# Patient Record
Sex: FEMALE | Race: WHITE | Employment: OTHER | URBAN - METROPOLITAN AREA
[De-identification: names, ages, dates, MRNs, and addresses within clinical notes are randomized per-mention and may not be internally consistent; named-entity substitution may affect disease eponyms.]

---

## 2017-01-03 ENCOUNTER — TELEPHONE (OUTPATIENT)
Dept: PULMONOLOGY | Age: 62
End: 2017-01-03

## 2017-03-01 ENCOUNTER — OFFICE VISIT (OUTPATIENT)
Dept: PULMONOLOGY | Age: 62
End: 2017-03-01

## 2017-03-01 VITALS
BODY MASS INDEX: 27.07 KG/M2 | DIASTOLIC BLOOD PRESSURE: 82 MMHG | HEIGHT: 63 IN | SYSTOLIC BLOOD PRESSURE: 104 MMHG | RESPIRATION RATE: 20 BRPM | HEART RATE: 90 BPM | TEMPERATURE: 98.5 F | OXYGEN SATURATION: 94 % | WEIGHT: 152.8 LBS

## 2017-03-01 DIAGNOSIS — J44.9 CHRONIC OBSTRUCTIVE PULMONARY DISEASE, UNSPECIFIED COPD TYPE (HCC): Primary | ICD-10-CM

## 2017-03-01 PROCEDURE — 99213 OFFICE O/P EST LOW 20 MIN: CPT | Performed by: INTERNAL MEDICINE

## 2017-03-01 RX ORDER — NICOTINE 21 MG/24HR
1 PATCH, TRANSDERMAL 24 HOURS TRANSDERMAL EVERY 24 HOURS
Qty: 30 PATCH | Refills: 3 | Status: ON HOLD | OUTPATIENT
Start: 2017-03-01 | End: 2018-05-22 | Stop reason: HOSPADM

## 2017-09-15 ENCOUNTER — HOSPITAL ENCOUNTER (OUTPATIENT)
Dept: OTHER | Age: 62
Discharge: HOME | End: 2017-09-15
Attending: PSYCHIATRY & NEUROLOGY | Admitting: PSYCHIATRY & NEUROLOGY

## 2017-09-15 LAB
A/G RATIO: 1.4 (ref 1.1–2.2)
ALBUMIN SERPL-MCNC: 4.1 G/DL (ref 3.4–5)
ALP BLD-CCNC: 97 U/L (ref 40–129)
ALT SERPL-CCNC: 19 U/L (ref 10–40)
ANION GAP SERPL CALCULATED.3IONS-SCNC: 13 MMOL/L (ref 3–16)
AST SERPL-CCNC: 27 U/L (ref 15–37)
BASOPHILS ABSOLUTE: 0.1 K/UL (ref 0–0.2)
BASOPHILS RELATIVE PERCENT: 0.9 %
BILIRUB SERPL-MCNC: 0.5 MG/DL (ref 0–1)
BUN BLDV-MCNC: 9 MG/DL (ref 7–20)
CALCIUM SERPL-MCNC: 9.2 MG/DL (ref 8.3–10.6)
CHLORIDE BLD-SCNC: 96 MMOL/L (ref 99–110)
CHOLESTEROL, TOTAL: 154 MG/DL (ref 0–199)
CO2: 23 MMOL/L (ref 21–32)
CREAT SERPL-MCNC: <0.5 MG/DL (ref 0.6–1.1)
EOSINOPHILS ABSOLUTE: 0.2 K/UL (ref 0–0.6)
EOSINOPHILS RELATIVE PERCENT: 1.8 %
GFR AFRICAN AMERICAN: >60
GFR NON-AFRICAN AMERICAN: >60
GLOBULIN: 3 G/DL
GLUCOSE BLD-MCNC: 118 MG/DL (ref 70–99)
HCT VFR BLD CALC: 44.1 % (ref 36–48)
HDLC SERPL-MCNC: 81 MG/DL (ref 40–60)
HEMOGLOBIN: 15.3 G/DL (ref 12–16)
LDL CHOLESTEROL CALCULATED: 64 MG/DL
LYMPHOCYTES ABSOLUTE: 1.7 K/UL (ref 1–5.1)
LYMPHOCYTES RELATIVE PERCENT: 18 %
MCH RBC QN AUTO: 32.2 PG (ref 26–34)
MCHC RBC AUTO-ENTMCNC: 34.7 G/DL (ref 31–36)
MCV RBC AUTO: 92.6 FL (ref 80–100)
MONOCYTES ABSOLUTE: 0.8 K/UL (ref 0–1.3)
MONOCYTES RELATIVE PERCENT: 8.6 %
NEUTROPHILS ABSOLUTE: 6.7 K/UL (ref 1.7–7.7)
NEUTROPHILS RELATIVE PERCENT: 70.7 %
PDW BLD-RTO: 12.9 % (ref 12.4–15.4)
PLATELET # BLD: 178 K/UL (ref 135–450)
PMV BLD AUTO: 7.4 FL (ref 5–10.5)
POTASSIUM SERPL-SCNC: 4.4 MMOL/L (ref 3.5–5.1)
RBC # BLD: 4.76 M/UL (ref 4–5.2)
SODIUM BLD-SCNC: 132 MMOL/L (ref 136–145)
TOTAL PROTEIN: 7.1 G/DL (ref 6.4–8.2)
TRIGL SERPL-MCNC: 44 MG/DL (ref 0–150)
TSH SERPL DL<=0.05 MIU/L-ACNC: 1.04 UIU/ML (ref 0.27–4.2)
VLDLC SERPL CALC-MCNC: 9 MG/DL
WBC # BLD: 9.5 K/UL (ref 4–11)

## 2017-09-16 LAB
ESTIMATED AVERAGE GLUCOSE: 108.3 MG/DL
HBA1C MFR BLD: 5.4 %

## 2018-02-27 ENCOUNTER — TELEPHONE (OUTPATIENT)
Dept: PULMONOLOGY | Age: 63
End: 2018-02-27

## 2018-04-03 ENCOUNTER — TELEPHONE (OUTPATIENT)
Dept: PULMONOLOGY | Age: 63
End: 2018-04-03

## 2018-05-20 PROBLEM — J44.1 COPD EXACERBATION (HCC): Status: ACTIVE | Noted: 2018-05-20

## 2018-05-21 PROBLEM — J96.01 ACUTE RESPIRATORY FAILURE WITH HYPOXIA (HCC): Status: ACTIVE | Noted: 2018-05-21

## 2018-06-01 ENCOUNTER — OFFICE VISIT (OUTPATIENT)
Dept: PULMONOLOGY | Age: 63
End: 2018-06-01

## 2018-06-01 VITALS
WEIGHT: 139 LBS | HEIGHT: 64 IN | BODY MASS INDEX: 23.73 KG/M2 | SYSTOLIC BLOOD PRESSURE: 114 MMHG | DIASTOLIC BLOOD PRESSURE: 72 MMHG | RESPIRATION RATE: 18 BRPM | TEMPERATURE: 97.6 F | OXYGEN SATURATION: 96 % | HEART RATE: 53 BPM

## 2018-06-01 DIAGNOSIS — J96.01 ACUTE RESPIRATORY FAILURE WITH HYPOXIA (HCC): ICD-10-CM

## 2018-06-01 DIAGNOSIS — J44.9 CHRONIC OBSTRUCTIVE PULMONARY DISEASE, UNSPECIFIED COPD TYPE (HCC): Primary | ICD-10-CM

## 2018-06-01 DIAGNOSIS — F17.210 NICOTINE DEPENDENCE, CIGARETTES, UNCOMPLICATED: ICD-10-CM

## 2018-06-01 PROCEDURE — 1111F DSCHRG MED/CURRENT MED MERGE: CPT | Performed by: INTERNAL MEDICINE

## 2018-06-01 PROCEDURE — 3017F COLORECTAL CA SCREEN DOC REV: CPT | Performed by: INTERNAL MEDICINE

## 2018-06-01 PROCEDURE — G8420 CALC BMI NORM PARAMETERS: HCPCS | Performed by: INTERNAL MEDICINE

## 2018-06-01 PROCEDURE — 99214 OFFICE O/P EST MOD 30 MIN: CPT | Performed by: INTERNAL MEDICINE

## 2018-06-01 PROCEDURE — 4004F PT TOBACCO SCREEN RCVD TLK: CPT | Performed by: INTERNAL MEDICINE

## 2018-06-01 PROCEDURE — G8926 SPIRO NO PERF OR DOC: HCPCS | Performed by: INTERNAL MEDICINE

## 2018-06-01 PROCEDURE — G0296 VISIT TO DETERM LDCT ELIG: HCPCS | Performed by: INTERNAL MEDICINE

## 2018-06-01 PROCEDURE — 3023F SPIROM DOC REV: CPT | Performed by: INTERNAL MEDICINE

## 2018-06-01 PROCEDURE — G8427 DOCREV CUR MEDS BY ELIG CLIN: HCPCS | Performed by: INTERNAL MEDICINE

## 2018-06-01 RX ORDER — ALBUTEROL SULFATE 2.5 MG/3ML
2.5 SOLUTION RESPIRATORY (INHALATION) EVERY 6 HOURS PRN
Qty: 120 EACH | Refills: 5 | Status: SHIPPED | OUTPATIENT
Start: 2018-06-01 | End: 2018-11-27 | Stop reason: SDUPTHER

## 2018-06-01 RX ORDER — NICOTINE 21 MG/24HR
1 PATCH, TRANSDERMAL 24 HOURS TRANSDERMAL EVERY 24 HOURS
Qty: 30 PATCH | Refills: 3 | Status: SHIPPED | OUTPATIENT
Start: 2018-06-01 | End: 2018-07-17 | Stop reason: SDUPTHER

## 2018-06-12 ENCOUNTER — TELEPHONE (OUTPATIENT)
Dept: PULMONOLOGY | Age: 63
End: 2018-06-12

## 2018-07-13 ENCOUNTER — HOSPITAL ENCOUNTER (OUTPATIENT)
Dept: PULMONOLOGY | Age: 63
Discharge: HOME | End: 2018-07-14
Attending: INTERNAL MEDICINE | Admitting: INTERNAL MEDICINE

## 2018-07-13 DIAGNOSIS — F17.200 NICOTINE DEPENDENCE, UNCOMPLICATED: ICD-10-CM

## 2018-07-13 DIAGNOSIS — F17.210 NICOTINE DEPENDENCE, CIGARETTES, UNCOMPLICATED: ICD-10-CM

## 2018-07-13 RX ORDER — ALBUTEROL SULFATE 2.5 MG/3ML
2.5 SOLUTION RESPIRATORY (INHALATION) ONCE
Status: COMPLETED | OUTPATIENT
Start: 2018-07-13 | End: 2018-07-13

## 2018-07-13 RX ADMIN — ALBUTEROL SULFATE 2.5 MG: 2.5 SOLUTION RESPIRATORY (INHALATION) at 11:55

## 2018-07-16 ENCOUNTER — TELEPHONE (OUTPATIENT)
Dept: CASE MANAGEMENT | Age: 63
End: 2018-07-16

## 2018-07-16 NOTE — TELEPHONE ENCOUNTER
Baseline lung screen on 7-13-18. LRAD4A Recommended LDCT in 3 months (10-13-18). Ordering office contacts pt with results. Per physician order, current smoker, 35 pack yr hx. Per OV note dated 6-1-18: Smoked 405years at 1ppd/Smoking < 1/2 PPD. Smoking cessation material mailed. 7-17-18 OV/review for next imaging.

## 2018-07-17 ENCOUNTER — OFFICE VISIT (OUTPATIENT)
Dept: PULMONOLOGY | Age: 63
End: 2018-07-17

## 2018-07-17 VITALS
BODY MASS INDEX: 24.13 KG/M2 | HEART RATE: 57 BPM | HEIGHT: 63 IN | WEIGHT: 136.2 LBS | DIASTOLIC BLOOD PRESSURE: 58 MMHG | RESPIRATION RATE: 20 BRPM | SYSTOLIC BLOOD PRESSURE: 112 MMHG | OXYGEN SATURATION: 93 %

## 2018-07-17 DIAGNOSIS — R91.1 PULMONARY NODULE: Primary | ICD-10-CM

## 2018-07-17 DIAGNOSIS — F17.200 TOBACCO USE DISORDER: ICD-10-CM

## 2018-07-17 DIAGNOSIS — J44.9 MODERATE COPD (CHRONIC OBSTRUCTIVE PULMONARY DISEASE) (HCC): ICD-10-CM

## 2018-07-17 PROCEDURE — 4004F PT TOBACCO SCREEN RCVD TLK: CPT | Performed by: INTERNAL MEDICINE

## 2018-07-17 PROCEDURE — G8420 CALC BMI NORM PARAMETERS: HCPCS | Performed by: INTERNAL MEDICINE

## 2018-07-17 PROCEDURE — 3023F SPIROM DOC REV: CPT | Performed by: INTERNAL MEDICINE

## 2018-07-17 PROCEDURE — 3017F COLORECTAL CA SCREEN DOC REV: CPT | Performed by: INTERNAL MEDICINE

## 2018-07-17 PROCEDURE — 99214 OFFICE O/P EST MOD 30 MIN: CPT | Performed by: INTERNAL MEDICINE

## 2018-07-17 PROCEDURE — G8427 DOCREV CUR MEDS BY ELIG CLIN: HCPCS | Performed by: INTERNAL MEDICINE

## 2018-07-17 PROCEDURE — G8926 SPIRO NO PERF OR DOC: HCPCS | Performed by: INTERNAL MEDICINE

## 2018-07-17 NOTE — PROGRESS NOTES
findings. Soft Tissues/Bones: No acute findings. No definitive osteolytic or   osteoblastic bone lesions.         7/13/18 new 7mm LLL nodule    ASSESSMENT:   New LLL 7mm lung nodule  Moderate COPD  Severe decrease in DLCO with advanced emphysema on Chest CT  Tobacco abuse    PLAN:   Repeat Chest CT in 3 months  Start Spiriva  She is using NRT patches intermittently  prn albuterol or albuterol nebs  F/u in 3 months  Repeat 6 MWT in 6 months for borderline hypoxia

## 2018-07-27 ENCOUNTER — TELEPHONE (OUTPATIENT)
Dept: CASE MANAGEMENT | Age: 63
End: 2018-07-27

## 2018-07-27 NOTE — TELEPHONE ENCOUNTER
7-13-18 baseline lung screen, LRAD4A    7-17-18 Pulm OV:Repeat Chest CT in 3 months    10-15-18 CT chest scheduled

## 2018-09-17 ENCOUNTER — TELEPHONE (OUTPATIENT)
Dept: PULMONOLOGY | Age: 63
End: 2018-09-17

## 2018-09-17 RX ORDER — PREDNISONE 10 MG/1
TABLET ORAL
Qty: 30 TABLET | Refills: 0 | Status: SHIPPED | OUTPATIENT
Start: 2018-09-17 | End: 2019-10-21

## 2018-09-17 RX ORDER — AZITHROMYCIN 250 MG/1
TABLET, FILM COATED ORAL
Qty: 1 PACKET | Refills: 0 | Status: SHIPPED | OUTPATIENT
Start: 2018-09-17 | End: 2018-09-21

## 2018-09-17 NOTE — TELEPHONE ENCOUNTER
Do you have the following symptoms? Shortness of Breath  Yes  Wheezing  No  Cough  Yes                  Cough Characteristics:                           Productive    Yes                           Sputum Color    Clear                           Hemoptysis   No                           Consistency of sputum   Thick     Fever:    Yes  Temp:not sure did not take but states she knows she had one  Chills/Sweats: Both  What other symptoms are you having?:  Fatigue, achy     How long have you had these symptoms? 4 days     Pharmacy: [facility]           Review medications and allergies: Allergies? Allergies   Allergen Reactions    Cephalosporins Shortness Of Breath    Pcn [Penicillins] Anaphylaxis and Hives                        Currently on Antibiotics? (Drug/Dose/Frequency and how long on?) No                   Systemic Steroids? (Drug/Dose/Frequency and how long on?) No      Last sick call taken on 6/12/18.   Meds prescribed were none    LOV: 7/17/18      ASSESSMENT:   · New LLL 7mm lung nodule  · Moderate COPD  · Severe decrease in DLCO with advanced emphysema on Chest CT  · Tobacco abuse     PLAN:   · Repeat Chest CT in 3 months  · Start Spiriva  · She is using NRT patches intermittently  · prn albuterol or albuterol nebs  · F/u in 3 months  · Repeat 6 MWT in 6 months for borderline hypoxia

## 2018-10-16 ENCOUNTER — TELEPHONE (OUTPATIENT)
Dept: PULMONOLOGY | Age: 63
End: 2018-10-16

## 2018-10-16 NOTE — TELEPHONE ENCOUNTER
Patient did not show for 3 month CT fua  with  on 10/16/18    Patient was also no show on: 4/3/18    LOV   ASSESSMENT: 7/17/18  · New LLL 7mm lung nodule  · Moderate COPD  · Severe decrease in DLCO with advanced emphysema on Chest CT  · Tobacco abuse     PLAN:   · Repeat Chest CT in 3 months  · Start Spiriva  · She is using NRT patches intermittently  · prn albuterol or albuterol nebs  · F/u in 3 months  · Repeat 6 MWT in 6 months for borderline hypoxia

## 2018-11-13 ENCOUNTER — TELEPHONE (OUTPATIENT)
Dept: PULMONOLOGY | Age: 63
End: 2018-11-13

## 2018-11-26 ENCOUNTER — TELEPHONE (OUTPATIENT)
Dept: CASE MANAGEMENT | Age: 63
End: 2018-11-26

## 2018-11-27 DIAGNOSIS — J44.9 CHRONIC OBSTRUCTIVE PULMONARY DISEASE, UNSPECIFIED COPD TYPE (HCC): ICD-10-CM

## 2018-11-27 RX ORDER — ALBUTEROL SULFATE 2.5 MG/3ML
SOLUTION RESPIRATORY (INHALATION)
Qty: 300 ML | Refills: 0 | Status: SHIPPED | OUTPATIENT
Start: 2018-11-27 | End: 2019-02-14 | Stop reason: SDUPTHER

## 2019-01-09 ENCOUNTER — TELEPHONE (OUTPATIENT)
Dept: PULMONOLOGY | Age: 64
End: 2019-01-09

## 2019-01-17 RX ORDER — TIOTROPIUM BROMIDE INHALATION SPRAY 3.12 UG/1
2 SPRAY, METERED RESPIRATORY (INHALATION) DAILY
Qty: 1 INHALER | Refills: 5 | Status: SHIPPED | OUTPATIENT
Start: 2019-01-17 | End: 2019-04-02

## 2019-02-14 DIAGNOSIS — J44.9 CHRONIC OBSTRUCTIVE PULMONARY DISEASE, UNSPECIFIED COPD TYPE (HCC): ICD-10-CM

## 2019-02-14 RX ORDER — ALBUTEROL SULFATE 2.5 MG/3ML
SOLUTION RESPIRATORY (INHALATION)
Qty: 360 VIAL | Refills: 5 | Status: SHIPPED | OUTPATIENT
Start: 2019-02-14 | End: 2019-04-02

## 2019-02-27 ENCOUNTER — TELEPHONE (OUTPATIENT)
Dept: PULMONOLOGY | Age: 64
End: 2019-02-27

## 2019-04-02 ENCOUNTER — TELEPHONE (OUTPATIENT)
Dept: PULMONOLOGY | Age: 64
End: 2019-04-02

## 2019-04-02 NOTE — TELEPHONE ENCOUNTER
Pt called to inform Dr. Sylvester Sensing that she is no longer using Albuterol neb treatments or the Spiriva Respimat inhaler. She said the Spiriva Respimat caused stomach upset and she just has not needed this inhaler or albuterol neb treatments for over 3 months. She is asking if we can remove these from her medication list.    OK to remove?     Last OV 7/17/18  ASSESSMENT:   · New LLL 7mm lung nodule  · Moderate COPD  · Severe decrease in DLCO with advanced emphysema on Chest CT  · Tobacco abuse     PLAN:   · Repeat Chest CT in 3 months  · Start Spiriva  · She is using NRT patches intermittently  · prn albuterol or albuterol nebs  · F/u in 3 months  · Repeat 6 MWT in 6 months for borderline hypoxia

## 2019-04-04 RX ORDER — ALBUTEROL SULFATE 90 UG/1
AEROSOL, METERED RESPIRATORY (INHALATION)
Qty: 3 INHALER | Refills: 1 | Status: ON HOLD | OUTPATIENT
Start: 2019-04-04 | End: 2020-02-07 | Stop reason: SDUPTHER

## 2019-08-28 ENCOUNTER — HOSPITAL ENCOUNTER (OUTPATIENT)
Age: 64
Discharge: HOME | End: 2019-08-28
Payer: MEDICARE

## 2019-08-28 ENCOUNTER — HOSPITAL ENCOUNTER (OUTPATIENT)
Dept: GENERAL RADIOLOGY | Age: 64
Discharge: HOME | End: 2019-08-28
Payer: MEDICARE

## 2019-08-28 DIAGNOSIS — M25.561 RIGHT KNEE PAIN, UNSPECIFIED CHRONICITY: ICD-10-CM

## 2019-08-28 DIAGNOSIS — M25.562 LEFT KNEE PAIN, UNSPECIFIED CHRONICITY: ICD-10-CM

## 2019-08-28 DIAGNOSIS — M47.816 LUMBAR SPONDYLOSIS: ICD-10-CM

## 2019-08-28 PROCEDURE — 73560 X-RAY EXAM OF KNEE 1 OR 2: CPT

## 2019-08-28 PROCEDURE — 72100 X-RAY EXAM L-S SPINE 2/3 VWS: CPT

## 2019-10-21 ENCOUNTER — HOSPITAL ENCOUNTER (EMERGENCY)
Age: 64
Discharge: HOME | End: 2019-10-21
Payer: MEDICARE

## 2019-10-21 ENCOUNTER — APPOINTMENT (OUTPATIENT)
Dept: GENERAL RADIOLOGY | Age: 64
End: 2019-10-21
Payer: MEDICARE

## 2019-10-21 VITALS
RESPIRATION RATE: 16 BRPM | SYSTOLIC BLOOD PRESSURE: 140 MMHG | OXYGEN SATURATION: 95 % | HEART RATE: 58 BPM | TEMPERATURE: 98.3 F | DIASTOLIC BLOOD PRESSURE: 76 MMHG | BODY MASS INDEX: 22.02 KG/M2 | WEIGHT: 129 LBS | HEIGHT: 64 IN

## 2019-10-21 DIAGNOSIS — M79.604 RIGHT LEG PAIN: Primary | ICD-10-CM

## 2019-10-21 PROCEDURE — 73590 X-RAY EXAM OF LOWER LEG: CPT

## 2019-10-21 PROCEDURE — 96372 THER/PROPH/DIAG INJ SC/IM: CPT

## 2019-10-21 PROCEDURE — 99283 EMERGENCY DEPT VISIT LOW MDM: CPT

## 2019-10-21 PROCEDURE — 6360000002 HC RX W HCPCS: Performed by: PHYSICIAN ASSISTANT

## 2019-10-21 RX ORDER — OXYCODONE HYDROCHLORIDE 10 MG/1
TABLET ORAL
COMMUNITY
Start: 2019-10-03 | End: 2019-10-21

## 2019-10-21 RX ORDER — HYDROXYZINE HYDROCHLORIDE 25 MG/1
TABLET, FILM COATED ORAL
Status: ON HOLD | COMMUNITY
Start: 2019-10-07 | End: 2020-01-31

## 2019-10-21 RX ADMIN — ENOXAPARIN SODIUM 60 MG: 60 INJECTION SUBCUTANEOUS at 15:51

## 2019-10-21 ASSESSMENT — ENCOUNTER SYMPTOMS
SHORTNESS OF BREATH: 0
COLOR CHANGE: 0

## 2019-10-21 ASSESSMENT — PAIN SCALES - GENERAL: PAINLEVEL_OUTOF10: 8

## 2019-10-21 ASSESSMENT — PAIN DESCRIPTION - PAIN TYPE: TYPE: ACUTE PAIN

## 2019-10-21 ASSESSMENT — PAIN DESCRIPTION - LOCATION: LOCATION: KNEE

## 2019-10-21 ASSESSMENT — PAIN DESCRIPTION - ORIENTATION: ORIENTATION: RIGHT

## 2019-10-29 ENCOUNTER — HOSPITAL ENCOUNTER (OUTPATIENT)
Dept: VASCULAR LAB | Age: 64
Discharge: HOME | End: 2019-10-29
Payer: MEDICARE

## 2019-10-29 DIAGNOSIS — M79.604 RIGHT LEG PAIN: ICD-10-CM

## 2019-10-29 PROCEDURE — 93971 EXTREMITY STUDY: CPT

## 2020-01-30 ENCOUNTER — HOSPITAL ENCOUNTER (INPATIENT)
Age: 65
LOS: 2 days | Discharge: HOME | DRG: 641 | End: 2020-02-01
Attending: EMERGENCY MEDICINE | Admitting: INTERNAL MEDICINE
Payer: MEDICARE

## 2020-01-30 ENCOUNTER — APPOINTMENT (OUTPATIENT)
Dept: GENERAL RADIOLOGY | Age: 65
DRG: 641 | End: 2020-01-30
Payer: MEDICARE

## 2020-01-30 PROBLEM — E87.1 HYPONATREMIA: Status: ACTIVE | Noted: 2020-01-30

## 2020-01-30 LAB
A/G RATIO: 1.5 (ref 1.1–2.2)
ALBUMIN SERPL-MCNC: 4 G/DL (ref 3.4–5)
ALBUMIN SERPL-MCNC: 4.1 G/DL (ref 3.4–5)
ALP BLD-CCNC: 89 U/L (ref 40–129)
ALT SERPL-CCNC: 22 U/L (ref 10–40)
ANION GAP SERPL CALCULATED.3IONS-SCNC: 13 MMOL/L (ref 3–16)
ANION GAP SERPL CALCULATED.3IONS-SCNC: 15 MMOL/L (ref 3–16)
AST SERPL-CCNC: 32 U/L (ref 15–37)
BACTERIA: ABNORMAL /HPF
BASOPHILS ABSOLUTE: 0 K/UL (ref 0–0.2)
BASOPHILS RELATIVE PERCENT: 0.6 %
BILIRUB SERPL-MCNC: 1 MG/DL (ref 0–1)
BILIRUBIN URINE: NEGATIVE
BLOOD, URINE: ABNORMAL
BUN BLDV-MCNC: 3 MG/DL (ref 7–20)
BUN BLDV-MCNC: <2 MG/DL (ref 7–20)
CALCIUM SERPL-MCNC: 9 MG/DL (ref 8.3–10.6)
CALCIUM SERPL-MCNC: 9.1 MG/DL (ref 8.3–10.6)
CHLORIDE BLD-SCNC: 83 MMOL/L (ref 99–110)
CHLORIDE BLD-SCNC: 89 MMOL/L (ref 99–110)
CHLORIDE URINE RANDOM: <20 MMOL/L
CLARITY: CLEAR
CO2: 21 MMOL/L (ref 21–32)
CO2: 23 MMOL/L (ref 21–32)
COLOR: ABNORMAL
CREAT SERPL-MCNC: <0.5 MG/DL (ref 0.6–1.2)
CREAT SERPL-MCNC: <0.5 MG/DL (ref 0.6–1.2)
EKG ATRIAL RATE: 58 BPM
EKG DIAGNOSIS: NORMAL
EKG P AXIS: 42 DEGREES
EKG P-R INTERVAL: 134 MS
EKG Q-T INTERVAL: 454 MS
EKG QRS DURATION: 76 MS
EKG QTC CALCULATION (BAZETT): 445 MS
EKG R AXIS: -65 DEGREES
EKG T AXIS: 67 DEGREES
EKG VENTRICULAR RATE: 58 BPM
EOSINOPHILS ABSOLUTE: 0 K/UL (ref 0–0.6)
EOSINOPHILS RELATIVE PERCENT: 0.2 %
EPITHELIAL CELLS, UA: ABNORMAL /HPF
GFR AFRICAN AMERICAN: >60
GFR AFRICAN AMERICAN: >60
GFR NON-AFRICAN AMERICAN: >60
GFR NON-AFRICAN AMERICAN: >60
GLOBULIN: 2.7 G/DL
GLUCOSE BLD-MCNC: 100 MG/DL (ref 70–99)
GLUCOSE BLD-MCNC: 150 MG/DL (ref 70–99)
GLUCOSE URINE: NEGATIVE MG/DL
HCT VFR BLD CALC: 41.1 % (ref 36–48)
HEMOGLOBIN: 14.3 G/DL (ref 12–16)
KETONES, URINE: NEGATIVE MG/DL
LEUKOCYTE ESTERASE, URINE: NEGATIVE
LYMPHOCYTES ABSOLUTE: 1.4 K/UL (ref 1–5.1)
LYMPHOCYTES RELATIVE PERCENT: 18.9 %
MAGNESIUM: 1.5 MG/DL (ref 1.8–2.4)
MCH RBC QN AUTO: 31.2 PG (ref 26–34)
MCHC RBC AUTO-ENTMCNC: 34.8 G/DL (ref 31–36)
MCV RBC AUTO: 89.8 FL (ref 80–100)
MICROSCOPIC EXAMINATION: YES
MONOCYTES ABSOLUTE: 0.5 K/UL (ref 0–1.3)
MONOCYTES RELATIVE PERCENT: 6.6 %
NEUTROPHILS ABSOLUTE: 5.5 K/UL (ref 1.7–7.7)
NEUTROPHILS RELATIVE PERCENT: 73.7 %
NITRITE, URINE: NEGATIVE
OSMOLALITY URINE: 111 MOSM/KG (ref 390–1070)
OSMOLALITY: 250 MOSM/KG (ref 280–301)
PDW BLD-RTO: 12.6 % (ref 12.4–15.4)
PH UA: 6 (ref 5–8)
PHOSPHORUS: 2.5 MG/DL (ref 2.5–4.9)
PLATELET # BLD: 230 K/UL (ref 135–450)
PMV BLD AUTO: 7.2 FL (ref 5–10.5)
POTASSIUM SERPL-SCNC: 3.2 MMOL/L (ref 3.5–5.1)
POTASSIUM SERPL-SCNC: 3.7 MMOL/L (ref 3.5–5.1)
POTASSIUM, UR: 12.6 MMOL/L
PROTEIN UA: NEGATIVE MG/DL
RBC # BLD: 4.57 M/UL (ref 4–5.2)
RBC UA: ABNORMAL /HPF (ref 0–2)
SODIUM BLD-SCNC: 121 MMOL/L (ref 136–145)
SODIUM BLD-SCNC: 123 MMOL/L (ref 136–145)
SODIUM URINE: <20 MMOL/L
SPECIFIC GRAVITY UA: <=1.005 (ref 1–1.03)
TOTAL PROTEIN: 6.8 G/DL (ref 6.4–8.2)
URINE REFLEX TO CULTURE: ABNORMAL
URINE TYPE: ABNORMAL
UROBILINOGEN, URINE: 0.2 E.U./DL
WBC # BLD: 7.5 K/UL (ref 4–11)
WBC UA: ABNORMAL /HPF (ref 0–5)

## 2020-01-30 PROCEDURE — 84300 ASSAY OF URINE SODIUM: CPT

## 2020-01-30 PROCEDURE — 83735 ASSAY OF MAGNESIUM: CPT

## 2020-01-30 PROCEDURE — 36415 COLL VENOUS BLD VENIPUNCTURE: CPT

## 2020-01-30 PROCEDURE — 6370000000 HC RX 637 (ALT 250 FOR IP): Performed by: NURSE PRACTITIONER

## 2020-01-30 PROCEDURE — 2060000000 HC ICU INTERMEDIATE R&B

## 2020-01-30 PROCEDURE — 71046 X-RAY EXAM CHEST 2 VIEWS: CPT

## 2020-01-30 PROCEDURE — 2580000003 HC RX 258: Performed by: NURSE PRACTITIONER

## 2020-01-30 PROCEDURE — 6360000002 HC RX W HCPCS: Performed by: NURSE PRACTITIONER

## 2020-01-30 PROCEDURE — 85025 COMPLETE CBC W/AUTO DIFF WBC: CPT

## 2020-01-30 PROCEDURE — 93010 ELECTROCARDIOGRAM REPORT: CPT | Performed by: INTERNAL MEDICINE

## 2020-01-30 PROCEDURE — 99285 EMERGENCY DEPT VISIT HI MDM: CPT

## 2020-01-30 PROCEDURE — 93005 ELECTROCARDIOGRAM TRACING: CPT | Performed by: NURSE PRACTITIONER

## 2020-01-30 PROCEDURE — 81001 URINALYSIS AUTO W/SCOPE: CPT

## 2020-01-30 PROCEDURE — 84133 ASSAY OF URINE POTASSIUM: CPT

## 2020-01-30 PROCEDURE — 82436 ASSAY OF URINE CHLORIDE: CPT

## 2020-01-30 PROCEDURE — 83935 ASSAY OF URINE OSMOLALITY: CPT

## 2020-01-30 PROCEDURE — 80053 COMPREHEN METABOLIC PANEL: CPT

## 2020-01-30 PROCEDURE — 83930 ASSAY OF BLOOD OSMOLALITY: CPT

## 2020-01-30 PROCEDURE — 96365 THER/PROPH/DIAG IV INF INIT: CPT

## 2020-01-30 RX ORDER — SODIUM CHLORIDE 0.9 % (FLUSH) 0.9 %
10 SYRINGE (ML) INJECTION PRN
Status: DISCONTINUED | OUTPATIENT
Start: 2020-01-30 | End: 2020-02-01 | Stop reason: HOSPADM

## 2020-01-30 RX ORDER — ALBUTEROL SULFATE 90 UG/1
2 AEROSOL, METERED RESPIRATORY (INHALATION) EVERY 6 HOURS PRN
Status: DISCONTINUED | OUTPATIENT
Start: 2020-01-30 | End: 2020-02-01 | Stop reason: HOSPADM

## 2020-01-30 RX ORDER — MAGNESIUM SULFATE 1 G/100ML
1 INJECTION INTRAVENOUS PRN
Status: DISCONTINUED | OUTPATIENT
Start: 2020-01-30 | End: 2020-02-01 | Stop reason: HOSPADM

## 2020-01-30 RX ORDER — MAGNESIUM SULFATE 1 G/100ML
1 INJECTION INTRAVENOUS ONCE
Status: COMPLETED | OUTPATIENT
Start: 2020-01-30 | End: 2020-01-30

## 2020-01-30 RX ORDER — SODIUM CHLORIDE 0.9 % (FLUSH) 0.9 %
10 SYRINGE (ML) INJECTION EVERY 12 HOURS SCHEDULED
Status: DISCONTINUED | OUTPATIENT
Start: 2020-01-30 | End: 2020-02-01 | Stop reason: HOSPADM

## 2020-01-30 RX ORDER — POTASSIUM CHLORIDE 7.45 MG/ML
10 INJECTION INTRAVENOUS PRN
Status: DISCONTINUED | OUTPATIENT
Start: 2020-01-30 | End: 2020-02-01 | Stop reason: HOSPADM

## 2020-01-30 RX ORDER — M-VIT,TX,IRON,MINS/CALC/FOLIC 27MG-0.4MG
1 TABLET ORAL DAILY
Status: DISCONTINUED | OUTPATIENT
Start: 2020-01-30 | End: 2020-02-01 | Stop reason: HOSPADM

## 2020-01-30 RX ORDER — NICOTINE 21 MG/24HR
1 PATCH, TRANSDERMAL 24 HOURS TRANSDERMAL DAILY
Status: DISCONTINUED | OUTPATIENT
Start: 2020-01-30 | End: 2020-02-01 | Stop reason: HOSPADM

## 2020-01-30 RX ORDER — POLYETHYLENE GLYCOL 3350 17 G/17G
17 POWDER, FOR SOLUTION ORAL DAILY PRN
Status: DISCONTINUED | OUTPATIENT
Start: 2020-01-30 | End: 2020-02-01 | Stop reason: HOSPADM

## 2020-01-30 RX ORDER — OXYCODONE HYDROCHLORIDE 5 MG/1
10 TABLET ORAL EVERY 6 HOURS PRN
Status: DISCONTINUED | OUTPATIENT
Start: 2020-01-30 | End: 2020-02-01 | Stop reason: HOSPADM

## 2020-01-30 RX ORDER — POTASSIUM CHLORIDE 20 MEQ/1
40 TABLET, EXTENDED RELEASE ORAL PRN
Status: DISCONTINUED | OUTPATIENT
Start: 2020-01-30 | End: 2020-02-01 | Stop reason: HOSPADM

## 2020-01-30 RX ORDER — ONDANSETRON 2 MG/ML
4 INJECTION INTRAMUSCULAR; INTRAVENOUS EVERY 6 HOURS PRN
Status: DISCONTINUED | OUTPATIENT
Start: 2020-01-30 | End: 2020-02-01 | Stop reason: HOSPADM

## 2020-01-30 RX ORDER — SODIUM CHLORIDE 9 MG/ML
INJECTION, SOLUTION INTRAVENOUS CONTINUOUS
Status: DISCONTINUED | OUTPATIENT
Start: 2020-01-30 | End: 2020-01-31

## 2020-01-30 RX ORDER — 0.9 % SODIUM CHLORIDE 0.9 %
500 INTRAVENOUS SOLUTION INTRAVENOUS ONCE
Status: COMPLETED | OUTPATIENT
Start: 2020-01-30 | End: 2020-01-30

## 2020-01-30 RX ORDER — POTASSIUM CHLORIDE 20 MEQ/1
40 TABLET, EXTENDED RELEASE ORAL ONCE
Status: COMPLETED | OUTPATIENT
Start: 2020-01-30 | End: 2020-01-30

## 2020-01-30 RX ADMIN — MAGNESIUM SULFATE HEPTAHYDRATE 1 G: 1 INJECTION, SOLUTION INTRAVENOUS at 13:57

## 2020-01-30 RX ADMIN — SODIUM CHLORIDE: 9 INJECTION, SOLUTION INTRAVENOUS at 20:49

## 2020-01-30 RX ADMIN — POTASSIUM CHLORIDE 40 MEQ: 1500 TABLET, EXTENDED RELEASE ORAL at 13:57

## 2020-01-30 RX ADMIN — Medication 10 ML: at 20:49

## 2020-01-30 RX ADMIN — SODIUM CHLORIDE 500 ML: 9 INJECTION, SOLUTION INTRAVENOUS at 13:57

## 2020-01-30 ASSESSMENT — ENCOUNTER SYMPTOMS
RHINORRHEA: 0
SHORTNESS OF BREATH: 0
ABDOMINAL PAIN: 0
COLOR CHANGE: 0
SORE THROAT: 0

## 2020-01-30 NOTE — ED PROVIDER NOTES
[facility]  [facility]  eMERGENCY dEPARTMENT eNCOUnter        Pt Name: Felipa Breen  MRN: [id number]  [place] 1955  Date of evaluation: 1/30/2020  Provider: JUAN Oneal - CNP  PCP: Alma Snow MD  ED Attending: No att. providers found    [facility]       Chief Complaint   Patient presents with    Fatigue     pt has been weak and dizzy. not feeling well.  states this has been going on for a week. states she feels \"high\". hx of copd       HISTORY OF PRESENT ILLNESS   (Location/Symptom, Timing/Onset, Context/Setting, Quality, Duration, Modifying Factors, Severity)  Note limiting factors. Felipa Breen is a 60 y.o. female for fatigue. Onset was for 1 week. \. Duration has been since the onset. Context includes daughter reports pt has had increased fatigue for while and states she feels like she is drugged. She has cut back on her pain meds thinking this was the issue but she is still fatigued. Alleviating factors include nothing. Aggravating factors include nothing. Pain is 0/10. nothing has been used for pain today. Nursing Notes were all reviewed and agreed with or any disagreements were addressed  in the HPI. REVIEW OF SYSTEMS  (2-9 systems for level 4, 10 or more for level 5)     Review of Systems   Constitutional: Positive for fatigue. Negative for fever. HENT: Negative for congestion, rhinorrhea and sore throat. Respiratory: Negative for shortness of breath. Cardiovascular: Negative for chest pain. Gastrointestinal: Negative for abdominal pain. Genitourinary: Negative for decreased urine volume and difficulty urinating. Musculoskeletal: Negative for arthralgias and myalgias. Skin: Negative for color change and rash. Neurological: Positive for dizziness. Negative for light-headedness. Psychiatric/Behavioral: Negative for agitation. All other systems reviewed and are negative. Positivesand Pertinent negatives as per HPI.   Except as noted Socioeconomic History    Marital status:      Spouse name: None    Number of children: 10    Years of education: None    Highest education level: None   Occupational History    None   Social Needs    Financial resource strain: None    Food insecurity:     Worry: None     Inability: None    Transportation needs:     Medical: None     Non-medical: None   Tobacco Use    Smoking status: Current Every Day Smoker     Packs/day: 0.50     Years: 35.00     Pack years: 17.50     Types: Cigarettes    Smokeless tobacco: Never Used    Tobacco comment: 7/17/18 currently 1/2 PPD   Substance and Sexual Activity    Alcohol use: No     Alcohol/week: 12.0 standard drinks     Types: 12 Cans of beer per week    Drug use: No    Sexual activity: Never   Lifestyle    Physical activity:     Days per week: None     Minutes per session: None    Stress: None   Relationships    Social connections:     Talks on phone: None     Gets together: None     Attends Hinduism service: None     Active member of club or organization: None     Attends meetings of clubs or organizations: None     Relationship status: None    Intimate partner violence:     Fear of current or ex partner: None     Emotionally abused: None     Physically abused: None     Forced sexual activity: None   Other Topics Concern    None   Social History Narrative    None       SCREENINGS    [place] Coma Scale  Eye Opening: Spontaneous  Best Verbal Response: Oriented  Best Motor Response: Obeys commands  [place]ster Coma Scale Score: 15        PHYSICAL EXAM    (up to 7 for level 4, 8 ormore for level 5)     ED Triage Vitals [01/30/20 1148]   BP Temp Temp Source Pulse Resp SpO2 Height Weight   (!) 150/78 99.3 °F (37.4 °C) Oral 66 20 94 % -- 130 lb (59 kg)       Physical Exam  Constitutional:       Appearance: She is well-developed. HENT:      Head: Normocephalic and atraumatic. Eyes:      Extraocular Movements: Extraocular movements intact.       Pupils: Pupils are equal, round, and reactive to light. Neck:      Musculoskeletal: Normal range of motion. Cardiovascular:      Rate and Rhythm: Normal rate. Pulmonary:      Effort: Pulmonary effort is normal. No respiratory distress. Abdominal:      General: There is no distension. Palpations: Abdomen is soft. Tenderness: There is no abdominal tenderness. Musculoskeletal: Normal range of motion. Skin:     General: Skin is warm and dry. Neurological:      General: No focal deficit present. Mental Status: She is alert and oriented to person, place, and time.          DIAGNOSTIC RESULTS   LABS:    Labs Reviewed   COMPREHENSIVE METABOLIC PANEL - Abnormal; Notable for the following components:       Result Value    Sodium 121 (*)     Potassium 3.2 (*)     Chloride 83 (*)     Glucose 100 (*)     BUN 3 (*)     CREATININE <0.5 (*)     All other components within normal limits    Narrative:     Performed at:  [place] [address],  [facility]   Phone [phone]   URINE RT REFLEX TO CULTURE - Abnormal; Notable for the following components:    Blood, Urine TRACE-INTACT (*)     All other components within normal limits    Narrative:     Performed at:  [place] [address],  [facility]   Phone [phone]   MICROSCOPIC URINALYSIS - Abnormal; Notable for the following components:    RBC, UA 3-5 (*)     Bacteria, UA 2+ (*)     All other components within normal limits    Narrative:     Performed at:  [facility]) [facility],  [employer]   Phone [phone]   MAGNESIUM - Abnormal; Notable for the following components:    Magnesium 1.50 (*)     All other components within normal limits    Narrative:     Performed at:  [place] [address],  [employer]   Phone [phone]   OSMOLALITY - Abnormal; Notable for the following components:    Osmolality 250 (*)     All other components within normal limits    Narrative:     Performed at:  [facility]) [place] [address],  [employer]   Phone [phone]   OSMOLALITY, URINE - Abnormal; Notable for the following components:    Osmolality, Ur 111 (*)     All other components within normal limits    Narrative:     Performed at:  [place] [address],  [employer] [facility]   Phone [phone]   BASIC METABOLIC PANEL W/ REFLEX TO MG FOR LOW K - Abnormal; Notable for the following components:    Potassium reflex Magnesium 3.4 (*)     Glucose 131 (*)     BUN 3 (*)     CREATININE <0.5 (*)     All other components within normal limits    Narrative:     Performed at:  [place] [address],  [employer] [facility]   Phone [phone]   RENAL FUNCTION PANEL - Abnormal; Notable for the following components:    Sodium 123 (*)     Chloride 89 (*)     Glucose 150 (*)     BUN <2 (*)     CREATININE <0.5 (*)     All other components within normal limits    Narrative:     Performed at:  [place] [address],  [employer] [employer]   Phone [phone]   RENAL FUNCTION PANEL - Abnormal; Notable for the following components:    Sodium 131 (*)     Chloride 98 (*)     CO2 20 (*)     Glucose 113 (*)     BUN 3 (*)     CREATININE <0.5 (*)     Phosphorus 2.2 (*)     All other components within normal limits    Narrative:     Performed at:  [place] [address],  [employer] [facility]   Phone [phone]   RENAL FUNCTION PANEL - Abnormal; Notable for the following components:    Sodium 129 (*)     Chloride 93 (*)     Glucose 108 (*)     BUN 4 (*)     CREATININE <0.5 (*)     All other components within normal limits    Narrative:     Performed at:  [facility]  [facility],  [place] [facility]   Phone [phone]   RENAL FUNCTION PANEL - Abnormal; Notable for the following components:    Sodium 131 (*)     Chloride 93 (*)     Glucose 116 (*)     BUN 6 (*)     Phosphorus 2.3 (*)     All other components within normal limits    Narrative:     Performed at:  [facility],  [employer] [facility]   Phone [phone]   RENAL FUNCTION PANEL - Abnormal; Notable for the following components:    Sodium 129 (*)     Chloride 95 (*)     Glucose 107 (*)     BUN 6 (*)     CREATININE <0.5 (*)     Phosphorus 2.4 (*)     All other components within normal limits    Narrative:     Performed at:  [facility],  [employer] [facility]   Phone [phone]   CBC WITH AUTO DIFFERENTIAL - Abnormal; Notable for the following components:    Lymphocytes Absolute 0.5 (*)     All other components within normal limits    Narrative:     Performed at:  [facility],  [employer] [facility]   Phone [phone]   RENAL FUNCTION PANEL - Abnormal; Notable for the following components:    Sodium 131 (*)     Chloride 94 (*)     Glucose 136 (*)     BUN 4 (*)     Phosphorus 2.2 (*)     All other components within normal limits    Narrative:     Performed at:  [facility],  [facility] [facility]   Phone [phone]   RENAL FUNCTION PANEL - Abnormal; Notable for the following components:    Sodium 133 (*)     Potassium 3.3 (*)     Chloride 95 (*)     BUN 4 (*)     All other components within normal limits    Narrative:     Performed at:  [facility],  [employer] [facility]   Phone [phone]   CBC WITH AUTO DIFFERENTIAL    Narrative:     Performed at:  [facility]) - [facility],  [facility]

## 2020-01-30 NOTE — ED NOTES
Report given PCU RN. Pt left ED in stable condition per w/c.       Sarah Lawson, LENNY  01/30/20 8947

## 2020-01-30 NOTE — ED PROVIDER NOTES
I independently examined and evaluated Isaias Pearson. In brief, 60 y.o. female presented with lightheadedness x many days. Denies vertigo. Not related to changes in posture. Denies chest pain, SOB. Focused exam is notable for 60 y.o. female, nontoxic, NAD. Mucus membranes are dry. - Alert and oriented to person, place, time, situation. - CN II-XII intact. - Full and symmetric strength bilateral upper and lower extremities. - Sensation intact to light touch in all extremities. - Normal rapidly alternating movements  - Normal finger to nose. Normal heel to shin. EKG interpreted by myself. Rate: 58  Rhythm: sinus neptali  Axis: -65  Intervals: within normal limits  ST Segments: non specific ST/T abnormality  Comparison: no prior  Impression: Sinus bradycardia with left axis deviation, cannot rule out anterior infarct, no prior for comparison         Labs notable for hyponatremia. Will characterize with serum osms, urine lytes. Suspect secondary to hypovolemia. Will obtain CXR. Anticipate admit. All diagnostic, treatment, and disposition decisions were made by myself in conjunction with the advanced practice provider. For all further details of the patient's emergency department visit, please see the advanced practice provider's documentation. Nuzhat Muñoz MD     This report has been produced using speech recognition software and may contain errors related to that system including errors in grammar, punctuation, and spelling, as well as words and phrases that may be inappropriate. If there are any questions or concerns please feel free to contact the dictating provider for clarification.       Nuzhat Muñoz MD  01/30/20 7922       Nuzhat Muñoz MD  01/30/20 4285

## 2020-01-31 ENCOUNTER — APPOINTMENT (OUTPATIENT)
Dept: GENERAL RADIOLOGY | Age: 65
DRG: 641 | End: 2020-01-31
Payer: MEDICARE

## 2020-01-31 ENCOUNTER — APPOINTMENT (OUTPATIENT)
Dept: CT IMAGING | Age: 65
DRG: 641 | End: 2020-01-31
Payer: MEDICARE

## 2020-01-31 LAB
ALBUMIN SERPL-MCNC: 3.5 G/DL (ref 3.4–5)
ALBUMIN SERPL-MCNC: 3.6 G/DL (ref 3.4–5)
ALBUMIN SERPL-MCNC: 3.7 G/DL (ref 3.4–5)
ANION GAP SERPL CALCULATED.3IONS-SCNC: 12 MMOL/L (ref 3–16)
ANION GAP SERPL CALCULATED.3IONS-SCNC: 13 MMOL/L (ref 3–16)
ANION GAP SERPL CALCULATED.3IONS-SCNC: 13 MMOL/L (ref 3–16)
ANION GAP SERPL CALCULATED.3IONS-SCNC: 14 MMOL/L (ref 3–16)
BUN BLDV-MCNC: 3 MG/DL (ref 7–20)
BUN BLDV-MCNC: 3 MG/DL (ref 7–20)
BUN BLDV-MCNC: 4 MG/DL (ref 7–20)
BUN BLDV-MCNC: 6 MG/DL (ref 7–20)
CALCIUM SERPL-MCNC: 8.4 MG/DL (ref 8.3–10.6)
CALCIUM SERPL-MCNC: 8.7 MG/DL (ref 8.3–10.6)
CALCIUM SERPL-MCNC: 8.8 MG/DL (ref 8.3–10.6)
CALCIUM SERPL-MCNC: 8.8 MG/DL (ref 8.3–10.6)
CHLORIDE BLD-SCNC: 101 MMOL/L (ref 99–110)
CHLORIDE BLD-SCNC: 93 MMOL/L (ref 99–110)
CHLORIDE BLD-SCNC: 93 MMOL/L (ref 99–110)
CHLORIDE BLD-SCNC: 98 MMOL/L (ref 99–110)
CO2: 20 MMOL/L (ref 21–32)
CO2: 23 MMOL/L (ref 21–32)
CO2: 23 MMOL/L (ref 21–32)
CO2: 24 MMOL/L (ref 21–32)
CREAT SERPL-MCNC: 0.6 MG/DL (ref 0.6–1.2)
CREAT SERPL-MCNC: <0.5 MG/DL (ref 0.6–1.2)
GFR AFRICAN AMERICAN: >60
GFR NON-AFRICAN AMERICAN: >60
GLUCOSE BLD-MCNC: 108 MG/DL (ref 70–99)
GLUCOSE BLD-MCNC: 113 MG/DL (ref 70–99)
GLUCOSE BLD-MCNC: 116 MG/DL (ref 70–99)
GLUCOSE BLD-MCNC: 131 MG/DL (ref 70–99)
HCT VFR BLD CALC: 41.3 % (ref 36–48)
HEMOGLOBIN: 14.5 G/DL (ref 12–16)
MAGNESIUM: 2 MG/DL (ref 1.8–2.4)
MCH RBC QN AUTO: 31.6 PG (ref 26–34)
MCHC RBC AUTO-ENTMCNC: 35.1 G/DL (ref 31–36)
MCV RBC AUTO: 90 FL (ref 80–100)
PDW BLD-RTO: 12.6 % (ref 12.4–15.4)
PHOSPHORUS: 2.2 MG/DL (ref 2.5–4.9)
PHOSPHORUS: 2.3 MG/DL (ref 2.5–4.9)
PHOSPHORUS: 2.5 MG/DL (ref 2.5–4.9)
PLATELET # BLD: 232 K/UL (ref 135–450)
PMV BLD AUTO: 7.1 FL (ref 5–10.5)
POTASSIUM REFLEX MAGNESIUM: 3.4 MMOL/L (ref 3.5–5.1)
POTASSIUM SERPL-SCNC: 3.6 MMOL/L (ref 3.5–5.1)
POTASSIUM SERPL-SCNC: 3.7 MMOL/L (ref 3.5–5.1)
POTASSIUM SERPL-SCNC: 3.9 MMOL/L (ref 3.5–5.1)
RBC # BLD: 4.58 M/UL (ref 4–5.2)
SODIUM BLD-SCNC: 129 MMOL/L (ref 136–145)
SODIUM BLD-SCNC: 131 MMOL/L (ref 136–145)
SODIUM BLD-SCNC: 131 MMOL/L (ref 136–145)
SODIUM BLD-SCNC: 136 MMOL/L (ref 136–145)
TSH REFLEX: 0.65 UIU/ML (ref 0.27–4.2)
WBC # BLD: 8.2 K/UL (ref 4–11)

## 2020-01-31 PROCEDURE — 71250 CT THORAX DX C-: CPT

## 2020-01-31 PROCEDURE — 90686 IIV4 VACC NO PRSV 0.5 ML IM: CPT | Performed by: INTERNAL MEDICINE

## 2020-01-31 PROCEDURE — 99232 SBSQ HOSP IP/OBS MODERATE 35: CPT | Performed by: INTERNAL MEDICINE

## 2020-01-31 PROCEDURE — 6370000000 HC RX 637 (ALT 250 FOR IP): Performed by: NURSE PRACTITIONER

## 2020-01-31 PROCEDURE — 6360000002 HC RX W HCPCS: Performed by: INTERNAL MEDICINE

## 2020-01-31 PROCEDURE — 2580000003 HC RX 258: Performed by: INTERNAL MEDICINE

## 2020-01-31 PROCEDURE — 6360000002 HC RX W HCPCS: Performed by: NURSE PRACTITIONER

## 2020-01-31 PROCEDURE — 84443 ASSAY THYROID STIM HORMONE: CPT

## 2020-01-31 PROCEDURE — 83735 ASSAY OF MAGNESIUM: CPT

## 2020-01-31 PROCEDURE — 2060000000 HC ICU INTERMEDIATE R&B

## 2020-01-31 PROCEDURE — 6370000000 HC RX 637 (ALT 250 FOR IP): Performed by: PHYSICIAN ASSISTANT

## 2020-01-31 PROCEDURE — G0008 ADMIN INFLUENZA VIRUS VAC: HCPCS | Performed by: INTERNAL MEDICINE

## 2020-01-31 PROCEDURE — 80069 RENAL FUNCTION PANEL: CPT

## 2020-01-31 PROCEDURE — 2580000003 HC RX 258: Performed by: NURSE PRACTITIONER

## 2020-01-31 PROCEDURE — 36415 COLL VENOUS BLD VENIPUNCTURE: CPT

## 2020-01-31 PROCEDURE — 85027 COMPLETE CBC AUTOMATED: CPT

## 2020-01-31 RX ORDER — RISPERIDONE 2 MG/1
4 TABLET, FILM COATED ORAL NIGHTLY
Status: DISCONTINUED | OUTPATIENT
Start: 2020-01-31 | End: 2020-02-01 | Stop reason: HOSPADM

## 2020-01-31 RX ORDER — DEXTROSE MONOHYDRATE 50 MG/ML
INJECTION, SOLUTION INTRAVENOUS ONCE
Status: COMPLETED | OUTPATIENT
Start: 2020-01-31 | End: 2020-01-31

## 2020-01-31 RX ORDER — DEXTROSE MONOHYDRATE 50 MG/ML
INJECTION, SOLUTION INTRAVENOUS CONTINUOUS
Status: ACTIVE | OUTPATIENT
Start: 2020-01-31 | End: 2020-01-31

## 2020-01-31 RX ORDER — HYDROXYZINE 50 MG/1
50 TABLET, FILM COATED ORAL EVERY 6 HOURS PRN
COMMUNITY
End: 2020-06-09

## 2020-01-31 RX ORDER — HYDROXYZINE PAMOATE 50 MG/1
50 CAPSULE ORAL EVERY 6 HOURS PRN
Status: DISCONTINUED | OUTPATIENT
Start: 2020-01-31 | End: 2020-02-01 | Stop reason: HOSPADM

## 2020-01-31 RX ORDER — PSEUDOEPHEDRINE HYDROCHLORIDE 30 MG/1
30 TABLET ORAL EVERY 4 HOURS PRN
Status: DISCONTINUED | OUTPATIENT
Start: 2020-01-31 | End: 2020-02-01 | Stop reason: HOSPADM

## 2020-01-31 RX ORDER — ESCITALOPRAM OXALATE 20 MG/1
20 TABLET ORAL NIGHTLY
COMMUNITY
End: 2022-04-06 | Stop reason: DRUGHIGH

## 2020-01-31 RX ORDER — ESCITALOPRAM OXALATE 10 MG/1
20 TABLET ORAL NIGHTLY
Status: DISCONTINUED | OUTPATIENT
Start: 2020-01-31 | End: 2020-02-01 | Stop reason: HOSPADM

## 2020-01-31 RX ORDER — RISPERIDONE 4 MG/1
4 TABLET, FILM COATED ORAL NIGHTLY
COMMUNITY
End: 2021-12-10

## 2020-01-31 RX ORDER — BACLOFEN 10 MG/1
10 TABLET ORAL 3 TIMES DAILY PRN
Status: DISCONTINUED | OUTPATIENT
Start: 2020-01-31 | End: 2020-02-01 | Stop reason: HOSPADM

## 2020-01-31 RX ORDER — DEXTROSE MONOHYDRATE 50 MG/ML
INJECTION, SOLUTION INTRAVENOUS CONTINUOUS
Status: DISCONTINUED | OUTPATIENT
Start: 2020-01-31 | End: 2020-01-31

## 2020-01-31 RX ORDER — KETOTIFEN FUMARATE 0.35 MG/ML
1 SOLUTION/ DROPS OPHTHALMIC 2 TIMES DAILY
COMMUNITY
End: 2020-06-09

## 2020-01-31 RX ORDER — NEOMYCIN SULFATE, POLYMYXIN B SULFATE AND HYDROCORTISONE 10; 3.5; 1 MG/ML; MG/ML; [USP'U]/ML
2 SUSPENSION/ DROPS AURICULAR (OTIC) 3 TIMES DAILY
COMMUNITY
End: 2020-06-09

## 2020-01-31 RX ORDER — BACLOFEN 10 MG/1
10 TABLET ORAL 3 TIMES DAILY PRN
Status: ON HOLD | COMMUNITY
End: 2020-02-07 | Stop reason: HOSPADM

## 2020-01-31 RX ADMIN — PSEUDOEPHEDRINE HCL 30 MG: 30 TABLET, FILM COATED ORAL at 20:40

## 2020-01-31 RX ADMIN — INFLUENZA A VIRUS A/BRISBANE/02/2018 IVR-190 (H1N1) ANTIGEN (PROPIOLACTONE INACTIVATED), INFLUENZA A VIRUS A/KANSAS/14/2017 X-327 (H3N2) ANTIGEN (PROPIOLACTONE INACTIVATED), INFLUENZA B VIRUS B/MARYLAND/15/2016 ANTIGEN (PROPIOLACTONE INACTIVATED), INFLUENZA B VIRUS B/PHUKET/3073/2013 BVR-1B ANTIGEN (PROPIOLACTONE INACTIVATED) 0.5 ML: 15; 15; 15; 15 INJECTION, SUSPENSION INTRAMUSCULAR at 10:14

## 2020-01-31 RX ADMIN — Medication 10 ML: at 20:41

## 2020-01-31 RX ADMIN — PSEUDOEPHEDRINE HCL 30 MG: 30 TABLET, FILM COATED ORAL at 15:56

## 2020-01-31 RX ADMIN — MULTIPLE VITAMINS W/ MINERALS TAB 1 TABLET: TAB at 10:14

## 2020-01-31 RX ADMIN — RISPERIDONE 4 MG: 2 TABLET ORAL at 20:40

## 2020-01-31 RX ADMIN — ESCITALOPRAM OXALATE 20 MG: 10 TABLET ORAL at 20:40

## 2020-01-31 RX ADMIN — DEXTROSE MONOHYDRATE: 50 INJECTION, SOLUTION INTRAVENOUS at 07:27

## 2020-01-31 RX ADMIN — DEXTROSE MONOHYDRATE: 50 INJECTION, SOLUTION INTRAVENOUS at 06:37

## 2020-01-31 RX ADMIN — OXYCODONE HYDROCHLORIDE 10 MG: 5 TABLET ORAL at 15:56

## 2020-01-31 RX ADMIN — ENOXAPARIN SODIUM 40 MG: 40 INJECTION SUBCUTANEOUS at 10:13

## 2020-01-31 ASSESSMENT — PAIN SCALES - GENERAL: PAINLEVEL_OUTOF10: 8

## 2020-01-31 NOTE — PROGRESS NOTES
Shift assessment complete as charted. VSS. NSR/SB on tele monitor. Lung sounds clear to auscultation. Denies pain at this time. Meds given as per MAR. Safety measures in place and will continue to monitor.

## 2020-01-31 NOTE — PROGRESS NOTES
Repeat sodium results of 123, within nephrology's parameters. 0.9 NS Infusing to Rt arm PIV. Will continue to monitor.

## 2020-01-31 NOTE — PROGRESS NOTES
Spoke with Dr. Tin Cary at this time, new orders received for one time bolus of D5W and then to start D5W gtt at 150ml/hr. See MAR for administrations.

## 2020-01-31 NOTE — H&P
Hospital Medicine History & Physical      PCP: Jack Gonsales MD    Date of Admission: 1/30/2020    Date of Service: Pt seen/examined on 1/30/2020    Chief Complaint: Dizziness      History Of Present Illness:    60 y.o. female who presented to the hospital with a chief complaint of dizziness and lethargy that is been going on for 4 weeks. The patient also reports increased fatigue and just feeling lifeless over the past couple of weeks. She endorses depression and states that she has not been eating very much. She has persistently felt dizzy and weak. Today her daughter convinced her to come to the emergency room to get evaluated. She denies any fevers or chills, denies any recent illness. She continues to smoke about half a pack of cigarettes daily. In the emergency department today she was found to have low sodium levels. She will be admitted for further medical therapy. Past Medical History:          Diagnosis Date    Angina pectoris (HCC)     Chronic pain     knees and lower back     COPD exacerbation ([facility] [address] 5/20/2018    Depression     Panic disorder     Pneumonia        Past Surgical History:        Procedure Laterality Date    CHOLECYSTECTOMY         Medications Prior to Admission:   Prior to Admission medications    Medication Sig Start Date End Date Taking? Authorizing Provider   hydrOXYzine (ATARAX) 25 MG tablet  10/7/19   Historical Provider, MD   albuterol sulfate HFA (VENTOLIN HFA) 108 (90 Base) MCG/ACT inhaler INHALE 2 PUFFS EVERY 6 HOURS AS NEEDED FOR WHEEZING OR SHORTNESS OF BREATH 4/4/19   Lida Zavala MD   oxyCODONE (ROXICODONE) 5 MG immediate release tablet Take 10 mg by mouth every 6 hours as needed for Pain. Anastasiia Scott     Historical Provider, MD   Multiple Vitamins-Minerals (CENTRUM SILVER 50+WOMEN PO) Take 1 tablet by mouth daily    Historical Provider, MD   risperiDONE (RISPERDAL) 2 MG tablet Take 2 mg by mouth daily    Historical Provider, MD   oxybutynin (DITROPAN) 5 MG tablet Take 5 mg by mouth 4 times daily    Historical Provider, MD   escitalopram (LEXAPRO) 20 MG tablet Take 20 mg by mouth daily     Historical Provider, MD       Allergies:  Cephalosporins and Pcn [penicillins]    Social History:    TOBACCO:   reports that she has been smoking cigarettes. She has a 17.50 pack-year smoking history. She has never used smokeless tobacco.  ETOH:   reports no history of alcohol use. Family History:        Problem Relation Age of Onset    COPD Mother     Hypertension Mother     Asthma Neg Hx     Cancer Neg Hx     Diabetes Neg Hx     Emphysema Neg Hx     Heart Failure Neg Hx        REVIEW OF SYSTEMS:   Constitutional:  Negative for fever,chills or night sweats  ENT:  Negative for rhinorrhea, epistaxis, hoarseness, sore throat. Respiratory: Negative for SOB or wheezing   Cardiovascular:   Negative for  chest pain, palpitations   Gastrointestinal:  Negative for nausea, vomiting, diarrhea  Genitourinary:  Negative for polyuria, dysuria   Hematologic/Lymphatic:  Negative for  bleeding tendency, easy bruising  Musculoskeletal:  Negative for myalgias and arthralgias  Neurologic:  Negative for  confusion,dysarthria. Skin:  Negative for itching,rash  Psychiatric:  Negative for depression,anxiety, agitation. Endocrine:  Negative for polydipsia,polyuria,heat /cold intolerance. PHYSICAL EXAM:  BP (!) 155/78   Pulse 59   Temp 98.9 °F (37.2 °C) (Oral)   Resp 18   Ht 5' 2\" (1.575 m)   Wt 126 lb (57.2 kg)   SpO2 96%   BMI 23.05 kg/m²   General appearance:  No apparent distress, appears stated age and cooperative. HEENT:  Normal cephalic, atraumatic without obvious deformity. Pupils equal, round, and reactive to light. Extra ocular muscles intact. Conjunctivae/corneas clear. Neck: Supple, with full range of motion. No jugular venous distention. Trachea midline.   Respiratory: Diminished breath sounds bilaterally, poor air movement  Cardiovascular:  Regular rate and rhythm with

## 2020-01-31 NOTE — PROGRESS NOTES
Bedside report and transfer of care given to [facility]. Pt awake in bed and denies needs.

## 2020-01-31 NOTE — PROGRESS NOTES
Morning lab work reviewed, Sodium level 136. Previous check @ ~20:00 was 123. Nephrology paged at this time to update them regarding change in level.

## 2020-01-31 NOTE — PROGRESS NOTES
RESPIRATORY THERAPY ASSESSMENT    Name:  Lili Field Record Number:  [id number]  Age: 60 y.o. Gender: female  : 1955  Today's Date:  2020  Room:  /0322-02    Assessment     Is the patient being admitted for a COPD or Asthma exacerbation? No   (If yes the patient will be seen every 4 hours for the first 24 hours and then reassessed)    Patient Admission Diagnosis      Allergies  Allergies   Allergen Reactions    Cephalosporins Shortness Of Breath    Pcn [Penicillins] Anaphylaxis and Hives       Minimum Predicted Vital Capacity:     748          Actual Vital Capacity:      n/a              Pulmonary History:COPD  Home Oxygen Therapy:  room air  Home Respiratory Therapy:Albuterol/Ipratropium Bromide MDI   Current Respiratory Therapy:  Albuterol MDI Q6 PRN           Respiratory Severity Index(RSI)   Patients with orders for inhalation medications, oxygen, or any therapeutic treatment modality will be placed on Respiratory Protocol. They will be assessed with the first treatment and at least every 72 hours thereafter. The following severity scale will be used to determine frequency of treatment intervention.     Smoking History: Pulmonary Disease or Smoking History, Greater than 15 pack year = 2    Social History  Social History     Tobacco Use    Smoking status: Current Every Day Smoker     Packs/day: 0.50     Years: 35.00     Pack years: 17.50     Types: Cigarettes    Smokeless tobacco: Never Used    Tobacco comment: 18 currently 1/2 PPD   Substance Use Topics    Alcohol use: No     Alcohol/week: 12.0 standard drinks     Types: 12 Cans of beer per week    Drug use: No       Recent Surgical History: None = 0  Past Surgical History  Past Surgical History:   Procedure Laterality Date    CHOLECYSTECTOMY         Level of Consciousness: Alert, Oriented, and Cooperative = 0    Level of Activity: Walking unassisted = 0    Respiratory Pattern: Regular Pattern; RR 8-20 = 0    Breath Sounds: Diminshed bilaterally and/or crackles = 2    Sputum   ,  ,    Cough: Strong, spontaneous, non-productive = 0    Vital Signs   BP (!) 145/70   Pulse 61   Temp 98.7 °F (37.1 °C) (Oral)   Resp 20   Ht 5' 2\" (1.575 m)   Wt 121 lb 11.2 oz (55.2 kg)   SpO2 97%   BMI 22.26 kg/m²   SPO2 (COPD values may differ): Greater than or equal to 92% on room air = 0    [place] Flow (asthma only): not applicable = 0    RSI: 0-4 = See once and convert to home regimen or discontinue        Plan       Goals: medication delivery    Patient/caregiver was educated on the proper method of use for Respiratory Care Devices:  Yes      Level of patient/caregiver understanding able to:   ? Verbalize understanding   ? Demonstrate understanding       ? Teach back        ? Needs reinforcement       ? No available caregiver               ? Other:     Response to education:  Very Good     Is patient being placed on Home Treatment Regimen? Yes     Does the patient have everything they need prior to discharge? NA     Comments: pt assessed and plan of care determined    Plan of Care: Albuterol MDI Q6 PRN     Electronically signed by Carlos Davis RCP on 1/31/2020 at 1:07 AM    Respiratory Protocol Guidelines     1. Assessment and treatment by Respiratory Therapy will be initiated for medication and therapeutic interventions upon initiation of aerosolized medication. 2. Physician will be contacted for respiratory rate (RR) greater than 35 breaths per minute. Therapy will be held for heart rate (HR) greater than 140 beats per minute, pending direction from physician. 3. Bronchodilators will be administered via Metered Dose Inhaler (MDI) with spacer when the following criteria are met:  a. Alert and cooperative     b. HR < 140 bpm  c. RR < 30 bpm                d. Can demonstrate a 2-3 second inspiratory hold  4.  Bronchodilators will be administered via Hand Held Nebulizer [facility]) to patients when ANY of the following criteria are met  a. Incognizant or uncooperative          b. Patients treated with HHN at Home        c. Unable to demonstrate proper use of MDI with spacer     d. RR > 30 bpm   5. Bronchodilators will be delivered via Metered Dose Inhaler (MDI), HHN, Aerogen to intubated patients on mechanical ventilation. 6. Inhalation medication orders will be delivered and/or substituted as outlined below. Aerosolized Medications Ordering and Administration Guidelines:    1. All Medications will be ordered by a physician, and their frequency and/or modality will be adjusted as defined by the patients Respiratory Severity Index (RSI) score. 2. If the patient does not have documented COPD, consider discontinuing anticholinergics when RSI is less than 9.  3. If the bronchospasm worsens (increased RSI), then the bronchodilator frequency can be increased to a maximum of every 4 hours. If greater than every 4 hours is required, the physician will be contacted. 4. If the bronchospasm improves, the frequency of the bronchodilator can be decreased, based on the patient's RSI, but not less than home treatment regimen frequency. 5. Bronchodilator(s) will be discontinued if patient has a RSI less than 9 and has received no scheduled or as needed treatment for 72  Hrs. Patients Ordered on a Mucolytic Agent:    1. Must always be administered with a bronchodilator. 2. Discontinue if patient experiences worsened bronchospasm, or secretions have lessened to the point that the patient is able to clear them with a cough. Anti-inflammatory and Combination Medications:    1. If the patient lacks prior history of lung disease, is not using inhaled anti-inflammatory medication at home, and lacks wheezing by examination or by history for at least 24 hours, contact physician for possible discontinuation.

## 2020-01-31 NOTE — CONSULTS
Pain..      Multiple Vitamins-Minerals (CENTRUM SILVER 50+WOMEN PO) Take 1 tablet by mouth daily      oxybutynin (DITROPAN) 5 MG tablet Take 5 mg by mouth 4 times daily         Allergies:  Cephalosporins and Pcn [penicillins]    Social History:    Social History     Socioeconomic History    Marital status:       Spouse name: Not on file    Number of children: 10    Years of education: Not on file    Highest education level: Not on file   Occupational History    Not on file   Social Needs    Financial resource strain: Not on file    Food insecurity:     Worry: Not on file     Inability: Not on file    Transportation needs:     Medical: Not on file     Non-medical: Not on file   Tobacco Use    Smoking status: Current Every Day Smoker     Packs/day: 0.50     Years: 35.00     Pack years: 17.50     Types: Cigarettes    Smokeless tobacco: Never Used    Tobacco comment: 7/17/18 currently 1/2 PPD   Substance and Sexual Activity    Alcohol use: No     Alcohol/week: 12.0 standard drinks     Types: 12 Cans of beer per week    Drug use: No    Sexual activity: Never   Lifestyle    Physical activity:     Days per week: Not on file     Minutes per session: Not on file    Stress: Not on file   Relationships    Social connections:     Talks on phone: Not on file     Gets together: Not on file     Attends Tenriism service: Not on file     Active member of club or organization: Not on file     Attends meetings of clubs or organizations: Not on file     Relationship status: Not on file    Intimate partner violence:     Fear of current or ex partner: Not on file     Emotionally abused: Not on file     Physically abused: Not on file     Forced sexual activity: Not on file   Other Topics Concern    Not on file   Social History Narrative    Not on file       Family History:   Family History   Problem Relation Age of Onset    COPD Mother     Hypertension Mother     Asthma Neg Hx     Cancer Neg Hx     follow-up    Plan  -Goal sodium of 128 to 129 by  1 p.m. on 1/31/2020 and further correction  -Given her history of smoking and pulmonary nodule, she needs to have further evaluation of her pulmonary lung nodule with CT scan of the chest  -Serial BMPs  -Fluid restriction 1500 mL/day eventually    Thank you for the consultation. Please do not hesitate to call with questions.     Gin Sifuentes  [facility]  Office: [phone]  Fax:    [phone]

## 2020-01-31 NOTE — PROGRESS NOTES
Progress Note    Admit Date:  1/30/2020    64F with depression, COPD, chronic pain presents with lightheadedness/dizziness and fatigue x 4 weeks. Very poor oral intake (potato chips and 6 cans of diet coke per day for the past 1 month). Reports no interest in eating- vague with any further details. Na 121 in the ER, admitted for further care    Subjective:  Ms. Kaia Torres she feels much better this morning. Ate breakfast.  Denies feeling dizzy    Objective:      /71   Pulse 61   Temp 98.1 °F (36.7 °C) (Oral)   Resp 18   Ht 5' 2\" (1.575 m)   Wt 121 lb 11.2 oz (55.2 kg)   SpO2 97%   BMI 22.26 kg/m²          Intake/Output Summary (Last 24 hours) at 1/31/2020 0943  Last data filed at 1/31/2020 0830  Gross per 24 hour   Intake 2429 ml   Output 2050 ml   Net 379 ml       Physical Exam:    Gen: Appears older than her stated age. No distress. Alert. Eyes: PERRL. No sclera icterus. No conjunctival injection. ENT: No discharge. Pharynx clear. Neck: No JVD. Trachea midline. Resp: No accessory muscle use. No crackles. No wheezes. No rhonchi. CV: Regular rate. Regular rhythm. No murmur. No rub. No edema. GI: Non-tender. Non-distended. Normal bowel sounds. Skin: Warm and dry. No nodule on exposed extremities. No rash on exposed extremities. M/S: No cyanosis. No joint deformity. No clubbing. Neuro: Awake. Grossly nonfocal    Psych: Oriented x 3. No anxiety or agitation. I Rebekah Ferraro have reviewed the chart on [facility] and personally interviewed and examined patient, reviewed the data (labs and imaging) and after discussion with my PA formulated the plan. Agree with note with the following edits. HPI:     I reviewed the patient's Past Medical History, Past Surgical History, Medications, and Allergies. No acute issues      General:  Elderly female, Awake, alert and oriented.  Appears to be not in any distress  Mucous Membranes:  Pink , anicteric  Neck: No JVD, no carotid bruit, no thyromegaly  Chest:  Clear to auscultation bilaterally, occasional basilar crackles  Cardiovascular:  RRR S1S2 heard, no murmurs or gallops  Abdomen:  Soft, undistended, non tender, no organomegaly, BS present  Extremities: No edema or cyanosis. Distal pulses well felt  Neurological : grossly normal                  Scheduled Meds:   therapeutic multivitamin-minerals  1 tablet Oral Daily    sodium chloride flush  10 mL Intravenous 2 times per day    enoxaparin  40 mg Subcutaneous Daily    nicotine  1 patch Transdermal Daily    influenza virus vaccine  0.5 mL Intramuscular Once       Continuous Infusions:   dextrose 150 mL/hr at 01/31/20 0727       PRN Meds:  albuterol sulfate HFA, oxyCODONE, sodium chloride flush, ondansetron, polyethylene glycol, magnesium sulfate, potassium chloride **OR** potassium alternative oral replacement **OR** potassium chloride      Data:  CBC:   Recent Labs     01/30/20  1256 01/31/20  0501   WBC 7.5 8.2   HGB 14.3 14.5   HCT 41.1 41.3   MCV 89.8 90.0    232     BMP:   Recent Labs     01/30/20 2015 01/31/20  0501 01/31/20  0814   * 136 131*   K 3.7 3.4* 3.6   CL 89* 101 98*   CO2 21 23 20*   PHOS 2.5  --  2.2*   BUN <2* 3* 3*   CREATININE <0.5* <0.5* <0.5*     LIVER PROFILE:   Recent Labs     01/30/20  1256   AST 32   ALT 22   BILITOT 1.0   ALKPHOS 89      RADIOLOGY  XR CHEST STANDARD (2 VW)   Final Result   1. Pulmonary emphysema with no acute infiltrate   2. Calcific coronary atherosclerosis         XR CHEST PORTABLE    (Results Pending)         Assessment/Plan:-    #Hyponatremia  - she reports very poor oral intake x 4 weeks (reports no interest in eating, denies n/v/d or pain). Her diet has consisted of potato chips and at least 72 oz of diet coke per day. - patient presented with Na of 121.   Placed on NS at 100 cc/hr and Na trended upward quickly to 136, fluids changed to D5 fluids now  - nephro c/s  - urine studies with urine sodium <20 and

## 2020-02-01 VITALS
WEIGHT: 126.19 LBS | RESPIRATION RATE: 18 BRPM | TEMPERATURE: 99.7 F | OXYGEN SATURATION: 92 % | SYSTOLIC BLOOD PRESSURE: 136 MMHG | DIASTOLIC BLOOD PRESSURE: 77 MMHG | HEART RATE: 95 BPM | BODY MASS INDEX: 23.22 KG/M2 | HEIGHT: 62 IN

## 2020-02-01 LAB
ALBUMIN SERPL-MCNC: 3.5 G/DL (ref 3.4–5)
ALBUMIN SERPL-MCNC: 3.7 G/DL (ref 3.4–5)
ALBUMIN SERPL-MCNC: 3.7 G/DL (ref 3.4–5)
ANION GAP SERPL CALCULATED.3IONS-SCNC: 13 MMOL/L (ref 3–16)
ANION GAP SERPL CALCULATED.3IONS-SCNC: 15 MMOL/L (ref 3–16)
ANION GAP SERPL CALCULATED.3IONS-SCNC: 9 MMOL/L (ref 3–16)
BASOPHILS ABSOLUTE: 0 K/UL (ref 0–0.2)
BASOPHILS RELATIVE PERCENT: 0.6 %
BUN BLDV-MCNC: 4 MG/DL (ref 7–20)
BUN BLDV-MCNC: 4 MG/DL (ref 7–20)
BUN BLDV-MCNC: 6 MG/DL (ref 7–20)
CALCIUM SERPL-MCNC: 8.7 MG/DL (ref 8.3–10.6)
CALCIUM SERPL-MCNC: 8.9 MG/DL (ref 8.3–10.6)
CALCIUM SERPL-MCNC: 8.9 MG/DL (ref 8.3–10.6)
CHLORIDE BLD-SCNC: 94 MMOL/L (ref 99–110)
CHLORIDE BLD-SCNC: 95 MMOL/L (ref 99–110)
CHLORIDE BLD-SCNC: 95 MMOL/L (ref 99–110)
CO2: 22 MMOL/L (ref 21–32)
CO2: 25 MMOL/L (ref 21–32)
CO2: 25 MMOL/L (ref 21–32)
CREAT SERPL-MCNC: 0.6 MG/DL (ref 0.6–1.2)
CREAT SERPL-MCNC: 0.6 MG/DL (ref 0.6–1.2)
CREAT SERPL-MCNC: <0.5 MG/DL (ref 0.6–1.2)
EOSINOPHILS ABSOLUTE: 0 K/UL (ref 0–0.6)
EOSINOPHILS RELATIVE PERCENT: 0.2 %
GFR AFRICAN AMERICAN: >60
GFR NON-AFRICAN AMERICAN: >60
GLUCOSE BLD-MCNC: 107 MG/DL (ref 70–99)
GLUCOSE BLD-MCNC: 136 MG/DL (ref 70–99)
GLUCOSE BLD-MCNC: 84 MG/DL (ref 70–99)
HCT VFR BLD CALC: 41.7 % (ref 36–48)
HEMOGLOBIN: 14.4 G/DL (ref 12–16)
LYMPHOCYTES ABSOLUTE: 0.5 K/UL (ref 1–5.1)
LYMPHOCYTES RELATIVE PERCENT: 8.1 %
MCH RBC QN AUTO: 31.4 PG (ref 26–34)
MCHC RBC AUTO-ENTMCNC: 34.4 G/DL (ref 31–36)
MCV RBC AUTO: 91.2 FL (ref 80–100)
MONOCYTES ABSOLUTE: 0.7 K/UL (ref 0–1.3)
MONOCYTES RELATIVE PERCENT: 12.2 %
NEUTROPHILS ABSOLUTE: 4.8 K/UL (ref 1.7–7.7)
NEUTROPHILS RELATIVE PERCENT: 78.9 %
PDW BLD-RTO: 12.8 % (ref 12.4–15.4)
PHOSPHORUS: 2.2 MG/DL (ref 2.5–4.9)
PHOSPHORUS: 2.4 MG/DL (ref 2.5–4.9)
PHOSPHORUS: 3.5 MG/DL (ref 2.5–4.9)
PLATELET # BLD: 178 K/UL (ref 135–450)
PMV BLD AUTO: 7.3 FL (ref 5–10.5)
POTASSIUM SERPL-SCNC: 3.3 MMOL/L (ref 3.5–5.1)
POTASSIUM SERPL-SCNC: 3.6 MMOL/L (ref 3.5–5.1)
POTASSIUM SERPL-SCNC: 4 MMOL/L (ref 3.5–5.1)
RBC # BLD: 4.58 M/UL (ref 4–5.2)
SODIUM BLD-SCNC: 129 MMOL/L (ref 136–145)
SODIUM BLD-SCNC: 131 MMOL/L (ref 136–145)
SODIUM BLD-SCNC: 133 MMOL/L (ref 136–145)
WBC # BLD: 6 K/UL (ref 4–11)

## 2020-02-01 PROCEDURE — 6370000000 HC RX 637 (ALT 250 FOR IP): Performed by: INTERNAL MEDICINE

## 2020-02-01 PROCEDURE — 80069 RENAL FUNCTION PANEL: CPT

## 2020-02-01 PROCEDURE — 85025 COMPLETE CBC W/AUTO DIFF WBC: CPT

## 2020-02-01 PROCEDURE — 36415 COLL VENOUS BLD VENIPUNCTURE: CPT

## 2020-02-01 PROCEDURE — 99238 HOSP IP/OBS DSCHRG MGMT 30/<: CPT | Performed by: INTERNAL MEDICINE

## 2020-02-01 PROCEDURE — 6360000002 HC RX W HCPCS: Performed by: NURSE PRACTITIONER

## 2020-02-01 PROCEDURE — 2580000003 HC RX 258: Performed by: NURSE PRACTITIONER

## 2020-02-01 PROCEDURE — 6370000000 HC RX 637 (ALT 250 FOR IP): Performed by: NURSE PRACTITIONER

## 2020-02-01 RX ORDER — AZITHROMYCIN 250 MG/1
500 TABLET, FILM COATED ORAL DAILY
Status: COMPLETED | OUTPATIENT
Start: 2020-02-01 | End: 2020-02-01

## 2020-02-01 RX ORDER — ACETAMINOPHEN 325 MG/1
650 TABLET ORAL EVERY 4 HOURS PRN
Status: DISCONTINUED | OUTPATIENT
Start: 2020-02-01 | End: 2020-02-01 | Stop reason: HOSPADM

## 2020-02-01 RX ORDER — AZITHROMYCIN 250 MG/1
250 TABLET, FILM COATED ORAL DAILY
Qty: 4 TABLET | Refills: 0 | Status: ON HOLD | OUTPATIENT
Start: 2020-02-01 | End: 2020-02-07 | Stop reason: HOSPADM

## 2020-02-01 RX ORDER — AZITHROMYCIN 250 MG/1
250 TABLET, FILM COATED ORAL DAILY
Status: DISCONTINUED | OUTPATIENT
Start: 2020-02-02 | End: 2020-02-01 | Stop reason: HOSPADM

## 2020-02-01 RX ADMIN — POTASSIUM CHLORIDE 40 MEQ: 1500 TABLET, EXTENDED RELEASE ORAL at 13:27

## 2020-02-01 RX ADMIN — ACETAMINOPHEN 650 MG: 325 TABLET, FILM COATED ORAL at 08:47

## 2020-02-01 RX ADMIN — OXYCODONE HYDROCHLORIDE 10 MG: 5 TABLET ORAL at 00:05

## 2020-02-01 RX ADMIN — MULTIPLE VITAMINS W/ MINERALS TAB 1 TABLET: TAB at 08:47

## 2020-02-01 RX ADMIN — Medication 10 ML: at 08:48

## 2020-02-01 RX ADMIN — AZITHROMYCIN MONOHYDRATE 500 MG: 250 TABLET ORAL at 08:47

## 2020-02-01 RX ADMIN — ENOXAPARIN SODIUM 40 MG: 40 INJECTION SUBCUTANEOUS at 08:47

## 2020-02-01 RX ADMIN — OXYCODONE HYDROCHLORIDE 10 MG: 5 TABLET ORAL at 08:47

## 2020-02-01 ASSESSMENT — PAIN SCALES - GENERAL
PAINLEVEL_OUTOF10: 6
PAINLEVEL_OUTOF10: 8
PAINLEVEL_OUTOF10: 7
PAINLEVEL_OUTOF10: 5

## 2020-02-01 ASSESSMENT — PAIN DESCRIPTION - ORIENTATION: ORIENTATION: MID

## 2020-02-01 ASSESSMENT — PAIN DESCRIPTION - LOCATION: LOCATION: HEAD;BACK

## 2020-02-01 ASSESSMENT — PAIN DESCRIPTION - DESCRIPTORS: DESCRIPTORS: ACHING

## 2020-02-01 ASSESSMENT — PAIN DESCRIPTION - PAIN TYPE: TYPE: ACUTE PAIN;CHRONIC PAIN

## 2020-02-01 NOTE — PROGRESS NOTES
Assessment complete. Vital signs stable on room air. Call light within reach. Allergy bands, ID band, and tele intact. Medications given per MAR. Denies any needs at this time. Will continue to monitor.

## 2020-02-01 NOTE — PROGRESS NOTES
Bedside report and transfer of care given to [facility], [address] [facility]. Pt awake in bed and denies needs.

## 2020-02-01 NOTE — CARE COORDINATION
DISCHARGE ORDER  Date/Time 2020 12:16 PM  Completed by: Margaret Mcmillan, Case Management    Patient Name: Nathaly Coe    : 1955  Admitting Diagnosis: Hyponatremia [E87.1]      Noted discharge order. Confirmed discharge plan with patient / family (pt): Yes    If pt confirmed DC plan does family need to be contacted by CM No if yes who______  Discharge Plan: reviewed chart. Met with pt. Plan home alone today. Pt active with [employer] for home O2, pt uses prn only. Pt declines any d/c needs at this time and family will provide transportation home per pt. Reviewed chart. Role of discharge planner explained and patient verbalized understanding. Discharge order is noted. Has Home O2 in place on admit:  Yes,uses prn only  Informed of need to bring portable home O2 tank on day of discharge for nursing to connect prior to leaving:   Not Indicated  Verbalized agreement/Understanding:   Not Indicated  Pt is being d/c'd to home today. Pt's O2 sats are 92% on RA. Discharge timeout done with [employer]. All discharge needs and concerns addressed.
[facility], uses prn only. Will need testing day of d/c. Follow. Explained Case Management role/services.

## 2020-02-01 NOTE — PROGRESS NOTES
Pt is lying in bed with their eyes closed. Respirations are easy and even. Call light within reach bed in lowest position with the wheels locked. Will continue to monitor.  Best Ar[facility]

## 2020-02-01 NOTE — PROGRESS NOTES
PRN oxycodone 10 mg given for knee pain. Pt rates pain 7/10. Pt assisted to bathroom and back. No other needs at this time. Call light within reach.

## 2020-02-01 NOTE — PROGRESS NOTES
Discharge paperwork and instructions reviewed with patient. Verbalized understanding. New medications reviewed. PIV and tele monitor removed. Pt belongings sent with pt. Stable condition at d/c.

## 2020-02-01 NOTE — DISCHARGE SUMMARY
Name:  Elijah Champagne  Room:  /3343-32  MRN:    [id number]    Discharge Summary      This discharge summary is in conjunction with a complete physical exam done on the day of discharge. Discharging Physician: Dr. Lesli Almendarez: 1/30/2020  Discharge:      HPI taken from admission H&P:      60 y.o. female who presented to the hospital with a chief complaint of dizziness and lethargy that is been going on for 4 weeks. The patient also reports increased fatigue and just feeling lifeless over the past couple of weeks. She endorses depression and states that she has not been eating very much. She has persistently felt dizzy and weak. Today her daughter convinced her to come to the emergency room to get evaluated. She denies any fevers or chills, denies any recent illness. She continues to smoke about half a pack of cigarettes daily. In the emergency department today she was found to have low sodium levels. She will be admitted for further medical therapy. Diagnoses this Admission and Hospital Course        #Hyponatremia  - she reported very poor oral intake x 4 weeks (reports no interest in eating, denies n/v/d or pain). Her diet has consisted of potato chips and at least 72 oz of diet coke per day. - patient presented with Na of 121. Placed on NS at 100 cc/hr and Na trended upward quickly to 136, fluids changed to D5 fluids now  - nephro c/s  - urine studies with urine sodium <20 and urine osmo low 111. Serum osmolality 250. Suspected related to polydipsia. Also on SSRI. CXR without abnormality noted   - TSH 0.65  - continued close monitoring of BMP   - Na trended 131, 1500cc fluid restriction continued at D/c    Acute Tracheobronchitis  - sent with Azithromycin     #Hypokalemia - Resolved  #Hypomagnesemia - Resp;elvin  - replaced     #COPD without exacerbation  - cont inhalers      # PULMONARY NODULE - 7 MM . FOUND LAST YEAR.  NEVER FOLLOWED UP  - OBTAIN CT CHEST FOR NEW HYPONATREMIA WORKUP     #Tobacco dependence  - 1/2 ppd, recommended cessation      #Depression  - lexapro and risperdal continued  - she f/w psychiatry  - ? Playing a role in her lack of oral intake     #Chronic pain   - f/w pain management    Procedures (Please Review Full Report for Details)  N/A    Consults    Nephrology    Physical Exam at Discharge:    /77   Pulse 95   Temp 99.7 °F (37.6 °C) (Oral)   Resp 18   Ht 5' 2\" (1.575 m)   Wt 126 lb 3 oz (57.2 kg)   SpO2 92%   BMI 23.08 kg/m²     General:  Elderly female, Awake, alert and oriented. Appears to be not in any distress  Mucous Membranes:  Pink , anicteric  Neck: No JVD, no carotid bruit, no thyromegaly  Chest:  Clear to auscultation bilaterally, occasional basilar crackles  Cardiovascular:  RRR S1S2 heard, no murmurs or gallops  Abdomen:  Soft, undistended, non tender, no organomegaly, BS present  Extremities: No edema or cyanosis. Distal pulses well felt  Neurological : grossly normal    CBC:   Recent Labs     01/30/20  1256 01/31/20  0501 02/01/20  0555   WBC 7.5 8.2 6.0   HGB 14.3 14.5 14.4   HCT 41.1 41.3 41.7   MCV 89.8 90.0 91.2    232 178     BMP:   Recent Labs     01/31/20  1819 01/31/20  2351 02/01/20  0555   * 129* 131*   K 3.9 4.0 3.6   CL 93* 95* 94*   CO2 24 25 22   PHOS 2.3* 2.4* 2.2*   BUN 6* 6* 4*   CREATININE 0.6 <0.5* 0.6     LIVER PROFILE:   Recent Labs     01/30/20  1256   AST 32   ALT 22   BILITOT 1.0   ALKPHOS 89     UA:  Recent Labs     01/30/20  1244   COLORU Straw   PHUR 6.0   WBCUA 3-5   RBCUA 3-5*   BACTERIA 2+*   CLARITYU Clear   SPECGRAV <=1.005   LEUKOCYTESUR Negative   UROBILINOGEN 0.2   BILIRUBINUR Negative   BLOODU TRACE-INTACT*   GLUCOSEU Negative     CULTURES  N/A    RADIOLOGY    CT CHEST WO CONTRAST 1/31/2020   Final Result   1. Left lower lobe pulmonary nodule is now confirmed to represent a partially   calcified granuloma. No additional follow-up of this nodule is necessary.    Resumption of screening may be considered. 2. Cluster of tree in bud nodularity in the superior segment of the right   lower lobe that is new. Recommend correlation with any acute pulmonary   symptoms. Pulmonary follow-up and consideration for repeat imaging in 3   months advised. 3. Moderate centrilobular emphysema. 4. Stable dilation of the ascending thoracic aorta. XR CHEST STANDARD (2 VW) 1/31/2020   Final Result   1. Pulmonary emphysema with no acute infiltrate   2. Calcific coronary atherosclerosis           Discharge Medications     Medication List      START taking these medications    azithromycin 250 MG tablet  Commonly known as:  Zithromax  Take 1 tablet by mouth daily        CONTINUE taking these medications    albuterol sulfate  (90 Base) MCG/ACT inhaler  Commonly known as:  Ventolin HFA  INHALE 2 PUFFS EVERY 6 HOURS AS NEEDED FOR WHEEZING OR SHORTNESS OF BREATH     baclofen 10 MG tablet  Commonly known as:  LIORESAL     CENTRUM SILVER 50+WOMEN PO     hydrOXYzine 50 MG tablet  Commonly known as:  ATARAX     ketotifen 0.025 % ophthalmic solution  Commonly known as:  ZADITOR     Lexapro 20 MG tablet  Generic drug:  escitalopram     neomycin-polymyxin-hydrocortisone 3.5-68479-2 otic suspension  Commonly known as:  CORTISPORIN     oxybutynin 5 MG tablet  Commonly known as:  DITROPAN     oxyCODONE 5 MG immediate release tablet  Commonly known as:  ROXICODONE     risperiDONE 4 MG tablet  Commonly known as:  RISPERDAL           Where to Get Your Medications      These medications were sent to [address] [place] [address] [phone] - F [phone]  [address] [place] [address], [address] [place] [address]    Phone:  [phone]   · azithromycin 250 MG tablet           Discharged in stable condition to home. Follow Up: Follow up with PCP in 1 week.          Nomi Rueda MD 2/10/2020 8:13 PM

## 2020-02-04 ENCOUNTER — APPOINTMENT (OUTPATIENT)
Dept: CT IMAGING | Age: 65
DRG: 193 | End: 2020-02-04
Payer: MEDICARE

## 2020-02-04 ENCOUNTER — APPOINTMENT (OUTPATIENT)
Dept: GENERAL RADIOLOGY | Age: 65
DRG: 193 | End: 2020-02-04
Payer: MEDICARE

## 2020-02-04 ENCOUNTER — HOSPITAL ENCOUNTER (INPATIENT)
Age: 65
LOS: 3 days | Discharge: HOME | DRG: 193 | End: 2020-02-07
Attending: EMERGENCY MEDICINE | Admitting: INTERNAL MEDICINE
Payer: MEDICARE

## 2020-02-04 PROBLEM — J96.22 ACUTE ON CHRONIC RESPIRATORY FAILURE WITH HYPOXIA AND HYPERCAPNIA (HCC): Status: ACTIVE | Noted: 2020-02-04

## 2020-02-04 PROBLEM — J96.21 ACUTE ON CHRONIC RESPIRATORY FAILURE WITH HYPOXIA AND HYPERCAPNIA (HCC): Status: ACTIVE | Noted: 2020-02-04

## 2020-02-04 LAB
A/G RATIO: 1.4 (ref 1.1–2.2)
ALBUMIN SERPL-MCNC: 3.8 G/DL (ref 3.4–5)
ALP BLD-CCNC: 84 U/L (ref 40–129)
ALT SERPL-CCNC: 23 U/L (ref 10–40)
ANION GAP SERPL CALCULATED.3IONS-SCNC: 13 MMOL/L (ref 3–16)
APTT: 30.3 SEC (ref 24.2–36.2)
AST SERPL-CCNC: 39 U/L (ref 15–37)
BACTERIA: ABNORMAL /HPF
BASE EXCESS VENOUS: 2.6 MMOL/L (ref -3–3)
BASOPHILS ABSOLUTE: 0 K/UL (ref 0–0.2)
BASOPHILS RELATIVE PERCENT: 0.5 %
BILIRUB SERPL-MCNC: 0.7 MG/DL (ref 0–1)
BILIRUBIN URINE: NEGATIVE
BLOOD, URINE: ABNORMAL
BUN BLDV-MCNC: 6 MG/DL (ref 7–20)
CALCIUM SERPL-MCNC: 8.9 MG/DL (ref 8.3–10.6)
CARBOXYHEMOGLOBIN: 2.8 % (ref 0–1.5)
CHLORIDE BLD-SCNC: 87 MMOL/L (ref 99–110)
CLARITY: CLEAR
CO2: 25 MMOL/L (ref 21–32)
COLOR: YELLOW
CREAT SERPL-MCNC: <0.5 MG/DL (ref 0.6–1.2)
EKG ATRIAL RATE: 70 BPM
EKG DIAGNOSIS: NORMAL
EKG P AXIS: 56 DEGREES
EKG P-R INTERVAL: 130 MS
EKG Q-T INTERVAL: 366 MS
EKG QRS DURATION: 68 MS
EKG QTC CALCULATION (BAZETT): 395 MS
EKG R AXIS: -37 DEGREES
EKG T AXIS: 43 DEGREES
EKG VENTRICULAR RATE: 70 BPM
EOSINOPHILS ABSOLUTE: 0 K/UL (ref 0–0.6)
EOSINOPHILS RELATIVE PERCENT: 0 %
EPITHELIAL CELLS, UA: ABNORMAL /HPF
GFR AFRICAN AMERICAN: >60
GFR NON-AFRICAN AMERICAN: >60
GLOBULIN: 2.8 G/DL
GLUCOSE BLD-MCNC: 103 MG/DL (ref 70–99)
GLUCOSE URINE: NEGATIVE MG/DL
HCO3 VENOUS: 26.7 MMOL/L (ref 23–29)
HCT VFR BLD CALC: 39.3 % (ref 36–48)
HEMOGLOBIN: 13.5 G/DL (ref 12–16)
INR BLD: 1.11 (ref 0.86–1.14)
KETONES, URINE: 15 MG/DL
LACTIC ACID, SEPSIS: 0.9 MMOL/L (ref 0.4–1.9)
LEUKOCYTE ESTERASE, URINE: ABNORMAL
LYMPHOCYTES ABSOLUTE: 0.7 K/UL (ref 1–5.1)
LYMPHOCYTES RELATIVE PERCENT: 7.5 %
MAGNESIUM: 1.5 MG/DL (ref 1.8–2.4)
MCH RBC QN AUTO: 30.9 PG (ref 26–34)
MCHC RBC AUTO-ENTMCNC: 34.5 G/DL (ref 31–36)
MCV RBC AUTO: 89.6 FL (ref 80–100)
METHEMOGLOBIN VENOUS: 0 %
MICROSCOPIC EXAMINATION: YES
MONOCYTES ABSOLUTE: 1 K/UL (ref 0–1.3)
MONOCYTES RELATIVE PERCENT: 10.7 %
NEUTROPHILS ABSOLUTE: 7.3 K/UL (ref 1.7–7.7)
NEUTROPHILS RELATIVE PERCENT: 81.3 %
NITRITE, URINE: NEGATIVE
O2 CONTENT, VEN: 15 VOL %
O2 SAT, VEN: 76 %
O2 THERAPY: ABNORMAL
PCO2, VEN: 39.7 MMHG (ref 40–50)
PDW BLD-RTO: 12.6 % (ref 12.4–15.4)
PH UA: 7 (ref 5–8)
PH VENOUS: 7.45 (ref 7.35–7.45)
PLATELET # BLD: 172 K/UL (ref 135–450)
PMV BLD AUTO: 7.3 FL (ref 5–10.5)
PO2, VEN: 39.2 MMHG (ref 25–40)
POTASSIUM REFLEX MAGNESIUM: 3.3 MMOL/L (ref 3.5–5.1)
PRO-BNP: 233 PG/ML (ref 0–124)
PROTEIN UA: ABNORMAL MG/DL
PROTHROMBIN TIME: 12.8 SEC (ref 10–13.2)
RAPID INFLUENZA  B AGN: NEGATIVE
RAPID INFLUENZA A AGN: NEGATIVE
RBC # BLD: 4.39 M/UL (ref 4–5.2)
RBC UA: ABNORMAL /HPF (ref 0–2)
SODIUM BLD-SCNC: 125 MMOL/L (ref 136–145)
SPECIFIC GRAVITY UA: 1.01 (ref 1–1.03)
TCO2 CALC VENOUS: 28 MMOL/L
TOTAL PROTEIN: 6.6 G/DL (ref 6.4–8.2)
TROPONIN: <0.01 NG/ML
URINE REFLEX TO CULTURE: YES
URINE TYPE: ABNORMAL
UROBILINOGEN, URINE: 0.2 E.U./DL
WBC # BLD: 9 K/UL (ref 4–11)
WBC UA: ABNORMAL /HPF (ref 0–5)

## 2020-02-04 PROCEDURE — 2580000003 HC RX 258: Performed by: EMERGENCY MEDICINE

## 2020-02-04 PROCEDURE — 6360000002 HC RX W HCPCS: Performed by: EMERGENCY MEDICINE

## 2020-02-04 PROCEDURE — 85730 THROMBOPLASTIN TIME PARTIAL: CPT

## 2020-02-04 PROCEDURE — 82803 BLOOD GASES ANY COMBINATION: CPT

## 2020-02-04 PROCEDURE — 36415 COLL VENOUS BLD VENIPUNCTURE: CPT

## 2020-02-04 PROCEDURE — 87086 URINE CULTURE/COLONY COUNT: CPT

## 2020-02-04 PROCEDURE — 2060000000 HC ICU INTERMEDIATE R&B

## 2020-02-04 PROCEDURE — 80053 COMPREHEN METABOLIC PANEL: CPT

## 2020-02-04 PROCEDURE — 81001 URINALYSIS AUTO W/SCOPE: CPT

## 2020-02-04 PROCEDURE — 83880 ASSAY OF NATRIURETIC PEPTIDE: CPT

## 2020-02-04 PROCEDURE — 83735 ASSAY OF MAGNESIUM: CPT

## 2020-02-04 PROCEDURE — 93010 ELECTROCARDIOGRAM REPORT: CPT | Performed by: INTERNAL MEDICINE

## 2020-02-04 PROCEDURE — 2500000003 HC RX 250 WO HCPCS: Performed by: EMERGENCY MEDICINE

## 2020-02-04 PROCEDURE — 84484 ASSAY OF TROPONIN QUANT: CPT

## 2020-02-04 PROCEDURE — 83605 ASSAY OF LACTIC ACID: CPT

## 2020-02-04 PROCEDURE — 96365 THER/PROPH/DIAG IV INF INIT: CPT

## 2020-02-04 PROCEDURE — 85025 COMPLETE CBC W/AUTO DIFF WBC: CPT

## 2020-02-04 PROCEDURE — 6370000000 HC RX 637 (ALT 250 FOR IP): Performed by: EMERGENCY MEDICINE

## 2020-02-04 PROCEDURE — 84145 PROCALCITONIN (PCT): CPT

## 2020-02-04 PROCEDURE — 87040 BLOOD CULTURE FOR BACTERIA: CPT

## 2020-02-04 PROCEDURE — 87804 INFLUENZA ASSAY W/OPTIC: CPT

## 2020-02-04 PROCEDURE — 70450 CT HEAD/BRAIN W/O DYE: CPT

## 2020-02-04 PROCEDURE — 85610 PROTHROMBIN TIME: CPT

## 2020-02-04 PROCEDURE — 93005 ELECTROCARDIOGRAM TRACING: CPT | Performed by: EMERGENCY MEDICINE

## 2020-02-04 PROCEDURE — 99285 EMERGENCY DEPT VISIT HI MDM: CPT

## 2020-02-04 PROCEDURE — 71046 X-RAY EXAM CHEST 2 VIEWS: CPT

## 2020-02-04 RX ORDER — ACETAMINOPHEN 325 MG/1
650 TABLET ORAL ONCE
Status: COMPLETED | OUTPATIENT
Start: 2020-02-04 | End: 2020-02-04

## 2020-02-04 RX ORDER — POTASSIUM CHLORIDE 7.45 MG/ML
10 INJECTION INTRAVENOUS
Status: COMPLETED | OUTPATIENT
Start: 2020-02-04 | End: 2020-02-05

## 2020-02-04 RX ORDER — 0.9 % SODIUM CHLORIDE 0.9 %
1000 INTRAVENOUS SOLUTION INTRAVENOUS ONCE
Status: COMPLETED | OUTPATIENT
Start: 2020-02-04 | End: 2020-02-04

## 2020-02-04 RX ADMIN — SODIUM CHLORIDE 1000 ML: 9 INJECTION, SOLUTION INTRAVENOUS at 22:34

## 2020-02-04 RX ADMIN — ACETAMINOPHEN 650 MG: 325 TABLET ORAL at 22:34

## 2020-02-04 RX ADMIN — AZTREONAM 1 G: 1 INJECTION, POWDER, LYOPHILIZED, FOR SOLUTION INTRAMUSCULAR; INTRAVENOUS at 22:35

## 2020-02-04 RX ADMIN — VANCOMYCIN HYDROCHLORIDE 1000 MG: 1 INJECTION, POWDER, LYOPHILIZED, FOR SOLUTION INTRAVENOUS at 21:45

## 2020-02-04 ASSESSMENT — PAIN SCALES - GENERAL: PAINLEVEL_OUTOF10: 0

## 2020-02-05 LAB
A/G RATIO: 1.1 (ref 1.1–2.2)
ALBUMIN SERPL-MCNC: 3.4 G/DL (ref 3.4–5)
ALP BLD-CCNC: 84 U/L (ref 40–129)
ALT SERPL-CCNC: 23 U/L (ref 10–40)
ANION GAP SERPL CALCULATED.3IONS-SCNC: 12 MMOL/L (ref 3–16)
ANION GAP SERPL CALCULATED.3IONS-SCNC: 13 MMOL/L (ref 3–16)
AST SERPL-CCNC: 38 U/L (ref 15–37)
BILIRUB SERPL-MCNC: 0.7 MG/DL (ref 0–1)
BUN BLDV-MCNC: 4 MG/DL (ref 7–20)
BUN BLDV-MCNC: 8 MG/DL (ref 7–20)
CALCIUM SERPL-MCNC: 8.7 MG/DL (ref 8.3–10.6)
CALCIUM SERPL-MCNC: 8.8 MG/DL (ref 8.3–10.6)
CHLORIDE BLD-SCNC: 92 MMOL/L (ref 99–110)
CHLORIDE BLD-SCNC: 94 MMOL/L (ref 99–110)
CO2: 24 MMOL/L (ref 21–32)
CO2: 24 MMOL/L (ref 21–32)
CREAT SERPL-MCNC: 0.6 MG/DL (ref 0.6–1.2)
CREAT SERPL-MCNC: <0.5 MG/DL (ref 0.6–1.2)
GFR AFRICAN AMERICAN: >60
GFR AFRICAN AMERICAN: >60
GFR NON-AFRICAN AMERICAN: >60
GFR NON-AFRICAN AMERICAN: >60
GLOBULIN: 3 G/DL
GLUCOSE BLD-MCNC: 287 MG/DL (ref 70–99)
GLUCOSE BLD-MCNC: 97 MG/DL (ref 70–99)
MAGNESIUM: 2 MG/DL (ref 1.8–2.4)
POTASSIUM REFLEX MAGNESIUM: 3.4 MMOL/L (ref 3.5–5.1)
POTASSIUM REFLEX MAGNESIUM: 3.8 MMOL/L (ref 3.5–5.1)
PROCALCITONIN: 0.09 NG/ML (ref 0–0.15)
SODIUM BLD-SCNC: 128 MMOL/L (ref 136–145)
SODIUM BLD-SCNC: 131 MMOL/L (ref 136–145)
TOTAL PROTEIN: 6.4 G/DL (ref 6.4–8.2)

## 2020-02-05 PROCEDURE — 2500000003 HC RX 250 WO HCPCS: Performed by: INTERNAL MEDICINE

## 2020-02-05 PROCEDURE — 83735 ASSAY OF MAGNESIUM: CPT

## 2020-02-05 PROCEDURE — 80053 COMPREHEN METABOLIC PANEL: CPT

## 2020-02-05 PROCEDURE — 6360000002 HC RX W HCPCS: Performed by: INTERNAL MEDICINE

## 2020-02-05 PROCEDURE — 92526 ORAL FUNCTION THERAPY: CPT

## 2020-02-05 PROCEDURE — 94761 N-INVAS EAR/PLS OXIMETRY MLT: CPT

## 2020-02-05 PROCEDURE — 2700000000 HC OXYGEN THERAPY PER DAY

## 2020-02-05 PROCEDURE — 6370000000 HC RX 637 (ALT 250 FOR IP): Performed by: INTERNAL MEDICINE

## 2020-02-05 PROCEDURE — 87449 NOS EACH ORGANISM AG IA: CPT

## 2020-02-05 PROCEDURE — 87641 MR-STAPH DNA AMP PROBE: CPT

## 2020-02-05 PROCEDURE — 94640 AIRWAY INHALATION TREATMENT: CPT

## 2020-02-05 PROCEDURE — 36415 COLL VENOUS BLD VENIPUNCTURE: CPT

## 2020-02-05 PROCEDURE — 2580000003 HC RX 258: Performed by: INTERNAL MEDICINE

## 2020-02-05 PROCEDURE — 2060000000 HC ICU INTERMEDIATE R&B

## 2020-02-05 PROCEDURE — 92610 EVALUATE SWALLOWING FUNCTION: CPT

## 2020-02-05 PROCEDURE — 99223 1ST HOSP IP/OBS HIGH 75: CPT | Performed by: INTERNAL MEDICINE

## 2020-02-05 RX ORDER — ACETAMINOPHEN 325 MG/1
650 TABLET ORAL EVERY 4 HOURS PRN
Status: DISCONTINUED | OUTPATIENT
Start: 2020-02-05 | End: 2020-02-07 | Stop reason: HOSPADM

## 2020-02-05 RX ORDER — SODIUM CHLORIDE 9 MG/ML
INJECTION, SOLUTION INTRAVENOUS
Status: DISPENSED
Start: 2020-02-05 | End: 2020-02-05

## 2020-02-05 RX ORDER — SODIUM CHLORIDE 9 MG/ML
INJECTION, SOLUTION INTRAVENOUS CONTINUOUS
Status: DISCONTINUED | OUTPATIENT
Start: 2020-02-05 | End: 2020-02-05

## 2020-02-05 RX ORDER — KETOTIFEN FUMARATE 0.35 MG/ML
1 SOLUTION/ DROPS OPHTHALMIC 2 TIMES DAILY
Status: DISCONTINUED | OUTPATIENT
Start: 2020-02-05 | End: 2020-02-07 | Stop reason: HOSPADM

## 2020-02-05 RX ORDER — ONDANSETRON 2 MG/ML
4 INJECTION INTRAMUSCULAR; INTRAVENOUS EVERY 6 HOURS PRN
Status: DISCONTINUED | OUTPATIENT
Start: 2020-02-05 | End: 2020-02-07 | Stop reason: HOSPADM

## 2020-02-05 RX ORDER — LEVOFLOXACIN 5 MG/ML
750 INJECTION, SOLUTION INTRAVENOUS EVERY 24 HOURS
Status: DISCONTINUED | OUTPATIENT
Start: 2020-02-05 | End: 2020-02-07 | Stop reason: HOSPADM

## 2020-02-05 RX ORDER — OXYBUTYNIN CHLORIDE 5 MG/1
5 TABLET ORAL 4 TIMES DAILY
Status: DISCONTINUED | OUTPATIENT
Start: 2020-02-05 | End: 2020-02-07 | Stop reason: HOSPADM

## 2020-02-05 RX ORDER — PREDNISONE 20 MG/1
40 TABLET ORAL DAILY
Status: DISCONTINUED | OUTPATIENT
Start: 2020-02-07 | End: 2020-02-06

## 2020-02-05 RX ORDER — ALBUTEROL SULFATE 2.5 MG/3ML
2.5 SOLUTION RESPIRATORY (INHALATION)
Status: DISCONTINUED | OUTPATIENT
Start: 2020-02-05 | End: 2020-02-07 | Stop reason: HOSPADM

## 2020-02-05 RX ORDER — NICOTINE 21 MG/24HR
1 PATCH, TRANSDERMAL 24 HOURS TRANSDERMAL DAILY
Status: DISCONTINUED | OUTPATIENT
Start: 2020-02-05 | End: 2020-02-07 | Stop reason: HOSPADM

## 2020-02-05 RX ORDER — METHYLPREDNISOLONE SODIUM SUCCINATE 40 MG/ML
40 INJECTION, POWDER, LYOPHILIZED, FOR SOLUTION INTRAMUSCULAR; INTRAVENOUS EVERY 12 HOURS
Status: DISCONTINUED | OUTPATIENT
Start: 2020-02-05 | End: 2020-02-06

## 2020-02-05 RX ORDER — KETOTIFEN FUMARATE 0.35 MG/ML
1 SOLUTION/ DROPS OPHTHALMIC 2 TIMES DAILY
Status: DISCONTINUED | OUTPATIENT
Start: 2020-02-05 | End: 2020-02-05 | Stop reason: SDUPTHER

## 2020-02-05 RX ORDER — IPRATROPIUM BROMIDE AND ALBUTEROL SULFATE 2.5; .5 MG/3ML; MG/3ML
1 SOLUTION RESPIRATORY (INHALATION)
Status: DISCONTINUED | OUTPATIENT
Start: 2020-02-05 | End: 2020-02-07 | Stop reason: HOSPADM

## 2020-02-05 RX ORDER — IPRATROPIUM BROMIDE AND ALBUTEROL SULFATE 2.5; .5 MG/3ML; MG/3ML
1 SOLUTION RESPIRATORY (INHALATION)
Status: DISCONTINUED | OUTPATIENT
Start: 2020-02-05 | End: 2020-02-05

## 2020-02-05 RX ORDER — POTASSIUM CHLORIDE 20 MEQ/1
40 TABLET, EXTENDED RELEASE ORAL 2 TIMES DAILY WITH MEALS
Status: COMPLETED | OUTPATIENT
Start: 2020-02-05 | End: 2020-02-06

## 2020-02-05 RX ORDER — HYDROXYZINE HYDROCHLORIDE 10 MG/1
25 TABLET, FILM COATED ORAL EVERY 6 HOURS PRN
Status: DISCONTINUED | OUTPATIENT
Start: 2020-02-05 | End: 2020-02-07 | Stop reason: HOSPADM

## 2020-02-05 RX ORDER — OXYCODONE HYDROCHLORIDE 5 MG/1
5 TABLET ORAL ONCE
Status: COMPLETED | OUTPATIENT
Start: 2020-02-05 | End: 2020-02-05

## 2020-02-05 RX ADMIN — IPRATROPIUM BROMIDE AND ALBUTEROL SULFATE 1 AMPULE: .5; 3 SOLUTION RESPIRATORY (INHALATION) at 14:44

## 2020-02-05 RX ADMIN — IPRATROPIUM BROMIDE AND ALBUTEROL SULFATE 1 AMPULE: .5; 3 SOLUTION RESPIRATORY (INHALATION) at 19:02

## 2020-02-05 RX ADMIN — METHYLPREDNISOLONE SODIUM SUCCINATE 40 MG: 40 INJECTION, POWDER, FOR SOLUTION INTRAMUSCULAR; INTRAVENOUS at 02:50

## 2020-02-05 RX ADMIN — POTASSIUM CHLORIDE 10 MEQ: 7.46 INJECTION, SOLUTION INTRAVENOUS at 02:53

## 2020-02-05 RX ADMIN — IPRATROPIUM BROMIDE AND ALBUTEROL SULFATE 1 AMPULE: .5; 3 SOLUTION RESPIRATORY (INHALATION) at 23:00

## 2020-02-05 RX ADMIN — SODIUM CHLORIDE: 9 INJECTION, SOLUTION INTRAVENOUS at 02:01

## 2020-02-05 RX ADMIN — POTASSIUM CHLORIDE 10 MEQ: 7.46 INJECTION, SOLUTION INTRAVENOUS at 04:03

## 2020-02-05 RX ADMIN — POTASSIUM CHLORIDE 10 MEQ: 7.46 INJECTION, SOLUTION INTRAVENOUS at 06:08

## 2020-02-05 RX ADMIN — IPRATROPIUM BROMIDE AND ALBUTEROL SULFATE 1 AMPULE: .5; 3 SOLUTION RESPIRATORY (INHALATION) at 10:37

## 2020-02-05 RX ADMIN — IPRATROPIUM BROMIDE AND ALBUTEROL SULFATE 1 AMPULE: .5; 3 SOLUTION RESPIRATORY (INHALATION) at 06:29

## 2020-02-05 RX ADMIN — POTASSIUM CHLORIDE 40 MEQ: 1500 TABLET, EXTENDED RELEASE ORAL at 17:15

## 2020-02-05 RX ADMIN — OXYBUTYNIN CHLORIDE 5 MG: 5 TABLET ORAL at 08:03

## 2020-02-05 RX ADMIN — AZTREONAM 1 G: 1 INJECTION, POWDER, LYOPHILIZED, FOR SOLUTION INTRAMUSCULAR; INTRAVENOUS at 08:03

## 2020-02-05 RX ADMIN — MAGNESIUM SULFATE HEPTAHYDRATE 2 G: 500 INJECTION, SOLUTION INTRAMUSCULAR; INTRAVENOUS at 00:20

## 2020-02-05 RX ADMIN — POTASSIUM CHLORIDE 10 MEQ: 7.46 INJECTION, SOLUTION INTRAVENOUS at 05:05

## 2020-02-05 RX ADMIN — ENOXAPARIN SODIUM 40 MG: 40 INJECTION SUBCUTANEOUS at 08:03

## 2020-02-05 RX ADMIN — OXYBUTYNIN CHLORIDE 5 MG: 5 TABLET ORAL at 17:15

## 2020-02-05 RX ADMIN — LEVOFLOXACIN 750 MG: 5 INJECTION, SOLUTION INTRAVENOUS at 13:29

## 2020-02-05 RX ADMIN — VANCOMYCIN HYDROCHLORIDE 1000 MG: 1 INJECTION, POWDER, LYOPHILIZED, FOR SOLUTION INTRAVENOUS at 22:04

## 2020-02-05 RX ADMIN — OXYCODONE HYDROCHLORIDE 5 MG: 5 TABLET ORAL at 22:04

## 2020-02-05 RX ADMIN — METHYLPREDNISOLONE SODIUM SUCCINATE 40 MG: 40 INJECTION, POWDER, FOR SOLUTION INTRAMUSCULAR; INTRAVENOUS at 13:29

## 2020-02-05 RX ADMIN — ACETAMINOPHEN 650 MG: 325 TABLET ORAL at 13:29

## 2020-02-05 RX ADMIN — OXYBUTYNIN CHLORIDE 5 MG: 5 TABLET ORAL at 22:04

## 2020-02-05 RX ADMIN — VANCOMYCIN HYDROCHLORIDE 1000 MG: 1 INJECTION, POWDER, LYOPHILIZED, FOR SOLUTION INTRAVENOUS at 10:26

## 2020-02-05 RX ADMIN — OXYBUTYNIN CHLORIDE 5 MG: 5 TABLET ORAL at 13:29

## 2020-02-05 ASSESSMENT — PAIN SCALES - GENERAL
PAINLEVEL_OUTOF10: 0
PAINLEVEL_OUTOF10: 6
PAINLEVEL_OUTOF10: 7
PAINLEVEL_OUTOF10: 7
PAINLEVEL_OUTOF10: 0

## 2020-02-05 ASSESSMENT — PAIN DESCRIPTION - LOCATION: LOCATION: KNEE

## 2020-02-05 NOTE — PROGRESS NOTES
Nutrition Assessment (Low Risk)    Type and Reason for Visit: Initial, Consult, Positive Nutrition Screen(poor appetite)    Nutrition Recommendations:   1. Continue Regular diet  2. Encouraged smoking cessation  3. Encouraged cessation of drink Pepsi     Nutrition Assessment:  Patient assessed for nutritional risk. Deemed to be at low risk at this time. Will continue to monitor for changes in status.       Malnutrition Assessment:  · Malnutrition Status: No malnutrition    Nutrition Risk Level   Risk Level: Low    Nutrition Diagnosis:   · Problem: No nutrition diagnosis at this time  · Etiology:      Signs and symptoms:      Nutrition Intervention:  Food and/or Delivery: Continue current diet  Nutrition Education/Counseling/Coordination of Care:  Continued Inpatient Monitoring, Coordination of Care, Coordination of Community Care      Electronically signed by Jovanni Bailon RD, LD on 2/5/20 at [address] [place] [address]    Contact Number: [phone]

## 2020-02-05 NOTE — CONSULTS
mL/hr at 02/05/20 0201    sodium chloride       PRN Meds:  hydrOXYzine, magnesium hydroxide, ondansetron, albuterol, acetaminophen    ALLERGIES:  Patient is allergic to cephalosporins and pcn [penicillins]. REVIEW OF SYSTEMS:  Constitutional: + for fever  HENT: Negative for sore throat  Eyes: Negative for redness   Respiratory: + for dyspnea, cough  Cardiovascular: Negative for chest pain  Gastrointestinal: Negative for vomiting, diarrhea   Genitourinary: Negative for hematuria   Musculoskeletal: Negative for arthralgias   Skin: Negative for rash  Neurological: Negative for syncope  Hematological: Negative for adenopathy  Psychiatric/Behavorial: Negative for anxiety    PHYSICAL EXAM:  Blood pressure 127/67, pulse 60, temperature 98.5 °F (36.9 °C), temperature source Oral, resp. rate 18, height 5' 3\" (1.6 m), weight 126 lb 11.2 oz (57.5 kg), SpO2 97 %, not currently breastfeeding.' on 2 L  Gen: No distress. Eyes: PERRL. No sclera icterus. No conjunctival injection. ENT: No discharge. Pharynx clear. Neck: Trachea midline. No obvious mass. Resp: No accessory muscle use. + crackles. + wheezes. No rhonchi. No dullness on percussion. CV: Regular rate. Regular rhythm. No murmur or rub. No edema. Peripheral pulses are 2+. Capillary refill is less than 3 seconds. GI: Non-tender. Non-distended. No hernia. Skin: Warm and dry. No nodule on exposed extremities. Lymph: No cervical LAD. No supraclavicular LAD. M/S: No cyanosis. No joint deformity. No clubbing. Neuro: Awake. Alert. Moves all four extremities. Psych: Oriented x 3. No anxiety.      LABS:  CBC:   Recent Labs     02/04/20 2033   WBC 9.0   HGB 13.5   HCT 39.3   MCV 89.6        BMP:   Recent Labs     02/04/20 2033 02/05/20  0514   * 131*   K 3.3* 3.8   CL 87* 94*   CO2 25 24   BUN 6* 4*   CREATININE <0.5* <0.5*     LIVER PROFILE:   Recent Labs     02/04/20 2033 02/05/20  0514   AST 39* 38*   ALT 23 23   BILITOT 0.7 0.7   ALKPHOS 84 84     PT/INR:   Recent Labs     02/04/20 2033   PROTIME 12.8   INR 1.11     APTT:   Recent Labs     02/04/20 2033   APTT 30.3     UA:  Recent Labs     02/04/20  2200   COLORU Yellow   PHUR 7.0   WBCUA 20-50*   RBCUA 10-20*   BACTERIA 3+*   CLARITYU Clear   SPECGRAV 1.015   LEUKOCYTESUR SMALL*   UROBILINOGEN 0.2   BILIRUBINUR Negative   BLOODU TRACE-INTACT*   GLUCOSEU Negative     No results for input(s): PHART, JAU2QDC, PO2ART in the last 72 hours. Chest imaging was reviewed by me and showed  CT chest 1/31/2020  Severe upper lobe predominant centrilobular pulmonary emphysema  6 mm calcified pulmonary nodule left lower lobe stable V 2018  Small focus of nodular airspace disease in the right lower lobe    CXR 2/4/2020 with new right lower lobe airspace disease    Head CT 2/5/2020 no acute abnormality    ASSESSMENT:  · Acute on chronic hypoxemic respiratory failure  · Community acquired pneumonia with risk for resistant organisms given underlying structural lung disease, of note, there was a small focus of lung disease present on the CT scan 1/31/2020 that probably represented an early pneumonia that is now become clinically evident  · Very severe upper lobe predominant centrilobular pulmonary emphysema on CT imaging -followed by Dr. Mandie Foss, unfortunately, she has a history of multiple no-shows  · Hyponatremia  · Acute metabolic encephalopathy   · UTI    PLAN:  Supplemental oxygen to maintain SaO2 >92%; wean as tolerated    IV solumedrol with plan to convert to oral prednisone with improvement  Inhaled bronchodilators   Azactam and vancomycin D#2, will plan to change to Levaquin/vanc for improved activity against suspected pathogens. D/C vanc if nasal probe for MRSA is negative  F/U nasal probe and urine antigens  Monitoring of vancomycin levels to prevent toxicity.    Tobacco cessation  ·  Repeat CT imaging of chest in approximately 3 months to document resolution of the nodular airspace disease in the right lower lobe. Patient is aware of this recommendation.

## 2020-02-05 NOTE — PROGRESS NOTES
RESPIRATORY THERAPY ASSESSMENT    Name:  Lili Field Record Number:  [id number]  Age: 60 y.o. Gender: female  : 1955  Today's Date:  2020  Room:  /0301-01    Assessment     Is the patient being admitted for a COPD or Asthma exacerbation? No   (If yes the patient will be seen every 4 hours for the first 24 hours and then reassessed)    Patient Admission Diagnosis      Allergies  Allergies   Allergen Reactions    Cephalosporins Shortness Of Breath    Pcn [Penicillins] Anaphylaxis and Hives       Minimum Predicted Vital Capacity:     782          Actual Vital Capacity: Too sleepy to preform               Pulmonary History:COPD, Smoker   Home Oxygen Therapy:  2 liters/min via NC but does not wear   Home Respiratory Therapy:None   Current Respiratory Therapy: Albuterol Q2 PRN and Duoneb Q4 WA             Respiratory Severity Index(RSI)   Patients with orders for inhalation medications, oxygen, or any therapeutic treatment modality will be placed on Respiratory Protocol. They will be assessed with the first treatment and at least every 72 hours thereafter. The following severity scale will be used to determine frequency of treatment intervention.     Smoking History: Pulmonary Disease or Smoking History, Greater than 15 pack year = 2    Social History  Social History     Tobacco Use    Smoking status: Current Every Day Smoker     Packs/day: 0.50     Years: 35.00     Pack years: 17.50     Types: Cigarettes    Smokeless tobacco: Never Used    Tobacco comment: 18 currently 1/2 PPD   Substance Use Topics    Alcohol use: No     Alcohol/week: 12.0 standard drinks     Types: 12 Cans of beer per week    Drug use: No       Recent Surgical History: None = 0  Past Surgical History  Past Surgical History:   Procedure Laterality Date    CHOLECYSTECTOMY         Level of Consciousness: Alert, Oriented, and Cooperative = 0    Level of Activity: Walking unassisted = 0    Respiratory Pattern: Regular Pattern; RR 8-20 = 0    Breath Sounds: Diminshed bilaterally and/or crackles = 2    Sputum   ,  ,    Cough: Strong, productive = 1    Vital Signs   /67   Pulse 60   Temp 98.5 °F (36.9 °C) (Oral)   Resp 22   Ht 5' 3\" (1.6 m)   Wt 126 lb 11.2 oz (57.5 kg)   SpO2 92%   Breastfeeding No   BMI 22.44 kg/m²   SPO2 (COPD values may differ): 88-89% on room air or greater than 92% on FiO2 28- 35% = 2    Peak Flow (asthma only): not applicable = 0    RSI: 5-6 = Q4hr PRN (every four hours as needed) for dyspnea        Plan       Goals: medication delivery and improve oxygenation    Patient/caregiver was educated on the proper method of use for Respiratory Care Devices:  Yes      Level of patient/caregiver understanding able to:   ? Verbalize understanding   ? Demonstrate understanding       ? Teach back        ? Needs reinforcement       ? No available caregiver               ? Other:     Response to education:  Very Good     Is patient being placed on Home Treatment Regimen? No     Does the patient have everything they need prior to discharge? NA     Comments: pt assessed and plan of care determined    Plan of Care: Albuterol Q2 PRN and Duoneb Q4 WA     Electronically signed by Gabrielle Bajwa RCP on 2/5/2020 at 4:01 AM    Respiratory Protocol Guidelines     1. Assessment and treatment by Respiratory Therapy will be initiated for medication and therapeutic interventions upon initiation of aerosolized medication. 2. Physician will be contacted for respiratory rate (RR) greater than 35 breaths per minute. Therapy will be held for heart rate (HR) greater than 140 beats per minute, pending direction from physician. 3. Bronchodilators will be administered via Metered Dose Inhaler (MDI) with spacer when the following criteria are met:  a. Alert and cooperative     b. HR < 140 bpm  c. RR < 30 bpm                d. Can demonstrate a 2-3 second inspiratory hold  4.  Bronchodilators will be

## 2020-02-05 NOTE — ED NOTES
[facility] center called with bed assignment. Patient will be going to #320 Bed #1.      [employer]  02/04/20 9634

## 2020-02-05 NOTE — CONSULTS
Pharmacy Note  Vancomycin Consult    Sidra Shetty is a 60 y.o. female started on Vancomycin for PNA; consult received from Dr. Tonie Beckford to manage therapy. Also receiving the following antibiotics: azactam.    Patient Active Problem List   Diagnosis    Chest pain    SOB (shortness of breath)    Tobacco use    Moderate COPD (chronic obstructive pulmonary disease) (HCC)    COPD exacerbation (HCC)    Acute respiratory failure with hypoxia (HCC)    Hyponatremia    Pulmonary nodule    Pulmonary emphysema (HCC)    Acute on chronic respiratory failure with hypoxia and hypercapnia (HCC)       Allergies:  Cephalosporins and Pcn [penicillins]     Temp max:     Recent Labs     02/04/20 2033   BUN 6*       Recent Labs     02/04/20 2033   CREATININE <0.5*       Recent Labs     02/04/20 2033   WBC 9.0         Intake/Output Summary (Last 24 hours) at 2/5/2020 0211  Last data filed at 2/5/2020 0130  Gross per 24 hour   Intake 350 ml   Output 600 ml   Net -250 ml       Culture Date      Source                       Results      Ht Readings from Last 1 Encounters:   02/05/20 5' 3\" (1.6 m)        Wt Readings from Last 1 Encounters:   02/05/20 126 lb 11.2 oz (57.5 kg)         Body mass index is 22.44 kg/m². CrCl cannot be calculated (This lab value cannot be used to calculate CrCl because it is not a number: <0.5). Goal Trough Level: 15-20 mcg/mL    Assessment/Plan:  Patient received vancomycin 1 gram ivpb x1 dose in ER at 2145 on 2-4-20. Please start vancomycin 750 mg ivpb q12h at 1000 on 2-5-20. Please check vancomycin trough 2-6-20 at 0930. Thank you for the consult. Will continue to follow. [address] Ph.  2/5/2020 2:12 AM

## 2020-02-05 NOTE — PLAN OF CARE
Problem: Falls - Risk of:  Goal: Will remain free from falls  Description  Will remain free from falls  Outcome: Ongoing  Goal: Absence of physical injury  Description  Absence of physical injury  Outcome: Ongoing     Problem: Breathing Pattern - Ineffective:  Goal: Ability to achieve and maintain a regular respiratory rate will improve  Description  Ability to achieve and maintain a regular respiratory rate will improve  Outcome: Ongoing     Problem: Pain:  Goal: Pain level will decrease  Description  Pain level will decrease  Outcome: Ongoing  Goal: Control of acute pain  Description  Control of acute pain  Outcome: Ongoing  Goal: Control of chronic pain  Description  Control of chronic pain  Outcome: Ongoing     Problem: Discharge Planning:  Goal: Discharged to appropriate level of care  Description  Discharged to appropriate level of care  Outcome: Ongoing     Problem: OXYGENATION/RESPIRATORY FUNCTION  Goal: Patient will maintain patent airway  Outcome: Ongoing  Goal: Patient will achieve/maintain normal respiratory rate/effort  Description  Respiratory rate and effort will be within normal limits for the patient  Outcome: Ongoing     Problem: SKIN INTEGRITY  Goal: Skin integrity is maintained or improved  Outcome: Ongoing     Problem: NUTRITION  Goal: Nutritional status is improving  Outcome: Ongoing

## 2020-02-05 NOTE — ED NOTES
Pt assisted to [facility] with stand-by assist     Fito Graff [facility]  02/04/20 2648

## 2020-02-05 NOTE — PROGRESS NOTES
Vancomycin Day: 2    Patient's labs, cultures, vitals, and vancomycin regimen reviewed. No changes today.   Trough due on 6th at [address] D 0/8/522455:82 AM  .

## 2020-02-05 NOTE — CARE COORDINATION
Case Management Assessment  Initial Evaluation  COPD    Date/Time of Evaluation: 2/5/2020 11:51 AM  Assessment Completed by: Jocy Tc    Patient Name: Irineo Desai  YOB: 1955  Diagnosis: Acute on chronic respiratory failure with hypoxia and hypercapnia ([facility] [address] [J96.21, J96.22]  Date / Time: 2/4/2020  7:55 PM  Admission status/Date: Inpatient 02/04/2020 4485  Chart Reviewed: Yes      Patient Interviewed: Yes   Family Interviewed:  No      Hospitalization in the last 30 days:  No    Contacts  :     Relationship to Patient:   Phone Number:    Alternate Contact:     Relationship to Patient:     Phone Number:      Current PCP: Eleni Tierney  Pulmonologist/Name:    Luis Mabry Medicare    ADLS  Support Systems:    Transportation: family    Meal Preparation: self  Smoke: +     Housing  Home Environment: lives alone  Steps: ramp  Plans to Return to Present Housing: Yes  Other Identified Issues: NA    Home Care Information  Currently active with [address] : No           Passport/Waiver : No  Passport/Waiver Services: NA    Durable Medical Equipment   DME Provider: Charlee Britt  Equipment: +home O2 (only uses PRN), Jeannette Braswell    Has Home O2 in place on admit:  Yes  Informed of need to bring portable home O2 tank on day of discharge for nursing to connect prior to leaving:   Not Indicated  Verbalized agreement/Understanding:   Not Indicated    Community Service Affiliation  Dialysis:  No  Outpatient PT/OT: No    Cancer Center: No     CHF Clinic: No     Pulmonary Rehab: No    Pain Clinic: No  Community Mental Health: No    Wound Clinic: No     Other: NA    DISCHARGE PLAN: Chart reviewed. Met with pt at bedside and explained the role of the CM. Lives alone. Depends on friends/family for transportation. She is otherwise independent. Plans to return home. Denies any new HC or DME needs. CM not following. Please consult CM PRN.

## 2020-02-05 NOTE — PROGRESS NOTES
home but over the last 2 days has become more weak. Intermittent episodes of confusion. Her family notes she has had a worsening cough and they have noted that she seemed more short of breath than usual.  They checked her oxygen saturation at home and she was in the low 70s on room air. While she does have home oxygen they note that she uses it very rarely and does not usually need it.     They are unsure if she has been having any fevers but has felt warm. She denies lower extremity swelling. She is had no vomiting or diarrhea. \"     Fiorella/RN okays entry of SLP for BSE. Pt awakens to name and light. Pt orients to person, place, year, not say of week. No c/o pain. Pt reports no h/o dysphagia. Pt eats regular solids and drinks thin liquids PTA. 2L O2 per NC. Oral motor exam is unremarkable. Volitional cough appears strong. Spontaneous cough noted prior to po trials. Oral care completed. Pt assessed with ice chips, thin liquids by tsp, cup, and straw, puree, and solids. Oral swallow appears WNL. No overt clinical signs of aspiration are observed. Pt completes 3 oz water challenge without stopping and without coughing during or one minute after completion indicating that likelihood of silent aspiration is extremely low. Assessment: Oral swallow appears WNL. No clinical signs of aspiration observed. Recommend initiate regular solids/thin liquids, meds whole with thin or puree as needed. Education completed  re: results and recommendations, role of SLP in dysphagia, signs of aspiration, safe swallow strategies, and benefits of oral care. No further ST recommended at this time. Dysphagia Diagnosis: Swallow function appears grossly intact  Dysphagia Impression : Oral swallow appears WNL. No clinical signs of aspiration observed.   Dysphagia Outcome Severity Scale: Level 7: Normal in all situations     Treatment Plan  Requires SLP Intervention: No  Duration/Frequency of Treatment: eval only  D/C

## 2020-02-05 NOTE — ED PROVIDER NOTES
[facility]      Pt Name: Chacho Worrell  MRN: [id number]  [place] 1955  Date of evaluation: 2/4/2020  Provider: Holli Gibbons MD    [address] [place] [address]       Chief Complaint   Patient presents with    Shortness of Breath     C/O shortness of breath starting today with a fever. Pt has home O2, \"but seldomnly wears it\" 88% on RA         HISTORY OF PRESENT ILLNESS   (Location/Symptom, Timing/Onset, Context/Setting, Quality, Duration, Modifying Factors, Severity)  Note limiting factors. Chacho Worrell is a 60 y.o. female with past medical history of COPD hyponatremia here today generalized weakness shortness of breath. Patient was just recently discharged from the hospital days ago for hyponatremia and COPD exacerbation. She initially was doing fine at home but over the last 2 days has become more weak. Intermittent episodes of confusion. Her family notes she has had a worsening cough and they have noted that she seemed more short of breath than usual.  They checked her oxygen saturation at home and she was in the low 70s on room air. While she does have home oxygen they note that she uses it very rarely and does not usually need it. They are unsure if she has been having any fevers but has felt warm. She denies lower extremity swelling. She is had no vomiting or diarrhea. \[facility] [place] [facility]\""    Nursing Notes were reviewed. REVIEW OF SYSTEMS    (2-9 systems for level 4, 10 or more for level 5)     Review of Systems    Please see HPI for pertinent positive and negative review of system findings. A full 10 system ROS was performed and otherwise negative.         PAST MEDICAL HISTORY     Past Medical History:   Diagnosis Date    Angina pectoris (HCC)     Chronic pain     knees and lower back     COPD exacerbation ([facility] [address] 5/20/2018    Depression     Panic disorder     Pneumonia          SURGICAL HISTORY       Past Surgical History:   Procedure Laterality tobacco: Never Used    Tobacco comment: 7/17/18 currently 1/2 PPD   Substance and Sexual Activity    Alcohol use: No     Alcohol/week: 12.0 standard drinks     Types: 12 Cans of beer per week    Drug use: No    Sexual activity: Never   Lifestyle    Physical activity:     Days per week: None     Minutes per session: None    Stress: None   Relationships    Social connections:     Talks on phone: None     Gets together: None     Attends Oriental orthodox service: None     Active member of club or organization: None     Attends meetings of clubs or organizations: None     Relationship status: None    Intimate partner violence:     Fear of current or ex partner: None     Emotionally abused: None     Physically abused: None     Forced sexual activity: None   Other Topics Concern    None   Social History Narrative    None       SCREENINGS               PHYSICAL EXAM    (up to 7 for level 4, 8 or more for level 5)     ED Triage Vitals   BP Temp Temp src Pulse Resp SpO2 Height Weight   -- -- -- -- -- -- -- --       Physical Exam    General appearance:  Cooperative. No acute distress. Skin:  Warm. Dry. Eye:  Extraocular movements intact. Ears, nose, mouth and throat:  Oral mucosa dry. Neck:  Trachea midline. Heart:  Regular rate and rhythm  Perfusion:  intact  Respiratory: Patient does have increased respiratory rate with somewhat distant lung sounds but no significant wheezing noted. Frequent wet sounding cough     Abdominal:   Non distended. Nontender  Neurological:  Alert and oriented x 3. Moves all extremities spontaneously. Patient has generalized weakness throughout with no focality.   While she is oriented to person, place and time she does somewhat trail off with her answers and at times appears somewhat confused with further questioning  Musculoskeletal:   Normal ROM, no deformities          Psychiatric:  Normal mood      DIAGNOSTIC RESULTS       Labs Reviewed   CBC WITH AUTO DIFFERENTIAL - Abnormal; Notable for the following components:       Result Value    Lymphocytes Absolute 0.7 (*)     All other components within normal limits    Narrative:     Performed at:  [facility]. [facility]  [address] [place] [address]. [place] [address] [place] [address]   Phone [phone]   COMPREHENSIVE METABOLIC PANEL W/ REFLEX TO MG FOR LOW K - Abnormal; Notable for the following components:    Sodium 125 (*)     Potassium reflex Magnesium 3.3 (*)     Chloride 87 (*)     Glucose 103 (*)     BUN 6 (*)     CREATININE <0.5 (*)     AST 39 (*)     All other components within normal limits    Narrative:     Performed at:  [facility]. [facility]  [address] [place] [address]. [place] [address] [address]    Phone [phone] - Abnormal; Notable for the following components:    Pro- (*)     All other components within normal limits    Narrative:     Performed at:  [facility]. [facility]  [address] [place] [address]. [address]   Phone [phone]   URINE RT REFLEX TO CULTURE - Abnormal; Notable for the following components:    Ketones, Urine 15 (*)     Blood, Urine TRACE-INTACT (*)     Protein, UA TRACE (*)     Leukocyte Esterase, Urine SMALL (*)     All other components within normal limits    Narrative:     Performed at:  [facility]. [facility]  [address] [place] [address]. [place] [facility] [address] [employer]   Phone [phone]   BLOOD GAS, VENOUS - Abnormal; Notable for the following components:    pCO2, Kevin 39.7 (*)     Carboxyhemoglobin 2.8 (*)     All other components within normal limits    Narrative:     Performed at:  [facility]. [facility]  [address] [place] [address].  [place] [facility] [employer]   Phone [phone]   MAGNESIUM - Abnormal; Notable for the following components:    Magnesium 1.50 (*)     All other components within normal limits    Narrative:     Performed at:  [facility]. [facility]  [address] [place] [address]. [facility], [address]   Phone [phone]   MICROSCOPIC URINALYSIS - Abnormal; Notable for the following components:    WBC, UA 20-50 (*)     RBC, UA 10-20 (*)     Bacteria, UA 3+ (*)     All other components within normal limits    Narrative:     Performed at:  [facility]. [facility]  [address] [place] [address]. [facility], [facility]   Phone [phone]   RAPID INFLUENZA A/B ANTIGENS    Narrative:     Performed at:  [facility]. [facility]  [address] [place] [address]. [facility], [facility] [address]   Phone [phone]   CULTURE BLOOD #1   CULTURE BLOOD #2   URINE CULTURE   TROPONIN    Narrative:     Performed at:  [facility]. [facility]  [address] [place] [address]. [facility], [facility] [address]   Phone [phone]    Narrative:     Performed at:  [facility]. [facility]  [address] [place] [address]. [facility], [facility] [address]   Phone [phone]   APTT    Narrative:     Performed at:  [facility]. [facility]  [address] [place] [address]. [facility], [facility] [address]   Phone [phone]   LACTATE, SEPSIS    Narrative:     Performed at:  [facility]. [facility]  [address] [place] [address]. [facility], [facility] [address]   Phone [phone]   LACTATE, SEPSIS   PROCALCITONIN       Interpretation per the Radiologist below, if obtained/available at the time of this note:    CT HEAD WO CONTRAST   Final Result   No acute intracranial abnormality. XR CHEST STANDARD (2 VW)   Final Result   New right lower lobe atelectasis versus pneumonia             All other labs/imaging were within normal range or not returned as of this dictation.     EMERGENCY DEPARTMENT COURSE and DIFFERENTIAL DIAGNOSIS/MDM:   Vitals:    Vitals:    02/04/20 2045 02/04/20 2239   BP: (!) 149/75 (!) 156/78   Pulse: 71 78   Resp: note that portions of this note were completed with a voice recognition program.  Efforts were made to edit the dictations but occasionally words are mis-transcribed.)    Gustavo Brown MD (electronically signed)  Attending Emergency Physician            Xi Olson MD  02/05/20 7079

## 2020-02-05 NOTE — CONSULTS
mouth 3 times daily as needed      ketotifen (ZADITOR) 0.025 % ophthalmic solution Place 1 drop into both eyes 2 times daily      neomycin-polymyxin-hydrocortisone (CORTISPORIN) 3.5-48543-0 otic suspension 2 drops 3 times daily      albuterol sulfate HFA (VENTOLIN HFA) 108 (90 Base) MCG/ACT inhaler INHALE 2 PUFFS EVERY 6 HOURS AS NEEDED FOR WHEEZING OR SHORTNESS OF BREATH 3 Inhaler 1    oxyCODONE (ROXICODONE) 5 MG immediate release tablet Take 10 mg by mouth every 6 hours as needed for Pain. Diania Medico Multiple Vitamins-Minerals (CENTRUM SILVER 50+WOMEN PO) Take 1 tablet by mouth daily      oxybutynin (DITROPAN) 5 MG tablet Take 5 mg by mouth 4 times daily         Allergies:  Cephalosporins and Pcn [penicillins]    Social History:    Social History     Socioeconomic History    Marital status:       Spouse name: Not on file    Number of children: 10    Years of education: Not on file    Highest education level: Not on file   Occupational History    Not on file   Social Needs    Financial resource strain: Not on file    Food insecurity:     Worry: Not on file     Inability: Not on file    Transportation needs:     Medical: Not on file     Non-medical: Not on file   Tobacco Use    Smoking status: Current Every Day Smoker     Packs/day: 0.50     Years: 35.00     Pack years: 17.50     Types: Cigarettes    Smokeless tobacco: Never Used    Tobacco comment: 7/17/18 currently 1/2 PPD   Substance and Sexual Activity    Alcohol use: No     Alcohol/week: 12.0 standard drinks     Types: 12 Cans of beer per week    Drug use: No    Sexual activity: Never   Lifestyle    Physical activity:     Days per week: Not on file     Minutes per session: Not on file    Stress: Not on file   Relationships    Social connections:     Talks on phone: Not on file     Gets together: Not on file     Attends Mandaen service: Not on file     Active member of club or organization: Not on file     Attends meetings of clubs or  Recommend correlation with any acute pulmonary   symptoms.  Pulmonary follow-up and consideration for repeat imaging in 3   months advised. 3. Moderate centrilobular emphysema. 4. Stable dilation of the ascending thoracic aorta. influenza screen has been negative  UA shows WBCs and RBCs with small le    Assessment  -Acute on chronic hyponatremia, in the setting of decreased solute intake and fever, responding with IV fluid   Now back almost to baseline    -Fever, confusion   Question source, possible UTI and pneumonia  -Ongoing tobacco abuse  -Pulmonary nodule  -Acute on chronic hypoxic respiratory failure      Plan  -Stop IV fluid  -Fluid restriction 1500 mL/day  -Antibiotics as ordered  -Serial BMPs  -Encouraged p.o. intake  -Follow blood cultures, urine culture    Thank you for the consultation. Please do not hesitate to call with questions.     Tristen Carrera  [facility]  Office: [phone]  Fax:    [phone]

## 2020-02-05 NOTE — ED NOTES
Pt report provided to PCU. Pt report provided to [employer], KULDIP. Pt and daughter verbalizes an understanding of admission and consent obtained.      [facility], [address] [facility]  02/05/20 1408

## 2020-02-06 LAB
ALBUMIN SERPL-MCNC: 3.5 G/DL (ref 3.4–5)
ANION GAP SERPL CALCULATED.3IONS-SCNC: 11 MMOL/L (ref 3–16)
BASOPHILS ABSOLUTE: 0 K/UL (ref 0–0.2)
BASOPHILS RELATIVE PERCENT: 0 %
BUN BLDV-MCNC: 10 MG/DL (ref 7–20)
CALCIUM SERPL-MCNC: 9.2 MG/DL (ref 8.3–10.6)
CHLORIDE BLD-SCNC: 92 MMOL/L (ref 99–110)
CO2: 24 MMOL/L (ref 21–32)
CREAT SERPL-MCNC: <0.5 MG/DL (ref 0.6–1.2)
EOSINOPHILS ABSOLUTE: 0 K/UL (ref 0–0.6)
EOSINOPHILS RELATIVE PERCENT: 0 %
GFR AFRICAN AMERICAN: >60
GFR NON-AFRICAN AMERICAN: >60
GLUCOSE BLD-MCNC: 154 MG/DL (ref 70–99)
HCT VFR BLD CALC: 38.8 % (ref 36–48)
HEMOGLOBIN: 13.3 G/DL (ref 12–16)
L. PNEUMOPHILA SEROGP 1 UR AG: NORMAL
LYMPHOCYTES ABSOLUTE: 0.7 K/UL (ref 1–5.1)
LYMPHOCYTES RELATIVE PERCENT: 9.4 %
MCH RBC QN AUTO: 30.9 PG (ref 26–34)
MCHC RBC AUTO-ENTMCNC: 34.2 G/DL (ref 31–36)
MCV RBC AUTO: 90.4 FL (ref 80–100)
MONOCYTES ABSOLUTE: 0.6 K/UL (ref 0–1.3)
MONOCYTES RELATIVE PERCENT: 7.9 %
MRSA SCREEN RT-PCR: NORMAL
NEUTROPHILS ABSOLUTE: 6.2 K/UL (ref 1.7–7.7)
NEUTROPHILS RELATIVE PERCENT: 82.7 %
PDW BLD-RTO: 12.7 % (ref 12.4–15.4)
PHOSPHORUS: 2.8 MG/DL (ref 2.5–4.9)
PLATELET # BLD: 167 K/UL (ref 135–450)
PMV BLD AUTO: 7.6 FL (ref 5–10.5)
POTASSIUM SERPL-SCNC: 3.9 MMOL/L (ref 3.5–5.1)
RBC # BLD: 4.29 M/UL (ref 4–5.2)
SODIUM BLD-SCNC: 127 MMOL/L (ref 136–145)
STREP PNEUMONIAE ANTIGEN, URINE: NORMAL
URINE CULTURE, ROUTINE: NORMAL
WBC # BLD: 7.5 K/UL (ref 4–11)

## 2020-02-06 PROCEDURE — 6370000000 HC RX 637 (ALT 250 FOR IP): Performed by: INTERNAL MEDICINE

## 2020-02-06 PROCEDURE — 6360000002 HC RX W HCPCS: Performed by: INTERNAL MEDICINE

## 2020-02-06 PROCEDURE — 97535 SELF CARE MNGMENT TRAINING: CPT

## 2020-02-06 PROCEDURE — 36415 COLL VENOUS BLD VENIPUNCTURE: CPT

## 2020-02-06 PROCEDURE — 97161 PT EVAL LOW COMPLEX 20 MIN: CPT

## 2020-02-06 PROCEDURE — 97530 THERAPEUTIC ACTIVITIES: CPT

## 2020-02-06 PROCEDURE — 80069 RENAL FUNCTION PANEL: CPT

## 2020-02-06 PROCEDURE — 85025 COMPLETE CBC W/AUTO DIFF WBC: CPT

## 2020-02-06 PROCEDURE — 99232 SBSQ HOSP IP/OBS MODERATE 35: CPT | Performed by: INTERNAL MEDICINE

## 2020-02-06 PROCEDURE — 94761 N-INVAS EAR/PLS OXIMETRY MLT: CPT

## 2020-02-06 PROCEDURE — 97116 GAIT TRAINING THERAPY: CPT

## 2020-02-06 PROCEDURE — 94640 AIRWAY INHALATION TREATMENT: CPT

## 2020-02-06 PROCEDURE — 2060000000 HC ICU INTERMEDIATE R&B

## 2020-02-06 PROCEDURE — 2700000000 HC OXYGEN THERAPY PER DAY

## 2020-02-06 PROCEDURE — 97166 OT EVAL MOD COMPLEX 45 MIN: CPT

## 2020-02-06 RX ORDER — PREDNISONE 20 MG/1
40 TABLET ORAL DAILY
Status: DISCONTINUED | OUTPATIENT
Start: 2020-02-07 | End: 2020-02-07 | Stop reason: HOSPADM

## 2020-02-06 RX ORDER — OXYCODONE HYDROCHLORIDE 5 MG/1
10 TABLET ORAL EVERY 6 HOURS PRN
Status: DISCONTINUED | OUTPATIENT
Start: 2020-02-06 | End: 2020-02-07 | Stop reason: HOSPADM

## 2020-02-06 RX ADMIN — OXYCODONE HYDROCHLORIDE 10 MG: 5 TABLET ORAL at 15:19

## 2020-02-06 RX ADMIN — ENOXAPARIN SODIUM 40 MG: 40 INJECTION SUBCUTANEOUS at 09:03

## 2020-02-06 RX ADMIN — OXYBUTYNIN CHLORIDE 5 MG: 5 TABLET ORAL at 18:06

## 2020-02-06 RX ADMIN — IPRATROPIUM BROMIDE AND ALBUTEROL SULFATE 1 AMPULE: .5; 3 SOLUTION RESPIRATORY (INHALATION) at 10:37

## 2020-02-06 RX ADMIN — IPRATROPIUM BROMIDE AND ALBUTEROL SULFATE 1 AMPULE: .5; 3 SOLUTION RESPIRATORY (INHALATION) at 19:10

## 2020-02-06 RX ADMIN — METHYLPREDNISOLONE SODIUM SUCCINATE 40 MG: 40 INJECTION, POWDER, FOR SOLUTION INTRAMUSCULAR; INTRAVENOUS at 03:02

## 2020-02-06 RX ADMIN — IPRATROPIUM BROMIDE AND ALBUTEROL SULFATE 1 AMPULE: .5; 3 SOLUTION RESPIRATORY (INHALATION) at 14:40

## 2020-02-06 RX ADMIN — OXYCODONE HYDROCHLORIDE 10 MG: 5 TABLET ORAL at 21:32

## 2020-02-06 RX ADMIN — IPRATROPIUM BROMIDE AND ALBUTEROL SULFATE 1 AMPULE: .5; 3 SOLUTION RESPIRATORY (INHALATION) at 23:00

## 2020-02-06 RX ADMIN — OXYBUTYNIN CHLORIDE 5 MG: 5 TABLET ORAL at 12:42

## 2020-02-06 RX ADMIN — IPRATROPIUM BROMIDE AND ALBUTEROL SULFATE 1 AMPULE: .5; 3 SOLUTION RESPIRATORY (INHALATION) at 06:49

## 2020-02-06 RX ADMIN — OXYCODONE HYDROCHLORIDE 10 MG: 5 TABLET ORAL at 08:13

## 2020-02-06 RX ADMIN — LEVOFLOXACIN 750 MG: 5 INJECTION, SOLUTION INTRAVENOUS at 12:42

## 2020-02-06 RX ADMIN — OXYBUTYNIN CHLORIDE 5 MG: 5 TABLET ORAL at 21:32

## 2020-02-06 RX ADMIN — POTASSIUM CHLORIDE 40 MEQ: 1500 TABLET, EXTENDED RELEASE ORAL at 09:03

## 2020-02-06 RX ADMIN — OXYBUTYNIN CHLORIDE 5 MG: 5 TABLET ORAL at 09:03

## 2020-02-06 ASSESSMENT — PAIN DESCRIPTION - ONSET
ONSET: ON-GOING
ONSET: ON-GOING

## 2020-02-06 ASSESSMENT — PAIN DESCRIPTION - PAIN TYPE
TYPE: ACUTE PAIN;CHRONIC PAIN
TYPE: CHRONIC PAIN
TYPE: CHRONIC PAIN

## 2020-02-06 ASSESSMENT — PAIN SCALES - GENERAL
PAINLEVEL_OUTOF10: 7
PAINLEVEL_OUTOF10: 0
PAINLEVEL_OUTOF10: 3
PAINLEVEL_OUTOF10: 7
PAINLEVEL_OUTOF10: 7
PAINLEVEL_OUTOF10: 0

## 2020-02-06 ASSESSMENT — PAIN DESCRIPTION - LOCATION
LOCATION: KNEE
LOCATION: KNEE
LOCATION: HEAD;KNEE

## 2020-02-06 ASSESSMENT — PAIN DESCRIPTION - DESCRIPTORS
DESCRIPTORS: ACHING;THROBBING
DESCRIPTORS: ACHING;HEADACHE
DESCRIPTORS: ACHING;THROBBING

## 2020-02-06 ASSESSMENT — PAIN DESCRIPTION - ORIENTATION
ORIENTATION: RIGHT;LEFT
ORIENTATION: OTHER (COMMENT)
ORIENTATION: OTHER (COMMENT)

## 2020-02-06 ASSESSMENT — PAIN - FUNCTIONAL ASSESSMENT
PAIN_FUNCTIONAL_ASSESSMENT: PREVENTS OR INTERFERES SOME ACTIVE ACTIVITIES AND ADLS
PAIN_FUNCTIONAL_ASSESSMENT: ACTIVITIES ARE NOT PREVENTED
PAIN_FUNCTIONAL_ASSESSMENT: PREVENTS OR INTERFERES WITH ALL ACTIVE AND SOME PASSIVE ACTIVITIES

## 2020-02-06 ASSESSMENT — PAIN DESCRIPTION - FREQUENCY
FREQUENCY: INTERMITTENT
FREQUENCY: CONTINUOUS
FREQUENCY: CONTINUOUS

## 2020-02-06 ASSESSMENT — PAIN DESCRIPTION - PROGRESSION
CLINICAL_PROGRESSION: NOT CHANGED
CLINICAL_PROGRESSION: GRADUALLY WORSENING

## 2020-02-06 NOTE — PROGRESS NOTES
Shift assessment complete. Patient is A/Ox4. VSS. Patient is awake in bed. Patient is currently on 2 L NC. SpO2 WNL. Lung sounds diminished. Patient with dyspnea on exertion. Heart rhythm NSR on telemetry, HRs 60s-70s. Bowel sounds active x4. Abdomen is soft and non tender. No new skin issues noted. No edema present. Patient with c/o 7/10 headache and aching knee pain- PRN oxycodone given per [facility] order with relief of pain per patient. Patient denies any further needs. Call light explained and in reach. Bed alarm set. Patient agrees to use call light for assistance. Will continue to monitor. Silvino Marques, MARYBETHN, RN.

## 2020-02-06 NOTE — PROGRESS NOTES
assistance from family for:  Cooking, Cleaning and Laundry shares with daughter  Pt. Fully independent for transfers and gait and walked with: No Device     Pain  Yes arthritis pain in knees  Rating: did not rate  Location:knees  Pain Medicine Status: No request made       Cognition    A&O x4   Able to follow 2 step commands    Subjective  Patient lying supine in bed with no family present  Pt agreeable to this PT eval & tx. Upper Extremity ROM/Strength  Please see OT evaluation. Lower Extremity ROM / Strength    AROM WFL: Yes    Strength Assessment (measured on a 0-5 scale):  R LE   Quad   4+   Ant Tib  5/5   Hamstring 4+   Iliopsoas 4+  L LE  Quad   4+   Ant Tib  5/5   Hamstring 4+   Iliopsoas 4+    Lower Extremity Sensation    WNL    Lower Extremity Proprioception:   NT    Coordination and Tone  WNL   UE tremors noted    Balance  Static Sitting:  Good   Dynamic Sitting:  Good   Static Standing: Good -    Tolerance: supervision standing at sink to wash hands without AD  Dynamic Standing: Good -     Bed Mobility   Supine to Sit:   Independent from flat bed  Sit to Supine:  Not Tested  Rolling:   Not Tested  Scooting at EOB: Independent  Scooting to [facility]:  Not Tested    Transfer Training     Sit to stand:   SBA  Stand to sit:   SBA  Bed to Chair:  Not Tested with use of N/A - pt ambulated    Gait gait completed as indicated below  Distance:  360 ft  Deviations (firm surface/linoleum): decreased raphael, Decreased arm swing on R and Decreased arm swing on L   Assistive Device Used:  gait belt  Level of Assist: SBA; one LOB with CGA-min A to recover  Comment: pt unsteady initially with one LOB and intermittent reaching for wall to steady herself, improved steadiness with increased distance    Stair Training deferred, pt does not have stairs in the home environment    Activity Tolerance  Pt completed therapy session with SOB noted with ambulation. Denies dizziness.   SpO2: 93% on 2L at rest   89% on 2L during ambulation   90% on 2L after ambulation  HR: 90 bpm at rest   83 bpm after ambulation   BP: 125/66 sitting EOB    Positioning Needs   Pt instructed and educated on use of call light to call for assist if desiring to get up or change position, call light handed to pt and needs within reach, Pt sitting up in chair, alarm set and pillows placed for support/comfort     Pt was able to demonstrate ability to move from a reclined to upright position within the recliner independently. Pt is currently utilizing the recliner as a chair only. RN notified of pt performance. Exercises Initiated    N/A    Other  None. Patient/Family Education   Pt educated on role of inpatient PT, POC, importance of continued activity, DC recommendations, safety awareness, transfer techniques, pursed lip breathing, pacing activity and calling for assist with mobility. Assessment  Pt seen for Physical Therapy evaluation in acute care setting. Pt demonstrated decreased Activity tolerance, Balance and Safety and decreased independence with Ambulation and Transfers. At this time pt is requiring SBA-min A for ambulation due to decreased balance and is requiring supplemental O2 to maintain saturation during activity. Pt would benefit from additional skilled therapy while in the acute setting. Recommending home with PRN assist.     Goals : To be met in 3 visits:  1). Independent with LE Ex x 10 reps    To be met in 6 visits:  1). Supine to/from sit: N/A - pt independent  2). Sit to/from stand: Independent  3). Bed to chair: Independent  4). Gait: Ambulate 50 ft.  with  Independent  and use of No AD  5).   Tolerate B LE exercises 3 sets of 10-15 reps    Rehabilitation Potential: Good  Strengths for achieving goals include:   Pt motivated, PLOF, Family Support and Pt cooperative  Barriers to achieving goals include:    No barriers     Plan    To be seen for 1-2 visits  while in acute care setting for therapeutic exercises, bed mobility,

## 2020-02-06 NOTE — PROGRESS NOTES
Inpatient Occupational Therapy  Evaluation and Treatment    Unit: PCU  Date:  2/6/2020  Patient Name:    Dayana Motley  Admitting diagnosis:  Acute on chronic respiratory [facility] [J96.21, J96.22]  Admit Date:  2/4/2020  Precautions/Restrictions/WB Status/ Lines/ Wounds/ Oxygen: fall risk, IV, bed/chair alarm and supplemental O2 (2L)    Treatment Time:  4808-2241  Treatment Number: 1   Billable Treatment Time: 31 minutes   Total Treatment Time:   41   minutes    Patient Goals for Therapy:  \" Patient wants to return home \"      Discharge Recommendations: Home Independently  DME needs for discharge: Needs Met       Therapy recommendations for staff:   Assist of 1 (contact guard assist) with use of No AD for all ambulation within halls    History of Present Illness: This is a 61-year-old female with a history of COPD who was recently hospitalized 1/30/2020-2/1/2020 with hyponatremia who presented to the emergency department on 2/4/2020 from home with a 2-day history of episodes of confusion, intermittent, moderately severe, associated with worsening cough, fever, chills and shortness of breath. She was found to be hypoxemic on room air. While she has home oxygen, she does not typically require it. Her prior episode of hyponatremia was similar and that she was weak but she did not have shortness of breath on her prior admission. Home Health S4 Level Recommendation:  NA  AM-PAC Score: 23    Preadmission Environment    Pt. Lives with family (daughter)  Home environment:  one story home  Steps to enter first floor:   bilateral hand rails and ramp    Steps to second floor: N/A  Bathroom:  Tub/Shower unit and Standard height toilet  Equipment owned:  [employer], Rolling Walker, manual W/C, BSC, quad cane, home O2 (2 L) PRN, inhaler and nebulizer     Preadmission Status / PLOF:  History of falls   Yes dizziness with falls   Pt.  Able to drive   No daughter assists  Pt Fully independent with ADL's  No  Pt. Required assistance from family for:  Cooking, Cleaning and Laundry shares with daughter  Pt. Fully independent for transfers and gait and walked with: No Device    Pain  Yes arthritis pain in knees  Rating: did not rate  Location:knees  Pain Medicine Status: No request made      Cognition    A&O x4   Able to follow 2 step commands    Subjective  Patient lying supine in bed with no family present  Pt agreeable to this OT eval & tx. Upper Extremity ROM:    WFL,  pt able to perform all bed mobility, transfers, and gait without ROM limitation. Upper Extremity Strength:    BUE strength WFL, but not formally assessed w/ MMT    Upper Extremity Sensation    WNL    Upper Extremity Proprioception:   WNL    Coordination and Tone  WNL, however noted to have a tremor in hands at times she reports this to be new, but able to hold items     Balance  Functional Sitting Balance: WNL  Functional Standing Balance:Impaired CGA with 1 LOB with ambulation without AD, SBA at sink and LB dressing    Bed mobility:    Supine to sit:   Modified Independent  Sit to supine:   Not Tested  Scooting to head of bed:   Not Tested  Scooting in sitting:  Not Tested  Rolling:   Not Tested  Bridging:   Independent    Transfers:    Sit to stand:  SBA  Stand to sit:  SBA  Bed to [facility]:  Not Tested  Bed to chair:   SBA/CGA 1 LOB without AD  Standard toilet: SBA    Activity Tolerance   Pt completed therapy session with Spo2 = varied from 88-92 on 2L O2 with activity  SpO2: see above  HR: 95  BP: 125/66    Dressing:      UE:   Supervision gown  LE:    SBA    Bathing:    UE:  Not Tested  LE:  Not Tested    Eating:   Not Tested    Toileting:  SBA    Positioning Needs:   Up in chair, call light and needs in reach.   Able to manually recline and un recline foot rest as needed    Exercise / Activities Initiated:   N/A    Patient/Family Education:   Role of OT  Energy conservation techniques  Oxygen tubing management    Assessment of Deficits:

## 2020-02-06 NOTE — PLAN OF CARE
Problem: Falls - Risk of:  Goal: Will remain free from falls  Description  Will remain free from falls  2/5/2020 2332 by Romina Huang RN  Outcome: Ongoing  2/5/2020 1046 by Ana Vasquez RN  Outcome: Ongoing  Goal: Absence of physical injury  Description  Absence of physical injury  2/5/2020 2332 by Romina Huang RN  Outcome: Ongoing  2/5/2020 1046 by Ana Vasquez RN  Outcome: Ongoing     Problem: Breathing Pattern - Ineffective:  Goal: Ability to achieve and maintain a regular respiratory rate will improve  Description  Ability to achieve and maintain a regular respiratory rate will improve  2/5/2020 2332 by Romina Huang RN  Outcome: Ongoing  2/5/2020 1046 by Ana Vasquez RN  Outcome: Ongoing     Problem: Pain:  Goal: Pain level will decrease  Description  Pain level will decrease  2/5/2020 2332 by Romina Huang RN  Outcome: Ongoing  2/5/2020 1046 by Ana Vasquez RN  Outcome: Ongoing  Goal: Control of acute pain  Description  Control of acute pain  2/5/2020 2332 by Romina Huang RN  Outcome: Ongoing  2/5/2020 1046 by Abby Bee RN  Outcome: Ongoing  Goal: Control of chronic pain  Description  Control of chronic pain  2/5/2020 2332 by Romina Huang RN  Outcome: Ongoing  2/5/2020 1046 by Ana Vasquez RN  Outcome: Ongoing     Problem: Discharge Planning:  Goal: Discharged to appropriate level of care  Description  Discharged to appropriate level of care  2/5/2020 2332 by Romina Huang RN  Outcome: Ongoing  2/5/2020 1046 by Ana Vasquez RN  Outcome: Ongoing     Problem: OXYGENATION/RESPIRATORY FUNCTION  Goal: Patient will maintain patent airway  2/5/2020 2332 by Romina Huang RN  Outcome: Ongoing  2/5/2020 1046 by Ana Vasquez RN  Outcome: Ongoing  Goal: Patient will achieve/maintain normal respiratory rate/effort  Description  Respiratory rate and effort will be within normal limits for the patient  2/5/2020 2332 by Romina Huang RN  Outcome: Ongoing  2/5/2020 1046 by Abby García LENNY Bee  Outcome: Ongoing     Problem: SKIN INTEGRITY  Goal: Skin integrity is maintained or improved  2/5/2020 2332 by Vijay Mendoza RN  Outcome: Ongoing  2/5/2020 1046 by Michelle Perea RN  Outcome: Ongoing     Problem: NUTRITION  Goal: Nutritional status is improving  2/5/2020 2332 by Vijay Mendoza RN  Outcome: Ongoing  2/5/2020 1046 by Michelle Perea RN  Outcome: Ongoing

## 2020-02-06 NOTE — PROGRESS NOTES
Pulmonary Progress Note  CC: Pneumonia, mental status change    Subjective:  Feeling better overall    IV line peripheral    EXAM:   /64   Pulse 72   Temp 98.4 °F (36.9 °C) (Oral)   Resp 19   Ht 5' 3\" (1.6 m)   Wt 125 lb 7 oz (56.9 kg)   SpO2 97%   Breastfeeding No   BMI 22.22 kg/m²  on 2 L  Constitutional:  No acute distress   HEENT: no scleral icterus  Neck: No tracheal deviation present. Cardiovascular: Normal heart sounds. Pulmonary/Chest: few wheezes. No rhonchi. No rales. No decreased breath sounds. No accessory muscle usage or stridor. Abdominal: Soft. Musculoskeletal: No cyanosis. No clubbing. Skin: Skin is warm and dry.      Scheduled Meds:   vancomycin  1,000 mg Intravenous Q12H    oxybutynin  5 mg Oral 4x Daily    enoxaparin  40 mg Subcutaneous Daily    nicotine  1 patch Transdermal Daily    methylPREDNISolone  40 mg Intravenous Q12H    Followed by   Stefania Burleson ON 2/7/2020] predniSONE  40 mg Oral Daily    ipratropium-albuterol  1 ampule Inhalation Q4H WA    ketotifen  1 drop Both Eyes BID    levofloxacin  750 mg Intravenous Q24H    potassium chloride  40 mEq Oral BID WC     Continuous Infusions:    PRN Meds:  hydrOXYzine, magnesium hydroxide, ondansetron, albuterol, acetaminophen    Labs:  CBC:   Recent Labs     02/04/20  2033 02/06/20  0421   WBC 9.0 7.5   HGB 13.5 13.3   HCT 39.3 38.8   MCV 89.6 90.4    167     BMP:   Recent Labs     02/05/20  0514 02/05/20  1448 02/06/20  0421   * 128* 127*   K 3.8 3.4* 3.9   CL 94* 92* 92*   CO2 24 24 24   PHOS  --   --  2.8   BUN 4* 8 10   CREATININE <0.5* 0.6 <0.5*       Cultures:  2/4/2020 blood sent    urine sent   Strep pneumo antigen, Legionella antigen pending   Rapid flu negative  2/5/2020 MRSA DNA probe negative    Films:  CT chest 1/31/2020  Severe upper lobe predominant centrilobular pulmonary emphysema  6 mm calcified pulmonary nodule left lower lobe stable V 2018  Small focus of nodular airspace disease in the right lower lobe     CXR 2/4/2020 with new right lower lobe airspace disease     Head CT 2/5/2020 no acute abnormality     ASSESSMENT:  · Acute on chronic hypoxemic respiratory failure  · Community acquired pneumonia with risk for resistant organisms given underlying structural lung disease   · Very severe upper lobe predominant centrilobular pulmonary emphysema on CT imaging -followed by Dr. Brenda Rosario, unfortunately, she has a history of multiple no-shows  · Hyponatremia  · Acute metabolic encephalopathy   · UTI     PLAN:  · Supplemental oxygen to maintain SaO2 >92%; wean as tolerated    · Prednisone taper  · Inhaled bronchodilators   · Antibiotic day #3, Levaquin day #2 (previously Azactam/VANC)  · Restart LAMA @ discharge, f/u with Dr. Brenda Rosario  · Tobacco cessation  ·  Repeat CT imaging of chest in approximately 3 months to document resolution of the nodular airspace disease in the right lower lobe.   Patient is aware of this recommendation, as is her daughter Napoleon Elaine  · Probably home tomorrow

## 2020-02-06 NOTE — PROGRESS NOTES
[facility]    Follow-up note           Reason for Consult: Hyponatremia  Requesting Physician:  Dr. Orlando Rossi    Chief Complaint: Confusion, weakness    Sub/interval history  Patient reports that she is feeling better, she does not feel hungry but says she is eating little bit better than yesterday   No new fever    last 24 h uop 1.5 L      ROS: No chest pain/fever/nausea/vomiting  PSFH: No visitor    Scheduled Meds:   [START ON 2/7/2020] predniSONE  40 mg Oral Daily    oxybutynin  5 mg Oral 4x Daily    enoxaparin  40 mg Subcutaneous Daily    nicotine  1 patch Transdermal Daily    ipratropium-albuterol  1 ampule Inhalation Q4H WA    ketotifen  1 drop Both Eyes BID    levofloxacin  750 mg Intravenous Q24H     Continuous Infusions:  PRN Meds:.oxyCODONE, hydrOXYzine, magnesium hydroxide, ondansetron, albuterol, acetaminophen      History of Present Illness on 2/5/2020:    60 y.o. yo female with PMH of COPD, panic disorder, ongoing tobacco abuse, hyponatremia who is admitted for fever, altered mental status and shortness of breath along with hyponatremia  Patient was discharged after an episode of hyponatremia this a few days ago  Since being at home, she has not felt well and has not been eating well, she also started getting short of breath and confused and weak and came to the emergency room. Patient was noted to be hypoxic and is on 2 to 3 L of oxygen.   She also had cough along with fever of 101 upon arrival.  Sodium was noted to be 125 at 8 PM on 2/4/2020, received 1 L normal saline bolus and was continued with 75 mL of normal saline per hour with sodium increasing up to 131 by 5 AM on 2/5   patient also received magnesium and potassium supplements  Her influenza screen has been negative  UA shows WBCs and RBCs with small le      Physical exam:   Constitutional:  VITALS:  /63   Pulse 78   Temp 97.7 °F (36.5 °C) (Oral)   Resp 16   Ht 5' 3\" (1.6 m)   Wt 125 lb 7 oz (56.9 kg) SpO2 94%   Breastfeeding No   BMI 22.22 kg/m²   Gen: alert, awake, nad  Skin: no rash, turgor wnl  Heent:  eomi, mmm  Neck: no bruits or jvd noted, thyroid normal  Cardiovascular:  S1, S2 without m/r/g  Respiratory: Decreased breath sounds   abdomen:  +bs, soft, nt, nd, no hepatosplenomegaly  Ext: no lower extremity edema  Neuro/Psy: AAoriented times 3 ; moves all 4 ext  Musculoskeletal:  Rom, muscular strength intact; digits, nails normal    Data/  Recent Labs     02/04/20 2033 02/06/20  0421   WBC 9.0 7.5   HGB 13.5 13.3   HCT 39.3 38.8   MCV 89.6 90.4    167     Recent Labs     02/04/20 2033 02/05/20  0514 02/05/20  1448 02/06/20  0421   * 131* 128* 127*   K 3.3* 3.8 3.4* 3.9   CL 87* 94* 92* 92*   CO2 25 24 24 24   GLUCOSE 103* 97 287* 154*   PHOS  --   --   --  2.8   MG 1.50*  --  2.00  --    BUN 6* 4* 8 10   CREATININE <0.5* <0.5* 0.6 <0.5*   LABGLOM >60 >60 >60 >60   GFRAA >60 >60 >60 >60      CT scan of the chest from 1/31/2020  Impression   1. Left lower lobe pulmonary nodule is now confirmed to represent a partially   calcified granuloma.  No additional follow-up of this nodule is necessary. Resumption of screening may be considered. 2. Cluster of tree in bud nodularity in the superior segment of the right   lower lobe that is new.  Recommend correlation with any acute pulmonary   symptoms.  Pulmonary follow-up and consideration for repeat imaging in 3   months advised. 3. Moderate centrilobular emphysema. 4. Stable dilation of the ascending thoracic aorta.        influenza screen has been negative  UA shows WBCs and RBCs with small le    Assessment  -Acute on chronic hyponatremia, in the setting of decreased solute intake and fever, responding with IV fluid   Now back almost to baseline    -Fever, confusion   Question source, possible UTI and pneumonia  -Ongoing tobacco abuse  -Pulmonary nodule  -Acute on chronic hypoxic respiratory failure      Plan  -Fluid restriction 1200 mL/day   -Ensure 1 bottle 3 times daily-Antibiotics as ordered  -Serial BMPs  -Encouraged p.o. intake  -Follow blood cultures, urine culture    Thank you for the consultation. Please do not hesitate to call with questions.     Agnes Marcos  [facility]  Office: [phone]  Fax:    [phone]

## 2020-02-06 NOTE — H&P
HFA) 108 (90 Base) MCG/ACT inhaler INHALE 2 PUFFS EVERY 6 HOURS AS NEEDED FOR WHEEZING OR SHORTNESS OF BREATH 4/4/19  Yes Pascual Deluna MD   oxyCODONE (ROXICODONE) 5 MG immediate release tablet Take 10 mg by mouth every 6 hours as needed for Pain. .   Yes Historical Provider, MD   Multiple Vitamins-Minerals (CENTRUM SILVER 50+WOMEN PO) Take 1 tablet by mouth daily   Yes Historical Provider, MD   oxybutynin (DITROPAN) 5 MG tablet Take 5 mg by mouth 4 times daily   Yes Historical Provider, MD       Allergies:  Cephalosporins and Pcn [penicillins]    Social History:  The patient currently lives at home    TOBACCO:   reports that she has been smoking cigarettes. She has a 17.50 pack-year smoking history. She has never used smokeless tobacco.  ETOH:   reports no history of alcohol use. Family History:  Reviewed in detail and negative for DM, Early CAD, Cancer, CVA. Positive as follows:        Problem Relation Age of Onset    COPD Mother     Hypertension Mother     Asthma Neg Hx     Cancer Neg Hx     Diabetes Neg Hx     Emphysema Neg Hx     Heart Failure Neg Hx        REVIEW OF SYSTEMS:   Positive for SOB and as noted in the HPI. All other systems reviewed and negative. PHYSICAL EXAM:    /68   Pulse 76   Temp 98.3 °F (36.8 °C) (Oral)   Resp 18   Ht 5' 3\" (1.6 m)   Wt 126 lb 11.2 oz (57.5 kg)   SpO2 97%   Breastfeeding No   BMI 22.44 kg/m²     General appearance: No apparent distress appears stated age and cooperative. HEENT Normal cephalic, atraumatic without obvious deformity. Pupils equal, round, and reactive to light. Extra ocular muscles intact. Conjunctivae/corneas clear. Neck: Supple, No jugular venous distention/bruits. Trachea midline without thyromegaly or adenopathy with full range of motion.   Lungs: diminished b/l  Heart: Regular rate and rhythm with Normal S1/S2   Abdomen: Soft, non-tender or non-distended without rigidity or guarding and positive bowel sounds   Extremities: Nayeli is expected to be hospitalized for > than 2 midnights based on the following assessment and plan:      ASSESSMENT/PLAN:    Acute on chr hypoxic resp failure - cont O2, wean as tolerated  Healthcare PNA - probable gram neg PNA with risk for MRSA PNA. CXR reviewed personally with RLL PNA, started on IV abx - levaquin/vancomycin, pulm consulted  Hyponatremia - Na 125 on admit, nephrology consulted  Acute metabolic encephalopathy - suspect 2/2 above, monitor  Hypokalemia/hypomag - replete  Poss UTI - cont abx for PNA, await urine cx        DVT Prophylaxis: lovenox  Diet: DIET GENERAL; Daily Fluid Restriction: 1500 ml  Code Status: Full Code  PT/OT Eval Status: ordered    [facility]       Jacqui Mora MD    Thank you Yeimy Churchill MD for the opportunity to be involved in this patient's care. If you have any questions or concerns please feel free to contact me at [phone].

## 2020-02-06 NOTE — PROGRESS NOTES
Progress Note    Admit Date:  2/4/2020    Subjective:  Ms. Lou Yung feels much better today. Mental status clear,. Sodium improving. No fevers,. No shortness of breath. Objective:   /63   Pulse 78   Temp 97.7 °F (36.5 °C) (Oral)   Resp 16   Ht 5' 3\" (1.6 m)   Wt 125 lb 7 oz (56.9 kg)   SpO2 94%   Breastfeeding No   BMI 22.22 kg/m²       Intake/Output Summary (Last 24 hours) at 2/6/2020 1444  Last data filed at 2/6/2020 1401  Gross per 24 hour   Intake 1603 ml   Output 900 ml   Net 703 ml         Physical Exam:  General:  Awake, alert, NAD  Skin:  Warm and dry  Neck:  JVD absent. Neck supple  Chest: Diminished breath sounds, mild rhonchi. Cardiovascular:  RRR ,S1S2 normal  Abdomen:  Soft, non tender, non distended, BS +  Extremities:  No edema. Intact peripheral pulses. Brisk cap refill, < 2 secs  Neuro: non focal      Medications:   Scheduled Meds:   [START ON 2/7/2020] predniSONE  40 mg Oral Daily    oxybutynin  5 mg Oral 4x Daily    enoxaparin  40 mg Subcutaneous Daily    nicotine  1 patch Transdermal Daily    ipratropium-albuterol  1 ampule Inhalation Q4H WA    ketotifen  1 drop Both Eyes BID    levofloxacin  750 mg Intravenous Q24H       Continuous Infusions:      Data:  CBC:   Recent Labs     02/04/20 2033 02/06/20  0421   WBC 9.0 7.5   RBC 4.39 4.29   HGB 13.5 13.3   HCT 39.3 38.8   MCV 89.6 90.4   RDW 12.6 12.7    167     BMP:   Recent Labs     02/05/20  0514 02/05/20  1448 02/06/20  0421   * 128* 127*   K 3.8 3.4* 3.9   CL 94* 92* 92*   CO2 24 24 24   PHOS  --   --  2.8   BUN 4* 8 10   CREATININE <0.5* 0.6 <0.5*     BNP: No results for input(s): BNP in the last 72 hours.   PT/INR:   Recent Labs     02/04/20 2033   PROTIME 12.8   INR 1.11     APTT:   Recent Labs     02/04/20 2033   APTT 30.3     CARDIAC ENZYMES:   Recent Labs     02/04/20 2033   TROPONINI <0.01     FASTING LIPID PANEL:  Lab Results   Component Value Date    CHOL 154 09/15/2017    HDL 81 (H) 09/15/2017    TRIG 44 09/15/2017     LIVER PROFILE:   Recent Labs     02/04/20 2033 02/05/20  0514   AST 39* 38*   ALT 23 23   BILITOT 0.7 0.7   ALKPHOS 84 84        Radiology  CT HEAD WO CONTRAST   Final Result   No acute intracranial abnormality. XR CHEST STANDARD (2 VW)   Final Result   New right lower lobe atelectasis versus pneumonia               Assessment:  Active Problems:    Acute on chronic respiratory failure with hypoxia and hypercapnia (HCC)    Community acquired pneumonia of right lung    Acute exacerbation of chronic obstructive pulmonary disease (COPD) ([facility] [address]  Resolved Problems:    * No resolved hospital problems.  *      Plan:    Acute on chr hypoxic resp failure   -Secondary to pneumonia and COPD   - cont O2, wean as tolerated    Community-acquired pneumonia  - probable gram neg PNA  -CXR with RLL PNA  -Pulmonology consulted  -Continue antibiotics Levaquin    COPD exacerbation  -Continue steroids, inhaled bronchodilators    Hyponatremia   - Na 125 on admit, improved with hydration to 127 today, monitor .  -Nephrology consulted    Acute metabolic encephalopathy   - suspect 2/2 above, monitor-resolved    Hypokalemia/hypomag   - replete    Poss UTI   - cont Levaquin await urine cx    Tobacco abuse  -On nicotine patch        DVT Prophylaxis: lovenox  Diet: DIET GENERAL; Daily Fluid Restriction: 1500 ml  Code Status: Full Code  PT/OT Eval Status: ordered     Dispo - cont care       Kelly Arias MD 2/6/2020 2:44 PM

## 2020-02-07 ENCOUNTER — TELEPHONE (OUTPATIENT)
Dept: PULMONOLOGY | Age: 65
End: 2020-02-07

## 2020-02-07 VITALS
SYSTOLIC BLOOD PRESSURE: 138 MMHG | DIASTOLIC BLOOD PRESSURE: 72 MMHG | WEIGHT: 126.7 LBS | RESPIRATION RATE: 18 BRPM | OXYGEN SATURATION: 91 % | HEIGHT: 63 IN | TEMPERATURE: 98 F | BODY MASS INDEX: 22.45 KG/M2 | HEART RATE: 73 BPM

## 2020-02-07 LAB
ALBUMIN SERPL-MCNC: 3.3 G/DL (ref 3.4–5)
ANION GAP SERPL CALCULATED.3IONS-SCNC: 10 MMOL/L (ref 3–16)
BUN BLDV-MCNC: 15 MG/DL (ref 7–20)
CALCIUM SERPL-MCNC: 9.3 MG/DL (ref 8.3–10.6)
CHLORIDE BLD-SCNC: 97 MMOL/L (ref 99–110)
CO2: 26 MMOL/L (ref 21–32)
CREAT SERPL-MCNC: <0.5 MG/DL (ref 0.6–1.2)
GFR AFRICAN AMERICAN: >60
GFR NON-AFRICAN AMERICAN: >60
GLUCOSE BLD-MCNC: 94 MG/DL (ref 70–99)
PHOSPHORUS: 2.3 MG/DL (ref 2.5–4.9)
POTASSIUM SERPL-SCNC: 3.9 MMOL/L (ref 3.5–5.1)
SODIUM BLD-SCNC: 133 MMOL/L (ref 136–145)

## 2020-02-07 PROCEDURE — 6370000000 HC RX 637 (ALT 250 FOR IP): Performed by: INTERNAL MEDICINE

## 2020-02-07 PROCEDURE — 80069 RENAL FUNCTION PANEL: CPT

## 2020-02-07 PROCEDURE — 99232 SBSQ HOSP IP/OBS MODERATE 35: CPT | Performed by: INTERNAL MEDICINE

## 2020-02-07 PROCEDURE — 94640 AIRWAY INHALATION TREATMENT: CPT

## 2020-02-07 PROCEDURE — 99238 HOSP IP/OBS DSCHRG MGMT 30/<: CPT | Performed by: INTERNAL MEDICINE

## 2020-02-07 PROCEDURE — 2700000000 HC OXYGEN THERAPY PER DAY

## 2020-02-07 PROCEDURE — 94761 N-INVAS EAR/PLS OXIMETRY MLT: CPT

## 2020-02-07 PROCEDURE — 36415 COLL VENOUS BLD VENIPUNCTURE: CPT

## 2020-02-07 RX ORDER — NICOTINE 21 MG/24HR
1 PATCH, TRANSDERMAL 24 HOURS TRANSDERMAL DAILY
Qty: 30 PATCH | Refills: 1 | Status: SHIPPED | OUTPATIENT
Start: 2020-02-08 | End: 2020-06-09

## 2020-02-07 RX ORDER — PREDNISONE 10 MG/1
TABLET ORAL
Qty: 18 TABLET | Refills: 0 | Status: SHIPPED | OUTPATIENT
Start: 2020-02-07 | End: 2020-06-09

## 2020-02-07 RX ORDER — ALBUTEROL SULFATE 90 UG/1
AEROSOL, METERED RESPIRATORY (INHALATION)
Qty: 3 INHALER | Refills: 1 | Status: SHIPPED | OUTPATIENT
Start: 2020-02-07 | End: 2021-08-27

## 2020-02-07 RX ORDER — LEVOFLOXACIN 750 MG/1
750 TABLET ORAL DAILY
Qty: 4 TABLET | Refills: 0 | Status: SHIPPED | OUTPATIENT
Start: 2020-02-07 | End: 2020-02-11

## 2020-02-07 RX ADMIN — IPRATROPIUM BROMIDE AND ALBUTEROL SULFATE 1 AMPULE: .5; 3 SOLUTION RESPIRATORY (INHALATION) at 06:28

## 2020-02-07 RX ADMIN — OXYCODONE HYDROCHLORIDE 10 MG: 5 TABLET ORAL at 08:55

## 2020-02-07 RX ADMIN — OXYBUTYNIN CHLORIDE 5 MG: 5 TABLET ORAL at 08:56

## 2020-02-07 RX ADMIN — POTASSIUM & SODIUM PHOSPHATES POWDER PACK 280-160-250 MG 500 MG: 280-160-250 PACK at 10:09

## 2020-02-07 RX ADMIN — PREDNISONE 40 MG: 20 TABLET ORAL at 08:56

## 2020-02-07 RX ADMIN — IPRATROPIUM BROMIDE AND ALBUTEROL SULFATE 1 AMPULE: .5; 3 SOLUTION RESPIRATORY (INHALATION) at 10:34

## 2020-02-07 ASSESSMENT — PAIN DESCRIPTION - LOCATION: LOCATION: KNEE

## 2020-02-07 ASSESSMENT — PAIN - FUNCTIONAL ASSESSMENT: PAIN_FUNCTIONAL_ASSESSMENT: PREVENTS OR INTERFERES SOME ACTIVE ACTIVITIES AND ADLS

## 2020-02-07 ASSESSMENT — PAIN DESCRIPTION - PROGRESSION: CLINICAL_PROGRESSION: GRADUALLY WORSENING

## 2020-02-07 ASSESSMENT — PAIN DESCRIPTION - PAIN TYPE: TYPE: CHRONIC PAIN

## 2020-02-07 ASSESSMENT — PAIN SCALES - GENERAL
PAINLEVEL_OUTOF10: 7
PAINLEVEL_OUTOF10: 4

## 2020-02-07 ASSESSMENT — PAIN DESCRIPTION - DESCRIPTORS: DESCRIPTORS: ACHING;THROBBING

## 2020-02-07 ASSESSMENT — PAIN DESCRIPTION - ORIENTATION: ORIENTATION: RIGHT;LEFT

## 2020-02-07 ASSESSMENT — PAIN DESCRIPTION - ONSET: ONSET: ON-GOING

## 2020-02-07 ASSESSMENT — PAIN DESCRIPTION - FREQUENCY: FREQUENCY: CONTINUOUS

## 2020-02-07 NOTE — PROGRESS NOTES
Pulmonary Progress Note  CC: Pneumonia, mental status change    Subjective: feeling better overall. IV line peripheral    EXAM:   /75   Pulse 89   Temp 97.7 °F (36.5 °C) (Oral)   Resp 20   Ht 5' 3\" (1.6 m)   Wt 126 lb 11.2 oz (57.5 kg)   SpO2 94%   Breastfeeding No   BMI 22.44 kg/m²  on 1 L  Constitutional:  No acute distress   HEENT: no scleral icterus  Neck: No tracheal deviation present. Cardiovascular: Normal heart sounds. Pulmonary/Chest: few wheezes. No rhonchi. No rales. No decreased breath sounds. No accessory muscle usage or stridor. Abdominal: Soft. Musculoskeletal: No cyanosis. No clubbing. Skin: Skin is warm and dry.      Scheduled Meds:   predniSONE  40 mg Oral Daily    oxybutynin  5 mg Oral 4x Daily    enoxaparin  40 mg Subcutaneous Daily    nicotine  1 patch Transdermal Daily    ipratropium-albuterol  1 ampule Inhalation Q4H WA    ketotifen  1 drop Both Eyes BID    levofloxacin  750 mg Intravenous Q24H     Continuous Infusions:    PRN Meds:  oxyCODONE, hydrOXYzine, magnesium hydroxide, ondansetron, albuterol, acetaminophen    Labs:  CBC:   Recent Labs     02/04/20  2033 02/06/20  0421   WBC 9.0 7.5   HGB 13.5 13.3   HCT 39.3 38.8   MCV 89.6 90.4    167     BMP:   Recent Labs     02/05/20  1448 02/06/20  0421 02/07/20  0513   * 127* 133*   K 3.4* 3.9 3.9   CL 92* 92* 97*   CO2 24 24 26   PHOS  --  2.8 2.3*   BUN 8 10 15   CREATININE 0.6 <0.5* <0.5*       Cultures:  2/4/2020 blood no growth    urine no growth   Strep pneumo antigen, Legionella antigen are both negative   Rapid flu negative  2/5/2020 MRSA DNA probe negative    Films:  CT chest 1/31/2020  Severe upper lobe predominant centrilobular pulmonary emphysema  6 mm calcified pulmonary nodule left lower lobe stable V 2018  Small focus of nodular airspace disease in the right lower lobe     CXR 2/4/2020 with new right lower lobe airspace disease     Head CT 2/5/2020 no acute abnormality     ASSESSMENT:  · Acute on chronic hypoxemic respiratory failure -improving  · Community acquired pneumonia with risk for resistant organisms given underlying structural lung disease   · Very severe upper lobe predominant centrilobular pulmonary emphysema on CT imaging -followed by Dr. August Escobar, unfortunately, she has a history of multiple no-shows  · Hyponatremia  · Acute metabolic encephalopathy   · UTI     PLAN:  · Supplemental oxygen to maintain SaO2 >92%; wean as tolerated    · Prednisone taper  · Inhaled bronchodilators   · Antibiotic day #4, Levaquin day #3 (previously Azactam/VANC)  · F/U with Dr. August Escobar, plan to discuss long acting bronchodilators  · Tobacco cessation  ·  Repeat CT imaging of chest in approximately 3 months to document resolution of the nodular airspace disease in the right lower lobe. Patient is aware of this recommendation, as is her daughter Mary Jo Thorne  · Anita Jimenez with me for discharge, see Dr. August Escobar in 1-2 weeks.

## 2020-02-07 NOTE — TELEPHONE ENCOUNTER
MD Nelly Bowser MA             Major 1-2 weeks, set up f/u CT at that visit (put on visit reason please)

## 2020-02-07 NOTE — PROGRESS NOTES
Shift assessment completed. Reviewed labs and plan of care. Patient is alert and oriented x4. Lungs are diminished. No abnormal heart tones. Bowel sounds are active. No edema noted. Patient requesting night time risperdol. Messaged nocturnist he replied with \"dayshift problem\". I informed the patient that I would make sure it was addressed tomorrow. No other needs expressed at this time.

## 2020-02-07 NOTE — PROGRESS NOTES
[facility]    Follow-up note           Reason for Consult: Hyponatremia  Requesting Physician:  Dr. Adilson Connor    Chief Complaint: Confusion, weakness    Sub/interval history  Feels better  Na up to 133  Sob better    last 24 h uop 1 L      ROS: No chest pain/fever/nausea/vomiting  PSFH: No visitor    Scheduled Meds:   potassium & sodium phosphates  2 packet Oral Once    predniSONE  40 mg Oral Daily    oxybutynin  5 mg Oral 4x Daily    enoxaparin  40 mg Subcutaneous Daily    nicotine  1 patch Transdermal Daily    ipratropium-albuterol  1 ampule Inhalation Q4H WA    ketotifen  1 drop Both Eyes BID    levofloxacin  750 mg Intravenous Q24H     Continuous Infusions:  PRN Meds:.oxyCODONE, hydrOXYzine, magnesium hydroxide, ondansetron, albuterol, acetaminophen      History of Present Illness on 2/5/2020:    60 y.o. yo female with PMH of COPD, panic disorder, ongoing tobacco abuse, hyponatremia who is admitted for fever, altered mental status and shortness of breath along with hyponatremia  Patient was discharged after an episode of hyponatremia this a few days ago  Since being at home, she has not felt well and has not been eating well, she also started getting short of breath and confused and weak and came to the emergency room. Patient was noted to be hypoxic and is on 2 to 3 L of oxygen.   She also had cough along with fever of 101 upon arrival.  Sodium was noted to be 125 at 8 PM on 2/4/2020, received 1 L normal saline bolus and was continued with 75 mL of normal saline per hour with sodium increasing up to 131 by 5 AM on 2/5   patient also received magnesium and potassium supplements  Her influenza screen has been negative  UA shows WBCs and RBCs with small le      Physical exam:   Constitutional:  VITALS:  /72   Pulse 73   Temp 98 °F (36.7 °C) (Oral)   Resp 18   Ht 5' 3\" (1.6 m)   Wt 126 lb 11.2 oz (57.5 kg)   SpO2 90%   Breastfeeding No   BMI 22.44 kg/m²   Gen: alert, nodule  -Acute on chronic hypoxic respiratory failure      Plan  -Fluid restriction 1200 mL/day   -Ensure 1 bottle 3 times daily-Antibiotics as ordered  -Serial BMPs  -Encouraged p.o. intake  -ok to dc from renal std pt    Thank you for the consultation. Please do not hesitate to call with questions.     Gin Sifuentes  [facility]  Office: [phone]  Fax:    [phone]

## 2020-02-07 NOTE — PROGRESS NOTES
Patient up to bedside commode, she took oxygen off. Upon coming back into room and assessing patient her O2 saturation was 83%, patient said she felt a little short of breath. Placed 1L back on patient and she recovered. No signs of distress noted. Will continue to monitor.

## 2020-02-07 NOTE — PROGRESS NOTES
Discharge instructions reviewed with the patient an patient's daughter, Destini Beckford. Verbalized understanding of all including prescribed medication, medication side effects/restrictions, and follow up care. Denies questions/concerns. Patient is alert/oriented, stable, and ambulatory at the time of discharge. Fang Melgar, MARYBETHN, RN.

## 2020-02-09 LAB
BLOOD CULTURE, ROUTINE: NORMAL
CULTURE, BLOOD 2: NORMAL

## 2020-02-12 ENCOUNTER — TELEPHONE (OUTPATIENT)
Dept: OTHER | Facility: CLINIC | Age: 65
End: 2020-02-12

## 2020-02-12 ENCOUNTER — APPOINTMENT (OUTPATIENT)
Dept: CT IMAGING | Age: 65
End: 2020-02-12
Payer: MEDICARE

## 2020-02-12 ENCOUNTER — APPOINTMENT (OUTPATIENT)
Dept: GENERAL RADIOLOGY | Age: 65
End: 2020-02-12
Payer: MEDICARE

## 2020-02-12 ENCOUNTER — HOSPITAL ENCOUNTER (EMERGENCY)
Age: 65
Discharge: HOME | End: 2020-02-12
Attending: EMERGENCY MEDICINE
Payer: MEDICARE

## 2020-02-12 VITALS
WEIGHT: 130 LBS | BODY MASS INDEX: 23.04 KG/M2 | SYSTOLIC BLOOD PRESSURE: 150 MMHG | HEIGHT: 63 IN | TEMPERATURE: 98.8 F | OXYGEN SATURATION: 94 % | HEART RATE: 66 BPM | DIASTOLIC BLOOD PRESSURE: 78 MMHG | RESPIRATION RATE: 18 BRPM

## 2020-02-12 LAB
A/G RATIO: 1.4 (ref 1.1–2.2)
ALBUMIN SERPL-MCNC: 4 G/DL (ref 3.4–5)
ALP BLD-CCNC: 83 U/L (ref 40–129)
ALT SERPL-CCNC: 22 U/L (ref 10–40)
ANION GAP SERPL CALCULATED.3IONS-SCNC: 12 MMOL/L (ref 3–16)
AST SERPL-CCNC: 22 U/L (ref 15–37)
BASE EXCESS ARTERIAL: 2.6 MMOL/L (ref -3–3)
BASOPHILS ABSOLUTE: 0 K/UL (ref 0–0.2)
BASOPHILS RELATIVE PERCENT: 0.4 %
BILIRUB SERPL-MCNC: 0.8 MG/DL (ref 0–1)
BILIRUBIN URINE: NEGATIVE
BLOOD, URINE: NEGATIVE
BUN BLDV-MCNC: 9 MG/DL (ref 7–20)
CALCIUM SERPL-MCNC: 9.4 MG/DL (ref 8.3–10.6)
CARBOXYHEMOGLOBIN ARTERIAL: 0.8 % (ref 0–1.5)
CHLORIDE BLD-SCNC: 96 MMOL/L (ref 99–110)
CLARITY: CLEAR
CO2: 28 MMOL/L (ref 21–32)
COLOR: YELLOW
CREAT SERPL-MCNC: 0.6 MG/DL (ref 0.6–1.2)
EOSINOPHILS ABSOLUTE: 0 K/UL (ref 0–0.6)
EOSINOPHILS RELATIVE PERCENT: 0.1 %
GFR AFRICAN AMERICAN: >60
GFR NON-AFRICAN AMERICAN: >60
GLOBULIN: 2.9 G/DL
GLUCOSE BLD-MCNC: 109 MG/DL (ref 70–99)
GLUCOSE URINE: NEGATIVE MG/DL
HCO3 ARTERIAL: 25.6 MMOL/L (ref 21–29)
HCT VFR BLD CALC: 43.7 % (ref 36–48)
HEMOGLOBIN, ART, EXTENDED: 15.2 G/DL (ref 12–16)
HEMOGLOBIN: 15 G/DL (ref 12–16)
KETONES, URINE: NEGATIVE MG/DL
LEUKOCYTE ESTERASE, URINE: NEGATIVE
LYMPHOCYTES ABSOLUTE: 1.6 K/UL (ref 1–5.1)
LYMPHOCYTES RELATIVE PERCENT: 12.8 %
MAGNESIUM: 2.2 MG/DL (ref 1.8–2.4)
MCH RBC QN AUTO: 30.9 PG (ref 26–34)
MCHC RBC AUTO-ENTMCNC: 34.5 G/DL (ref 31–36)
MCV RBC AUTO: 89.5 FL (ref 80–100)
METHEMOGLOBIN ARTERIAL: 0.6 %
MICROSCOPIC EXAMINATION: NORMAL
MONOCYTES ABSOLUTE: 0.9 K/UL (ref 0–1.3)
MONOCYTES RELATIVE PERCENT: 7.2 %
NEUTROPHILS ABSOLUTE: 9.6 K/UL (ref 1.7–7.7)
NEUTROPHILS RELATIVE PERCENT: 79.5 %
NITRITE, URINE: NEGATIVE
O2 CONTENT ARTERIAL: 20 ML/DL
O2 SAT, ARTERIAL: 95.7 %
O2 THERAPY: ABNORMAL
PCO2 ARTERIAL: 34.8 MMHG (ref 35–45)
PDW BLD-RTO: 12.6 % (ref 12.4–15.4)
PH ARTERIAL: 7.48 (ref 7.35–7.45)
PH UA: 6.5 (ref 5–8)
PHOSPHORUS: 3.9 MG/DL (ref 2.5–4.9)
PLATELET # BLD: 376 K/UL (ref 135–450)
PMV BLD AUTO: 6.9 FL (ref 5–10.5)
PO2 ARTERIAL: 75.6 MMHG (ref 75–108)
POTASSIUM REFLEX MAGNESIUM: 3.3 MMOL/L (ref 3.5–5.1)
PROTEIN UA: NEGATIVE MG/DL
RBC # BLD: 4.88 M/UL (ref 4–5.2)
SODIUM BLD-SCNC: 136 MMOL/L (ref 136–145)
SPECIFIC GRAVITY UA: 1.01 (ref 1–1.03)
TCO2 ARTERIAL: 26.6 MMOL/L
TOTAL PROTEIN: 6.9 G/DL (ref 6.4–8.2)
URINE REFLEX TO CULTURE: NORMAL
URINE TYPE: NORMAL
UROBILINOGEN, URINE: 0.2 E.U./DL
WBC # BLD: 12.1 K/UL (ref 4–11)

## 2020-02-12 PROCEDURE — 85025 COMPLETE CBC W/AUTO DIFF WBC: CPT

## 2020-02-12 PROCEDURE — 6370000000 HC RX 637 (ALT 250 FOR IP): Performed by: NURSE PRACTITIONER

## 2020-02-12 PROCEDURE — 83735 ASSAY OF MAGNESIUM: CPT

## 2020-02-12 PROCEDURE — 99285 EMERGENCY DEPT VISIT HI MDM: CPT

## 2020-02-12 PROCEDURE — 93005 ELECTROCARDIOGRAM TRACING: CPT | Performed by: EMERGENCY MEDICINE

## 2020-02-12 PROCEDURE — 70450 CT HEAD/BRAIN W/O DYE: CPT

## 2020-02-12 PROCEDURE — 82803 BLOOD GASES ANY COMBINATION: CPT

## 2020-02-12 PROCEDURE — 84100 ASSAY OF PHOSPHORUS: CPT

## 2020-02-12 PROCEDURE — 81003 URINALYSIS AUTO W/O SCOPE: CPT

## 2020-02-12 PROCEDURE — 80053 COMPREHEN METABOLIC PANEL: CPT

## 2020-02-12 PROCEDURE — 71046 X-RAY EXAM CHEST 2 VIEWS: CPT

## 2020-02-12 RX ORDER — POTASSIUM CHLORIDE 20 MEQ/1
40 TABLET, EXTENDED RELEASE ORAL ONCE
Status: COMPLETED | OUTPATIENT
Start: 2020-02-12 | End: 2020-02-12

## 2020-02-12 RX ORDER — LEVOFLOXACIN 750 MG/1
750 TABLET ORAL DAILY
COMMUNITY
End: 2020-06-09

## 2020-02-12 RX ADMIN — POTASSIUM CHLORIDE 40 MEQ: 20 TABLET, EXTENDED RELEASE ORAL at 15:46

## 2020-02-12 NOTE — TELEPHONE ENCOUNTER
Writer contacted Serg [place], ED provider to inform of 30 day readmission risk. No Decision on disposition at this time.

## 2020-02-12 NOTE — ED PROVIDER NOTES
drooping. Strength 5/5, sensation intact. No dysarthria. No dysmetria. No ataxia. PSYCHIATRIC: Normal mood and affect. SCREENINGS       RADIOLOGY  Xr Chest Standard (2 Vw)    Result Date: 2/12/2020  EXAMINATION: TWO X-RAY VIEWS OF THE CHEST 2/12/2020 1:31 pm COMPARISON: 02/04/2020 HISTORY: ORDERING SYSTEM PROVIDED HISTORY: Fatigue recent pneumonia TECHNOLOGIST PROVIDED HISTORY: Reason for exam:-> Fatigue recent pneumonia Reason for Exam: Fatigue recent pneumonia Acuity: Acute Type of Exam: Initial FINDINGS: Lungs are hyperinflated. Chronic appearing interstitial lung changes within the posterior lung bases. Previously noted bibasilar atelectasis or infiltrate is improved but persistent right greater than left. No consolidation, effusion or pneumothorax. Stable cardiomediastinal silhouette. No acute fracture, dislocation, or bony destructive process. COPD with chronic appearing bibasilar interstitial lung changes. Improving bibasilar atelectasis or infiltrate. Ct Head Wo Contrast    Result Date: 2/12/2020  EXAMINATION: CT OF THE HEAD WITHOUT CONTRAST  2/12/2020 3:26 pm TECHNIQUE: CT of the head was performed without the administration of intravenous contrast. Dose modulation, iterative reconstruction, and/or weight based adjustment of the mA/kV was utilized to reduce the radiation dose to as low as reasonably achievable. COMPARISON: 02/04/2020 HISTORY: ORDERING SYSTEM PROVIDED HISTORY: confusion episode TECHNOLOGIST PROVIDED HISTORY: Reason for exam:->confusion episode Has a \"code stroke\" or \"stroke alert\" been called? ->No Reason for Exam: confusion/dizziness x a few days Acuity: Acute Type of Exam: Initial Relevant Medical/Surgical History: no prev cva hx FINDINGS: BRAIN/VENTRICLES: There is no evidence of intracranial hemorrhage or cortical based infarct. No mass effect or midline shift. No abnormal extra-axial fluid collections.   Periventricular and deep white matter hypodensities are Negative    pH, UA 6.5 5.0 - 8.0    Protein, UA Negative Negative mg/dL    Urobilinogen, Urine 0.2 <2.0 E.U./dL    Nitrite, Urine Negative Negative    Leukocyte Esterase, Urine Negative Negative    Microscopic Examination Not Indicated     Urine Type NotGiven     Urine Reflex to Culture Not Indicated    Blood gas, arterial   Result Value Ref Range    pH, Arterial 7.484 (H) 7.350 - 7.450    pCO2, Arterial 34.8 (L) 35.0 - 45.0 mmHg    pO2, Arterial 75.6 75.0 - 108.0 mmHg    HCO3, Arterial 25.6 21.0 - 29.0 mmol/L    Base Excess, Arterial 2.6 -3.0 - 3.0 mmol/L    Hemoglobin, Art, Extended 15.2 12.0 - 16.0 g/dL    O2 Sat, Arterial 95.7 >92 %    Carboxyhgb, Arterial 0.8 0.0 - 1.5 %    Methemoglobin, Arterial 0.6 <1.5 %    TCO2, Arterial 26.6 Not Established mmol/L    O2 Content, Arterial 20 Not Established mL/dL    O2 Therapy Unknown    Comprehensive Metabolic Panel w/ Reflex to MG   Result Value Ref Range    Sodium 136 136 - 145 mmol/L    Potassium reflex Magnesium 3.3 (L) 3.5 - 5.1 mmol/L    Chloride 96 (L) 99 - 110 mmol/L    CO2 28 21 - 32 mmol/L    Anion Gap 12 3 - 16    Glucose 109 (H) 70 - 99 mg/dL    BUN 9 7 - 20 mg/dL    CREATININE 0.6 0.6 - 1.2 mg/dL    GFR Non-African American >60 >60    GFR African American >60 >60    Calcium 9.4 8.3 - 10.6 mg/dL    Total Protein 6.9 6.4 - 8.2 g/dL    Alb 4.0 3.4 - 5.0 g/dL    Albumin/Globulin Ratio 1.4 1.1 - 2.2    Total Bilirubin 0.8 0.0 - 1.0 mg/dL    Alkaline Phosphatase 83 40 - 129 U/L    ALT 22 10 - 40 U/L    AST 22 15 - 37 U/L    Globulin 2.9 g/dL   Magnesium   Result Value Ref Range    Magnesium 2.20 1.80 - 2.40 mg/dL   Phosphorus   Result Value Ref Range    Phosphorus 3.9 2.5 - 4.9 mg/dL   EKG 12 Lead   Result Value Ref Range    Ventricular Rate 66 BPM    Atrial Rate 66 BPM    P-R Interval 134 ms    QRS Duration 78 ms    Q-T Interval 344 ms    QTc Calculation (Bazett) 360 ms    P Axis 74 degrees    R Axis -79 degrees    T Axis 49 degrees    Diagnosis       Normal sinus rhythmLeft axis deviationInferior infarct , age undeterminedAbnormal ECGWhen compared with ECG of 04-FEB-2020 20:35,RSR' pattern in V1 is no longer PresentInferior infarct is now Present       I estimate there is LOW risk for ACUTE CORONARY SYNDROME, INTRACRANIAL HEMORRHAGE, MALIGNANT DYSRHYTHMIA, MENINGITIS, PNEUMONIA, PULMONARY EMBOLISM, SEPSIS, SUBARACHNOID HEMORRHAGE, SUBDURAL HEMATOMA, STROKE, or URINARY TRACT INFECTION, thus I consider the discharge disposition reasonable. Letha Tyler and I have discussed the diagnosis and risks, and we agree with discharging home to follow-up with their primary doctor. We also discussed returning to the Emergency Department immediately if new or worsening symptoms occur. We have discussed the symptoms which are most concerning (e.g., changing or worsening pain, weakness, vomiting, fever) that necessitate immediate return. Final Impression    1. Generalized weakness    2. Hypokalemia        Blood pressure (!) 150/78, pulse 66, temperature 98.8 °F (37.1 °C), resp. rate 18, height 5' 3\" (1.6 m), weight 130 lb (59 kg), SpO2 94 %, not currently breastfeeding.mdm    Patient was sent home with a prescription for below medication/s. I did [place] patient on appropriate use of these medication. New Prescriptions    No medications on file           FOLLOW UP  MD Malvin Cerda 5747 [phone]    Schedule an appointment as soon as possible for a visit   follow up    [facility]  [address]  Go to   As needed, If symptoms worsen      DISPOSITION  Patient was discharged to home in good condition. Comment: Please note this report has been produced using speech recognition software and may contain errors related to that system including errors in grammar, punctuation, and spelling, as well as words and phrases that may be inappropriate.  If there are any questions or concerns please feel free to contact the dictating provider for clarification.             [id number] [place], APRN - CNP  02/12/20 4271

## 2020-02-12 NOTE — ED PROVIDER NOTES
me as follows:  Rate: normal with a rate of 66  Rhythm: sinus  Axis: left deviation  Intervals: normal OH, narrow QRS, normal QTc  ST segments: no ST elevations or depressions  T waves: no abnormal inversions  Non-specific T wave changes: present  Prior EKG comparison: EKG dated 2/4/20 is not significantly different    MDM:  Diagnostic considerations included stroke, TIA, intracranial bleed, trauma, infection/sepsis, medication side effect, intoxication/withdrawal, metabolic/electrolyte abnormalities    ED course was notable for generalized weakness, nonspecific nonfocal symptoms with an unremarkable ED work-up except for mild hypokalemia that was repleted. Urinalysis not infected, head CT is unremarkable, labs otherwise generally reassuring and the patient is without symptoms when I evaluate her but was worried about them earlier in the day. At this time TIA does not seem very likely. Her recent pneumonia seems to be improving. Her recent hospitalization hyponatremia is also improved. No current evidence of metabolic encephalopathy. She is independently ambulatory. She is at her baseline and feels comfortable going home and will return with new or worsening symptoms and f/u as outpatient. Her family is in agreement with this plan. During the patient's ED course, the patient was given:  Medications   potassium chloride (KLOR-CON M) extended release tablet 40 mEq (40 mEq Oral Given 2/12/20 1546)        CLINICAL IMPRESSION  1. Generalized weakness    2. Hypokalemia        DISPOSITION  Omar Ellison was discharged to home in stable condition. I have discussed the findings of today's workup with the patient and addressed the patient's questions and concerns. Important warning signs as well as new or worsening symptoms which would necessitate immediate return to the ED were discussed. The plan is to discharge from the ED at this time, and the patient is in stable condition.   The patient acknowledged understanding is agreeable with this plan. Follow-up with:  MD Malvin Talavera 5747 [phone]    Schedule an appointment as soon as possible for a visit   follow up    [facility]  ED  [address]  Go to   As needed, If symptoms worsen      This chart was created using Dragon dictation software. Efforts were made by me to ensure accuracy, however some errors may be present due to limitations of this technology.             Ian Mata MD  02/12/20 8858

## 2020-02-12 NOTE — ED NOTES
Patient provided with discharge instructions and any prescriptions. --Instructions, dosing, and follow-up appointments reviewed with patient/family. No further questions or needs at this time. --Vital signs and patient stable upon discharge. --IV d/c without complications. --Pt ambulated to private vehicle without difficulty.       Lavonne Choi RN  02/12/20 1531

## 2020-02-13 ENCOUNTER — TELEPHONE (OUTPATIENT)
Dept: OTHER | Facility: CLINIC | Age: 65
End: 2020-02-13

## 2020-02-13 LAB
EKG ATRIAL RATE: 66 BPM
EKG DIAGNOSIS: NORMAL
EKG P AXIS: 74 DEGREES
EKG P-R INTERVAL: 134 MS
EKG Q-T INTERVAL: 344 MS
EKG QRS DURATION: 78 MS
EKG QTC CALCULATION (BAZETT): 360 MS
EKG R AXIS: -79 DEGREES
EKG T AXIS: 49 DEGREES
EKG VENTRICULAR RATE: 66 BPM

## 2020-02-13 PROCEDURE — 93010 ELECTROCARDIOGRAM REPORT: CPT | Performed by: INTERNAL MEDICINE

## 2020-02-13 NOTE — TELEPHONE ENCOUNTER
Writer contacted Dr. Fuentes Peña Office to schedule ED f/u appt. Spoke with Venita Doll, appt scheduled for Friday 2-14 @2:45pm with Dr. Fuentes Peña.

## 2020-02-14 ENCOUNTER — TELEPHONE (OUTPATIENT)
Dept: PULMONOLOGY | Age: 65
End: 2020-02-14

## 2020-02-14 NOTE — TELEPHONE ENCOUNTER
Patient did not show for hospital follow-up appointment  with   on 2/14/20    Same Day Cancellation: No    Patient rescheduled:  No    New appointment: n/a    Patient was also no show on: 2/27/19,10/16/18 and 11/30/18    LOV   ASSESSMENT:2/7/20  [facility] note  · Acute on chronic hypoxemic respiratory failure -improving  · Community acquired pneumonia with risk for resistant organisms given underlying structural lung disease   · Very severe upper lobe predominant centrilobular pulmonary emphysema on CT imaging -followed by Dr. Saez, unfortunately, she has a history of multiple no-shows  · Hyponatremia  · Acute metabolic encephalopathy   · UTI     PLAN:  · Supplemental oxygen to maintain SaO2 >92%; wean as tolerated    · Prednisone taper  · Inhaled bronchodilators   · Antibiotic day #4, Levaquin day #3 (previously Azactam/VANC)  · F/U with Dr. Rubén Fontaine, plan to discuss long acting bronchodilators  · Tobacco cessation  ·  Repeat CT imaging of chest in approximately 3 months to document resolution of the nodular airspace disease in the right lower lobe.  Patient is aware of this recommendation, as is her daughter Kavin Schultz  · Okay with me for discharge, see Dr. Rubén Fontaine in 1-2 weeks.

## 2020-02-26 ENCOUNTER — OFFICE VISIT (OUTPATIENT)
Dept: PULMONOLOGY | Age: 65
End: 2020-02-26
Payer: MEDICARE

## 2020-02-26 VITALS
HEART RATE: 70 BPM | RESPIRATION RATE: 15 BRPM | DIASTOLIC BLOOD PRESSURE: 72 MMHG | BODY MASS INDEX: 22.15 KG/M2 | OXYGEN SATURATION: 94 % | HEIGHT: 63 IN | SYSTOLIC BLOOD PRESSURE: 138 MMHG | WEIGHT: 125 LBS

## 2020-02-26 PROCEDURE — 3017F COLORECTAL CA SCREEN DOC REV: CPT | Performed by: INTERNAL MEDICINE

## 2020-02-26 PROCEDURE — 4004F PT TOBACCO SCREEN RCVD TLK: CPT | Performed by: INTERNAL MEDICINE

## 2020-02-26 PROCEDURE — 1111F DSCHRG MED/CURRENT MED MERGE: CPT | Performed by: INTERNAL MEDICINE

## 2020-02-26 PROCEDURE — G8420 CALC BMI NORM PARAMETERS: HCPCS | Performed by: INTERNAL MEDICINE

## 2020-02-26 PROCEDURE — G8427 DOCREV CUR MEDS BY ELIG CLIN: HCPCS | Performed by: INTERNAL MEDICINE

## 2020-02-26 PROCEDURE — 99214 OFFICE O/P EST MOD 30 MIN: CPT | Performed by: INTERNAL MEDICINE

## 2020-02-26 PROCEDURE — 99406 BEHAV CHNG SMOKING 3-10 MIN: CPT | Performed by: INTERNAL MEDICINE

## 2020-02-26 PROCEDURE — G8482 FLU IMMUNIZE ORDER/ADMIN: HCPCS | Performed by: INTERNAL MEDICINE

## 2020-02-26 PROCEDURE — 3023F SPIROM DOC REV: CPT | Performed by: INTERNAL MEDICINE

## 2020-02-26 PROCEDURE — G8926 SPIRO NO PERF OR DOC: HCPCS | Performed by: INTERNAL MEDICINE

## 2020-02-26 RX ORDER — ALBUTEROL SULFATE 90 UG/1
2 AEROSOL, METERED RESPIRATORY (INHALATION) EVERY 6 HOURS PRN
Qty: 1 INHALER | Refills: 5 | Status: SHIPPED | OUTPATIENT
Start: 2020-02-26 | End: 2020-06-09

## 2020-02-26 RX ORDER — NICOTINE 21 MG/24HR
1 PATCH, TRANSDERMAL 24 HOURS TRANSDERMAL EVERY 24 HOURS
Qty: 30 PATCH | Refills: 3 | Status: SHIPPED | OUTPATIENT
Start: 2020-02-26 | End: 2020-06-09

## 2020-02-26 NOTE — PROGRESS NOTES
[facility]    Outpatient Follow Up Note    CC: hospital stay and COPD  Consulting provider: Dorrine Lundborg, MD    Interval History: 60 y.o. female   SOB is unchanged and remains improved since her hospitalization. Not using her rescue every day. Smoking 1/2 PPD. Initial HPI:Cough started about 2 months ago. Non-productive. Her cough improved but did not resolve after 2 weeks and she had another course of antibiotics and now the cough is mostly resolved. No fever. No wheezing. Her repeat CXR showed improvement but not resolution of the RML pneumonia. Denies SOB or CP. She is trying to quit smoking with patches. Smoked 405years at 1ppd. Her mother had COPD. Current Medications:    Current Outpatient Medications:     levofloxacin (LEVAQUIN) 750 MG tablet, Take 750 mg by mouth daily, Disp: , Rfl:     albuterol sulfate HFA (VENTOLIN HFA) 108 (90 Base) MCG/ACT inhaler, INHALE 2 PUFFS EVERY 6 HOURS AS NEEDED FOR WHEEZING OR SHORTNESS OF BREATH, Disp: 3 Inhaler, Rfl: 1    hydrOXYzine (ATARAX) 50 MG tablet, Take 50 mg by mouth every 6 hours as needed for Itching, Disp: , Rfl:     risperiDONE (RISPERDAL) 4 MG tablet, Take 4 mg by mouth nightly, Disp: , Rfl:     escitalopram (LEXAPRO) 20 MG tablet, Take 20 mg by mouth nightly, Disp: , Rfl:     neomycin-polymyxin-hydrocortisone (CORTISPORIN) 3.5-64317-0 otic suspension, 2 drops 3 times daily, Disp: , Rfl:     oxyCODONE (ROXICODONE) 5 MG immediate release tablet, Take 10 mg by mouth every 6 hours as needed for Pain. ., Disp: , Rfl:     Multiple Vitamins-Minerals (CENTRUM SILVER 50+WOMEN PO), Take 1 tablet by mouth daily, Disp: , Rfl:     oxybutynin (DITROPAN) 5 MG tablet, Take 5 mg by mouth 4 times daily, Disp: , Rfl:     predniSONE (DELTASONE) 10 MG tablet, Take 3 tabs a day for 3 days, Then 2 tabs a day for 3 days , Then 1 tab a day for 3 days.  (Patient not taking: Reported on 2/26/2020), Disp: 18 tablet, Rfl: 0    nicotine

## 2020-05-06 ENCOUNTER — HOSPITAL ENCOUNTER (OUTPATIENT)
Age: 65
Discharge: HOME | End: 2020-05-06
Payer: MEDICARE

## 2020-05-06 LAB
A/G RATIO: 1.1 (ref 1.1–2.2)
ALBUMIN SERPL-MCNC: 3.4 G/DL (ref 3.4–5)
ALP BLD-CCNC: 123 U/L (ref 40–129)
ALT SERPL-CCNC: 14 U/L (ref 10–40)
ANION GAP SERPL CALCULATED.3IONS-SCNC: 16 MMOL/L (ref 3–16)
AST SERPL-CCNC: 20 U/L (ref 15–37)
BASOPHILS ABSOLUTE: 0 K/UL (ref 0–0.2)
BASOPHILS RELATIVE PERCENT: 0.8 %
BILIRUB SERPL-MCNC: 0.7 MG/DL (ref 0–1)
BUN BLDV-MCNC: <2 MG/DL (ref 7–20)
CALCIUM SERPL-MCNC: 9.1 MG/DL (ref 8.3–10.6)
CHLORIDE BLD-SCNC: 90 MMOL/L (ref 99–110)
CO2: 25 MMOL/L (ref 21–32)
CREAT SERPL-MCNC: <0.5 MG/DL (ref 0.6–1.2)
EOSINOPHILS ABSOLUTE: 0 K/UL (ref 0–0.6)
EOSINOPHILS RELATIVE PERCENT: 0.7 %
GFR AFRICAN AMERICAN: >60
GFR NON-AFRICAN AMERICAN: >60
GLOBULIN: 3.2 G/DL
GLUCOSE BLD-MCNC: 124 MG/DL (ref 70–99)
HCT VFR BLD CALC: 38.8 % (ref 36–48)
HEMOGLOBIN: 13.2 G/DL (ref 12–16)
LYMPHOCYTES ABSOLUTE: 0.9 K/UL (ref 1–5.1)
LYMPHOCYTES RELATIVE PERCENT: 15.7 %
MAGNESIUM: 1.6 MG/DL (ref 1.8–2.4)
MCH RBC QN AUTO: 30.4 PG (ref 26–34)
MCHC RBC AUTO-ENTMCNC: 34.1 G/DL (ref 31–36)
MCV RBC AUTO: 89.2 FL (ref 80–100)
MONOCYTES ABSOLUTE: 0.5 K/UL (ref 0–1.3)
MONOCYTES RELATIVE PERCENT: 8.7 %
NEUTROPHILS ABSOLUTE: 4.4 K/UL (ref 1.7–7.7)
NEUTROPHILS RELATIVE PERCENT: 74.1 %
PDW BLD-RTO: 13 % (ref 12.4–15.4)
PLATELET # BLD: 343 K/UL (ref 135–450)
PMV BLD AUTO: 6.5 FL (ref 5–10.5)
POTASSIUM SERPL-SCNC: 3.4 MMOL/L (ref 3.5–5.1)
RBC # BLD: 4.35 M/UL (ref 4–5.2)
SODIUM BLD-SCNC: 131 MMOL/L (ref 136–145)
TOTAL PROTEIN: 6.6 G/DL (ref 6.4–8.2)
WBC # BLD: 6 K/UL (ref 4–11)

## 2020-05-06 PROCEDURE — 85025 COMPLETE CBC W/AUTO DIFF WBC: CPT

## 2020-05-06 PROCEDURE — 36415 COLL VENOUS BLD VENIPUNCTURE: CPT

## 2020-05-06 PROCEDURE — 80053 COMPREHEN METABOLIC PANEL: CPT

## 2020-05-06 PROCEDURE — 83735 ASSAY OF MAGNESIUM: CPT

## 2020-06-02 ENCOUNTER — TELEPHONE (OUTPATIENT)
Dept: PULMONOLOGY | Age: 65
End: 2020-06-02

## 2020-06-02 NOTE — TELEPHONE ENCOUNTER

## 2020-06-09 ENCOUNTER — VIRTUAL VISIT (OUTPATIENT)
Dept: PULMONOLOGY | Age: 65
End: 2020-06-09
Payer: MEDICARE

## 2020-06-09 PROCEDURE — 99442 PR PHYS/QHP TELEPHONE EVALUATION 11-20 MIN: CPT | Performed by: INTERNAL MEDICINE

## 2020-06-09 RX ORDER — MAGNESIUM OXIDE 400 MG/1
400 TABLET ORAL DAILY
COMMUNITY
End: 2020-12-21

## 2020-06-09 RX ORDER — HYDROXYZINE HYDROCHLORIDE 25 MG/1
25 TABLET, FILM COATED ORAL 3 TIMES DAILY PRN
COMMUNITY

## 2020-06-09 RX ORDER — CETIRIZINE HYDROCHLORIDE 10 MG/1
10 TABLET ORAL DAILY
COMMUNITY

## 2020-08-04 RX ORDER — ALBUTEROL SULFATE 90 UG/1
2 AEROSOL, METERED RESPIRATORY (INHALATION) EVERY 6 HOURS PRN
Qty: 18 G | Refills: 5 | Status: SHIPPED | OUTPATIENT
Start: 2020-08-04 | End: 2021-02-16

## 2020-08-18 ENCOUNTER — TELEPHONE (OUTPATIENT)
Dept: PULMONOLOGY | Age: 65
End: 2020-08-18

## 2020-08-18 NOTE — TELEPHONE ENCOUNTER
Per Rosaura patient wanted to cancel 8/19/20 follow-up with . Called patient to reschedule appointment and there was no answer or voicemail. Notes 6/9/20       Onofre Barger is a 60 y.o. female evaluated via telephone on 6/9/2020.       Consent:  She and/or health care decision maker is aware that that she may receive a bill for this telephone service, depending on her insurance coverage, and has provided verbal consent to proceed: Yes        Documentation:  I communicated with the patient and/or health care decision maker about Pneumonia and COPD.    Details of this discussion including any medical advice provided:   Pt did not complete the recommended chest CT or PFTs  Will r/s tests  Continue PRN albuterol and needs rx for refill     I affirm this is a Patient Initiated Episode with a Patient who has not had a related appointment within my department in the past 7 days or scheduled within the next 24 hours.     Patient identification was verified at the start of the visit: Yes     Total Time: minutes: 11-20 minutes     Note: not billable if this call serves to triage the patient into an appointment for the relevant concern        LEONOR PISANO

## 2020-09-03 NOTE — TELEPHONE ENCOUNTER
Patient was No show on the following  Days: 2/14/20, 2/27/19, 10/16/18. Per office policy patient has one more no show before dismissal. Please send letter so that she is aware.

## 2020-12-21 ENCOUNTER — HOSPITAL ENCOUNTER (EMERGENCY)
Age: 65
Discharge: LEFT BEFORE DISCHARGE | End: 2020-12-21
Attending: EMERGENCY MEDICINE
Payer: MEDICARE

## 2020-12-21 ENCOUNTER — APPOINTMENT (OUTPATIENT)
Dept: GENERAL RADIOLOGY | Age: 65
End: 2020-12-21
Payer: MEDICARE

## 2020-12-21 VITALS
OXYGEN SATURATION: 85 % | DIASTOLIC BLOOD PRESSURE: 75 MMHG | HEIGHT: 63 IN | WEIGHT: 142 LBS | HEART RATE: 80 BPM | SYSTOLIC BLOOD PRESSURE: 143 MMHG | BODY MASS INDEX: 25.16 KG/M2 | TEMPERATURE: 98.1 F | RESPIRATION RATE: 20 BRPM

## 2020-12-21 LAB
A/G RATIO: 1.3 (ref 1.1–2.2)
ALBUMIN SERPL-MCNC: 4.1 G/DL (ref 3.4–5)
ALP BLD-CCNC: 120 U/L (ref 40–129)
ALT SERPL-CCNC: 24 U/L (ref 10–40)
ANION GAP SERPL CALCULATED.3IONS-SCNC: 12 MMOL/L (ref 3–16)
AST SERPL-CCNC: 41 U/L (ref 15–37)
BASOPHILS ABSOLUTE: 0.1 K/UL (ref 0–0.2)
BASOPHILS RELATIVE PERCENT: 1.3 %
BILIRUB SERPL-MCNC: 0.3 MG/DL (ref 0–1)
BILIRUBIN URINE: NEGATIVE
BLOOD, URINE: ABNORMAL
BUN BLDV-MCNC: 5 MG/DL (ref 7–20)
CALCIUM SERPL-MCNC: 9.3 MG/DL (ref 8.3–10.6)
CHLORIDE BLD-SCNC: 97 MMOL/L (ref 99–110)
CLARITY: CLEAR
CO2: 27 MMOL/L (ref 21–32)
COLOR: YELLOW
CREAT SERPL-MCNC: <0.5 MG/DL (ref 0.6–1.2)
EKG ATRIAL RATE: 60 BPM
EKG DIAGNOSIS: NORMAL
EKG P AXIS: 60 DEGREES
EKG P-R INTERVAL: 140 MS
EKG Q-T INTERVAL: 408 MS
EKG QRS DURATION: 80 MS
EKG QTC CALCULATION (BAZETT): 408 MS
EKG R AXIS: -48 DEGREES
EKG T AXIS: 70 DEGREES
EKG VENTRICULAR RATE: 60 BPM
EOSINOPHILS ABSOLUTE: 0.1 K/UL (ref 0–0.6)
EOSINOPHILS RELATIVE PERCENT: 1.7 %
EPITHELIAL CELLS, UA: NORMAL /HPF (ref 0–5)
GFR AFRICAN AMERICAN: >60
GFR NON-AFRICAN AMERICAN: >60
GLOBULIN: 3.2 G/DL
GLUCOSE BLD-MCNC: 82 MG/DL (ref 70–99)
GLUCOSE URINE: NEGATIVE MG/DL
HCT VFR BLD CALC: 46.5 % (ref 36–48)
HEMOGLOBIN: 15.3 G/DL (ref 12–16)
KETONES, URINE: NEGATIVE MG/DL
LEUKOCYTE ESTERASE, URINE: NEGATIVE
LYMPHOCYTES ABSOLUTE: 1.3 K/UL (ref 1–5.1)
LYMPHOCYTES RELATIVE PERCENT: 17.2 %
MCH RBC QN AUTO: 30.6 PG (ref 26–34)
MCHC RBC AUTO-ENTMCNC: 32.9 G/DL (ref 31–36)
MCV RBC AUTO: 92.8 FL (ref 80–100)
MICROSCOPIC EXAMINATION: YES
MONOCYTES ABSOLUTE: 0.8 K/UL (ref 0–1.3)
MONOCYTES RELATIVE PERCENT: 10.6 %
NEUTROPHILS ABSOLUTE: 5.2 K/UL (ref 1.7–7.7)
NEUTROPHILS RELATIVE PERCENT: 69.2 %
NITRITE, URINE: NEGATIVE
PDW BLD-RTO: 13.1 % (ref 12.4–15.4)
PH UA: 6 (ref 5–8)
PLATELET # BLD: 276 K/UL (ref 135–450)
PMV BLD AUTO: 7 FL (ref 5–10.5)
POTASSIUM REFLEX MAGNESIUM: 4 MMOL/L (ref 3.5–5.1)
PRO-BNP: 130 PG/ML (ref 0–124)
PROTEIN UA: NEGATIVE MG/DL
RBC # BLD: 5.01 M/UL (ref 4–5.2)
RBC UA: NORMAL /HPF (ref 0–4)
SODIUM BLD-SCNC: 136 MMOL/L (ref 136–145)
SPECIFIC GRAVITY UA: <=1.005 (ref 1–1.03)
TOTAL PROTEIN: 7.3 G/DL (ref 6.4–8.2)
TROPONIN: <0.01 NG/ML
URINE TYPE: ABNORMAL
UROBILINOGEN, URINE: 0.2 E.U./DL
WBC # BLD: 7.5 K/UL (ref 4–11)
WBC UA: NORMAL /HPF (ref 0–5)

## 2020-12-21 PROCEDURE — 6360000002 HC RX W HCPCS: Performed by: EMERGENCY MEDICINE

## 2020-12-21 PROCEDURE — 80053 COMPREHEN METABOLIC PANEL: CPT

## 2020-12-21 PROCEDURE — 99284 EMERGENCY DEPT VISIT MOD MDM: CPT

## 2020-12-21 PROCEDURE — 6370000000 HC RX 637 (ALT 250 FOR IP): Performed by: EMERGENCY MEDICINE

## 2020-12-21 PROCEDURE — 99283 EMERGENCY DEPT VISIT LOW MDM: CPT

## 2020-12-21 PROCEDURE — 85025 COMPLETE CBC W/AUTO DIFF WBC: CPT

## 2020-12-21 PROCEDURE — 84484 ASSAY OF TROPONIN QUANT: CPT

## 2020-12-21 PROCEDURE — 81001 URINALYSIS AUTO W/SCOPE: CPT

## 2020-12-21 PROCEDURE — U0003 INFECTIOUS AGENT DETECTION BY NUCLEIC ACID (DNA OR RNA); SEVERE ACUTE RESPIRATORY SYNDROME CORONAVIRUS 2 (SARS-COV-2) (CORONAVIRUS DISEASE [COVID-19]), AMPLIFIED PROBE TECHNIQUE, MAKING USE OF HIGH THROUGHPUT TECHNOLOGIES AS DESCRIBED BY CMS-2020-01-R: HCPCS

## 2020-12-21 PROCEDURE — 71045 X-RAY EXAM CHEST 1 VIEW: CPT

## 2020-12-21 PROCEDURE — 93005 ELECTROCARDIOGRAM TRACING: CPT | Performed by: EMERGENCY MEDICINE

## 2020-12-21 PROCEDURE — 36415 COLL VENOUS BLD VENIPUNCTURE: CPT

## 2020-12-21 PROCEDURE — 83880 ASSAY OF NATRIURETIC PEPTIDE: CPT

## 2020-12-21 PROCEDURE — 93010 ELECTROCARDIOGRAM REPORT: CPT | Performed by: INTERNAL MEDICINE

## 2020-12-21 RX ORDER — PREDNISONE 50 MG/1
50 TABLET ORAL DAILY
Qty: 5 TABLET | Refills: 0 | Status: SHIPPED | OUTPATIENT
Start: 2020-12-21 | End: 2020-12-26

## 2020-12-21 RX ORDER — IPRATROPIUM BROMIDE AND ALBUTEROL SULFATE 2.5; .5 MG/3ML; MG/3ML
1 SOLUTION RESPIRATORY (INHALATION) ONCE
Status: COMPLETED | OUTPATIENT
Start: 2020-12-21 | End: 2020-12-21

## 2020-12-21 RX ORDER — ALBUTEROL SULFATE 2.5 MG/3ML
7.5 SOLUTION RESPIRATORY (INHALATION) ONCE
Status: COMPLETED | OUTPATIENT
Start: 2020-12-21 | End: 2020-12-21

## 2020-12-21 RX ORDER — AZITHROMYCIN 250 MG/1
250 TABLET, FILM COATED ORAL SEE ADMIN INSTRUCTIONS
Qty: 6 TABLET | Refills: 0 | Status: SHIPPED | OUTPATIENT
Start: 2020-12-21 | End: 2020-12-26

## 2020-12-21 RX ADMIN — IPRATROPIUM BROMIDE AND ALBUTEROL SULFATE 1 AMPULE: .5; 3 SOLUTION RESPIRATORY (INHALATION) at 14:52

## 2020-12-21 RX ADMIN — ALBUTEROL SULFATE 7.5 MG: 2.5 SOLUTION RESPIRATORY (INHALATION) at 16:06

## 2020-12-21 NOTE — ED NOTES
Pt denies SOB at this time. 2L required to maintain O2 sats in mid-upper 90s. Patient consistently desats to mid- upper 80s. Patient states she does not want to be admitted to the hospital. Patient states she has everything she needs to care for herself at home and \"just need direction on what to do\". MD aware.       Maryan Solorzano RN  12/21/20 0433

## 2020-12-21 NOTE — ED PROVIDER NOTES
Emergency Department Provider Note  Location: [place]. Pending sale to [facility], [place],[address]  12/21/2020     Patient Identification  Felipe Son is a 63 y.o. female    Chief Complaint  Cough (went to PCP for cough x1 week. pt states her O2 sat was 88 in office. patient states she smoked prior to coming in. states she has pulse ox at home but doesnt check it. )          HPI  (History provided by patient)  Patient is a 72-year-old female with COPD, current smoker, followed by Dr. Lino Brice pulmonology not on home O2 who presents with hypoxemia found at PCP office today. Patient reports a dry nonproductive cough over the past week. She is taking her albuterol inhaler occasionally with no reported improvement. No fevers no chills no sputum production no chest pain no loss of consciousness. Is found to be hypoxic at 88% PCP office. I have reviewed the following nursing documentation:  Allergies: Allergies   Allergen Reactions    Cephalosporins Shortness Of Breath    Pcn [Penicillins] Anaphylaxis and Hives       Past medical history:  has a past medical history of Angina pectoris ([address] Chronic pain, COPD exacerbation ([address] (5/20/2018), Depression, Panic disorder, and Pneumonia. Past surgical history:  has a past surgical history that includes Cholecystectomy. Home medications:   Prior to Admission medications    Medication Sig Start Date End Date Taking?  Authorizing Provider   predniSONE (DELTASONE) 50 MG tablet Take 1 tablet by mouth daily for 5 days 12/21/20 12/26/20 Yes Pawel Wu MD   azithromycin (ZITHROMAX) 250 MG tablet Take 1 tablet by mouth See Admin Instructions for 5 days 500mg on day 1 followed by 250mg on days 2 - 5 12/21/20 12/26/20 Yes Pawel Wu MD   albuterol sulfate HFA (VENTOLIN HFA) 108 (90 Base) MCG/ACT inhaler Inhale 2 puffs into the lungs every 6 hours as needed for Wheezing or Shortness of Breath 8/4/20  Yes Charly Ackerman MD   cetirizine (ZYRTEC) 10 MG tablet Take 10 mg by mouth daily   Yes Historical Provider, MD   hydrOXYzine (ATARAX) 25 MG tablet Take 25 mg by mouth 3 times daily as needed for Itching   Yes Historical Provider, MD   albuterol sulfate HFA (VENTOLIN HFA) 108 (90 Base) MCG/ACT inhaler INHALE 2 PUFFS EVERY 6 HOURS AS NEEDED FOR WHEEZING OR SHORTNESS OF BREATH 2/7/20  Yes Harshal Nina MD   risperiDONE (RISPERDAL) 4 MG tablet Take 4 mg by mouth nightly   Yes Historical Provider, MD   escitalopram (LEXAPRO) 20 MG tablet Take 20 mg by mouth nightly   Yes Historical Provider, MD   Multiple Vitamins-Minerals (CENTRUM SILVER 50+WOMEN PO) Take 1 tablet by mouth daily   Yes Historical Provider, MD   oxybutynin (DITROPAN) 5 MG tablet Take 5 mg by mouth 4 times daily   Yes Historical Provider, MD       Social history:  reports that she has been smoking cigarettes. She started smoking about 52 years ago. She has a 52.00 pack-year smoking history. She has never used smokeless tobacco. She reports that she does not drink alcohol or use drugs. Family history:    Family History   Problem Relation Age of Onset   [place] Sicard COPD Mother     Hypertension Mother     Asthma Neg Hx     Cancer Neg Hx     Diabetes Neg Hx     Emphysema Neg Hx     Heart Failure Neg Hx          ROS  Review of Systems   Constitutional: Negative for chills and fever. HENT: Negative for congestion and rhinorrhea. Eyes: Negative for photophobia and visual disturbance. Respiratory: Positive for cough. Negative for shortness of breath and wheezing. Cardiovascular: Negative for chest pain and palpitations. Gastrointestinal: Negative for abdominal distention, diarrhea, nausea and vomiting. Genitourinary: Negative for dysuria and hematuria. Musculoskeletal: Negative for back pain and neck pain. Skin: Negative for rash and wound. Neurological: Negative for syncope and weakness. Psychiatric/Behavioral: Negative for agitation and confusion.          Exam  ED Triage Vitals [12/21/20 1339]   BP Temp Temp Source Pulse Resp SpO2 Height Weight   (!) 160/70 98.1 °F (36.7 °C) Oral 60 16 (!) 85 % 5' 3\" (1.6 m) 142 lb (64.4 kg)       Physical Exam  Vitals signs and nursing note reviewed. Constitutional:       General: She is not in acute distress. Appearance: She is well-developed. HENT:      Head: Normocephalic and atraumatic. Nose: Nose normal. No congestion. Eyes:      Extraocular Movements: Extraocular movements intact. Pupils: Pupils are equal, round, and reactive to light. Neck:      Musculoskeletal: Normal range of motion and neck supple. Cardiovascular:      Rate and Rhythm: Normal rate and regular rhythm. Heart sounds: No murmur. Pulmonary:      Effort: Pulmonary effort is normal. No respiratory distress. Comments: Prolonged expiratory phase, scant mild wheezes throughout bilateral  Abdominal:      General: There is no distension. Palpations: Abdomen is soft. Tenderness: There is no abdominal tenderness. Musculoskeletal: Normal range of motion. General: No deformity. Skin:     General: Skin is warm. Findings: No rash. Neurological:      Mental Status: She is alert and oriented to person, place, and time. Motor: No abnormal muscle tone.       Coordination: Coordination normal.   Psychiatric:         Mood and Affect: Mood normal.         Behavior: Behavior normal.           ED Course    ED Medication Orders (From admission, onward)    Start Ordered     Status Ordering Provider    12/21/20 1545 12/21/20 1525  albuterol (PROVENTIL) nebulizer solution 7.5 mg  ONCE      Last MAR action: Given - by NCTech on 12/21/20 at [address]    12/21/20 1430 12/21/20 1412  ipratropium-albuterol (DUONEB) nebulizer solution 1 ampule  ONCE      Last MAR action: Given - by ALMAZ FUNEZ on 12/21/20 at [address]          EKG  Normal sinus rhythm rate of 60, left axis deviation, normal intervals no evidence of ischemic changes noted, . Radiology  No results found.       Labs  Results for orders placed or performed during the hospital encounter of 12/21/20   Urinalysis, reflex to microscopic   Result Value Ref Range    Color, UA Yellow Straw/Yellow    Clarity, UA Clear Clear    Glucose, Ur Negative Negative mg/dL    Bilirubin Urine Negative Negative    Ketones, Urine Negative Negative mg/dL    Specific Gravity, UA <=1.005 1.005 - 1.030    Blood, Urine TRACE-INTACT (A) Negative    pH, UA 6.0 5.0 - 8.0    Protein, UA Negative Negative mg/dL    Urobilinogen, Urine 0.2 <2.0 E.U./dL    Nitrite, Urine Negative Negative    Leukocyte Esterase, Urine Negative Negative    Microscopic Examination YES     Urine Type NotGiven    CBC Auto Differential   Result Value Ref Range    WBC 7.5 4.0 - 11.0 K/uL    RBC 5.01 4.00 - 5.20 M/uL    Hemoglobin 15.3 12.0 - 16.0 g/dL    Hematocrit 46.5 36.0 - 48.0 %    MCV 92.8 80.0 - 100.0 fL    MCH 30.6 26.0 - 34.0 pg    MCHC 32.9 31.0 - 36.0 g/dL    RDW 13.1 12.4 - 15.4 %    Platelets 968 988 - 276 K/uL    MPV 7.0 5.0 - 10.5 fL    Neutrophils % 69.2 %    Lymphocytes % 17.2 %    Monocytes % 10.6 %    Eosinophils % 1.7 %    Basophils % 1.3 %    Neutrophils Absolute 5.2 1.7 - 7.7 K/uL    Lymphocytes Absolute 1.3 1.0 - 5.1 K/uL    Monocytes Absolute 0.8 0.0 - 1.3 K/uL    Eosinophils Absolute 0.1 0.0 - 0.6 K/uL    Basophils Absolute 0.1 0.0 - 0.2 K/uL   Comprehensive Metabolic Panel w/ Reflex to MG   Result Value Ref Range    Sodium 136 136 - 145 mmol/L    Potassium reflex Magnesium 4.0 3.5 - 5.1 mmol/L    Chloride 97 (L) 99 - 110 mmol/L    CO2 27 21 - 32 mmol/L    Anion Gap 12 3 - 16    Glucose 82 70 - 99 mg/dL    BUN 5 (L) 7 - 20 mg/dL    CREATININE <0.5 (L) 0.6 - 1.2 mg/dL    GFR Non-African American >60 >60    GFR African American >60 >60    Calcium 9.3 8.3 - 10.6 mg/dL    Total Protein 7.3 6.4 - 8.2 g/dL    Alb 4.1 3.4 - 5.0 g/dL    Albumin/Globulin Ratio 1.3 1.1 - 2.2    Total Bilirubin 0.3 0.0 - Dragon Dictation system and may contain errors related to that system including errors in grammar, punctuation, and spelling, as well as words and phrases that may be inappropriate. If there are any questions or concerns please feel free to contact the dictating provider for clarification.      Lena Hung MD  [address] MD  12/23/20 Alex Pizano MD  12/23/20 9733

## 2020-12-22 LAB — SARS-COV-2: NOT DETECTED

## 2020-12-22 NOTE — ED NOTES
Pt refusing admission. Daughter mamadou called. I talked with Mamadou. Daughter angry. Verified with pt I could talk to daughter. Daughter states her mom has been here all day and we have done nothing. Explained to daughter what we did and pt was refusing admission due to having oxygen at home. Daughter states I needed to hurry up and get her out so she could take her somewhere else. Pt informed. Instructed pt to come back to ed for fever chills sob or cp. Pt states she is still going home even with daughters concerns. Pt assisted to br with w/c then to waiting room.       Joseph Abrams RN  12/21/20 3855

## 2020-12-23 ASSESSMENT — ENCOUNTER SYMPTOMS
PHOTOPHOBIA: 0
DIARRHEA: 0
ABDOMINAL DISTENTION: 0
RHINORRHEA: 0
VOMITING: 0
SHORTNESS OF BREATH: 0
NAUSEA: 0
BACK PAIN: 0
WHEEZING: 0
COUGH: 1

## 2020-12-30 ENCOUNTER — APPOINTMENT (OUTPATIENT)
Dept: GENERAL RADIOLOGY | Age: 65
End: 2020-12-30
Payer: MEDICARE

## 2020-12-30 ENCOUNTER — HOSPITAL ENCOUNTER (EMERGENCY)
Age: 65
Discharge: HOME | End: 2020-12-30
Attending: EMERGENCY MEDICINE
Payer: MEDICARE

## 2020-12-30 VITALS
DIASTOLIC BLOOD PRESSURE: 78 MMHG | RESPIRATION RATE: 24 BRPM | BODY MASS INDEX: 24.8 KG/M2 | HEIGHT: 63 IN | OXYGEN SATURATION: 94 % | WEIGHT: 140 LBS | TEMPERATURE: 98.3 F | HEART RATE: 78 BPM | SYSTOLIC BLOOD PRESSURE: 118 MMHG

## 2020-12-30 PROCEDURE — 71045 X-RAY EXAM CHEST 1 VIEW: CPT

## 2020-12-30 PROCEDURE — 99285 EMERGENCY DEPT VISIT HI MDM: CPT

## 2020-12-30 PROCEDURE — 6370000000 HC RX 637 (ALT 250 FOR IP): Performed by: EMERGENCY MEDICINE

## 2020-12-30 PROCEDURE — 72072 X-RAY EXAM THORAC SPINE 3VWS: CPT

## 2020-12-30 RX ORDER — BENZONATATE 100 MG/1
100 CAPSULE ORAL ONCE
Status: COMPLETED | OUTPATIENT
Start: 2020-12-30 | End: 2020-12-30

## 2020-12-30 RX ORDER — IPRATROPIUM BROMIDE AND ALBUTEROL SULFATE 2.5; .5 MG/3ML; MG/3ML
1 SOLUTION RESPIRATORY (INHALATION) ONCE
Status: COMPLETED | OUTPATIENT
Start: 2020-12-30 | End: 2020-12-30

## 2020-12-30 RX ORDER — OXYCODONE HYDROCHLORIDE AND ACETAMINOPHEN 5; 325 MG/1; MG/1
2 TABLET ORAL ONCE
Status: COMPLETED | OUTPATIENT
Start: 2020-12-30 | End: 2020-12-30

## 2020-12-30 RX ORDER — LIDOCAINE 50 MG/G
1 PATCH TOPICAL EVERY 24 HOURS
Qty: 30 PATCH | Refills: 0 | Status: SHIPPED | OUTPATIENT
Start: 2020-12-30 | End: 2021-01-29

## 2020-12-30 RX ORDER — OXYCODONE HYDROCHLORIDE AND ACETAMINOPHEN 5; 325 MG/1; MG/1
1 TABLET ORAL EVERY 6 HOURS PRN
Qty: 12 TABLET | Refills: 0 | Status: SHIPPED | OUTPATIENT
Start: 2020-12-30 | End: 2021-01-06 | Stop reason: SDUPTHER

## 2020-12-30 RX ADMIN — IPRATROPIUM BROMIDE AND ALBUTEROL SULFATE 1 AMPULE: .5; 3 SOLUTION RESPIRATORY (INHALATION) at 08:30

## 2020-12-30 RX ADMIN — OXYCODONE HYDROCHLORIDE AND ACETAMINOPHEN 2 TABLET: 5; 325 TABLET ORAL at 08:28

## 2020-12-30 RX ADMIN — BENZONATATE 100 MG: 100 CAPSULE ORAL at 08:28

## 2020-12-30 ASSESSMENT — PAIN SCALES - GENERAL
PAINLEVEL_OUTOF10: 8
PAINLEVEL_OUTOF10: 6

## 2020-12-30 ASSESSMENT — PAIN DESCRIPTION - LOCATION: LOCATION: BACK

## 2020-12-30 ASSESSMENT — PAIN DESCRIPTION - PAIN TYPE: TYPE: ACUTE PAIN

## 2020-12-30 ASSESSMENT — PAIN DESCRIPTION - ORIENTATION: ORIENTATION: UPPER;LEFT;RIGHT

## 2020-12-30 ASSESSMENT — PAIN DESCRIPTION - PROGRESSION: CLINICAL_PROGRESSION: NOT CHANGED

## 2020-12-30 NOTE — ED PROVIDER NOTES
Emergency Physician Note  [id number] [address] [address]  Dept: [phone]  Loc: [phone]  Open Note Time:  8:15 AM EST    Chief Complaint  Back Pain (Pt c/o of upper back pain after falling backwards 2 days ago. Denies LOC)       History of Present Illness  Sreekanth Oconnor is a 63 y.o. female  has a past medical history of Angina pectoris ([address] Chronic pain, COPD exacerbation ([facility] [address] Depression, Panic disorder, and Pneumonia. who presents to the ED for back pain. Patient states that the initial injury occurred when she had come home from her most recent ER visit. Even though she initially triaged as 2 days ago for the initial injury she clarified that it was the same day as her most recent visit to the ER which was 9 days ago. She states she slipped on the step going back into her house and landed on her back. Since then she has had significant pain in the upper back. The pain is worse with her coughing. Patient does have a history of COPD, she states she does not use oxygen continuously at home at 2 L nasal cannula. She has not been able to use her nebulizer machine because she is missing one of the pieces. She states her shortness of breath has been a little bit worse than usual with us only because every time she coughs she gets significant back pain. At home she has tried Tylenol without relief. No Saddle Anesthesia,  no Bowel Dysfunction, no Bladder Dysfunction, no Motor Deficits, no Sensory Deficits. Denies fever/IVDU. Denies fever, chills, malaise, chest pain,  abdominal pain, nausea, vomiting, diarrhea, headache, sore throat, dysuria, rash. No palliative/provocative factors.        Unless otherwise stated in this report or unable to obtain because of the patient's clinical or mental status as evidenced by the medical record, this patient's positive and negative responses for review of systems, constitutional, psych, eyes, ENT, cardiovascular, respiratory, gastrointestinal, neurological, genitourinary, musculoskeletal, integument systems and systems related to the presenting problem are either stated in the preceding paragraph or were not pertinent or were negative for the symptoms and/or complaints related to the medical problem. I have reviewed the following from the nursing documentation:      Prior to Admission medications    Medication Sig Start Date End Date Taking?  Authorizing Provider   albuterol sulfate HFA (VENTOLIN HFA) 108 (90 Base) MCG/ACT inhaler Inhale 2 puffs into the lungs every 6 hours as needed for Wheezing or Shortness of Breath 8/4/20  Yes Molly Patel MD   cetirizine (ZYRTEC) 10 MG tablet Take 10 mg by mouth daily   Yes Historical Provider, MD   hydrOXYzine (ATARAX) 25 MG tablet Take 25 mg by mouth 3 times daily as needed for Itching   Yes Historical Provider, MD   albuterol sulfate HFA (VENTOLIN HFA) 108 (90 Base) MCG/ACT inhaler INHALE 2 PUFFS EVERY 6 HOURS AS NEEDED FOR WHEEZING OR SHORTNESS OF BREATH 2/7/20  Yes Yonny Burton MD   risperiDONE (RISPERDAL) 4 MG tablet Take 4 mg by mouth nightly   Yes Historical Provider, MD   escitalopram (LEXAPRO) 20 MG tablet Take 20 mg by mouth nightly   Yes Historical Provider, MD   Multiple Vitamins-Minerals (CENTRUM SILVER 50+WOMEN PO) Take 1 tablet by mouth daily   Yes Historical Provider, MD   oxybutynin (DITROPAN) 5 MG tablet Take 5 mg by mouth 4 times daily   Yes Historical Provider, MD       Allergies as of 12/30/2020 - Review Complete 12/30/2020   Allergen Reaction Noted    Cephalosporins Shortness Of Breath 05/20/2018    Pcn [penicillins] Anaphylaxis and Hives 10/14/2011       Past Medical History:   Diagnosis Date    Angina pectoris (HCC)     Chronic pain     knees and lower back     COPD exacerbation ([facility] [address] 5/20/2018    Depression     Panic disorder     Pneumonia         Surgical History:   Past Surgical History:   Procedure Laterality Date    CHOLECYSTECTOMY          Family History:    Family History   Problem Relation Age of Onset    COPD Mother     Hypertension Mother     Asthma Neg Hx     Cancer Neg Hx     Diabetes Neg Hx     Emphysema Neg Hx     Heart Failure Neg Hx        Social History     Socioeconomic History    Marital status:      Spouse name: Not on file    Number of children: 10    Years of education: Not on file    Highest education level: Not on file   Occupational History    Not on file   Social Needs    Financial resource strain: Not on file    Food insecurity     Worry: Not on file     Inability: Not on file    Transportation needs     Medical: Not on file     Non-medical: Not on file   Tobacco Use    Smoking status: Current Every Day Smoker     Packs/day: 1.00     Years: 52.00     Pack years: 52.00     Types: Cigarettes     Start date: 1969    Smokeless tobacco: Never Used   Substance and Sexual Activity    Alcohol use: No     Alcohol/week: 12.0 standard drinks     Types: 12 Cans of beer per week    Drug use: No    Sexual activity: Never   Lifestyle    Physical activity     Days per week: Not on file     Minutes per session: Not on file    Stress: Not on file   Relationships    Social connections     Talks on phone: Not on file     Gets together: Not on file     Attends Confucianism service: Not on file     Active member of club or organization: Not on file     Attends meetings of clubs or organizations: Not on file     Relationship status: Not on file    Intimate partner violence     Fear of current or ex partner: Not on file     Emotionally abused: Not on file     Physically abused: Not on file     Forced sexual activity: Not on file   Other Topics Concern    Not on file   Social History Narrative    Not on file       Nursing notes reviewed.     ED Triage Vitals [12/30/20 0746]   Enc Vitals Group      BP (!) 144/78      Pulse 97      Resp 18      Temp 98.8 °F (37.1 °C)      Temp Source Oral SpO2 (!) 83 %      Weight 140 lb (63.5 kg)      Height 5' 3\" (1.6 m)      Head Circumference       Peak Flow       Pain Score       Pain Loc       Pain Edu? Excl. in [address]? GENERAL:   Body mass index is 24.8 kg/m². Awake, alert. Well developed, well nourished with apparent distress. Nontoxic-appearing, ill-appearing. HENT:   Normocephalic, Atraumatic, no lacerations. No ENT exam due to PPE. EYES:   Conjunctiva normal,   Pupils equal round and reactive to light,   Extraocular movements normal.  NECK:  Trachea is midline. No stridor. CHEST:  Regular rate and regular rhythm, no murmurs/rubs/gallops,   normal S1/S2, chest wall non-tender. LUNGS:  No respiratory distress. No abdominal retractions, no sternal retractions. Poor respiratory effort with very minimal air movement, no expiratory/inspiratory wheezing, no rhochi, no rales  Speaking comfortably in full sentences however when she has a coughing fit she is unable to speak, it appears that she has significant pain while she is coughing. ABDOMEN:  Soft, non-tender, non-distended. No guarding. No rebound. EXTREMITIES:  Moves extremities x4 with purpose. Normal range of motion, no edema,   No tenderness, no deformity,   distal pulses present and equal bilaterally. BACK:  Kyphosis is present  No midline tenderness in the cervical and lumbar spine. Patient does have midline tenderness in the thoracic region from approximately T2 to to T8  No deformities, no step-off. Palpation did not elicit any paraspinous tenderness. SKIN:  Warm, dry and intact. NEUROLOGIC:  Normal mental status. Moving all extremities to command. Alert and oriented x4   without focal motor deficit or gross sensory deficit. Normal speech. Strength 5/5 in bilateral upper and lower extremities. Sensation is intact in the upper and lower extremities. MUR exam of both upper extremities are without focal neurological deficits.   +2 Patellar Reflexes Bilaterally, +2 Achilles Reflexes Bilaterally. Light touch in lower extremities bilaterally is intact. No overlying rashes. LE strength is 5/5. PSYCHIATRIC:  Not anxious,   normal mood and affect,   thoughts are linear and organized,   without delusions/hallucinations,   Not responding to internal stimuli,  responds appropriately to questions    LABS and DIAGNOSTIC RESULTS    RADIOLOGY  X-RAYS:  I have reviewed radiologic plain film image(s). ALL OTHER NON-PLAIN FILM IMAGES SUCH AS CT, ULTRASOUND AND MRI HAVE BEEN READ BY THE RADIOLOGIST. XR CHEST PORTABLE   Preliminary Result   1. Unchanged basilar opacities since December 21, 2020. XR THORACIC SPINE (3 VIEWS)   Final Result   Age indeterminate compression deformities as above. If there is clinical   concern for acute fracture further evaluation with MRI suggested. Chest X-Ray  Interpreted by: Emergency Department Physician  View: Portable chest xray  Findings: No hemothorax, No pneumothorax, No effusion    LABS  No results found for this visit on 12/30/20. SCREENINGS  NIH Score     [place]ow  Aster Coma Scale  Eye Opening: Spontaneous  Best Verbal Response: Oriented  Best Motor Response: Obeys commands  [place] Coma Scale Score: 15  [facility]    Heart Score              PROCEDURES    MEDICAL DECISION MAKING    Procedures/interventions/images ordered for this visit  Orders Placed This Encounter   Procedures    XR CHEST PORTABLE    XR THORACIC SPINE (3 VIEWS)       Medications ordered for this visit  Orders Placed This Encounter   Medications    ipratropium-albuterol (DUONEB) nebulizer solution 1 ampule    oxyCODONE-acetaminophen (PERCOCET) 5-325 MG per tablet 2 tablet    benzonatate (TESSALON) capsule 100 mg       ED course notes for this visit  ED Course as of Dec 30 0903   Wed Dec 30, 2020   1179 Patient is reporting of feeling much better, the pain is definitely improved.   She states she is is able to ambulate without assistance at home but she feels like she might need some extra help at home. Her daughter and her  live with her but she says that her daughter is starting to get worn down as well. Patient did not seem thrilled about the idea when I offered admission, however I did offer to speak to case management to see about getting extra help and she really liked that idea. [SG]   [id number] Patient did not have her oxygen on when I went in the room and she had desaturation down to 87%. She states that she will frequently forget to keep her oxygen on at home as well.    [SG]      ED Course User Index  [SG] Sheila Mendoza MD       I wore N95 Envo mask with filter protection, facial shield and gloves when I evaluated the patient. I evaluated the patient in room 04/04    - I evaluated her most recent visit, at that time she also had hypoxemia at about 88%. She declined admission at that time. Patient reports she did complete her round of antibiotics that were prescribed on her most recent visit she also completed a regimen of steroids that was conspired at her most recent visit. At the time because of her hypoxemia she was offered admission however patient declined. At that time it was noted that she was not using oxygen continuously at home. While I was in the room patient had been placed on 2 L of nasal cannula she had significant improvement in oxygen saturation to 96%. I believe that the initial presenting of 83% on room air is baseline for this patient.     High Sensitivity Neuro Exam (HSNE) Spine  If Lumbar Pain (also check femoral pulses):  L1-L2 Cremasteric Reflex (inner thigh sensation): Present  L2 Adduct Thigh (cross legs): Present  L3 Extend Knee: Present  L4 Dorsiflex Ankle (Up): Present  L5 Point Great Toe Up: Present  L2 L3-L4 Knee Reflex: Present  S1 Flex Knee: Present  S2 Plantarflex Toes: Present  S3, 4, 5 Groin/Perianal Sensation: Present    If Thoracic Back Pain (also check evidence-based clinical evaluation to screen for spinal epidural abscess and other spinal emergencies, as well as the risk of further testing and hospitalization. The evidence shows that the risk for an acute spinal emergency is less than 1%. Although the risk of an acute spinal emergency has not been eliminated, the risks of further testing likely exceed the benefit, and the patient declines further emergent evaluation or hospitalization for dangerous emergency spinal conditions. On reevaluation patient is feeling improved. Based on the history and exam, there is no significant evidence of fracture, spinal cord compression, central disc herniation, cauda equina syndrome, osteomyelitis, epidural abscess, epidural hematoma, other focal infection, mass, tumor, unstable spinal column, or other process requiring immediate medical or surgical intervention at this time. Based on the history, exam, and ED work-up, it appears the patients symptoms are due to a muscle spasm. At this time I feel they are stable for d/c home with pain has been controlled, and their v/s are stable. The diagnosis, expected course, discharge medications, and follow-up plan (with their PMD) were discussed. It is understood that if they are not improving as expected or if other new symptoms or signs of concern develop, other etiologies or diagnoses may need to be considered requiring other tests, treatments, consultations, and/or admission. Return precautions were discussed and all questions were answered. Final Impression    1. Acute midline thoracic back pain    2. Chronic respiratory failure with hypoxia (HCC)    3. Chronic obstructive pulmonary disease, unspecified COPD type ([address]    4. Compression fracture of T4 vertebra, initial encounter ([address]    5. Compression fracture of T5 vertebra, initial encounter ([address]    6. Compression fracture of T6 vertebra, initial encounter ([address]    7.  Tobacco use disorder        Blood pressure (!) 144/78, pulse 97, temperature 98.8 °F (37.1 °C), temperature source Oral, resp. rate 18, height 5' 3\" (1.6 m), weight 140 lb (63.5 kg), SpO2 (!) 83 %, not currently breastfeeding. Disposition  At this point I do not feel the patient requires further work up and it is reasonable to discharge the patient. I had a discussion with the patient and/or their surrogate regarding diagnosis, diagnostic testing results, treatment/ plan of care, and follow up. Patient and/or companions verbalized understanding of the ED workup, any relevant findings as well as any incidental findings, and the disposition and plan. There was shared decision-making between myself as well as the patient and/or their surrogate and we are all in agreement with discharge home. Yevgeniy Nathan was an opportunity for questions and all questions were answered to the best of my ability and to the satisfaction of the patient and/or patient's family. Patient agreed to follow up as recommend for further evaluation/treatment. The patient was given strict return precautions as we discussed symptoms that would necessitate return to the ED. Patient will return to ED for new/worsening symptoms. The patient verbalized their understanding and agreement with the above plan. Please refer to AVS for further details regarding discharge instructions. Patient was given scripts for the following medications. I counseled patient how to take these medications. New Prescriptions    LIDOCAINE (LIDODERM) 5 %    Place 1 patch onto the skin every 24 hours Place 1 patch onto the skin daily 12 hours on, 12 hours off. OXYCODONE-ACETAMINOPHEN (PERCOCET) 5-325 MG PER TABLET    Take 1 tablet by mouth every 6 hours as needed for Pain for up to 7 days. Patient had scripts modified or refilled for the following medications. I counseled patient how to take these medications.   Modified Medications    No medications on file       Pt is in stable condition upon Discharge to home.    The note was completed using Dragon voice recognition transcription. Every effort was made to ensure accuracy; however, inadvertent transcription errors may be present despite my best efforts to edit errors.     Sheila Mendoza MD  [address] [place] [address], MD  12/30/20 5834

## 2020-12-30 NOTE — CARE COORDINATION
CM consult for home care noted. D/W Dr. Jarrell Ask. Brief phone interview with patient. Pt confirms that she lives alone but daughter has been helping her but they need additional support. She requests [facility]. Pt has used [facility] in the past and is agreeable to referral for services today. Pt was advised that [facility] will call to set up [facility] within the next 48 hours. Orders and CHIKI/AVS in Epic. VM left with Nava Tiwari RN liaison for [facility]. Pt will DC home from [place] [id number]. [place].  +CM following

## 2020-12-30 NOTE — CARE COORDINATION
[facility]  Received referral regarding [facility]. services from [address]. Sent request to [facility] for [facility] coverage. [facility] is able to service for [facility]. needs. Attempt to follow up with pt. No answer. LM. Faxed referral with orders to [facility].     Electronically signed by Samuel Chaudhari RN on 12/30/2020 at 10:57 AM

## 2020-12-30 NOTE — DISCHARGE INSTR - COC
Continuity of Care Form    Patient Name: Sreekanth Oconnor   :  1955  MRN:  [id number]    Admit date:  2020  Discharge date:  2020    Code Status Order: Prior   Advance Directives:     Admitting Physician:  No admitting provider for patient encounter.   PCP: Romina Gastelum MD    Discharging Nurse: [facility] Unit/Room#:   Discharging Unit Phone Number: ***    Emergency Contact:   Extended Emergency Contact Information  Primary Emergency Contact: Amy Landrum[address] Phone: [phone]  Relation: Child  Secondary Emergency Contact: Carla Tyler   [address] Phone: [phone]  Relation: Child    Past Surgical History:  Past Surgical History:   Procedure Laterality Date    CHOLECYSTECTOMY         Immunization History:   Immunization History   Administered Date(s) Administered    Influenza Vaccine, unspecified formulation 2013, 10/26/2016    Influenza Virus Vaccine 2013, 2017    Influenza, Elsa Cancino, IM, PF (6 mo and older Fluzone, Flulaval, Fluarix, and 3 yrs and older Afluria) 10/26/2016, 2017, 2020    Tdap (Boostrix, Adacel) 2017       Active Problems:  Patient Active Problem List   Diagnosis Code    Chest pain R07.9    SOB (shortness of breath) R06.02    Tobacco use Z72.0    Moderate COPD (chronic obstructive pulmonary disease) ([address] J44.9    COPD exacerbation ([address] J44.1    Acute respiratory failure with hypoxia ([address] J96.01    Hyponatremia E87.1    Pulmonary nodule R91.1    Pulmonary emphysema ([address] J43.9    Acute on chronic respiratory failure with hypoxia and hypercapnia ([address] J96.21, J96.22    Community acquired pneumonia of right lung J18.9    Acute exacerbation of chronic obstructive pulmonary disease (COPD) ([address] J44.1       Isolation/Infection:   Isolation          No Isolation        Patient Infection Status     Infection Onset Added Last Indicated Last Indicated By Review Therapies: {THERAPEUTIC INTERVENTION:0676579463}  Weight Bearing Status/Restrictions: 50Aracely Jalloh CC Weight Bearin}  Other Medical Equipment (for information only, NOT a DME order):  {EQUIPMENT:602500524}  Other Treatments: ***    Patient's personal belongings (please select all that are sent with patient):  {CHP DME Belongings:050017214}    RN SIGNATURE:  {Esignature:324342104}    CASE MANAGEMENT/SOCIAL WORK SECTION    Inpatient Status Date: ED visit  Readmission Risk Assessment Score:  Readmission Risk              Risk of Unplanned Readmission:        0           Discharging to Facility/ Agency   · Name: [facility]  · Address:  · Phone: [phone]  · Fax: [phone]: LEVEL 1 STANDARD    Home health agency to establish plan of care for patient over 60 day period   Nursing  Initial home SN evaluation visit to occur within 24-48 hours for:  medication management  VS and clinical assessment  S&S chronic disease exacerbation education + when to contact MD / NP  care coordination  Medication Reconciliation during 1st SN visit       PT/OT   Evaluate with goal of regaining prior level of functioning   OT to evaluate if patient has [address] needs for personal care      PCP Visit scheduled within 7 days of hospital discharge       Dialysis Facility (if applicable)   · Name:  · Address:  · Dialysis Schedule:  · Phone:  · Fax:    / signature: Electronically signed by Humberto Rayo RN on 20 at 9:05 AM EST    PHYSICIAN SECTION    Prognosis: {Prognosis:1384573423}    Condition at Discharge: 50Aracely Jalloh Patient Condition:747824716}    Rehab Potential (if transferring to Rehab): {Prognosis:2824820922}    Recommended Labs or Other Treatments After Discharge: SN PT OT  HCV Program and Telehealth    Physician Certification: I certify the above information and transfer of Sergey Sanchez  is necessary for the continuing treatment of the diagnosis listed and that she requires Home Care for less 30 days.      Update Admission H&P: Changes in H&P as follows - see ED H&P attached    PHYSICIAN SIGNATURE: ELVIN Castillo MD/ Electronically signed by Humberto Rayo RN on 12/30/20 at 9:05 AM EST

## 2021-01-04 ENCOUNTER — HOSPITAL ENCOUNTER (EMERGENCY)
Age: 66
Discharge: HOME | End: 2021-01-05
Attending: EMERGENCY MEDICINE
Payer: MEDICARE

## 2021-01-04 ENCOUNTER — APPOINTMENT (OUTPATIENT)
Dept: GENERAL RADIOLOGY | Age: 66
End: 2021-01-04
Payer: MEDICARE

## 2021-01-04 DIAGNOSIS — M54.6 ACUTE MIDLINE THORACIC BACK PAIN: Primary | ICD-10-CM

## 2021-01-04 DIAGNOSIS — R06.89 DYSPNEA AND RESPIRATORY ABNORMALITIES: ICD-10-CM

## 2021-01-04 DIAGNOSIS — R06.00 DYSPNEA AND RESPIRATORY ABNORMALITIES: ICD-10-CM

## 2021-01-04 DIAGNOSIS — R63.1 PSYCHOGENIC POLYDIPSIA: ICD-10-CM

## 2021-01-04 DIAGNOSIS — E87.1 HYPONATREMIA: ICD-10-CM

## 2021-01-04 DIAGNOSIS — F54 PSYCHOGENIC POLYDIPSIA: ICD-10-CM

## 2021-01-04 LAB
A/G RATIO: 1.4 (ref 1.1–2.2)
ALBUMIN SERPL-MCNC: 3.8 G/DL (ref 3.4–5)
ALP BLD-CCNC: 118 U/L (ref 40–129)
ALT SERPL-CCNC: 19 U/L (ref 10–40)
ANION GAP SERPL CALCULATED.3IONS-SCNC: 7 MMOL/L (ref 3–16)
AST SERPL-CCNC: 29 U/L (ref 15–37)
BASE EXCESS VENOUS: 0 MMOL/L (ref -3–3)
BASOPHILS ABSOLUTE: 0.1 K/UL (ref 0–0.2)
BASOPHILS RELATIVE PERCENT: 1.3 %
BILIRUB SERPL-MCNC: 0.3 MG/DL (ref 0–1)
BUN BLDV-MCNC: 13 MG/DL (ref 7–20)
CALCIUM SERPL-MCNC: 9.5 MG/DL (ref 8.3–10.6)
CARBOXYHEMOGLOBIN: 2.2 % (ref 0–1.5)
CHLORIDE BLD-SCNC: 93 MMOL/L (ref 99–110)
CO2: 26 MMOL/L (ref 21–32)
CREAT SERPL-MCNC: <0.5 MG/DL (ref 0.6–1.2)
EOSINOPHILS ABSOLUTE: 0.2 K/UL (ref 0–0.6)
EOSINOPHILS RELATIVE PERCENT: 1.8 %
GFR AFRICAN AMERICAN: >60
GFR NON-AFRICAN AMERICAN: >60
GLOBULIN: 2.8 G/DL
GLUCOSE BLD-MCNC: 112 MG/DL (ref 70–99)
HCO3 VENOUS: 25.1 MMOL/L (ref 23–29)
HCT VFR BLD CALC: 41.7 % (ref 36–48)
HEMOGLOBIN: 13.7 G/DL (ref 12–16)
INR BLD: 0.93 (ref 0.86–1.14)
LYMPHOCYTES ABSOLUTE: 1 K/UL (ref 1–5.1)
LYMPHOCYTES RELATIVE PERCENT: 11.5 %
MCH RBC QN AUTO: 30.5 PG (ref 26–34)
MCHC RBC AUTO-ENTMCNC: 32.9 G/DL (ref 31–36)
MCV RBC AUTO: 92.8 FL (ref 80–100)
METHEMOGLOBIN VENOUS: 0.3 %
MONOCYTES ABSOLUTE: 0.6 K/UL (ref 0–1.3)
MONOCYTES RELATIVE PERCENT: 6.5 %
NEUTROPHILS ABSOLUTE: 6.9 K/UL (ref 1.7–7.7)
NEUTROPHILS RELATIVE PERCENT: 78.9 %
O2 CONTENT, VEN: 18 VOL %
O2 SAT, VEN: 94 %
O2 THERAPY: ABNORMAL
PCO2, VEN: 42.5 MMHG (ref 40–50)
PDW BLD-RTO: 13.3 % (ref 12.4–15.4)
PH VENOUS: 7.39 (ref 7.35–7.45)
PLATELET # BLD: 256 K/UL (ref 135–450)
PMV BLD AUTO: 6.9 FL (ref 5–10.5)
PO2, VEN: 68.3 MMHG (ref 25–40)
POTASSIUM REFLEX MAGNESIUM: 4 MMOL/L (ref 3.5–5.1)
PRO-BNP: 105 PG/ML (ref 0–124)
PROTHROMBIN TIME: 10.8 SEC (ref 10–13.2)
RBC # BLD: 4.49 M/UL (ref 4–5.2)
SODIUM BLD-SCNC: 126 MMOL/L (ref 136–145)
TCO2 CALC VENOUS: 26 MMOL/L
TOTAL PROTEIN: 6.6 G/DL (ref 6.4–8.2)
TROPONIN: <0.01 NG/ML
WBC # BLD: 8.8 K/UL (ref 4–11)

## 2021-01-04 PROCEDURE — 71046 X-RAY EXAM CHEST 2 VIEWS: CPT

## 2021-01-04 PROCEDURE — 99283 EMERGENCY DEPT VISIT LOW MDM: CPT

## 2021-01-04 PROCEDURE — 83880 ASSAY OF NATRIURETIC PEPTIDE: CPT

## 2021-01-04 PROCEDURE — 85610 PROTHROMBIN TIME: CPT

## 2021-01-04 PROCEDURE — 84484 ASSAY OF TROPONIN QUANT: CPT

## 2021-01-04 PROCEDURE — 93005 ELECTROCARDIOGRAM TRACING: CPT | Performed by: EMERGENCY MEDICINE

## 2021-01-04 PROCEDURE — 80053 COMPREHEN METABOLIC PANEL: CPT

## 2021-01-04 PROCEDURE — 82803 BLOOD GASES ANY COMBINATION: CPT

## 2021-01-04 PROCEDURE — 85025 COMPLETE CBC W/AUTO DIFF WBC: CPT

## 2021-01-04 RX ORDER — HYDROCODONE BITARTRATE AND ACETAMINOPHEN 5; 325 MG/1; MG/1
1 TABLET ORAL ONCE
Status: COMPLETED | OUTPATIENT
Start: 2021-01-04 | End: 2021-01-05

## 2021-01-04 ASSESSMENT — PAIN DESCRIPTION - PAIN TYPE: TYPE: ACUTE PAIN

## 2021-01-05 VITALS
WEIGHT: 140 LBS | HEART RATE: 68 BPM | OXYGEN SATURATION: 98 % | TEMPERATURE: 97.8 F | RESPIRATION RATE: 20 BRPM | HEIGHT: 63 IN | SYSTOLIC BLOOD PRESSURE: 166 MMHG | BODY MASS INDEX: 24.8 KG/M2 | DIASTOLIC BLOOD PRESSURE: 71 MMHG

## 2021-01-05 PROCEDURE — 6370000000 HC RX 637 (ALT 250 FOR IP): Performed by: EMERGENCY MEDICINE

## 2021-01-05 RX ORDER — IBUPROFEN 600 MG/1
600 TABLET ORAL 3 TIMES DAILY PRN
Qty: 20 TABLET | Refills: 0 | Status: ON HOLD | OUTPATIENT
Start: 2021-01-05 | End: 2021-02-07 | Stop reason: HOSPADM

## 2021-01-05 RX ADMIN — HYDROCODONE BITARTRATE AND ACETAMINOPHEN 1 TABLET: 5; 325 TABLET ORAL at 00:29

## 2021-01-05 NOTE — ED NOTES
Discharge instructions given, pt verbalized understanding. Discussed follow-up appointments and medications. No questions or concerns at this time. Pt discharged with 1 prescription.       Linda Casas RN  01/05/21 5596

## 2021-01-05 NOTE — ED PROVIDER NOTES
[address] ED  EMERGENCY DEPARTMENT ENCOUNTER      Pt Name: Maine Dodd  MRN: [id number]  [place] 1955  Date of evaluation: 1/4/2021  Provider: Jeff Wilcox MD    [address] [place] [address]       Chief Complaint   Patient presents with    Shortness of Breath     fell on back 12/25, seen in ER. c/o shortness of breath, back pain since fall. wears 2L home O2    Fall         HISTORY OF PRESENT ILLNESS   (Location/Symptom, Timing/Onset, Context/Setting, Quality, Duration, Modifying Factors, Severity)  Note limiting factors. Maine Dodd is a 63 y.o. female with past medical history of COPD on 2 L nasal cannula oxygen chronic pain on outpatient narcotics here today with back and rib pain after a fall    Patient states that on 12/25/2020 she had a mechanical fall and since that time has been having midthoracic back pain that radiates around to the left side. Sharp and aching. Worse with taking a deep breath or coughing. This is been associated with shortness of breath and increased cough but no hemoptysis. Cough has been nonproductive. No fevers or chills. Occasional increased wheezing. Denies abdominal pain. She did not hit her head lose consciousness. No lower extremity swelling. She was seen in the emergency department 5 days after this where she was found to have some indeterminate spinal endplate deformities but was ultimately discharged home. She states her pain persists. Moderate pain aching in nature. \[facility] [place] [facility]\""    Nursing Notes were reviewed. REVIEW OF SYSTEMS    (2-9 systems for level 4, 10 or more for level 5)     Review of Systems    Please see HPI for pertinent positive and negative review of system findings. A full 10 system ROS was performed and otherwise negative.         PAST MEDICAL HISTORY     Past Medical History:   Diagnosis Date    Angina pectoris (HCC)     Chronic pain     knees and lower back     COPD exacerbation ([facility] 5/20/2018    Depression     Panic disorder     Pneumonia          SURGICAL HISTORY       Past Surgical History:   Procedure Laterality Date    CHOLECYSTECTOMY           CURRENT MEDICATIONS       Previous Medications    ALBUTEROL SULFATE HFA (VENTOLIN HFA) 108 (90 BASE) MCG/ACT INHALER    INHALE 2 PUFFS EVERY 6 HOURS AS NEEDED FOR WHEEZING OR SHORTNESS OF BREATH    ALBUTEROL SULFATE HFA (VENTOLIN HFA) 108 (90 BASE) MCG/ACT INHALER    Inhale 2 puffs into the lungs every 6 hours as needed for Wheezing or Shortness of Breath    CETIRIZINE (ZYRTEC) 10 MG TABLET    Take 10 mg by mouth daily    ESCITALOPRAM (LEXAPRO) 20 MG TABLET    Take 20 mg by mouth nightly    HYDROXYZINE (ATARAX) 25 MG TABLET    Take 25 mg by mouth 3 times daily as needed for Itching    LIDOCAINE (LIDODERM) 5 %    Place 1 patch onto the skin every 24 hours Place 1 patch onto the skin daily 12 hours on, 12 hours off. MULTIPLE VITAMINS-MINERALS (CENTRUM SILVER 50+WOMEN PO)    Take 1 tablet by mouth daily    OXYBUTYNIN (DITROPAN) 5 MG TABLET    Take 5 mg by mouth 4 times daily    OXYCODONE-ACETAMINOPHEN (PERCOCET) 5-325 MG PER TABLET    Take 1 tablet by mouth every 6 hours as needed for Pain for up to 7 days. RISPERIDONE (RISPERDAL) 4 MG TABLET    Take 4 mg by mouth nightly       ALLERGIES     Cephalosporins and Pcn [penicillins]    FAMILY HISTORY       Family History   Problem Relation Age of Onset    COPD Mother     Hypertension Mother     Asthma Neg Hx     Cancer Neg Hx     Diabetes Neg Hx     Emphysema Neg Hx     Heart Failure Neg Hx           SOCIAL HISTORY       Social History     Socioeconomic History    Marital status:       Spouse name: None    Number of children: 6    Years of education: None    Highest education level: None   Occupational History    None   Social Needs    Financial resource strain: None    Food insecurity     Worry: None     Inability: None    Transportation needs     Medical: None     Non-medical: None   Tobacco Use    Smoking status: Former Smoker     Packs/day: 1.00     Years: 52.00     Pack years: 52.00     Types: Cigarettes     Start date: 1969    Smokeless tobacco: Never Used   Substance and Sexual Activity    Alcohol use: No     Alcohol/week: 12.0 standard drinks     Types: 12 Cans of beer per week    Drug use: No    Sexual activity: Never   Lifestyle    Physical activity     Days per week: None     Minutes per session: None    Stress: None   Relationships    Social connections     Talks on phone: None     Gets together: None     Attends Mu-ism service: None     Active member of club or organization: None     Attends meetings of clubs or organizations: None     Relationship status: None    Intimate partner violence     Fear of current or ex partner: None     Emotionally abused: None     Physically abused: None     Forced sexual activity: None   Other Topics Concern    None   Social History Narrative    None       SCREENINGS               PHYSICAL EXAM    (up to 7 for level 4, 8 or more for level 5)     ED Triage Vitals [01/04/21 2135]   BP Temp Temp Source Pulse Resp SpO2 Height Weight   (!) 141/80 97.8 °F (36.6 °C) Temporal 61 20 (!) 89 % 5' 3\" (1.6 m) 140 lb (63.5 kg)       Physical Exam    General appearance:  Cooperative. No acute distress. Skin:  Warm. Dry. Eye:  Extraocular movements intact. Ears, nose, mouth and throat:  Oral mucosa moist,  Neck:  Trachea midline. Heart:  Regular rate and rhythm  Perfusion:  intact  Respiratory:  Lungs clear to auscultation bilaterally. Respirations nonlabored. Abdominal:   Non distended. Nontender  Neurological:  Alert and oriented x 3. Moves all extremities spontaneously  Musculoskeletal:   Normal ROM, no deformities. Tenderness to palpation the mid thoracic spine without step-off or deformity. Mild tenderness in the left lateral chest wall without crepitus or deformity. No overlying skin changes. No edema in the lower extremities.           Psychiatric: Normal mood      DIAGNOSTIC RESULTS       Labs Reviewed   BLOOD GAS, VENOUS - Abnormal; Notable for the following components:       Result Value    pO2, Kevin 68.3 (*)     Carboxyhemoglobin 2.2 (*)     All other components within normal limits    Narrative:     Performed at:  [facility],  [facility], [employer]   Phone [phone]   COMPREHENSIVE METABOLIC PANEL W/ REFLEX TO MG FOR LOW K - Abnormal; Notable for the following components:    Sodium 126 (*)     Chloride 93 (*)     Glucose 112 (*)     CREATININE <0.5 (*)     All other components within normal limits    Narrative:     Performed at:  [facility],  [facility] [place] [facility]   Phone [phone]   BRAIN NATRIURETIC PEPTIDE    Narrative:     Performed at:  [place] [address],  [facility], [facility]   Phone [phone]   CBC WITH AUTO DIFFERENTIAL    Narrative:     Performed at:  [place] [address],  [facility], [facility]   Phone [phone]   PROTIME-INR    Narrative:     Performed at:  [place] [address],  [facility], [place] [employer]   Phone [phone]   TROPONIN    Narrative:     Performed at:  [facility],  [facility], [employer]   Phone [phone]       Interpretation per the Radiologist below, if obtained/available at the time of this note:    XR CHEST (2 VW)   Final Result   Pulmonary vascular congestion. All other labs/imaging were within normal range or not returned as of this dictation.     EMERGENCY DEPARTMENT COURSE and DIFFERENTIAL DIAGNOSIS/MDM:   Vitals:    Vitals:    01/04/21 2135 01/04/21 2137   BP: (!) 141/80    Pulse: 61    Resp: 20    Temp: 97.8 °F (36.6 °C)    TempSrc: Temporal    SpO2: (!) 89% 94%   Weight: 140 lb (63.5 kg)    Height: 5' 3\" (1.6 m) EKG: Sinus bradycardia rate of 59 bpm.  Left axis deviation. Q waves in 3 and aVF. No ST elevation. Patient presents emergency department today complaining of back and side pain after a fall almost 2 weeks ago. She did have prior imaging showing some age-indeterminate superior endplate abnormalities. She had no new injury or trauma. Pain is easily reproducible on exam.  She is also notable complaining of some shortness of breath which is more of a chronic complaint for her. Hypoxic on room air but once placed on her stable baseline 2 L nasal cannula had normal oxygen saturations. Lungs were clear. She has no increased work of breathing. Speaking in full sentences. Chest x-ray question interstitial edema but BNP normal.  No significant edema noted on physical examination. No JVD. Low suspicion for actual pulmonary edema. Has a cough which is chronic. She recently completed a course of antibiotics. She is afebrile. No leukocytosis. Low concern for pneumonia. Feel like this is much more chronic in nature. She was given anti-inflammatory medication pain is improved. Did not feel new imaging necessary. She was incidentally found to have mild hyponatremia. The patient states she drinks 6-8 bottles of water a day. I suspect this is likely the etiology to her mild hyponatremia. She has had worse in the past.  Instructed her to cut back her water to 2-3 bottles daily at a maximum is this will likely improve her sodium but otherwise feel that she can be discharged home to follow-up with her primary care physician. Patient is comfortable with this plan will be discharged home. I did offer COVID-19 testing but the patient has declined    MDM    CONSULTS     None    Critical Care:   None    REASSESSMENT          PROCEDURE     Unless otherwise noted below, none     Procedures      FINAL IMPRESSION      1. Acute midline thoracic back pain    2. Dyspnea and respiratory abnormalities    3.

## 2021-01-06 ENCOUNTER — OFFICE VISIT (OUTPATIENT)
Dept: ORTHOPEDIC SURGERY | Age: 66
End: 2021-01-06
Payer: MEDICARE

## 2021-01-06 ENCOUNTER — TELEPHONE (OUTPATIENT)
Dept: ORTHOPEDIC SURGERY | Age: 66
End: 2021-01-06

## 2021-01-06 ENCOUNTER — CARE COORDINATION (OUTPATIENT)
Dept: CARE COORDINATION | Age: 66
End: 2021-01-06

## 2021-01-06 VITALS — BODY MASS INDEX: 24.8 KG/M2 | WEIGHT: 140 LBS | HEIGHT: 63 IN

## 2021-01-06 DIAGNOSIS — S22.050A COMPRESSION FRACTURE OF T5 VERTEBRA, INITIAL ENCOUNTER (HCC): ICD-10-CM

## 2021-01-06 DIAGNOSIS — S22.040A COMPRESSION FRACTURE OF T4 VERTEBRA, INITIAL ENCOUNTER (HCC): Primary | ICD-10-CM

## 2021-01-06 DIAGNOSIS — S22.050A CLOSED WEDGE COMPRESSION FRACTURE OF T5 VERTEBRA, INITIAL ENCOUNTER (HCC): ICD-10-CM

## 2021-01-06 DIAGNOSIS — S22.050A CLOSED WEDGE COMPRESSION FRACTURE OF T6 VERTEBRA, INITIAL ENCOUNTER (HCC): ICD-10-CM

## 2021-01-06 DIAGNOSIS — S22.050A COMPRESSION FRACTURE OF T6 VERTEBRA, INITIAL ENCOUNTER (HCC): ICD-10-CM

## 2021-01-06 LAB
EKG ATRIAL RATE: 59 BPM
EKG DIAGNOSIS: NORMAL
EKG P AXIS: 55 DEGREES
EKG P-R INTERVAL: 138 MS
EKG Q-T INTERVAL: 418 MS
EKG QRS DURATION: 74 MS
EKG QTC CALCULATION (BAZETT): 413 MS
EKG R AXIS: -44 DEGREES
EKG T AXIS: 60 DEGREES
EKG VENTRICULAR RATE: 59 BPM

## 2021-01-06 PROCEDURE — G8420 CALC BMI NORM PARAMETERS: HCPCS | Performed by: PHYSICIAN ASSISTANT

## 2021-01-06 PROCEDURE — 99203 OFFICE O/P NEW LOW 30 MIN: CPT | Performed by: PHYSICIAN ASSISTANT

## 2021-01-06 PROCEDURE — G8484 FLU IMMUNIZE NO ADMIN: HCPCS | Performed by: PHYSICIAN ASSISTANT

## 2021-01-06 PROCEDURE — L0462 TLSO 3MOD SACRO-SCAP PRE: HCPCS | Performed by: PHYSICIAN ASSISTANT

## 2021-01-06 PROCEDURE — G8427 DOCREV CUR MEDS BY ELIG CLIN: HCPCS | Performed by: PHYSICIAN ASSISTANT

## 2021-01-06 PROCEDURE — 93010 ELECTROCARDIOGRAM REPORT: CPT | Performed by: INTERNAL MEDICINE

## 2021-01-06 RX ORDER — OXYCODONE HYDROCHLORIDE AND ACETAMINOPHEN 5; 325 MG/1; MG/1
1 TABLET ORAL 2 TIMES DAILY PRN
Qty: 20 TABLET | Refills: 0 | Status: SHIPPED | OUTPATIENT
Start: 2021-01-06 | End: 2021-01-16

## 2021-01-06 NOTE — TELEPHONE ENCOUNTER
1/6/21 [facility]  - APPROVED  AUTH # [id number]  1/6/21 - 4/6/21  PER [facility] ONLINE  MP

## 2021-01-06 NOTE — PROGRESS NOTES
New Patient: LUMBAR SPINE    Referring Provider:  Dr. Terry Garcia from [facility] ED    CHIEF COMPLAINT:    Chief Complaint   Patient presents with    Back Pain     Age indeterminate fractures T4-6. Patient had a fall in early Dec.         HISTORY OF PRESENT ILLNESS:       Ms. Penny Anderson  is a pleasant 63 y.o. female here for consultation regarding her thoracic back pain. She states her pain began about 2 weeks ago after a fall. She notes she slipped on her floor and landed on her back. Her pain has steadily persisted since then. She rates her back pain 9/1- and leg pain 4/10. She describes the pain as aching. Her pain is localized to her mid thoracic spine and radiates to her left and right side. Pain is worse with walking and improved some with sitting or lying down. She denies lower extremity pain, numbness or weakness. She denies saddle numbness or bowel or bladder dysfunction. The pain occasionally disrupts her sleep. Current/Past Treatment:   · Physical Therapy: No  · Chiropractic:  No   · Injection:  No   · Medications:  Percocet, ibuprofen,Tylenol     Past Medical History:   Past Medical History:   Diagnosis Date    Angina pectoris (HCC)     Chronic pain     knees and lower back     COPD exacerbation ([facility] 5/20/2018    Depression     Panic disorder     Pneumonia         Past Surgical History:     Past Surgical History:   Procedure Laterality Date    CHOLECYSTECTOMY         Current Medications:     Current Outpatient Medications:     ibuprofen (ADVIL;MOTRIN) 600 MG tablet, Take 1 tablet by mouth 3 times daily as needed for Pain, Disp: 20 tablet, Rfl: 0    lidocaine (LIDODERM) 5 %, Place 1 patch onto the skin every 24 hours Place 1 patch onto the skin daily 12 hours on, 12 hours off., Disp: 30 patch, Rfl: 0    oxyCODONE-acetaminophen (PERCOCET) 5-325 MG per tablet, Take 1 tablet by mouth every 6 hours as needed for Pain for up to 7 days. , Disp: 12 tablet, Rfl: 0    albuterol sulfate HFA (VENTOLIN HFA) 108 (90 Base) MCG/ACT inhaler, Inhale 2 puffs into the lungs every 6 hours as needed for Wheezing or Shortness of Breath, Disp: 18 g, Rfl: 5    cetirizine (ZYRTEC) 10 MG tablet, Take 10 mg by mouth daily, Disp: , Rfl:     hydrOXYzine (ATARAX) 25 MG tablet, Take 25 mg by mouth 3 times daily as needed for Itching, Disp: , Rfl:     albuterol sulfate HFA (VENTOLIN HFA) 108 (90 Base) MCG/ACT inhaler, INHALE 2 PUFFS EVERY 6 HOURS AS NEEDED FOR WHEEZING OR SHORTNESS OF BREATH, Disp: 3 Inhaler, Rfl: 1    risperiDONE (RISPERDAL) 4 MG tablet, Take 4 mg by mouth nightly, Disp: , Rfl:     escitalopram (LEXAPRO) 20 MG tablet, Take 20 mg by mouth nightly, Disp: , Rfl:     Multiple Vitamins-Minerals (CENTRUM SILVER 50+WOMEN PO), Take 1 tablet by mouth daily, Disp: , Rfl:     oxybutynin (DITROPAN) 5 MG tablet, Take 5 mg by mouth 4 times daily, Disp: , Rfl:     Allergies:  Cephalosporins and Pcn [penicillins]    Social History:    reports that she has quit smoking. Her smoking use included cigarettes. She started smoking about 52 years ago. She has a 52.00 pack-year smoking history. She has never used smokeless tobacco. She reports that she does not drink alcohol or use drugs.     Family History:   Family History   Problem Relation Age of Onset    COPD Mother     Hypertension Mother     Asthma Neg Hx     Cancer Neg Hx     Diabetes Neg Hx     Emphysema Neg Hx     Heart Failure Neg Hx        REVIEW OF SYSTEMS: Full ROS noted & scanned   CONSTITUTIONAL: Denies unexplained weight loss, fevers, chills or fatigue  NEUROLOGICAL: Denies unsteady gait or progressive weakness  MUSCULOSKELETAL: Denies joint swelling or redness  PSYCHOLOGICAL: Denies anxiety, depression   SKIN: Denies skin changes, delayed healing, rash, itching   HEMATOLOGIC: Denies easy bleeding or bruising  ENDOCRINE: Denies excessive thirst, urination, heat/cold  RESPIRATORY: Denies current dyspnea, cough  GI: Denies nausea, vomiting, diarrhea : Denies bowel or bladder issues      PHYSICAL EXAM:    Vitals: Height 5' 3\" (1.6 m), weight 140 lb (63.5 kg), not currently breastfeeding. GENERAL EXAM:  · General Apparence: Patient is adequately groomed with no evidence of malnutrition. · Orientation: The patient is oriented to time, place and person. · Mood & Affect:The patient's mood and affect are appropriate. · Vascular: Examination reveals no swelling tenderness in upper or lower extremities. Good capillary refill. · Lymphatic: The lymphatic examination bilaterally reveals all areas to be without enlargement or induration  · Sensation: Sensation is intact without deficit  · Coordination/Balance: Good coordination. Tandem walking normal.     LUMBAR/SACRAL EXAMINATION:  · Inspection: Local inspection shows no step-off or bruising. Lumbar alignment is normal.  Sagittal and Coronal balance is neutral.      · Palpation:   No evidence of tenderness at the midline. No tenderness bilaterally at the paraspinal or trochanters. There is no step-off or paraspinal spasm. · Range of Motion: Lumbar flexion, extension and rotation are mildly limited due to pain. · Strength:   Strength testing is 5/5 in all muscle groups tested. · Special Tests:   Straight leg raise and crossed SLR negative. Leg length and pelvis level. · Skin: There are no rashes, ulcerations or lesions. · Reflexes: Reflexes are symmetrically 2+ at the patellar and ankle tendons. Clonus absent bilaterally at the feet. · Gait & station: normal, patient ambulates without assistance    · Additional Examinations:   · RIGHT LOWER EXTREMITY: Inspection/examination of the right lower extremity does not show any tenderness, deformity or injury. Range of motion is unremarkable. There is no gross instability. There are no rashes, ulcerations or lesions.  Strength and tone are normal.  · LEFT LOWER EXTREMITY:  Inspection/examination of the left lower extremity does not show any tenderness, deformity or injury. Range of motion is unremarkable. There is no gross instability. There are no rashes, ulcerations or lesions. Strength and tone are normal.    Diagnostic Testing:    I reviewed AP and lateral xray images of her thoracic spine from the office today, as well as from 12/30/20. They show age indeterminate T4, T5, and T6 compression fractures with moderate kyphosis. Impression:   Thoracic compression fractures    Plan:    We discussed treatment options including observation, a joelle orthosis or TLSO, or additional imaging for consideration of thoracic kyphoplasty. She wishes to continue with activity modification and a TLSO brace. She will return in 3 weeks for repeat xrays of her thoracic spine. She was advised to avoid excessive twisting, bending, or lifting greater than 10 lbs over the next 3 weeks. She understands she may be at risk of potential increased kyphosis without thoracic kyphoplasty. She will call for a thoracic MRI if her pain does not improve or worsens over the next few weeks or if she develops any new neurologic symptoms, for consideration for a thoracic kyphoplasty.      Jorge L Hyatt PA-C

## 2021-01-06 NOTE — CARE COORDINATION
[facility]  Pt was a non admit to [facility]. Pt declined [facility]. services.     Electronically signed by Roxanna Beckford RN on 1/6/2021 at 5:44 PM

## 2021-01-07 ENCOUNTER — CARE COORDINATION (OUTPATIENT)
Dept: CARE COORDINATION | Age: 66
End: 2021-01-07

## 2021-01-08 ENCOUNTER — HOSPITAL ENCOUNTER (OUTPATIENT)
Dept: MAMMOGRAPHY | Age: 66
Discharge: HOME | End: 2021-01-13
Payer: MEDICARE

## 2021-01-08 DIAGNOSIS — Z12.31 VISIT FOR SCREENING MAMMOGRAM: ICD-10-CM

## 2021-02-04 ENCOUNTER — APPOINTMENT (OUTPATIENT)
Dept: CT IMAGING | Age: 66
DRG: 640 | End: 2021-02-04
Payer: MEDICARE

## 2021-02-04 ENCOUNTER — HOSPITAL ENCOUNTER (INPATIENT)
Age: 66
LOS: 3 days | Discharge: HOME | DRG: 640 | End: 2021-02-07
Attending: EMERGENCY MEDICINE | Admitting: INTERNAL MEDICINE
Payer: MEDICARE

## 2021-02-04 ENCOUNTER — APPOINTMENT (OUTPATIENT)
Dept: GENERAL RADIOLOGY | Age: 66
DRG: 640 | End: 2021-02-04
Payer: MEDICARE

## 2021-02-04 DIAGNOSIS — R41.82 ALTERED MENTAL STATUS, UNSPECIFIED ALTERED MENTAL STATUS TYPE: Primary | ICD-10-CM

## 2021-02-04 DIAGNOSIS — E87.1 HYPONATREMIA: ICD-10-CM

## 2021-02-04 PROBLEM — J96.21 ACUTE ON CHRONIC RESPIRATORY FAILURE WITH HYPOXIA (HCC): Status: ACTIVE | Noted: 2021-02-04

## 2021-02-04 LAB
A/G RATIO: 1.6 (ref 1.1–2.2)
ACETAMINOPHEN LEVEL: <5 UG/ML (ref 10–30)
ALBUMIN SERPL-MCNC: 4.5 G/DL (ref 3.4–5)
ALP BLD-CCNC: 146 U/L (ref 40–129)
ALT SERPL-CCNC: 33 U/L (ref 10–40)
AMMONIA: 38 UMOL/L (ref 11–51)
ANION GAP SERPL CALCULATED.3IONS-SCNC: 16 MMOL/L (ref 3–16)
ANION GAP SERPL CALCULATED.3IONS-SCNC: 9 MMOL/L (ref 3–16)
AST SERPL-CCNC: 49 U/L (ref 15–37)
BASE EXCESS ARTERIAL: -2.2 MMOL/L (ref -3–3)
BASE EXCESS VENOUS: -4.5 MMOL/L (ref -3–3)
BASOPHILS ABSOLUTE: 0 K/UL (ref 0–0.2)
BASOPHILS RELATIVE PERCENT: 0.2 %
BILIRUB SERPL-MCNC: 0.9 MG/DL (ref 0–1)
BILIRUBIN URINE: NEGATIVE
BLOOD, URINE: ABNORMAL
BUN BLDV-MCNC: 7 MG/DL (ref 7–20)
BUN BLDV-MCNC: 8 MG/DL (ref 7–20)
CALCIUM SERPL-MCNC: 8.6 MG/DL (ref 8.3–10.6)
CALCIUM SERPL-MCNC: 9.5 MG/DL (ref 8.3–10.6)
CARBOXYHEMOGLOBIN ARTERIAL: 1.6 % (ref 0–1.5)
CARBOXYHEMOGLOBIN: 3.1 % (ref 0–1.5)
CHLORIDE BLD-SCNC: 76 MMOL/L (ref 99–110)
CHLORIDE BLD-SCNC: 89 MMOL/L (ref 99–110)
CLARITY: CLEAR
CO2: 22 MMOL/L (ref 21–32)
CO2: 22 MMOL/L (ref 21–32)
COLOR: ABNORMAL
CREAT SERPL-MCNC: <0.5 MG/DL (ref 0.6–1.2)
CREAT SERPL-MCNC: <0.5 MG/DL (ref 0.6–1.2)
CREATININE URINE: 30 MG/DL (ref 28–259)
EOSINOPHILS ABSOLUTE: 0 K/UL (ref 0–0.6)
EOSINOPHILS RELATIVE PERCENT: 0 %
EPITHELIAL CELLS, UA: NORMAL /HPF (ref 0–5)
GFR AFRICAN AMERICAN: >60
GFR AFRICAN AMERICAN: >60
GFR NON-AFRICAN AMERICAN: >60
GFR NON-AFRICAN AMERICAN: >60
GLOBULIN: 2.8 G/DL
GLUCOSE BLD-MCNC: 109 MG/DL (ref 70–99)
GLUCOSE BLD-MCNC: 115 MG/DL (ref 70–99)
GLUCOSE BLD-MCNC: 128 MG/DL (ref 70–99)
GLUCOSE URINE: NEGATIVE MG/DL
HCO3 ARTERIAL: 22.3 MMOL/L (ref 21–29)
HCO3 VENOUS: 21.4 MMOL/L (ref 23–29)
HCT VFR BLD CALC: 36.9 % (ref 36–48)
HEMOGLOBIN, ART, EXTENDED: 12.9 G/DL (ref 12–16)
HEMOGLOBIN: 12.6 G/DL (ref 12–16)
INR BLD: 0.99 (ref 0.86–1.14)
KETONES, URINE: 15 MG/DL
LACTIC ACID: 3.1 MMOL/L (ref 0.4–2)
LEUKOCYTE ESTERASE, URINE: NEGATIVE
LIPASE: 22 U/L (ref 13–60)
LYMPHOCYTES ABSOLUTE: 0.6 K/UL (ref 1–5.1)
LYMPHOCYTES RELATIVE PERCENT: 4.9 %
MCH RBC QN AUTO: 30.4 PG (ref 26–34)
MCHC RBC AUTO-ENTMCNC: 34.1 G/DL (ref 31–36)
MCV RBC AUTO: 89.4 FL (ref 80–100)
METHEMOGLOBIN ARTERIAL: 0.1 %
METHEMOGLOBIN VENOUS: 0.1 %
MICROSCOPIC EXAMINATION: YES
MONOCYTES ABSOLUTE: 0.5 K/UL (ref 0–1.3)
MONOCYTES RELATIVE PERCENT: 3.8 %
NEUTROPHILS ABSOLUTE: 12.1 K/UL (ref 1.7–7.7)
NEUTROPHILS RELATIVE PERCENT: 91.1 %
NITRITE, URINE: NEGATIVE
O2 CONTENT ARTERIAL: 17 ML/DL
O2 CONTENT, VEN: 17 VOL %
O2 SAT, ARTERIAL: 95.4 %
O2 SAT, VEN: 92 %
O2 THERAPY: ABNORMAL
O2 THERAPY: ABNORMAL
PCO2 ARTERIAL: 37.5 MMHG (ref 35–45)
PCO2, VEN: 42.3 MMHG (ref 40–50)
PDW BLD-RTO: 13.9 % (ref 12.4–15.4)
PERFORMED ON: ABNORMAL
PH ARTERIAL: 7.39 (ref 7.35–7.45)
PH UA: 6 (ref 5–8)
PH VENOUS: 7.32 (ref 7.35–7.45)
PLATELET # BLD: 251 K/UL (ref 135–450)
PMV BLD AUTO: 7.7 FL (ref 5–10.5)
PO2 ARTERIAL: 81.4 MMHG (ref 75–108)
PO2, VEN: 67.7 MMHG (ref 25–40)
POTASSIUM REFLEX MAGNESIUM: 4.3 MMOL/L (ref 3.5–5.1)
POTASSIUM SERPL-SCNC: 4 MMOL/L (ref 3.5–5.1)
PRO-BNP: 77 PG/ML (ref 0–124)
PROCALCITONIN: 0.1 NG/ML (ref 0–0.15)
PROTEIN UA: NEGATIVE MG/DL
PROTHROMBIN TIME: 11.5 SEC (ref 10–13.2)
RAPID INFLUENZA  B AGN: NEGATIVE
RAPID INFLUENZA A AGN: NEGATIVE
RBC # BLD: 4.12 M/UL (ref 4–5.2)
RBC UA: NORMAL /HPF (ref 0–4)
SALICYLATE, SERUM: 18.4 MG/DL (ref 15–30)
SARS-COV-2, NAAT: NOT DETECTED
SODIUM BLD-SCNC: 114 MMOL/L (ref 136–145)
SODIUM BLD-SCNC: 120 MMOL/L (ref 136–145)
SODIUM URINE: <20 MMOL/L
SPECIFIC GRAVITY UA: 1.01 (ref 1–1.03)
TCO2 ARTERIAL: 23.4 MMOL/L
TCO2 CALC VENOUS: 23 MMOL/L
TOTAL PROTEIN: 7.3 G/DL (ref 6.4–8.2)
TROPONIN: <0.01 NG/ML
URIC ACID, SERUM: 1 MG/DL (ref 2.6–6)
URINE REFLEX TO CULTURE: ABNORMAL
URINE TYPE: ABNORMAL
UROBILINOGEN, URINE: 0.2 E.U./DL
WBC # BLD: 13.3 K/UL (ref 4–11)
WBC UA: NORMAL /HPF (ref 0–5)

## 2021-02-04 PROCEDURE — 82140 ASSAY OF AMMONIA: CPT

## 2021-02-04 PROCEDURE — 80179 DRUG ASSAY SALICYLATE: CPT

## 2021-02-04 PROCEDURE — 2580000003 HC RX 258: Performed by: EMERGENCY MEDICINE

## 2021-02-04 PROCEDURE — 84484 ASSAY OF TROPONIN QUANT: CPT

## 2021-02-04 PROCEDURE — 82803 BLOOD GASES ANY COMBINATION: CPT

## 2021-02-04 PROCEDURE — 83605 ASSAY OF LACTIC ACID: CPT

## 2021-02-04 PROCEDURE — 85025 COMPLETE CBC W/AUTO DIFF WBC: CPT

## 2021-02-04 PROCEDURE — 80053 COMPREHEN METABOLIC PANEL: CPT

## 2021-02-04 PROCEDURE — 84550 ASSAY OF BLOOD/URIC ACID: CPT

## 2021-02-04 PROCEDURE — 81001 URINALYSIS AUTO W/SCOPE: CPT

## 2021-02-04 PROCEDURE — 84145 PROCALCITONIN (PCT): CPT

## 2021-02-04 PROCEDURE — 2580000003 HC RX 258: Performed by: INTERNAL MEDICINE

## 2021-02-04 PROCEDURE — 94761 N-INVAS EAR/PLS OXIMETRY MLT: CPT

## 2021-02-04 PROCEDURE — 82570 ASSAY OF URINE CREATININE: CPT

## 2021-02-04 PROCEDURE — 87040 BLOOD CULTURE FOR BACTERIA: CPT

## 2021-02-04 PROCEDURE — 84300 ASSAY OF URINE SODIUM: CPT

## 2021-02-04 PROCEDURE — 84295 ASSAY OF SERUM SODIUM: CPT

## 2021-02-04 PROCEDURE — 85610 PROTHROMBIN TIME: CPT

## 2021-02-04 PROCEDURE — 71045 X-RAY EXAM CHEST 1 VIEW: CPT

## 2021-02-04 PROCEDURE — 83880 ASSAY OF NATRIURETIC PEPTIDE: CPT

## 2021-02-04 PROCEDURE — 87804 INFLUENZA ASSAY W/OPTIC: CPT

## 2021-02-04 PROCEDURE — 2060000000 HC ICU INTERMEDIATE R&B

## 2021-02-04 PROCEDURE — 83690 ASSAY OF LIPASE: CPT

## 2021-02-04 PROCEDURE — 70450 CT HEAD/BRAIN W/O DYE: CPT

## 2021-02-04 PROCEDURE — 99285 EMERGENCY DEPT VISIT HI MDM: CPT

## 2021-02-04 PROCEDURE — 2700000000 HC OXYGEN THERAPY PER DAY

## 2021-02-04 PROCEDURE — U0002 COVID-19 LAB TEST NON-CDC: HCPCS

## 2021-02-04 PROCEDURE — 80143 DRUG ASSAY ACETAMINOPHEN: CPT

## 2021-02-04 PROCEDURE — 71260 CT THORAX DX C+: CPT

## 2021-02-04 PROCEDURE — 93005 ELECTROCARDIOGRAM TRACING: CPT | Performed by: EMERGENCY MEDICINE

## 2021-02-04 PROCEDURE — 84443 ASSAY THYROID STIM HORMONE: CPT

## 2021-02-04 PROCEDURE — 6360000004 HC RX CONTRAST MEDICATION: Performed by: EMERGENCY MEDICINE

## 2021-02-04 RX ORDER — SODIUM CHLORIDE 0.9 % (FLUSH) 0.9 %
10 SYRINGE (ML) INJECTION PRN
Status: DISCONTINUED | OUTPATIENT
Start: 2021-02-04 | End: 2021-02-07 | Stop reason: HOSPADM

## 2021-02-04 RX ORDER — 3% SODIUM CHLORIDE 3 G/100ML
50 INJECTION, SOLUTION INTRAVENOUS CONTINUOUS
Status: DISPENSED | OUTPATIENT
Start: 2021-02-04 | End: 2021-02-04

## 2021-02-04 RX ORDER — MAGNESIUM SULFATE 1 G/100ML
1000 INJECTION INTRAVENOUS PRN
Status: DISCONTINUED | OUTPATIENT
Start: 2021-02-04 | End: 2021-02-07 | Stop reason: HOSPADM

## 2021-02-04 RX ORDER — NICOTINE 21 MG/24HR
1 PATCH, TRANSDERMAL 24 HOURS TRANSDERMAL DAILY
Status: DISCONTINUED | OUTPATIENT
Start: 2021-02-05 | End: 2021-02-07 | Stop reason: HOSPADM

## 2021-02-04 RX ORDER — ACETAMINOPHEN 325 MG/1
650 TABLET ORAL EVERY 6 HOURS PRN
Status: DISCONTINUED | OUTPATIENT
Start: 2021-02-04 | End: 2021-02-07 | Stop reason: HOSPADM

## 2021-02-04 RX ORDER — PROMETHAZINE HYDROCHLORIDE 25 MG/1
12.5 TABLET ORAL EVERY 6 HOURS PRN
Status: DISCONTINUED | OUTPATIENT
Start: 2021-02-04 | End: 2021-02-07 | Stop reason: HOSPADM

## 2021-02-04 RX ORDER — SODIUM CHLORIDE 9 MG/ML
INJECTION, SOLUTION INTRAVENOUS CONTINUOUS
Status: DISCONTINUED | OUTPATIENT
Start: 2021-02-04 | End: 2021-02-05

## 2021-02-04 RX ORDER — POTASSIUM CHLORIDE 20 MEQ/1
40 TABLET, EXTENDED RELEASE ORAL PRN
Status: DISCONTINUED | OUTPATIENT
Start: 2021-02-04 | End: 2021-02-07 | Stop reason: HOSPADM

## 2021-02-04 RX ORDER — IPRATROPIUM BROMIDE AND ALBUTEROL SULFATE 2.5; .5 MG/3ML; MG/3ML
1 SOLUTION RESPIRATORY (INHALATION) 4 TIMES DAILY
Status: DISCONTINUED | OUTPATIENT
Start: 2021-02-04 | End: 2021-02-05

## 2021-02-04 RX ORDER — ONDANSETRON 2 MG/ML
4 INJECTION INTRAMUSCULAR; INTRAVENOUS EVERY 6 HOURS PRN
Status: DISCONTINUED | OUTPATIENT
Start: 2021-02-04 | End: 2021-02-07 | Stop reason: HOSPADM

## 2021-02-04 RX ORDER — ACETAMINOPHEN 650 MG/1
650 SUPPOSITORY RECTAL EVERY 6 HOURS PRN
Status: DISCONTINUED | OUTPATIENT
Start: 2021-02-04 | End: 2021-02-07 | Stop reason: HOSPADM

## 2021-02-04 RX ORDER — POTASSIUM CHLORIDE 7.45 MG/ML
10 INJECTION INTRAVENOUS PRN
Status: DISCONTINUED | OUTPATIENT
Start: 2021-02-04 | End: 2021-02-07 | Stop reason: HOSPADM

## 2021-02-04 RX ORDER — ALBUTEROL SULFATE 2.5 MG/3ML
2.5 SOLUTION RESPIRATORY (INHALATION)
Status: DISCONTINUED | OUTPATIENT
Start: 2021-02-04 | End: 2021-02-05

## 2021-02-04 RX ORDER — 0.9 % SODIUM CHLORIDE 0.9 %
1000 INTRAVENOUS SOLUTION INTRAVENOUS ONCE
Status: COMPLETED | OUTPATIENT
Start: 2021-02-04 | End: 2021-02-04

## 2021-02-04 RX ADMIN — SODIUM CHLORIDE: 9 INJECTION, SOLUTION INTRAVENOUS at 23:30

## 2021-02-04 RX ADMIN — IOPAMIDOL 75 ML: 755 INJECTION, SOLUTION INTRAVENOUS at 23:16

## 2021-02-04 RX ADMIN — SODIUM CHLORIDE 1000 ML: 9 INJECTION, SOLUTION INTRAVENOUS at 17:59

## 2021-02-04 RX ADMIN — SODIUM CHLORIDE 50 ML/HR: 3 INJECTION, SOLUTION INTRAVENOUS at 19:19

## 2021-02-04 ASSESSMENT — ENCOUNTER SYMPTOMS: TACHYPNEA: 1

## 2021-02-04 NOTE — ED PROVIDER NOTES
[address] [facility]  ED PROVIDER NOTE    Patient Identification  Pt Name: Penny Anderson  MRN: [id number]  Leannagfosiris 1955  Date of evaluation: 2/4/2021  Provider: Elvira Bolanos MD  PCP: Ace Stauffer MD    Chief Complaint  AMS    HPI  History provided by patient   This is a 63 y.o. female who presents to the ED for altered mental status. Patient is able to answer some simple questions. Denies pain. Specifically no chest pain, abdominal pain, headache. Endorses shortness of breath. Cannot tell me how long it has been ongoing for. Knows she is in the hospital but does not know where or the year. Does not know why she came here today. No nausea. No fever. ROS  10 systems reviewed, pertinent positives/negatives per HPI otherwise noted to be negative.     I have reviewed the following nursing documentation:  Allergies: Cephalosporins and Pcn [penicillins]    Past medical history:   Past Medical History:   Diagnosis Date    [facility]ina pectoris ([facility] [address]     Chronic pain     knees and lower back     COPD exacerbation ([facility] [address] 5/20/2018    Depression     Panic disorder     Pneumonia      Past surgical history:   Past Surgical History:   Procedure Laterality Date    CHOLECYSTECTOMY         Home medications:   Previous Medications    ALBUTEROL SULFATE HFA (VENTOLIN HFA) 108 (90 BASE) MCG/ACT INHALER    INHALE 2 PUFFS EVERY 6 HOURS AS NEEDED FOR WHEEZING OR SHORTNESS OF BREATH    ALBUTEROL SULFATE HFA (VENTOLIN HFA) 108 (90 BASE) MCG/ACT INHALER    Inhale 2 puffs into the lungs every 6 hours as needed for Wheezing or Shortness of Breath    CETIRIZINE (ZYRTEC) 10 MG TABLET    Take 10 mg by mouth daily    ESCITALOPRAM (LEXAPRO) 20 MG TABLET    Take 20 mg by mouth nightly    HYDROXYZINE (ATARAX) 25 MG TABLET    Take 25 mg by mouth 3 times daily as needed for Itching    IBUPROFEN (ADVIL;MOTRIN) 600 MG TABLET    Take 1 tablet by mouth 3 times daily as needed for Pain    MULTIPLE VITAMINS-MINERALS (CENTRUM SILVER 50+WOMEN PO)    Take 1 tablet by mouth daily    OXYBUTYNIN (DITROPAN) 5 MG TABLET    Take 5 mg by mouth 4 times daily    RISPERIDONE (RISPERDAL) 4 MG TABLET    Take 4 mg by mouth nightly       Social history:  reports that she has quit smoking. Her smoking use included cigarettes. She started smoking about 52 years ago. She has a 52.00 pack-year smoking history. She has never used smokeless tobacco. She reports that she does not drink alcohol or use drugs. Family history:    Family History   Problem Relation Age of Onset    COPD Mother     Hypertension Mother     Asthma Neg Hx     Cancer Neg Hx     Diabetes Neg Hx     Emphysema Neg Hx     Heart Failure Neg Hx          Exam  ED Triage Vitals [02/04/21 1726]   BP Temp Temp Source Pulse Resp SpO2 Height Weight   (!) 154/85 98.7 °F (37.1 °C) Oral 101 18 96 % -- --     Nursing note and vitals reviewed. Constitutional: Ill appearing  HENT:      Head: Normocephalic      Ears: External ears normal.      Nose: Nose normal.     Mouth: Membrane mucosa moist   Eyes: No discharge. Neck: Supple. Trachea midline. Cardiovascular: Regular rate. Warm extremities  Pulmonary/Chest: Effort normal. No respiratory distress. CTAB. Abdominal: Soft. No distension. Nontender  : Deferred  Rectal: Deferred   Musculoskeletal: Moves all extremities. No gross deformity. Neurological: Alert and oriented to person only. Face symmetric. Speech is clear. Lifts all 4 extremities. Can feel me touching in all 4 extremities. Having difficulty following commands. Able to name objects  Skin: Warm and dry. No rash. Psychiatric: Normal mood and affect. Behavior is normal.    Procedures      EKG  The Ekg interpreted by me in the absence of a cardiologist shows. Normal sinus rhythm. No specific ST-T wave changes appreciated. No evidence of acute ischemia.    No significant change from prior EKG dated jan 4  Hr 99  Qtc 420      Radiology  CT Head WO Contrast   Final Result   No acute intracranial abnormality. XR CHEST PORTABLE   Final Result   No acute process.              Labs  Results for orders placed or performed during the hospital encounter of 02/04/21   CBC Auto Differential   Result Value Ref Range    WBC 13.3 (H) 4.0 - 11.0 K/uL    RBC 4.12 4.00 - 5.20 M/uL    Hemoglobin 12.6 12.0 - 16.0 g/dL    Hematocrit 36.9 36.0 - 48.0 %    MCV 89.4 80.0 - 100.0 fL    MCH 30.4 26.0 - 34.0 pg    MCHC 34.1 31.0 - 36.0 g/dL    RDW 13.9 12.4 - 15.4 %    Platelets 604 335 - 274 K/uL    MPV 7.7 5.0 - 10.5 fL    Neutrophils % 91.1 %    Lymphocytes % 4.9 %    Monocytes % 3.8 %    Eosinophils % 0.0 %    Basophils % 0.2 %    Neutrophils Absolute 12.1 (H) 1.7 - 7.7 K/uL    Lymphocytes Absolute 0.6 (L) 1.0 - 5.1 K/uL    Monocytes Absolute 0.5 0.0 - 1.3 K/uL    Eosinophils Absolute 0.0 0.0 - 0.6 K/uL    Basophils Absolute 0.0 0.0 - 0.2 K/uL   Comprehensive Metabolic Panel w/ Reflex to MG   Result Value Ref Range    Sodium 114 (LL) 136 - 145 mmol/L    Potassium reflex Magnesium 4.3 3.5 - 5.1 mmol/L    Chloride 76 (L) 99 - 110 mmol/L    CO2 22 21 - 32 mmol/L    Anion Gap 16 3 - 16    Glucose 128 (H) 70 - 99 mg/dL    BUN 8 7 - 20 mg/dL    CREATININE <0.5 (L) 0.6 - 1.2 mg/dL    GFR Non-African American >60 >60    GFR African American >60 >60    Calcium 9.5 8.3 - 10.6 mg/dL    Total Protein 7.3 6.4 - 8.2 g/dL    Albumin 4.5 3.4 - 5.0 g/dL    Albumin/Globulin Ratio 1.6 1.1 - 2.2    Total Bilirubin 0.9 0.0 - 1.0 mg/dL    Alkaline Phosphatase 146 (H) 40 - 129 U/L    ALT 33 10 - 40 U/L    AST 49 (H) 15 - 37 U/L    Globulin 2.8 g/dL   Lipase   Result Value Ref Range    Lipase 22.0 13.0 - 60.0 U/L   Troponin   Result Value Ref Range    Troponin <0.01 <0.01 ng/mL   Brain Natriuretic Peptide   Result Value Ref Range    Pro-BNP 77 0 - 124 pg/mL   Protime-INR   Result Value Ref Range    Protime 11.5 10.0 - 13.2 sec    INR 0.99 0.86 - 1.14   Blood Gas, Venous   Result Value Ref Range    pH, Kevin 7.321 (L) 7.350 - 7.450 Interval 140 ms    QRS Duration 66 ms    Q-T Interval 328 ms    QTc Calculation (Bazett) 420 ms    P Axis 51 degrees    R Axis -72 degrees    T Axis 23 degrees    Diagnosis       Normal sinus rhythmLeft axis deviationInferior infarct , age undeterminedAbnormal ECGWhen compared with ECG of 04-JAN-2021 21:42,Vent. rate has increased BY  40 BPMInferior infarct is now PresentST now depressed in Inferior leads       Screenings           MDM and ED Course  Patient is a 72-year-old woman who presents with altered mental status. Unclear etiology. Differential broad including intracranial bleed, infection, metabolic abnormality, tox. Spoke with daughter who states that she is normally very conversive and the patient complained of some headache earlier. She currently only complains of shortness of breath. Because she has no headache, neck stiffness, nausea, I have low suspicion for meningitis, intracranial bleed at this time however I will still get CT head. I did find the patient to be hyponatremic to 114. Because this is severe causing change in mental status, I consulted nephrology Dr. Cintia Chavez at about 6:30 PM.  He recommended 3% hypertonic sodium chloride given at 50 mils over 2 hours. Will admit to ICU and recheck sodium level in 2 hours. Spoke with Dr. Karla Carmona who will admit   ([facility])    The total critical care time spent while evaluating and treating this patient was at least 35 minutes. This excludes time spent doing separately billable procedures. This includes time at the bedside, data interpretation, medication management, obtaining critical history from collateral sources if the patient is unable to provide it directly, and physician consultation. Specifics of interventions taken and potentially life-threatening diagnostic considerations are listed above in the medical decision making. [unfilled]    Final Impression  1.  Altered mental status, unspecified altered mental status type 2. Hyponatremia        Blood pressure (!) 132/57, pulse 104, temperature 99.5 °F (37.5 °C), temperature source Oral, resp. rate 22, height 5' 3\" (1.6 m), weight 150 lb (68 kg), SpO2 94 %, not currently breastfeeding. Disposition:  DISPOSITION Decision To Admit 02/04/2021 06:39:02 PM      Patient Referrals:  No follow-up provider specified. Discharge Medications:  New Prescriptions    No medications on file       Discontinued Medications:  Discontinued Medications    No medications on file       This chart was generated using the [address] [place] [address] dictation system. I created this record but it may contain dictation errors given the limitations of this technology.         Robin Chino MD  02/04/21 0974

## 2021-02-04 NOTE — ED NOTES

## 2021-02-04 NOTE — CONSULTS
Consult for hyponatremia management    Discussed with ED attending    Labs reviewed    Full consult to follow

## 2021-02-05 LAB
ANION GAP SERPL CALCULATED.3IONS-SCNC: 9 MMOL/L (ref 3–16)
BUN BLDV-MCNC: 8 MG/DL (ref 7–20)
CALCIUM SERPL-MCNC: 8.9 MG/DL (ref 8.3–10.6)
CHLORIDE BLD-SCNC: 91 MMOL/L (ref 99–110)
CO2: 25 MMOL/L (ref 21–32)
CREAT SERPL-MCNC: <0.5 MG/DL (ref 0.6–1.2)
EKG ATRIAL RATE: 99 BPM
EKG DIAGNOSIS: NORMAL
EKG P AXIS: 51 DEGREES
EKG P-R INTERVAL: 140 MS
EKG Q-T INTERVAL: 328 MS
EKG QRS DURATION: 66 MS
EKG QTC CALCULATION (BAZETT): 420 MS
EKG R AXIS: -72 DEGREES
EKG T AXIS: 23 DEGREES
EKG VENTRICULAR RATE: 99 BPM
GFR AFRICAN AMERICAN: >60
GFR NON-AFRICAN AMERICAN: >60
GLUCOSE BLD-MCNC: 90 MG/DL (ref 70–99)
POTASSIUM SERPL-SCNC: 4.2 MMOL/L (ref 3.5–5.1)
SODIUM BLD-SCNC: 119 MMOL/L (ref 136–145)
SODIUM BLD-SCNC: 125 MMOL/L (ref 136–145)
TSH REFLEX: 0.48 UIU/ML (ref 0.27–4.2)
TSH REFLEX: 1.02 UIU/ML (ref 0.27–4.2)

## 2021-02-05 PROCEDURE — 93010 ELECTROCARDIOGRAM REPORT: CPT | Performed by: INTERNAL MEDICINE

## 2021-02-05 PROCEDURE — 2700000000 HC OXYGEN THERAPY PER DAY

## 2021-02-05 PROCEDURE — 2060000000 HC ICU INTERMEDIATE R&B

## 2021-02-05 PROCEDURE — 6370000000 HC RX 637 (ALT 250 FOR IP): Performed by: INTERNAL MEDICINE

## 2021-02-05 PROCEDURE — 6360000002 HC RX W HCPCS: Performed by: INTERNAL MEDICINE

## 2021-02-05 PROCEDURE — 36415 COLL VENOUS BLD VENIPUNCTURE: CPT

## 2021-02-05 PROCEDURE — 94761 N-INVAS EAR/PLS OXIMETRY MLT: CPT

## 2021-02-05 PROCEDURE — 80048 BASIC METABOLIC PNL TOTAL CA: CPT

## 2021-02-05 RX ORDER — IPRATROPIUM BROMIDE AND ALBUTEROL SULFATE 2.5; .5 MG/3ML; MG/3ML
1 SOLUTION RESPIRATORY (INHALATION) EVERY 4 HOURS PRN
Status: DISCONTINUED | OUTPATIENT
Start: 2021-02-05 | End: 2021-02-07 | Stop reason: HOSPADM

## 2021-02-05 RX ADMIN — ACETAMINOPHEN 650 MG: 325 TABLET ORAL at 03:01

## 2021-02-05 RX ADMIN — ACETAMINOPHEN 650 MG: 325 TABLET ORAL at 15:02

## 2021-02-05 RX ADMIN — ENOXAPARIN SODIUM 40 MG: 40 INJECTION SUBCUTANEOUS at 09:07

## 2021-02-05 ASSESSMENT — PAIN SCALES - GENERAL: PAINLEVEL_OUTOF10: 3

## 2021-02-05 NOTE — PLAN OF CARE
Problem: Falls - Risk of:  Goal: Will remain free from falls  Description: Will remain free from falls  Outcome: Ongoing  Goal: Absence of physical injury  Description: Absence of physical injury  Outcome: Ongoing       Patient understands use of call light. Call light is within reach. Bed alarm in place and bed placed in the lowest position. Non-skid footwear on feet. Will continue to monitor.

## 2021-02-05 NOTE — PROGRESS NOTES
Page sent to Dr. Karla Carmona: \"Hello! Patient was admitted this evening for respiratory failure and hyponatremia. Her labs have just resulted and her sodium came back critical at 119. Just wanted to make you aware and see if you wanted any new orders. Thanks! \"    MD response: \"thank you.  Please note she is free water restricted to 1.5 L /day\"

## 2021-02-05 NOTE — PLAN OF CARE
Problem: Falls - Risk of:  Goal: Will remain free from falls  Description: Will remain free from falls  2/5/2021 1322 by Betty Curry RN  Outcome: Ongoing  2/5/2021 0353 by Temo Bennett RN  Outcome: Ongoing  Goal: Absence of physical injury  Description: Absence of physical injury  2/5/2021 1322 by Betty Curry RN  Outcome: Ongoing  2/5/2021 0353 by Temo Bennett RN  Outcome: Ongoing

## 2021-02-05 NOTE — PROGRESS NOTES
4 Eyes Skin Assessment     The patient is being assess for   Admission    I agree that 2 RN's have performed a thorough Head to Toe Skin Assessment on the patient. ALL assessment sites listed below have been assessed. Areas assessed by both nurses:   [x]   Head, Face, and Ears   [x]   Shoulders, Back, and Chest, Abdomen  [x]   Arms, Elbows, and Hands   [x]   Coccyx, Sacrum, and Ischium  [x]   Legs, Feet, and Heels          **SHARE this note so that the co-signing nurse is able to place an eSignature**    Co-signer eSignature: Electronically signed by Geo Carlson RN on 2/5/21 at 3:45 AM EST    Does the Patient have Skin Breakdown?   No          William Prevention initiated:  No   Wound Care Orders initiated:  No      [facility] nurse consulted for Pressure Injury (Stage 3,4, Unstageable, DTI, NWPT, Complex wounds)and New or Established Ostomies:  No      Primary Nurse eSignature: Electronically signed by Alize Mckenzie RN on 2/5/21 at 3:45 AM EST

## 2021-02-05 NOTE — PROGRESS NOTES
RESPIRATORY THERAPY ASSESSMENT    Name:  Lili Field Record Number:  [id number]  Age: 63 y.o. Gender: female  : 1955  Today's Date:  2021  Room:  [facility]    Assessment     Is the patient being admitted for a COPD or Asthma exacerbation? No   (If yes the patient will be seen every 4 hours for the first 24 hours and then reassessed)    Patient Admission Diagnosis      Allergies  Allergies   Allergen Reactions    Cephalosporins Shortness Of Breath    Pcn [Penicillins] Anaphylaxis and Hives       Minimum Predicted Vital Capacity:     n/a          Actual Vital Capacity:      n/a              Pulmonary History:COPD  Home Oxygen Therapy:  unknown  Home Respiratory Therapy:Albuterol   Current Respiratory Therapy: [facility] HHN Q4 PRN            Respiratory Severity Index(RSI)   Patients with orders for inhalation medications, oxygen, or any therapeutic treatment modality will be placed on Respiratory Protocol. They will be assessed with the first treatment and at least every 72 hours thereafter. The following severity scale will be used to determine frequency of treatment intervention.     Smoking History: Smoking History Less than 1ppd or less than 15 pack year = 1    Social History  Social History     Tobacco Use    Smoking status: Former Smoker     Packs/day: 1.00     Years: 52.00     Pack years: 52.00     Types: Cigarettes     Start date:     Smokeless tobacco: Never Used   Substance Use Topics    Alcohol use: No     Alcohol/week: 12.0 standard drinks     Types: 12 Cans of beer per week    Drug use: No       Recent Surgical History: None = 0  Past Surgical History  Past Surgical History:   Procedure Laterality Date    CHOLECYSTECTOMY         Level of Consciousness: Alert, Oriented, and Cooperative = 0    Level of Activity: Walking with assistance = 1    Respiratory Pattern: Increased; RR 21-30 = 1    Breath Sounds: Clear = 0    Sputum   ,  ,    Cough: Strong, spontaneous, non-productive = 0    Vital Signs   BP (!) 152/73   Pulse 88   Temp 99 °F (37.2 °C) (Oral)   Resp 20   Ht 5' 3\" (1.6 m)   Wt 150 lb (68 kg)   SpO2 92%   BMI 26.57 kg/m²   SPO2 (COPD values may differ): 90-91% on room air or greater than 92% on FiO2 24- 28% = 1    Peak Flow (asthma only): not applicable = 0    RSI: 0-4 = See once and convert to home regimen or discontinue        Plan       Goals: medication delivery, mobilize retained secretions, volume expansion and improve oxygenation    Patient/caregiver was educated on the proper method of use for Respiratory Care Devices:  No: pt note states she was here for AMS, no eval at bedside       Level of patient/caregiver understanding able to:   ? Verbalize understanding   ? Demonstrate understanding       ? Teach back        ? Needs reinforcement       ? No available caregiver               ? Other:     Response to education:  none completed at this time     Is patient being placed on Home Treatment Regimen? Yes     Does the patient have everything they need prior to discharge? Yes     Comments: pt chart reviewed on arrical to hospital    Plan of Care: [facility] Q4 PRN     Electronically signed by Madhav Mcwilliams RCP on 2/5/2021 at 2:19 AM    Respiratory Protocol Guidelines     1. Assessment and treatment by Respiratory Therapy will be initiated for medication and therapeutic interventions upon initiation of aerosolized medication. 2. Physician will be contacted for respiratory rate (RR) greater than 35 breaths per minute. Therapy will be held for heart rate (HR) greater than 140 beats per minute, pending direction from physician. 3. Bronchodilators will be administered via Metered Dose Inhaler (MDI) with spacer when the following criteria are met:  a. Alert and cooperative     b. HR < 140 bpm  c. RR < 30 bpm                d. Can demonstrate a 2-3 second inspiratory hold  4.  Bronchodilators will be administered via Hand Held Nebulizer [facility]) to patients when ANY of the following criteria are met  a. Incognizant or uncooperative          b. Patients treated with HHN at Home        c. Unable to demonstrate proper use of MDI with spacer     d. RR > 30 bpm   5. Bronchodilators will be delivered via Metered Dose Inhaler (MDI), HHN, Aerogen to intubated patients on mechanical ventilation. 6. Inhalation medication orders will be delivered and/or substituted as outlined below. Aerosolized Medications Ordering and Administration Guidelines:    1. All Medications will be ordered by a physician, and their frequency and/or modality will be adjusted as defined by the patients Respiratory Severity Index (RSI) score. 2. If the patient does not have documented COPD, consider discontinuing anticholinergics when RSI is less than 9.  3. If the bronchospasm worsens (increased RSI), then the bronchodilator frequency can be increased to a maximum of every 4 hours. If greater than every 4 hours is required, the physician will be contacted. 4. If the bronchospasm improves, the frequency of the bronchodilator can be decreased, based on the patient's RSI, but not less than home treatment regimen frequency. 5. Bronchodilator(s) will be discontinued if patient has a RSI less than 9 and has received no scheduled or as needed treatment for 72  Hrs. Patients Ordered on a Mucolytic Agent:    1. Must always be administered with a bronchodilator. 2. Discontinue if patient experiences worsened bronchospasm, or secretions have lessened to the point that the patient is able to clear them with a cough. Anti-inflammatory and Combination Medications:    1. If the patient lacks prior history of lung disease, is not using inhaled anti-inflammatory medication at home, and lacks wheezing by examination or by history for at least 24 hours, contact physician for possible discontinuation.

## 2021-02-05 NOTE — PROGRESS NOTES
Patient admitted to room 442 from ED. Patient oriented to room, call light, bed rails, phone, lights and bathroom. Patient instructed about the schedule of the day including: vital sign frequency, lab draws, possible tests, frequency of MD and staff rounds, including RN/MD rounding together at bedside, daily weights, and I &O's. Patient instructed about prescribed diet, how to use 8MENU, and television. Bed alarm in place, patient aware of placement and reason. Telemetry box in place, patient aware of placement and reason. Bed locked, in lowest position, side rails up 2/4, call light within reach. Will continue to monitor.

## 2021-02-05 NOTE — CONSULTS
Nephrology Consult Note                                                                                                                                                                                                                                                                                                                                                               Office : [phone]     Fax :[phone]              Patient's Name: Daniel Hernandez  12:22 PM  2/5/2021    Reason for Consult:  Hyponatremia    Requesting Physician:  Pantera Chaney MD      Chief Complaint:  AMS    History of Present Ilness:    Daniel Hernandez is a 63 y.o. female with PMH of COPD, chronic smoker  Depression was brought to ED with c/o AMS  No nausea or headache or vomiting  Serum Na found to be 114  1 L of NS given  Nephrology eval for further management of hyponatremia        Past Medical History:   Diagnosis Date    Angina pectoris ([facility] [address]     Chronic pain     knees and lower back     COPD exacerbation ([facility] [address] 5/20/2018    Depression     Panic disorder     Pneumonia        Past Surgical History:   Procedure Laterality Date    CHOLECYSTECTOMY         Family History   Problem Relation Age of Onset    COPD Mother     Hypertension Mother     Asthma Neg Hx     Cancer Neg Hx     Diabetes Neg Hx     Emphysema Neg Hx     Heart Failure Neg Hx         reports that she has quit smoking. Her smoking use included cigarettes. She started smoking about 52 years ago. She has a 52.00 pack-year smoking history. She has never used smokeless tobacco. She reports that she does not drink alcohol or use drugs.     Allergies:  Cephalosporins and Pcn [penicillins]    Current Medications:        ipratropium-albuterol (DUONEB) nebulizer solution 1 ampule, Q4H PRN      sodium chloride flush 0.9 % injection 10 mL, PRN      potassium chloride (KLOR-CON M) extended release tablet 40 mEq, PRN    Or      potassium bicarb-citric acid (EFFER-K) effervescent tablet 40 mEq, PRN    Or      potassium chloride 10 mEq/100 mL IVPB (Peripheral Line), PRN      magnesium sulfate 1000 mg in dextrose 5% 100 mL IVPB, PRN      promethazine (PHENERGAN) tablet 12.5 mg, Q6H PRN    Or      ondansetron (ZOFRAN) injection 4 mg, Q6H PRN      acetaminophen (TYLENOL) tablet 650 mg, Q6H PRN    Or      acetaminophen (TYLENOL) suppository 650 mg, Q6H PRN      enoxaparin (LOVENOX) injection 40 mg, Daily      nicotine (NICODERM CQ) 14 MG/24HR 1 patch, Daily        Review of Systems:   14 point ROS obtained but were negative except mentioned in HPI      Physical exam:     Vitals:  /77   Pulse 66   Temp 99 °F (37.2 °C)   Resp 20   Ht 5' 3\" (1.6 m)   Wt 142 lb 8 oz (64.6 kg)   SpO2 97%   BMI 25.24 kg/m²   Constitutional:  OAA X3 NAD  Skin: no rash, turgor wnl  Heent:  eomi, mmm  Neck: no bruits or jvd noted  Cardiovascular:  S1, S2 without m/r/g  Respiratory: CTA B without w/r/r  Abdomen:  +bs, soft, nt, nd  Ext: no lower extremity edema  Psychiatric: mood and affect appropriate  Musculoskeletal:  Rom, muscular strength intact    Data:   Labs:  CBC:   Recent Labs     02/04/21  1740   WBC 13.3*   HGB 12.6        BMP:    Recent Labs     02/04/21  1740 02/04/21 2108 02/04/21  2353 02/05/21  0449   * 120* 119* 125*   K 4.3 4.0  --  4.2   CL 76* 89*  --  91*   CO2 22 22  --  25   BUN 8 7  --  8   CREATININE <0.5* <0.5*  --  <0.5*   GLUCOSE 128* 109*  --  90     Ca/Mg/Phos:   Recent Labs     02/04/21  1740 02/04/21 2108 02/05/21  0449   CALCIUM 9.5 8.6 8.9     Hepatic:   Recent Labs     02/04/21  1740   AST 49*   ALT 33   BILITOT 0.9   ALKPHOS 146*     Troponin:   Recent Labs     02/04/21  1740   TROPONINI <0.01     BNP: No results for input(s): BNP in the last 72 hours. Lipids: No results for input(s): CHOL, TRIG, HDL, LDLCALC, LABVLDL in the last 72 hours.   ABGs:   Recent Labs     02/04/21  2140   PHART 7.392   PO2ART 81.4   PLZ5YVR 37.5     INR: Recent Labs     02/04/21  1740   INR 0.99     UA:  Recent Labs     02/04/21  1749   COLORU Straw   CLARITYU Clear   GLUCOSEU Negative   BILIRUBINUR Negative   KETUA 15*   SPECGRAV 1.010   BLOODU TRACE-INTACT*   PHUR 6.0   PROTEINU Negative   UROBILINOGEN 0.2   NITRU Negative   LEUKOCYTESUR Negative   LABMICR YES   Calvin Sykes NotGiven      Urine Microscopic:   Recent Labs     02/04/21  1749   WBCUA 3-5   RBCUA 3-4   EPIU 2-5     Urine Culture: No results for input(s): LABURIN in the last 72 hours. Urine Chemistry:   Recent Labs     02/04/21  1749   LABCREA 30.0   NAUR <20             IMAGING:  CT CHEST PULMONARY EMBOLISM W CONTRAST   Final Result   No evidence of a pulmonary embolus      Mildly dilated and atherosclerotic thoracic aorta with aneurysmal dilatation   of the ascending aorta measuring up to 4 cm which is unchanged with no   dissection and no mediastinal mass or adenopathy      Chronic asymmetric elevation of the right hemidiaphragm which is unchanged   with subsegmental atelectasis vs early infiltrates or scarring along the lung   bases posterolaterally which is more prominent on the right. COPD with linear scarring along the upper lobes and mild chronic linear   parenchymal scarring in the right middle lobe which is unchanged. Mild-to-moderate compression fractures of T4 through T6 which were not seen   previously. Recommend surgical follow-up. CT Head WO Contrast   Final Result   No acute intracranial abnormality. XR CHEST PORTABLE   Final Result   No acute process. Assessment/Plan       1.   Hypoosmolar hyponatremia        Etiology seems to be polydipsia      Drink ~ 6 can of pop and 5-6  Bottle of water /day     Have poor po intake of solute     Had h/o low serum Na before      Takes Ibuprofen           S/p 3 % Nacl infusion x 2 hrs on 2/4 -----> serum Na improved to 120      S/p 1 L NaCl infusion on 2/4              Serum Na 125 today am  ( was on Nacl infusion overnight )        Urine Na  <20        TSH : 0.65       On risperidone and lexapro at home       AMS was present on admission but improved when serum Na improved to 120          Plan       Seems at baseline mental status , AMS improved      Hyponatremia : improving     Would hold Nacl Infusion for now     moniter serum Na Q12 H     Avoid further rapid correction of serum Na       Total fluid restriction 1500 ml /day        Encourage solute intake/protein intake in diet       Avoid NSAIDs              2. Depression       Cont home meds        Will follow       Thank you for allowing us to participate in care of [facility] free to contact me   [facility] [address]  Office : [phone]  Fax :[phone]

## 2021-02-05 NOTE — ED NOTES
Writer updated patient's daughter, Leticia Interiano, at this time with permission from patient. Nilda is the main contact for updates and will relay information to the rest of the family.      Ashwin Méndez RN  02/04/21 3661

## 2021-02-05 NOTE — PROGRESS NOTES
Hospitalist Progress Note      PCP: Aris Haines MD    Date of Admission: 2/4/2021    Chief Complaint: confusion     Hospital Course:   Maine Dodd is a 63 y.o. female. The presents from home though the circumstances under which she was transported to the ER are not well documented. History is also somewhat limited by confusion, but her confusion seems to be improved compared to presentation.       Her primary complaint now that she is here is dyspnea which has been worse than usual for about 2 weeks, and especially the past 3 or 4 days. She has moderate-severe COPD. She continues to smoke. She follows up with Dr. Jonah Kenyon. She has O2 at home but only uses it prn. She has been using it frequently this week. She feels generally ill but cannot say how. She does not think her cough or sputum character is any different than usual.     When specifically asked, she subscribes to polydipsia and states she \"tries to\" drink at least two 20oz bottles of water everyday. Subjective: confusion improved. SOB improved on 4L. No new complaints. Medications:  Reviewed    Infusion Medications   Scheduled Medications    enoxaparin  40 mg Subcutaneous Daily    nicotine  1 patch Transdermal Daily     PRN Meds: ipratropium-albuterol, sodium chloride flush, potassium chloride **OR** potassium alternative oral replacement **OR** potassium chloride, magnesium sulfate, promethazine **OR** ondansetron, acetaminophen **OR** acetaminophen      Intake/Output Summary (Last 24 hours) at 2/5/2021 1601  Last data filed at 2/5/2021 1518  Gross per 24 hour   Intake 2300 ml   Output 5570 ml   Net -3270 ml       Physical Exam Performed:    /68   Pulse 81   Temp 98.7 °F (37.1 °C) (Oral)   Resp 17   Ht 5' 3\" (1.6 m)   Wt 142 lb 8 oz (64.6 kg)   SpO2 95%   BMI 25.24 kg/m²     General appearance: No apparent distress, appears stated age and cooperative. HEENT: Pupils equal, round, and reactive to light. elevation of the right hemidiaphragm which is unchanged   with subsegmental atelectasis vs early infiltrates or scarring along the lung   bases posterolaterally which is more prominent on the right. COPD with linear scarring along the upper lobes and mild chronic linear   parenchymal scarring in the right middle lobe which is unchanged. Mild-to-moderate compression fractures of T4 through T6 which were not seen   previously. Recommend surgical follow-up. CT Head WO Contrast   Final Result   No acute intracranial abnormality. XR CHEST PORTABLE   Final Result   No acute process. Assessment/Plan:    Active Hospital Problems    Diagnosis    Acute on chronic respiratory failure with hypoxia (HCC) [J96.21]    Hyponatremia [E87.1]    COPD exacerbation (HCC) [J44.1]    Moderate COPD (chronic obstructive pulmonary disease) (HCC) [J44.9]    Tobacco use [Z72.0]     Acute on chronic hypoxic respiratory failure 2/2 COPD exacerbation  - COVID 19 negative. PCT WNL  - started on steroids, duonebs. No need for abx  - wean O2 as tolerated. Home O2 of 2L NC    Hyponatremia  - 2/2 polydipsia  - nephrology consulted  - s/p hypertonic saline. IVF now on hold  - continue to monitor Na closely    Acute metabolic encephalopathy  - 2/2 hyponatremia  - improved with correction  - supportive care    Tobacco dependence  - counseling given.  Nicotine replacement ordered    DVT Prophylaxis: lovenox  Diet: DIET CARDIAC; Daily Fluid Restriction: 1500 ml  Code Status: Full Code    PT/OT Eval Status: not ordered    Dispo - home in 1-2 days    Luis Reddy MD

## 2021-02-05 NOTE — H&P
Hospital Medicine History & Physical      Patient:  Tatyana Oseguera  :   1955  MRN:   [id number]  Date of Service: 21    CHIEF COMPLAINT:  confusion    HISTORY OF PRESENT ILLNESS:    Tatyana Oseguera is a 63 y.o. female. The presents from home though the circumstances under which she was transported to the ER are not well documented. History is also somewhat limited by confusion, but her confusion seems to be improved compared to presentation. Her primary complaint now that she is here is dyspnea which has been worse than usual for about 2 weeks, and especially the past 3 or 4 days. She has moderate-severe COPD. She continues to smoke. She follows up with Dr. Savannah Lopes. She has O2 at home but only uses it prn. She has been using it frequently this week. She feels generally ill but cannot say how. She does not think her cough or sputum character is any different than usual.    When specifically asked, she subscribes to polydipsia and states she \"tries to\" drink at least two 20oz bottles of water everyday. Review of Systems:  All pertinent positives and negatives are as noted in the HPI section. All other systems were reviewed and are negative. Past Medical History:   Diagnosis Date    Angina pectoris (HCC)     Chronic pain     knees and lower back     COPD exacerbation ([facility] [address] 2018    Depression     Panic disorder     Pneumonia        Past Surgical History:   Procedure Laterality Date    CHOLECYSTECTOMY           Prior to Admission medications    Medication Sig Start Date End Date Taking?  Authorizing Provider   ibuprofen (ADVIL;MOTRIN) 600 MG tablet Take 1 tablet by mouth 3 times daily as needed for Pain 21   Ad Prieto MD   albuterol sulfate HFA (VENTOLIN HFA) 108 (90 Base) MCG/ACT inhaler Inhale 2 puffs into the lungs every 6 hours as needed for Wheezing or Shortness of Breath 20   Isabel Nicole MD   cetirizine (ZYRTEC) 10 MG tablet Take 10 mg by mouth daily    Historical Provider, MD   hydrOXYzine (ATARAX) 25 MG tablet Take 25 mg by mouth 3 times daily as needed for Itching    Historical Provider, MD   albuterol sulfate HFA (VENTOLIN HFA) 108 (90 Base) MCG/ACT inhaler INHALE 2 PUFFS EVERY 6 HOURS AS NEEDED FOR WHEEZING OR SHORTNESS OF BREATH 2/7/20   Selwyn Ventura MD   risperiDONE (RISPERDAL) 4 MG tablet Take 4 mg by mouth nightly    Historical Provider, MD   escitalopram (LEXAPRO) 20 MG tablet Take 20 mg by mouth nightly    Historical Provider, MD   Multiple Vitamins-Minerals (CENTRUM SILVER 50+WOMEN PO) Take 1 tablet by mouth daily    Historical Provider, MD   oxybutynin (DITROPAN) 5 MG tablet Take 5 mg by mouth 4 times daily    Historical Provider, MD       Allergies:   Cephalosporins and Pcn [penicillins]    Social:   reports that she has quit smoking. Her smoking use included cigarettes. She started smoking about 52 years ago. She has a 52.00 pack-year smoking history. She has never used smokeless tobacco.   reports no history of alcohol use. Social History     Substance and Sexual Activity   Drug Use No       Family History   Problem Relation Age of Onset    COPD Mother     Hypertension Mother     Asthma Neg Hx     Cancer Neg Hx     Diabetes Neg Hx     Emphysema Neg Hx     Heart Failure Neg Hx        PHYSICAL EXAM:  I performed this physical examination.     Vitals:  Patient Vitals for the past 24 hrs:   BP Temp Temp src Pulse Resp SpO2 Height Weight   02/04/21 2016 (!) 132/57 -- -- 104 22 94 % -- --   02/04/21 1916 (!) 149/97 -- -- 103 24 93 % -- --   02/04/21 1803 (!) 155/78 -- -- 103 28 93 % -- --   02/04/21 1742 (!) 173/88 -- -- -- -- 95 % -- --   02/04/21 1737 -- 99.5 °F (37.5 °C) Oral -- -- -- -- --   02/04/21 1726 (!) 154/85 98.7 °F (37.1 °C) Oral 101 18 96 % 5' 3\" (1.6 m) 150 lb (68 kg)       Intake/Output Summary (Last 24 hours) at 2/4/2021 2132  Last data filed at 2/4/2021 1922  Gross per 24 hour   Intake 1000 ml   Output --   Net 1000 ml Vent Settings:  4L/min O2    GEN:  Appearance:  Age appropriate female. Profusely diaphoretic    Level of Consciousness:  Asleep but easily arousable . Orientation:  Self ok. Knows she is in a hospital but believes it is [facility]. Able to state correct year and month. HEENT: Sclera anicteric.  no conjunctival chemosis. tacky mucus membranes. no specific or diagnostic oral lesions. NECK:  no signs of meningismus. Jugular veins not distended. Carotid pulses  2+.  no cervical lymphadenopathy. no thyromegaly. CV:  regular rhythm. normal S1 & S2.    no murmur. no rub.  no gallop. CHEST: Exaggerated thoracic kyphosis    PULM:  Chest excursion is symmetric. Breath sounds are severely and globally diminished throughout. Adventitious sounds:  Faint crackles audible at both bases    AB:  Abdominal shape is normal.  Bowel sounds are active. Generally soft to palpation. no tenderness is present. no involuntary guarding. no rebound guarding. EXTR:  Skin is warm. Capillary refill brisk. no specific or pathognomic rash. no clubbing. no pitting edema. no active wound or ulcer. LABS:  Lab Results   Component Value Date    WBC 13.3 (H) 02/04/2021    HGB 12.6 02/04/2021    HCT 36.9 02/04/2021    MCV 89.4 02/04/2021     02/04/2021     Lab Results   Component Value Date    CREATININE <0.5 (L) 02/04/2021    BUN 8 02/04/2021     (LL) 02/04/2021    K 4.3 02/04/2021    CL 76 (L) 02/04/2021    CO2 22 02/04/2021     Lab Results   Component Value Date    ALT 33 02/04/2021    AST 49 (H) 02/04/2021    ALKPHOS 146 (H) 02/04/2021    BILITOT 0.9 02/04/2021     Lab Results   Component Value Date    TROPONINI <0.01 02/04/2021     No results for input(s): PHART, KPT9OOS, PO2ART in the last 72 hours.     IMAGING:  Ct Head Wo Contrast    Result Date: 2/4/2021  EXAMINATION: CT OF THE HEAD WITHOUT CONTRAST  2/4/2021 6:38 pm TECHNIQUE: CT of the head was performed without the administration of intravenous contrast. Dose modulation, iterative reconstruction, and/or weight based adjustment of the mA/kV was utilized to reduce the radiation dose to as low as reasonably achievable. COMPARISON: 02/12/2020 HISTORY: ORDERING SYSTEM PROVIDED HISTORY: AMS TECHNOLOGIST PROVIDED HISTORY: Reason for exam:->AMS Has a \"code stroke\" or \"stroke alert\" been called? ->No Decision Support Exception->Emergency Medical Condition (MA) Reason for Exam: ams, headache Acuity: Acute Type of Exam: Initial FINDINGS: BRAIN/VENTRICLES: The ventricles and sulci are diffusely enlarged. Low attenuation is seen in the periventricular and subcortical white matter. No acute intracranial hemorrhage or acute infarct is identified ORBITS: The visualized portion of the orbits demonstrate no acute abnormality. SINUSES: The visualized paranasal sinuses and mastoid air cells demonstrate no acute abnormality. SOFT TISSUES/SKULL:  No acute abnormality of the visualized skull or soft tissues. No acute intracranial abnormality. Xr Chest Portable    Result Date: 2/4/2021  EXAMINATION: ONE XRAY VIEW OF THE CHEST 2/4/2021 5:35 pm COMPARISON: 01/04/2021 HISTORY: ORDERING SYSTEM PROVIDED HISTORY: AMS TECHNOLOGIST PROVIDED HISTORY: Reason for exam:->AMS FINDINGS: The lungs are without acute focal process. There is no effusion or pneumothorax. The cardiomediastinal silhouette is stable. The osseous structures are stable. No acute process. I directly reviewed all recent imaging studies as well as pertinent prior studies. Radiology reports may or may not be available at the time of my review. EKG:  New and pertinent prior tracings were directly reviewed.   My interpretation is as follows:  NSR at HR = 99 bpm w/ LAFB    Active Hospital Problems    Diagnosis Date Noted    Acute on chronic respiratory failure with hypoxia (HCC) [J96.21] 02/04/2021    Hyponatremia [E87.1] 01/30/2020    COPD exacerbation ([facility] [address] [J44.1] 05/20/2018    Moderate COPD (chronic obstructive pulmonary disease) (HCC) [J44.9] 04/21/2016    Tobacco use [Z72.0] 02/12/2016       ASSESSMENT & PLAN  Acute on chronic respiratory failure, COPD  -  Titrate respiratory support according to patient's needs and advanced care plan. Currently patient is requiring 4L/min O2 support. -  Cultures:  COVID19 NAAT and influenza A/B negative  -  Procalcitonin = 0.10.  -  CT pulmonary angiogram protocol STAT to exclude PE, which is a possible explanation, as well as pneumonia. All negative except severe emphysematous change. -  Start solumedrol 40mg IV q12h, scheduled and prn bronchodilators. Hyponatremia  -  Patient received 2L NS in the ER. She produced 2.9L urine all while in the ER.  -  Urine s.g. = 1.010 which is roughly isotonic to plasma. So far consistent with primary polydipsia. Tobacco abuse  -  Smoking cessation counseled. NRT provided.     DVT prophylaxis: SCD  Code Status:  Full  Disposition:  Inpatient    Mahogany Salomon MD

## 2021-02-05 NOTE — PROGRESS NOTES
Physician Progress Note      Jacqueline Pablo  CSN #:                  [id number]  :                       1955  ADMIT DATE:       2021 5:16 PM  [address] [place] DATE:  RESPONDING  PROVIDER #:        Dee Padilla MD          QUERY TEXT:    Pt admitted with hyponatremia and acute on chronic respiratory failure. Pt   noted to have documentation of altered mental status. If possible, please   document in the progress notes and discharge summary if you are evaluating and   / or treating any of the following: The medical record reflects the following:  Risk Factors: Hyponatremia, acute on chronic respiratory failure  Clinical Indicators: Sodium 114, Per ED consult note 'presents to the ED for   altered mental status. Knows she is in the hospital but does not know where   or the year. Does not know why she came here today. Spoke with daughter who   states that she is normally very conversive and the patient complained of some   headache earlier. I did find the patient to be hyponatremic to 114. Because   this is severe causing change in mental status, I consulted nephrology\"  Treatment: 3% hypertonic sodium chloride infusion, IV fluids, serial labs,   nephrology consult in ED, serial labs, supportive care. Options provided:  -- Metabolic encephalopathy  -- Other encephalopathy ##please specify, Please specify  -- Other - I will add my own diagnosis  -- Disagree - Not applicable / Not valid  -- Disagree - Clinically unable to determine / Unknown  -- Refer to Clinical Documentation Reviewer    PROVIDER RESPONSE TEXT:    This patient has metabolic encephalopathy.     Query created by: Michele Law on 2021 11:44 AM      Electronically signed by:  Dee Padilla MD 2021 2:50 PM

## 2021-02-05 NOTE — ED NOTES
Patient O2 sat dropped to 80-85% and patient reported not feeling well \"for a week\". Writer increased patient's O2 to 4L NC. Dr. Camilla Caba aware.      Vickie Quintanilla RN  02/04/21 [id number]

## 2021-02-06 LAB
ANION GAP SERPL CALCULATED.3IONS-SCNC: 10 MMOL/L (ref 3–16)
BUN BLDV-MCNC: 4 MG/DL (ref 7–20)
CALCIUM SERPL-MCNC: 8.9 MG/DL (ref 8.3–10.6)
CHLORIDE BLD-SCNC: 90 MMOL/L (ref 99–110)
CO2: 27 MMOL/L (ref 21–32)
CREAT SERPL-MCNC: <0.5 MG/DL (ref 0.6–1.2)
GFR AFRICAN AMERICAN: >60
GFR NON-AFRICAN AMERICAN: >60
GLUCOSE BLD-MCNC: 140 MG/DL (ref 70–99)
POTASSIUM REFLEX MAGNESIUM: 3.6 MMOL/L (ref 3.5–5.1)
SODIUM BLD-SCNC: 127 MMOL/L (ref 136–145)
SODIUM BLD-SCNC: 127 MMOL/L (ref 136–145)

## 2021-02-06 PROCEDURE — 6360000002 HC RX W HCPCS: Performed by: INTERNAL MEDICINE

## 2021-02-06 PROCEDURE — 2060000000 HC ICU INTERMEDIATE R&B

## 2021-02-06 PROCEDURE — 6370000000 HC RX 637 (ALT 250 FOR IP): Performed by: INTERNAL MEDICINE

## 2021-02-06 PROCEDURE — 36415 COLL VENOUS BLD VENIPUNCTURE: CPT

## 2021-02-06 PROCEDURE — 80048 BASIC METABOLIC PNL TOTAL CA: CPT

## 2021-02-06 PROCEDURE — 84295 ASSAY OF SERUM SODIUM: CPT

## 2021-02-06 PROCEDURE — 94761 N-INVAS EAR/PLS OXIMETRY MLT: CPT

## 2021-02-06 PROCEDURE — 2700000000 HC OXYGEN THERAPY PER DAY

## 2021-02-06 RX ORDER — RISPERIDONE 1 MG/1
4 TABLET, FILM COATED ORAL NIGHTLY
Status: DISCONTINUED | OUTPATIENT
Start: 2021-02-06 | End: 2021-02-07 | Stop reason: HOSPADM

## 2021-02-06 RX ORDER — LOPERAMIDE HYDROCHLORIDE 2 MG/1
2 CAPSULE ORAL 4 TIMES DAILY PRN
Status: DISCONTINUED | OUTPATIENT
Start: 2021-02-06 | End: 2021-02-07 | Stop reason: HOSPADM

## 2021-02-06 RX ORDER — ESCITALOPRAM OXALATE 10 MG/1
20 TABLET ORAL NIGHTLY
Status: DISCONTINUED | OUTPATIENT
Start: 2021-02-06 | End: 2021-02-07 | Stop reason: HOSPADM

## 2021-02-06 RX ORDER — CETIRIZINE HYDROCHLORIDE 10 MG/1
10 TABLET ORAL DAILY
Status: DISCONTINUED | OUTPATIENT
Start: 2021-02-06 | End: 2021-02-07 | Stop reason: HOSPADM

## 2021-02-06 RX ORDER — OXYBUTYNIN CHLORIDE 5 MG/1
5 TABLET ORAL 4 TIMES DAILY
Status: DISCONTINUED | OUTPATIENT
Start: 2021-02-06 | End: 2021-02-07 | Stop reason: HOSPADM

## 2021-02-06 RX ADMIN — ENOXAPARIN SODIUM 40 MG: 40 INJECTION SUBCUTANEOUS at 11:54

## 2021-02-06 RX ADMIN — CETIRIZINE HYDROCHLORIDE 10 MG: 10 TABLET, FILM COATED ORAL at 11:54

## 2021-02-06 RX ADMIN — OXYBUTYNIN CHLORIDE 5 MG: 5 TABLET ORAL at 20:28

## 2021-02-06 RX ADMIN — ESCITALOPRAM OXALATE 20 MG: 10 TABLET ORAL at 20:28

## 2021-02-06 RX ADMIN — OXYBUTYNIN CHLORIDE 5 MG: 5 TABLET ORAL at 11:54

## 2021-02-06 RX ADMIN — RISPERIDONE 4 MG: 1 TABLET, FILM COATED ORAL at 20:28

## 2021-02-06 RX ADMIN — ACETAMINOPHEN 650 MG: 325 TABLET ORAL at 05:28

## 2021-02-06 RX ADMIN — LOPERAMIDE HYDROCHLORIDE 2 MG: 2 CAPSULE ORAL at 11:54

## 2021-02-06 RX ADMIN — ACETAMINOPHEN 650 MG: 325 TABLET ORAL at 11:53

## 2021-02-06 RX ADMIN — OXYBUTYNIN CHLORIDE 5 MG: 5 TABLET ORAL at 16:47

## 2021-02-06 ASSESSMENT — PAIN SCALES - GENERAL
PAINLEVEL_OUTOF10: 0
PAINLEVEL_OUTOF10: 3

## 2021-02-06 NOTE — PROGRESS NOTES
Nicky Castelan MD   Patient: Dany Show   0'\"   2/6/21 11:07 AM   [phone] Hospital or Facility: [facility] From: [facility] RE: Bess Johnston 1955 RM: [phone] pt requesting something for diarrhea, sinus issues. please review and advise.  ty Need Callback: NO CALLBACK REQ C4 PROGRESSIVE CARE  Unread

## 2021-02-06 NOTE — PLAN OF CARE
Problem: Falls - Risk of:  Goal: Will remain free from falls  Description: Will remain free from falls  2/5/2021 2028 by Anna Dang RN  Outcome: Ongoing  Goal: Absence of physical injury  Description: Absence of physical injury  2/5/2021 2028 by Anna Dang RN  Outcome: Ongoing    Patient understands use of call light. Call light is within reach. Bed alarm in place and bed placed in the lowest position. Non-skid footwear on feet. Will continue to monitor.

## 2021-02-06 NOTE — PROGRESS NOTES
in dextrose 5% 100 mL IVPB, PRN      promethazine (PHENERGAN) tablet 12.5 mg, Q6H PRN    Or      ondansetron (ZOFRAN) injection 4 mg, Q6H PRN      acetaminophen (TYLENOL) tablet 650 mg, Q6H PRN    Or      acetaminophen (TYLENOL) suppository 650 mg, Q6H PRN      enoxaparin (LOVENOX) injection 40 mg, Daily      nicotine (NICODERM CQ) 14 MG/24HR 1 patch, Daily            Physical exam:     Vitals:  BP (!) 144/67   Pulse 62   Temp 97.3 °F (36.3 °C)   Resp 16   Ht 5' 3\" (1.6 m)   Wt 147 lb 14.4 oz (67.1 kg)   SpO2 99%   BMI 26.20 kg/m²   Constitutional:  OAA X3 NAD  Skin: no rash, turgor wnl  Heent:  eomi, mmm  Neck: no bruits or jvd noted  Cardiovascular:  S1, S2 without m/r/g  Respiratory: CTA B without w/r/r  Abdomen:  +bs, soft, nt, nd  Ext: no lower extremity edema    Data:   Labs:  CBC:   Recent Labs     02/04/21  1740   WBC 13.3*   HGB 12.6        BMP:    Recent Labs     02/04/21 2108 02/04/21 2353 02/05/21 0449 02/06/21  1300   * 119* 125* 127*   K 4.0  --  4.2 3.6   CL 89*  --  91* 90*   CO2 22  --  25 27   BUN 7  --  8 4*   CREATININE <0.5*  --  <0.5* <0.5*   GLUCOSE 109*  --  90 140*     Ca/Mg/Phos:   Recent Labs     02/04/21 2108 02/05/21 0449 02/06/21  1300   CALCIUM 8.6 8.9 8.9     Hepatic:   Recent Labs     02/04/21  1740   AST 49*   ALT 33   BILITOT 0.9   ALKPHOS 146*     Troponin:   Recent Labs     02/04/21  1740   TROPONINI <0.01     BNP: No results for input(s): BNP in the last 72 hours. Lipids: No results for input(s): CHOL, TRIG, HDL, LDLCALC, LABVLDL in the last 72 hours.   ABGs:   Recent Labs     02/04/21  2140   PHART 7.392   PO2ART 81.4   AUK2EOD 37.5     INR:   Recent Labs     02/04/21  1740   INR 0.99     UA:  Recent Labs     02/04/21  1749   COLORU Straw   CLARITYU Clear   GLUCOSEU Negative   BILIRUBINUR Negative   KETUA 15*   SPECGRAV 1.010   BLOODU TRACE-INTACT*   PHUR 6.0   PROTEINU Negative   UROBILINOGEN 0.2   NITRU Negative   LEUKOCYTESUR Negative   LABMICR YES   URINETYPE NotGiven      Urine Microscopic:   Recent Labs     02/04/21  1749   WBCUA 3-5   RBCUA 3-4   EPIU 2-5     Urine Culture: No results for input(s): LABURIN in the last 72 hours. Urine Chemistry:   Recent Labs     02/04/21  1749   LABCREA 30.0   NAUR <20             IMAGING:  CT CHEST PULMONARY EMBOLISM W CONTRAST   Final Result   No evidence of a pulmonary embolus      Mildly dilated and atherosclerotic thoracic aorta with aneurysmal dilatation   of the ascending aorta measuring up to 4 cm which is unchanged with no   dissection and no mediastinal mass or adenopathy      Chronic asymmetric elevation of the right hemidiaphragm which is unchanged   with subsegmental atelectasis vs early infiltrates or scarring along the lung   bases posterolaterally which is more prominent on the right. COPD with linear scarring along the upper lobes and mild chronic linear   parenchymal scarring in the right middle lobe which is unchanged. Mild-to-moderate compression fractures of T4 through T6 which were not seen   previously. Recommend surgical follow-up. CT Head WO Contrast   Final Result   No acute intracranial abnormality. XR CHEST PORTABLE   Final Result   No acute process. Assessment/Plan       1. Hyponatremia    improving      Etiology seems to be polydipsia      Drink ~ 6 can of pop and 5-6  Bottle of water /day     Have poor po intake of solute     Had h/o low serum Na before      Takes Ibuprofen         Urine Na  <20      TSH : 0.65     On risperidone and lexapro at home        Plan :       Seems at baseline mental status , AMS improved      Hyponatremia : improving     moniter serum Na Q6 H     Avoid further rapid correction of serum Na     Total fluid restriction 1200 ml /day      Encourage solute intake/protein intake in diet     Avoid NSAIDs    2.  Depression     Cont home meds      Will follow       Thank you for allowing us to participate in care of [facility]

## 2021-02-06 NOTE — PROGRESS NOTES
Started 1200 fluid restriction at 1500. Pt called out for water and soda. Complied. Pt was eating dinner with a fresh soda already on her tray. Noted that pt had 680cc of liquid on her tray and that she only had 520cc left to last her until 3 pm tomorrow. Pt verbalized understanding.

## 2021-02-06 NOTE — PROGRESS NOTES
Hospitalist Progress Note      PCP: Ronda Calle MD    Date of Admission: 2/4/2021    Chief Complaint: confusion     Hospital Course:   Dania Worrell is a 63 y.o. female. The presents from home though the circumstances under which she was transported to the ER are not well documented. History is also somewhat limited by confusion, but her confusion seems to be improved compared to presentation.       Her primary complaint now that she is here is dyspnea which has been worse than usual for about 2 weeks, and especially the past 3 or 4 days. She has moderate-severe COPD. She continues to smoke. She follows up with Dr. Jhoana Mesa. She has O2 at home but only uses it prn. She has been using it frequently this week. She feels generally ill but cannot say how. She does not think her cough or sputum character is any different than usual.     When specifically asked, she subscribes to polydipsia and states she \"tries to\" drink at least two 20oz bottles of water everyday. Subjective: confusion improved. SOB improved. Restarted home psych meds today.       Medications:  Reviewed    Infusion Medications   Scheduled Medications    cetirizine  10 mg Oral Daily    escitalopram  20 mg Oral Nightly    risperiDONE  4 mg Oral Nightly    oxybutynin  5 mg Oral 4x Daily    enoxaparin  40 mg Subcutaneous Daily    nicotine  1 patch Transdermal Daily     PRN Meds: loperamide, ipratropium-albuterol, sodium chloride flush, potassium chloride **OR** potassium alternative oral replacement **OR** potassium chloride, magnesium sulfate, promethazine **OR** ondansetron, acetaminophen **OR** acetaminophen      Intake/Output Summary (Last 24 hours) at 2/6/2021 1117  Last data filed at 2/6/2021 4294  Gross per 24 hour   Intake 1080 ml   Output 2770 ml   Net -1690 ml       Physical Exam Performed:    /71   Pulse 62   Temp 98.9 °F (37.2 °C)   Resp 16   Ht 5' 3\" (1.6 m)   Wt 147 lb 14.4 oz (67.1 kg)   SpO2 94%   BMI 26.20 kg/m²     General appearance: No apparent distress, appears stated age and cooperative. HEENT: Pupils equal, round, and reactive to light. Conjunctivae/corneas clear. Neck: Supple, with full range of motion. No jugular venous distention. Trachea midline. Respiratory:  Normal respiratory effort. Mild wheezes bilaterally without Rales/Rhonchi. Cardiovascular: Regular rate and rhythm with normal S1/S2 without murmurs, rubs or gallops. Abdomen: Soft, non-tender, non-distended with normal bowel sounds. Musculoskeletal: No clubbing, cyanosis or edema bilaterally. Full range of motion without deformity. Skin: Skin color, texture, turgor normal.  No rashes or lesions. Neurologic:  Neurovascularly intact without any focal sensory/motor deficits.  Cranial nerves: II-XII intact, grossly non-focal.  Psychiatric: Alert and oriented, thought content appropriate, normal insight  Capillary Refill: Brisk,< 3 seconds   Peripheral Pulses: +2 palpable, equal bilaterally       Labs:   Recent Labs     02/04/21  1740   WBC 13.3*   HGB 12.6   HCT 36.9        Recent Labs     02/04/21  1740 02/04/21  2108 02/04/21  2353 02/05/21  0449   * 120* 119* 125*   K 4.3 4.0  --  4.2   CL 76* 89*  --  91*   CO2 22 22  --  25   BUN 8 7  --  8   CREATININE <0.5* <0.5*  --  <0.5*   CALCIUM 9.5 8.6  --  8.9     Recent Labs     02/04/21  1740   AST 49*   ALT 33   BILITOT 0.9   ALKPHOS 146*     Recent Labs     02/04/21  1740   INR 0.99     Recent Labs     02/04/21  1740   TROPONINI <0.01       Urinalysis:      Lab Results   Component Value Date    NITRU Negative 02/04/2021    WBCUA 3-5 02/04/2021    BACTERIA 3+ 02/04/2020    RBCUA 3-4 02/04/2021    BLOODU TRACE-INTACT 02/04/2021    SPECGRAV 1.010 02/04/2021    GLUCOSEU Negative 02/04/2021       Radiology:  CT CHEST PULMONARY EMBOLISM W CONTRAST   Final Result   No evidence of a pulmonary embolus      Mildly dilated and atherosclerotic thoracic aorta with aneurysmal dilatation   of the ascending aorta measuring up to 4 cm which is unchanged with no   dissection and no mediastinal mass or adenopathy      Chronic asymmetric elevation of the right hemidiaphragm which is unchanged   with subsegmental atelectasis vs early infiltrates or scarring along the lung   bases posterolaterally which is more prominent on the right. COPD with linear scarring along the upper lobes and mild chronic linear   parenchymal scarring in the right middle lobe which is unchanged. Mild-to-moderate compression fractures of T4 through T6 which were not seen   previously. Recommend surgical follow-up. CT Head WO Contrast   Final Result   No acute intracranial abnormality. XR CHEST PORTABLE   Final Result   No acute process. Assessment/Plan:    Active Hospital Problems    Diagnosis    Acute on chronic respiratory failure with hypoxia ([facility]) [J96.21]    Hyponatremia [E87.1]    COPD exacerbation (HCC) [J44.1]    Moderate COPD (chronic obstructive pulmonary disease) (HCC) [J44.9]    Tobacco use [Z72.0]     Acute on chronic hypoxic respiratory failure 2/2 COPD exacerbation  - COVID 19 negative. PCT WNL  - started on steroids, duonebs. No need for abx  - wean O2 as tolerated. Home O2 of 2L NC    Hyponatremia  - 2/2 polydipsia  - nephrology consulted  - s/p hypertonic saline. IVF now on hold  - continue to monitor Na closely    Acute metabolic encephalopathy  - 2/2 hyponatremia  - improved with correction  - supportive care    Depression/anxiety  - mood stable  - restarted home psych meds    Tobacco dependence  - counseling given.  Nicotine replacement ordered    DVT Prophylaxis: lovenox  Diet: DIET CARDIAC; Daily Fluid Restriction: 1500 ml  Code Status: Full Code    PT/OT Eval Status: not ordered    Dispo - likely home tomorrow if Na in appropriate range    Vivi Hull MD

## 2021-02-07 VITALS
OXYGEN SATURATION: 98 % | HEIGHT: 63 IN | SYSTOLIC BLOOD PRESSURE: 130 MMHG | WEIGHT: 137.2 LBS | RESPIRATION RATE: 18 BRPM | DIASTOLIC BLOOD PRESSURE: 70 MMHG | HEART RATE: 61 BPM | BODY MASS INDEX: 24.31 KG/M2 | TEMPERATURE: 98.6 F

## 2021-02-07 LAB
SODIUM BLD-SCNC: 130 MMOL/L (ref 136–145)
SODIUM BLD-SCNC: 131 MMOL/L (ref 136–145)

## 2021-02-07 PROCEDURE — 2700000000 HC OXYGEN THERAPY PER DAY

## 2021-02-07 PROCEDURE — 6370000000 HC RX 637 (ALT 250 FOR IP): Performed by: INTERNAL MEDICINE

## 2021-02-07 PROCEDURE — 94761 N-INVAS EAR/PLS OXIMETRY MLT: CPT

## 2021-02-07 PROCEDURE — 6360000002 HC RX W HCPCS: Performed by: INTERNAL MEDICINE

## 2021-02-07 PROCEDURE — 84295 ASSAY OF SERUM SODIUM: CPT

## 2021-02-07 PROCEDURE — 36415 COLL VENOUS BLD VENIPUNCTURE: CPT

## 2021-02-07 RX ORDER — LOPERAMIDE HYDROCHLORIDE 2 MG/1
2 CAPSULE ORAL 4 TIMES DAILY PRN
Qty: 90 CAPSULE | Refills: 0 | Status: ON HOLD | OUTPATIENT
Start: 2021-02-07 | End: 2022-08-18 | Stop reason: SDUPTHER

## 2021-02-07 RX ADMIN — OXYBUTYNIN CHLORIDE 5 MG: 5 TABLET ORAL at 08:35

## 2021-02-07 RX ADMIN — ENOXAPARIN SODIUM 40 MG: 40 INJECTION SUBCUTANEOUS at 08:35

## 2021-02-07 RX ADMIN — CETIRIZINE HYDROCHLORIDE 10 MG: 10 TABLET, FILM COATED ORAL at 08:35

## 2021-02-07 RX ADMIN — OXYBUTYNIN CHLORIDE 5 MG: 5 TABLET ORAL at 13:21

## 2021-02-07 RX ADMIN — ACETAMINOPHEN 650 MG: 325 TABLET ORAL at 10:24

## 2021-02-07 ASSESSMENT — PAIN SCALES - GENERAL: PAINLEVEL_OUTOF10: 3

## 2021-02-07 NOTE — DISCHARGE SUMMARY
nephrology as an outpatient     Acute metabolic encephalopathy  - 2/2 hyponatremia  - improved with correction  - supportive care     Depression/anxiety  - mood stable  - continue home psych meds     Tobacco dependence  - counseling given. Nicotine replacement ordered    Physical Exam Performed:     /70   Pulse 61   Temp 98.6 °F (37 °C)   Resp 18   Ht 5' 3\" (1.6 m)   Wt 137 lb 3.2 oz (62.2 kg)   SpO2 98%   BMI 24.30 kg/m²       General appearance: No apparent distress, appears stated age and cooperative. HEENT: Pupils equal, round, and reactive to light. Conjunctivae/corneas clear. Neck: Supple, with full range of motion. No jugular venous distention. Trachea midline. Respiratory:  Normal respiratory effort. Mild wheezes bilaterally without Rales/Rhonchi. Cardiovascular: Regular rate and rhythm with normal S1/S2 without murmurs, rubs or gallops. Abdomen: Soft, non-tender, non-distended with normal bowel sounds. Musculoskeletal: No clubbing, cyanosis or edema bilaterally. Full range of motion without deformity. Skin: Skin color, texture, turgor normal.  No rashes or lesions. Neurologic:  Neurovascularly intact without any focal sensory/motor deficits. Cranial nerves: II-XII intact, grossly non-focal.  Psychiatric: Alert and oriented, thought content appropriate, normal insight  Capillary Refill: Brisk,< 3 seconds   Peripheral Pulses: +2 palpable, equal bilaterally     Labs:  For convenience and continuity at follow-up the following most recent labs are provided:      CBC:    Lab Results   Component Value Date    WBC 13.3 02/04/2021    HGB 12.6 02/04/2021    HCT 36.9 02/04/2021     02/04/2021       Renal:    Lab Results   Component Value Date     02/07/2021    K 3.6 02/06/2021    CL 90 02/06/2021    CO2 27 02/06/2021    BUN 4 02/06/2021    CREATININE <0.5 02/06/2021    CALCIUM 8.9 02/06/2021    PHOS 3.9 02/12/2020         Significant Diagnostic Studies    Radiology:   CT CHEST PULMONARY EMBOLISM W CONTRAST   Final Result   No evidence of a pulmonary embolus      Mildly dilated and atherosclerotic thoracic aorta with aneurysmal dilatation   of the ascending aorta measuring up to 4 cm which is unchanged with no   dissection and no mediastinal mass or adenopathy      Chronic asymmetric elevation of the right hemidiaphragm which is unchanged   with subsegmental atelectasis vs early infiltrates or scarring along the lung   bases posterolaterally which is more prominent on the right. COPD with linear scarring along the upper lobes and mild chronic linear   parenchymal scarring in the right middle lobe which is unchanged. Mild-to-moderate compression fractures of T4 through T6 which were not seen   previously. Recommend surgical follow-up. CT Head WO Contrast   Final Result   No acute intracranial abnormality. XR CHEST PORTABLE   Final Result   No acute process.                 Consults:     IP CONSULT TO NEPHROLOGY  IP CONSULT TO HOSPITALIST    Disposition:  home     Condition at Discharge: Stable    Discharge Instructions/Follow-up:  Follow up with PCP, nephrology within 1-2 weeks    Code Status:  Full Code     Activity: activity as tolerated    Diet: regular diet      Discharge Medications:     Current Discharge Medication List           Details   loperamide (IMODIUM) 2 MG capsule Take 1 capsule by mouth 4 times daily as needed for Diarrhea  Qty: 90 capsule, Refills: 0              Details   !! albuterol sulfate HFA (VENTOLIN HFA) 108 (90 Base) MCG/ACT inhaler Inhale 2 puffs into the lungs every 6 hours as needed for Wheezing or Shortness of Breath  Qty: 18 g, Refills: 5      cetirizine (ZYRTEC) 10 MG tablet Take 10 mg by mouth daily      hydrOXYzine (ATARAX) 25 MG tablet Take 25 mg by mouth 3 times daily as needed for Itching      !! albuterol sulfate HFA (VENTOLIN HFA) 108 (90 Base) MCG/ACT inhaler INHALE 2 PUFFS EVERY 6 HOURS AS NEEDED FOR WHEEZING OR SHORTNESS OF BREATH  Qty: 3 Inhaler, Refills: 1      risperiDONE (RISPERDAL) 4 MG tablet Take 4 mg by mouth nightly      escitalopram (LEXAPRO) 20 MG tablet Take 20 mg by mouth nightly      Multiple Vitamins-Minerals (CENTRUM SILVER 50+WOMEN PO) Take 1 tablet by mouth daily      oxybutynin (DITROPAN) 5 MG tablet Take 5 mg by mouth 4 times daily       ! ! - Potential duplicate medications found. Please discuss with provider. Time Spent on discharge is more than 30 minutes in the examination, evaluation, counseling and review of medications and discharge plan. Signed:    Gabriella Cornelius MD   2/7/2021      Thank you Aurea Barth MD for the opportunity to be involved in this patient's care. If you have any questions or concerns please feel free to contact me at [phone].

## 2021-02-07 NOTE — PROGRESS NOTES
Office : [phone]     Fax :[phone]            Reason for Consult:  Hyponatremia    History of Present Ilness:    Venita Chavarria is a 63 y.o. female with PMH of COPD, chronic smoker  Depression was brought to ED with c/o AMS  No nausea or headache or vomiting  Serum Na found to be 114  1 L of NS given  Nephrology eval for further management of hyponatremia    Interval hx     Awake and alert   Sodium level better at 131. No acute events overnight. No weakness. No edema. No shortness of breath.         Past Medical History:   Diagnosis Date    Angina pectoris (HCC)     Chronic pain     knees and lower back     COPD exacerbation ([facility] [address] 5/20/2018    Depression     Panic disorder     Pneumonia        Past Surgical History:   Procedure Laterality Date    CHOLECYSTECTOMY         Family History   Problem Relation Age of Onset    COPD Mother     Hypertension Mother     Asthma Neg Hx     Cancer Neg Hx     Diabetes Neg Hx     Emphysema Neg Hx     Heart Failure Neg Hx          Current Medications:        cetirizine (ZYRTEC) tablet 10 mg, Daily      escitalopram (LEXAPRO) tablet 20 mg, Nightly      risperiDONE (RISPERDAL) tablet 4 mg, Nightly      oxybutynin (DITROPAN) tablet 5 mg, 4x Daily      loperamide (IMODIUM) capsule 2 mg, 4x Daily PRN      ipratropium-albuterol (DUONEB) nebulizer solution 1 ampule, Q4H PRN      sodium chloride flush 0.9 % injection 10 mL, PRN      potassium chloride (KLOR-CON M) extended release tablet 40 mEq, PRN    Or      potassium bicarb-citric acid (EFFER-K) effervescent tablet 40 mEq, PRN    Or      potassium chloride 10 mEq/100 mL IVPB (Peripheral Line), PRN      magnesium sulfate 1000 mg in dextrose 5% 100 mL IVPB, PRN      promethazine (PHENERGAN) tablet 12.5 mg, Q6H PRN    Or      ondansetron (ZOFRAN) injection 4 mg, Q6H PRN      acetaminophen (TYLENOL) tablet 650 mg, Q6H PRN    Or      acetaminophen (TYLENOL) suppository 650 mg, Q6H PRN      enoxaparin (LOVENOX) injection 40 mg, Daily      nicotine (NICODERM CQ) 14 MG/24HR 1 patch, Daily            Physical exam:     Vitals:  /70   Pulse 61   Temp 98.6 °F (37 °C)   Resp 18   Ht 5' 3\" (1.6 m)   Wt 137 lb 3.2 oz (62.2 kg)   SpO2 98%   BMI 24.30 kg/m²   Constitutional:  OAA X3 NAD  Skin: no rash, turgor wnl  Heent:  eomi, mmm  Neck: no bruits or jvd noted  Cardiovascular:  S1, S2 without m/r/g  Respiratory: CTA B without w/r/r  Abdomen:  +bs, soft, nt, nd  Ext: no lower extremity edema    Data:   Labs:  CBC:   Recent Labs     02/04/21  1740   WBC 13.3*   HGB 12.6        BMP:    Recent Labs     02/04/21 2108 02/04/21 2108 02/05/21 0449 02/06/21  1300 02/06/21  2144 02/07/21  0326 02/07/21  0914   *   < > 125* 127* 127* 130* 131*   K 4.0  --  4.2 3.6  --   --   --    CL 89*  --  91* 90*  --   --   --    CO2 22  --  25 27  --   --   --    BUN 7  --  8 4*  --   --   --    CREATININE <0.5*  --  <0.5* <0.5*  --   --   --    GLUCOSE 109*  --  90 140*  --   --   --     < > = values in this interval not displayed. Ca/Mg/Phos:   Recent Labs     02/04/21 2108 02/05/21 0449 02/06/21  1300   CALCIUM 8.6 8.9 8.9     Hepatic:   Recent Labs     02/04/21  1740   AST 49*   ALT 33   BILITOT 0.9   ALKPHOS 146*     Troponin:   Recent Labs     02/04/21  1740   TROPONINI <0.01     BNP: No results for input(s): BNP in the last 72 hours. Lipids: No results for input(s): CHOL, TRIG, HDL, LDLCALC, LABVLDL in the last 72 hours.   ABGs:   Recent Labs     02/04/21  2140   PHART 7.392   PO2ART 81.4   QFJ9HUC 37.5     INR:   Recent Labs     02/04/21  1740 INR 0.99     UA:  Recent Labs     02/04/21  1749   COLORU Straw   CLARITYU Clear   GLUCOSEU Negative   BILIRUBINUR Negative   KETUA 15*   SPECGRAV 1.010   BLOODU TRACE-INTACT*   PHUR 6.0   PROTEINU Negative   UROBILINOGEN 0.2   NITRU Negative   LEUKOCYTESUR Negative   LABMICR YES   URINETYPE NotGiven      Urine Microscopic:   Recent Labs     02/04/21  1749   WBCUA 3-5   RBCUA 3-4   EPIU 2-5     Urine Culture: No results for input(s): LABURIN in the last 72 hours. Urine Chemistry:   Recent Labs     02/04/21  1749   LABCREA 30.0   NAUR <20             IMAGING:  CT CHEST PULMONARY EMBOLISM W CONTRAST   Final Result   No evidence of a pulmonary embolus      Mildly dilated and atherosclerotic thoracic aorta with aneurysmal dilatation   of the ascending aorta measuring up to 4 cm which is unchanged with no   dissection and no mediastinal mass or adenopathy      Chronic asymmetric elevation of the right hemidiaphragm which is unchanged   with subsegmental atelectasis vs early infiltrates or scarring along the lung   bases posterolaterally which is more prominent on the right. COPD with linear scarring along the upper lobes and mild chronic linear   parenchymal scarring in the right middle lobe which is unchanged. Mild-to-moderate compression fractures of T4 through T6 which were not seen   previously. Recommend surgical follow-up. CT Head WO Contrast   Final Result   No acute intracranial abnormality. XR CHEST PORTABLE   Final Result   No acute process. Assessment/Plan       1.   Hyponatremia    improving      Etiology seems to be polydipsia      Drink ~ 6 can of pop and 5-6  Bottle of water /day     Have poor po intake of solute     Had h/o low serum Na before      Takes Ibuprofen         Urine Na  <20      TSH : 0.65     On risperidone and lexapro at home        Plan :       Seems at baseline mental status , AMS improved      Hyponatremia : improving     moniter serum Na Q6 H

## 2021-02-07 NOTE — PROGRESS NOTES
Removed urinary flood catheter. Waiting for post void. Reviewed avs, pt verbalized understanding and was able to teach back. pts dtr to pick her up, coming from [facility].

## 2021-02-08 LAB
BLOOD CULTURE, ROUTINE: NORMAL
CULTURE, BLOOD 2: NORMAL

## 2021-02-16 ENCOUNTER — VIRTUAL VISIT (OUTPATIENT)
Dept: PULMONOLOGY | Age: 66
End: 2021-02-16
Payer: MEDICARE

## 2021-02-16 ENCOUNTER — TELEPHONE (OUTPATIENT)
Dept: PULMONOLOGY | Age: 66
End: 2021-02-16

## 2021-02-16 DIAGNOSIS — J44.9 MODERATE COPD (CHRONIC OBSTRUCTIVE PULMONARY DISEASE) (HCC): Primary | ICD-10-CM

## 2021-02-16 DIAGNOSIS — Z72.0 TOBACCO USE: ICD-10-CM

## 2021-02-16 PROCEDURE — 99442 PR PHYS/QHP TELEPHONE EVALUATION 11-20 MIN: CPT | Performed by: INTERNAL MEDICINE

## 2021-02-16 NOTE — PROGRESS NOTES
Venita Chavarria is a 63 y.o. female evaluated via telephone on 2/16/2021. Consent:  She and/or health care decision maker is aware that that she may receive a bill for this telephone service, depending on her insurance coverage, and has provided verbal consent to proceed: Yes      Documentation:  I communicated with the patient and/or health care decision maker about   C/o increased SOB   She quit smoking 3-4 weeks ago   Retry Spiriva   Continue PRN albuterol   R/s 6 MWT and f/u in office next month    I affirm this is a Patient Initiated Episode with a Patient who has not had a related appointment within my department in the past 7 days or scheduled within the next 24 hours.     Patient identification was verified at the start of the visit: Yes    Total Time: minutes: 11-20 minutes      LEONOR PISANO

## 2021-02-28 ENCOUNTER — APPOINTMENT (OUTPATIENT)
Dept: CT IMAGING | Age: 66
End: 2021-02-28
Payer: MEDICARE

## 2021-02-28 ENCOUNTER — HOSPITAL ENCOUNTER (EMERGENCY)
Age: 66
Discharge: ANOTHER HOSPITAL | End: 2021-03-01
Attending: EMERGENCY MEDICINE
Payer: MEDICARE

## 2021-02-28 ENCOUNTER — APPOINTMENT (OUTPATIENT)
Dept: GENERAL RADIOLOGY | Age: 66
End: 2021-02-28
Payer: MEDICARE

## 2021-02-28 ENCOUNTER — TELEPHONE (OUTPATIENT)
Dept: OTHER | Facility: CLINIC | Age: 66
End: 2021-02-28

## 2021-02-28 DIAGNOSIS — E87.20 LACTIC ACIDOSIS: ICD-10-CM

## 2021-02-28 DIAGNOSIS — R56.9 SEIZURE (HCC): Primary | ICD-10-CM

## 2021-02-28 DIAGNOSIS — J96.21 ACUTE ON CHRONIC RESPIRATORY FAILURE WITH HYPOXIA AND HYPERCAPNIA (HCC): ICD-10-CM

## 2021-02-28 DIAGNOSIS — J96.22 ACUTE ON CHRONIC RESPIRATORY FAILURE WITH HYPOXIA AND HYPERCAPNIA (HCC): ICD-10-CM

## 2021-02-28 LAB
A/G RATIO: 1.6 (ref 1.1–2.2)
ALBUMIN SERPL-MCNC: 4.6 G/DL (ref 3.4–5)
ALP BLD-CCNC: 142 U/L (ref 40–129)
ALT SERPL-CCNC: 38 U/L (ref 10–40)
ANION GAP SERPL CALCULATED.3IONS-SCNC: 15 MMOL/L (ref 3–16)
AST SERPL-CCNC: 39 U/L (ref 15–37)
BASE EXCESS VENOUS: -4.4 MMOL/L (ref -3–3)
BASE EXCESS VENOUS: -6.8 MMOL/L (ref -3–3)
BASOPHILS ABSOLUTE: 0.1 K/UL (ref 0–0.2)
BASOPHILS RELATIVE PERCENT: 0.8 %
BILIRUB SERPL-MCNC: 0.5 MG/DL (ref 0–1)
BILIRUBIN URINE: NEGATIVE
BLOOD, URINE: ABNORMAL
BUN BLDV-MCNC: 10 MG/DL (ref 7–20)
CALCIUM SERPL-MCNC: 9.1 MG/DL (ref 8.3–10.6)
CARBOXYHEMOGLOBIN: 2.2 % (ref 0–1.5)
CARBOXYHEMOGLOBIN: 2.3 % (ref 0–1.5)
CHLORIDE BLD-SCNC: 92 MMOL/L (ref 99–110)
CLARITY: CLEAR
CO2: 22 MMOL/L (ref 21–32)
COLOR: ABNORMAL
CREAT SERPL-MCNC: 0.8 MG/DL (ref 0.6–1.2)
EKG ATRIAL RATE: 113 BPM
EKG DIAGNOSIS: NORMAL
EKG P AXIS: 78 DEGREES
EKG P-R INTERVAL: 164 MS
EKG Q-T INTERVAL: 306 MS
EKG QRS DURATION: 66 MS
EKG QTC CALCULATION (BAZETT): 419 MS
EKG R AXIS: -67 DEGREES
EKG T AXIS: 72 DEGREES
EKG VENTRICULAR RATE: 113 BPM
EOSINOPHILS ABSOLUTE: 0.1 K/UL (ref 0–0.6)
EOSINOPHILS RELATIVE PERCENT: 1.6 %
EPITHELIAL CELLS, UA: NORMAL /HPF (ref 0–5)
GFR AFRICAN AMERICAN: >60
GFR NON-AFRICAN AMERICAN: >60
GLOBULIN: 2.8 G/DL
GLUCOSE BLD-MCNC: 136 MG/DL (ref 70–99)
GLUCOSE BLD-MCNC: 142 MG/DL (ref 70–99)
GLUCOSE URINE: NEGATIVE MG/DL
HCO3 VENOUS: 21.2 MMOL/L (ref 23–29)
HCO3 VENOUS: 23 MMOL/L (ref 23–29)
HCT VFR BLD CALC: 44 % (ref 36–48)
HEMOGLOBIN: 14.4 G/DL (ref 12–16)
INR BLD: 1.03 (ref 0.86–1.14)
KETONES, URINE: NEGATIVE MG/DL
LACTIC ACID, SEPSIS: 0.9 MMOL/L (ref 0.4–1.9)
LACTIC ACID, SEPSIS: 6.9 MMOL/L (ref 0.4–1.9)
LEUKOCYTE ESTERASE, URINE: NEGATIVE
LYMPHOCYTES ABSOLUTE: 2.4 K/UL (ref 1–5.1)
LYMPHOCYTES RELATIVE PERCENT: 27.9 %
MCH RBC QN AUTO: 30.7 PG (ref 26–34)
MCHC RBC AUTO-ENTMCNC: 32.6 G/DL (ref 31–36)
MCV RBC AUTO: 94.1 FL (ref 80–100)
METHEMOGLOBIN VENOUS: 0.3 %
METHEMOGLOBIN VENOUS: 0.3 %
MICROSCOPIC EXAMINATION: YES
MONOCYTES ABSOLUTE: 0.9 K/UL (ref 0–1.3)
MONOCYTES RELATIVE PERCENT: 10.2 %
NEUTROPHILS ABSOLUTE: 5.1 K/UL (ref 1.7–7.7)
NEUTROPHILS RELATIVE PERCENT: 59.5 %
NITRITE, URINE: NEGATIVE
O2 CONTENT, VEN: 17 VOL %
O2 CONTENT, VEN: 18 VOL %
O2 SAT, VEN: 92 %
O2 SAT, VEN: 96 %
O2 THERAPY: ABNORMAL
O2 THERAPY: ABNORMAL
PCO2, VEN: 51.6 MMHG (ref 40–50)
PCO2, VEN: 52.8 MMHG (ref 40–50)
PDW BLD-RTO: 14.6 % (ref 12.4–15.4)
PERFORMED ON: ABNORMAL
PH UA: 5.5 (ref 5–8)
PH VENOUS: 7.22 (ref 7.35–7.45)
PH VENOUS: 7.27 (ref 7.35–7.45)
PLATELET # BLD: 287 K/UL (ref 135–450)
PMV BLD AUTO: 6.8 FL (ref 5–10.5)
PO2, VEN: 72.5 MMHG (ref 25–40)
PO2, VEN: 97.3 MMHG (ref 25–40)
POTASSIUM REFLEX MAGNESIUM: 4.2 MMOL/L (ref 3.5–5.1)
PROTEIN UA: 100 MG/DL
PROTHROMBIN TIME: 11.9 SEC (ref 10–13.2)
RBC # BLD: 4.68 M/UL (ref 4–5.2)
RBC UA: NORMAL /HPF (ref 0–4)
SODIUM BLD-SCNC: 129 MMOL/L (ref 136–145)
SPECIFIC GRAVITY UA: 1.02 (ref 1–1.03)
TCO2 CALC VENOUS: 23 MMOL/L
TCO2 CALC VENOUS: 25 MMOL/L
TOTAL PROTEIN: 7.4 G/DL (ref 6.4–8.2)
TROPONIN: <0.01 NG/ML
URINE TYPE: ABNORMAL
UROBILINOGEN, URINE: 0.2 E.U./DL
WBC # BLD: 8.5 K/UL (ref 4–11)
WBC UA: NORMAL /HPF (ref 0–5)

## 2021-02-28 PROCEDURE — 6360000002 HC RX W HCPCS: Performed by: EMERGENCY MEDICINE

## 2021-02-28 PROCEDURE — 83605 ASSAY OF LACTIC ACID: CPT

## 2021-02-28 PROCEDURE — 87077 CULTURE AEROBIC IDENTIFY: CPT

## 2021-02-28 PROCEDURE — 82803 BLOOD GASES ANY COMBINATION: CPT

## 2021-02-28 PROCEDURE — 87086 URINE CULTURE/COLONY COUNT: CPT

## 2021-02-28 PROCEDURE — 70450 CT HEAD/BRAIN W/O DYE: CPT

## 2021-02-28 PROCEDURE — 6370000000 HC RX 637 (ALT 250 FOR IP): Performed by: EMERGENCY MEDICINE

## 2021-02-28 PROCEDURE — 71045 X-RAY EXAM CHEST 1 VIEW: CPT

## 2021-02-28 PROCEDURE — 85025 COMPLETE CBC W/AUTO DIFF WBC: CPT

## 2021-02-28 PROCEDURE — 93005 ELECTROCARDIOGRAM TRACING: CPT | Performed by: EMERGENCY MEDICINE

## 2021-02-28 PROCEDURE — 36415 COLL VENOUS BLD VENIPUNCTURE: CPT

## 2021-02-28 PROCEDURE — 96367 TX/PROPH/DG ADDL SEQ IV INF: CPT

## 2021-02-28 PROCEDURE — 80053 COMPREHEN METABOLIC PANEL: CPT

## 2021-02-28 PROCEDURE — 96375 TX/PRO/DX INJ NEW DRUG ADDON: CPT

## 2021-02-28 PROCEDURE — 2580000003 HC RX 258: Performed by: EMERGENCY MEDICINE

## 2021-02-28 PROCEDURE — 70498 CT ANGIOGRAPHY NECK: CPT

## 2021-02-28 PROCEDURE — 93010 ELECTROCARDIOGRAM REPORT: CPT | Performed by: INTERNAL MEDICINE

## 2021-02-28 PROCEDURE — 81001 URINALYSIS AUTO W/SCOPE: CPT

## 2021-02-28 PROCEDURE — 84484 ASSAY OF TROPONIN QUANT: CPT

## 2021-02-28 PROCEDURE — 96365 THER/PROPH/DIAG IV INF INIT: CPT

## 2021-02-28 PROCEDURE — 6360000004 HC RX CONTRAST MEDICATION: Performed by: EMERGENCY MEDICINE

## 2021-02-28 PROCEDURE — 87186 SC STD MICRODIL/AGAR DIL: CPT

## 2021-02-28 PROCEDURE — 6360000002 HC RX W HCPCS

## 2021-02-28 PROCEDURE — 99285 EMERGENCY DEPT VISIT HI MDM: CPT

## 2021-02-28 PROCEDURE — 87040 BLOOD CULTURE FOR BACTERIA: CPT

## 2021-02-28 PROCEDURE — 85610 PROTHROMBIN TIME: CPT

## 2021-02-28 RX ORDER — SODIUM CHLORIDE 9 MG/ML
1000 INJECTION, SOLUTION INTRAVENOUS CONTINUOUS
Status: DISCONTINUED | OUTPATIENT
Start: 2021-02-28 | End: 2021-03-01 | Stop reason: HOSPADM

## 2021-02-28 RX ORDER — OXYMETAZOLINE HYDROCHLORIDE 0.05 G/100ML
2 SPRAY NASAL ONCE
Status: COMPLETED | OUTPATIENT
Start: 2021-02-28 | End: 2021-02-28

## 2021-02-28 RX ORDER — SODIUM CHLORIDE 0.9 % (FLUSH) 0.9 %
10 SYRINGE (ML) INJECTION PRN
Status: DISCONTINUED | OUTPATIENT
Start: 2021-02-28 | End: 2021-03-01 | Stop reason: HOSPADM

## 2021-02-28 RX ORDER — PROPOFOL 10 MG/ML
5-50 INJECTION, EMULSION INTRAVENOUS ONCE
Status: COMPLETED | OUTPATIENT
Start: 2021-02-28 | End: 2021-03-01

## 2021-02-28 RX ORDER — PROPOFOL 10 MG/ML
INJECTION, EMULSION INTRAVENOUS
Status: DISCONTINUED
Start: 2021-02-28 | End: 2021-03-01 | Stop reason: HOSPADM

## 2021-02-28 RX ORDER — LEVETIRACETAM 500 MG/5ML
INJECTION, SOLUTION, CONCENTRATE INTRAVENOUS
Status: DISCONTINUED
Start: 2021-02-28 | End: 2021-03-01 | Stop reason: HOSPADM

## 2021-02-28 RX ORDER — LIDOCAINE HYDROCHLORIDE 20 MG/ML
SOLUTION OROPHARYNGEAL ONCE
Status: DISCONTINUED | OUTPATIENT
Start: 2021-02-28 | End: 2021-03-01 | Stop reason: HOSPADM

## 2021-02-28 RX ORDER — MIDAZOLAM HYDROCHLORIDE 5 MG/ML
INJECTION INTRAMUSCULAR; INTRAVENOUS
Status: COMPLETED
Start: 2021-02-28 | End: 2021-02-28

## 2021-02-28 RX ORDER — SODIUM CHLORIDE 9 MG/ML
INJECTION, SOLUTION INTRAVENOUS
Status: DISCONTINUED
Start: 2021-02-28 | End: 2021-03-01 | Stop reason: HOSPADM

## 2021-02-28 RX ORDER — SODIUM CHLORIDE 0.9 % (FLUSH) 0.9 %
10 SYRINGE (ML) INJECTION EVERY 12 HOURS SCHEDULED
Status: DISCONTINUED | OUTPATIENT
Start: 2021-02-28 | End: 2021-03-01 | Stop reason: HOSPADM

## 2021-02-28 RX ORDER — 0.9 % SODIUM CHLORIDE 0.9 %
30 INTRAVENOUS SOLUTION INTRAVENOUS ONCE
Status: COMPLETED | OUTPATIENT
Start: 2021-02-28 | End: 2021-02-28

## 2021-02-28 RX ORDER — MIDAZOLAM HYDROCHLORIDE 1 MG/ML
5 INJECTION INTRAMUSCULAR; INTRAVENOUS
Status: DISCONTINUED | OUTPATIENT
Start: 2021-02-28 | End: 2021-03-01 | Stop reason: HOSPADM

## 2021-02-28 RX ADMIN — MIDAZOLAM HYDROCHLORIDE 5 MG: 5 INJECTION, SOLUTION INTRAMUSCULAR; INTRAVENOUS at 19:48

## 2021-02-28 RX ADMIN — OXYMETAZOLINE HYDROCHLORIDE 2 SPRAY: 5 SPRAY NASAL at 18:51

## 2021-02-28 RX ADMIN — PROPOFOL 10 MCG/KG/MIN: 10 INJECTION, EMULSION INTRAVENOUS at 18:04

## 2021-02-28 RX ADMIN — IOPAMIDOL 75 ML: 755 INJECTION, SOLUTION INTRAVENOUS at 18:02

## 2021-02-28 RX ADMIN — MIDAZOLAM HYDROCHLORIDE 5 MG: 1 INJECTION, SOLUTION INTRAMUSCULAR; INTRAVENOUS at 20:44

## 2021-02-28 RX ADMIN — SODIUM CHLORIDE 1000 ML: 9 INJECTION, SOLUTION INTRAVENOUS at 20:44

## 2021-02-28 RX ADMIN — LEVETIRACETAM 1000 MG: 100 INJECTION, SOLUTION INTRAVENOUS at 20:43

## 2021-02-28 RX ADMIN — LEVETIRACETAM 1000 MG: 100 INJECTION, SOLUTION INTRAVENOUS at 18:53

## 2021-02-28 RX ADMIN — SODIUM CHLORIDE 1863 ML: 9 INJECTION, SOLUTION INTRAVENOUS at 18:45

## 2021-02-28 NOTE — ED NOTES
ET tube retracted per MD request. Et tube now 21 cm at the lip line. Tube is secured.       Xiao Velásquez  02/28/21 3819

## 2021-02-28 NOTE — ED NOTES
Pt arrived intubated by EMS, 7.0 Tube, 27cm at the lip line. Pt retracted to 25cm at MD order.      Vent settings per MD order    AC  FiO2 100  Peep 5    BPM 16      Xiao Velásquez  02/28/21 1848       Xiao Velásquez  02/28/21 1850

## 2021-03-01 VITALS
OXYGEN SATURATION: 100 % | HEART RATE: 70 BPM | DIASTOLIC BLOOD PRESSURE: 78 MMHG | TEMPERATURE: 97.4 F | RESPIRATION RATE: 18 BRPM | WEIGHT: 137 LBS | SYSTOLIC BLOOD PRESSURE: 117 MMHG | BODY MASS INDEX: 24.27 KG/M2 | HEIGHT: 63 IN

## 2021-03-01 RX ORDER — PROPOFOL 10 MG/ML
INJECTION, EMULSION INTRAVENOUS
Status: DISCONTINUED
Start: 2021-03-01 | End: 2021-03-01 | Stop reason: HOSPADM

## 2021-03-01 NOTE — ED NOTES
1915 Call to [facility], for admit to Neuro ICU. Dx: Seizure, AMS, higher level of care, need for continuous EEG monitoring and family preference. Was advised they are not taking admissions from family requests for UC, with certain exceptions, will check with admissions and call back with either a physician on the phone or letting us know, per UC transfer center. 1926 Call from [facility] Transfer center stating unable to accept patient's at request from family at this time, suggest reach out to [facility]. MD aware. 1928 Call to [facility] for Neuro @ [facility].         Xiao Velásquez  02/28/21 1930

## 2021-03-01 NOTE — ED PROVIDER NOTES
[address] [facility] Name: Mati Leblanc  MRN: [id number]  [place] 1955  Date of evaluation: 2/28/2021  Provider: Anneliese Garza MD  PCP: Shanique Art MD  ED Attending: Anneliese Garza MD    CHIEF COMPLAINT       Chief Complaint   Patient presents with    Respiratory Distress     EMS called for unresponsive, went into witnessed seizure activity with resp distress. Admin 150 ketamin, 100 rocc, and initiated RSI with 7 tube        HISTORY OF PRESENT ILLNESS   (Location/Symptom, Timing/Onset, Context/Setting, Quality, Duration, Modifying Factors, Severity)  Note limiting factors. Mati Leblanc is a 63 y.o. female who presents to the emergency department via EMS after altered mental status and seizure-like activity. Patient's last known normal was at 5 PM.  Family noticed at about 515 she was staring off into space and was not responsive. Family states that at times her gaze was somewhat deviated to the right and she had some contraction of the right upper extremity in towards her body. At one point daughter was able to ask her if she was okay and the patient responded \"yeah yeah\". Daughter was not aware of any dysarthria. She is not able to tell me whether she noted all of her extremities moving or if there was any facial droop. EMS arrived a short time thereafter and the patient went into a tonic-clonic type seizure. She was given 10 mg of Versed intramuscularly. She apparently then stopped breathing. EMS then decided to intubate her using RSI protocol, ketamine 150 mg and rocuronium. When the patient arrives she is currently still sedated and paralyzed on bag-valve ventilation. History is obtained from EMS, daughter eventually. REVIEW OF SYSTEMS    (2-9 systems for level 4, 10 or more for level 5)     Review of Systems   Unable to perform ROS: Intubated       Positives and Pertinent negatives as per HPI.   Except as noted above in the ROS, all other systems were reviewed and negative. PAST MEDICAL HISTORY     Past Medical History:   Diagnosis Date    Angina pectoris (HCC)     Chronic pain     knees and lower back     COPD exacerbation ([facility] [address] 5/20/2018    Depression     Panic disorder     Pneumonia          SURGICAL HISTORY     Past Surgical History:   Procedure Laterality Date    CHOLECYSTECTOMY           CURRENTMEDICATIONS       Previous Medications    ALBUTEROL SULFATE HFA (VENTOLIN HFA) 108 (90 BASE) MCG/ACT INHALER    INHALE 2 PUFFS EVERY 6 HOURS AS NEEDED FOR WHEEZING OR SHORTNESS OF BREATH    CETIRIZINE (ZYRTEC) 10 MG TABLET    Take 10 mg by mouth daily    ESCITALOPRAM (LEXAPRO) 20 MG TABLET    Take 20 mg by mouth nightly    HYDROXYZINE (ATARAX) 25 MG TABLET    Take 25 mg by mouth 3 times daily as needed for Itching    LOPERAMIDE (IMODIUM) 2 MG CAPSULE    Take 1 capsule by mouth 4 times daily as needed for Diarrhea    MULTIPLE VITAMINS-MINERALS (CENTRUM SILVER 50+WOMEN PO)    Take 1 tablet by mouth daily    OXYBUTYNIN (DITROPAN) 5 MG TABLET    Take 5 mg by mouth 4 times daily    RISPERIDONE (RISPERDAL) 4 MG TABLET    Take 4 mg by mouth nightly    TIOTROPIUM (SPIRIVA RESPIMAT) 2.5 MCG/ACT AERS INHALER    Inhale 2 puffs into the lungs daily         ALLERGIES     Cephalosporins and Pcn [penicillins]    FAMILYHISTORY       Family History   Problem Relation Age of Onset    COPD Mother     Hypertension Mother     Asthma Neg Hx     Cancer Neg Hx     Diabetes Neg Hx     Emphysema Neg Hx     Heart Failure Neg Hx           SOCIAL HISTORY       Social History     Socioeconomic History    Marital status:       Spouse name: None    Number of children: 6    Years of education: None    Highest education level: None   Occupational History    None   Social Needs    Financial resource strain: None    Food insecurity     Worry: None     Inability: None    Transportation needs     Medical: None     Non-medical: None   Tobacco Use    Smoking status: Former Smoker     Packs/day: 1.00     Years: 52.00     Pack years: 52.00     Types: Cigarettes     Start date: 1969    Smokeless tobacco: Never Used   Substance and Sexual Activity    Alcohol use: No     Alcohol/week: 12.0 standard drinks     Types: 12 Cans of beer per week    Drug use: No    Sexual activity: Never   Lifestyle    Physical activity     Days per week: None     Minutes per session: None    Stress: None   Relationships    Social connections     Talks on phone: None     Gets together: None     Attends Bahai service: None     Active member of club or organization: None     Attends meetings of clubs or organizations: None     Relationship status: None    Intimate partner violence     Fear of current or ex partner: None     Emotionally abused: None     Physically abused: None     Forced sexual activity: None   Other Topics Concern    None   Social History Narrative    None       SCREENINGS             PHYSICAL EXAM    (up to 7 for level 4, 8 or more for level 5)     ED Triage Vitals   BP Temp Temp Source Pulse Resp SpO2 Height Weight   02/28/21 1727 02/28/21 1841 02/28/21 1841 02/28/21 1727 02/28/21 1727 02/28/21 1727 02/28/21 1727 02/28/21 1727   (!) 204/116 98.2 °F (36.8 °C) Bladder 139 13 100 % 5' 3\" (1.6 m) 137 lb (62.1 kg)       Physical Exam  Vitals signs and nursing note reviewed. Constitutional:       Appearance: She is well-developed and well-groomed. Interventions: She is sedated, chemically paralyzed and intubated. HENT:      Head: Normocephalic and atraumatic. Right Ear: External ear normal.      Left Ear: External ear normal.      Nose: Nose normal.      Mouth/Throat:      Pharynx: No oropharyngeal exudate. Eyes:      General: Lids are normal. Gaze aligned appropriately. Conjunctiva/sclera: Conjunctivae normal.      Comments: Initially pupils 3 mm and nonreactive.    Neck:      Musculoskeletal: Normal range of motion and neck supple. Cardiovascular:      Rate and Rhythm: Regular rhythm. Tachycardia present. Heart sounds: Normal heart sounds. No murmur. No friction rub. No gallop. Pulmonary:      Effort: Pulmonary effort is normal. No respiratory distress. She is intubated. Breath sounds: Normal breath sounds. No wheezing. Abdominal:      General: Bowel sounds are normal. There is no distension. Palpations: Abdomen is soft. Tenderness: There is no abdominal tenderness. There is no guarding or rebound. Musculoskeletal: Normal range of motion. General: No tenderness. Right lower leg: No edema. Left lower leg: No edema. Lymphadenopathy:      Cervical: No cervical adenopathy. Skin:     General: Skin is warm and dry. Capillary Refill: Capillary refill takes less than 2 seconds. Findings: No rash. Neurological:      Mental Status: She is unresponsive. GCS: GCS eye subscore is 1. GCS verbal subscore is 1. GCS motor subscore is 1. Comments: Unable to obtain NIH stroke score secondary to her chemical paralysis and intubated state. Later on in the patient's emergency department stay she has been noted to gum at the endotracheal tube and move both of her feet.          DIAGNOSTIC RESULTS   LABS:    Results for orders placed or performed during the hospital encounter of 02/28/21   Lactate, Sepsis   Result Value Ref Range    Lactic Acid, Sepsis 6.9 (HH) 0.4 - 1.9 mmol/L   Lactate, Sepsis   Result Value Ref Range    Lactic Acid, Sepsis 0.9 0.4 - 1.9 mmol/L   CBC auto differential   Result Value Ref Range    WBC 8.5 4.0 - 11.0 K/uL    RBC 4.68 4.00 - 5.20 M/uL    Hemoglobin 14.4 12.0 - 16.0 g/dL    Hematocrit 44.0 36.0 - 48.0 %    MCV 94.1 80.0 - 100.0 fL    MCH 30.7 26.0 - 34.0 pg    MCHC 32.6 31.0 - 36.0 g/dL    RDW 14.6 12.4 - 15.4 %    Platelets 379 101 - 396 K/uL    MPV 6.8 5.0 - 10.5 fL    Neutrophils % 59.5 %    Lymphocytes % 27.9 %    Monocytes % 10.2 %    Eosinophils (L) 23.0 - 29.0 mmol/L    Base Excess, Kevin -6.8 (L) -3.0 - 3.0 mmol/L    O2 Sat, Kevin 96 Not Established %    Carboxyhemoglobin 2.3 (H) 0.0 - 1.5 %    MetHgb, Kevin 0.3 <1.5 %    TC02 (Calc), Kevin 23 Not Established mmol/L    O2 Content, Kevin 18 Not Established VOL %    O2 Therapy Unknown    POCT Glucose   Result Value Ref Range    POC Glucose 136 (H) 70 - 99 mg/dl    Performed on ACCU-CHEK    EKG 12 Lead   Result Value Ref Range    Ventricular Rate 113 BPM    Atrial Rate 113 BPM    P-R Interval 164 ms    QRS Duration 66 ms    Q-T Interval 306 ms    QTc Calculation (Bazett) 419 ms    P Axis 78 degrees    R Axis -67 degrees    T Axis 72 degrees    Diagnosis       Sinus tachycardiaLeft axis deviationAbnormal ECGWhen compared with ECG of 28-FEB-2021 15:49, (unconfirmed)Fusion complexes are no longer PresentPR interval has decreasedVent. rate has increased BY  41 BPMLeft bundle branch block is no longer Present       All other labs were within normal range ornot returned as of this dictation. EKG: All EKG's are interpreted by the Emergency Department Physician who either signs or Co-signs this chart in the absence of a cardiologist.  Please see their note for interpretation of EKG. EKG Interpretation    Interpreted by emergency department physician    Rhythm: sinus tachycardia  Rate: tachycardia  Axis: left  Ectopy: none  Conduction: normal  ST Segments: normal  T Waves: normal  Q Waves: none    Clinical Impression: Sinus tachycardia, left axis deviation. RADIOLOGY:   Non-plain film images such as CT, Ultrasound and MRI are read by the radiologist.  Plain radiographic images are visualized and preliminarily interpreted by the ED Provider with the belowfindings:    Interpretation per the Radiologist below, if available at the time of this note:    XR CHEST PORTABLE   Final Result   Endotracheal tube with tip 5-6 cm from the myriam. Enteric tube as described.       Left basilar opacity and trace pleural effusions. CTA HEAD NECK W CONTRAST   Final Result   No acute intracranial abnormality visualized. No flow limiting stenosis or large vessel occlusion detected within the head   or neck. Incidentally noted pulmonary emphysema and ET tube, which terminates in the   proximal right mainstem bronchus. Recommend pulling back by 3 cm. CT HEAD WO CONTRAST   Final Result   No acute intracranial abnormality visualized. No flow limiting stenosis or large vessel occlusion detected within the head   or neck. Incidentally noted pulmonary emphysema and ET tube, which terminates in the   proximal right mainstem bronchus. Recommend pulling back by 3 cm. PROCEDURES   Unless otherwise noted below, none     Procedures    CRITICAL CARE TIME   Total critical care time today provided was 51 minutes. This excludes seperately billable procedures and family discussion time. Provided for acute seizure-like activity, acute altered mental status, acute respiratory failure with hypoxia and hypercarbia Requiring intervention with concern for potential decompensation.       CONSULTS:  IP CONSULT TO NEUROLOGY      EMERGENCY DEPARTMENT COURSE and DIFFERENTIAL DIAGNOSIS/MDM:   Vitals:    Vitals:    02/28/21 1930 02/28/21 2000 02/28/21 2015 02/28/21 2030   BP: (!) 146/95 115/66 102/66 132/81   Pulse: 104 97 83 96   Resp: 16 16 22 24   Temp:   97.4 °F (36.3 °C)    TempSrc:   Bladder    SpO2: 100% 100% 100% 100%   Weight:       Height:           Patient was given the following medications:  Medications   sodium chloride flush 0.9 % injection 10 mL (has no administration in time range)   sodium chloride flush 0.9 % injection 10 mL (has no administration in time range)   midazolam (VERSED) injection 5 mg (5 mg Intravenous Given 2/28/21 2044)   lidocaine viscous hcl (XYLOCAINE) 2 % solution ( Mouth/Throat Not Given 2/28/21 1850)   levETIRAcetam (KEPPRA) 1,000 mg in sodium chloride 0.9 % 100 mL IVPB (1,000 mg Intravenous New Bag 2/28/21 2043)   0.9 % sodium chloride infusion (1,000 mLs Intravenous New Bag 2/28/21 2044)   propofol injection (25 mcg/kg/min × 62.1 kg Intravenous Rate/Dose Change 2/28/21 1938)   levETIRAcetam (KEPPRA) 1,000 mg in sodium chloride 0.9 % 100 mL IVPB (0 mg Intravenous Stopped 2/28/21 1919)   oxymetazoline (AFRIN) 0.05 % nasal spray 2 spray (2 sprays Each Nostril Given 2/28/21 1851)   iopamidol (ISOVUE-370) 76 % injection 75 mL (75 mLs Intravenous Given 2/28/21 1802)   0.9 % sodium chloride IV bolus 1,863 mL (0 mLs Intravenous Stopped 2/28/21 1953)   midazolam (VERSED) 5 MG/ML injection (5 mg  Given 2/28/21 1948)       ED Course as of Feb 28 2052   Kane El Feb 28, 2021   [address]   [address] has declined transfer of the patient [facility] they are not excepting patients based on family request\". 51 [facility] does not have continuous EEG monitoring tonight. Transfer center reaching back out to . [TC]   2039 Dr. Isra Jang from [facility] neuro ICU has accepted transfer. She requested another gram of keppra. I am repeating her VBG. [TC]      ED Course User Index  [TC] Jose Raya MD     My initial concern was that the patient potentially had intracranial hemorrhage versus large vessel occlusion versus continued status epilepticus. Patient was stabilized and placed on the ventilator. She was provided sedation and loaded with Keppra. CT imaging including angiography was obtained that does not show acute intracranial abnormality. She was initially acidotic however this has corrected with respiratory compensation via the ventilator as well as fluid resuscitation. Patient at this point will need continuous EEG monitoring. Please see the timeline above. Patient has been accepted to [facility] neuro intensive care unit. Mobile intensive care unit will be utilized for transfer. Patient's daughter has been updated multiple times.   I have examined the patient several times with some increased movement on each time. Differential diagnosis included but was not limited to: stroke, TIA, intracranial bleed, trauma, infection/sepsis, medication side effect, intoxication/withdrawal, metabolic/electrolyte abnormalities, status epilepticus. FINAL IMPRESSION      1. Seizure ([address]    2. Acute on chronic respiratory failure with hypoxia and hypercapnia (HCC)    3.  Lactic acidosis          DISPOSITION/PLAN   DISPOSITION Decision To Transfer 02/28/2021 07:05:58 PM    (Please note that portions of this note were completed with a voice recognition program.  Efforts were made to edit the dictations but occasionally words are mis-transcribed.)    Kaela Maravilla MD(electronically signed)              Kaela Maravilla MD  02/28/21 6702

## 2021-03-01 NOTE — ED NOTES
Vent Rate increased to 18 BPM per MD order     [facility]  02/28/21 7174

## 2021-03-01 NOTE — ED NOTES
Patient spo2 remains 100%, titrated patient Fio2 to 80%. Patient spo2 remains 100%.       Bubba Saldivar  02/28/21 6542

## 2021-03-01 NOTE — ED NOTES
Patients SPo2 100% with FIO2 of 95%. Titrated patient FIO2 to 90% patient SPo2 remains 100%.       Walker Medici  02/28/21 5637

## 2021-03-01 NOTE — ED NOTES
1915 Call to [facility], for admit to Neuro ICU. Dx: Seizure, AMS, higher level of care, need for continuous EEG monitoring and family preference. Was advised they are not taking admissions from family requests for UC, with certain exceptions, will check with admissions and call back with either a physician on the phone or letting us know, per UC transfer center.          Xiao Velásquez  02/28/21 1920

## 2021-03-01 NOTE — ED NOTES
Patients SPo2 remains 100%. Titrated Fio2 to 70%. Patient spo2 remains 100%.       Sb Stephens  02/28/21 2988

## 2021-03-03 LAB
ORGANISM: ABNORMAL
URINE CULTURE, ROUTINE: ABNORMAL

## 2021-03-05 LAB
BLOOD CULTURE, ROUTINE: NORMAL
CULTURE, BLOOD 2: NORMAL

## 2021-03-08 ENCOUNTER — TELEPHONE (OUTPATIENT)
Dept: ORTHOPEDIC SURGERY | Age: 66
End: 2021-03-08

## 2021-03-10 ENCOUNTER — HOSPITAL ENCOUNTER (EMERGENCY)
Age: 66
Discharge: HOME | End: 2021-03-11
Attending: EMERGENCY MEDICINE
Payer: MEDICARE

## 2021-03-10 ENCOUNTER — APPOINTMENT (OUTPATIENT)
Dept: CT IMAGING | Age: 66
End: 2021-03-10
Payer: MEDICARE

## 2021-03-10 DIAGNOSIS — S33.5XXD LUMBAR SPRAIN, SUBSEQUENT ENCOUNTER: Primary | ICD-10-CM

## 2021-03-10 PROCEDURE — 6360000002 HC RX W HCPCS: Performed by: EMERGENCY MEDICINE

## 2021-03-10 PROCEDURE — 6370000000 HC RX 637 (ALT 250 FOR IP): Performed by: EMERGENCY MEDICINE

## 2021-03-10 PROCEDURE — 72131 CT LUMBAR SPINE W/O DYE: CPT

## 2021-03-10 PROCEDURE — 96374 THER/PROPH/DIAG INJ IV PUSH: CPT

## 2021-03-10 PROCEDURE — 99284 EMERGENCY DEPT VISIT MOD MDM: CPT

## 2021-03-10 RX ORDER — CYCLOBENZAPRINE HCL 10 MG
10 TABLET ORAL ONCE
Status: COMPLETED | OUTPATIENT
Start: 2021-03-10 | End: 2021-03-10

## 2021-03-10 RX ORDER — KETOROLAC TROMETHAMINE 30 MG/ML
30 INJECTION, SOLUTION INTRAMUSCULAR; INTRAVENOUS ONCE
Status: COMPLETED | OUTPATIENT
Start: 2021-03-10 | End: 2021-03-10

## 2021-03-10 RX ADMIN — KETOROLAC TROMETHAMINE 30 MG: 30 INJECTION, SOLUTION INTRAMUSCULAR; INTRAVENOUS at 23:45

## 2021-03-10 RX ADMIN — CYCLOBENZAPRINE 10 MG: 10 TABLET, FILM COATED ORAL at 23:45

## 2021-03-10 ASSESSMENT — PAIN DESCRIPTION - PROGRESSION: CLINICAL_PROGRESSION: NOT CHANGED

## 2021-03-10 ASSESSMENT — PAIN SCALES - GENERAL
PAINLEVEL_OUTOF10: 8
PAINLEVEL_OUTOF10: 8

## 2021-03-10 ASSESSMENT — PAIN DESCRIPTION - PAIN TYPE: TYPE: ACUTE PAIN

## 2021-03-10 NOTE — Clinical Note
Pt discharged to home with family via personal vehicle. Pt/family verbalize and understanding of d/c instructions, f/u care, and medications. Pt ambulated unassisted and tolerated well. Pt and family deny and questions or concerns.

## 2021-03-11 VITALS
BODY MASS INDEX: 23.39 KG/M2 | WEIGHT: 137 LBS | OXYGEN SATURATION: 93 % | DIASTOLIC BLOOD PRESSURE: 83 MMHG | RESPIRATION RATE: 19 BRPM | HEIGHT: 64 IN | TEMPERATURE: 97.9 F | SYSTOLIC BLOOD PRESSURE: 148 MMHG | HEART RATE: 78 BPM

## 2021-03-11 RX ORDER — CYCLOBENZAPRINE HCL 10 MG
10 TABLET ORAL 3 TIMES DAILY PRN
Qty: 20 TABLET | Refills: 0 | Status: SHIPPED | OUTPATIENT
Start: 2021-03-11 | End: 2021-03-18

## 2021-03-11 NOTE — ED PROVIDER NOTES
Emergency Department Attending Note    Lindy Draper MD    Date of ED VIsit: 3/10/2021    CHIEF COMPLAINT  Back Pain (Middle of back since 3/1 after falling, \"getting worse\")      HISTORY OF PRESENT ILLNESS  Burak Balderas is a 63 y.o. female  With Vital signs of BP (!) 205/88   Pulse 90   Temp 97.9 °F (36.6 °C) (Oral)   Resp 18   Ht 5' 4\" (1.626 m)   Wt 137 lb (62.1 kg)   SpO2 93%   Breastfeeding No   BMI 23.52 kg/m²  who presents to the ED with a complaint of lower back pain after a fall. Patient seen and evaluated in room 7. Patient states that she fell the first of the month and since then has had lower back pain. She says that she has been seen at 3 other locations but I am unable to find previous radiographs that she states that she has had. She is complaining of pain primarily in the paraspinal muscle groups of the upper lumbar spine. She denies any lower extremity weakness numbness tingling. Inability to walk. No bowel or bladder symptoms. And the patient when asked what hospital she went to did not have a recall of what hospital she went to looking at her records here she was apparently intubated for a seizure disorder on the 28th of last month. But otherwise there is no other records of her visiting any [facility] do not know what hospital it was at so that is forcing my hand to get her radiograph which I think the best would be a CT scan of her lumbar spine to look for any fractures. .  No other complaints, modifying factors or associated symptoms. Patients Past medical history reviewed and listed below  Past Medical History:   Diagnosis Date    Angina pectoris (HCC)     Chronic pain     knees and lower back     COPD exacerbation ([facility] [address] 5/20/2018    Depression     Panic disorder     Pneumonia      Past Surgical History:   Procedure Laterality Date    CHOLECYSTECTOMY         I have reviewed the following from the nursing documentation.     Family History   Problem Relation Age of Onset    COPD Mother     Hypertension Mother     Asthma Neg Hx     Cancer Neg Hx     Diabetes Neg Hx     Emphysema Neg Hx     Heart Failure Neg Hx      Social History     Socioeconomic History    Marital status:      Spouse name: Not on file    Number of children: 10    Years of education: Not on file    Highest education level: Not on file   Occupational History    Not on file   Social Needs    Financial resource strain: Not on file    Food insecurity     Worry: Not on file     Inability: Not on file    Transportation needs     Medical: Not on file     Non-medical: Not on file   Tobacco Use    Smoking status: Former Smoker     Packs/day: 1.00     Years: 52.00     Pack years: 52.00     Types: Cigarettes     Start date: 1969    Smokeless tobacco: Never Used   Substance and Sexual Activity    Alcohol use: No     Alcohol/week: 12.0 standard drinks     Types: 12 Cans of beer per week    Drug use: No    Sexual activity: Never   Lifestyle    Physical activity     Days per week: Not on file     Minutes per session: Not on file    Stress: Not on file   Relationships    Social connections     Talks on phone: Not on file     Gets together: Not on file     Attends Pentecostalism service: Not on file     Active member of club or organization: Not on file     Attends meetings of clubs or organizations: Not on file     Relationship status: Not on file    Intimate partner violence     Fear of current or ex partner: Not on file     Emotionally abused: Not on file     Physically abused: Not on file     Forced sexual activity: Not on file   Other Topics Concern    Not on file   Social History Narrative    Not on file     No current facility-administered medications for this encounter.       Current Outpatient Medications   Medication Sig Dispense Refill    tiotropium (SPIRIVA RESPIMAT) 2.5 MCG/ACT AERS inhaler Inhale 2 puffs into the lungs daily 1 Inhaler 5    loperamide (IMODIUM) 2 MG capsule Take 1 capsule by mouth 4 times daily as needed for Diarrhea 90 capsule 0    cetirizine (ZYRTEC) 10 MG tablet Take 10 mg by mouth daily      hydrOXYzine (ATARAX) 25 MG tablet Take 25 mg by mouth 3 times daily as needed for Itching      albuterol sulfate HFA (VENTOLIN HFA) 108 (90 Base) MCG/ACT inhaler INHALE 2 PUFFS EVERY 6 HOURS AS NEEDED FOR WHEEZING OR SHORTNESS OF BREATH 3 Inhaler 1    risperiDONE (RISPERDAL) 4 MG tablet Take 4 mg by mouth nightly      escitalopram (LEXAPRO) 20 MG tablet Take 20 mg by mouth nightly      Multiple Vitamins-Minerals (CENTRUM SILVER 50+WOMEN PO) Take 1 tablet by mouth daily      oxybutynin (DITROPAN) 5 MG tablet Take 5 mg by mouth 4 times daily       Allergies   Allergen Reactions    Cephalosporins Shortness Of Breath    Pcn [Penicillins] Anaphylaxis and Hives       REVIEW OF SYSTEMS  10 systems reviewed, pertinent positives per HPI otherwise noted to be negative     PHYSICAL EXAM  BP (!) 205/88   Pulse 90   Temp 97.9 °F (36.6 °C) (Oral)   Resp 18   Ht 5' 4\" (1.626 m)   Wt 137 lb (62.1 kg)   SpO2 93%   Breastfeeding No   BMI 23.52 kg/m²   GENERAL APPEARANCE: Awake and alert. Cooperative. In minor distress. HEAD: Normocephalic. Atraumatic. EYES: PERRL. EOM's grossly intact. ENT: Mucous membranes are pink and moist.   NECK: Supple. HEART: RRR. No murmurs. LUNGS: Respirations unlabored. CTAB. Good air exchange. ABDOMEN: Soft. Non-distended. Non-tender. No masses. No organomegaly. No guarding or rebound. EXTREMITIES: No peripheral edema. Moves all extremities equally. All extremities neurovascularly intact. BACK: Patient has pain on the paraspinal muscle groups in the L1-2 and 3 zone area. She has no pain over the midline. She is able to lift and hold her legs up against pressure. She is able to ambulate into the room as well. SKIN: Warm and dry. No acute rashes. NEUROLOGICAL: Alert and oriented.   Cranial nerves II through XII are intact no sensory or motor deficits are detected in the lower extremities. Strength 5/5, sensation intact. Gait normal.   PSYCHIATRIC: Normal mood and affect. No HI or SI expressed to me. RADIOLOGY    If acquired see below       ED COURSE/MDM    Patient had a narcotic prescription for 20 pills on 6 January of this year. She seems to get something on the weekly to monthly basis although she did have a spate of known to our narcotics through the later half of the 2020s. Based on this pattern I am not going to prescribe the patient narcotics I will offer her anti-inflammatories as well as muscle relaxants for the pain especially since it seems to be in the paraspinal muscle groups. So at this point I am going to call in a lumbar strain. ED Course as of Mar 11 0031   Thu Mar 11, 2021   0024 IMPRESSION:  Moderate multilevel degenerative disc disease with no acute abnormality seen.     Moderate disc bulges from L2 through L5.     Mild osteoarthritic changes of the facets throughout causing diffuse mild  spinal stenosis.      CT Lumbar Spine WO Contrast [DL]      ED Course User Index  [DL] Jaleel Osei MD       The ED course and plan were reviewed and results discussed with the patient    The patient understood and agreed with the Discharge/transfer planning.     CLINICAL IMPRESSION and DISPOSITION    Branden Tyler was stable and diagnosed with lumbar strain    Patient was treated with Flexeril and Toradol       Jaleel Osei MD  03/11/21 0792

## 2021-03-17 ENCOUNTER — APPOINTMENT (OUTPATIENT)
Dept: GENERAL RADIOLOGY | Age: 66
End: 2021-03-17
Payer: MEDICARE

## 2021-03-17 ENCOUNTER — APPOINTMENT (OUTPATIENT)
Dept: CT IMAGING | Age: 66
End: 2021-03-17
Payer: MEDICARE

## 2021-03-17 ENCOUNTER — HOSPITAL ENCOUNTER (EMERGENCY)
Age: 66
Discharge: HOME | End: 2021-03-18
Attending: STUDENT IN AN ORGANIZED HEALTH CARE EDUCATION/TRAINING PROGRAM
Payer: MEDICARE

## 2021-03-17 DIAGNOSIS — J44.9 CHRONIC OBSTRUCTIVE PULMONARY DISEASE, UNSPECIFIED COPD TYPE (HCC): Primary | ICD-10-CM

## 2021-03-17 DIAGNOSIS — R41.0 DISORIENTATION: ICD-10-CM

## 2021-03-17 LAB
A/G RATIO: 1.7 (ref 1.1–2.2)
ALBUMIN SERPL-MCNC: 4.3 G/DL (ref 3.4–5)
ALP BLD-CCNC: 169 U/L (ref 40–129)
ALT SERPL-CCNC: 14 U/L (ref 10–40)
AMMONIA: 14 UMOL/L (ref 11–51)
ANION GAP SERPL CALCULATED.3IONS-SCNC: 12 MMOL/L (ref 3–16)
AST SERPL-CCNC: 21 U/L (ref 15–37)
BASE EXCESS VENOUS: -2.4 MMOL/L (ref -3–3)
BASOPHILS ABSOLUTE: 0.3 K/UL (ref 0–0.2)
BASOPHILS RELATIVE PERCENT: 4.1 %
BILIRUB SERPL-MCNC: 0.3 MG/DL (ref 0–1)
BILIRUBIN URINE: NEGATIVE
BLOOD, URINE: NEGATIVE
BUN BLDV-MCNC: 9 MG/DL (ref 7–20)
CALCIUM SERPL-MCNC: 9.6 MG/DL (ref 8.3–10.6)
CARBOXYHEMOGLOBIN: 3 % (ref 0–1.5)
CHLORIDE BLD-SCNC: 96 MMOL/L (ref 99–110)
CLARITY: CLEAR
CO2: 24 MMOL/L (ref 21–32)
COLOR: YELLOW
CREAT SERPL-MCNC: 0.6 MG/DL (ref 0.6–1.2)
EOSINOPHILS ABSOLUTE: 0.1 K/UL (ref 0–0.6)
EOSINOPHILS RELATIVE PERCENT: 1.5 %
GFR AFRICAN AMERICAN: >60
GFR NON-AFRICAN AMERICAN: >60
GLOBULIN: 2.5 G/DL
GLUCOSE BLD-MCNC: 94 MG/DL (ref 70–99)
GLUCOSE URINE: NEGATIVE MG/DL
HCO3 VENOUS: 22.9 MMOL/L (ref 23–29)
HCT VFR BLD CALC: 40.4 % (ref 36–48)
HEMOGLOBIN: 13.4 G/DL (ref 12–16)
KETONES, URINE: NEGATIVE MG/DL
LEUKOCYTE ESTERASE, URINE: NEGATIVE
LYMPHOCYTES ABSOLUTE: 1.2 K/UL (ref 1–5.1)
LYMPHOCYTES RELATIVE PERCENT: 15.9 %
MCH RBC QN AUTO: 30.9 PG (ref 26–34)
MCHC RBC AUTO-ENTMCNC: 33.3 G/DL (ref 31–36)
MCV RBC AUTO: 92.8 FL (ref 80–100)
METHEMOGLOBIN VENOUS: 0.3 %
MICROSCOPIC EXAMINATION: NORMAL
MONOCYTES ABSOLUTE: 0.6 K/UL (ref 0–1.3)
MONOCYTES RELATIVE PERCENT: 8.2 %
NEUTROPHILS ABSOLUTE: 5.5 K/UL (ref 1.7–7.7)
NEUTROPHILS RELATIVE PERCENT: 70.3 %
NITRITE, URINE: NEGATIVE
O2 CONTENT, VEN: 14 VOL %
O2 SAT, VEN: 70 %
O2 THERAPY: ABNORMAL
PCO2, VEN: 41.2 MMHG (ref 40–50)
PDW BLD-RTO: 14.5 % (ref 12.4–15.4)
PH UA: 6 (ref 5–8)
PH VENOUS: 7.36 (ref 7.35–7.45)
PLATELET # BLD: 357 K/UL (ref 135–450)
PMV BLD AUTO: 6.8 FL (ref 5–10.5)
PO2, VEN: 38.1 MMHG (ref 25–40)
POTASSIUM REFLEX MAGNESIUM: 4.2 MMOL/L (ref 3.5–5.1)
PROTEIN UA: NEGATIVE MG/DL
RBC # BLD: 4.35 M/UL (ref 4–5.2)
SODIUM BLD-SCNC: 132 MMOL/L (ref 136–145)
SPECIFIC GRAVITY UA: 1.01 (ref 1–1.03)
TCO2 CALC VENOUS: 24 MMOL/L
TOTAL PROTEIN: 6.8 G/DL (ref 6.4–8.2)
TROPONIN: <0.01 NG/ML
URINE TYPE: NORMAL
UROBILINOGEN, URINE: 0.2 E.U./DL
WBC # BLD: 7.8 K/UL (ref 4–11)

## 2021-03-17 PROCEDURE — 80053 COMPREHEN METABOLIC PANEL: CPT

## 2021-03-17 PROCEDURE — 84443 ASSAY THYROID STIM HORMONE: CPT

## 2021-03-17 PROCEDURE — 36415 COLL VENOUS BLD VENIPUNCTURE: CPT

## 2021-03-17 PROCEDURE — 81003 URINALYSIS AUTO W/O SCOPE: CPT

## 2021-03-17 PROCEDURE — 70450 CT HEAD/BRAIN W/O DYE: CPT

## 2021-03-17 PROCEDURE — 82140 ASSAY OF AMMONIA: CPT

## 2021-03-17 PROCEDURE — 6370000000 HC RX 637 (ALT 250 FOR IP): Performed by: STUDENT IN AN ORGANIZED HEALTH CARE EDUCATION/TRAINING PROGRAM

## 2021-03-17 PROCEDURE — 85025 COMPLETE CBC W/AUTO DIFF WBC: CPT

## 2021-03-17 PROCEDURE — 71046 X-RAY EXAM CHEST 2 VIEWS: CPT

## 2021-03-17 PROCEDURE — 84484 ASSAY OF TROPONIN QUANT: CPT

## 2021-03-17 PROCEDURE — 82803 BLOOD GASES ANY COMBINATION: CPT

## 2021-03-17 PROCEDURE — 99285 EMERGENCY DEPT VISIT HI MDM: CPT

## 2021-03-17 PROCEDURE — 93005 ELECTROCARDIOGRAM TRACING: CPT | Performed by: STUDENT IN AN ORGANIZED HEALTH CARE EDUCATION/TRAINING PROGRAM

## 2021-03-17 PROCEDURE — 80307 DRUG TEST PRSMV CHEM ANLYZR: CPT

## 2021-03-17 RX ORDER — IPRATROPIUM BROMIDE AND ALBUTEROL SULFATE 2.5; .5 MG/3ML; MG/3ML
1 SOLUTION RESPIRATORY (INHALATION) ONCE
Status: COMPLETED | OUTPATIENT
Start: 2021-03-17 | End: 2021-03-17

## 2021-03-17 RX ORDER — PREDNISONE 20 MG/1
60 TABLET ORAL ONCE
Status: COMPLETED | OUTPATIENT
Start: 2021-03-17 | End: 2021-03-17

## 2021-03-17 RX ADMIN — PREDNISONE 60 MG: 20 TABLET ORAL at 22:30

## 2021-03-17 RX ADMIN — IPRATROPIUM BROMIDE AND ALBUTEROL SULFATE 1 AMPULE: .5; 3 SOLUTION RESPIRATORY (INHALATION) at 22:30

## 2021-03-17 ASSESSMENT — ENCOUNTER SYMPTOMS
BACK PAIN: 0
ABDOMINAL PAIN: 0
STRIDOR: 0
VOMITING: 0
SORE THROAT: 0
RHINORRHEA: 0
SHORTNESS OF BREATH: 1
NAUSEA: 0
COUGH: 0
PHOTOPHOBIA: 0

## 2021-03-18 VITALS
DIASTOLIC BLOOD PRESSURE: 91 MMHG | WEIGHT: 140 LBS | BODY MASS INDEX: 23.9 KG/M2 | SYSTOLIC BLOOD PRESSURE: 168 MMHG | TEMPERATURE: 98 F | OXYGEN SATURATION: 97 % | RESPIRATION RATE: 17 BRPM | HEIGHT: 64 IN | HEART RATE: 89 BPM

## 2021-03-18 LAB
AMPHETAMINE SCREEN, URINE: NORMAL
BARBITURATE SCREEN URINE: NORMAL
BENZODIAZEPINE SCREEN, URINE: NORMAL
CANNABINOID SCREEN URINE: NORMAL
COCAINE METABOLITE SCREEN URINE: NORMAL
EKG ATRIAL RATE: 78 BPM
EKG DIAGNOSIS: NORMAL
EKG P AXIS: 72 DEGREES
EKG P-R INTERVAL: 134 MS
EKG Q-T INTERVAL: 368 MS
EKG QRS DURATION: 70 MS
EKG QTC CALCULATION (BAZETT): 419 MS
EKG R AXIS: -63 DEGREES
EKG T AXIS: 63 DEGREES
EKG VENTRICULAR RATE: 78 BPM
Lab: NORMAL
METHADONE SCREEN, URINE: NORMAL
OPIATE SCREEN URINE: NORMAL
OXYCODONE URINE: NORMAL
PH UA: 6
PHENCYCLIDINE SCREEN URINE: NORMAL
PROPOXYPHENE SCREEN: NORMAL
TSH REFLEX: 0.56 UIU/ML (ref 0.27–4.2)

## 2021-03-18 PROCEDURE — 93010 ELECTROCARDIOGRAM REPORT: CPT | Performed by: INTERNAL MEDICINE

## 2021-03-18 RX ORDER — PREDNISONE 10 MG/1
TABLET ORAL
Qty: 20 TABLET | Refills: 0 | Status: SHIPPED | OUTPATIENT
Start: 2021-03-18 | End: 2021-03-28

## 2021-03-18 NOTE — ED PROVIDER NOTES
[facility] Name: Wil Villegas  MRN: [id number]  [place] 1955  Date of evaluation: 3/17/2021  Provider: Cristobal Foss DO    CHIEF COMPLAINT       Chief Complaint   Patient presents with    Altered Mental Status     Dizziness, confusion, \"like she felt before her last seizure\" since last night         HISTORY OF PRESENT ILLNESS   (Location/Symptom, Timing/Onset, Context/Setting, Quality, Duration, Modifying Factors, Severity)  Note limiting factors. Wil Villegas is a 63 y.o. female with a past medical history of COPD, seizures who presents to the emergency department complaining of confusion, dizziness, right hand tingling. Of note patient recently at the end of February where she was admitted to [address] [place] [address] after being seen in the emergency department following seizure-like activity in which she required intubation. During that encounter patient had MRI studies which did show no acute infarct or intracranial hemorrhage, chronic ischemic white matter change with mild parenchymal atrophy, patient was also found to have mild to moderate ventriculomegaly which was felt to be disproportionate to degree of parenchymal atrophy. During that encounter patient was found to be hyponatremic with a sodium in 129 patient was not discharged on Keppra due to first-time seizure thought to be provoked from hyponatremia. On my evaluation today patient complaining of feeling \"funny \". Patient states that yesterday morning she began to feel more and more confused. Is having difficulty with remembering directions, very member in room states that she is to be staring off when she is speaking. Is reports that she is somewhat slow to respond to questioning.   My evaluation patient is alert she is oriented to person place and time, at times does appear to be somewhat slow to respond to questioning, and at times required multiple attempts to follow commands. Nonfocal exam.  Initial complaint on check-in also listed numbness in right hand, patient states that she had mild tingling sensation earlier in the day however this is completely resolved at the time of presentation. She denies numbness or weakness in arms or legs denies headache neck pain or stiffness. Patient does have a history of COPD, states over the last 1 month she has had worsening shortness of breath. She has required increasing her oxygen. At baseline she only intermittently wore 2 L however now is requiring oxygen 24/7. She arrived off oxygen and was hypoxic. She is labored. Breathing in the upper 20s at times. Is tachypneic with speech. No fevers no chills no cough no congestion no chest pain no abdominal pain no nausea no vomiting denies dysuria, hematuria. States that throughout the day today she has had periods where she felt increasing confusion, states that she felt like this prior to her episode where she had a seizure and was admitted. .  Does have history of COPD, did come in off oxygen satting 80% on room air, improved to 94% on baseline 2 L. Patient did have an encounter admission 2/4/2021 for confusion which was thought to be secondary to hyponatremia which was believed to be due to polydipsia. MRI Head W WO contrast   Final Result   IMPRESSION:     1. No acute infarct or intracranial hemorrhage. 2. Mild chronic ischemic white matter changes with mild parenchymal atrophy. 3. Mild to moderate ventriculomegaly, slightly disproportionate to the degree of parenchymal atrophy. Considerations include normal pressure hydrocephalus or central predominant atrophy. EEG Interpretation:   The EEG was abnormal due to the presence of:  · Triphasic waves were seen. This is a non-specific finding consistent with a process that has diffusely affected the cerebrum including toxic, metabolic, and post-hypoxic processes.  If is often seen with metabolic abnormalities, including hepatic encephalopathy. · Mild to moderate generalized slowing. Generalized slowing is a non-specific finding consistent with a generalized disturbance of cerebral functioning including toxic, metabolic, or structural abnormalities that are multi-focal or diffuse. · No focal, lateralizing, or epileptiform features were seen during the recording. Nursing Notes were reviewed.     PAST MEDICAL HISTORY     Past Medical History:   Diagnosis Date    Angina pectoris (HCC)     Chronic pain     knees and lower back     COPD exacerbation ([facility] [address] 5/20/2018    Depression     Panic disorder     Pneumonia          SURGICAL HISTORY       Past Surgical History:   Procedure Laterality Date    CHOLECYSTECTOMY           CURRENT MEDICATIONS       Discharge Medication List as of 3/18/2021 12:25 AM      CONTINUE these medications which have NOT CHANGED    Details   cyclobenzaprine (FLEXERIL) 10 MG tablet Take 1 tablet by mouth 3 times daily as needed for Muscle spasms, Disp-20 tablet, R-0Print      tiotropium (SPIRIVA RESPIMAT) 2.5 MCG/ACT AERS inhaler Inhale 2 puffs into the lungs daily, Disp-1 Inhaler, R-5Normal      loperamide (IMODIUM) 2 MG capsule Take 1 capsule by mouth 4 times daily as needed for Diarrhea, Disp-90 capsule, R-0Normal      cetirizine (ZYRTEC) 10 MG tablet Take 10 mg by mouth dailyHistorical Med      hydrOXYzine (ATARAX) 25 MG tablet Take 25 mg by mouth 3 times daily as needed for ItchingHistorical Med      albuterol sulfate HFA (VENTOLIN HFA) 108 (90 Base) MCG/ACT inhaler INHALE 2 PUFFS EVERY 6 HOURS AS NEEDED FOR WHEEZING OR SHORTNESS OF BREATH, Disp-3 Inhaler, R-1Normal      risperiDONE (RISPERDAL) 4 MG tablet Take 4 mg by mouth nightlyHistorical Med      escitalopram (LEXAPRO) 20 MG tablet Take 20 mg by mouth nightlyHistorical Med      Multiple Vitamins-Minerals (CENTRUM SILVER 50+WOMEN PO) Take 1 tablet by mouth dailyHistorical Med      oxybutynin (DITROPAN) 5 MG tablet Take 5 mg by mouth 4 times daily             ALLERGIES     Cephalosporins and Pcn [penicillins]    FAMILY HISTORY       Family History   Problem Relation Age of Onset    COPD Mother     Hypertension Mother     Asthma Neg Hx     Cancer Neg Hx     Diabetes Neg Hx     Emphysema Neg Hx     Heart Failure Neg Hx           SOCIAL HISTORY       Social History     Socioeconomic History    Marital status:      Spouse name: None    Number of children: 10    Years of education: None    Highest education level: None   Occupational History    None   Social Needs    Financial resource strain: None    Food insecurity     Worry: None     Inability: None    Transportation needs     Medical: None     Non-medical: None   Tobacco Use    Smoking status: Former Smoker     Packs/day: 1.00     Years: 52.00     Pack years: 52.00     Types: Cigarettes     Start date: 1969    Smokeless tobacco: Never Used   Substance and Sexual Activity    Alcohol use: No     Alcohol/week: 12.0 standard drinks     Types: 12 Cans of beer per week    Drug use: No    Sexual activity: Never   Lifestyle    Physical activity     Days per week: None     Minutes per session: None    Stress: None   Relationships    Social connections     Talks on phone: None     Gets together: None     Attends Congregational service: None     Active member of club or organization: None     Attends meetings of clubs or organizations: None     Relationship status: None    Intimate partner violence     Fear of current or ex partner: None     Emotionally abused: None     Physically abused: None     Forced sexual activity: None   Other Topics Concern    None   Social History Narrative    None       SCREENINGS   NIH Stroke Scale  Interval: Baseline  Level of Consciousness (1a. ): Alert  LOC Questions (1b. ):  Answers both correctly  LOC Commands (1c. ): Performs both tasks correctly  Best Gaze (2. ): Normal  Visual (3. ): No visual loss  Facial Palsy (4. ): Normal symmetrical movement  Motor Arm, Left (5a. ): No drift  Motor Arm, Right (5b. ): No drift  Motor Leg, Left (6a. ): No drift  Motor Leg, Right (6b. ): No drift  Limb Ataxia (7. ): Absent  Sensory (8. ): Normal  Best Language (9. ): No aphasia  Dysarthria (10. ): Normal  Extinction and Inattention (11): No abnormality  Total: 0    Aster Coma Scale  Eye Opening: Spontaneous  Best Verbal Response: Oriented  Best Motor Response: Obeys commands  Aster Coma Scale Score: 15                   REVIEW OF SYSTEMS    (2-9 systems for level 4, 10 or more for level 5)   Review of Systems   Constitutional: Negative for chills and fever. HENT: Negative for congestion, rhinorrhea and sore throat. Eyes: Negative for photophobia and visual disturbance. Respiratory: Positive for shortness of breath. Negative for cough and stridor. Expiratory wheeze   Cardiovascular: Negative for chest pain. Gastrointestinal: Negative for abdominal pain, nausea and vomiting. Genitourinary: Negative for decreased urine volume. Musculoskeletal: Negative for back pain, neck pain and neck stiffness. Skin: Negative for rash. Allergic/Immunologic: Negative for environmental allergies. Hematological: Negative for adenopathy. Psychiatric/Behavioral: Positive for confusion. PHYSICAL EXAM    (up to 7 for level 4, 8 or more for level 5)     ED Triage Vitals   BP Temp Temp src Pulse Resp SpO2 Height Weight   -- -- -- -- -- -- -- --       Physical Exam  Constitutional:       General: She is not in acute distress. Appearance: She is not diaphoretic. HENT:      Head: Normocephalic and atraumatic. Eyes:      Pupils: Pupils are equal, round, and reactive to light. Neck:      Musculoskeletal: Normal range of motion and neck supple. Trachea: No tracheal deviation. Cardiovascular:      Rate and Rhythm: Normal rate and regular rhythm. Pulmonary:      Effort: Respiratory distress present.       Comments: Expiratory wheeze, breath CT, Ultrasound and MRI are read by the radiologist. Plain radiographic images are visualized and preliminarily interpreted by the emergency physician. Interpretation per the Radiologist below, if available at the time of this note:    XR CHEST (2 VW)   Final Result   Hyperinflated, clear lungs. Cardiomegaly. No acute cardiopulmonary   abnormality. CT HEAD WO CONTRAST   Final Result   No evidence of acute intracranial process. Mild atrophy and moderate chronic   small vessel ischemic changes noted. LABS:  Labs Reviewed   CBC WITH AUTO DIFFERENTIAL - Abnormal; Notable for the following components:       Result Value    Basophils Absolute 0.3 (*)     All other components within normal limits    Narrative:     Performed at:  [facility]. [facility]  [address] [place] [address]. [place]   Phone [phone]   COMPREHENSIVE METABOLIC PANEL W/ REFLEX TO MG FOR LOW K - Abnormal; Notable for the following components:    Sodium 132 (*)     Chloride 96 (*)     Alkaline Phosphatase 169 (*)     All other components within normal limits    Narrative:     Performed at:  [facility]. [facility]  [address] [place] [address]. [place], [place]   Phone [phone]   BLOOD GAS, VENOUS - Abnormal; Notable for the following components:    HCO3, Venous 22.9 (*)     Carboxyhemoglobin 3.0 (*)     All other components within normal limits    Narrative:     Performed at:  [facility]. [facility]  [address] [place] [address]. [place]   Phone [phone]   AMMONIA    Narrative:     Performed at:  [facility]. [facility]  [address] [place] [address]. [place]   Phone [phone]   URINE DRUG SCREEN    Narrative:     Performed at:  [facility]. [facility]  [address] [place] [address].  [place]   Phone [phone] Narrative:     Performed at:  [facility]. [facility]  [address],  [facility] [address]. [facility], [address]   Phone [phone]   TROPONIN    Narrative:     Performed at:  [facility]. [facility]  [address],  [facility] [address]. [place], [address]   Phone [phone]   TSH WITH REFLEX       All other labs were within normal range or not returned as of this dictation. EMERGENCY DEPARTMENT COURSE and DIFFERENTIAL DIAGNOSIS/MDM:   Morales Blancas is a 63 y.o. female who presents to the emergency department with the complaint of vague neurologic complaints. States that she is had increasing confusion over the last several days feels as if she may have a seizure, was admitted the end of February with hyponatremia thought to be secondary to polydipsia which resulted in seizure-like activity resulting in intubation and transfer. Work-up was essentially unremarkable aside from hyponatremia at that time. Does a history of COPD reports worsening shortness of breath over last 1 month now requiring oxygen 24/7, she is tachypneic in room she is diminished and she does have expiratory wheeze. She satting high percent on her baseline oxygen however. On neurologic exam she has a NIH is 0 she has no nystagmus no abnormal test of skew she has normal finger-to-nose heel-to-shin testing. See NIH documentation. Low suspicion for acute CVA in this patient based on physical exam.  She has vague complaints as well as family has vague complaints over increasing confusion, failure does state that patient occasionally has difficulty remembering some things however she over last 2 days is significantly worsening. On my evaluation she is oriented however does appear somewhat slow to respond to questions at times.   Vitals she is hypertensive at 196/88 without headaches vision change or focal deficit otherwise stable vitals on her baseline oxygen, she is mildly tachypneic however at times.    CT head negative for acute findings    Lab work reviewed no signs of urinary tract infection, drug screen negative, patient sodium today is improving today at 132, otherwise no significant electrolyte abnormality, normal renal function patient's troponin less than 0.01. There is no leukocytosis, H&H stable    At time of presentation here patient is at baseline with a normal NIH score normal neuro exam, her work-up today is reassuring with stable and reassuring vital signs. Patient was started on prednisone for COPD, however at time of discharge she was on her baseline oxygen satting in the upper 90%, tachypnea had improved. We will discharge patient follow-up with neurology, was given return precautions for CVA, TIA however at this time do not feel patient would benefit from hospitalization for further work-up at this time with reassuring evaluation            CRITICAL CARE TIME   Total Critical Care time was 0 minutes, excluding separately reportable procedures. There was a high probability of clinically significant/life threatening deterioration in the patient's condition which required my urgent intervention. Clinical concern   Intervention     CONSULTS:  None    PROCEDURES:  Unless otherwise noted below, none     Procedures        FINAL IMPRESSION      1. Chronic obstructive pulmonary disease, unspecified COPD type ([facility] [address]    2.  Disorientation          DISPOSITION/PLAN   DISPOSITION  NE      PATIENT REFERRED TO:  [facility] Emergency Department  [address] [address]  [phone]    If symptoms worsen    Robert Krueger MD  . [facility] [address] [address]  [phone]    Schedule an appointment as soon as possible for a visit today        DISCHARGE MEDICATIONS:  Discharge Medication List as of 3/18/2021 12:25 AM      START taking these medications    Details   predniSONE (DELTASONE) 10 MG tablet Take 4 tablets by mouth once daily for 5 days, Disp-20 tablet, R-0Normal Controlled Substances Monitoring:     No flowsheet data found.     (Please note that portions of this note were completed with a voice recognition program.  Efforts were made to edit the dictations but occasionally words are mis-transcribed.)    Elizabeth Rey DO (electronically signed)  Attending Emergency Physician            Elizabeth Rey DO  03/18/21 0133

## 2021-03-19 ENCOUNTER — CARE COORDINATION (OUTPATIENT)
Dept: CASE MANAGEMENT | Age: 66
End: 2021-03-19

## 2021-03-19 NOTE — CARE COORDINATION
Attempted to contact patient for ED f/u; phone rings and rings with no answer then call disconnects  Unable to leave voice message

## 2021-03-20 ENCOUNTER — CARE COORDINATION (OUTPATIENT)
Dept: CASE MANAGEMENT | Age: 66
End: 2021-03-20

## 2021-03-20 NOTE — CARE COORDINATION
Second attempt to contact patient unsuccessful; phone rings and rings with no answer  No further outreach scheduled with this ACM; episode of care resolved

## 2021-03-24 ENCOUNTER — TELEPHONE (OUTPATIENT)
Dept: ORTHOPEDIC SURGERY | Age: 66
End: 2021-03-24

## 2021-03-26 ENCOUNTER — TELEPHONE (OUTPATIENT)
Dept: ORTHOPEDIC SURGERY | Age: 66
End: 2021-03-26

## 2021-03-30 ENCOUNTER — TELEPHONE (OUTPATIENT)
Dept: PULMONOLOGY | Age: 66
End: 2021-03-30

## 2021-03-30 NOTE — TELEPHONE ENCOUNTER
Patient did not show for pft follow up appointment  with Dr. Kel Haider on 3/30/21\  Pt came to office 1 hour late for appointment  She stated she did not do PFT and is not going to do it. She stated she wants to see Dr. Kel Haider for shortness of breath and to get a poc that he said he would give her in the hospital    Same Day Cancellation: No    Patient rescheduled:  Yes    New appointment:  8/5/21    Patient was also no show on 2/14/20    LOV 2/16/21    Morales Blancas is a 63 y.o. female evaluated via telephone on 2/16/2021.       Consent:  She and/or health care decision maker is aware that that she may receive a bill for this telephone service, depending on her insurance coverage, and has provided verbal consent to proceed:  Yes        Documentation:  I communicated with the patient and/or health care decision maker about   C/o increased SOB   She quit smoking 3-4 weeks ago   Retry Spiriva   Continue PRN albuterol   R/s 6 MWT and f/u in office next month     I affirm this is a Patient Initiated Episode with a Patient who has not had a related appointment within my department in the past 7 days or scheduled within the next 24 hours.     Patient identification was verified at the start of the visit: Yes     Total Time: minutes: 11-20 minutes

## 2021-04-07 ENCOUNTER — OFFICE VISIT (OUTPATIENT)
Dept: ORTHOPEDIC SURGERY | Age: 66
End: 2021-04-07
Payer: MEDICARE

## 2021-04-07 VITALS — BODY MASS INDEX: 23.9 KG/M2 | WEIGHT: 140 LBS | HEIGHT: 64 IN

## 2021-04-07 DIAGNOSIS — S22.040A COMPRESSION FRACTURE OF T4 VERTEBRA, INITIAL ENCOUNTER (HCC): Primary | ICD-10-CM

## 2021-04-07 PROCEDURE — 3017F COLORECTAL CA SCREEN DOC REV: CPT | Performed by: PHYSICIAN ASSISTANT

## 2021-04-07 PROCEDURE — 1036F TOBACCO NON-USER: CPT | Performed by: PHYSICIAN ASSISTANT

## 2021-04-07 PROCEDURE — 99213 OFFICE O/P EST LOW 20 MIN: CPT | Performed by: PHYSICIAN ASSISTANT

## 2021-04-07 PROCEDURE — G8420 CALC BMI NORM PARAMETERS: HCPCS | Performed by: PHYSICIAN ASSISTANT

## 2021-04-07 PROCEDURE — G8427 DOCREV CUR MEDS BY ELIG CLIN: HCPCS | Performed by: PHYSICIAN ASSISTANT

## 2021-04-07 RX ORDER — TRAMADOL HYDROCHLORIDE 50 MG/1
50 TABLET ORAL NIGHTLY PRN
Qty: 14 TABLET | Refills: 0 | Status: SHIPPED | OUTPATIENT
Start: 2021-04-07 | End: 2021-04-21

## 2021-04-07 NOTE — PROGRESS NOTES
Follow-up: LUMBAR SPINE    Referring Provider:  Dr. Reina Martinez from [facility] ED    CHIEF COMPLAINT:    Chief Complaint   Patient presents with    Back Pain     thoracic compression fracture       HISTORY OF PRESENT ILLNESS:       Ms. Ashok Beaver  is a pleasant 63 y.o. female here for follow-up regarding her T4, T5, T6 compression fractures with kyphosis . Patient states she continues to experience pain, which is a 7-8/10, within her mid to upper back which is exacerbated with standing, walking, and prolonged sitting. She has been intermittently wearing her TLSO brace. She denies any upper extremity or lower extremity pain, weakness, or paresthesias. Patient states that any deep inspiration does increase her back pain and denies any bowel or bladder dysfunction. Patient has a significant history of COPD and has had a recent seizure and when she was seen in the local ED. She denies any recent exacerbation of her thoracic spine pain. Her pain began about 3 months ago after a fall. She notes she slipped on her floor and landed on her back. Her pain has steadily persisted since then. She rates her back pain 9/10 and leg pain 4/10. She describes the pain as aching. Her pain is localized to her mid thoracic spine and radiates to her left and right side. Pain is worse with walking and improved some with sitting or lying down. She denies lower extremity pain, numbness or weakness. She denies saddle numbness or bowel or bladder dysfunction. The pain occasionally disrupts her sleep.      Current/Past Treatment:   · Physical Therapy: No  · Chiropractic:  No   · Injection:  No   · Medications: None currently    Past Medical History:   Past Medical History:   Diagnosis Date    Angina pectoris (HCC)     Chronic pain     knees and lower back     COPD exacerbation ([facility] [address] 5/20/2018    Depression     Panic disorder     Pneumonia         Past Surgical History:     Past Surgical History:   Procedure Laterality Date    CHOLECYSTECTOMY         Current Medications:     Current Outpatient Medications:     tiotropium (SPIRIVA RESPIMAT) 2.5 MCG/ACT AERS inhaler, Inhale 2 puffs into the lungs daily, Disp: 1 Inhaler, Rfl: 5    loperamide (IMODIUM) 2 MG capsule, Take 1 capsule by mouth 4 times daily as needed for Diarrhea, Disp: 90 capsule, Rfl: 0    cetirizine (ZYRTEC) 10 MG tablet, Take 10 mg by mouth daily, Disp: , Rfl:     hydrOXYzine (ATARAX) 25 MG tablet, Take 25 mg by mouth 3 times daily as needed for Itching, Disp: , Rfl:     albuterol sulfate HFA (VENTOLIN HFA) 108 (90 Base) MCG/ACT inhaler, INHALE 2 PUFFS EVERY 6 HOURS AS NEEDED FOR WHEEZING OR SHORTNESS OF BREATH, Disp: 3 Inhaler, Rfl: 1    risperiDONE (RISPERDAL) 4 MG tablet, Take 4 mg by mouth nightly, Disp: , Rfl:     escitalopram (LEXAPRO) 20 MG tablet, Take 20 mg by mouth nightly, Disp: , Rfl:     Multiple Vitamins-Minerals (CENTRUM SILVER 50+WOMEN PO), Take 1 tablet by mouth daily, Disp: , Rfl:     oxybutynin (DITROPAN) 5 MG tablet, Take 5 mg by mouth 4 times daily, Disp: , Rfl:     Allergies:  Cephalosporins and Pcn [penicillins]    Social History:    reports that she has quit smoking. Her smoking use included cigarettes. She started smoking about 52 years ago. She has a 52.00 pack-year smoking history. She has never used smokeless tobacco. She reports that she does not drink alcohol or use drugs.     Family History:   Family History   Problem Relation Age of Onset   Yuko Manual COPD Mother     Hypertension Mother     Asthma Neg Hx     Cancer Neg Hx     Diabetes Neg Hx     Emphysema Neg Hx     Heart Failure Neg Hx        REVIEW OF SYSTEMS: Full ROS noted & scanned   CONSTITUTIONAL: Denies unexplained weight loss, fevers, chills or fatigue  NEUROLOGICAL: Denies unsteady gait or progressive weakness  MUSCULOSKELETAL: Denies joint swelling or redness  PSYCHOLOGICAL: Denies anxiety, depression   SKIN: Denies skin changes, delayed healing, rash, itching   HEMATOLOGIC: Denies easy bleeding or bruising  ENDOCRINE: Denies excessive thirst, urination, heat/cold  RESPIRATORY: Denies current dyspnea, cough  GI: Denies nausea, vomiting, diarrhea   : Denies bowel or bladder issues      PHYSICAL EXAM:    Vitals: Height 5' 4.02\" (1.626 m), weight 140 lb (63.5 kg), not currently breastfeeding. GENERAL EXAM:  · General Apparence: Patient is adequately groomed with no evidence of malnutrition. · Orientation: The patient is oriented to time, place and person. · Mood & Affect:The patient's mood and affect are appropriate. · Vascular: Examination reveals no swelling tenderness in upper or lower extremities. Good capillary refill. · Lymphatic: The lymphatic examination bilaterally reveals all areas to be without enlargement or induration  · Sensation: Sensation is intact without deficit  · Coordination/Balance: Good coordination. Tandem walking normal.     Thoracic-lumbar EXAMINATION:  · Inspection: Local inspection shows no step-off or bruising. Thoracic alignment reveals a kyphotic deformity. Sagittal and Coronal balance is neutral.      · Palpation:   No evidence of tenderness at the midline. No tenderness bilaterally at the paraspinal or trochanters. There is no step-off or paraspinal spasm. · Range of Motion: Lumbar flexion, extension and rotation are moderately limited due to pain and patient is being examined in wheelchair today. · Strength:   Strength testing is 5/5 in all muscle groups tested. · Special Tests:   Straight leg raise and crossed SLR negative. Leg length and pelvis level. · Skin: There are no rashes, ulcerations or lesions. · Reflexes: Reflexes are symmetrically 2+ at the patellar and ankle tendons. Clonus absent bilaterally at the feet. · Gait & station: Patient is able to walk short distances but uses a cane for ambulation and is presently in a wheelchair.     · Additional Examinations:   · RIGHT LOWER EXTREMITY: Inspection/examination of the right lower extremity does not show any tenderness, deformity or injury. Range of motion is unremarkable. There is no gross instability. There are no rashes, ulcerations or lesions. Strength and tone are normal.  · LEFT LOWER EXTREMITY:  Inspection/examination of the left lower extremity does not show any tenderness, deformity or injury. Range of motion is unremarkable. There is no gross instability. There are no rashes, ulcerations or lesions. Strength and tone are normal.    Diagnostic Testing:    I reviewed AP and lateral xray images of her thoracic spine that were obtained in the office today and compared to the x-rays from 1/6/2021, as well as from 12/30/20. They show age indeterminate T4, T5, and T6 compression fractures with moderate kyphosis. Impression:   Thoracic compression fractures    Plan:    We discussed treatment options including observation, a joelle orthosis or TLSO, or additional imaging for consideration of thoracic kyphoplasty. She wishes to proceed with thoracic MRI to evaluate the compression fractures in her thoracic spine. We did discuss further wear of the TLSO which she should be doing now and may take the brace off at night to sleep and bathe. She was given a short prescription of tramadol that she is to take at night to help her rest.    Follow-up: Patient will follow-up after the thoracic MRI for review the results and for further treatment discussion.     Total evaluation time 25 minutes    Patient examined and note dictated by Zoraida Jo PA-C.

## 2021-04-08 ENCOUNTER — TELEPHONE (OUTPATIENT)
Dept: ORTHOPEDIC SURGERY | Age: 66
End: 2021-04-08

## 2021-04-23 ENCOUNTER — TELEPHONE (OUTPATIENT)
Dept: ORTHOPEDIC SURGERY | Age: 66
End: 2021-04-23

## 2021-04-28 ENCOUNTER — HOSPITAL ENCOUNTER (OUTPATIENT)
Dept: MRI IMAGING | Age: 66
Discharge: HOME | End: 2021-04-28
Payer: MEDICARE

## 2021-04-28 ENCOUNTER — TELEPHONE (OUTPATIENT)
Dept: ORTHOPEDIC SURGERY | Age: 66
End: 2021-04-28

## 2021-04-28 DIAGNOSIS — S22.040A COMPRESSION FRACTURE OF T4 VERTEBRA, INITIAL ENCOUNTER (HCC): ICD-10-CM

## 2021-04-28 PROCEDURE — 72146 MRI CHEST SPINE W/O DYE: CPT

## 2021-08-03 ENCOUNTER — OFFICE VISIT (OUTPATIENT)
Dept: PULMONOLOGY | Age: 66
End: 2021-08-03
Payer: MEDICARE

## 2021-08-03 VITALS
BODY MASS INDEX: 22.57 KG/M2 | HEIGHT: 63 IN | DIASTOLIC BLOOD PRESSURE: 72 MMHG | WEIGHT: 127.4 LBS | OXYGEN SATURATION: 94 % | SYSTOLIC BLOOD PRESSURE: 138 MMHG | HEART RATE: 82 BPM | TEMPERATURE: 96.9 F

## 2021-08-03 DIAGNOSIS — Z72.0 TOBACCO USE: ICD-10-CM

## 2021-08-03 DIAGNOSIS — Z87.891 PERSONAL HISTORY OF TOBACCO USE, PRESENTING HAZARDS TO HEALTH: ICD-10-CM

## 2021-08-03 DIAGNOSIS — J44.9 CHRONIC OBSTRUCTIVE PULMONARY DISEASE, UNSPECIFIED COPD TYPE (HCC): Primary | ICD-10-CM

## 2021-08-03 PROCEDURE — 99214 OFFICE O/P EST MOD 30 MIN: CPT | Performed by: INTERNAL MEDICINE

## 2021-08-03 PROCEDURE — G8427 DOCREV CUR MEDS BY ELIG CLIN: HCPCS | Performed by: INTERNAL MEDICINE

## 2021-08-03 PROCEDURE — 1036F TOBACCO NON-USER: CPT | Performed by: INTERNAL MEDICINE

## 2021-08-03 PROCEDURE — 3017F COLORECTAL CA SCREEN DOC REV: CPT | Performed by: INTERNAL MEDICINE

## 2021-08-03 PROCEDURE — 3023F SPIROM DOC REV: CPT | Performed by: INTERNAL MEDICINE

## 2021-08-03 PROCEDURE — G8926 SPIRO NO PERF OR DOC: HCPCS | Performed by: INTERNAL MEDICINE

## 2021-08-03 PROCEDURE — G8420 CALC BMI NORM PARAMETERS: HCPCS | Performed by: INTERNAL MEDICINE

## 2021-08-03 NOTE — PROGRESS NOTES
[facility] Pulmonary, Critical Care, and Sleep    Outpatient Follow Up Note    CC: hospital stay and COPD  Consulting provider: Vel Tay MD    Interval History: 63 y.o. female   SOB is unchanged and remains improved since her hospitalization. Not using her rescue every day. Smoking 1/2 PPD. Quit smoking 1/2021    Initial HPI:Cough started about 2 months ago. Non-productive. Her cough improved but did not resolve after 2 weeks and she had another course of antibiotics and now the cough is mostly resolved. No fever. No wheezing. Her repeat CXR showed improvement but not resolution of the RML pneumonia. Denies SOB or CP. She is trying to quit smoking with patches. Smoked 405years at 1ppd. Her mother had COPD.    Current Medications:    Current Outpatient Medications:     Biotin 300 MCG TABS, Take 1 tablet by mouth daily, Disp: 30 tablet, Rfl: 3    tiotropium (SPIRIVA RESPIMAT) 2.5 MCG/ACT AERS inhaler, Inhale 2 puffs into the lungs daily, Disp: 1 Inhaler, Rfl: 5    loperamide (IMODIUM) 2 MG capsule, Take 1 capsule by mouth 4 times daily as needed for Diarrhea, Disp: 90 capsule, Rfl: 0    cetirizine (ZYRTEC) 10 MG tablet, Take 10 mg by mouth daily, Disp: , Rfl:     hydrOXYzine (ATARAX) 25 MG tablet, Take 25 mg by mouth 3 times daily as needed for Itching, Disp: , Rfl:     albuterol sulfate HFA (VENTOLIN HFA) 108 (90 Base) MCG/ACT inhaler, INHALE 2 PUFFS EVERY 6 HOURS AS NEEDED FOR WHEEZING OR SHORTNESS OF BREATH, Disp: 3 Inhaler, Rfl: 1    risperiDONE (RISPERDAL) 4 MG tablet, Take 4 mg by mouth nightly, Disp: , Rfl:     escitalopram (LEXAPRO) 20 MG tablet, Take 20 mg by mouth nightly, Disp: , Rfl:     Multiple Vitamins-Minerals (CENTRUM SILVER 50+WOMEN PO), Take 1 tablet by mouth daily, Disp: , Rfl:     oxybutynin (DITROPAN) 5 MG tablet, Take 5 mg by mouth 4 times daily, Disp: , Rfl:       Objective:   PHYSICAL EXAM:      /72   Pulse 82   Temp 96.9 °F (36.1 °C)   Ht 5' 3\" (1.6 m)   Wt 127 lb 6.4 oz (57.8 kg)   SpO2 94% Comment: with RA  BMI 22.57 kg/m²   Constitutional: In no acute distress. Appears stated age. Well developed and nourished  Eyes: PERRL. No sclera icterus. No conjunctival injection. ENT: Oropharynx clear. Neck: Trachea midline. No thyroid tenderness. Lymph: No cervical LAD. No supraclavicular LAD. Resp: No accessory muscle use. No crackles. No wheezes. No rhonchi. CV: Regular rate. Regular rhythm. No murmur or rub. No lower extremity edema. Skin: Warm and dry. No nodules on exposed extremities. No rash on exposed extremities. Musc: No clubbing. No cyanosis. No synovitis or joint deformity in digits. Psych: Oriented x 3. Mood and affect normal. Intact judgment and insight. LABS:  Reviewed any pertinent new labs that are available.     PFTs   4/21/16  FVC  (100%) FEV1 1.78 (71%) FEV1/FVC ratio 55  TLC  (106%)  RV  (118%)   DLCO (42%) Bronchodilator response: no   6MWT: 980 ft, 91%  7/13/18  FVC  (84%) FEV1 1.78 (58%) FEV1/FVC ratio 53  TLC  (103%)  RV  (149%)   DLCO (37%) Bronchodilator response: no   6 MWT:  1000ft, 88%    IMAGING:  I personally reviewed and interpreted the following today in the office:       ASSESSMENT:   Moderate COPD  Severe decrease in DLCO with advanced emphysema on Chest CT  Tobacco abuse in remission    PLAN:   Spiriva  PRN albuterol  LDCT lung cancer screening due 2/2021  PFT/6MWT

## 2021-10-26 RX ORDER — TIOTROPIUM BROMIDE INHALATION SPRAY 3.12 UG/1
SPRAY, METERED RESPIRATORY (INHALATION)
Qty: 4 G | Refills: 5 | Status: SHIPPED | OUTPATIENT
Start: 2021-10-26 | End: 2022-04-06 | Stop reason: ALTCHOICE

## 2021-11-01 ENCOUNTER — TELEPHONE (OUTPATIENT)
Dept: PULMONOLOGY | Age: 66
End: 2021-11-01

## 2021-11-01 NOTE — TELEPHONE ENCOUNTER
Patient cancelled appointment on 11/3/21 with Dr. Gary Rubi for 6mw/PFT f/u. Reason: patient did not complete covid test    Patient did not reschedule appointment. Patient to reschedule PFT and call back    Appointment rescheduled for n/a.      Last OV 8/3/21    ASSESSMENT:   · Moderate COPD  · Severe decrease in DLCO with advanced emphysema on Chest CT  · Tobacco abuse in remission     PLAN:   · Spiriva  · PRN albuterol  · LDCT lung cancer screening due 2/2021  · PFT/6MWT

## 2021-11-09 NOTE — TELEPHONE ENCOUNTER
August 6, 2021      Regan Healy  [address] [address]        To Whom It May Concern:    Regan Healy was seen on 8/6/2021.  Please excuse her until Monday 8/9/21 due to illness (not related to COVID-19).        Sincerely,        Marce Rosales, CNP     Called pt, no answer, VM not set up. Will try back at another time.

## 2021-11-15 NOTE — TELEPHONE ENCOUNTER
I have attempted without success to contact this patient by phone to will try again later unable to leave message vm box not set up.

## 2021-11-22 NOTE — TELEPHONE ENCOUNTER
Unable to reach patient to alejandrina appt and PFT; is maco for f/u with CT lung screen on 2/14/22.

## 2021-12-10 ENCOUNTER — APPOINTMENT (OUTPATIENT)
Dept: CT IMAGING | Age: 66
DRG: 193 | End: 2021-12-10
Payer: MEDICARE

## 2021-12-10 ENCOUNTER — APPOINTMENT (OUTPATIENT)
Dept: GENERAL RADIOLOGY | Age: 66
DRG: 193 | End: 2021-12-10
Payer: MEDICARE

## 2021-12-10 ENCOUNTER — HOSPITAL ENCOUNTER (INPATIENT)
Age: 66
LOS: 5 days | Discharge: HOME | DRG: 193 | End: 2021-12-15
Attending: EMERGENCY MEDICINE | Admitting: INTERNAL MEDICINE
Payer: MEDICARE

## 2021-12-10 DIAGNOSIS — Q45.3 PANCREATIC ABNORMALITY: ICD-10-CM

## 2021-12-10 DIAGNOSIS — J18.9 PNEUMONIA DUE TO INFECTIOUS ORGANISM, UNSPECIFIED LATERALITY, UNSPECIFIED PART OF LUNG: Primary | ICD-10-CM

## 2021-12-10 DIAGNOSIS — K83.8 COMMON BILE DUCT DILATATION: ICD-10-CM

## 2021-12-10 DIAGNOSIS — R10.9 RIGHT FLANK PAIN: ICD-10-CM

## 2021-12-10 DIAGNOSIS — Z99.81 SUPPLEMENTAL OXYGEN DEPENDENT: ICD-10-CM

## 2021-12-10 DIAGNOSIS — R06.00 DYSPNEA, UNSPECIFIED TYPE: ICD-10-CM

## 2021-12-10 DIAGNOSIS — R00.0 TACHYCARDIA: ICD-10-CM

## 2021-12-10 DIAGNOSIS — J44.1 COPD EXACERBATION (HCC): ICD-10-CM

## 2021-12-10 DIAGNOSIS — R91.1 LUNG NODULE: ICD-10-CM

## 2021-12-10 DIAGNOSIS — E87.1 HYPONATREMIA: ICD-10-CM

## 2021-12-10 PROBLEM — R74.01 TRANSAMINITIS: Status: ACTIVE | Noted: 2021-12-10

## 2021-12-10 LAB
A/G RATIO: 1.8 (ref 1.1–2.2)
ALBUMIN SERPL-MCNC: 4.9 G/DL (ref 3.4–5)
ALP BLD-CCNC: 163 U/L (ref 40–129)
ALT SERPL-CCNC: 46 U/L (ref 10–40)
ANION GAP SERPL CALCULATED.3IONS-SCNC: 10 MMOL/L (ref 3–16)
ANION GAP SERPL CALCULATED.3IONS-SCNC: 13 MMOL/L (ref 3–16)
AST SERPL-CCNC: 49 U/L (ref 15–37)
BACTERIA: ABNORMAL /HPF
BASOPHILS ABSOLUTE: 0 K/UL (ref 0–0.2)
BASOPHILS RELATIVE PERCENT: 0.2 %
BILIRUB SERPL-MCNC: 1 MG/DL (ref 0–1)
BILIRUBIN URINE: NEGATIVE
BLOOD, URINE: ABNORMAL
BUN BLDV-MCNC: 10 MG/DL (ref 7–20)
BUN BLDV-MCNC: 15 MG/DL (ref 7–20)
CALCIUM SERPL-MCNC: 8.8 MG/DL (ref 8.3–10.6)
CALCIUM SERPL-MCNC: 9.8 MG/DL (ref 8.3–10.6)
CHLORIDE BLD-SCNC: 87 MMOL/L (ref 99–110)
CHLORIDE BLD-SCNC: 92 MMOL/L (ref 99–110)
CLARITY: CLEAR
CO2: 20 MMOL/L (ref 21–32)
CO2: 26 MMOL/L (ref 21–32)
COLOR: YELLOW
CREAT SERPL-MCNC: 0.6 MG/DL (ref 0.6–1.2)
CREAT SERPL-MCNC: 0.6 MG/DL (ref 0.6–1.2)
D DIMER: 394 NG/ML DDU (ref 0–229)
EKG ATRIAL RATE: 115 BPM
EKG DIAGNOSIS: NORMAL
EKG P AXIS: 76 DEGREES
EKG P-R INTERVAL: 152 MS
EKG Q-T INTERVAL: 298 MS
EKG QRS DURATION: 68 MS
EKG QTC CALCULATION (BAZETT): 412 MS
EKG R AXIS: 17 DEGREES
EKG T AXIS: 76 DEGREES
EKG VENTRICULAR RATE: 115 BPM
EOSINOPHILS ABSOLUTE: 0 K/UL (ref 0–0.6)
EOSINOPHILS RELATIVE PERCENT: 0.1 %
EPITHELIAL CELLS, UA: ABNORMAL /HPF (ref 0–5)
GFR AFRICAN AMERICAN: >60
GFR AFRICAN AMERICAN: >60
GFR NON-AFRICAN AMERICAN: >60
GFR NON-AFRICAN AMERICAN: >60
GLUCOSE BLD-MCNC: 159 MG/DL (ref 70–99)
GLUCOSE BLD-MCNC: 166 MG/DL (ref 70–99)
GLUCOSE URINE: NEGATIVE MG/DL
HCT VFR BLD CALC: 29.8 % (ref 36–48)
HCT VFR BLD CALC: 33.4 % (ref 36–48)
HEMOGLOBIN: 10.1 G/DL (ref 12–16)
HEMOGLOBIN: 11.4 G/DL (ref 12–16)
KETONES, URINE: NEGATIVE MG/DL
LACTIC ACID: 1.9 MMOL/L (ref 0.4–2)
LACTIC ACID: 2.9 MMOL/L (ref 0.4–2)
LACTIC ACID: 3.3 MMOL/L (ref 0.4–2)
LACTIC ACID: 3.3 MMOL/L (ref 0.4–2)
LEUKOCYTE ESTERASE, URINE: NEGATIVE
LIPASE: 28 U/L (ref 13–60)
LYMPHOCYTES ABSOLUTE: 0.5 K/UL (ref 1–5.1)
LYMPHOCYTES RELATIVE PERCENT: 2.8 %
MCH RBC QN AUTO: 33.1 PG (ref 26–34)
MCH RBC QN AUTO: 33.4 PG (ref 26–34)
MCHC RBC AUTO-ENTMCNC: 33.9 G/DL (ref 31–36)
MCHC RBC AUTO-ENTMCNC: 34.1 G/DL (ref 31–36)
MCV RBC AUTO: 97 FL (ref 80–100)
MCV RBC AUTO: 98.4 FL (ref 80–100)
MICROSCOPIC EXAMINATION: YES
MONOCYTES ABSOLUTE: 1.2 K/UL (ref 0–1.3)
MONOCYTES RELATIVE PERCENT: 6.6 %
NEUTROPHILS ABSOLUTE: 16.5 K/UL (ref 1.7–7.7)
NEUTROPHILS RELATIVE PERCENT: 90.3 %
NITRITE, URINE: NEGATIVE
OSMOLALITY: 267 MOSM/KG (ref 280–301)
PDW BLD-RTO: 12 % (ref 12.4–15.4)
PDW BLD-RTO: 12.3 % (ref 12.4–15.4)
PH UA: 6 (ref 5–8)
PLATELET # BLD: 214 K/UL (ref 135–450)
PLATELET # BLD: 265 K/UL (ref 135–450)
PMV BLD AUTO: 6.8 FL (ref 5–10.5)
PMV BLD AUTO: 6.9 FL (ref 5–10.5)
POTASSIUM REFLEX MAGNESIUM: 4.1 MMOL/L (ref 3.5–5.1)
POTASSIUM REFLEX MAGNESIUM: 4.7 MMOL/L (ref 3.5–5.1)
PRO-BNP: 83 PG/ML (ref 0–124)
PROCALCITONIN: 0.4 NG/ML (ref 0–0.15)
PROTEIN UA: NEGATIVE MG/DL
RAPID INFLUENZA  B AGN: NEGATIVE
RAPID INFLUENZA A AGN: NEGATIVE
RBC # BLD: 3.03 M/UL (ref 4–5.2)
RBC # BLD: 3.44 M/UL (ref 4–5.2)
RBC UA: ABNORMAL /HPF (ref 0–4)
SARS-COV-2, NAAT: NOT DETECTED
SODIUM BLD-SCNC: 123 MMOL/L (ref 136–145)
SODIUM BLD-SCNC: 125 MMOL/L (ref 136–145)
SPECIFIC GRAVITY UA: 1.01 (ref 1–1.03)
TOTAL PROTEIN: 7.7 G/DL (ref 6.4–8.2)
TROPONIN: <0.01 NG/ML
TSH REFLEX: 0.17 UIU/ML (ref 0.27–4.2)
URIC ACID, SERUM: 2.5 MG/DL (ref 2.6–6)
URINE REFLEX TO CULTURE: ABNORMAL
URINE TYPE: ABNORMAL
UROBILINOGEN, URINE: 0.2 E.U./DL
WBC # BLD: 18.2 K/UL (ref 4–11)
WBC # BLD: 20.7 K/UL (ref 4–11)
WBC UA: ABNORMAL /HPF (ref 0–5)

## 2021-12-10 PROCEDURE — 84145 PROCALCITONIN (PCT): CPT

## 2021-12-10 PROCEDURE — 84550 ASSAY OF BLOOD/URIC ACID: CPT

## 2021-12-10 PROCEDURE — 2580000003 HC RX 258: Performed by: NURSE PRACTITIONER

## 2021-12-10 PROCEDURE — 36415 COLL VENOUS BLD VENIPUNCTURE: CPT

## 2021-12-10 PROCEDURE — 83930 ASSAY OF BLOOD OSMOLALITY: CPT

## 2021-12-10 PROCEDURE — 2700000000 HC OXYGEN THERAPY PER DAY

## 2021-12-10 PROCEDURE — 81001 URINALYSIS AUTO W/SCOPE: CPT

## 2021-12-10 PROCEDURE — 99223 1ST HOSP IP/OBS HIGH 75: CPT | Performed by: PHYSICIAN ASSISTANT

## 2021-12-10 PROCEDURE — 99285 EMERGENCY DEPT VISIT HI MDM: CPT

## 2021-12-10 PROCEDURE — 85027 COMPLETE CBC AUTOMATED: CPT

## 2021-12-10 PROCEDURE — 85025 COMPLETE CBC W/AUTO DIFF WBC: CPT

## 2021-12-10 PROCEDURE — 83690 ASSAY OF LIPASE: CPT

## 2021-12-10 PROCEDURE — 6370000000 HC RX 637 (ALT 250 FOR IP): Performed by: NURSE PRACTITIONER

## 2021-12-10 PROCEDURE — 83605 ASSAY OF LACTIC ACID: CPT

## 2021-12-10 PROCEDURE — 94640 AIRWAY INHALATION TREATMENT: CPT

## 2021-12-10 PROCEDURE — 6360000002 HC RX W HCPCS: Performed by: NURSE PRACTITIONER

## 2021-12-10 PROCEDURE — 99222 1ST HOSP IP/OBS MODERATE 55: CPT | Performed by: INTERNAL MEDICINE

## 2021-12-10 PROCEDURE — 6370000000 HC RX 637 (ALT 250 FOR IP): Performed by: EMERGENCY MEDICINE

## 2021-12-10 PROCEDURE — 87635 SARS-COV-2 COVID-19 AMP PRB: CPT

## 2021-12-10 PROCEDURE — 96375 TX/PRO/DX INJ NEW DRUG ADDON: CPT

## 2021-12-10 PROCEDURE — 84484 ASSAY OF TROPONIN QUANT: CPT

## 2021-12-10 PROCEDURE — 6370000000 HC RX 637 (ALT 250 FOR IP)

## 2021-12-10 PROCEDURE — 93005 ELECTROCARDIOGRAM TRACING: CPT | Performed by: EMERGENCY MEDICINE

## 2021-12-10 PROCEDURE — 6370000000 HC RX 637 (ALT 250 FOR IP): Performed by: INTERNAL MEDICINE

## 2021-12-10 PROCEDURE — 94761 N-INVAS EAR/PLS OXIMETRY MLT: CPT

## 2021-12-10 PROCEDURE — 2580000003 HC RX 258: Performed by: EMERGENCY MEDICINE

## 2021-12-10 PROCEDURE — 87804 INFLUENZA ASSAY W/OPTIC: CPT

## 2021-12-10 PROCEDURE — 85379 FIBRIN DEGRADATION QUANT: CPT

## 2021-12-10 PROCEDURE — 6360000002 HC RX W HCPCS: Performed by: EMERGENCY MEDICINE

## 2021-12-10 PROCEDURE — 84443 ASSAY THYROID STIM HORMONE: CPT

## 2021-12-10 PROCEDURE — 2060000000 HC ICU INTERMEDIATE R&B

## 2021-12-10 PROCEDURE — 71045 X-RAY EXAM CHEST 1 VIEW: CPT

## 2021-12-10 PROCEDURE — 71260 CT THORAX DX C+: CPT

## 2021-12-10 PROCEDURE — 80053 COMPREHEN METABOLIC PANEL: CPT

## 2021-12-10 PROCEDURE — 93010 ELECTROCARDIOGRAM REPORT: CPT | Performed by: INTERNAL MEDICINE

## 2021-12-10 PROCEDURE — 6360000004 HC RX CONTRAST MEDICATION: Performed by: EMERGENCY MEDICINE

## 2021-12-10 PROCEDURE — 96361 HYDRATE IV INFUSION ADD-ON: CPT

## 2021-12-10 PROCEDURE — 83880 ASSAY OF NATRIURETIC PEPTIDE: CPT

## 2021-12-10 PROCEDURE — 87040 BLOOD CULTURE FOR BACTERIA: CPT

## 2021-12-10 PROCEDURE — 96365 THER/PROPH/DIAG IV INF INIT: CPT

## 2021-12-10 PROCEDURE — 74177 CT ABD & PELVIS W/CONTRAST: CPT

## 2021-12-10 RX ORDER — CETIRIZINE HYDROCHLORIDE 10 MG/1
10 TABLET ORAL DAILY
Status: DISCONTINUED | OUTPATIENT
Start: 2021-12-10 | End: 2021-12-15 | Stop reason: HOSPADM

## 2021-12-10 RX ORDER — SODIUM CHLORIDE 9 MG/ML
25 INJECTION, SOLUTION INTRAVENOUS PRN
Status: DISCONTINUED | OUTPATIENT
Start: 2021-12-10 | End: 2021-12-15 | Stop reason: HOSPADM

## 2021-12-10 RX ORDER — LISINOPRIL 5 MG/1
2.5 TABLET ORAL DAILY
Status: DISCONTINUED | OUTPATIENT
Start: 2021-12-10 | End: 2021-12-15 | Stop reason: HOSPADM

## 2021-12-10 RX ORDER — ALBUTEROL SULFATE 90 UG/1
2 AEROSOL, METERED RESPIRATORY (INHALATION) EVERY 4 HOURS PRN
Status: DISCONTINUED | OUTPATIENT
Start: 2021-12-10 | End: 2021-12-15 | Stop reason: HOSPADM

## 2021-12-10 RX ORDER — SODIUM CHLORIDE 9 MG/ML
INJECTION, SOLUTION INTRAVENOUS CONTINUOUS
Status: DISCONTINUED | OUTPATIENT
Start: 2021-12-10 | End: 2021-12-10

## 2021-12-10 RX ORDER — OXYBUTYNIN CHLORIDE 5 MG/1
5 TABLET ORAL 4 TIMES DAILY
Status: DISCONTINUED | OUTPATIENT
Start: 2021-12-10 | End: 2021-12-15 | Stop reason: HOSPADM

## 2021-12-10 RX ORDER — IPRATROPIUM BROMIDE AND ALBUTEROL SULFATE 2.5; .5 MG/3ML; MG/3ML
3 SOLUTION RESPIRATORY (INHALATION) ONCE
Status: COMPLETED | OUTPATIENT
Start: 2021-12-10 | End: 2021-12-10

## 2021-12-10 RX ORDER — 0.9 % SODIUM CHLORIDE 0.9 %
1000 INTRAVENOUS SOLUTION INTRAVENOUS ONCE
Status: COMPLETED | OUTPATIENT
Start: 2021-12-10 | End: 2021-12-10

## 2021-12-10 RX ORDER — POLYETHYLENE GLYCOL 3350 17 G/17G
17 POWDER, FOR SOLUTION ORAL DAILY PRN
Status: DISCONTINUED | OUTPATIENT
Start: 2021-12-10 | End: 2021-12-15 | Stop reason: HOSPADM

## 2021-12-10 RX ORDER — OXYCODONE HYDROCHLORIDE AND ACETAMINOPHEN 5; 325 MG/1; MG/1
1 TABLET ORAL ONCE
Status: COMPLETED | OUTPATIENT
Start: 2021-12-10 | End: 2021-12-10

## 2021-12-10 RX ORDER — SODIUM CHLORIDE 0.9 % (FLUSH) 0.9 %
5-40 SYRINGE (ML) INJECTION EVERY 12 HOURS SCHEDULED
Status: DISCONTINUED | OUTPATIENT
Start: 2021-12-10 | End: 2021-12-15 | Stop reason: HOSPADM

## 2021-12-10 RX ORDER — HYDROCHLOROTHIAZIDE 25 MG/1
25 TABLET ORAL DAILY
Status: ON HOLD | COMMUNITY
End: 2021-12-15 | Stop reason: HOSPADM

## 2021-12-10 RX ORDER — IPRATROPIUM BROMIDE AND ALBUTEROL SULFATE 2.5; .5 MG/3ML; MG/3ML
1 SOLUTION RESPIRATORY (INHALATION) 3 TIMES DAILY
Status: DISCONTINUED | OUTPATIENT
Start: 2021-12-11 | End: 2021-12-15 | Stop reason: HOSPADM

## 2021-12-10 RX ORDER — ONDANSETRON 2 MG/ML
4 INJECTION INTRAMUSCULAR; INTRAVENOUS EVERY 6 HOURS PRN
Status: DISCONTINUED | OUTPATIENT
Start: 2021-12-10 | End: 2021-12-15 | Stop reason: HOSPADM

## 2021-12-10 RX ORDER — ACETAMINOPHEN 650 MG/1
650 SUPPOSITORY RECTAL EVERY 6 HOURS PRN
Status: DISCONTINUED | OUTPATIENT
Start: 2021-12-10 | End: 2021-12-15 | Stop reason: HOSPADM

## 2021-12-10 RX ORDER — ACETAMINOPHEN 325 MG/1
650 TABLET ORAL EVERY 6 HOURS PRN
Status: DISCONTINUED | OUTPATIENT
Start: 2021-12-10 | End: 2021-12-15 | Stop reason: HOSPADM

## 2021-12-10 RX ORDER — ONDANSETRON 4 MG/1
4 TABLET, ORALLY DISINTEGRATING ORAL EVERY 8 HOURS PRN
Status: DISCONTINUED | OUTPATIENT
Start: 2021-12-10 | End: 2021-12-15 | Stop reason: HOSPADM

## 2021-12-10 RX ORDER — IPRATROPIUM BROMIDE AND ALBUTEROL SULFATE 2.5; .5 MG/3ML; MG/3ML
SOLUTION RESPIRATORY (INHALATION)
Status: DISPENSED
Start: 2021-12-10 | End: 2021-12-10

## 2021-12-10 RX ORDER — OXYCODONE HYDROCHLORIDE AND ACETAMINOPHEN 5; 325 MG/1; MG/1
1 TABLET ORAL EVERY 6 HOURS PRN
Status: DISCONTINUED | OUTPATIENT
Start: 2021-12-10 | End: 2021-12-15 | Stop reason: HOSPADM

## 2021-12-10 RX ORDER — ESCITALOPRAM OXALATE 10 MG/1
20 TABLET ORAL NIGHTLY
Status: DISCONTINUED | OUTPATIENT
Start: 2021-12-10 | End: 2021-12-15 | Stop reason: HOSPADM

## 2021-12-10 RX ORDER — LEVOFLOXACIN 5 MG/ML
750 INJECTION, SOLUTION INTRAVENOUS EVERY 24 HOURS
Status: DISCONTINUED | OUTPATIENT
Start: 2021-12-11 | End: 2021-12-14

## 2021-12-10 RX ORDER — IPRATROPIUM BROMIDE AND ALBUTEROL SULFATE 2.5; .5 MG/3ML; MG/3ML
1 SOLUTION RESPIRATORY (INHALATION)
Status: DISCONTINUED | OUTPATIENT
Start: 2021-12-10 | End: 2021-12-10

## 2021-12-10 RX ORDER — KETOROLAC TROMETHAMINE 30 MG/ML
15 INJECTION, SOLUTION INTRAMUSCULAR; INTRAVENOUS ONCE
Status: COMPLETED | OUTPATIENT
Start: 2021-12-10 | End: 2021-12-10

## 2021-12-10 RX ORDER — METHYLPREDNISOLONE SODIUM SUCCINATE 125 MG/2ML
60 INJECTION, POWDER, LYOPHILIZED, FOR SOLUTION INTRAMUSCULAR; INTRAVENOUS ONCE
Status: COMPLETED | OUTPATIENT
Start: 2021-12-10 | End: 2021-12-10

## 2021-12-10 RX ORDER — METHYLPREDNISOLONE SODIUM SUCCINATE 40 MG/ML
40 INJECTION, POWDER, LYOPHILIZED, FOR SOLUTION INTRAMUSCULAR; INTRAVENOUS EVERY 12 HOURS
Status: DISCONTINUED | OUTPATIENT
Start: 2021-12-10 | End: 2021-12-13

## 2021-12-10 RX ORDER — LEVOFLOXACIN 5 MG/ML
750 INJECTION, SOLUTION INTRAVENOUS ONCE
Status: COMPLETED | OUTPATIENT
Start: 2021-12-10 | End: 2021-12-10

## 2021-12-10 RX ORDER — ACETAMINOPHEN 325 MG/1
TABLET ORAL
Status: COMPLETED
Start: 2021-12-10 | End: 2021-12-10

## 2021-12-10 RX ORDER — HYDROXYZINE PAMOATE 25 MG/1
25 CAPSULE ORAL 3 TIMES DAILY PRN
Status: DISCONTINUED | OUTPATIENT
Start: 2021-12-10 | End: 2021-12-11

## 2021-12-10 RX ORDER — SODIUM CHLORIDE 0.9 % (FLUSH) 0.9 %
5-40 SYRINGE (ML) INJECTION PRN
Status: DISCONTINUED | OUTPATIENT
Start: 2021-12-10 | End: 2021-12-15 | Stop reason: HOSPADM

## 2021-12-10 RX ORDER — LISINOPRIL 2.5 MG/1
2.5 TABLET ORAL DAILY
Status: ON HOLD | COMMUNITY
End: 2021-12-15 | Stop reason: HOSPADM

## 2021-12-10 RX ADMIN — OXYCODONE HYDROCHLORIDE AND ACETAMINOPHEN 1 TABLET: 5; 325 TABLET ORAL at 18:30

## 2021-12-10 RX ADMIN — METHYLPREDNISOLONE SODIUM SUCCINATE 60 MG: 125 INJECTION, POWDER, FOR SOLUTION INTRAMUSCULAR; INTRAVENOUS at 05:12

## 2021-12-10 RX ADMIN — CETIRIZINE HYDROCHLORIDE 10 MG: 10 TABLET ORAL at 16:08

## 2021-12-10 RX ADMIN — METHYLPREDNISOLONE SODIUM SUCCINATE 40 MG: 40 INJECTION, POWDER, FOR SOLUTION INTRAMUSCULAR; INTRAVENOUS at 18:30

## 2021-12-10 RX ADMIN — SODIUM CHLORIDE 1000 ML: 9 INJECTION, SOLUTION INTRAVENOUS at 07:04

## 2021-12-10 RX ADMIN — IOPAMIDOL 75 ML: 755 INJECTION, SOLUTION INTRAVENOUS at 05:57

## 2021-12-10 RX ADMIN — ENOXAPARIN SODIUM 40 MG: 100 INJECTION SUBCUTANEOUS at 16:08

## 2021-12-10 RX ADMIN — IPRATROPIUM BROMIDE AND ALBUTEROL SULFATE 1 AMPULE: .5; 2.5 SOLUTION RESPIRATORY (INHALATION) at 11:46

## 2021-12-10 RX ADMIN — IPRATROPIUM BROMIDE AND ALBUTEROL SULFATE 1 AMPULE: .5; 2.5 SOLUTION RESPIRATORY (INHALATION) at 19:29

## 2021-12-10 RX ADMIN — LISINOPRIL 2.5 MG: 5 TABLET ORAL at 16:08

## 2021-12-10 RX ADMIN — SODIUM CHLORIDE 1000 ML: 9 INJECTION, SOLUTION INTRAVENOUS at 09:32

## 2021-12-10 RX ADMIN — OXYBUTYNIN CHLORIDE 5 MG: 5 TABLET ORAL at 20:57

## 2021-12-10 RX ADMIN — OXYBUTYNIN CHLORIDE 5 MG: 5 TABLET ORAL at 16:08

## 2021-12-10 RX ADMIN — ACETAMINOPHEN 650 MG: 325 TABLET ORAL at 11:59

## 2021-12-10 RX ADMIN — ESCITALOPRAM OXALATE 20 MG: 10 TABLET ORAL at 20:57

## 2021-12-10 RX ADMIN — SODIUM CHLORIDE: 9 INJECTION, SOLUTION INTRAVENOUS at 14:42

## 2021-12-10 RX ADMIN — KETOROLAC TROMETHAMINE 15 MG: 30 INJECTION, SOLUTION INTRAMUSCULAR at 05:12

## 2021-12-10 RX ADMIN — SODIUM CHLORIDE 1000 ML: 9 INJECTION, SOLUTION INTRAVENOUS at 06:14

## 2021-12-10 RX ADMIN — IPRATROPIUM BROMIDE AND ALBUTEROL SULFATE 1 AMPULE: .5; 2.5 SOLUTION RESPIRATORY (INHALATION) at 15:37

## 2021-12-10 RX ADMIN — OXYCODONE HYDROCHLORIDE AND ACETAMINOPHEN 1 TABLET: 5; 325 TABLET ORAL at 08:25

## 2021-12-10 RX ADMIN — Medication 10 ML: at 20:57

## 2021-12-10 RX ADMIN — IPRATROPIUM BROMIDE AND ALBUTEROL SULFATE 3 ML: .5; 2.5 SOLUTION RESPIRATORY (INHALATION) at 05:11

## 2021-12-10 RX ADMIN — LEVOFLOXACIN 750 MG: 5 INJECTION, SOLUTION INTRAVENOUS at 07:03

## 2021-12-10 ASSESSMENT — ENCOUNTER SYMPTOMS
GASTROINTESTINAL NEGATIVE: 1
SHORTNESS OF BREATH: 1
COUGH: 1

## 2021-12-10 ASSESSMENT — PAIN DESCRIPTION - DESCRIPTORS
DESCRIPTORS: ACHING
DESCRIPTORS: SHOOTING
DESCRIPTORS: ACHING
DESCRIPTORS: STABBING;THROBBING

## 2021-12-10 ASSESSMENT — PAIN DESCRIPTION - LOCATION
LOCATION: RIB CAGE

## 2021-12-10 ASSESSMENT — PAIN DESCRIPTION - FREQUENCY
FREQUENCY: CONTINUOUS

## 2021-12-10 ASSESSMENT — PAIN DESCRIPTION - ORIENTATION
ORIENTATION: RIGHT

## 2021-12-10 ASSESSMENT — PAIN SCALES - GENERAL
PAINLEVEL_OUTOF10: 4
PAINLEVEL_OUTOF10: 7
PAINLEVEL_OUTOF10: 9
PAINLEVEL_OUTOF10: 5
PAINLEVEL_OUTOF10: 7
PAINLEVEL_OUTOF10: 10

## 2021-12-10 ASSESSMENT — PAIN DESCRIPTION - PAIN TYPE
TYPE: ACUTE PAIN
TYPE: CHRONIC PAIN

## 2021-12-10 NOTE — ED PROVIDER NOTES
CHIEF COMPLAINT  Shortness of Breath (x 2 days)      HISTORY OF PRESENT ILLNESS  Aguila Kam is a 61 y.o. female presents to the ED with shortness of breath, the past couple days, also having right flank/side pain, has been coughing a lot, some productive sputum, no known fever, no upper/anterior chest pain, has been using her albuterol inhaler numerous times since this started without much improvement, she has not been on any steroids or antibiotics recently, she is on 2 L nasal cannula supplemental oxygen most of the time, she has a history of COPD, quit smoking almost a year ago, no vomiting/diarrhea nor left-sided abdominal pain, she has had a cholecystectomy, no headache or neck stiffness. No other complaints, modifying factors or associated symptoms. I have reviewed the following from the nursing documentation. Past Medical History:   Diagnosis Date    Angina pectoris (HCC)     Chronic pain     knees and lower back     COPD exacerbation ([facility] [address] 2018    Depression     Panic disorder     Pneumonia      Past Surgical History:   Procedure Laterality Date    CHOLECYSTECTOMY       Family History   Problem Relation Age of Onset    COPD Mother     Hypertension Mother     Asthma Neg Hx     Cancer Neg Hx     Diabetes Neg Hx     Emphysema Neg Hx     Heart Failure Neg Hx      Social History     Socioeconomic History    Marital status:       Spouse name: Not on file    Number of children: 10    Years of education: Not on file    Highest education level: Not on file   Occupational History    Not on file   Tobacco Use    Smoking status: Former Smoker     Packs/day: 1.00     Years: 52.00     Pack years: 52.00     Types: Cigarettes     Start date:      Quit date:      Years since quittin.9    Smokeless tobacco: Never Used   Vaping Use    Vaping Use: Never used   Substance and Sexual Activity    Alcohol use: No     Alcohol/week: 12.0 standard drinks     Types: 12 Cans of beer per week    Drug use: No    Sexual activity: Never   Other Topics Concern    Not on file   Social History Narrative    Not on file     Social Determinants of Health     Financial Resource Strain:     Difficulty of Paying Living Expenses: Not on file   Food Insecurity:     Worried About Running Out of Food in the Last Year: Not on file    Munira of Food in the Last Year: Not on file   Transportation Needs:     Lack of Transportation (Medical): Not on file    Lack of Transportation (Non-Medical): Not on file   Physical Activity:     Days of Exercise per Week: Not on file    Minutes of Exercise per Session: Not on file   Stress:     Feeling of Stress : Not on file   Social Connections:     Frequency of Communication with Friends and Family: Not on file    Frequency of Social Gatherings with Friends and Family: Not on file    Attends Evangelical Services: Not on file    Active Member of [address] [place] [address] [employer] or Organizations: Not on file    Attends Club or Organization Meetings: Not on file    Marital Status: Not on file   Intimate Partner Violence:     Fear of Current or Ex-Partner: Not on file    Emotionally Abused: Not on file    Physically Abused: Not on file    Sexually Abused: Not on file   Housing Stability:     Unable to Pay for Housing in the Last Year: Not on file    Number of [place] in the Last Year: Not on file    Unstable Housing in the Last Year: Not on file     No current facility-administered medications for this encounter.      Current Outpatient Medications   Medication Sig Dispense Refill    Misc Natural Products (GLUCOSAMINE CHOND CMP ADVANCED PO) Take by mouth      lisinopril (PRINIVIL;ZESTRIL) 2.5 MG tablet Take 2.5 mg by mouth daily      hydroCHLOROthiazide (HYDRODIURIL) 25 MG tablet Take 25 mg by mouth daily      SPIRIVA RESPIMAT 2.5 MCG/ACT AERS inhaler INHALE 2 PUFFS INTO THE LUNGS DAILY 4 g 5    albuterol sulfate  (90 Base) MCG/ACT inhaler Inhale 2 puffs into the lungs every 6 hours as needed for Wheezing orShortness of Breath 3 Inhaler 5    Biotin 300 MCG TABS Take 1 tablet by mouth daily 30 tablet 3    loperamide (IMODIUM) 2 MG capsule Take 1 capsule by mouth 4 times daily as needed for Diarrhea 90 capsule 0    cetirizine (ZYRTEC) 10 MG tablet Take 10 mg by mouth daily      hydrOXYzine (ATARAX) 25 MG tablet Take 25 mg by mouth 3 times daily as needed for Itching      escitalopram (LEXAPRO) 20 MG tablet Take 20 mg by mouth nightly      Multiple Vitamins-Minerals (CENTRUM SILVER 50+WOMEN PO) Take 1 tablet by mouth daily      oxybutynin (DITROPAN) 5 MG tablet Take 5 mg by mouth 4 times daily       Allergies   Allergen Reactions    Cephalosporins Shortness Of Breath    Pcn [Penicillins] Anaphylaxis and Hives       REVIEW OF SYSTEMS  10 systems reviewed, pertinent positives per HPI otherwise noted to be negative. PHYSICAL EXAM  BP (!) 141/80   Pulse 114   Temp 98.1 °F (36.7 °C) (Axillary)   Resp 19   Ht 5' 3\" (1.6 m)   Wt 135 lb (61.2 kg)   SpO2 93%   BMI 23.91 kg/m²   GENERAL APPEARANCE: Awake and alert. Cooperative. Mild respiratory distress  HEAD: Normocephalic. Atraumatic. EYES: PERRL. EOM's grossly intact. No conjunctival injection  ENT: Mucous membranes are tacky, airway patent, no stridor, midline uvula, no exudates, TMs without bulging/erythema/edema.,  Wearing O2 nasal cannula  NECK: Supple. No rigidity, no JVD  HEART: RRR. No murmurs  LUNGS: Respirations slightly labored, tachypneic in the 20s, and increased work of breathing with use of accessory muscles. Lungs are with coarse breath sounds, diminished throughout, scattered wheezes, no crackles or rhonchi noted, but poor inspiratory depth. ABDOMEN: Soft. Non-distended. Right flank/right CVA tenderness to palpation, right upper quadrant tenderness with fullness in the area. No guarding or rebound. Normal bowel sounds.   EXTREMITIES: 1+ pitting lower extremity edema, left greater than right, patient reports is chronic. Moves all extremities equally. All extremities neurovascularly intact. SKIN: Warm and dry. No acute rashes. NEUROLOGICAL: Alert and oriented. No gross facial drooping. Strength 5/5, sensation intact. No truncal ataxia. Normal speech, steady gait  PSYCHIATRIC: Normal mood and affect. LABS  I have reviewed all labs for this visit.    Results for orders placed or performed during the hospital encounter of 12/10/21   Rapid influenza A/B antigens    Specimen: Nasopharyngeal   Result Value Ref Range    Rapid Influenza A Ag Negative Negative    Rapid Influenza B Ag Negative Negative   COVID-19, Rapid    Specimen: Nasopharyngeal Swab   Result Value Ref Range    SARS-CoV-2, NAAT Not Detected Not Detected   CBC Auto Differential   Result Value Ref Range    WBC 18.2 (H) 4.0 - 11.0 K/uL    RBC 3.44 (L) 4.00 - 5.20 M/uL    Hemoglobin 11.4 (L) 12.0 - 16.0 g/dL    Hematocrit 33.4 (L) 36.0 - 48.0 %    MCV 97.0 80.0 - 100.0 fL    MCH 33.1 26.0 - 34.0 pg    MCHC 34.1 31.0 - 36.0 g/dL    RDW 12.0 (L) 12.4 - 15.4 %    Platelets 401 469 - 011 K/uL    MPV 6.8 5.0 - 10.5 fL    Neutrophils % 90.3 %    Lymphocytes % 2.8 %    Monocytes % 6.6 %    Eosinophils % 0.1 %    Basophils % 0.2 %    Neutrophils Absolute 16.5 (H) 1.7 - 7.7 K/uL    Lymphocytes Absolute 0.5 (L) 1.0 - 5.1 K/uL    Monocytes Absolute 1.2 0.0 - 1.3 K/uL    Eosinophils Absolute 0.0 0.0 - 0.6 K/uL    Basophils Absolute 0.0 0.0 - 0.2 K/uL   Comprehensive Metabolic Panel w/ Reflex to MG   Result Value Ref Range    Sodium 123 (L) 136 - 145 mmol/L    Potassium reflex Magnesium 4.7 3.5 - 5.1 mmol/L    Chloride 87 (L) 99 - 110 mmol/L    CO2 26 21 - 32 mmol/L    Anion Gap 10 3 - 16    Glucose 159 (H) 70 - 99 mg/dL    BUN 15 7 - 20 mg/dL    CREATININE 0.6 0.6 - 1.2 mg/dL    GFR Non-African American >60 >60    GFR African American >60 >60    Calcium 9.8 8.3 - 10.6 mg/dL    Total Protein 7.7 6.4 - 8.2 g/dL    Albumin 4.9 3.4 - 5.0 g/dL Albumin/Globulin Ratio 1.8 1.1 - 2.2    Total Bilirubin 1.0 0.0 - 1.0 mg/dL    Alkaline Phosphatase 163 (H) 40 - 129 U/L    ALT 46 (H) 10 - 40 U/L    AST 49 (H) 15 - 37 U/L   Troponin   Result Value Ref Range    Troponin <0.01 <0.01 ng/mL   Brain Natriuretic Peptide   Result Value Ref Range    Pro-BNP 83 0 - 124 pg/mL   D-Dimer, Quantitative   Result Value Ref Range    D-Dimer, Quant 394 (H) 0 - 229 ng/mL DDU   Urinalysis Reflex to Culture    Specimen: Urine, clean catch   Result Value Ref Range    Color, UA Yellow Straw/Yellow    Clarity, UA Clear Clear    Glucose, Ur Negative Negative mg/dL    Bilirubin Urine Negative Negative    Ketones, Urine Negative Negative mg/dL    Specific Gravity, UA 1.015 1.005 - 1.030    Blood, Urine TRACE-INTACT (A) Negative    pH, UA 6.0 5.0 - 8.0    Protein, UA Negative Negative mg/dL    Urobilinogen, Urine 0.2 <2.0 E.U./dL    Nitrite, Urine Negative Negative    Leukocyte Esterase, Urine Negative Negative    Microscopic Examination YES     Urine Type NotGiven     Urine Reflex to Culture Not Indicated    Lactic acid, plasma   Result Value Ref Range    Lactic Acid 3.3 (H) 0.4 - 2.0 mmol/L   Microscopic Urinalysis   Result Value Ref Range    WBC, UA 0-2 0 - 5 /HPF    RBC, UA 0-2 0 - 4 /HPF    Epithelial Cells, UA 0-1 0 - 5 /HPF    Bacteria, UA Rare (A) None Seen /HPF   EKG 12 Lead   Result Value Ref Range    Ventricular Rate 115 BPM    Atrial Rate 115 BPM    P-R Interval 152 ms    QRS Duration 68 ms    Q-T Interval 298 ms    QTc Calculation (Bazett) 412 ms    P Axis 76 degrees    R Axis 17 degrees    T Axis 76 degrees    Diagnosis       Sinus tachycardiaNonspecific ST abnormalityWhen compared with ECG of 17-MAR-2021 21:51,No significant change was foundConfirmed by LEONOR Quiles MD ([id number]) on 12/10/2021 7:17:01 AM       EKG  The Ekg interpreted by me shows  Sinus tachycardia, rate 115, baseline wander, and artifact, QTc 412, no ST segment or T wave changes indicative of acute ischemia    RADIOLOGY  CT ABDOMEN PELVIS W IV CONTRAST Additional Contrast? None    Result Date: 12/10/2021  EXAMINATION: CT OF THE ABDOMEN AND PELVIS WITH CONTRAST; CTA OF THE CHEST 12/10/2021 5:41 am; 12/10/2021 5:36 am TECHNIQUE: CT of the abdomen and pelvis was performed with the administration of intravenous contrast. Multiplanar reformatted images are provided for review. Dose modulation, iterative reconstruction, and/or weight based adjustment of the mA/kV was utilized to reduce the radiation dose to as low as reasonably achievable.; CTA of the chest was performed after the administration of intravenous contrast.  Multiplanar reformatted images are provided for review. MIP images are provided for review. Dose modulation, iterative reconstruction, and/or weight based adjustment of the mA/kV was utilized to reduce the radiation dose to as low as reasonably achievable. COMPARISON: Chest CT February 2020 HISTORY: ORDERING SYSTEM PROVIDED HISTORY: R flank pain TECHNOLOGIST PROVIDED HISTORY: Reason for exam:->R flank pain Additional Contrast?->None Reason for Exam: sob, tachy rt flank pain increasing last night Relevant Medical/Surgical History: coopd, pneumonia, choley; ORDERING SYSTEM PROVIDED HISTORY: tachy, SOB, R flank pain TECHNOLOGIST PROVIDED HISTORY: Reason for exam:->tachy, SOB, R flank pain Decision Support Exception - unselect if not a suspected or confirmed emergency medical condition->Emergency Medical Condition (MA) Reason for Exam: sob, tachy rt flank pain increasing last night Relevant Medical/Surgical History: copd, pneumonia, choley FINDINGS: Chest: Mediastinum: Thyroid gland is unremarkable. Atherosclerotic change seen in aorta. Moderate coronary artery calcification is seen. No pericardial effusion is seen. Small hiatal hernia is seen. There is nonspecific thickening at the GE junction. No embolus is seen in the central, right, or left main pulmonary artery.   No obvious embolus seen in the proximal segmental branches. Distal segmental branches and subsegmental branches are not well evaluated secondary to respiratory motion artifact and suboptimal contrast bolus. Lungs/pleura: Severe underlying emphysema is seen. No of obstructing endobronchial lesions are seen. Patchy consolidation is seen in the right lower lobe. No focal consolidation is seen on the left. There is a dominant irregular nodule in the left upper lobe, measuring 9 mm by 7 mm. There is mild elevation of the right hemidiaphragm Soft Tissues/Bones: Compression fractures are seen at T5, T6, and T8. T11 and T12. Fracture lines are still evident in T6, T8, T11 and T12. Abdomen/Pelvis: Organs: No perisplenic fluid. There is mild thickening of the adrenal glands either due to adenoma or hyperplasia There is lobular contour left kidney. No hydronephrosis noted. Punctate hypodense nodule seen left kidney, likely cyst. There is lobular contour the right kidney. No hydronephrosis noted Gallbladder is surgically absent. There is mild intra and extrahepatic biliary ductal dilatation. Low attenuation is seen throughout the liver, compatible with fatty infiltration. There are questionable filling defects seen in the extrahepatic common duct. There is subtle lobular contour to the liver inferiorly. . No peripancreatic fluid. No peripancreatic inflammatory change Punctate hypodense nodule seen anteriorly in the pancreas measuring 4-5 mm GI/Bowel: No significant small bowel distention noted. Moderate stool seen in the colon. Scattered colonic diverticula are seen. Distal colon is collapsed, accentuating its thickness. Appendix is partially visualized and appears normal Pelvis: No free fluid is seen in the pelvis. Bladder is distended Dominant cyst or follicle seen in the right adnexa measuring 2.7 cm Peritoneum/Retroperitoneum: Atherosclerotic change is seen in aorta. No aneurysm. Atherosclerotic change is seen in the iliacs. Bones/Soft Tissues: Spurring is seen in the spine. Spurring is seen in the hips     Chest: No central pulmonary embolus. Tiny peripheral branches are not well evaluated. Dominant irregular nodule in the left upper lobe. .  This is more conspicuous than February 2021. This could represent an inflammatory nodule or an early finding of lung carcinoma. Recommend short-term follow-up chest CT versus PET-CT. Right lower lobe airspace disease, increased compared to prior, either atelectasis or pneumonia. Moderate coronary artery calcification Compression fractures in the thoracic spine, previously described on MRI 04/28/2021. No obvious change given differences in modalities Abdomen and pelvis: Mildly dilated extrahepatic common duct to 1.2 cm. There is a questionable filling defect seen in the extrahepatic common duct. Punctate hypodense nodule in the pancreas, most likely small side branch IPMN RECOMMENDATIONS: ACR management recommendations for incidental cystic pancreatic masses in asymptomatic* patients on CT, MR, or US < 2 cm:  Single follow-up in 1 yr (MR preferred) *Stable:  Benign, no further follow-up *Growth: As below based upon size * None of the following:  Hyperamylasemia, recent onset diabetes, severe epigastric pain, weight loss, steatorrhea, or jaundice. Ryley SOLORIO, et al.  Managing incidental findings on abdominal CT: white paper of the ACR Incidental Findings Committee. J Am Alis Radiol 2010; 9:095-307. XR CHEST PORTABLE    Result Date: 12/10/2021  EXAMINATION: ONE XRAY VIEW OF THE CHEST 12/10/2021 5:04 am COMPARISON: 03/17/2021 radiograph HISTORY: ORDERING SYSTEM PROVIDED HISTORY: Shortness of breath TECHNOLOGIST PROVIDED HISTORY: Reason for exam:->Shortness of breath Reason for Exam: sob FINDINGS: The heart and mediastinum are normal.  Chronic changes of COPD and interstitial change. Increasing right basilar airspace opacification. Questionable mild left perihilar opacification as well.   No skeletal finding. Multifocal airspace opacities that are suspected to represent developing pneumonitis. Correlate with clinical workup. CT CHEST PULMONARY EMBOLISM W CONTRAST    Result Date: 12/10/2021  EXAMINATION: CT OF THE ABDOMEN AND PELVIS WITH CONTRAST; CTA OF THE CHEST 12/10/2021 5:41 am; 12/10/2021 5:36 am TECHNIQUE: CT of the abdomen and pelvis was performed with the administration of intravenous contrast. Multiplanar reformatted images are provided for review. Dose modulation, iterative reconstruction, and/or weight based adjustment of the mA/kV was utilized to reduce the radiation dose to as low as reasonably achievable.; CTA of the chest was performed after the administration of intravenous contrast.  Multiplanar reformatted images are provided for review. MIP images are provided for review. Dose modulation, iterative reconstruction, and/or weight based adjustment of the mA/kV was utilized to reduce the radiation dose to as low as reasonably achievable. COMPARISON: Chest CT February 2020 HISTORY: ORDERING SYSTEM PROVIDED HISTORY: R flank pain TECHNOLOGIST PROVIDED HISTORY: Reason for exam:->R flank pain Additional Contrast?->None Reason for Exam: sob, tachy rt flank pain increasing last night Relevant Medical/Surgical History: coopd, pneumonia, choley; ORDERING SYSTEM PROVIDED HISTORY: tachy, SOB, R flank pain TECHNOLOGIST PROVIDED HISTORY: Reason for exam:->tachy, SOB, R flank pain Decision Support Exception - unselect if not a suspected or confirmed emergency medical condition->Emergency Medical Condition (MA) Reason for Exam: sob, tachy rt flank pain increasing last night Relevant Medical/Surgical History: copd, pneumonia, choley FINDINGS: Chest: Mediastinum: Thyroid gland is unremarkable. Atherosclerotic change seen in aorta. Moderate coronary artery calcification is seen. No pericardial effusion is seen. Small hiatal hernia is seen. There is nonspecific thickening at the GE junction. No embolus is seen in the central, right, or left main pulmonary artery. No obvious embolus seen in the proximal segmental branches. Distal segmental branches and subsegmental branches are not well evaluated secondary to respiratory motion artifact and suboptimal contrast bolus. Lungs/pleura: Severe underlying emphysema is seen. No of obstructing endobronchial lesions are seen. Patchy consolidation is seen in the right lower lobe. No focal consolidation is seen on the left. There is a dominant irregular nodule in the left upper lobe, measuring 9 mm by 7 mm. There is mild elevation of the right hemidiaphragm Soft Tissues/Bones: Compression fractures are seen at T5, T6, and T8. T11 and T12. Fracture lines are still evident in T6, T8, T11 and T12. Abdomen/Pelvis: Organs: No perisplenic fluid. There is mild thickening of the adrenal glands either due to adenoma or hyperplasia There is lobular contour left kidney. No hydronephrosis noted. Punctate hypodense nodule seen left kidney, likely cyst. There is lobular contour the right kidney. No hydronephrosis noted Gallbladder is surgically absent. There is mild intra and extrahepatic biliary ductal dilatation. Low attenuation is seen throughout the liver, compatible with fatty infiltration. There are questionable filling defects seen in the extrahepatic common duct. There is subtle lobular contour to the liver inferiorly. . No peripancreatic fluid. No peripancreatic inflammatory change Punctate hypodense nodule seen anteriorly in the pancreas measuring 4-5 mm GI/Bowel: No significant small bowel distention noted. Moderate stool seen in the colon. Scattered colonic diverticula are seen. Distal colon is collapsed, accentuating its thickness. Appendix is partially visualized and appears normal Pelvis: No free fluid is seen in the pelvis.   Bladder is distended Dominant cyst or follicle seen in the right adnexa measuring 2.7 cm Peritoneum/Retroperitoneum: Atherosclerotic change is seen in aorta. No aneurysm. Atherosclerotic change is seen in the iliacs. Bones/Soft Tissues: Spurring is seen in the spine. Spurring is seen in the hips     Chest: No central pulmonary embolus. Tiny peripheral branches are not well evaluated. Dominant irregular nodule in the left upper lobe. .  This is more conspicuous than February 2021. This could represent an inflammatory nodule or an early finding of lung carcinoma. Recommend short-term follow-up chest CT versus PET-CT. Right lower lobe airspace disease, increased compared to prior, either atelectasis or pneumonia. Moderate coronary artery calcification Compression fractures in the thoracic spine, previously described on MRI 04/28/2021. No obvious change given differences in modalities Abdomen and pelvis: Mildly dilated extrahepatic common duct to 1.2 cm. There is a questionable filling defect seen in the extrahepatic common duct. Punctate hypodense nodule in the pancreas, most likely small side branch IPMN RECOMMENDATIONS: ACR management recommendations for incidental cystic pancreatic masses in asymptomatic* patients on CT, MR, or US < 2 cm:  Single follow-up in 1 yr (MR preferred) *Stable:  Benign, no further follow-up *Growth: As below based upon size * None of the following:  Hyperamylasemia, recent onset diabetes, severe epigastric pain, weight loss, steatorrhea, or jaundice. Ryley LL, et al.  Managing incidental findings on abdominal CT: white paper of the ACR Incidental Findings Committee. J Am Alis Radiol 2010; 4:833-890. ED COURSE/MDM  Patient seen and evaluated. Old records reviewed. Labs and imaging reviewed and results discussed with patient.    81-year-old female with shortness of breath, increased work of breathing, poor air movement, initially concern for COPD exacerbation, given 3 neb treatments, steroids, but after her white count elevation and chest x-ray findings, there was concern for pneumonia, injection 15 mg    0.9 % sodium chloride bolus    iopamidol (ISOVUE-370) 76 % injection 75 mL    levoFLOXacin (LEVAQUIN) 750 MG/150ML infusion 750 mg     Order Specific Question:   Antimicrobial Indications     Answer:   Pneumonia (CAP)    0.9 % sodium chloride bolus    ipratropium-albuterol (DUONEB) 0.5-2.5 (3) MG/3ML nebulizer solution     Elma Gaviria: cabinet override    oxyCODONE-acetaminophen (PERCOCET) 5-325 MG per tablet 1 tablet     ED Course as of 12/10/21 0833   Fri Dec 10, 2021   0759 Spoke to Elham Carolina NP who agreed to accept for admission to hospitalist service at [facility]. [SY]      ED Course User Index  [SY] Glen Inman DO     The total critical care time spent while evaluating and treating this patient was 32 minutes. This excludes time spent doing separately billable procedures. This includes time at the bedside, data interpretation, medication management, obtaining critical history from collateral sources if the patient is unable to provide it directly, and physician consultation. Specifics of interventions taken and potentially life-threatening diagnostic considerations are listed in the medical decision making. CLINICAL IMPRESSION  1. Pneumonia due to infectious organism, unspecified laterality, unspecified part of lung    2. Lung nodule    3. Dyspnea, unspecified type    4. Common bile duct dilatation    5. Hyponatremia    6. Right flank pain    7. Tachycardia    8. Pancreatic abnormality    9. Supplemental oxygen dependent    10. COPD exacerbation (HCC)        Blood pressure (!) 141/80, pulse 114, temperature 98.1 °F (36.7 °C), temperature source Axillary, resp. rate 19, height 5' 3\" (1.6 m), weight 135 lb (61.2 kg), SpO2 93 %, not currently breastfeeding. DISPOSITION  Berna Hancock was admitted to [facility] in stable condition. I did let Dr. Shelton Nieto know about patient as she is here awaiting transport at shift change.                 Glen Inman DO  12/10/21 [id number]

## 2021-12-10 NOTE — CONSULTS
Patient is being seen at the request of JUAN Patel CNP  for a consultation for CAP COPD and new lung nodule    HISTORY OF PRESENT ILLNESS:   61years old with history of COPD presented with shortness of breath for few days. Worse with exertion and better with resting. Associated with cough and sputum production- brown. No hemoptysis. No ever. No chest pain. Denies any fever. No recent hospitalization. 2 weeks ago treated for COPD AE with steroid and Abx. Has been using her albuterol several times with not much improvement. Uses 2 L at home. History of COPD tobacco abuse, quit a year ago. 5 L O2. PAST MEDICAL HISTORY:  Past Medical History:   Diagnosis Date    Angina pectoris (HCC)     Chronic pain     knees and lower back     COPD exacerbation ([facility] [address] 5/20/2018    Depression     Panic disorder     Pneumonia      PAST SURGICAL HISTORY:  Past Surgical History:   Procedure Laterality Date    CHOLECYSTECTOMY         FAMILY HISTORY:  family history includes COPD in her mother; Hypertension in her mother. SOCIAL HISTORY:   reports that she quit smoking about 11 months ago. Her smoking use included cigarettes. She started smoking about 52 years ago. She has a 52.00 pack-year smoking history.  She has never used smokeless tobacco.    Scheduled Meds:   cetirizine  10 mg Oral Daily    escitalopram  20 mg Oral Nightly    lisinopril  2.5 mg Oral Daily    oxybutynin  5 mg Oral 4x Daily    sodium chloride flush  5-40 mL IntraVENous 2 times per day    enoxaparin  40 mg SubCUTAneous Daily    ipratropium-albuterol  1 ampule Inhalation Q4H WA    [START ON 12/11/2021] levofloxacin  750 mg IntraVENous Q24H    methylPREDNISolone  40 mg IntraVENous Q12H    influenza virus vaccine  0.5 mL IntraMUSCular Prior to discharge     Continuous Infusions:   sodium chloride       PRN Meds:  albuterol sulfate HFA, hydrOXYzine, sodium chloride flush, sodium chloride, ondansetron **OR** ondansetron, polyethylene glycol, acetaminophen **OR** acetaminophen    ALLERGIES:  Patient is allergic to cephalosporins and pcn [penicillins]. REVIEW OF SYSTEMS:  Constitutional: Negative for fever  HENT: Negative for sore throat  Eyes: Negative for redness   Respiratory: + Dyspnea, cough  Cardiovascular: Negative for chest pain  Gastrointestinal: Negative for vomiting, diarrhea   Genitourinary: Negative for hematuria   Musculoskeletal: + Arthralgias   Skin: Negative for rash  Neurological: Negative for syncope  Hematological: Negative for adenopathy  Psychiatric/Behavorial: Negative for anxiety    PHYSICAL EXAM:  Blood pressure (!) 141/87, pulse 105, temperature 98 °F (36.7 °C), temperature source Oral, resp. rate 24, height 5' 3\" (1.6 m), weight 135 lb (61.2 kg), SpO2 96 %, not currently breastfeeding.' on 5 L  Gen: + Distress. Ill-appearing  Eyes: PERRL. No sclera icterus. No conjunctival injection. ENT: No discharge. Pharynx clear. Neck: Trachea midline. No obvious mass. Resp: + Accessory muscle use. Right-sided crackles. Bilateral wheezes. No rhonchi. No dullness on percussion. CV: Regular rate. Regular rhythm. No murmur or rub. No edema. GI: Non-tender. Non-distended. No hernia. Skin: Warm and dry. No nodule on exposed extremities. Lymph: No cervical LAD. No supraclavicular LAD. M/S: No cyanosis. No joint deformity. No clubbing. Neuro: Awake. Alert. Moves all four extremities. Psych: Oriented x 3. No anxiety. LABS:  CBC:   Recent Labs     12/10/21  0445   WBC 18.2*   HGB 11.4*   HCT 33.4*   MCV 97.0        BMP:   Recent Labs     12/10/21  0445 12/10/21  1155   * 125*   K 4.7 4.1   CL 87* 92*   CO2 26 20*   BUN 15 10   CREATININE 0.6 0.6     LIVER PROFILE:   Recent Labs     12/10/21  0445   AST 49*   ALT 46*   LIPASE 28.0   BILITOT 1.0   ALKPHOS 163*     PT/INR: No results for input(s): PROTIME, INR in the last 72 hours. APTT: No results for input(s): APTT in the last 72 hours.   UA:  Recent Labs     12/10/21  0710   COLORU Yellow   PHUR 6.0   WBCUA 0-2   RBCUA 0-2   BACTERIA Rare*   CLARITYU Clear   SPECGRAV 1.015   LEUKOCYTESUR Negative   UROBILINOGEN 0.2   BILIRUBINUR Negative   BLOODU TRACE-INTACT*   GLUCOSEU Negative     No results for input(s): PHART, QLS3KFA, PO2ART in the last 72 hours. CTPA 12/10 imaging was reviewed by me and showed   No central pulmonary embolus. Tiny peripheral branches are not well evaluated. Dominant irregular nodule in the left upper lobe. .    Right lower lobe airspace disease  Moderate coronary artery calcification         ASSESSMENT:  · Acute hypoxemic respiratory failure  · RLL community-acquired pneumonia  · Moderate COPD with AE   · Left upper lobe nodule on CT 12/10/2021 more conspicuous than February 2021      PLAN:  Supplemental oxygen to maintain SaO2 >92%; wean as tolerated  Intensive inhaled bronchodilator therapy  IV solumedrol 40 mg IV Q12 hrs.  Plan to switch to oral prednisone taper when improved  IV antibiotics to include Levaquin  Further evaluation of pulmonary nodule as an outpatient  Acapellmino and Mucinex  Advised to continue with smoking cessation

## 2021-12-10 NOTE — PROGRESS NOTES
Shift assessment complete- see flow sheet. Patient is A/Ox4. VSS. Currently on 4 L, SpO2 WNL. Lung sounds diminished with crackles in R base. SOB at rest and exertion. Able to get to [facility]. Patient denies any further needs. Call light explained and in reach. Will continue to monitor.

## 2021-12-10 NOTE — ED NOTES
Updated pt on plan of care, awaiting ready bed at [facility]. Pt verbalized understanding.      Jordon Mansfield RN  12/10/21 7298

## 2021-12-10 NOTE — CONSULTS
Nephrology Consult Note  [id number] [facility]        Reason for Consult: Hyponatremia  Consulting Physician: Obdulia POON    HISTORY OF PRESENT ILLNESS:      The patient is a 61 y.o. female with significant past medical history of COPD and depression who presents with SOB. Patient reports worsening SOB the past few days associated with productive cough. Denies any fever or chills. On admission, was noted to have a serum sodium of 123mmol/L. Has a history of chronic hyponatremia since 2013. Denies any nausea, vomiting, dizziness. Reports has been drinking about 64 oz of water + 36 oz of soda per day. Home meds include HCTZ and Escitalopram. She was started on NS IVF and we were consulted for further evaluation and management. Past Medical History:        Diagnosis Date    Angina pectoris (HCC)     Chronic pain     knees and lower back     COPD exacerbation ([facility] [address] 5/20/2018    Depression     Panic disorder     Pneumonia        Past Surgical History:        Procedure Laterality Date    CHOLECYSTECTOMY         Current Medications:    No current facility-administered medications on file prior to encounter.      Current Outpatient Medications on File Prior to Encounter   Medication Sig Dispense Refill    Misc Natural Products (GLUCOSAMINE CHOND CMP ADVANCED PO) Take by mouth      lisinopril (PRINIVIL;ZESTRIL) 2.5 MG tablet Take 2.5 mg by mouth daily      hydroCHLOROthiazide (HYDRODIURIL) 25 MG tablet Take 25 mg by mouth daily      SPIRIVA RESPIMAT 2.5 MCG/ACT AERS inhaler INHALE 2 PUFFS INTO THE LUNGS DAILY 4 g 5    albuterol sulfate  (90 Base) MCG/ACT inhaler Inhale 2 puffs into the lungs every 6 hours as needed for Wheezing orShortness of Breath 3 Inhaler 5    Biotin 300 MCG TABS Take 1 tablet by mouth daily 30 tablet 3    loperamide (IMODIUM) 2 MG capsule Take 1 capsule by mouth 4 times daily as needed for Diarrhea 90 capsule 0    cetirizine (ZYRTEC) 10 MG tablet Take 10 mg by mouth daily  hydrOXYzine (ATARAX) 25 MG tablet Take 25 mg by mouth 3 times daily as needed for Itching      escitalopram (LEXAPRO) 20 MG tablet Take 20 mg by mouth nightly      Multiple Vitamins-Minerals (CENTRUM SILVER 50+WOMEN PO) Take 1 tablet by mouth daily      oxybutynin (DITROPAN) 5 MG tablet Take 5 mg by mouth 4 times daily         Allergies:  Cephalosporins and Pcn [penicillins]    Social History:    Social History     Socioeconomic History    Marital status:      Spouse name: Not on file    Number of children: 10    Years of education: Not on file    Highest education level: Not on file   Occupational History    Not on file   Tobacco Use    Smoking status: Former Smoker     Packs/day: 1.00     Years: 52.00     Pack years: 52.00     Types: Cigarettes     Start date:      Quit date:      Years since quittin.9    Smokeless tobacco: Never Used   Vaping Use    Vaping Use: Never used   Substance and Sexual Activity    Alcohol use: No     Alcohol/week: 12.0 standard drinks     Types: 12 Cans of beer per week    Drug use: No    Sexual activity: Never   Other Topics Concern    Not on file   Social History Narrative    Not on file     Social Determinants of Health     Financial Resource Strain:     Difficulty of Paying Living Expenses: Not on file   Food Insecurity:     Worried About Running Out of Food in the Last Year: Not on file    Munira of Food in the Last Year: Not on file   Transportation Needs:     Lack of Transportation (Medical): Not on file    Lack of Transportation (Non-Medical):  Not on file   Physical Activity:     Days of Exercise per Week: Not on file    Minutes of Exercise per Session: Not on file   Stress:     Feeling of Stress : Not on file   Social Connections:     Frequency of Communication with Friends and Family: Not on file    Frequency of Social Gatherings with Friends and Family: Not on file    Attends Anabaptism Services: Not on file    Active Member of Clubs or Organizations: Not on file    Attends Club or Organization Meetings: Not on file    Marital Status: Not on file   Intimate Partner Violence:     Fear of Current or Ex-Partner: Not on file    Emotionally Abused: Not on file    Physically Abused: Not on file    Sexually Abused: Not on file   Housing Stability:     Unable to Pay for Housing in the Last Year: Not on file    Number of [place] in the Last Year: Not on file    Unstable Housing in the Last Year: Not on file       Family History:       Problem Relation Age of Onset    COPD Mother     Hypertension Mother     Asthma Neg Hx     Cancer Neg Hx     Diabetes Neg Hx     Emphysema Neg Hx     Heart Failure Neg Hx        REVIEW OF SYSTEMS:    Review of Systems   Constitutional: Negative. HENT: Negative. Respiratory: Positive for cough and shortness of breath. Gastrointestinal: Negative. Genitourinary: Positive for flank pain. Neurological: Negative. Psychiatric/Behavioral: Negative. PHYSICAL EXAM:    Vitals:    BP (!) 141/87   Pulse 105   Temp 98 °F (36.7 °C) (Oral)   Resp 24   Ht 5' 3\" (1.6 m)   Wt 135 lb (61.2 kg)   SpO2 96%   BMI 23.91 kg/m²   I/O last 3 completed shifts: In: 1150 [IV Piggyback:1150]  Out: -   No intake/output data recorded. Physical Exam:  Physical Exam  Vitals reviewed. Constitutional:       General: She is not in acute distress. Appearance: Normal appearance. HENT:      Head: Normocephalic and atraumatic. Eyes:      General: No scleral icterus. Conjunctiva/sclera: Conjunctivae normal.   Cardiovascular:      Rate and Rhythm: Normal rate and regular rhythm. Heart sounds: No friction rub. Pulmonary:      Effort: Pulmonary effort is normal. No respiratory distress. Comments: Coarse breath sounds bilateral  Abdominal:      General: Bowel sounds are normal. There is no distension. Tenderness: There is no abdominal tenderness.    Musculoskeletal: Right lower leg: No edema. Left lower leg: No edema. Neurological:      Mental Status: She is alert. DATA:    Recent Labs     12/10/21  0445   WBC 18.2*   HGB 11.4*   HCT 33.4*   MCV 97.0        Recent Labs     12/10/21  0445 12/10/21  1155   * 125*   K 4.7 4.1   CL 87* 92*   CO2 26 20*   GLUCOSE 159* 166*   BUN 15 10   CREATININE 0.6 0.6   LABGLOM >60 >60   GFRAA >60 >60           IMPRESSION/RECOMMENDATIONS:      Hyponatremia.  - Hx of chronic hyponatremia since 2013. Baseline in the low 130s. - Prior work up suggestive of polydipsia +/- low solute intake. - Has risk factors to develop SIADH (HCTZ, Escitalopram, COPD/lung nodule). - Will D/C NS IVF. - Will check urine sodium and osm, serum osm, uric acid. - Recommend to permanently discontinue HCTZ. - Free water restriction of 64oz/day. - BMP daily. Hypertension.  - BP is acceptable. - On Lisinopril. COPD/Pneumonia. - On steroid and antibiotic per primary service. Thank you for allowing me to participate in the care of this patient. Please do not hesitate to contact me at [phone] if with questions. Pearl Howe MD  [facility] [email or web address]  12/10/2021

## 2021-12-10 NOTE — PROGRESS NOTES
Speech Language Pathology  No charge screening per order      Name: Leanne Jaimes  : 1955  Medical Diagnosis: Hyponatremia [E87.1]  Tachycardia [R00.0]  Lung nodule [R91.1]  Common bile duct dilatation [K83.8]  COPD exacerbation (HCC) [J44.1]  Right flank pain [R10.9]  Supplemental oxygen dependent [Z99.81]  Pancreatic abnormality [Q45.3]  Dyspnea, unspecified type [R06.00]  Pneumonia due to infectious organism, unspecified laterality, unspecified part of lung [J18.9]    Swallow screen completed. Patient demonstrates some risk indicators for potential dysphagia / aspiration per swallow screen, including hx COPD. RECOMMEND: Clinical Swallow Evaluation at bedside to assess swallowing function, rule out aspiration, and determine appropriate diet level. Please place order for SLP Eval and Treat if in agreement. Tyrel Houston MS CCC-SLP  Speech Language Pathologist   Phone [phone]

## 2021-12-10 NOTE — ED NOTES
Report given to Irma Ley RN. POC reviewed and care transferred at this time. Yajaira Martines RN.        Susan Poole RN  12/10/21 1028

## 2021-12-10 NOTE — PROGRESS NOTES
[facility] oriented to room, call light, bed rails, phone, lights and bathroom. Patient instructed about the schedule of the day including: vital sign frequency, lab draws, possible tests, frequency of MD and staff rounds, daily weights, I &O's and prescribed diet. + bed alarm in place, patient aware of placement and reason. Telemetry box in place, patient aware of placement and reason. Bed locked, in lowest position, side rails up 2/4, call light within reach. Recliner Assessment  Patient is able to demonstrate the ability to move from a reclining position to an upright position within the recliner. 4 Eyes Skin Assessment     The patient is being assess for   Admission   Cat and dog scratches on legs and arms. I agree that 2 RN's have performed a thorough Head to Toe Skin Assessment on the patient. ALL assessment sites listed below have been assessed. Areas assessed for pressure by both nurses:   [x]   Head, Face, and Ears   [x]   Shoulders, Back, and Chest, Abdomen  [x]   Arms, Elbows, and Hands   [x]   Coccyx, Sacrum, and Ischium  [x]   Legs, Feet, and Heels        Skin Assessed Under all Medical Devices by both nurses:  Skin Unremarkable              All Mepilex Borders were peeled back and area peeked at by both nurses:  No: N/A  Please list where Mepilex Borders are located:  N/A             **SHARE this note so that the co-signing nurse is able to place an eSignature**    Co-signer eSignature: Electronically signed by Benay Felty, RN on 12/10/21 at 3:10 PM EST    Does the Patient have Skin Breakdown related to pressure?   No          William Prevention initiated:  NA   Wound Care Orders initiated:  NA      [facility] nurse consulted for Pressure Injury (Stage 3,4, Unstageable, DTI, NWPT, Complex wounds)and New or Established Ostomies:  NA      Primary Nurse eSignature: Electronically signed by Joseph Hahn RN on 12/14/21 at 7:09 PM EST

## 2021-12-10 NOTE — PROGRESS NOTES
FYI:  Additive QT interval prolongation may occur during combination therapy of Lexapro, Hydroxyzine, Levaquin, and Zofran. Most adverse events occur when the QTC > 500. Patient's QTC = 412. Compazine has lesser effects on the QT interval and can be ordered as an alternative to Zofran. Observe for signs and symptoms.   Ankit Christie R.Ph.12/10/92793:43 PM

## 2021-12-11 LAB
ALBUMIN SERPL-MCNC: 3.7 G/DL (ref 3.4–5)
ALP BLD-CCNC: 112 U/L (ref 40–129)
ALT SERPL-CCNC: 32 U/L (ref 10–40)
ANION GAP SERPL CALCULATED.3IONS-SCNC: 12 MMOL/L (ref 3–16)
ANION GAP SERPL CALCULATED.3IONS-SCNC: 13 MMOL/L (ref 3–16)
ANION GAP SERPL CALCULATED.3IONS-SCNC: 16 MMOL/L (ref 3–16)
AST SERPL-CCNC: 33 U/L (ref 15–37)
BILIRUB SERPL-MCNC: 0.5 MG/DL (ref 0–1)
BILIRUBIN DIRECT: <0.2 MG/DL (ref 0–0.3)
BILIRUBIN, INDIRECT: NORMAL MG/DL (ref 0–1)
BUN BLDV-MCNC: 10 MG/DL (ref 7–20)
BUN BLDV-MCNC: 12 MG/DL (ref 7–20)
BUN BLDV-MCNC: 14 MG/DL (ref 7–20)
CALCIUM SERPL-MCNC: 8.7 MG/DL (ref 8.3–10.6)
CALCIUM SERPL-MCNC: 8.9 MG/DL (ref 8.3–10.6)
CALCIUM SERPL-MCNC: 9.1 MG/DL (ref 8.3–10.6)
CHLORIDE BLD-SCNC: 89 MMOL/L (ref 99–110)
CHLORIDE BLD-SCNC: 90 MMOL/L (ref 99–110)
CHLORIDE BLD-SCNC: 91 MMOL/L (ref 99–110)
CO2: 16 MMOL/L (ref 21–32)
CO2: 20 MMOL/L (ref 21–32)
CO2: 23 MMOL/L (ref 21–32)
CREAT SERPL-MCNC: 0.6 MG/DL (ref 0.6–1.2)
CREAT SERPL-MCNC: <0.5 MG/DL (ref 0.6–1.2)
CREAT SERPL-MCNC: <0.5 MG/DL (ref 0.6–1.2)
GFR AFRICAN AMERICAN: >60
GFR NON-AFRICAN AMERICAN: >60
GLUCOSE BLD-MCNC: 126 MG/DL (ref 70–99)
GLUCOSE BLD-MCNC: 134 MG/DL (ref 70–99)
GLUCOSE BLD-MCNC: 158 MG/DL (ref 70–99)
HCT VFR BLD CALC: 30.7 % (ref 36–48)
HEMOGLOBIN: 9.9 G/DL (ref 12–16)
LACTIC ACID: 1.5 MMOL/L (ref 0.4–2)
LACTIC ACID: 1.6 MMOL/L (ref 0.4–2)
LACTIC ACID: 2.4 MMOL/L (ref 0.4–2)
LACTIC ACID: 2.8 MMOL/L (ref 0.4–2)
MCH RBC QN AUTO: 32.3 PG (ref 26–34)
MCHC RBC AUTO-ENTMCNC: 32.4 G/DL (ref 31–36)
MCV RBC AUTO: 100 FL (ref 80–100)
OSMOLALITY URINE: 245 MOSM/KG (ref 390–1070)
PDW BLD-RTO: 12.3 % (ref 12.4–15.4)
PLATELET # BLD: 223 K/UL (ref 135–450)
PMV BLD AUTO: 7.6 FL (ref 5–10.5)
POTASSIUM REFLEX MAGNESIUM: 4.3 MMOL/L (ref 3.5–5.1)
POTASSIUM SERPL-SCNC: 4.2 MMOL/L (ref 3.5–5.1)
POTASSIUM SERPL-SCNC: 4.4 MMOL/L (ref 3.5–5.1)
RBC # BLD: 3.07 M/UL (ref 4–5.2)
REASON FOR REJECTION: NORMAL
REJECTED TEST: NORMAL
SODIUM BLD-SCNC: 123 MMOL/L (ref 136–145)
SODIUM BLD-SCNC: 123 MMOL/L (ref 136–145)
SODIUM BLD-SCNC: 124 MMOL/L (ref 136–145)
SODIUM URINE: <20 MMOL/L
T4 FREE: 1.1 NG/DL (ref 0.9–1.8)
TOTAL PROTEIN: 6.8 G/DL (ref 6.4–8.2)
WBC # BLD: 20.5 K/UL (ref 4–11)

## 2021-12-11 PROCEDURE — 6370000000 HC RX 637 (ALT 250 FOR IP): Performed by: INTERNAL MEDICINE

## 2021-12-11 PROCEDURE — 6370000000 HC RX 637 (ALT 250 FOR IP): Performed by: NURSE PRACTITIONER

## 2021-12-11 PROCEDURE — 94640 AIRWAY INHALATION TREATMENT: CPT

## 2021-12-11 PROCEDURE — 36415 COLL VENOUS BLD VENIPUNCTURE: CPT

## 2021-12-11 PROCEDURE — 80076 HEPATIC FUNCTION PANEL: CPT

## 2021-12-11 PROCEDURE — 94761 N-INVAS EAR/PLS OXIMETRY MLT: CPT

## 2021-12-11 PROCEDURE — 99233 SBSQ HOSP IP/OBS HIGH 50: CPT | Performed by: INTERNAL MEDICINE

## 2021-12-11 PROCEDURE — 83935 ASSAY OF URINE OSMOLALITY: CPT

## 2021-12-11 PROCEDURE — 85027 COMPLETE CBC AUTOMATED: CPT

## 2021-12-11 PROCEDURE — 80048 BASIC METABOLIC PNL TOTAL CA: CPT

## 2021-12-11 PROCEDURE — 2700000000 HC OXYGEN THERAPY PER DAY

## 2021-12-11 PROCEDURE — 2580000003 HC RX 258: Performed by: INTERNAL MEDICINE

## 2021-12-11 PROCEDURE — 2060000000 HC ICU INTERMEDIATE R&B

## 2021-12-11 PROCEDURE — 83605 ASSAY OF LACTIC ACID: CPT

## 2021-12-11 PROCEDURE — 84439 ASSAY OF FREE THYROXINE: CPT

## 2021-12-11 PROCEDURE — 92610 EVALUATE SWALLOWING FUNCTION: CPT

## 2021-12-11 PROCEDURE — 2580000003 HC RX 258: Performed by: NURSE PRACTITIONER

## 2021-12-11 PROCEDURE — 84300 ASSAY OF URINE SODIUM: CPT

## 2021-12-11 PROCEDURE — 6360000002 HC RX W HCPCS: Performed by: NURSE PRACTITIONER

## 2021-12-11 RX ORDER — HYDROXYZINE PAMOATE 25 MG/1
25 CAPSULE ORAL 3 TIMES DAILY PRN
Status: DISCONTINUED | OUTPATIENT
Start: 2021-12-11 | End: 2021-12-15 | Stop reason: HOSPADM

## 2021-12-11 RX ADMIN — Medication 10 ML: at 09:05

## 2021-12-11 RX ADMIN — LEVOFLOXACIN 750 MG: 5 INJECTION, SOLUTION INTRAVENOUS at 09:10

## 2021-12-11 RX ADMIN — OXYBUTYNIN CHLORIDE 5 MG: 5 TABLET ORAL at 20:42

## 2021-12-11 RX ADMIN — HYDROXYZINE PAMOATE 25 MG: 25 CAPSULE ORAL at 20:41

## 2021-12-11 RX ADMIN — METHYLPREDNISOLONE SODIUM SUCCINATE 40 MG: 40 INJECTION, POWDER, FOR SOLUTION INTRAMUSCULAR; INTRAVENOUS at 17:03

## 2021-12-11 RX ADMIN — METHYLPREDNISOLONE SODIUM SUCCINATE 40 MG: 40 INJECTION, POWDER, FOR SOLUTION INTRAMUSCULAR; INTRAVENOUS at 06:04

## 2021-12-11 RX ADMIN — HYDROXYZINE PAMOATE 25 MG: 25 CAPSULE ORAL at 00:37

## 2021-12-11 RX ADMIN — ESCITALOPRAM OXALATE 20 MG: 10 TABLET ORAL at 20:41

## 2021-12-11 RX ADMIN — OXYBUTYNIN CHLORIDE 5 MG: 5 TABLET ORAL at 09:04

## 2021-12-11 RX ADMIN — LISINOPRIL 2.5 MG: 5 TABLET ORAL at 09:04

## 2021-12-11 RX ADMIN — OXYCODONE HYDROCHLORIDE AND ACETAMINOPHEN 1 TABLET: 5; 325 TABLET ORAL at 00:27

## 2021-12-11 RX ADMIN — OXYCODONE HYDROCHLORIDE AND ACETAMINOPHEN 1 TABLET: 5; 325 TABLET ORAL at 19:37

## 2021-12-11 RX ADMIN — OXYCODONE HYDROCHLORIDE AND ACETAMINOPHEN 1 TABLET: 5; 325 TABLET ORAL at 06:04

## 2021-12-11 RX ADMIN — ENOXAPARIN SODIUM 40 MG: 100 INJECTION SUBCUTANEOUS at 17:03

## 2021-12-11 RX ADMIN — OXYBUTYNIN CHLORIDE 5 MG: 5 TABLET ORAL at 12:06

## 2021-12-11 RX ADMIN — OXYCODONE HYDROCHLORIDE AND ACETAMINOPHEN 1 TABLET: 5; 325 TABLET ORAL at 12:06

## 2021-12-11 RX ADMIN — IPRATROPIUM BROMIDE AND ALBUTEROL SULFATE 1 AMPULE: .5; 2.5 SOLUTION RESPIRATORY (INHALATION) at 11:22

## 2021-12-11 RX ADMIN — IPRATROPIUM BROMIDE AND ALBUTEROL SULFATE 1 AMPULE: .5; 2.5 SOLUTION RESPIRATORY (INHALATION) at 07:07

## 2021-12-11 RX ADMIN — IPRATROPIUM BROMIDE AND ALBUTEROL SULFATE 1 AMPULE: .5; 2.5 SOLUTION RESPIRATORY (INHALATION) at 18:49

## 2021-12-11 RX ADMIN — OXYBUTYNIN CHLORIDE 5 MG: 5 TABLET ORAL at 17:03

## 2021-12-11 RX ADMIN — CETIRIZINE HYDROCHLORIDE 10 MG: 10 TABLET ORAL at 09:04

## 2021-12-11 RX ADMIN — VASOPRESSIN: 20 INJECTION, SOLUTION INTRAVENOUS at 11:36

## 2021-12-11 ASSESSMENT — PAIN DESCRIPTION - PAIN TYPE
TYPE: CHRONIC PAIN

## 2021-12-11 ASSESSMENT — PAIN DESCRIPTION - DESCRIPTORS
DESCRIPTORS: ACHING

## 2021-12-11 ASSESSMENT — PAIN DESCRIPTION - ORIENTATION
ORIENTATION: RIGHT
ORIENTATION: MID
ORIENTATION: RIGHT

## 2021-12-11 ASSESSMENT — PAIN SCALES - GENERAL
PAINLEVEL_OUTOF10: 7
PAINLEVEL_OUTOF10: 6
PAINLEVEL_OUTOF10: 5
PAINLEVEL_OUTOF10: 6
PAINLEVEL_OUTOF10: 8
PAINLEVEL_OUTOF10: 3
PAINLEVEL_OUTOF10: 3
PAINLEVEL_OUTOF10: 7

## 2021-12-11 ASSESSMENT — PAIN DESCRIPTION - FREQUENCY
FREQUENCY: CONTINUOUS

## 2021-12-11 ASSESSMENT — PAIN DESCRIPTION - LOCATION
LOCATION: BACK
LOCATION: BACK;ABDOMEN
LOCATION: RIB CAGE

## 2021-12-11 ASSESSMENT — PAIN DESCRIPTION - ONSET
ONSET: ON-GOING
ONSET: ON-GOING

## 2021-12-11 ASSESSMENT — PAIN DESCRIPTION - PROGRESSION
CLINICAL_PROGRESSION: GRADUALLY WORSENING
CLINICAL_PROGRESSION: GRADUALLY WORSENING

## 2021-12-11 NOTE — CONSULTS
Gastroenterology Consult Note    Patient:   Julieta Lam   :    1955   Facility:     [address]  [address] [facility] [address]   Referring/PCP: Rodrigue Suazo MD  Date:     2021  Consultant:   Carlin Britt DO    Subjective: This 61 y.o. female was admitted 12/10/2021 with a diagnosis of \"Hyponatremia [E87.1]  Tachycardia [R00.0]  Lung nodule [R91.1]  Common bile duct dilatation [K83.8]  COPD exacerbation (HCC) [J44.1]  Right flank pain [R10.9]  Supplemental oxygen dependent [Z99.81]  Pancreatic abnormality [Q45.3]  Dyspnea, unspecified type [R06.00]  Pneumonia due to infectious organism, unspecified laterality, unspecified part of lung [J18.9]\" and is seen in consultation regarding abnormal liver enzymes. 30-year-old female with history of COPD chronic hypoxic respiratory failure hyponatremia who presented with chest pain. She was found to have a right lower lobe pneumonia. On imaging she was found to have a dilated common bile duct to 1.2 cm. She also had a hypodense nodule in the pancreas thought to be possibly a small sidebranch IPMN. Since being hospitalized her liver enzymes have totally normalized and the patient denies any significant abdominal discomfort.     Past Medical History:   Diagnosis Date    Angina pectoris (HCC)     Chronic pain     knees and lower back     COPD exacerbation ([facility] [address] 2018    Depression     Panic disorder     Pneumonia      Past Surgical History:   Procedure Laterality Date    CHOLECYSTECTOMY         Social:   Social History     Tobacco Use    Smoking status: Former Smoker     Packs/day: 1.00     Years: 52.00     Pack years: 52.00     Types: Cigarettes     Start date:      Quit date:      Years since quittin.9    Smokeless tobacco: Never Used   Substance Use Topics    Alcohol use: No     Alcohol/week: 12.0 standard drinks     Types: 12 Cans of beer per week     Family:   Family History Problem Relation Age of Onset    COPD Mother     Hypertension Mother     Asthma Neg Hx     Cancer Neg Hx     Diabetes Neg Hx     Emphysema Neg Hx     Heart Failure Neg Hx        Scheduled Medications:    cetirizine  10 mg Oral Daily    escitalopram  20 mg Oral Nightly    lisinopril  2.5 mg Oral Daily    oxybutynin  5 mg Oral 4x Daily    sodium chloride flush  5-40 mL IntraVENous 2 times per day    enoxaparin  40 mg SubCUTAneous Daily    levofloxacin  750 mg IntraVENous Q24H    methylPREDNISolone  40 mg IntraVENous Q12H    influenza virus vaccine  0.5 mL IntraMUSCular Prior to discharge    ipratropium-albuterol  1 ampule Inhalation TID     Infusions:    IV infusion builder 60 mL/hr at 12/11/21 1136    sodium chloride       PRN Medications: hydrOXYzine, albuterol sulfate HFA, sodium chloride flush, sodium chloride, ondansetron **OR** ondansetron, polyethylene glycol, acetaminophen **OR** acetaminophen, oxyCODONE-acetaminophen  Allergies: Allergies   Allergen Reactions    Cephalosporins Shortness Of Breath    Pcn [Penicillins] Anaphylaxis and Hives       ROS:   Review of Systems    Objective:     Physical Exam:   Vitals:    12/11/21 1412   BP: 133/71   Pulse: 93   Resp: 20   Temp: 97.5 °F (36.4 °C)   SpO2: 95%     I/O last 3 completed shifts:   In: 18 [P.O.:1075]  Out: 1400 [Urine:1400]   General appearance: alert, appears stated age and cooperative  HEENT: PERRLA  Neck: no adenopathy, no carotid bruit, no JVD, supple, symmetrical, trachea midline and thyroid not enlarged, symmetric, no tenderness/mass/nodules  Lungs: clear to auscultation bilaterally  Heart: regular rate and rhythm, S1, S2 normal, no murmur, click, rub or gallop  Abdomen: soft, non-tender; bowel sounds normal; no masses,  no organomegaly  Extremities: extremities normal, atraumatic, no cyanosis or edema     Lab and Imaging Review   Labs:  CBC:   Recent Labs     12/10/21  0445 12/10/21  1746 12/11/21  0310   WBC 18.2* 20.7* 20.5*   HGB 11.4* 10.1* 9.9*   HCT 33.4* 29.8* 30.7*   MCV 97.0 98.4 100.0    214 223     BMP:   Recent Labs     12/10/21  1155 12/11/21  0310 12/11/21  1227   * 123* 123*   K 4.1 4.3 4.4   CL 92* 90* 91*   CO2 20* 20* 16*   BUN 10 10 12   CREATININE 0.6 0.6 <0.5*     LIVER PROFILE:   Recent Labs     12/10/21  0445 12/11/21  0310   AST 49* 33   ALT 46* 32   LIPASE 28.0  --    PROT 7.7 6.8   BILIDIR  --  <0.2   BILITOT 1.0 0.5   ALKPHOS 163* 112     PT/INR: No results for input(s): INR in the last 72 hours. Invalid input(s): PT    IMAGING:  CT ABDOMEN PELVIS W IV CONTRAST Additional Contrast? None    Result Date: 12/10/2021  EXAMINATION: CT OF THE ABDOMEN AND PELVIS WITH CONTRAST; CTA OF THE CHEST 12/10/2021 5:41 am; 12/10/2021 5:36 am TECHNIQUE: CT of the abdomen and pelvis was performed with the administration of intravenous contrast. Multiplanar reformatted images are provided for review. Dose modulation, iterative reconstruction, and/or weight based adjustment of the mA/kV was utilized to reduce the radiation dose to as low as reasonably achievable.; CTA of the chest was performed after the administration of intravenous contrast.  Multiplanar reformatted images are provided for review. MIP images are provided for review. Dose modulation, iterative reconstruction, and/or weight based adjustment of the mA/kV was utilized to reduce the radiation dose to as low as reasonably achievable.  COMPARISON: Chest CT February 2020 HISTORY: ORDERING SYSTEM PROVIDED HISTORY: R flank pain TECHNOLOGIST PROVIDED HISTORY: Reason for exam:->R flank pain Additional Contrast?->None Reason for Exam: sob, tachy rt flank pain increasing last night Relevant Medical/Surgical History: coopd, pneumonia, choley; ORDERING SYSTEM PROVIDED HISTORY: tachy, SOB, R flank pain TECHNOLOGIST PROVIDED HISTORY: Reason for exam:->tachy, SOB, R flank pain Decision Support Exception - unselect if not a suspected or confirmed emergency in the pancreas measuring 4-5 mm GI/Bowel: No significant small bowel distention noted. Moderate stool seen in the colon. Scattered colonic diverticula are seen. Distal colon is collapsed, accentuating its thickness. Appendix is partially visualized and appears normal Pelvis: No free fluid is seen in the pelvis. Bladder is distended Dominant cyst or follicle seen in the right adnexa measuring 2.7 cm Peritoneum/Retroperitoneum: Atherosclerotic change is seen in aorta. No aneurysm. Atherosclerotic change is seen in the iliacs. Bones/Soft Tissues: Spurring is seen in the spine. Spurring is seen in the hips     Chest: No central pulmonary embolus. Tiny peripheral branches are not well evaluated. Dominant irregular nodule in the left upper lobe. .  This is more conspicuous than February 2021. This could represent an inflammatory nodule or an early finding of lung carcinoma. Recommend short-term follow-up chest CT versus PET-CT. Right lower lobe airspace disease, increased compared to prior, either atelectasis or pneumonia. Moderate coronary artery calcification Compression fractures in the thoracic spine, previously described on MRI 04/28/2021. No obvious change given differences in modalities Abdomen and pelvis: Mildly dilated extrahepatic common duct to 1.2 cm. There is a questionable filling defect seen in the extrahepatic common duct. Punctate hypodense nodule in the pancreas, most likely small side branch IPMN RECOMMENDATIONS: ACR management recommendations for incidental cystic pancreatic masses in asymptomatic* patients on CT, MR, or US < 2 cm:  Single follow-up in 1 yr (MR preferred) *Stable:  Benign, no further follow-up *Growth: As below based upon size * None of the following:  Hyperamylasemia, recent onset diabetes, severe epigastric pain, weight loss, steatorrhea, or jaundice. Ryley SOLORIO, et al.  Managing incidental findings on abdominal CT: white paper of the ACR Incidental Findings Committee. J Am Alis Radiol 2010; 6:222-553. XR CHEST PORTABLE    Result Date: 12/10/2021  EXAMINATION: ONE XRAY VIEW OF THE CHEST 12/10/2021 5:04 am COMPARISON: 03/17/2021 radiograph HISTORY: ORDERING SYSTEM PROVIDED HISTORY: Shortness of breath TECHNOLOGIST PROVIDED HISTORY: Reason for exam:->Shortness of breath Reason for Exam: sob FINDINGS: The heart and mediastinum are normal.  Chronic changes of COPD and interstitial change. Increasing right basilar airspace opacification. Questionable mild left perihilar opacification as well. No skeletal finding. Multifocal airspace opacities that are suspected to represent developing pneumonitis. Correlate with clinical workup. CT CHEST PULMONARY EMBOLISM W CONTRAST    Result Date: 12/10/2021  EXAMINATION: CT OF THE ABDOMEN AND PELVIS WITH CONTRAST; CTA OF THE CHEST 12/10/2021 5:41 am; 12/10/2021 5:36 am TECHNIQUE: CT of the abdomen and pelvis was performed with the administration of intravenous contrast. Multiplanar reformatted images are provided for review. Dose modulation, iterative reconstruction, and/or weight based adjustment of the mA/kV was utilized to reduce the radiation dose to as low as reasonably achievable.; CTA of the chest was performed after the administration of intravenous contrast.  Multiplanar reformatted images are provided for review. MIP images are provided for review. Dose modulation, iterative reconstruction, and/or weight based adjustment of the mA/kV was utilized to reduce the radiation dose to as low as reasonably achievable.  COMPARISON: Chest CT February 2020 HISTORY: ORDERING SYSTEM PROVIDED HISTORY: R flank pain TECHNOLOGIST PROVIDED HISTORY: Reason for exam:->R flank pain Additional Contrast?->None Reason for Exam: sob, tachy rt flank pain increasing last night Relevant Medical/Surgical History: coopd, pneumonia, choley; ORDERING SYSTEM PROVIDED HISTORY: tachy, SOB, R flank pain TECHNOLOGIST PROVIDED HISTORY: Reason for exam:->tachy, SOB, R flank pain Decision Support Exception - unselect if not a suspected or confirmed emergency medical condition->Emergency Medical Condition (MA) Reason for Exam: sob, tachy rt flank pain increasing last night Relevant Medical/Surgical History: copd, pneumonia, choley FINDINGS: Chest: Mediastinum: Thyroid gland is unremarkable. Atherosclerotic change seen in aorta. Moderate coronary artery calcification is seen. No pericardial effusion is seen. Small hiatal hernia is seen. There is nonspecific thickening at the GE junction. No embolus is seen in the central, right, or left main pulmonary artery. No obvious embolus seen in the proximal segmental branches. Distal segmental branches and subsegmental branches are not well evaluated secondary to respiratory motion artifact and suboptimal contrast bolus. Lungs/pleura: Severe underlying emphysema is seen. No of obstructing endobronchial lesions are seen. Patchy consolidation is seen in the right lower lobe. No focal consolidation is seen on the left. There is a dominant irregular nodule in the left upper lobe, measuring 9 mm by 7 mm. There is mild elevation of the right hemidiaphragm Soft Tissues/Bones: Compression fractures are seen at T5, T6, and T8. T11 and T12. Fracture lines are still evident in T6, T8, T11 and T12. Abdomen/Pelvis: Organs: No perisplenic fluid. There is mild thickening of the adrenal glands either due to adenoma or hyperplasia There is lobular contour left kidney. No hydronephrosis noted. Punctate hypodense nodule seen left kidney, likely cyst. There is lobular contour the right kidney. No hydronephrosis noted Gallbladder is surgically absent. There is mild intra and extrahepatic biliary ductal dilatation. Low attenuation is seen throughout the liver, compatible with fatty infiltration. There are questionable filling defects seen in the extrahepatic common duct. There is subtle lobular contour to the liver inferiorly. Patricio Amend  No peripancreatic fluid. No peripancreatic inflammatory change Punctate hypodense nodule seen anteriorly in the pancreas measuring 4-5 mm GI/Bowel: No significant small bowel distention noted. Moderate stool seen in the colon. Scattered colonic diverticula are seen. Distal colon is collapsed, accentuating its thickness. Appendix is partially visualized and appears normal Pelvis: No free fluid is seen in the pelvis. Bladder is distended Dominant cyst or follicle seen in the right adnexa measuring 2.7 cm Peritoneum/Retroperitoneum: Atherosclerotic change is seen in aorta. No aneurysm. Atherosclerotic change is seen in the iliacs. Bones/Soft Tissues: Spurring is seen in the spine. Spurring is seen in the hips     Chest: No central pulmonary embolus. Tiny peripheral branches are not well evaluated. Dominant irregular nodule in the left upper lobe. .  This is more conspicuous than February 2021. This could represent an inflammatory nodule or an early finding of lung carcinoma. Recommend short-term follow-up chest CT versus PET-CT. Right lower lobe airspace disease, increased compared to prior, either atelectasis or pneumonia. Moderate coronary artery calcification Compression fractures in the thoracic spine, previously described on MRI 04/28/2021. No obvious change given differences in modalities Abdomen and pelvis: Mildly dilated extrahepatic common duct to 1.2 cm. There is a questionable filling defect seen in the extrahepatic common duct. Punctate hypodense nodule in the pancreas, most likely small side branch IPMN RECOMMENDATIONS: ACR management recommendations for incidental cystic pancreatic masses in asymptomatic* patients on CT, MR, or US < 2 cm:  Single follow-up in 1 yr (MR preferred) *Stable:  Benign, no further follow-up *Growth: As below based upon size * None of the following:  Hyperamylasemia, recent onset diabetes, severe epigastric pain, weight loss, steatorrhea, or jaundice.  Ryley SOLORIO, et al. Managing incidental findings on abdominal CT: white paper of the ACR Incidental Findings Committee. J Am Alis Radiol 2010; 7:763-337. Hospital Problems           Last Modified POA    * (Principal) Acute hypoxemic respiratory failure ([address] 12/10/2021 Yes    Hyponatremia 12/10/2021 Yes    Pneumonia due to infectious organism 12/10/2021 Yes    Transaminitis 12/10/2021 Yes    Pancreatic abnormality 12/10/2021 Yes    Common bile duct dilatation 12/10/2021 Yes    Community acquired pneumonia 12/11/2021 Yes        Assessment:   59-year-old female who presented with epigastric pain which has resolved. Plan:   1. Given the patient's clinical presentation she may have had a small stone that passed. Her current MRI does not support the presence of stones. That being said given her pancreatic nodule previously noted dilated bile duct it may be beneficial to evaluate her with endoscopic ultrasound and fine-needle aspiration. If the patient continues to do well she can be discharged and can follow this up as outpatient.       Glenn Flood DO  5:29 PM 12/11/2021            [address] [place] [address]    [address]      [address] [place] [address]     Phone: [phone]     Fax: [phone]

## 2021-12-11 NOTE — PROGRESS NOTES
[facility] Progress Note           Subjective/   61y.o. year old female who we are seeing in consultation for Hyponatremia. HPI:  Sodium trending down, off IVF's. Intake adequate. ROS:  Shortness of breath better, remains on 3-4 L NC, states uses 2-3 L NC at home. No fevers. Objective/   GEN:  Chronically ill, /70   Pulse 97   Temp 98.3 °F (36.8 °C) (Oral)   Resp 20   Ht 5' 3\" (1.6 m)   Wt 148 lb 9.6 oz (67.4 kg)   SpO2 95%   BMI 26.32 kg/m²   HEENT: non-icteric, no JVD  CV: S1, S2 without m/r/g; no LE edema  RESP: CTA B without w/r/r; breathing wnl  ABD: +bs, soft, nt, no hsm  SKIN: warm, no rashes    Data/  Recent Labs     12/10/21  0445 12/10/21  1746 12/11/21  0310   WBC 18.2* 20.7* 20.5*   HGB 11.4* 10.1* 9.9*   HCT 33.4* 29.8* 30.7*   MCV 97.0 98.4 100.0    214 223     Recent Labs     12/10/21  0445 12/10/21  1155 12/11/21  0310   * 125* 123*   K 4.7 4.1 4.3   CL 87* 92* 90*   CO2 26 20* 20*   GLUCOSE 159* 166* 134*   BUN 15 10 10   CREATININE 0.6 0.6 0.6   LABGLOM >60 >60 >60   GFRAA >60 >60 >60       Component      Latest Ref Rng & Units 12/11/2021 12/11/2021 12/10/2021 12/10/2021          12:40 AM 12:40 AM 11:55 AM 11:55 AM   TSH      0.27 - 4.20 uIU/mL   0.17 (L)    Uric Acid, Serum      2.6 - 6.0 mg/dL    2.5 (L)   Sodium, Ur      Not Established mmol/L  <20     Osmolality, Ur      390 - 1070 mOsm/kg 245 (L)          Assessment/     Hyponatremia.  - Hx of chronic hyponatremia since 2013. Baseline in the low 130s. - Prior work up suggestive of polydipsia +/- low solute intake. - Has risk factors to develop SIADH (HCTZ, Escitalopram, COPD/lung nodule). - Recommend to permanently discontinue HCTZ. - Free water restriction of 64oz/day. - Start ~2% saline at 60 ml/hour - can stop once SNa 129 or higher  - BMP [id number].     Hypertension.  - BP is acceptable. - Off HCTZ which she states was recently started.   - On Lisinopril, can add amlodipine if BP's run high.     COPD/Pneumonia. - On steroid and antibiotic per Primary service. ____________________________________  Grady Munoz MD  [facility]  [email or web address]  Office: [phone]

## 2021-12-11 NOTE — PROGRESS NOTES
Pt. Assisted up to bedside commode. Pt. Very SOB with exertion. Will continue to monitor. Call light is within reach.   Lizeth Ma RN

## 2021-12-11 NOTE — FLOWSHEET NOTE
12/11/21 1412   Vital Signs   Temp 97.5 °F (36.4 °C)   Temp Source Oral   Pulse 93   Heart Rate Source Monitor   Resp 20   /71   BP Location Right lower arm   Level of Consciousness Alert (0)   MEWS Score 1   Oxygen Therapy   SpO2 95 %   O2 Device Nasal cannula   O2 Flow Rate (L/min) 3 L/min     VS as shown. Pt. Resting in bed. No signs of distress noted. Pt. denies further needs at this time. Will continue to monitor. Call light is within reach.   Elia Barry RN

## 2021-12-11 NOTE — PROGRESS NOTES
Frequency below that used at home. 0-3 - enter or revise RT bronchodilator order(s) to equivalent RT Bronchodilator order with Frequency of every 4 hours PRN for wheezing or increased work of breathing using Per Protocol order mode. 4-6 - enter or revise RT Bronchodilator order(s) to two equivalent RT bronchodilator orders with one order with BID Frequency and one order with Frequency of every 4 hours PRN wheezing or increased work of breathing using Per Protocol order mode. 7-10 - enter or revise RT Bronchodilator order(s) to two equivalent RT bronchodilator orders with one order with TID Frequency and one order with Frequency of every 4 hours PRN wheezing or increased work of breathing using Per Protocol order mode. 11-13 - enter or revise RT Bronchodilator order(s) to one equivalent RT bronchodilator order with QID Frequency and an Albuterol order with Frequency of every 4 hours PRN wheezing or increased work of breathing using Per Protocol order mode. Greater than 13 - enter or revise RT Bronchodilator order(s) to one equivalent RT bronchodilator order with every 4 hours Frequency and an Albuterol order with Frequency of every 2 hours PRN wheezing or increased work of breathing using Per Protocol order mode.          Electronically signed by Brody Ventura RCP on 12/11/2021 at 7:10 AM

## 2021-12-11 NOTE — PROGRESS NOTES
Pt. Awake in bed. No signs of distress noted. Pt. Assessment completed- see flow sheet. Pt. denies further needs at this time. Will continue to monitor. Call light is within reach.   Patricio Rios RN

## 2021-12-11 NOTE — H&P
Hospital Medicine History & Physical      PCP: Sanju Richards MD    Date of Admission: 12/10/2021    Date of Service: Pt seen/examined on 12/10/2021   Chief Complaint:    Chief Complaint   Patient presents with    Shortness of Breath     x 2 days         History Of Present Illness: The patient is a 61 y.o. female with COPD, chronic hypoxic respiratory failure, hyponatremia who presented to the ED with complaint of right lower posterior chest pain, cough, shortness of breath. Symptoms started 2 to 3 days ago. She describes a cough productive of brown sputum. She has not measured a fever at home but she has felt feverish. She has felt short of breath with exertion. She states the pain in her right lower posterior chest is stabbing sensation with coughing or deep breathing. She came to the ER for evaluation where she was found to have a right lower lobe pneumonia, acute on chronic hypoxic respiratory failure. Multiple other findings found on ER work-up-see plan below. She is admitted for further care    Past Medical History:        Diagnosis Date    Angina pectoris (HCC)     Chronic pain     knees and lower back     COPD exacerbation ([facility] [address] 5/20/2018    Depression     Panic disorder     Pneumonia        Past Surgical History:        Procedure Laterality Date    CHOLECYSTECTOMY         Medications Prior to Admission:    Prior to Admission medications    Medication Sig Start Date End Date Taking?  Authorizing Provider   Misc Natural Products (GLUCOSAMINE CHOND CMP ADVANCED PO) Take by mouth   Yes Historical Provider, MD   lisinopril (PRINIVIL;ZESTRIL) 2.5 MG tablet Take 2.5 mg by mouth daily   Yes Historical Provider, MD   hydroCHLOROthiazide (HYDRODIURIL) 25 MG tablet Take 25 mg by mouth daily   Yes Historical Provider, MD   SPIRIVA RESPIMAT 2.5 MCG/ACT AERS inhaler INHALE 2 PUFFS INTO THE LUNGS DAILY 10/26/21  Yes Sanju Husain MD   albuterol sulfate  (90 Base) MCG/ACT inhaler Inhale 2 puffs into the lungs every 6 hours as needed for Wheezing orShortness of Breath 8/27/21  Yes Princess Emily MD   Biotin 300 MCG TABS Take 1 tablet by mouth daily 4/12/21  Yes Jossy Plascencia MD   loperamide (IMODIUM) 2 MG capsule Take 1 capsule by mouth 4 times daily as needed for Diarrhea 2/7/21  Yes Simone Chi MD   cetirizine (ZYRTEC) 10 MG tablet Take 10 mg by mouth daily   Yes Historical Provider, MD   hydrOXYzine (ATARAX) 25 MG tablet Take 25 mg by mouth 3 times daily as needed for Itching   Yes Historical Provider, MD   escitalopram (LEXAPRO) 20 MG tablet Take 20 mg by mouth nightly   Yes Historical Provider, MD   Multiple Vitamins-Minerals (CENTRUM SILVER 50+WOMEN PO) Take 1 tablet by mouth daily   Yes Historical Provider, MD   oxybutynin (DITROPAN) 5 MG tablet Take 5 mg by mouth 4 times daily   Yes Historical Provider, MD       Allergies:  Cephalosporins and Pcn [penicillins]    Social History:  The patient currently lives at home alone     TOBACCO:   reports that she quit smoking about 11 months ago. Her smoking use included cigarettes. She started smoking about 52 years ago. She has a 52.00 pack-year smoking history. She has never used smokeless tobacco.  ETOH:   reports no history of alcohol use.       Family History:   Positive as follows:        Problem Relation Age of Onset    COPD Mother     Hypertension Mother     Asthma Neg Hx     Cancer Neg Hx     Diabetes Neg Hx     Emphysema Neg Hx     Heart Failure Neg Hx        REVIEW OF SYSTEMS:       Constitutional: +for subjective fever   HENT: Negative for sore throat   Eyes: Negative for redness   Respiratory: +for dyspnea, cough   Cardiovascular: +for chest pain   Gastrointestinal: Negative for vomiting, diarrhea   Genitourinary: Negative for hematuria   Musculoskeletal: Negative for arthralgias   Skin: Negative for rash   Neurological: Negative for syncope   Hematological: Negative for adenopathy   Psychiatric/Behavorial: Negative for anxiety    PHYSICAL EXAM:    BP (!) 141/87   Pulse 105   Temp 98 °F (36.7 °C) (Oral)   Resp 20   Ht 5' 3\" (1.6 m)   Wt 135 lb (61.2 kg)   SpO2 96%   BMI 23.91 kg/m²     Gen: No distress. Alert. Eyes: PERRL. No sclera icterus. No conjunctival injection. ENT: No discharge. Pharynx clear. Neck: No JVD. No Carotid Bruit. Trachea midline. Resp: + accessory muscle use. + right base crackles. +wheezes. No rhonchi. CV: Regular rate. Regular rhythm. No murmur. No rub. No edema. GI: Non-tender. Non-distended. No masses. No organomegaly. Normal bowel sounds. No hernia. Skin: Warm and dry. No nodule on exposed extremities. No rash on exposed extremities. M/S: No cyanosis. No joint deformity. No clubbing. Neuro: Awake. Grossly nonfocal    Psych: Oriented x 3. No anxiety or agitation. CBC:   Recent Labs     12/10/21  0445 12/10/21  1746   WBC 18.2* 20.7*   HGB 11.4* 10.1*   HCT 33.4* 29.8*   MCV 97.0 98.4    214     BMP:   Recent Labs     12/10/21  0445 12/10/21  1155   * 125*   K 4.7 4.1   CL 87* 92*   CO2 26 20*   BUN 15 10   CREATININE 0.6 0.6     LIVER PROFILE:   Recent Labs     12/10/21  0445   AST 49*   ALT 46*   LIPASE 28.0   BILITOT 1.0   ALKPHOS 163*     PT/INR: No results for input(s): PROTIME, INR in the last 72 hours. APTT: No results for input(s): APTT in the last 72 hours.   UA:  Recent Labs     12/10/21  0710   COLORU Yellow   PHUR 6.0   WBCUA 0-2   RBCUA 0-2   BACTERIA Rare*   CLARITYU Clear   SPECGRAV 1.015   LEUKOCYTESUR Negative   UROBILINOGEN 0.2   BILIRUBINUR Negative   BLOODU TRACE-INTACT*   GLUCOSEU Negative          CARDIAC ENZYMES  Recent Labs     12/10/21  0445   TROPONINI <0.01       U/A:    Lab Results   Component Value Date    COLORU Yellow 12/10/2021    WBCUA 0-2 12/10/2021    RBCUA 0-2 12/10/2021    BACTERIA Rare 12/10/2021    CLARITYU Clear 12/10/2021    SPECGRAV 1.015 12/10/2021    LEUKOCYTESUR Negative 12/10/2021    BLOODU TRACE-INTACT 12/10/2021 GLUCOSEU Negative 12/10/2021       ABG    Lab Results   Component Value Date    KQK5ZXJ 22.3 02/04/2021    BEART -2.2 02/04/2021    C6STFRZV 95.4 02/04/2021    PHART 7.392 02/04/2021    EFY0SBU 37.5 02/04/2021    PO2ART 81.4 02/04/2021    VOZ3VHC 23.4 02/04/2021       CULTURES  Blood: ordered  Rapid influenza A/B: neg/neg  COVID 19, rapid: not detected     EKG:   Sinus tachycardia  Nonspecific ST abnormality  When compared with ECG of 17-MAR-2021 21:51,  No significant change was found  Confirmed by MAR BURKETT, LEONOR    RADIOLOGY  CT ABDOMEN PELVIS W IV CONTRAST Additional Contrast? None   Final Result   Chest:      No central pulmonary embolus. Tiny peripheral branches are not well   evaluated. Dominant irregular nodule in the left upper lobe. .  This is more conspicuous   than February 2021. This could represent an inflammatory nodule or an early   finding of lung carcinoma. Recommend short-term follow-up chest CT versus   PET-CT. Right lower lobe airspace disease, increased compared to prior, either   atelectasis or pneumonia. Moderate coronary artery calcification      Compression fractures in the thoracic spine, previously described on MRI   04/28/2021. No obvious change given differences in modalities      Abdomen and pelvis:      Mildly dilated extrahepatic common duct to 1.2 cm. There is a questionable   filling defect seen in the extrahepatic common duct. Punctate hypodense nodule in the pancreas, most likely small side branch IPMN      RECOMMENDATIONS:   ACR management recommendations for incidental cystic pancreatic masses in   asymptomatic* patients on CT, MR, or US      < 2 cm:  Single follow-up in 1 yr (MR preferred)      *Stable:  Benign, no further follow-up   *Growth: As below based upon size   * None of the following:  Hyperamylasemia, recent onset diabetes, severe   epigastric pain, weight loss, steatorrhea, or jaundice.       Ryley SOLORIO, et al.  Managing incidental findings on abdominal CT: white paper   of the ACR Incidental Findings Committee. J Am Alis Radiol 2010; 7:796-876. CT CHEST PULMONARY EMBOLISM W CONTRAST   Final Result   Chest:      No central pulmonary embolus. Tiny peripheral branches are not well   evaluated. Dominant irregular nodule in the left upper lobe. .  This is more conspicuous   than February 2021. This could represent an inflammatory nodule or an early   finding of lung carcinoma. Recommend short-term follow-up chest CT versus   PET-CT. Right lower lobe airspace disease, increased compared to prior, either   atelectasis or pneumonia. Moderate coronary artery calcification      Compression fractures in the thoracic spine, previously described on MRI   04/28/2021. No obvious change given differences in modalities      Abdomen and pelvis:      Mildly dilated extrahepatic common duct to 1.2 cm. There is a questionable   filling defect seen in the extrahepatic common duct. Punctate hypodense nodule in the pancreas, most likely small side branch IPMN      RECOMMENDATIONS:   ACR management recommendations for incidental cystic pancreatic masses in   asymptomatic* patients on CT, MR, or US      < 2 cm:  Single follow-up in 1 yr (MR preferred)      *Stable:  Benign, no further follow-up   *Growth: As below based upon size   * None of the following:  Hyperamylasemia, recent onset diabetes, severe   epigastric pain, weight loss, steatorrhea, or jaundice. Ryley SOLORIO, et al.  Managing incidental findings on abdominal CT: white paper   of the ACR Incidental Findings Committee. J Am Alis Radiol 2010; 2:118-031. XR CHEST PORTABLE   Final Result   Multifocal airspace opacities that are suspected to represent developing   pneumonitis. Correlate with clinical workup. ASSESSMENT/PLAN:    #Acute on chronic hypoxic respiratory failure  -Patient uses 2 to 3 L of nasal cannula oxygen at home.   She is currently

## 2021-12-11 NOTE — PROGRESS NOTES
Pulmonary Progress Note    CC: COPD, lung nodule, pneumonia    Subjective:   3L O2   No sputum  No hemoptysis         Intake/Output Summary (Last 24 hours) at 12/11/2021 0735  Last data filed at 12/11/2021 0115  Gross per 24 hour   Intake 1865 ml   Output 1200 ml   Net 665 ml       Exam:   /64   Pulse 80   Temp 98.2 °F (36.8 °C) (Oral)   Resp 20   Ht 5' 3\" (1.6 m)   Wt 148 lb 9.6 oz (67.4 kg)   SpO2 94%   BMI 26.32 kg/m²  on 3LPM   Gen: Mild distress. Ill-appearing  Eyes: PERRL. No sclera icterus. No conjunctival injection. ENT: No discharge. Pharynx clear. Neck: Trachea midline. No obvious mass. Resp: + Accessory muscle use. R crackles. Few wheezes. No rhonchi. No dullness on percussion. CV: Regular rate. Regular rhythm. No murmur or rub. No edema. GI: Non-tender. Non-distended. No hernia. Skin: Warm and dry. No nodule on exposed extremities. Lymph: No cervical LAD. No supraclavicular LAD. M/S: No cyanosis. No joint deformity. No clubbing. Neuro: Awake. Alert. Moves all four extremities. Psych: Oriented x 3.  No anxiety    Scheduled Meds:   cetirizine  10 mg Oral Daily    escitalopram  20 mg Oral Nightly    lisinopril  2.5 mg Oral Daily    oxybutynin  5 mg Oral 4x Daily    sodium chloride flush  5-40 mL IntraVENous 2 times per day    enoxaparin  40 mg SubCUTAneous Daily    levofloxacin  750 mg IntraVENous Q24H    methylPREDNISolone  40 mg IntraVENous Q12H    influenza virus vaccine  0.5 mL IntraMUSCular Prior to discharge    ipratropium-albuterol  1 ampule Inhalation TID     Continuous Infusions:   sodium chloride       PRN Meds:  hydrOXYzine, albuterol sulfate HFA, sodium chloride flush, sodium chloride, ondansetron **OR** ondansetron, polyethylene glycol, acetaminophen **OR** acetaminophen, oxyCODONE-acetaminophen    Labs:  CBC:   Recent Labs     12/10/21  0445 12/10/21  1746 12/11/21  0310   WBC 18.2* 20.7* 20.5*   HGB 11.4* 10.1* 9.9*   HCT 33.4* 29.8* 30.7*   MCV 97.0 98.4 100.0    214 223     BMP:   Recent Labs     12/10/21  0445 12/10/21  1155 12/11/21  0310   * 125* 123*   K 4.7 4.1 4.3   CL 87* 92* 90*   CO2 26 20* 20*   BUN 15 10 10   CREATININE 0.6 0.6 0.6     LIVER PROFILE:   Recent Labs     12/10/21  0445 12/11/21  0310   AST 49* 33   ALT 46* 32   LIPASE 28.0  --    BILIDIR  --  <0.2   BILITOT 1.0 0.5   ALKPHOS 163* 112     PT/INR: No results for input(s): PROTIME, INR in the last 72 hours. APTT: No results for input(s): APTT in the last 72 hours. UA:  Recent Labs     12/10/21  0710   COLORU Yellow   PHUR 6.0   WBCUA 0-2   RBCUA 0-2   BACTERIA Rare*   CLARITYU Clear   SPECGRAV 1.015   LEUKOCYTESUR Negative   UROBILINOGEN 0.2   BILIRUBINUR Negative   BLOODU TRACE-INTACT*   GLUCOSEU Negative     No results for input(s): PHART, XJV0SDW, PO2ART in the last 72 hours. Cultures:   12/10 BC  12/10 influenza negative  12/10 COVID-19 not detected    Films:  CTPA 12/10 imaging was reviewed by me and showed   No central pulmonary embolus. Tiny peripheral branches are not well evaluated. Dominant irregular nodule in the left upper lobe. .    Right lower lobe airspace disease  Moderate coronary artery calcification            ASSESSMENT:  · Acute hypoxemic respiratory failure  · RLL community-acquired pneumonia  · Moderate COPD with AE   · Left upper lobe nodule on CT 12/10/2021 more conspicuous than February 2021     PLAN:  · Supplemental oxygen to maintain SaO2 >92%; wean as tolerated  · Intensive inhaled bronchodilator therapy  · Continue IV solumedrol 40 mg IV Q12 hrs.  Plan to switch to oral prednisone taper when improved  · IV antibiotics to include Levaquin D#2  · Further evaluation of pulmonary nodule as an outpatient  · Acapella and Mucinex  · Advised to continue with smoking cessation

## 2021-12-11 NOTE — PROGRESS NOTES
12/10/21 1900   RT Protocol   History Pulmonary Disease 2   Respiratory pattern 0   Breath sounds 2   Cough 0   Indications for Bronchodilator Therapy Decreased or absent breath sounds   Bronchodilator Assessment Score 4   RT Inhaler-Nebulizer Bronchodilator Protocol Note    There is a bronchodilator order in the chart from a provider indicating to follow the RT Bronchodilator Protocol and there is an Initiate RT Inhaler-Nebulizer Bronchodilator Protocol order as well (see protocol at bottom of note). CXR Findings:  XR CHEST PORTABLE    Result Date: 12/10/2021  Multifocal airspace opacities that are suspected to represent developing pneumonitis. Correlate with clinical workup. The findings from the last RT Protocol Assessment were as follows:   History Pulmonary Disease: Chronic pulmonary disease  Respiratory Pattern: Regular pattern and RR 12-20 bpm  Breath Sounds: Slightly diminished and/or crackles  Cough: Strong, spontaneous, non-productive  Indication for Bronchodilator Therapy: Decreased or absent breath sounds  Bronchodilator Assessment Score: 4    Aerosolized bronchodilator medication orders have been revised according to the RT Inhaler-Nebulizer Bronchodilator Protocol below. Respiratory Therapist to perform RT Therapy Protocol Assessment initially then follow the protocol. Repeat RT Therapy Protocol Assessment PRN for score 0-3 or on second treatment, BID, and PRN for scores above 3. No Indications - adjust the frequency to every 6 hours PRN wheezing or bronchospasm, if no treatments needed after 48 hours then discontinue using Per Protocol order mode. If indication present, adjust the RT bronchodilator orders based on the Bronchodilator Assessment Score as indicated below.   Use Inhaler orders unless patient has one or more of the following: on home nebulizer, not able to hold breath for 10 seconds, is not alert and oriented, cannot activate and use MDI correctly, or respiratory rate 25 breaths per minute or more, then use the equivalent nebulizer order(s) with same Frequency and PRN reasons based on the score. If a patient is on this medication at home then do not decrease Frequency below that used at home. 0-3 - enter or revise RT bronchodilator order(s) to equivalent RT Bronchodilator order with Frequency of every 4 hours PRN for wheezing or increased work of breathing using Per Protocol order mode. 4-6 - enter or revise RT Bronchodilator order(s) to two equivalent RT bronchodilator orders with one order with BID Frequency and one order with Frequency of every 4 hours PRN wheezing or increased work of breathing using Per Protocol order mode. 7-10 - enter or revise RT Bronchodilator order(s) to two equivalent RT bronchodilator orders with one order with TID Frequency and one order with Frequency of every 4 hours PRN wheezing or increased work of breathing using Per Protocol order mode. 11-13 - enter or revise RT Bronchodilator order(s) to one equivalent RT bronchodilator order with QID Frequency and an Albuterol order with Frequency of every 4 hours PRN wheezing or increased work of breathing using Per Protocol order mode. Greater than 13 - enter or revise RT Bronchodilator order(s) to one equivalent RT bronchodilator order with every 4 hours Frequency and an Albuterol order with Frequency of every 2 hours PRN wheezing or increased work of breathing using Per Protocol order mode.      Electronically signed by Terrilee Goldberg, RCP on 12/10/2021 at 7:33 PM

## 2021-12-11 NOTE — PROGRESS NOTES
Speech Language Pathology  [facility] TELE[facility]   CLINICAL BEDSIDE SWALLOW EVALUATION    NAME: Yasmin Orlando  : 1955  MRN: [id number]    ADMISSION DATE: 12/10/2021  ADMITTING DIAGNOSIS: has Chest pain; SOB (shortness of breath); Tobacco use; Moderate COPD (chronic obstructive pulmonary disease) ([address] COPD exacerbation ([address] Acute respiratory failure with hypoxia ([address] Hyponatremia; Pulmonary nodule; Pulmonary emphysema ([address] Acute on chronic respiratory failure with hypoxia and hypercapnia ([address] Community acquired pneumonia of right lung; Acute exacerbation of chronic obstructive pulmonary disease (COPD) ([address] Acute on chronic respiratory failure with hypoxia ([address] Acute hypoxemic respiratory failure ([address] Pneumonia due to infectious organism; Transaminitis; Pancreatic abnormality; and Common bile duct dilatation on their problem list.  ONSET DATE:12/10/21    Recent Chest Xray/CT of Chest: Ct Chest 12/10/21  Impression   Chest:       No central pulmonary embolus.  Tiny peripheral branches are not well   evaluated.       Dominant irregular nodule in the left upper lobe. .  This is more conspicuous   than 2021.  This could represent an inflammatory nodule or an early   finding of lung carcinoma.  Recommend short-term follow-up chest CT versus   PET-CT.       Right lower lobe airspace disease, increased compared to prior, either   atelectasis or pneumonia.       Moderate coronary artery calcification       Compression fractures in the thoracic spine, previously described on MRI   2021.  No obvious change given differences in modalities       Abdomen and pelvis:       Mildly dilated extrahepatic common duct to 1.2 cm.  There is a questionable   filling defect seen in the extrahepatic common duct.       Punctate hypodense nodule in the pancreas, most likely small side branch IPMN       Date of Eval: 2021  Evaluating Therapist: Rashi Vincent SLP    Current Diet level:  Current Diet : Regular  Current Liquid Diet : Thin      Primary Complaint  Patient Complaint: The patient is a 61 y.o. female with COPD, chronic hypoxic respiratory failure, hyponatremia who presented to the ED with complaint of right lower posterior chest pain, cough, shortness of breath. Symptoms started 2 to 3 days ago. She describes a cough productive of brown sputum. She has not measured a fever at home but she has felt feverish. She has felt short of breath with exertion. She states the pain in her right lower posterior chest is stabbing sensation with coughing or deep breathing. She came to the ER for evaluation where she was found to have a right lower lobe pneumonia, acute on chronic hypoxic respiratory failure. Multiple other findings found on ER work-up-see plan below. She is admitted for further care    Pain:  Pain Assessment  Pain Assessment: 0-10  Pain Level: 3  Patient's Stated Pain Goal: No pain  Pain Type: Chronic pain  Pain Location: Rib cage  Pain Orientation: Right  Pain Descriptors: Aching  Pain Frequency: Continuous  Response to Pain Intervention: Patient Satisfied    Reason for Referral  Hina Chavez was referred for a bedside swallow evaluation to assess the efficiency of her swallow function, identify signs and symptoms of aspiration and make recommendations regarding safe dietary consistencies, effective compensatory strategies, and safe eating environment. Impression  Dysphagia Diagnosis: Mild oral stage dysphagia  Dysphagia Outcome Severity Scale: Level 5: Mild dysphagia- Distant supervision. May need one diet consistency restricted   Patient presents with reports dysphagia symptoms likely related pharyngoesophageal dysphagia. Does not take anything for reflux. Hx of COPD. Reports food feeling stuck, pointing at sternal notch >1 year. Denied coughing or choking.  Oral and pharyngeal phase of the swallow today demonstrate to be grossly within functional limits. She is missing lower dentures and reports harder foods or difficult to chew so she eats softer items. Discussed adding moisture to her food whenever possible to increase ability to masticate and benefit possible from oropharyngeal or pharyngoesophageal clearance. Recommend further evaluation of reports of dysphagia with esophagram and GI consult or MBS as an outpatient. No acute concerns for aspiration at this time. SLPwill sign off at this time. Patient agreeable for outpatient services as needed. Treatment Plan  Requires SLP Intervention: No  Duration/Frequency of Treatment: Eval Only          Recommended Diet and Intervention  Diet Solids Recommendation: Regular  Liquid Consistency Recommendation: Thin  Recommended Form of Meds: PO          Compensatory Swallowing Strategies  Compensatory Swallowing Strategies: Alternate solids and liquids; Eat/Feed slowly; Remain upright for 30-45 minutes after meals    Treatment/Goals  Long-term Goals  Timeframe for Long-term Goals: Eval Only    General  Chart Reviewed: No  Subjective  Subjective: RN with permission to treat  Behavior/Cognition: Alert; Cooperative  Temperature Spikes Noted: No  Respiratory Status: O2 via nasual cannula  Breath Sounds: Clear  O2 Device: Nasal cannula  Liters of Oxygen: 3 L  Communication Observation: Functional  Follows Directions: Complex  Dentition: Dentures top (edentulous bottom)  Patient Positioning: Upright in bed  Baseline Vocal Quality: Hoarse  Volitional Cough: Strong  Prior Dysphagia History: n/a  Consistencies Administered: Reg solid; Dysphagia Pureed (Dysphagia I); Thin - cup; Thin - straw    Oral Motor Deficits  Oral/Motor  Oral Motor:  Within functional limits    Oral Phase Dysfunction  Oral Phase  Oral Phase: WFL     Indicators of Pharyngeal Phase Dysfunction   Pharyngeal Phase  Pharyngeal Phase: WFL    Prognosis  Prognosis  Prognosis for safe diet advancement: guarded (missing dentition)  Individuals consulted  Consulted and agree with results and recommendations: Patient    Education  Patient Education: SLP role and compensatory strategies. Patient Education Response: Verbalizes understanding  Safety Devices in place: Yes  Type of devices:  All fall risk precautions in place; Call light within reach (MD at bedside)       Therapy Time  SLP Individual Minutes  Time In: 6855  Time Out: [address]  Minutes: [address] .[place] [id number] Pathologist  12/11/2021

## 2021-12-11 NOTE — PROGRESS NOTES
Hospitalist Progress Note      PCP: Yamini Caputo MD    Date of Admission: 12/10/2021    Subjective: feeling better today    Medications:  Reviewed    Infusion Medications    IV infusion builder 60 mL/hr at 12/11/21 1136    sodium chloride       Scheduled Medications    cetirizine  10 mg Oral Daily    escitalopram  20 mg Oral Nightly    lisinopril  2.5 mg Oral Daily    oxybutynin  5 mg Oral 4x Daily    sodium chloride flush  5-40 mL IntraVENous 2 times per day    enoxaparin  40 mg SubCUTAneous Daily    levofloxacin  750 mg IntraVENous Q24H    methylPREDNISolone  40 mg IntraVENous Q12H    influenza virus vaccine  0.5 mL IntraMUSCular Prior to discharge    ipratropium-albuterol  1 ampule Inhalation TID     PRN Meds: hydrOXYzine, albuterol sulfate HFA, sodium chloride flush, sodium chloride, ondansetron **OR** ondansetron, polyethylene glycol, acetaminophen **OR** acetaminophen, oxyCODONE-acetaminophen      Intake/Output Summary (Last 24 hours) at 12/11/2021 1714  Last data filed at 12/11/2021 1330  Gross per 24 hour   Intake 720 ml   Output 1400 ml   Net -680 ml       Physical Exam Performed:    /71   Pulse 93   Temp 97.5 °F (36.4 °C) (Oral)   Resp 20   Ht 5' 3\" (1.6 m)   Wt 148 lb 9.6 oz (67.4 kg)   SpO2 95%   BMI 26.32 kg/m²       Gen: No distress. Alert. Eyes: PERRL. No sclera icterus. No conjunctival injection. ENT: No discharge. Pharynx clear. Neck: No JVD. No Carotid Bruit. Trachea midline. Resp: + accessory muscle use. + right base crackles. +wheezes. No rhonchi. CV: Regular rate. Regular rhythm. No murmur. No rub. No edema. GI: Non-tender. Non-distended. No masses. No organomegaly. Normal bowel sounds. No hernia. Skin: Warm and dry. No nodule on exposed extremities. No rash on exposed extremities. M/S: No cyanosis. No joint deformity. No clubbing. Neuro: Awake. Grossly nonfocal    Psych: Oriented x 3. No anxiety or agitation.     Labs:   Recent Labs 12/10/21  0445 12/10/21  1746 12/11/21  0310   WBC 18.2* 20.7* 20.5*   HGB 11.4* 10.1* 9.9*   HCT 33.4* 29.8* 30.7*    214 223     Recent Labs     12/10/21  1155 12/11/21  0310 12/11/21  1227   * 123* 123*   K 4.1 4.3 4.4   CL 92* 90* 91*   CO2 20* 20* 16*   BUN 10 10 12   CREATININE 0.6 0.6 <0.5*   CALCIUM 8.8 9.1 8.9     Recent Labs     12/10/21  0445 12/11/21  0310   AST 49* 33   ALT 46* 32   BILIDIR  --  <0.2   BILITOT 1.0 0.5   ALKPHOS 163* 112     No results for input(s): INR in the last 72 hours. Recent Labs     12/10/21  0445   TROPONINI <0.01       Urinalysis:      Lab Results   Component Value Date    NITRU Negative 12/10/2021    WBCUA 0-2 12/10/2021    BACTERIA Rare 12/10/2021    RBCUA 0-2 12/10/2021    BLOODU TRACE-INTACT 12/10/2021    SPECGRAV 1.015 12/10/2021    GLUCOSEU Negative 12/10/2021       Radiology:  CT ABDOMEN PELVIS W IV CONTRAST Additional Contrast? None   Final Result   Chest:      No central pulmonary embolus. Tiny peripheral branches are not well   evaluated. Dominant irregular nodule in the left upper lobe. .  This is more conspicuous   than February 2021. This could represent an inflammatory nodule or an early   finding of lung carcinoma. Recommend short-term follow-up chest CT versus   PET-CT. Right lower lobe airspace disease, increased compared to prior, either   atelectasis or pneumonia. Moderate coronary artery calcification      Compression fractures in the thoracic spine, previously described on MRI   04/28/2021. No obvious change given differences in modalities      Abdomen and pelvis:      Mildly dilated extrahepatic common duct to 1.2 cm. There is a questionable   filling defect seen in the extrahepatic common duct.       Punctate hypodense nodule in the pancreas, most likely small side branch IPMN      RECOMMENDATIONS:   ACR management recommendations for incidental cystic pancreatic masses in   asymptomatic* patients on CT, MR, or US      < 2 cm:  Single follow-up in 1 yr (MR preferred)      *Stable:  Benign, no further follow-up   *Growth: As below based upon size   * None of the following:  Hyperamylasemia, recent onset diabetes, severe   epigastric pain, weight loss, steatorrhea, or jaundice. Ryley SOLORIO, et al.  Managing incidental findings on abdominal CT: white paper   of the ACR Incidental Findings Committee. J Am Alis Radiol 2010; 2:776-170. CT CHEST PULMONARY EMBOLISM W CONTRAST   Final Result   Chest:      No central pulmonary embolus. Tiny peripheral branches are not well   evaluated. Dominant irregular nodule in the left upper lobe. .  This is more conspicuous   than February 2021. This could represent an inflammatory nodule or an early   finding of lung carcinoma. Recommend short-term follow-up chest CT versus   PET-CT. Right lower lobe airspace disease, increased compared to prior, either   atelectasis or pneumonia. Moderate coronary artery calcification      Compression fractures in the thoracic spine, previously described on MRI   04/28/2021. No obvious change given differences in modalities      Abdomen and pelvis:      Mildly dilated extrahepatic common duct to 1.2 cm. There is a questionable   filling defect seen in the extrahepatic common duct. Punctate hypodense nodule in the pancreas, most likely small side branch IPMN      RECOMMENDATIONS:   ACR management recommendations for incidental cystic pancreatic masses in   asymptomatic* patients on CT, MR, or US      < 2 cm:  Single follow-up in 1 yr (MR preferred)      *Stable:  Benign, no further follow-up   *Growth: As below based upon size   * None of the following:  Hyperamylasemia, recent onset diabetes, severe   epigastric pain, weight loss, steatorrhea, or jaundice. Ryley SOLORIO, et al.  Managing incidental findings on abdominal CT: white paper   of the ACR Incidental Findings Committee. J Am Alis Radiol 2010; 3:582-496.          XR CHEST PORTABLE   Final Result   Multifocal airspace opacities that are suspected to represent developing   pneumonitis. Correlate with clinical workup. Assessment/Plan:    Active Hospital Problems    Diagnosis     Community acquired pneumonia [J18.9]     Transaminitis [R74.01]     Acute hypoxemic respiratory failure ([address] [J96.01]     Pneumonia due to infectious organism [J18.9]     Pancreatic abnormality [Q45.3]     Common bile duct dilatation [K83.8]     Hyponatremia [E87.1]            #Acute on chronic hypoxic respiratory failure  -Patient uses 2 to 3 L of nasal cannula oxygen at home. She is currently requiring 4-->3 L nasal cannula at rest  -Related to COPD and pneumonia  -Wean oxygen as able and treat COPD and pneumonia as below     #Right lower lobe community-acquired pneumonia  - Suspect gram-positive organism. Levaquin day #2  - f/u cultures     #Left upper lobe nodule  -Noted on prior imaging, but radiologist notes that it is more conspicuous in appearance now. Pulmonology consulted     #COPD with acute exacerbation  -Solu-Medrol 40 mg IV twice daily  -Scheduled duo nebs  -Pulmonary consulted     #Lactic acidosis   - related to above. Sp IVF, check repeat      #Transaminitis  #Extrahepatic common bile duct dilatation  -She denies current abdominal pain, does but does state that she had abdominal pain in the right upper quadrant recently. -  GI consulted  - monitor LFTs     #Pancreatic nodules  - GI c/s     #Hyponatremia  -Acute on chronic  -Her baseline sodium is in the low 130s  - na on admit 123, started on IVF per nephrology  -Nephrology consulted  -Check urine studies  -Stop hydrochlorothiazide  -Fluid restriction added  -Monitor BMP     #Coronary artery calcification  - noted on CT. She denies history of CAD, denies CP.   Consider ASA, statin, PCP f/u for w/u      #Thoracic spine compression fractures-noted on MRI in April 2021     #Subclinical hyperthyroidism  - TSH 0.17  - check free T4     #HTN  - stop HCTZ  - cont lisino[ril     #Depression  - cont lexapro       DVT Prophylaxis: Lovenox   Diet: ADULT DIET;  Regular; Low Fat (less than or equal to 50 gm/day)  Code Status: Full Code    PT/OT Eval Status: ordered    Ivelisse Villasenor MD

## 2021-12-11 NOTE — FLOWSHEET NOTE
12/11/21 1206   Pain Assessment   Pain Assessment 0-10   Pain Level 6   Pain Type Chronic pain   Pain Location Back; Abdomen   Pain Orientation Right   Pain Descriptors Aching   Pain Frequency Continuous   Pain Onset On-going   Clinical Progression Gradually worsening       Pain as shown. Pt. Assisted up to bedside commode and back to bed. Pt. Very SOB with standing up and pivoting. Will continue to monitor. Call light is within reach.   Damaris Dugan RN

## 2021-12-11 NOTE — PROGRESS NOTES
Call from pts. Daughter Nilda. Updated her at this time. No other questions at this time.  Bob Byrd RN

## 2021-12-11 NOTE — PROGRESS NOTES
12/11/21 1800   RT Protocol   History Pulmonary Disease 2   Respiratory pattern 0   Breath sounds 4   Cough 0   Indications for Bronchodilator Therapy Decreased or absent breath sounds   Bronchodilator Assessment Score 6   RT Inhaler-Nebulizer Bronchodilator Protocol Note    There is a bronchodilator order in the chart from a provider indicating to follow the RT Bronchodilator Protocol and there is an Initiate RT Inhaler-Nebulizer Bronchodilator Protocol order as well (see protocol at bottom of note). CXR Findings:  XR CHEST PORTABLE    Result Date: 12/10/2021  Multifocal airspace opacities that are suspected to represent developing pneumonitis. Correlate with clinical workup. The findings from the last RT Protocol Assessment were as follows:   History Pulmonary Disease: Chronic pulmonary disease  Respiratory Pattern: Regular pattern and RR 12-20 bpm  Breath Sounds: Intermittent or unilateral wheezes  Cough: Strong, spontaneous, non-productive  Indication for Bronchodilator Therapy: Decreased or absent breath sounds  Bronchodilator Assessment Score: 6    Aerosolized bronchodilator medication orders have been revised according to the RT Inhaler-Nebulizer Bronchodilator Protocol below. Respiratory Therapist to perform RT Therapy Protocol Assessment initially then follow the protocol. Repeat RT Therapy Protocol Assessment PRN for score 0-3 or on second treatment, BID, and PRN for scores above 3. No Indications - adjust the frequency to every 6 hours PRN wheezing or bronchospasm, if no treatments needed after 48 hours then discontinue using Per Protocol order mode. If indication present, adjust the RT bronchodilator orders based on the Bronchodilator Assessment Score as indicated below.   Use Inhaler orders unless patient has one or more of the following: on home nebulizer, not able to hold breath for 10 seconds, is not alert and oriented, cannot activate and use MDI correctly, or respiratory rate 25 breaths per minute or more, then use the equivalent nebulizer order(s) with same Frequency and PRN reasons based on the score. If a patient is on this medication at home then do not decrease Frequency below that used at home. 0-3 - enter or revise RT bronchodilator order(s) to equivalent RT Bronchodilator order with Frequency of every 4 hours PRN for wheezing or increased work of breathing using Per Protocol order mode. 4-6 - enter or revise RT Bronchodilator order(s) to two equivalent RT bronchodilator orders with one order with BID Frequency and one order with Frequency of every 4 hours PRN wheezing or increased work of breathing using Per Protocol order mode. 7-10 - enter or revise RT Bronchodilator order(s) to two equivalent RT bronchodilator orders with one order with TID Frequency and one order with Frequency of every 4 hours PRN wheezing or increased work of breathing using Per Protocol order mode. 11-13 - enter or revise RT Bronchodilator order(s) to one equivalent RT bronchodilator order with QID Frequency and an Albuterol order with Frequency of every 4 hours PRN wheezing or increased work of breathing using Per Protocol order mode. Greater than 13 - enter or revise RT Bronchodilator order(s) to one equivalent RT bronchodilator order with every 4 hours Frequency and an Albuterol order with Frequency of every 2 hours PRN wheezing or increased work of breathing using Per Protocol order mode.      Electronically signed by Lor Berumen RCP on 12/11/2021 at 6:52 PM

## 2021-12-12 LAB
ANION GAP SERPL CALCULATED.3IONS-SCNC: 10 MMOL/L (ref 3–16)
ANION GAP SERPL CALCULATED.3IONS-SCNC: 10 MMOL/L (ref 3–16)
ANION GAP SERPL CALCULATED.3IONS-SCNC: 11 MMOL/L (ref 3–16)
BASOPHILS ABSOLUTE: 0.1 K/UL (ref 0–0.2)
BASOPHILS RELATIVE PERCENT: 0.5 %
BUN BLDV-MCNC: 11 MG/DL (ref 7–20)
BUN BLDV-MCNC: 12 MG/DL (ref 7–20)
BUN BLDV-MCNC: 14 MG/DL (ref 7–20)
CALCIUM SERPL-MCNC: 8.7 MG/DL (ref 8.3–10.6)
CALCIUM SERPL-MCNC: 8.8 MG/DL (ref 8.3–10.6)
CALCIUM SERPL-MCNC: 9 MG/DL (ref 8.3–10.6)
CHLORIDE BLD-SCNC: 92 MMOL/L (ref 99–110)
CHLORIDE BLD-SCNC: 93 MMOL/L (ref 99–110)
CHLORIDE BLD-SCNC: 95 MMOL/L (ref 99–110)
CO2: 24 MMOL/L (ref 21–32)
CO2: 24 MMOL/L (ref 21–32)
CO2: 25 MMOL/L (ref 21–32)
CREAT SERPL-MCNC: 0.8 MG/DL (ref 0.6–1.2)
CREAT SERPL-MCNC: <0.5 MG/DL (ref 0.6–1.2)
CREAT SERPL-MCNC: <0.5 MG/DL (ref 0.6–1.2)
EOSINOPHILS ABSOLUTE: 0 K/UL (ref 0–0.6)
EOSINOPHILS RELATIVE PERCENT: 0 %
GFR AFRICAN AMERICAN: >60
GFR NON-AFRICAN AMERICAN: >60
GLUCOSE BLD-MCNC: 131 MG/DL (ref 70–99)
GLUCOSE BLD-MCNC: 152 MG/DL (ref 70–99)
GLUCOSE BLD-MCNC: 161 MG/DL (ref 70–99)
HCT VFR BLD CALC: 31.7 % (ref 36–48)
HEMOGLOBIN: 10.6 G/DL (ref 12–16)
LACTIC ACID: 1 MMOL/L (ref 0.4–2)
LACTIC ACID: 1.8 MMOL/L (ref 0.4–2)
LYMPHOCYTES ABSOLUTE: 0.4 K/UL (ref 1–5.1)
LYMPHOCYTES RELATIVE PERCENT: 4.2 %
MCH RBC QN AUTO: 33.3 PG (ref 26–34)
MCHC RBC AUTO-ENTMCNC: 33.3 G/DL (ref 31–36)
MCV RBC AUTO: 99.8 FL (ref 80–100)
MONOCYTES ABSOLUTE: 0.3 K/UL (ref 0–1.3)
MONOCYTES RELATIVE PERCENT: 2.7 %
NEUTROPHILS ABSOLUTE: 9.6 K/UL (ref 1.7–7.7)
NEUTROPHILS RELATIVE PERCENT: 92.6 %
PDW BLD-RTO: 12.3 % (ref 12.4–15.4)
PLATELET # BLD: 248 K/UL (ref 135–450)
PMV BLD AUTO: 7.5 FL (ref 5–10.5)
POTASSIUM SERPL-SCNC: 4.3 MMOL/L (ref 3.5–5.1)
POTASSIUM SERPL-SCNC: 4.3 MMOL/L (ref 3.5–5.1)
POTASSIUM SERPL-SCNC: 4.5 MMOL/L (ref 3.5–5.1)
RBC # BLD: 3.17 M/UL (ref 4–5.2)
SODIUM BLD-SCNC: 126 MMOL/L (ref 136–145)
SODIUM BLD-SCNC: 127 MMOL/L (ref 136–145)
SODIUM BLD-SCNC: 131 MMOL/L (ref 136–145)
WBC # BLD: 10.4 K/UL (ref 4–11)

## 2021-12-12 PROCEDURE — 83605 ASSAY OF LACTIC ACID: CPT

## 2021-12-12 PROCEDURE — 2700000000 HC OXYGEN THERAPY PER DAY

## 2021-12-12 PROCEDURE — 99233 SBSQ HOSP IP/OBS HIGH 50: CPT | Performed by: INTERNAL MEDICINE

## 2021-12-12 PROCEDURE — 6370000000 HC RX 637 (ALT 250 FOR IP): Performed by: INTERNAL MEDICINE

## 2021-12-12 PROCEDURE — 97161 PT EVAL LOW COMPLEX 20 MIN: CPT

## 2021-12-12 PROCEDURE — 2580000003 HC RX 258: Performed by: INTERNAL MEDICINE

## 2021-12-12 PROCEDURE — 97165 OT EVAL LOW COMPLEX 30 MIN: CPT

## 2021-12-12 PROCEDURE — 97530 THERAPEUTIC ACTIVITIES: CPT

## 2021-12-12 PROCEDURE — 85025 COMPLETE CBC W/AUTO DIFF WBC: CPT

## 2021-12-12 PROCEDURE — 94640 AIRWAY INHALATION TREATMENT: CPT

## 2021-12-12 PROCEDURE — 80048 BASIC METABOLIC PNL TOTAL CA: CPT

## 2021-12-12 PROCEDURE — 94761 N-INVAS EAR/PLS OXIMETRY MLT: CPT

## 2021-12-12 PROCEDURE — 36415 COLL VENOUS BLD VENIPUNCTURE: CPT

## 2021-12-12 PROCEDURE — 6360000002 HC RX W HCPCS: Performed by: NURSE PRACTITIONER

## 2021-12-12 PROCEDURE — 2060000000 HC ICU INTERMEDIATE R&B

## 2021-12-12 PROCEDURE — 97116 GAIT TRAINING THERAPY: CPT

## 2021-12-12 PROCEDURE — 6370000000 HC RX 637 (ALT 250 FOR IP): Performed by: NURSE PRACTITIONER

## 2021-12-12 RX ORDER — AMLODIPINE BESYLATE 5 MG/1
5 TABLET ORAL DAILY
Status: DISCONTINUED | OUTPATIENT
Start: 2021-12-12 | End: 2021-12-15 | Stop reason: HOSPADM

## 2021-12-12 RX ADMIN — OXYBUTYNIN CHLORIDE 5 MG: 5 TABLET ORAL at 12:30

## 2021-12-12 RX ADMIN — OXYBUTYNIN CHLORIDE 5 MG: 5 TABLET ORAL at 19:58

## 2021-12-12 RX ADMIN — VASOPRESSIN: 20 INJECTION, SOLUTION INTRAVENOUS at 04:38

## 2021-12-12 RX ADMIN — IPRATROPIUM BROMIDE AND ALBUTEROL SULFATE 1 AMPULE: .5; 2.5 SOLUTION RESPIRATORY (INHALATION) at 11:54

## 2021-12-12 RX ADMIN — OXYCODONE HYDROCHLORIDE AND ACETAMINOPHEN 1 TABLET: 5; 325 TABLET ORAL at 01:37

## 2021-12-12 RX ADMIN — CETIRIZINE HYDROCHLORIDE 10 MG: 10 TABLET ORAL at 09:47

## 2021-12-12 RX ADMIN — AMLODIPINE BESYLATE 5 MG: 5 TABLET ORAL at 12:30

## 2021-12-12 RX ADMIN — LISINOPRIL 2.5 MG: 5 TABLET ORAL at 09:47

## 2021-12-12 RX ADMIN — HYDROXYZINE PAMOATE 25 MG: 25 CAPSULE ORAL at 19:58

## 2021-12-12 RX ADMIN — OXYCODONE HYDROCHLORIDE AND ACETAMINOPHEN 1 TABLET: 5; 325 TABLET ORAL at 23:03

## 2021-12-12 RX ADMIN — METHYLPREDNISOLONE SODIUM SUCCINATE 40 MG: 40 INJECTION, POWDER, FOR SOLUTION INTRAMUSCULAR; INTRAVENOUS at 17:28

## 2021-12-12 RX ADMIN — ENOXAPARIN SODIUM 40 MG: 100 INJECTION SUBCUTANEOUS at 17:27

## 2021-12-12 RX ADMIN — OXYCODONE HYDROCHLORIDE AND ACETAMINOPHEN 1 TABLET: 5; 325 TABLET ORAL at 09:47

## 2021-12-12 RX ADMIN — IPRATROPIUM BROMIDE AND ALBUTEROL SULFATE 1 AMPULE: .5; 2.5 SOLUTION RESPIRATORY (INHALATION) at 07:00

## 2021-12-12 RX ADMIN — OXYBUTYNIN CHLORIDE 5 MG: 5 TABLET ORAL at 09:48

## 2021-12-12 RX ADMIN — METHYLPREDNISOLONE SODIUM SUCCINATE 40 MG: 40 INJECTION, POWDER, FOR SOLUTION INTRAMUSCULAR; INTRAVENOUS at 04:54

## 2021-12-12 RX ADMIN — VASOPRESSIN: 20 INJECTION, SOLUTION INTRAVENOUS at 19:02

## 2021-12-12 RX ADMIN — OXYBUTYNIN CHLORIDE 5 MG: 5 TABLET ORAL at 17:28

## 2021-12-12 RX ADMIN — LEVOFLOXACIN 750 MG: 5 INJECTION, SOLUTION INTRAVENOUS at 09:51

## 2021-12-12 RX ADMIN — IPRATROPIUM BROMIDE AND ALBUTEROL SULFATE 1 AMPULE: .5; 2.5 SOLUTION RESPIRATORY (INHALATION) at 18:58

## 2021-12-12 RX ADMIN — ESCITALOPRAM OXALATE 20 MG: 10 TABLET ORAL at 19:58

## 2021-12-12 RX ADMIN — OXYCODONE HYDROCHLORIDE AND ACETAMINOPHEN 1 TABLET: 5; 325 TABLET ORAL at 17:28

## 2021-12-12 ASSESSMENT — PAIN DESCRIPTION - PROGRESSION
CLINICAL_PROGRESSION: NOT CHANGED
CLINICAL_PROGRESSION: GRADUALLY WORSENING
CLINICAL_PROGRESSION: GRADUALLY WORSENING

## 2021-12-12 ASSESSMENT — PAIN DESCRIPTION - FREQUENCY
FREQUENCY: CONTINUOUS

## 2021-12-12 ASSESSMENT — PAIN DESCRIPTION - PAIN TYPE
TYPE: ACUTE PAIN

## 2021-12-12 ASSESSMENT — PAIN DESCRIPTION - ORIENTATION
ORIENTATION: MID;LOWER
ORIENTATION: LOWER;MID
ORIENTATION: LOWER;MID
ORIENTATION: RIGHT
ORIENTATION: LOWER;MID

## 2021-12-12 ASSESSMENT — PAIN DESCRIPTION - ONSET
ONSET: ON-GOING

## 2021-12-12 ASSESSMENT — PAIN DESCRIPTION - DESCRIPTORS
DESCRIPTORS: CONSTANT;ACHING
DESCRIPTORS: CONSTANT

## 2021-12-12 ASSESSMENT — PAIN SCALES - GENERAL
PAINLEVEL_OUTOF10: 8
PAINLEVEL_OUTOF10: 5
PAINLEVEL_OUTOF10: 8
PAINLEVEL_OUTOF10: 7
PAINLEVEL_OUTOF10: 6
PAINLEVEL_OUTOF10: 6

## 2021-12-12 ASSESSMENT — PAIN DESCRIPTION - LOCATION
LOCATION: BACK
LOCATION: BACK
LOCATION: FLANK
LOCATION: BACK
LOCATION: BACK

## 2021-12-12 ASSESSMENT — PAIN - FUNCTIONAL ASSESSMENT
PAIN_FUNCTIONAL_ASSESSMENT: ACTIVITIES ARE NOT PREVENTED
PAIN_FUNCTIONAL_ASSESSMENT: PREVENTS OR INTERFERES SOME ACTIVE ACTIVITIES AND ADLS

## 2021-12-12 NOTE — PROGRESS NOTES
Inpatient Physical Therapy Evaluation and Treatment    Unit: PCU  Date:  12/12/2021  Patient Name:    Cindy Zuniga  Admitting diagnosis:  Hyponatremia [E87.1]  Tachycardia [R00.0]  Lung nodule [R91.1]  Common bile duct dilatation [K83.8]  COPD exacerbation (HCC) [J44.1]  Right flank pain [R10.9]  Supplemental oxygen dependent [Z99.81]  Pancreatic abnormality [Q45.3]  Dyspnea, unspecified type [R06.00]  Pneumonia due to infectious organism, unspecified laterality, unspecified part of lung [J18.9]  Admit Date:  12/10/2021  Precautions/Restrictions/WB Status/ Lines/ Wounds/ Oxygen: Lines -Supplemental O2 (3L), Telemetry and Continuous pulse oximetry    Treatment Time:  1110-1135  Treatment Number:  1   Timed Code Treatment Minutes: 15 minutes  Total Treatment Minutes:  25  minutes    Patient Goals for Therapy: \" Go home \"          Discharge Recommendations: Home 24 hr supervision  and Home PT  DME needs for discharge: Needs Met       Therapy recommendation for EMS Transport: can transport by wheelchair    Therapy recommendations for staff:   Assist of 1 with use of rolling walker (RW) for all transfers and ambulation to/from chair  to/from bathroom  within room    History of Present Illness: ED note, 12/10/2021, Grahammagdalena Doll:   \" 61 y. o. female presents to the ED with shortness of breath, the past couple days, also having right flank/side pain, has been coughing a lot, some productive sputum, no known fever, no upper/anterior chest pain, has been using her albuterol inhaler numerous times since this started without much improvement, she has not been on any steroids or antibiotics recently, she is on 2 L nasal cannula supplemental oxygen most of the time, she has a history of COPD, quit smoking almost a year ago, no vomiting/diarrhea nor left-sided abdominal pain, she has had a cholecystectomy, no headache or neck stiffness.  No other complaints, modifying factors or associated symptoms.  \"     Home Health S4 Level Recommendation:  Level 1 Standard  AM-PAC Mobility Score    AM-PAC Inpatient Mobility Raw Score : 19       Information obtained from OT/PT evaluation on 2020 and edited as needed. Preadmission Environment    Pt. Jd Villarreal family (daughter)  Home environment:    one story home  Steps to enter first floor:   bilateral hand rails and ramp          Steps to second floor: N/A  Bathroom:       Tub/Shower unit and Standard height toilet-rails   Equipment owned:      SPC, Rolling Walker, manual W/C, BSC, quad cane, home O2 (2 L) PRN, inhaler and nebulizer, electric wheelchair      Preadmission Status / PLOF:  History of falls             No  Pt. Able to drive          No daughter assists  Pt Fully independent with ADL's         Yes  Pt. Required assistance from family (dtr) for:  Cooking, Cleaning and [address] with daughter  Pt. Fully independent for transfers and gait and walked with: No Device    Pain   Yes  Location: R upper rib cage  Ratin- 3 /10  Pain Medicine Status: No request made    Cognition    A&O x4   Able to follow 2 step commands    Subjective  Patient sitting up in chair with no family present. Pt agreeable to this PT eval & tx. Upper Extremity ROM/Strength  Please see OT evaluation. Lower Extremity ROM / Strength   AROM WFL: Yes  ROM limitations:     Strength Assessment (measured on a 0-5 scale):  R LE   Quad   4   Ant Tib  4   Hamstring 4  L LE  Quad   4   Ant Tib  4   Hamstring 4    Lower Extremity Sensation    WFL    Lower Extremity Proprioception:   WFL    Coordination and Tone  WFL    Balance  Sitting:  Good ;  Independent  Comments:     Standing: Good - ; SBA  Comments:     Bed Mobility   Supine to Sit:    Not Tested  Sit to Supine:   Not Tested  Rolling:   Not Tested  Scooting in sitting: Independent  Scooting in supine:  Not Tested    Transfer Training     Sit to stand:   SBA  Stand to sit:   SBA  Bed to Chair:   Not Tested with use of N/A    Gait gait completed as indicated below  Distance:      40 ft  Deviations (firm surface/linoleum):  decreased raphael, forward flexed posture and increased reliance on RW with UE for stability  Assistive Device Used:    rolling walker (RW)  Level of Assist:    SBA  Comment: total assist for O2 line management    Activity Tolerance   Pt completed therapy session with SOB noted with ambulation, able to recover with extended seated rest break   SpO2: 94% on 3L O2 sitting at rest   84% on 3L O2 post-ambulation,~ 1 min with seated rest break to recover to 90%  HR: 101 bpm sitting at rest   115 bpm with ambulation    Positioning Needs   Pt reclined in chair, no alarm needed, positioned in proper neutral alignment and pressure relief provided. Call light provided and all needs within reach    Exercises Initiated  all completed bilaterally unless indicated  Ankle Pumps x 10 reps    Other  None. Patient/Family Education   Pt educated on role of inpatient PT, POC, importance of continued activity, DC recommendations, safety awareness, transfer techniques, pacing activity, HEP and calling for assist with mobility. Assessment  Pt seen for Physical Therapy evaluation in acute care setting. Pt demonstrated decreased Activity tolerance, Balance, Safety and Strength as well as decreased independence with Ambulation, Bed Mobility  and Transfers. Recommending Home 24 hr supervision and with home PT upon discharge as patient functioning below baseline level and would benefit from continued therapy services    Goals : To be met in 3 visits:  1). Independent with LE Ex x 10 reps    To be met in 6 visits:  1). Supine to/from sit: Independent  2). Sit to/from stand: Independent  3). Bed to chair: Independent  4). Gait: Ambulate 75 ft.   with  Supervision and use of LRAD (least restrictive assistive device)  5).   Tolerate B LE exercises 3 sets of 10-15 reps    Rehabilitation Potential: Good  Strengths for achieving goals include:   Pt motivated, Family

## 2021-12-12 NOTE — PROGRESS NOTES
PROGRESS NOTE  S:66 yrs Patient  admitted on 12/10/2021 with Hyponatremia [E87.1]  Tachycardia [R00.0]  Lung nodule [R91.1]  Common bile duct dilatation [K83.8]  COPD exacerbation (HCC) [J44.1]  Right flank pain [R10.9]  Supplemental oxygen dependent [Z99.81]  Pancreatic abnormality [Q45.3]  Dyspnea, unspecified type [R06.00]  Pneumonia due to infectious organism, unspecified laterality, unspecified part of lung [J18.9] . Current [facility] Meds   amLODIPine  5 mg Oral Daily    cetirizine  10 mg Oral Daily    escitalopram  20 mg Oral Nightly    lisinopril  2.5 mg Oral Daily    oxybutynin  5 mg Oral 4x Daily    sodium chloride flush  5-40 mL IntraVENous 2 times per day    enoxaparin  40 mg SubCUTAneous Daily    levofloxacin  750 mg IntraVENous Q24H    methylPREDNISolone  40 mg IntraVENous Q12H    influenza virus vaccine  0.5 mL IntraMUSCular Prior to discharge    ipratropium-albuterol  1 ampule Inhalation TID     [facility] IV Meds   IV infusion builder 60 mL/hr at 12/12/21 0438    sodium chloride       [facility] PRN Meds  hydrOXYzine, albuterol sulfate HFA, sodium chloride flush, sodium chloride, ondansetron **OR** ondansetron, polyethylene glycol, acetaminophen **OR** acetaminophen, oxyCODONE-acetaminophen    Exam:   Vitals:    12/12/21 1547   BP: 109/66   Pulse: 105   Resp: 20   Temp: 98.7 °F (37.1 °C)   SpO2: 92%     I/O last 3 completed shifts:   In: 1595.4 [P.O.:640; I.V.:955.4]  Out: 2900 [Urine:2900]   General appearance: alert, appears stated age and cooperative  HEENT: PERRLA  Neck: no adenopathy, no carotid bruit, no JVD, supple, symmetrical, trachea midline and thyroid not enlarged, symmetric, no tenderness/mass/nodules  Lungs: clear to auscultation bilaterally  Heart: regular rate and rhythm, S1, S2 normal, no murmur, click, rub or gallop  Abdomen: soft, non-tender; bowel sounds normal; no masses,  no organomegaly  Extremities: extremities normal, atraumatic, no cyanosis or edema     Labs:  CBC:   Recent Labs     12/10/21  1746 12/11/21  0310 12/12/21  0850   WBC 20.7* 20.5* 10.4   HGB 10.1* 9.9* 10.6*   HCT 29.8* 30.7* 31.7*   MCV 98.4 100.0 99.8    223 248     BMP:   Recent Labs     12/11/21  1949 12/12/21  0324 12/12/21  1144   * 126* 127*   K 4.2 4.5 4.3   CL 89* 92* 93*   CO2 23 24 24   BUN 14 12 11   CREATININE <0.5* <0.5* <0.5*     LIVER PROFILE:   Recent Labs     12/10/21  0445 12/11/21  0310   AST 49* 33   ALT 46* 32   LIPASE 28.0  --    PROT 7.7 6.8   BILIDIR  --  <0.2   BILITOT 1.0 0.5   ALKPHOS 163* 112     PT/INR: No results for input(s): INR in the last 72 hours. Invalid input(s): PT    IMAGING:  No results found. Hospital Problems           Last Modified POA    * (Principal) Acute hypoxemic respiratory failure ([address] 12/10/2021 Yes    Hyponatremia 12/10/2021 Yes    Pneumonia due to infectious organism 12/10/2021 Yes    Transaminitis 12/10/2021 Yes    Pancreatic abnormality 12/10/2021 Yes    Common bile duct dilatation 12/10/2021 Yes    Community acquired pneumonia 12/11/2021 Yes         Impression:  62 yo female with abnormal imaging    Recommendation:  1. Doing well, can get EUS as outpatient  2.  Please call with further questions or concerns      Wagner Em DO  6:53 PM 12/12/2021            [facility]    [address]      [address] [facility]     Phone: [phone]     Fax: [phone]

## 2021-12-12 NOTE — PROGRESS NOTES
[facility] Progress Note           Subjective/   61y.o. year old female who we are seeing in consultation for Hyponatremia. HPI:  Sodium trending down, started on course of ~2% saline. Intake adequate. ROS:  Shortness of breath persists, remains on 3 L NC, states uses 2-3 L NC at home. BP trending higher off hctz. No fevers. Objective/   GEN:  Chronically ill, BP (!) 156/82   Pulse 96   Temp 98.6 °F (37 °C) (Oral)   Resp 20   Ht 5' 3\" (1.6 m)   Wt 152 lb 9.6 oz (69.2 kg)   SpO2 94%   BMI 27.03 kg/m²   HEENT: non-icteric, no JVD  CV: S1, S2 without m/r/g; no LE edema  RESP: CTA B without w/r/r; breathing wnl  ABD: +bs, soft, nt, no hsm  SKIN: warm, no rashes    Data/  Recent Labs     12/10/21  0445 12/10/21  1746 12/11/21  0310   WBC 18.2* 20.7* 20.5*   HGB 11.4* 10.1* 9.9*   HCT 33.4* 29.8* 30.7*   MCV 97.0 98.4 100.0    214 223     Recent Labs     12/11/21  1227 12/11/21  1949 12/12/21  0324   * 124* 126*   K 4.4 4.2 4.5   CL 91* 89* 92*   CO2 16* 23 24   GLUCOSE 126* 158* 161*   BUN 12 14 12   CREATININE <0.5* <0.5* <0.5*   LABGLOM >60 >60 >60   GFRAA >60 >60 >60       Component      Latest Ref Rng & Units 12/11/2021 12/11/2021 12/10/2021 12/10/2021          12:40 AM 12:40 AM 11:55 AM 11:55 AM   TSH      0.27 - 4.20 uIU/mL   0.17 (L)    Uric Acid, Serum      2.6 - 6.0 mg/dL    2.5 (L)   Sodium, Ur      Not Established mmol/L  <20     Osmolality, Ur      390 - 1070 mOsm/kg 245 (L)          Assessment/     Hyponatremia.  - Hx of chronic hyponatremia since 2013. Baseline in the low 130s. - Prior work up suggestive of polydipsia +/- low solute intake. - Has risk factors to develop SIADH (HCTZ, Escitalopram, COPD/lung nodule). - Recommend to permanently discontinue HCTZ. - Free water restriction of 64oz/day.   - Started ~2% saline at 60 ml/hour - can stop once SNa 129 or higher  - BMP [id number].     Hypertension.  - Off HCTZ which she states was recently started. - On Lisinopril, start amlodipine with higher BP trends.     COPD/Pneumonia. - On steroids and antibiotic per Primary service. ____________________________________  Italia Almeida MD  [facility]  [email or web address]  Office: [phone]

## 2021-12-12 NOTE — PROGRESS NOTES
Resting quietly with continuous pulse oximetry low/mid 90%'s on 3L NC. No c/o. Call in easy reach. Bed alarm on for safety. Continue to monitor closely.   Helen Gaona RN

## 2021-12-12 NOTE — PROGRESS NOTES
Bedside nursing handoff to [facility], [facility].   Magaly Cummins, RN

## 2021-12-12 NOTE — PROGRESS NOTES
scored a 20/24 on the AM-PAC ADL Inpatient form. Current research shows that an AM-PAC score of 18 or greater is associated with a discharge to the patient's home setting. Information obtained from OT/PT evaluation on 2020 and edited as needed. Preadmission Environment    Pt. Lives with family (daughter)  Home environment:    one story home  Steps to enter first floor:   bilateral hand rails and ramp          Steps to second floor: N/A  Bathroom:       Tub/Shower unit and Standard height toilet  Equipment owned:      [facility], Rolling Walker, manual W/C, BSC, quad cane, home O2 (2 L) PRN, inhaler and nebulizer      Preadmission Status / PLOF:  History of falls             Yes dizziness with falls   Pt. Able to drive          No daughter assists  Pt Fully independent with ADL's         No  Pt. Required assistance from family for:  Cooking, Cleaning and Laundry shares with daughter  Pt. Fully independent for transfers and gait and walked with: No Device    Pain   Yes  Location: R upper rib cage  Ratin- 3 /10  Pain Medicine Status: No request made     Cognition    A&O x4   Able to follow 2 step commands, consistently. Subjective:  Pt seated in bedside chair upon therapist arrival. Pt agreeable to work with therapy this date. Upper Extremity ROM:   WFL,  pt able to perform all bed mobility, transfers, and gait without ROM limitation. Upper Extremity Strength:    BUE strength WFL, but not formally assessed w/ MMT    Upper Extremity Sensation:    WNL    Upper Extremity Proprioception:  WNL    Coordination and Tone:  WNL    Balance  Sitting:             Good ;  Independent  Comments:      Standing:         Good - ; SBA  Comments:      Bed Mobility   Supine to Sit:               Not Tested  Sit to Supine:               Not Tested  Rolling:                        Not Tested  Scooting in sitting:       Independent  Scooting in supine:     Not Tested     Transfer Training                    Sit to stand: SBA  Stand to sit:                 SBA  Bed to Chair:               Not Tested with use of N/A    Activities of Daily Living:   UB Dressing:   Not Tested  LB Dressing:    supervision to don socks   UB Bathing:  Not Tested  LB Bathing:  Not Tested  Feeding:  Independent  Grooming:   Not Tested  Toileting:  Not Tested    Activity Tolerance:   Pt completed therapy session with SOB noted with ambulation, able to recover with extended seated rest break   SpO2: 94% on 3L O2 sitting at rest              84% on 3L O2 post-ambulation,~ 1 min with seated rest break to recover to 90%  HR: 101 bpm sitting at rest              115 bpm with ambulation    Positioning Needs:   Up in chair, call light and needs in reach. Exercise / Activities Initiated:   NT    Patient/Family Education:   Role of OT  Recommendations for DC    Assessment of Deficits: Pt seen for Occupational therapy evaluation in acute care setting. Pt demonstrated decreased Activity tolerance, ADLs and Bed mobility. Pt functioning below baseline and will likely benefit from skilled occupational therapy services to maximize safety and independence. Goal(s): To be met in 3 Visits:  1). Bed to toilet: Independent    To be met in 5 Visits:  1). Supine to/from Sit:  Independent  2). Upper Body Bathing:   Independent  3). Lower Body Bathing:   Independent  4). Upper Body Dressing:  Independent  5). Lower Body Dressing:  Independent  6). Pt to demonstrate UE exs x 15 reps with minimal cues    Rehabilitation Potential:  Good for goals listed above. Strengths for achieving goals include: Pt motivated, PLOF and Pt cooperative  Barriers to achieving goals include:  No barriers     Plan: To be seen 2-3 x/wk  while in acute care setting for strengthening, bed mobility, transfer training, family/patient education, ADL/IADL retraining and energy conservation.       Yee Miller, MOT, OTR/L   [id number]           If patient discharges from this facility prior to next visit, this note will serve as the Discharge Summary

## 2021-12-12 NOTE — PROGRESS NOTES
Hospitalist Progress Note      PCP: Sherre Fothergill, MD    Date of Admission: 12/10/2021    Subjective: pt feels better today, Na level improved    Medications:  Reviewed    Infusion Medications    IV infusion builder 60 mL/hr at 12/12/21 0438    sodium chloride       Scheduled Medications    amLODIPine  5 mg Oral Daily    cetirizine  10 mg Oral Daily    escitalopram  20 mg Oral Nightly    lisinopril  2.5 mg Oral Daily    oxybutynin  5 mg Oral 4x Daily    sodium chloride flush  5-40 mL IntraVENous 2 times per day    enoxaparin  40 mg SubCUTAneous Daily    levofloxacin  750 mg IntraVENous Q24H    methylPREDNISolone  40 mg IntraVENous Q12H    influenza virus vaccine  0.5 mL IntraMUSCular Prior to discharge    ipratropium-albuterol  1 ampule Inhalation TID     PRN Meds: hydrOXYzine, albuterol sulfate HFA, sodium chloride flush, sodium chloride, ondansetron **OR** ondansetron, polyethylene glycol, acetaminophen **OR** acetaminophen, oxyCODONE-acetaminophen      Intake/Output Summary (Last 24 hours) at 12/12/2021 1701  Last data filed at 12/12/2021 1023  Gross per 24 hour   Intake 1595.35 ml   Output 2900 ml   Net -1304.65 ml       Physical Exam Performed:    /66   Pulse 105   Temp 98.7 °F (37.1 °C) (Oral)   Resp 20   Ht 5' 3\" (1.6 m)   Wt 152 lb 9.6 oz (69.2 kg)   SpO2 92%   BMI 27.03 kg/m²       Gen: No distress. Alert. Eyes: PERRL. No sclera icterus. No conjunctival injection. ENT: No discharge. Pharynx clear. Neck: No JVD.  No Carotid Bruit. Trachea midline. Resp: + accessory muscle use. + right base crackles. +wheezes. No rhonchi. CV: Regular rate. Regular rhythm. No murmur.  No rub. No edema. GI: Non-tender. Non-distended. No masses. No organomegaly. Normal bowel sounds. No hernia. Skin: Warm and dry. No nodule on exposed extremities. No rash on exposed extremities. M/S: No cyanosis. No joint deformity. No clubbing. Neuro: Awake. Grossly nonfocal    Psych: Oriented x 3. No anxiety or agitation.       Labs:   Recent Labs     12/10/21  1746 12/11/21  0310 12/12/21  0850   WBC 20.7* 20.5* 10.4   HGB 10.1* 9.9* 10.6*   HCT 29.8* 30.7* 31.7*    223 248     Recent Labs     12/11/21  1949 12/12/21  0324 12/12/21  1144   * 126* 127*   K 4.2 4.5 4.3   CL 89* 92* 93*   CO2 23 24 24   BUN 14 12 11   CREATININE <0.5* <0.5* <0.5*   CALCIUM 8.7 8.8 8.7     Recent Labs     12/10/21  0445 12/11/21  0310   AST 49* 33   ALT 46* 32   BILIDIR  --  <0.2   BILITOT 1.0 0.5   ALKPHOS 163* 112     No results for input(s): INR in the last 72 hours. Recent Labs     12/10/21  0445   TROPONINI <0.01       Urinalysis:      Lab Results   Component Value Date    NITRU Negative 12/10/2021    WBCUA 0-2 12/10/2021    BACTERIA Rare 12/10/2021    RBCUA 0-2 12/10/2021    BLOODU TRACE-INTACT 12/10/2021    SPECGRAV 1.015 12/10/2021    GLUCOSEU Negative 12/10/2021       Radiology:  CT ABDOMEN PELVIS W IV CONTRAST Additional Contrast? None   Final Result   Chest:      No central pulmonary embolus. Tiny peripheral branches are not well   evaluated. Dominant irregular nodule in the left upper lobe. .  This is more conspicuous   than February 2021. This could represent an inflammatory nodule or an early   finding of lung carcinoma. Recommend short-term follow-up chest CT versus   PET-CT. Right lower lobe airspace disease, increased compared to prior, either   atelectasis or pneumonia. Moderate coronary artery calcification      Compression fractures in the thoracic spine, previously described on MRI   04/28/2021. No obvious change given differences in modalities      Abdomen and pelvis:      Mildly dilated extrahepatic common duct to 1.2 cm. There is a questionable   filling defect seen in the extrahepatic common duct.       Punctate hypodense nodule in the pancreas, most likely small side branch IPMN      RECOMMENDATIONS:   ACR management recommendations for incidental cystic pancreatic masses in   asymptomatic* patients on CT, MR, or US      < 2 cm:  Single follow-up in 1 yr (MR preferred)      *Stable:  Benign, no further follow-up   *Growth: As below based upon size   * None of the following:  Hyperamylasemia, recent onset diabetes, severe   epigastric pain, weight loss, steatorrhea, or jaundice. Ryley SOLORIO, et al.  Managing incidental findings on abdominal CT: white paper   of the ACR Incidental Findings Committee. J Am Alis Radiol 2010; 3:093-773. CT CHEST PULMONARY EMBOLISM W CONTRAST   Final Result   Chest:      No central pulmonary embolus. Tiny peripheral branches are not well   evaluated. Dominant irregular nodule in the left upper lobe. .  This is more conspicuous   than February 2021. This could represent an inflammatory nodule or an early   finding of lung carcinoma. Recommend short-term follow-up chest CT versus   PET-CT. Right lower lobe airspace disease, increased compared to prior, either   atelectasis or pneumonia. Moderate coronary artery calcification      Compression fractures in the thoracic spine, previously described on MRI   04/28/2021. No obvious change given differences in modalities      Abdomen and pelvis:      Mildly dilated extrahepatic common duct to 1.2 cm. There is a questionable   filling defect seen in the extrahepatic common duct. Punctate hypodense nodule in the pancreas, most likely small side branch IPMN      RECOMMENDATIONS:   ACR management recommendations for incidental cystic pancreatic masses in   asymptomatic* patients on CT, MR, or US      < 2 cm:  Single follow-up in 1 yr (MR preferred)      *Stable:  Benign, no further follow-up   *Growth: As below based upon size   * None of the following:  Hyperamylasemia, recent onset diabetes, severe   epigastric pain, weight loss, steatorrhea, or jaundice.       Ryley SOLORIO, et al.  Managing incidental findings on abdominal CT: white paper   of the ACR Incidental Findings Committee. J Am Alis Radiol 2010; 2:078-922. XR CHEST PORTABLE   Final Result   Multifocal airspace opacities that are suspected to represent developing   pneumonitis. Correlate with clinical workup. Assessment/Plan:    Active Hospital Problems    Diagnosis     Community acquired pneumonia [J18.9]     Transaminitis [R74.01]     Acute hypoxemic respiratory failure (HCC) [J96.01]     Pneumonia due to infectious organism [J18.9]     Pancreatic abnormality [Q45.3]     Common bile duct dilatation [K83.8]     Hyponatremia [E87.1]             #Acute on chronic hypoxic respiratory failure  -Patient uses 2 to 3 L of nasal cannula oxygen at home. Saloni Sanchez is currently requiring 4-->3 L nasal cannula at rest  -Related to COPD and pneumonia  -Wean oxygen as able and treat COPD and pneumonia as below     #Right lower lobe community-acquired pneumonia  - Suspect gram-positive organism.  Levaquin day #3  - f/u cultures     #Left upper lobe nodule  -Noted on prior imaging, but radiologist notes that it is more conspicuous in appearance now.  Pulmonology consulted     #COPD with acute exacerbation  -Solu-Medrol 40 mg IV twice daily  -Scheduled duo nebs  -Pulmonary consulted     #Lactic acidosis   - related to above.      #Transaminitis  #Extrahepatic common bile duct dilatation  -She denies current abdominal pain, does but does state that she had abdominal pain in the right upper quadrant recently.   -  GI consulted  - monitor LFTs     #Pancreatic nodules  - GI consulted     #Hyponatremia  -Acute on chronic  -Her baseline sodium is in the low 130s  - na on admit 123, started on IVF per nephrology  -Nephrology consulted  -Check urine studies  -Stop hydrochlorothiazide  -Fluid restriction added  -Monitor BMP, Na improved to 126       #Coronary artery calcification  - noted on CT.  She denies history of CAD, denies CP.  Consider ASA, statin, PCP f/u for w/u      #Thoracic spine compression fractures-noted on MRI in April 2021     #Subclinical hyperthyroidism  - TSH 0.17  - check free T4     #HTN  - stop HCTZ  - cont lisino[ril     #Depression  - cont lexapro          DVT Prophylaxis: Lovenox   Diet: ADULT DIET;  Regular; Low Fat (less than or equal to 50 gm/day)  Code Status: Full Code    PT/OT Eval Status: ordered    Jillian Nance MD

## 2021-12-12 NOTE — FLOWSHEET NOTE
12/12/21 1547   Vital Signs   Temp 98.7 °F (37.1 °C)   Temp Source Oral   Pulse 105   Heart Rate Source Monitor   Resp 20   /66   BP Location Left upper arm   Level of Consciousness Alert (0)   MEWS Score 2   Oxygen Therapy   SpO2 92 %   O2 Device Nasal cannula   O2 Flow Rate (L/min) 3 L/min       Pt. Awake in chair. VS as shown. Pt. Assisted up to bedside commode and then to bed with standby assist. Pt. SOB with exertion. Pt. denies further needs at this time. Will continue to monitor. Call light is within reach.   Mary Jo Wu RN

## 2021-12-12 NOTE — ACP (ADVANCE CARE PLANNING)
Advance Care Planning   Healthcare Decision Maker:    Primary Decision Maker: Carla Tyler - Child - [phone]    Secondary Decision Maker: Ambrocio Mcgregor - Child - [phone]    Click here to complete Healthcare Decision Makers including selection of the Healthcare Decision Maker Relationship (ie \"Primary\"). Today we documented Decision Maker(s) consistent with Legal Next of Kin hierarchy.

## 2021-12-12 NOTE — CARE COORDINATION
Case Management Assessment  Initial Evaluation      Patient Name: Gustavo Sahni  YOB: 1955  Diagnosis: Hyponatremia [E87.1]  Tachycardia [R00.0]  Lung nodule [R91.1]  Common bile duct dilatation [K83.8]  COPD exacerbation ([address] [J44.1]  Right flank pain [R10.9]  Supplemental oxygen dependent [Z99.81]  Pancreatic abnormality [Q45.3]  Dyspnea, unspecified type [R06.00]  Pneumonia due to infectious organism, unspecified laterality, unspecified part of lung [J18.9]  Date / Time: 12/10/2021  4:26 AM    Admission status/Date:  12/10/2021  Chart Reviewed: Yes      Patient Shayne Sep: attempted- no answer   Family Interviewed:  Yes - daughter Thuy      Hospitalization in the last 30 days:  No      Health Care Decision Maker :   Primary Decision Maker: Carla Tyler - Child - [phone]    Secondary Decision Maker: Nilda Tyler - Child - [phone]    (CM - must 1st enter selection under Navigator - emergency contact- Health Care Decision Maker Relationship and pick relationship)   Who do you trust or have selected to make healthcare decisions for you    Current PCP: [facility]  Precert required for SNF : Y, N          3 night stay required - Y, N    ADLS  Support Systems/Care Needs:    Transportation: family    Meal Preparation: self/family    Housing  Living Arrangements: Lives w/daughter, Thuy in single story house  Steps: ramp  Intent for return to present living arrangements: Yes  Identified Issues: NA    Home Care Information  Active with Home Health Care : No Agency:(Services)     Passport/Waiver : No  :                      Phone Number:    Passport/Waiver Services: NA          Durable Medical Equiptment   DME Provider: unsure of O2 provider  Equipment:   Walker_RW__Cane_Single point and quad canes__RTS___ BSC___Shower Chair___Hospital Bed___W/C_manula and electric WC___Other________  02 at __2__Liter(s)---wears(frequency)_PRN______ [facility] _X__ CPAP___ BiPap___   N/A____      Home O2 Use :  Yes      Informed of need for care provider to bring portable home O2 tank on day of discharge for nursing to connect prior to leaving:   Yes  Verbalized agreement/Understanding:   Yes    Community Service Affiliation  Dialysis:  No    · Agency:  · Location:  · Dialysis Schedule:  · Phone:   · Fax: Other Community Services: (ex:PT/OT,Mental Health,Wound Clinic, Cardio/Pul [address])    DISCHARGE PLAN: Explained Case Management role/services. Chart reviewed. Met with pt's daughter Venessa Felder by phone and explained the role of the CM. Pt lives in her home and daughter lives with her. She will have 24/7 supervision upon DC. Declines HHC services. Has home O2 (baseline 2 liters PRN) unsure of provider.   +CM following

## 2021-12-12 NOTE — FLOWSHEET NOTE
12/11/21 1937   Pain Assessment   Pain Assessment 0-10   Pain Level 8   Pain Type Chronic pain   Pain Location Back   Pain Orientation Mid   Pain Descriptors Aching   Pain Frequency Continuous   Pain Onset On-going   Clinical Progression Gradually worsening       Pain as shown. PRN Percocet given per [facility] order. Bedside report given to [facility]. Pt. denies further needs at this time. Call light is within reach.   Zhang Hughes RN

## 2021-12-12 NOTE — PROGRESS NOTES
2041  Given Vistaril prn upon request for c/o anxiousness. Call in easy reach. Bed alarm on for safety. Continue to monitor closely. 2140  No further c/o. Continue to monitor closely.   Naun Allen RN

## 2021-12-12 NOTE — PROGRESS NOTES
Pulmonary Progress Note    CC: COPD, lung nodule, pneumonia    Subjective:   3 L O2, uses 3 L at home  Some cough with shortness of breath        Intake/Output Summary (Last 24 hours) at 12/12/2021 0737  Last data filed at 12/12/2021 0648  Gross per 24 hour   Intake 1855.35 ml   Output 2700 ml   Net -844.65 ml       Exam:   BP (!) 156/82   Pulse 96   Temp 98.6 °F (37 °C) (Oral)   Resp 20   Ht 5' 3\" (1.6 m)   Wt 152 lb 9.6 oz (69.2 kg)   SpO2 94%   BMI 27.03 kg/m²  on 3LPM   Gen:  No distress. Ill-appearing  Eyes: PERRL. No sclera icterus. No conjunctival injection. ENT: No discharge. Pharynx clear. Neck: Trachea midline. No obvious mass. Resp:  No accessory muscle use. R crackles. Minimal wheezes. No rhonchi. No dullness on percussion. CV: Regular rate. Regular rhythm. No murmur or rub. No edema. GI: Non-tender. Non-distended. No hernia. Skin: Warm and dry. No nodule on exposed extremities. Lymph: No cervical LAD. No supraclavicular LAD. M/S: No cyanosis. No joint deformity. No clubbing. Neuro: Awake. Alert. Moves all four extremities. Psych: Oriented x 3.  No anxiety    Scheduled Meds:   cetirizine  10 mg Oral Daily    escitalopram  20 mg Oral Nightly    lisinopril  2.5 mg Oral Daily    oxybutynin  5 mg Oral 4x Daily    sodium chloride flush  5-40 mL IntraVENous 2 times per day    enoxaparin  40 mg SubCUTAneous Daily    levofloxacin  750 mg IntraVENous Q24H    methylPREDNISolone  40 mg IntraVENous Q12H    influenza virus vaccine  0.5 mL IntraMUSCular Prior to discharge    ipratropium-albuterol  1 ampule Inhalation TID     Continuous Infusions:   IV infusion builder 60 mL/hr at 12/12/21 0438    sodium chloride       PRN Meds:  hydrOXYzine, albuterol sulfate HFA, sodium chloride flush, sodium chloride, ondansetron **OR** ondansetron, polyethylene glycol, acetaminophen **OR** acetaminophen, oxyCODONE-acetaminophen    Labs:  CBC:   Recent Labs     12/10/21  0445 12/10/21  1746 12/11/21  0310   WBC 18.2* 20.7* 20.5*   HGB 11.4* 10.1* 9.9*   HCT 33.4* 29.8* 30.7*   MCV 97.0 98.4 100.0    214 223     BMP:   Recent Labs     12/11/21  1227 12/11/21  1949 12/12/21  0324   * 124* 126*   K 4.4 4.2 4.5   CL 91* 89* 92*   CO2 16* 23 24   BUN 12 14 12   CREATININE <0.5* <0.5* <0.5*     LIVER PROFILE:   Recent Labs     12/10/21  0445 12/11/21  0310   AST 49* 33   ALT 46* 32   LIPASE 28.0  --    BILIDIR  --  <0.2   BILITOT 1.0 0.5   ALKPHOS 163* 112     PT/INR: No results for input(s): PROTIME, INR in the last 72 hours. APTT: No results for input(s): APTT in the last 72 hours. UA:  Recent Labs     12/10/21  0710   COLORU Yellow   PHUR 6.0   WBCUA 0-2   RBCUA 0-2   BACTERIA Rare*   CLARITYU Clear   SPECGRAV 1.015   LEUKOCYTESUR Negative   UROBILINOGEN 0.2   BILIRUBINUR Negative   BLOODU TRACE-INTACT*   GLUCOSEU Negative     No results for input(s): PHART, SSU0GAG, PO2ART in the last 72 hours. Cultures:   12/10 BC NGTD  12/10 influenza negative  12/10 COVID-19 not detected    Films:  CTPA 12/10 imaging was reviewed by me and showed   No central pulmonary embolus. Tiny peripheral branches are not well evaluated. Dominant irregular nodule in the left upper lobe. .    Right lower lobe airspace disease  Moderate coronary artery calcification            ASSESSMENT:  · Acute hypoxemic respiratory failure  · RLL community-acquired pneumonia  · Moderate COPD with AE   · Left upper lobe nodule on CT 12/10/2021 more conspicuous than February 2021     PLAN:  · Supplemental oxygen to maintain SaO2 >92%; wean as tolerated  · Intensive inhaled bronchodilator therapy  · Continue IV solumedrol 40 mg IV Q12 hrs.  Plan to switch to oral prednisone taper when improved  · IV antibiotics to include Levaquin D#3  · Follow CBC today  · Further evaluation of pulmonary nodule as an outpatient  · Acapella and Mucinex  · Advised to continue with smoking cessation

## 2021-12-12 NOTE — PROGRESS NOTES
RT Inhaler-Nebulizer Bronchodilator Protocol Note    There is a bronchodilator order in the chart from a provider indicating to follow the RT Bronchodilator Protocol and there is an Initiate RT Inhaler-Nebulizer Bronchodilator Protocol order as well (see protocol at bottom of note). CXR Findings:  No results found. The findings from the last RT Protocol Assessment were as follows:   History Pulmonary Disease: (P) Chronic pulmonary disease  Respiratory Pattern: (P) Regular pattern and RR 12-20 bpm  Breath Sounds: (P) Intermittent or unilateral wheezes  Cough: (P) Strong, spontaneous, non-productive  Indication for Bronchodilator Therapy: (P) Decreased or absent breath sounds  Bronchodilator Assessment Score: (P) 6    Aerosolized bronchodilator medication orders have been revised according to the RT Inhaler-Nebulizer Bronchodilator Protocol below. Respiratory Therapist to perform RT Therapy Protocol Assessment initially then follow the protocol. Repeat RT Therapy Protocol Assessment PRN for score 0-3 or on second treatment, BID, and PRN for scores above 3. No Indications - adjust the frequency to every 6 hours PRN wheezing or bronchospasm, if no treatments needed after 48 hours then discontinue using Per Protocol order mode. If indication present, adjust the RT bronchodilator orders based on the Bronchodilator Assessment Score as indicated below. Use Inhaler orders unless patient has one or more of the following: on home nebulizer, not able to hold breath for 10 seconds, is not alert and oriented, cannot activate and use MDI correctly, or respiratory rate 25 breaths per minute or more, then use the equivalent nebulizer order(s) with same Frequency and PRN reasons based on the score. If a patient is on this medication at home then do not decrease Frequency below that used at home.     0-3 - enter or revise RT bronchodilator order(s) to equivalent RT Bronchodilator order with Frequency of every 4 hours PRN for wheezing or increased work of breathing using Per Protocol order mode. 4-6 - enter or revise RT Bronchodilator order(s) to two equivalent RT bronchodilator orders with one order with BID Frequency and one order with Frequency of every 4 hours PRN wheezing or increased work of breathing using Per Protocol order mode. 7-10 - enter or revise RT Bronchodilator order(s) to two equivalent RT bronchodilator orders with one order with TID Frequency and one order with Frequency of every 4 hours PRN wheezing or increased work of breathing using Per Protocol order mode. 11-13 - enter or revise RT Bronchodilator order(s) to one equivalent RT bronchodilator order with QID Frequency and an Albuterol order with Frequency of every 4 hours PRN wheezing or increased work of breathing using Per Protocol order mode. Greater than 13 - enter or revise RT Bronchodilator order(s) to one equivalent RT bronchodilator order with every 4 hours Frequency and an Albuterol order with Frequency of every 2 hours PRN wheezing or increased work of breathing using Per Protocol order mode.          Electronically signed by Kevin Landers RCP on 12/12/2021 at 8:44 AM

## 2021-12-13 ENCOUNTER — TELEPHONE (OUTPATIENT)
Dept: PULMONOLOGY | Age: 66
End: 2021-12-13

## 2021-12-13 DIAGNOSIS — R91.1 PULMONARY NODULE: Primary | ICD-10-CM

## 2021-12-13 LAB
ANION GAP SERPL CALCULATED.3IONS-SCNC: 11 MMOL/L (ref 3–16)
ANION GAP SERPL CALCULATED.3IONS-SCNC: 11 MMOL/L (ref 3–16)
ANION GAP SERPL CALCULATED.3IONS-SCNC: 12 MMOL/L (ref 3–16)
BASOPHILS ABSOLUTE: 0 K/UL (ref 0–0.2)
BASOPHILS RELATIVE PERCENT: 0 %
BUN BLDV-MCNC: 13 MG/DL (ref 7–20)
BUN BLDV-MCNC: 13 MG/DL (ref 7–20)
BUN BLDV-MCNC: 14 MG/DL (ref 7–20)
CALCIUM SERPL-MCNC: 9 MG/DL (ref 8.3–10.6)
CALCIUM SERPL-MCNC: 9.3 MG/DL (ref 8.3–10.6)
CALCIUM SERPL-MCNC: 9.4 MG/DL (ref 8.3–10.6)
CHLORIDE BLD-SCNC: 86 MMOL/L (ref 99–110)
CHLORIDE BLD-SCNC: 91 MMOL/L (ref 99–110)
CHLORIDE BLD-SCNC: 91 MMOL/L (ref 99–110)
CO2: 26 MMOL/L (ref 21–32)
CO2: 27 MMOL/L (ref 21–32)
CO2: 27 MMOL/L (ref 21–32)
CREAT SERPL-MCNC: 0.6 MG/DL (ref 0.6–1.2)
CREAT SERPL-MCNC: 0.7 MG/DL (ref 0.6–1.2)
CREAT SERPL-MCNC: <0.5 MG/DL (ref 0.6–1.2)
EOSINOPHILS ABSOLUTE: 0 K/UL (ref 0–0.6)
EOSINOPHILS RELATIVE PERCENT: 0 %
GFR AFRICAN AMERICAN: >60
GFR NON-AFRICAN AMERICAN: >60
GLUCOSE BLD-MCNC: 115 MG/DL (ref 70–99)
GLUCOSE BLD-MCNC: 138 MG/DL (ref 70–99)
GLUCOSE BLD-MCNC: 191 MG/DL (ref 70–99)
HCT VFR BLD CALC: 35 % (ref 36–48)
HEMOGLOBIN: 11.8 G/DL (ref 12–16)
LYMPHOCYTES ABSOLUTE: 0.5 K/UL (ref 1–5.1)
LYMPHOCYTES RELATIVE PERCENT: 4 %
MCH RBC QN AUTO: 33.4 PG (ref 26–34)
MCHC RBC AUTO-ENTMCNC: 33.7 G/DL (ref 31–36)
MCV RBC AUTO: 99.3 FL (ref 80–100)
MONOCYTES ABSOLUTE: 0.7 K/UL (ref 0–1.3)
MONOCYTES RELATIVE PERCENT: 6 %
NEUTROPHILS ABSOLUTE: 10.5 K/UL (ref 1.7–7.7)
NEUTROPHILS RELATIVE PERCENT: 90 %
OSMOLALITY URINE: 336 MOSM/KG (ref 390–1070)
PDW BLD-RTO: 12.2 % (ref 12.4–15.4)
PLATELET # BLD: 329 K/UL (ref 135–450)
PLATELET SLIDE REVIEW: ADEQUATE
PMV BLD AUTO: 6.7 FL (ref 5–10.5)
POTASSIUM SERPL-SCNC: 3.9 MMOL/L (ref 3.5–5.1)
POTASSIUM SERPL-SCNC: 4.2 MMOL/L (ref 3.5–5.1)
POTASSIUM SERPL-SCNC: 4.2 MMOL/L (ref 3.5–5.1)
RBC # BLD: 3.53 M/UL (ref 4–5.2)
SLIDE REVIEW: ABNORMAL
SODIUM BLD-SCNC: 125 MMOL/L (ref 136–145)
SODIUM BLD-SCNC: 128 MMOL/L (ref 136–145)
SODIUM BLD-SCNC: 129 MMOL/L (ref 136–145)
SODIUM URINE: 60 MMOL/L
WBC # BLD: 11.7 K/UL (ref 4–11)

## 2021-12-13 PROCEDURE — 6370000000 HC RX 637 (ALT 250 FOR IP): Performed by: INTERNAL MEDICINE

## 2021-12-13 PROCEDURE — 84300 ASSAY OF URINE SODIUM: CPT

## 2021-12-13 PROCEDURE — 94761 N-INVAS EAR/PLS OXIMETRY MLT: CPT

## 2021-12-13 PROCEDURE — 6360000002 HC RX W HCPCS: Performed by: NURSE PRACTITIONER

## 2021-12-13 PROCEDURE — 2500000003 HC RX 250 WO HCPCS: Performed by: INTERNAL MEDICINE

## 2021-12-13 PROCEDURE — 99233 SBSQ HOSP IP/OBS HIGH 50: CPT | Performed by: INTERNAL MEDICINE

## 2021-12-13 PROCEDURE — 2060000000 HC ICU INTERMEDIATE R&B

## 2021-12-13 PROCEDURE — 2580000003 HC RX 258: Performed by: NURSE PRACTITIONER

## 2021-12-13 PROCEDURE — 85025 COMPLETE CBC W/AUTO DIFF WBC: CPT

## 2021-12-13 PROCEDURE — 83935 ASSAY OF URINE OSMOLALITY: CPT

## 2021-12-13 PROCEDURE — 36415 COLL VENOUS BLD VENIPUNCTURE: CPT

## 2021-12-13 PROCEDURE — 80048 BASIC METABOLIC PNL TOTAL CA: CPT

## 2021-12-13 PROCEDURE — 2700000000 HC OXYGEN THERAPY PER DAY

## 2021-12-13 PROCEDURE — 99232 SBSQ HOSP IP/OBS MODERATE 35: CPT | Performed by: INTERNAL MEDICINE

## 2021-12-13 PROCEDURE — 94640 AIRWAY INHALATION TREATMENT: CPT

## 2021-12-13 PROCEDURE — 6370000000 HC RX 637 (ALT 250 FOR IP): Performed by: NURSE PRACTITIONER

## 2021-12-13 RX ORDER — PREDNISONE 20 MG/1
40 TABLET ORAL DAILY
Status: DISCONTINUED | OUTPATIENT
Start: 2021-12-13 | End: 2021-12-13

## 2021-12-13 RX ORDER — LABETALOL HYDROCHLORIDE 5 MG/ML
10 INJECTION, SOLUTION INTRAVENOUS EVERY 4 HOURS PRN
Status: COMPLETED | OUTPATIENT
Start: 2021-12-13 | End: 2021-12-14

## 2021-12-13 RX ADMIN — OXYCODONE HYDROCHLORIDE AND ACETAMINOPHEN 1 TABLET: 5; 325 TABLET ORAL at 12:14

## 2021-12-13 RX ADMIN — CETIRIZINE HYDROCHLORIDE 10 MG: 10 TABLET ORAL at 08:26

## 2021-12-13 RX ADMIN — PREDNISONE 40 MG: 20 TABLET ORAL at 08:26

## 2021-12-13 RX ADMIN — OXYBUTYNIN CHLORIDE 5 MG: 5 TABLET ORAL at 08:26

## 2021-12-13 RX ADMIN — Medication 10 ML: at 20:39

## 2021-12-13 RX ADMIN — OXYCODONE HYDROCHLORIDE AND ACETAMINOPHEN 1 TABLET: 5; 325 TABLET ORAL at 05:48

## 2021-12-13 RX ADMIN — OXYBUTYNIN CHLORIDE 5 MG: 5 TABLET ORAL at 12:14

## 2021-12-13 RX ADMIN — OXYCODONE HYDROCHLORIDE AND ACETAMINOPHEN 1 TABLET: 5; 325 TABLET ORAL at 18:40

## 2021-12-13 RX ADMIN — IPRATROPIUM BROMIDE AND ALBUTEROL SULFATE 1 AMPULE: .5; 2.5 SOLUTION RESPIRATORY (INHALATION) at 18:55

## 2021-12-13 RX ADMIN — LISINOPRIL 2.5 MG: 5 TABLET ORAL at 08:27

## 2021-12-13 RX ADMIN — POLYETHYLENE GLYCOL (3350) 17 G: 17 POWDER, FOR SOLUTION ORAL at 04:37

## 2021-12-13 RX ADMIN — ESCITALOPRAM OXALATE 20 MG: 10 TABLET ORAL at 20:39

## 2021-12-13 RX ADMIN — METHYLPREDNISOLONE SODIUM SUCCINATE 40 MG: 40 INJECTION, POWDER, FOR SOLUTION INTRAMUSCULAR; INTRAVENOUS at 04:37

## 2021-12-13 RX ADMIN — LEVOFLOXACIN 750 MG: 5 INJECTION, SOLUTION INTRAVENOUS at 08:34

## 2021-12-13 RX ADMIN — ENOXAPARIN SODIUM 40 MG: 100 INJECTION SUBCUTANEOUS at 17:39

## 2021-12-13 RX ADMIN — AMLODIPINE BESYLATE 5 MG: 5 TABLET ORAL at 08:27

## 2021-12-13 RX ADMIN — IPRATROPIUM BROMIDE AND ALBUTEROL SULFATE 1 AMPULE: .5; 2.5 SOLUTION RESPIRATORY (INHALATION) at 07:23

## 2021-12-13 RX ADMIN — Medication 10 ML: at 08:27

## 2021-12-13 RX ADMIN — LABETALOL HYDROCHLORIDE 10 MG: 5 INJECTION, SOLUTION INTRAVENOUS at 04:25

## 2021-12-13 RX ADMIN — IPRATROPIUM BROMIDE AND ALBUTEROL SULFATE 1 AMPULE: .5; 2.5 SOLUTION RESPIRATORY (INHALATION) at 15:18

## 2021-12-13 RX ADMIN — OXYBUTYNIN CHLORIDE 5 MG: 5 TABLET ORAL at 17:40

## 2021-12-13 RX ADMIN — OXYBUTYNIN CHLORIDE 5 MG: 5 TABLET ORAL at 20:39

## 2021-12-13 ASSESSMENT — PAIN - FUNCTIONAL ASSESSMENT
PAIN_FUNCTIONAL_ASSESSMENT: ACTIVITIES ARE NOT PREVENTED
PAIN_FUNCTIONAL_ASSESSMENT: ACTIVITIES ARE NOT PREVENTED

## 2021-12-13 ASSESSMENT — PAIN DESCRIPTION - LOCATION
LOCATION: BACK
LOCATION: BACK
LOCATION: BACK;RIB CAGE
LOCATION: BACK;RIB CAGE

## 2021-12-13 ASSESSMENT — PAIN DESCRIPTION - PAIN TYPE
TYPE: ACUTE PAIN
TYPE: ACUTE PAIN
TYPE: CHRONIC PAIN
TYPE: CHRONIC PAIN

## 2021-12-13 ASSESSMENT — PAIN SCALES - GENERAL
PAINLEVEL_OUTOF10: 7
PAINLEVEL_OUTOF10: 5
PAINLEVEL_OUTOF10: 6
PAINLEVEL_OUTOF10: 6
PAINLEVEL_OUTOF10: 5

## 2021-12-13 ASSESSMENT — PAIN DESCRIPTION - ONSET
ONSET: ON-GOING
ONSET: ON-GOING

## 2021-12-13 ASSESSMENT — PAIN DESCRIPTION - PROGRESSION
CLINICAL_PROGRESSION: GRADUALLY WORSENING
CLINICAL_PROGRESSION: GRADUALLY WORSENING

## 2021-12-13 ASSESSMENT — PAIN DESCRIPTION - FREQUENCY
FREQUENCY: CONTINUOUS
FREQUENCY: CONTINUOUS

## 2021-12-13 ASSESSMENT — PAIN DESCRIPTION - DESCRIPTORS
DESCRIPTORS: ACHING;CONSTANT
DESCRIPTORS: ACHING;CONSTANT

## 2021-12-13 ASSESSMENT — PAIN DESCRIPTION - ORIENTATION
ORIENTATION: LOWER;MID
ORIENTATION: LOWER;MID

## 2021-12-13 NOTE — PROGRESS NOTES
Hospitalist Progress Note      PCP: Angeline Pinzon MD    Date of Admission: 12/10/2021    Subjective:   Ms Kenyatta Gomez seen on home o2 of 3 L and pt feels ok today, no new issues      Medications:  Reviewed    Infusion Medications    IV infusion builder Stopped (12/12/21 2117)    sodium chloride       Scheduled Medications    amLODIPine  5 mg Oral Daily    cetirizine  10 mg Oral Daily    escitalopram  20 mg Oral Nightly    lisinopril  2.5 mg Oral Daily    oxybutynin  5 mg Oral 4x Daily    sodium chloride flush  5-40 mL IntraVENous 2 times per day    enoxaparin  40 mg SubCUTAneous Daily    levofloxacin  750 mg IntraVENous Q24H    methylPREDNISolone  40 mg IntraVENous Q12H    influenza virus vaccine  0.5 mL IntraMUSCular Prior to discharge    ipratropium-albuterol  1 ampule Inhalation TID     PRN Meds: labetalol, hydrOXYzine, albuterol sulfate HFA, sodium chloride flush, sodium chloride, ondansetron **OR** ondansetron, polyethylene glycol, acetaminophen **OR** acetaminophen, oxyCODONE-acetaminophen      Intake/Output Summary (Last 24 hours) at 12/13/2021 0746  Last data filed at 12/13/2021 0148  Gross per 24 hour   Intake 1943.6 ml   Output 1650 ml   Net 293.6 ml       Physical Exam Performed:    BP (!) 166/89   Pulse 99   Temp 98.4 °F (36.9 °C) (Oral)   Resp 18   Ht 5' 3\" (1.6 m)   Wt 148 lb 14.4 oz (67.5 kg)   SpO2 97%   BMI 26.38 kg/m²       Gen: elderlly female, up in bed, No distress. Alert. Eyes: PERRL. No sclera icterus. No conjunctival injection. ENT: No discharge. Pharynx clear. Neck: No JVD.  No Carotid Bruit. Trachea midline. Resp: improved javier with no wheeze   CV: Regular rate. Regular rhythm. No murmur.  No rub. No edema. GI: Non-tender. Non-distended. No masses. No organomegaly. Normal bowel sounds. No hernia. Skin: Warm and dry. No nodule on exposed extremities. No rash on exposed extremities. M/S: No cyanosis. No joint deformity. No clubbing. Neuro: Awake.  Grossly nonfocal    Psych: Oriented x 3. No anxiety or agitation.       Labs:   Recent Labs     12/11/21  0310 12/12/21  0850 12/13/21  0412   WBC 20.5* 10.4 11.7*   HGB 9.9* 10.6* 11.8*   HCT 30.7* 31.7* 35.0*    248 329     Recent Labs     12/12/21  1144 12/12/21  1942 12/13/21  0412   * 131* 129*   K 4.3 4.3 4.2   CL 93* 95* 91*   CO2 24 25 27   BUN 11 14 13   CREATININE <0.5* 0.8 0.6   CALCIUM 8.7 9.0 9.4     Recent Labs     12/11/21  0310   AST 33   ALT 32   BILIDIR <0.2   BILITOT 0.5   ALKPHOS 112     No results for input(s): INR in the last 72 hours. No results for input(s): Gayathri Ill in the last 72 hours. Urinalysis:      Lab Results   Component Value Date    NITRU Negative 12/10/2021    WBCUA 0-2 12/10/2021    BACTERIA Rare 12/10/2021    RBCUA 0-2 12/10/2021    BLOODU TRACE-INTACT 12/10/2021    SPECGRAV 1.015 12/10/2021    GLUCOSEU Negative 12/10/2021       Radiology:  CT ABDOMEN PELVIS W IV CONTRAST Additional Contrast? None   Final Result   Chest:      No central pulmonary embolus. Tiny peripheral branches are not well   evaluated. Dominant irregular nodule in the left upper lobe. .  This is more conspicuous   than February 2021. This could represent an inflammatory nodule or an early   finding of lung carcinoma. Recommend short-term follow-up chest CT versus   PET-CT. Right lower lobe airspace disease, increased compared to prior, either   atelectasis or pneumonia. Moderate coronary artery calcification      Compression fractures in the thoracic spine, previously described on MRI   04/28/2021. No obvious change given differences in modalities      Abdomen and pelvis:      Mildly dilated extrahepatic common duct to 1.2 cm. There is a questionable   filling defect seen in the extrahepatic common duct.       Punctate hypodense nodule in the pancreas, most likely small side branch IPMN      RECOMMENDATIONS:   ACR management recommendations for incidental cystic pancreatic masses in   asymptomatic* patients on CT, MR, or US      < 2 cm:  Single follow-up in 1 yr (MR preferred)      *Stable:  Benign, no further follow-up   *Growth: As below based upon size   * None of the following:  Hyperamylasemia, recent onset diabetes, severe   epigastric pain, weight loss, steatorrhea, or jaundice. Ryley SOLORIO, et al.  Managing incidental findings on abdominal CT: white paper   of the ACR Incidental Findings Committee. J Am Alis Radiol 2010; 2:720-804. CT CHEST PULMONARY EMBOLISM W CONTRAST   Final Result   Chest:      No central pulmonary embolus. Tiny peripheral branches are not well   evaluated. Dominant irregular nodule in the left upper lobe. .  This is more conspicuous   than February 2021. This could represent an inflammatory nodule or an early   finding of lung carcinoma. Recommend short-term follow-up chest CT versus   PET-CT. Right lower lobe airspace disease, increased compared to prior, either   atelectasis or pneumonia. Moderate coronary artery calcification      Compression fractures in the thoracic spine, previously described on MRI   04/28/2021. No obvious change given differences in modalities      Abdomen and pelvis:      Mildly dilated extrahepatic common duct to 1.2 cm. There is a questionable   filling defect seen in the extrahepatic common duct. Punctate hypodense nodule in the pancreas, most likely small side branch IPMN      RECOMMENDATIONS:   ACR management recommendations for incidental cystic pancreatic masses in   asymptomatic* patients on CT, MR, or US      < 2 cm:  Single follow-up in 1 yr (MR preferred)      *Stable:  Benign, no further follow-up   *Growth: As below based upon size   * None of the following:  Hyperamylasemia, recent onset diabetes, severe   epigastric pain, weight loss, steatorrhea, or jaundice.       Ryley SOLORIO, et al.  Managing incidental findings on abdominal CT: white paper   of the ACR Incidental Findings Committee. J Am Alis Radiol 2010; 6:485-529. XR CHEST PORTABLE   Final Result   Multifocal airspace opacities that are suspected to represent developing   pneumonitis. Correlate with clinical workup. Assessment/Plan:    Active Hospital Problems    Diagnosis     Community acquired pneumonia [J18.9]     Transaminitis [R74.01]     Acute hypoxemic respiratory failure (HCC) [J96.01]     Pneumonia due to infectious organism [J18.9]     Pancreatic abnormality [Q45.3]     Common bile duct dilatation [K83.8]     Hyponatremia [E87.1]             #Acute on chronic hypoxic respiratory failure  Sec to copd exacebration   -Patient uses 2 to 3 L of nasal cannula oxygen at home.  increased RR and dyspnea , needing 4L on arrial. Treated with steroids, HHN, imaging with possible pna   Given abx and improved now back to 3 L nasal cannula at rest  -wean off steroids      #Right lower lobe community-acquired pneumonia  - Suspect gram-positive organism.  Levaquin day #4  - f/u cultures  - improved symptoms      #Left upper lobe nodule  -Noted on prior imaging, but radiologist notes that it is more conspicuous in appearance now.  Pulmonology consulted and needs outpt f/w   Smoking cessation discussed        #Transaminitis  #Extrahepatic common bile duct dilatation  -She denies current abdominal pain, does but does state that she had abdominal pain in the right upper quadrant recently.   -  GI consulted  - monitor LFTs, no workup for now - plans for EUS as outpt     #Pancreatic nodules  - GI consulted     #Hyponatremia  -Acute on chronic  -Her baseline sodium is in the low 130s  - na on admit 123, started on IVF and later on fluid restriction  - etiology likely SIADH and also pulm process   -Nephrology consulted  -Stop hydrochlorothiazide  -Fluid restriction added  -Monitor BMP, Na improved to 129       #Coronary artery calcification  - noted on CT.  She denies history of CAD, denies CP.  Consider ASA, statin, PCP f/u for w/u      #Thoracic spine compression fractures-noted on MRI in April 2021          #HTN  - stop HCTZ  - cont lisino[ril     #Depression  - cont lexapro          DVT Prophylaxis: Lovenox   Diet: ADULT DIET;  Regular; Low Fat (less than or equal to 50 gm/day)  Code Status: Full Code    PT/OT Eval Status: ordered    Dispo - cont care  Dc planning    Rudy Noonan MD

## 2021-12-13 NOTE — TELEPHONE ENCOUNTER
MD Nelly Cameron MA  Needs chest ct in 3 months. Needs order for home neb machine with prn albuterol       Still inpt.

## 2021-12-13 NOTE — PROGRESS NOTES
[facility] Progress Note           Subjective/   61y.o. year old female who we are seeing in consultation for Hyponatremia. HPI:  Sodium stable  Remains more short of breath than usual  She feels that she has some swelling  BP highly variable     ROS:  Shortness of breath persists, remains on 3 L NC, states uses 2-3 L NC at home. No fevers  Eating well     Objective/   GEN:  Chronically ill, BP (!) 144/76   Pulse 90   Temp 98.2 °F (36.8 °C) (Oral)   Resp 18   Ht 5' 3\" (1.6 m)   Wt 148 lb 14.4 oz (67.5 kg)   SpO2 92%   BMI 26.38 kg/m²   HEENT: non-icteric, no JVD  CV: S1, S2 without m/r/g; no LE edema  RESP: CTA B without w/r/r; breathing wnl  ABD: +bs, soft, nt, no hsm  SKIN: warm, no rashes    Data/  Recent Labs     12/11/21  0310 12/12/21  0850 12/13/21  0412   WBC 20.5* 10.4 11.7*   HGB 9.9* 10.6* 11.8*   HCT 30.7* 31.7* 35.0*   .0 99.8 99.3    248 329     Recent Labs     12/12/21  1144 12/12/21  1942 12/13/21  0412   * 131* 129*   K 4.3 4.3 4.2   CL 93* 95* 91*   CO2 24 25 27   GLUCOSE 131* 152* 115*   BUN 11 14 13   CREATININE <0.5* 0.8 0.6   LABGLOM >60 >60 >60   GFRAA >60 >60 >60       Component      Latest Ref Rng & Units 12/11/2021 12/11/2021 12/10/2021 12/10/2021          12:40 AM 12:40 AM 11:55 AM 11:55 AM   TSH      0.27 - 4.20 uIU/mL   0.17 (L)    Uric Acid, Serum      2.6 - 6.0 mg/dL    2.5 (L)   Sodium, Ur      Not Established mmol/L  <20     Osmolality, Ur      390 - 1070 mOsm/kg 245 (L)          Assessment/     Hyponatremia.  - Hx of chronic hyponatremia since 2013. Baseline in the low 130s. - Prior work up suggestive of polydipsia. Current work up with more of SIADH picture with U osm of 245, given persistently low urine sodium cannot exclude volume depletion as the ADH stimulus however also on SSRI and has pulmonary processes   - Recommended to permanently discontinue HCTZ. - Free water restriction of 64oz/day.   - Recheck U osm and Urine Na  - Monitor labs      Hypertension.  - High variable     COPD/Pneumonia. - On steroids and antibiotic per Primary service. ____________________________________  Cash Ortega MD  [facility]  [email or web address]  Office: [phone]

## 2021-12-13 NOTE — PROGRESS NOTES
Pt. Awake in bed. No signs of distress noted. Pt. Assessment completed- see flow sheet. Pt. denies further needs at this time. Will continue to monitor. Call light is within reach.   Larayne Blizzard, RN

## 2021-12-13 NOTE — PROGRESS NOTES
Pulmonary Progress Note    CC: COPD, lung nodule, pneumonia    Subjective:   3 L O2, uses 3 L at home  Some cough with shortness of breath        Intake/Output Summary (Last 24 hours) at 12/13/2021 1353  Last data filed at 12/13/2021 1136  Gross per 24 hour   Intake 1903.6 ml   Output 2200 ml   Net -296.4 ml       Exam:   BP (!) 144/76   Pulse 90   Temp 98.2 °F (36.8 °C) (Oral)   Resp 18   Ht 5' 3\" (1.6 m)   Wt 148 lb 14.4 oz (67.5 kg)   SpO2 92%   BMI 26.38 kg/m²  on 3LPM   Gen:  No distress. Ill-appearing  Eyes: PERRL. No sclera icterus. No conjunctival injection. ENT: No discharge. Pharynx clear. Neck: Trachea midline. No obvious mass. Resp:  No accessory muscle use. R crackles. Minimal wheezes. No rhonchi. No dullness on percussion. CV: Regular rate. Regular rhythm. No murmur or rub. No edema. M/S: No cyanosis. No joint deformity. No clubbing. Neuro: Awake. Alert. Moves all four extremities. Psych: Oriented x 3.  No anxiety    Scheduled Meds:   predniSONE  40 mg Oral Daily    amLODIPine  5 mg Oral Daily    cetirizine  10 mg Oral Daily    escitalopram  20 mg Oral Nightly    lisinopril  2.5 mg Oral Daily    oxybutynin  5 mg Oral 4x Daily    sodium chloride flush  5-40 mL IntraVENous 2 times per day    enoxaparin  40 mg SubCUTAneous Daily    levofloxacin  750 mg IntraVENous Q24H    influenza virus vaccine  0.5 mL IntraMUSCular Prior to discharge    ipratropium-albuterol  1 ampule Inhalation TID     Continuous Infusions:   sodium chloride       PRN Meds:  labetalol, hydrOXYzine, albuterol sulfate HFA, sodium chloride flush, sodium chloride, ondansetron **OR** ondansetron, polyethylene glycol, acetaminophen **OR** acetaminophen, oxyCODONE-acetaminophen    Labs:  CBC:   Recent Labs     12/11/21  0310 12/12/21  0850 12/13/21  0412   WBC 20.5* 10.4 11.7*   HGB 9.9* 10.6* 11.8*   HCT 30.7* 31.7* 35.0*   .0 99.8 99.3    248 329     BMP:   Recent Labs     12/12/21 1942 12/13/21  0412 12/13/21  1154   * 129* 128*   K 4.3 4.2 4.2   CL 95* 91* 91*   CO2 25 27 26   BUN 14 13 13   CREATININE 0.8 0.6 <0.5*     LIVER PROFILE:   Recent Labs     12/11/21  0310   AST 33   ALT 32   BILIDIR <0.2   BILITOT 0.5   ALKPHOS 112     PT/INR: No results for input(s): PROTIME, INR in the last 72 hours. APTT: No results for input(s): APTT in the last 72 hours. UA:  No results for input(s): NITRITE, COLORU, PHUR, LABCAST, WBCUA, RBCUA, MUCUS, TRICHOMONAS, YEAST, BACTERIA, CLARITYU, SPECGRAV, LEUKOCYTESUR, UROBILINOGEN, BILIRUBINUR, BLOODU, GLUCOSEU, AMORPHOUS in the last 72 hours. Invalid input(s): KETONESU  No results for input(s): PHART, NYO8ZEX, PO2ART in the last 72 hours. Cultures:   12/10 BC NGTD  12/10 influenza negative  12/10 COVID-19 not detected    Films:  CTPA 12/10 imaging was reviewed by me and showed   No central pulmonary embolus. Tiny peripheral branches are not well evaluated. Dominant irregular nodule in the left upper lobe. .    Right lower lobe airspace disease  Moderate coronary artery calcification            ASSESSMENT:  · Acute hypoxemic respiratory failure  · RLL community-acquired pneumonia  · Moderate COPD with AE   · Left upper lobe nodule on CT 12/10/2021 more conspicuous than February 2021     PLAN:  · Supplemental oxygen to maintain SaO2 >92%; wean as tolerated  · Intensive inhaled bronchodilator therapy  · switch to oral prednisone taper   · IV antibiotics to include Levaquin D#4  · Follow CBC today  · Further evaluation of pulmonary nodule as an outpatient  · Acapella and Mucinex  · Advised to continue with smoking cessation  · Office notified for chest ct in 3 months and home nebs

## 2021-12-13 NOTE — PROGRESS NOTES
RT Inhaler-Nebulizer Bronchodilator Protocol Note    There is a bronchodilator order in the chart from a provider indicating to follow the RT Bronchodilator Protocol and there is an Initiate RT Inhaler-Nebulizer Bronchodilator Protocol order as well (see protocol at bottom of note). CXR Findings:  No results found. The findings from the last RT Protocol Assessment were as follows:   History Pulmonary Disease: (P) Chronic pulmonary disease  Respiratory Pattern: (P) Regular pattern and RR 12-20 bpm  Breath Sounds: (P) Slightly diminished and/or crackles  Cough: (P) Strong, spontaneous, non-productive  Indication for Bronchodilator Therapy: (P) Decreased or absent breath sounds  Bronchodilator Assessment Score: (P) 4    Aerosolized bronchodilator medication orders have been revised according to the RT Inhaler-Nebulizer Bronchodilator Protocol below. Respiratory Therapist to perform RT Therapy Protocol Assessment initially then follow the protocol. Repeat RT Therapy Protocol Assessment PRN for score 0-3 or on second treatment, BID, and PRN for scores above 3. No Indications - adjust the frequency to every 6 hours PRN wheezing or bronchospasm, if no treatments needed after 48 hours then discontinue using Per Protocol order mode. If indication present, adjust the RT bronchodilator orders based on the Bronchodilator Assessment Score as indicated below. Use Inhaler orders unless patient has one or more of the following: on home nebulizer, not able to hold breath for 10 seconds, is not alert and oriented, cannot activate and use MDI correctly, or respiratory rate 25 breaths per minute or more, then use the equivalent nebulizer order(s) with same Frequency and PRN reasons based on the score. If a patient is on this medication at home then do not decrease Frequency below that used at home.     0-3 - enter or revise RT bronchodilator order(s) to equivalent RT Bronchodilator order with Frequency of every 4 hours PRN for wheezing or increased work of breathing using Per Protocol order mode. 4-6 - enter or revise RT Bronchodilator order(s) to two equivalent RT bronchodilator orders with one order with BID Frequency and one order with Frequency of every 4 hours PRN wheezing or increased work of breathing using Per Protocol order mode. 7-10 - enter or revise RT Bronchodilator order(s) to two equivalent RT bronchodilator orders with one order with TID Frequency and one order with Frequency of every 4 hours PRN wheezing or increased work of breathing using Per Protocol order mode. 11-13 - enter or revise RT Bronchodilator order(s) to one equivalent RT bronchodilator order with QID Frequency and an Albuterol order with Frequency of every 4 hours PRN wheezing or increased work of breathing using Per Protocol order mode. Greater than 13 - enter or revise RT Bronchodilator order(s) to one equivalent RT bronchodilator order with every 4 hours Frequency and an Albuterol order with Frequency of every 2 hours PRN wheezing or increased work of breathing using Per Protocol order mode. RT to enter RT Home Evaluation for COPD & MDI Assessment order using Per Protocol order mode.     Electronically signed by Shoshana Fierro RCP on 12/13/2021 at 7:28 AM

## 2021-12-13 NOTE — CARE COORDINATION
INTERDISCIPLINARY PLAN OF CARE CONFERENCE    Date/Time: 12/13/2021 10:49 AM  Completed by: Jovon Leos RN, Case Management      Patient Name:  [place] Rojo  YOB: 1955  Admitting Diagnosis: Hyponatremia [E87.1]  Tachycardia [R00.0]  Lung nodule [R91.1]  Common bile duct dilatation [K83.8]  COPD exacerbation ([address] [J44.1]  Right flank pain [R10.9]  Supplemental oxygen dependent [Z99.81]  Pancreatic abnormality [Q45.3]  Dyspnea, unspecified type [R06.00]  Pneumonia due to infectious organism, unspecified laterality, unspecified part of lung [J18.9]     Admit Date/Time:  12/10/2021  4:26 AM    Chart reviewed. Interdisciplinary team contacted or reviewed plan related to patient progress and discharge plans. Disciplines included Case Management, Nursing, and Dietitian. Current Status:ongoing  PT/OT recommendation for discharge plan of care: home with 24/7 supervision initially and home PT/OT    Expected D/C Disposition:  Home  Confirmed plan with patient and/or family Yes confirmed with: (name) pt  Met with:pt  Discharge Plan Comments: Reviewed chart. Role of discharge planner explained and patient verbalized understanding. Pt is alert and oriented. Pt is from home with daughter. Pt plans to return. Pt is aware that PT/OT recommends home with 24/7 supervision and home PT/OT. Pt states that although her daughter is limited, she is able to provide 24/7 supervision initially and safely, and is there 24/7. Pt is active with [facility] for home O2 and baseline is 2-3L. Pt states that she will have family bring tank. Pt is wanting SOC with [facility] for PT/OT/SN. Pt has had [facility] in the past.   +HC orders. , +eCOC      Home O2 in place on admit: Yes  Pt informed of need to bring portable home O2 tank on day of discharge for nursing to connect prior to leaving:  Yes  Verbalized agreement/Understanding:   Yes

## 2021-12-13 NOTE — PROGRESS NOTES
12/13/21 1800   RT Protocol   History Pulmonary Disease 2   Respiratory pattern 0   Breath sounds 2   Cough 0   Indications for Bronchodilator Therapy Decreased or absent breath sounds   Bronchodilator Assessment Score 4   RT Inhaler-Nebulizer Bronchodilator Protocol Note    There is a bronchodilator order in the chart from a provider indicating to follow the RT Bronchodilator Protocol and there is an Initiate RT Inhaler-Nebulizer Bronchodilator Protocol order as well (see protocol at bottom of note). CXR Findings:  No results found. The findings from the last RT Protocol Assessment were as follows:   History Pulmonary Disease: Chronic pulmonary disease  Respiratory Pattern: Regular pattern and RR 12-20 bpm  Breath Sounds: Slightly diminished and/or crackles  Cough: Strong, spontaneous, non-productive  Indication for Bronchodilator Therapy: Decreased or absent breath sounds  Bronchodilator Assessment Score: 4    Aerosolized bronchodilator medication orders have been revised according to the RT Inhaler-Nebulizer Bronchodilator Protocol below. Respiratory Therapist to perform RT Therapy Protocol Assessment initially then follow the protocol. Repeat RT Therapy Protocol Assessment PRN for score 0-3 or on second treatment, BID, and PRN for scores above 3. No Indications - adjust the frequency to every 6 hours PRN wheezing or bronchospasm, if no treatments needed after 48 hours then discontinue using Per Protocol order mode. If indication present, adjust the RT bronchodilator orders based on the Bronchodilator Assessment Score as indicated below. Use Inhaler orders unless patient has one or more of the following: on home nebulizer, not able to hold breath for 10 seconds, is not alert and oriented, cannot activate and use MDI correctly, or respiratory rate 25 breaths per minute or more, then use the equivalent nebulizer order(s) with same Frequency and PRN reasons based on the score.   If a patient is on this medication at home then do not decrease Frequency below that used at home. 0-3 - enter or revise RT bronchodilator order(s) to equivalent RT Bronchodilator order with Frequency of every 4 hours PRN for wheezing or increased work of breathing using Per Protocol order mode. 4-6 - enter or revise RT Bronchodilator order(s) to two equivalent RT bronchodilator orders with one order with BID Frequency and one order with Frequency of every 4 hours PRN wheezing or increased work of breathing using Per Protocol order mode. 7-10 - enter or revise RT Bronchodilator order(s) to two equivalent RT bronchodilator orders with one order with TID Frequency and one order with Frequency of every 4 hours PRN wheezing or increased work of breathing using Per Protocol order mode. 11-13 - enter or revise RT Bronchodilator order(s) to one equivalent RT bronchodilator order with QID Frequency and an Albuterol order with Frequency of every 4 hours PRN wheezing or increased work of breathing using Per Protocol order mode. Greater than 13 - enter or revise RT Bronchodilator order(s) to one equivalent RT bronchodilator order with every 4 hours Frequency and an Albuterol order with Frequency of every 2 hours PRN wheezing or increased work of breathing using Per Protocol order mode.      Electronically signed by Kristina Alvarez RCP on 12/13/2021 at 6:57 PM

## 2021-12-13 NOTE — DISCHARGE INSTR - COC
Continuity of Care Form    Patient Name: Praveena Chiu   :  1955  MRN:  [id number]    Admit date:  12/10/2021  Discharge date:  2021    Code Status Order: Full Code   Advance Directives:      Admitting Physician:  Karri Balbuena MD  PCP: Sera Espinoza MD    Discharging Nurse: [facility] Unit/Room#: /0792-76  Discharging Unit Phone Number: [phone]      Emergency Contact:   Extended Emergency Contact Information  Primary Emergency Contact: [address] Phone: [phone]  Mobile Phone: [phone]  Relation: Child  Secondary Emergency Contact: [address] Phone: [phone]  Relation: Child    Past Surgical History:  Past Surgical History:   Procedure Laterality Date    CHOLECYSTECTOMY         Immunization History:   Immunization History   Administered Date(s) Administered    Influenza Vaccine, unspecified formulation 2013, 10/26/2016    Influenza Virus Vaccine 2013, 10/26/2016, 2017    Influenza, Tripp Spine, IM, PF (6 mo and older Fluzone, Flulaval, Fluarix, and 3 yrs and older Afluria) 10/26/2016, 2017, 2020    Tdap (Boostrix, Adacel) 2017       Active Problems:  Patient Active Problem List   Diagnosis Code    Chest pain R07.9    SOB (shortness of breath) R06.02    Tobacco use Z72.0    Moderate COPD (chronic obstructive pulmonary disease) ([address] J44.9    COPD exacerbation (HCC) J44.1    Acute respiratory failure with hypoxia (HCC) J96.01    Hyponatremia E87.1    Lung nodule R91.1    Pulmonary emphysema ([address] J43.9    Acute on chronic respiratory failure with hypoxia and hypercapnia (HCC) J96.21, J96.22    Community acquired pneumonia of right lung J18.9    Acute exacerbation of chronic obstructive pulmonary disease (COPD) ([address] J44.1    Acute on chronic respiratory failure with hypoxia ([address] J96.21    Acute hypoxemic respiratory failure ([address] J96.01    Pneumonia due to infectious organism J18.9    Transaminitis R74.01    Pancreatic abnormality Q45.3    Common bile duct dilatation K83.8    Community acquired pneumonia J18.9       Isolation/Infection:   Isolation            No Isolation          Patient Infection Status       Infection Onset Added Last Indicated Last Indicated By Review Planned Expiration Resolved Resolved By    None active    Resolved    COVID-19 (Rule Out) 12/10/21 12/10/21 12/10/21 COVID-19, Rapid (Ordered)   12/10/21 Rule-Out Test Resulted    COVID-19 (Rule Out) 21 COVID-19 (Ordered)   21 Rule-Out Test Resulted    COVID-19 (Rule Out) 21 COVID-19 (Ordered)   21     COVID-19 (Rule Out) 20 COVID-19 (Ordered)   20 Rule-Out Test Resulted            Nurse Assessment:  Last Vital Signs: BP (!) 144/76   Pulse 90   Temp 98.2 °F (36.8 °C) (Oral)   Resp 18   Ht 5' 3\" (1.6 m)   Wt 148 lb 14.4 oz (67.5 kg)   SpO2 92%   BMI 26.38 kg/m²     Last documented pain score (0-10 scale): Pain Level: 5  Last Weight:   Wt Readings from Last 1 Encounters:   21 148 lb 14.4 oz (67.5 kg)     Mental Status:  oriented and alert    IV Access:  - None    Nursing Mobility/ADLs:  Walking   Assisted  Transfer  Assisted  Bathing  Assisted  Dressing  Independent  Toileting  Independent  Feeding  Independent  Med Admin  Independent  Med Delivery   whole    Wound Care Documentation and Therapy:        Elimination:  Continence: Bowel: Yes  Bladder: Yes  Urinary Catheter: None   Colostomy/Ileostomy/Ileal Conduit: No       Date of Last BM:     Intake/Output Summary (Last 24 hours) at 2021 1121  Last data filed at 2021 0843  Gross per 24 hour   Intake 1903.6 ml   Output 2000 ml   Net -96.4 ml     I/O last 3 completed shifts: In: 1943.6 [P.O.:774; I.V.:874.2; IV Piggyback:295.4]  Out: 1650 [Urine:1650]    Safety Concerns:      At Risk for Falls    Impairments/Disabilities: Vision    Nutrition Therapy:  Current Nutrition Therapy:   - Oral Diet:  General    Routes of Feeding: Oral  Liquids: Thin Liquids  Daily Fluid Restriction: yes - amount 1800  Last Modified Barium Swallow with Video (Video Swallowing Test): not done    Treatments at the Time of Hospital Discharge:   Respiratory Treatments: 3L continous  Oxygen Therapy:  is on oxygen at 3 L/min per nasal cannula.   Ventilator:    - No ventilator support    Rehab Therapies: Physical Therapy, Occupational Therapy, and SN  Weight Bearing Status/Restrictions: No weight bearing restirctions  Other Medical Equipment (for information only, NOT a DME order):  walker  Other Treatments:     Patient's personal belongings (please select all that are sent with patient):  Glasses, Dentures upper    RN SIGNATURE:  Electronically signed by Scott Watts RN on 12/15/21 at 1:03 PM EST    CASE MANAGEMENT/SOCIAL WORK SECTION    Inpatient Status Date: 12/10/2021    Readmission Risk Assessment Score:  Readmission Risk              Risk of Unplanned Readmission:  15           Discharging to Facility/ Agency   Name: [facility]  Address: [address] [place] [address]   Phone: [phone]  Fax: [phone]    4 [facility]: [facility] [address] [place] [address] to establish plan of care for patient over 60 day period   Nursing  Initial home SN evaluation visit to occur within 24-48 hours for:  medication management  VS and clinical assessment  S&S chronic disease exacerbation education + when to contact MD / NP  care coordination  Medication Reconciliation during 1st SN visit       PT/OT   Evaluate with goal of regaining prior level of functioning   OT to evaluate if patient has [address] needs for personal care      PCP Visit scheduled within 7 days of hospital discharge           / signature: Electronically signed by Nanci Cowden, RN on 12/15/21 at 11:22 AM EST    PHYSICIAN SECTION    Prognosis: {Prognosis:3736546244}    Condition at Discharge: [address] Patient Condition:[id number]}    Rehab Potential (if transferring to Rehab): {Prognosis:0204024158}    Recommended Labs or Other Treatments After Discharge: SN, PT/OT  HCV and Zoom Programs    Physician Certification: I certify the above information and transfer of Luisito Farr  is necessary for the continuing treatment of the diagnosis listed and that she requires Home Care for less 30 days.      Update Admission H&P: {CHP DME Changes in YDAZO:931229198}    PHYSICIAN SIGNATURE:  Electronically signed by Live Phillips MD/Nasra Grove RN on 12/15/21 at 11:22 AM EST

## 2021-12-13 NOTE — FLOWSHEET NOTE
12/13/21 1840   Pain Assessment   Pain Assessment 0-10   Pain Level 6   Pain Type Chronic pain   Pain Location Back   Pain Orientation Lower; Mid   Pain Descriptors Aching; Constant   Pain Frequency Continuous   Pain Onset On-going   Clinical Progression Gradually worsening   Functional Pain Assessment Activities are not prevented     Pain as shown. PRN Percocet given per [facility] order. Pt. denies further needs at this time. Will continue to monitor. Call light is within reach.   Damaris Dugan RN

## 2021-12-13 NOTE — PROGRESS NOTES
Bedside report given to Disha Clemente. Pt. denies further needs at this time. Call light is within reach.   Damaris Dugan RN

## 2021-12-13 NOTE — FLOWSHEET NOTE
12/13/21 1548   Vital Signs   Temp 98.2 °F (36.8 °C)   Temp Source Oral   Pulse 88   Heart Rate Source Monitor   Resp 18   /79   BP Location Left upper arm   Level of Consciousness Alert (0)   MEWS Score 1   Oxygen Therapy   SpO2 96 %   O2 Device Nasal cannula   O2 Flow Rate (L/min) 3 L/min       VS as shown. Pt. Up walking in room with this RN using walker. Pt. Tolerated well. Lowest that oxygen dropped was 90% on 3 L NC. Pt. denies further needs at this time. Call light is within reach.   Patricio Riso, RN

## 2021-12-14 LAB
ALBUMIN SERPL-MCNC: 3.8 G/DL (ref 3.4–5)
ANION GAP SERPL CALCULATED.3IONS-SCNC: 9 MMOL/L (ref 3–16)
ANION GAP SERPL CALCULATED.3IONS-SCNC: 9 MMOL/L (ref 3–16)
BASOPHILS ABSOLUTE: 0 K/UL (ref 0–0.2)
BASOPHILS RELATIVE PERCENT: 0 %
BLOOD CULTURE, ROUTINE: NORMAL
BUN BLDV-MCNC: 16 MG/DL (ref 7–20)
BUN BLDV-MCNC: 17 MG/DL (ref 7–20)
CALCIUM SERPL-MCNC: 9.1 MG/DL (ref 8.3–10.6)
CALCIUM SERPL-MCNC: 9.3 MG/DL (ref 8.3–10.6)
CHLORIDE BLD-SCNC: 88 MMOL/L (ref 99–110)
CHLORIDE BLD-SCNC: 89 MMOL/L (ref 99–110)
CO2: 28 MMOL/L (ref 21–32)
CO2: 29 MMOL/L (ref 21–32)
CREAT SERPL-MCNC: 0.6 MG/DL (ref 0.6–1.2)
CREAT SERPL-MCNC: 0.6 MG/DL (ref 0.6–1.2)
CULTURE, BLOOD 2: NORMAL
EOSINOPHILS ABSOLUTE: 0 K/UL (ref 0–0.6)
EOSINOPHILS RELATIVE PERCENT: 0 %
GFR AFRICAN AMERICAN: >60
GFR AFRICAN AMERICAN: >60
GFR NON-AFRICAN AMERICAN: >60
GFR NON-AFRICAN AMERICAN: >60
GLUCOSE BLD-MCNC: 118 MG/DL (ref 70–99)
GLUCOSE BLD-MCNC: 119 MG/DL (ref 70–99)
HCT VFR BLD CALC: 33.1 % (ref 36–48)
HEMOGLOBIN: 11.4 G/DL (ref 12–16)
LYMPHOCYTES ABSOLUTE: 1.9 K/UL (ref 1–5.1)
LYMPHOCYTES RELATIVE PERCENT: 16 %
MAGNESIUM: 1.9 MG/DL (ref 1.8–2.4)
MCH RBC QN AUTO: 33.7 PG (ref 26–34)
MCHC RBC AUTO-ENTMCNC: 34.4 G/DL (ref 31–36)
MCV RBC AUTO: 97.8 FL (ref 80–100)
MONOCYTES ABSOLUTE: 0.7 K/UL (ref 0–1.3)
MONOCYTES RELATIVE PERCENT: 6 %
NEUTROPHILS ABSOLUTE: 9.2 K/UL (ref 1.7–7.7)
NEUTROPHILS RELATIVE PERCENT: 78 %
PDW BLD-RTO: 12.2 % (ref 12.4–15.4)
PHOSPHORUS: 2.2 MG/DL (ref 2.5–4.9)
PLATELET # BLD: 335 K/UL (ref 135–450)
PLATELET SLIDE REVIEW: ADEQUATE
PMV BLD AUTO: 6.6 FL (ref 5–10.5)
POTASSIUM SERPL-SCNC: 3.9 MMOL/L (ref 3.5–5.1)
POTASSIUM SERPL-SCNC: 4.1 MMOL/L (ref 3.5–5.1)
RBC # BLD: 3.38 M/UL (ref 4–5.2)
SLIDE REVIEW: ABNORMAL
SODIUM BLD-SCNC: 125 MMOL/L (ref 136–145)
SODIUM BLD-SCNC: 127 MMOL/L (ref 136–145)
URIC ACID, SERUM: 1.6 MG/DL (ref 2.6–6)
WBC # BLD: 11.8 K/UL (ref 4–11)

## 2021-12-14 PROCEDURE — 2700000000 HC OXYGEN THERAPY PER DAY

## 2021-12-14 PROCEDURE — 80069 RENAL FUNCTION PANEL: CPT

## 2021-12-14 PROCEDURE — 6370000000 HC RX 637 (ALT 250 FOR IP): Performed by: INTERNAL MEDICINE

## 2021-12-14 PROCEDURE — 94640 AIRWAY INHALATION TREATMENT: CPT

## 2021-12-14 PROCEDURE — 36415 COLL VENOUS BLD VENIPUNCTURE: CPT

## 2021-12-14 PROCEDURE — 6370000000 HC RX 637 (ALT 250 FOR IP): Performed by: NURSE PRACTITIONER

## 2021-12-14 PROCEDURE — 84550 ASSAY OF BLOOD/URIC ACID: CPT

## 2021-12-14 PROCEDURE — 94761 N-INVAS EAR/PLS OXIMETRY MLT: CPT

## 2021-12-14 PROCEDURE — 85025 COMPLETE CBC W/AUTO DIFF WBC: CPT

## 2021-12-14 PROCEDURE — 99232 SBSQ HOSP IP/OBS MODERATE 35: CPT | Performed by: INTERNAL MEDICINE

## 2021-12-14 PROCEDURE — 99233 SBSQ HOSP IP/OBS HIGH 50: CPT | Performed by: INTERNAL MEDICINE

## 2021-12-14 PROCEDURE — 2580000003 HC RX 258: Performed by: NURSE PRACTITIONER

## 2021-12-14 PROCEDURE — 2060000000 HC ICU INTERMEDIATE R&B

## 2021-12-14 PROCEDURE — 83735 ASSAY OF MAGNESIUM: CPT

## 2021-12-14 PROCEDURE — 6360000002 HC RX W HCPCS: Performed by: NURSE PRACTITIONER

## 2021-12-14 PROCEDURE — 2500000003 HC RX 250 WO HCPCS: Performed by: INTERNAL MEDICINE

## 2021-12-14 RX ORDER — LEVOFLOXACIN 750 MG/1
750 TABLET ORAL DAILY
Status: DISCONTINUED | OUTPATIENT
Start: 2021-12-15 | End: 2021-12-15 | Stop reason: HOSPADM

## 2021-12-14 RX ORDER — TOLVAPTAN 15 MG/1
15 TABLET ORAL ONCE
Status: COMPLETED | OUTPATIENT
Start: 2021-12-14 | End: 2021-12-14

## 2021-12-14 RX ADMIN — Medication 10 ML: at 08:47

## 2021-12-14 RX ADMIN — LABETALOL HYDROCHLORIDE 10 MG: 5 INJECTION, SOLUTION INTRAVENOUS at 11:57

## 2021-12-14 RX ADMIN — OXYBUTYNIN CHLORIDE 5 MG: 5 TABLET ORAL at 20:14

## 2021-12-14 RX ADMIN — OXYBUTYNIN CHLORIDE 5 MG: 5 TABLET ORAL at 11:54

## 2021-12-14 RX ADMIN — OXYBUTYNIN CHLORIDE 5 MG: 5 TABLET ORAL at 17:06

## 2021-12-14 RX ADMIN — AMLODIPINE BESYLATE 5 MG: 5 TABLET ORAL at 08:44

## 2021-12-14 RX ADMIN — CETIRIZINE HYDROCHLORIDE 10 MG: 10 TABLET ORAL at 08:44

## 2021-12-14 RX ADMIN — OXYBUTYNIN CHLORIDE 5 MG: 5 TABLET ORAL at 08:45

## 2021-12-14 RX ADMIN — OXYCODONE HYDROCHLORIDE AND ACETAMINOPHEN 1 TABLET: 5; 325 TABLET ORAL at 00:43

## 2021-12-14 RX ADMIN — OXYCODONE HYDROCHLORIDE AND ACETAMINOPHEN 1 TABLET: 5; 325 TABLET ORAL at 06:37

## 2021-12-14 RX ADMIN — PREDNISONE 30 MG: 20 TABLET ORAL at 08:44

## 2021-12-14 RX ADMIN — Medication 10 ML: at 20:14

## 2021-12-14 RX ADMIN — IPRATROPIUM BROMIDE AND ALBUTEROL SULFATE 1 AMPULE: .5; 2.5 SOLUTION RESPIRATORY (INHALATION) at 07:07

## 2021-12-14 RX ADMIN — OXYCODONE HYDROCHLORIDE AND ACETAMINOPHEN 1 TABLET: 5; 325 TABLET ORAL at 11:53

## 2021-12-14 RX ADMIN — IPRATROPIUM BROMIDE AND ALBUTEROL SULFATE 1 AMPULE: .5; 2.5 SOLUTION RESPIRATORY (INHALATION) at 12:05

## 2021-12-14 RX ADMIN — LEVOFLOXACIN 750 MG: 5 INJECTION, SOLUTION INTRAVENOUS at 08:54

## 2021-12-14 RX ADMIN — ENOXAPARIN SODIUM 40 MG: 100 INJECTION SUBCUTANEOUS at 17:06

## 2021-12-14 RX ADMIN — LISINOPRIL 2.5 MG: 5 TABLET ORAL at 08:45

## 2021-12-14 RX ADMIN — IPRATROPIUM BROMIDE AND ALBUTEROL SULFATE 1 AMPULE: .5; 2.5 SOLUTION RESPIRATORY (INHALATION) at 19:01

## 2021-12-14 RX ADMIN — Medication 15 MG: at 11:53

## 2021-12-14 RX ADMIN — OXYCODONE HYDROCHLORIDE AND ACETAMINOPHEN 1 TABLET: 5; 325 TABLET ORAL at 18:02

## 2021-12-14 RX ADMIN — ESCITALOPRAM OXALATE 20 MG: 10 TABLET ORAL at 20:14

## 2021-12-14 ASSESSMENT — PAIN DESCRIPTION - DESCRIPTORS
DESCRIPTORS: ACHING;CONSTANT
DESCRIPTORS: ACHING;CONSTANT

## 2021-12-14 ASSESSMENT — PAIN DESCRIPTION - PROGRESSION
CLINICAL_PROGRESSION: GRADUALLY WORSENING
CLINICAL_PROGRESSION: GRADUALLY WORSENING

## 2021-12-14 ASSESSMENT — PAIN DESCRIPTION - ONSET
ONSET: ON-GOING
ONSET: ON-GOING

## 2021-12-14 ASSESSMENT — PAIN SCALES - GENERAL
PAINLEVEL_OUTOF10: 7
PAINLEVEL_OUTOF10: 6
PAINLEVEL_OUTOF10: 0
PAINLEVEL_OUTOF10: 6
PAINLEVEL_OUTOF10: 7

## 2021-12-14 ASSESSMENT — PAIN DESCRIPTION - ORIENTATION
ORIENTATION: LOWER;MID
ORIENTATION: LOWER;MID

## 2021-12-14 ASSESSMENT — PAIN DESCRIPTION - LOCATION
LOCATION: BACK
LOCATION: BACK

## 2021-12-14 ASSESSMENT — PAIN DESCRIPTION - PAIN TYPE
TYPE: CHRONIC PAIN
TYPE: CHRONIC PAIN

## 2021-12-14 ASSESSMENT — PAIN DESCRIPTION - FREQUENCY
FREQUENCY: CONTINUOUS
FREQUENCY: CONTINUOUS

## 2021-12-14 NOTE — PROGRESS NOTES
Pulmonary Progress Note    CC: COPD, lung nodule, pneumonia    Subjective:   + EDGAR      Intake/Output Summary (Last 24 hours) at 12/14/2021 1206  Last data filed at 12/14/2021 1020  Gross per 24 hour   Intake 240 ml   Output 2250 ml   Net -2010 ml       Exam:   BP (!) 172/77   Pulse 104   Temp 99 °F (37.2 °C) (Oral)   Resp 18   Ht 5' 3\" (1.6 m)   Wt 154 lb (69.9 kg)   SpO2 92%   BMI 27.28 kg/m²  on 3LPM   Gen:  No distress. Ill-appearing  Eyes: PERRL. No sclera icterus. No conjunctival injection. ENT: No discharge. Pharynx clear. Neck: Trachea midline. No obvious mass. Resp:  No accessory muscle use. R crackles. Minimal wheezes. No rhonchi. CV: Regular rate. Regular rhythm. No murmur or rub. No edema. M/S: No cyanosis. No joint deformity. No clubbing. Neuro: Awake. Alert. Moves all four extremities. Psych: Oriented x 3.  No anxiety    Scheduled Meds:   predniSONE  30 mg Oral Daily    amLODIPine  5 mg Oral Daily    cetirizine  10 mg Oral Daily    escitalopram  20 mg Oral Nightly    lisinopril  2.5 mg Oral Daily    oxybutynin  5 mg Oral 4x Daily    sodium chloride flush  5-40 mL IntraVENous 2 times per day    enoxaparin  40 mg SubCUTAneous Daily    levofloxacin  750 mg IntraVENous Q24H    influenza virus vaccine  0.5 mL IntraMUSCular Prior to discharge    ipratropium-albuterol  1 ampule Inhalation TID     Continuous Infusions:   sodium chloride       PRN Meds:  hydrOXYzine, albuterol sulfate HFA, sodium chloride flush, sodium chloride, ondansetron **OR** ondansetron, polyethylene glycol, acetaminophen **OR** acetaminophen, oxyCODONE-acetaminophen    Labs:  CBC:   Recent Labs     12/12/21  0850 12/13/21  0412 12/14/21  0344   WBC 10.4 11.7* 11.8*   HGB 10.6* 11.8* 11.4*   HCT 31.7* 35.0* 33.1*   MCV 99.8 99.3 97.8    329 335     BMP:   Recent Labs     12/13/21  1154 12/13/21  1946 12/14/21  0344   * 125* 127*  125*   K 4.2 3.9 4.1  3.9   CL 91* 86* 89*  88*   CO2 26 27 29  28   PHOS  --   --  2.2*   BUN 13 14 16  17   CREATININE <0.5* 0.7 0.6  0.6     LIVER PROFILE:   No results for input(s): AST, ALT, LIPASE, BILIDIR, BILITOT, ALKPHOS in the last 72 hours. Invalid input(s): AMYLASE,  ALB  PT/INR: No results for input(s): PROTIME, INR in the last 72 hours. APTT: No results for input(s): APTT in the last 72 hours. UA:  No results for input(s): NITRITE, COLORU, PHUR, LABCAST, WBCUA, RBCUA, MUCUS, TRICHOMONAS, YEAST, BACTERIA, CLARITYU, SPECGRAV, LEUKOCYTESUR, UROBILINOGEN, BILIRUBINUR, BLOODU, GLUCOSEU, AMORPHOUS in the last 72 hours. Invalid input(s): KETONESU  No results for input(s): PHART, OFE6YHX, PO2ART in the last 72 hours. Cultures:   12/10 BC NGTD  12/10 influenza negative  12/10 COVID-19 not detected    Films:  CTPA 12/10 imaging was reviewed by me and showed   No central pulmonary embolus. Tiny peripheral branches are not well evaluated. Dominant irregular nodule in the left upper lobe. .    Right lower lobe airspace disease  Moderate coronary artery calcification            ASSESSMENT:  · Acute hypoxemic respiratory failure  · RLL community-acquired pneumonia  · Moderate COPD with AE   · Left upper lobe nodule on CT 12/10/2021 more conspicuous than February 2021     PLAN:  · Supplemental oxygen to maintain SaO2 >92%; wean as tolerated  · Intensive inhaled bronchodilator therapy  · Prednisone taper   · Levaquin D#5  · Further evaluation of pulmonary nodule as an outpatient  · Acapella and Mucinex  · Advised to continue with smoking cessation  · Office notified for chest ct in 3 months and home nebs

## 2021-12-14 NOTE — PROGRESS NOTES
Physician Progress Note      Leo Ramírez  CSN #:                  [id number]  :                       1955  ADMIT DATE:       12/10/2021 4:26 AM  DISCH DATE:  RESPONDING  PROVIDER #:        Zo Louie MD          QUERY TEXT:    Pt admitted with PNA and AE COPD. Pt noted to have sepsis indicators on   arrival. If possible, please document in the progress notes and discharge   summary if you are evaluating and /or treating any of the following: The medical record reflects the following:  Risk Factors: COPD with chronic O2 dependence, PNA suspected gram + organism    Clinical Indicators: on arrival 12/10- WBC 18.2, , Acute/chronic resp   failure needing 5L NC O2 from 2 L home baseline, PCT 0.40, LA 3.3, BC- pending    Treatment: 2 L NS IVF bolus followed by 75 ml/hr cont. infusion, Levaquin,   Solumedrol, labs, imaging, Pulmonary consult, ongoing supportive care    thank you- catrachita ramsey RN, BSMONIQUE - [email or web address]  Options provided:  -- Sepsis d/t PNA, POA  -- PNA without Sepsis  -- Other - I will add my own diagnosis  -- Disagree - Not applicable / Not valid  -- Disagree - Clinically unable to determine / Unknown  -- Refer to Clinical Documentation Reviewer    PROVIDER RESPONSE TEXT:    This patient has PNA without Sepsis.     Query created by: Lashell Kirk on 2021 1:30 PM      Electronically signed by:  Zo Louie MD 2021 7:30 AM

## 2021-12-14 NOTE — PROGRESS NOTES
Shift assessment complete, see flowsheets. Medications given, see MAR. A+Ox4. Lungs diminished throughout. Pt remains on 3L NC. No needs stated at this time. Call light in easy reach, bedside table in easy reach, and bed in lowest position.   Toi Diallo, RN

## 2021-12-14 NOTE — FLOWSHEET NOTE
12/14/21 1145   Vital Signs   Temp 99 °F (37.2 °C)   Temp Source Oral   Pulse 104   Heart Rate Source Monitor   Resp 18   BP (!) 172/77   BP Location Left upper arm   Patient Position Sitting   Level of Consciousness Alert (0)   MEWS Score 2   Patient Currently in Pain Denies   Oxygen Therapy   SpO2 92 %   O2 Device Nasal cannula   O2 Flow Rate (L/min) 2 L/min     Elevated BP; IV labetalol given. C/O pain 7/10 PRN oxycodone given.     Buena Hammans, RN

## 2021-12-14 NOTE — PROGRESS NOTES
Hospitalist Progress Note      PCP: Joaquín Figueredo MD    Date of Admission: 12/10/2021    Subjective:   Ms Jozef Ford seen on home o2 of 3 L and pt feels ok today, reports constipation  No fevers          Medications:  Reviewed    Infusion Medications    sodium chloride       Scheduled Medications    predniSONE  30 mg Oral Daily    amLODIPine  5 mg Oral Daily    cetirizine  10 mg Oral Daily    escitalopram  20 mg Oral Nightly    lisinopril  2.5 mg Oral Daily    oxybutynin  5 mg Oral 4x Daily    sodium chloride flush  5-40 mL IntraVENous 2 times per day    enoxaparin  40 mg SubCUTAneous Daily    levofloxacin  750 mg IntraVENous Q24H    influenza virus vaccine  0.5 mL IntraMUSCular Prior to discharge    ipratropium-albuterol  1 ampule Inhalation TID     PRN Meds: labetalol, hydrOXYzine, albuterol sulfate HFA, sodium chloride flush, sodium chloride, ondansetron **OR** ondansetron, polyethylene glycol, acetaminophen **OR** acetaminophen, oxyCODONE-acetaminophen      Intake/Output Summary (Last 24 hours) at 12/14/2021 0729  Last data filed at 12/14/2021 0640  Gross per 24 hour   Intake 360 ml   Output 2700 ml   Net -2340 ml       Physical Exam Performed:    BP (!) 153/83   Pulse 94   Temp 97.8 °F (36.6 °C) (Oral)   Resp 18   Ht 5' 3\" (1.6 m)   Wt 154 lb (69.9 kg)   SpO2 95%   BMI 27.28 kg/m²       Gen: elderlly female, up in bed, No distress. Alert. Eyes: PERRL. No sclera icterus. No conjunctival injection. ENT: No discharge. Pharynx clear. Neck: No JVD.  No Carotid Bruit. Trachea midline. Resp: resolving javier wheeze and improving air entry   CV: Regular rate. Regular rhythm. No murmur.  No rub. No edema. GI: Non-tender. Non-distended. No masses. No organomegaly. Normal bowel sounds. No hernia. Skin: Warm and dry. No nodule on exposed extremities. No rash on exposed extremities. M/S: No cyanosis. No joint deformity. No clubbing. Neuro: Awake. Grossly nonfocal    Psych: Oriented x 3.  No cystic pancreatic masses in   asymptomatic* patients on CT, MR, or US      < 2 cm:  Single follow-up in 1 yr (MR preferred)      *Stable:  Benign, no further follow-up   *Growth: As below based upon size   * None of the following:  Hyperamylasemia, recent onset diabetes, severe   epigastric pain, weight loss, steatorrhea, or jaundice. Ryley SOLORIO, et al.  Managing incidental findings on abdominal CT: white paper   of the ACR Incidental Findings Committee. J Am Alis Radiol 2010; 4:537-080. CT CHEST PULMONARY EMBOLISM W CONTRAST   Final Result   Chest:      No central pulmonary embolus. Tiny peripheral branches are not well   evaluated. Dominant irregular nodule in the left upper lobe. .  This is more conspicuous   than February 2021. This could represent an inflammatory nodule or an early   finding of lung carcinoma. Recommend short-term follow-up chest CT versus   PET-CT. Right lower lobe airspace disease, increased compared to prior, either   atelectasis or pneumonia. Moderate coronary artery calcification      Compression fractures in the thoracic spine, previously described on MRI   04/28/2021. No obvious change given differences in modalities      Abdomen and pelvis:      Mildly dilated extrahepatic common duct to 1.2 cm. There is a questionable   filling defect seen in the extrahepatic common duct. Punctate hypodense nodule in the pancreas, most likely small side branch IPMN      RECOMMENDATIONS:   ACR management recommendations for incidental cystic pancreatic masses in   asymptomatic* patients on CT, MR, or US      < 2 cm:  Single follow-up in 1 yr (MR preferred)      *Stable:  Benign, no further follow-up   *Growth: As below based upon size   * None of the following:  Hyperamylasemia, recent onset diabetes, severe   epigastric pain, weight loss, steatorrhea, or jaundice.       Ryley SOLORIO, et al.  Managing incidental findings on abdominal CT: white paper   of the ACR Incidental Findings Committee. J Am Alis Radiol 2010; 5:170-480. XR CHEST PORTABLE   Final Result   Multifocal airspace opacities that are suspected to represent developing   pneumonitis. Correlate with clinical workup. Assessment/Plan:    Active Hospital Problems    Diagnosis     Community acquired pneumonia [J18.9]     Transaminitis [R74.01]     Acute hypoxemic respiratory failure (HCC) [J96.01]     Pneumonia due to infectious organism [J18.9]     Pancreatic abnormality [Q45.3]     Common bile duct dilatation [K83.8]     Lung nodule [R91.1]     Hyponatremia [E87.1]             #Acute on chronic hypoxic respiratory failure  Sec to copd exacebration   -Patient uses 2 to 3 L of nasal cannula oxygen at home.  increased RR and dyspnea , needing 4L on arrial. Treated with steroids, HHN, imaging with possible pna   Given abx and improved now back to 3 L nasal cannula at rest  -wean off steroids      #Right lower lobe community-acquired pneumonia  - Suspect gram-positive organism.  Levaquin day #5  - f/u cultures  - improved symptoms      #Left upper lobe nodule  -Noted on prior imaging, but radiologist notes that it is more conspicuous in appearance now.  Pulmonology consulted and needs outpt f/w   Smoking cessation discussed        #Transaminitis  #Extrahepatic common bile duct dilatation  -She denies current abdominal pain, does but does state that she had abdominal pain in the right upper quadrant recently.   - [address] consulted  - monitor LFTs, no workup for now - plans for EUS as outpt     #Pancreatic nodules  - GI consulted     #Hyponatremia  -Acute on chronic  -Her baseline sodium is in the low 130s  - na on admit 123, started on IVF and later on fluid restriction  - etiology likely SIADH and also pulm process   -Nephrology consulted  -Stop hydrochlorothiazide  --Monitor BMP, Na improved to 127  -Fluid restriction added- for samsca today       #Coronary artery calcification  - noted on CT. Freddie Huff denies history of CAD, denies CP.  Consider ASA, statin, PCP f/u for w/u      #Thoracic spine compression fractures-noted on MRI in April 2021          #HTN  - stop HCTZ  - cont lisino[ril     #Depression  - cont lexapro          DVT Prophylaxis: Lovenox   Diet: ADULT DIET;  Regular; Low Fat (less than or equal to 50 gm/day)  Code Status: Full Code    PT/OT Eval Status: ordered    Dispo - cont care  Dc planning    Vasu Carl MD

## 2021-12-14 NOTE — PROGRESS NOTES
RT Inhaler-Nebulizer Bronchodilator Protocol Note    There is a bronchodilator order in the chart from a provider indicating to follow the RT Bronchodilator Protocol and there is an Initiate RT Inhaler-Nebulizer Bronchodilator Protocol order as well (see protocol at bottom of note). CXR Findings:  No results found. The findings from the last RT Protocol Assessment were as follows:   History Pulmonary Disease: (P) Chronic pulmonary disease  Respiratory Pattern: (P) Regular pattern and RR 12-20 bpm  Breath Sounds: (P) Slightly diminished and/or crackles  Cough: (P) Strong, spontaneous, non-productive  Indication for Bronchodilator Therapy: (P) Decreased or absent breath sounds  Bronchodilator Assessment Score: (P) 4    Aerosolized bronchodilator medication orders have been revised according to the RT Inhaler-Nebulizer Bronchodilator Protocol below. Respiratory Therapist to perform RT Therapy Protocol Assessment initially then follow the protocol. Repeat RT Therapy Protocol Assessment PRN for score 0-3 or on second treatment, BID, and PRN for scores above 3. No Indications - adjust the frequency to every 6 hours PRN wheezing or bronchospasm, if no treatments needed after 48 hours then discontinue using Per Protocol order mode. If indication present, adjust the RT bronchodilator orders based on the Bronchodilator Assessment Score as indicated below. Use Inhaler orders unless patient has one or more of the following: on home nebulizer, not able to hold breath for 10 seconds, is not alert and oriented, cannot activate and use MDI correctly, or respiratory rate 25 breaths per minute or more, then use the equivalent nebulizer order(s) with same Frequency and PRN reasons based on the score. If a patient is on this medication at home then do not decrease Frequency below that used at home.     0-3 - enter or revise RT bronchodilator order(s) to equivalent RT Bronchodilator order with Frequency of every 4 hours PRN for wheezing or increased work of breathing using Per Protocol order mode. 4-6 - enter or revise RT Bronchodilator order(s) to two equivalent RT bronchodilator orders with one order with BID Frequency and one order with Frequency of every 4 hours PRN wheezing or increased work of breathing using Per Protocol order mode. 7-10 - enter or revise RT Bronchodilator order(s) to two equivalent RT bronchodilator orders with one order with TID Frequency and one order with Frequency of every 4 hours PRN wheezing or increased work of breathing using Per Protocol order mode. 11-13 - enter or revise RT Bronchodilator order(s) to one equivalent RT bronchodilator order with QID Frequency and an Albuterol order with Frequency of every 4 hours PRN wheezing or increased work of breathing using Per Protocol order mode. Greater than 13 - enter or revise RT Bronchodilator order(s) to one equivalent RT bronchodilator order with every 4 hours Frequency and an Albuterol order with Frequency of every 2 hours PRN wheezing or increased work of breathing using Per Protocol order mode. RT to enter RT Home Evaluation for COPD & MDI Assessment order using Per Protocol order mode.     Electronically signed by Evert Waters RCP on 12/14/2021 at 12:08 PM

## 2021-12-14 NOTE — PROGRESS NOTES
[facility] Progress Note           Subjective/   61y.o. year old female who we are seeing in consultation for Hyponatremia. HPI:  Sodium stable  Remains more short of breath than usual  She feels that she has some swelling  BP range has been better     ROS:  Shortness of breath persists with exertion but down to baseline oxygen requirements of 2 liters NC  No fevers  Eating well     Objective/   GEN:  Chronically ill, /77   Pulse 93   Temp 98.3 °F (36.8 °C) (Oral)   Resp 17   Ht 5' 3\" (1.6 m)   Wt 154 lb (69.9 kg)   SpO2 94%   BMI 27.28 kg/m²   HEENT: non-icteric, no JVD  CV: S1, S2 without m/r/g; no LE edema  RESP: CTA B without w/r/r; breathing wnl  ABD: +bs, soft, nt, no hsm  SKIN: warm, no rashes    Data/  Recent Labs     12/12/21  0850 12/13/21  0412 12/14/21  0344   WBC 10.4 11.7* 11.8*   HGB 10.6* 11.8* 11.4*   HCT 31.7* 35.0* 33.1*   MCV 99.8 99.3 97.8    329 335     Recent Labs     12/13/21  1154 12/13/21  1946 12/14/21  0344   * 125* 127*  125*   K 4.2 3.9 4.1  3.9   CL 91* 86* 89*  88*   CO2 26 27 29  28   GLUCOSE 138* 191* 118*  119*   PHOS  --   --  2.2*   BUN 13 14 16  17   CREATININE <0.5* 0.7 0.6  0.6   LABGLOM >60 >60 >60  >60   GFRAA >60 >60 >60  >60       Component      Latest Ref Rng & Units 12/11/2021 12/11/2021 12/10/2021 12/10/2021          12:40 AM 12:40 AM 11:55 AM 11:55 AM   TSH      0.27 - 4.20 uIU/mL   0.17 (L)    Uric Acid, Serum      2.6 - 6.0 mg/dL    2.5 (L)   Sodium, Ur      Not Established mmol/L  <20     Osmolality, Ur      390 - 1070 mOsm/kg 245 (L)          Assessment/     Hyponatremia.  - Hx of chronic hyponatremia since 2013. Baseline in the low 130s. - Prior work up suggestive of polydipsia. Current work up with more of SIADH picture with U osm of 245, given persistently low urine sodium cannot exclude volume depletion as the ADH stimulus however also on SSRI and has pulmonary processes.   Repeat U osm of 336 and U Na of 60 confirms a superimposed SIADH process   - Recommended to permanently discontinue HCTZ. - Free water restriction of 64oz/day long term, will hold while giving tolvaptan. - Stopping SSRI is not a good option at this time, will need medical management of SIADH, for now free water restriction and encourage high protein diet. Could add Urea to increase solute load. - Monitor labs. Dischargne tomorrow      Hypertension.  - High variable     COPD/Pneumonia. - On steroids and antibiotic per Primary service. ____________________________________  Tristian Norton MD  [facility]  [email or web address]  Office: [phone]

## 2021-12-14 NOTE — PROGRESS NOTES
Bedside report given to [facility]. Pt. denies further needs at this time. Call light is within reach.   Kofi Subramanian RN

## 2021-12-15 VITALS
WEIGHT: 154 LBS | DIASTOLIC BLOOD PRESSURE: 85 MMHG | TEMPERATURE: 98.2 F | HEIGHT: 63 IN | BODY MASS INDEX: 27.29 KG/M2 | RESPIRATION RATE: 18 BRPM | HEART RATE: 109 BPM | OXYGEN SATURATION: 94 % | SYSTOLIC BLOOD PRESSURE: 151 MMHG

## 2021-12-15 LAB
ALBUMIN SERPL-MCNC: 3.8 G/DL (ref 3.4–5)
ANION GAP SERPL CALCULATED.3IONS-SCNC: 11 MMOL/L (ref 3–16)
BANDED NEUTROPHILS RELATIVE PERCENT: 1 % (ref 0–7)
BASOPHILS ABSOLUTE: 0 K/UL (ref 0–0.2)
BASOPHILS RELATIVE PERCENT: 0 %
BUN BLDV-MCNC: 14 MG/DL (ref 7–20)
CALCIUM SERPL-MCNC: 9.1 MG/DL (ref 8.3–10.6)
CHLORIDE BLD-SCNC: 94 MMOL/L (ref 99–110)
CO2: 28 MMOL/L (ref 21–32)
CREAT SERPL-MCNC: <0.5 MG/DL (ref 0.6–1.2)
EOSINOPHILS ABSOLUTE: 0 K/UL (ref 0–0.6)
EOSINOPHILS RELATIVE PERCENT: 0 %
GFR AFRICAN AMERICAN: >60
GFR NON-AFRICAN AMERICAN: >60
GLUCOSE BLD-MCNC: 93 MG/DL (ref 70–99)
HCT VFR BLD CALC: 36.8 % (ref 36–48)
HEMOGLOBIN: 12.3 G/DL (ref 12–16)
HYPOCHROMIA: ABNORMAL
LYMPHOCYTES ABSOLUTE: 3.6 K/UL (ref 1–5.1)
LYMPHOCYTES RELATIVE PERCENT: 36 %
MCH RBC QN AUTO: 33.3 PG (ref 26–34)
MCHC RBC AUTO-ENTMCNC: 33.6 G/DL (ref 31–36)
MCV RBC AUTO: 99.1 FL (ref 80–100)
MONOCYTES ABSOLUTE: 1 K/UL (ref 0–1.3)
MONOCYTES RELATIVE PERCENT: 10 %
NEUTROPHILS ABSOLUTE: 5.3 K/UL (ref 1.7–7.7)
NEUTROPHILS RELATIVE PERCENT: 53 %
PDW BLD-RTO: 11.9 % (ref 12.4–15.4)
PHOSPHORUS: 3.1 MG/DL (ref 2.5–4.9)
PLATELET # BLD: 333 K/UL (ref 135–450)
PLATELET SLIDE REVIEW: ADEQUATE
PMV BLD AUTO: 6.6 FL (ref 5–10.5)
POLYCHROMASIA: ABNORMAL
POTASSIUM SERPL-SCNC: 3.5 MMOL/L (ref 3.5–5.1)
RBC # BLD: 3.71 M/UL (ref 4–5.2)
SLIDE REVIEW: ABNORMAL
SODIUM BLD-SCNC: 133 MMOL/L (ref 136–145)
WBC # BLD: 9.9 K/UL (ref 4–11)

## 2021-12-15 PROCEDURE — 99233 SBSQ HOSP IP/OBS HIGH 50: CPT | Performed by: INTERNAL MEDICINE

## 2021-12-15 PROCEDURE — 97116 GAIT TRAINING THERAPY: CPT

## 2021-12-15 PROCEDURE — 99238 HOSP IP/OBS DSCHRG MGMT 30/<: CPT | Performed by: INTERNAL MEDICINE

## 2021-12-15 PROCEDURE — 6370000000 HC RX 637 (ALT 250 FOR IP): Performed by: INTERNAL MEDICINE

## 2021-12-15 PROCEDURE — 6370000000 HC RX 637 (ALT 250 FOR IP): Performed by: NURSE PRACTITIONER

## 2021-12-15 PROCEDURE — 36415 COLL VENOUS BLD VENIPUNCTURE: CPT

## 2021-12-15 PROCEDURE — 85025 COMPLETE CBC W/AUTO DIFF WBC: CPT

## 2021-12-15 PROCEDURE — 94761 N-INVAS EAR/PLS OXIMETRY MLT: CPT

## 2021-12-15 PROCEDURE — 94640 AIRWAY INHALATION TREATMENT: CPT

## 2021-12-15 PROCEDURE — 80069 RENAL FUNCTION PANEL: CPT

## 2021-12-15 PROCEDURE — 2700000000 HC OXYGEN THERAPY PER DAY

## 2021-12-15 PROCEDURE — 97110 THERAPEUTIC EXERCISES: CPT

## 2021-12-15 RX ORDER — NEBULIZER ACCESSORIES
KIT MISCELLANEOUS
Qty: 1 KIT | Refills: 0 | Status: SHIPPED | OUTPATIENT
Start: 2021-12-15

## 2021-12-15 RX ORDER — PREDNISONE 10 MG/1
TABLET ORAL
Qty: 18 TABLET | Refills: 0 | Status: SHIPPED | OUTPATIENT
Start: 2021-12-15 | End: 2022-04-06 | Stop reason: ALTCHOICE

## 2021-12-15 RX ORDER — AMLODIPINE BESYLATE 5 MG/1
5 TABLET ORAL DAILY
Qty: 30 TABLET | Refills: 3 | Status: ON HOLD
Start: 2021-12-16 | End: 2022-08-18 | Stop reason: HOSPADM

## 2021-12-15 RX ORDER — ALBUTEROL SULFATE 1.25 MG/3ML
1 SOLUTION RESPIRATORY (INHALATION) EVERY 6 HOURS PRN
Qty: 120 ML | Refills: 3 | Status: SHIPPED | OUTPATIENT
Start: 2021-12-15

## 2021-12-15 RX ORDER — LEVOFLOXACIN 750 MG/1
750 TABLET ORAL DAILY
Qty: 2 TABLET | Refills: 0 | Status: SHIPPED | OUTPATIENT
Start: 2021-12-16 | End: 2021-12-18

## 2021-12-15 RX ADMIN — PREDNISONE 30 MG: 20 TABLET ORAL at 09:23

## 2021-12-15 RX ADMIN — OXYCODONE HYDROCHLORIDE AND ACETAMINOPHEN 1 TABLET: 5; 325 TABLET ORAL at 06:32

## 2021-12-15 RX ADMIN — AMLODIPINE BESYLATE 5 MG: 5 TABLET ORAL at 09:23

## 2021-12-15 RX ADMIN — OXYBUTYNIN CHLORIDE 5 MG: 5 TABLET ORAL at 12:38

## 2021-12-15 RX ADMIN — OXYBUTYNIN CHLORIDE 5 MG: 5 TABLET ORAL at 09:23

## 2021-12-15 RX ADMIN — IPRATROPIUM BROMIDE AND ALBUTEROL SULFATE 1 AMPULE: .5; 2.5 SOLUTION RESPIRATORY (INHALATION) at 12:04

## 2021-12-15 RX ADMIN — LISINOPRIL 2.5 MG: 5 TABLET ORAL at 09:24

## 2021-12-15 RX ADMIN — CETIRIZINE HYDROCHLORIDE 10 MG: 10 TABLET ORAL at 09:23

## 2021-12-15 RX ADMIN — OXYCODONE HYDROCHLORIDE AND ACETAMINOPHEN 1 TABLET: 5; 325 TABLET ORAL at 12:38

## 2021-12-15 RX ADMIN — OXYCODONE HYDROCHLORIDE AND ACETAMINOPHEN 1 TABLET: 5; 325 TABLET ORAL at 00:30

## 2021-12-15 RX ADMIN — LEVOFLOXACIN 750 MG: 750 TABLET, FILM COATED ORAL at 09:23

## 2021-12-15 RX ADMIN — IPRATROPIUM BROMIDE AND ALBUTEROL SULFATE 1 AMPULE: .5; 2.5 SOLUTION RESPIRATORY (INHALATION) at 07:08

## 2021-12-15 ASSESSMENT — PAIN DESCRIPTION - LOCATION: LOCATION: BACK

## 2021-12-15 ASSESSMENT — PAIN SCALES - GENERAL
PAINLEVEL_OUTOF10: 9
PAINLEVEL_OUTOF10: 7
PAINLEVEL_OUTOF10: 0
PAINLEVEL_OUTOF10: 0
PAINLEVEL_OUTOF10: 8

## 2021-12-15 ASSESSMENT — PAIN DESCRIPTION - FREQUENCY: FREQUENCY: CONTINUOUS

## 2021-12-15 ASSESSMENT — PAIN DESCRIPTION - ORIENTATION: ORIENTATION: LOWER;MID

## 2021-12-15 ASSESSMENT — PAIN DESCRIPTION - ONSET: ONSET: ON-GOING

## 2021-12-15 ASSESSMENT — PAIN DESCRIPTION - DESCRIPTORS: DESCRIPTORS: CONSTANT

## 2021-12-15 NOTE — PROGRESS NOTES
Perfect serve sent to Dr. Ying Aparicio regarding pt having 14 beats of SVT. Will continue to monitor.

## 2021-12-15 NOTE — PROGRESS NOTES
Shift assessment complete, scheduled meds given. Call light and bed side table in reach. Will continue to monitor. l

## 2021-12-15 NOTE — PROGRESS NOTES
or increased work of breathing using Per Protocol order mode. 4-6 - enter or revise RT Bronchodilator order(s) to two equivalent RT bronchodilator orders with one order with BID Frequency and one order with Frequency of every 4 hours PRN wheezing or increased work of breathing using Per Protocol order mode. 7-10 - enter or revise RT Bronchodilator order(s) to two equivalent RT bronchodilator orders with one order with TID Frequency and one order with Frequency of every 4 hours PRN wheezing or increased work of breathing using Per Protocol order mode. 11-13 - enter or revise RT Bronchodilator order(s) to one equivalent RT bronchodilator order with QID Frequency and an Albuterol order with Frequency of every 4 hours PRN wheezing or increased work of breathing using Per Protocol order mode. Greater than 13 - enter or revise RT Bronchodilator order(s) to one equivalent RT bronchodilator order with every 4 hours Frequency and an Albuterol order with Frequency of every 2 hours PRN wheezing or increased work of breathing using Per Protocol order mode. RT to enter RT Home Evaluation for COPD & MDI Assessment order using Per Protocol order mode.     Electronically signed by Lia Phillips on 12/15/2021 at 7:14 AM

## 2021-12-15 NOTE — DISCHARGE SUMMARY
Name:  Gracie Santiago  Room:  /1049-41  MRN:    [id number]    Discharge Summary      This discharge summary is in conjunction with a complete physical exam done on the day of discharge. Discharging Physician: Dr. Briceno Husbands: 12/10/2021  Discharge:  12/15/2021    HPI:    The patient is a 61 y.o. female with COPD, chronic hypoxic respiratory failure, hyponatremia who presented to the ED with complaint of right lower posterior chest pain, cough, shortness of breath. Symptoms started 2 to 3 days ago. She describes a cough productive of brown sputum. She has not measured a fever at home but she has felt feverish. She has felt short of breath with exertion. She states the pain in her right lower posterior chest is stabbing sensation with coughing or deep breathing. She came to the ER for evaluation where she was found to have a right lower lobe pneumonia, acute on chronic hypoxic respiratory failure. Multiple other findings found on ER work-up-see plan below. She is admitted for further care    Diagnoses this Admission and Hospital Course   #Acute on chronic hypoxic respiratory failure  Sec to copd exacebration   -Patient uses 2 to 3 L of nasal cannula oxygen at home.  increased RR and dyspnea , needing 4L on arrial. Treated with steroids, HHN, imaging with possible pna   Given abx and improved now back to 3 L nasal cannula at rest  -wean off steroids      #Right lower lobe community-acquired pneumonia  - Suspect gram-positive organism.  Levaquin day #5/7  - f/u cultures  - improved symptoms      #Left upper lobe nodule  -Noted on prior imaging, but radiologist notes that it is more conspicuous in appearance now.  Pulmonology consulted and needs outpt f/w   Smoking cessation discussed        #Transaminitis  #Extrahepatic common bile duct dilatation  -She denies current abdominal pain, does but does state that she had abdominal pain in the right upper quadrant recently.   - [address] consulted  - monitor LFTs, no workup for now - plans for EUS as outpt     #Pancreatic nodules  - GI consulted     #Hyponatremia  -Acute on chronic- likely with hctz use  -Her baseline sodium is in the low 130s  - etiology likely SIADH with hctz use and also pulm process   -Stop hydrochlorothiazide  -- na on admit 123, started on IVF and later on fluid restriction  -Nephrology consulted  --Monitor BMP, Na improved to 133           #Coronary artery calcification  - noted on CT.  She denies history of CAD, denies CP.  Consider ASA, statin, PCP f/u for w/u      #Thoracic spine compression fractures-noted on MRI in April 2021           #HTN  - stop HCTZ  - added norvasc     #Depression  - cont lexapro           DVT Prophylaxis: Lovenox     Procedures (Please Review Full Report for Details)  N/A    Consults    GI  Nephrology  Pulmonology       Physical Exam at Discharge:    BP (!) 151/85   Pulse 109   Temp 98.2 °F (36.8 °C) (Oral)   Resp 18   Ht 5' 3\" (1.6 m)   Wt 154 lb (69.9 kg)   SpO2 94%   BMI 27.28 kg/m²     See today's progress note    CBC: Recent Labs     12/13/21  0412 12/14/21  0344 12/15/21  0430   WBC 11.7* 11.8* 9.9   HGB 11.8* 11.4* 12.3   HCT 35.0* 33.1* 36.8   MCV 99.3 97.8 99.1    335 333     BMP:   Recent Labs     12/13/21  1946 12/14/21  0344 12/15/21  0430   * 127*  125* 133*   K 3.9 4.1  3.9 3.5   CL 86* 89*  88* 94*   CO2 27 29  28 28   PHOS  --  2.2* 3.1   BUN 14 16  17 14   CREATININE 0.7 0.6  0.6 <0.5*       U/A:    Lab Results   Component Value Date    COLORU Yellow 12/10/2021    WBCUA 0-2 12/10/2021    RBCUA 0-2 12/10/2021    BACTERIA Rare 12/10/2021    CLARITYU Clear 12/10/2021    SPECGRAV 1.015 12/10/2021    LEUKOCYTESUR Negative 12/10/2021    BLOODU TRACE-INTACT 12/10/2021    GLUCOSEU Negative 12/10/2021       ABG    Lab Results   Component Value Date    AZP1ESG 22.3 02/04/2021    BEART -2.2 02/04/2021    H7IOBOZA 95.4 02/04/2021    PHART 7.392 02/04/2021    GXK3CIN 37.5 02/04/2021 PO2ART 81.4 02/04/2021    GPO6IPM 23.4 02/04/2021         CULTURES  Blood: NGTD  Rapid influenza: not detected  COVID: not detected    RADIOLOGY  CT ABDOMEN PELVIS W IV CONTRAST Additional Contrast? None   Final Result   Chest:      No central pulmonary embolus. Tiny peripheral branches are not well   evaluated. Dominant irregular nodule in the left upper lobe. .  This is more conspicuous   than February 2021. This could represent an inflammatory nodule or an early   finding of lung carcinoma. Recommend short-term follow-up chest CT versus   PET-CT. Right lower lobe airspace disease, increased compared to prior, either   atelectasis or pneumonia. Moderate coronary artery calcification      Compression fractures in the thoracic spine, previously described on MRI   04/28/2021. No obvious change given differences in modalities      Abdomen and pelvis:      Mildly dilated extrahepatic common duct to 1.2 cm. There is a questionable   filling defect seen in the extrahepatic common duct. Punctate hypodense nodule in the pancreas, most likely small side branch IPMN      RECOMMENDATIONS:   ACR management recommendations for incidental cystic pancreatic masses in   asymptomatic* patients on CT, MR, or US      < 2 cm:  Single follow-up in 1 yr (MR preferred)      *Stable:  Benign, no further follow-up   *Growth: As below based upon size   * None of the following:  Hyperamylasemia, recent onset diabetes, severe   epigastric pain, weight loss, steatorrhea, or jaundice. Ryley SOLORIO, et al.  Managing incidental findings on abdominal CT: white paper   of the ACR Incidental Findings Committee. J Am Alis Radiol 2010; 6:540-940. CT CHEST PULMONARY EMBOLISM W CONTRAST   Final Result   Chest:      No central pulmonary embolus. Tiny peripheral branches are not well   evaluated. Dominant irregular nodule in the left upper lobe. .  This is more conspicuous   than February 2021.   This could represent an inflammatory nodule or an early   finding of lung carcinoma. Recommend short-term follow-up chest CT versus   PET-CT. Right lower lobe airspace disease, increased compared to prior, either   atelectasis or pneumonia. Moderate coronary artery calcification      Compression fractures in the thoracic spine, previously described on MRI   04/28/2021. No obvious change given differences in modalities      Abdomen and pelvis:      Mildly dilated extrahepatic common duct to 1.2 cm. There is a questionable   filling defect seen in the extrahepatic common duct. Punctate hypodense nodule in the pancreas, most likely small side branch IPMN      RECOMMENDATIONS:   ACR management recommendations for incidental cystic pancreatic masses in   asymptomatic* patients on CT, MR, or US      < 2 cm:  Single follow-up in 1 yr (MR preferred)      *Stable:  Benign, no further follow-up   *Growth: As below based upon size   * None of the following:  Hyperamylasemia, recent onset diabetes, severe   epigastric pain, weight loss, steatorrhea, or jaundice. Ryley SOLORIO, et al.  Managing incidental findings on abdominal CT: white paper   of the ACR Incidental Findings Committee. J Am Alis Radiol 2010; 6:834-981. XR CHEST PORTABLE   Final Result   Multifocal airspace opacities that are suspected to represent developing   pneumonitis. Correlate with clinical workup.                Discharge Medications     Medication List      START taking these medications    amLODIPine 5 MG tablet  Commonly known as: NORVASC  Take 1 tablet by mouth daily  Start taking on: December 16, 2021     levoFLOXacin 750 MG tablet  Commonly known as: LEVAQUIN  Take 1 tablet by mouth daily for 2 doses  Start taking on: December 16, 2021     Nebulizer/Tubing/Mouthpiece Kit  Use with accuneb     predniSONE 10 MG tablet  Commonly known as: DELTASONE  30 mg x 3 days, 20 mg x 3 days, 10 mg x 3 days then stop        CHANGE how you take MG tablet           Discharged in stable condition to home. Follow Up: Follow up with PCP.     Ita Rondon MD, 1/5/2022 9:08 AM

## 2021-12-15 NOTE — CARE COORDINATION
INTERDISCIPLINARY PLAN OF CARE CONFERENCE    Date/Time: 12/15/2021 9:51 AM  Completed by: Kaushal Paiz RN, Case Management      Patient Name:  Leanne Jaimes  YOB: 1955  Admitting Diagnosis: Hyponatremia [E87.1]  Tachycardia [R00.0]  Lung nodule [R91.1]  Common bile duct dilatation [K83.8]  COPD exacerbation ([address] [J44.1]  Right flank pain [R10.9]  Supplemental oxygen dependent [Z99.81]  Pancreatic abnormality [Q45.3]  Dyspnea, unspecified type [R06.00]  Pneumonia due to infectious organism, unspecified laterality, unspecified part of lung [J18.9]     Admit Date/Time:  12/10/2021  4:26 AM    Chart reviewed. Interdisciplinary team contacted or reviewed plan related to patient progress and discharge plans. Disciplines included Case Management, Nursing, and Dietitian. Current Status:ongoing  PT/OT recommendation for discharge plan of care: home with 24/7 supervision initially and home PT/OT    Expected D/C Disposition:  Home  Confirmed plan with patient and/or family Yes confirmed with: (name) pt  Met with:pt  Discharge Plan Comments: Reviewed chart. Role of discharge planner explained and patient verbalized understanding. Pt is alert and oriented. Pt is from home with daughter. Pt states that her daughter will help provide 24/7 supervision. Pt is aware that PT/OT recommends 24/7 supervision initially and home PT/OT. Pt wants HC with PT/OT/SN with SOC with [facility] at d/c. +HC orders, +eCOC. Pt is active with [facility] for homr O2 at 2-3L.        Home O2 in place on admit: Yes  Pt informed of need to bring portable home O2 tank on day of discharge for nursing to connect prior to leaving:  Yes  Verbalized agreement/Understanding:  Yes    DISCHARGE ORDER  Date/Time 12/15/2021 11:29 AM  Completed by: Kaushal Paiz RN, Case Management    Patient Name: Leanne Jaimes      : 1955  Admitting Diagnosis: Hyponatremia [E87.1]  Tachycardia [R00.0]  Lung nodule [R91.1]  Common bile duct dilatation [K83.8]  COPD exacerbation ([facility]) [J44.1]  Right flank pain [R10.9]  Supplemental oxygen dependent [Z99.81]  Pancreatic abnormality [Q45.3]  Dyspnea, unspecified type [R06.00]  Pneumonia due to infectious organism, unspecified laterality, unspecified part of lung [J18.9]      Admit order Date and Status:12/10/2021  (verify MD's last order for status of admission)      Noted discharge order. If applicable PT/OT recommendation at Discharge: home with 24/7 supervision initially and home PT/OT  DME recommendation by PT/OT:RW  Confirmed discharge plan  (pt): Yes  with whom_______________pt  If pt confirmed DC plan does family need to be contacted by CM No if yes who__n/a____  Discharge Plan: Reviewed chart. Role of discharge planner explained and patient verbalized understanding. Pt is alert and oriented. Discharge order is noted. Pt is being d/c'd to home today. Pt's O2 sats are 94% on 3L. Pt is active with [facility] 2-3 L at baseline. Pt wants HC with [facility] for PT./OT/SN. Lu spoke with Breana Arora with [facility] and she can accept. +HC orders, +eCOC. Pt is aware that PT/OT recommends home with 24/7 supervision initially and home PT/OT/RW. PT states that she has someone with her 24/7. Pt states she kaplan snot have a RW at home and would like  to obtain it through [place] she is active with them for her home O2. Lu called [facility] ([phone]) and spoke with Ezekiel Alex to let her know pt would need HHN and Silvia Buchanan is faxing the detailed order to LU to  to send back to her. She states that she will try to get the [facility] and RW delivered today, but otherwise delivered tomorrow to pt's home. Pt is aware. Addendum [id number]: LU received the detailed written order, Dr. Su Cruz signed it, CM faxed it back to [phone] (as this was on the order sheet). Cm labeled this and placed in medical records bin for scanning. Reviewed chart.   Role of discharge planner explained and patient verbalized understanding. Discharge order is noted. Has Home O2 in place on admit:  Yes  Informed of need to bring portable home O2 tank on day of discharge for nursing to connect prior to leaving:   Yes  Verbalized agreement/Understanding:   Yes    Discharge timeout done with Avni Morley RN. All discharge needs and concerns addressed.

## 2021-12-15 NOTE — PROGRESS NOTES
Bedside report and transfer of care given to [facility], RN. Pt currently resting in bed with the call light within reach. Pt denies any other care needs at this time. Pt stable at this time.     Abby Edwards RN

## 2021-12-15 NOTE — PROGRESS NOTES
[facility] Progress Note           Subjective/   61y.o. year old female who we are seeing in consultation for Hyponatremia. HPI:  Sodium up to 133 with tolvaptan   Shortness of breath is better   No new complaints   BP range has been better     ROS:  Shortness of breath persists with exertion but down to baseline oxygen requirements of 2 liters NC  No fevers  Eating well     Objective/   GEN:  Chronically ill, BP (!) 151/85   Pulse 109   Temp 98.2 °F (36.8 °C) (Oral)   Resp 18   Ht 5' 3\" (1.6 m)   Wt 154 lb (69.9 kg)   SpO2 94%   BMI 27.28 kg/m²   HEENT: non-icteric, no JVD  CV: S1, S2 without m/r/g; no LE edema  RESP: CTA B without w/r/r; breathing wnl  ABD: +bs, soft, nt, no hsm  SKIN: warm, no rashes    Data/  Recent Labs     12/13/21  0412 12/14/21  0344 12/15/21  0430   WBC 11.7* 11.8* 9.9   HGB 11.8* 11.4* 12.3   HCT 35.0* 33.1* 36.8   MCV 99.3 97.8 99.1    335 333     Recent Labs     12/13/21  1946 12/14/21  0344 12/14/21  2107 12/15/21  0430   * 127*  125*  --  133*   K 3.9 4.1  3.9  --  3.5   CL 86* 89*  88*  --  94*   CO2 27 29  28  --  28   GLUCOSE 191* 118*  119*  --  93   PHOS  --  2.2*  --  3.1   MG  --   --  1.90  --    BUN 14 16  17  --  14   CREATININE 0.7 0.6  0.6  --  <0.5*   LABGLOM >60 >60  >60  --  >60   GFRAA >60 >60  >60  --  >60       Component      Latest Ref Rng & Units 12/11/2021 12/11/2021 12/10/2021 12/10/2021          12:40 AM 12:40 AM 11:55 AM 11:55 AM   TSH      0.27 - 4.20 uIU/mL   0.17 (L)    Uric Acid, Serum      2.6 - 6.0 mg/dL    2.5 (L)   Sodium, Ur      Not Established mmol/L  <20     Osmolality, Ur      390 - 1070 mOsm/kg 245 (L)          Assessment/     Hyponatremia.  - Hx of chronic hyponatremia since 2013. Baseline in the low 130s. - Prior work up suggestive of polydipsia.   Current work up with more of SIADH picture with U osm of 245, given persistently low urine sodium cannot exclude volume depletion as the ADH stimulus however also on SSRI and has pulmonary processes. Repeat U osm of 336 and U Na of 60 confirms a superimposed SIADH process   - Recommended to permanently discontinue HCTZ. - Free water restriction of 64oz/day long term  - Stopping SSRI is not a good option at this time, will need medical management of SIADH, for now free water restriction and encourage high protein diet. Could add Urea to increase solute load if she has issues with sodium down the line.   - Ok for discharge      Hypertension.  - Highly variable / Natalia Sox. Monitor as outpatient      COPD/Pneumonia. - On steroids and antibiotic per Primary service. ____________________________________  Kay Lima MD  [facility]  [email or web address]  Office: [phone]

## 2021-12-15 NOTE — PROGRESS NOTES
Hospitalist Progress Note      PCP: David Dorsey MD    Date of Admission: 12/10/2021    Subjective:   Ms [place] seen on home o2 of 3 L and pt feels ok today, doing well and ready to go home            Medications:  Reviewed    Infusion Medications    sodium chloride       Scheduled Medications    levoFLOXacin  750 mg Oral Daily    predniSONE  30 mg Oral Daily    amLODIPine  5 mg Oral Daily    cetirizine  10 mg Oral Daily    escitalopram  20 mg Oral Nightly    lisinopril  2.5 mg Oral Daily    oxybutynin  5 mg Oral 4x Daily    sodium chloride flush  5-40 mL IntraVENous 2 times per day    enoxaparin  40 mg SubCUTAneous Daily    influenza virus vaccine  0.5 mL IntraMUSCular Prior to discharge    ipratropium-albuterol  1 ampule Inhalation TID     PRN Meds: hydrOXYzine, albuterol sulfate HFA, sodium chloride flush, sodium chloride, ondansetron **OR** ondansetron, polyethylene glycol, acetaminophen **OR** acetaminophen, oxyCODONE-acetaminophen      Intake/Output Summary (Last 24 hours) at 12/15/2021 0710  Last data filed at 12/15/2021 0618  Gross per 24 hour   Intake 1637 ml   Output 5300 ml   Net -3663 ml       Physical Exam Performed:    BP (!) 161/90   Pulse 86   Temp 98.2 °F (36.8 °C) (Oral)   Resp 16   Ht 5' 3\" (1.6 m)   Wt 154 lb (69.9 kg)   SpO2 95%   BMI 27.28 kg/m²       Gen: elderlly female, up in bed, No distress. Alert. Eyes: PERRL. No sclera icterus. No conjunctival injection. ENT: No discharge. Pharynx clear. Neck: No JVD.  No Carotid Bruit. Trachea midline. Resp: resolving jvaier wheeze and improving air entry   CV: Regular rate. Regular rhythm. No murmur.  No rub. No edema. GI: Non-tender. Non-distended. No masses. No organomegaly. Normal bowel sounds. No hernia. Skin: Warm and dry. No nodule on exposed extremities. No rash on exposed extremities. M/S: No cyanosis. No joint deformity. No clubbing. Neuro: Awake. Grossly nonfocal    Psych: Oriented x 3.  No anxiety or agitation.       Labs:   Recent Labs     12/13/21  0412 12/14/21  0344 12/15/21  0430   WBC 11.7* 11.8* 9.9   HGB 11.8* 11.4* 12.3   HCT 35.0* 33.1* 36.8    335 333     Recent Labs     12/13/21  1946 12/14/21  0344 12/15/21  0430   * 127*  125* 133*   K 3.9 4.1  3.9 3.5   CL 86* 89*  88* 94*   CO2 27 29  28 28   BUN 14 16  17 14   CREATININE 0.7 0.6  0.6 <0.5*   CALCIUM 9.3 9.1  9.3 9.1   PHOS  --  2.2* 3.1     No results for input(s): AST, ALT, BILIDIR, BILITOT, ALKPHOS in the last 72 hours. No results for input(s): INR in the last 72 hours. No results for input(s): [place] in the last 72 hours. Urinalysis:      Lab Results   Component Value Date    NITRU Negative 12/10/2021    WBCUA 0-2 12/10/2021    BACTERIA Rare 12/10/2021    RBCUA 0-2 12/10/2021    BLOODU TRACE-INTACT 12/10/2021    SPECGRAV 1.015 12/10/2021    GLUCOSEU Negative 12/10/2021       Radiology:  CT ABDOMEN PELVIS W IV CONTRAST Additional Contrast? None   Final Result   Chest:      No central pulmonary embolus. Tiny peripheral branches are not well   evaluated. Dominant irregular nodule in the left upper lobe. .  This is more conspicuous   than February 2021. This could represent an inflammatory nodule or an early   finding of lung carcinoma. Recommend short-term follow-up chest CT versus   PET-CT. Right lower lobe airspace disease, increased compared to prior, either   atelectasis or pneumonia. Moderate coronary artery calcification      Compression fractures in the thoracic spine, previously described on MRI   04/28/2021. No obvious change given differences in modalities      Abdomen and pelvis:      Mildly dilated extrahepatic common duct to 1.2 cm. There is a questionable   filling defect seen in the extrahepatic common duct.       Punctate hypodense nodule in the pancreas, most likely small side branch IPMN      RECOMMENDATIONS:   ACR management recommendations for incidental cystic pancreatic masses in   asymptomatic* patients on CT, MR, or US      < 2 cm:  Single follow-up in 1 yr (MR preferred)      *Stable:  Benign, no further follow-up   *Growth: As below based upon size   * None of the following:  Hyperamylasemia, recent onset diabetes, severe   epigastric pain, weight loss, steatorrhea, or jaundice. Rlyey SOLORIO, et al.  Managing incidental findings on abdominal CT: white paper   of the ACR Incidental Findings Committee. J Am Alis Radiol 2010; 4:764-685. CT CHEST PULMONARY EMBOLISM W CONTRAST   Final Result   Chest:      No central pulmonary embolus. Tiny peripheral branches are not well   evaluated. Dominant irregular nodule in the left upper lobe. .  This is more conspicuous   than February 2021. This could represent an inflammatory nodule or an early   finding of lung carcinoma. Recommend short-term follow-up chest CT versus   PET-CT. Right lower lobe airspace disease, increased compared to prior, either   atelectasis or pneumonia. Moderate coronary artery calcification      Compression fractures in the thoracic spine, previously described on MRI   04/28/2021. No obvious change given differences in modalities      Abdomen and pelvis:      Mildly dilated extrahepatic common duct to 1.2 cm. There is a questionable   filling defect seen in the extrahepatic common duct. Punctate hypodense nodule in the pancreas, most likely small side branch IPMN      RECOMMENDATIONS:   ACR management recommendations for incidental cystic pancreatic masses in   asymptomatic* patients on CT, MR, or US      < 2 cm:  Single follow-up in 1 yr (MR preferred)      *Stable:  Benign, no further follow-up   *Growth: As below based upon size   * None of the following:  Hyperamylasemia, recent onset diabetes, severe   epigastric pain, weight loss, steatorrhea, or jaundice.       Ryley SOLORIO, et al.  Managing incidental findings on abdominal CT: white paper   of the ACR Incidental Findings Committee. J Am Alis Radiol 2010; 0:359-148. XR CHEST PORTABLE   Final Result   Multifocal airspace opacities that are suspected to represent developing   pneumonitis. Correlate with clinical workup. Assessment/Plan:    Active Hospital Problems    Diagnosis     Community acquired pneumonia [J18.9]     Transaminitis [R74.01]     Acute hypoxemic respiratory failure (HCC) [J96.01]     Pneumonia due to infectious organism [J18.9]     Pancreatic abnormality [Q45.3]     Common bile duct dilatation [K83.8]     Lung nodule [R91.1]     Hyponatremia [E87.1]             #Acute on chronic hypoxic respiratory failure  Sec to copd exacebration   -Patient uses 2 to 3 L of nasal cannula oxygen at home.  increased RR and dyspnea , needing 4L on arrial. Treated with steroids, HHN, imaging with possible pna   Given abx and improved now back to 3 L nasal cannula at rest  -wean off steroids      #Right lower lobe community-acquired pneumonia  - Suspect gram-positive organism.  Levaquin day #5/7  - f/u cultures  - improved symptoms      #Left upper lobe nodule  -Noted on prior imaging, but radiologist notes that it is more conspicuous in appearance now.  Pulmonology consulted and needs outpt f/w   Smoking cessation discussed        #Transaminitis  #Extrahepatic common bile duct dilatation  -She denies current abdominal pain, does but does state that she had abdominal pain in the right upper quadrant recently.   -  GI consulted  - monitor LFTs, no workup for now - plans for EUS as outpt     #Pancreatic nodules  - GI consulted     #Hyponatremia  -Acute on chronic- likely with hctz use  -Her baseline sodium is in the low 130s  - etiology likely SIADH with hctz use and also pulm process   -Stop hydrochlorothiazide  -- na on admit 123, started on IVF and later on fluid restriction  -Nephrology consulted  --Monitor BMP, Na improved to 133         #Coronary artery calcification  - noted on CT.  She denies history of CAD, denies CP.  Consider ASA, statin, PCP f/u for w/u      #Thoracic spine compression fractures-noted on MRI in April 2021          #HTN  - stop HCTZ  - added norvasc     #Depression  - cont lexapro          DVT Prophylaxis: Lovenox   Diet: ADULT DIET;  Regular; Low Fat (less than or equal to 50 gm/day)  Code Status: Full Code    PT/OT Eval Status: ordered    Dispo - cont care  Dc planning    Vasu Carl MD

## 2021-12-15 NOTE — PROGRESS NOTES
Patient educated on discharge instructions as well as new medications use, dosage, administration and possible side effects. Patient verified knowledge. IV removed without difficulty and dry dressing in place. Telemetry monitor removed and returned to [facility]. Pt left facility in stable condition to Home with all of their personal belongings.      Jeff Mireles RN

## 2021-12-15 NOTE — PROGRESS NOTES
Pulmonary Progress Note    CC: COPD, lung nodule, pneumonia    Subjective:   Less SOB    Intake/Output Summary (Last 24 hours) at 12/15/2021 0806  Last data filed at 12/15/2021 0618  Gross per 24 hour   Intake 1637 ml   Output 5300 ml   Net -3663 ml       Exam:   BP (!) 161/90   Pulse 86   Temp 98.2 °F (36.8 °C) (Oral)   Resp 16   Ht 5' 3\" (1.6 m)   Wt 154 lb (69.9 kg)   SpO2 96%   BMI 27.28 kg/m²  on 3LPM   Gen:  No distress. Ill-appearing  Eyes: PERRL. No sclera icterus. No conjunctival injection. ENT: No discharge. Pharynx clear. Neck: Trachea midline. No obvious mass. Resp:  No accessory muscle use. no crackles. Minimal wheezes. No rhonchi. CV: Regular rate. Regular rhythm. No murmur or rub. No edema. M/S: No cyanosis. No joint deformity. No clubbing. Neuro: Awake. Alert. Moves all four extremities. Psych: Oriented x 3.  No anxiety    Scheduled Meds:   levoFLOXacin  750 mg Oral Daily    predniSONE  30 mg Oral Daily    amLODIPine  5 mg Oral Daily    cetirizine  10 mg Oral Daily    escitalopram  20 mg Oral Nightly    lisinopril  2.5 mg Oral Daily    oxybutynin  5 mg Oral 4x Daily    sodium chloride flush  5-40 mL IntraVENous 2 times per day    enoxaparin  40 mg SubCUTAneous Daily    influenza virus vaccine  0.5 mL IntraMUSCular Prior to discharge    ipratropium-albuterol  1 ampule Inhalation TID     Continuous Infusions:   sodium chloride       PRN Meds:  hydrOXYzine, albuterol sulfate HFA, sodium chloride flush, sodium chloride, ondansetron **OR** ondansetron, polyethylene glycol, acetaminophen **OR** acetaminophen, oxyCODONE-acetaminophen    Labs:  CBC:   Recent Labs     12/13/21  0412 12/14/21  0344 12/15/21  0430   WBC 11.7* 11.8* 9.9   HGB 11.8* 11.4* 12.3   HCT 35.0* 33.1* 36.8   MCV 99.3 97.8 99.1    335 333     BMP:   Recent Labs     12/13/21  1946 12/14/21  0344 12/15/21  0430   * 127*  125* 133*   K 3.9 4.1  3.9 3.5   CL 86* 89*  88* 94*   CO2 27 29 28 28   PHOS  --  2.2* 3.1   BUN 14 16  17 14   CREATININE 0.7 0.6  0.6 <0.5*     LIVER PROFILE:   No results for input(s): AST, ALT, LIPASE, BILIDIR, BILITOT, ALKPHOS in the last 72 hours. Invalid input(s): AMYLASE,  ALB  PT/INR: No results for input(s): PROTIME, INR in the last 72 hours. APTT: No results for input(s): APTT in the last 72 hours. UA:  No results for input(s): NITRITE, COLORU, PHUR, LABCAST, WBCUA, RBCUA, MUCUS, TRICHOMONAS, YEAST, BACTERIA, CLARITYU, SPECGRAV, LEUKOCYTESUR, UROBILINOGEN, BILIRUBINUR, BLOODU, GLUCOSEU, AMORPHOUS in the last 72 hours. Invalid input(s): KETONESU  No results for input(s): PHART, KHT9WDD, PO2ART in the last 72 hours. Cultures:   12/10 BC NGTD  12/10 influenza negative  12/10 COVID-19 not detected    Films:  CTPA 12/10 imaging was reviewed by me and showed   No central pulmonary embolus. Tiny peripheral branches are not well evaluated. Dominant irregular nodule in the left upper lobe. .    Right lower lobe airspace disease  Moderate coronary artery calcification            ASSESSMENT:  · Acute hypoxemic respiratory failure  · RLL community-acquired pneumonia  · Moderate COPD with AE   · Left upper lobe nodule on CT 12/10/2021 more conspicuous than February 2021     PLAN:  · Supplemental oxygen to maintain SaO2 >92%; wean as tolerated  · Intensive inhaled bronchodilator therapy  · Prednisone taper   · Levaquin D#6/7  · Further evaluation of pulmonary nodule as an outpatient  · Acapella and Mucinex  · Advised to continue with smoking cessation  · Office notified for chest ct in 3 months and home nebs

## 2021-12-15 NOTE — FLOWSHEET NOTE
12/15/21 0923   Vital Signs   Pulse 109   Heart Rate Source Monitor   Resp 18   BP (!) 151/85   BP Location Left upper arm   Patient Position Sitting   Level of Consciousness Alert (0)   Patient Currently in Pain Denies   Pain Assessment   Pain Assessment 0-10   Pain Level 0   Patient's Stated Pain Goal No pain   Oxygen Therapy   SpO2 94 %   O2 Device Nasal cannula   O2 Flow Rate (L/min) 3 L/min     Pt assessment complete; see flow sheets. Vitals completed. Meds given per MAR. Pt currently working with PT. Tolerating well. Goal to be discharged today. Pt stable.     Harsh Ribera RN

## 2021-12-15 NOTE — CARE COORDINATION
[facility]  Received referral regarding HC services from Nasra LEIGH. Sent request to [facility] for [facility] coverage with dc home today. [facility] is able to see pt for SN, PT/OT. Attempt to follow up with pt by telephone. No answer and no VM set up. Spoke with pt at hospital prior to discharge. Agreeable to [facility] for SN, PT/OT. Verified demographics. Discussed HCV and pt agreeable. Telephone call to Dr. Cruz Quezada office and spoke with Maria Guadalupe Sweet. Pt has been in office to see Dr. Seth Manriquez in past 6 months and Dr. Seth Manriquez will sign for [facility] [place]. orders. Faxed referral with orders to [facility].       Electronically signed by Daya Cancino RN on 12/15/2021 at 11:32 AM

## 2021-12-15 NOTE — PROGRESS NOTES
Physical Therapy  Inpatient Physical Therapy Daily Treatment Note    Unit: PCU  Date:  12/15/2021  Patient Name:    Hina Chavez  Admitting diagnosis:  Hyponatremia [E87.1]  Tachycardia [R00.0]  Lung nodule [R91.1]  Common bile duct dilatation [K83.8]  COPD exacerbation (HCC) [J44.1]  Right flank pain [R10.9]  Supplemental oxygen dependent [Z99.81]  Pancreatic abnormality [Q45.3]  Dyspnea, unspecified type [R06.00]  Pneumonia due to infectious organism, unspecified laterality, unspecified part of lung [J18.9]  Admit Date:  12/10/2021  Precautions/Restrictions:  Fall risk, Bed/chair alarm, Telemetry and Continuous pulse oximetry      Discharge Recommendations: Home 24 hr supervision and with home PT  and Home PT  DME needs for discharge: Needs Met       Therapy recommendation for EMS Transport: can transport by wheelchair    Therapy recommendations for staff:   Supervision with use of rolling walker (RW) for all transfers and ambulation to/from chair  to/from bathroom  within room    History of Present Illness: ED note, 12/10/2021, Cristobal Ny:   \" 34 y. o. female presents to the ED with shortness of breath, the past couple days, also having right flank/side pain, has been coughing a lot, some productive sputum, no known fever, no upper/anterior chest pain, has been using her albuterol inhaler numerous times since this started without much improvement, she has not been on any steroids or antibiotics recently, she is on 2 L nasal cannula supplemental oxygen most of the time, she has a history of COPD, quit smoking almost a year ago, no vomiting/diarrhea nor left-sided abdominal pain, she has had a cholecystectomy, no headache or neck stiffness.  No other complaints, modifying factors or associated symptoms. \"     Home Health S4 Level Recommendation: Level 1 Standard  AM-PAC Mobility Score   AM-PAC Inpatient Mobility Raw Score : 21       Treatment Time:  4501-2985  Treatment number: 2  Timed Code Treatment Minutes: 26 minutes  Total Treatment Minutes:  26  minutes    Cognition    A&O x4   Able to follow 2 step commands    Subjective  Patient lying supine in bed with no family present   Pt agreeable to this PT tx. Pain   No  Location: NA  Rating:    NA/10  Pain Medicine Status: Denies need     Bed Mobility   Supine to Sit:    Modified Independent  Sit to Supine:   Not Tested  Rolling:   Not Tested  Scooting:   Independent    Transfer Training     Sit to stand:   Supervision  Stand to sit:   Supervision  Bed to Chair:   Supervision with use of rolling walker (RW)    Gait Training gait completed as indicated below  Distance:      30 ft  Deviations (firm surface/linoleum):  decreased raphael  Assistive Device Used:    rolling walker (RW)  Level of Assist:    Supervision  Comment: Pt ambulates with steady raphael, no LOB. Pt denies dizziness, shortness of breath and chest pain. RPE 1/10    Stair Training deferred, pt does not have stairs in the home environment      Therapeutic Exercise all completed bilaterally unless indicated and completed in seated position  Ankle Pumps x 15 reps  LAQ x 15 reps  marching x 15 reps  Hip abduction: x 15 reps  Hip adduction/pillow squeeze: x 15 reps  Repeated STS: 2x5 reps   Comment: Pt educated on performing upon d/c and adjusting exercises to improve strength    Balance  Sitting:  Normal; Independent  Comments: sitting in chair and EOB, reaching outside SHANTA to ground level with no safety concerns    Standing: Good ; Supervision  Comments: with RW    Patient Education      Role of PT, POC, Discharge recommendations, safety awareness, transfer techniques, pursed lip breathing, energy conservation, HEP and calling for assist with mobility. Pt educated on use of RPE in home environment for energy conservation     Positioning Needs       Pt up in chair, no alarm needed, positioned in proper neutral alignment and pressure relief provided.    Call light provided and all needs within reach    Activity Tolerance   Pt completed therapy session with No adverse symptoms noted w/activity. At rest: /85,  bpm, SpO2 93% on 3 L  Post-ambulation:  bpm, SpO2 89% on 3L  During Exercises: -115 bpm, SpO2 92-93% on 3 L    Assessment :  Patient continues to progress towards PT goals. Pt performs bed mobility independently with HOB elevated, ambulation and transfers with supervision. SpO2 remains above 89% on 3 L with all activity. Pt demo good safety awareness and balance reactions. Pt educated on HEP, RPE and d/c recommendations. Recommending Home 24 hr supervision and with home PT upon discharge as patient functioning below baseline level and would benefit from continued therapy services    Goals (all goals ongoing unless otherwise indicated)  To be met in 3 visits:  1). Independent with LE Ex x 10 reps     To be met in 6 visits:  1). Supine to/from sit: Independent  2). Sit to/from stand: Independent  3). Bed to chair: Independent  4). Gait: Ambulate 75 ft.   with  Supervision and use of LRAD (least restrictive assistive device)  5). Tolerate B LE exercises 3 sets of 10-15 reps    Plan   Continue with plan of care. Signature: Toshia Mendoza, PT, DPT      If patient discharges from this facility prior to next visit, this note will serve as the Discharge Summary.

## 2021-12-23 NOTE — TELEPHONE ENCOUNTER
CT order pending. Spoke with pt, rescheduled appt for 3/14/22 with same day CT chest per pt request due to transportation issues. Need to verify if pt got nebulizer upon d/c (was ordered per med list). Called pt back, no answer, no VM. Will try back at another time. Called pharmacy and script for neb was not filled.

## 2022-01-11 ENCOUNTER — APPOINTMENT (OUTPATIENT)
Dept: GENERAL RADIOLOGY | Age: 67
End: 2022-01-11
Payer: MEDICARE

## 2022-01-11 ENCOUNTER — HOSPITAL ENCOUNTER (EMERGENCY)
Age: 67
Discharge: HOME | End: 2022-01-12
Attending: EMERGENCY MEDICINE
Payer: MEDICARE

## 2022-01-11 ENCOUNTER — APPOINTMENT (OUTPATIENT)
Dept: CT IMAGING | Age: 67
End: 2022-01-11
Payer: MEDICARE

## 2022-01-11 DIAGNOSIS — J18.8 OTHER PNEUMONIA, UNSPECIFIED ORGANISM: Primary | ICD-10-CM

## 2022-01-11 DIAGNOSIS — E87.1 HYPONATREMIA: ICD-10-CM

## 2022-01-11 DIAGNOSIS — J44.1 COPD EXACERBATION (HCC): ICD-10-CM

## 2022-01-11 LAB
A/G RATIO: 1.2 (ref 1.1–2.2)
ALBUMIN SERPL-MCNC: 4.5 G/DL (ref 3.4–5)
ALP BLD-CCNC: 121 U/L (ref 40–129)
ALT SERPL-CCNC: 26 U/L (ref 10–40)
ANION GAP SERPL CALCULATED.3IONS-SCNC: 13 MMOL/L (ref 3–16)
ANION GAP SERPL CALCULATED.3IONS-SCNC: 14 MMOL/L (ref 3–16)
AST SERPL-CCNC: 30 U/L (ref 15–37)
BASE EXCESS VENOUS: 0.2 MMOL/L (ref -3–3)
BASOPHILS ABSOLUTE: 0.1 K/UL (ref 0–0.2)
BASOPHILS RELATIVE PERCENT: 0.4 %
BILIRUB SERPL-MCNC: 1 MG/DL (ref 0–1)
BILIRUBIN URINE: NEGATIVE
BLOOD, URINE: ABNORMAL
BUN BLDV-MCNC: 10 MG/DL (ref 7–20)
BUN BLDV-MCNC: 9 MG/DL (ref 7–20)
CALCIUM SERPL-MCNC: 8.9 MG/DL (ref 8.3–10.6)
CALCIUM SERPL-MCNC: 9.8 MG/DL (ref 8.3–10.6)
CARBOXYHEMOGLOBIN: 5.2 % (ref 0–1.5)
CHLORIDE BLD-SCNC: 87 MMOL/L (ref 99–110)
CHLORIDE BLD-SCNC: 90 MMOL/L (ref 99–110)
CLARITY: CLEAR
CO2: 21 MMOL/L (ref 21–32)
CO2: 24 MMOL/L (ref 21–32)
COLOR: YELLOW
CREAT SERPL-MCNC: <0.5 MG/DL (ref 0.6–1.2)
CREAT SERPL-MCNC: <0.5 MG/DL (ref 0.6–1.2)
EOSINOPHILS ABSOLUTE: 0 K/UL (ref 0–0.6)
EOSINOPHILS RELATIVE PERCENT: 0 %
EPITHELIAL CELLS, UA: NORMAL /HPF (ref 0–5)
GFR AFRICAN AMERICAN: >60
GFR AFRICAN AMERICAN: >60
GFR NON-AFRICAN AMERICAN: >60
GFR NON-AFRICAN AMERICAN: >60
GLUCOSE BLD-MCNC: 127 MG/DL (ref 70–99)
GLUCOSE BLD-MCNC: 218 MG/DL (ref 70–99)
GLUCOSE URINE: NEGATIVE MG/DL
HCO3 VENOUS: 26 MMOL/L (ref 23–29)
HCT VFR BLD CALC: 33.7 % (ref 36–48)
HEMOGLOBIN: 11.4 G/DL (ref 12–16)
KETONES, URINE: ABNORMAL MG/DL
LACTIC ACID, SEPSIS: 1.5 MMOL/L (ref 0.4–1.9)
LEUKOCYTE ESTERASE, URINE: NEGATIVE
LYMPHOCYTES ABSOLUTE: 0.9 K/UL (ref 1–5.1)
LYMPHOCYTES RELATIVE PERCENT: 4.7 %
MCH RBC QN AUTO: 32.7 PG (ref 26–34)
MCHC RBC AUTO-ENTMCNC: 33.9 G/DL (ref 31–36)
MCV RBC AUTO: 96.4 FL (ref 80–100)
METHEMOGLOBIN VENOUS: 0.2 %
MICROSCOPIC EXAMINATION: YES
MONOCYTES ABSOLUTE: 1 K/UL (ref 0–1.3)
MONOCYTES RELATIVE PERCENT: 5.3 %
NEUTROPHILS ABSOLUTE: 16.4 K/UL (ref 1.7–7.7)
NEUTROPHILS RELATIVE PERCENT: 89.6 %
NITRITE, URINE: NEGATIVE
O2 SAT, VEN: 50 %
O2 THERAPY: ABNORMAL
PCO2, VEN: 46.6 MMHG (ref 40–50)
PDW BLD-RTO: 12.4 % (ref 12.4–15.4)
PH UA: 6 (ref 5–8)
PH VENOUS: 7.36 (ref 7.35–7.45)
PLATELET # BLD: 346 K/UL (ref 135–450)
PMV BLD AUTO: 5.9 FL (ref 5–10.5)
PO2, VEN: 27.8 MMHG (ref 25–40)
POTASSIUM REFLEX MAGNESIUM: 4.4 MMOL/L (ref 3.5–5.1)
POTASSIUM SERPL-SCNC: 4.4 MMOL/L (ref 3.5–5.1)
PRO-BNP: 155 PG/ML (ref 0–124)
PROTEIN UA: NEGATIVE MG/DL
RBC # BLD: 3.49 M/UL (ref 4–5.2)
RBC UA: NORMAL /HPF (ref 0–4)
SARS-COV-2, NAAT: NOT DETECTED
SODIUM BLD-SCNC: 124 MMOL/L (ref 136–145)
SODIUM BLD-SCNC: 125 MMOL/L (ref 136–145)
SPECIFIC GRAVITY UA: 1.01 (ref 1–1.03)
TCO2 CALC VENOUS: 27 MMOL/L
TOTAL PROTEIN: 8.2 G/DL (ref 6.4–8.2)
TROPONIN: <0.01 NG/ML
URINE REFLEX TO CULTURE: ABNORMAL
URINE TYPE: ABNORMAL
UROBILINOGEN, URINE: 0.2 E.U./DL
WBC # BLD: 18.3 K/UL (ref 4–11)
WBC UA: NORMAL /HPF (ref 0–5)

## 2022-01-11 PROCEDURE — 6360000002 HC RX W HCPCS

## 2022-01-11 PROCEDURE — 36415 COLL VENOUS BLD VENIPUNCTURE: CPT

## 2022-01-11 PROCEDURE — 80053 COMPREHEN METABOLIC PANEL: CPT

## 2022-01-11 PROCEDURE — 87635 SARS-COV-2 COVID-19 AMP PRB: CPT

## 2022-01-11 PROCEDURE — 84484 ASSAY OF TROPONIN QUANT: CPT

## 2022-01-11 PROCEDURE — 71045 X-RAY EXAM CHEST 1 VIEW: CPT

## 2022-01-11 PROCEDURE — 6360000002 HC RX W HCPCS: Performed by: PHYSICIAN ASSISTANT

## 2022-01-11 PROCEDURE — 96365 THER/PROPH/DIAG IV INF INIT: CPT

## 2022-01-11 PROCEDURE — 81001 URINALYSIS AUTO W/SCOPE: CPT

## 2022-01-11 PROCEDURE — 2580000003 HC RX 258: Performed by: PHYSICIAN ASSISTANT

## 2022-01-11 PROCEDURE — 6370000000 HC RX 637 (ALT 250 FOR IP): Performed by: PHYSICIAN ASSISTANT

## 2022-01-11 PROCEDURE — 70450 CT HEAD/BRAIN W/O DYE: CPT

## 2022-01-11 PROCEDURE — 83605 ASSAY OF LACTIC ACID: CPT

## 2022-01-11 PROCEDURE — 83880 ASSAY OF NATRIURETIC PEPTIDE: CPT

## 2022-01-11 PROCEDURE — 82803 BLOOD GASES ANY COMBINATION: CPT

## 2022-01-11 PROCEDURE — 99285 EMERGENCY DEPT VISIT HI MDM: CPT

## 2022-01-11 PROCEDURE — 85025 COMPLETE CBC W/AUTO DIFF WBC: CPT

## 2022-01-11 PROCEDURE — 96375 TX/PRO/DX INJ NEW DRUG ADDON: CPT

## 2022-01-11 PROCEDURE — 87040 BLOOD CULTURE FOR BACTERIA: CPT

## 2022-01-11 PROCEDURE — 93005 ELECTROCARDIOGRAM TRACING: CPT | Performed by: PHYSICIAN ASSISTANT

## 2022-01-11 RX ORDER — 0.9 % SODIUM CHLORIDE 0.9 %
1000 INTRAVENOUS SOLUTION INTRAVENOUS ONCE
Status: COMPLETED | OUTPATIENT
Start: 2022-01-11 | End: 2022-01-11

## 2022-01-11 RX ORDER — DOXYCYCLINE HYCLATE 100 MG
100 TABLET ORAL ONCE
Status: COMPLETED | OUTPATIENT
Start: 2022-01-11 | End: 2022-01-11

## 2022-01-11 RX ORDER — DEXAMETHASONE SODIUM PHOSPHATE 10 MG/ML
10 INJECTION, SOLUTION INTRAMUSCULAR; INTRAVENOUS ONCE
Status: COMPLETED | OUTPATIENT
Start: 2022-01-11 | End: 2022-01-11

## 2022-01-11 RX ORDER — IPRATROPIUM BROMIDE AND ALBUTEROL SULFATE 2.5; .5 MG/3ML; MG/3ML
1 SOLUTION RESPIRATORY (INHALATION) ONCE
Status: COMPLETED | OUTPATIENT
Start: 2022-01-11 | End: 2022-01-11

## 2022-01-11 RX ORDER — LEVOFLOXACIN 750 MG/1
750 TABLET ORAL DAILY
Qty: 5 TABLET | Refills: 0 | Status: SHIPPED | OUTPATIENT
Start: 2022-01-11 | End: 2022-01-16

## 2022-01-11 RX ORDER — LEVOFLOXACIN 5 MG/ML
500 INJECTION, SOLUTION INTRAVENOUS ONCE
Status: COMPLETED | OUTPATIENT
Start: 2022-01-11 | End: 2022-01-11

## 2022-01-11 RX ORDER — DOXYCYCLINE HYCLATE 100 MG
100 TABLET ORAL 2 TIMES DAILY
Qty: 14 TABLET | Refills: 0 | Status: SHIPPED | OUTPATIENT
Start: 2022-01-11 | End: 2022-01-18

## 2022-01-11 RX ORDER — ONDANSETRON 2 MG/ML
4 INJECTION INTRAMUSCULAR; INTRAVENOUS EVERY 6 HOURS PRN
Status: DISCONTINUED | OUTPATIENT
Start: 2022-01-11 | End: 2022-01-12 | Stop reason: HOSPADM

## 2022-01-11 RX ORDER — ONDANSETRON 2 MG/ML
INJECTION INTRAMUSCULAR; INTRAVENOUS
Status: COMPLETED
Start: 2022-01-11 | End: 2022-01-11

## 2022-01-11 RX ADMIN — IPRATROPIUM BROMIDE AND ALBUTEROL SULFATE 1 AMPULE: .5; 3 SOLUTION RESPIRATORY (INHALATION) at 19:20

## 2022-01-11 RX ADMIN — IPRATROPIUM BROMIDE AND ALBUTEROL SULFATE 1 AMPULE: .5; 3 SOLUTION RESPIRATORY (INHALATION) at 20:59

## 2022-01-11 RX ADMIN — LEVOFLOXACIN 500 MG: 500 INJECTION, SOLUTION INTRAVENOUS at 21:47

## 2022-01-11 RX ADMIN — SODIUM CHLORIDE 1000 ML: 9 INJECTION, SOLUTION INTRAVENOUS at 20:59

## 2022-01-11 RX ADMIN — DOXYCYCLINE HYCLATE 100 MG: 100 TABLET, COATED ORAL at 21:48

## 2022-01-11 RX ADMIN — ONDANSETRON 4 MG: 2 INJECTION INTRAMUSCULAR; INTRAVENOUS at 22:45

## 2022-01-11 RX ADMIN — DEXAMETHASONE SODIUM PHOSPHATE 10 MG: 10 INJECTION INTRAMUSCULAR; INTRAVENOUS at 19:20

## 2022-01-11 RX ADMIN — ONDANSETRON HYDROCHLORIDE 4 MG: 2 INJECTION, SOLUTION INTRAMUSCULAR; INTRAVENOUS at 22:45

## 2022-01-11 ASSESSMENT — ENCOUNTER SYMPTOMS
ABDOMINAL PAIN: 0
WHEEZING: 1
VOMITING: 0
COUGH: 1
SHORTNESS OF BREATH: 1

## 2022-01-11 ASSESSMENT — PAIN DESCRIPTION - PAIN TYPE: TYPE: ACUTE PAIN

## 2022-01-11 ASSESSMENT — PAIN SCALES - GENERAL: PAINLEVEL_OUTOF10: 6

## 2022-01-11 ASSESSMENT — PAIN DESCRIPTION - LOCATION: LOCATION: BACK

## 2022-01-11 ASSESSMENT — PAIN DESCRIPTION - ORIENTATION: ORIENTATION: MID

## 2022-01-11 NOTE — ED PROVIDER NOTES
[address] [facility]        Pt Name: Joanie Wilde  MRN: [id number]  [place] 1955  Date of evaluation: 1/11/2022  Provider: Tri Pena PA-C  PCP: Bean Rome MD    Shared Visit or Autonomous Visit:  I have seen and evaluated this patient with my supervising physician Ana Pompa MD.    [facility]       Chief Complaint   Patient presents with    Shortness of Breath     PAtient admitted revently for pneumonia, finished antibiotics and feeling better but yesterday started with increased shortness of breath, cough and fever       HISTORY OF PRESENT ILLNESS   (Location/Symptom, Timing/Onset, Context/Setting, Quality, Duration, Modifying Factors, Severity)  Note limiting factors. Joanie Wilde is a 61 y.o. female presenting to the emergency department for evaluation of increased shortness of breath cough and that her states she also had an episode where she was spaced out for a few minutes this afternoon about 3 PM after she finished her physical therapy at home. States she seemed out of it was not talking to her states her left eye lid seemed a little droopy and then she came elevated and started talking to her but had complained of a headache pressure in the top of her head that has now resolved. Daughter states the episode reminded her of when she had low sodium in the past as how she acted. She has also had a seizure in the past about a couple months ago. She was admitted to the hospital 12/10 for pneumonia and acute on chronic respiratory failure and hyponatremia. Had been discharged home on Levaquin. Not currently on any antibiotics. States she seemed like she was doing okay yesterday today having increased cough increased shortness of breath she has severe COPD. She is on nasal cannula oxygen 2 to 3 L at home all the time. Denies any chest pain.   Patient states she feels weak all over especially weak in both of her legs.  No focal numbness or weakness. Speech is clear. Oriented x3 here. Daughter states no confusion. The history is provided by the patient. Shortness of Breath  Duration:  1 day  Chronicity:  New  Context: URI    Associated symptoms: cough, fever, headaches and wheezing    Associated symptoms: no abdominal pain, no chest pain and no vomiting    Risk factors: no hx of PE/DVT    Altered Mental Status  Presenting symptoms: partial responsiveness    Episode history:  Single  Chronicity:  New  Context: not head injury    Associated symptoms: fever and headaches    Associated symptoms: no abdominal pain and no vomiting          Nursing Notes were reviewed    REVIEW OF SYSTEMS    (2-9 systems for level 4, 10 or more for level 5)     Review of Systems   Constitutional: Positive for fatigue and fever. Eyes: Negative for visual disturbance. Respiratory: Positive for cough, shortness of breath and wheezing. Cardiovascular: Positive for leg swelling. Negative for chest pain. Gastrointestinal: Negative for abdominal pain and vomiting. Neurological: Positive for headaches. Negative for syncope. All other systems reviewed and are negative. Positives and Pertinent negatives as per HPI.        PAST MEDICAL HISTORY     Past Medical History:   Diagnosis Date    Angina pectoris (HCC)     Chronic pain     knees and lower back     COPD exacerbation ([facility] [address] 5/20/2018    Depression     Panic disorder     Pneumonia          SURGICAL HISTORY     Past Surgical History:   Procedure Laterality Date    CHOLECYSTECTOMY           CURRENTMEDICATIONS       Previous Medications    ALBUTEROL (ACCUNEB) 1.25 MG/3ML NEBULIZER SOLUTION    Inhale 3 mLs into the lungs every 6 hours as needed for Wheezing    ALBUTEROL SULFATE  (90 BASE) MCG/ACT INHALER    Inhale 2 puffs into the lungs every 6 hours as needed for Wheezing orShortness of Breath    AMLODIPINE (NORVASC) 5 MG TABLET    Take 1 tablet by mouth daily    BIOTIN 300 MCG TABS    Take 1 tablet by mouth daily    CETIRIZINE (ZYRTEC) 10 MG TABLET    Take 10 mg by mouth daily    ESCITALOPRAM (LEXAPRO) 20 MG TABLET    Take 20 mg by mouth nightly    HYDROXYZINE (ATARAX) 25 MG TABLET    Take 25 mg by mouth 3 times daily as needed for Itching    LOPERAMIDE (IMODIUM) 2 MG CAPSULE    Take 1 capsule by mouth 4 times daily as needed for Diarrhea    MISC NATURAL PRODUCTS (GLUCOSAMINE CHOND CMP ADVANCED PO)    Take by mouth    MULTIPLE VITAMINS-MINERALS (CENTRUM SILVER 50+WOMEN PO)    Take 1 tablet by mouth daily    OXYBUTYNIN (DITROPAN) 5 MG TABLET    Take 5 mg by mouth 4 times daily    PREDNISONE (DELTASONE) 10 MG TABLET    30 mg x 3 days, 20 mg x 3 days, 10 mg x 3 days then stop    RESPIRATORY THERAPY SUPPLIES (NEBULIZER/TUBING/MOUTHPIECE) KIT    Use with accuneb    SPIRIVA RESPIMAT 2.5 MCG/ACT AERS INHALER    INHALE 2 PUFFS INTO THE LUNGS DAILY         ALLERGIES     Cephalosporins, Pcn [penicillins], and Hctz [hydrochlorothiazide]    FAMILYHISTORY       Family History   Problem Relation Age of Onset    COPD Mother     Hypertension Mother     Asthma Neg Hx     Cancer Neg Hx     Diabetes Neg Hx     Emphysema Neg Hx     Heart Failure Neg Hx           SOCIAL HISTORY       Social History     Socioeconomic History    Marital status:       Spouse name: Not on file    Number of children: 10    Years of education: Not on file    Highest education level: Not on file   Occupational History    Not on file   Tobacco Use    Smoking status: Former Smoker     Packs/day: 1.00     Years: 52.00     Pack years: 52.00     Types: Cigarettes     Start date:      Quit date:      Years since quittin.0    Smokeless tobacco: Never Used   Vaping Use    Vaping Use: Never used   Substance and Sexual Activity    Alcohol use: No     Alcohol/week: 12.0 standard drinks     Types: 12 Cans of beer per week    Drug use: No    Sexual activity: Never   Other Topics Concern    Not on file Social History Narrative    Not on file     Social Determinants of Health     Financial Resource Strain:     Difficulty of Paying Living Expenses: Not on file   Food Insecurity:     Worried About Running Out of Food in the Last Year: Not on file    Munira of Food in the Last Year: Not on file   Transportation Needs:     Lack of Transportation (Medical): Not on file    Lack of Transportation (Non-Medical): Not on file   Physical Activity:     Days of Exercise per Week: Not on file    Minutes of Exercise per Session: Not on file   Stress:     Feeling of Stress : Not on file   Social Connections:     Frequency of Communication with Friends and Family: Not on file    Frequency of Social Gatherings with Friends and Family: Not on file    Attends Jehovah's witness Services: Not on file    Active Member of [address] [place] [address] [address] [employer] or Organizations: Not on file    Attends Club or Organization Meetings: Not on file    Marital Status: Not on file   Intimate Partner Violence:     Fear of Current or Ex-Partner: Not on file    Emotionally Abused: Not on file    Physically Abused: Not on file    Sexually Abused: Not on file   Housing Stability:     Unable to Pay for Housing in the Last Year: Not on file    Number of [place] in the Last Year: Not on file    Unstable Housing in the Last Year: Not on file       SCREENINGS    Nicholson Coma Scale  Eye Opening: Spontaneous  Best Verbal Response: Oriented  Best Motor Response: Obeys commands  Aster Coma Scale Score: 15        PHYSICAL EXAM    (up to 7 for level 4, 8 or more for level 5)     ED Triage Vitals [01/11/22 1744]   BP Temp Temp Source Pulse Resp SpO2 Height Weight   (!) 176/110 99.6 °F (37.6 °C) Oral 111 19 97 % 5' 3\" (1.6 m) 130 lb (59 kg)       Physical Exam  Vitals and nursing note reviewed. Constitutional:       Appearance: She is well-developed. She is ill-appearing. She is not toxic-appearing. HENT:      Head: Normocephalic and atraumatic.       Mouth/Throat: Mouth: Mucous membranes are moist.      Pharynx: Oropharynx is clear. No pharyngeal swelling, oropharyngeal exudate or posterior oropharyngeal erythema. Eyes:      Conjunctiva/sclera: Conjunctivae normal.      Pupils: Pupils are equal, round, and reactive to light. Neck:      Vascular: No JVD. Cardiovascular:      Rate and Rhythm: Regular rhythm. Tachycardia present. Pulses: Normal pulses. Pulmonary:      Effort: Tachypnea present. Breath sounds: No stridor. Decreased breath sounds, wheezing and rhonchi present. No rales. Abdominal:      General: Bowel sounds are normal. There is no distension. Palpations: Abdomen is soft. Abdomen is not rigid. There is no mass. Tenderness: There is no abdominal tenderness. There is no guarding or rebound. Musculoskeletal:         General: Normal range of motion. Cervical back: Normal range of motion and neck supple. Right lower leg: Edema (mild symmetric) present. Left lower leg: Edema (mild symmetric) present. Skin:     General: Skin is warm and dry. Findings: No rash. Neurological:      Mental Status: She is alert and oriented to person, place, and time. GCS: GCS eye subscore is 4. GCS verbal subscore is 5. GCS motor subscore is 6. Cranial Nerves: No cranial nerve deficit, dysarthria or facial asymmetry. Sensory: Sensation is intact. No sensory deficit. Motor: Motor function is intact. No weakness, abnormal muscle tone or pronator drift. Coordination: Coordination is intact. Coordination normal. Finger-Nose-Finger Test and Heel to [facility] Test normal.      Comments: Cranial facial musculature and sensation are intact. No facial droop. Smile symmetric. Speech is clear. No nuchal rigidity or meningismus. Pt has intact finger to nose. Intact sensation symmetric. Equal strength 5 out of 5 symmetric upper and lower extremities. Patient holds his extremity out extended for 10 seconds without drift. Touches heel to opposite shin and runs down  Without difficulty. NIHSS 0. Psychiatric:         Behavior: Behavior normal.       NIH Stroke Scale     1a  Level of consciousness: 0=alert; keenly responsive   1b. LOC questions:  0=Performs both tasks correctly   1c. LOC commands: 0=Performs both tasks correctly   2. Best Gaze: 0=normal   3. Visual: 0=No visual loss   4. Facial Palsy: 0=Normal symmetric movement   5a. Motor left arm: 0=No drift, limb holds 90 (or 45) degrees for full 10 seconds   5b. Motor right arm: 0=No drift, limb holds 90 (or 45) degrees for full 10 seconds   6a. motor left le=No drift, limb holds 90 (or 45) degrees for full 10 seconds   6b  Motor right le=No drift, limb holds 90 (or 45) degrees for full 10 seconds   7. Limb Ataxia: 0=Absent   8. Sensory: 0=Normal; no sensory loss   9. Best Language:  0=No aphasia, normal   10. Dysarthria: 0=Normal   11. Extinction and Inattention: 0=No abnormality         Total:  0         DIAGNOSTIC RESULTS   LABS:    Labs Reviewed   BLOOD GAS, VENOUS - Abnormal; Notable for the following components:       Result Value    Carboxyhemoglobin 5.2 (*)     All other components within normal limits    Narrative:     Performed at:  [facility]. [facility]  [address] [place] [address]. [facility], [address]    Phone [phone]   CBC WITH AUTO DIFFERENTIAL - Abnormal; Notable for the following components:    WBC 18.3 (*)     RBC 3.49 (*)     Hemoglobin 11.4 (*)     Hematocrit 33.7 (*)     Neutrophils Absolute 16.4 (*)     Lymphocytes Absolute 0.9 (*)     All other components within normal limits    Narrative:     Performed at:  [facility]. [facility]  [address] [place] [address].  [place], [address]   Phone [phone]   COMPREHENSIVE METABOLIC PANEL W/ REFLEX TO MG FOR LOW K - Abnormal; Notable for the following components:    Sodium 124 (*)     Chloride 87 (*)     Glucose 127 (*)     CREATININE <0.5 (*)     All other components within normal limits    Narrative:     Performed at:  [facility]. [facility]  [address] [place] [address]. [place], [address]   Phone [phone] - Abnormal; Notable for the following components:    Pro- (*)     All other components within normal limits    Narrative:     Performed at:  [facility]. [facility]  [address] [place] [address]. [place], [facility] [address]   Phone [phone]   URINE RT REFLEX TO CULTURE - Abnormal; Notable for the following components:    Ketones, Urine TRACE (*)     Blood, Urine TRACE-LYSED (*)     All other components within normal limits    Narrative:     Performed at:  [facility]. [facility]  [address] [place] [address]. [place], [facility]   Phone [phone], RAPID    Narrative:     Performed at:  [facility]. [facility]  [address] [place] [address]. [place], [facility]   Phone [phone]   CULTURE, BLOOD 1   CULTURE, BLOOD 2   RAPID INFLUENZA A/B ANTIGENS   LACTATE, SEPSIS    Narrative:     Performed at:  [facility]. [facility]  [address] [place] [address]. [place], [facility] [address]   Phone [phone]   TROPONIN    Narrative:     Performed at:  [facility]. [facility]  [address] [place] [address]. [place], [facility] [address]   Phone [phone]   MICROSCOPIC URINALYSIS    Narrative:     Performed at:  [facility]. [facility]  [address] [place] [address].  [place], [facility] [address]   Phone [phone]   LACTATE, SEPSIS     Results for orders placed or performed during the hospital encounter of 01/11/22   COVID-19, Rapid    Specimen: Nasopharyngeal Swab   Result Value Ref Range    SARS-CoV-2, NAAT Not Detected Not Detected   Blood gas, venous   Result Value Ref Range    pH, Kevin 7.364 7.350 - 7.450    pCO2, Kevin 46.6 40.0 - 50.0 mmHg    pO2, Kevin 27.8 25.0 - 40.0 mmHg    HCO3, Venous 26.0 23.0 - 29.0 mmol/L    Base Excess, Kevin 0.2 -3.0 - 3.0 mmol/L    O2 Sat, Kevin 50 Not Established %    Carboxyhemoglobin 5.2 (H) 0.0 - 1.5 %    MetHgb, Kevin 0.2 <1.5 %    TC02 (Calc), Kevin 27 Not Established mmol/L    O2 Therapy Unknown    Lactate, Sepsis   Result Value Ref Range    Lactic Acid, Sepsis 1.5 0.4 - 1.9 mmol/L   CBC Auto Differential   Result Value Ref Range    WBC 18.3 (H) 4.0 - 11.0 K/uL    RBC 3.49 (L) 4.00 - 5.20 M/uL    Hemoglobin 11.4 (L) 12.0 - 16.0 g/dL    Hematocrit 33.7 (L) 36.0 - 48.0 %    MCV 96.4 80.0 - 100.0 fL    MCH 32.7 26.0 - 34.0 pg    MCHC 33.9 31.0 - 36.0 g/dL    RDW 12.4 12.4 - 15.4 %    Platelets 660 801 - 393 K/uL    MPV 5.9 5.0 - 10.5 fL    Neutrophils % 89.6 %    Lymphocytes % 4.7 %    Monocytes % 5.3 %    Eosinophils % 0.0 %    Basophils % 0.4 %    Neutrophils Absolute 16.4 (H) 1.7 - 7.7 K/uL    Lymphocytes Absolute 0.9 (L) 1.0 - 5.1 K/uL    Monocytes Absolute 1.0 0.0 - 1.3 K/uL    Eosinophils Absolute 0.0 0.0 - 0.6 K/uL    Basophils Absolute 0.1 0.0 - 0.2 K/uL   Comprehensive Metabolic Panel w/ Reflex to MG   Result Value Ref Range    Sodium 124 (L) 136 - 145 mmol/L    Potassium reflex Magnesium 4.4 3.5 - 5.1 mmol/L    Chloride 87 (L) 99 - 110 mmol/L    CO2 24 21 - 32 mmol/L    Anion Gap 13 3 - 16    Glucose 127 (H) 70 - 99 mg/dL    BUN 10 7 - 20 mg/dL    CREATININE <0.5 (L) 0.6 - 1.2 mg/dL    GFR Non-African American >60 >60    GFR African American >60 >60    Calcium 9.8 8.3 - 10.6 mg/dL    Total Protein 8.2 6.4 - 8.2 g/dL    Albumin 4.5 3.4 - 5.0 g/dL    Albumin/Globulin Ratio 1.2 1.1 - 2.2    Total Bilirubin 1.0 0.0 - 1.0 mg/dL    Alkaline Phosphatase 121 40 - 129 U/L    ALT 26 10 - 40 U/L    AST 30 15 - 37 U/L   Troponin   Result Value Ref Range    Troponin <0.01 <0.01 ng/mL   Brain Natriuretic Peptide   Result Value Ref Range    Pro- (H) 0 - 124 pg/mL   Urinalysis Reflex to Culture    Specimen: Urine, clean catch   Result Value Ref Range    Color, UA Yellow Straw/Yellow    Clarity, UA Clear Clear    Glucose, Ur Negative Negative mg/dL    Bilirubin Urine Negative Negative    Ketones, Urine TRACE (A) Negative mg/dL    Specific Gravity, UA 1.010 1.005 - 1.030    Blood, Urine TRACE-LYSED (A) Negative    pH, UA 6.0 5.0 - 8.0    Protein, UA Negative Negative mg/dL    Urobilinogen, Urine 0.2 <2.0 E.U./dL    Nitrite, Urine Negative Negative    Leukocyte Esterase, Urine Negative Negative    Microscopic Examination YES     Urine Type NotGiven     Urine Reflex to Culture Not Indicated    Microscopic Urinalysis   Result Value Ref Range    WBC, UA 0-2 0 - 5 /HPF    RBC, UA 0-2 0 - 4 /HPF    Epithelial Cells, UA 0-1 0 - 5 /HPF   EKG 12 Lead   Result Value Ref Range    Ventricular Rate 106 BPM    Atrial Rate 106 BPM    P-R Interval 134 ms    QRS Duration 66 ms    Q-T Interval 320 ms    QTc Calculation (Bazett) 425 ms    P Axis 70 degrees    R Axis -45 degrees    T Axis 67 degrees    Diagnosis       Sinus tachycardiaLeft axis deviationAbnormal ECGWhen compared with ECG of 10-DEC-2021 04:54,No significant change was found         All other labs were within normal range or not returned as of this dictation. EKG: All EKG's are interpreted by the Emergency Department Physician in the absence of a cardiologist.  Please see their note for interpretation of EKG. RADIOLOGY:   Non-plain film images such as CT, Ultrasound and MRI are read by the radiologist. Plain radiographic images are visualized andpreliminarily interpreted by the  ED Provider with the below findings:        Interpretation perthe Radiologist below, if available at the time of this note:    CT HEAD WO CONTRAST   Final Result   No acute intracranial abnormality given patient motion artifact. Chronic small vessel ischemic disease.          XR CHEST PORTABLE   Final Result   Peripheral pulmonary opacities seen in both lungs could represent COVID   pneumonia           CT HEAD WO CONTRAST    Result Date: 1/11/2022  EXAMINATION: CT OF THE HEAD WITHOUT CONTRAST  1/11/2022 6:34 pm TECHNIQUE: CT of the head was performed without the administration of intravenous contrast. Dose modulation, iterative reconstruction, and/or weight based adjustment of the mA/kV was utilized to reduce the radiation dose to as low as reasonably achievable. COMPARISON: March 17, 2021. February 28, 2021. HISTORY: ORDERING SYSTEM PROVIDED HISTORY: headache TECHNOLOGIST PROVIDED HISTORY: Has a \"code stroke\" or \"stroke alert\" been called? ->No Reason for exam:->headache Decision Support Exception - unselect if not a suspected or confirmed emergency medical condition->Emergency Medical Condition (MA) Reason for Exam: cough, SOB, recent pneumonia, AMS, fever FINDINGS: Image quality is degraded by motion artifact. BRAIN/VENTRICLES: There is no large acute intracranial hemorrhage, mass effect or midline shift. No abnormal extra-axial fluid collection. The gray-white differentiation is grossly maintained without evidence of an acute infarct. There is prominence of the ventricles and sulci due to global parenchymal volume loss. There are nonspecific areas of hypoattenuation within the periventricular and subcortical white matter, which likely represent chronic microvascular ischemic change. ORBITS: The visualized portion of the orbits demonstrate no acute abnormality. SINUSES: The visualized paranasal sinuses and mastoid air cells demonstrate no acute abnormality. SOFT TISSUES/SKULL: No acute abnormality of the visualized skull or soft tissues. No acute intracranial abnormality given patient motion artifact. Chronic small vessel ischemic disease.      XR CHEST PORTABLE    Result Date: 1/11/2022  EXAMINATION: ONE XRAY VIEW OF THE CHEST 1/11/2022 6:34 pm COMPARISON: 12/10/2021 HISTORY: ORDERING SYSTEM PROVIDED HISTORY: cough, sob TECHNOLOGIST PROVIDED HISTORY: Reason for exam:->cough, sob Reason for Exam: cough, SOB, recent pneumonia FINDINGS: Cardiomediastinal silhouette stable. Peripheral pulmonary opacities seen in both lungs. No pneumothorax. No pulmonary vascular congestion. No pleural effusion. Peripheral pulmonary opacities seen in both lungs could represent COVID pneumonia       PROCEDURES   Unless otherwise noted below, none     Procedures    CRITICAL CARE TIME   N/A    CONSULTS:  None      EMERGENCY DEPARTMENT COURSE and DIFFERENTIAL DIAGNOSIS/MDM:   Vitals:    Vitals:    01/11/22 1744 01/11/22 1915 01/11/22 1946   BP: (!) 176/110 135/82 123/86   Pulse: 111 107 109   Resp: 19 16 19   Temp: 99.6 °F (37.6 °C)     TempSrc: Oral     SpO2: 97% 93% 94%   Weight: 130 lb (59 kg)     Height: 5' 3\" (1.6 m)         Patient was given thefollowing medications:  Medications   0.9 % sodium chloride bolus (1,000 mLs IntraVENous New Bag 1/11/22 2059)   levoFLOXacin (LEVAQUIN) 500 MG/100ML infusion 500 mg (has no administration in time range)   doxycycline hyclate (VIBRA-TABS) tablet 100 mg (has no administration in time range)   ipratropium-albuterol (DUONEB) nebulizer solution 1 ampule (1 ampule Inhalation Given 1/11/22 1920)   dexamethasone (PF) (DECADRON) injection 10 mg (10 mg IntraVENous Given 1/11/22 1920)   ipratropium-albuterol (DUONEB) nebulizer solution 1 ampule (1 ampule Inhalation Given 1/11/22 2059)       ED course   Patient presented to the ER for evaluation of cough, shortness of breath and had an episode this afternoon where she was dazed daughter states she spaced out for a few minutes. States she has acted this way in the past with low sodium. Normal neurological exam here. NIHSS 0. She has chronic respiratory failure on 3 L nasal cannula oxygen currently on her 3 L here. She does have tachypnea and wheezing throughout with rhonchi. Recent admission for pneumonia. Chest x-ray obtained today is showing pneumonia antibiotics ordered. COVID-19 test came back negative. Troponin within normal limits.   CT brain no acute intracranial normality. White count is elevated 18.3. Sodium: 124. Potassium 4.4. Chloride 87. Given 1 L normal saline and will plan repeat labs and reassess. Given breathing treatments and Decadron for COPD exacerbation. FINAL IMPRESSION      1. Other pneumonia, unspecified organism    2. COPD exacerbation ([facility] [address]    3. Hyponatremia          DISPOSITION/PLAN   DISPOSITION     PATIENT REFERREDTO:  No follow-up provider specified.     DISCHARGE MEDICATIONS:  New Prescriptions    No medications on file       DISCONTINUED MEDICATIONS:  Discontinued Medications    No medications on file              (Please note that portions ofthis note were completed with a voice recognition program.  Efforts were made to edit the dictations but occasionally words are mis-transcribed.)    Hermann Singh PA-C (electronically signed)           Christy Ford PA-C  01/11/22 3937

## 2022-01-12 VITALS
DIASTOLIC BLOOD PRESSURE: 85 MMHG | OXYGEN SATURATION: 94 % | WEIGHT: 130 LBS | HEART RATE: 92 BPM | TEMPERATURE: 98.3 F | HEIGHT: 63 IN | RESPIRATION RATE: 15 BRPM | BODY MASS INDEX: 23.04 KG/M2 | SYSTOLIC BLOOD PRESSURE: 118 MMHG

## 2022-01-12 LAB
EKG ATRIAL RATE: 106 BPM
EKG DIAGNOSIS: NORMAL
EKG P AXIS: 70 DEGREES
EKG P-R INTERVAL: 134 MS
EKG Q-T INTERVAL: 320 MS
EKG QRS DURATION: 66 MS
EKG QTC CALCULATION (BAZETT): 425 MS
EKG R AXIS: -45 DEGREES
EKG T AXIS: 67 DEGREES
EKG VENTRICULAR RATE: 106 BPM

## 2022-01-12 PROCEDURE — 93010 ELECTROCARDIOGRAM REPORT: CPT | Performed by: INTERNAL MEDICINE

## 2022-01-12 NOTE — ED PROVIDER NOTES
I independently performed a history and physical on [address]. All diagnostic, treatment, and disposition decisions were made by myself in conjunction with the advanced practice provider. For further details of [facility] Christianson emergency department encounter, please see the LUCY/resident's documentation. I personally saw the patient and performed a substantive portion of the visit including all aspects of the medical decision making. Briefly, this is a 57-year-old female with history of COPD on 2 to 3 L nasal cannula, chronic low back pain, depression who presents emergency room for evaluation of shortness of breath. Patient had hospitalization in December for management of pneumonia, hypoxia. She has not had any known COVID contacts. She presents with worsening shortness of breath over the past several days. She has had a nonproductive cough. No chest pain. On exam, patient is mildly tachycardic, temperature is 99.6. Blood pressure stable. SPO2 in the mid 90s on 3 L nasal cannula. She has coarse wheezing bilaterally. Chest x-ray with findings concerning for potential pneumonia. She has elevated leukocytosis to 18.3 thousand. Rapid COVID test was negative. She'll be initiated on breathing treatments. Patient will be reevaluated after breathing treatments and is determined whether she will require admission for increased work of breathing. Sodium was low at 124. She was given 1 L of normal saline. Repeat sodium after this was 125. On chart review, patient has had chronic hyponatremia that is thought to be secondary to medication side effect. She also has been drinking a significant amount of soda which likely explains the low sodium level. I had an extensive discussion with the patient and daughter regarding hospitalization for management of COPD exacerbation, hyponatremia. Because of concerns for potential COVID exposure, they wish to manage her symptoms at home.   Patient will be initiated on Levaquin, doxycycline. She has already been given Decadron. She was advised to drink electrolyte containing drinks such as Gatorade, Powerade and to limit free water, soda intake. She is advised to call her primary care doctor tomorrow to schedule a follow-up appointment. Advised on return precautions. As she is not on her home O2, no overt respiratory distress and maintaining her oxygen saturation, I think this is a reasonable plan. The patient will be discharged from the emergency department. The patient was counseled on their diagnosis and any medications prescribed. They were advised on the need for PCP followup. They were counseled on the need to return to the emergency department if any of their symptoms were to worsen, change or have any other concerns. Discharged in stable condition. EKG  The Ekg interpreted by myself in the emergency department in the absence of a cardiologist.  sinus tachycardia, rmgy=068 with a rate of 106  Axis is   Left axis deviation  QTc is  425  Intervals and Durations are unremarkable. No specific ST-T wave changes appreciated. No evidence of acute ischemia.    No significant change from prior EKG dated 12-10-21       Esha Mcginnis MD  01/11/22 2017       Esha Mcginnis MD  01/11/22 0370

## 2022-01-15 LAB
BLOOD CULTURE, ROUTINE: NORMAL
CULTURE, BLOOD 2: NORMAL

## 2022-03-14 ENCOUNTER — HOSPITAL ENCOUNTER (OUTPATIENT)
Dept: CT IMAGING | Age: 67
Discharge: HOME | End: 2022-03-14
Payer: MEDICARE

## 2022-03-14 DIAGNOSIS — R91.1 PULMONARY NODULE: ICD-10-CM

## 2022-03-14 PROCEDURE — 71250 CT THORAX DX C-: CPT

## 2022-03-14 NOTE — TELEPHONE ENCOUNTER
Radiology called the office today to confirm that we received the Chest CT results from today. I verified that they are visible in Epic and that I'll notify Dr. Liberty Kovacs that they're ready for review.

## 2022-03-15 ENCOUNTER — SCHEDULED TELEPHONE ENCOUNTER (OUTPATIENT)
Dept: PULMONOLOGY | Age: 67
End: 2022-03-15
Payer: MEDICARE

## 2022-03-15 ENCOUNTER — TELEPHONE (OUTPATIENT)
Dept: PULMONOLOGY | Age: 67
End: 2022-03-15

## 2022-03-15 DIAGNOSIS — J44.9 CHRONIC OBSTRUCTIVE PULMONARY DISEASE, UNSPECIFIED COPD TYPE (HCC): ICD-10-CM

## 2022-03-15 DIAGNOSIS — R91.1 LEFT UPPER LOBE PULMONARY NODULE: Primary | ICD-10-CM

## 2022-03-15 PROCEDURE — 99443 PR PHYS/QHP TELEPHONE EVALUATION 21-30 MIN: CPT | Performed by: INTERNAL MEDICINE

## 2022-03-15 RX ORDER — ALENDRONATE SODIUM 70 MG/1
70 TABLET ORAL
COMMUNITY
Start: 2022-03-14

## 2022-03-15 RX ORDER — LISINOPRIL AND HYDROCHLOROTHIAZIDE 12.5; 1 MG/1; MG/1
TABLET ORAL
COMMUNITY
Start: 2022-01-07 | End: 2022-04-12

## 2022-03-15 RX ORDER — MELOXICAM 15 MG/1
TABLET ORAL
COMMUNITY
Start: 2022-03-04 | End: 2022-04-06 | Stop reason: ALTCHOICE

## 2022-03-15 RX ORDER — DILTIAZEM HYDROCHLORIDE 60 MG/1
2 TABLET, FILM COATED ORAL 2 TIMES DAILY
Qty: 1 EACH | Refills: 5 | Status: ON HOLD
Start: 2022-03-15 | End: 2022-09-01 | Stop reason: HOSPADM

## 2022-03-15 NOTE — TELEPHONE ENCOUNTER
Ashok Beaver is a 61 y.o. female evaluated via telephone on 3/15/2022. She and/or health care decision maker is aware that that she may receive a bill for this telephone service, which includes applicable co-pays, depending on her insurance coverage, and has provided verbal consent to proceed. I affirm this is a Patient Initiated Episode with a Patient who has not had a related appointment within my department in the past 7 days or scheduled within the next 24 hours. Patient identification was verified at the start of the visit: Yes Total Time: minutes: 21-30 minutes Ashok Beaver was evaluated through a synchronous (real-time) audio encounter. The patient was located at home in a state where the provider was licensed to provide care. [facility]    Outpatient Follow Up Note    CC: hospital stay and COPD  Consulting provider: No ref. provider found    Interval History: 61 y.o. female   SOB is worse since her hospitalization. Now on 2-3lpm O2. Not    Quit smoking 1/2021    Initial HPI:Cough started about 2 months ago. Non-productive. Her cough improved but did not resolve after 2 weeks and she had another course of antibiotics and now the cough is mostly resolved. No fever. No wheezing. Her repeat CXR showed improvement but not resolution of the RML pneumonia. Denies SOB or CP. She is trying to quit smoking with patches. Smoked 405years at 1ppd. Her mother had COPD.    Current Medications:    Current Outpatient Medications:     amLODIPine (NORVASC) 5 MG tablet, Take 1 tablet by mouth daily, Disp: 30 tablet, Rfl: 3    predniSONE (DELTASONE) 10 MG tablet, 30 mg x 3 days, 20 mg x 3 days, 10 mg x 3 days then stop, Disp: 18 tablet, Rfl: 0    Respiratory Therapy Supplies (NEBULIZER/TUBING/MOUTHPIECE) KIT, Use with accuneb, Disp: 1 kit, Rfl: 0    albuterol (ACCUNEB) 1.25 MG/3ML nebulizer solution, Inhale 3 mLs into the lungs every 6 hours as needed for Wheezing, Disp: 120 mL, Rfl: 3    Misc Natural Products (GLUCOSAMINE CHOND CMP ADVANCED PO), Take by mouth, Disp: , Rfl:     SPIRIVA RESPIMAT 2.5 MCG/ACT AERS inhaler, INHALE 2 PUFFS INTO THE LUNGS DAILY, Disp: 4 g, Rfl: 5    albuterol sulfate  (90 Base) MCG/ACT inhaler, Inhale 2 puffs into the lungs every 6 hours as needed for Wheezing orShortness of Breath, Disp: 3 Inhaler, Rfl: 5    Biotin 300 MCG TABS, Take 1 tablet by mouth daily, Disp: 30 tablet, Rfl: 3    loperamide (IMODIUM) 2 MG capsule, Take 1 capsule by mouth 4 times daily as needed for Diarrhea, Disp: 90 capsule, Rfl: 0    cetirizine (ZYRTEC) 10 MG tablet, Take 10 mg by mouth daily, Disp: , Rfl:     hydrOXYzine (ATARAX) 25 MG tablet, Take 25 mg by mouth 3 times daily as needed for Itching, Disp: , Rfl:     escitalopram (LEXAPRO) 20 MG tablet, Take 20 mg by mouth nightly, Disp: , Rfl:     Multiple Vitamins-Minerals (CENTRUM SILVER 50+WOMEN PO), Take 1 tablet by mouth daily, Disp: , Rfl:     oxybutynin (DITROPAN) 5 MG tablet, Take 5 mg by mouth 4 times daily, Disp: , Rfl:       Objective:   PHYSICAL EXAM:      Tele    LABS:  Reviewed any pertinent new labs that are available. PFTs   4/21/16  FVC  (100%) FEV1 1.78 (71%) FEV1/FVC ratio 55  TLC  (106%)  RV  (118%)   DLCO (42%) Bronchodilator response: no   6MWT: 980 ft, 91%  7/13/18  FVC  (84%) FEV1 1.78 (58%) FEV1/FVC ratio 53  TLC  (103%)  RV  (149%)   DLCO (37%) Bronchodilator response: no   6 MWT:  1000ft, 88%    IMAGING:  I personally reviewed and interpreted the following today in the office:   3/14/22 Chest CT:   FINDINGS:   Mediastinum: Coronary artery calcifications are a marker of atherosclerosis. There is a mildly enlarged precarinal lymph node measuring 1.4 x 2.0 cm.       Lungs/pleura: The tracheobronchial tree is patent. Elaine Bloch is no pneumothorax   or pleural effusion.  Significant emphysema involves the bilateral lungs.    There is bibasilar scarring.       Within the left upper lobe, there is an enlarging 0.9 x 1.0 cm spiculated   solid pulmonary nodule, previously measuring 7 x 9 mm.  No change in the   solid subcentimeter bilateral pulmonary nodules.       Upper Abdomen: Images of the upper abdomen are unremarkable.       Soft Tissues/Bones: Degenerative changes involve the thoracic spine.  No   change in the old compression fractures throughout the thoracic spine there   also old healed bilateral rib fractures.       Impression   1. Enlarging 1.0 cm solid left upper lobe pulmonary nodule.  Recommend PET-CT   for further evaluation. 2. Mediastinal adenopathy either due to reactive disease or volodymyr metastasis.          ASSESSMENT:   KAMLA 1cm lung nodule suspicous for malignancy  Moderate COPD  Chronic hypoxic respiratory failure  Severe decrease in DLCO with advanced emphysema on Chest CT  Tobacco abuse in remission    PLAN:   Spiriva  Start Symbicort 80 BID  PRN albuterol  Requests POC after walking test  PET- CT and PFT/6MWT

## 2022-03-15 NOTE — TELEPHONE ENCOUNTER
Within this Telehealth Consent, the terms you and yours refer to the person using the Telehealth Service (Service), or in the case of a use of the Service by or on behalf of a minor, you and yours refer to and include (i) the parent or legal guardian who provides consent to the use of the Service by such minor or uses the Service on behalf of such minor, and (ii) the minor for whom consent is being provided or on whose behalf the Service is being utilized. When using Service, you will be consulting with your health care providers via the use of Telehealth.   Telehealth involves the delivery of healthcare services using electronic communications, information technology or other means between a healthcare provider and a patient who are not in the same physical location. Telehealth may be used for diagnosis, treatment, follow-up and/or patient education, and may include, but is not limited to, one or more of the following:    Electronic transmission of medical records, photo images, personal health information or other data between a patient and a healthcare provider    Interactions between a patient and healthcare provider via audio, video and/or data communications    Use of output data from medical devices, sound and video files    Anticipated Benefits   The use of Telehealth by your Provider(s) through the Service may have the following possible benefits:    Making it easier and more efficient for you to access medical care and treatment for the conditions treated by such Provider(s) utilizing the Service    Allowing you to obtain medical care and treatment by Provider(s) at times that are convenient for you    Enabling you to interact with Provider(s) without the necessity of an in-office appointment     Possible Risks   While the use of Telehealth can provide potential benefits for you, there are also potential risks associated with the use of Telehealth.  These risks include, but may not be limited to the following:    Your Provider(s) may not able to provide medical treatment for your particular condition and you may be required to seek alternative healthcare or emergency care services.  The electronic systems or other security protocols or safeguards used in the Service could fail, causing a breach of privacy of your medical or other information.  Given regulatory requirements in certain jurisdictions, your Provider(s) diagnosis and/or treatment options, especially pertaining to certain prescriptions, may be limited. Acceptance   1. You understand that Services will be provided via Telehealth. This process involves the use of HIPAA compliant and secure, real-time audio-visual interfacing with a qualified and appropriately trained provider located at [facility]. 2. You understand that, under no circumstances, will this session be recorded. 3. You understand that the Provider(s) at [facility] and other clinical participants will be party to the information obtained during the Telehealth session in accordance with best medical practices. 4. You understand that the information obtained during the Telehealth session will be used to help determine the most appropriate treatment options. 5. You understand that You have the right to revoke this consent at any point in time. 6. You understand that Telehealth is voluntary, and that continued treatment is not dependent upon consent. 7. You understand that, in the event of non-consent to Telehealth services and/or technical difficulties, you will obtain services as typically provided in the absence of Telehealth technology. 8. You understand that this consent will be kept in Your medical record. 9. No potential benefits from the use of Telehealth or specific results can be guaranteed. Your condition may not be cured or improved and, in some cases, may get worse.    10. There are limitations in the provision of medical care and treatment via Telehealth and the Service and you may not be able to receive diagnosis and/or treatment through the Service for every condition for which you seek diagnosis and/or treatment. 11. There are potential risks to the use of Telehealth, including but not limited to the risks described in this Telehealth Consent. 12. Your Provider(s) have discussed the use of Telehealth and the Service with you, including the benefits and risks of such and you have provided oral consent to your Provider(s) for the use of Telehealth and the Service. 15. You understand that it is your duty to provide your Provider(s) truthful, accurate and complete information, including all relevant information regarding care that you may have received or may be receiving from other healthcare providers outside of the Service. 14. You understand that each of your Provider(s) may determine in his or sole discretion that your condition is not suitable for diagnosis and/or treatment using the Service, and that you may need to seek medical care and treatment a specialist or other healthcare provider, outside of the Service. 15. You understand that you are fully responsible for payment for all services provided by Provider(s) or through use of the Service and that you may not be able to use third-party insurance. 16. You represent that (a) you have read this Telehealth Consent carefully, (b) you understand the risks and benefits of the Service and the use of Telehealth in the medical care and treatment provided to you by Provider(s) using the Service, and (c) you have the legal capacity and authority to provide this consent for yourself and/or the minor for which you are consenting under applicable federal and state laws, including laws relating to the age of [de-identified] and/or parental/guardian consent.    17. You give your informed consent to the use of Telehealth by Provider(s) using the Service under the terms described in the Terms of Service and this Telehealth Consent. The patient was read the following statement and has consented to the visit as of 3/15/22. The patient has been scheduled for their first telehealth visit on 03/15/2022 with Dr Shila Alejandre.

## 2022-03-15 NOTE — LETTER
[facility] Pulmonary, Critical Care, and Sleep  [address], [address] [place] [address]  Phone: [phone]  Fax: [phone]      Cira Hernandes MD      May 16, 2022    [facility] [employer]  [address] [place], [place] [address]  [facility] [address]      Dear Jackson Darling:    I am writing because my staff has left multiple messages asking that you call the office to discuss aspects of your treatment, but to date they have not heard from you. We care about you and the management of your healthcare and want to make sure that you follow up as recommended. As your Pulmonologist I must stress to you how important it is for you to have the tests I order as well to see me in follow up. These appointments enable me to monitor any changes to your pulmonary health and gives me the opportunity to discuss the need for further evaluation and possible treatment of any pulmonary condition you may have. I hope you are doing well and urge you to call my office at your earliest convenience so that we can discuss your treatment and schedule any important appointments.     Sincerely,      Cira Hernandes MD

## 2022-03-15 NOTE — TELEPHONE ENCOUNTER
PFT/6MW [facility] 3/25/22 @ 3pm    CT/PET [facility] 3/24/22 @ 9am arrival MTO. Patient aware of date times and prep. Watch for results.

## 2022-03-24 ENCOUNTER — HOSPITAL ENCOUNTER (OUTPATIENT)
Age: 67
Discharge: HOME | End: 2022-03-24
Payer: MEDICARE

## 2022-03-24 ENCOUNTER — HOSPITAL ENCOUNTER (OUTPATIENT)
Dept: PET IMAGING | Age: 67
Discharge: HOME | End: 2022-03-24
Payer: MEDICARE

## 2022-03-24 DIAGNOSIS — R91.1 LUNG NODULE: ICD-10-CM

## 2022-03-24 LAB — SARS-COV-2, NAAT: NOT DETECTED

## 2022-03-24 PROCEDURE — A9552 F18 FDG: HCPCS | Performed by: INTERNAL MEDICINE

## 2022-03-24 PROCEDURE — 3430000000 HC RX DIAGNOSTIC RADIOPHARMACEUTICAL: Performed by: INTERNAL MEDICINE

## 2022-03-24 PROCEDURE — 78815 PET IMAGE W/CT SKULL-THIGH: CPT

## 2022-03-24 PROCEDURE — 87635 SARS-COV-2 COVID-19 AMP PRB: CPT

## 2022-03-24 RX ORDER — FLUDEOXYGLUCOSE F 18 200 MCI/ML
15 INJECTION, SOLUTION INTRAVENOUS
Status: COMPLETED | OUTPATIENT
Start: 2022-03-24 | End: 2022-03-24

## 2022-03-24 RX ADMIN — FLUDEOXYGLUCOSE F 18 15 MILLICURIE: 200 INJECTION, SOLUTION INTRAVENOUS at 09:57

## 2022-03-24 NOTE — TELEPHONE ENCOUNTER
[facility] for f/u 4/6 @ 2:40pm.  Tried to reach patient no answer. Unable to leave message.

## 2022-04-06 ENCOUNTER — SCHEDULED TELEPHONE ENCOUNTER (OUTPATIENT)
Dept: PULMONOLOGY | Age: 67
End: 2022-04-06
Payer: MEDICARE

## 2022-04-06 DIAGNOSIS — J44.9 MODERATE COPD (CHRONIC OBSTRUCTIVE PULMONARY DISEASE) (HCC): ICD-10-CM

## 2022-04-06 DIAGNOSIS — R91.1 PULMONARY NODULE: Primary | ICD-10-CM

## 2022-04-06 PROCEDURE — 99443 PR PHYS/QHP TELEPHONE EVALUATION 21-30 MIN: CPT | Performed by: INTERNAL MEDICINE

## 2022-04-06 RX ORDER — UMECLIDINIUM BROMIDE AND VILANTEROL TRIFENATATE 62.5; 25 UG/1; UG/1
1 POWDER RESPIRATORY (INHALATION) DAILY
Qty: 60 EACH | Refills: 5 | Status: ON HOLD | OUTPATIENT
Start: 2022-04-06 | End: 2022-08-28

## 2022-04-06 RX ORDER — ESCITALOPRAM OXALATE 10 MG/1
20 TABLET ORAL NIGHTLY
Status: ON HOLD | COMMUNITY
Start: 2022-03-16 | End: 2022-09-01 | Stop reason: HOSPADM

## 2022-04-06 NOTE — PROGRESS NOTES
Venu Ojeda is a 61 y.o. female evaluated via telephone on 4/6/2022   Total Time: minutes: 21-30 minutes Venu Ojeda was evaluated through a synchronous (real-time) audio encounter. Patient identification was verified at the start of the visit. She (or guardian if applicable) is aware that this is a billable service, which includes applicable co-pays. This visit was conducted with the patient's (and/or legal guardian's) verbal consent. She has not had a related appointment within my department in the past 7 days or scheduled within the next 24 hours. The patient was located in a state where the provider was licensed to provide care. [facility], Critical Care, and Sleep    Outpatient Follow Up Note    CC: Lung nodule    Interval History: 61 y.o. female   SOB is unchanged and remains improved since her hospitalization. Not using her rescue every day. Smoking 1/2 PPD. Quit smoking 1/2021    Initial HPI:Cough started about 2 months ago. Non-productive. Her cough improved but did not resolve after 2 weeks and she had another course of antibiotics and now the cough is mostly resolved. No fever. No wheezing. Her repeat CXR showed improvement but not resolution of the RML pneumonia. Denies SOB or CP. She is trying to quit smoking with patches. Smoked 405years at 1ppd. Her mother had COPD.    Current Medications:    Current Outpatient Medications:     escitalopram (LEXAPRO) 10 MG tablet, , Disp: , Rfl:     alendronate (FOSAMAX) 70 MG tablet, , Disp: , Rfl:     lisinopril-hydroCHLOROthiazide (PRINZIDE;ZESTORETIC) 10-12.5 MG per tablet, , Disp: , Rfl:     SYMBICORT 80-4.5 MCG/ACT AERO, Inhale 2 puffs into the lungs 2 times daily, Disp: 1 each, Rfl: 5    amLODIPine (NORVASC) 5 MG tablet, Take 1 tablet by mouth daily, Disp: 30 tablet, Rfl: 3    Respiratory Therapy Supplies (NEBULIZER/TUBING/MOUTHPIECE) KIT, Use with accuneb, Disp: 1 kit, Rfl: 0    albuterol (ACCUNEB) 1.25 MG/3ML nebulizer solution, Inhale 3 mLs into the lungs every 6 hours as needed for Wheezing, Disp: 120 mL, Rfl: 3    SPIRIVA RESPIMAT 2.5 MCG/ACT AERS inhaler, INHALE 2 PUFFS INTO THE LUNGS DAILY, Disp: 4 g, Rfl: 5    albuterol sulfate  (90 Base) MCG/ACT inhaler, Inhale 2 puffs into the lungs every 6 hours as needed for Wheezing orShortness of Breath, Disp: 3 Inhaler, Rfl: 5    Biotin 300 MCG TABS, Take 1 tablet by mouth daily, Disp: 30 tablet, Rfl: 3    loperamide (IMODIUM) 2 MG capsule, Take 1 capsule by mouth 4 times daily as needed for Diarrhea, Disp: 90 capsule, Rfl: 0    cetirizine (ZYRTEC) 10 MG tablet, Take 10 mg by mouth daily, Disp: , Rfl:     hydrOXYzine (ATARAX) 25 MG tablet, Take 25 mg by mouth 3 times daily as needed for Itching, Disp: , Rfl:     Multiple Vitamins-Minerals (CENTRUM SILVER 50+WOMEN PO), Take 1 tablet by mouth daily, Disp: , Rfl:     oxybutynin (DITROPAN) 5 MG tablet, Take 5 mg by mouth 4 times daily, Disp: , Rfl:       Objective:   PHYSICAL EXAM:      There were no vitals taken for this visit. Tele    LABS:  Reviewed any pertinent new labs that are available. PFTs   4/21/16  FVC  (100%) FEV1 1.78 (71%) FEV1/FVC ratio 55  TLC  (106%)  RV  (118%)   DLCO (42%) Bronchodilator response: no   6MWT: 980 ft, 91%  7/13/18  FVC  (84%) FEV1 1.78 (58%) FEV1/FVC ratio 53  TLC  (103%)  RV  (149%)   DLCO (37%) Bronchodilator response: no   6 MWT:  1000ft, 88%    IMAGING:  I personally reviewed and interpreted the following today in the office:     3/24/22 PET CT  CHEST: Severe underlying emphysema is seen.  Scattered areas of bronchial   wall thickening are seen       Small mediastinal nodes are noted.  Precarinal lymph node described on recent   chest CT report is not significantly hypermetabolic.  Maximum SUV measures   1.76, slightly lower than background mediastinal uptake of 2.2       Irregular nodule in the left upper lobe persists.  This is hypermetabolic,   maximum SUV measuring 2.45       No hypermetabolic pulmonary nodule on the right       Coronary artery calcifications are seen.       Bandlike opacities are seen in the right middle lobe and lingula.  Bandlike   opacities are seen in the right lower lobe.  There is improved aeration of   the lungs compared to prior       ABDOMEN/PELVIS: No hypermetabolic adrenal mass identified.       Atherosclerotic change seen in abdominal aorta.       No hypermetabolic retroperitoneal adenopathy.  No aortic aneurysm.       BONES/SOFT TISSUE: Spurring is seen in the spine.  Remote appearing rib   fractures are seen.  Remote appearing compression deformities are seen       There is a focal soft tissue nodule seen in the right axillary region   measuring 8 mm in size., With maximum SUV measuring 1.6       INCIDENTAL CT FINDINGS:       Impression   The growth and SUV uptake of left upper lobe pulmonary nodule is suspicious   for lung cancer until proven otherwise       Small precarinal node seen on recent CT scan is not significantly   hypermetabolic.        ASSESSMENT:   1cm hypermetabolic KAMLA nodule suspicous for malignancy  Moderate COPD  Severe decrease in DLCO with advanced emphysema on Chest CT  Tobacco abuse in remission    PLAN:   Change Spiriva to Anoro  PRN albuterol  PFT/6MWT  Refer to Dr. Wilber Jasso for consideration for surgery

## 2022-04-06 NOTE — TELEPHONE ENCOUNTER
Pt returned call, was informed to keep PFT 4/12/22. Will watch to make sure pt gets scheduled with Dr. Bob Wilkinson.

## 2022-04-06 NOTE — TELEPHONE ENCOUNTER
Pt completed televisit with Dr. Danica Ramirez today. Pt needs to keep appt for PFT 4/12/22. Referral faxed to Dr. Rashida Teixeira. Will watch to make sure pt gets scheduled. F/u with Dr. Danica Ramirez will be based on Dr. Xu Boyd. Called pt, no answer, no VM. Will try back at another time.

## 2022-04-08 ENCOUNTER — TELEPHONE (OUTPATIENT)
Dept: CARDIOTHORACIC SURGERY | Age: 67
End: 2022-04-08

## 2022-04-08 NOTE — TELEPHONE ENCOUNTER
[address] office is trying to reach patient to [facility]. I have attempted without success to contact this patient by phone to will try again later.

## 2022-04-08 NOTE — TELEPHONE ENCOUNTER
Called patients phone to set up appt w/ Dr. Carlos Draper. No answer, no VM. Called daughter Thuy, no answer, no VM. Called daughter Lexi Etienne, left VM w/ office number. Called daughter [address] [place] [address], left VM w/ office number. Called daughter Keira Tariq and spoke with her. She took down my direct line and will pass the info onto her mother to give me a call this afternoon or Monday regarding possible appt Tuesday.

## 2022-04-12 ENCOUNTER — OFFICE VISIT (OUTPATIENT)
Dept: CARDIOTHORACIC SURGERY | Age: 67
End: 2022-04-12
Payer: MEDICARE

## 2022-04-12 ENCOUNTER — HOSPITAL ENCOUNTER (OUTPATIENT)
Dept: PULMONOLOGY | Age: 67
Discharge: HOME | End: 2022-04-12
Payer: MEDICARE

## 2022-04-12 VITALS
HEART RATE: 82 BPM | SYSTOLIC BLOOD PRESSURE: 110 MMHG | WEIGHT: 156.8 LBS | BODY MASS INDEX: 26.77 KG/M2 | OXYGEN SATURATION: 92 % | TEMPERATURE: 98.2 F | HEIGHT: 64 IN | DIASTOLIC BLOOD PRESSURE: 66 MMHG

## 2022-04-12 DIAGNOSIS — R91.1 LUNG NODULE: Primary | ICD-10-CM

## 2022-04-12 DIAGNOSIS — J44.9 CHRONIC OBSTRUCTIVE PULMONARY DISEASE, UNSPECIFIED COPD TYPE (HCC): ICD-10-CM

## 2022-04-12 PROCEDURE — 94618 PULMONARY STRESS TESTING: CPT

## 2022-04-12 PROCEDURE — 94640 AIRWAY INHALATION TREATMENT: CPT

## 2022-04-12 PROCEDURE — 99204 OFFICE O/P NEW MOD 45 MIN: CPT | Performed by: THORACIC SURGERY (CARDIOTHORACIC VASCULAR SURGERY)

## 2022-04-12 PROCEDURE — 94726 PLETHYSMOGRAPHY LUNG VOLUMES: CPT

## 2022-04-12 PROCEDURE — 1036F TOBACCO NON-USER: CPT | Performed by: THORACIC SURGERY (CARDIOTHORACIC VASCULAR SURGERY)

## 2022-04-12 PROCEDURE — G8419 CALC BMI OUT NRM PARAM NOF/U: HCPCS | Performed by: THORACIC SURGERY (CARDIOTHORACIC VASCULAR SURGERY)

## 2022-04-12 PROCEDURE — G8427 DOCREV CUR MEDS BY ELIG CLIN: HCPCS | Performed by: THORACIC SURGERY (CARDIOTHORACIC VASCULAR SURGERY)

## 2022-04-12 PROCEDURE — 94729 DIFFUSING CAPACITY: CPT

## 2022-04-12 PROCEDURE — 94060 EVALUATION OF WHEEZING: CPT

## 2022-04-12 PROCEDURE — 3017F COLORECTAL CA SCREEN DOC REV: CPT | Performed by: THORACIC SURGERY (CARDIOTHORACIC VASCULAR SURGERY)

## 2022-04-12 PROCEDURE — 6370000000 HC RX 637 (ALT 250 FOR IP): Performed by: INTERNAL MEDICINE

## 2022-04-12 RX ORDER — ALBUTEROL SULFATE 90 UG/1
4 AEROSOL, METERED RESPIRATORY (INHALATION) ONCE
Status: COMPLETED | OUTPATIENT
Start: 2022-04-12 | End: 2022-04-12

## 2022-04-12 RX ORDER — LISINOPRIL 10 MG/1
10 TABLET ORAL DAILY
COMMUNITY

## 2022-04-12 RX ORDER — ACETAMINOPHEN 500 MG
500 TABLET ORAL EVERY 6 HOURS PRN
COMMUNITY

## 2022-04-12 RX ADMIN — Medication 4 PUFF: at 15:50

## 2022-04-12 NOTE — PROGRESS NOTES
Referred by Dr. Luisa West    Review of Systems:  Constitutional:  No night sweats, weight loss. + headaches, fatigue  Eyes:  No glaucoma, cataracts. + wears glasses  ENMT:  No nosebleeds, deviated septum. Cardiac:  No arrhythmias. Vascular:  No claudication, varicosities. GI:  No PUD, heartburn. + diarrhea  :  No kidney stones, frequent UTIs  Musculoskeletal:  No gout. + arthritis, chronic pain in back and knees  Respiratory:  + wears Oxygen, SOB, COPD, inhalers, nebulizers  Integumentary:  No dermatitis, itching, rash. Neurological:  No stroke, TIAs, + seizure about a year ago, dizziness & lightheadedness  Psychiatric:  + panic disorder, depression, anxiety. Endocrine: No diabetes, thyroid issues. Hematologic:  No bleeding, easy bruising. Immunologic:  No steroid therapies.  + KAMLA nodule found

## 2022-04-12 NOTE — PROGRESS NOTES
Consultation H&P        Date of Admission:  No admission date for patient encounter. Date of Consultation:  4/12/2022    PCP:  Anil Lechuga MD    Referred    Chief Complaint: Lung nodule    History of Present Illness: We are asked to see this patient in consultation by Dr. edan  Maria R Galarza is a 61 y.o. female who chest CT for chronic cough improved with antibiotic and COPD therapy showed lung nodule followed by PET scan which turned to be positive highly suspicion for malignancy referred for possible surgical opinion     Past Medical History:  Past Medical History:   Diagnosis Date    Angina pectoris ([facility] [address]     Chronic pain     knees and lower back     COPD exacerbation ([facility] [address] 5/20/2018    Depression     Panic disorder     Pneumonia        Past Surgical History:  Past Surgical History:   Procedure Laterality Date    CHOLECYSTECTOMY      COLONOSCOPY         Home Medications:   Prior to Admission medications    Medication Sig Start Date End Date Taking?  Authorizing Provider   lisinopril (PRINIVIL;ZESTRIL) 10 MG tablet Take 10 mg by mouth daily   Yes Historical Provider, MD   acetaminophen (TYLENOL) 500 MG tablet Take 500 mg by mouth every 6 hours as needed for Pain   Yes Historical Provider, MD   escitalopram (LEXAPRO) 10 MG tablet  3/16/22  Yes Historical Provider, MD   umeclidinium-vilanterol (ANORO ELLIPTA) 62.5-25 MCG/INH AEPB inhaler Inhale 1 puff into the lungs daily 4/6/22  Yes Vannessa Angel MD   alendronate (FOSAMAX) 70 MG tablet  3/14/22  Yes Historical Provider, MD   SYMBICORT 80-4.5 MCG/ACT AERO Inhale 2 puffs into the lungs 2 times daily 3/15/22  Yes Vannessa Angel MD   Respiratory Therapy Supplies (NEBULIZER/TUBING/MOUTHPIECE) KIT Use with accuneb 12/15/21  Yes Shannan Kim MD   albuterol (ACCUNEB) 1.25 MG/3ML nebulizer solution Inhale 3 mLs into the lungs every 6 hours as needed for Wheezing 12/15/21  Yes Shannan Kim MD   albuterol sulfate  (90 Base) MCG/ACT inhaler Inhale 2 puffs into the lungs every 6 hours as needed for Wheezing orShortness of Breath 21  Yes Nik Armenta MD   Biotin 300 MCG TABS Take 1 tablet by mouth daily 21  Yes Savannah Moreau MD   loperamide (IMODIUM) 2 MG capsule Take 1 capsule by mouth 4 times daily as needed for Diarrhea 21  Yes Franco Stewart MD   cetirizine (ZYRTEC) 10 MG tablet Take 10 mg by mouth daily   Yes Historical Provider, MD   hydrOXYzine (ATARAX) 25 MG tablet Take 25 mg by mouth 3 times daily as needed for Itching   Yes Historical Provider, MD   Multiple Vitamins-Minerals (CENTRUM SILVER 50+WOMEN PO) Take 1 tablet by mouth daily   Yes Historical Provider, MD   oxybutynin (DITROPAN) 5 MG tablet Take 5 mg by mouth 4 times daily   Yes Historical Provider, MD   amLODIPine (NORVASC) 5 MG tablet Take 1 tablet by mouth daily  Patient not taking: Reported on 21   Radha Lutz MD        Facility Administered Medications: Allergies: Allergies   Allergen Reactions    Cephalosporins Shortness Of Breath    Pcn [Penicillins] Anaphylaxis and Hives    Hctz [Hydrochlorothiazide] Other (See Comments)     Recurrent low sodium        Social History:    Working:   Caffeine:   Lifestyle:    Social History     Socioeconomic History    Marital status:       Spouse name: Not on file    Number of children: 10    Years of education: Not on file    Highest education level: Not on file   Occupational History    Not on file   Tobacco Use    Smoking status: Former Smoker     Packs/day: 1.00     Years: 52.00     Pack years: 52.00     Types: Cigarettes     Start date:      Quit date:      Years since quittin.2    Smokeless tobacco: Never Used   Vaping Use    Vaping Use: Never used   Substance and Sexual Activity    Alcohol use: Not Currently     Alcohol/week: 12.0 standard drinks     Types: 12 Cans of beer per week    Drug use: No    Sexual activity: Never   Other Topics Concern    Not on file   Social History Narrative    Not on file     Social Determinants of Health     Financial Resource Strain:     Difficulty of Paying Living Expenses: Not on file   Food Insecurity:     Worried About Running Out of Food in the Last Year: Not on file    Munira of Food in the Last Year: Not on file   Transportation Needs:     Lack of Transportation (Medical): Not on file    Lack of Transportation (Non-Medical): Not on file   Physical Activity:     Days of Exercise per Week: Not on file    Minutes of Exercise per Session: Not on file   Stress:     Feeling of Stress : Not on file   Social Connections:     Frequency of Communication with Friends and Family: Not on file    Frequency of Social Gatherings with Friends and Family: Not on file    Attends Anabaptist Services: Not on file    Active Member of [address] [place] [address] or Organizations: Not on file    Attends Club or Organization Meetings: Not on file    Marital Status: Not on file   Intimate Partner Violence:     Fear of Current or Ex-Partner: Not on file    Emotionally Abused: Not on file    Physically Abused: Not on file    Sexually Abused: Not on file   Housing Stability:     Unable to Pay for Housing in the Last Year: Not on file    Number of [place] in the Last Year: Not on file    Unstable Housing in the Last Year: Not on file       Family History:  Heart Disease:   Stroke:   Cancer:   Diabetes:   Hypertension:   Aneurysm/PVD:         Problem Relation Age of Onset    COPD Mother     Hypertension Mother     Asthma Neg Hx     Cancer Neg Hx     Diabetes Neg Hx     Emphysema Neg Hx     Heart Failure Neg Hx        Review of Systems:  Constitutional:  No night sweats, headaches, weight loss. Eyes:  No glaucoma, cataracts. ENMT:  No nosebleeds, deviated septum. Cardiac:  No arrhythmias, previous MI. Vascular:  No claudication, varicosities. GI:  No PUD, heartburn.   :  No kidney stones, frequent UTIs  Musculoskeletal:  No arthritis, gout. Respiratory:  No SOB, emphysema, asthma. Integumentary:  No dermatitis, itching, rash. Neurological:  No stroke, TIAs, seizures. Psychiatric:  No depression, anxiety. Endocrine: No diabetes, thyroid issues. Hematologic:  No bleeding, easy bruising. Immunologic:  No known cancer, steroid therapies. Physical Examination:    /66 (Site: Right Upper Arm, Position: Sitting)   Pulse 82   Temp 98.2 °F (36.8 °C) (Temporal)   Ht 5' 3.5\" (1.613 m)   Wt 156 lb 12.8 oz (71.1 kg)   SpO2 92% Comment: wears about 3LNC at home, 3LNC here today  BMI 27.34 kg/m²      BP RUE:  BP LUE:   Admission Weight: 156 lb 12.8 oz (71.1 kg)   Hand dominance:    General appearance: NAD, well nourished  Eyes: anicteric, PERRLA  ENMT: no scars or lesions, no nasal deformity, normal dentition, no cyanosis of oral mucosa  Neck: no masses, no thyroid enlargement, no JVD. Respiratory: effort is unlabored, symmetric, no crackles, wheezes or rubs. No palpable/percussable abnormalities. Cardiovascular: regular, no murmur. PMI normal, no thrill. No carotid bruits. No edema or varicosities. Abdominal aorta cannot be appreciated given body habitus. GI: abdomen soft, nondistended, no organomegaly. No masses. Lymphatic: no cervical/supraclavicular adenopathy  Musculoskeletal: strength and tone normal. Full ROM. No scoliosis. Extremities: warm and pink. No clubbing or petechiae. Skin: no dermatitis or ulceration. No nodularity or induration. Neuro: CN grossly intact. Sensation and motor function grossly intact. Psychiatric: oriented, appropriate mood/affect. MEDICAL DECISION MAKING/TESTING  Studies personally reviewed.       Echo:     CXR:     CT    PET/CT  The growth and SUV uptake of left upper lobe pulmonary nodule is suspicious   for lung cancer until proven otherwise       Small precarinal node seen on recent CT scan is not significantly   hypermetabolic.       Small soft tissue nodule in the right axillary region is seen,, with   low-grade uptake, which may relate to prior trauma or sebaceous cyst           PFT    Pathology      Labs:   CBC: No results for input(s): WBC, HGB, HCT, MCV, PLT in the last 72 hours. BMP: No results for input(s): NA, K, CL, CO2, PHOS, BUN, CREATININE, CALCIUM, MG in the last 72 hours. Cardiac Enzymes: No results for input(s): CKTOTAL, CKMB, CKMBINDEX, TROPONINI in the last 72 hours. PT/INR: No results for input(s): PROTIME, INR in the last 72 hours. APTT: No results for input(s): APTT in the last 72 hours. Liver Profile:  Lab Results   Component Value Date    AST 30 01/11/2022    ALT 26 01/11/2022    BILIDIR <0.2 12/11/2021    BILITOT 1.0 01/11/2022    ALKPHOS 121 01/11/2022     Lab Results   Component Value Date    CHOL 154 09/15/2017    HDL 81 09/15/2017    HDL 64 05/14/2012    TRIG 44 09/15/2017     UA:   Lab Results   Component Value Date    COLORU Yellow 01/11/2022    PHUR 6.0 01/11/2022    WBCUA 0-2 01/11/2022    RBCUA 0-2 01/11/2022    BACTERIA Rare 12/10/2021    CLARITYU Clear 01/11/2022    SPECGRAV 1.010 01/11/2022    LEUKOCYTESUR Negative 01/11/2022    UROBILINOGEN 0.2 01/11/2022    BILIRUBINUR Negative 01/11/2022    BLOODU TRACE-LYSED 01/11/2022    GLUCOSEU Negative 01/11/2022       History obtained: chart, pt    Risk factors:      Diagnosis:    Severe COPD  Lung nodule PET positive  Plan: Lung nodule but positive most probably lung cancer  As discussed with the patient unfortunately with her current lung status would not be surgical candidate I will refer her for radiation oncology for evaluation    Typical periop/postop course reviewed including initial limitations on driving/heavy lifting. Risks, benefits and postoperative complications discussed including bleeding, infection, stroke, death, postop pulmonary and renal issues.   I spent 60 minutes of care and visit with this patient, greater than 50% of time was spent in counseling and coordinating care, image interpretation, discussion with other caregiver.   And future planning    Nita Santana MD FACS

## 2022-04-14 LAB
DLCO %PRED: 21 %
DLCO PRED: NORMAL
DLCO/VA %PRED: NORMAL
DLCO/VA PRED: NORMAL
DLCO/VA: NORMAL
DLCO: NORMAL
EXPIRATORY TIME-POST: NORMAL
EXPIRATORY TIME: NORMAL
FEF 25-75% %CHNG: NORMAL
FEF 25-75% %PRED-POST: NORMAL
FEF 25-75% %PRED-PRE: NORMAL
FEF 25-75% PRED: NORMAL
FEF 25-75%-POST: NORMAL
FEF 25-75%-PRE: NORMAL
FEV1 %PRED-POST: 36 %
FEV1 %PRED-PRE: 36 %
FEV1 PRED: NORMAL
FEV1-POST: NORMAL
FEV1-PRE: NORMAL
FEV1/FVC %PRED-POST: NORMAL
FEV1/FVC %PRED-PRE: NORMAL
FEV1/FVC PRED: NORMAL
FEV1/FVC-POST: 37 %
FEV1/FVC-PRE: 38 %
FVC %PRED-POST: NORMAL
FVC %PRED-PRE: NORMAL
FVC PRED: NORMAL
FVC-POST: NORMAL
FVC-PRE: NORMAL
GAW %PRED: NORMAL
GAW PRED: NORMAL
GAW: NORMAL
IC %PRED: NORMAL
IC PRED: NORMAL
IC: NORMAL
MEP: NORMAL
MIP: NORMAL
MVV %PRED-PRE: NORMAL
MVV PRED: NORMAL
MVV-PRE: NORMAL
PEF %PRED-POST: NORMAL
PEF %PRED-PRE: NORMAL
PEF PRED: NORMAL
PEF%CHNG: NORMAL
PEF-POST: NORMAL
PEF-PRE: NORMAL
RAW %PRED: NORMAL
RAW PRED: NORMAL
RAW: NORMAL
RV %PRED: NORMAL
RV PRED: NORMAL
RV: NORMAL
SVC %PRED: NORMAL
SVC PRED: NORMAL
SVC: NORMAL
TLC %PRED: 89 %
TLC PRED: NORMAL
TLC: NORMAL
VA %PRED: NORMAL
VA PRED: NORMAL
VA: NORMAL
VTG %PRED: NORMAL
VTG PRED: NORMAL
VTG: NORMAL

## 2022-04-14 ASSESSMENT — PULMONARY FUNCTION TESTS
FEV1/FVC_POST: 37
FEV1_PERCENT_PREDICTED_PRE: 36
FEV1/FVC_PRE: 38
FEV1_PERCENT_PREDICTED_POST: 36

## 2022-04-14 NOTE — PROCEDURES
[address]                 [address] [address]                               PULMONARY FUNCTION    PATIENT NAME: Stephanie Nunez                    :        1955  MED REC NO:   [id number]                          ROOM:  ACCOUNT NO:   [de-identified]                           ADMIT DATE: 2022  PROVIDER:     Lulu Reeves MD    DATE OF PROCEDURE:  2022    ATTENDING PROVIDER:  Lulu Reeves MD    INDICATION:  COPD. FINDINGS:  1. Spirometry: The FVC is 72% of predicted. The FEV1 is 0.85 liters  which is 36% of predicted. The FEV1/FVC ratio is 0.38. There is no  response to bronchodilators. 2.  Plethysmography:  The total lung capacity is 89% of predicted. 3.  The diffusion capacity of carbon monoxide is 21% of predicted. 4.  The flow volume loop shows an obstructive pattern. IMPRESSION:  This patient has severe COPD with a decreased DLCO. A six-minute walk test was conducted by Jw Camacho protocol. The  patient walked 280 feet and required 3 liters of oxygen to maintain  saturations of 88% or greater. The heart rate at rest was 75 and heart  rate maximum was 89.         Unruly Sanchez MD    D: 2022 11:50:43       T: 2022 12:21:52     [id number]  Job#: [id number]     Doc#: [id number]    CC:

## 2022-04-22 NOTE — TELEPHONE ENCOUNTER
Jabier scanned to chart Dr Christina Ruth referred by Dr Catia Rowan. Does not have f/u with Dr Tania Siegel.

## 2022-05-05 NOTE — TELEPHONE ENCOUNTER
I have attempted without success to contact this patient by phone to will try again later unable to leave message vm not sey up.

## 2022-05-09 NOTE — TELEPHONE ENCOUNTER
I have attempted without success to contact this patient by phone to will try again later unable to leave message vm not set up.

## 2022-06-17 ENCOUNTER — TELEPHONE (OUTPATIENT)
Dept: PULMONOLOGY | Age: 67
End: 2022-06-17

## 2022-06-17 NOTE — TELEPHONE ENCOUNTER
Pt called stating that Jewell needs something from our office before she can order more oxygen. Spoke with [facility] at [place] [address] whom states that they did not have Dr. Erasmo Wolf as the prescriber for oxygen. CMN has been sent to our office for Dr. Erasmo Wolf to sign. Returned call and informed pt.

## 2022-08-13 ENCOUNTER — HOSPITAL ENCOUNTER (INPATIENT)
Age: 67
LOS: 4 days | Discharge: HOME | DRG: 177 | End: 2022-08-18
Attending: EMERGENCY MEDICINE | Admitting: HOSPITALIST
Payer: MEDICARE

## 2022-08-13 DIAGNOSIS — J96.21 ACUTE ON CHRONIC RESPIRATORY FAILURE WITH HYPOXIA (HCC): ICD-10-CM

## 2022-08-13 DIAGNOSIS — U07.1 COVID-19: Primary | ICD-10-CM

## 2022-08-13 DIAGNOSIS — E87.1 HYPONATREMIA: ICD-10-CM

## 2022-08-13 DIAGNOSIS — I10 ESSENTIAL HYPERTENSION: ICD-10-CM

## 2022-08-13 DIAGNOSIS — R79.89 LFT ELEVATION: ICD-10-CM

## 2022-08-13 DIAGNOSIS — Z28.310 UNVACCINATED FOR COVID-19: ICD-10-CM

## 2022-08-13 PROCEDURE — 99285 EMERGENCY DEPT VISIT HI MDM: CPT

## 2022-08-13 ASSESSMENT — PAIN - FUNCTIONAL ASSESSMENT: PAIN_FUNCTIONAL_ASSESSMENT: 0-10

## 2022-08-13 ASSESSMENT — PAIN SCALES - GENERAL: PAINLEVEL_OUTOF10: 10

## 2022-08-13 ASSESSMENT — PAIN DESCRIPTION - LOCATION: LOCATION: HEAD

## 2022-08-13 ASSESSMENT — PAIN DESCRIPTION - DESCRIPTORS: DESCRIPTORS: ACHING

## 2022-08-14 ENCOUNTER — APPOINTMENT (OUTPATIENT)
Dept: GENERAL RADIOLOGY | Age: 67
DRG: 177 | End: 2022-08-14
Payer: MEDICARE

## 2022-08-14 PROBLEM — U07.1 PNEUMONIA DUE TO COVID-19 VIRUS: Status: ACTIVE | Noted: 2022-08-14

## 2022-08-14 PROBLEM — J12.82 PNEUMONIA DUE TO COVID-19 VIRUS: Status: ACTIVE | Noted: 2022-08-14

## 2022-08-14 LAB
A/G RATIO: 1.5 (ref 1.1–2.2)
ALBUMIN SERPL-MCNC: 4.4 G/DL (ref 3.4–5)
ALP BLD-CCNC: 148 U/L (ref 40–129)
ALT SERPL-CCNC: 47 U/L (ref 10–40)
ANION GAP SERPL CALCULATED.3IONS-SCNC: 11 MMOL/L (ref 3–16)
ANION GAP SERPL CALCULATED.3IONS-SCNC: 9 MMOL/L (ref 3–16)
AST SERPL-CCNC: 50 U/L (ref 15–37)
BASE EXCESS VENOUS: -0.5 MMOL/L (ref -3–3)
BASOPHILS ABSOLUTE: 0 K/UL (ref 0–0.2)
BASOPHILS RELATIVE PERCENT: 1 %
BILIRUB SERPL-MCNC: 0.4 MG/DL (ref 0–1)
BUN BLDV-MCNC: 13 MG/DL (ref 7–20)
BUN BLDV-MCNC: 16 MG/DL (ref 7–20)
C-REACTIVE PROTEIN: 9.8 MG/L (ref 0–5.1)
CALCIUM SERPL-MCNC: 9 MG/DL (ref 8.3–10.6)
CALCIUM SERPL-MCNC: 9.1 MG/DL (ref 8.3–10.6)
CARBOXYHEMOGLOBIN: 4.7 % (ref 0–1.5)
CHLORIDE BLD-SCNC: 82 MMOL/L (ref 99–110)
CHLORIDE BLD-SCNC: 88 MMOL/L (ref 99–110)
CO2: 23 MMOL/L (ref 21–32)
CO2: 28 MMOL/L (ref 21–32)
CREAT SERPL-MCNC: 0.6 MG/DL (ref 0.6–1.2)
CREAT SERPL-MCNC: <0.5 MG/DL (ref 0.6–1.2)
EKG ATRIAL RATE: 78 BPM
EKG DIAGNOSIS: NORMAL
EKG P AXIS: 56 DEGREES
EKG P-R INTERVAL: 150 MS
EKG Q-T INTERVAL: 382 MS
EKG QRS DURATION: 72 MS
EKG QTC CALCULATION (BAZETT): 435 MS
EKG R AXIS: -43 DEGREES
EKG T AXIS: 19 DEGREES
EKG VENTRICULAR RATE: 78 BPM
EOSINOPHILS ABSOLUTE: 0 K/UL (ref 0–0.6)
EOSINOPHILS RELATIVE PERCENT: 0.5 %
GFR AFRICAN AMERICAN: >60
GFR AFRICAN AMERICAN: >60
GFR NON-AFRICAN AMERICAN: >60
GFR NON-AFRICAN AMERICAN: >60
GLUCOSE BLD-MCNC: 120 MG/DL (ref 70–99)
GLUCOSE BLD-MCNC: 182 MG/DL (ref 70–99)
HCO3 VENOUS: 25.1 MMOL/L (ref 23–29)
HCT VFR BLD CALC: 32.9 % (ref 36–48)
HEMATOLOGY PATH CONSULT: NO
HEMOGLOBIN: 11.5 G/DL (ref 12–16)
LACTIC ACID: 0.8 MMOL/L (ref 0.4–2)
LIPASE: 40 U/L (ref 13–60)
LYMPHOCYTES ABSOLUTE: 0.3 K/UL (ref 1–5.1)
LYMPHOCYTES RELATIVE PERCENT: 7.6 %
MCH RBC QN AUTO: 33.1 PG (ref 26–34)
MCHC RBC AUTO-ENTMCNC: 35 G/DL (ref 31–36)
MCV RBC AUTO: 94.6 FL (ref 80–100)
METHEMOGLOBIN VENOUS: 0.3 %
MONOCYTES ABSOLUTE: 0.6 K/UL (ref 0–1.3)
MONOCYTES RELATIVE PERCENT: 13 %
NEUTROPHILS ABSOLUTE: 3.4 K/UL (ref 1.7–7.7)
NEUTROPHILS RELATIVE PERCENT: 77.9 %
O2 CONTENT, VEN: 15 VOL %
O2 SAT, VEN: 94 %
O2 THERAPY: ABNORMAL
PCO2, VEN: 44.9 MMHG (ref 40–50)
PDW BLD-RTO: 11.9 % (ref 12.4–15.4)
PH VENOUS: 7.37 (ref 7.35–7.45)
PLATELET # BLD: 142 K/UL (ref 135–450)
PLATELET SLIDE REVIEW: ABNORMAL
PMV BLD AUTO: 7.9 FL (ref 5–10.5)
PO2, VEN: 72.9 MMHG (ref 25–40)
POTASSIUM SERPL-SCNC: 4.7 MMOL/L (ref 3.5–5.1)
POTASSIUM SERPL-SCNC: 4.8 MMOL/L (ref 3.5–5.1)
PROCALCITONIN: 0.14 NG/ML (ref 0–0.15)
RBC # BLD: 3.48 M/UL (ref 4–5.2)
SARS-COV-2, NAAT: DETECTED
SLIDE REVIEW: ABNORMAL
SODIUM BLD-SCNC: 116 MMOL/L (ref 136–145)
SODIUM BLD-SCNC: 123 MMOL/L (ref 136–145)
SODIUM BLD-SCNC: 125 MMOL/L (ref 136–145)
SODIUM URINE: 49 MMOL/L
TCO2 CALC VENOUS: 27 MMOL/L
TOTAL PROTEIN: 7.4 G/DL (ref 6.4–8.2)
TROPONIN: <0.01 NG/ML
WBC # BLD: 4.3 K/UL (ref 4–11)

## 2022-08-14 PROCEDURE — 80053 COMPREHEN METABOLIC PANEL: CPT

## 2022-08-14 PROCEDURE — 2060000000 HC ICU INTERMEDIATE R&B

## 2022-08-14 PROCEDURE — 83690 ASSAY OF LIPASE: CPT

## 2022-08-14 PROCEDURE — 94761 N-INVAS EAR/PLS OXIMETRY MLT: CPT

## 2022-08-14 PROCEDURE — 84295 ASSAY OF SERUM SODIUM: CPT

## 2022-08-14 PROCEDURE — 82803 BLOOD GASES ANY COMBINATION: CPT

## 2022-08-14 PROCEDURE — 36415 COLL VENOUS BLD VENIPUNCTURE: CPT

## 2022-08-14 PROCEDURE — 6370000000 HC RX 637 (ALT 250 FOR IP): Performed by: INTERNAL MEDICINE

## 2022-08-14 PROCEDURE — 6360000002 HC RX W HCPCS: Performed by: EMERGENCY MEDICINE

## 2022-08-14 PROCEDURE — 6370000000 HC RX 637 (ALT 250 FOR IP): Performed by: HOSPITALIST

## 2022-08-14 PROCEDURE — 84145 PROCALCITONIN (PCT): CPT

## 2022-08-14 PROCEDURE — 99223 1ST HOSP IP/OBS HIGH 75: CPT | Performed by: INTERNAL MEDICINE

## 2022-08-14 PROCEDURE — 85025 COMPLETE CBC W/AUTO DIFF WBC: CPT

## 2022-08-14 PROCEDURE — 83605 ASSAY OF LACTIC ACID: CPT

## 2022-08-14 PROCEDURE — 84484 ASSAY OF TROPONIN QUANT: CPT

## 2022-08-14 PROCEDURE — 86140 C-REACTIVE PROTEIN: CPT

## 2022-08-14 PROCEDURE — 84300 ASSAY OF URINE SODIUM: CPT

## 2022-08-14 PROCEDURE — 2580000003 HC RX 258: Performed by: EMERGENCY MEDICINE

## 2022-08-14 PROCEDURE — 87635 SARS-COV-2 COVID-19 AMP PRB: CPT

## 2022-08-14 PROCEDURE — 2580000003 HC RX 258: Performed by: HOSPITALIST

## 2022-08-14 PROCEDURE — 6360000002 HC RX W HCPCS: Performed by: HOSPITALIST

## 2022-08-14 PROCEDURE — 71045 X-RAY EXAM CHEST 1 VIEW: CPT

## 2022-08-14 PROCEDURE — 93010 ELECTROCARDIOGRAM REPORT: CPT | Performed by: INTERNAL MEDICINE

## 2022-08-14 PROCEDURE — 2700000000 HC OXYGEN THERAPY PER DAY

## 2022-08-14 PROCEDURE — 93005 ELECTROCARDIOGRAM TRACING: CPT | Performed by: EMERGENCY MEDICINE

## 2022-08-14 PROCEDURE — 6370000000 HC RX 637 (ALT 250 FOR IP): Performed by: EMERGENCY MEDICINE

## 2022-08-14 PROCEDURE — 94640 AIRWAY INHALATION TREATMENT: CPT

## 2022-08-14 RX ORDER — SODIUM CHLORIDE 9 MG/ML
INJECTION, SOLUTION INTRAVENOUS PRN
Status: DISCONTINUED | OUTPATIENT
Start: 2022-08-14 | End: 2022-08-18 | Stop reason: HOSPADM

## 2022-08-14 RX ORDER — 0.9 % SODIUM CHLORIDE 0.9 %
1000 INTRAVENOUS SOLUTION INTRAVENOUS ONCE
Status: COMPLETED | OUTPATIENT
Start: 2022-08-14 | End: 2022-08-14

## 2022-08-14 RX ORDER — ONDANSETRON 2 MG/ML
4 INJECTION INTRAMUSCULAR; INTRAVENOUS EVERY 6 HOURS PRN
Status: DISCONTINUED | OUTPATIENT
Start: 2022-08-14 | End: 2022-08-18 | Stop reason: HOSPADM

## 2022-08-14 RX ORDER — GUAIFENESIN 600 MG/1
600 TABLET, EXTENDED RELEASE ORAL 2 TIMES DAILY
Status: DISCONTINUED | OUTPATIENT
Start: 2022-08-14 | End: 2022-08-18 | Stop reason: HOSPADM

## 2022-08-14 RX ORDER — CETIRIZINE HYDROCHLORIDE 10 MG/1
10 TABLET ORAL DAILY
Status: DISCONTINUED | OUTPATIENT
Start: 2022-08-14 | End: 2022-08-18 | Stop reason: HOSPADM

## 2022-08-14 RX ORDER — LISINOPRIL 10 MG/1
10 TABLET ORAL DAILY
Status: DISCONTINUED | OUTPATIENT
Start: 2022-08-14 | End: 2022-08-18 | Stop reason: HOSPADM

## 2022-08-14 RX ORDER — ACETAMINOPHEN 650 MG/1
650 SUPPOSITORY RECTAL EVERY 6 HOURS PRN
Status: DISCONTINUED | OUTPATIENT
Start: 2022-08-14 | End: 2022-08-18 | Stop reason: HOSPADM

## 2022-08-14 RX ORDER — AMLODIPINE BESYLATE 5 MG/1
5 TABLET ORAL DAILY
Status: DISCONTINUED | OUTPATIENT
Start: 2022-08-14 | End: 2022-08-18 | Stop reason: HOSPADM

## 2022-08-14 RX ORDER — ENOXAPARIN SODIUM 100 MG/ML
40 INJECTION SUBCUTANEOUS DAILY
Status: DISCONTINUED | OUTPATIENT
Start: 2022-08-14 | End: 2022-08-18 | Stop reason: HOSPADM

## 2022-08-14 RX ORDER — OXYBUTYNIN CHLORIDE 5 MG/1
5 TABLET ORAL 4 TIMES DAILY
Status: DISCONTINUED | OUTPATIENT
Start: 2022-08-14 | End: 2022-08-18 | Stop reason: HOSPADM

## 2022-08-14 RX ORDER — DEXAMETHASONE SODIUM PHOSPHATE 10 MG/ML
6 INJECTION, SOLUTION INTRAMUSCULAR; INTRAVENOUS EVERY 24 HOURS
Status: DISCONTINUED | OUTPATIENT
Start: 2022-08-15 | End: 2022-08-15

## 2022-08-14 RX ORDER — ESCITALOPRAM OXALATE 10 MG/1
10 TABLET ORAL DAILY
Status: DISCONTINUED | OUTPATIENT
Start: 2022-08-14 | End: 2022-08-15

## 2022-08-14 RX ORDER — HYDROXYZINE HYDROCHLORIDE 25 MG/1
25 TABLET, FILM COATED ORAL 3 TIMES DAILY PRN
Status: DISCONTINUED | OUTPATIENT
Start: 2022-08-14 | End: 2022-08-18 | Stop reason: HOSPADM

## 2022-08-14 RX ORDER — POLYETHYLENE GLYCOL 3350 17 G/17G
17 POWDER, FOR SOLUTION ORAL DAILY PRN
Status: DISCONTINUED | OUTPATIENT
Start: 2022-08-14 | End: 2022-08-18 | Stop reason: HOSPADM

## 2022-08-14 RX ORDER — SODIUM CHLORIDE 0.9 % (FLUSH) 0.9 %
5-40 SYRINGE (ML) INJECTION EVERY 12 HOURS SCHEDULED
Status: DISCONTINUED | OUTPATIENT
Start: 2022-08-14 | End: 2022-08-18 | Stop reason: HOSPADM

## 2022-08-14 RX ORDER — ACETAMINOPHEN 325 MG/1
650 TABLET ORAL EVERY 6 HOURS PRN
Status: DISCONTINUED | OUTPATIENT
Start: 2022-08-14 | End: 2022-08-18 | Stop reason: HOSPADM

## 2022-08-14 RX ORDER — BUDESONIDE AND FORMOTEROL FUMARATE DIHYDRATE 80; 4.5 UG/1; UG/1
2 AEROSOL RESPIRATORY (INHALATION) 2 TIMES DAILY
Status: DISCONTINUED | OUTPATIENT
Start: 2022-08-14 | End: 2022-08-18 | Stop reason: HOSPADM

## 2022-08-14 RX ORDER — IPRATROPIUM BROMIDE AND ALBUTEROL SULFATE 2.5; .5 MG/3ML; MG/3ML
3 SOLUTION RESPIRATORY (INHALATION) ONCE
Status: COMPLETED | OUTPATIENT
Start: 2022-08-14 | End: 2022-08-14

## 2022-08-14 RX ORDER — DEXAMETHASONE SODIUM PHOSPHATE 10 MG/ML
6 INJECTION, SOLUTION INTRAMUSCULAR; INTRAVENOUS ONCE
Status: COMPLETED | OUTPATIENT
Start: 2022-08-14 | End: 2022-08-14

## 2022-08-14 RX ORDER — SODIUM CHLORIDE 0.9 % (FLUSH) 0.9 %
5-40 SYRINGE (ML) INJECTION PRN
Status: DISCONTINUED | OUTPATIENT
Start: 2022-08-14 | End: 2022-08-18 | Stop reason: HOSPADM

## 2022-08-14 RX ORDER — LANOLIN ALCOHOL/MO/W.PET/CERES
1 CREAM (GRAM) TOPICAL DAILY
Status: DISCONTINUED | OUTPATIENT
Start: 2022-08-14 | End: 2022-08-14 | Stop reason: RX

## 2022-08-14 RX ORDER — BUTALBITAL, ACETAMINOPHEN AND CAFFEINE 50; 325; 40 MG/1; MG/1; MG/1
1 TABLET ORAL ONCE
Status: COMPLETED | OUTPATIENT
Start: 2022-08-14 | End: 2022-08-14

## 2022-08-14 RX ORDER — ONDANSETRON 4 MG/1
4 TABLET, ORALLY DISINTEGRATING ORAL EVERY 8 HOURS PRN
Status: DISCONTINUED | OUTPATIENT
Start: 2022-08-14 | End: 2022-08-18 | Stop reason: HOSPADM

## 2022-08-14 RX ADMIN — AMLODIPINE BESYLATE 5 MG: 5 TABLET ORAL at 08:14

## 2022-08-14 RX ADMIN — Medication 2 PUFF: at 07:31

## 2022-08-14 RX ADMIN — ENOXAPARIN SODIUM 40 MG: 100 INJECTION SUBCUTANEOUS at 08:13

## 2022-08-14 RX ADMIN — DEXAMETHASONE SODIUM PHOSPHATE 6 MG: 10 INJECTION, SOLUTION INTRAMUSCULAR; INTRAVENOUS at 22:28

## 2022-08-14 RX ADMIN — Medication 2 PUFF: at 19:13

## 2022-08-14 RX ADMIN — OXYBUTYNIN CHLORIDE 5 MG: 5 TABLET ORAL at 18:11

## 2022-08-14 RX ADMIN — ACETAMINOPHEN 650 MG: 325 TABLET ORAL at 20:16

## 2022-08-14 RX ADMIN — ESCITALOPRAM OXALATE 10 MG: 10 TABLET ORAL at 08:14

## 2022-08-14 RX ADMIN — ACETAMINOPHEN 650 MG: 325 TABLET ORAL at 14:09

## 2022-08-14 RX ADMIN — IPRATROPIUM BROMIDE AND ALBUTEROL SULFATE 3 ML: .5; 2.5 SOLUTION RESPIRATORY (INHALATION) at 02:37

## 2022-08-14 RX ADMIN — OXYBUTYNIN CHLORIDE 5 MG: 5 TABLET ORAL at 14:09

## 2022-08-14 RX ADMIN — GUAIFENESIN 600 MG: 600 TABLET, EXTENDED RELEASE ORAL at 18:11

## 2022-08-14 RX ADMIN — LISINOPRIL 10 MG: 10 TABLET ORAL at 08:14

## 2022-08-14 RX ADMIN — BUTALBITAL, ACETAMINOPHEN, AND CAFFEINE 1 TABLET: 50; 325; 40 TABLET ORAL at 01:41

## 2022-08-14 RX ADMIN — ACETAMINOPHEN 650 MG: 325 TABLET ORAL at 08:13

## 2022-08-14 RX ADMIN — SODIUM CHLORIDE, PRESERVATIVE FREE 10 ML: 5 INJECTION INTRAVENOUS at 08:15

## 2022-08-14 RX ADMIN — DEXAMETHASONE SODIUM PHOSPHATE 6 MG: 10 INJECTION, SOLUTION INTRAMUSCULAR; INTRAVENOUS at 01:48

## 2022-08-14 RX ADMIN — OXYBUTYNIN CHLORIDE 5 MG: 5 TABLET ORAL at 20:16

## 2022-08-14 RX ADMIN — OXYBUTYNIN CHLORIDE 5 MG: 5 TABLET ORAL at 08:14

## 2022-08-14 RX ADMIN — TIOTROPIUM BROMIDE AND OLODATEROL 2 PUFF: 3.124; 2.736 SPRAY, METERED RESPIRATORY (INHALATION) at 07:31

## 2022-08-14 RX ADMIN — SODIUM CHLORIDE 1000 ML: 9 INJECTION, SOLUTION INTRAVENOUS at 03:36

## 2022-08-14 RX ADMIN — CETIRIZINE HYDROCHLORIDE 10 MG: 10 TABLET ORAL at 08:14

## 2022-08-14 ASSESSMENT — PAIN SCALES - GENERAL
PAINLEVEL_OUTOF10: 0
PAINLEVEL_OUTOF10: 10
PAINLEVEL_OUTOF10: 7
PAINLEVEL_OUTOF10: 0
PAINLEVEL_OUTOF10: 7
PAINLEVEL_OUTOF10: 7
PAINLEVEL_OUTOF10: 0

## 2022-08-14 ASSESSMENT — PAIN DESCRIPTION - DESCRIPTORS
DESCRIPTORS: ACHING

## 2022-08-14 ASSESSMENT — LIFESTYLE VARIABLES
HOW OFTEN DO YOU HAVE A DRINK CONTAINING ALCOHOL: NEVER
HOW MANY STANDARD DRINKS CONTAINING ALCOHOL DO YOU HAVE ON A TYPICAL DAY: PATIENT DOES NOT DRINK

## 2022-08-14 ASSESSMENT — PAIN - FUNCTIONAL ASSESSMENT
PAIN_FUNCTIONAL_ASSESSMENT: NONE - DENIES PAIN

## 2022-08-14 ASSESSMENT — PAIN DESCRIPTION - LOCATION
LOCATION: HEAD

## 2022-08-14 ASSESSMENT — PAIN DESCRIPTION - ORIENTATION
ORIENTATION: MID
ORIENTATION: MID

## 2022-08-14 NOTE — H&P
Hospital Medicine History & Physical      PCP: Manuela Knott MD    Date of Admission: 8/13/2022    Date of Service: Pt seen/examined on 8/14/22 and Admitted to Inpatient     Chief Complaint:  SOB    History Of Present Illness: The patient is a 61 y.o. female who presents to [address] [place] [address] with SOB. she has a history of COPD, and just took a home test yesterday and tested positive for COVID, her daughter was here in the ER last night and diagnosed with COVID and the daughter stays with her and helps care for her. Patient reports she just started having symptoms yesterday, associated with cough, headache, has had some nausea/vomiting/diarrhea but not currently nauseous. She is on 2 to 3 L O2 nasal cannula, has been using 3 to 4 L recently, needing 5L O2         Past Medical History:        Diagnosis Date    Angina pectoris (HCC)     Chronic pain     knees and lower back     COPD exacerbation ([facility] [address] 5/20/2018    Depression     Panic disorder     Pneumonia        Past Surgical History:        Procedure Laterality Date    CHOLECYSTECTOMY      COLONOSCOPY         Medications Prior to Admission:    Prior to Admission medications    Medication Sig Start Date End Date Taking?  Authorizing Provider   lisinopril (PRINIVIL;ZESTRIL) 10 MG tablet Take 10 mg by mouth daily    Historical Provider, MD   acetaminophen (TYLENOL) 500 MG tablet Take 500 mg by mouth every 6 hours as needed for Pain    Historical Provider, MD   escitalopram (LEXAPRO) 10 MG tablet Take 10 mg by mouth at bedtime 3/16/22   Historical Provider, MD   umeclidinium-vilanterol (ANORO ELLIPTA) 62.5-25 MCG/INH AEPB inhaler Inhale 1 puff into the lungs daily 4/6/22   Greyson De La Vega MD   alendronate (FOSAMAX) 70 MG tablet Take 70 mg by mouth every 7 days Takes every Monday 3/14/22   Historical Provider, MD SYMBICORT 80-4.5 MCG/ACT AERO Inhale 2 puffs into the lungs 2 times daily 3/15/22   Deidra Hawley MD   amLODIPine (NORVASC) 5 MG tablet Take 1 tablet by mouth daily  Patient not taking: No sig reported 12/16/21   Lianne Herrera MD   Respiratory Therapy Supplies (NEBULIZER/TUBING/MOUTHPIECE) KIT Use with accuneb 12/15/21   Lianne Herrera MD   albuterol (ACCUNEB) 1.25 MG/3ML nebulizer solution Inhale 3 mLs into the lungs every 6 hours as needed for Wheezing 12/15/21   Lianne Herrera MD   albuterol sulfate  (90 Base) MCG/ACT inhaler Inhale 2 puffs into the lungs every 6 hours as needed for Wheezing orShortness of Breath 8/27/21   Deidra Hawley MD   Biotin 300 MCG TABS Take 1 tablet by mouth daily 4/12/21   Sera Hassan MD   loperamide (IMODIUM) 2 MG capsule Take 1 capsule by mouth 4 times daily as needed for Diarrhea  Patient taking differently: Take 2 mg by mouth 4 times daily as needed for Diarrhea (pt takes once daily q AM) 2/7/21   Lady Eryn MD   cetirizine (ZYRTEC) 10 MG tablet Take 10 mg by mouth daily    Historical Provider, MD   hydrOXYzine (ATARAX) 25 MG tablet Take 25 mg by mouth 3 times daily as needed for Itching    Historical Provider, MD   Multiple Vitamins-Minerals (CENTRUM SILVER 50+WOMEN PO) Take 1 tablet by mouth daily    Historical Provider, MD   oxybutynin (DITROPAN) 5 MG tablet Take 5 mg by mouth 4 times daily    Historical Provider, MD       Allergies:  Cephalosporins, Pcn [penicillins], and Hctz [hydrochlorothiazide]    Social History:  The patient currently lives at home    TOBACCO:   reports that she quit smoking about 19 months ago. Her smoking use included cigarettes. She started smoking about 53 years ago. She has a 52.00 pack-year smoking history. She has been exposed to tobacco smoke. She has never used smokeless tobacco.  ETOH:   reports that she does not currently use alcohol after a past usage of about 12.0 standard drinks per week.       Family History:  Reviewed in detail and negative for DM, Early CAD, Cancer, CVA. Positive as follows:        Problem Relation Age of Onset    COPD Mother     Hypertension Mother     Asthma Neg Hx     Cancer Neg Hx     Diabetes Neg Hx     Emphysema Neg Hx     Heart Failure Neg Hx        REVIEW OF SYSTEMS:   Positive for SOB and as noted in the HPI. All other systems reviewed and negative. PHYSICAL EXAM:    /73   Pulse 84   Temp 99.9 °F (37.7 °C) (Oral)   Resp 20   Ht 5' 3\" (1.6 m)   Wt 169 lb 8 oz (76.9 kg)   SpO2 92%   Breastfeeding No   BMI 30.03 kg/m²     General appearance: No apparent distress appears stated age and cooperative. HEENT Normal cephalic, atraumatic without obvious deformity. Pupils equal, round, and reactive to light. Extra ocular muscles intact. Conjunctivae/corneas clear. Neck: Supple, No jugular venous distention/bruits. Trachea midline without thyromegaly or adenopathy with full range of motion. Lungs: diminished b/l  Heart: Regular rate and rhythm with Normal S1/S2 without murmurs, rubs or gallops, point of maximum impulse non-displaced  Abdomen: Soft, non-tender or non-distended without rigidity or guarding and positive bowel sounds all four quadrants. Extremities: No clubbing, cyanosis, or edema bilaterally. Full range of motion without deformity and normal gait intact. Skin: Skin color, texture, turgor normal.  No rashes or lesions. Neurologic: Alert and oriented X 3, neurovascularly intact with sensory/motor intact upper extremities/lower extremities, bilaterally. Cranial nerves: II-XII intact, grossly non-focal.  Mental status: Alert, oriented, thought content appropriate.   Capillary Refill: Acceptable  < 3 seconds  Peripheral Pulses: +3 Easily felt, not easily obliterated with pressure      CXR:  I have reviewed the CXR with the following interpretation: copd changes    CBC   Recent Labs     08/14/22  0204   WBC 4.3   HGB 11.5*   HCT 32.9*    RENAL  Recent Labs     08/14/22 0204 08/14/22  0445   * 123*   K 4.7  --    CL 82*  --    CO2 23  --    BUN 13  --    CREATININE <0.5*  --      LFT'S  Recent Labs     08/14/22  0204   AST 50*   ALT 47*   BILITOT 0.4   ALKPHOS 148*     COAG  No results for input(s): INR in the last 72 hours. CARDIAC ENZYMES  Recent Labs     08/14/22  0445   TROPONINI <0.01       U/A:    Lab Results   Component Value Date/Time    COLORU Yellow 01/11/2022 08:36 PM    WBCUA 0-2 01/11/2022 08:36 PM    RBCUA 0-2 01/11/2022 08:36 PM    BACTERIA Rare 12/10/2021 07:10 AM    CLARITYU Clear 01/11/2022 08:36 PM    SPECGRAV 1.010 01/11/2022 08:36 PM    LEUKOCYTESUR Negative 01/11/2022 08:36 PM    BLOODU TRACE-LYSED 01/11/2022 08:36 PM    GLUCOSEU Negative 01/11/2022 08:36 PM       ABG    Lab Results   Component Value Date/Time    YBP0EGE 22.3 02/04/2021 09:40 PM    BEART -2.2 02/04/2021 09:40 PM    D1MLZNKG 95.4 02/04/2021 09:40 PM    PHART 7.392 02/04/2021 09:40 PM    KRF5WOS 37.5 02/04/2021 09:40 PM    PO2ART 81.4 02/04/2021 09:40 PM    YSU8LHW 23.4 02/04/2021 09:40 PM           Active Hospital Problems    Diagnosis Date Noted    Pneumonia due to COVID-19 virus [U07.1, J12.82] 08/14/2022     Priority: Medium         PHYSICIANS CERTIFICATION:    I certify that David Cage is expected to be hospitalized for > than 2 midnights based on the following assessment and plan:      ASSESSMENT/PLAN:    Acute on chr hypoxic resp failure - uses 2-3 L home O2, now on 5L. Wean as able    Acute COVID PNA - tested covid positive. Started on decadron. CRP level checked. No indication for actemra vs baricitinib. COPD - stable, cont home inhalers    Hyponatremia - Na 123, check urine Na, recheck in am    HTN - controlled, cont home meds      DVT Prophylaxis: lovenox  Diet: ADULT DIET;  Regular  Code Status: Full Code  PT/OT Eval Status: ordered    Maximo Roland MD    Thank you Mary Jo Dennis MD for the opportunity to be involved in this patient's care. If you have any questions or concerns please feel free to contact me at [phone].

## 2022-08-14 NOTE — FLOWSHEET NOTE
08/14/22 0600   Vital Signs   Temp 97.8 °F (36.6 °C)   Temp Source Oral   Heart Rate 91   Heart Rate Source Monitor   Resp 20   BP (!) 147/79   BP Location Right upper arm   MAP (Calculated) 101.67   Patient Position Semi fowlers   Level of Consciousness Alert (0)   MEWS Score 1   Oxygen Therapy   SpO2 94 %   O2 Device Nasal cannula   O2 Flow Rate (L/min) 5 L/min   Height and Weight   Height 5' 3\" (1.6 m)   Weight 169 lb 8 oz (76.9 kg)   Weight Method Bed scale   BSA (Calculated - sq m) 1.85 sq meters   BMI (Calculated) 30.1   Admission questions complete at this time per pt report. Assessment complete, see flowsheets. Pt has scattered bruising/abrasions but no open areas and no pressure injury, +2BLE edema. Pt aware to call for any needs or changes. Call light within reach, telemetry and continuous pulse ox in place; pt refused CHG wipes at this time but provided to pt and use encouraged. Patient is not able to demonstrate the ability to move from a reclining position to an upright position within the recliner due to weakness. Will continue to monitor.   Linwood Wraner RN

## 2022-08-14 NOTE — PROGRESS NOTES
Bedside report and transfer of care given to Юлия Briceno RN. Pt currently resting in bed with the call light within reach. Pt denies any other care needs at this time. Pt stable at this time.

## 2022-08-14 NOTE — ED PROVIDER NOTES
Emergency Department Physician Note     Location: [place] [address] ED  8/13/2022    CHIEF COMPLAINT  Positive For Covid-19 (Patient states that she tested positive for covid today, onset of SX today. C/O HA and SOB, increased 02 needs- on 2-3L 02 at basline, states she bumped herself to 4L today. Pulse OX broke so she is unsure what 02 was running. )      HISTORY OF PRESENT ILLNESS  Kareem Alejandra is a 61 y.o. female presents to the ED with shortness of breath, much more than her normal, she has a history of COPD, and just took a home test and was positive for COVID, her daughter was here in the ER last night and diagnosed with COVID and the daughter stays with her and helps care for her. Patient reports she just started having symptoms today, cough, headache, has had some nausea/vomiting/diarrhea but not currently nauseous, no measured fever, no chest pain, chest tightness and wheezing, has not been using her neb machine, she is on 2 to 3 L O2 nasal cannula, has been using 3 to 4 L recently, reportedly her pulse ox broke, although we just gave her daughter a new pulse ox last night, no neck stiffness, no dysuria or urinary changes, denies recent steroid use, she is not COVID vaccinated, no other complaints, modifying factors or associated symptoms. I have reviewed the following from the nursing documentation. Past Medical History:   Diagnosis Date    Angina pectoris (HCC)     Chronic pain     knees and lower back     COPD exacerbation ([facility] [address] 5/20/2018    Depression     Panic disorder     Pneumonia      Past Surgical History:   Procedure Laterality Date    CHOLECYSTECTOMY      COLONOSCOPY       Family History   Problem Relation Age of Onset    COPD Mother     Hypertension Mother     Asthma Neg Hx     Cancer Neg Hx     Diabetes Neg Hx     Emphysema Neg Hx     Heart Failure Neg Hx      Social History     Socioeconomic History    Marital status:       Spouse name: Not on file    Number of children: 6    Years of education: Not on file    Highest education level: Not on file   Occupational History    Not on file   Tobacco Use    Smoking status: Former     Packs/day: 1.00     Years: 52.00     Pack years: 52.00     Types: Cigarettes     Start date:      Quit date:      Years since quittin.6    Smokeless tobacco: Never   Vaping Use    Vaping Use: Never used   Substance and Sexual Activity    Alcohol use: Not Currently     Alcohol/week: 12.0 standard drinks     Types: 12 Cans of beer per week    Drug use: No    Sexual activity: Never   Other Topics Concern    Not on file   Social History Narrative    Not on file     Social Determinants of Health     Financial Resource Strain: Not on file   Food Insecurity: Not on file   Transportation Needs: Not on file   Physical Activity: Not on file   Stress: Not on file   Social Connections: Not on file   Intimate Partner Violence: Not on file   Housing Stability: Not on file     Current Facility-Administered Medications   Medication Dose Route Frequency Provider Last Rate Last Admin    0.9 % sodium chloride bolus  1,000 mL IntraVENous Once Twilla Perry, DO         Current Outpatient Medications   Medication Sig Dispense Refill    lisinopril (PRINIVIL;ZESTRIL) 10 MG tablet Take 10 mg by mouth daily      acetaminophen (TYLENOL) 500 MG tablet Take 500 mg by mouth every 6 hours as needed for Pain      escitalopram (LEXAPRO) 10 MG tablet       umeclidinium-vilanterol (ANORO ELLIPTA) 62.5-25 MCG/INH AEPB inhaler Inhale 1 puff into the lungs daily 60 each 5    alendronate (FOSAMAX) 70 MG tablet       SYMBICORT 80-4.5 MCG/ACT AERO Inhale 2 puffs into the lungs 2 times daily 1 each 5    amLODIPine (NORVASC) 5 MG tablet Take 1 tablet by mouth daily (Patient not taking: Reported on 2022) 30 tablet 3    Respiratory Therapy Supplies (NEBULIZER/TUBING/MOUTHPIECE) KIT Use with accuneb 1 kit 0    albuterol (ACCUNEB) 1.25 MG/3ML nebulizer solution Inhale 3 mLs into the lungs every 6 hours as needed for Wheezing 120 mL 3    albuterol sulfate  (90 Base) MCG/ACT inhaler Inhale 2 puffs into the lungs every 6 hours as needed for Wheezing orShortness of Breath 3 Inhaler 5    Biotin 300 MCG TABS Take 1 tablet by mouth daily 30 tablet 3    loperamide (IMODIUM) 2 MG capsule Take 1 capsule by mouth 4 times daily as needed for Diarrhea 90 capsule 0    cetirizine (ZYRTEC) 10 MG tablet Take 10 mg by mouth daily      hydrOXYzine (ATARAX) 25 MG tablet Take 25 mg by mouth 3 times daily as needed for Itching      Multiple Vitamins-Minerals (CENTRUM SILVER 50+WOMEN PO) Take 1 tablet by mouth daily      oxybutynin (DITROPAN) 5 MG tablet Take 5 mg by mouth 4 times daily       Allergies   Allergen Reactions    Cephalosporins Shortness Of Breath    Pcn [Penicillins] Anaphylaxis and Hives    Hctz [Hydrochlorothiazide] Other (See Comments)     Recurrent low sodium       REVIEW OF SYSTEMS  10 systems reviewed, pertinent positives per HPI otherwise noted to be negative. PHYSICAL EXAM   BP (!) 153/87   Pulse 78   Temp 98.4 °F (36.9 °C) (Oral)   Resp 22   Ht 5' 3\" (1.6 m)   Wt 145 lb (65.8 kg)   SpO2 97%   BMI 25.69 kg/m²   GENERAL APPEARANCE: Awake and alert. Cooperative. Chronically ill-appearing, appears older than stated age  HEAD: Normocephalic. Atraumatic. No cardona's sign. EYES: PERRL. EOM's grossly intact. No scleral icterus. No drainage. No periorbital ecchymosis. ENT: Mucous membranes are moist. Airway patent. No stridor. No epistaxis. No otorrhea or rhinorrhea. Wearing nasal cannula, TMs without bulging/erythema/edema bilaterally, no exudates, midline uvula  NECK: Supple. No rigidity  HEART: RRR. No murmurs  LUNGS: Respirations with increased work of breathing, tachypneic around 20, pursed lip breathing, lungs are with coarse breath sounds, diminished throughout, scattered wheezes throughout, no crackles or rhonchi  ABDOMEN: Soft. Non-distended. Non-tender.  No guarding, no rebound tenderness, no rigidity. Normal bowel sounds. No McBurney's point tenderness, negative Rovsing's sign, negative Espinoza's sign  EXTREMITIES: No peripheral edema. No calf tenderness, moves all extremities equally. All extremities neurovascularly intact. No obvious deformities. SKIN: Warm and dry. No acute rashes. NEUROLOGICAL: Alert and oriented x4. No gross facial drooping. Normal speech, did not assess gait due to significant dyspnea, she was able to stand and go to bedside commode  PSYCHIATRIC: Normal mood and affect. LABS  I have reviewed all labs for this visit.    Results for orders placed or performed during the hospital encounter of 08/13/22   COVID-19, Rapid    Specimen: Nasopharyngeal Swab   Result Value Ref Range    SARS-CoV-2, NAAT DETECTED (AA) Not Detected   CBC with Auto Differential   Result Value Ref Range    WBC 4.3 4.0 - 11.0 K/uL    RBC 3.48 (L) 4.00 - 5.20 M/uL    Hemoglobin 11.5 (L) 12.0 - 16.0 g/dL    Hematocrit 32.9 (L) 36.0 - 48.0 %    MCV 94.6 80.0 - 100.0 fL    MCH 33.1 26.0 - 34.0 pg    MCHC 35.0 31.0 - 36.0 g/dL    RDW 11.9 (L) 12.4 - 15.4 %    Platelets 374 535 - 677 K/uL    MPV 7.9 5.0 - 10.5 fL    PLATELET SLIDE REVIEW Decreased     SLIDE REVIEW see below     Path Consult No     Neutrophils % 77.9 %    Lymphocytes % 7.6 %    Monocytes % 13.0 %    Eosinophils % 0.5 %    Basophils % 1.0 %    Neutrophils Absolute 3.4 1.7 - 7.7 K/uL    Lymphocytes Absolute 0.3 (L) 1.0 - 5.1 K/uL    Monocytes Absolute 0.6 0.0 - 1.3 K/uL    Eosinophils Absolute 0.0 0.0 - 0.6 K/uL    Basophils Absolute 0.0 0.0 - 0.2 K/uL   Comprehensive Metabolic Panel   Result Value Ref Range    Sodium 116 (LL) 136 - 145 mmol/L    Potassium 4.7 3.5 - 5.1 mmol/L    Chloride 82 (L) 99 - 110 mmol/L    CO2 23 21 - 32 mmol/L    Anion Gap 11 3 - 16    Glucose 120 (H) 70 - 99 mg/dL    BUN 13 7 - 20 mg/dL    Creatinine <0.5 (L) 0.6 - 1.2 mg/dL    GFR Non-African American >60 >60    GFR  >60 >60 Calcium 9.1 8.3 - 10.6 mg/dL    Total Protein 7.4 6.4 - 8.2 g/dL    Albumin 4.4 3.4 - 5.0 g/dL    Albumin/Globulin Ratio 1.5 1.1 - 2.2    Total Bilirubin 0.4 0.0 - 1.0 mg/dL    Alkaline Phosphatase 148 (H) 40 - 129 U/L    ALT 47 (H) 10 - 40 U/L    AST 50 (H) 15 - 37 U/L   Lactic Acid   Result Value Ref Range    Lactic Acid 0.8 0.4 - 2.0 mmol/L   Procalcitonin   Result Value Ref Range    Procalcitonin 0.14 0.00 - 0.15 ng/mL   Lipase   Result Value Ref Range    Lipase 40.0 13.0 - 60.0 U/L   C-Reactive Protein   Result Value Ref Range    CRP 9.8 (H) 0.0 - 5.1 mg/L   Sodium   Result Value Ref Range    Sodium 123 (L) 136 - 145 mmol/L   Blood Gas, Venous   Result Value Ref Range    pH, Kevin 7.365 7.350 - 7.450    pCO2, Kevin 44.9 40.0 - 50.0 mmHg    pO2, Kevin 72.9 (H) 25.0 - 40.0 mmHg    HCO3, Venous 25.1 23.0 - 29.0 mmol/L    Base Excess, Kevin -0.5 -3.0 - 3.0 mmol/L    O2 Sat, Kevin 94 Not Established %    Carboxyhemoglobin 4.7 (H) 0.0 - 1.5 %    MetHgb, Kevin 0.3 <1.5 %    TC02 (Calc), Kevin 27 Not Established mmol/L    O2 Content, Kevin 15 Not Established VOL %    O2 Therapy Unknown        EKG  EKG interpretation by me: Normal sinus rhythm, left axis deviation, baseline artifact, rate 78, QTc 435, likely old inferior infarct, no ST segment or T wave changes indicative of acute ischemia    RADIOLOGY  XR CHEST PORTABLE    Result Date: 8/14/2022  EXAMINATION: ONE XRAY VIEW OF THE CHEST 8/13/2022 11:57 pm COMPARISON: 01/11/2022 HISTORY: ORDERING SYSTEM PROVIDED HISTORY: SOB. covid TECHNOLOGIST PROVIDED HISTORY: Reason for exam:->SOB. covid Reason for Exam: SOB, hx of COPD FINDINGS: Coarse interstitial markings are present with bibasilar opacities. The coarse parenchymal markings are present on the old study as well but the basilar opacities are increased. Small amount of pleural effusion are not excluded. There is no sign of pneumothorax. The cardiac silhouette is not enlarged.   The central pulmonary vasculature/enlarged pulmonary arteries are stable. No lines or tubes in the chest.  Underpenetration of the spine but the shoulder regions appear stable. Coarse parenchymal markings may relate to chronic lung disease. However there are also increased basilar opacities which may relate to subsegmental atelectasis and or the reported COVID. ED COURSE/MDM  Patient seen and evaluated. Old records reviewed. Labs and imaging reviewed and results discussed with patient. 61 y.o. female with dyspnea, increased supplemental oxygen requirement, hypoxic down to 83% with just getting up to stand on 4 L nasal cannula, placed on 5 temporarily, she was given 3 DuoNeb treatments, Decadron, COVID-positive, unvaccinated, found to have sodium of 116, her norm had been in the 120s in the past, given saline bolus, and rechecking, no current suspicion for sepsis of bacterial origin, no chest pain, given Fioricet for headache, she was feeling a little better, mild LFT elevation, but no abdominal pain or active vomiting here, declined need for nausea medication, no current suspicion for ACS/PE/dissection, updated patient and her daughter via phone, hospitalist agreed to accept for admission.     Orders Placed This Encounter   Procedures    COVID-19, Rapid    XR CHEST PORTABLE    CBC with Auto Differential    Comprehensive Metabolic Panel    Lactic Acid    Procalcitonin    Lipase    C-Reactive Protein    Sodium    Blood Gas, Venous    Check Pulse Oximetry while ambulating    Cardiac Monitor - ED Only    Continuous Pulse Oximetry    Inpatient consult to Hospitalist    EKG 12 Lead    Saline lock IV    ADMIT TO INPATIENT     Orders Placed This Encounter   Medications    ipratropium-albuterol (DUONEB) nebulizer solution 3 mL     Order Specific Question:   Initiate RT Bronchodilator Protocol     Answer:   No    dexamethasone (PF) (DECADRON) injection 6 mg    butalbital-acetaminophen-caffeine (FIORICET, ESGIC) per tablet 1 tablet    0.9 % sodium chloride bolus ED Course as of 08/14/22 0533   Shanelle Monae Aug 14, 2022   0335 Dr. Kasie Cardoso agreed to accept for admission to hospitalist service. [SY]      ED Course User Index  [SY] Dipti Wiliam        Is this patient to be included in the SEP-1 Core Measure due to severe sepsis or septic shock? No   Exclusion criteria - the patient is NOT to be included for SEP-1 Core Measure due to:  Viral etiology found or highly suspected (including COVID-19) without concomitant bacterial infection    The total critical care time spent while evaluating and treating this patient was 35 minutes. This excludes time spent doing separately billable procedures. This includes time at the bedside, data interpretation, medication management, obtaining critical history from collateral sources if the patient is unable to provide it directly, and physician consultation. Specifics of interventions taken and potentially life-threatening diagnostic considerations are listed in the medical decision making. CLINICAL IMPRESSION  1. COVID-19    2. Acute on chronic respiratory failure with hypoxia (HCC)    3. Hyponatremia    4. Unvaccinated for covid-19    5. LFT elevation    6. Essential hypertension        Blood pressure (!) 153/87, pulse 78, temperature 98.4 °F (36.9 °C), temperature source Oral, resp. rate 22, height 5' 3\" (1.6 m), weight 145 lb (65.8 kg), SpO2 97 %, not currently breastfeeding. DISPOSITION  Patricia Khan was admitted in stable condition.                    Dipti Swain DO  08/14/22 1337

## 2022-08-14 NOTE — PROGRESS NOTES
4 Eyes Skin Assessment     The patient is being assessed for   Admission    I agree that 2 RN's have performed a thorough Head to Toe Skin Assessment on the patient. ALL assessment sites listed below have been assessed. Areas assessed for pressure by both nurses:   [x]   Head, Face, and Ears   [x]   Shoulders, Back, and Chest, Abdomen  [x]   Arms, Elbows, and Hands   [x]   Coccyx, Sacrum, and Ischium  [x]   Legs, Feet, and Heels    Scattered abrasions/bruising present, +2 BLE edema; no open wounds or pressure injuries. Skin Assessed Under all Medical Devices by both nurses:  O2 device tubing              All Mepilex Borders were peeled back and area peeked at by both nurses:  No: n/a  Please list where Mepilex Borders are located:  n/a             **SHARE this note so that the co-signing nurse is able to place an eSignature**    Co-signer eSignature: Electronically signed by Kimberli Leonard RN on 8/14/22 at 7:00 AM EDT    Does the Patient have Skin Breakdown related to pressure?   No     (Insert Photo here:n/a)         William Prevention initiated:  No   Wound Care Orders initiated:  No      WOC nurse consulted for Pressure Injury (Stage 3,4, Unstageable, DTI, NWPT, Complex wounds)and New or Established Ostomies:  NA      Primary Nurse eSignature: Electronically signed by Sahara Muniz RN on 8/14/22 at 6:54 AM EDT

## 2022-08-14 NOTE — ED NOTES
SBAR report given to [facility] RN  Questions answered to Cornelius Joaquin RN  08/14/22 8948

## 2022-08-14 NOTE — PROGRESS NOTES
Voicemail left for pt's daughter Eun Prater, pt wanted her to be notified that she was admitted.  Electronically signed by Rolando Shah RN on 8/14/2022 at 6:23 AM

## 2022-08-14 NOTE — PROGRESS NOTES
Shift assessment completed. See flow sheet. Vitals are stable. Patient is currently on 5L of O2 and desats with movement but quickly recovers. Patient used bedside commode and is currently eating breakfast. Patient given Tylenol for pain. Call light within reach.

## 2022-08-14 NOTE — ED NOTES
Dr. Colt Griffith at [facility]  08/14/22 0320    Dr. Wendi Crowder returned call at [facility]  08/14/22 2589

## 2022-08-14 NOTE — PROGRESS NOTES
Patient is awake in bed. Patient requested Tylenol for headache. Tylenol given. Patient is A&O x4 x4. Patient denies further needs at this time. Call light within reach.

## 2022-08-14 NOTE — ED NOTES
RN assisted patient to bedside commode x1. Patient oxygen saturation dropped to 83% on 3L nasal cannula. Patient assisted back into bed, RN titrated oxygen to 5L nasal cannula to obtain an oxygen saturation of 93%. MD aware.       Fortunato Montes De Oca RN  08/14/22 1670

## 2022-08-15 PROBLEM — U07.1 COVID-19: Status: ACTIVE | Noted: 2022-08-15

## 2022-08-15 PROBLEM — I10 ESSENTIAL HYPERTENSION: Status: ACTIVE | Noted: 2022-08-15

## 2022-08-15 LAB
ALBUMIN SERPL-MCNC: 4.4 G/DL (ref 3.4–5)
ALP BLD-CCNC: 136 U/L (ref 40–129)
ALT SERPL-CCNC: 53 U/L (ref 10–40)
ANION GAP SERPL CALCULATED.3IONS-SCNC: 9 MMOL/L (ref 3–16)
AST SERPL-CCNC: 68 U/L (ref 15–37)
BASOPHILS ABSOLUTE: 0 K/UL (ref 0–0.2)
BASOPHILS RELATIVE PERCENT: 0.6 %
BILIRUB SERPL-MCNC: 0.3 MG/DL (ref 0–1)
BILIRUBIN DIRECT: <0.2 MG/DL (ref 0–0.3)
BILIRUBIN, INDIRECT: ABNORMAL MG/DL (ref 0–1)
BUN BLDV-MCNC: 13 MG/DL (ref 7–20)
C-REACTIVE PROTEIN: 4.2 MG/L (ref 0–5.1)
CALCIUM SERPL-MCNC: 9.2 MG/DL (ref 8.3–10.6)
CHLORIDE BLD-SCNC: 89 MMOL/L (ref 99–110)
CO2: 27 MMOL/L (ref 21–32)
CREAT SERPL-MCNC: 0.6 MG/DL (ref 0.6–1.2)
EOSINOPHILS ABSOLUTE: 0 K/UL (ref 0–0.6)
EOSINOPHILS RELATIVE PERCENT: 0 %
GFR AFRICAN AMERICAN: >60
GFR NON-AFRICAN AMERICAN: >60
GLUCOSE BLD-MCNC: 129 MG/DL (ref 70–99)
HCT VFR BLD CALC: 35.1 % (ref 36–48)
HEMOGLOBIN: 11.8 G/DL (ref 12–16)
LACTIC ACID: 0.9 MMOL/L (ref 0.4–2)
LYMPHOCYTES ABSOLUTE: 0.5 K/UL (ref 1–5.1)
LYMPHOCYTES RELATIVE PERCENT: 12.8 %
MCH RBC QN AUTO: 31.9 PG (ref 26–34)
MCHC RBC AUTO-ENTMCNC: 33.7 G/DL (ref 31–36)
MCV RBC AUTO: 94.6 FL (ref 80–100)
MONOCYTES ABSOLUTE: 0.4 K/UL (ref 0–1.3)
MONOCYTES RELATIVE PERCENT: 10.4 %
NEUTROPHILS ABSOLUTE: 3 K/UL (ref 1.7–7.7)
NEUTROPHILS RELATIVE PERCENT: 76.2 %
PDW BLD-RTO: 11.9 % (ref 12.4–15.4)
PLATELET # BLD: 189 K/UL (ref 135–450)
PMV BLD AUTO: 7 FL (ref 5–10.5)
POTASSIUM REFLEX MAGNESIUM: 4.9 MMOL/L (ref 3.5–5.1)
RBC # BLD: 3.71 M/UL (ref 4–5.2)
SODIUM BLD-SCNC: 125 MMOL/L (ref 136–145)
TOTAL PROTEIN: 7 G/DL (ref 6.4–8.2)
WBC # BLD: 3.9 K/UL (ref 4–11)

## 2022-08-15 PROCEDURE — 83605 ASSAY OF LACTIC ACID: CPT

## 2022-08-15 PROCEDURE — 2060000000 HC ICU INTERMEDIATE R&B

## 2022-08-15 PROCEDURE — 2580000003 HC RX 258: Performed by: INTERNAL MEDICINE

## 2022-08-15 PROCEDURE — 99223 1ST HOSP IP/OBS HIGH 75: CPT | Performed by: INTERNAL MEDICINE

## 2022-08-15 PROCEDURE — 97530 THERAPEUTIC ACTIVITIES: CPT

## 2022-08-15 PROCEDURE — 97166 OT EVAL MOD COMPLEX 45 MIN: CPT

## 2022-08-15 PROCEDURE — 86140 C-REACTIVE PROTEIN: CPT

## 2022-08-15 PROCEDURE — 99233 SBSQ HOSP IP/OBS HIGH 50: CPT | Performed by: INTERNAL MEDICINE

## 2022-08-15 PROCEDURE — 6370000000 HC RX 637 (ALT 250 FOR IP): Performed by: INTERNAL MEDICINE

## 2022-08-15 PROCEDURE — 6360000002 HC RX W HCPCS: Performed by: INTERNAL MEDICINE

## 2022-08-15 PROCEDURE — 97161 PT EVAL LOW COMPLEX 20 MIN: CPT

## 2022-08-15 PROCEDURE — 36415 COLL VENOUS BLD VENIPUNCTURE: CPT

## 2022-08-15 PROCEDURE — 6360000002 HC RX W HCPCS: Performed by: HOSPITALIST

## 2022-08-15 PROCEDURE — 80048 BASIC METABOLIC PNL TOTAL CA: CPT

## 2022-08-15 PROCEDURE — 94761 N-INVAS EAR/PLS OXIMETRY MLT: CPT

## 2022-08-15 PROCEDURE — 6370000000 HC RX 637 (ALT 250 FOR IP): Performed by: HOSPITALIST

## 2022-08-15 PROCEDURE — 94640 AIRWAY INHALATION TREATMENT: CPT

## 2022-08-15 PROCEDURE — 2700000000 HC OXYGEN THERAPY PER DAY

## 2022-08-15 PROCEDURE — XW033E5 INTRODUCTION OF REMDESIVIR ANTI-INFECTIVE INTO PERIPHERAL VEIN, PERCUTANEOUS APPROACH, NEW TECHNOLOGY GROUP 5: ICD-10-PCS | Performed by: INTERNAL MEDICINE

## 2022-08-15 PROCEDURE — 85025 COMPLETE CBC W/AUTO DIFF WBC: CPT

## 2022-08-15 PROCEDURE — 80076 HEPATIC FUNCTION PANEL: CPT

## 2022-08-15 PROCEDURE — 97116 GAIT TRAINING THERAPY: CPT

## 2022-08-15 RX ORDER — ESCITALOPRAM OXALATE 10 MG/1
10 TABLET ORAL NIGHTLY
Status: DISCONTINUED | OUTPATIENT
Start: 2022-08-15 | End: 2022-08-18 | Stop reason: HOSPADM

## 2022-08-15 RX ADMIN — GUAIFENESIN 600 MG: 600 TABLET, EXTENDED RELEASE ORAL at 22:06

## 2022-08-15 RX ADMIN — CETIRIZINE HYDROCHLORIDE 10 MG: 10 TABLET ORAL at 08:47

## 2022-08-15 RX ADMIN — OXYBUTYNIN CHLORIDE 5 MG: 5 TABLET ORAL at 22:05

## 2022-08-15 RX ADMIN — OXYBUTYNIN CHLORIDE 5 MG: 5 TABLET ORAL at 08:47

## 2022-08-15 RX ADMIN — ESCITALOPRAM OXALATE 10 MG: 10 TABLET ORAL at 22:37

## 2022-08-15 RX ADMIN — Medication 2 PUFF: at 07:52

## 2022-08-15 RX ADMIN — LISINOPRIL 10 MG: 10 TABLET ORAL at 08:46

## 2022-08-15 RX ADMIN — GUAIFENESIN 600 MG: 600 TABLET, EXTENDED RELEASE ORAL at 08:47

## 2022-08-15 RX ADMIN — AMLODIPINE BESYLATE 5 MG: 5 TABLET ORAL at 08:46

## 2022-08-15 RX ADMIN — REMDESIVIR 200 MG: 100 INJECTION, POWDER, LYOPHILIZED, FOR SOLUTION INTRAVENOUS at 17:22

## 2022-08-15 RX ADMIN — DEXAMETHASONE 6 MG: 4 TABLET ORAL at 22:05

## 2022-08-15 RX ADMIN — ENOXAPARIN SODIUM 40 MG: 100 INJECTION SUBCUTANEOUS at 08:46

## 2022-08-15 RX ADMIN — OXYBUTYNIN CHLORIDE 5 MG: 5 TABLET ORAL at 13:49

## 2022-08-15 RX ADMIN — ACETAMINOPHEN 650 MG: 325 TABLET ORAL at 17:29

## 2022-08-15 RX ADMIN — TIOTROPIUM BROMIDE AND OLODATEROL 2 PUFF: 3.124; 2.736 SPRAY, METERED RESPIRATORY (INHALATION) at 07:53

## 2022-08-15 RX ADMIN — OXYBUTYNIN CHLORIDE 5 MG: 5 TABLET ORAL at 17:24

## 2022-08-15 RX ADMIN — ACETAMINOPHEN 650 MG: 325 TABLET ORAL at 11:10

## 2022-08-15 NOTE — PROGRESS NOTES
Handoff report given to Asad Ledesma RN. Care transferred.      Electronically signed by Jeannette Rubin RN on 8/15/2022 at 7:32 AM

## 2022-08-15 NOTE — CONSULTS
Patient is being seen at the request of Dr. Britni Fuentes for a consultation for COVID-19 and respiratory failure in an unvaccinated patient      HISTORY OF PRESENT ILLNESS: This is a 31-year-old female who presented to the emergency department on 8/14/2022 with a 1 day history of moderate to severe cough, associated with mild headache, minimal nausea and diarrhea. Her oxygen requirement increased from 2 L nasal cannula to 4 L nasal cannula and finally to 5 L nasal cannula. She does not feel better with treatment so far including supplemental oxygen. PAST MEDICAL HISTORY:  Past Medical History:   Diagnosis Date    Angina pectoris (HCC)     Chronic pain     knees and lower back     COPD exacerbation ([address] 5/20/2018    Depression     Panic disorder     Pneumonia      PAST SURGICAL HISTORY:  Past Surgical History:   Procedure Laterality Date    CHOLECYSTECTOMY      COLONOSCOPY         FAMILY HISTORY:  family history includes COPD in her mother; Hypertension in her mother. SOCIAL HISTORY:   reports that she quit smoking about 19 months ago. Her smoking use included cigarettes. She started smoking about 53 years ago. She has a 52.00 pack-year smoking history. She has been exposed to tobacco smoke.  She has never used smokeless tobacco.    Scheduled Meds:   dexamethasone  6 mg Oral Daily    [START ON 8/16/2022] remdesivir IVPB  100 mg IntraVENous Q24H    amLODIPine  5 mg Oral Daily    cetirizine  10 mg Oral Daily    escitalopram  10 mg Oral Daily    lisinopril  10 mg Oral Daily    oxybutynin  5 mg Oral 4x Daily    budesonide-formoterol  2 puff Inhalation BID    tiotropium-olodaterol  2 puff Inhalation Daily    sodium chloride flush  5-40 mL IntraVENous 2 times per day    enoxaparin  40 mg SubCUTAneous Daily    guaiFENesin  600 mg Oral BID     Continuous Infusions:   sodium chloride       PRN Meds:  albuterol-ipratropium, hydrOXYzine HCl, sodium chloride flush, sodium chloride, ondansetron **OR** ondansetron, polyethylene glycol, acetaminophen **OR** acetaminophen    ALLERGIES:  Patient is allergic to cephalosporins, pcn [penicillins], and hctz [hydrochlorothiazide]. REVIEW OF SYSTEMS:  Constitutional: Negative for fever  HENT: Negative for sore throat  Eyes: Negative for redness   Respiratory: + for dyspnea, cough  Cardiovascular: Negative for chest pain  Gastrointestinal: + diarrhea   Genitourinary: Negative for hematuria   Musculoskeletal: Negative for arthralgias   Skin: Negative for rash  Neurological: + Headache  Hematological: Negative for adenopathy  Psychiatric/Behavorial: Negative for anxiety    PHYSICAL EXAM:  Blood pressure 132/70, pulse 90, temperature 98 °F (36.7 °C), temperature source Oral, resp. rate 20, height 5' 3\" (1.6 m), weight 167 lb 3.2 oz (75.8 kg), SpO2 95 %, not currently breastfeeding.' on 5 L  Gen: No distress. Eyes: PERRL. No sclera icterus. No conjunctival injection. ENT: No discharge. Pharynx clear. Neck: Trachea midline. No obvious mass. Resp: No accessory muscle use. Very few crackles. No wheezes. No rhonchi. No dullness on percussion. CV: Regular rate. Regular rhythm. No murmur or rub. No edema. Peripheral pulses are 2+. Capillary refill is less than 3 seconds. GI: Non-tender. Non-distended. No hernia. Skin: Warm and dry. No nodule on exposed extremities. Lymph: No cervical LAD. No supraclavicular LAD. M/S: No cyanosis. No joint deformity. No clubbing. Neuro: Awake. Alert. Moves all four extremities. Psych: Oriented x 3. No anxiety.      LABS:  CBC:   Recent Labs     08/14/22  0204 08/15/22  0530   WBC 4.3 3.9*   HGB 11.5* 11.8*   HCT 32.9* 35.1*   MCV 94.6 94.6    189     BMP:   Recent Labs     08/14/22  0204 08/14/22  0445 08/14/22  1736 08/15/22  0530   * 123* 125* 125*   K 4.7  --  4.8 4.9   CL 82*  --  88* 89*   CO2 23  --  28 27   BUN 13  --  16 13   CREATININE <0.5*  --  0.6 0.6     LIVER PROFILE:   Recent Labs     08/14/22  0204 08/15/22  0530   AST 50* 68*   ALT 47* 53*   LIPASE 40.0  --    BILIDIR  --  <0.2   BILITOT 0.4 0.3   ALKPHOS 148* 136*     PT/INR: No results for input(s): PROTIME, INR in the last 72 hours. APTT: No results for input(s): APTT in the last 72 hours. UA:No results for input(s): NITRITE, COLORU, PHUR, LABCAST, WBCUA, RBCUA, MUCUS, TRICHOMONAS, YEAST, BACTERIA, CLARITYU, SPECGRAV, LEUKOCYTESUR, UROBILINOGEN, BILIRUBINUR, BLOODU, GLUCOSEU, AMORPHOUS in the last 72 hours. Invalid input(s): KETONESU  No results for input(s): PHART, PQX3TWQ, PO2ART in the last 72 hours.     Micro:  8/14/2022 SARS-CoV-2 positive    Imaging:  Chest imaging was reviewed by me and showed   CXR 8/14/2022 bilateral bibasilar predominant infiltrates    ASSESSMENT:  COVID-19 pneumonia in an vaccinated patient, no booster  Acute hypoxemic respiratory failure  Mild transaminitis  Leukopenia  Moderate COPD/emphysema w/o exacerbation  KAMLA lung cancer, clinical diagnosis     PLAN:  COVID-19 isolation, droplet plus  Supplemental oxygen to maintain SaO2 >92%; monitor saturations closely   Remdesevir D#1, LFT monitoring for high risk medication  Decadron D#1, 6 mg daily, plan to change to am dosing tomorrow  Consider baricitinib or tocilizumab if clinically worsening on above treatment  Inhaled bronchodilators as needed, MDI preferred   Request last note from Dr. Tatiana Myers re: SBRT  Lovenox 40 daily

## 2022-08-15 NOTE — FLOWSHEET NOTE
08/15/22 0715   Vital Signs   Temp 98 °F (36.7 °C)   Temp Source Oral   Heart Rate 90   Heart Rate Source Monitor   Resp 20   /75   BP Location Left Arm   MAP (Calculated) 94.67   Level of Consciousness Alert (0)   MEWS Score 1   Pain Assessment   Pain Assessment None - Denies Pain   Oxygen Therapy   SpO2 96 %   O2 Device Nasal cannula   O2 Flow Rate (L/min) 5 L/min   Pt resting quietly in bed no s/s of ditress noted call light within reach bed low position see flowsheet for full assessment

## 2022-08-15 NOTE — PROGRESS NOTES
IM Progress Note    Admit Date:  8/13/2022    Admitted with COVID-19 pneumonia, acute on chronic hypoxic respiratory failure    Subjective:  Ms. Anselmo Shone is feeling a little better today. Still gets dyspneic and desaturates with activity. On oxygen 5 L. Baseline O2 3 L at home. Complains of persistent headache, appears dyspneic fatigued . Objective:   /70   Pulse 90   Temp 98 °F (36.7 °C) (Oral)   Resp 20   Ht 5' 3\" (1.6 m)   Wt 167 lb 3.2 oz (75.8 kg)   SpO2 95%   Breastfeeding No   BMI 29.62 kg/m²     Intake/Output Summary (Last 24 hours) at 8/15/2022 1539  Last data filed at 8/15/2022 1311  Gross per 24 hour   Intake 1185 ml   Output 2200 ml   Net -1015 ml         Physical Exam:  General:  Awake, alert, well-oriented. Appears ill and fatigued. Tachypneic  Skin:  Warm and dry  Neck:  JVD absent. Neck supple  Chest: Diminished breath sounds. No wheezes, rales or rhonchi. Cardiovascular:  RRR ,S1S2 normal  Abdomen:  Soft, non tender, non distended, BS +  Extremities:  No edema. Intact peripheral pulses.  Brisk cap refill, < 2 secs  Neuro: non focal      Medications:   Scheduled Meds:   dexamethasone  6 mg Oral Daily    amLODIPine  5 mg Oral Daily    cetirizine  10 mg Oral Daily    escitalopram  10 mg Oral Daily    lisinopril  10 mg Oral Daily    oxybutynin  5 mg Oral 4x Daily    budesonide-formoterol  2 puff Inhalation BID    tiotropium-olodaterol  2 puff Inhalation Daily    sodium chloride flush  5-40 mL IntraVENous 2 times per day    enoxaparin  40 mg SubCUTAneous Daily    guaiFENesin  600 mg Oral BID       Continuous Infusions:   sodium chloride         Data:  CBC:   Recent Labs     08/14/22  0204 08/15/22  0530   WBC 4.3 3.9*   RBC 3.48* 3.71*   HGB 11.5* 11.8*   HCT 32.9* 35.1*   MCV 94.6 94.6   RDW 11.9* 11.9*    189     BMP:   Recent Labs     08/14/22  0204 08/14/22  0445 08/14/22  1736 08/15/22  0530   * 123* 125* 125*   K 4.7  --  4.8 4.9   CL 82*  --  88* 89*   CO2 23 --  28 27   BUN 13  --  16 13   CREATININE <0.5*  --  0.6 0.6     BNP: No results for input(s): BNP in the last 72 hours. PT/INR: No results for input(s): PROTIME, INR in the last 72 hours. APTT: No results for input(s): APTT in the last 72 hours. CARDIAC ENZYMES:   Recent Labs     08/14/22  0445   TROPONINI <0.01     FASTING LIPID PANEL:  Lab Results   Component Value Date    CHOL 154 09/15/2017    HDL 81 (H) 09/15/2017    TRIG 44 09/15/2017     LIVER PROFILE:   Recent Labs     08/14/22  0204 08/15/22  0530   AST 50* 68*   ALT 47* 48*   BILIDIR  --  <0.2   BILITOT 0.4 0.3   ALKPHOS 148* 136*          Cultures  COVID detected    Radiology  XR CHEST PORTABLE   Final Result   Coarse parenchymal markings may relate to chronic lung disease. However   there are also increased basilar opacities which may relate to subsegmental   atelectasis and or the reported COVID. Assessment:  Principal Problem:    Pneumonia due to COVID-19 virus  Resolved Problems:    * No resolved hospital problems. *      Plan:    # Acute on chr hypoxic resp failure   - uses 2-3 L home O2, now on 5L. Wean as able     # Acute COVID PNA   - tested covid positive. - Started on decadron. Continue day #2  - CRP level only mildly elevated. Patient with persistent hypoxemia,  remains on oxygen 5 L. Will consult pulmonology to get opinion on initiating remdesivir/baricitinib for this patient     # COPD   - stable, cont home inhalers     # Hyponatremia   - Na 123,-> 125  - check urine Na, recheck in am     # HTN   - controlled, cont home meds        DVT Prophylaxis: lovenox  Diet: ADULT DIET;  Regular  Code Status: Full Code  PT/OT Eval Status: ordered     Dispo - cont care      Karthik Ramirez MD   8/15/2022 3:39 PM

## 2022-08-15 NOTE — PROGRESS NOTES
Remdesivir Initiation Note    Patient meets criteria for initiation of remdesivir   Known or suspected COVID-19  5 liters  Acceptable renal function  CrCl ? 30 ml/min based on SCr obtained prior to initiation OR   CrCl < 30 ml/min but the potential benefit of remdesivir outweighs the risk  Acceptable hepatic function (ALT within 10 times ULN)        Liver function tests will be monitored daily while on remdesivir. Oliverio Waldrop

## 2022-08-15 NOTE — PROGRESS NOTES
PM assessment completed. Scheduled medications given per MAR. VSS 5 liter NC, A/O x4 denies any needs at this time. Call light in reach, will monitor.

## 2022-08-15 NOTE — ACP (ADVANCE CARE PLANNING)
Advance Care Planning     General Advance Care Planning (ACP) Conversation    Date of Conversation: 8/13/2022  Conducted with: Patient with Decision Making Capacity    Healthcare Decision Maker:    Primary Decision Maker: Carla Tyler - Child - [phone]    Secondary Decision Maker: Kym Neal - Child - [phone]  Click here to complete Healthcare Decision Makers including selection of the Healthcare Decision Maker Relationship (ie \"Primary\"). Today we documented Decision Maker(s) consistent with Legal Next of Kin hierarchy.     Content/Action Overview:  Has NO ACP documents/care preferences - refer to ACP Clinical Specialist  Reviewed DNR/DNI and patient elects Full Code (Attempt Resuscitation)    Pastoral Care consult placed to assist with POA paperwork per patient request.       Length of Voluntary ACP Conversation in minutes:  <16 minutes (Non-Billable)    Shannan Han RN

## 2022-08-15 NOTE — PROGRESS NOTES
Inpatient Physical Therapy Evaluation and Treatment    Unit: PCU  Date:  8/15/2022  Patient Name:    Chely Hurley  Admitting diagnosis:  Hyponatremia [E87.1]  Acute on chronic respiratory failure with hypoxia ([facility] [J96.21]  Unvaccinated for covid-19 [Z28.310]  Pneumonia due to COVID-19 virus [U07.1, J12.82]  COVID-19 [U07.1]  Admit Date:  8/13/2022  Precautions/Restrictions/WB Status/ Lines/ Wounds/ Oxygen: Fall risk, Bed/chair alarm, Lines -IV, Supplemental O2 (5L), and Purewick catheter, Telemetry, Continuous pulse oximetry, and Isolation Precautions: Droplet Plus - COVID    Treatment Time:  10:30 - 11:10  Treatment Number:  1   Timed Code Treatment Minutes: 30 minutes  Total Treatment Minutes:  40  minutes    Patient Goals for Therapy: \" go home \"          Discharge Recommendations: Home 24 hr assist  and Home PT  DME needs for discharge: Needs Met       Therapy recommendation for EMS Transport: can transport by wheelchair    Therapy recommendations for staff:   Assist of 1 with use of rolling walker (RW) and gait belt for all transfers and ambulation to/from [facility]  to/from chair  within room    History of Present Illness:   Per Dr. Linwood Germain:  The patient is a 61 y.o. female who presents to [address] [place] [address] with SOB. she has a history of COPD, and just took a home test yesterday and tested positive for COVID, her daughter was here in the ER last night and diagnosed with COVID and the daughter stays with her and helps care for her. Patient reports she just started having symptoms yesterday, associated with cough, headache, has had some nausea/vomiting/diarrhea but not currently nauseous. She is on 2 to 3 L O2 nasal cannula, has been using 3 to 4 L recently, needing 5L O2    Home Health S4 Level Recommendation:  Level 1 Standard  AM-PAC Mobility Score       AM-PAC Inpatient Mobility without Stair Climbing Raw Score : 17    Preadmission Environment    Pt.  Lives with family (daughter)  Home environment: one story home  Steps to enter first floor:   bilateral hand rails and ramp          Steps to second floor: N/A  Bathroom:       Tub/Shower unit and Standard height toilet-rails   Equipment owned:      SPC, Rolling Walker, manual W/C, BSC, quad cane, home O2 (3 L) continuous, inhaler and nebulizer, electric wheelchair (can't find )     Preadmission Status / PLOF:  History of falls             No  Pt. Able to drive          No, daughter drives  Pt Fully independent with ADL's         Yes, dtr provides supervision if pt needs. Pt. Required assistance from family (dtr) for:  Cooking, Cleaning and Laundry shares with daughter  Pt. Fully independent for transfers and gait and walked with: No Device - furniture walking in home. Takes [facility] when going to dtr's home. Pt sleeps on a loveseat in her home. Dtr is home     Pain   Yes  Location: headache  Ratin /10  Pain Medicine Status: Pain med requested and RN notified    Cognition    A&O Person, Place, and Situation , knows it is august   Able to follow 2 step commands    Subjective  Patient lying supine in bed with no family present. Pt agreeable to this PT eval & tx. Upper Extremity ROM/Strength  Please see OT evaluation. Lower Extremity ROM / Strength   AROM WFL: Yes  ROM limitations:     BLE strength WFL, but not formally assessed with MMT.      Lower Extremity Sensation    WFL    Lower Extremity Proprioception:   NT    Coordination and Tone  NT    Balance  Sitting:  Good ; Supervision  Comments: EOB    Standing: Fair +; CGA  Comments: with RW    Bed Mobility   Supine to Sit:    Supervision with use of bedrail  Sit to Supine:   Not Tested  Rolling:   Not Tested  Scooting in sitting: SBA  Scooting in supine:  Not Tested    Transfer Training     Sit to stand:   SBA  Stand to sit:   SBA  Bed to Chair:   SBA with use of No AD and gait belt    Gait gait completed as indicated below  Trial 1:  Distance:      5 ft  Deviations (firm surface/linoleum): decreased raphael, increased SHANTA, forward flexed posture, shuffles, step to pattern, and decreased step length bilaterally  Assistive Device Used:    No AD and gait belt  Level of Assist:    Min A   Comment: unsteady, reaching for furniture    Trial 2:  Distance:      15 ft  Deviations (firm surface/linoleum):  decreased raphael, increased SHANTA, step through pattern, and decreased step length bilaterally  Assistive Device Used:    gait belt and rolling walker (RW)  Level of Assist:    SBA and CGA  Comment: improved balance    Stair Training deferred, pt does not have stairs in home environment    Activity Tolerance   Pt completed therapy session with SOB noted with gait and transfers  Supine at rest:  SpO2: 95% on 5L O2  HR: 76bpm    After gait:  SpO2: 92%  HR: 96bpm    Positioning Needs   Pt reclined in chair, alarm set, positioned in proper neutral alignment and pressure relief provided. Call light provided and all needs within reach    Exercises Initiated  Yulia deferred secondary to treatment focus on functional mobility  NA    Other  None. Patient/Family Education   Pt educated on role of inpatient PT, POC, importance of continued activity, DC recommendations, safety awareness, transfer techniques, pursed lip breathing, pacing activity, and calling for assist with mobility. Assessment  Pt seen for Physical Therapy evaluation in acute care setting. Pt demonstrated decreased Activity tolerance, Balance, Safety, and Strength as well as decreased independence with Ambulation, Bed Mobility , and Transfers. Recommending Home 24 hr assist and with home PT upon discharge as patient functioning below baseline level and would benefit from continued therapy services    Goals : To be met in 3 visits:  1). Independent with LE Ex x 10 reps    To be met in 6 visits:  1). Supine to/from sit: Independent  2). Sit to/from stand: Supervision  3). Bed to chair: Supervision  4).   Gait: Ambulate 50 ft.  with Supervision and use of rolling walker (RW)  5). Tolerate B LE exercises 3 sets of 10-15 reps      Rehabilitation Potential: Good  Strengths for achieving goals include:   PLOF, Family Support, and Pt cooperative   Barriers to achieving goals include:    No Barriers    Plan    To be seen 3-5 x / week  while in acute care setting for therapeutic exercises, bed mobility, transfers, progressive gait training, balance training, and family/patient education. Signature: Jai Gongora PT, DPT, OMT-C  #[id number]      If patient discharges from this facility prior to next visit, this note will serve as the Discharge Summary.

## 2022-08-15 NOTE — PROGRESS NOTES
Consult has been called to Dr. Luana Robertson on 8/15/22. Spoke with Nayeli.  3:48 PM    Augusta Cardenas  8/15/2022

## 2022-08-15 NOTE — PLAN OF CARE
Problem: Discharge Planning  Goal: Discharge to home or other facility with appropriate resources  Outcome: Progressing  Flowsheets (Taken 8/14/2022 0843 by José Luis Stephens RN)  Discharge to home or other facility with appropriate resources: Identify barriers to discharge with patient and caregiver     Problem: Safety - Adult  Goal: Free from fall injury  Outcome: Progressing     Problem: ABCDS Injury Assessment  Goal: Absence of physical injury  Outcome: Progressing

## 2022-08-15 NOTE — CARE COORDINATION
Case Management Assessment  Initial Evaluation      Patient Name: Gaetano Bustamante  YOB: 1955  Diagnosis: Hyponatremia [E87.1]  Acute on chronic respiratory failure with hypoxia ([facility] [J96.21]  Unvaccinated for covid-19 [Z28.310]  Pneumonia due to COVID-19 virus [U07.1, J12.82]  COVID-19 [U07.1]  Date / Time: 8/13/2022 11:29 PM    Admission status/Date: Inpatient 8/14/2022  Chart Reviewed: Yes      Patient Interviewed: Yes   Family Interviewed:  No      Hospitalization in the last 30 days:  No      Health Care Decision Maker :   Primary Decision Maker: Carla Tyler - Child - [phone]    Secondary Decision Maker: Nilda Tyler - Child - [phone]      Who do you trust or have selected to make healthcare decisions for you      Met with: Patient via phone 2/2 +Covid    Current PCP: Manuela Knott MD     [facility]  Precert required for SNF : Y          3 night stay required - N    ADLS  Support Systems/Care Needs: Family Members, Friends/Neighbors  Transportation: family    Meal Preparation: self/family    Housing  Living Arrangements: Lives in single story home with daughter  Steps: w/c ramp  Intent for return to present living arrangements: Yes  Identified Issues: +Covid. May need new o2 orders at discharge as current o2 requirements exceed home orders.     Home Care Information  Active with Home Health Care : No Agency:(Services)  Type of Home Care Services: None  Passport/Waiver : No  :                      Phone Number:    Passport/Waiver Services: n/a           Durable Medical Equiptment   DME Provider: [facility] for home o2; HHN  Equipment:   Walker_x__Cane__x_RTS___ BSC___Shower Chair_x__Hospital Bed___W/C____Other________  02 at __2__Liter(s)---wears(frequency)___cont____ [facility] _x__ CPAP___ BiPap___   N/A____  Patient states HHN broken; CM contacted Nancy Griffin at [facility] who states patient/daughter will need to bring in for exchange as it is under warranty. Patient notified and will update daughter. Home O2 Use :  Yes    If No for home O2---if presently on O2 during hospitalization:  Yes  if yes CM to follow for potential DC O2 need  Informed of need for care provider to bring portable home O2 tank on day of discharge for nursing to connect prior to leaving:   Yes  Verbalized agreement/Understanding:   Yes    Community Service Affiliation  Dialysis:  No    Agency:  Location:  Dialysis Schedule:  Phone:   Fax: Other Community Services: n/a     DISCHARGE PLAN: Explained Case Management role/services. CM reviewed chart and spoke to patient regarding discharge needs and plan. Patient lives with daughter who is available 24/7. States requires supervision/minimal assist with adl's which daughter provides. Uses cane and/or walker with all ambulation. Plans tor return home at discharge. Patient states uses home o2 at 3L continuous. Home o2 orders for 2L cont and verified with Moe Rubi at [place] [address]. SpO2 95% on 5L. Patient +Covid. May need new o2 orders at discharge if o2 requirements exceed current home orders. Sticky note left for treatment team regarding need for o2 walk test prior to discharge. States would like home health care if recommended. PT/OT consult order placed. Patient provided [address] list. CM will follow for recommendations and DCP needs.

## 2022-08-15 NOTE — PROGRESS NOTES
Inpatient Occupational Therapy  Evaluation and Treatment    Unit: PCU  Date:  8/15/2022  Patient Name:    Brisa Moeller  Admitting diagnosis:  Hyponatremia [E87.1]  Acute on chronic respiratory failure with hypoxia ([facility] [J96.21]  Unvaccinated for covid-19 [Z28.310]  Pneumonia due to COVID-19 virus [U07.1, J12.82]  COVID-19 [U07.1]  Admit Date:  8/13/2022  Precautions/Restrictions/WB Status/ Lines/ Wounds/ Oxygen: Fall risk, Bed/chair alarm, Lines -IV, Supplemental O2 (5L), and Purewick catheter, Telemetry, Continuous pulse oximetry, and Isolation Precautions: Droplet Plus - COVID    Treatment Time:  9866-7633  Treatment Number: 1   Timed code treatment minutes 20 minutes   Total Treatment minutes:   30   minutes    Patient Goals for Therapy:  \" go home \"  Go home     Discharge Recommendations: Home 24 hr assist home OT   DME needs for discharge: Needs Met       Therapy recommendations for staff:   Assist of 1 with use of rolling walker (RW) for all transfers to/from [facility]  to/from [facility]    History of Present Illness: per H&P    61 y.o. female presents to the ED with shortness of breath, much more than her normal, she has a history of COPD, and just took a home test and was positive for COVID, her daughter was here in the ER last night and diagnosed with COVID and the daughter stays with her and helps care for her. Patient reports she just started having symptoms today, cough, headache, has had some nausea/vomiting/diarrhea but not currently nauseous, no measured fever, no chest pain, chest tightness and wheezing, has not been using her neb machine, she is on 2 to 3 L O2 nasal cannula, has been using 3 to 4 L recently, reportedly her pulse ox broke, although we just gave her daughter a new pulse ox last night, no neck stiffness, no dysuria or urinary changes, denies recent steroid use, she is not COVID vaccinated, no other complaints, modifying factors or associated symptoms.   Home Health S4 Level Recommendation: Level 1 Standard  AM-PAC Score: 20  Preadmission Environment    Pt. Lives with family (daughter)  Home environment:    one story home  Steps to enter first floor:   bilateral hand rails and ramp          Steps to second floor: N/A  Bathroom:       Tub/Shower unit and Standard height toilet  Equipment owned:      [address], Rolling Walker, manual W/C, BSC, quad cane, home O2 (2 L) PRN, inhaler and nebulizer , electric wheelchair (can't find )        Preadmission Status / PLOF:    History of falls             No  Pt. Able to drive          No, daughter drives  Pt Fully independent with ADL's         Yes, dtr provides supervision if pt needs. Pt. Required assistance from family (dtr) for:  Cooking, Cleaning and Laundry shares with daughter  Pt. Fully independent for transfers and gait and walked with: No Device - furniture walking in home. Takes [address] when going to dtr's home. Pt sleeps on a loveseat in her home. Dtr is home      Pain   Yes  Location: headache  Ratin /10  Pain Medicine Status: Pain med requested and RN notified    Cognition     A&O Person, Place, and Situation , knows it is august   Able to follow 2 step commands    Subjective  Patient lying supine in bed with no family present. Pt agreeable to this OT eval & tx.      Upper Extremity ROM:    WFL    Upper Extremity Strength:    WFL      Upper Extremity Sensation    WFL    Upper Extremity Proprioception:  WFL    Coordination and Tone  Diminished    Balance  Functional Sitting Balance:  WFL  Functional Standing Balance:Diminished    Bed mobility:    Supine to sit:   Supervision  Sit to supine:   Not Tested  Rolling:    Not Tested  Scooting in sitting:  SBA  Scooting to head of bed:   Not Tested    Bridging:   Not Tested    Transfers:    Sit to stand:  SBA  Stand to sit:  SBA  Bed to chair:   SBA with RW to chair and gait belt   Standard toilet: Not Tested  Bed to [facility]:  Not Tested    Dressing:      UE:   Not Tested  LE:    Not Tested    Bathing: UE:  Not Tested  LE:  Not Tested    Eating:   Independent    Toileting:  Not Tested    Activity Tolerance   Pt completed therapy session with SOB and fatigue  Pt completed therapy session with SOB noted with gait and transfers  Supine at rest:  SpO2: 95% on 5L O2  HR: 76bpm     After gait:  SpO2: 92%  HR: 96bpm  Positioning Needs:   Pt up in chair, alarm set, positioned in proper neutral alignment and pressure relief provided. Exercise / Activities Initiated:   N/A    Patient/Family Education:   Role of OT    Assessment of Deficits: Pt seen for Occupational therapy evaluation in acute care setting. Pt demonstrated decreased Activity tolerance, ADLs, Balance , Bed mobility, and Transfers. Pt functioning below baseline and will likely benefit from skilled occupational therapy services to maximize safety and independence. Goal(s) : To be met in 3 Visits:  1). Bed to toilet/BSC: SBA with Approp AD     To be met in 5 Visits:  1). Supine to/from Sit:  Modified Independent  2). Upper Body Bathing:   Independent  3). Lower Body Bathing:   CGA  4). Upper Body Dressing:  Independent  5). Lower Body Dressing:  CGA  6). Pt to demonstrate UE exs x 15 reps with minimal cues    Rehabilitation Potential:  Fair for goals listed above. Strengths for achieving goals include: Pt cooperative  Barriers to achieving goals include:  Complexity of condition     Plan: To be seen 3-5 x/wk while in acute care setting for therapeutic exercises, bed mobility, transfers, dressing, bathing, family/patient education, ADL/IADL retraining, energy conservation training.      Shane Pascal OTR/L [id number]           If patient discharges from this facility prior to next visit, this note will serve as the Discharge Summary

## 2022-08-15 NOTE — ACP (ADVANCE CARE PLANNING)
Advance Care Planning     Advance Care Planning Inpatient Note  [facility] Department    Today's Date: 8/15/2022  Unit: [facility] PCU TELEMETRY    Received request from patient. Upon review of chart and communication with care team, patient's decision making abilities are not in question. . Patient was/were present in the room during visit. Goals of ACP Conversation:  Discuss advance care planning documents    Health Care Decision Makers:       Primary Decision Maker: Mark Distance - [phone]    Secondary Decision Maker: Bonilla Dillon Child - [phone]  Summary:  Completed [address]    Advance Care Planning Documents (Patient Wishes):  Healthcare Power of /Advance Directive Appointment of Health Care Agent  Living Will/Advance Directive     Assessment:  Pt clear that she would not want to be kept on life-support if no hope of recovery. Pt also stated she would prefer hospitalization if coming to end of life, stating, \"I just feel like it would be a burden to do hospice at home. \"     Interventions:  Provided education on documents for clarity and greater understanding  Assisted in the completion of documents according to patient's wishes at this time    Care Preferences Communicated:     Hospitalization:  If the patient's health worsens and it becomes clear that the chance of recovery is unlikely,     the patient wants hospitalization. Ventilation:   If the patient, in their present state of health, suddenly became very ill and unable to breathe on their own,     the patient would desire the use of a ventilator (breathing machine). Only temporarily    If their health worsens and it becomes clear that the change of recovery is unlikely,     the patient would NOT desire the use of a ventilator (breathing machine).     Resuscitation:  In the event the patient's heart stopped as a result of an underlying serious health condition, the patient communicates a preference for      resuscitative attempts (CPR). Outcomes/Plan:  ACP Discussion: Completed  New advance directive completed. Returned original document(s) to patient, as well as copies for distribution to appointed agents  Copy of advance directive given to staff to scan into medical record.     Electronically signed by Chaplain Rock on 8/15/2022 at 3:29 PM

## 2022-08-16 ENCOUNTER — TELEPHONE (OUTPATIENT)
Dept: PULMONOLOGY | Age: 67
End: 2022-08-16

## 2022-08-16 LAB
A/G RATIO: 1.6 (ref 1.1–2.2)
ALBUMIN SERPL-MCNC: 4.5 G/DL (ref 3.4–5)
ALP BLD-CCNC: 137 U/L (ref 40–129)
ALT SERPL-CCNC: 49 U/L (ref 10–40)
ANION GAP SERPL CALCULATED.3IONS-SCNC: 14 MMOL/L (ref 3–16)
AST SERPL-CCNC: 53 U/L (ref 15–37)
BILIRUB SERPL-MCNC: 0.4 MG/DL (ref 0–1)
BUN BLDV-MCNC: 13 MG/DL (ref 7–20)
CALCIUM SERPL-MCNC: 9.3 MG/DL (ref 8.3–10.6)
CHLORIDE BLD-SCNC: 88 MMOL/L (ref 99–110)
CO2: 21 MMOL/L (ref 21–32)
CREAT SERPL-MCNC: 0.6 MG/DL (ref 0.6–1.2)
GFR AFRICAN AMERICAN: >60
GFR NON-AFRICAN AMERICAN: >60
GLUCOSE BLD-MCNC: 167 MG/DL (ref 70–99)
POTASSIUM REFLEX MAGNESIUM: 4.5 MMOL/L (ref 3.5–5.1)
SODIUM BLD-SCNC: 123 MMOL/L (ref 136–145)
TOTAL PROTEIN: 7.3 G/DL (ref 6.4–8.2)

## 2022-08-16 PROCEDURE — 2580000003 HC RX 258: Performed by: INTERNAL MEDICINE

## 2022-08-16 PROCEDURE — 36415 COLL VENOUS BLD VENIPUNCTURE: CPT

## 2022-08-16 PROCEDURE — 6370000000 HC RX 637 (ALT 250 FOR IP): Performed by: INTERNAL MEDICINE

## 2022-08-16 PROCEDURE — 80053 COMPREHEN METABOLIC PANEL: CPT

## 2022-08-16 PROCEDURE — 99233 SBSQ HOSP IP/OBS HIGH 50: CPT | Performed by: INTERNAL MEDICINE

## 2022-08-16 PROCEDURE — 2060000000 HC ICU INTERMEDIATE R&B

## 2022-08-16 PROCEDURE — 97535 SELF CARE MNGMENT TRAINING: CPT

## 2022-08-16 PROCEDURE — 99232 SBSQ HOSP IP/OBS MODERATE 35: CPT | Performed by: INTERNAL MEDICINE

## 2022-08-16 PROCEDURE — 6360000002 HC RX W HCPCS: Performed by: HOSPITALIST

## 2022-08-16 PROCEDURE — 2580000003 HC RX 258: Performed by: HOSPITALIST

## 2022-08-16 PROCEDURE — 6370000000 HC RX 637 (ALT 250 FOR IP): Performed by: HOSPITALIST

## 2022-08-16 PROCEDURE — 6360000002 HC RX W HCPCS: Performed by: INTERNAL MEDICINE

## 2022-08-16 PROCEDURE — 94761 N-INVAS EAR/PLS OXIMETRY MLT: CPT

## 2022-08-16 PROCEDURE — 94640 AIRWAY INHALATION TREATMENT: CPT

## 2022-08-16 PROCEDURE — 2700000000 HC OXYGEN THERAPY PER DAY

## 2022-08-16 PROCEDURE — 97530 THERAPEUTIC ACTIVITIES: CPT

## 2022-08-16 RX ADMIN — CETIRIZINE HYDROCHLORIDE 10 MG: 10 TABLET ORAL at 08:19

## 2022-08-16 RX ADMIN — LISINOPRIL 10 MG: 10 TABLET ORAL at 08:19

## 2022-08-16 RX ADMIN — SODIUM CHLORIDE, PRESERVATIVE FREE 10 ML: 5 INJECTION INTRAVENOUS at 20:39

## 2022-08-16 RX ADMIN — OXYBUTYNIN CHLORIDE 5 MG: 5 TABLET ORAL at 20:21

## 2022-08-16 RX ADMIN — OXYBUTYNIN CHLORIDE 5 MG: 5 TABLET ORAL at 16:46

## 2022-08-16 RX ADMIN — SODIUM CHLORIDE, PRESERVATIVE FREE 10 ML: 5 INJECTION INTRAVENOUS at 08:20

## 2022-08-16 RX ADMIN — REMDESIVIR 100 MG: 100 INJECTION, POWDER, LYOPHILIZED, FOR SOLUTION INTRAVENOUS at 16:46

## 2022-08-16 RX ADMIN — ENOXAPARIN SODIUM 40 MG: 100 INJECTION SUBCUTANEOUS at 08:19

## 2022-08-16 RX ADMIN — Medication 2 PUFF: at 07:28

## 2022-08-16 RX ADMIN — OXYBUTYNIN CHLORIDE 5 MG: 5 TABLET ORAL at 08:19

## 2022-08-16 RX ADMIN — TIOTROPIUM BROMIDE AND OLODATEROL 2 PUFF: 3.124; 2.736 SPRAY, METERED RESPIRATORY (INHALATION) at 07:28

## 2022-08-16 RX ADMIN — GUAIFENESIN 600 MG: 600 TABLET, EXTENDED RELEASE ORAL at 20:21

## 2022-08-16 RX ADMIN — Medication 2 PUFF: at 21:01

## 2022-08-16 RX ADMIN — ESCITALOPRAM OXALATE 10 MG: 10 TABLET ORAL at 20:21

## 2022-08-16 RX ADMIN — AMLODIPINE BESYLATE 5 MG: 5 TABLET ORAL at 08:19

## 2022-08-16 RX ADMIN — ACETAMINOPHEN 650 MG: 325 TABLET ORAL at 16:46

## 2022-08-16 RX ADMIN — GUAIFENESIN 600 MG: 600 TABLET, EXTENDED RELEASE ORAL at 08:19

## 2022-08-16 ASSESSMENT — PAIN SCALES - GENERAL
PAINLEVEL_OUTOF10: 0
PAINLEVEL_OUTOF10: 5
PAINLEVEL_OUTOF10: 0

## 2022-08-16 ASSESSMENT — PAIN DESCRIPTION - LOCATION: LOCATION: HEAD

## 2022-08-16 NOTE — PROGRESS NOTES
Occupational Therapy  Attempted OT treatment and the pt was eating breakfast and wanted to wait until later for therapy. Will attempt again today as time allows.   Tracy Lopez OTR/L [id number]

## 2022-08-16 NOTE — TELEPHONE ENCOUNTER
MD Abigail Suarez MA; Leela Samano MA  Please request last note from [facility].

## 2022-08-16 NOTE — TELEPHONE ENCOUNTER
Spoke with Roberta Gonzalez at [facility] and requested to have visit note faxed.

## 2022-08-16 NOTE — PROGRESS NOTES
PULMONARY PROGRESS NOTE  Hospital Day: 4   CC: COVID-19 and respiratory failure in an unvaccinated patient      Subjective: Weaned to baseline oxygen this AM.    Overall feels pretty well. IV line: Peripheral    PHYSICAL EXAM:   BP (!) 157/60   Pulse 71   Temp 98 °F (36.7 °C) (Oral)   Resp 16   Ht 5' 3\" (1.6 m)   Wt 162 lb 1.6 oz (73.5 kg)   SpO2 94%   Breastfeeding No   BMI 28.71 kg/m² ' on 3 L  Gen: NAD  Eyes: PERRL. No sclera icterus. No conjunctival injection. ENT: No discharge. Pharynx clear. Neck: Trachea midline. No obvious mass. Resp: No accessory muscle use. Few crackles. No wheezes. No rhonchi. No dullness on percussion. Very diminished breath sounds. CV: Regular rate. Regular rhythm. No murmur or rub. No edema. GI: Non-tender. Non-distended. No hernia. Skin: Warm and dry. No nodule on exposed extremities. Lymph: No cervical LAD. No supraclavicular LAD. M/S: No cyanosis. No joint deformity. No clubbing. Neuro: Awake. Alert. Moves all four extremities. Psych: Oriented x 3. No anxiety.      Scheduled Meds:   dexamethasone  6 mg Oral Daily    remdesivir IVPB  100 mg IntraVENous Q24H    escitalopram  10 mg Oral Nightly    amLODIPine  5 mg Oral Daily    cetirizine  10 mg Oral Daily    lisinopril  10 mg Oral Daily    oxybutynin  5 mg Oral 4x Daily    budesonide-formoterol  2 puff Inhalation BID    tiotropium-olodaterol  2 puff Inhalation Daily    sodium chloride flush  5-40 mL IntraVENous 2 times per day    enoxaparin  40 mg SubCUTAneous Daily    guaiFENesin  600 mg Oral BID     Continuous Infusions:   sodium chloride       PRN Meds:  albuterol-ipratropium, hydrOXYzine HCl, sodium chloride flush, sodium chloride, ondansetron **OR** ondansetron, polyethylene glycol, acetaminophen **OR** acetaminophen    Labs:  CBC:   Recent Labs     08/14/22  0204 08/15/22  0530   WBC 4.3 3.9*   HGB 11.5* 11.8*   HCT 32.9* 35.1*   MCV 94.6 94.6    189     BMP:   Recent Labs 08/14/22  0204 08/14/22  0445 08/14/22  1736 08/15/22  0530   * 123* 125* 125*   K 4.7  --  4.8 4.9   CL 82*  --  88* 89*   CO2 23  --  28 27   BUN 13  --  16 13   CREATININE <0.5*  --  0.6 0.6     Micro:  8/14/2022 SARS-CoV-2 positive    Imaging:  CXR 8/14/2022 bilateral bibasilar predominant infiltrates    ASSESSMENT:  COVID-19 pneumonia in an unvaccinated patient  Acute on chronic hypoxemic respiratory failure; baseline 3 L O2  Mild transaminitis   Leukopenia   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 Gabbie CASON lung cancer, clinical diagnosis, saw Dr. Cristian Wilder & was not a surgical candidate, now s/p 5 fractions of SBRT from 5/3/22 - 5/13/22 by Dr. Tatiana Myers   Former smoker, quit ~1 year ago    PLAN:  COVID-19 isolation, droplet plus  Supplemental oxygen to maintain SaO2 >92%; monitor saturations closely   Remdesevir D#2, LFT monitoring for high risk medication  Decadron D#3, 6 mg daily   Consider baricitinib or tocilizumab if clinically worsening on above treatment  Inhaled bronchodilators as needed, MDI preferred   Lovenox 40 daily  Discharge planning for tomorrow as long as continuing to improve. I spent a total of 11 minutes on this encounter on chart review, history taking and review of data. I independently performed a physical exam and updated this encounter note as needed. [place], [place] on 8/16/22 at 9:41 AM EDT   I spent a total of 16 minutes on this encounter personally obtaining the patient history, reviewing labs, imaging and other data. I independently performed the above physical exam and updated this encounter note, assessment and plan as needed.   Gracia Salomon MD on 8/16/22 at 5:34 PM EDT

## 2022-08-16 NOTE — PROGRESS NOTES
Occupational Therapy Daily Treatment Note    Unit: PCU  Date:  8/16/2022  Patient Name:    Haydee Bloom  Admitting diagnosis:  Hyponatremia [E87.1]  Acute on chronic respiratory failure with hypoxia ([facility] [address] [J96.21]  Unvaccinated for covid-19 [Z28.310]  Pneumonia due to COVID-19 virus [U07.1, J12.82]  COVID-19 [U07.1]  Admit Date:  8/13/2022  Precautions/Restrictions:    Fall risk, Bed/chair alarm, Lines -IV, Supplemental O2 (5L), and Purewick catheter, Telemetry, Continuous pulse oximetry, and Isolation Precautions: Droplet Plus - COVID      Discharge Recommendations: Home 24 hr assist with home OT   DME needs for discharge:   Needs Met     Therapy recommendations for staff:   Assist of 1 with use of rolling walker (RW) for all transfers to/from [facility]  to/from chair with gait belt and RW    AM-PAC Score: AM-PAC Inpatient Daily Activity Raw Score: 20  Home Health S4 Level: Level 1- Standard       Treatment Time:  [id number]   Treatment number:  2    Timed code treatment minutes: 40 minutes  Total treatment minutes:   40 minutes    History of Present Illness:    per H&P    61 y.o. female presents to the ED with shortness of breath, much more than her normal, she has a history of COPD, and just took a home test and was positive for COVID, her daughter was here in the ER last night and diagnosed with COVID and the daughter stays with her and helps care for her.   Patient reports she just started having symptoms today, cough, headache, has had some nausea/vomiting/diarrhea but not currently nauseous, no measured fever, no chest pain, chest tightness and wheezing, has not been using her neb machine, she is on 2 to 3 L O2 nasal cannula, has been using 3 to 4 L recently, reportedly her pulse ox broke, although we just gave her daughter a new pulse ox last night, no neck stiffness, no dysuria or urinary changes, denies recent steroid use, she is not COVID vaccinated, no other complaints, modifying factors or associated symptoms. Subjective:  Pt in bed and willing to work with OT    Pain   Yes  Rating: mild  Location:headache  Pain Medicine Status: No request made      Bed Mobility:   Supine to Sit:  Supervision- head of bed min elevated   Sit to Supine:  Not Tested  Rolling:           Not Tested  Scooting:        Supervision    Transfer Training:   Sit to stand:   SBA and CGA  Stand to sit:  SBA and CGA  Bed to Chair:  CGA with RW  Bed to BSC:   CGA with RW   Standard toilet:   Not Tested    Activity Tolerance   Pt completed therapy session with SOB decreased endurance   Sp02 94% supine HR 7 6  Sp02 sitting edge of bed 91%  /85   Sp02 89-91% after transfer to chair and BSC HR 98 = SOB   ADL Training:   Upper body dressing: Not Tested  Upper body bathing:  Not Tested  Lower body dressing:  SBA to don pant over feet and CGA to stand and pull pants over hips   Lower body bathing:  Not Tested  Toileting:   CGA  Grooming/Hygiene:  Pt able to wipe self with CGA when standing     Therapeutic Exercise:   N/A    Patient Education:   Role of OT  Energy conservation techniques    Positioning Needs:   Pt up in chair, alarm set, positioned in proper neutral alignment and pressure relief provided. Family Present:  No    Assessment:   Pt was supervision for bed mobility and CGA for transfer to the chair and BSC with RW and gait belt. The pt was CGA to stand and wipe self after BSC use. The pt was SOB during activity with decreased endurance. The pt was on 3 liters of 02 and Sp02 was 89-94% . GOALS    To be met in 3 Visits:  1). Bed to toilet/BSC: SBA with Approp AD      To be met in 5 Visits:  1). Supine to/from Sit:             Modified Independent  2). Upper Body Bathing:         Independent  3). Lower Body Bathing:         CGA  4). Upper Body Dressing:       Independent  5). Lower Body Dressing:       CGA  6).  Pt to demonstrate UE exs x 15 reps with minimal cues    Plan: cont with [address] OTR/L [address]       If patient discharges from this facility prior to next visit, this note will serve as the Discharge Summary

## 2022-08-16 NOTE — FLOWSHEET NOTE
08/16/22 0724   Vital Signs   Temp 98 °F (36.7 °C)   Temp Source Oral   Heart Rate 85   Heart Rate Source Monitor   Resp 16   BP (!) 157/60   MAP (Calculated) 92.33   Level of Consciousness Alert (0)   MEWS Score 1   Pain Assessment   Pain Level 0   Care Plan - Pain Goals   Verbalizes/displays adequate comfort level or baseline comfort level Assess pain using appropriate pain scale;Encourage patient to monitor pain and request assistance   Oxygen Therapy   SpO2 94 %   O2 Device Nasal cannula   O2 Flow Rate (L/min) 3 L/min   Pt resting quietly in bed no s/s of distress noted easily aroused , titrated oxygen down will monitor call light within reach , bed in low position see flowsheet for full assessment

## 2022-08-16 NOTE — PROGRESS NOTES
IM Progress Note    Admit Date:  8/13/2022    Admitted with COVID-19 pneumonia, acute on chronic hypoxic respiratory failure    Subjective:  Ms. Anne Streeter is feeling better today. Oxygen saturations have improved. Been back down from 5 L to 3 L today. No dyspnea on rest, still dyspneic on exertion with mild desaturations. Headache improved. Still hyponatremic. Objective:   BP (!) 145/85   Pulse 70   Temp 98 °F (36.7 °C) (Oral)   Resp 16   Ht 5' 3\" (1.6 m)   Wt 162 lb 1.6 oz (73.5 kg)   SpO2 94%   Breastfeeding No   BMI 28.71 kg/m²     Intake/Output Summary (Last 24 hours) at 8/16/2022 1439  Last data filed at 8/16/2022 5050  Gross per 24 hour   Intake 900 ml   Output 2275 ml   Net -1375 ml         Physical Exam:  General:  Awake, alert, well-oriented. Appears less fatigued today, no distress  Skin:  Warm and dry  Neck:  JVD absent. Neck supple  Chest: Diminished breath sounds. No wheezes, rales or rhonchi. Cardiovascular:  RRR ,S1S2 normal  Abdomen:  Soft, non tender, non distended, BS +  Extremities:  No edema. Intact peripheral pulses.  Brisk cap refill, < 2 secs  Neuro: non focal      Medications:   Scheduled Meds:   dexamethasone  6 mg Oral Daily    remdesivir IVPB  100 mg IntraVENous Q24H    escitalopram  10 mg Oral Nightly    amLODIPine  5 mg Oral Daily    cetirizine  10 mg Oral Daily    lisinopril  10 mg Oral Daily    oxybutynin  5 mg Oral 4x Daily    budesonide-formoterol  2 puff Inhalation BID    tiotropium-olodaterol  2 puff Inhalation Daily    sodium chloride flush  5-40 mL IntraVENous 2 times per day    enoxaparin  40 mg SubCUTAneous Daily    guaiFENesin  600 mg Oral BID       Continuous Infusions:   sodium chloride         Data:  CBC:   Recent Labs     08/14/22  0204 08/15/22  0530   WBC 4.3 3.9*   RBC 3.48* 3.71*   HGB 11.5* 11.8*   HCT 32.9* 35.1*   MCV 94.6 94.6   RDW 11.9* 11.9*    189       BMP:   Recent Labs     08/14/22  1736 08/15/22  0530 08/16/22  1034   * 125* 123*   K 4.8 4.9 4.5   CL 88* 89* 88*   CO2 28 27 21   BUN 16 13 13   CREATININE 0.6 0.6 0.6       BNP: No results for input(s): BNP in the last 72 hours. PT/INR: No results for input(s): PROTIME, INR in the last 72 hours. APTT: No results for input(s): APTT in the last 72 hours. CARDIAC ENZYMES:   Recent Labs     08/14/22  0445   TROPONINI <0.01       FASTING LIPID PANEL:  Lab Results   Component Value Date    CHOL 154 09/15/2017    HDL 81 (H) 09/15/2017    TRIG 44 09/15/2017     LIVER PROFILE:   Recent Labs     08/14/22  0204 08/15/22  0530 08/16/22  1034   AST 50* 68* 53*   ALT 47* 53* 49*   BILIDIR  --  <0.2  --    BILITOT 0.4 0.3 0.4   ALKPHOS 148* 136* 137*            Cultures  COVID detected    Radiology  XR CHEST PORTABLE   Final Result   Coarse parenchymal markings may relate to chronic lung disease. However   there are also increased basilar opacities which may relate to subsegmental   atelectasis and or the reported COVID. Assessment:  Principal Problem:    Pneumonia due to COVID-19 virus  Active Problems:    Essential hypertension    COVID-19  Resolved Problems:    * No resolved hospital problems. *      Plan:    # Acute on chr hypoxic resp failure   - uses 2-3 L home O2,; was on 5L-weaned back down to 3 L now. # Acute COVID PNA   - tested covid positive. - Started on decadron. Continue day #3  - CRP level only mildly elevated. -Seen in consultation by pulmonology  -Started on remdesivir, continue day #2.   -Monitor LFTs and renal function. # COPD   - stable, cont home inhalers     # Hyponatremia   -Sodium remains low at 123  - recheck in am     # HTN   - controlled, cont home meds        DVT Prophylaxis: lovenox  Diet: ADULT DIET;  Regular  Code Status: Full Code  PT/OT Eval Status: ordered     Dispo - cont care      Hanane Stephens MD   8/16/2022 2:39 PM

## 2022-08-17 ENCOUNTER — TELEPHONE (OUTPATIENT)
Dept: PULMONOLOGY | Age: 67
End: 2022-08-17

## 2022-08-17 LAB
A/G RATIO: 1.8 (ref 1.1–2.2)
ALBUMIN SERPL-MCNC: 4.1 G/DL (ref 3.4–5)
ALP BLD-CCNC: 121 U/L (ref 40–129)
ALT SERPL-CCNC: 42 U/L (ref 10–40)
ANION GAP SERPL CALCULATED.3IONS-SCNC: 14 MMOL/L (ref 3–16)
AST SERPL-CCNC: 43 U/L (ref 15–37)
BASOPHILS ABSOLUTE: 0 K/UL (ref 0–0.2)
BASOPHILS RELATIVE PERCENT: 0.1 %
BILIRUB SERPL-MCNC: 0.4 MG/DL (ref 0–1)
BUN BLDV-MCNC: 12 MG/DL (ref 7–20)
CALCIUM SERPL-MCNC: 9 MG/DL (ref 8.3–10.6)
CHLORIDE BLD-SCNC: 88 MMOL/L (ref 99–110)
CO2: 22 MMOL/L (ref 21–32)
CREAT SERPL-MCNC: <0.5 MG/DL (ref 0.6–1.2)
EOSINOPHILS ABSOLUTE: 0 K/UL (ref 0–0.6)
EOSINOPHILS RELATIVE PERCENT: 0.1 %
GFR AFRICAN AMERICAN: >60
GFR NON-AFRICAN AMERICAN: >60
GLUCOSE BLD-MCNC: 94 MG/DL (ref 70–99)
HCT VFR BLD CALC: 37 % (ref 36–48)
HEMOGLOBIN: 12.6 G/DL (ref 12–16)
LYMPHOCYTES ABSOLUTE: 1.1 K/UL (ref 1–5.1)
LYMPHOCYTES RELATIVE PERCENT: 16.6 %
MCH RBC QN AUTO: 32.2 PG (ref 26–34)
MCHC RBC AUTO-ENTMCNC: 34.2 G/DL (ref 31–36)
MCV RBC AUTO: 94.3 FL (ref 80–100)
MONOCYTES ABSOLUTE: 1.1 K/UL (ref 0–1.3)
MONOCYTES RELATIVE PERCENT: 16 %
NEUTROPHILS ABSOLUTE: 4.4 K/UL (ref 1.7–7.7)
NEUTROPHILS RELATIVE PERCENT: 67.2 %
PDW BLD-RTO: 11.8 % (ref 12.4–15.4)
PLATELET # BLD: 214 K/UL (ref 135–450)
PMV BLD AUTO: 7.2 FL (ref 5–10.5)
POTASSIUM REFLEX MAGNESIUM: 4 MMOL/L (ref 3.5–5.1)
RBC # BLD: 3.92 M/UL (ref 4–5.2)
SODIUM BLD-SCNC: 124 MMOL/L (ref 136–145)
TOTAL PROTEIN: 6.4 G/DL (ref 6.4–8.2)
WBC # BLD: 6.6 K/UL (ref 4–11)

## 2022-08-17 PROCEDURE — 6360000002 HC RX W HCPCS: Performed by: HOSPITALIST

## 2022-08-17 PROCEDURE — 2580000003 HC RX 258: Performed by: INTERNAL MEDICINE

## 2022-08-17 PROCEDURE — 36415 COLL VENOUS BLD VENIPUNCTURE: CPT

## 2022-08-17 PROCEDURE — 2700000000 HC OXYGEN THERAPY PER DAY

## 2022-08-17 PROCEDURE — 2060000000 HC ICU INTERMEDIATE R&B

## 2022-08-17 PROCEDURE — 85025 COMPLETE CBC W/AUTO DIFF WBC: CPT

## 2022-08-17 PROCEDURE — 6360000002 HC RX W HCPCS: Performed by: INTERNAL MEDICINE

## 2022-08-17 PROCEDURE — 2580000003 HC RX 258: Performed by: HOSPITALIST

## 2022-08-17 PROCEDURE — 99233 SBSQ HOSP IP/OBS HIGH 50: CPT | Performed by: INTERNAL MEDICINE

## 2022-08-17 PROCEDURE — 99232 SBSQ HOSP IP/OBS MODERATE 35: CPT | Performed by: INTERNAL MEDICINE

## 2022-08-17 PROCEDURE — 94761 N-INVAS EAR/PLS OXIMETRY MLT: CPT

## 2022-08-17 PROCEDURE — 6370000000 HC RX 637 (ALT 250 FOR IP): Performed by: INTERNAL MEDICINE

## 2022-08-17 PROCEDURE — 94640 AIRWAY INHALATION TREATMENT: CPT

## 2022-08-17 PROCEDURE — 6370000000 HC RX 637 (ALT 250 FOR IP): Performed by: HOSPITALIST

## 2022-08-17 PROCEDURE — 80053 COMPREHEN METABOLIC PANEL: CPT

## 2022-08-17 RX ORDER — LOPERAMIDE HYDROCHLORIDE 2 MG/1
2 CAPSULE ORAL 4 TIMES DAILY PRN
Status: DISCONTINUED | OUTPATIENT
Start: 2022-08-17 | End: 2022-08-18 | Stop reason: HOSPADM

## 2022-08-17 RX ADMIN — ENOXAPARIN SODIUM 40 MG: 100 INJECTION SUBCUTANEOUS at 09:20

## 2022-08-17 RX ADMIN — GUAIFENESIN 600 MG: 600 TABLET, EXTENDED RELEASE ORAL at 20:24

## 2022-08-17 RX ADMIN — DEXAMETHASONE 6 MG: 4 TABLET ORAL at 09:20

## 2022-08-17 RX ADMIN — Medication 2 PUFF: at 20:36

## 2022-08-17 RX ADMIN — SODIUM CHLORIDE: 9 INJECTION, SOLUTION INTRAVENOUS at 17:20

## 2022-08-17 RX ADMIN — LISINOPRIL 10 MG: 10 TABLET ORAL at 09:21

## 2022-08-17 RX ADMIN — OXYBUTYNIN CHLORIDE 5 MG: 5 TABLET ORAL at 14:34

## 2022-08-17 RX ADMIN — LOPERAMIDE HYDROCHLORIDE 2 MG: 2 CAPSULE ORAL at 20:56

## 2022-08-17 RX ADMIN — CETIRIZINE HYDROCHLORIDE 10 MG: 10 TABLET ORAL at 09:20

## 2022-08-17 RX ADMIN — OXYBUTYNIN CHLORIDE 5 MG: 5 TABLET ORAL at 18:54

## 2022-08-17 RX ADMIN — AMLODIPINE BESYLATE 5 MG: 5 TABLET ORAL at 09:20

## 2022-08-17 RX ADMIN — OXYBUTYNIN CHLORIDE 5 MG: 5 TABLET ORAL at 09:20

## 2022-08-17 RX ADMIN — ACETAMINOPHEN 650 MG: 325 TABLET ORAL at 11:15

## 2022-08-17 RX ADMIN — ESCITALOPRAM OXALATE 10 MG: 10 TABLET ORAL at 20:24

## 2022-08-17 RX ADMIN — REMDESIVIR 100 MG: 100 INJECTION, POWDER, LYOPHILIZED, FOR SOLUTION INTRAVENOUS at 17:21

## 2022-08-17 RX ADMIN — ACETAMINOPHEN 650 MG: 325 TABLET ORAL at 05:03

## 2022-08-17 RX ADMIN — GUAIFENESIN 600 MG: 600 TABLET, EXTENDED RELEASE ORAL at 09:21

## 2022-08-17 RX ADMIN — OXYBUTYNIN CHLORIDE 5 MG: 5 TABLET ORAL at 20:24

## 2022-08-17 RX ADMIN — ACETAMINOPHEN 650 MG: 325 TABLET ORAL at 20:56

## 2022-08-17 RX ADMIN — SODIUM CHLORIDE, PRESERVATIVE FREE 10 ML: 5 INJECTION INTRAVENOUS at 09:21

## 2022-08-17 ASSESSMENT — PAIN DESCRIPTION - DESCRIPTORS
DESCRIPTORS: ACHING
DESCRIPTORS: ACHING

## 2022-08-17 ASSESSMENT — PAIN DESCRIPTION - ORIENTATION: ORIENTATION: MID

## 2022-08-17 ASSESSMENT — PAIN SCALES - GENERAL
PAINLEVEL_OUTOF10: 8
PAINLEVEL_OUTOF10: 8

## 2022-08-17 ASSESSMENT — PAIN DESCRIPTION - LOCATION
LOCATION: HEAD;NECK
LOCATION: HEAD;NECK

## 2022-08-17 NOTE — PLAN OF CARE
Problem: Discharge Planning  Goal: Discharge to home or other facility with appropriate resources  8/16/2022 2113 by Fina Parikh RN  Outcome: Progressing  Flowsheets (Taken 8/16/2022 0724 by Thi Smith RN)  Discharge to home or other facility with appropriate resources:   Identify barriers to discharge with patient and caregiver   Arrange for needed discharge resources and transportation as appropriate  8/16/2022 0722 by Thi Smith RN  Outcome: Progressing     Problem: Safety - Adult  Goal: Free from fall injury  8/16/2022 2113 by Fina Parikh RN  Outcome: Progressing  8/16/2022 0722 by Thi Smith RN  Outcome: Progressing     Problem: ABCDS Injury Assessment  Goal: Absence of physical injury  8/16/2022 2113 by Fina Parikh RN  Outcome: Progressing  8/16/2022 0722 by Thi Smith RN  Outcome: Progressing     Problem: Pain  Goal: Verbalizes/displays adequate comfort level or baseline comfort level  Outcome: Progressing  Flowsheets  Taken 8/16/2022 1415 by Thi Smith RN  Verbalizes/displays adequate comfort level or baseline comfort level:   Encourage patient to monitor pain and request assistance   Assess pain using appropriate pain scale  Taken 8/16/2022 0724 by Thi Smith RN  Verbalizes/displays adequate comfort level or baseline comfort level:   Assess pain using appropriate pain scale   Encourage patient to monitor pain and request assistance

## 2022-08-17 NOTE — PLAN OF CARE
Problem: Discharge Planning  Goal: Discharge to home or other facility with appropriate resources  Outcome: Progressing     Problem: Safety - Adult  Goal: Free from fall injury  Outcome: Progressing  Flowsheets (Taken 8/17/2022 1050)  Free From Fall Injury: Instruct family/caregiver on patient safety     Problem: ABCDS Injury Assessment  Goal: Absence of physical injury  Outcome: Progressing  Flowsheets (Taken 8/17/2022 1050)  Absence of Physical Injury: Implement safety measures based on patient assessment

## 2022-08-17 NOTE — FLOWSHEET NOTE
08/17/22 1433   Vital Signs   Temp 98.2 °F (36.8 °C)   Temp Source Oral   Heart Rate 88   Heart Rate Source Monitor   Resp 18   /83   BP Location Right upper arm   BP Method Automatic   MAP (Calculated) 100   Patient Position Sitting   Level of Consciousness Alert (0)   MEWS Score 1   Oxygen Therapy   SpO2 93 %   O2 Device Nasal cannula   O2 Flow Rate (L/min) 3 L/min   Patient awake in bed. A/Ox4. VSS. Scheduled meds given. Patient denies any further needs at this time. Call light and bedside commode within reach.

## 2022-08-17 NOTE — FLOWSHEET NOTE
Shift assessment completed. Patient awake in bed watching TV. A/Ox4. Patient is stable. Patient c/o a headache. Will give PRN Tylenol. Scheduled meds given. Patient denies any further needs at this time. Call light and bedside table within reach.

## 2022-08-17 NOTE — PROGRESS NOTES
PM assessment completed. Scheduled medications given per MAR. VSS 3 liter NC, A/O x4 denies any needs at this time. Call light in reach, will monitor, bed alarm on.

## 2022-08-17 NOTE — PROGRESS NOTES
IM Progress Note    Admit Date:  8/13/2022    Admitted with COVID-19 pneumonia, acute on chronic hypoxic respiratory failure    Subjective:  Ms. Clarissa Almeida is feeling better today. Overall much better. Oxygen saturation stable on 3 L. She had an episode of shortness of breath this morning. Required O2 transiently up to 4 L for comfort, back on 3 L. No significant desaturations noted. Getting third dose of remdesivir today, afebrile, no headaches. Complains of diarrhea-patient has chronic diarrhea. Objective:   /73   Pulse 74   Temp 98.9 °F (37.2 °C) (Oral)   Resp 16   Ht 5' 3\" (1.6 m)   Wt 165 lb 3.2 oz (74.9 kg)   SpO2 95%   Breastfeeding No   BMI 29.26 kg/m²     Intake/Output Summary (Last 24 hours) at 8/17/2022 1357  Last data filed at 8/16/2022 2105  Gross per 24 hour   Intake 950 ml   Output 1500 ml   Net -550 ml         Physical Exam:  General:  Awake, alert, well-oriented. Appears less fatigued today, no distress  Skin:  Warm and dry  Neck:  JVD absent. Neck supple  Chest: Diminished breath sounds. No wheezes, rales or rhonchi. Cardiovascular:  RRR ,S1S2 normal  Abdomen:  Soft, non tender, non distended, BS +  Extremities:  No edema. Intact peripheral pulses.  Brisk cap refill, < 2 secs  Neuro: non focal      Medications:   Scheduled Meds:   dexamethasone  6 mg Oral Daily    remdesivir IVPB  100 mg IntraVENous Q24H    escitalopram  10 mg Oral Nightly    amLODIPine  5 mg Oral Daily    cetirizine  10 mg Oral Daily    lisinopril  10 mg Oral Daily    oxybutynin  5 mg Oral 4x Daily    budesonide-formoterol  2 puff Inhalation BID    tiotropium-olodaterol  2 puff Inhalation Daily    sodium chloride flush  5-40 mL IntraVENous 2 times per day    enoxaparin  40 mg SubCUTAneous Daily    guaiFENesin  600 mg Oral BID       Continuous Infusions:   sodium chloride         Data:  CBC:   Recent Labs     08/15/22  0530 08/17/22  0418   WBC 3.9* 6.6   RBC 3.71* 3.92*   HGB 11.8* 12.6   HCT 35.1* 37.0   MCV 94.6 94.3   RDW 11.9* 11.8*    214       BMP:   Recent Labs     08/15/22  0530 08/16/22  1034 08/17/22  0418   * 123* 124*   K 4.9 4.5 4.0   CL 89* 88* 88*   CO2 27 21 22   BUN 13 13 12   CREATININE 0.6 0.6 <0.5*       BNP: No results for input(s): BNP in the last 72 hours. PT/INR: No results for input(s): PROTIME, INR in the last 72 hours. APTT: No results for input(s): APTT in the last 72 hours. CARDIAC ENZYMES:   No results for input(s): CKMB, CKMBINDEX, TROPONINI in the last 72 hours. Invalid input(s): CKTOTAL;3    FASTING LIPID PANEL:  Lab Results   Component Value Date    CHOL 154 09/15/2017    HDL 81 (H) 09/15/2017    TRIG 44 09/15/2017     LIVER PROFILE:   Recent Labs     08/15/22  0530 08/16/22  1034 08/17/22  0418   AST 68* 53* 43*   ALT 53* 49* 42*   BILIDIR <0.2  --   --    BILITOT 0.3 0.4 0.4   ALKPHOS 136* 137* 121            Cultures  COVID detected    Radiology  XR CHEST PORTABLE   Final Result   Coarse parenchymal markings may relate to chronic lung disease. However   there are also increased basilar opacities which may relate to subsegmental   atelectasis and or the reported COVID. Assessment:  Principal Problem:    Pneumonia due to COVID-19 virus  Active Problems:    Essential hypertension    COVID-19  Resolved Problems:    * No resolved hospital problems. *      Plan:    # Acute on chr hypoxic resp failure   - uses 2-3 L home O2,; was on 5L-weaned back down to 3 L now. O2 sats stable on 3 L today     # Acute COVID PNA   - tested covid positive. - Started on decadron. Continue day #4  - CRP level only mildly elevated. -Seen in consultation by pulmonology  -Started on remdesivir, continue day #3   -Monitor LFTs and renal function.   Stable     # COPD   - stable, cont home inhalers     # Hyponatremia   -Sodium remains low at 123, 124  Appears chronic hyponatremia    #Chronic diarrhea  -Imodium as needed     # HTN   - controlled, cont home meds DVT Prophylaxis: lovenox  Diet: ADULT DIET; Regular  Code Status: Full Code  PT/OT Eval Status: ordered     Dispo - cont care      Episode of increased shortness of breath today. Improved now. O2 sats stable on 3 L which is her home O2. .  Due to her episode this morning. We will monitor her tonight and plan on discharge tomorrow.       Lynda Wilson MD   8/17/2022 1:57 PM

## 2022-08-17 NOTE — DISCHARGE INSTRUCTIONS
Call [facility] Dr. Rachnaa Kovacs office [phone] for follow up after having SBRT (radiation)

## 2022-08-18 VITALS
HEIGHT: 63 IN | TEMPERATURE: 98.3 F | RESPIRATION RATE: 18 BRPM | HEART RATE: 88 BPM | DIASTOLIC BLOOD PRESSURE: 77 MMHG | OXYGEN SATURATION: 96 % | SYSTOLIC BLOOD PRESSURE: 129 MMHG | BODY MASS INDEX: 29.27 KG/M2 | WEIGHT: 165.2 LBS

## 2022-08-18 LAB
A/G RATIO: 1.7 (ref 1.1–2.2)
ALBUMIN SERPL-MCNC: 4 G/DL (ref 3.4–5)
ALP BLD-CCNC: 114 U/L (ref 40–129)
ALT SERPL-CCNC: 40 U/L (ref 10–40)
ANION GAP SERPL CALCULATED.3IONS-SCNC: 13 MMOL/L (ref 3–16)
AST SERPL-CCNC: 34 U/L (ref 15–37)
BASOPHILS ABSOLUTE: 0 K/UL (ref 0–0.2)
BASOPHILS RELATIVE PERCENT: 0.1 %
BILIRUB SERPL-MCNC: 0.3 MG/DL (ref 0–1)
BUN BLDV-MCNC: 12 MG/DL (ref 7–20)
CALCIUM SERPL-MCNC: 9.1 MG/DL (ref 8.3–10.6)
CHLORIDE BLD-SCNC: 90 MMOL/L (ref 99–110)
CO2: 22 MMOL/L (ref 21–32)
CREAT SERPL-MCNC: <0.5 MG/DL (ref 0.6–1.2)
EOSINOPHILS ABSOLUTE: 0 K/UL (ref 0–0.6)
EOSINOPHILS RELATIVE PERCENT: 0 %
GFR AFRICAN AMERICAN: >60
GFR NON-AFRICAN AMERICAN: >60
GLUCOSE BLD-MCNC: 176 MG/DL (ref 70–99)
HCT VFR BLD CALC: 35.9 % (ref 36–48)
HEMOGLOBIN: 12.3 G/DL (ref 12–16)
LYMPHOCYTES ABSOLUTE: 0.8 K/UL (ref 1–5.1)
LYMPHOCYTES RELATIVE PERCENT: 14.4 %
MCH RBC QN AUTO: 32.2 PG (ref 26–34)
MCHC RBC AUTO-ENTMCNC: 34.2 G/DL (ref 31–36)
MCV RBC AUTO: 94.1 FL (ref 80–100)
MONOCYTES ABSOLUTE: 0.6 K/UL (ref 0–1.3)
MONOCYTES RELATIVE PERCENT: 10.7 %
NEUTROPHILS ABSOLUTE: 4.1 K/UL (ref 1.7–7.7)
NEUTROPHILS RELATIVE PERCENT: 74.8 %
PDW BLD-RTO: 11.7 % (ref 12.4–15.4)
PLATELET # BLD: 234 K/UL (ref 135–450)
PMV BLD AUTO: 6.4 FL (ref 5–10.5)
POTASSIUM REFLEX MAGNESIUM: 4 MMOL/L (ref 3.5–5.1)
RBC # BLD: 3.81 M/UL (ref 4–5.2)
SODIUM BLD-SCNC: 125 MMOL/L (ref 136–145)
TOTAL PROTEIN: 6.4 G/DL (ref 6.4–8.2)
WBC # BLD: 5.4 K/UL (ref 4–11)

## 2022-08-18 PROCEDURE — 99232 SBSQ HOSP IP/OBS MODERATE 35: CPT | Performed by: INTERNAL MEDICINE

## 2022-08-18 PROCEDURE — 99239 HOSP IP/OBS DSCHRG MGMT >30: CPT | Performed by: INTERNAL MEDICINE

## 2022-08-18 PROCEDURE — 94761 N-INVAS EAR/PLS OXIMETRY MLT: CPT

## 2022-08-18 PROCEDURE — 6360000002 HC RX W HCPCS: Performed by: HOSPITALIST

## 2022-08-18 PROCEDURE — 2700000000 HC OXYGEN THERAPY PER DAY

## 2022-08-18 PROCEDURE — 6360000002 HC RX W HCPCS: Performed by: INTERNAL MEDICINE

## 2022-08-18 PROCEDURE — 36415 COLL VENOUS BLD VENIPUNCTURE: CPT

## 2022-08-18 PROCEDURE — 80053 COMPREHEN METABOLIC PANEL: CPT

## 2022-08-18 PROCEDURE — 6370000000 HC RX 637 (ALT 250 FOR IP): Performed by: INTERNAL MEDICINE

## 2022-08-18 PROCEDURE — 94640 AIRWAY INHALATION TREATMENT: CPT

## 2022-08-18 PROCEDURE — 6370000000 HC RX 637 (ALT 250 FOR IP): Performed by: HOSPITALIST

## 2022-08-18 PROCEDURE — 2580000003 HC RX 258: Performed by: INTERNAL MEDICINE

## 2022-08-18 PROCEDURE — 85025 COMPLETE CBC W/AUTO DIFF WBC: CPT

## 2022-08-18 PROCEDURE — 2580000003 HC RX 258: Performed by: HOSPITALIST

## 2022-08-18 RX ORDER — GUAIFENESIN 600 MG/1
600 TABLET, EXTENDED RELEASE ORAL 2 TIMES DAILY
Qty: 30 TABLET | Refills: 0 | Status: ON HOLD | OUTPATIENT
Start: 2022-08-18 | End: 2022-11-23 | Stop reason: HOSPADM

## 2022-08-18 RX ORDER — DEXAMETHASONE 6 MG/1
6 TABLET ORAL DAILY
Qty: 5 TABLET | Refills: 0 | Status: SHIPPED | OUTPATIENT
Start: 2022-08-19 | End: 2022-08-24

## 2022-08-18 RX ORDER — AMLODIPINE BESYLATE 5 MG/1
5 TABLET ORAL DAILY
Qty: 30 TABLET | Refills: 1 | Status: SHIPPED | OUTPATIENT
Start: 2022-08-18

## 2022-08-18 RX ORDER — LOPERAMIDE HYDROCHLORIDE 2 MG/1
2 CAPSULE ORAL 4 TIMES DAILY PRN
Status: ON HOLD | COMMUNITY
Start: 2022-08-18 | End: 2022-11-23 | Stop reason: HOSPADM

## 2022-08-18 RX ADMIN — CETIRIZINE HYDROCHLORIDE 10 MG: 10 TABLET ORAL at 08:07

## 2022-08-18 RX ADMIN — Medication 2 PUFF: at 18:57

## 2022-08-18 RX ADMIN — GUAIFENESIN 600 MG: 600 TABLET, EXTENDED RELEASE ORAL at 08:07

## 2022-08-18 RX ADMIN — LISINOPRIL 10 MG: 10 TABLET ORAL at 08:07

## 2022-08-18 RX ADMIN — AMLODIPINE BESYLATE 5 MG: 5 TABLET ORAL at 08:07

## 2022-08-18 RX ADMIN — OXYBUTYNIN CHLORIDE 5 MG: 5 TABLET ORAL at 13:02

## 2022-08-18 RX ADMIN — Medication 2 PUFF: at 07:28

## 2022-08-18 RX ADMIN — SODIUM CHLORIDE, PRESERVATIVE FREE 10 ML: 5 INJECTION INTRAVENOUS at 08:07

## 2022-08-18 RX ADMIN — REMDESIVIR 100 MG: 100 INJECTION, POWDER, LYOPHILIZED, FOR SOLUTION INTRAVENOUS at 17:27

## 2022-08-18 RX ADMIN — OXYBUTYNIN CHLORIDE 5 MG: 5 TABLET ORAL at 17:24

## 2022-08-18 RX ADMIN — TIOTROPIUM BROMIDE AND OLODATEROL 2 PUFF: 3.124; 2.736 SPRAY, METERED RESPIRATORY (INHALATION) at 07:28

## 2022-08-18 RX ADMIN — DEXAMETHASONE 6 MG: 4 TABLET ORAL at 08:06

## 2022-08-18 RX ADMIN — ACETAMINOPHEN 650 MG: 325 TABLET ORAL at 15:55

## 2022-08-18 RX ADMIN — ENOXAPARIN SODIUM 40 MG: 100 INJECTION SUBCUTANEOUS at 08:06

## 2022-08-18 RX ADMIN — OXYBUTYNIN CHLORIDE 5 MG: 5 TABLET ORAL at 08:06

## 2022-08-18 RX ADMIN — ACETAMINOPHEN 650 MG: 325 TABLET ORAL at 08:17

## 2022-08-18 RX ADMIN — HYDROXYZINE HYDROCHLORIDE 25 MG: 25 TABLET ORAL at 18:27

## 2022-08-18 ASSESSMENT — PAIN DESCRIPTION - LOCATION
LOCATION: NECK;HEAD
LOCATION: ABDOMEN

## 2022-08-18 ASSESSMENT — PAIN SCALES - GENERAL
PAINLEVEL_OUTOF10: 7
PAINLEVEL_OUTOF10: 10

## 2022-08-18 ASSESSMENT — PAIN DESCRIPTION - ORIENTATION
ORIENTATION: OTHER (COMMENT)
ORIENTATION: LEFT;RIGHT

## 2022-08-18 ASSESSMENT — PAIN DESCRIPTION - DESCRIPTORS
DESCRIPTORS: ACHING;DISCOMFORT
DESCRIPTORS: ACHING;DISCOMFORT

## 2022-08-18 NOTE — FLOWSHEET NOTE
08/17/22 2031   Assessment   Charting Type Shift assessment   Psychosocial   Psychosocial (WDL) WDL   Neurological   Neuro (WDL) WDL   Level of Consciousness Alert (0)   Orientation Level Oriented X4   Cognition Appropriate judgement; Appropriate safety awareness; Appropriate attention/concentration; Appropriate for developmental age; Follows commands   Speech Clear   Ucon Coma Scale   Eye Opening 4   Best Verbal Response 5   Best Motor Response 6   Aster Coma Scale Score 15   HEENT (Head, Ears, Eyes, Nose, & Throat)   HEENT (WDL) X   Right Eye Impaired vision   Left Eye Impaired vision   Mucous Membrane Dry   Teeth Dentures upper   Respiratory   Respiratory (WDL) X   Respiratory Pattern Regular   Respiratory Depth Normal;Shallow   Respiratory Quality/Effort Dyspnea with exertion   Chest Assessment Chest expansion symmetrical;Trachea midline   L Breath Sounds Diminished; Expiratory Wheezes   R Breath Sounds Diminished; Expiratory Wheezes   Subcutaneous Air/Crepitus None   Cardiac   Cardiac (WDL) X   Cardiac Regularity Regular   Heart Sounds S1, S2   Cardiac Rhythm Sinus rhythm   Cardiac Monitor   Telemetry Box Number [id number]   Alarm Audible Yes   Telemetry Monitor Alarm Parameters CMU   Gastrointestinal   Abdominal (WDL) X   Abdomen Inspection Soft   RUQ Bowel Sounds Active   LUQ Bowel Sounds Active   RLQ Bowel Sounds Active   LLQ Bowel Sounds Active   Tenderness Soft;Nontender; No guarding   Genitourinary   Genitourinary (WDL) X   Suprapubic Tenderness No   Dysuria (Pain/Burning w/Urination) No   Peripheral Vascular   Peripheral Vascular (WDL) X   Edema Right lower extremity; Left lower extremity;Generalized   RLE Edema Non-pitting   LLE Edema Non-pitting   Skin Integumentary    Skin Integumentary (WDL) X  (scattered bruising)   Musculoskeletal   Musculoskeletal (WDL) X  (weakness.  Uses walker at home)

## 2022-08-18 NOTE — PLAN OF CARE
Problem: Discharge Planning  Goal: Discharge to home or other facility with appropriate resources  8/17/2022 2032 by Kerry Green RN  Outcome: Progressing

## 2022-08-18 NOTE — FLOWSHEET NOTE
08/18/22 1300   Vital Signs   Temp 98.3 °F (36.8 °C)   Temp Source Oral   Heart Rate 69   Heart Rate Source Monitor   Resp 19   /77   BP Location Right upper arm   MAP (Calculated) 94.33   Patient Position Semi fowlers   Level of Consciousness Alert (0)   MEWS Score 1   Oxygen Therapy   SpO2 96 %   O2 Device Nasal cannula   O2 Flow Rate (L/min) 3 L/min     Afternoon vitals completed. Meds given per MAR. Pt stable.     Penelope Anders RN

## 2022-08-18 NOTE — CARE COORDINATION
DISCHARGE ORDER  Date/Time 2022 4:25 PM  Completed by: Emily Brink RN, Case Management    Patient Name: Quin Mayberry      : 1955  Admitting Diagnosis: Hyponatremia [E87.1]  Acute on chronic respiratory failure with hypoxia ([facility] [address] [J96.21]  Unvaccinated for covid-19 [Z28.310]  Pneumonia due to COVID-19 virus [U07.1, J12.82]  COVID-19 [U07.1]      Admit order Date and Status:2022  (verify MD's last order for status of admission)      Noted discharge order. If applicable PT/OT recommendation at Discharge: home with 24 hr assist and home PT/OT  DME recommendation by PT/OT:n/a  Confirmed discharge plan  (pt): Yes  with whom____________pt___  If pt confirmed DC plan does family need to be contacted by CM No if yes who__n/a____  Discharge Plan: Reviewed chart. Role of discharge planner explained and patient verbalized understanding. Pt is alert and oriented. Discharge order is noted. Pt is being d/c'd to home today. Pt's O2 sats are 96% on 3L. Pt is active with [facility] at 3L baseline. No further discharge needs needed or noted. Date of Last IMM Given: 2022--pt is agreeable to discharge today. Reviewed chart. Role of discharge planner explained and patient verbalized understanding. Discharge order is noted. Has Home O2 in place on admit:  Yes  Informed of need to bring portable home O2 tank on day of discharge for nursing to connect prior to leaving:   Yes  Verbalized agreement/Understanding:   Yes    Discharge timeout done with Sabrina Mccauley RN. All discharge needs and concerns addressed.

## 2022-08-18 NOTE — PROGRESS NOTES
Patient educated on discharge instructions as well as new medications use, dosage, administration and possible side effects. Patient verified knowledge. IV removed without difficulty and dry dressing in place. Telemetry monitor removed and returned to [facility]. Pt left facility in stable condition to Home with all of their personal belongings.      Evelyn iXe RN

## 2022-08-18 NOTE — DISCHARGE SUMMARY
Name:  Ryan Carl  Room:  /9504-87  MRN:    [id number]    Discharge Summary      This discharge summary is in conjunction with a complete physical exam done on the day of discharge. Discharging Physician: Dr. Riccardo Aguilar MD     Admit: 8/13/2022  Discharge:  8/18/2022     HPI taken from admission H&P:    The patient is a 61 y.o. female who presents to [address] with SOB. she has a history of COPD, and just took a home test yesterday and tested positive for COVID, her daughter was here in the ER last night and diagnosed with COVID and the daughter stays with her and helps care for her. Patient reports she just started having symptoms yesterday, associated with cough, headache, has had some nausea/vomiting/diarrhea but not currently nauseous. She is on 2 to 3 L O2 nasal cannula, has been using 3 to 4 L recently, needing 5L O2     Diagnoses this Admission and Hospital Course   #Acute on chr hypoxic resp failure  -uses 2-3 L home O2,; was on 5L->weaned back down to 3 L now.  -O2 sats stable on 3 L today     #Acute COVID PNA  -tested covid positive 8/14  -Started on decadron. Received 5 doses. -CRP level only mildly elevated. -Seen in consultation by pulmonology  -Started on remdesivir. Day #4 today. -Monitored LFTs and renal function. Stable  Patient is stable today; she can be discharged home on oral Decadron     #COPD without exacerbation  - stable, cont home inhalers    # KAMLA lung cancer  -clinical diagnosis . saw Dr. Vania Rao & was not a surgical candidate, now s/p 5 fractions of SBRT from 5/3/22 - 5/13/22 by Dr. Lilian Coleman .   Patient advised to follow-up with Dr. Lilian Coleman    #Hyponatremia  -Chronic  -Sodium remains low at 123 - 125  -Likely related to lung disease as above     #Chronic diarrhea  -Imodium as needed     #HTN  -controlled, cont home meds  Continue lisinopril  Resumed on Norvasc    Procedures (Please Review Full Report for Details)  none    Consults Pulmonology      Physical Exam at Discharge:    /77   Pulse 69   Temp 99 °F (37.2 °C) (Oral)   Resp 19   Ht 5' 3\" (1.6 m)   Wt 165 lb 3.2 oz (74.9 kg)   SpO2 96%   Breastfeeding No   BMI 29.26 kg/m²     General:  Awake, alert, well-oriented. Appears less fatigued today, no distress  Skin:  Warm and dry  Neck:  JVD absent. Neck supple  Chest: Diminished breath sounds. No wheezes, rales or rhonchi. Cardiovascular:  RRR ,S1S2 normal  Abdomen:  Soft, non tender, non distended, BS +  Extremities:  No edema. Intact peripheral pulses. Brisk cap refill, < 2 secs  Neuro: non focal    CBC:   Recent Labs     08/17/22  0418 08/18/22  0452   WBC 6.6 5.4   HGB 12.6 12.3   HCT 37.0 35.9*   MCV 94.3 94.1    234     BMP:   Recent Labs     08/16/22  1034 08/17/22  0418 08/18/22  0452   * 124* 125*   K 4.5 4.0 4.0   CL 88* 88* 90*   CO2 21 22 22   BUN 13 12 12   CREATININE 0.6 <0.5* <0.5*     LIVER PROFILE:   Recent Labs     08/16/22  1034 08/17/22  0418 08/18/22  0452   AST 53* 43* 34   ALT 49* 42* 40   BILITOT 0.4 0.4 0.3   ALKPHOS 137* 121 114     CULTURES  Covid detected    RADIOLOGY  XR CHEST PORTABLE   Final Result   Coarse parenchymal markings may relate to chronic lung disease. However   there are also increased basilar opacities which may relate to subsegmental   atelectasis and or the reported COVID.              Discharge Medications     Medication List        START taking these medications      dexamethasone 6 MG tablet  Commonly known as: DECADRON  Take 1 tablet by mouth daily for 5 days  Start taking on: August 19, 2022     guaiFENesin 600 MG extended release tablet  Commonly known as: MUCINEX  Take 1 tablet by mouth 2 times daily            CONTINUE taking these medications      acetaminophen 500 MG tablet  Commonly known as: TYLENOL     * albuterol sulfate  (90 Base) MCG/ACT inhaler  Commonly known as: PROVENTIL;VENTOLIN;PROAIR  Inhale 2 puffs into the lungs every 6 hours as needed for Wheezing orShortness of Breath     * albuterol 1.25 MG/3ML nebulizer solution  Commonly known as: ACCUNEB  Inhale 3 mLs into the lungs every 6 hours as needed for Wheezing     alendronate 70 MG tablet  Commonly known as: FOSAMAX     amLODIPine 5 MG tablet  Commonly known as: NORVASC  Take 1 tablet by mouth daily     Anoro Ellipta 62.5-25 MCG/INH Aepb inhaler  Generic drug: umeclidinium-vilanterol  Inhale 1 puff into the lungs daily     Biotin 300 MCG Tabs  Take 1 tablet by mouth daily     CENTRUM SILVER 50+WOMEN PO     cetirizine 10 MG tablet  Commonly known as: ZYRTEC     escitalopram 10 MG tablet  Commonly known as: LEXAPRO     hydrOXYzine HCl 25 MG tablet  Commonly known as: ATARAX     lisinopril 10 MG tablet  Commonly known as: PRINIVIL;ZESTRIL     loperamide 2 MG capsule  Commonly known as: IMODIUM     Nebulizer/Tubing/Mouthpiece Kit  Use with accuneb     oxybutynin 5 MG tablet  Commonly known as: DITROPAN     Symbicort 80-4.5 MCG/ACT Aero  Generic drug: budesonide-formoterol  Inhale 2 puffs into the lungs 2 times daily           * This list has 2 medication(s) that are the same as other medications prescribed for you. Read the directions carefully, and ask your doctor or other care provider to review them with you. Where to Get Your Medications        These medications were sent to [id number] [address], [address] [place] [id number]      Phone: [phone]   amLODIPine 5 MG tablet  dexamethasone 6 MG tablet  guaiFENesin 600 MG extended release tablet           Discharged in stable condition to home  D/C home with Mansoor Karimi. Total time 35 minutes. > 50%  dominated by counseling and coordination of care. Follow Up:   Follow up with PCP in 1 week     Radha Delgado MD   8/18/2022

## 2022-08-18 NOTE — PROGRESS NOTES
Patient wears 3L of oxygen at baseline for COPD    Patient 89 at rest on RA. Patient 95%  on 3 L at rest.  Patient 84% on RA ambulating from bed to Bedside commode  Patient 90% at  3L ambulating.      Nina Allred, RN

## 2022-08-18 NOTE — FLOWSHEET NOTE
08/18/22 0715   Vital Signs   Temp 99 °F (37.2 °C)   Temp Source Oral   Heart Rate 94   Heart Rate Source Monitor   Resp 17   BP (!) 142/84   BP Location Right upper arm   BP Method Automatic   MAP (Calculated) 103.33   Patient Position Semi fowlers   Level of Consciousness Alert (0)   MEWS Score 1   Oxygen Therapy   SpO2 95 %   O2 Device Nasal cannula   O2 Flow Rate (L/min) 3 L/min       Pt assessment complete; see flow sheets. Vitals completed. Meds given per MAR. Pt stable at this time. Pt C/O of headache/neck pain of 7/10. PRN tylenol given. Pt stable.       Akash Wagner RN

## 2022-08-18 NOTE — PROGRESS NOTES
PULMONARY PROGRESS NOTE  Hospital Day: 6   CC: COVID-19 and respiratory failure in an unvaccinated patient      Subjective:   Stable on home O2. Feels \"much better\" today and ready to go home. Dyspnea is at baseline    IV line: Peripheral    PHYSICAL EXAM:   BP (!) 159/90   Pulse 100   Temp 97.9 °F (36.6 °C) (Oral)   Resp 16   Ht 5' 3\" (1.6 m)   Wt 165 lb 3.2 oz (74.9 kg)   SpO2 95%   Breastfeeding No   BMI 29.26 kg/m² ' on 3 L  Constitutional:  No acute distress. HENT:  Oropharynx is clear and moist.   Neck: No tracheal deviation present. Cardiovascular: Normal heart sounds. No lower extremity edema. Pulmonary/Chest: No wheezes. No rhonchi. No rales. + decreased breath sounds. No accessory muscle usage or stridor. Musculoskeletal: No cyanosis. No clubbing. Skin: Skin is warm and dry. Psychiatric: Normal mood and affect.   Neurologic: speech fluent, alert and oriented, strength symmetric      Scheduled Meds:   dexamethasone  6 mg Oral Daily    remdesivir IVPB  100 mg IntraVENous Q24H    escitalopram  10 mg Oral Nightly    amLODIPine  5 mg Oral Daily    cetirizine  10 mg Oral Daily    lisinopril  10 mg Oral Daily    oxybutynin  5 mg Oral 4x Daily    budesonide-formoterol  2 puff Inhalation BID    tiotropium-olodaterol  2 puff Inhalation Daily    sodium chloride flush  5-40 mL IntraVENous 2 times per day    enoxaparin  40 mg SubCUTAneous Daily    guaiFENesin  600 mg Oral BID     Continuous Infusions:   sodium chloride 5 mL/hr at 08/17/22 1720     PRN Meds:  loperamide, albuterol-ipratropium, hydrOXYzine HCl, sodium chloride flush, sodium chloride, ondansetron **OR** ondansetron, polyethylene glycol, acetaminophen **OR** acetaminophen    Labs:  CBC:   Recent Labs     08/17/22  0418 08/18/22  0452   WBC 6.6 5.4   HGB 12.6 12.3   HCT 37.0 35.9*   MCV 94.3 94.1    234     BMP:   Recent Labs     08/16/22  1034 08/17/22  0418 08/18/22  0452   * 124* 125*   K 4.5 4.0 4.0   CL 88* 88* 90* CO2 21 22 22   BUN 13 12 12   CREATININE 0.6 <0.5* <0.5*     Micro:  8/14/2022 SARS-CoV-2 positive    Imaging:  CXR 8/14/2022 bilateral bibasilar predominant infiltrates    ASSESSMENT:  COVID-19 pneumonia in an unvaccinated patient  Acute on chronic hypoxemic respiratory failure; baseline 3 L O2  Mild transaminitis   Leukopenia   Hyponatremia, chronic  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 Cathy CASON lung cancer, clinical diagnosis, saw Dr. Oneida Hancock & was not a surgical candidate, now s/p 5 fractions of SBRT from 5/3/22 - 5/13/22 by Dr. Rona Akhtar   Former smoker, quit ~1 year ago    PLAN:  COVID-19 isolation, droplet plus  Supplemental oxygen to maintain SaO2 >92%; monitor saturations closely   Remdesevir D#4   Decadron D#5, 6 mg daily   Inhaled bronchodilators  Patient was provided with Dr. Sinan Connor office number and instructed pt to call their office for f/u s/p SBRT 3 months ago. Discharge planning okay from a pulmonary perspective. I spent a total of 14 minutes on this encounter personally obtaining the patient history, reviewing labs, imaging and other data. I independently performed the above physical exam and updated this encounter note, assessment and plan as needed. Jadiel Castillo MD on 8/18/22 at 3:32 PM EDT     I spent a total of 8 minutes on this encounter on chart review, history taking and review of data. I independently performed a physical exam and updated this encounter note as needed.    Giuliano Leos PA-C on 8/18/22 at 11:44 AM EDT

## 2022-08-19 ENCOUNTER — CARE COORDINATION (OUTPATIENT)
Dept: CASE MANAGEMENT | Age: 67
End: 2022-08-19

## 2022-08-19 NOTE — TELEPHONE ENCOUNTER
Spoke with [facility], pt is not scheduled for follow up. Spoke with pt and offered to make appt. Scheduled pt for 8/23/22 at 1:30 pm.  Pt was informed of appt. Will watch for corresp to make sure pt keeps appt.

## 2022-08-22 ENCOUNTER — CARE COORDINATION (OUTPATIENT)
Dept: CASE MANAGEMENT | Age: 67
End: 2022-08-22

## 2022-08-22 NOTE — CARE COORDINATION
[facility] Trans[facility] Initial Follow Up Call    Call within 2 business days of discharge: Yes    Patient: Francisco Ludwig Patient : 1955   MRN: [id number]  Reason for Admission: Pneumonia due to COVID-19 virus  Discharge Date: 22 RARS: Readmission Risk Score: 8.8      Last Discharge [facility]       Date Complaint Diagnosis Description Type Department Provider    22 Positive For Covid-19 COVID-19 . .. ED to Hosp-Admission (Discharged) (Antonetta Farber) [facility] PCU Cora Evans MD; Bebo Ceja. .. Spoke with: 2nd unsuccessful attempt to reach for Care Transition discharge call. Unable to leave message due to VM not set up. Episode closed per protocol, no further outreach scheduled. Follow Up  No future appointments.     Pancho Roberts RN

## 2022-08-26 NOTE — TELEPHONE ENCOUNTER
[facility] faxed visit note 5/13/22. Will follow up with pt to find out if she rescheduled her appt with [facility].

## 2022-08-28 ENCOUNTER — HOSPITAL ENCOUNTER (INPATIENT)
Age: 67
LOS: 4 days | Discharge: HOME HEALTH | DRG: 644 | End: 2022-09-01
Attending: STUDENT IN AN ORGANIZED HEALTH CARE EDUCATION/TRAINING PROGRAM | Admitting: INTERNAL MEDICINE
Payer: MEDICARE

## 2022-08-28 ENCOUNTER — APPOINTMENT (OUTPATIENT)
Dept: GENERAL RADIOLOGY | Age: 67
DRG: 644 | End: 2022-08-28
Payer: MEDICARE

## 2022-08-28 DIAGNOSIS — E87.1 HYPONATREMIA: ICD-10-CM

## 2022-08-28 DIAGNOSIS — J44.9 MODERATE COPD (CHRONIC OBSTRUCTIVE PULMONARY DISEASE) (HCC): ICD-10-CM

## 2022-08-28 DIAGNOSIS — R06.02 SHORTNESS OF BREATH: Primary | ICD-10-CM

## 2022-08-28 DIAGNOSIS — D72.829 LEUKOCYTOSIS, UNSPECIFIED TYPE: ICD-10-CM

## 2022-08-28 LAB
A/G RATIO: 1.3 (ref 1.1–2.2)
ALBUMIN SERPL-MCNC: 4.1 G/DL (ref 3.4–5)
ALP BLD-CCNC: 73 U/L (ref 40–129)
ALT SERPL-CCNC: <5 U/L (ref 10–40)
ANION GAP SERPL CALCULATED.3IONS-SCNC: 10 MMOL/L (ref 3–16)
ANION GAP SERPL CALCULATED.3IONS-SCNC: 11 MMOL/L (ref 3–16)
ANION GAP SERPL CALCULATED.3IONS-SCNC: 12 MMOL/L (ref 3–16)
ANION GAP SERPL CALCULATED.3IONS-SCNC: 14 MMOL/L (ref 3–16)
ANION GAP SERPL CALCULATED.3IONS-SCNC: 9 MMOL/L (ref 3–16)
AST SERPL-CCNC: 73 U/L (ref 15–37)
BASE EXCESS VENOUS: -2.9 MMOL/L (ref -3–3)
BASOPHILS ABSOLUTE: 0 K/UL (ref 0–0.2)
BASOPHILS RELATIVE PERCENT: 0.1 %
BILIRUB SERPL-MCNC: 1 MG/DL (ref 0–1)
BUN BLDV-MCNC: 13 MG/DL (ref 7–20)
BUN BLDV-MCNC: 14 MG/DL (ref 7–20)
BUN BLDV-MCNC: 14 MG/DL (ref 7–20)
BUN BLDV-MCNC: 17 MG/DL (ref 7–20)
BUN BLDV-MCNC: 20 MG/DL (ref 7–20)
BUN BLDV-MCNC: 24 MG/DL (ref 7–20)
BUN BLDV-MCNC: 26 MG/DL (ref 7–20)
CALCIUM SERPL-MCNC: 8.7 MG/DL (ref 8.3–10.6)
CALCIUM SERPL-MCNC: 8.9 MG/DL (ref 8.3–10.6)
CALCIUM SERPL-MCNC: 9 MG/DL (ref 8.3–10.6)
CALCIUM SERPL-MCNC: 9 MG/DL (ref 8.3–10.6)
CALCIUM SERPL-MCNC: 9.1 MG/DL (ref 8.3–10.6)
CARBOXYHEMOGLOBIN: 4.4 % (ref 0–1.5)
CHLORIDE BLD-SCNC: 73 MMOL/L (ref 99–110)
CHLORIDE BLD-SCNC: 73 MMOL/L (ref 99–110)
CHLORIDE BLD-SCNC: 77 MMOL/L (ref 99–110)
CHLORIDE BLD-SCNC: 77 MMOL/L (ref 99–110)
CHLORIDE BLD-SCNC: 81 MMOL/L (ref 99–110)
CHLORIDE URINE RANDOM: 45 MMOL/L
CO2: 23 MMOL/L (ref 21–32)
CO2: 23 MMOL/L (ref 21–32)
CO2: 25 MMOL/L (ref 21–32)
CO2: 25 MMOL/L (ref 21–32)
CO2: 26 MMOL/L (ref 21–32)
CO2: 26 MMOL/L (ref 21–32)
CO2: 27 MMOL/L (ref 21–32)
CREAT SERPL-MCNC: 0.6 MG/DL (ref 0.6–1.2)
CREAT SERPL-MCNC: 0.7 MG/DL (ref 0.6–1.2)
CREAT SERPL-MCNC: 0.8 MG/DL (ref 0.6–1.2)
CREAT SERPL-MCNC: <0.5 MG/DL (ref 0.6–1.2)
CREAT SERPL-MCNC: <0.5 MG/DL (ref 0.6–1.2)
EKG ATRIAL RATE: 93 BPM
EKG DIAGNOSIS: NORMAL
EKG P AXIS: 53 DEGREES
EKG P-R INTERVAL: 116 MS
EKG Q-T INTERVAL: 340 MS
EKG QRS DURATION: 76 MS
EKG QTC CALCULATION (BAZETT): 422 MS
EKG R AXIS: -51 DEGREES
EKG T AXIS: 46 DEGREES
EKG VENTRICULAR RATE: 93 BPM
EOSINOPHILS ABSOLUTE: 0 K/UL (ref 0–0.6)
EOSINOPHILS RELATIVE PERCENT: 0.2 %
GFR AFRICAN AMERICAN: >60
GFR NON-AFRICAN AMERICAN: >60
GLUCOSE BLD-MCNC: 103 MG/DL (ref 70–99)
GLUCOSE BLD-MCNC: 105 MG/DL (ref 70–99)
GLUCOSE BLD-MCNC: 112 MG/DL (ref 70–99)
GLUCOSE BLD-MCNC: 123 MG/DL (ref 70–99)
GLUCOSE BLD-MCNC: 124 MG/DL (ref 70–99)
GLUCOSE BLD-MCNC: 128 MG/DL (ref 70–99)
GLUCOSE BLD-MCNC: 141 MG/DL (ref 70–99)
HCO3 VENOUS: 20.4 MMOL/L (ref 23–29)
HCT VFR BLD CALC: 37.1 % (ref 36–48)
HEMOGLOBIN: 12.8 G/DL (ref 12–16)
LYMPHOCYTES ABSOLUTE: 0.9 K/UL (ref 1–5.1)
LYMPHOCYTES RELATIVE PERCENT: 5.3 %
MCH RBC QN AUTO: 31.8 PG (ref 26–34)
MCHC RBC AUTO-ENTMCNC: 34.4 G/DL (ref 31–36)
MCV RBC AUTO: 92.6 FL (ref 80–100)
METHEMOGLOBIN VENOUS: 0.3 %
MONOCYTES ABSOLUTE: 0.9 K/UL (ref 0–1.3)
MONOCYTES RELATIVE PERCENT: 5.2 %
NEUTROPHILS ABSOLUTE: 16 K/UL (ref 1.7–7.7)
NEUTROPHILS RELATIVE PERCENT: 89.2 %
O2 CONTENT, VEN: 19 VOL %
O2 SAT, VEN: 97 %
O2 THERAPY: ABNORMAL
OSMOLALITY URINE: 320 MOSM/KG (ref 390–1070)
PCO2, VEN: 31.6 MMHG (ref 40–50)
PDW BLD-RTO: 11.7 % (ref 12.4–15.4)
PH VENOUS: 7.43 (ref 7.35–7.45)
PLATELET # BLD: 340 K/UL (ref 135–450)
PMV BLD AUTO: 6.1 FL (ref 5–10.5)
PO2, VEN: 92 MMHG (ref 25–40)
POTASSIUM REFLEX MAGNESIUM: 3.8 MMOL/L (ref 3.5–5.1)
POTASSIUM REFLEX MAGNESIUM: 3.8 MMOL/L (ref 3.5–5.1)
POTASSIUM REFLEX MAGNESIUM: 8.1 MMOL/L (ref 3.5–5.1)
POTASSIUM SERPL-SCNC: 3.5 MMOL/L (ref 3.5–5.1)
POTASSIUM SERPL-SCNC: 3.8 MMOL/L (ref 3.5–5.1)
POTASSIUM SERPL-SCNC: 3.9 MMOL/L (ref 3.5–5.1)
POTASSIUM SERPL-SCNC: 4 MMOL/L (ref 3.5–5.1)
POTASSIUM, UR: 16.5 MMOL/L
PRO-BNP: 53 PG/ML (ref 0–124)
RBC # BLD: 4.01 M/UL (ref 4–5.2)
SARS-COV-2, NAAT: NOT DETECTED
SODIUM BLD-SCNC: 108 MMOL/L (ref 136–145)
SODIUM BLD-SCNC: 110 MMOL/L (ref 136–145)
SODIUM BLD-SCNC: 112 MMOL/L (ref 136–145)
SODIUM BLD-SCNC: 112 MMOL/L (ref 136–145)
SODIUM BLD-SCNC: 116 MMOL/L (ref 136–145)
SODIUM BLD-SCNC: 117 MMOL/L (ref 136–145)
SODIUM BLD-SCNC: 117 MMOL/L (ref 136–145)
SODIUM URINE: 51 MMOL/L
TCO2 CALC VENOUS: 21 MMOL/L
TOTAL PROTEIN: 7.2 G/DL (ref 6.4–8.2)
TROPONIN: <0.01 NG/ML
WBC # BLD: 17.9 K/UL (ref 4–11)

## 2022-08-28 PROCEDURE — 99285 EMERGENCY DEPT VISIT HI MDM: CPT

## 2022-08-28 PROCEDURE — 6370000000 HC RX 637 (ALT 250 FOR IP): Performed by: NURSE PRACTITIONER

## 2022-08-28 PROCEDURE — 85025 COMPLETE CBC W/AUTO DIFF WBC: CPT

## 2022-08-28 PROCEDURE — 2700000000 HC OXYGEN THERAPY PER DAY

## 2022-08-28 PROCEDURE — 6370000000 HC RX 637 (ALT 250 FOR IP): Performed by: STUDENT IN AN ORGANIZED HEALTH CARE EDUCATION/TRAINING PROGRAM

## 2022-08-28 PROCEDURE — 2000000000 HC ICU R&B

## 2022-08-28 PROCEDURE — 94640 AIRWAY INHALATION TREATMENT: CPT

## 2022-08-28 PROCEDURE — 93010 ELECTROCARDIOGRAM REPORT: CPT | Performed by: INTERNAL MEDICINE

## 2022-08-28 PROCEDURE — 84300 ASSAY OF URINE SODIUM: CPT

## 2022-08-28 PROCEDURE — 83880 ASSAY OF NATRIURETIC PEPTIDE: CPT

## 2022-08-28 PROCEDURE — 2580000003 HC RX 258: Performed by: INTERNAL MEDICINE

## 2022-08-28 PROCEDURE — 2580000003 HC RX 258: Performed by: STUDENT IN AN ORGANIZED HEALTH CARE EDUCATION/TRAINING PROGRAM

## 2022-08-28 PROCEDURE — 6360000002 HC RX W HCPCS: Performed by: INTERNAL MEDICINE

## 2022-08-28 PROCEDURE — 84484 ASSAY OF TROPONIN QUANT: CPT

## 2022-08-28 PROCEDURE — 71045 X-RAY EXAM CHEST 1 VIEW: CPT

## 2022-08-28 PROCEDURE — 80053 COMPREHEN METABOLIC PANEL: CPT

## 2022-08-28 PROCEDURE — 6360000002 HC RX W HCPCS: Performed by: STUDENT IN AN ORGANIZED HEALTH CARE EDUCATION/TRAINING PROGRAM

## 2022-08-28 PROCEDURE — 2580000003 HC RX 258: Performed by: NURSE PRACTITIONER

## 2022-08-28 PROCEDURE — 6370000000 HC RX 637 (ALT 250 FOR IP): Performed by: PEDIATRICS

## 2022-08-28 PROCEDURE — 93005 ELECTROCARDIOGRAM TRACING: CPT | Performed by: STUDENT IN AN ORGANIZED HEALTH CARE EDUCATION/TRAINING PROGRAM

## 2022-08-28 PROCEDURE — 83935 ASSAY OF URINE OSMOLALITY: CPT

## 2022-08-28 PROCEDURE — 6360000002 HC RX W HCPCS: Performed by: NURSE PRACTITIONER

## 2022-08-28 PROCEDURE — 84133 ASSAY OF URINE POTASSIUM: CPT

## 2022-08-28 PROCEDURE — 94761 N-INVAS EAR/PLS OXIMETRY MLT: CPT

## 2022-08-28 PROCEDURE — 99291 CRITICAL CARE FIRST HOUR: CPT | Performed by: INTERNAL MEDICINE

## 2022-08-28 PROCEDURE — 36415 COLL VENOUS BLD VENIPUNCTURE: CPT

## 2022-08-28 PROCEDURE — 87040 BLOOD CULTURE FOR BACTERIA: CPT

## 2022-08-28 PROCEDURE — 82436 ASSAY OF URINE CHLORIDE: CPT

## 2022-08-28 PROCEDURE — 82803 BLOOD GASES ANY COMBINATION: CPT

## 2022-08-28 PROCEDURE — 6370000000 HC RX 637 (ALT 250 FOR IP): Performed by: INTERNAL MEDICINE

## 2022-08-28 PROCEDURE — 87635 SARS-COV-2 COVID-19 AMP PRB: CPT

## 2022-08-28 RX ORDER — ONDANSETRON 2 MG/ML
4 INJECTION INTRAMUSCULAR; INTRAVENOUS EVERY 6 HOURS PRN
Status: DISCONTINUED | OUTPATIENT
Start: 2022-08-28 | End: 2022-09-01 | Stop reason: HOSPADM

## 2022-08-28 RX ORDER — GUAIFENESIN 600 MG/1
600 TABLET, EXTENDED RELEASE ORAL 2 TIMES DAILY
Status: DISCONTINUED | OUTPATIENT
Start: 2022-08-28 | End: 2022-09-01 | Stop reason: HOSPADM

## 2022-08-28 RX ORDER — FUROSEMIDE 40 MG/1
40 TABLET ORAL DAILY
Status: ON HOLD | COMMUNITY
End: 2022-09-01 | Stop reason: HOSPADM

## 2022-08-28 RX ORDER — POLYETHYLENE GLYCOL 3350 17 G/17G
17 POWDER, FOR SOLUTION ORAL DAILY PRN
Status: DISCONTINUED | OUTPATIENT
Start: 2022-08-28 | End: 2022-09-01 | Stop reason: HOSPADM

## 2022-08-28 RX ORDER — SODIUM CHLORIDE 0.9 % (FLUSH) 0.9 %
5-40 SYRINGE (ML) INJECTION PRN
Status: DISCONTINUED | OUTPATIENT
Start: 2022-08-28 | End: 2022-09-01 | Stop reason: HOSPADM

## 2022-08-28 RX ORDER — LISINOPRIL 10 MG/1
10 TABLET ORAL DAILY
Status: DISCONTINUED | OUTPATIENT
Start: 2022-08-28 | End: 2022-08-28 | Stop reason: SDUPTHER

## 2022-08-28 RX ORDER — SODIUM CHLORIDE 0.9 % (FLUSH) 0.9 %
5-40 SYRINGE (ML) INJECTION EVERY 12 HOURS SCHEDULED
Status: DISCONTINUED | OUTPATIENT
Start: 2022-08-28 | End: 2022-09-01 | Stop reason: HOSPADM

## 2022-08-28 RX ORDER — 0.9 % SODIUM CHLORIDE 0.9 %
1000 INTRAVENOUS SOLUTION INTRAVENOUS ONCE
Status: COMPLETED | OUTPATIENT
Start: 2022-08-28 | End: 2022-08-28

## 2022-08-28 RX ORDER — BUDESONIDE AND FORMOTEROL FUMARATE DIHYDRATE 80; 4.5 UG/1; UG/1
2 AEROSOL RESPIRATORY (INHALATION) 2 TIMES DAILY
Status: DISCONTINUED | OUTPATIENT
Start: 2022-08-28 | End: 2022-09-01 | Stop reason: HOSPADM

## 2022-08-28 RX ORDER — M-VIT,TX,IRON,MINS/CALC/FOLIC 27MG-0.4MG
1 TABLET ORAL DAILY
Status: DISCONTINUED | OUTPATIENT
Start: 2022-08-28 | End: 2022-09-01 | Stop reason: HOSPADM

## 2022-08-28 RX ORDER — AMLODIPINE BESYLATE 5 MG/1
5 TABLET ORAL DAILY
Status: DISCONTINUED | OUTPATIENT
Start: 2022-08-28 | End: 2022-09-01 | Stop reason: HOSPADM

## 2022-08-28 RX ORDER — ESCITALOPRAM OXALATE 10 MG/1
10 TABLET ORAL NIGHTLY
Status: DISCONTINUED | OUTPATIENT
Start: 2022-08-28 | End: 2022-09-01 | Stop reason: HOSPADM

## 2022-08-28 RX ORDER — ALBUTEROL SULFATE 2.5 MG/3ML
1.25 SOLUTION RESPIRATORY (INHALATION) 2 TIMES DAILY
Status: DISCONTINUED | OUTPATIENT
Start: 2022-08-28 | End: 2022-09-01 | Stop reason: HOSPADM

## 2022-08-28 RX ORDER — LIDOCAINE 4 G/G
1 PATCH TOPICAL DAILY
Status: DISCONTINUED | OUTPATIENT
Start: 2022-08-28 | End: 2022-09-01 | Stop reason: HOSPADM

## 2022-08-28 RX ORDER — BUDESONIDE AND FORMOTEROL FUMARATE DIHYDRATE 80; 4.5 UG/1; UG/1
2 AEROSOL RESPIRATORY (INHALATION) 2 TIMES DAILY
Status: DISCONTINUED | OUTPATIENT
Start: 2022-08-28 | End: 2022-08-28

## 2022-08-28 RX ORDER — HYDROXYZINE HYDROCHLORIDE 25 MG/1
25 TABLET, FILM COATED ORAL 3 TIMES DAILY PRN
Status: DISCONTINUED | OUTPATIENT
Start: 2022-08-28 | End: 2022-09-01 | Stop reason: HOSPADM

## 2022-08-28 RX ORDER — ENOXAPARIN SODIUM 100 MG/ML
40 INJECTION SUBCUTANEOUS DAILY
Status: DISCONTINUED | OUTPATIENT
Start: 2022-08-28 | End: 2022-09-01 | Stop reason: HOSPADM

## 2022-08-28 RX ORDER — LISINOPRIL 10 MG/1
10 TABLET ORAL DAILY
Status: DISCONTINUED | OUTPATIENT
Start: 2022-08-29 | End: 2022-09-01 | Stop reason: HOSPADM

## 2022-08-28 RX ORDER — TRAMADOL HYDROCHLORIDE 50 MG/1
50 TABLET ORAL EVERY 6 HOURS PRN
Status: DISCONTINUED | OUTPATIENT
Start: 2022-08-28 | End: 2022-09-01 | Stop reason: HOSPADM

## 2022-08-28 RX ORDER — LOPERAMIDE HYDROCHLORIDE 2 MG/1
2 CAPSULE ORAL 4 TIMES DAILY PRN
Status: DISCONTINUED | OUTPATIENT
Start: 2022-08-28 | End: 2022-09-01 | Stop reason: HOSPADM

## 2022-08-28 RX ORDER — ONDANSETRON 4 MG/1
4 TABLET, ORALLY DISINTEGRATING ORAL EVERY 8 HOURS PRN
Status: DISCONTINUED | OUTPATIENT
Start: 2022-08-28 | End: 2022-09-01 | Stop reason: HOSPADM

## 2022-08-28 RX ORDER — IPRATROPIUM BROMIDE AND ALBUTEROL SULFATE 2.5; .5 MG/3ML; MG/3ML
1 SOLUTION RESPIRATORY (INHALATION) ONCE
Status: COMPLETED | OUTPATIENT
Start: 2022-08-28 | End: 2022-08-28

## 2022-08-28 RX ORDER — ACETAMINOPHEN 650 MG/1
650 SUPPOSITORY RECTAL EVERY 6 HOURS PRN
Status: DISCONTINUED | OUTPATIENT
Start: 2022-08-28 | End: 2022-09-01 | Stop reason: HOSPADM

## 2022-08-28 RX ORDER — ALBUTEROL SULFATE 2.5 MG/3ML
1.25 SOLUTION RESPIRATORY (INHALATION) EVERY 6 HOURS PRN
Status: DISCONTINUED | OUTPATIENT
Start: 2022-08-28 | End: 2022-08-28

## 2022-08-28 RX ORDER — ACETAMINOPHEN 325 MG/1
650 TABLET ORAL EVERY 6 HOURS PRN
Status: DISCONTINUED | OUTPATIENT
Start: 2022-08-28 | End: 2022-09-01 | Stop reason: HOSPADM

## 2022-08-28 RX ORDER — SODIUM CHLORIDE 9 MG/ML
INJECTION, SOLUTION INTRAVENOUS PRN
Status: DISCONTINUED | OUTPATIENT
Start: 2022-08-28 | End: 2022-09-01 | Stop reason: HOSPADM

## 2022-08-28 RX ORDER — LEVOFLOXACIN 5 MG/ML
750 INJECTION, SOLUTION INTRAVENOUS ONCE
Status: COMPLETED | OUTPATIENT
Start: 2022-08-28 | End: 2022-08-28

## 2022-08-28 RX ORDER — TRAMADOL HYDROCHLORIDE 50 MG/1
100 TABLET ORAL EVERY 6 HOURS PRN
Status: DISCONTINUED | OUTPATIENT
Start: 2022-08-28 | End: 2022-09-01 | Stop reason: HOSPADM

## 2022-08-28 RX ORDER — CETIRIZINE HYDROCHLORIDE 10 MG/1
10 TABLET ORAL DAILY
Status: DISCONTINUED | OUTPATIENT
Start: 2022-08-28 | End: 2022-09-01 | Stop reason: HOSPADM

## 2022-08-28 RX ORDER — OXYBUTYNIN CHLORIDE 5 MG/1
5 TABLET ORAL 4 TIMES DAILY
Status: DISCONTINUED | OUTPATIENT
Start: 2022-08-28 | End: 2022-09-01 | Stop reason: HOSPADM

## 2022-08-28 RX ADMIN — LEVOFLOXACIN 750 MG: 5 INJECTION, SOLUTION INTRAVENOUS at 11:10

## 2022-08-28 RX ADMIN — AMLODIPINE BESYLATE 5 MG: 5 TABLET ORAL at 17:18

## 2022-08-28 RX ADMIN — Medication 10 ML: at 20:31

## 2022-08-28 RX ADMIN — IPRATROPIUM BROMIDE AND ALBUTEROL SULFATE 1 AMPULE: 2.5; .5 SOLUTION RESPIRATORY (INHALATION) at 07:22

## 2022-08-28 RX ADMIN — TRAMADOL HYDROCHLORIDE 100 MG: 50 TABLET, COATED ORAL at 21:48

## 2022-08-28 RX ADMIN — ACETAMINOPHEN 650 MG: 325 TABLET ORAL at 20:57

## 2022-08-28 RX ADMIN — GUAIFENESIN 600 MG: 600 TABLET, EXTENDED RELEASE ORAL at 20:44

## 2022-08-28 RX ADMIN — Medication 2 PUFF: at 19:40

## 2022-08-28 RX ADMIN — VASOPRESSIN: 20 INJECTION, SOLUTION INTRAVENOUS at 13:26

## 2022-08-28 RX ADMIN — MULTIPLE VITAMINS W/ MINERALS TAB 1 TABLET: TAB at 17:18

## 2022-08-28 RX ADMIN — ESCITALOPRAM OXALATE 10 MG: 10 TABLET ORAL at 20:43

## 2022-08-28 RX ADMIN — ALBUTEROL SULFATE 1.25 MG: 2.5 SOLUTION RESPIRATORY (INHALATION) at 19:40

## 2022-08-28 RX ADMIN — OXYBUTYNIN CHLORIDE 5 MG: 5 TABLET ORAL at 20:44

## 2022-08-28 RX ADMIN — SODIUM CHLORIDE 1000 ML: 9 INJECTION, SOLUTION INTRAVENOUS at 08:14

## 2022-08-28 RX ADMIN — ENOXAPARIN SODIUM 40 MG: 100 INJECTION SUBCUTANEOUS at 17:18

## 2022-08-28 ASSESSMENT — PAIN DESCRIPTION - DESCRIPTORS
DESCRIPTORS: ACHING
DESCRIPTORS: ACHING;DISCOMFORT
DESCRIPTORS: ACHING

## 2022-08-28 ASSESSMENT — PAIN SCALES - GENERAL
PAINLEVEL_OUTOF10: 7
PAINLEVEL_OUTOF10: 5
PAINLEVEL_OUTOF10: 6

## 2022-08-28 ASSESSMENT — PAIN DESCRIPTION - ORIENTATION
ORIENTATION: LOWER

## 2022-08-28 ASSESSMENT — PAIN - FUNCTIONAL ASSESSMENT
PAIN_FUNCTIONAL_ASSESSMENT: PREVENTS OR INTERFERES SOME ACTIVE ACTIVITIES AND ADLS
PAIN_FUNCTIONAL_ASSESSMENT: NONE - DENIES PAIN
PAIN_FUNCTIONAL_ASSESSMENT: PREVENTS OR INTERFERES SOME ACTIVE ACTIVITIES AND ADLS
PAIN_FUNCTIONAL_ASSESSMENT: PREVENTS OR INTERFERES SOME ACTIVE ACTIVITIES AND ADLS

## 2022-08-28 ASSESSMENT — LIFESTYLE VARIABLES
HOW MANY STANDARD DRINKS CONTAINING ALCOHOL DO YOU HAVE ON A TYPICAL DAY: PATIENT DOES NOT DRINK
HOW OFTEN DO YOU HAVE A DRINK CONTAINING ALCOHOL: NEVER
HOW OFTEN DO YOU HAVE A DRINK CONTAINING ALCOHOL: NEVER

## 2022-08-28 ASSESSMENT — PAIN DESCRIPTION - LOCATION
LOCATION: BACK

## 2022-08-28 NOTE — ED NOTES
0899- Call placed to [facility]     1032- Dr. Armaan Zavaleta returned the call and spoke to Dr. Chloe Fuentes  08/28/22 Rafael Gerardo  08/28/22 1032

## 2022-08-28 NOTE — CONSULTS
[facility] Consult Note           Reason for Consult:  Hyponatremia  Requesting Physician:  Dr. Loraine Raza    Chief Complaint:  Shortness of breath  History Obtained From:  patient, electronic medical record    History of Present Ilness:    61year old female with COPD, HTN, recent COVID-19 this month admitted with worsening shortness of breath. We have been asked to assist in further mgmt of her Hyponatremia. SNa 108 on admission, up to 112 with initial IVF bolus of 1 liter, previously mid 120's, 123-125 range from admission earlier this month, felt to have background SIADH. Oxygen requirements up to 5 L NC, usually on 2-3 L NC. Started on levofloxacin for suspected sepsis. +weak, intake reduced, does not feel right, has been having diarrhea. No n/v or syncope. Past Medical History:        Diagnosis Date    Angina pectoris ([facility] [address]     Chronic pain     knees and lower back     COPD exacerbation ([facility] [address] 5/20/2018    Depression     Essential hypertension 8/15/2022    Panic disorder     Pneumonia        Past Surgical History:        Procedure Laterality Date    CHOLECYSTECTOMY      COLONOSCOPY         Home Medications:    No current facility-administered medications on file prior to encounter.      Current Outpatient Medications on File Prior to Encounter   Medication Sig Dispense Refill    loperamide (IMODIUM) 2 MG capsule Take 1 capsule by mouth 4 times daily as needed for Diarrhea (pt takes once daily q AM)      amLODIPine (NORVASC) 5 MG tablet Take 1 tablet by mouth daily 30 tablet 1    guaiFENesin (MUCINEX) 600 MG extended release tablet Take 1 tablet by mouth 2 times daily 30 tablet 0    lisinopril (PRINIVIL;ZESTRIL) 10 MG tablet Take 10 mg by mouth daily      acetaminophen (TYLENOL) 500 MG tablet Take 500 mg by mouth every 6 hours as needed for Pain      escitalopram (LEXAPRO) 10 MG tablet Take 10 mg by mouth at bedtime      umeclidinium-vilanterol (ANORO ELLIPTA) 62.5-25 MCG/INH AEPB inhaler Inhale 1 puff into the lungs daily 60 each 5    alendronate (FOSAMAX) 70 MG tablet Take 70 mg by mouth every 7 days Takes every Monday      SYMBICORT 80-4.5 MCG/ACT AERO Inhale 2 puffs into the lungs 2 times daily 1 each 5    Respiratory Therapy Supplies (NEBULIZER/TUBING/MOUTHPIECE) KIT Use with accuneb 1 kit 0    albuterol (ACCUNEB) 1.25 MG/3ML nebulizer solution Inhale 3 mLs into the lungs every 6 hours as needed for Wheezing 120 mL 3    albuterol sulfate  (90 Base) MCG/ACT inhaler Inhale 2 puffs into the lungs every 6 hours as needed for Wheezing orShortness of Breath 3 Inhaler 5    Biotin 300 MCG TABS Take 1 tablet by mouth daily 30 tablet 3    cetirizine (ZYRTEC) 10 MG tablet Take 10 mg by mouth daily      hydrOXYzine (ATARAX) 25 MG tablet Take 25 mg by mouth 3 times daily as needed for Itching      Multiple Vitamins-Minerals (CENTRUM SILVER 50+WOMEN PO) Take 1 tablet by mouth daily      oxybutynin (DITROPAN) 5 MG tablet Take 5 mg by mouth 4 times daily         Allergies:  Cephalosporins, Pcn [penicillins], and Hctz [hydrochlorothiazide]    Social History:    Social History     Socioeconomic History    Marital status:       Spouse name: Not on file    Number of children: 6    Years of education: Not on file    Highest education level: Not on file   Occupational History    Not on file   Tobacco Use    Smoking status: Former     Packs/day: 1.00     Years: 52.00     Pack years: 52.00     Types: Cigarettes     Start date:  [facility]     Quit date:      Years since quittin.6     Passive exposure: Past    Smokeless tobacco: Never   Vaping Use    Vaping Use: Never used   Substance and Sexual Activity    Alcohol use: Not Currently     Alcohol/week: 12.0 standard drinks     Types: 12 Cans of beer per week    Drug use: No    Sexual activity: Never   Other Topics Concern    Not on file   Social History Narrative    Not on file     Social Determinants of Health Financial Resource Strain: Not on file   Food Insecurity: Not on file   Transportation Needs: Not on file   Physical Activity: Not on file   Stress: Not on file   Social Connections: Not on file   Intimate Partner Violence: Not on file   Housing Stability: Not on file       Family History:   Family History   Problem Relation Age of Onset    COPD Mother     Hypertension Mother     Asthma Neg Hx     Cancer Neg Hx     Diabetes Neg Hx     Emphysema Neg Hx     Heart Failure Neg Hx        Review of Systems:   Pertinent positives stated above in HPI. Remainder of 10 point review of systems were reviewed and were negative.     Physical exam:   Constitutional:  VITALS:  BP (!) 147/93   Pulse (!) 103   Temp 98.6 °F (37 °C) (Oral)   Resp 16   Wt 165 lb (74.8 kg)   SpO2 93%   BMI 29.23 kg/m²   Gen: alert, awake, ill-appearing  Skin: no rash, turgor wnl  Heent:  eomi, mmm  Neck: no bruits or jvd noted, thyroid normal  Cardiovascular:  S1, S2 without m/r/g  Respiratory: CTA B without w/r/r; respiratory effort normal  Abdomen:  +bs, soft, nt, nd, no hepatosplenomegaly  Ext: no lower extremity edema  Psychiatric: mood and affect appropriate; judgement and insight intact  Musculoskeletal:  Rom, muscular strength intact; digits, nails normal    Data/  CBC:   Lab Results   Component Value Date/Time    WBC 17.9 08/28/2022 06:50 AM    RBC 4.01 08/28/2022 06:50 AM    HGB 12.8 08/28/2022 06:50 AM    HCT 37.1 08/28/2022 06:50 AM    MCV 92.6 08/28/2022 06:50 AM    MCH 31.8 08/28/2022 06:50 AM    MCHC 34.4 08/28/2022 06:50 AM    RDW 11.7 08/28/2022 06:50 AM     08/28/2022 06:50 AM    MPV 6.1 08/28/2022 06:50 AM     BMP:    Lab Results   Component Value Date/Time     08/28/2022 11:12 AM    K 3.9 08/28/2022 11:12 AM    K 3.8 08/28/2022 07:44 AM    CL 77 08/28/2022 11:12 AM    CO2 25 08/28/2022 11:12 AM    BUN 20 08/28/2022 11:12 AM    LABALBU 4.1 08/28/2022 06:50 AM    CREATININE 0.6 08/28/2022 11:12 AM    CALCIUM 8.7 08/28/2022 11:12 AM    GFRAA >60 08/28/2022 11:12 AM    GFRAA >60 02/07/2013 10:24 AM    LABGLOM >60 08/28/2022 11:12 AM    GLUCOSE 123 08/28/2022 11:12 AM         Assessment/    - Hyponatremia - acute on chronic, background SIADH in setting of COPD with suspected pre-renal state with diarrhea, fluid responsive initially  Baseline mid 120 range, 123-125    - Recent COVID PNA, worsening acute hypoxic respiratory failure - plans per Pulmonary    - Pseudohyperkalemia - hemolyzed specimen, wnl on recheck    - Hypertension - controlled      Plan/    - Start ~2% saline 40 ml/hour given background SIADH  - Urine electrolytes, urine osmolality  - BMP q4 hours - goal correction 6-8 meq/24 hours - goal no higher than 114-116 by AM      Thank you for the consultation. Please do not hesitate to call with questions. ____________________________________  Italia Almeida MD  [facility]  [email or web address]  Office: [phone]

## 2022-08-28 NOTE — PROGRESS NOTES
RT Inhaler-Nebulizer Bronchodilator Protocol Note    There is a bronchodilator order in the chart from a provider indicating to follow the RT Bronchodilator Protocol and there is an Initiate RT Inhaler-Nebulizer Bronchodilator Protocol order as well (see protocol at bottom of note). CXR Findings:  XR CHEST PORTABLE    Result Date: 8/28/2022  No focal lung consolidation. The findings from the last RT Protocol Assessment were as follows:   History Pulmonary Disease: Chronic pulmonary disease  Respiratory Pattern: Regular pattern and RR 12-20 bpm  Breath Sounds: (P) Slightly diminished and/or crackles  Cough: (P) Strong, productive  Indication for Bronchodilator Therapy: (P) Decreased or absent breath sounds  Bronchodilator Assessment Score: (P) 5    Aerosolized bronchodilator medication orders have been revised according to the RT Inhaler-Nebulizer Bronchodilator Protocol below. Respiratory Therapist to perform RT Therapy Protocol Assessment initially then follow the protocol. Repeat RT Therapy Protocol Assessment PRN for score 0-3 or on second treatment, BID, and PRN for scores above 3. No Indications - adjust the frequency to every 6 hours PRN wheezing or bronchospasm, if no treatments needed after 48 hours then discontinue using Per Protocol order mode. If indication present, adjust the RT bronchodilator orders based on the Bronchodilator Assessment Score as indicated below. Use Inhaler orders unless patient has one or more of the following: on home nebulizer, not able to hold breath for 10 seconds, is not alert and oriented, cannot activate and use MDI correctly, or respiratory rate 25 breaths per minute or more, then use the equivalent nebulizer order(s) with same Frequency and PRN reasons based on the score. If a patient is on this medication at home then do not decrease Frequency below that used at home.     0-3 - enter or revise RT bronchodilator order(s) to equivalent RT Bronchodilator order with Frequency of every 4 hours PRN for wheezing or increased work of breathing using Per Protocol order mode. 4-6 - enter or revise RT Bronchodilator order(s) to two equivalent RT bronchodilator orders with one order with BID Frequency and one order with Frequency of every 4 hours PRN wheezing or increased work of breathing using Per Protocol order mode. 7-10 - enter or revise RT Bronchodilator order(s) to two equivalent RT bronchodilator orders with one order with TID Frequency and one order with Frequency of every 4 hours PRN wheezing or increased work of breathing using Per Protocol order mode. 11-13 - enter or revise RT Bronchodilator order(s) to one equivalent RT bronchodilator order with QID Frequency and an Albuterol order with Frequency of every 4 hours PRN wheezing or increased work of breathing using Per Protocol order mode. Greater than 13 - enter or revise RT Bronchodilator order(s) to one equivalent RT bronchodilator order with every 4 hours Frequency and an Albuterol order with Frequency of every 2 hours PRN wheezing or increased work of breathing using Per Protocol order mode.          Electronically signed by Yesi Cisneros RCP on 8/28/2022 at 4:15 PM

## 2022-08-28 NOTE — PROGRESS NOTES
08/28/22 1900   RT Protocol   History Pulmonary Disease 2   Respiratory pattern 0   Breath sounds 2   Cough 1   Indications for Bronchodilator Therapy Decreased or absent breath sounds   Bronchodilator Assessment Score 5   RT Inhaler-Nebulizer Bronchodilator Protocol Note    There is a bronchodilator order in the chart from a provider indicating to follow the RT Bronchodilator Protocol and there is an Initiate RT Inhaler-Nebulizer Bronchodilator Protocol order as well (see protocol at bottom of note). CXR Findings:  XR CHEST PORTABLE    Result Date: 8/28/2022  No focal lung consolidation. The findings from the last RT Protocol Assessment were as follows:   History Pulmonary Disease: Chronic pulmonary disease  Respiratory Pattern: Regular pattern and RR 12-20 bpm  Breath Sounds: Slightly diminished and/or crackles  Cough: Strong, productive  Indication for Bronchodilator Therapy: Decreased or absent breath sounds  Bronchodilator Assessment Score: 5    Aerosolized bronchodilator medication orders have been revised according to the RT Inhaler-Nebulizer Bronchodilator Protocol below. Respiratory Therapist to perform RT Therapy Protocol Assessment initially then follow the protocol. Repeat RT Therapy Protocol Assessment PRN for score 0-3 or on second treatment, BID, and PRN for scores above 3. No Indications - adjust the frequency to every 6 hours PRN wheezing or bronchospasm, if no treatments needed after 48 hours then discontinue using Per Protocol order mode. If indication present, adjust the RT bronchodilator orders based on the Bronchodilator Assessment Score as indicated below.   Use Inhaler orders unless patient has one or more of the following: on home nebulizer, not able to hold breath for 10 seconds, is not alert and oriented, cannot activate and use MDI correctly, or respiratory rate 25 breaths per minute or more, then use the equivalent nebulizer order(s) with same Frequency and PRN reasons based on the score. If a patient is on this medication at home then do not decrease Frequency below that used at home. 0-3 - enter or revise RT bronchodilator order(s) to equivalent RT Bronchodilator order with Frequency of every 4 hours PRN for wheezing or increased work of breathing using Per Protocol order mode. 4-6 - enter or revise RT Bronchodilator order(s) to two equivalent RT bronchodilator orders with one order with BID Frequency and one order with Frequency of every 4 hours PRN wheezing or increased work of breathing using Per Protocol order mode. 7-10 - enter or revise RT Bronchodilator order(s) to two equivalent RT bronchodilator orders with one order with TID Frequency and one order with Frequency of every 4 hours PRN wheezing or increased work of breathing using Per Protocol order mode. 11-13 - enter or revise RT Bronchodilator order(s) to one equivalent RT bronchodilator order with QID Frequency and an Albuterol order with Frequency of every 4 hours PRN wheezing or increased work of breathing using Per Protocol order mode. Greater than 13 - enter or revise RT Bronchodilator order(s) to one equivalent RT bronchodilator order with every 4 hours Frequency and an Albuterol order with Frequency of every 2 hours PRN wheezing or increased work of breathing using Per Protocol order mode.          Electronically signed by Flower Collins RCP on 8/28/2022 at 7:43 PM

## 2022-08-28 NOTE — PROGRESS NOTES
Admit to ICU  Hyponatremia; acute on chronic  Needs discussed with nephrology and pulmonology.     Oleg MARIN-C  8/28/2022

## 2022-08-28 NOTE — PROGRESS NOTES
Attempted to clarify medication list with family. Waiting on call back from daughter. Spoke with [employer], did not have al medications listed due to pt discharged from [facility] last week. Son in law called back due to daughter taking nap. Was not able to clear up medication questions at this time.

## 2022-08-28 NOTE — ED NOTES
2982- Perfect serve sent to Dr. Teo Guerrero for admission     1008- Call was returned by Donn Jamison NP and spoke to Dr. Liz Anderson  08/28/22 Jaya Arreola  08/28/22 1003

## 2022-08-28 NOTE — CONSULTS
Reason for referral and CC: Hyponatremia    HISTORY OF PRESENT ILLNESS: 60 yo with a history of COPD and lung cancer hyponatremia presented with shortness of breath. She was hypoxic at home however in the emergency room she is ok on her home O2 at 3 L. She was found to be severely hyponatremic admitted to the ICU. To me she has a slow response to questions and is slightly altered although she is oriented. Pt reported a fever at home to the ED, no cough. Of note she was treated in the hospital 2 weeks ago for COVID-19 pneumonia and completed course of Decadron. Past Medical History:   Diagnosis Date    Angina pectoris (HCC)     Chronic pain     knees and lower back     COPD exacerbation ([address] 5/20/2018    Depression     Essential hypertension 8/15/2022    Panic disorder     Pneumonia      Past Surgical History:   Procedure Laterality Date    CHOLECYSTECTOMY      COLONOSCOPY       Family History  family history includes COPD in her mother; Hypertension in her mother. Social History:  reports that she quit smoking about 19 months ago. Her smoking use included cigarettes. She started smoking about 53 years ago. She has a 52.00 pack-year smoking history. She has been exposed to tobacco smoke. She has never used smokeless tobacco.   reports that she does not currently use alcohol after a past usage of about 12.0 standard drinks per week. ALLERGIES:  Patient is allergic to cephalosporins, pcn [penicillins], and hctz [hydrochlorothiazide].   Continuous Infusions:   sodium chloride      IV infusion builder 40 mL/hr at 08/28/22 1326     Scheduled Meds:   amLODIPine  5 mg Oral Daily    cetirizine  10 mg Oral Daily    escitalopram  10 mg Oral Nightly    guaiFENesin  600 mg Oral BID    lisinopril  10 mg Oral Daily    oxybutynin  5 mg Oral 4x Daily    budesonide-formoterol  2 puff Inhalation BID    tiotropium-olodaterol  2 puff Inhalation Daily    sodium chloride flush  5-40 mL IntraVENous 2 times per day    enoxaparin 40 mg SubCUTAneous Daily     PRN Meds:  albuterol, hydrOXYzine HCl, loperamide, sodium chloride flush, sodium chloride, ondansetron **OR** ondansetron, polyethylene glycol, acetaminophen **OR** acetaminophen    REVIEW OF SYSTEMS:  Constitutional: + fever  HENT: Negative for sore throat  Eyes: Negative for redness   Respiratory: +dyspnea  Cardiovascular: Negative for chest pain  Gastrointestinal: Negative for vomiting, diarrhea   Genitourinary: Negative for hematuria   Musculoskeletal: Negative for arthralgias   Skin: Negative for rash  Neurological: Negative for syncope  Hematological: Negative for adenopathy  Psychiatric/Behavorial: Negative for anxiety    PHYSICAL EXAM: BP (!) 145/93   Pulse 97   Temp 98.6 °F (37 °C) (Oral)   Resp 24   Wt 165 lb (74.8 kg)   SpO2 96%   BMI 29.23 kg/m²  on 3L  Constitutional:  No acute distress. Eyes: PERRL. Conjunctivae anicteric. ENT: Normal nose. Normal tongue. Neck:  Trachea is midline. No thyroid tenderness. Respiratory: No accessory muscle usage. Decreased breath sounds. No wheezes. No rales. No Rhonchi. Cardiovascular: Normal S1S2. No digit clubbing. No digit cyanosis. No LE edema. Gastrointestinal: No mass palpated. No tenderness palpated. No umbilical hernia. Lymphatic: No cervical or supraclavicular adenopathy. Skin: No rash on the exposed extremities. No Nodules or induration on exposed extremities. Psychiatric: No anxiety or Agitation. A little bet lethargic but oiented to person, place and time.     CBC:   Recent Labs     08/28/22  0650   WBC 17.9*   HGB 12.8   HCT 37.1   MCV 92.6        BMP:   Recent Labs     08/28/22  0650 08/28/22  0744 08/28/22  1112   * 110* 112*   K 8.1* 3.8 3.9   CL 73* 73* 77*   CO2 23 23 25   BUN 26* 24* 20   CREATININE 0.8 0.7 0.6        Recent Labs     08/28/22  0650   AST 73*   ALT <5*   BILITOT 1.0   ALKPHOS 73       COVID 19 Positive 8/14/22  COVID negative 8/28/22  PCT 0.14    Chest imaging was reviewed by me and showed CXR 8/28/22   No focal lung consolidation. No pleural effusion or pneumothorax. Cardiomediastinal silhouette is unremarkable. Visualized osseous structures   are unremarkable. Impression   No focal lung consolidation. ASSESSMENT:  Hyponatremia  H/o SIADH  Leukocytosis: source unclear, may be reactive  COPD on 3L O2  Chronic hypoxic respiratory failure   H/o COVID pneumonia on 8/14/2022: completed course of decadron  Lung cancer s/p SBRT    PLAN:  Home O2 at 3lpm   Continue inhalers  2% HTS per nephrology  No fever since admission, negative UA, neg CXR, observe for diarrhea  Needs chest imaging f/u at [facility]  Lovenox DVT prophylaxis   Due to at least single organ failure and risk of rapid deterioration, I spent 31 minutes of Critical care time reviewing labs/films, examining patient, collaborating with other physicians. This does not include time performing critical procedures.      Thank you Jose Clay MD for this consult

## 2022-08-28 NOTE — ED NOTES
1034- Attempted to call Nephrology.  Waited on hold for 13 mins no answer       1109- Call was returned Dr. Contreras Lim and [facility]   08/28/22 6 [facility]  08/28/22 0333

## 2022-08-28 NOTE — PROGRESS NOTES
Pt to ICU 8 via stretcher from ED. Pt  placed on cardiac monitor, bp and spo2. Seizure pads in place. Pt alert and oriented x4. Follows commands. Lungs diminished. Skin dry and intact, no open areas noted. Allred patent to bsd. Call light within reach. Pt unable to recall medications. Called daughter, waiting on call back. Patient is able to demonstrate the ability to move from a reclining position to an upright position within the recliner.

## 2022-08-28 NOTE — ED PROVIDER NOTES
ATTENDING PHYSICIAN NOTE       Date of evaluation: 8/28/2022    Chief Complaint     Shortness of Breath (SOB THIS MORNING. HX OF COPD. SQUAD STATES SATS IN 70'S ON THEIR ARRIVAL /  PT DOES WEAR 3 LITERS AT BASELINE. GIVEN ONE DUONEB IN ROUTE.)      History of Present Illness     Matt Lugo is a 61 y.o. female who presents with shortness of breath. She has history of COPD and recent diagnosis of COVID-19 infection back on August 14, 2022. Symptoms have progressively worsened over the past several months however patient woke up more short of breath this morning. Per EMS her oxygen saturations were in the 70s on arrival.  Patient wears 3 L of supplemental oxygen at baseline and has had to increase it to 5L for episodes of shortness of breath. .  Shortness of breath is constant, worsens with exertion, with associated fever. Patient unsure of how high. She denies any sinus pressure or pain, denies sore throat. Denies visual disturbance, productive cough, chest pain, chest pressure, palpitations, leg swelling, abdominal pain, bloody stool, nausea, vomiting, changes in urination, worsening back pain or neck pain, dizziness, headache, lightheadedness, syncope, weakness, or confusion. Review of Systems     Review of Systems   All other systems reviewed and are negative. Past Medical, Surgical, Family, and Social History     She has a past medical history of Angina pectoris ([address] Chronic pain, COPD exacerbation ([address] Depression, Essential hypertension, Panic disorder, and Pneumonia. She has a past surgical history that includes Cholecystectomy and Colonoscopy. Her family history includes COPD in her mother; Hypertension in her mother. She reports that she quit smoking about 19 months ago. Her smoking use included cigarettes. She started smoking about 53 years ago. She has a 52.00 pack-year smoking history. She has been exposed to tobacco smoke.  She has never used smokeless tobacco. She reports that LABS:   Results for orders placed or performed during the hospital encounter of 08/28/22   SARS-CoV-2 NAAT (Rapid)    Specimen: Nasopharyngeal Swab   Result Value Ref Range    SARS-CoV-2, NAAT Not Detected Not Detected   CBC with Auto Differential   Result Value Ref Range    WBC 17.9 (H) 4.0 - 11.0 K/uL    RBC 4.01 4.00 - 5.20 M/uL    Hemoglobin 12.8 12.0 - 16.0 g/dL    Hematocrit 37.1 36.0 - 48.0 %    MCV 92.6 80.0 - 100.0 fL    MCH 31.8 26.0 - 34.0 pg    MCHC 34.4 31.0 - 36.0 g/dL    RDW 11.7 (L) 12.4 - 15.4 %    Platelets 110 187 - 460 K/uL    MPV 6.1 5.0 - 10.5 fL    Neutrophils % 89.2 %    Lymphocytes % 5.3 %    Monocytes % 5.2 %    Eosinophils % 0.2 %    Basophils % 0.1 %    Neutrophils Absolute 16.0 (H) 1.7 - 7.7 K/uL    Lymphocytes Absolute 0.9 (L) 1.0 - 5.1 K/uL    Monocytes Absolute 0.9 0.0 - 1.3 K/uL    Eosinophils Absolute 0.0 0.0 - 0.6 K/uL    Basophils Absolute 0.0 0.0 - 0.2 K/uL   Comprehensive Metabolic Panel w/ Reflex to MG   Result Value Ref Range    Sodium 108 (LL) 136 - 145 mmol/L    Potassium reflex Magnesium 8.1 (HH) 3.5 - 5.1 mmol/L    Chloride 73 (L) 99 - 110 mmol/L    CO2 23 21 - 32 mmol/L    Anion Gap 12 3 - 16    Glucose 124 (H) 70 - 99 mg/dL    BUN 26 (H) 7 - 20 mg/dL    Creatinine 0.8 0.6 - 1.2 mg/dL    GFR Non-African American >60 >60    GFR African American >60 >60    Calcium 8.7 8.3 - 10.6 mg/dL    Total Protein 7.2 6.4 - 8.2 g/dL    Albumin 4.1 3.4 - 5.0 g/dL    Albumin/Globulin Ratio 1.3 1.1 - 2.2    Total Bilirubin 1.0 0.0 - 1.0 mg/dL    Alkaline Phosphatase 73 40 - 129 U/L    ALT <5 (L) 10 - 40 U/L    AST 73 (H) 15 - 37 U/L   Troponin   Result Value Ref Range    Troponin <0.01 <0.01 ng/mL   Blood gas, venous   Result Value Ref Range    pH, Kevin 7.428 7.350 - 7.450    pCO2, Kevin 31.6 (L) 40.0 - 50.0 mmHg    pO2, Kevin 92.0 (H) 25.0 - 40.0 mmHg    HCO3, Venous 20.4 (L) 23.0 - 29.0 mmol/L    Base Excess, Kevin -2.9 -3.0 - 3.0 mmol/L    O2 Sat, Kevin 97 Not Established % Carboxyhemoglobin 4.4 (H) 0.0 - 1.5 %    MetHgb, Kevin 0.3 <1.5 %    TC02 (Calc), Kevin 21 Not Established mmol/L    O2 Content, Kevin 19 Not Established VOL %    O2 Therapy Unknown    Brain Natriuretic Peptide   Result Value Ref Range    Pro-BNP 53 0 - 124 pg/mL   Basic Metabolic Panel w/ Reflex to MG   Result Value Ref Range    Sodium 110 (LL) 136 - 145 mmol/L    Potassium reflex Magnesium 3.8 3.5 - 5.1 mmol/L    Chloride 73 (L) 99 - 110 mmol/L    CO2 23 21 - 32 mmol/L    Anion Gap 14 3 - 16    Glucose 128 (H) 70 - 99 mg/dL    BUN 24 (H) 7 - 20 mg/dL    Creatinine 0.7 0.6 - 1.2 mg/dL    GFR Non-African American >60 >60    GFR African American >60 >60    Calcium 9.1 8.3 - 10.6 mg/dL   EKG 12 Lead   Result Value Ref Range    Ventricular Rate 93 BPM    Atrial Rate 93 BPM    P-R Interval 116 ms    QRS Duration 76 ms    Q-T Interval 340 ms    QTc Calculation (Bazett) 422 ms    P Axis 53 degrees    R Axis -51 degrees    T Axis 46 degrees    Diagnosis       Normal sinus rhythmLeft anterior fascicular blockInferior infarct , age undeterminedPossible Anterior infarct , age undeterminedAbnormal ECGNo previous ECGs available       ED BEDSIDE ULTRASOUND:  No results found. RECENT VITALS:  BP: 133/72, , Heart Rate: (!) 101, Resp: 18, SpO2: 93 %     Procedures         ED Course     Nursing Notes, Past Medical Hx, Past Surgical Hx, Social Hx,Allergies, and Family Hx were reviewed.     ED Course as of 08/28/22 1023   Sun Aug 28, 2022   0734 BP: 135/81 [JK]   0734 Heart Rate: 94 [JK]   0734 SpO2: 98 % [JK]   0734 Resp: 18 [JK]   0734 WBC(!): 17.9 [JK]   0735 Sodium(!!): 108 [JK]   0735 Potassium(!!): 8.1 [JK]   0735 BUN,BUNPL(!): 26 [JK]   0735 Creatinine: 0.8 [JK]   0735 GFR Non-: >60 [JK]   0735 ALT(!): <5 [JK]   0735 AST(!): 73 [JK]   0754 Troponin: <0.01 [JK]   0754 Pro-BNP: 53 [JK]   0810 SARS-CoV-2, NAAT: Not Detected [JK]   0842 Sodium(!!): 110 [JK]   0842 Potassium: 3.8 [JK]   0842 Glucose, Random(!): 128 [JK] ED Course User Index  [JK] Versa Babinski, MD       patient was given the following medications:  Orders Placed This Encounter   Medications    ipratropium-albuterol (DUONEB) nebulizer solution 1 ampule     Order Specific Question:   Initiate RT Bronchodilator Protocol     Answer:   Yes - ED protocol    0.9 % sodium chloride bolus    levoFLOXacin (LEVAQUIN) 750 MG/150ML infusion 750 mg     Order Specific Question:   Antimicrobial Indications     Answer:   COPD Exacerbation       CONSULTS:  IP CONSULT TO HOSPITALIST  IP CONSULT TO NEPHROLOGY  IP CONSULT TO NEPHROLOGY  IP CONSULT TO PULMONOLOGY    MEDICAL DECISIONMAKING / ASSESSMENT / Maria Esther Hampton is a 61 y.o. female who presents with worsening shortness of breath. She presented normotensive, normal respiratory rate of 18 and satting at 98% room air. Her heart rate was normal at 94. Please see exam above. Given history and exam my differential diagnosis includes but is not limited to ACS, CHF, COPD exacerbation, pneumonia, COVID-19, anemia, electrolyte abnormalities. I considered PE however I feel this is less likely at this time. I interpreted the labs and note  CBC with leukocytosis to 17.9 with left shift, no evidence of anemia, normal platelet count  Initial CMP with hyponatremia to 108, hyperkalemia to 8, normal renal function, elevated AST to 73 with low ALT, normal T bili  I repeated BMP due to concern of hemolysis. Repeat BMP with ongoing hyponatremia to 110 but potassium normal at 3.8    I interpreted the chest x-ray and note no acute cardiopulmonary change. Unclear etiology of hyponatremia. Her baseline appears to be 1 23-1 25. She does have history of lung cancer and followed by Dr. Sara Cedeno with pulmonology. She is also followed by Kuldeep Scanlon for oncology care. I placed patient on broad-spectrum antibiotics given concern for sepsis.   On chart review she does have significant anaphylactic allergy to cephalosporins and penicillins. I spoke with hospitalist who accepted patient to their service. All questions and concerns were addressed. Patient amenable with admission. Critical Care: I spent 20 minutes providing critical care. This time excludes time spent performing procedures but includes time spent on direct patient care, history retrieval, review of the chart, and discussions with patient, family, and consultant(s). Clinical Impression     1. Shortness of breath    2. Hyponatremia    3. Moderate COPD (chronic obstructive pulmonary disease) ([address]    4. Leukocytosis, unspecified type        Disposition     PATIENT REFERRED TO:  No follow-up provider specified.     DISCHARGE MEDICATIONS:  New Prescriptions    No medications on file       DISPOSITION Admitted 08/28/2022 10:17:37 AM         Ana Horta MD  08/28/22 1026

## 2022-08-28 NOTE — PROGRESS NOTES
4 Eyes Skin Assessment     The patient is being assess for   Admission    I agree that 2 RN's have performed a thorough Head to Toe Skin Assessment on the patient. ALL assessment sites listed below have been assessed. Areas assessed for pressure by both nurses:   [x]   Head, Face, and Ears   [x]   Shoulders, Back, and Chest, Abdomen  [x]   Arms, Elbows, and Hands   [x]   Coccyx, Sacrum, and Ischium  [x]   Legs, Feet, and Heels        Generalized bruising . No open areas noted. Skin Assessed Under all Medical Devices by both nurses:  O2 device tubing and flood              All Mepilex Borders were peeled back and area peeked at by both nurses:  Yes  Please list where Mepilex Borders are located:  sacrum             **SHARE this note so that the co-signing nurse is able to place an eSignature**    Co-signer eSignature: Electronically signed by Ericka Roberts RN on 8/28/22 at 9:23 PM EDT    Does the Patient have Skin Breakdown related to pressure? No     (Insert Photo here)         William Prevention initiated:  Yes   Wound Care Orders initiated:  No      [facility] nurse consulted for Pressure Injury (Stage 3,4, Unstageable, DTI, NWPT, Complex wounds)and New or Established Ostomies:  No      Primary Nurse eSignature: Electronically signed by Jailene Mills RN on 8/28/22 at 4:34 PM EDT    Patient is able to demonstrate the ability to move from a reclining position to an upright position within the recliner.

## 2022-08-28 NOTE — ED NOTES
Awaiting to give Levaquin, Lab called to come draw second blood cultures.       Magi Buchanan RN  08/28/22 2897

## 2022-08-29 PROBLEM — E87.6 HYPOKALEMIA: Status: ACTIVE | Noted: 2022-08-29

## 2022-08-29 PROBLEM — Z86.16 HISTORY OF COVID-19: Status: ACTIVE | Noted: 2022-08-29

## 2022-08-29 PROBLEM — R79.89 LOW TSH LEVEL: Status: ACTIVE | Noted: 2022-08-29

## 2022-08-29 PROBLEM — D72.829 LEUKOCYTOSIS: Status: ACTIVE | Noted: 2022-08-29

## 2022-08-29 LAB
ANION GAP SERPL CALCULATED.3IONS-SCNC: 10 MMOL/L (ref 3–16)
ANION GAP SERPL CALCULATED.3IONS-SCNC: 10 MMOL/L (ref 3–16)
ANION GAP SERPL CALCULATED.3IONS-SCNC: 11 MMOL/L (ref 3–16)
ANION GAP SERPL CALCULATED.3IONS-SCNC: 8 MMOL/L (ref 3–16)
ANION GAP SERPL CALCULATED.3IONS-SCNC: 9 MMOL/L (ref 3–16)
BASOPHILS ABSOLUTE: 0 K/UL (ref 0–0.2)
BASOPHILS RELATIVE PERCENT: 0.2 %
BUN BLDV-MCNC: 11 MG/DL (ref 7–20)
BUN BLDV-MCNC: 11 MG/DL (ref 7–20)
BUN BLDV-MCNC: 13 MG/DL (ref 7–20)
BUN BLDV-MCNC: 9 MG/DL (ref 7–20)
BUN BLDV-MCNC: 9 MG/DL (ref 7–20)
CALCIUM SERPL-MCNC: 8.2 MG/DL (ref 8.3–10.6)
CALCIUM SERPL-MCNC: 8.2 MG/DL (ref 8.3–10.6)
CALCIUM SERPL-MCNC: 8.3 MG/DL (ref 8.3–10.6)
CALCIUM SERPL-MCNC: 8.4 MG/DL (ref 8.3–10.6)
CALCIUM SERPL-MCNC: 8.7 MG/DL (ref 8.3–10.6)
CHLORIDE BLD-SCNC: 82 MMOL/L (ref 99–110)
CHLORIDE BLD-SCNC: 83 MMOL/L (ref 99–110)
CHLORIDE BLD-SCNC: 84 MMOL/L (ref 99–110)
CO2: 24 MMOL/L (ref 21–32)
CO2: 26 MMOL/L (ref 21–32)
CO2: 26 MMOL/L (ref 21–32)
CO2: 27 MMOL/L (ref 21–32)
CO2: 28 MMOL/L (ref 21–32)
CREAT SERPL-MCNC: 0.6 MG/DL (ref 0.6–1.2)
CREAT SERPL-MCNC: 0.6 MG/DL (ref 0.6–1.2)
CREAT SERPL-MCNC: <0.5 MG/DL (ref 0.6–1.2)
EOSINOPHILS ABSOLUTE: 0 K/UL (ref 0–0.6)
EOSINOPHILS RELATIVE PERCENT: 0.8 %
GFR AFRICAN AMERICAN: >60
GFR NON-AFRICAN AMERICAN: >60
GLUCOSE BLD-MCNC: 104 MG/DL (ref 70–99)
GLUCOSE BLD-MCNC: 105 MG/DL (ref 70–99)
GLUCOSE BLD-MCNC: 113 MG/DL (ref 70–99)
GLUCOSE BLD-MCNC: 195 MG/DL (ref 70–99)
GLUCOSE BLD-MCNC: 96 MG/DL (ref 70–99)
HCT VFR BLD CALC: 33.6 % (ref 36–48)
HEMOGLOBIN: 11.8 G/DL (ref 12–16)
LYMPHOCYTES ABSOLUTE: 1.1 K/UL (ref 1–5.1)
LYMPHOCYTES RELATIVE PERCENT: 19.6 %
MCH RBC QN AUTO: 32.6 PG (ref 26–34)
MCHC RBC AUTO-ENTMCNC: 35.2 G/DL (ref 31–36)
MCV RBC AUTO: 92.5 FL (ref 80–100)
MONOCYTES ABSOLUTE: 0.8 K/UL (ref 0–1.3)
MONOCYTES RELATIVE PERCENT: 14.5 %
NEUTROPHILS ABSOLUTE: 3.6 K/UL (ref 1.7–7.7)
NEUTROPHILS RELATIVE PERCENT: 64.9 %
PDW BLD-RTO: 12.2 % (ref 12.4–15.4)
PLATELET # BLD: 272 K/UL (ref 135–450)
PMV BLD AUTO: 5.5 FL (ref 5–10.5)
POTASSIUM SERPL-SCNC: 3.2 MMOL/L (ref 3.5–5.1)
POTASSIUM SERPL-SCNC: 3.3 MMOL/L (ref 3.5–5.1)
POTASSIUM SERPL-SCNC: 3.4 MMOL/L (ref 3.5–5.1)
POTASSIUM SERPL-SCNC: 3.9 MMOL/L (ref 3.5–5.1)
POTASSIUM SERPL-SCNC: 4.9 MMOL/L (ref 3.5–5.1)
RBC # BLD: 3.63 M/UL (ref 4–5.2)
SODIUM BLD-SCNC: 116 MMOL/L (ref 136–145)
SODIUM BLD-SCNC: 118 MMOL/L (ref 136–145)
SODIUM BLD-SCNC: 118 MMOL/L (ref 136–145)
SODIUM BLD-SCNC: 120 MMOL/L (ref 136–145)
SODIUM BLD-SCNC: 120 MMOL/L (ref 136–145)
T4 FREE: 1.8 NG/DL (ref 0.9–1.8)
TSH REFLEX: 0.11 UIU/ML (ref 0.27–4.2)
WBC # BLD: 5.6 K/UL (ref 4–11)

## 2022-08-29 PROCEDURE — 84443 ASSAY THYROID STIM HORMONE: CPT

## 2022-08-29 PROCEDURE — 6370000000 HC RX 637 (ALT 250 FOR IP): Performed by: PEDIATRICS

## 2022-08-29 PROCEDURE — 99233 SBSQ HOSP IP/OBS HIGH 50: CPT | Performed by: INTERNAL MEDICINE

## 2022-08-29 PROCEDURE — 6370000000 HC RX 637 (ALT 250 FOR IP): Performed by: INTERNAL MEDICINE

## 2022-08-29 PROCEDURE — 2580000003 HC RX 258: Performed by: INTERNAL MEDICINE

## 2022-08-29 PROCEDURE — 94640 AIRWAY INHALATION TREATMENT: CPT

## 2022-08-29 PROCEDURE — 2000000000 HC ICU R&B

## 2022-08-29 PROCEDURE — 36415 COLL VENOUS BLD VENIPUNCTURE: CPT

## 2022-08-29 PROCEDURE — 2700000000 HC OXYGEN THERAPY PER DAY

## 2022-08-29 PROCEDURE — 84439 ASSAY OF FREE THYROXINE: CPT

## 2022-08-29 PROCEDURE — 6360000002 HC RX W HCPCS: Performed by: INTERNAL MEDICINE

## 2022-08-29 PROCEDURE — 6370000000 HC RX 637 (ALT 250 FOR IP): Performed by: NURSE PRACTITIONER

## 2022-08-29 PROCEDURE — 80048 BASIC METABOLIC PNL TOTAL CA: CPT

## 2022-08-29 PROCEDURE — 85025 COMPLETE CBC W/AUTO DIFF WBC: CPT

## 2022-08-29 PROCEDURE — 2580000003 HC RX 258: Performed by: NURSE PRACTITIONER

## 2022-08-29 PROCEDURE — 94761 N-INVAS EAR/PLS OXIMETRY MLT: CPT

## 2022-08-29 PROCEDURE — 6360000002 HC RX W HCPCS: Performed by: NURSE PRACTITIONER

## 2022-08-29 RX ORDER — POTASSIUM CHLORIDE 750 MG/1
40 TABLET, EXTENDED RELEASE ORAL ONCE
Status: COMPLETED | OUTPATIENT
Start: 2022-08-29 | End: 2022-08-29

## 2022-08-29 RX ORDER — DEXTROSE MONOHYDRATE 50 MG/ML
INJECTION, SOLUTION INTRAVENOUS CONTINUOUS
Status: DISCONTINUED | OUTPATIENT
Start: 2022-08-29 | End: 2022-08-30

## 2022-08-29 RX ADMIN — GUAIFENESIN 600 MG: 600 TABLET, EXTENDED RELEASE ORAL at 08:43

## 2022-08-29 RX ADMIN — ACETAMINOPHEN 650 MG: 325 TABLET ORAL at 20:42

## 2022-08-29 RX ADMIN — OXYBUTYNIN CHLORIDE 5 MG: 5 TABLET ORAL at 13:33

## 2022-08-29 RX ADMIN — OXYBUTYNIN CHLORIDE 5 MG: 5 TABLET ORAL at 08:43

## 2022-08-29 RX ADMIN — Medication 10 ML: at 20:17

## 2022-08-29 RX ADMIN — Medication 2 PUFF: at 07:26

## 2022-08-29 RX ADMIN — Medication 2 PUFF: at 19:31

## 2022-08-29 RX ADMIN — ALBUTEROL SULFATE 1.25 MG: 2.5 SOLUTION RESPIRATORY (INHALATION) at 07:25

## 2022-08-29 RX ADMIN — OXYBUTYNIN CHLORIDE 5 MG: 5 TABLET ORAL at 17:34

## 2022-08-29 RX ADMIN — POTASSIUM CHLORIDE 40 MEQ: 750 TABLET, EXTENDED RELEASE ORAL at 10:31

## 2022-08-29 RX ADMIN — Medication 10 ML: at 08:43

## 2022-08-29 RX ADMIN — ENOXAPARIN SODIUM 40 MG: 100 INJECTION SUBCUTANEOUS at 17:34

## 2022-08-29 RX ADMIN — LISINOPRIL 10 MG: 10 TABLET ORAL at 08:43

## 2022-08-29 RX ADMIN — TIOTROPIUM BROMIDE AND OLODATEROL 2 PUFF: 3.124; 2.736 SPRAY, METERED RESPIRATORY (INHALATION) at 07:26

## 2022-08-29 RX ADMIN — GUAIFENESIN 600 MG: 600 TABLET, EXTENDED RELEASE ORAL at 20:17

## 2022-08-29 RX ADMIN — OXYBUTYNIN CHLORIDE 5 MG: 5 TABLET ORAL at 20:17

## 2022-08-29 RX ADMIN — AMLODIPINE BESYLATE 5 MG: 5 TABLET ORAL at 08:43

## 2022-08-29 RX ADMIN — TRAMADOL HYDROCHLORIDE 100 MG: 50 TABLET, COATED ORAL at 10:31

## 2022-08-29 RX ADMIN — DEXTROSE MONOHYDRATE: 50 INJECTION, SOLUTION INTRAVENOUS at 13:31

## 2022-08-29 RX ADMIN — MULTIPLE VITAMINS W/ MINERALS TAB 1 TABLET: TAB at 08:43

## 2022-08-29 RX ADMIN — ALBUTEROL SULFATE 1.25 MG: 2.5 SOLUTION RESPIRATORY (INHALATION) at 19:25

## 2022-08-29 RX ADMIN — ESCITALOPRAM OXALATE 10 MG: 10 TABLET ORAL at 20:17

## 2022-08-29 RX ADMIN — MUPIROCIN: 20 OINTMENT TOPICAL at 08:43

## 2022-08-29 RX ADMIN — MUPIROCIN: 20 OINTMENT TOPICAL at 20:17

## 2022-08-29 RX ADMIN — ACETAMINOPHEN 650 MG: 325 TABLET ORAL at 08:49

## 2022-08-29 RX ADMIN — TRAMADOL HYDROCHLORIDE 100 MG: 50 TABLET, COATED ORAL at 18:08

## 2022-08-29 RX ADMIN — DEXTROSE MONOHYDRATE: 50 INJECTION, SOLUTION INTRAVENOUS at 03:34

## 2022-08-29 RX ADMIN — CETIRIZINE HYDROCHLORIDE 10 MG: 10 TABLET ORAL at 08:43

## 2022-08-29 ASSESSMENT — PAIN SCALES - GENERAL
PAINLEVEL_OUTOF10: 7
PAINLEVEL_OUTOF10: 7
PAINLEVEL_OUTOF10: 8
PAINLEVEL_OUTOF10: 7
PAINLEVEL_OUTOF10: 3
PAINLEVEL_OUTOF10: 5

## 2022-08-29 ASSESSMENT — PAIN DESCRIPTION - FREQUENCY
FREQUENCY: CONTINUOUS
FREQUENCY: INTERMITTENT

## 2022-08-29 ASSESSMENT — PAIN DESCRIPTION - LOCATION
LOCATION: LEG
LOCATION: BACK

## 2022-08-29 ASSESSMENT — PAIN DESCRIPTION - ORIENTATION
ORIENTATION: UPPER
ORIENTATION: RIGHT;LEFT

## 2022-08-29 ASSESSMENT — PAIN - FUNCTIONAL ASSESSMENT: PAIN_FUNCTIONAL_ASSESSMENT: ACTIVITIES ARE NOT PREVENTED

## 2022-08-29 ASSESSMENT — PAIN DESCRIPTION - DESCRIPTORS
DESCRIPTORS: ACHING;DISCOMFORT
DESCRIPTORS: CRAMPING

## 2022-08-29 ASSESSMENT — PAIN DESCRIPTION - PAIN TYPE: TYPE: CHRONIC PAIN

## 2022-08-29 ASSESSMENT — PAIN DESCRIPTION - ONSET: ONSET: GRADUAL

## 2022-08-29 NOTE — PROGRESS NOTES
Nephrology Progress Note   [email or web address]      Sub/interval history  Patient feels better than yesterday  Sodium increased swiftly this morning and D5W was ordered to slow down the correction and 2% saline was held    Last 24 h uop 3.2 L      ROS: No chest pain/shortness of breath/fever/nausea/vomiting  PSFH: No visitor    Scheduled Meds:   mupirocin   Nasal BID    amLODIPine  5 mg Oral Daily    cetirizine  10 mg Oral Daily    escitalopram  10 mg Oral Nightly    guaiFENesin  600 mg Oral BID    oxybutynin  5 mg Oral 4x Daily    sodium chloride flush  5-40 mL IntraVENous 2 times per day    enoxaparin  40 mg SubCUTAneous Daily    therapeutic multivitamin-minerals  1 tablet Oral Daily    budesonide-formoterol  2 puff Inhalation BID    tiotropium-olodaterol  2 puff Inhalation Daily    albuterol  1.25 mg Nebulization BID    lisinopril  10 mg Oral Daily    lidocaine  1 patch TransDERmal Daily     Continuous Infusions:   dextrose 125 mL/hr at 08/29/22 1350    sodium chloride      [Held by provider] IV infusion builder Stopped (08/28/22 1713)     PRN Meds:.hydrOXYzine HCl, loperamide, sodium chloride flush, sodium chloride, ondansetron **OR** ondansetron, polyethylene glycol, acetaminophen **OR** acetaminophen, traMADol **OR** traMADol    Objective/     Vitals:    08/29/22 1100 08/29/22 1200 08/29/22 1300 08/29/22 1400   BP: (!) 104/55 102/77 (!) 112/50 (!) 99/47   Pulse: 75 82 78 72   Resp: 19 20 15 14   Temp: 99.2 °F (37.3 °C)      TempSrc: Oral      SpO2: 94% 92% 97% 93%   Weight:       Height:         24HR INTAKE/OUTPUT:    Intake/Output Summary (Last 24 hours) at 8/29/2022 1524  Last data filed at 8/29/2022 1400  Gross per 24 hour   Intake 1654.38 ml   Output 3725 ml   Net -2070.62 ml     Gen: alert, awake  Neck: No JVD  Skin: Unremarkable  Cardiovascular:  S1, S2 without m/r/g   Respiratory: CTA B without w/r/r; respiratory effort normal  Abdomen:  soft, nt, nd,   Extremities: no lower extremity edema  Neuro/Psy: AAoriented times 3 ; moves all 4 ext    Data/  Recent Labs     08/28/22  0650 08/29/22  0419   WBC 17.9* 5.6   HGB 12.8 11.8*   HCT 37.1 33.6*   MCV 92.6 92.5    272     Recent Labs     08/29/22  0419 08/29/22  1006 08/29/22  1351   * 120* 120*   K 3.3* 3.2* 3.9   CL 82* 83* 84*   CO2 27 26 26   GLUCOSE 113* 195* 104*   BUN 9 9 11   CREATININE <0.5* 0.6 <0.5*   LABGLOM >60 >60 >60   GFRAA >60 >60 >60     Urine sodium 51 urine osmolality 320    Assessment/     - Hyponatremia - acute on chronic, background SIADH in setting of COPD with suspected pre-renal state with diarrhea, fluid responsive initially  Baseline mid 120 range, 123-125     - Recent COVID PNA, worsening acute hypoxic respiratory failure - plans per Pulmonary     - Pseudohyperkalemia - hemolyzed specimen, wnl on recheck     - Hypertension - controlled        Plan/   -Fluid restriction 1500 mL/day  -Stop D5W  -Call nephrology for sodium less than 120 or more than 125  -  BMP q6 hours - goal correction 6-8 meq/24 hours   - goal sodium of 124 x 7 a.m. on 8/30    Sunita Madden MD  Office: [phone]  Fax:    [phone] [facility]

## 2022-08-29 NOTE — PROGRESS NOTES
Pulmonary & Critical Care Medicine ICU Progress Note    CC: COPD/COVID-19    Events of Last 24 hours:   D5W per nephrology       Vascular lines: IV: PIVs          / / /   No results for input(s): PHART, ARL8LPZ, PO2ART in the last 72 hours. IV:   dextrose 100 mL/hr at 22 6488    sodium chloride      IV infusion builder Stopped (22 1713)       Vitals:  Blood pressure 118/71, pulse 61, temperature 99.1 °F (37.3 °C), temperature source Oral, resp. rate 18, height 5' 3\" (1.6 m), weight 160 lb 3.2 oz (72.7 kg), SpO2 97 %, not currently breastfeeding. on 3LPM   Temp  Av.9 °F (37.2 °C)  Min: 98.6 °F (37 °C)  Max: 99.1 °F (37.3 °C)    Intake/Output Summary (Last 24 hours) at 2022 0711  Last data filed at 2022 3898  Gross per 24 hour   Intake 2033.96 ml   Output 3225 ml   Net -1191.04 ml     PE:  General: ill appearing    Eyes: PERRL. No sclera icterus. No conjunctival injection. ENT: No discharge. Pharynx clear. Neck: Trachea midline. Normal thyroid. Resp: No accessory muscle use. No crackles. Few wheezing. No rhonchi. No dullness on percussion. CV: Regular rate. Regular rhythm. No mumur or rub. No edema. GI: Non-tender. Non-distended. No masses. No organomegaly. Normal bowel sounds. No hernia. Skin: Warm and dry. No nodule on exposed extremities. No rash on exposed extremities. Lymph: No cervical LAD. No supraclavicular LAD. M/S: No cyanosis. No joint deformity. No clubbing. Neuro: AAOx3.  Patellar reflexes are symmetric  Psych: No agitation, no anxiety, affect is full     Scheduled Meds:   amLODIPine  5 mg Oral Daily    cetirizine  10 mg Oral Daily    escitalopram  10 mg Oral Nightly    guaiFENesin  600 mg Oral BID    oxybutynin  5 mg Oral 4x Daily    sodium chloride flush  5-40 mL IntraVENous 2 times per day    enoxaparin  40 mg SubCUTAneous Daily    therapeutic multivitamin-minerals  1 tablet Oral Daily    budesonide-formoterol  2 puff Inhalation BID    tiotropium-olodaterol 2 puff Inhalation Daily    albuterol  1.25 mg Nebulization BID    lisinopril  10 mg Oral Daily    lidocaine  1 patch TransDERmal Daily       Data:  CBC:   Recent Labs     08/28/22  0650 08/29/22  0419   WBC 17.9* 5.6   HGB 12.8 11.8*   HCT 37.1 33.6*   MCV 92.6 92.5    272     BMP:   Recent Labs     08/28/22  2153 08/29/22  0137 08/29/22  0419   * 118* 118*   K 3.5 3.4* 3.3*   CL 81* 82* 82*   CO2 25 28 27   BUN 13 11 9   CREATININE 0.6 <0.5* <0.5*     LIVER PROFILE:   Recent Labs     08/28/22  0650   AST 73*   ALT <5*   BILITOT 1.0   ALKPHOS 73       Microbiology:  COVID 19 Positive 8/14/22  COVID negative 8/28/22    Imaging:  Chest x-ray 8/28  images reviewed by me and showed:   Low lung volumes   No acute cardiopulmonary disease         ASSESSMENT:  Hyponatremia  Hypokalemia   H/o SIADH  Low TSH   Leukocytosis: source unclear, may be reactive  COPD on 3L O2  Chronic hypoxic respiratory failure   H/o COVID pneumonia on 8/14/2022: completed course of decadron  KAMLA nodule s/p SBRT     PLAN:  Supplemental oxygen to maintain SaO2 >92%; wean as tolerated- Home O2 at 3lpm   Inhaled bronchodilators  IVF per nephrology  KCL  Free T4   No fever since admission, negative UA, neg CXR, observe for diarrhea  Needs chest imaging f/u at [facility] DVT prophylaxis

## 2022-08-29 NOTE — ACP (ADVANCE CARE PLANNING)
Advance Care Planning     General Advance Care Planning (ACP) Conversation    Date of Conversation: 8/28/2022  Conducted with: Patient with Decision Making Capacity    Healthcare Decision Maker:    Primary Decision Maker: Bar Raphael - [phone]    Secondary Decision Maker: Satish Curtis - [phone]  Click here to complete Healthcare Decision Makers including selection of the Healthcare Decision Maker Relationship (ie \"Primary\"). Today we documented Decision Maker(s) consistent with ACP documents on file.  See ACP documents under media tab on 8/22/2022    Content/Action Overview:    Reviewed DNR/DNI and patient elects Full Code (Attempt Resuscitation)    Length of Voluntary ACP Conversation in minutes:  <16 minutes (Non-Billable)    DRAKE LopezS

## 2022-08-29 NOTE — PROGRESS NOTES
Internal Medicine ICU Progress Note      Events of Last 24 hours:     Patient is doing well. She feels better than the time of admission. She has chronic hyponatremia. Sodium was corrected quickly and she was switched to D5 water. No seizures. Sodium was 108 at the time of admission. Invasive Lines: PIV    MV: N/A    No results for input(s): PHART, LTN4SPR, PO2ART in the last 72 hours. MV Settings:     / / /     IV:   dextrose 125 mL/hr at 22 1331    sodium chloride      [Held by provider] IV infusion builder Stopped (22 1713)       Vitals:  Temp  Av.8 °F (37.1 °C)  Min: 97.9 °F (36.6 °C)  Max: 99.2 °F (37.3 °C)  Pulse  Av.9  Min: 60  Max: 107  BP  Min: 102/77  Max: 145/61  SpO2  Av.7 %  Min: 91 %  Max: 100 %  Patient Vitals for the past 4 hrs:   BP Temp Temp src Pulse Resp SpO2   22 1200 102/77 -- -- 82 20 92 %   22 1100 (!) 104/55 99.2 °F (37.3 °C) Oral 75 19 94 %   22 1000 (!) 102/44 -- -- 84 14 93 %       CVP:        Intake/Output Summary (Last 24 hours) at 2022 1340  Last data filed at 2022 0619  Gross per 24 hour   Intake 883.96 ml   Output 3225 ml   Net -2341.04 ml       EXAM:  General: Awake and alert. Ill-appearing. Eyes: PERRL. No sclera icterus. No conjunctiva injected. ENT: No discharge. Pharynx clear. Neck: Trachea midline. Normal thyroid. Resp: No accessory muscle use. No crackles. No wheezing. No rhonchi. No dullness on percussion. CV: Regular rate. Regular rhythm. No mumur or rub. No edema. No JVD. Palpable pedal pulses. GI: Non-tender. Non-distended. No masses. No organmegaly. Normal bowel sounds. No hernia. Skin: Warm and dry. No nodule on exposed extremities. No rash on exposed extremities. Lymph: No cervical LAD. No supraclavicular LAD. M/S: No cyanosis. No joint deformity. No clubbing. Neuro: Awake. Follows commands. Positive pupils/gag/corneals. Normal pain response. Psych: Oriented to person, place, time.  No anxiety or agitation. Medications:  Scheduled Meds:   mupirocin   Nasal BID    amLODIPine  5 mg Oral Daily    cetirizine  10 mg Oral Daily    escitalopram  10 mg Oral Nightly    guaiFENesin  600 mg Oral BID    oxybutynin  5 mg Oral 4x Daily    sodium chloride flush  5-40 mL IntraVENous 2 times per day    enoxaparin  40 mg SubCUTAneous Daily    therapeutic multivitamin-minerals  1 tablet Oral Daily    budesonide-formoterol  2 puff Inhalation BID    tiotropium-olodaterol  2 puff Inhalation Daily    albuterol  1.25 mg Nebulization BID    lisinopril  10 mg Oral Daily    lidocaine  1 patch TransDERmal Daily       PRN Meds:  hydrOXYzine HCl, loperamide, sodium chloride flush, sodium chloride, ondansetron **OR** ondansetron, polyethylene glycol, acetaminophen **OR** acetaminophen, traMADol **OR** traMADol    Results:  CBC:   Recent Labs     08/28/22  0650 08/29/22  0419   WBC 17.9* 5.6   HGB 12.8 11.8*   HCT 37.1 33.6*   MCV 92.6 92.5    272     BMP:   Recent Labs     08/29/22  0137 08/29/22  0419 08/29/22  1006   * 118* 120*   K 3.4* 3.3* 3.2*   CL 82* 82* 83*   CO2 28 27 26   BUN 11 9 9   CREATININE <0.5* <0.5* 0.6     LIVER PROFILE:   Recent Labs     08/28/22  0650   AST 73*   ALT <5*   BILITOT 1.0   ALKPHOS 73         Cultures:    COVID-19 positive on 8/14/2022. COVID-19 negative on 8/20/2022. Films:    XR CHEST PORTABLE   Final Result   No focal lung consolidation. Assessment:    Principal Problem:    Hyponatremia  Active Problems:    Essential hypertension    Hypokalemia    Low TSH level    History of COVID-19    Moderate COPD (chronic obstructive pulmonary disease) (HCC)  Resolved Problems:    * No resolved hospital problems. *         Plan:    #Hyponatremia. Patient has chronic hyponatremia. Baseline sodium was between 125 and 128. She has hydrochlorothiazide listed on her med list.  I am unsure if she has been taking it. Nephrology was consulted.   Sodium was 108 at the time of admission. Goal was to increase it by 8 mEq every 24 hours. Since sodium went up to 118 she was put on D5 water. Nephrology is managing this. This is likely a combination of SIADH from underlying COPD and prerenal state from diarrhea. #Hypokalemia. Correct this. Sliding scale potassium. #Low TSH. Monitor. #Leukocytosis. Likely reactive. Resolved. #History of left upper lobe nodule. Has received SBRT. #COPD. Use nebulizers. Stable. #Chronic respiratory failure. Stable. She uses 3 L of oxygen at home. #Lovenox for DVT prophylaxis. Monitor in ICU for now. IMPORTANT: Please note that some portions of this note may have been created using Dragon voice recognition software. Some \"sound-alike\" and totally wrong word substitutions may have taken place due to known inherent limitations of any such software, including this voice recognition software. In spite of efforts to eliminate such errors, some may not have been corrected. So please read the note with this in mind and recognize such mistakes and understand the correct version using the  context. If there are still uncertainties in the mind of the medical provider reading this note about any aspect of the note, the provider can feel free to contact me. Thanks. All questions and concerns were addressed to the patient/family. Alternatives to my treatment were discussed. The note was completed using EMR. Every effort was made to ensure accuracy; however, inadvertent computerized transcription errors may be present.          Carloz Poole MD 1:40 PM 8/29/2022

## 2022-08-29 NOTE — H&P
Hospital Medicine History & Physical      PCP: Elo Grace MD    Date of Admission: 8/28/2022    Date of Service: Pt seen/examined on 08/28/22 and Admitted to Inpatient with expected LOS greater than two midnights due to medical therapy. Chief Complaint:  \"I have covid\"       History Of Present Illness:      61 y.o. female who presented to [facility] - after daughter called 911 because she wasn't feeling well. She reports feeling like she has been having fevers. + rhinitis. + sore throat. + cough. No nausea or vomiting. She reports low back pain - thinks it was the bed. No dysuria. No chest pain. She reports intermittent dyspnea, but denies dyspnea at present. Workup in the ED showed a sodium off 117. She was admitted to the ICU for correction. She reports diarrhea - but none today. No blood in stool. She has been thinking that maybe her acetaminophen has been causing diarrhea. She reports taking the acetaminophen for her back pain at home. She was positive for covid on 8/14/22. Subsequently tested 8/28/22 and negative. She denies recent changes to her medications. She denies recent weight changes. She denies productive cough. She reports not being vaccinated against covid. She denies falls at home. Denies feeling lightheaded or dizzy. She reports difficulty with passing urine - a flood was placed. RN staff reports its unclear if her home med list is up to date. Daughter may be able to bring updates tomorrow.      SocHx:   - lives with her daughter at home   - previously smoked, quit > 1 year ago   - denies alcohol use   - retired     Past Medical History:          Diagnosis Date    Angina pectoris ([address]     Chronic pain     knees and lower back     COPD exacerbation ([address] 5/20/2018    Depression     Essential hypertension 8/15/2022    Panic disorder     Pneumonia        Past Surgical History:          Procedure Laterality Date    CHOLECYSTECTOMY      COLONOSCOPY Medications Prior to Admission:      Prior to Admission medications    Medication Sig Start Date End Date Taking?  Authorizing Provider   furosemide (LASIX) 40 MG tablet Take 40 mg by mouth daily   Yes Historical Provider, MD   loperamide (IMODIUM) 2 MG capsule Take 1 capsule by mouth 4 times daily as needed for Diarrhea (pt takes once daily q AM) 8/18/22   Javier Perez MD   amLODIPine (NORVASC) 5 MG tablet Take 1 tablet by mouth daily 8/18/22   Javier Perez MD   guaiFENesin (MUCINEX) 600 MG extended release tablet Take 1 tablet by mouth 2 times daily 8/18/22   Javier Perez MD   lisinopril (PRINIVIL;ZESTRIL) 10 MG tablet Take 10 mg by mouth daily    Historical Provider, MD   acetaminophen (TYLENOL) 500 MG tablet Take 500 mg by mouth every 6 hours as needed for Pain    Historical Provider, MD   escitalopram (LEXAPRO) 10 MG tablet Take 20 mg by mouth at bedtime 3/16/22   Historical Provider, MD   alendronate (FOSAMAX) 70 MG tablet Take 70 mg by mouth every 7 days Takes every Monday 3/14/22   Historical Provider, MD   SYMBICORT 80-4.5 MCG/ACT AERO Inhale 2 puffs into the lungs 2 times daily  Patient not taking: Reported on 8/28/2022 3/15/22   Mabel Lora MD   Respiratory Therapy Supplies (NEBULIZER/TUBING/MOUTHPIECE) KIT Use with accuneb 12/15/21   Edwin Lee MD   albuterol (ACCUNEB) 1.25 MG/3ML nebulizer solution Inhale 3 mLs into the lungs every 6 hours as needed for Wheezing 12/15/21   Edwin Lee MD   albuterol sulfate  (90 Base) MCG/ACT inhaler Inhale 2 puffs into the lungs every 6 hours as needed for Wheezing orShortness of Breath 8/27/21   Mabel Lora MD   Biotin 300 MCG TABS Take 1 tablet by mouth daily  Patient not taking: Reported on 8/28/2022 4/12/21   Luz Sesay MD   cetirizine (ZYRTEC) 10 MG tablet Take 10 mg by mouth daily    Historical Provider, MD   hydrOXYzine (ATARAX) 25 MG tablet Take 25 mg by mouth 3 times daily as needed for Itching Historical Provider, MD   Multiple Vitamins-Minerals (CENTRUM SILVER 50+WOMEN PO) Take 1 tablet by mouth daily    Historical Provider, MD   oxybutynin (DITROPAN) 5 MG tablet Take 5 mg by mouth 4 times daily  Patient not taking: Reported on 8/28/2022    Historical Provider, MD       Allergies:  Cephalosporins, Pcn [penicillins], and Hctz [hydrochlorothiazide]    Social History:      The patient currently lives \at home     TOBACCO:   reports that she quit smoking about 19 months ago. Her smoking use included cigarettes. She started smoking about 53 years ago. She has a 52.00 pack-year smoking history. She has been exposed to tobacco smoke. She has never used smokeless tobacco.  ETOH:   reports that she does not currently use alcohol after a past usage of about 12.0 standard drinks per week. E-cigarette/Vaping       Questions Responses    E-cigarette/Vaping Use Never User    Start Date     Passive Exposure No    Quit Date     Counseling Given Yes    Comments               Family History:        Reviewed and negative in regards to presenting illness/complaint. Problem Relation Age of Onset    COPD Mother     Hypertension Mother     Asthma Neg Hx     Cancer Neg Hx     Diabetes Neg Hx     Emphysema Neg Hx     Heart Failure Neg Hx        REVIEW OF SYSTEMS COMPLETED:   Pertinent positives as noted in the HPI. All other systems reviewed and negative. PHYSICAL EXAM PERFORMED:    /65   Pulse 83   Temp 98.8 °F (37.1 °C) (Axillary)   Resp 19   Ht 5' 3\" (1.6 m)   Wt 157 lb 14.4 oz (71.6 kg)   SpO2 94%   BMI 27.97 kg/m²     General appearance:  No apparent distress, appears stated age and cooperative. HEENT:  Normal cephalic, atraumatic without obvious deformity. Pupils equal, round, and reactive to light. Extra ocular muscles intact. Conjunctivae/corneas clear. Neck: Supple, with full range of motion. No jugular venous distention. Trachea midline. Respiratory:  Normal respiratory effort.  Clear to auscultation, bilaterally without Rales/Wheezes/Rhonchi. Cardiovascular:  Regular rate and rhythm with normal S1/S2 without murmurs, rubs or gallops. Abdomen: Soft, non-tender, non-distended    Musculoskeletal:  No clubbing, cyanosis or edema bilaterally. Full range of motion without deformity. Skin: Skin color, texture, turgor normal.  No rashes or lesions. Neurologic:    Speech clear   No facial droop  Moves ext x 4   Psychiatric:  Alert and oriented   Capillary Refill: Brisk,3 seconds, normal  Peripheral Pulses: +2 palpable, equal bilaterally       Labs:     Recent Labs     08/28/22  0650   WBC 17.9*   HGB 12.8   HCT 37.1        Recent Labs     08/28/22  1342 08/28/22  1634 08/28/22  1808   * 116* 117*   K 4.0 3.8 3.8   CL 77* 81* 81*   CO2 26 26 27   BUN 17 14 14   CREATININE <0.5* <0.5* 0.6   CALCIUM 8.7 9.0 9.0     Recent Labs     08/28/22  0650   AST 73*   ALT <5*   BILITOT 1.0   ALKPHOS 73     No results for input(s): INR in the last 72 hours. Recent Labs     08/28/22  0650   TROPONINI <0.01       Urinalysis:      Lab Results   Component Value Date/Time    NITRU Negative 01/11/2022 08:36 PM    WBCUA 0-2 01/11/2022 08:36 PM    BACTERIA Rare 12/10/2021 07:10 AM    RBCUA 0-2 01/11/2022 08:36 PM    BLOODU TRACE-LYSED 01/11/2022 08:36 PM    SPECGRAV 1.010 01/11/2022 08:36 PM    GLUCOSEU Negative 01/11/2022 08:36 PM       Radiology:     CXR: I have reviewed the CXR with the following interpretation:   EKG:  I have reviewed the EKG with the following interpretation:     XR CHEST PORTABLE   Final Result   No focal lung consolidation.              Consults:    IP CONSULT TO HOSPITALIST  IP CONSULT TO NEPHROLOGY  IP CONSULT TO NEPHROLOGY  IP CONSULT TO PULMONOLOGY    ASSESSMENT:    Active Hospital Problems    Diagnosis Date Noted    Hyponatremia [E87.1] 01/30/2020       Francisco Ludwig is 61 y.o. female     Hyponatremia:   - ICU    - presenting sodium of 117   - NS @ 40 ml/hr (held if sodium > 114) - nephrology consulted   - BMP q4 hrs   - I/Os   - HCTZ held although it was unclear if she was taking this at home     COPD, respiratory failure with hypoxia:   - symbicort BID   - tiotropium   - baseline O2 home use at home of 3 LPM, presently on 3 LPM   - covid positive as of 8/14/22     Hypertension:   - amlodipine 5 mg po daily   - lisinopril 10 mg op daily     Mood disorder:   - escitalopram 10 mg po daily     Rhinitis:   - cetirizine 10 mg po daily     Back Pain:   - she reports she fell and broke her back about a year ago, has limited her walking lately   - lidocaine patches   - trial ultram     Diarrhea / poor appetite:   - she suspects acetaminophen has been giving her diarrhea (although she has had diarrhea for a year so this doesn't make sense)   - she drinks bottle water   - no blood in it   - denies weight loss   - she reports a cholecystectomy by hx, so may need dietary modification   - check TSH w AM labs   - home med loperamide PRN     Overactive bladder:   - oxybutynin 5 mg po QID   - of note she reports difficulty with passing urine - a flood was placed     Supplement:   - MV po daily     DVT Prophylaxis: enoxaparin   Diet: ADULT DIET; Regular  Code Status: Full Code    Alternative decision makers - Thuy Tyler or Nilda Tyler (daughters)   She is a      PT/OT Eval Status: not consulted     Dispo - pending improvement        Jenny Boyd MD    Thank you Elo Grace MD for the opportunity to be involved in this patient's care.

## 2022-08-29 NOTE — CARE COORDINATION
Case Management Assessment  Initial Evaluation      Patient Name: Asad Scott  YOB: 1955  Diagnosis: Shortness of breath [R06.02]  Hyponatremia [E87.1]  Moderate COPD (chronic obstructive pulmonary disease) ([facility] [J44.9]  Leukocytosis, unspecified type [D72.829]  Date / Time: 8/28/2022  6:44 AM    Admission status/Date:08/28/2022 Inpatient   Chart Reviewed: Yes      Patient Interviewed: Yes   Family Interviewed:  No      Hospitalization in the last 30 days:  Yes    Health Care Decision Maker :   Primary Decision MakerLinda Crawley - Child - [phone]    Secondary Decision Maker: Nilda Tyler - Child - [phone]    (CM - must 1st enter selection under Navigator - emergency contact- Health Care Decision Maker Relationship and pick relationship)   Who do you trust or have selected to make healthcare decisions for you    Met with:  pt   Interview conducted  (bedside/phone): bedside    Current PCP: Keo Felix MD    Financial  [employer] Medicare and Medicaid  Precert required for SNF : Y          3 night stay required -  N    ADLS  Support Systems/Care Needs: Children  Transportation: family    Meal Preparation: family    Housing  Living Arrangements: pt lives at home with her family. She stated she has someone with her 24/7  Steps: 1  Intent for return to present living arrangements: Yes  Identified Issues: [id number] [address] [facility] with [address] : No Agency:(Services)  Type of Home Care Services: None  Passport/Waiver : No  :                      Phone Number:    Passport/Waiver Services: n/a          Durable Medical Equiptment   DME Provider: maureen bruno ([phone])[facility] confirmed pt is active with them for 2 liters cont and was recently set up in June.  She stated no additional orders needed  Equipment:   Walker___Cane___RTS___ BSC___Shower Chair___Hospital Bed___W/C____Other________  02 at _3___Liter(s)---wears(frequency)___cont____ [facility] ___ CPAP___ BiPap___   N/A____    Home O2 Use :  Yes    If No for home O2---if presently on O2 during hospitalization:  Yes  if yes CM to follow for potential DC O2 need  Informed of need for care provider to bring portable home O2 tank on day of discharge for nursing to connect prior to leaving:   Yes  Verbalized agreement/Understanding:   Yes    Community Service Affiliation  Dialysis:  No    Agency:  Location:  Dialysis Schedule:  Phone:   Fax: Other Community Services: n/a    DISCHARGE PLAN: Explained Case Management role/services. Chart review completed. Met with pt at bedside. Pt stated she is independent at home and will return when discharged. She stated that she will have a ride home from family with an o2 tank when discharged. Discussed additional services at home as pt was recently [facility] at [facility]. Pt stated she wants help with cleaning at home. Discussed [employer] [place] on [employer] and she stated agreement with referral. Verified her address and phone number. She denied  current questions or needs for CM. Completed Interdisciplinary rounds with ICU staff. Referral called to Brook Lucas at [place] ([phone]) for the referral. She stated to call [employer] on [employer] to make a referral to their Hospital to home 20 hour waiver program ([phone] resource [place]). Message left for the Area Agency on [employer] requesting call back to make the referral.     CM will continue to follow and assist. Please notify CM if needs or concerns arise. Angel Phoenix MSW, [facility]    Addendum at 1:30pm: Received call from Antonio Plascencia at [employer] on [employer] for referral to hospital to home. She stated she will call pt's number for further information.

## 2022-08-29 NOTE — FLOWSHEET NOTE
08/29/22 0849   Pain Assessment   Pain Assessment 0-10   Pain Level 7   Patient's Stated Pain Goal 1   Pain Location Back   Pain Orientation Upper   Pain Descriptors Aching;Discomfort   Functional Pain Assessment Activities are not prevented   Pain Type Chronic pain   Pain Frequency Continuous   Pain Onset Gradual   Non-Pharmaceutical Pain Intervention(s) Repositioned;Distraction     Admin 650 mg Tylenol PO for 7/10 pain

## 2022-08-29 NOTE — PROGRESS NOTES
[facility] for sodium level of 118, Dr. Sara Ribera ordered D 5 at 100 ml/hr til sodium is 116.  BMP Q 4 H.

## 2022-08-29 NOTE — DISCHARGE INSTRUCTIONS
Area Agency on [employer]  Phone: [phone]  **call to see if you qualify for additional services**

## 2022-08-29 NOTE — PROGRESS NOTES
Pt a/o. VSS. Assessment complete as charted. Repositioned for comfort. Call light in reach. Denies needs. Will continue to monitor. Pt on 3 lt oxygen which is her baseline. Sodium 117 at 1800. BMP Q 4 H. I/V fluids on hold for now.

## 2022-08-29 NOTE — PROGRESS NOTES
Frequency of every 4 hours PRN for wheezing or increased work of breathing using Per Protocol order mode. 4-6 - enter or revise RT Bronchodilator order(s) to two equivalent RT bronchodilator orders with one order with BID Frequency and one order with Frequency of every 4 hours PRN wheezing or increased work of breathing using Per Protocol order mode. 7-10 - enter or revise RT Bronchodilator order(s) to two equivalent RT bronchodilator orders with one order with TID Frequency and one order with Frequency of every 4 hours PRN wheezing or increased work of breathing using Per Protocol order mode. 11-13 - enter or revise RT Bronchodilator order(s) to one equivalent RT bronchodilator order with QID Frequency and an Albuterol order with Frequency of every 4 hours PRN wheezing or increased work of breathing using Per Protocol order mode. Greater than 13 - enter or revise RT Bronchodilator order(s) to one equivalent RT bronchodilator order with every 4 hours Frequency and an Albuterol order with Frequency of every 2 hours PRN wheezing or increased work of breathing using Per Protocol order mode.        Electronically signed by Thompson Mc RCP on 8/29/2022 at 7:33 PM

## 2022-08-30 LAB
ANION GAP SERPL CALCULATED.3IONS-SCNC: 10 MMOL/L (ref 3–16)
ANION GAP SERPL CALCULATED.3IONS-SCNC: 8 MMOL/L (ref 3–16)
ANION GAP SERPL CALCULATED.3IONS-SCNC: 9 MMOL/L (ref 3–16)
BASOPHILS ABSOLUTE: 0 K/UL (ref 0–0.2)
BASOPHILS RELATIVE PERCENT: 0.3 %
BUN BLDV-MCNC: 10 MG/DL (ref 7–20)
BUN BLDV-MCNC: 12 MG/DL (ref 7–20)
BUN BLDV-MCNC: 8 MG/DL (ref 7–20)
CALCIUM SERPL-MCNC: 8.5 MG/DL (ref 8.3–10.6)
CALCIUM SERPL-MCNC: 8.6 MG/DL (ref 8.3–10.6)
CALCIUM SERPL-MCNC: 8.8 MG/DL (ref 8.3–10.6)
CHLORIDE BLD-SCNC: 84 MMOL/L (ref 99–110)
CHLORIDE BLD-SCNC: 92 MMOL/L (ref 99–110)
CHLORIDE BLD-SCNC: 93 MMOL/L (ref 99–110)
CO2: 24 MMOL/L (ref 21–32)
CO2: 26 MMOL/L (ref 21–32)
CO2: 28 MMOL/L (ref 21–32)
CREAT SERPL-MCNC: 0.8 MG/DL (ref 0.6–1.2)
CREAT SERPL-MCNC: <0.5 MG/DL (ref 0.6–1.2)
CREAT SERPL-MCNC: <0.5 MG/DL (ref 0.6–1.2)
EOSINOPHILS ABSOLUTE: 0.1 K/UL (ref 0–0.6)
EOSINOPHILS RELATIVE PERCENT: 1.5 %
GFR AFRICAN AMERICAN: >60
GFR NON-AFRICAN AMERICAN: >60
GLUCOSE BLD-MCNC: 123 MG/DL (ref 70–99)
GLUCOSE BLD-MCNC: 91 MG/DL (ref 70–99)
GLUCOSE BLD-MCNC: 91 MG/DL (ref 70–99)
HCT VFR BLD CALC: 34 % (ref 36–48)
HEMOGLOBIN: 11.7 G/DL (ref 12–16)
LYMPHOCYTES ABSOLUTE: 1.2 K/UL (ref 1–5.1)
LYMPHOCYTES RELATIVE PERCENT: 15 %
MCH RBC QN AUTO: 32.2 PG (ref 26–34)
MCHC RBC AUTO-ENTMCNC: 34.5 G/DL (ref 31–36)
MCV RBC AUTO: 93.1 FL (ref 80–100)
MONOCYTES ABSOLUTE: 1.2 K/UL (ref 0–1.3)
MONOCYTES RELATIVE PERCENT: 14.5 %
NEUTROPHILS ABSOLUTE: 5.7 K/UL (ref 1.7–7.7)
NEUTROPHILS RELATIVE PERCENT: 68.7 %
PDW BLD-RTO: 12 % (ref 12.4–15.4)
PLATELET # BLD: 267 K/UL (ref 135–450)
PMV BLD AUTO: 5.6 FL (ref 5–10.5)
POTASSIUM SERPL-SCNC: 4 MMOL/L (ref 3.5–5.1)
POTASSIUM SERPL-SCNC: 4.3 MMOL/L (ref 3.5–5.1)
POTASSIUM SERPL-SCNC: 4.4 MMOL/L (ref 3.5–5.1)
RBC # BLD: 3.65 M/UL (ref 4–5.2)
SODIUM BLD-SCNC: 120 MMOL/L (ref 136–145)
SODIUM BLD-SCNC: 127 MMOL/L (ref 136–145)
SODIUM BLD-SCNC: 127 MMOL/L (ref 136–145)
WBC # BLD: 8.3 K/UL (ref 4–11)

## 2022-08-30 PROCEDURE — 94640 AIRWAY INHALATION TREATMENT: CPT

## 2022-08-30 PROCEDURE — 6360000002 HC RX W HCPCS: Performed by: INTERNAL MEDICINE

## 2022-08-30 PROCEDURE — 6370000000 HC RX 637 (ALT 250 FOR IP): Performed by: NURSE PRACTITIONER

## 2022-08-30 PROCEDURE — 6370000000 HC RX 637 (ALT 250 FOR IP): Performed by: PEDIATRICS

## 2022-08-30 PROCEDURE — 2580000003 HC RX 258: Performed by: NURSE PRACTITIONER

## 2022-08-30 PROCEDURE — 1200000000 HC SEMI PRIVATE

## 2022-08-30 PROCEDURE — 2700000000 HC OXYGEN THERAPY PER DAY

## 2022-08-30 PROCEDURE — 36415 COLL VENOUS BLD VENIPUNCTURE: CPT

## 2022-08-30 PROCEDURE — 80048 BASIC METABOLIC PNL TOTAL CA: CPT

## 2022-08-30 PROCEDURE — 99233 SBSQ HOSP IP/OBS HIGH 50: CPT | Performed by: INTERNAL MEDICINE

## 2022-08-30 PROCEDURE — 94761 N-INVAS EAR/PLS OXIMETRY MLT: CPT

## 2022-08-30 PROCEDURE — 6360000002 HC RX W HCPCS: Performed by: NURSE PRACTITIONER

## 2022-08-30 PROCEDURE — 85025 COMPLETE CBC W/AUTO DIFF WBC: CPT

## 2022-08-30 RX ADMIN — Medication 2 PUFF: at 07:36

## 2022-08-30 RX ADMIN — Medication 10 ML: at 08:15

## 2022-08-30 RX ADMIN — MULTIPLE VITAMINS W/ MINERALS TAB 1 TABLET: TAB at 08:15

## 2022-08-30 RX ADMIN — LISINOPRIL 10 MG: 10 TABLET ORAL at 08:15

## 2022-08-30 RX ADMIN — ALBUTEROL SULFATE 1.25 MG: 2.5 SOLUTION RESPIRATORY (INHALATION) at 19:45

## 2022-08-30 RX ADMIN — ALBUTEROL SULFATE 1.25 MG: 2.5 SOLUTION RESPIRATORY (INHALATION) at 07:36

## 2022-08-30 RX ADMIN — AMLODIPINE BESYLATE 5 MG: 5 TABLET ORAL at 08:15

## 2022-08-30 RX ADMIN — TIOTROPIUM BROMIDE AND OLODATEROL 2 PUFF: 3.124; 2.736 SPRAY, METERED RESPIRATORY (INHALATION) at 07:36

## 2022-08-30 RX ADMIN — OXYBUTYNIN CHLORIDE 5 MG: 5 TABLET ORAL at 16:54

## 2022-08-30 RX ADMIN — ACETAMINOPHEN 650 MG: 325 TABLET ORAL at 21:11

## 2022-08-30 RX ADMIN — CETIRIZINE HYDROCHLORIDE 10 MG: 10 TABLET ORAL at 08:15

## 2022-08-30 RX ADMIN — ESCITALOPRAM OXALATE 10 MG: 10 TABLET ORAL at 21:11

## 2022-08-30 RX ADMIN — OXYBUTYNIN CHLORIDE 5 MG: 5 TABLET ORAL at 13:51

## 2022-08-30 RX ADMIN — GUAIFENESIN 600 MG: 600 TABLET, EXTENDED RELEASE ORAL at 21:11

## 2022-08-30 RX ADMIN — TRAMADOL HYDROCHLORIDE 100 MG: 50 TABLET, COATED ORAL at 18:13

## 2022-08-30 RX ADMIN — MUPIROCIN: 20 OINTMENT TOPICAL at 08:15

## 2022-08-30 RX ADMIN — OXYBUTYNIN CHLORIDE 5 MG: 5 TABLET ORAL at 21:11

## 2022-08-30 RX ADMIN — Medication 2 PUFF: at 19:46

## 2022-08-30 RX ADMIN — GUAIFENESIN 600 MG: 600 TABLET, EXTENDED RELEASE ORAL at 08:15

## 2022-08-30 RX ADMIN — ENOXAPARIN SODIUM 40 MG: 100 INJECTION SUBCUTANEOUS at 16:54

## 2022-08-30 RX ADMIN — OXYBUTYNIN CHLORIDE 5 MG: 5 TABLET ORAL at 08:15

## 2022-08-30 RX ADMIN — Medication 10 ML: at 21:11

## 2022-08-30 RX ADMIN — TRAMADOL HYDROCHLORIDE 100 MG: 50 TABLET, COATED ORAL at 04:22

## 2022-08-30 ASSESSMENT — PAIN DESCRIPTION - ONSET
ONSET: GRADUAL
ONSET: GRADUAL

## 2022-08-30 ASSESSMENT — PAIN DESCRIPTION - FREQUENCY
FREQUENCY: CONTINUOUS
FREQUENCY: CONTINUOUS

## 2022-08-30 ASSESSMENT — PAIN DESCRIPTION - ORIENTATION
ORIENTATION: LOWER
ORIENTATION: UPPER
ORIENTATION: LOWER
ORIENTATION: UPPER

## 2022-08-30 ASSESSMENT — PAIN DESCRIPTION - DESCRIPTORS
DESCRIPTORS: ACHING
DESCRIPTORS: THROBBING
DESCRIPTORS: ACHING;DISCOMFORT
DESCRIPTORS: ACHING

## 2022-08-30 ASSESSMENT — PAIN SCALES - GENERAL
PAINLEVEL_OUTOF10: 8
PAINLEVEL_OUTOF10: 7
PAINLEVEL_OUTOF10: 8
PAINLEVEL_OUTOF10: 3

## 2022-08-30 ASSESSMENT — PAIN DESCRIPTION - PAIN TYPE
TYPE: CHRONIC PAIN
TYPE: CHRONIC PAIN

## 2022-08-30 ASSESSMENT — PAIN DESCRIPTION - LOCATION
LOCATION: BACK

## 2022-08-30 ASSESSMENT — PAIN - FUNCTIONAL ASSESSMENT
PAIN_FUNCTIONAL_ASSESSMENT: PREVENTS OR INTERFERES SOME ACTIVE ACTIVITIES AND ADLS
PAIN_FUNCTIONAL_ASSESSMENT: PREVENTS OR INTERFERES SOME ACTIVE ACTIVITIES AND ADLS
PAIN_FUNCTIONAL_ASSESSMENT: ACTIVITIES ARE NOT PREVENTED

## 2022-08-30 NOTE — PROGRESS NOTES
Nephrology Progress Note   [facility]      Sub/interval history  Patient continues to feel better  D5W was stopped on 8/29 and 2% saline was started back on the sodium dropped to 116, subsequently this afternoon sodium has stayed stable around 127     Last 24 h uop 3.1 L      ROS: No chest pain/shortness of breath/fever/nausea/vomiting  PSFH: No visitor    Scheduled Meds:   mupirocin   Nasal BID    amLODIPine  5 mg Oral Daily    cetirizine  10 mg Oral Daily    escitalopram  10 mg Oral Nightly    guaiFENesin  600 mg Oral BID    oxybutynin  5 mg Oral 4x Daily    sodium chloride flush  5-40 mL IntraVENous 2 times per day    enoxaparin  40 mg SubCUTAneous Daily    therapeutic multivitamin-minerals  1 tablet Oral Daily    budesonide-formoterol  2 puff Inhalation BID    tiotropium-olodaterol  2 puff Inhalation Daily    albuterol  1.25 mg Nebulization BID    lisinopril  10 mg Oral Daily    lidocaine  1 patch TransDERmal Daily     Continuous Infusions:   [Held by provider] dextrose Stopped (08/29/22 2016)    sodium chloride       PRN Meds:.hydrOXYzine HCl, loperamide, sodium chloride flush, sodium chloride, ondansetron **OR** ondansetron, polyethylene glycol, acetaminophen **OR** acetaminophen, traMADol **OR** traMADol    Objective/     Vitals:    08/30/22 0900 08/30/22 1000 08/30/22 1100 08/30/22 1200   BP: (!) 114/53 (!) 109/51 (!) 100/53 103/63   Pulse: 91 80 72 71   Resp: 19 17 17 19   Temp:   98.3 °F (36.8 °C)    TempSrc:   Oral    SpO2: 96% 96% 99% 98%   Weight:       Height:         24HR INTAKE/OUTPUT:    Intake/Output Summary (Last 24 hours) at 8/30/2022 1441  Last data filed at 8/30/2022 1414  Gross per 24 hour   Intake 2408.39 ml   Output 3375 ml   Net -966.61 ml       Gen: alert, awake  Neck: No JVD  Skin: Unremarkable  Cardiovascular:  S1, S2 without m/r/g   Respiratory: CTA B without w/r/r; respiratory effort normal  Abdomen:  soft, nt, nd,   Extremities: no lower extremity edema  Neuro/Psy: AAoriented times 3 ; moves all 4 ext    Data/  Recent Labs     08/28/22  0650 08/29/22  0419 08/30/22  0144   WBC 17.9* 5.6 8.3   HGB 12.8 11.8* 11.7*   HCT 37.1 33.6* 34.0*   MCV 92.6 92.5 93.1    272 267       Recent Labs     08/30/22  0144 08/30/22  0754 08/30/22  1333   * 127* 127*   K 4.0 4.3 4.4   CL 84* 93* 92*   CO2 28 24 26   GLUCOSE 91 91 123*   BUN 12 8 10   CREATININE <0.5* <0.5* 0.8   LABGLOM >60 >60 >60   GFRAA >60 >60 >60       Urine sodium 51 urine osmolality 320    Assessment/     - Hyponatremia - acute on chronic, background SIADH in setting of COPD with suspected pre-renal state with diarrhea, fluid responsive initially  Baseline mid 120 range, 123-125     - Recent COVID PNA, worsening acute hypoxic respiratory failure - plans per Pulmonary     - Pseudohyperkalemia - hemolyzed specimen, wnl on recheck     - Hypertension - controlled        Plan/   -Fluid restriction 1500 mL/day  -Patient's sodium correction is appropriate and stable at around 127 which is higher than her baseline  -BMP daily  -Okay to move out of ICU    Jose Rojas MD  Office: [phone] [phone]  Fax:    Arturo Harman [phone]. [employer]

## 2022-08-30 NOTE — PROGRESS NOTES
4 Eyes Skin Assessment     The patient is being assess for   Shift Handoff    I agree that 2 RN's have performed a thorough Head to Toe Skin Assessment on the patient. ALL assessment sites listed below have been assessed. Areas assessed for pressure by both nurses:   [x]   Head, Face, and Ears   [x]   Shoulders, Back, and Chest, Abdomen  [x]   Arms, Elbows, and Hands   [x]   Coccyx, Sacrum, and Ischium  [x]   Legs, Feet, and Heels        Skin Assessed Under all Medical Devices by both nurses:  O2 device tubing, flood, and securement devices              All Mepilex Borders were peeled back and area peeked at by both nurses:  No: NA  Please list where Mepilex Borders are located:  NA             **SHARE this note so that the co-signing nurse is able to place an eSignature**    Co-signer eSignature: Electronically signed by Yohan Pinto RN on 8/30/22 at 6:26 AM EDT    Does the Patient have Skin Breakdown related to pressure?   No     (Insert Photo here-na)         William Prevention initiated:  Yes   Wound Care Orders initiated:  NA      WO nurse consulted for Pressure Injury (Stage 3,4, Unstageable, DTI, NWPT, Complex wounds)and New or Established Ostomies:  NA      Primary Nurse eSignature: Electronically signed by Sara Dey RN on 8/30/22 at 5:09 AM EDT

## 2022-08-30 NOTE — PROGRESS NOTES
Pulmonary & Critical Care Medicine ICU Progress Note    CC: COPD/COVID-19    Events of Last 24 hours:   2% saline  per nephrology   3LPM       Vascular lines: IV: PIVs          / / /   No results for input(s): PHART, NLK0SYG, PO2ART in the last 72 hours. IV:   [Held by provider] dextrose Stopped (22)    sodium chloride      IV infusion builder 60 mL/hr at 22 2241       Vitals:  Blood pressure 110/65, pulse 59, temperature 98.4 °F (36.9 °C), temperature source Oral, resp. rate 19, height 5' 3\" (1.6 m), weight 159 lb (72.1 kg), SpO2 100 %, not currently breastfeeding. on 3 LPM   Temp  Av.5 °F (36.9 °C)  Min: 97.9 °F (36.6 °C)  Max: 99.2 °F (37.3 °C)    Intake/Output Summary (Last 24 hours) at 2022 0726  Last data filed at 2022 0600  Gross per 24 hour   Intake 3528.81 ml   Output 3125 ml   Net 403.81 ml     PE:  General: ill appearing    Eyes: PERRL. No sclera icterus. No conjunctival injection. ENT: No discharge. Pharynx clear. Neck: Trachea midline. Normal thyroid. Resp: No accessory muscle use. No crackles. Few wheezing. No rhonchi. No dullness on percussion. CV: Regular rate. Regular rhythm. No mumur or rub. No edema. GI: Non-tender. Non-distended. No masses. No organomegaly. Normal bowel sounds. No hernia. Skin: Warm and dry. No nodule on exposed extremities. No rash on exposed extremities. Lymph: No cervical LAD. No supraclavicular LAD. M/S: No cyanosis. No joint deformity. No clubbing. Neuro: AAOx3.  Patellar reflexes are symmetric  Psych: No agitation, no anxiety, affect is full     Scheduled Meds:   mupirocin   Nasal BID    amLODIPine  5 mg Oral Daily    cetirizine  10 mg Oral Daily    escitalopram  10 mg Oral Nightly    guaiFENesin  600 mg Oral BID    oxybutynin  5 mg Oral 4x Daily    sodium chloride flush  5-40 mL IntraVENous 2 times per day    enoxaparin  40 mg SubCUTAneous Daily    therapeutic multivitamin-minerals  1 tablet Oral Daily budesonide-formoterol  2 puff Inhalation BID    tiotropium-olodaterol  2 puff Inhalation Daily    albuterol  1.25 mg Nebulization BID    lisinopril  10 mg Oral Daily    lidocaine  1 patch TransDERmal Daily       Data:  CBC:   Recent Labs     08/28/22  0650 08/29/22  0419 08/30/22  0144   WBC 17.9* 5.6 8.3   HGB 12.8 11.8* 11.7*   HCT 37.1 33.6* 34.0*   MCV 92.6 92.5 93.1    272 267     BMP:   Recent Labs     08/29/22  1351 08/29/22  1943 08/30/22  0144   * 116* 120*   K 3.9 4.9 4.0   CL 84* 82* 84*   CO2 26 24 28   BUN 11 13 12   CREATININE <0.5* 0.6 <0.5*     LIVER PROFILE:   Recent Labs     08/28/22  0650   AST 73*   ALT <5*   BILITOT 1.0   ALKPHOS 73       Microbiology:  COVID 19 Positive 8/14/22  COVID negative 8/28/22    Imaging:  Chest x-ray 8/28  images reviewed by me and showed:   Low lung volumes   No acute cardiopulmonary disease         ASSESSMENT:  Hyponatremia  Hypokalemia   H/o SIADH  Low TSH   Leukocytosis: source unclear, may be reactive  COPD on 3L O2  Chronic hypoxic respiratory failure   H/o COVID pneumonia on 8/14/2022: completed course of decadron  KAMLA nodule s/p SBRT     PLAN:  Supplemental oxygen to maintain SaO2 >92%; wean as tolerated- Home O2 at 3lpm   Continue Inhaled bronchodilators  IVF per nephrology  Free T4 - normal   Needs [facility] f[facility] at [facility]  Lovenox DVT prophylaxis

## 2022-08-30 NOTE — PROGRESS NOTES
RT Inhaler-Nebulizer Bronchodilator Protocol Note    There is a bronchodilator order in the chart from a provider indicating to follow the RT Bronchodilator Protocol and there is an Initiate RT Inhaler-Nebulizer Bronchodilator Protocol order as well (see protocol at bottom of note). CXR Findings:  No results found. The findings from the last RT Protocol Assessment were as follows:   History Pulmonary Disease: Chronic pulmonary disease  Respiratory Pattern: Regular pattern and RR 12-20 bpm  Breath Sounds: Slightly diminished and/or crackles  Cough: Strong, spontaneous, non-productive  Indication for Bronchodilator Therapy: Decreased or absent breath sounds  Bronchodilator Assessment Score: 4    Aerosolized bronchodilator medication orders have been revised according to the RT Inhaler-Nebulizer Bronchodilator Protocol below. Respiratory Therapist to perform RT Therapy Protocol Assessment initially then follow the protocol. Repeat RT Therapy Protocol Assessment PRN for score 0-3 or on second treatment, BID, and PRN for scores above 3. No Indications - adjust the frequency to every 6 hours PRN wheezing or bronchospasm, if no treatments needed after 48 hours then discontinue using Per Protocol order mode. If indication present, adjust the RT bronchodilator orders based on the Bronchodilator Assessment Score as indicated below. Use Inhaler orders unless patient has one or more of the following: on home nebulizer, not able to hold breath for 10 seconds, is not alert and oriented, cannot activate and use MDI correctly, or respiratory rate 25 breaths per minute or more, then use the equivalent nebulizer order(s) with same Frequency and PRN reasons based on the score. If a patient is on this medication at home then do not decrease Frequency below that used at home.     0-3 - enter or revise RT bronchodilator order(s) to equivalent RT Bronchodilator order with Frequency of every 4 hours PRN for wheezing or increased work of breathing using Per Protocol order mode. 4-6 - enter or revise RT Bronchodilator order(s) to two equivalent RT bronchodilator orders with one order with BID Frequency and one order with Frequency of every 4 hours PRN wheezing or increased work of breathing using Per Protocol order mode. 7-10 - enter or revise RT Bronchodilator order(s) to two equivalent RT bronchodilator orders with one order with TID Frequency and one order with Frequency of every 4 hours PRN wheezing or increased work of breathing using Per Protocol order mode. 11-13 - enter or revise RT Bronchodilator order(s) to one equivalent RT bronchodilator order with QID Frequency and an Albuterol order with Frequency of every 4 hours PRN wheezing or increased work of breathing using Per Protocol order mode. Greater than 13 - enter or revise RT Bronchodilator order(s) to one equivalent RT bronchodilator order with every 4 hours Frequency and an Albuterol order with Frequency of every 2 hours PRN wheezing or increased work of breathing using Per Protocol order mode.          Electronically signed by Cesia Lucas RCP on 8/30/2022 at 7:51 PM

## 2022-08-30 NOTE — PROGRESS NOTES
AM assessment completed. See flowsheet. A/O x 4. Lungs sounds diminished throughout. NSR on bedside monitor. Bowel sounds active. No edema noted. Urinary catheter in place draining clear yellow urine. Labs reviewed. Cont to monitor.

## 2022-08-30 NOTE — PROGRESS NOTES
Physician Progress Note      PATIENTShellee Hammans  [facility]:                  [id number]  :                       1955  ADMIT DATE:       2022 6:44 AM  [address] [place] DATE:  RESPONDING  PROVIDER #:        Williams Goldman MD          QUERY TEXT:    Patient admitted with SIADH. Noted documentation of acute respiratory failure   in the nephrology note on . Please specify if in agreement and include   clinical indicators or specify if ruled out after study: The medical record reflects the following:  Risk Factors: age, copd, recent covid pna, chronic respiratory failure  Clinical Indicators: stable, uses 3 l oxygen at baseline  Treatment: oxygen 3L , nebulizers    Acute Respiratory Failure Clinical Indicators per [employer] Training Guide and   Quick Reference Guide:  pO2 < 60 mmHg or SpO2 (pulse oximetry) < 91% breathing room air  pCO2 > 50 and pH < 7.35  P/F ratio (pO2 / FIO2) < 300  pO2 decrease or pCO2 increase by 10 mmHg from baseline (if known)  Supplemental oxygen of 40% or more  Presence of respiratory distress, tachypnea, dyspnea, shortness of breath,   wheezing  Unable to speak in complete sentences  Use of accessory muscles to breathe  Extreme anxiety and feeling of impending doom  Tripod position  Confusion/altered mental status/obtunded    Thank you for your assistance,  Elena Schmid RN,BSN,CCDS,CRCR  Options provided:  -- Acute Respiratory Failure as evidenced by, Please document evidence. -- Acute Respiratory Failure ruled out after study and Chronic Respiratory   Failure confirmed  -- Other - I will add my own diagnosis  -- Disagree - Not applicable / Not valid  -- Disagree - Clinically unable to determine / Unknown  -- Refer to Clinical Documentation Reviewer    PROVIDER RESPONSE TEXT:    Acute Respiratory Failure ruled out after study and Chronic Respiratory   Failure confirmed.     Query created by: Ton Crowder on 2022 2:45 PM      Electronically signed by: Yasir Cervantes MD 8/30/2022 1:52 PM

## 2022-08-30 NOTE — PROGRESS NOTES
Shift assessment complete. VSS. No acute distress noted. IV left AC out of place, removed. Pt refused bath, did allow flood cath care to be completed. All needs addressed. Bed in lowest position, call light in reach. Critical lab value reported to nephrology with NO 2% NS.  Awaiting from pharmacy

## 2022-08-30 NOTE — PROGRESS NOTES
Oriented to person, place, time. No anxiety or agitation. Medications:  Scheduled Meds:   mupirocin   Nasal BID    amLODIPine  5 mg Oral Daily    cetirizine  10 mg Oral Daily    escitalopram  10 mg Oral Nightly    guaiFENesin  600 mg Oral BID    oxybutynin  5 mg Oral 4x Daily    sodium chloride flush  5-40 mL IntraVENous 2 times per day    enoxaparin  40 mg SubCUTAneous Daily    therapeutic multivitamin-minerals  1 tablet Oral Daily    budesonide-formoterol  2 puff Inhalation BID    tiotropium-olodaterol  2 puff Inhalation Daily    albuterol  1.25 mg Nebulization BID    lisinopril  10 mg Oral Daily    lidocaine  1 patch TransDERmal Daily       PRN Meds:  hydrOXYzine HCl, loperamide, sodium chloride flush, sodium chloride, ondansetron **OR** ondansetron, polyethylene glycol, acetaminophen **OR** acetaminophen, traMADol **OR** traMADol    Results:  CBC:   Recent Labs     08/28/22  0650 08/29/22  0419 08/30/22  0144   WBC 17.9* 5.6 8.3   HGB 12.8 11.8* 11.7*   HCT 37.1 33.6* 34.0*   MCV 92.6 92.5 93.1    272 267       BMP:   Recent Labs     08/29/22  1943 08/30/22  0144 08/30/22  0754   * 120* 127*   K 4.9 4.0 4.3   CL 82* 84* 93*   CO2 24 28 24   BUN 13 12 8   CREATININE 0.6 <0.5* <0.5*       LIVER PROFILE:   Recent Labs     08/28/22  0650   AST 73*   ALT <5*   BILITOT 1.0   ALKPHOS 73           Cultures:    COVID-19 positive on 8/14/2022. COVID-19 negative on 8/20/2022. Films:    XR CHEST PORTABLE   Final Result   No focal lung consolidation. Assessment:    Principal Problem:    Hyponatremia  Active Problems:    Essential hypertension    Hypokalemia    Low TSH level    History of COVID-19    Leukocytosis    Shortness of breath    Moderate COPD (chronic obstructive pulmonary disease) (HCC)  Resolved Problems:    * No resolved hospital problems. *         Plan:    #Hyponatremia. Patient has chronic hyponatremia. Baseline sodium was between 125 and 128.   She has hydrochlorothiazide listed on her med list.  I am unsure if she has been taking it. Nephrology was consulted. Sodium was 108 at the time of admission. Goal was to increase it by 8 mEq every 24 hours. He sodium is doing better. Nephrology is managing this. This is likely a combination of SIADH from underlying COPD and prerenal state from diarrhea. #Hypokalemia. Corrected this. Sliding scale potassium. #Low TSH. Monitor. #Leukocytosis. Likely reactive. Resolved. #History of left upper lobe nodule. Has received SBRT. #COPD. Use nebulizers. Stable. #Chronic respiratory failure. Stable. She uses 3 L of oxygen at home. #Lovenox for DVT prophylaxis. May go to two [address] [place] [address] S with tele metry. IMPORTANT: Please note that some portions of this note may have been created using Dragon voice recognition software. Some \"sound-alike\" and totally wrong word substitutions may have taken place due to known inherent limitations of any such software, including this voice recognition software. In spite of efforts to eliminate such errors, some may not have been corrected. So please read the note with this in mind and recognize such mistakes and understand the correct version using the  context. If there are still uncertainties in the mind of the medical provider reading this note about any aspect of the note, the provider can feel free to contact me. Thanks. All questions and concerns were addressed to the patient/family. Alternatives to my treatment were discussed. The note was completed using EMR. Every effort was made to ensure accuracy; however, inadvertent computerized transcription errors may be present.          Jose Hardy MD 1:52 PM 8/30/2022

## 2022-08-31 PROBLEM — J96.11 CHRONIC HYPOXEMIC RESPIRATORY FAILURE (HCC): Status: ACTIVE | Noted: 2022-08-31

## 2022-08-31 LAB
ANION GAP SERPL CALCULATED.3IONS-SCNC: 8 MMOL/L (ref 3–16)
BASOPHILS ABSOLUTE: 0 K/UL (ref 0–0.2)
BASOPHILS RELATIVE PERCENT: 0.6 %
BUN BLDV-MCNC: 15 MG/DL (ref 7–20)
CALCIUM SERPL-MCNC: 9.2 MG/DL (ref 8.3–10.6)
CHLORIDE BLD-SCNC: 89 MMOL/L (ref 99–110)
CO2: 28 MMOL/L (ref 21–32)
CREAT SERPL-MCNC: <0.5 MG/DL (ref 0.6–1.2)
EOSINOPHILS ABSOLUTE: 0.1 K/UL (ref 0–0.6)
EOSINOPHILS RELATIVE PERCENT: 2.6 %
GFR AFRICAN AMERICAN: >60
GFR NON-AFRICAN AMERICAN: >60
GLUCOSE BLD-MCNC: 89 MG/DL (ref 70–99)
HCT VFR BLD CALC: 34.8 % (ref 36–48)
HEMOGLOBIN: 11.9 G/DL (ref 12–16)
LYMPHOCYTES ABSOLUTE: 1 K/UL (ref 1–5.1)
LYMPHOCYTES RELATIVE PERCENT: 18.6 %
MCH RBC QN AUTO: 32.4 PG (ref 26–34)
MCHC RBC AUTO-ENTMCNC: 34.3 G/DL (ref 31–36)
MCV RBC AUTO: 94.5 FL (ref 80–100)
MONOCYTES ABSOLUTE: 0.7 K/UL (ref 0–1.3)
MONOCYTES RELATIVE PERCENT: 13.2 %
NEUTROPHILS ABSOLUTE: 3.6 K/UL (ref 1.7–7.7)
NEUTROPHILS RELATIVE PERCENT: 65 %
PDW BLD-RTO: 12 % (ref 12.4–15.4)
PLATELET # BLD: 267 K/UL (ref 135–450)
PMV BLD AUTO: 5.7 FL (ref 5–10.5)
POTASSIUM SERPL-SCNC: 4.9 MMOL/L (ref 3.5–5.1)
RBC # BLD: 3.68 M/UL (ref 4–5.2)
SODIUM BLD-SCNC: 125 MMOL/L (ref 136–145)
WBC # BLD: 5.5 K/UL (ref 4–11)

## 2022-08-31 PROCEDURE — 6360000002 HC RX W HCPCS: Performed by: INTERNAL MEDICINE

## 2022-08-31 PROCEDURE — 97116 GAIT TRAINING THERAPY: CPT

## 2022-08-31 PROCEDURE — 97161 PT EVAL LOW COMPLEX 20 MIN: CPT

## 2022-08-31 PROCEDURE — 6370000000 HC RX 637 (ALT 250 FOR IP): Performed by: INTERNAL MEDICINE

## 2022-08-31 PROCEDURE — 85025 COMPLETE CBC W/AUTO DIFF WBC: CPT

## 2022-08-31 PROCEDURE — 94640 AIRWAY INHALATION TREATMENT: CPT

## 2022-08-31 PROCEDURE — 97165 OT EVAL LOW COMPLEX 30 MIN: CPT

## 2022-08-31 PROCEDURE — 36415 COLL VENOUS BLD VENIPUNCTURE: CPT

## 2022-08-31 PROCEDURE — 2700000000 HC OXYGEN THERAPY PER DAY

## 2022-08-31 PROCEDURE — 1200000000 HC SEMI PRIVATE

## 2022-08-31 PROCEDURE — 99233 SBSQ HOSP IP/OBS HIGH 50: CPT | Performed by: NURSE PRACTITIONER

## 2022-08-31 PROCEDURE — 97535 SELF CARE MNGMENT TRAINING: CPT

## 2022-08-31 PROCEDURE — 80048 BASIC METABOLIC PNL TOTAL CA: CPT

## 2022-08-31 PROCEDURE — 97530 THERAPEUTIC ACTIVITIES: CPT

## 2022-08-31 PROCEDURE — 2580000003 HC RX 258: Performed by: INTERNAL MEDICINE

## 2022-08-31 PROCEDURE — 99232 SBSQ HOSP IP/OBS MODERATE 35: CPT | Performed by: INTERNAL MEDICINE

## 2022-08-31 PROCEDURE — 94761 N-INVAS EAR/PLS OXIMETRY MLT: CPT

## 2022-08-31 RX ADMIN — Medication 2 PUFF: at 07:59

## 2022-08-31 RX ADMIN — ALBUTEROL SULFATE 1.25 MG: 2.5 SOLUTION RESPIRATORY (INHALATION) at 19:12

## 2022-08-31 RX ADMIN — TIOTROPIUM BROMIDE AND OLODATEROL 2 PUFF: 3.124; 2.736 SPRAY, METERED RESPIRATORY (INHALATION) at 07:59

## 2022-08-31 RX ADMIN — ACETAMINOPHEN 650 MG: 325 TABLET ORAL at 06:15

## 2022-08-31 RX ADMIN — Medication 10 ML: at 08:32

## 2022-08-31 RX ADMIN — TRAMADOL HYDROCHLORIDE 100 MG: 50 TABLET, COATED ORAL at 01:23

## 2022-08-31 RX ADMIN — ALBUTEROL SULFATE 1.25 MG: 2.5 SOLUTION RESPIRATORY (INHALATION) at 07:57

## 2022-08-31 RX ADMIN — AMLODIPINE BESYLATE 5 MG: 5 TABLET ORAL at 08:32

## 2022-08-31 RX ADMIN — MULTIPLE VITAMINS W/ MINERALS TAB 1 TABLET: TAB at 08:32

## 2022-08-31 RX ADMIN — TRAMADOL HYDROCHLORIDE 50 MG: 50 TABLET, COATED ORAL at 10:35

## 2022-08-31 RX ADMIN — OXYBUTYNIN CHLORIDE 5 MG: 5 TABLET ORAL at 22:36

## 2022-08-31 RX ADMIN — ACETAMINOPHEN 650 MG: 325 TABLET ORAL at 17:15

## 2022-08-31 RX ADMIN — CETIRIZINE HYDROCHLORIDE 10 MG: 10 TABLET ORAL at 08:32

## 2022-08-31 RX ADMIN — ENOXAPARIN SODIUM 40 MG: 100 INJECTION SUBCUTANEOUS at 17:13

## 2022-08-31 RX ADMIN — GUAIFENESIN 600 MG: 600 TABLET, EXTENDED RELEASE ORAL at 22:36

## 2022-08-31 RX ADMIN — LISINOPRIL 10 MG: 10 TABLET ORAL at 08:32

## 2022-08-31 RX ADMIN — Medication 2 PUFF: at 19:12

## 2022-08-31 RX ADMIN — OXYBUTYNIN CHLORIDE 5 MG: 5 TABLET ORAL at 17:13

## 2022-08-31 RX ADMIN — Medication 10 ML: at 22:36

## 2022-08-31 RX ADMIN — ACETAMINOPHEN 650 MG: 325 TABLET ORAL at 23:27

## 2022-08-31 RX ADMIN — OXYBUTYNIN CHLORIDE 5 MG: 5 TABLET ORAL at 08:32

## 2022-08-31 RX ADMIN — ESCITALOPRAM OXALATE 10 MG: 10 TABLET ORAL at 22:36

## 2022-08-31 RX ADMIN — OXYBUTYNIN CHLORIDE 5 MG: 5 TABLET ORAL at 12:45

## 2022-08-31 RX ADMIN — GUAIFENESIN 600 MG: 600 TABLET, EXTENDED RELEASE ORAL at 08:31

## 2022-08-31 ASSESSMENT — PAIN DESCRIPTION - ONSET
ONSET: GRADUAL
ONSET: GRADUAL

## 2022-08-31 ASSESSMENT — PAIN DESCRIPTION - FREQUENCY
FREQUENCY: CONTINUOUS
FREQUENCY: CONTINUOUS

## 2022-08-31 ASSESSMENT — PAIN DESCRIPTION - ORIENTATION
ORIENTATION: LOWER
ORIENTATION: MID

## 2022-08-31 ASSESSMENT — PAIN DESCRIPTION - LOCATION
LOCATION: HEAD
LOCATION: BACK
LOCATION: HEAD
LOCATION: HEAD
LOCATION: OTHER (COMMENT)

## 2022-08-31 ASSESSMENT — PAIN DESCRIPTION - PAIN TYPE
TYPE: CHRONIC PAIN
TYPE: ACUTE PAIN

## 2022-08-31 ASSESSMENT — PAIN DESCRIPTION - DESCRIPTORS
DESCRIPTORS: ACHING
DESCRIPTORS: ACHING
DESCRIPTORS: ACHING;DISCOMFORT

## 2022-08-31 ASSESSMENT — PAIN SCALES - GENERAL
PAINLEVEL_OUTOF10: 8
PAINLEVEL_OUTOF10: 7
PAINLEVEL_OUTOF10: 3
PAINLEVEL_OUTOF10: 5
PAINLEVEL_OUTOF10: 7

## 2022-08-31 NOTE — PROGRESS NOTES
Progress Note    Admit Date:  8/28/2022    Pt admitted to ICU for hyponatremia, Sodium was 108 on admission. Pt with chronic hyponatremia, baseline 125-127  Pt with recent admit for COVID PNA, completed decadron     Subjective:  Ms. Seth Buckley is resting in bed. Stated she feels better now then when she came in. She remains to complain of generalized weakness and gets shaky at times when she gets up. PT/OT consulted. Sodium 125 today. She denies any increased shortness of breath, chest pain, palpitations, or dizziness. No diarrhea today. Discussed with patient regarding home medications. She thinks she is supposed to be taking trilogy now. Unsure about HCTZ. Her daughter Hanane Grimaldo helps with her medications at home. Discussed with nursing to call and verify home medications. Objective:   Patient Vitals for the past 4 hrs:   BP Temp Temp src Pulse Resp SpO2   08/31/22 0815 119/64 98.6 °F (37 °C) Oral 75 18 98 %   08/31/22 0759 -- -- -- -- -- 98 %          Intake/Output Summary (Last 24 hours) at 8/31/2022 0919  Last data filed at 8/31/2022 0129  Gross per 24 hour   Intake 1150 ml   Output 1500 ml   Net -350 ml       Physical Exam:    Gen: No distress. Alert. Appears chronically ill and older than stated age  Eyes: PERRL. No sclera icterus. No conjunctival injection. ENT: No discharge. Pharynx clear. Neck: No JVD. Trachea midline. Resp: No accessory muscle use. No crackles. Occasional wheezes. No rhonchi. On 3 liters n/c  CV: Regular rate. Regular rhythm. No murmur. No rub. No edema. Capillary Refill: Brisk,< 3 seconds   Peripheral Pulses: +2 palpable, equal bilaterally   GI: Non-tender. Non-distended. Normal bowel sounds. Skin: Warm and dry. No nodule on exposed extremities. No rash on exposed extremities. Scattered ecchymosis on arms   M/S: No cyanosis. No joint deformity. No clubbing. Neuro: Awake. Grossly nonfocal    Psych: Oriented x 3. No anxiety or agitation.       Scheduled Meds: mupirocin   Nasal BID    amLODIPine  5 mg Oral Daily    cetirizine  10 mg Oral Daily    escitalopram  10 mg Oral Nightly    guaiFENesin  600 mg Oral BID    oxybutynin  5 mg Oral 4x Daily    sodium chloride flush  5-40 mL IntraVENous 2 times per day    enoxaparin  40 mg SubCUTAneous Daily    therapeutic multivitamin-minerals  1 tablet Oral Daily    budesonide-formoterol  2 puff Inhalation BID    tiotropium-olodaterol  2 puff Inhalation Daily    albuterol  1.25 mg Nebulization BID    lisinopril  10 mg Oral Daily    lidocaine  1 patch TransDERmal Daily       Continuous Infusions:   sodium chloride         PRN Meds:  hydrOXYzine HCl, loperamide, sodium chloride flush, sodium chloride, ondansetron **OR** ondansetron, polyethylene glycol, acetaminophen **OR** acetaminophen, traMADol **OR** traMADol      Data:  CBC:   Recent Labs     08/29/22  0419 08/30/22  0144 08/31/22  0552   WBC 5.6 8.3 5.5   HGB 11.8* 11.7* 11.9*   HCT 33.6* 34.0* 34.8*   MCV 92.5 93.1 94.5    267 267     BMP:   Recent Labs     08/30/22  0754 08/30/22  1333 08/31/22  0552   * 127* 125*   K 4.3 4.4 4.9   CL 93* 92* 89*   CO2 24 26 28   BUN 8 10 15   CREATININE <0.5* 0.8 <0.5*       CULTURES  COVID-19 positive on 8/14/2022. COVID-19 negative on 8/20/2022. RADIOLOGY  XR CHEST PORTABLE   Final Result   No focal lung consolidation.                Assessment/Plan:    Hyponatremia  - chronic for patient, baseline 125-128  - pt admitted to ICU with sodium of 108---125 today   - pt has HCTZ listed on home medications, nursing to verify home meds  - nephrology consulted and managing   - likely combination of SIADH from underlying COPD and prerenal state from diarrhea  - 1500 ml fluid restriction, discussed with patient     Generalized weakness  - PT/OT consulted     Hypokalemia  - resolved  - monitor     Low TSH  - TSH 0.11  - normal Free T4  - follow up with up with PCP    Leukocytosis  - on admission, likely reactive- pt was recently treated with Decadron as well  - resolved     COPD  Chronic respiratory failure on 3 liters at baseline  - no AE  - home inhalers need to be clarified, discussed with nursing     Recent dx of COVID   - recent admission for COVID PNA  - completed course of decadron     History of KAMLA cancer  - has received SBRT  - follow up with Dr. Faith Branham     HTN  - continue Norvasc and Lisinopril    Chronic Diarrhea  - stable at this time     Depression  - continue Lexapro    OAB  - continue Ditropan     DVT Prophylaxis: Lovenox   Diet: ADULT DIET;  Regular; 1500 ml  Code Status: Full Code      Delonte Marin, APRN - CNP

## 2022-08-31 NOTE — PROGRESS NOTES
Pulmonary & Critical Care Medicine ICU Progress Note    CC: COPD/COVID-19    Events of Last 24 hours:   3 L O2  Stable breathing      Vascular lines: IV: PIVs          / / /   No results for input(s): PHART, [id number], PO2ART in the last 72 hours. IV:   sodium chloride         Vitals:  Blood pressure (!) 110/51, pulse 70, temperature 98.1 °F (36.7 °C), temperature source Oral, resp. rate 18, height 5' 3\" (1.6 m), weight 159 lb 8 oz (72.3 kg), SpO2 100 %, not currently breastfeeding. on 3 LPM   Temp  Av.9 °F (36.6 °C)  Min: 97.3 °F (36.3 °C)  Max: 98.3 °F (36.8 °C)    Intake/Output Summary (Last 24 hours) at 2022 0721  Last data filed at 2022 0129  Gross per 24 hour   Intake 1150 ml   Output 1500 ml   Net -350 ml     PE:  Constitutional:  No acute distress. Landy Nikko HEENT: no scleral icterus  Neck: No tracheal deviation present. Cardiovascular: Normal heart sounds. Pulmonary/Chest: Few wheezes. No rhonchi. No rales. No decreased breath sounds. No accessory muscle usage or stridor. Abdominal: Soft. Musculoskeletal: No cyanosis. No clubbing. Skin: Skin is warm and dry.        Scheduled Meds:   mupirocin   Nasal BID    amLODIPine  5 mg Oral Daily    cetirizine  10 mg Oral Daily    escitalopram  10 mg Oral Nightly    guaiFENesin  600 mg Oral BID    oxybutynin  5 mg Oral 4x Daily    sodium chloride flush  5-40 mL IntraVENous 2 times per day    enoxaparin  40 mg SubCUTAneous Daily    therapeutic multivitamin-minerals  1 tablet Oral Daily    budesonide-formoterol  2 puff Inhalation BID    tiotropium-olodaterol  2 puff Inhalation Daily    albuterol  1.25 mg Nebulization BID    lisinopril  10 mg Oral Daily    lidocaine  1 patch TransDERmal Daily       Data:  CBC:   Recent Labs     22  0419 22  0144 22  0552   WBC 5.6 8.3 5.5   HGB 11.8* 11.7* 11.9*   HCT 33.6* 34.0* 34.8*   MCV 92.5 93.1 94.5    267 267     BMP:   Recent Labs     22  0754 22  1333 22  0552 * 127* 125*   K 4.3 4.4 4.9   CL 93* 92* 89*   CO2 24 26 28   BUN 8 10 15   CREATININE <0.5* 0.8 <0.5*     LIVER PROFILE:   No results for input(s): AST, ALT, LIPASE, BILIDIR, BILITOT, ALKPHOS in the last 72 hours. Invalid input(s):   AMYLASE,  ALB      Microbiology:  COVID 19 Positive 8/14/22  COVID negative 8/28/22    Imaging:  Chest x-ray 8/28  images reviewed by me and showed:   Low lung volumes   No acute cardiopulmonary disease         ASSESSMENT:  COPD  Chronic hypoxemic respiratory failure-3 L O2  Hyponatremia  Leukocytosis: source unclear, may be reactive  H/o COVID pneumonia on 8/14/2022: completed course of decadron  KAMLA nodule s/p SBRT     PLAN:  Continue O2 maintain SaO2 >92%; wean as tolerated- Home O2 at 3lpm   Continue home bronchodilators  Needs chest imaging f/u at [facility] to follow-up and verbalized understanding  Lovenox DVT prophylaxis

## 2022-08-31 NOTE — PROGRESS NOTES
RT Inhaler-Nebulizer Bronchodilator Protocol Note    There is a bronchodilator order in the chart from a provider indicating to follow the RT Bronchodilator Protocol and there is an Initiate RT Inhaler-Nebulizer Bronchodilator Protocol order as well (see protocol at bottom of note). CXR Findings:  No results found. The findings from the last RT Protocol Assessment were as follows:   History Pulmonary Disease: Chronic pulmonary disease  Respiratory Pattern: Regular pattern and RR 12-20 bpm  Breath Sounds: Slightly diminished and/or crackles  Cough: Strong, spontaneous, non-productive  Indication for Bronchodilator Therapy: Decreased or absent breath sounds  Bronchodilator Assessment Score: 4    Aerosolized bronchodilator medication orders have been revised according to the RT Inhaler-Nebulizer Bronchodilator Protocol below. Respiratory Therapist to perform RT Therapy Protocol Assessment initially then follow the protocol. Repeat RT Therapy Protocol Assessment PRN for score 0-3 or on second treatment, BID, and PRN for scores above 3. No Indications - adjust the frequency to every 6 hours PRN wheezing or bronchospasm, if no treatments needed after 48 hours then discontinue using Per Protocol order mode. If indication present, adjust the RT bronchodilator orders based on the Bronchodilator Assessment Score as indicated below. Use Inhaler orders unless patient has one or more of the following: on home nebulizer, not able to hold breath for 10 seconds, is not alert and oriented, cannot activate and use MDI correctly, or respiratory rate 25 breaths per minute or more, then use the equivalent nebulizer order(s) with same Frequency and PRN reasons based on the score. If a patient is on this medication at home then do not decrease Frequency below that used at home.     0-3 - enter or revise RT bronchodilator order(s) to equivalent RT Bronchodilator order with Frequency of every 4 hours PRN for wheezing or increased work of breathing using Per Protocol order mode. 4-6 - enter or revise RT Bronchodilator order(s) to two equivalent RT bronchodilator orders with one order with BID Frequency and one order with Frequency of every 4 hours PRN wheezing or increased work of breathing using Per Protocol order mode. 7-10 - enter or revise RT Bronchodilator order(s) to two equivalent RT bronchodilator orders with one order with TID Frequency and one order with Frequency of every 4 hours PRN wheezing or increased work of breathing using Per Protocol order mode. 11-13 - enter or revise RT Bronchodilator order(s) to one equivalent RT bronchodilator order with QID Frequency and an Albuterol order with Frequency of every 4 hours PRN wheezing or increased work of breathing using Per Protocol order mode. Greater than 13 - enter or revise RT Bronchodilator order(s) to one equivalent RT bronchodilator order with every 4 hours Frequency and an Albuterol order with Frequency of every 2 hours PRN wheezing or increased work of breathing using Per Protocol order mode. RT to enter RT Home Evaluation for COPD & MDI Assessment order using Per Protocol order mode.     Electronically signed by Molina Bernard RCP on 8/31/2022 at 8:01 AM

## 2022-08-31 NOTE — PROGRESS NOTES
RT Inhaler-Nebulizer Bronchodilator Protocol Note    There is a bronchodilator order in the chart from a provider indicating to follow the RT Bronchodilator Protocol and there is an Initiate RT Inhaler-Nebulizer Bronchodilator Protocol order as well (see protocol at bottom of note). CXR Findings:  No results found. The findings from the last RT Protocol Assessment were as follows:   History Pulmonary Disease: Chronic pulmonary disease  Respiratory Pattern: Regular pattern and RR 12-20 bpm  Breath Sounds: Slightly diminished and/or crackles  Cough: Strong, spontaneous, non-productive  Indication for Bronchodilator Therapy: Decreased or absent breath sounds  Bronchodilator Assessment Score: 4    Aerosolized bronchodilator medication orders have been revised according to the RT Inhaler-Nebulizer Bronchodilator Protocol below. Respiratory Therapist to perform RT Therapy Protocol Assessment initially then follow the protocol. Repeat RT Therapy Protocol Assessment PRN for score 0-3 or on second treatment, BID, and PRN for scores above 3. No Indications - adjust the frequency to every 6 hours PRN wheezing or bronchospasm, if no treatments needed after 48 hours then discontinue using Per Protocol order mode. If indication present, adjust the RT bronchodilator orders based on the Bronchodilator Assessment Score as indicated below. Use Inhaler orders unless patient has one or more of the following: on home nebulizer, not able to hold breath for 10 seconds, is not alert and oriented, cannot activate and use MDI correctly, or respiratory rate 25 breaths per minute or more, then use the equivalent nebulizer order(s) with same Frequency and PRN reasons based on the score. If a patient is on this medication at home then do not decrease Frequency below that used at home.     0-3 - enter or revise RT bronchodilator order(s) to equivalent RT Bronchodilator order with Frequency of every 4 hours PRN for wheezing or increased work of breathing using Per Protocol order mode. 4-6 - enter or revise RT Bronchodilator order(s) to two equivalent RT bronchodilator orders with one order with BID Frequency and one order with Frequency of every 4 hours PRN wheezing or increased work of breathing using Per Protocol order mode. 7-10 - enter or revise RT Bronchodilator order(s) to two equivalent RT bronchodilator orders with one order with TID Frequency and one order with Frequency of every 4 hours PRN wheezing or increased work of breathing using Per Protocol order mode. 11-13 - enter or revise RT Bronchodilator order(s) to one equivalent RT bronchodilator order with QID Frequency and an Albuterol order with Frequency of every 4 hours PRN wheezing or increased work of breathing using Per Protocol order mode. Greater than 13 - enter or revise RT Bronchodilator order(s) to one equivalent RT bronchodilator order with every 4 hours Frequency and an Albuterol order with Frequency of every 2 hours PRN wheezing or increased work of breathing using Per Protocol order mode.          Electronically signed by Tahir Iraheta RCP on 8/31/2022 at 7:16 PM

## 2022-08-31 NOTE — PROGRESS NOTES
Ne[facility] Progress Note   [facility]      Sub/interval history  Patient continues to feel better  Sodium 125 on 8/31    Last 24 h uop 1.5 L      ROS: No chest pain/shortness of breath/fever/nausea/vomiting  PSFH: No visitor    Scheduled Meds:   mupirocin   Nasal BID    amLODIPine  5 mg Oral Daily    cetirizine  10 mg Oral Daily    escitalopram  10 mg Oral Nightly    guaiFENesin  600 mg Oral BID    oxybutynin  5 mg Oral 4x Daily    sodium chloride flush  5-40 mL IntraVENous 2 times per day    enoxaparin  40 mg SubCUTAneous Daily    therapeutic multivitamin-minerals  1 tablet Oral Daily    budesonide-formoterol  2 puff Inhalation BID    tiotropium-olodaterol  2 puff Inhalation Daily    albuterol  1.25 mg Nebulization BID    lisinopril  10 mg Oral Daily    lidocaine  1 patch TransDERmal Daily     Continuous Infusions:   sodium chloride       PRN Meds:.hydrOXYzine HCl, loperamide, sodium chloride flush, sodium chloride, ondansetron **OR** ondansetron, polyethylene glycol, acetaminophen **OR** acetaminophen, traMADol **OR** traMADol    Objective/     Vitals:    08/31/22 0130 08/31/22 0153 08/31/22 0759 08/31/22 0815   BP:    119/64   Pulse:    75   Resp:  18  18   Temp:    98.6 °F (37 °C)   TempSrc:    Oral   SpO2:   98% 98%   Weight: 159 lb 8 oz (72.3 kg)      Height:         24HR INTAKE/OUTPUT:    Intake/Output Summary (Last 24 hours) at 8/31/2022 1402  Last data filed at 8/31/2022 1027  Gross per 24 hour   Intake 670 ml   Output 1200 ml   Net -530 ml       Gen: alert, awake  Neck: No JVD  Skin: Unremarkable  Cardiovascular:  S1, S2 without m/r/g   Respiratory: CTA B without w/r/r; respiratory effort normal  Abdomen:  soft, nt, nd,   Extremities: no lower extremity edema  Neuro/Psy: AAoriented times 3 ; moves all 4 ext    Data/  Recent Labs     08/29/22  0419 08/30/22  0144 08/31/22  0552   WBC 5.6 8.3 5.5   HGB 11.8* 11.7* 11.9*   HCT 33.6* 34.0* 34.8*   MCV 92.5 93.1 94.5    267 267       Recent Labs     08/30/22  0754 08/30/22  1333 08/31/22  0552   * 127* 125*   K 4.3 4.4 4.9   CL 93* 92* 89*   CO2 24 26 28   GLUCOSE 91 123* 89   BUN 8 10 15   CREATININE <0.5* 0.8 <0.5*   LABGLOM >60 >60 >60   GFRAA >60 >60 >60       Urine sodium 51 urine osmolality 320    Assessment/     - Hyponatremia - acute on chronic, background SIADH in setting of COPD with suspected pre-renal state with diarrhea, fluid responsive initially  Baseline mid 120 range, 123-125     - Recent COVID PNA, worsening acute hypoxic respiratory failure - plans per Pulmonary     - Pseudohyperkalemia - hemolyzed specimen, wnl on recheck     - Hypertension - controlled        Plan/   -Fluid restriction 1500 mL/day  -Encourage protein intake    Okay to discharge from renal standpoint      Leidy Pinon MD  Office: [phone]  Fax:    [phone]

## 2022-08-31 NOTE — PROGRESS NOTES
Verified Symbicort and Stiolto for use this admission, but Rx Navigator indicates that Dr. Jen De La Cruz has been ordering Anoro Ellipta since 4/2022, last refill 8/2/2022. However, Dr. Lupis Peña ordered Trelegy on 8/10/2022. [facility] Mendoza has filled both prescriptions. Need to clarify for patient which inhaler to use post discharge.   MELI Bennett.Ph.8/28/20226:56 PM

## 2022-08-31 NOTE — PLAN OF CARE
Problem: Discharge Planning  Goal: Discharge to home or other facility with appropriate resources  8/31/2022 1029 by Sharon Goodrich RN  Outcome: Progressing  8/30/2022 2224 by Fina Bartlett RN  Outcome: Progressing  Flowsheets (Taken 8/30/2022 2115)  Discharge to home or other facility with appropriate resources: Identify barriers to discharge with patient and caregiver     Problem: Safety - Adult  Goal: Free from fall injury  8/31/2022 1029 by Sharon Goodrich RN  Outcome: Progressing  8/30/2022 2224 by Fina Bartlett RN  Outcome: Progressing     Problem: Skin/Tissue Integrity  Goal: Absence of new skin breakdown  Description: 1. Monitor for areas of redness and/or skin breakdown  2. Assess vascular access sites hourly  3. Every 4-6 hours minimum:  Change oxygen saturation probe site  4. Every 4-6 hours:  If on nasal continuous positive airway pressure, respiratory therapy assess nares and determine need for appliance change or resting period. 8/31/2022 1029 by Sharon Goodrich RN  Outcome: Progressing  8/30/2022 2224 by Fina Bartlett RN  Outcome: Progressing     Problem: Pain  Goal: Verbalizes/displays adequate comfort level or baseline comfort level  8/31/2022 1029 by Sharon Goodrich RN  Outcome: Progressing  8/30/2022 2224 by Fina Bartlett RN  Outcome: Progressing     Problem: ABCDS Injury Assessment  Goal: Absence of physical injury  8/31/2022 1029 by Sharon Goodrich RN  Outcome: Progressing  8/30/2022 2224 by Fina Bartlett RN  Outcome: Progressing     Problem: Safety - Medical Restraint  Goal: Remains free of injury from restraints (Restraint for Interference with Medical Device)  Description: INTERVENTIONS:  1. Determine that other, less restrictive measures have been tried or would not be effective before applying the restraint  2. Evaluate the patient's condition at the time of restraint application  3. Inform patient/family regarding the reason for restraint  4.  Q2H: Monitor safety, psychosocial status, comfort, nutrition and hydration  8/31/2022 1029 by Rayray Beaulieu RN  Outcome: Progressing  8/30/2022 2224 by Ascencion Carmona RN  Outcome: Progressing     Problem: Discharge Planning  Goal: Discharge to home or other facility with appropriate resources  8/31/2022 1029 by Rayray Beaulieu RN  Outcome: Progressing  8/30/2022 2224 by Ascencion Carmona RN  Outcome: Progressing  Flowsheets (Taken 8/30/2022 2115)  Discharge to home or other facility with appropriate resources: Identify barriers to discharge with patient and caregiver     Problem: Safety - Adult  Goal: Free from fall injury  8/31/2022 1029 by Rayray Beaulieu RN  Outcome: Progressing  8/30/2022 2224 by Ascencion Carmona RN  Outcome: Progressing     Problem: Skin/Tissue Integrity  Goal: Absence of new skin breakdown  Description: 1. Monitor for areas of redness and/or skin breakdown  2. Assess vascular access sites hourly  3. Every 4-6 hours minimum:  Change oxygen saturation probe site  4. Every 4-6 hours:  If on nasal continuous positive airway pressure, respiratory therapy assess nares and determine need for appliance change or resting period. 8/31/2022 1029 by Rayray Beaulieu RN  Outcome: Progressing  8/30/2022 2224 by Ascencion Carmona RN  Outcome: Progressing     Problem: Pain  Goal: Verbalizes/displays adequate comfort level or baseline comfort level  8/31/2022 1029 by Rayray Beaulieu RN  Outcome: Progressing  8/30/2022 2224 by Ascencion Carmona RN  Outcome: Progressing     Problem: ABCDS Injury Assessment  Goal: Absence of physical injury  8/31/2022 1029 by Rayray Beaulieu RN  Outcome: Progressing  8/30/2022 2224 by Ascencion Carmona RN  Outcome: Progressing     Problem: Safety - Medical Restraint  Goal: Remains free of injury from restraints (Restraint for Interference with Medical Device)  Description: INTERVENTIONS:  1. Determine that other, less restrictive measures have been tried or would not be effective before applying the restraint  2. Evaluate the patient's condition at the time of restraint application  3. Inform patient/family regarding the reason for restraint  4.  Q2H: Monitor safety, psychosocial status, comfort, nutrition and hydration  8/31/2022 1029 by Micheal Martinez RN  Outcome: Progressing  8/30/2022 2224 by Celester Lennox, RN  Outcome: Progressing

## 2022-08-31 NOTE — TELEPHONE ENCOUNTER
Spoke with [facility] checking if pt rescheduled appt with Dr. Smitha Renner, and pt has not. Per Epic chart, pt is admitted again.

## 2022-08-31 NOTE — FLOWSHEET NOTE
08/31/22 0815   Vital Signs   Temp 98.6 °F (37 °C)   Temp Source Oral   Heart Rate 75   Heart Rate Source Monitor   Resp 18   /64   BP Location Left upper arm   MAP (Calculated) 82.33   Patient Position High fowlers   Level of Consciousness 0   MEWS Score 1   Oxygen Therapy   SpO2 98 %   O2 Device Nasal cannula   O2 Flow Rate (L/min) 3 L/min     Pt assessment completed, vss, see flow sheet. Pt alert and oriented x 4. Pt given all medications per orders. Pt denies any needs at this time.  Rayne Avila, RN

## 2022-08-31 NOTE — PROGRESS NOTES
Patient alert and oriented. VSS. PRN tramadol given for lower back pain 7/10; will continue to monitor. In bed, lowest position, call light within reach.

## 2022-08-31 NOTE — PROGRESS NOTES
Inpatient Physical Therapy Evaluation and Treatment    [facility]  Date:  8/31/2022  Patient Name:    Leanne Jaimes  Admitting diagnosis:  Shortness of breath [R06.02]  Hyponatremia [E87.1]  Moderate COPD (chronic obstructive pulmonary disease) ([facility] [address] [J44.9]  Leukocytosis, unspecified type [D72.829]  Admit Date:  8/28/2022  Precautions/Restrictions/WB Status/ Lines/ Wounds/ Oxygen: Fall risk, Bed/chair alarm, Lines -IV and Supplemental O2 (3L/min), Telemetry, and Continuous pulse oximetry    Treatment Time: 7360 - 9726  Treatment Number:  1   Timed Code Treatment Minutes: 32 minutes  Total Treatment Minutes:  42  minutes    Patient Goals for Therapy: \" Go home \"          Discharge Recommendations: Home 24 hr assist and with home PT   DME needs for discharge: Needs Met       Therapy recommendation for EMS Transport: can transport by wheelchair    Therapy recommendations for staff:   Assist of 1 with use of rolling walker (RW) and gait belt for all transfers and ambulation to/from chair  to/from bathroom  within room    History of Present Illness: H & P as per Arya Zamora MD's note dated 8/28/2022  61 y.o. female who presented to [facility] - after daughter called 911 because she wasn't feeling well. She reports feeling like she has been having fevers. + rhinitis. + sore throat. + cough. No nausea or vomiting. She reports low back pain - thinks it was the bed. No dysuria. No chest pain. She reports intermittent dyspnea, but denies dyspnea at present. Workup in the ED showed a sodium off 117. She was admitted to the ICU for correction. She reports diarrhea - but none today. No blood in stool. She has been thinking that maybe her acetaminophen has been causing diarrhea. She reports taking the acetaminophen for her back pain at home. She was positive for covid on 8/14/22. Subsequently tested 8/28/22 and negative. She denies recent changes to her medications. She denies recent weight changes. She denies productive cough. She reports not being vaccinated against covid. She denies falls at home. Denies feeling lightheaded or dizzy. She reports difficulty with passing urine - a flood was placed. RN staff reports its unclear if her home med list is up to date. Daughter may be able to bring updates tomorrow. SocHx:   - lives with her daughter at home   - previously smoked, quit > 1 year ago   - denies alcohol use   - retired     Home Health S4 Level Recommendation:  Level 1 Standard  AM-PAC Mobility Score    AM-PAC Inpatient Mobility Raw Score : 21       Preadmission Environment    Pt. Lives with family (daughter)  Home environment:    one story home  Steps to enter first floor:   bilateral hand rails and ramp          Steps to second floor: N/A  Bathroom:       Tub/Shower unit and Standard height toilet  Equipment owned:      [address], Rolling Walker, manual W/C, BSC, quad cane, home O2 (3 L) PRN, inhaler and nebulizer , electric wheelchair (can't find )        Preadmission Status / PLOF:  History of falls             No  Pt. Able to drive          No, daughter drives  Pt Fully independent with ADL's         Yes, dtr provides supervision if pt needs. Pt. Required assistance from family (dtr) for:  Cooking, Cleaning and Laundry shares with daughter  Pt. Fully independent for transfers and gait and walked with: RW  Pt sleeps on a loveseat in her home. Dtr is home     Pain   Yes  Location: back region  Ratin /10  Pain Medicine Status: Received pain med prior to tx    Cognition    A&O x4   Able to follow 2 step commands    Subjective  Patient sitting EOB with no family present. Pt agreeable to this PT eval & tx. Upper Extremity ROM/Strength  Please see OT evaluation.       Lower Extremity ROM / Strength   AROM WFL: Yes  ROM limitations: N/A    Strength Assessment (measured on a 0-5 scale):  R LE   Quad   5   Ant Tib  5   Hamstring 3   Iliopsoas 3+  L LE  Quad   5   Ant Tib 5   Hamstring 3-   Iliopsoas 3    Lower Extremity Sensation    WNL    Lower Extremity Proprioception:   WNL    Coordination and Tone  WNL    Balance  Sitting:  Good - ; SBA  Comments:     Standing: Fair ; CGA  Comments: ~2 min with RW    Bed Mobility   Supine to Sit:    Independent  Sit to Supine:   Not Tested  Rolling:   Not Tested  Scooting in sitting: Independent  Scooting in supine:  Not Tested    Transfer Training     Sit to stand:   SBA  Stand to sit:   SBA  Bed to Chair:   SBA with use of gait belt and rolling walker (RW)    Gait gait completed as indicated below  Distance:      25 ft + sitting break + 25 ft  Deviations (firm surface/linoleum):  decreased raphael, forward flexed posture, decreased step length bilaterally, and decreased stance time bilaterally  Assistive Device Used:    gait belt and rolling walker (RW)  Level of Assist:    SBA  Comment: Patient was limited in walking distance due to SOB and needed sitting break with walking trials. Stair Training deferred, pt does not have stairs in home environment    Activity Tolerance   Pt completed therapy session with SOB noted with standing and walking activities  In bed:  SpO2: 97% on 3L of O2  HR: 67  BP: 108/71     In chair:   SpO2: 95%  HR: 94 bpm  BP: 128/67    Positioning Needs   Pt up in chair, alarm set, positioned in proper neutral alignment and pressure relief provided. Call light provided and all needs within reach    Exercises Initiated  Yulia deferred secondary to treatment focus on functional mobility  NA    Other  None. Patient/Family Education   Pt educated on role of inpatient PT, POC, importance of continued activity, DC recommendations, safety awareness, transfer techniques, pursed lip breathing, energy conservation, pacing activity, and calling for assist with mobility. Assessment  Pt seen for Physical Therapy evaluation in acute care setting.   Pt demonstrated decreased Activity tolerance, Balance, Safety, and Strength as well as decreased independence with Ambulation, Bed Mobility , and Transfers. Recommending Home 24 hr assist and with home PT upon discharge as patient functioning below baseline level and would benefit from continued therapy services    Goals : To be met in 3 visits:  1). Independent with LE Ex x 10 reps    To be met in 6 visits:  1). Supine to/from sit: Independent  2). Sit to/from stand: Independent  3). Bed to chair: Independent  4). Gait: Ambulate  30 ft.   with  Modified Independent and use of LRAD (least restrictive assistive device)  5). Tolerate B LE exercises 3 sets of 10-15 reps    Rehabilitation Potential: Good  Strengths for achieving goals include:   Pt motivated and Pt cooperative   Barriers to achieving goals include:    Decreased activity tolerance    Plan    To be seen 3-5 x / week  while in acute care setting for therapeutic exercises, bed mobility, transfers, progressive gait training, balance training, and family/patient education. Signature: Romain Cummins MS PT, # [id number]    If patient discharges from this facility prior to next visit, this note will serve as the Discharge Summary.

## 2022-08-31 NOTE — PROGRESS NOTES
Inpatient Occupational Therapy  Evaluation and Treatment    Unit: 2 [place]  Date:  8/31/2022  Patient Name:    Praveena Chiu  Admitting diagnosis:  Shortness of breath [R06.02]  Hyponatremia [E87.1]  Mod[facility]ch[facility] disease[facility] [J44.9]  Leukocytosis, unspecified type [D72.829]  Admit Date:  8/28/2022  Precautions/Restrictions/WB Status/ Lines/ Wounds/ Oxygen: fall risk, IV, bed/chair alarm, and supplemental O2 (3L)    Treatment Time:  13:38-14:38  Treatment Number: 1     Billable Treatment Time: 50 minutes   Total Treatment Time:   60  minutes    Patient Goals for Therapy:  \" to go home \"      Discharge Recommendations: Home with 24/7 assist and home therapy  DME needs for discharge: Needs Met       Therapy recommendations for staff:   Assist of 1 with use of rolling walker (RW) for all transfers to/from BSC/chair    History of Present Illness:  62 yo with a history of COPD and lung cancer hyponatremia presented with shortness of breath. She was hypoxic at home however in the emergency room she is ok on her home O2 at 3 L. She was found to be severely hyponatremic admitted to the ICU. To me she has a slow response to questions and is slightly altered although she is oriented. Pt reported a fever at home to the ED, no cough. Of note she was treated in the hospital 2 weeks ago for COVID-19 pneumonia and completed course of Decadron. Home Health S4 Level Recommendation:  Level 1 Standard  AM-PAC Score: AM-PAC Inpatient Daily Activity Raw Score: 20    Preadmission Environment    Pt.  Lives with family (daughter)  Home environment:    one story home  Steps to enter first floor:   bilateral hand rails and ramp          Steps to second floor: N/A  Bathroom:       Tub/Shower unit and Standard height toilet  Equipment owned:      [address], Rolling Walker, manual W/C, BSC, quad cane, home O2 (3 L) PRN, inhaler and nebulizer , electric wheelchair (can't find )        Preadmission Status / PLOF:    History of falls             No  Pt. Able to drive          No, daughter drives  Pt Fully independent with ADL's         Yes, dtr provides supervision if pt needs. Pt. Required assistance from family (dtr) for:  Cooking, Cleaning and Laundry shares with daughter  Pt. Fully independent for transfers and gait and walked with: RW  Pt sleeps on a loveseat in her home. Dtr is home     Pain  Yes  Ratin  Location: headache and back  Pain Medicine Status: Received pain med prior to tx      Cognition    A&O x4   Able to follow 2 step commands    Subjective  Patient lying supine in bed with no family present   Pt agreeable to this OT eval & tx. Upper Extremity ROM:    WFL,  pt able to perform all bed mobility, transfers, and gait without ROM limitation. Upper Extremity Strength:    BUE strength impaired but not formally assessed w/ MMT    Upper Extremity Sensation    WFL    Upper Extremity Proprioception:  WFL    Coordination and Tone  WFL    Balance  Functional Sitting Balance:  WFL  Functional Standing Balance:Diminished (SBA)    Bed mobility:    Supine to sit: Independent  Sit to supine:   Not Tested  Rolling:    Not Tested  Scooting in sitting:  Independent  Scooting to head of bed:   Not Tested    Bridging:   Not Tested    Transfers:    Sit to stand:  SBA  Stand to sit:  SBA  Bed to chair:   SBA and with use of RW  Standard toilet: SBA and with use of RW  Bed to [facility]:  Not Tested    Dressing:      UE:   Supervision  LE:    Min A to don briefs    Bathing:    UE:  SBA  LE:  SBA    Eating:   Independent    Toileting:  SBA    Grooming: SBA    Activity Tolerance   Pt completed therapy session with SOB noted with activity    In bed:  SpO2: 97% on 3L of O2  HR: 67  BP: 108/71    In chair:   SpO2: 95%  HR: 94 bpm  BP: 128/67    Positioning Needs:   Up in chair, call light and needs in reach.     Alarm Set    Exercise / Activities Initiated:   N/A    Patient/Family Education:   Role of OT  Recommendations for DC  Safe RW use/hand placement    Assessment of Deficits: Pt seen for Occupational therapy evaluation in acute care setting. Pt demonstrated decreased Activity tolerance, ADLs, IADLs, Balance , Bathing, Bed mobility, Dressing, ROM, Safety Awareness, Strength, Transfers, and Coping Skills. Pt functioning below baseline and will likely benefit from skilled occupational therapy services to maximize safety and independence. Goal(s) : To be met in 3 Visits:  1). Bed to toilet/BSC: Independent    To be met in 5 Visits:  1). Supine to/from Sit:  Independent  2). Upper Body Bathing:   Independent  3). Lower Body Bathing:   Independent  4). Upper Body Dressing:  Independent  5). Lower Body Dressing:  Independent  6). Pt to demonstrate UE exs x 15 reps with minimal cues    Rehabilitation Potential:  Good for goals listed above. Strengths for achieving goals include: Pt motivated, PLOF, and Pt cooperative  Barriers to achieving goals include:  Complexity of condition     Plan: To be seen 3-5 x/wk while in acute care setting for therapeutic exercises, bed mobility, transfers, dressing, bathing, family/patient education, ADL/IADL retraining, energy conservation training.        WYATT LangleyR/L #[id number]    If patient discharges from this facility prior to next visit, this note will serve as the Discharge Summary

## 2022-08-31 NOTE — PLAN OF CARE
Problem: Discharge Planning  Goal: Discharge to home or other facility with appropriate resources  Outcome: Progressing  Flowsheets (Taken 8/30/2022 2115)  Discharge to home or other facility with appropriate resources: Identify barriers to discharge with patient and caregiver     Problem: Safety - Adult  Goal: Free from fall injury  Outcome: Progressing     Problem: Skin/Tissue Integrity  Goal: Absence of new skin breakdown  Description: 1. Monitor for areas of redness and/or skin breakdown  2. Assess vascular access sites hourly  3. Every 4-6 hours minimum:  Change oxygen saturation probe site  4. Every 4-6 hours:  If on nasal continuous positive airway pressure, respiratory therapy assess nares and determine need for appliance change or resting period. Outcome: Progressing     Problem: Pain  Goal: Verbalizes/displays adequate comfort level or baseline comfort level  Outcome: Progressing     Problem: ABCDS Injury Assessment  Goal: Absence of physical injury  Outcome: Progressing     Problem: Safety - Medical Restraint  Goal: Remains free of injury from restraints (Restraint for Interference with Medical Device)  Description: INTERVENTIONS:  1. Determine that other, less restrictive measures have been tried or would not be effective before applying the restraint  2. Evaluate the patient's condition at the time of restraint application  3. Inform patient/family regarding the reason for restraint  4.  Q2H: Monitor safety, psychosocial status, comfort, nutrition and hydration  Outcome: Progressing

## 2022-08-31 NOTE — PROGRESS NOTES
PM Shift assessment completed. Pt is alert and oriented. Vital signs are WNL. Respirations are even & easy. Patient remains on baseline 02 at 3 L; tolerating well. Patient complaint of lower back pain; PRN tylenol given. SR up x 2 and bed in low position. Call light is within reach. Will monitor. .Bedside Mobility Assessment Tool (BMAT):     Assessment Level 1- Sit and Shake    1. From a semi-reclined position, ask patient to sit up and rotate to a seated position at the side of the bed. Can use the bedrail. 2. Ask patient to reach out and grab your hand and shake making sure patient reaches across his/her midline. Pass- Patient is able to come to a seated position, maintain core strength. Maintains seated balance while reaching across midline. Move on to Assessment Level 2. Assessment Level 2- Stretch and Point   1. With patient in seated position at the side of the bed, have patient place both feet on the floor (or stool) with knees no higher than hips. 2. Ask patient to stretch one leg and straighten the knee, then bend the ankle/flex and point the toes. If appropriate, repeat with the other leg. Pass- Patient is able to demonstrate appropriate quad strength on intended weight bearing limb(s). Move onto Assessment Level 3. Assessment Level 3- Stand   1. Ask patient to elevate off the bed or chair (seated to standing) using an assistive device (cane, bedrail). 2. Patient should be able to raise buttocks off be and hold for a count of five. May repeat once. Pass- Patient maintains standing stability for at least 5 seconds, proceed to assessment level 4. Assessment Level 4- Walk   1. Ask patient to march in place at bedside. 2. Then ask patient to advance step and return each foot. Some medical conditions may render a patient from stepping backwards, use your best clinical judgement.    Pass- Patient demonstrates balance while shifting weight and ability to step, takes independent steps, does not use assistive device patient is MOBILITY LEVEL 4.       Mobility Level- 4

## 2022-08-31 NOTE — PROGRESS NOTES
AM Shift report given to Juwan Zaman RN at Bedside. Patient is stable. All end of shift needs have been met. No further assistance needed at this time.

## 2022-09-01 VITALS
HEIGHT: 63 IN | RESPIRATION RATE: 18 BRPM | WEIGHT: 165.2 LBS | HEART RATE: 86 BPM | TEMPERATURE: 98 F | OXYGEN SATURATION: 99 % | SYSTOLIC BLOOD PRESSURE: 133 MMHG | DIASTOLIC BLOOD PRESSURE: 77 MMHG | BODY MASS INDEX: 29.27 KG/M2

## 2022-09-01 LAB
ANION GAP SERPL CALCULATED.3IONS-SCNC: 8 MMOL/L (ref 3–16)
BASOPHILS ABSOLUTE: 0 K/UL (ref 0–0.2)
BASOPHILS RELATIVE PERCENT: 0.6 %
BLOOD CULTURE, ROUTINE: NORMAL
BUN BLDV-MCNC: 11 MG/DL (ref 7–20)
CALCIUM SERPL-MCNC: 9.1 MG/DL (ref 8.3–10.6)
CHLORIDE BLD-SCNC: 94 MMOL/L (ref 99–110)
CO2: 26 MMOL/L (ref 21–32)
CREAT SERPL-MCNC: <0.5 MG/DL (ref 0.6–1.2)
CULTURE, BLOOD 2: NORMAL
EOSINOPHILS ABSOLUTE: 0.2 K/UL (ref 0–0.6)
EOSINOPHILS RELATIVE PERCENT: 3.8 %
GFR AFRICAN AMERICAN: >60
GFR NON-AFRICAN AMERICAN: >60
GLUCOSE BLD-MCNC: 94 MG/DL (ref 70–99)
HCT VFR BLD CALC: 35 % (ref 36–48)
HEMOGLOBIN: 11.7 G/DL (ref 12–16)
LYMPHOCYTES ABSOLUTE: 0.9 K/UL (ref 1–5.1)
LYMPHOCYTES RELATIVE PERCENT: 19.4 %
MCH RBC QN AUTO: 31.5 PG (ref 26–34)
MCHC RBC AUTO-ENTMCNC: 33.4 G/DL (ref 31–36)
MCV RBC AUTO: 94.4 FL (ref 80–100)
MONOCYTES ABSOLUTE: 0.7 K/UL (ref 0–1.3)
MONOCYTES RELATIVE PERCENT: 15.5 %
NEUTROPHILS ABSOLUTE: 2.7 K/UL (ref 1.7–7.7)
NEUTROPHILS RELATIVE PERCENT: 60.7 %
PDW BLD-RTO: 11.9 % (ref 12.4–15.4)
PLATELET # BLD: 255 K/UL (ref 135–450)
PMV BLD AUTO: 5.9 FL (ref 5–10.5)
POTASSIUM SERPL-SCNC: 5 MMOL/L (ref 3.5–5.1)
RBC # BLD: 3.71 M/UL (ref 4–5.2)
SODIUM BLD-SCNC: 128 MMOL/L (ref 136–145)
WBC # BLD: 4.5 K/UL (ref 4–11)

## 2022-09-01 PROCEDURE — 6370000000 HC RX 637 (ALT 250 FOR IP): Performed by: INTERNAL MEDICINE

## 2022-09-01 PROCEDURE — 85025 COMPLETE CBC W/AUTO DIFF WBC: CPT

## 2022-09-01 PROCEDURE — 94761 N-INVAS EAR/PLS OXIMETRY MLT: CPT

## 2022-09-01 PROCEDURE — 97530 THERAPEUTIC ACTIVITIES: CPT

## 2022-09-01 PROCEDURE — 6360000002 HC RX W HCPCS: Performed by: INTERNAL MEDICINE

## 2022-09-01 PROCEDURE — 99232 SBSQ HOSP IP/OBS MODERATE 35: CPT | Performed by: INTERNAL MEDICINE

## 2022-09-01 PROCEDURE — 2580000003 HC RX 258: Performed by: INTERNAL MEDICINE

## 2022-09-01 PROCEDURE — 80048 BASIC METABOLIC PNL TOTAL CA: CPT

## 2022-09-01 PROCEDURE — 36415 COLL VENOUS BLD VENIPUNCTURE: CPT

## 2022-09-01 PROCEDURE — 94640 AIRWAY INHALATION TREATMENT: CPT

## 2022-09-01 PROCEDURE — 2700000000 HC OXYGEN THERAPY PER DAY

## 2022-09-01 PROCEDURE — 97535 SELF CARE MNGMENT TRAINING: CPT

## 2022-09-01 PROCEDURE — 99238 HOSP IP/OBS DSCHRG MGMT 30/<: CPT | Performed by: INTERNAL MEDICINE

## 2022-09-01 RX ORDER — BUDESONIDE AND FORMOTEROL FUMARATE DIHYDRATE 80; 4.5 UG/1; UG/1
2 AEROSOL RESPIRATORY (INHALATION) 2 TIMES DAILY
Qty: 1 EACH | Refills: 0 | Status: SHIPPED | OUTPATIENT
Start: 2022-09-01

## 2022-09-01 RX ORDER — LIDOCAINE 4 G/G
1 PATCH TOPICAL DAILY
Qty: 30 EACH | Refills: 0 | Status: SHIPPED | OUTPATIENT
Start: 2022-09-02

## 2022-09-01 RX ORDER — ESCITALOPRAM OXALATE 10 MG/1
10 TABLET ORAL NIGHTLY
Qty: 30 TABLET | Refills: 0 | Status: SHIPPED | OUTPATIENT
Start: 2022-09-01

## 2022-09-01 RX ADMIN — MUPIROCIN: 20 OINTMENT TOPICAL at 10:24

## 2022-09-01 RX ADMIN — TIOTROPIUM BROMIDE AND OLODATEROL 2 PUFF: 3.124; 2.736 SPRAY, METERED RESPIRATORY (INHALATION) at 07:33

## 2022-09-01 RX ADMIN — Medication 2 PUFF: at 07:32

## 2022-09-01 RX ADMIN — ACETAMINOPHEN 650 MG: 325 TABLET ORAL at 15:01

## 2022-09-01 RX ADMIN — TRAMADOL HYDROCHLORIDE 50 MG: 50 TABLET, COATED ORAL at 10:01

## 2022-09-01 RX ADMIN — GUAIFENESIN 600 MG: 600 TABLET, EXTENDED RELEASE ORAL at 10:02

## 2022-09-01 RX ADMIN — CETIRIZINE HYDROCHLORIDE 10 MG: 10 TABLET ORAL at 10:03

## 2022-09-01 RX ADMIN — ALBUTEROL SULFATE 1.25 MG: 2.5 SOLUTION RESPIRATORY (INHALATION) at 07:32

## 2022-09-01 RX ADMIN — ACETAMINOPHEN 650 MG: 325 TABLET ORAL at 06:49

## 2022-09-01 RX ADMIN — Medication 2 PUFF: at 19:18

## 2022-09-01 RX ADMIN — OXYBUTYNIN CHLORIDE 5 MG: 5 TABLET ORAL at 10:02

## 2022-09-01 RX ADMIN — MULTIPLE VITAMINS W/ MINERALS TAB 1 TABLET: TAB at 10:02

## 2022-09-01 RX ADMIN — Medication 10 ML: at 10:04

## 2022-09-01 RX ADMIN — ALBUTEROL SULFATE 1.25 MG: 2.5 SOLUTION RESPIRATORY (INHALATION) at 19:19

## 2022-09-01 ASSESSMENT — PAIN DESCRIPTION - LOCATION
LOCATION: HEAD
LOCATION: BACK
LOCATION: HEAD

## 2022-09-01 ASSESSMENT — PAIN SCALES - GENERAL
PAINLEVEL_OUTOF10: 3
PAINLEVEL_OUTOF10: 7
PAINLEVEL_OUTOF10: 0
PAINLEVEL_OUTOF10: 7

## 2022-09-01 ASSESSMENT — PAIN DESCRIPTION - DESCRIPTORS
DESCRIPTORS: ACHING

## 2022-09-01 NOTE — CASE COMMUNICATION
Critical access hospital  Received referral regarding HC services from [address] [place] [address]. Critical access hospital is not in network. Telephone call to [facility]. Can not cover SOC. Telephone call to [facility], [facility].. Spoke with [facility]. [facility] can only cover PT/OT with a SOC next week. Telephone call to [facility]. Can service for SN, PT/OT, however, can not do PT SOC until next week. Telephone call to [facility]. Unable to service pt's zipcode. Telephone call to [facility]. Spoke with Carolee. [facility], [facility]. can service pt for PT/OT with SOC in next 1-2 days. [facility], [facility]. order for PT/OT and their therapist can add nursing for next week. [facility] has epic access and can pull HC orders.  for [facility] with above information.       Electronically signed by Joy Thornton RN on 9/1/2022 at 2:14 PM

## 2022-09-01 NOTE — PROGRESS NOTES
Nephrology Progress Note   [email or web address]      Sub/interval history  Patient continues to feel better  Sodium 125 on 8/31--> 128 on 9/1    ROS: No chest pain/shortness of breath/fever/nausea/vomiting  PSFH: No visitor    Scheduled Meds:   mupirocin   Nasal BID    amLODIPine  5 mg Oral Daily    cetirizine  10 mg Oral Daily    escitalopram  10 mg Oral Nightly    guaiFENesin  600 mg Oral BID    oxybutynin  5 mg Oral 4x Daily    sodium chloride flush  5-40 mL IntraVENous 2 times per day    enoxaparin  40 mg SubCUTAneous Daily    therapeutic multivitamin-minerals  1 tablet Oral Daily    budesonide-formoterol  2 puff Inhalation BID    tiotropium-olodaterol  2 puff Inhalation Daily    albuterol  1.25 mg Nebulization BID    lisinopril  10 mg Oral Daily    lidocaine  1 patch TransDERmal Daily     Continuous Infusions:   sodium chloride       PRN Meds:.hydrOXYzine HCl, loperamide, sodium chloride flush, sodium chloride, ondansetron **OR** ondansetron, polyethylene glycol, acetaminophen **OR** acetaminophen, traMADol **OR** traMADol    Objective/     Vitals:    09/01/22 0734 09/01/22 0945 09/01/22 1031 09/01/22 1327   BP:  (!) 110/56  133/77   Pulse: 61 75  86   Resp: 18 18 18 18   Temp:  98.1 °F (36.7 °C)  98 °F (36.7 °C)   TempSrc:  Oral  Oral   SpO2: 100%   99%   Weight:  165 lb 3.2 oz (74.9 kg)     Height:  5' 3\" (1.6 m)       24HR INTAKE/OUTPUT:    Intake/Output Summary (Last 24 hours) at 9/1/2022 1400  Last data filed at 9/1/2022 1327  Gross per 24 hour   Intake 1085 ml   Output --   Net 1085 ml       Gen: alert, awake  Neck: No JVD  Skin: Unremarkable  Cardiovascular:  S1, S2 without m/r/g   Respiratory: CTA B without w/r/r; respiratory effort normal  Abdomen:  soft, nt, nd,   Extremities: no lower extremity edema  Neuro/Psy: AAoriented times 3 ; moves all 4 ext    Data/  Recent Labs     08/30/22  0144 08/31/22  0552 09/01/22  0524   WBC 8.3 5.5 4.5   HGB 11.7* 11.9* 11.7*   HCT 34.0* 34.8* 35.0*   MCV 93.1 94.5 94.4    267 255       Recent Labs     08/30/22  1333 08/31/22  0552 09/01/22  0524   * 125* 128*   K 4.4 4.9 5.0   CL 92* 89* 94*   CO2 26 28 26   GLUCOSE 123* 89 94   BUN 10 15 11   CREATININE 0.8 <0.5* <0.5*   LABGLOM >60 >60 >60   GFRAA >60 >60 >60       Urine sodium 51 urine osmolality 320    Assessment/     - Hyponatremia - acute on chronic, background SIADH in setting of COPD with suspected pre-renal state with diarrhea, fluid responsive initially  Baseline mid 120 range, 123-125     - Recent COVID PNA, worsening acute hypoxic respiratory failure - plans per Pulmonary     - Pseudohyperkalemia - hemolyzed specimen, wnl on recheck     - Hypertension - controlled        Plan/   -Fluid restriction 1500 mL/day  -Encourage protein intake    Okay to discharge from renal standpoint      Donna Franklin MD  Office: [phone]  Fax:    Arturo Harman [phone] [phone]

## 2022-09-01 NOTE — DISCHARGE SUMMARY
Name:  Faby Lam  Room:  0209/0209-02  MRN:    [id number]    Discharge Summary      This discharge summary is in conjunction with a complete physical exam done on the day of discharge. Discharging Physician: Dr. Miriam Silvestre: 8/28/2022  Discharge:  09/01/22     HPI taken from admission H&P:      61 y.o. female who presented to [facility] - after daughter called 911 because she wasn't feeling well. She reports feeling like she has been having fevers. + rhinitis. + sore throat. + cough. No nausea or vomiting. She reports low back pain - thinks it was the bed. No dysuria. No chest pain. She reports intermittent dyspnea, but denies dyspnea at present. Workup in the ED showed a sodium off 117. She was admitted to the ICU for correction. She reports diarrhea - but none today. No blood in stool. She has been thinking that maybe her acetaminophen has been causing diarrhea. She reports taking the acetaminophen for her back pain at home. She was positive for covid on 8/14/22. Subsequently tested 8/28/22 and negative. She denies recent changes to her medications. She denies recent weight changes. She denies productive cough. She reports not being vaccinated against covid. She denies falls at home. Denies feeling lightheaded or dizzy. She reports difficulty with passing urine - a flood was placed. RN staff reports its unclear if her home med list is up to date. Daughter may be able to bring updates tomorrow.       SocHx:   - lives with her daughter at home   - previously smoked, quit > 1 year ago   - denies alcohol use   - retired      Diagnoses this Admission and Hospital Course     Hyponatremia  Acute on chronic hyponatremia  - chronic for patient, baseline 125-128  - pt admitted to ICU with sodium of 108---> sodium levels are slowly improved and now corrected to .   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 She has home health. Hypokalemia  - resolved  - monitor      Low TSH  - TSH 0.11  - normal Free T4  - follow up with up with PCP     Leukocytosis  - on admission, likely reactive- pt was recently treated with Decadron as well  - resolved      COPD  Chronic respiratory failure on 3 liters at baseline  - no AE  - home inhalers need to be clarified, discussed with nursing      Recent dx of COVID   - recent admission for COVID PNA  - completed course of decadron      History of KAMLA cancer  - has received SBRT  - follow up with Dr. Toay Soria      HTN  - continue Norvasc and Lisinopril     Chronic Diarrhea  - stable at this time      Depression  - continue Lexapro     OAB  - continue Ditropan     Procedures (Please Review Full Report for Details)  None     Consults    Pulmonary  Nephrology       Physical Exam at Discharge:    BP (!) 110/56   Pulse 75   Temp 98.1 °F (36.7 °C) (Oral)   Resp 18   Ht 5' 3\" (1.6 m)   Wt 165 lb 3.2 oz (74.9 kg)   SpO2 100%   BMI 29.26 kg/m²   Gen: No distress. Alert. Appears chronically ill and older than stated age  Eyes: PERRL. No sclera icterus. No conjunctival injection. ENT: No discharge. Pharynx clear. Neck: No JVD. Trachea midline. Resp: No accessory muscle use. Diminished breath sounds . no crackles. Occasional wheezes. No rhonchi. On 3 liters n/c  CV: Regular rate. Regular rhythm. No murmur. No rub. No edema. Capillary Refill: Brisk,< 3 seconds   Peripheral Pulses: +2 palpable, equal bilaterally   GI: Non-tender. Non-distended. Normal bowel sounds. Skin: Warm and dry. No nodule on exposed extremities. No rash on exposed extremities. Scattered ecchymosis on arms   M/S: No cyanosis.  No joint deformity. No clubbing. Neuro: Awake. Grossly nonfocal    Psych: Oriented x 3. No anxiety or agitation. CBC:   Recent Labs     08/30/22  0144 08/31/22  0552 09/01/22  0524   WBC 8.3 5.5 4.5   HGB 11.7* 11.9* 11.7*   HCT 34.0* 34.8* 35.0*   MCV 93.1 94.5 94.4    267 255     BMP:   Recent Labs     08/30/22  1333 08/31/22  0552 09/01/22  0524   * 125* 128*   K 4.4 4.9 5.0   CL 92* 89* 94*   CO2 26 28 26   BUN 10 15 11   CREATININE 0.8 <0.5* <0.5*       CULTURES  COVID not detected  Blood cultures no growth to date    RADIOLOGY  XR CHEST PORTABLE   Final Result   No focal lung consolidation.                Discharge Medications     Medication List        START taking these medications      lidocaine 4 % external patch  Place 1 patch onto the skin daily  Start taking on: September 2, 2022            CHANGE how you take these medications      escitalopram 10 MG tablet  Commonly known as: LEXAPRO  Take 1 tablet by mouth at bedtime Dose decreased  What changed:   how much to take  additional instructions            CONTINUE taking these medications      acetaminophen 500 MG tablet  Commonly known as: TYLENOL     * albuterol sulfate  (90 Base) MCG/ACT inhaler  Commonly known as: PROVENTIL;VENTOLIN;PROAIR  Inhale 2 puffs into the lungs every 6 hours as needed for Wheezing orShortness of Breath     * albuterol 1.25 MG/3ML nebulizer solution  Commonly known as: ACCUNEB  Inhale 3 mLs into the lungs every 6 hours as needed for Wheezing     alendronate 70 MG tablet  Commonly known as: FOSAMAX     amLODIPine 5 MG tablet  Commonly known as: NORVASC  Take 1 tablet by mouth daily     budesonide-formoterol 80-4.5 MCG/ACT Aero  Commonly known as: Symbicort  Inhale 2 puffs into the lungs 2 times daily     CENTRUM SILVER 50+WOMEN PO     cetirizine 10 MG tablet  Commonly known as: ZYRTEC     guaiFENesin 600 MG extended release tablet  Commonly known as: MUCINEX  Take 1 tablet by mouth 2 times daily

## 2022-09-01 NOTE — PROGRESS NOTES
Pulmonary & Critical Care Medicine ICU Progress Note    CC: COPD/COVID-19    Events of Last 24 hours:   3 stable on L O2  Breathing is at baseline      Vascular lines: IV: PIVs          / / /   No results for input(s): PHART, PBO1VYG, PO2ART in the last 72 hours. IV:   sodium chloride         Vitals:  Blood pressure 128/66, pulse 67, temperature 97.5 °F (36.4 °C), temperature source Oral, resp. rate 18, height 5' 3\" (1.6 m), weight 162 lb 4 oz (73.6 kg), SpO2 100 %, not currently breastfeeding. on 3 LPM   Temp  Av.1 °F (36.7 °C)  Min: 97.5 °F (36.4 °C)  Max: 98.6 °F (37 °C)    Intake/Output Summary (Last 24 hours) at 2022 8958  Last data filed at 2022 1844  Gross per 24 hour   Intake 1125 ml   Output --   Net 1125 ml     PE:  Constitutional:  No acute distress. Girdler Copping HEENT: no scleral icterus  Neck: No tracheal deviation present. Cardiovascular: Normal heart sounds. Pulmonary/Chest: No wheezes. No rhonchi. No rales. No decreased breath sounds. No accessory muscle usage or stridor. Abdominal: Soft. Musculoskeletal: No cyanosis. No clubbing. Skin: Skin is warm and dry.        Scheduled Meds:   mupirocin   Nasal BID    amLODIPine  5 mg Oral Daily    cetirizine  10 mg Oral Daily    escitalopram  10 mg Oral Nightly    guaiFENesin  600 mg Oral BID    oxybutynin  5 mg Oral 4x Daily    sodium chloride flush  5-40 mL IntraVENous 2 times per day    enoxaparin  40 mg SubCUTAneous Daily    therapeutic multivitamin-minerals  1 tablet Oral Daily    budesonide-formoterol  2 puff Inhalation BID    tiotropium-olodaterol  2 puff Inhalation Daily    albuterol  1.25 mg Nebulization BID    lisinopril  10 mg Oral Daily    lidocaine  1 patch TransDERmal Daily       Data:  CBC:   Recent Labs     22  0144 22  0552 22  0524   WBC 8.3 5.5 4.5   HGB 11.7* 11.9* 11.7*   HCT 34.0* 34.8* 35.0*   MCV 93.1 94.5 94.4    267 255     BMP:   Recent Labs     22  1333 22  0552 09/01/22  0524   * 125* 128*   K 4.4 4.9 5.0   CL 92* 89* 94*   CO2 26 28 26   BUN 10 15 11   CREATININE 0.8 <0.5* <0.5*     LIVER PROFILE:   No results for input(s): AST, ALT, LIPASE, BILIDIR, BILITOT, ALKPHOS in the last 72 hours. Invalid input(s):   AMYLASE,  ALB      Microbiology:  COVID 19 Positive 8/14/22  COVID negative 8/28/22    Imaging:  Chest x-ray 8/28  images reviewed by me and showed:   Low lung volumes   No acute cardiopulmonary disease         ASSESSMENT:  COPD  Chronic hypoxemic respiratory failure-3 L O2  Hyponatremia  Leukocytosis: source unclear, may be reactive  H/o COVID pneumonia on 8/14/2022: completed course of decadron  KAMLA nodule s/p SBRT     PLAN:  Continue O2 maintain SaO2 >92%; wean as tolerated- Home O2 at 3lpm   Inhaled bronchodilators  Needs chest imaging f/u at [facility] to follow-up and verbalized understanding  Lovenox DVT prophylaxis

## 2022-09-01 NOTE — DISCHARGE INSTR - COC
Continuity of Care Form    Patient Name: Cindy Zuniga   :  1955  MRN:  [id number]    Admit date:  2022  Discharge date:  2022      Code Status Order: Full Code   Advance Directives:     Admitting Physician:  Messi Simmons MD  PCP: Kelsea Avilez MD    Discharging Nurse: Yareli Garland, LENNY  [address] Unit/Room#: 0209/0209-02  Discharging Unit Phone Number: [phone]    Emergency Contact:   Extended Emergency Contact Information  Primary Emergency Contact: Nilda Tyler   [address] Phone: [phone]  Work Phone: [phone]  Mobile Phone: [phone]  Relation: Child  Secondary Emergency Contact: [address] Phone: [phone]  Mobile Phone: [phone]  Relation: Child    Past Surgical History:  Past Surgical History:   Procedure Laterality Date    CHOLECYSTECTOMY      COLONOSCOPY         Immunization History:   Immunization History   Administered Date(s) Administered    Influenza Vaccine, unspecified formulation 2013, 10/26/2016    Influenza Virus Vaccine 2013, 10/26/2016, 2017    Influenza, FLUARIX, Rozella Meth (age 10 mo+) AND AFLURIA, (age 1 y+), PF, 0.5mL 10/26/2016, 2017, 2020    Tdap (Boostrix, Adacel) 2017       Active Problems:  Patient Active Problem List   Diagnosis Code    Chest pain R07.9    Shortness of breath R06.02    Tobacco use Z72.0    Moderate COPD (chronic obstructive pulmonary disease) ([address] J44.9    COPD exacerbation (HCC) J44.1    Acute respiratory failure with hypoxia (HCC) J96.01    Hyponatremia E87.1    Lung nodule R91.1    Pulmonary emphysema ([address] J43.9    Acute on chronic respiratory failure with hypoxia and hypercapnia (HCC) J96.21, J96.22    Community acquired pneumonia of right lung J18.9    Acute exacerbation of chronic obstructive pulmonary disease (COPD) ([address] J44.1    Acute on chronic respiratory failure with hypoxia ([address] J96.21    Acute hypoxemic respiratory failure ([address] J96.01    Pneumonia due to infectious organism J18.9    Transaminitis R74.01    Pancreatic abnormality Q45.3    Common bile duct dilatation K83.8    Community acquired pneumonia J18.9    Pneumonia due to COVID-19 virus U07.1, J12.82    Essential hypertension I10    COVID-19 U07.1    Hypokalemia E87.6    Low TSH level R79.89    History of COVID-19 Z86.16    Leukocytosis D72.829    Chronic hypoxemic respiratory failure (HCC) J96.11       Isolation/Infection:   Isolation            No Isolation          Patient Infection Status       Infection Onset Added Last Indicated Last Indicated By Review Planned Expiration Resolved Resolved By    None active    Resolved    COVID-19 22 COVID-19, Rapid   22 Anders Gray RN    Collected: 22  Result status: Final  Resulting lab: [facility]  Reference range: Not Detected        COVID-19 (Rule Out) 22 COVID-19, Rapid (Ordered)   22 Rule-Out Test Resulted    COVID-19 (Rule Out) 22 COVID-19, Rapid (Ordered)   22 Rule-Out Test Resulted    COVID-19 (Rule Out) 22 COVID-19, Rapid (Ordered)   22 Rule-Out Test Resulted    COVID-19 (Rule Out) 12/10/21 12/10/21 12/10/21 COVID-19, Rapid (Ordered)   12/10/21 Rule-Out Test Resulted    COVID-19 (Rule Out) 21 COVID-19 (Ordered)   21 Rule-Out Test Resulted    COVID-19 (Rule Out) 21 COVID-19 (Ordered)   21     COVID-19 (Rule Out) 20 COVID-19 (Ordered)   20 Rule-Out Test Resulted            Nurse Assessment:  Last Vital Signs: BP (!) 110/56   Pulse 75   Temp 98.1 °F (36.7 °C) (Oral)   Resp 18   Ht 5' 3\" (1.6 m)   Wt 165 lb 3.2 oz (74.9 kg)   SpO2 100%   BMI 29.26 kg/m²     Last documented pain score (0-10 scale): Pain Level: 0  Last Weight:   Wt Readings from Last 1 Encounters:   22 165 lb Electronically signed by Criss Silva, RN on 9/1/22 at 1:51 PM EDT    PHYSICIAN SECTION    Prognosis: Good    Condition at Discharge: Stable    Rehab Potential (if transferring to Rehab): Fair    Recommended Labs or Other Treatments After Discharge: PT/OT    Physician Certification: I certify the above information and transfer of Cindy Zuniga  is necessary for the continuing treatment of the diagnosis listed and that she requires Home Care for less 30 days.      Update Admission H&P: No change in H&P    PHYSICIAN SIGNATURE:  Electronically signed by Chuckie Agarwal MD on 9/1/22 at 12:44 PM EDT

## 2022-09-01 NOTE — PLAN OF CARE
Problem: Discharge Planning  Goal: Discharge to home or other facility with appropriate resources  9/1/2022 1308 by Vin Elizabeth RN  Outcome: Completed  9/1/2022 1058 by Vin Elizabeth RN  Outcome: Progressing     Problem: Safety - Adult  Goal: Free from fall injury  9/1/2022 1308 by Vin Elizabeth RN  Outcome: Completed  9/1/2022 1058 by Vin Elizabeth RN  Outcome: Progressing     Problem: Skin/Tissue Integrity  Goal: Absence of new skin breakdown  Description: 1. Monitor for areas of redness and/or skin breakdown  2. Assess vascular access sites hourly  3. Every 4-6 hours minimum:  Change oxygen saturation probe site  4. Every 4-6 hours:  If on nasal continuous positive airway pressure, respiratory therapy assess nares and determine need for appliance change or resting period. 9/1/2022 1308 by Vin Elizabeth RN  Outcome: Completed  9/1/2022 1058 by Vin Elizabeth RN  Outcome: Progressing     Problem: Pain  Goal: Verbalizes/displays adequate comfort level or baseline comfort level  9/1/2022 1308 by Vin Elizabeth RN  Outcome: Completed  9/1/2022 1058 by Vin Elizabeth RN  Outcome: Progressing     Problem: ABCDS Injury Assessment  Goal: Absence of physical injury  9/1/2022 1308 by Vin Elizabeth RN  Outcome: Completed  9/1/2022 1058 by Vin Elizabeth RN  Outcome: Progressing     Problem: Safety - Medical Restraint  Goal: Remains free of injury from restraints (Restraint for Interference with Medical Device)  Description: INTERVENTIONS:  1. Determine that other, less restrictive measures have been tried or would not be effective before applying the restraint  2. Evaluate the patient's condition at the time of restraint application  3. Inform patient/family regarding the reason for restraint  4.  Q2H: Monitor safety, psychosocial status, comfort, nutrition and hydration  9/1/2022 1308 by Vin Elizabeth RN  Outcome: Completed  9/1/2022 1058 by Vin Elizabeth RN  Outcome: Progressing

## 2022-09-01 NOTE — PROGRESS NOTES
Occupational Therapy  Occupational Therapy Daily Treatment Note    Unit: [address]  Date:  9/1/2022  Patient Name:    Annett Babinski  Admitting diagnosis:  Shortness of breath [R06.02]  Hyponatremia [E87.1]  Moderate COPD (chronic obstructive pulmonary disease) ([facility] [J44.9]  Leukocytosis, unspecified type [D72.829]  Admit Date:  8/28/2022  Precautions/Restrictions:  fall risk and supplemental O2 (3L)        Discharge Recommendations: Home with 24/7 assist and home therapy  DME needs for discharge: Needs Met       Therapy recommendations for staff:   Assist of 1 (stand by assist) with use of rolling walker (RW) for all transfers to/from bathroom    AM-PAC Score: AM-PAC Inpatient Daily Activity Raw Score: 20  Home Health S4 Level: Level 1- Standard       Treatment Time:  9006-9187  Treatment number:  2   Total Treatment Time:   45 minutes    History of Present Illness:  62 yo with a history of COPD and lung cancer hyponatremia presented with shortness of breath. She was hypoxic at home however in the emergency room she is ok on her home O2 at 3 L. She was found to be severely hyponatremic admitted to the ICU. To me she has a slow response to questions and is slightly altered although she is oriented. Pt reported a fever at home to the ED, no cough. Of note she was treated in the hospital 2 weeks ago for COVID-19 pneumonia and completed course of Decadron. Subjective:  Pt agreeable to OT session. Cognition: A&O to self, place, day. 2025 for year. Pain   Yes  Rating: 3  Location:Back of head- knot in hair at area of pain. Pt says that she will brush her hair later.   Pain Medicine Status: No request made      Bed Mobility:   Supine to Sit:  Supervision  Sit to Supine:  Not Tested  Rolling:           Not Tested  Scooting:        Supervision    Transfer Training:   Sit to stand:   Supervision  Stand to sit:  Supervision  Bed to Chair:  Supervision  Bed to [facility]:   Not Tested  Standard toilet: Supervision    Cues required for RW management, technique and O2 line management . Activity Tolerance     Pt completed therapy session with SOB noted with activity. BP (mmHg)  HR (bpm) SpO2 (%)    Seated after activity   100%           Following functional transfer from bed>chair pt took 2 standing rest breaks with c/o SOB. Pt instructed to use PLB to help with SOB. Balance  Sitting Balance :     Supervision  Standing Balance   Supervision    Pt participated in folding laundry activity to practice dynamic balance this date. Pt demonstrated Good balance with reach low outside of SHANTA to  towel, fold towel, and transport towel to table x3. ADL Training:   Upper body dressing:  Not Tested  Upper body bathing:  Not Tested  Lower body dressing:  Not Tested  Lower body bathing:  Not Tested  Toileting:   Supervision- Grossly SUP for all aspects of activity. Toileting completed in the bathroom using standard toilet. Grooming/Hygiene:  Supervision- washing hands, applying deodorant, and brushing teeth. Feeding :   Not Tested    Therapeutic Exercise:   NA    Patient Education:   Role of OT  Energy conservation techniques  Safe RW use/hand placement  Oxygen tubing management    Positioning Needs:   Up in chair, call light and needs in reach. Alarm Set    Family Present:  No    Assessment: Pt seen for Occupational therapy evaluation in acute care setting. Pt demonstrated decreased Activity tolerance, ADLs, IADLs, Balance , Bathing, Bed mobility, Dressing, ROM, Safety Awareness, Strength, Transfers, and Coping Skills. Pt functioning below baseline and will likely benefit from skilled occupational therapy services to maximize safety and independence. GOALS  To be met in 3 Visits:  1). Bed to toilet/BSC: Independent     To be met in 5 Visits:  1). Supine to/from Sit:             Independent  2). Upper Body Bathing:         Independent  3). Lower Body Bathing:         Independent  4).  Upper Body Dressing:       Independent  5). Lower Body Dressing:       Independent  6).  Pt to demonstrate UE exs x 15 reps with minimal cues         Plan: cont with POC    Stacey Pearce OTR/L #[id number]      If patient discharges from this facility prior to next visit, this note will serve as the Discharge Summary

## 2022-09-01 NOTE — CARE COORDINATION
DISCHARGE ORDER  Date/Time 2022 4:14 PM  Completed by: Venita Bingham RN, Case Management    Patient Name: Luisito Farr      : 1955  Admitting Diagnosis: Shortness of breath [R06.02]  Hyponatremia [E87.1]  Moderate COPD (chronic obstructive pulmonary disease) ([facility] [J44.9]  Leukocytosis, unspecified type [D72.829]      Admit order Date and Status:22 inpt  (verify MD's last order for status of admission)      Noted discharge order. If applicable PT/OT recommendation at Discharge: Home 24 hr assist and with home PT   DME recommendation by PT/OT:Needs Met  Confirmed discharge plan  : Yes  with whom patient  If pt confirmed DC plan does family need to be contacted by CM No   Discharge Plan: Order for dc noted. Spoke with pt who cont plan to go home with sandro and 24/7 assist. Discussed HHC and pt is agreeable and chooses [facility]. Spoke with Nelsy Crystal at [facility] who is not in network with insurance but has set up [facility] AT [place]. Received call from [address] [place] [address] stating can accept for [facility]. Spoke with [employer] primary contact who will be here to  pt. Date of Last IMM Given: 22    Reviewed chart. Role of discharge planner explained and patient verbalized understanding. Discharge order is noted. Has Home O2 in place on admit:  Yes  Informed of need to bring portable home O2 tank on day of discharge for nursing to connect prior to leaving:   Yes  Verbalized agreement/Understanding:   Yes  Pt is being d/c'd to home today. Pt's O2 sats are 99% on 3L NC. Discharge timeout done with nsg, CM and pt. All discharge needs and concerns addressed.

## 2022-09-01 NOTE — PROGRESS NOTES
See pm shift assessment. Requested and given snack. Pleasant and cooperative; denies problems/concerns at this time. Call light in reach.

## 2022-09-01 NOTE — PROGRESS NOTES
Attempted to contact \"Thuy\" from pt contact. No Answer. Voicemail full. CM said they spoke with this person at approximately 4pm and they said they were coming to get the patient then. No update since.

## 2022-09-01 NOTE — PROGRESS NOTES
RT Inhaler-Nebulizer Bronchodilator Protocol Note    There is a bronchodilator order in the chart from a provider indicating to follow the RT Bronchodilator Protocol and there is an Initiate RT Inhaler-Nebulizer Bronchodilator Protocol order as well (see protocol at bottom of note). CXR Findings:  No results found. The findings from the last RT Protocol Assessment were as follows:   History Pulmonary Disease: Chronic pulmonary disease  Respiratory Pattern: Regular pattern and RR 12-20 bpm  Breath Sounds: Slightly diminished and/or crackles  Cough: Strong, spontaneous, non-productive  Indication for Bronchodilator Therapy: Decreased or absent breath sounds  Bronchodilator Assessment Score: 4    Aerosolized bronchodilator medication orders have been revised according to the RT Inhaler-Nebulizer Bronchodilator Protocol below. Respiratory Therapist to perform RT Therapy Protocol Assessment initially then follow the protocol. Repeat RT Therapy Protocol Assessment PRN for score 0-3 or on second treatment, BID, and PRN for scores above 3. No Indications - adjust the frequency to every 6 hours PRN wheezing or bronchospasm, if no treatments needed after 48 hours then discontinue using Per Protocol order mode. If indication present, adjust the RT bronchodilator orders based on the Bronchodilator Assessment Score as indicated below. Use Inhaler orders unless patient has one or more of the following: on home nebulizer, not able to hold breath for 10 seconds, is not alert and oriented, cannot activate and use MDI correctly, or respiratory rate 25 breaths per minute or more, then use the equivalent nebulizer order(s) with same Frequency and PRN reasons based on the score. If a patient is on this medication at home then do not decrease Frequency below that used at home.     0-3 - enter or revise RT bronchodilator order(s) to equivalent RT Bronchodilator order with Frequency of every 4 hours PRN for wheezing or increased work of breathing using Per Protocol order mode. 4-6 - enter or revise RT Bronchodilator order(s) to two equivalent RT bronchodilator orders with one order with BID Frequency and one order with Frequency of every 4 hours PRN wheezing or increased work of breathing using Per Protocol order mode. 7-10 - enter or revise RT Bronchodilator order(s) to two equivalent RT bronchodilator orders with one order with TID Frequency and one order with Frequency of every 4 hours PRN wheezing or increased work of breathing using Per Protocol order mode. 11-13 - enter or revise RT Bronchodilator order(s) to one equivalent RT bronchodilator order with QID Frequency and an Albuterol order with Frequency of every 4 hours PRN wheezing or increased work of breathing using Per Protocol order mode. Greater than 13 - enter or revise RT Bronchodilator order(s) to one equivalent RT bronchodilator order with every 4 hours Frequency and an Albuterol order with Frequency of every 2 hours PRN wheezing or increased work of breathing using Per Protocol order mode. RT to enter RT Home Evaluation for COPD & MDI Assessment order using Per Protocol order mode.     Electronically signed by Fernie Ceballos on 9/1/2022 at 7:38 AM

## 2022-09-01 NOTE — PROGRESS NOTES
Pt given written and verbal discharge instructions. Pt indicated understanding of home medication and care instructions. Paper Prescriptions given to pt preferred pharmacy. Pt packed own belongings. Pt taken down to family car via wheelchair, family met with home o2 tank.

## 2022-09-01 NOTE — FLOWSHEET NOTE
09/01/22 0945   Vital Signs   Temp 98.1 °F (36.7 °C)   Temp Source Oral   Heart Rate 75   Heart Rate Source Monitor   Resp 18   BP (!) 110/56   BP Location Left upper arm   BP Method Automatic   MAP (Calculated) 74   Patient Position Semi fowlers   Level of Consciousness 0   MEWS Score 1   Pain Assessment   Pain Assessment 0-10   Pain Level 7   Patient's Stated Pain Goal 0 - No pain   Pain Location Head   Pain Descriptors Aching   AM assessment completed, see flow sheet. Pt is alert and oriented. Vital signs are noted in flow sheet/eMAR. Respirations are even & easy. No complaints voiced. Pt denies needs at this time. SR up x 2, and bed in low position. Call light is within reach.

## 2022-09-01 NOTE — PLAN OF CARE
Problem: Discharge Planning  Goal: Discharge to home or other facility with appropriate resources  Outcome: Progressing     Problem: Safety - Adult  Goal: Free from fall injury  Outcome: Progressing     Problem: Skin/Tissue Integrity  Goal: Absence of new skin breakdown  Description: 1. Monitor for areas of redness and/or skin breakdown  2. Assess vascular access sites hourly  3. Every 4-6 hours minimum:  Change oxygen saturation probe site  4. Every 4-6 hours:  If on nasal continuous positive airway pressure, respiratory therapy assess nares and determine need for appliance change or resting period. Outcome: Progressing     Problem: Pain  Goal: Verbalizes/displays adequate comfort level or baseline comfort level  Outcome: Progressing     Problem: ABCDS Injury Assessment  Goal: Absence of physical injury  Outcome: Progressing     Problem: Safety - Medical Restraint  Goal: Remains free of injury from restraints (Restraint for Interference with Medical Device)  Description: INTERVENTIONS:  1. Determine that other, less restrictive measures have been tried or would not be effective before applying the restraint  2. Evaluate the patient's condition at the time of restraint application  3. Inform patient/family regarding the reason for restraint  4.  Q2H: Monitor safety, psychosocial status, comfort, nutrition and hydration  Outcome: Progressing

## 2022-09-01 NOTE — PROGRESS NOTES
RT Inhaler-Nebulizer Bronchodilator Protocol Note    There is a bronchodilator order in the chart from a provider indicating to follow the RT Bronchodilator Protocol and there is an Initiate RT Inhaler-Nebulizer Bronchodilator Protocol order as well (see protocol at bottom of note). CXR Findings:  No results found. The findings from the last RT Protocol Assessment were as follows:   History Pulmonary Disease: Chronic pulmonary disease  Respiratory Pattern: Regular pattern and RR 12-20 bpm  Breath Sounds: Slightly diminished and/or crackles  Cough: Strong, spontaneous, non-productive  Indication for Bronchodilator Therapy: Decreased or absent breath sounds  Bronchodilator Assessment Score: 4    Aerosolized bronchodilator medication orders have been revised according to the RT Inhaler-Nebulizer Bronchodilator Protocol below. Respiratory Therapist to perform RT Therapy Protocol Assessment initially then follow the protocol. Repeat RT Therapy Protocol Assessment PRN for score 0-3 or on second treatment, BID, and PRN for scores above 3. No Indications - adjust the frequency to every 6 hours PRN wheezing or bronchospasm, if no treatments needed after 48 hours then discontinue using Per Protocol order mode. If indication present, adjust the RT bronchodilator orders based on the Bronchodilator Assessment Score as indicated below. Use Inhaler orders unless patient has one or more of the following: on home nebulizer, not able to hold breath for 10 seconds, is not alert and oriented, cannot activate and use MDI correctly, or respiratory rate 25 breaths per minute or more, then use the equivalent nebulizer order(s) with same Frequency and PRN reasons based on the score. If a patient is on this medication at home then do not decrease Frequency below that used at home.     0-3 - enter or revise RT bronchodilator order(s) to equivalent RT Bronchodilator order with Frequency of every 4 hours PRN for wheezing or increased work of breathing using Per Protocol order mode. 4-6 - enter or revise RT Bronchodilator order(s) to two equivalent RT bronchodilator orders with one order with BID Frequency and one order with Frequency of every 4 hours PRN wheezing or increased work of breathing using Per Protocol order mode. 7-10 - enter or revise RT Bronchodilator order(s) to two equivalent RT bronchodilator orders with one order with TID Frequency and one order with Frequency of every 4 hours PRN wheezing or increased work of breathing using Per Protocol order mode. 11-13 - enter or revise RT Bronchodilator order(s) to one equivalent RT bronchodilator order with QID Frequency and an Albuterol order with Frequency of every 4 hours PRN wheezing or increased work of breathing using Per Protocol order mode. Greater than 13 - enter or revise RT Bronchodilator order(s) to one equivalent RT bronchodilator order with every 4 hours Frequency and an Albuterol order with Frequency of every 2 hours PRN wheezing or increased work of breathing using Per Protocol order mode.          Electronically signed by Maritza Jerome RCP on 9/1/2022 at 7:21 PM

## 2022-09-07 NOTE — TELEPHONE ENCOUNTER
I called [facility] today - Pt no showed her appt with Dr. Bonifacio Crespo on 8/23/22 because she was admitted to the hospital at the time. She has not rescheduled the appt yet. I tried to reach pt today but her phone goes straight to a recording that says she doesn't have a voicemail set up. Her daughter, Lauren Marvin, is on the HIPAA form - I spoke with her and asked her to pass along the message that she needs to reschedule that appt with [facility]. Provided her with Dr. Sonja Booth phone # and she said she'd call to get it rescheduled.

## 2022-10-03 RX ORDER — UMECLIDINIUM BROMIDE AND VILANTEROL TRIFENATATE 62.5; 25 UG/1; UG/1
POWDER RESPIRATORY (INHALATION)
Qty: 60 EACH | Refills: 5 | Status: SHIPPED | OUTPATIENT
Start: 2022-10-03

## 2022-11-17 ENCOUNTER — APPOINTMENT (OUTPATIENT)
Dept: GENERAL RADIOLOGY | Age: 67
DRG: 640 | End: 2022-11-17
Payer: MEDICARE

## 2022-11-17 ENCOUNTER — APPOINTMENT (OUTPATIENT)
Dept: CT IMAGING | Age: 67
DRG: 640 | End: 2022-11-17
Payer: MEDICARE

## 2022-11-17 ENCOUNTER — HOSPITAL ENCOUNTER (INPATIENT)
Age: 67
LOS: 6 days | Discharge: HOME HEALTH | DRG: 640 | End: 2022-11-23
Attending: STUDENT IN AN ORGANIZED HEALTH CARE EDUCATION/TRAINING PROGRAM | Admitting: INTERNAL MEDICINE
Payer: MEDICARE

## 2022-11-17 DIAGNOSIS — E87.1 HYPONATREMIA: ICD-10-CM

## 2022-11-17 DIAGNOSIS — J96.20 ACUTE ON CHRONIC RESPIRATORY FAILURE, UNSPECIFIED WHETHER WITH HYPOXIA OR HYPERCAPNIA (HCC): ICD-10-CM

## 2022-11-17 DIAGNOSIS — R41.82 ALTERED MENTAL STATUS, UNSPECIFIED ALTERED MENTAL STATUS TYPE: Primary | ICD-10-CM

## 2022-11-17 LAB
A/G RATIO: 1.8 (ref 1.1–2.2)
ALBUMIN SERPL-MCNC: 4.5 G/DL (ref 3.4–5)
ALBUMIN SERPL-MCNC: 4.5 G/DL (ref 3.4–5)
ALP BLD-CCNC: 113 U/L (ref 40–129)
ALT SERPL-CCNC: 26 U/L (ref 10–40)
AMORPHOUS: NORMAL /HPF
ANION GAP SERPL CALCULATED.3IONS-SCNC: 10 MMOL/L (ref 3–16)
ANION GAP SERPL CALCULATED.3IONS-SCNC: 13 MMOL/L (ref 3–16)
ANISOCYTOSIS: ABNORMAL
AST SERPL-CCNC: 62 U/L (ref 15–37)
BASE EXCESS VENOUS: -2.5 MMOL/L (ref -3–3)
BASE EXCESS VENOUS: 0.6 MMOL/L (ref -3–3)
BASOPHILS ABSOLUTE: 0 K/UL (ref 0–0.2)
BASOPHILS RELATIVE PERCENT: 0.8 %
BILIRUB SERPL-MCNC: 1 MG/DL (ref 0–1)
BILIRUBIN URINE: NEGATIVE
BLOOD, URINE: ABNORMAL
BUN BLDV-MCNC: 10 MG/DL (ref 7–20)
BUN BLDV-MCNC: 11 MG/DL (ref 7–20)
CALCIUM SERPL-MCNC: 8.7 MG/DL (ref 8.3–10.6)
CALCIUM SERPL-MCNC: 8.9 MG/DL (ref 8.3–10.6)
CARBOXYHEMOGLOBIN: 2.6 % (ref 0–1.5)
CARBOXYHEMOGLOBIN: 3.5 % (ref 0–1.5)
CHLORIDE BLD-SCNC: 72 MMOL/L (ref 99–110)
CHLORIDE BLD-SCNC: 73 MMOL/L (ref 99–110)
CHLORIDE URINE RANDOM: <20 MMOL/L
CLARITY: CLEAR
CO2: 24 MMOL/L (ref 21–32)
CO2: 26 MMOL/L (ref 21–32)
COLOR: YELLOW
CREAT SERPL-MCNC: <0.5 MG/DL (ref 0.6–1.2)
CREAT SERPL-MCNC: <0.5 MG/DL (ref 0.6–1.2)
EKG ATRIAL RATE: 123 BPM
EKG ATRIAL RATE: 132 BPM
EKG ATRIAL RATE: 90 BPM
EKG DIAGNOSIS: NORMAL
EKG P AXIS: 55 DEGREES
EKG P AXIS: 61 DEGREES
EKG P AXIS: 78 DEGREES
EKG P-R INTERVAL: 134 MS
EKG P-R INTERVAL: 138 MS
EKG P-R INTERVAL: 154 MS
EKG Q-T INTERVAL: 298 MS
EKG Q-T INTERVAL: 328 MS
EKG Q-T INTERVAL: 358 MS
EKG QRS DURATION: 66 MS
EKG QRS DURATION: 66 MS
EKG QRS DURATION: 80 MS
EKG QTC CALCULATION (BAZETT): 437 MS
EKG QTC CALCULATION (BAZETT): 441 MS
EKG QTC CALCULATION (BAZETT): 469 MS
EKG R AXIS: -45 DEGREES
EKG R AXIS: -73 DEGREES
EKG R AXIS: -76 DEGREES
EKG T AXIS: 51 DEGREES
EKG T AXIS: 73 DEGREES
EKG T AXIS: 78 DEGREES
EKG VENTRICULAR RATE: 123 BPM
EKG VENTRICULAR RATE: 132 BPM
EKG VENTRICULAR RATE: 90 BPM
EOSINOPHILS ABSOLUTE: 0 K/UL (ref 0–0.6)
EOSINOPHILS RELATIVE PERCENT: 0.9 %
EPITHELIAL CELLS, UA: NORMAL /HPF (ref 0–5)
GFR SERPL CREATININE-BSD FRML MDRD: >60 ML/MIN/{1.73_M2}
GFR SERPL CREATININE-BSD FRML MDRD: >60 ML/MIN/{1.73_M2}
GLUCOSE BLD-MCNC: 132 MG/DL (ref 70–99)
GLUCOSE BLD-MCNC: 226 MG/DL (ref 70–99)
GLUCOSE BLD-MCNC: 236 MG/DL (ref 70–99)
GLUCOSE URINE: 500 MG/DL
HCO3 VENOUS: 18.5 MMOL/L (ref 23–29)
HCO3 VENOUS: 25.3 MMOL/L (ref 23–29)
HCT VFR BLD CALC: 30.9 % (ref 36–48)
HEMOGLOBIN: 11.1 G/DL (ref 12–16)
INFLUENZA A: NOT DETECTED
INFLUENZA B: NOT DETECTED
KETONES, URINE: NEGATIVE MG/DL
LEUKOCYTE ESTERASE, URINE: NEGATIVE
LYMPHOCYTES ABSOLUTE: 0.9 K/UL (ref 1–5.1)
LYMPHOCYTES RELATIVE PERCENT: 18.8 %
MAGNESIUM: 1.5 MG/DL (ref 1.8–2.4)
MCH RBC QN AUTO: 31.7 PG (ref 26–34)
MCHC RBC AUTO-ENTMCNC: 36 G/DL (ref 31–36)
MCV RBC AUTO: 88 FL (ref 80–100)
METHEMOGLOBIN VENOUS: 0.2 %
METHEMOGLOBIN VENOUS: 0.3 %
MICROSCOPIC EXAMINATION: YES
MONOCYTES ABSOLUTE: 0.8 K/UL (ref 0–1.3)
MONOCYTES RELATIVE PERCENT: 17.5 %
NEUTROPHILS ABSOLUTE: 2.8 K/UL (ref 1.7–7.7)
NEUTROPHILS RELATIVE PERCENT: 62 %
NITRITE, URINE: POSITIVE
O2 CONTENT, VEN: 16 VOL %
O2 CONTENT, VEN: 16 VOL %
O2 SAT, VEN: 96 %
O2 SAT, VEN: 99 %
O2 THERAPY: ABNORMAL
O2 THERAPY: ABNORMAL
OSMOLALITY URINE: 157 MOSM/KG (ref 390–1070)
OSMOLALITY: 232 MOSM/KG (ref 280–301)
PCO2, VEN: 22.5 MMHG (ref 40–50)
PCO2, VEN: 41 MMHG (ref 40–50)
PDW BLD-RTO: 13.8 % (ref 12.4–15.4)
PERFORMED ON: ABNORMAL
PH UA: 6.5 (ref 5–8)
PH VENOUS: 7.41 (ref 7.35–7.45)
PH VENOUS: 7.53 (ref 7.35–7.45)
PHOSPHORUS: 2.7 MG/DL (ref 2.5–4.9)
PLATELET # BLD: 274 K/UL (ref 135–450)
PLATELET SLIDE REVIEW: ABNORMAL
PMV BLD AUTO: 6.9 FL (ref 5–10.5)
PO2, VEN: 123.4 MMHG (ref 25–40)
PO2, VEN: 82.2 MMHG (ref 25–40)
POIKILOCYTES: ABNORMAL
POTASSIUM REFLEX MAGNESIUM: 3.9 MMOL/L (ref 3.5–5.1)
POTASSIUM SERPL-SCNC: 3.3 MMOL/L (ref 3.5–5.1)
POTASSIUM, UR: 8.2 MMOL/L
PROTEIN UA: NEGATIVE MG/DL
RBC # BLD: 3.51 M/UL (ref 4–5.2)
RBC UA: NORMAL /HPF (ref 0–4)
SARS-COV-2 RNA, RT PCR: NOT DETECTED
SLIDE REVIEW: ABNORMAL
SODIUM BLD-SCNC: 108 MMOL/L (ref 136–145)
SODIUM BLD-SCNC: 110 MMOL/L (ref 136–145)
SODIUM URINE: <20 MMOL/L
SPECIFIC GRAVITY UA: <=1.005 (ref 1–1.03)
TCO2 CALC VENOUS: 19 MMOL/L
TCO2 CALC VENOUS: 27 MMOL/L
TOTAL PROTEIN: 7 G/DL (ref 6.4–8.2)
TROPONIN: <0.01 NG/ML
URINE REFLEX TO CULTURE: ABNORMAL
URINE TYPE: ABNORMAL
UROBILINOGEN, URINE: 0.2 E.U./DL
WBC # BLD: 4.6 K/UL (ref 4–11)
WBC UA: NORMAL /HPF (ref 0–5)

## 2022-11-17 PROCEDURE — 71045 X-RAY EXAM CHEST 1 VIEW: CPT

## 2022-11-17 PROCEDURE — 82803 BLOOD GASES ANY COMBINATION: CPT

## 2022-11-17 PROCEDURE — 83735 ASSAY OF MAGNESIUM: CPT

## 2022-11-17 PROCEDURE — 94640 AIRWAY INHALATION TREATMENT: CPT

## 2022-11-17 PROCEDURE — 6370000000 HC RX 637 (ALT 250 FOR IP): Performed by: STUDENT IN AN ORGANIZED HEALTH CARE EDUCATION/TRAINING PROGRAM

## 2022-11-17 PROCEDURE — 80053 COMPREHEN METABOLIC PANEL: CPT

## 2022-11-17 PROCEDURE — 99285 EMERGENCY DEPT VISIT HI MDM: CPT

## 2022-11-17 PROCEDURE — 6360000002 HC RX W HCPCS: Performed by: INTERNAL MEDICINE

## 2022-11-17 PROCEDURE — 81001 URINALYSIS AUTO W/SCOPE: CPT

## 2022-11-17 PROCEDURE — 6370000000 HC RX 637 (ALT 250 FOR IP)

## 2022-11-17 PROCEDURE — 84133 ASSAY OF URINE POTASSIUM: CPT

## 2022-11-17 PROCEDURE — 83930 ASSAY OF BLOOD OSMOLALITY: CPT

## 2022-11-17 PROCEDURE — 93010 ELECTROCARDIOGRAM REPORT: CPT | Performed by: INTERNAL MEDICINE

## 2022-11-17 PROCEDURE — 6360000002 HC RX W HCPCS: Performed by: STUDENT IN AN ORGANIZED HEALTH CARE EDUCATION/TRAINING PROGRAM

## 2022-11-17 PROCEDURE — 36415 COLL VENOUS BLD VENIPUNCTURE: CPT

## 2022-11-17 PROCEDURE — 87636 SARSCOV2 & INF A&B AMP PRB: CPT

## 2022-11-17 PROCEDURE — 2580000003 HC RX 258: Performed by: INTERNAL MEDICINE

## 2022-11-17 PROCEDURE — 6370000000 HC RX 637 (ALT 250 FOR IP): Performed by: INTERNAL MEDICINE

## 2022-11-17 PROCEDURE — 84300 ASSAY OF URINE SODIUM: CPT

## 2022-11-17 PROCEDURE — 94660 CPAP INITIATION&MGMT: CPT

## 2022-11-17 PROCEDURE — 84484 ASSAY OF TROPONIN QUANT: CPT

## 2022-11-17 PROCEDURE — 85025 COMPLETE CBC W/AUTO DIFF WBC: CPT

## 2022-11-17 PROCEDURE — 93005 ELECTROCARDIOGRAM TRACING: CPT | Performed by: STUDENT IN AN ORGANIZED HEALTH CARE EDUCATION/TRAINING PROGRAM

## 2022-11-17 PROCEDURE — 70450 CT HEAD/BRAIN W/O DYE: CPT

## 2022-11-17 PROCEDURE — 2580000003 HC RX 258: Performed by: STUDENT IN AN ORGANIZED HEALTH CARE EDUCATION/TRAINING PROGRAM

## 2022-11-17 PROCEDURE — 2700000000 HC OXYGEN THERAPY PER DAY

## 2022-11-17 PROCEDURE — 83935 ASSAY OF URINE OSMOLALITY: CPT

## 2022-11-17 PROCEDURE — 2000000000 HC ICU R&B

## 2022-11-17 PROCEDURE — 82436 ASSAY OF URINE CHLORIDE: CPT

## 2022-11-17 RX ORDER — IPRATROPIUM BROMIDE AND ALBUTEROL SULFATE 2.5; .5 MG/3ML; MG/3ML
1 SOLUTION RESPIRATORY (INHALATION) ONCE
Status: COMPLETED | OUTPATIENT
Start: 2022-11-17 | End: 2022-11-17

## 2022-11-17 RX ORDER — SODIUM CHLORIDE 9 MG/ML
1000 INJECTION, SOLUTION INTRAVENOUS CONTINUOUS
Status: DISCONTINUED | OUTPATIENT
Start: 2022-11-17 | End: 2022-11-18

## 2022-11-17 RX ORDER — ONDANSETRON 4 MG/1
4 TABLET, ORALLY DISINTEGRATING ORAL EVERY 8 HOURS PRN
Status: DISCONTINUED | OUTPATIENT
Start: 2022-11-17 | End: 2022-11-23 | Stop reason: HOSPADM

## 2022-11-17 RX ORDER — IPRATROPIUM BROMIDE AND ALBUTEROL SULFATE 2.5; .5 MG/3ML; MG/3ML
3 SOLUTION RESPIRATORY (INHALATION) ONCE
Status: COMPLETED | OUTPATIENT
Start: 2022-11-17 | End: 2022-11-17

## 2022-11-17 RX ORDER — ONDANSETRON 2 MG/ML
4 INJECTION INTRAMUSCULAR; INTRAVENOUS EVERY 6 HOURS PRN
Status: DISCONTINUED | OUTPATIENT
Start: 2022-11-17 | End: 2022-11-23 | Stop reason: HOSPADM

## 2022-11-17 RX ORDER — SODIUM CHLORIDE 0.9 % (FLUSH) 0.9 %
5-40 SYRINGE (ML) INJECTION PRN
Status: DISCONTINUED | OUTPATIENT
Start: 2022-11-17 | End: 2022-11-23 | Stop reason: HOSPADM

## 2022-11-17 RX ORDER — POTASSIUM CHLORIDE 20 MEQ/1
40 TABLET, EXTENDED RELEASE ORAL PRN
Status: DISCONTINUED | OUTPATIENT
Start: 2022-11-17 | End: 2022-11-23 | Stop reason: HOSPADM

## 2022-11-17 RX ORDER — HYDROXYZINE HYDROCHLORIDE 25 MG/1
25 TABLET, FILM COATED ORAL 3 TIMES DAILY PRN
Status: DISCONTINUED | OUTPATIENT
Start: 2022-11-17 | End: 2022-11-23 | Stop reason: HOSPADM

## 2022-11-17 RX ORDER — ACETAMINOPHEN 650 MG/1
650 SUPPOSITORY RECTAL EVERY 6 HOURS PRN
Status: DISCONTINUED | OUTPATIENT
Start: 2022-11-17 | End: 2022-11-23 | Stop reason: HOSPADM

## 2022-11-17 RX ORDER — SODIUM CHLORIDE 9 MG/ML
INJECTION, SOLUTION INTRAVENOUS PRN
Status: DISCONTINUED | OUTPATIENT
Start: 2022-11-17 | End: 2022-11-23 | Stop reason: HOSPADM

## 2022-11-17 RX ORDER — SODIUM CHLORIDE 9 MG/ML
1000 INJECTION, SOLUTION INTRAVENOUS CONTINUOUS
Status: DISCONTINUED | OUTPATIENT
Start: 2022-11-17 | End: 2022-11-17

## 2022-11-17 RX ORDER — POLYETHYLENE GLYCOL 3350 17 G/17G
17 POWDER, FOR SOLUTION ORAL DAILY PRN
Status: DISCONTINUED | OUTPATIENT
Start: 2022-11-17 | End: 2022-11-23 | Stop reason: HOSPADM

## 2022-11-17 RX ORDER — SODIUM CHLORIDE 0.9 % (FLUSH) 0.9 %
5-40 SYRINGE (ML) INJECTION EVERY 12 HOURS SCHEDULED
Status: DISCONTINUED | OUTPATIENT
Start: 2022-11-17 | End: 2022-11-23 | Stop reason: HOSPADM

## 2022-11-17 RX ORDER — MELOXICAM 15 MG/1
15 TABLET ORAL DAILY
Status: ON HOLD | COMMUNITY
End: 2022-11-23 | Stop reason: HOSPADM

## 2022-11-17 RX ORDER — ACETAMINOPHEN 325 MG/1
650 TABLET ORAL EVERY 6 HOURS PRN
Status: DISCONTINUED | OUTPATIENT
Start: 2022-11-17 | End: 2022-11-23 | Stop reason: HOSPADM

## 2022-11-17 RX ORDER — POTASSIUM CHLORIDE 7.45 MG/ML
10 INJECTION INTRAVENOUS PRN
Status: DISCONTINUED | OUTPATIENT
Start: 2022-11-17 | End: 2022-11-23 | Stop reason: HOSPADM

## 2022-11-17 RX ORDER — MAGNESIUM SULFATE 1 G/100ML
1000 INJECTION INTRAVENOUS
Status: COMPLETED | OUTPATIENT
Start: 2022-11-17 | End: 2022-11-17

## 2022-11-17 RX ORDER — ENOXAPARIN SODIUM 100 MG/ML
40 INJECTION SUBCUTANEOUS DAILY
Status: DISCONTINUED | OUTPATIENT
Start: 2022-11-17 | End: 2022-11-23 | Stop reason: HOSPADM

## 2022-11-17 RX ORDER — IPRATROPIUM BROMIDE AND ALBUTEROL SULFATE 2.5; .5 MG/3ML; MG/3ML
SOLUTION RESPIRATORY (INHALATION)
Status: COMPLETED
Start: 2022-11-17 | End: 2022-11-17

## 2022-11-17 RX ADMIN — ENOXAPARIN SODIUM 40 MG: 100 INJECTION SUBCUTANEOUS at 21:07

## 2022-11-17 RX ADMIN — Medication 10 ML: at 21:09

## 2022-11-17 RX ADMIN — MAGNESIUM SULFATE HEPTAHYDRATE 1000 MG: 1 INJECTION, SOLUTION INTRAVENOUS at 21:07

## 2022-11-17 RX ADMIN — SODIUM CHLORIDE 1000 ML: 9 INJECTION, SOLUTION INTRAVENOUS at 16:48

## 2022-11-17 RX ADMIN — SODIUM CHLORIDE 1000 ML: 9 INJECTION, SOLUTION INTRAVENOUS at 18:17

## 2022-11-17 RX ADMIN — IPRATROPIUM BROMIDE AND ALBUTEROL SULFATE 1 AMPULE: 2.5; .5 SOLUTION RESPIRATORY (INHALATION) at 17:11

## 2022-11-17 RX ADMIN — ALBUTEROL SULFATE 5 MG: 2.5 SOLUTION RESPIRATORY (INHALATION) at 17:11

## 2022-11-17 RX ADMIN — MAGNESIUM SULFATE HEPTAHYDRATE 1000 MG: 1 INJECTION, SOLUTION INTRAVENOUS at 22:17

## 2022-11-17 RX ADMIN — IPRATROPIUM BROMIDE AND ALBUTEROL SULFATE 3 AMPULE: 2.5; .5 SOLUTION RESPIRATORY (INHALATION) at 15:34

## 2022-11-17 RX ADMIN — POTASSIUM CHLORIDE 40 MEQ: 750 TABLET, EXTENDED RELEASE ORAL at 22:10

## 2022-11-17 RX ADMIN — IPRATROPIUM BROMIDE AND ALBUTEROL SULFATE 3 ML: 2.5; .5 SOLUTION RESPIRATORY (INHALATION) at 17:10

## 2022-11-17 ASSESSMENT — PAIN - FUNCTIONAL ASSESSMENT: PAIN_FUNCTIONAL_ASSESSMENT: NONE - DENIES PAIN

## 2022-11-17 NOTE — PROGRESS NOTES
Pt admitted into ICU rm 7 from ED. Pt is alert & oriented upon arrival to ICU. Pt is on BiPap & is w/out evidence of distress @ this time.

## 2022-11-17 NOTE — ED NOTES
1659-Perfect Serve sent to Dr. Dominique Arreola regarding consult.     1755-Orders placed for admission by Hospitalist.  Isadora Soto  11/17/22 Mitchell Loepz  11/17/22 1754

## 2022-11-17 NOTE — ED PROVIDER NOTES
[address] ED      CHIEF COMPLAINT  Shortness of Breath (Pt brought in by EMS for SOB, and AMS. Pt given 125 solumedrol due to SOB. Pt wears 3.5 lpm via NC at home. Last known normal this morning by family. Family called life squad. ) and Altered Mental Status       HISTORY OF PRESENT ILLNESS  Tomer Landers is a 61 y.o. female  who presents to the ED complaining of shortness of breath and confusion, per EMS. They state that family called them due to the patient's confusion and apparent shortness of breath despite her home 3 and half liters oxygen by nasal cannula. She was seen by her family this morning appeared normal.  Patient did receive Solu-Medrol in route by EMS due to her shortness of breath and concern for COPD exacerbation. On my exam patient tells me that her shortness of breath started just today. Denies any significant cough. Denies any fevers or chills. She is confused on my exam.    No other complaints, modifying factors or associated symptoms. I have reviewed the following from the nursing documentation. Past Medical History:   Diagnosis Date    Angina pectoris ([address]     Chronic pain     knees and lower back     COPD exacerbation ([address] 5/20/2018    Depression     Essential hypertension 8/15/2022    Panic disorder     Pneumonia      Past Surgical History:   Procedure Laterality Date    CHOLECYSTECTOMY      COLONOSCOPY       Family History   Problem Relation Age of Onset    COPD Mother     Hypertension Mother     Asthma Neg Hx     Cancer Neg Hx     Diabetes Neg Hx     Emphysema Neg Hx     Heart Failure Neg Hx      Social History     Socioeconomic History    Marital status:       Spouse name: Not on file    Number of children: 6    Years of education: Not on file    Highest education level: Not on file   Occupational History    Not on file   Tobacco Use    Smoking status: Former     Packs/day: 1.00     Years: 52.00     Pack years: 52.00     Types: Cigarettes     Start Spoke with patient and informed her of the below results and recommendations. She verbalized understanding.        date: 58     Quit date:      Years since quittin.8     Passive exposure: Past    Smokeless tobacco: Never   Vaping Use    Vaping Use: Never used   Substance and Sexual Activity    Alcohol use: Not Currently     Alcohol/week: 12.0 standard drinks     Types: 12 Cans of beer per week    Drug use: No    Sexual activity: Never   Other Topics Concern    Not on file   Social History Narrative    Not on file     Social Determinants of Health     Financial Resource Strain: Not on file   Food Insecurity: Not on file   Transportation Needs: Not on file   Physical Activity: Not on file   Stress: Not on file   Social Connections: Not on file   Intimate Partner Violence: Not on file   Housing Stability: Not on file     Current Facility-Administered Medications   Medication Dose Route Frequency Provider Last Rate Last Admin    ipratropium-albuterol (DUONEB) nebulizer solution 3 ampule  3 ampule Inhalation Once Belcamp Sylvia, DO         Current Outpatient Medications   Medication Sig Dispense Refill    ANORO ELLIPTA 62.5-25 MCG/INH AEPB inhaler INHALE ONE DOSE BY MOUTH DAILY 60 each 5    budesonide-formoterol (SYMBICORT) 80-4.5 MCG/ACT AERO Inhale 2 puffs into the lungs 2 times daily 1 each 0    escitalopram (LEXAPRO) 10 MG tablet Take 1 tablet by mouth at bedtime Dose decreased 30 tablet 0    lidocaine 4 % external patch Place 1 patch onto the skin daily 30 each 0    loperamide (IMODIUM) 2 MG capsule Take 1 capsule by mouth 4 times daily as needed for Diarrhea (pt takes once daily q AM)      amLODIPine (NORVASC) 5 MG tablet Take 1 tablet by mouth daily 30 tablet 1    guaiFENesin (MUCINEX) 600 MG extended release tablet Take 1 tablet by mouth 2 times daily 30 tablet 0    lisinopril (PRINIVIL;ZESTRIL) 10 MG tablet Take 10 mg by mouth daily      acetaminophen (TYLENOL) 500 MG tablet Take 500 mg by mouth every 6 hours as needed for Pain      alendronate (FOSAMAX) 70 MG tablet Take 70 mg by mouth every 7 days Takes every RT PCR NOT DETECTED NOT DETECTED    INFLUENZA A NOT DETECTED NOT DETECTED    INFLUENZA B NOT DETECTED NOT DETECTED   CBC with Auto Differential   Result Value Ref Range    WBC 4.6 4.0 - 11.0 K/uL    RBC 3.51 (L) 4.00 - 5.20 M/uL    Hemoglobin 11.1 (L) 12.0 - 16.0 g/dL    Hematocrit 30.9 (L) 36.0 - 48.0 %    MCV 88.0 80.0 - 100.0 fL    MCH 31.7 26.0 - 34.0 pg    MCHC 36.0 31.0 - 36.0 g/dL    RDW 13.8 12.4 - 15.4 %    Platelets 931 549 - 021 K/uL    MPV 6.9 5.0 - 10.5 fL    PLATELET SLIDE REVIEW Clumped     SLIDE REVIEW see below     Neutrophils % 62.0 %    Lymphocytes % 18.8 %    Monocytes % 17.5 %    Eosinophils % 0.9 %    Basophils % 0.8 %    Neutrophils Absolute 2.8 1.7 - 7.7 K/uL    Lymphocytes Absolute 0.9 (L) 1.0 - 5.1 K/uL    Monocytes Absolute 0.8 0.0 - 1.3 K/uL    Eosinophils Absolute 0.0 0.0 - 0.6 K/uL    Basophils Absolute 0.0 0.0 - 0.2 K/uL    Anisocytosis 1+ (A)     Poikilocytes 1+ (A)    CMP w/ Reflex to MG   Result Value Ref Range    Sodium 108 (LL) 136 - 145 mmol/L    Potassium reflex Magnesium 3.9 3.5 - 5.1 mmol/L    Chloride 72 (L) 99 - 110 mmol/L    CO2 26 21 - 32 mmol/L    Anion Gap 10 3 - 16    Glucose 132 (H) 70 - 99 mg/dL    BUN 11 7 - 20 mg/dL    Creatinine <0.5 (L) 0.6 - 1.2 mg/dL    Est, Glom Filt Rate >60 >60    Calcium 8.7 8.3 - 10.6 mg/dL    Total Protein 7.0 6.4 - 8.2 g/dL    Albumin 4.5 3.4 - 5.0 g/dL    Albumin/Globulin Ratio 1.8 1.1 - 2.2    Total Bilirubin 1.0 0.0 - 1.0 mg/dL    Alkaline Phosphatase 113 40 - 129 U/L    ALT 26 10 - 40 U/L    AST 62 (H) 15 - 37 U/L   Troponin   Result Value Ref Range    Troponin <0.01 <0.01 ng/mL   Blood gas, venous   Result Value Ref Range    pH, Kevin 7.409 7.350 - 7.450    pCO2, Kevin 41.0 40.0 - 50.0 mmHg    pO2, Kevin 82.2 (H) 25.0 - 40.0 mmHg    HCO3, Venous 25.3 23.0 - 29.0 mmol/L    Base Excess, Kevin 0.6 -3.0 - 3.0 mmol/L    O2 Sat, Kevin 96 Not Established %    Carboxyhemoglobin 2.6 (H) 0.0 - 1.5 %    MetHgb, Kevin 0.3 <1.5 %    TC02 (Calc), Kevin 27 Not Established mmol/L    O2 Content, Kevin 16 Not Established VOL %    O2 Therapy Unknown    Osmolality   Result Value Ref Range    Osmolality 232 (L) 280 - 301 mOsm/kg   Blood gas, venous   Result Value Ref Range    pH, Kevin 7.532 (H) 7.350 - 7.450    pCO2, Kevin 22.5 (L) 40.0 - 50.0 mmHg    pO2, Kevin 123.4 (H) 25.0 - 40.0 mmHg    HCO3, Venous 18.5 (L) 23.0 - 29.0 mmol/L    Base Excess, Kevin -2.5 -3.0 - 3.0 mmol/L    O2 Sat, Kevin 99 Not Established %    Carboxyhemoglobin 3.5 (H) 0.0 - 1.5 %    MetHgb, Kevin 0.2 <1.5 %    TC02 (Calc), Kevin 19 Not Established mmol/L    O2 Content, Kevin 16 Not Established VOL %    O2 Therapy Unknown    Magnesium   Result Value Ref Range    Magnesium 1.50 (L) 1.80 - 2.40 mg/dL   EKG 12 Lead   Result Value Ref Range    Ventricular Rate 90 BPM    Atrial Rate 90 BPM    P-R Interval 154 ms    QRS Duration 66 ms    Q-T Interval 358 ms    QTc Calculation (Bazett) 437 ms    P Axis 61 degrees    R Axis -45 degrees    T Axis 51 degrees    Diagnosis       Sinus rhythm with Premature atrial complexesLeft axis deviationLow voltage QRSInferior infarct (cited on or before 14-AUG-2022)Cannot rule out Anterior infarct (cited on or before 28-AUG-2022)Abnormal ECGWhen compared with ECG of 28-AUG-2022 06:52,Premature atrial complexes are now Present     EKG 12 Lead   Result Value Ref Range    Ventricular Rate 132 BPM    Atrial Rate 132 BPM    P-R Interval 138 ms    QRS Duration 66 ms    Q-T Interval 298 ms    QTc Calculation (Bazett) 441 ms    P Axis 55 degrees    R Axis -76 degrees    T Axis 73 degrees    Diagnosis       Sinus tachycardiaLeft axis deviationAbnormal ECGWhen compared with ECG of 17-NOV-2022 15:26, (unconfirmed)Premature atrial complexes are no longer PresentCriteria for Inferior infarct are no longer Present   EKG 12 Lead   Result Value Ref Range    Ventricular Rate 123 BPM    Atrial Rate 123 BPM    P-R Interval 134 ms    QRS Duration 80 ms    Q-T Interval 328 ms    QTc Calculation (Bazett) 469 ms    P Axis 78 degrees    R Axis -73 degrees    T Axis 78 degrees    Diagnosis       Sinus tachycardia with Premature atrial complexesLeft anterior fascicular blockInferior infarct , age undeterminedAbnormal ECGWhen compared with ECG of 17-NOV-2022 16:40, (unconfirmed)Premature atrial complexes are now Present       ECG  The Ekg interpreted by me shows  Sinus rhythm with PACs at a rate of 90 bpm.  Left axis deviation. No acute injury pattern. , QRS 66, QTc 437. Repeat EKG: Sinus tachycardia with a rate of 132 bpm.  Left axis deviation. No acute injury pattern. , QRS 66, QTc 441. RADIOLOGY  XR CHEST PORTABLE   Final Result   Left lung nodule that is suspicious for malignancy until proven otherwise. Severe pulmonary emphysema and associated interstitial pulmonary fibrosis. Pulmonary artery hypertension. Possible osteoporosis. CT Head W/O Contrast   Final Result   No acute intracranial abnormality. XR CHEST PORTABLE   Final Result   No radiographic evidence of acute pulmonary disease. ED COURSE/MDM  Patient seen and evaluated. Old records reviewed. Labs and imaging reviewed and results discussed with patient. Altered mental status work-up initiated and patient treated with DuoNeb's. After DuoNeb patient's lung sounds are much more clear and she appears to be relatively comfortable on 4 L oxygen by nasal cannula. Lab work did reveal a severe hyponatremia of 108. Patient has a chronic hyponatremia in the 120s. She had a recent admission for similar presentation with a sodium in the low 100s as well. She is started on normal saline at 50 cc an hour. Patient was admitted by the hospitalist team.  After admission orders placed, patient became severely tachycardic with heart rates in the 130s to 140s. He repeat EKG showed this was a sinus rhythm. Patient did appear to have increased work of breathing and she was placed on BiPAP.   BiPAP did seem to improve her respiratory status, however her heart rate did not improve. Lungs are very clear at this time. Repeat chest x-ray obtained to evaluate for new pathology, specifically pneumothorax. I reviewed the x-ray at the bedside I do not see any sign of pneumothorax. At this time I noticed the patient does rather fidgety and I suspect anxiety may be at least partially the cause of her tachycardia. Her nurse is actually at the bedside currently to take her up to the ICU. I discussed these developments with the on-call intensivist, Dr. Kate Booth, who will be assuming her care. Patient transferred to the ICU tachycardic, but otherwise stable. Critical care    Critical care time 34 minutes exclusive from separate billable procedures that were performed. The following was considered in the determination of critical care but not limited to the level of medical decision making, intensive cardiac and/or respiratory monitoring, frequent vital sign monitoring, evaluation of laboratory studies, evaluation of radiographic studies, oxygen monitoring, and constant monitoring and speaking to family at bedside. Is this patient to be included in the SEP-1 Core Measure due to severe sepsis or septic shock? No   Exclusion criteria - the patient is NOT to be included for SEP-1 Core Measure due to:   Infection is not suspected    During the patient's ED course, the patient was given:  Medications   0.9 % sodium chloride infusion (1,000 mLs IntraVENous New Bag 11/17/22 1817)   tiotropium-olodaterol (STIOLTO) 2.5-2.5 MCG/ACT inhaler 2 puff (has no administration in time range)   hydrOXYzine HCl (ATARAX) tablet 25 mg (has no administration in time range)   sodium chloride flush 0.9 % injection 5-40 mL (has no administration in time range)   sodium chloride flush 0.9 % injection 5-40 mL (has no administration in time range)   0.9 % sodium chloride infusion (has no administration in time range)   enoxaparin (LOVENOX) injection 40 mg (has no administration in time range)   ondansetron (ZOFRAN-ODT) disintegrating tablet 4 mg (has no administration in time range)     Or   ondansetron (ZOFRAN) injection 4 mg (has no administration in time range)   polyethylene glycol (GLYCOLAX) packet 17 g (has no administration in time range)   acetaminophen (TYLENOL) tablet 650 mg (has no administration in time range)     Or   acetaminophen (TYLENOL) suppository 650 mg (has no administration in time range)   ipratropium-albuterol (DUONEB) nebulizer solution 3 ampule (3 ampules Inhalation Given 11/17/22 1534)   ipratropium-albuterol (DUONEB) 0.5-2.5 (3) MG/3ML nebulizer solution (3 mLs  Given 11/17/22 1710)   ipratropium-albuterol (DUONEB) nebulizer solution 1 ampule (1 ampule Inhalation Given 11/17/22 1711)   albuterol (PROVENTIL) nebulizer solution 5 mg (5 mg Nebulization Given 11/17/22 1711)        CLINICAL IMPRESSION  1. Altered mental status, unspecified altered mental status type    2. Hyponatremia    3. Acute on chronic respiratory failure, unspecified whether with hypoxia or hypercapnia (HCC)        Blood pressure 133/84, pulse 89, temperature 97.8 °F (36.6 °C), temperature source Axillary, resp. rate 18, height 5' 3\" (1.6 m), SpO2 97 %, not currently breastfeeding. DISPOSITION  Benitez Caballero was admitted in fair condition. Patient was given scripts for the following medications. I counseled patient how to take these medications. New Prescriptions    No medications on file       Follow-up with:  No follow-up provider specified. DISCLAIMER: This chart was created using Dragon dictation software. Efforts were made by me to ensure accuracy, however some errors may be present due to limitations of this technology and occasionally words are not transcribed correctly.      Adele Ward DO  11/17/22 2242

## 2022-11-18 PROBLEM — G93.41 METABOLIC ENCEPHALOPATHY: Status: ACTIVE | Noted: 2022-11-18

## 2022-11-18 PROBLEM — J96.11 CHRONIC HYPOXEMIC RESPIRATORY FAILURE (HCC): Status: ACTIVE | Noted: 2022-11-18

## 2022-11-18 PROBLEM — G93.40 ACUTE ENCEPHALOPATHY: Status: ACTIVE | Noted: 2022-11-18

## 2022-11-18 LAB
ALBUMIN SERPL-MCNC: 4 G/DL (ref 3.4–5)
ALBUMIN SERPL-MCNC: 4.1 G/DL (ref 3.4–5)
ALBUMIN SERPL-MCNC: 4.2 G/DL (ref 3.4–5)
ALBUMIN SERPL-MCNC: 4.3 G/DL (ref 3.4–5)
ALBUMIN SERPL-MCNC: 4.4 G/DL (ref 3.4–5)
ALBUMIN SERPL-MCNC: 4.5 G/DL (ref 3.4–5)
ANION GAP SERPL CALCULATED.3IONS-SCNC: 11 MMOL/L (ref 3–16)
ANION GAP SERPL CALCULATED.3IONS-SCNC: 12 MMOL/L (ref 3–16)
ANION GAP SERPL CALCULATED.3IONS-SCNC: 14 MMOL/L (ref 3–16)
ANION GAP SERPL CALCULATED.3IONS-SCNC: 7 MMOL/L (ref 3–16)
ANION GAP SERPL CALCULATED.3IONS-SCNC: 9 MMOL/L (ref 3–16)
ANION GAP SERPL CALCULATED.3IONS-SCNC: 9 MMOL/L (ref 3–16)
BASOPHILS ABSOLUTE: 0 K/UL (ref 0–0.2)
BASOPHILS RELATIVE PERCENT: 0.1 %
BUN BLDV-MCNC: 11 MG/DL (ref 7–20)
BUN BLDV-MCNC: 12 MG/DL (ref 7–20)
BUN BLDV-MCNC: 14 MG/DL (ref 7–20)
BUN BLDV-MCNC: 8 MG/DL (ref 7–20)
CALCIUM SERPL-MCNC: 8.4 MG/DL (ref 8.3–10.6)
CALCIUM SERPL-MCNC: 8.4 MG/DL (ref 8.3–10.6)
CALCIUM SERPL-MCNC: 8.5 MG/DL (ref 8.3–10.6)
CALCIUM SERPL-MCNC: 8.6 MG/DL (ref 8.3–10.6)
CALCIUM SERPL-MCNC: 9.1 MG/DL (ref 8.3–10.6)
CALCIUM SERPL-MCNC: 9.7 MG/DL (ref 8.3–10.6)
CHLORIDE BLD-SCNC: 78 MMOL/L (ref 99–110)
CHLORIDE BLD-SCNC: 81 MMOL/L (ref 99–110)
CHLORIDE BLD-SCNC: 81 MMOL/L (ref 99–110)
CHLORIDE BLD-SCNC: 82 MMOL/L (ref 99–110)
CHLORIDE BLD-SCNC: 83 MMOL/L (ref 99–110)
CHLORIDE BLD-SCNC: 86 MMOL/L (ref 99–110)
CO2: 22 MMOL/L (ref 21–32)
CO2: 24 MMOL/L (ref 21–32)
CO2: 24 MMOL/L (ref 21–32)
CO2: 26 MMOL/L (ref 21–32)
CO2: 28 MMOL/L (ref 21–32)
CO2: 28 MMOL/L (ref 21–32)
CREAT SERPL-MCNC: 0.6 MG/DL (ref 0.6–1.2)
CREAT SERPL-MCNC: <0.5 MG/DL (ref 0.6–1.2)
EOSINOPHILS ABSOLUTE: 0 K/UL (ref 0–0.6)
EOSINOPHILS RELATIVE PERCENT: 0.1 %
GFR SERPL CREATININE-BSD FRML MDRD: >60 ML/MIN/{1.73_M2}
GLUCOSE BLD-MCNC: 118 MG/DL (ref 70–99)
GLUCOSE BLD-MCNC: 119 MG/DL (ref 70–99)
GLUCOSE BLD-MCNC: 127 MG/DL (ref 70–99)
GLUCOSE BLD-MCNC: 146 MG/DL (ref 70–99)
GLUCOSE BLD-MCNC: 149 MG/DL (ref 70–99)
GLUCOSE BLD-MCNC: 160 MG/DL (ref 70–99)
GLUCOSE BLD-MCNC: 169 MG/DL (ref 70–99)
GLUCOSE BLD-MCNC: 187 MG/DL (ref 70–99)
GLUCOSE BLD-MCNC: 188 MG/DL (ref 70–99)
GLUCOSE BLD-MCNC: 211 MG/DL (ref 70–99)
GLUCOSE BLD-MCNC: 221 MG/DL (ref 70–99)
HCT VFR BLD CALC: 32.9 % (ref 36–48)
HEMOGLOBIN: 11.5 G/DL (ref 12–16)
LYMPHOCYTES ABSOLUTE: 0.2 K/UL (ref 1–5.1)
LYMPHOCYTES RELATIVE PERCENT: 8.3 %
MAGNESIUM: 2.3 MG/DL (ref 1.8–2.4)
MCH RBC QN AUTO: 31.7 PG (ref 26–34)
MCHC RBC AUTO-ENTMCNC: 34.9 G/DL (ref 31–36)
MCV RBC AUTO: 90.7 FL (ref 80–100)
MONOCYTES ABSOLUTE: 0.1 K/UL (ref 0–1.3)
MONOCYTES RELATIVE PERCENT: 3 %
NEUTROPHILS ABSOLUTE: 2.3 K/UL (ref 1.7–7.7)
NEUTROPHILS RELATIVE PERCENT: 88.5 %
PDW BLD-RTO: 12.8 % (ref 12.4–15.4)
PERFORMED ON: ABNORMAL
PHOSPHORUS: 2 MG/DL (ref 2.5–4.9)
PHOSPHORUS: 2 MG/DL (ref 2.5–4.9)
PHOSPHORUS: 2.1 MG/DL (ref 2.5–4.9)
PHOSPHORUS: 2.1 MG/DL (ref 2.5–4.9)
PHOSPHORUS: 2.2 MG/DL (ref 2.5–4.9)
PHOSPHORUS: 2.4 MG/DL (ref 2.5–4.9)
PLATELET # BLD: 295 K/UL (ref 135–450)
PMV BLD AUTO: 5.7 FL (ref 5–10.5)
POTASSIUM SERPL-SCNC: 3.8 MMOL/L (ref 3.5–5.1)
POTASSIUM SERPL-SCNC: 4 MMOL/L (ref 3.5–5.1)
POTASSIUM SERPL-SCNC: 4 MMOL/L (ref 3.5–5.1)
POTASSIUM SERPL-SCNC: 4.2 MMOL/L (ref 3.5–5.1)
POTASSIUM SERPL-SCNC: 4.3 MMOL/L (ref 3.5–5.1)
POTASSIUM SERPL-SCNC: 4.6 MMOL/L (ref 3.5–5.1)
RBC # BLD: 3.63 M/UL (ref 4–5.2)
SODIUM BLD-SCNC: 114 MMOL/L (ref 136–145)
SODIUM BLD-SCNC: 117 MMOL/L (ref 136–145)
SODIUM BLD-SCNC: 117 MMOL/L (ref 136–145)
SODIUM BLD-SCNC: 118 MMOL/L (ref 136–145)
SODIUM BLD-SCNC: 118 MMOL/L (ref 136–145)
SODIUM BLD-SCNC: 121 MMOL/L (ref 136–145)
TSH REFLEX: 0.08 UIU/ML (ref 0.27–4.2)
WBC # BLD: 2.6 K/UL (ref 4–11)

## 2022-11-18 PROCEDURE — 2000000000 HC ICU R&B

## 2022-11-18 PROCEDURE — 6370000000 HC RX 637 (ALT 250 FOR IP): Performed by: INTERNAL MEDICINE

## 2022-11-18 PROCEDURE — 6360000002 HC RX W HCPCS: Performed by: INTERNAL MEDICINE

## 2022-11-18 PROCEDURE — 94669 MECHANICAL CHEST WALL OSCILL: CPT

## 2022-11-18 PROCEDURE — 83735 ASSAY OF MAGNESIUM: CPT

## 2022-11-18 PROCEDURE — 2580000003 HC RX 258: Performed by: INTERNAL MEDICINE

## 2022-11-18 PROCEDURE — 80069 RENAL FUNCTION PANEL: CPT

## 2022-11-18 PROCEDURE — 99233 SBSQ HOSP IP/OBS HIGH 50: CPT | Performed by: INTERNAL MEDICINE

## 2022-11-18 PROCEDURE — 99223 1ST HOSP IP/OBS HIGH 75: CPT | Performed by: INTERNAL MEDICINE

## 2022-11-18 PROCEDURE — 85025 COMPLETE CBC W/AUTO DIFF WBC: CPT

## 2022-11-18 PROCEDURE — 94660 CPAP INITIATION&MGMT: CPT

## 2022-11-18 PROCEDURE — 2700000000 HC OXYGEN THERAPY PER DAY

## 2022-11-18 PROCEDURE — 84439 ASSAY OF FREE THYROXINE: CPT

## 2022-11-18 PROCEDURE — 36415 COLL VENOUS BLD VENIPUNCTURE: CPT

## 2022-11-18 PROCEDURE — 84443 ASSAY THYROID STIM HORMONE: CPT

## 2022-11-18 PROCEDURE — 94640 AIRWAY INHALATION TREATMENT: CPT

## 2022-11-18 PROCEDURE — 94761 N-INVAS EAR/PLS OXIMETRY MLT: CPT

## 2022-11-18 RX ORDER — DEXTROSE MONOHYDRATE 50 MG/ML
INJECTION, SOLUTION INTRAVENOUS CONTINUOUS
Status: DISCONTINUED | OUTPATIENT
Start: 2022-11-18 | End: 2022-11-18

## 2022-11-18 RX ORDER — GUAIFENESIN 600 MG/1
600 TABLET, EXTENDED RELEASE ORAL 2 TIMES DAILY
Status: DISCONTINUED | OUTPATIENT
Start: 2022-11-18 | End: 2022-11-20

## 2022-11-18 RX ORDER — DEXTROSE MONOHYDRATE 100 MG/ML
INJECTION, SOLUTION INTRAVENOUS CONTINUOUS PRN
Status: DISCONTINUED | OUTPATIENT
Start: 2022-11-18 | End: 2022-11-23 | Stop reason: HOSPADM

## 2022-11-18 RX ORDER — IPRATROPIUM BROMIDE AND ALBUTEROL SULFATE 2.5; .5 MG/3ML; MG/3ML
1 SOLUTION RESPIRATORY (INHALATION)
Status: DISCONTINUED | OUTPATIENT
Start: 2022-11-18 | End: 2022-11-19

## 2022-11-18 RX ORDER — AMLODIPINE BESYLATE 5 MG/1
5 TABLET ORAL DAILY
Status: DISCONTINUED | OUTPATIENT
Start: 2022-11-18 | End: 2022-11-23 | Stop reason: HOSPADM

## 2022-11-18 RX ORDER — INSULIN LISPRO 100 [IU]/ML
0-4 INJECTION, SOLUTION INTRAVENOUS; SUBCUTANEOUS NIGHTLY
Status: DISCONTINUED | OUTPATIENT
Start: 2022-11-18 | End: 2022-11-23 | Stop reason: HOSPADM

## 2022-11-18 RX ORDER — INSULIN LISPRO 100 [IU]/ML
0-8 INJECTION, SOLUTION INTRAVENOUS; SUBCUTANEOUS
Status: DISCONTINUED | OUTPATIENT
Start: 2022-11-18 | End: 2022-11-23 | Stop reason: HOSPADM

## 2022-11-18 RX ORDER — LISINOPRIL 10 MG/1
10 TABLET ORAL DAILY
Status: DISCONTINUED | OUTPATIENT
Start: 2022-11-18 | End: 2022-11-23 | Stop reason: HOSPADM

## 2022-11-18 RX ADMIN — IPRATROPIUM BROMIDE AND ALBUTEROL SULFATE 1 AMPULE: 2.5; .5 SOLUTION RESPIRATORY (INHALATION) at 19:41

## 2022-11-18 RX ADMIN — DIBASIC SODIUM PHOSPHATE, MONOBASIC POTASSIUM PHOSPHATE AND MONOBASIC SODIUM PHOSPHATE 2 TABLET: 852; 155; 130 TABLET ORAL at 18:04

## 2022-11-18 RX ADMIN — DEXTROSE MONOHYDRATE: 50 INJECTION, SOLUTION INTRAVENOUS at 10:05

## 2022-11-18 RX ADMIN — GUAIFENESIN 600 MG: 600 TABLET, EXTENDED RELEASE ORAL at 20:12

## 2022-11-18 RX ADMIN — GUAIFENESIN 600 MG: 600 TABLET, EXTENDED RELEASE ORAL at 10:04

## 2022-11-18 RX ADMIN — TIOTROPIUM BROMIDE AND OLODATEROL 2 PUFF: 3.124; 2.736 SPRAY, METERED RESPIRATORY (INHALATION) at 07:35

## 2022-11-18 RX ADMIN — IPRATROPIUM BROMIDE AND ALBUTEROL SULFATE 1 AMPULE: 2.5; .5 SOLUTION RESPIRATORY (INHALATION) at 23:16

## 2022-11-18 RX ADMIN — LISINOPRIL 10 MG: 10 TABLET ORAL at 10:04

## 2022-11-18 RX ADMIN — Medication 10 ML: at 08:43

## 2022-11-18 RX ADMIN — ACETAMINOPHEN 650 MG: 325 TABLET ORAL at 15:21

## 2022-11-18 RX ADMIN — MUPIROCIN: 20 OINTMENT TOPICAL at 20:12

## 2022-11-18 RX ADMIN — IPRATROPIUM BROMIDE AND ALBUTEROL SULFATE 1 AMPULE: 2.5; .5 SOLUTION RESPIRATORY (INHALATION) at 11:35

## 2022-11-18 RX ADMIN — ACETAMINOPHEN 650 MG: 325 TABLET ORAL at 08:48

## 2022-11-18 RX ADMIN — ACETAMINOPHEN 650 MG: 325 TABLET ORAL at 21:22

## 2022-11-18 RX ADMIN — Medication 10 ML: at 20:12

## 2022-11-18 RX ADMIN — DEXTROSE MONOHYDRATE 500 ML: 50 INJECTION, SOLUTION INTRAVENOUS at 08:54

## 2022-11-18 RX ADMIN — IPRATROPIUM BROMIDE AND ALBUTEROL SULFATE 1 AMPULE: 2.5; .5 SOLUTION RESPIRATORY (INHALATION) at 15:37

## 2022-11-18 RX ADMIN — AMLODIPINE BESYLATE 5 MG: 5 TABLET ORAL at 10:05

## 2022-11-18 RX ADMIN — ENOXAPARIN SODIUM 40 MG: 100 INJECTION SUBCUTANEOUS at 08:42

## 2022-11-18 RX ADMIN — MUPIROCIN: 20 OINTMENT TOPICAL at 08:49

## 2022-11-18 ASSESSMENT — PAIN DESCRIPTION - LOCATION
LOCATION: HEAD

## 2022-11-18 ASSESSMENT — PAIN SCALES - GENERAL
PAINLEVEL_OUTOF10: 7
PAINLEVEL_OUTOF10: 3
PAINLEVEL_OUTOF10: 7
PAINLEVEL_OUTOF10: 5

## 2022-11-18 ASSESSMENT — PAIN DESCRIPTION - DESCRIPTORS
DESCRIPTORS: ACHING
DESCRIPTORS: ACHING

## 2022-11-18 NOTE — PROGRESS NOTES
Dr. Allegra Bright at the bedside to examine pt. See progress note for details. Okay for regular diet.  DC NPO order

## 2022-11-18 NOTE — PROGRESS NOTES
Admit: 2022    Name:  Dara Cee  Room:  [facility]  MRN:    [id number]    Critical Care Daily Progress Note for 2022   Admitted with altered mental status and hyponatremia    Interval History:     Mental status much better this morning  Scheduled Meds:   tiotropium-olodaterol  2 puff Inhalation Daily    sodium chloride flush  5-40 mL IntraVENous 2 times per day    enoxaparin  40 mg SubCUTAneous Daily       Continuous Infusions:   sodium chloride         PRN Meds:  hydrOXYzine HCl, sodium chloride flush, sodium chloride, ondansetron **OR** ondansetron, polyethylene glycol, acetaminophen **OR** acetaminophen, potassium chloride **OR** potassium alternative oral replacement **OR** potassium chloride                  Objective:     Temp  Av.2 °F (36.8 °C)  Min: 97.6 °F (36.4 °C)  Max: 99.2 °F (37.3 °C)  Pulse  Av.1  Min: 71  Max: 135  BP  Min: 94/40  Max: 157/84  SpO2  Av.7 %  Min: 86 %  Max: 100 %  FiO2   Av.8 %  Min: 35 %  Max: 50 %  Patient Vitals for the past 4 hrs:   BP Temp Temp src Pulse Resp SpO2   22 0737 -- -- -- 81 15 97 %   22 0700 129/66 -- -- 80 16 --   22 0600 (!) 119/96 -- -- 79 15 --   22 0500 (!) 107/53 -- -- 71 19 99 %   22 0400 (!) 105/55 98.2 °F (36.8 °C) Axillary 73 27 99 %         Intake/Output Summary (Last 24 hours) at 2022 0758  Last data filed at 2022 0519  Gross per 24 hour   Intake 771.18 ml   Output 2100 ml   Net -1328.82 ml       Physical Exam:  General:  Awake, alert and oriented. Appears to be not in any distress  Mucous Membranes:  Pink , anicteric  Neck: No JVD, no carotid bruit, no thyromegaly  Chest:  Clear to auscultation bilaterally, no added sounds  Cardiovascular:  RRR S1S2 heard, no murmurs or gallops  Abdomen:  Soft, undistended, non tender, no organomegaly, BS present  Extremities: No edema or cyanosis.  Distal pulses well felt  Neurological : no focal deficits    Lab Data:  CBC:   Recent Labs 11/17/22  1511 11/18/22  0344   WBC 4.6 2.6*   RBC 3.51* 3.63*   HGB 11.1* 11.5*   HCT 30.9* 32.9*   MCV 88.0 90.7   RDW 13.8 12.8    295     BMP:   Recent Labs     11/17/22  1944 11/17/22  2343 11/18/22  0344   * 114* 118*   K 3.3* 3.8 4.0   CL 73* 78* 81*   CO2 24 22 28   PHOS 2.7 2.1* 2.2*   BUN 10 8 8   CREATININE <0.5* <0.5* <0.5*     BNP: No results for input(s): BNP in the last 72 hours. PT/INR: No results for input(s): PROTIME, INR in the last 72 hours. APTT:No results for input(s): APTT in the last 72 hours. CARDIAC ENZYMES:   Recent Labs     11/17/22  1511   TROPONINI <0.01     FASTING LIPID PANEL:  Lab Results   Component Value Date/Time    CHOL 154 09/15/2017 08:18 AM    HDL 81 09/15/2017 08:18 AM    HDL 64 05/14/2012 06:55 AM    TRIG 44 09/15/2017 08:18 AM     LIVER PROFILE:   Recent Labs     11/17/22  1511   AST 62*   ALT 26   BILITOT 1.0   ALKPHOS 113         XR CHEST PORTABLE   Final Result   Left lung nodule that is suspicious for malignancy until proven otherwise. Severe pulmonary emphysema and associated interstitial pulmonary fibrosis. Pulmonary artery hypertension. Possible osteoporosis. CT Head W/O Contrast   Final Result   No acute intracranial abnormality. XR CHEST PORTABLE   Final Result   No radiographic evidence of acute pulmonary disease.                Assessment & Plan:     Patient Active Problem List    Diagnosis Date Noted    Chronic hypoxemic respiratory failure ([facility] 08/31/2022    Hypokalemia 08/29/2022    Low TSH level 08/29/2022    History of COVID-19 08/29/2022    Leukocytosis 08/29/2022    Essential hypertension 08/15/2022    COVID-19 08/15/2022    Pneumonia due to COVID-19 virus 08/14/2022    Community acquired pneumonia     Transaminitis 12/10/2021    Acute hypoxemic respiratory failure ([facility]     Pneumonia due to infectious organism     Pancreatic abnormality     Common bile duct dilatation     Acute on chronic respiratory failure with hypoxia ([facility] 02/04/2021    Community acquired pneumonia of right lung     Acute exacerbation of chronic obstructive pulmonary disease (COPD) ([address]     Acute on chronic respiratory failure with hypoxia and hypercapnia ([facility] 02/04/2020    Lung nodule     Pulmonary emphysema (HCC)     Hyponatremia 01/30/2020    Acute respiratory failure with hypoxia ([address] 05/21/2018    COPD exacerbation ([facility] 05/20/2018    Moderate COPD (chronic obstructive pulmonary disease) ([facility] 04/21/2016    Tobacco use 02/12/2016    Chest pain 10/14/2011    Shortness of breath 10/01/2698     Metabolic encephalopathy  Likely secondary to hyponatremia  Resolving    Acute on chronic hyponatremia  Admitted to the ICU. Seizure precautions. Nephrology consult. Continue normal saline for now.   Low sodium levels closely    COPD and chronic hypoxic respiratory failure  Stable on home O2  Continue inhalers    Lung nodule  Pulmonary consult    Hypertension  Stable  Continue amlodipine and lisinopril    Full code  General diet  Lovenox for DVT prophylaxis      Madi Vazquez MD

## 2022-11-18 NOTE — PROGRESS NOTES
Prevention  dressing applied to bridge of nose and other facial bony prominences uon BiPAP/CPAP initiation. Consulted RN regarding the assessment of skin integrity.

## 2022-11-18 NOTE — PLAN OF CARE
Problem: Discharge Planning  Goal: Discharge to home or other facility with appropriate resources  Outcome: Progressing  Flowsheets (Taken 11/17/2022 2028)  Discharge to home or other facility with appropriate resources:   Identify discharge learning needs (meds, wound care, etc)   Identify barriers to discharge with patient and caregiver     Problem: Safety - Adult  Goal: Free from fall injury  Outcome: Progressing     Problem: Skin/Tissue Integrity  Goal: Absence of new skin breakdown  Description: 1. Monitor for areas of redness and/or skin breakdown  2. Assess vascular access sites hourly  3. Every 4-6 hours minimum:  Change oxygen saturation probe site  4. Every 4-6 hours:  If on nasal continuous positive airway pressure, respiratory therapy assess nares and determine need for appliance change or resting period. Outcome: Progressing     Problem: Respiratory - Adult  Goal: Achieves optimal ventilation and oxygenation  Outcome: Progressing     Problem: Cardiovascular - Adult  Goal: Maintains optimal cardiac output and hemodynamic stability  Outcome: Progressing     Problem: Skin/Tissue Integrity - Adult  Goal: Skin integrity remains intact  Outcome: Progressing  Goal: Oral mucous membranes remain intact  Outcome: Progressing     Problem: Gastrointestinal - Adult  Goal: Maintains adequate nutritional intake  Outcome: Progressing     Problem: Metabolic/Fluid and Electrolytes - Adult  Goal: Electrolytes maintained within normal limits  Outcome: Progressing  Goal: Hemodynamic stability and optimal renal function maintained  Outcome: Progressing     Problem: Hematologic - Adult  Goal: Maintains hematologic stability  Outcome: Progressing     Admission assessment, completed, see flow sheet. Pt is alert and oriented x 4. Following commands. Call light within reach. Bed in lowest position. Bed alarm on.

## 2022-11-18 NOTE — PROGRESS NOTES
Dr. Mona Flores at the bedside to examine pt. See progress note for details.      Stop D5, redraw BMP at 1500

## 2022-11-18 NOTE — PROGRESS NOTES
4 Eyes Skin Assessment     The patient is being assess for   Admission    I agree that 2 RN's have performed a thorough Head to Toe Skin Assessment on the patient. ALL assessment sites listed below have been assessed. Areas assessed for pressure by both nurses:   [x]   Head, Face, and Ears   [x]   Shoulders, Back, and Chest, Abdomen  [x]   Arms, Elbows, and Hands   [x]   Coccyx, Sacrum, and Ischium  [x]   Legs, Feet, and Heels        Skin Assessed Under all Medical Devices by both nurses:  O2 device tubing              All Mepilex Borders were peeled back and area peeked at by both nurses:  No:    Please list where Mepilex Borders are located:  N/A             **SHARE this note so that the co-signing nurse is able to place an eSignature**    Co-signer eSignature: Electronically signed by Lisa Talley RN on 11/18/22 at 7:06 AM EST    Does the Patient have Skin Breakdown related to pressure? No     William Prevention initiated:  Yes   Wound Care Orders initiated:  No      [facility] nurse consulted for Pressure Injury (Stage 3,4, Unstageable, DTI, NWPT, Complex wounds)and New or Established Ostomies:  No      Primary Nurse eSignature: Electronically signed by Jennifer Niño RN on 11/18/22 at 7:02 AM EST     Patient is not able to demonstrate the ability to move from a reclining position to an upright position within the recliner due to weakness.

## 2022-11-18 NOTE — CARE COORDINATION
Case Management Assessment  Initial Evaluation      Patient Name: Mely Strickland  YOB: 1955  Diagnosis: Hyponatremia [E87.1]  Date / Time: 11/17/2022  2:54 PM    Admission status/Date: Inpatient 11/17/2022  Chart Reviewed: Yes      Patient Interviewed: Yes   Family Interviewed:  No      Hospitalization in the last 30 days:  No      Health Care Decision Maker :   Primary Decision Maker: Franciscoradha Crandall - [phone]    Secondary Decision Maker: [place] Noble Child - [phone]   Who do you trust or have selected to make healthcare decisions for you      Met with: Patient at bedside. Current PCP: Polina Ahumada MD     Financial  Commercial  Precert required for SNF : Y, N          3 night stay required - Y, N    ADLS  Support Systems/Care Needs: Children  Transportation: family    Meal Preparation: family    Housing  Living Arrangements: Lives in home with daughter, Johnanna Gitelman  Steps: w/c ramp  Intent for return to present living arrangements: Yes  Identified Issues: None at this time. Home Care Information  Active with Home Health Care : No Agency:(Services)  Type of Home Care Services: None  Passport/Waiver : No  :                      Phone Number:    Passport/Waiver Services: n/a           Durable Medical Equiptment   DME Provider: [employer] for home o2  Equipment:   Walker_x__Cane_x__RTS___ BSC___Shower Chair__x_Hospital Bed___W/C_x___Other________  02 at _2___Liter(s)---wears(frequency)__cont_____ HHN ___ CPAP___ BiPap___   N/A____      Home O2 Use :  Yes    If No for home O2---if presently on O2 during hospitalization:  Yes  if yes CM to follow for potential DC O2 need  Informed of need for care provider to bring portable home O2 tank on day of discharge for nursing to connect prior to leaving:   Yes  Verbalized agreement/Understanding:   Yes    Community Service Affiliation  Dialysis:  No    Agency:  Location:  Dialysis Schedule:  Phone:   Fax:     Other Community Services: n/a    DISCHARGE PLAN: Explained Case Management role/services. CM reviewed chart and met with patient at bedside to discuss discharge needs and plan. Patient lives in home with daughter, Irma Horvath. Patient requires minimal assist with adl's. Daughter available 24/7 and provides assistance as needed. Plans to return home at discharge. Uses home o2 at 2L continuous via [employer]. Spoke to Johan at [facility] who confirms home o2 order at 2L cont. SpO2 97% on 3L. Will need new home o2 orders if requirements exceed current home order at discharge.     Karen Leija, RN

## 2022-11-18 NOTE — PROGRESS NOTES
.      Dr. Jon Ochoa called and stated once D5 500 ml bolus finished to start D5 100 ml/hr and draw 1200 NA at 1100

## 2022-11-18 NOTE — PROGRESS NOTES
11/18/22 0016   NIV Type   Skin Assessment Clean, dry, & intact   Skin Protection for O2 Device Yes   Equipment Type V60   Mode Bilevel   Mask Type Full face mask   Mask Size Medium   Settings/Measurements   IPAP 12 cmH20   CPAP/EPAP 5 cmH2O   Vt (Measured) 457 mL   Rate Ordered 20   Resp 24   FiO2  35 %   Mask Leak (lpm) 19 lpm   Comfort Level Good   Using Accessory Muscles No   SpO2 95   Patient's Home Machine No   Alarm Settings   Alarms On Y   Oxygen Therapy/Pulse Ox   Heart Rate 94   SpO2 94 %

## 2022-11-18 NOTE — PROGRESS NOTES
11/18/22 0304   NIV Type   Equipment Type V60   Mode Bilevel   Mask Type Full face mask   Mask Size Medium   Settings/Measurements   IPAP 12 cmH20   CPAP/EPAP 5 cmH2O   Vt (Measured) 496 mL   Rate Ordered 20   Resp 22   FiO2  35 %   Mask Leak (lpm) 24 lpm   Comfort Level Good   Using Accessory Muscles No   SpO2 99   Patient's Home Machine No   Alarm Settings   Alarms On Y   Oxygen Therapy/Pulse Ox   Heart Rate 79   SpO2 99 %

## 2022-11-18 NOTE — PROGRESS NOTES
Admission assessment, completed, see flow sheet. Full bed bath with chlorhexidine wipes and bath wipes. Pt is alert and oriented x 4. Following commands. , /61, SpO2 96%. 5 L NC. Respirations are easy, even, and unlabored. Bilateral lung sounds diminished. External flood in place. Pt denies any pain. Family at bedside. Call light within reach. Bed in lowest position. Bed alarm on.

## 2022-11-18 NOTE — PROGRESS NOTES
Reassessment completed, see flowsheets, unchanged from prior. VSS. Continues on 3L/NC     All ICU Lines and monitoring remain in place. Bed locked in lowest position. Call light within reach.

## 2022-11-18 NOTE — PROGRESS NOTES
[facility])   Nephrology Note  [phone] / [phone] / [phone]   www. [employer]    - Patient: Rosales Ziegler (MRN: [id number]). - Nephrology Consults was called for: Hyponatremia.  - Patient's chart was reviewed. Full consult note to follow. - Initial Recommendations:    - Disposition: inpatient admission.   - Trend labs. - Discussed with ICU RN. A&Ox4 now reportedly. - Labs q4hrs   - Not sure why on Bipap. - Continue mIVF. Thank you for consulting [facility]. Full Consult to follow. Please call with any questions or concerns. Vinayak Torres MD  [facility])  [phone]  www. [employer]  11/17/2022, 7:58 PM

## 2022-11-18 NOTE — PROGRESS NOTES
Shift assessment, completed, see flow sheet. Pt is alert and oriented x 3, delayed responses. Following commands. SR 83, /91 (104), SpO2 96% on 3L/NC. Respirations are easy, even, and unlabored. Bilateral lung sounds diminished. 2 PIV, WNL with D5 500 ml bolus infusing. Evetta [place] in place and patent to 40 mmgHg suction     Call light within reach. Bed in lowest position. Bed alarm on.

## 2022-11-18 NOTE — PROGRESS NOTES
Dr. Candido López called and stated if most recent NA level comes back at 117 or higher then give 500 ml bolus of d5w over 1 hour.

## 2022-11-18 NOTE — PROGRESS NOTES
Care rounds completed with Dr. Nazario Beverly and multidisciplinary team. Reviewed labs, meds, VS, assessment, & plan of care for today. See dictated note and new orders for details.      Add mucinex and SSI  Add home dose amlodipine and lisinopril

## 2022-11-18 NOTE — CONSULTS
Patient is being seen at the request of Earnestine Hatchet, MD for a consultation for hyponatremia     HISTORY OF PRESENT ILLNESS:   61years old with history of COPD presented with altered mental status. Mild to moderate. Family found her confused lethargic yesterday morning. Not sure what makes better or worse. No associated syncope. Was given Solu-Medrol and DuoNeb for respiratory distress in route. In ED placed briefly on BiPAP. Labs revealed sodium 108, patient with history of SIADH and chronic hyponatremia. On home O2 3L. No smoking. PAST MEDICAL HISTORY:  Past Medical History:   Diagnosis Date    Angina pectoris (HCC)     Chronic pain     knees and lower back     COPD exacerbation ([address] 5/20/2018    Depression     Essential hypertension 8/15/2022    Panic disorder     Pneumonia      PAST SURGICAL HISTORY:  Past Surgical History:   Procedure Laterality Date    CHOLECYSTECTOMY      COLONOSCOPY         FAMILY HISTORY:  family history includes COPD in her mother; Hypertension in her mother. SOCIAL HISTORY:   reports that she quit smoking about 22 months ago. Her smoking use included cigarettes. She started smoking about 53 years ago. She has a 52.00 pack-year smoking history. She has been exposed to tobacco smoke. She has never used smokeless tobacco.    Scheduled Meds:   tiotropium-olodaterol  2 puff Inhalation Daily    sodium chloride flush  5-40 mL IntraVENous 2 times per day    enoxaparin  40 mg SubCUTAneous Daily     Continuous Infusions:   sodium chloride       PRN Meds:  hydrOXYzine HCl, sodium chloride flush, sodium chloride, ondansetron **OR** ondansetron, polyethylene glycol, acetaminophen **OR** acetaminophen, potassium chloride **OR** potassium alternative oral replacement **OR** potassium chloride    ALLERGIES:  Patient is allergic to cephalosporins, pcn [penicillins], and hctz [hydrochlorothiazide].     REVIEW OF SYSTEMS:  Constitutional: Negative for fever  HENT: Negative for sore throat  Eyes: Negative for redness   Respiratory: +dyspnea, cough  Cardiovascular: Negative for chest pain  Gastrointestinal: Negative for vomiting, diarrhea   Genitourinary: Negative for hematuria   Musculoskeletal: + arthralgias   Skin: Negative for rash  Neurological:+ AMS  Hematological: Negative for adenopathy  Psychiatric/Behavorial: Negative for anxiety    PHYSICAL EXAM:  Blood pressure 129/66, pulse 80, temperature 98.2 °F (36.8 °C), temperature source Axillary, resp. rate 16, height 5' 3\" (1.6 m), weight 159 lb 14.4 oz (72.5 kg), SpO2 99 %, not currently breastfeeding.' on 3LPM  Gen: No distress. Eyes: PERRL. No sclera icterus. No conjunctival injection. ENT: No discharge. Pharynx clear. Neck: Trachea midline. No obvious mass. Resp: No accessory muscle use. No crackles. Few wheezes. No rhonchi. No dullness on percussion. CV: Regular rate. Regular rhythm. No murmur or rub. No edema. GI: Non-tender. Non-distended. No hernia. Skin: Warm and dry. No nodule on exposed extremities. Lymph: No cervical LAD. No supraclavicular LAD. M/S: No cyanosis. No joint deformity. No clubbing. Neuro: Awake. Alert. Moves all four extremities. Psych: Oriented x 2. No anxiety. LABS:  CBC:   Recent Labs     11/17/22  1511 11/18/22  0344   WBC 4.6 2.6*   HGB 11.1* 11.5*   HCT 30.9* 32.9*   MCV 88.0 90.7    295     BMP:   Recent Labs     11/17/22  1944 11/17/22  2343 11/18/22  0344   * 114* 118*   K 3.3* 3.8 4.0   CL 73* 78* 81*   CO2 24 22 28   PHOS 2.7 2.1* 2.2*   BUN 10 8 8   CREATININE <0.5* <0.5* <0.5*     LIVER PROFILE:   Recent Labs     11/17/22  1511   AST 62*   ALT 26   BILITOT 1.0   ALKPHOS 113     PT/INR: No results for input(s): PROTIME, INR in the last 72 hours. APTT: No results for input(s): APTT in the last 72 hours.   UA:  Recent Labs     11/17/22  2245   COLORU Yellow   PHUR 6.5   WBCUA None seen   RBCUA 3-4   CLARITYU Clear   SPECGRAV <=1.005   LEUKOCYTESUR Negative UROBILINOGEN 0.2   BILIRUBINUR Negative   BLOODU TRACE-INTACT*   GLUCOSEU 500*   AMORPHOUS 2+     No results for input(s): PHART, TUY5IUR, PO2ART in the last 72 hours.   Chest x-ray 11/17 imaging was reviewed by me and showed   Left lung nodule  No acute infiltrates       ASSESSMENT:  Acute hyponatremia  Acute encephalopathy  Chronic hypoxemic respiratory failure on 3 L O2  KAMLA hypermetabolic pulmonary nodule post SBRT  Moderate COPD/emphysema-not in exacerbation  History of COVID-pneumonia August 2022    PLAN:  Supplemental oxygen to maintain SaO2 >92%; wean as tolerated  Inhaled bronchodilator therapy  Fluid management per nephrology   Acapella and Mucinex  Resume BP meds   Home medications were addressed   Blood sugar control ISS, with goal 150-180  DVT prophylaxis: Lovenox  MRSA prophylaxis: Bactroban

## 2022-11-18 NOTE — CONSULTS
[facility]  Nephrology Consult Note           Reason for Consult: Hyponatremia  Requesting Physician:  Dr. Sherman Rodriguez for    Chief Complaint:    Chief Complaint   Patient presents with    Shortness of Breath     Pt brought in by EMS for SOB, and AMS. Pt given 125 solumedrol due to SOB. Pt wears 3.5 lpm via NC at home. Last known normal this morning by family. Family called life squad. Altered Mental Status     History of Present Illness on 11/18/2022:    61 y.o. yo female with PMH of COPD, hypertension, hyponatremia who is admitted for increased shortness of breath and nephrology has been consulted for hyponatremia. Her presenting sodium was 108. Patient developed shortness of breath and EMS was called during transfer, patient was given IV Solu-Medrol. She has chronic respiratory failure and is on 3.5 L of oxygen per minute  She also was mildly confused  During rounds, patient is awake alert and reports that she drinks a lot of water    Past Medical History:        Diagnosis Date    Angina pectoris ([facility] [address]     Chronic pain     knees and lower back     COPD exacerbation ([facility] [address] 5/20/2018    Depression     Essential hypertension 8/15/2022    Panic disorder     Pneumonia        Past Surgical History:        Procedure Laterality Date    CHOLECYSTECTOMY      COLONOSCOPY         Home Medications:    No current facility-administered medications on file prior to encounter.      Current Outpatient Medications on File Prior to Encounter   Medication Sig Dispense Refill    meloxicam (MOBIC) 15 MG tablet Take 15 mg by mouth daily      ANORO ELLIPTA 62.5-25 MCG/INH AEPB inhaler INHALE ONE DOSE BY MOUTH DAILY 60 each 5    budesonide-formoterol (SYMBICORT) 80-4.5 MCG/ACT AERO Inhale 2 puffs into the lungs 2 times daily 1 each 0    escitalopram (LEXAPRO) 10 MG tablet Take 1 tablet by mouth at bedtime Dose decreased 30 tablet 0    lidocaine 4 % external patch Place 1 patch onto the skin daily 30 each 0    [DISCONTINUED] furosemide (LASIX) 40 MG tablet Take 40 mg by mouth daily      loperamide (IMODIUM) 2 MG capsule Take 1 capsule by mouth 4 times daily as needed for Diarrhea (pt takes once daily q AM)      amLODIPine (NORVASC) 5 MG tablet Take 1 tablet by mouth daily 30 tablet 1    guaiFENesin (MUCINEX) 600 MG extended release tablet Take 1 tablet by mouth 2 times daily (Patient not taking: Reported on 11/17/2022) 30 tablet 0    lisinopril (PRINIVIL;ZESTRIL) 10 MG tablet Take 10 mg by mouth daily      acetaminophen (TYLENOL) 500 MG tablet Take 500 mg by mouth every 6 hours as needed for Pain      alendronate (FOSAMAX) 70 MG tablet Take 70 mg by mouth every 7 days Takes every Monday      Respiratory Therapy Supplies (NEBULIZER/TUBING/MOUTHPIECE) KIT Use with accuneb 1 kit 0    albuterol (ACCUNEB) 1.25 MG/3ML nebulizer solution Inhale 3 mLs into the lungs every 6 hours as needed for Wheezing 120 mL 3    albuterol sulfate  (90 Base) MCG/ACT inhaler Inhale 2 puffs into the lungs every 6 hours as needed for Wheezing orShortness of Breath 3 Inhaler 5    [DISCONTINUED] Biotin 300 MCG TABS Take 1 tablet by mouth daily (Patient not taking: Reported on 8/28/2022) 30 tablet 3    cetirizine (ZYRTEC) 10 MG tablet Take 10 mg by mouth daily      hydrOXYzine (ATARAX) 25 MG tablet Take 25 mg by mouth 3 times daily as needed for Itching      Multiple Vitamins-Minerals (CENTRUM SILVER 50+WOMEN PO) Take 1 tablet by mouth daily      [DISCONTINUED] oxybutynin (DITROPAN) 5 MG tablet Take 5 mg by mouth 4 times daily (Patient not taking: No sig reported)         Allergies:  Cephalosporins, Pcn [penicillins], and Hctz [hydrochlorothiazide]    Social History:    Social History     Socioeconomic History    Marital status:       Spouse name: Not on file    Number of children: 6    Years of education: Not on file    Highest education level: Not on file   Occupational History    Not on file   Tobacco Use    Smoking status: Former     Packs/day: 1.00     Years: 52.00     Pack years: 52.00     Types: Cigarettes     Start date: 58     Quit date:      Years since quittin.8     Passive exposure: Past    Smokeless tobacco: Never   Vaping Use    Vaping Use: Never used   Substance and Sexual Activity    Alcohol use: Not Currently     Alcohol/week: 12.0 standard drinks     Types: 12 Cans of beer per week    Drug use: No    Sexual activity: Never   Other Topics Concern    Not on file   Social History Narrative    Not on file     Social Determinants of Health     Financial Resource Strain: Not on file   Food Insecurity: Not on file   Transportation Needs: Not on file   Physical Activity: Not on file   Stress: Not on file   Social Connections: Not on file   Intimate Partner Violence: Not on file   Housing Stability: Not on file       Family History:   Family History   Problem Relation Age of Onset    COPD Mother     Hypertension Mother     Asthma Neg Hx     Cancer Neg Hx     Diabetes Neg Hx     Emphysema Neg Hx     Heart Failure Neg Hx        Review of Systems:   Pertinent positives stated above in HPI. All other 10 systems were reviewed and were negative.      Physical exam:   Constitutional:  VITALS:  /61   Pulse 78   Temp 97.5 °F (36.4 °C) (Oral)   Resp 18   Ht 5' 3\" (1.6 m)   Wt 159 lb 14.4 oz (72.5 kg)   SpO2 90%   BMI 28.33 kg/m²   Gen: alert, awake  Neck: No JVD  Skin: Unremarkable  Cardiovascular:  S1, S2 without m/r/g   Respiratory: CTA B without w/r/r; respiratory effort normal  Abdomen:  soft, nt, nd,   Extremities: no lower extremity edema  Neuro/Psy: AAoriented times 3 ; moves all 4 ext    Data/  Recent Labs     22  1511 22  0344   WBC 4.6 2.6*   HGB 11.1* 11.5*   HCT 30.9* 32.9*   MCV 88.0 90.7    295     Recent Labs     22  1511 22  1944 22  0344 22  0742 22  1106 22  1453   *   < > 118* 121* 118*  --    K 3.9   < > 4.0 4.6 4.2  --    CL 72*   < > 81* 86* 83*  --    CO2 26   < > 28 24 26 28   GLUCOSE 132*   < > 188* 160* 127*  --    PHOS  --    < > 2.2* 2.1* 2.0* 2.0*   MG 1.50*  --  2.30  --   --   --    BUN 11   < > 8 8 11 14   CREATININE <0.5*   < > <0.5* <0.5* 0.6 0.6   LABGLOM >60   < > >60 >60 >60 >60    < > = values in this interval not displayed. Urine sodium less than 20 and urine osmolality 157  TSH of 0.11 in August 2022  Assessment  -Acute on chronic hyponatremia. Baseline sodium of around 125-128   Sodium on admission on 11/17/2022 at 1500 was 108    - COPD  -Acute metabolic encephalopathy      Plan  -Patient responded with IV normal saline and rapidly corrected up to 121 after which it was stopped and D5W has been given. -Call nephrology for sodium level less than 115 and or more than 122  -Goal sodium of 124 by 1500 on 11/19  -Serial labs  -TSH  -Fluid restriction 1500 mL/day   -Encourage protein intake    Thank you for the consultation. Please do not hesitate to call with questions. Aquilino Taveras MD  Office: [phone]  Fax:    [phone]  [facility]. [facility]

## 2022-11-18 NOTE — H&P
Hospital Medicine History & Physical      PCP: Emilee Baca MD    Date of Admission: 11/17/2022    Date of Service: Pt seen/examined on 11/17/2022    Chief Complaint: Altered mental status    History Of Present Illness:    61 y.o. female who presented to the hospital with a chief complaint of altered mental status. Patient apparently woke up this morning at a baseline and family later on found her confused and lethargic. [facility] and she was brought to the emergency department by EMS. They stated that when they saw the patient she appeared to be in respiratory distress, she was given a dose of Solu-Medrol in route and duo nebs as there was a concern for COPD exacerbation. When she presented to the emergency department she was altered and did not necessarily have any respiratory distress even though she was placed on BiPAP for short while. Further work-up in emergency department revealed a sodium level of 108. Patient has a history of SIADH and chronic hyponatremia. She was admitted to the ICU for further care. When I saw the patient she was still confused but was arousable and directable. She has since been started on a saline drip for her hyponatremia. Past Medical History:        Diagnosis Date    Angina pectoris ([address]     Chronic pain     knees and lower back     COPD exacerbation ([address] 5/20/2018    Depression     Essential hypertension 8/15/2022    Panic disorder     Pneumonia        Past Surgical History:          Procedure Laterality Date    CHOLECYSTECTOMY      COLONOSCOPY         Medications Prior to Admission:      Prior to Admission medications    Medication Sig Start Date End Date Taking?  Authorizing Provider   meloxicam (MOBIC) 15 MG tablet Take 15 mg by mouth daily   Yes Historical Provider, MD Ted Burns ELLIPTA 62.5-25 MCG/INH AEPB inhaler INHALE ONE DOSE BY MOUTH DAILY 10/3/22   Junie Weinstein PA-C   budesonide-formoterol (SYMBICORT) 80-4.5 MCG/ACT AERO Inhale 2 puffs into the lungs 2 times daily 9/1/22   Sam Daniels MD   escitalopram (LEXAPRO) 10 MG tablet Take 1 tablet by mouth at bedtime Dose decreased 9/1/22   Sam Daniels MD   lidocaine 4 % external patch Place 1 patch onto the skin daily 9/2/22   Sam Daniels MD   furosemide (LASIX) 40 MG tablet Take 40 mg by mouth daily  9/1/22  Historical Provider, MD   loperamide (IMODIUM) 2 MG capsule Take 1 capsule by mouth 4 times daily as needed for Diarrhea (pt takes once daily q AM) 8/18/22   Sam Daniels MD   amLODIPine (NORVASC) 5 MG tablet Take 1 tablet by mouth daily 8/18/22   Sam Daniels MD   guaiFENesin (MUCINEX) 600 MG extended release tablet Take 1 tablet by mouth 2 times daily  Patient not taking: Reported on 11/17/2022 8/18/22   Sam Daniels MD   lisinopril (PRINIVIL;ZESTRIL) 10 MG tablet Take 10 mg by mouth daily    Historical Provider, MD   acetaminophen (TYLENOL) 500 MG tablet Take 500 mg by mouth every 6 hours as needed for Pain    Historical Provider, MD   alendronate (FOSAMAX) 70 MG tablet Take 70 mg by mouth every 7 days Takes every Monday 3/14/22   Historical Provider, MD   Respiratory Therapy Supplies (NEBULIZER/TUBING/MOUTHPIECE) KIT Use with accuneb 12/15/21   Lizzeth Christianson MD   albuterol (ACCUNEB) 1.25 MG/3ML nebulizer solution Inhale 3 mLs into the lungs every 6 hours as needed for Wheezing 12/15/21   Lizzeth Christianson MD   albuterol sulfate  (90 Base) MCG/ACT inhaler Inhale 2 puffs into the lungs every 6 hours as needed for Wheezing orShortness of Breath 8/27/21   Michele Holder MD   Biotin 300 MCG TABS Take 1 tablet by mouth daily  Patient not taking: Reported on 8/28/2022 4/12/21 9/1/22  Robbi James MD   cetirizine (ZYRTEC) 10 MG tablet Take 10 mg by mouth daily    Historical Provider, MD   hydrOXYzine (ATARAX) 25 MG tablet Take 25 mg by mouth 3 times daily as needed for Itching    Historical Provider, MD   Multiple Vitamins-Minerals (CENTRUM SILVER 50+WOMEN PO) Take 1 tablet by mouth daily    Historical Provider, MD   oxybutynin (DITROPAN) 5 MG tablet Take 5 mg by mouth 4 times daily  Patient not taking: No sig reported  9/1/22  Historical Provider, MD       Allergies:  Cephalosporins, Pcn [penicillins], and Hctz [hydrochlorothiazide]    Social History:      TOBACCO:   reports that she quit smoking about 22 months ago. Her smoking use included cigarettes. She started smoking about 53 years ago. She has a 52.00 pack-year smoking history. She has been exposed to tobacco smoke. She has never used smokeless tobacco.  ETOH:   reports that she does not currently use alcohol after a past usage of about 12.0 standard drinks per week. Family History:        Problem Relation Age of Onset    COPD Mother     Hypertension Mother     Asthma Neg Hx     Cancer Neg Hx     Diabetes Neg Hx     Emphysema Neg Hx     Heart Failure Neg Hx        REVIEW OF SYSTEMS:   Cannot obtain given patient's mental status    PHYSICAL EXAM:  /64   Pulse 100   Temp 97.6 °F (36.4 °C) (Axillary)   Resp 16   Ht 5' 3\" (1.6 m)   Wt 163 lb 9.6 oz (74.2 kg)   SpO2 98%   BMI 28.98 kg/m²   General appearance: Lethargic and ill-appearing female, incoherent but arousable. HEENT:  Normal cephalic, atraumatic without obvious deformity. Pupils equal, round, and reactive to light. Extra ocular muscles intact. Conjunctivae/corneas clear. Neck: Supple, with full range of motion. No jugular venous distention. Trachea midline. Respiratory:  Normal respiratory effort. Clear to auscultation, bilaterally without Rales/Wheezes/Rhonchi. Cardiovascular: Tachycardic  Abdomen: Soft, non-tender, non-distended with normal bowel sounds. Musculoskeletal:  No clubbing, cyanosis or edema bilaterally. Full range of motion without deformity. Skin: Skin color, texture, turgor normal.  No rashes or lesions. Neurologic: Alert to self and place, follows basic commands.   Nonfocal neuro examination. Psychiatric: Altered and mildly confused. Capillary Refill: Brisk,< 3 seconds   Peripheral Pulses: +2 palpable, equal bilaterally       Labs:   Recent Labs     11/17/22  1511   WBC 4.6   HGB 11.1*   HCT 30.9*        Recent Labs     11/17/22  1511 11/17/22  1944   * 110*   K 3.9 3.3*   CL 72* 73*   CO2 26 24   BUN 11 10   CREATININE <0.5* <0.5*   CALCIUM 8.7 8.9   PHOS  --  2.7     Recent Labs     11/17/22  1511   AST 62*   ALT 26   BILITOT 1.0   ALKPHOS 113     No results for input(s): INR in the last 72 hours. Recent Labs     11/17/22  1511   TROPONINI <0.01       Urinalysis:      Lab Results   Component Value Date/Time    NITRU POSITIVE 11/17/2022 10:45 PM    WBCUA None seen 11/17/2022 10:45 PM    BACTERIA Rare 12/10/2021 07:10 AM    RBCUA 3-4 11/17/2022 10:45 PM    BLOODU TRACE-INTACT 11/17/2022 10:45 PM    SPECGRAV <=1.005 11/17/2022 10:45 PM    GLUCOSEU 500 11/17/2022 10:45 PM       Radiology:   XR CHEST PORTABLE   Final Result   Left lung nodule that is suspicious for malignancy until proven otherwise. Severe pulmonary emphysema and associated interstitial pulmonary fibrosis. Pulmonary artery hypertension. Possible osteoporosis. CT Head W/O Contrast   Final Result   No acute intracranial abnormality. XR CHEST PORTABLE   Final Result   No radiographic evidence of acute pulmonary disease. ASSESSMENT:  Acute on chronic hyponatremia  Metabolic encephalopathy  Chronic respiratory failure on home oxygen  Lung nodule, concern for malignancy  Hypertension  Anxiety disorder/depression  COPD/emphysema  Osteoarthritis    PLAN:  Patient with history of SIADH presenting with altered mental status that could be related to hyponatremia. She has since been admitted to the ICU and been started on IV hydration. Nephrology has been consulted as well as intensivist.  -Free water restriction, continue NS.   Managed by nephrology, goal to correct 10 mEq in 24 hours.  Strict I's and O's. Liberalize salt intake.  -Continue supplemental oxygen therapy. Intensivist consulted, may need lung biopsy evaluated and possibly biopsied.   -Hold sedating medications  -Resume rest of home meds      DVT Prophylaxis: Lovenox  Diet: Diet NPO  Code Status: Full Code    Dispo -admit to the ICU      Thank you for the opportunity to be involved in this patient's care.       (Please note that portions of this note were completed with a voice recognition program. Efforts were made to edit the dictations but occasionally words are mis-transcribed.)

## 2022-11-18 NOTE — PROGRESS NOTES
Reassessment completed, see flowsheets, unchanged from prior. VSS. D5 100 ml/hr. All ICU Lines and monitoring remain in place. Bed locked in lowest position. Call light within reach.

## 2022-11-18 NOTE — PROGRESS NOTES
Latest Reference Range & Units 11/18/22 14:53   Sodium 136 - 145 mmol/L 117 (LL)   (LL): Data is critically low    Dr. Vale White aware, new orders to call nephrology if NA <115 or greater >122, goal   at 1500 on 11/19.  Fluid restriction 1500 ml

## 2022-11-19 LAB
ALBUMIN SERPL-MCNC: 3.9 G/DL (ref 3.4–5)
ALBUMIN SERPL-MCNC: 4 G/DL (ref 3.4–5)
ALBUMIN SERPL-MCNC: 4 G/DL (ref 3.4–5)
ALBUMIN SERPL-MCNC: 4.3 G/DL (ref 3.4–5)
ANION GAP SERPL CALCULATED.3IONS-SCNC: 10 MMOL/L (ref 3–16)
ANION GAP SERPL CALCULATED.3IONS-SCNC: 11 MMOL/L (ref 3–16)
ANION GAP SERPL CALCULATED.3IONS-SCNC: 8 MMOL/L (ref 3–16)
ANION GAP SERPL CALCULATED.3IONS-SCNC: 9 MMOL/L (ref 3–16)
BASOPHILS ABSOLUTE: 0 K/UL (ref 0–0.2)
BASOPHILS RELATIVE PERCENT: 0.1 %
BUN BLDV-MCNC: 11 MG/DL (ref 7–20)
BUN BLDV-MCNC: 11 MG/DL (ref 7–20)
BUN BLDV-MCNC: 12 MG/DL (ref 7–20)
BUN BLDV-MCNC: 7 MG/DL (ref 7–20)
CALCIUM SERPL-MCNC: 8.2 MG/DL (ref 8.3–10.6)
CALCIUM SERPL-MCNC: 8.3 MG/DL (ref 8.3–10.6)
CALCIUM SERPL-MCNC: 8.4 MG/DL (ref 8.3–10.6)
CALCIUM SERPL-MCNC: 9.3 MG/DL (ref 8.3–10.6)
CHLORIDE BLD-SCNC: 85 MMOL/L (ref 99–110)
CHLORIDE BLD-SCNC: 86 MMOL/L (ref 99–110)
CHLORIDE BLD-SCNC: 88 MMOL/L (ref 99–110)
CHLORIDE BLD-SCNC: 90 MMOL/L (ref 99–110)
CO2: 24 MMOL/L (ref 21–32)
CO2: 27 MMOL/L (ref 21–32)
CO2: 27 MMOL/L (ref 21–32)
CO2: 29 MMOL/L (ref 21–32)
CREAT SERPL-MCNC: <0.5 MG/DL (ref 0.6–1.2)
EOSINOPHILS ABSOLUTE: 0 K/UL (ref 0–0.6)
EOSINOPHILS RELATIVE PERCENT: 0.5 %
GFR SERPL CREATININE-BSD FRML MDRD: >60 ML/MIN/{1.73_M2}
GLUCOSE BLD-MCNC: 103 MG/DL (ref 70–99)
GLUCOSE BLD-MCNC: 103 MG/DL (ref 70–99)
GLUCOSE BLD-MCNC: 109 MG/DL (ref 70–99)
GLUCOSE BLD-MCNC: 119 MG/DL (ref 70–99)
GLUCOSE BLD-MCNC: 122 MG/DL (ref 70–99)
GLUCOSE BLD-MCNC: 123 MG/DL (ref 70–99)
GLUCOSE BLD-MCNC: 123 MG/DL (ref 70–99)
GLUCOSE BLD-MCNC: 86 MG/DL (ref 70–99)
HCT VFR BLD CALC: 32 % (ref 36–48)
HEMOGLOBIN: 10.7 G/DL (ref 12–16)
LYMPHOCYTES ABSOLUTE: 1.1 K/UL (ref 1–5.1)
LYMPHOCYTES RELATIVE PERCENT: 15.8 %
MCH RBC QN AUTO: 31.5 PG (ref 26–34)
MCHC RBC AUTO-ENTMCNC: 33.4 G/DL (ref 31–36)
MCV RBC AUTO: 94.2 FL (ref 80–100)
MONOCYTES ABSOLUTE: 0.8 K/UL (ref 0–1.3)
MONOCYTES RELATIVE PERCENT: 11.4 %
NEUTROPHILS ABSOLUTE: 4.8 K/UL (ref 1.7–7.7)
NEUTROPHILS RELATIVE PERCENT: 72.2 %
PDW BLD-RTO: 13 % (ref 12.4–15.4)
PERFORMED ON: ABNORMAL
PERFORMED ON: NORMAL
PHOSPHORUS: 2.1 MG/DL (ref 2.5–4.9)
PHOSPHORUS: 2.7 MG/DL (ref 2.5–4.9)
PHOSPHORUS: 2.8 MG/DL (ref 2.5–4.9)
PHOSPHORUS: 3.2 MG/DL (ref 2.5–4.9)
PLATELET # BLD: 324 K/UL (ref 135–450)
PMV BLD AUTO: 7.2 FL (ref 5–10.5)
POTASSIUM SERPL-SCNC: 3.7 MMOL/L (ref 3.5–5.1)
POTASSIUM SERPL-SCNC: 3.9 MMOL/L (ref 3.5–5.1)
POTASSIUM SERPL-SCNC: 4.1 MMOL/L (ref 3.5–5.1)
POTASSIUM SERPL-SCNC: 4.1 MMOL/L (ref 3.5–5.1)
RBC # BLD: 3.4 M/UL (ref 4–5.2)
SODIUM BLD-SCNC: 121 MMOL/L (ref 136–145)
SODIUM BLD-SCNC: 121 MMOL/L (ref 136–145)
SODIUM BLD-SCNC: 125 MMOL/L (ref 136–145)
SODIUM BLD-SCNC: 127 MMOL/L (ref 136–145)
WBC # BLD: 6.7 K/UL (ref 4–11)

## 2022-11-19 PROCEDURE — 2580000003 HC RX 258: Performed by: INTERNAL MEDICINE

## 2022-11-19 PROCEDURE — 99232 SBSQ HOSP IP/OBS MODERATE 35: CPT | Performed by: INTERNAL MEDICINE

## 2022-11-19 PROCEDURE — 94640 AIRWAY INHALATION TREATMENT: CPT

## 2022-11-19 PROCEDURE — 94669 MECHANICAL CHEST WALL OSCILL: CPT

## 2022-11-19 PROCEDURE — 94660 CPAP INITIATION&MGMT: CPT

## 2022-11-19 PROCEDURE — 99233 SBSQ HOSP IP/OBS HIGH 50: CPT | Performed by: INTERNAL MEDICINE

## 2022-11-19 PROCEDURE — 85025 COMPLETE CBC W/AUTO DIFF WBC: CPT

## 2022-11-19 PROCEDURE — 6370000000 HC RX 637 (ALT 250 FOR IP): Performed by: INTERNAL MEDICINE

## 2022-11-19 PROCEDURE — 94761 N-INVAS EAR/PLS OXIMETRY MLT: CPT

## 2022-11-19 PROCEDURE — 2700000000 HC OXYGEN THERAPY PER DAY

## 2022-11-19 PROCEDURE — 6360000002 HC RX W HCPCS: Performed by: INTERNAL MEDICINE

## 2022-11-19 PROCEDURE — 80069 RENAL FUNCTION PANEL: CPT

## 2022-11-19 PROCEDURE — 2060000000 HC ICU INTERMEDIATE R&B

## 2022-11-19 PROCEDURE — 36415 COLL VENOUS BLD VENIPUNCTURE: CPT

## 2022-11-19 RX ORDER — IPRATROPIUM BROMIDE AND ALBUTEROL SULFATE 2.5; .5 MG/3ML; MG/3ML
1 SOLUTION RESPIRATORY (INHALATION) 4 TIMES DAILY
Status: DISCONTINUED | OUTPATIENT
Start: 2022-11-19 | End: 2022-11-21

## 2022-11-19 RX ADMIN — ENOXAPARIN SODIUM 40 MG: 100 INJECTION SUBCUTANEOUS at 09:01

## 2022-11-19 RX ADMIN — GUAIFENESIN 600 MG: 600 TABLET, EXTENDED RELEASE ORAL at 09:01

## 2022-11-19 RX ADMIN — Medication 10 ML: at 21:00

## 2022-11-19 RX ADMIN — MUPIROCIN: 20 OINTMENT TOPICAL at 09:01

## 2022-11-19 RX ADMIN — DIBASIC SODIUM PHOSPHATE, MONOBASIC POTASSIUM PHOSPHATE AND MONOBASIC SODIUM PHOSPHATE 2 TABLET: 852; 155; 130 TABLET ORAL at 09:01

## 2022-11-19 RX ADMIN — Medication 10 ML: at 09:01

## 2022-11-19 RX ADMIN — AMLODIPINE BESYLATE 5 MG: 5 TABLET ORAL at 09:01

## 2022-11-19 RX ADMIN — IPRATROPIUM BROMIDE AND ALBUTEROL SULFATE 1 AMPULE: 2.5; .5 SOLUTION RESPIRATORY (INHALATION) at 20:03

## 2022-11-19 RX ADMIN — ACETAMINOPHEN 650 MG: 325 TABLET ORAL at 09:06

## 2022-11-19 RX ADMIN — IPRATROPIUM BROMIDE AND ALBUTEROL SULFATE 1 AMPULE: 2.5; .5 SOLUTION RESPIRATORY (INHALATION) at 08:07

## 2022-11-19 RX ADMIN — IPRATROPIUM BROMIDE AND ALBUTEROL SULFATE 1 AMPULE: 2.5; .5 SOLUTION RESPIRATORY (INHALATION) at 11:28

## 2022-11-19 RX ADMIN — IPRATROPIUM BROMIDE AND ALBUTEROL SULFATE 1 AMPULE: 2.5; .5 SOLUTION RESPIRATORY (INHALATION) at 15:15

## 2022-11-19 RX ADMIN — GUAIFENESIN 600 MG: 600 TABLET, EXTENDED RELEASE ORAL at 21:00

## 2022-11-19 RX ADMIN — MUPIROCIN: 20 OINTMENT TOPICAL at 21:00

## 2022-11-19 RX ADMIN — ACETAMINOPHEN 650 MG: 325 TABLET ORAL at 17:16

## 2022-11-19 RX ADMIN — DIBASIC SODIUM PHOSPHATE, MONOBASIC POTASSIUM PHOSPHATE AND MONOBASIC SODIUM PHOSPHATE 2 TABLET: 852; 155; 130 TABLET ORAL at 17:13

## 2022-11-19 RX ADMIN — TIOTROPIUM BROMIDE AND OLODATEROL 2 PUFF: 3.124; 2.736 SPRAY, METERED RESPIRATORY (INHALATION) at 08:07

## 2022-11-19 RX ADMIN — ACETAMINOPHEN 650 MG: 325 TABLET ORAL at 22:53

## 2022-11-19 RX ADMIN — LISINOPRIL 10 MG: 10 TABLET ORAL at 09:01

## 2022-11-19 RX ADMIN — VASOPRESSIN: 20 INJECTION, SOLUTION INTRAVENOUS at 13:49

## 2022-11-19 ASSESSMENT — PAIN DESCRIPTION - DESCRIPTORS: DESCRIPTORS: ACHING

## 2022-11-19 ASSESSMENT — PAIN DESCRIPTION - LOCATION
LOCATION: HEAD

## 2022-11-19 ASSESSMENT — PAIN SCALES - GENERAL
PAINLEVEL_OUTOF10: 6
PAINLEVEL_OUTOF10: 5
PAINLEVEL_OUTOF10: 7

## 2022-11-19 NOTE — PROGRESS NOTES
11/19/22 0100   RT Protocol   History Pulmonary Disease 2   Respiratory pattern 0   Breath sounds 2   Cough 0   Indications for Bronchodilator Therapy Decreased or absent breath sounds   Bronchodilator Assessment Score 4   RT Inhaler-Nebulizer Bronchodilator Protocol Note    There is a bronchodilator order in the chart from a provider indicating to follow the RT Bronchodilator Protocol and there is an Initiate RT Inhaler-Nebulizer Bronchodilator Protocol order as well (see protocol at bottom of note). CXR Findings:  XR CHEST PORTABLE    Result Date: 11/17/2022  Left lung nodule that is suspicious for malignancy until proven otherwise. Severe pulmonary emphysema and associated interstitial pulmonary fibrosis. Pulmonary artery hypertension. Possible osteoporosis. XR CHEST PORTABLE    Result Date: 11/17/2022  No radiographic evidence of acute pulmonary disease. The findings from the last RT Protocol Assessment were as follows:   History Pulmonary Disease: Chronic pulmonary disease  Respiratory Pattern: Regular pattern and RR 12-20 bpm  Breath Sounds: Slightly diminished and/or crackles  Cough: Strong, spontaneous, non-productive  Indication for Bronchodilator Therapy: Decreased or absent breath sounds  Bronchodilator Assessment Score: 4    Aerosolized bronchodilator medication orders have been revised according to the RT Inhaler-Nebulizer Bronchodilator Protocol below. Respiratory Therapist to perform RT Therapy Protocol Assessment initially then follow the protocol. Repeat RT Therapy Protocol Assessment PRN for score 0-3 or on second treatment, BID, and PRN for scores above 3. No Indications - adjust the frequency to every 6 hours PRN wheezing or bronchospasm, if no treatments needed after 48 hours then discontinue using Per Protocol order mode. If indication present, adjust the RT bronchodilator orders based on the Bronchodilator Assessment Score as indicated below.   Use Inhaler orders unless patient has one or more of the following: on home nebulizer, not able to hold breath for 10 seconds, is not alert and oriented, cannot activate and use MDI correctly, or respiratory rate 25 breaths per minute or more, then use the equivalent nebulizer order(s) with same Frequency and PRN reasons based on the score. If a patient is on this medication at home then do not decrease Frequency below that used at home. 0-3 - enter or revise RT bronchodilator order(s) to equivalent RT Bronchodilator order with Frequency of every 4 hours PRN for wheezing or increased work of breathing using Per Protocol order mode. 4-6 - enter or revise RT Bronchodilator order(s) to two equivalent RT bronchodilator orders with one order with BID Frequency and one order with Frequency of every 4 hours PRN wheezing or increased work of breathing using Per Protocol order mode. 7-10 - enter or revise RT Bronchodilator order(s) to two equivalent RT bronchodilator orders with one order with TID Frequency and one order with Frequency of every 4 hours PRN wheezing or increased work of breathing using Per Protocol order mode. 11-13 - enter or revise RT Bronchodilator order(s) to one equivalent RT bronchodilator order with QID Frequency and an Albuterol order with Frequency of every 4 hours PRN wheezing or increased work of breathing using Per Protocol order mode. Greater than 13 - enter or revise RT Bronchodilator order(s) to one equivalent RT bronchodilator order with every 4 hours Frequency and an Albuterol order with Frequency of every 2 hours PRN wheezing or increased work of breathing using Per Protocol order mode.        Electronically signed by Radha Blake RCP on 11/19/2022 at 1:32 AM

## 2022-11-19 NOTE — PROGRESS NOTES
11/18/22 2337   NIV Type   Skin Protection for O2 Device Yes   Equipment Type v60   Mode Bilevel   Mask Type Full face mask   Mask Size Medium   Settings/Measurements   IPAP 12 cmH20   CPAP/EPAP 5 cmH2O   Vt (Measured) 738 mL   Rate Ordered 20   Resp 23   FiO2  35 %   Minute Volume (L/min) 14 Liters   Mask Leak (lpm) 18 lpm   Comfort Level Good   Using Accessory Muscles No   SpO2 99   Patient's Home Machine No   Patient Observation   Observations spo2 99% on 35% bipap   Oxygen Therapy/Pulse Ox   Heart Rate 91   SpO2 90 %

## 2022-11-19 NOTE — PROGRESS NOTES
Care rounds completed with Dr. Heber Montes and multidisciplinary team. Reviewed labs, meds, VS, assessment, & plan of care for today. See dictated note and new orders for details.      Okay to move out of ICU

## 2022-11-19 NOTE — PROGRESS NOTES
Pt had NA -117 at 2102, f/u NA -121 @ 0232. Next draw 830AM. Pt currently on fluid restriction 1500, received 360 this shift. Pt does ask frequently for something to drink. Pt wore bipap at night, fio2 of 30%. Pt did desat into the mid 80's on 3L, difficulty recovering, increased O2 to 5L.

## 2022-11-19 NOTE — PROGRESS NOTES
Pulmonary Progress Note    CC: Acute encephalopathy    Subjective:   4LPM- uses 3-4 L at home   Still with SOB  BiPAP overnight- slept well       IV line: PIVs    Intake/Output Summary (Last 24 hours) at 11/19/2022 0744  Last data filed at 11/19/2022 0600  Gross per 24 hour   Intake 2142.74 ml   Output 2400 ml   Net -257.26 ml       Exam:   /75   Pulse 66   Temp 98.1 °F (36.7 °C) (Axillary)   Resp 17   Ht 5' 3\" (1.6 m)   Wt 159 lb 14.4 oz (72.5 kg)   SpO2 97%   BMI 28.33 kg/m²  on 4LPM   Gen: No distress. Eyes: PERRL. No sclera icterus. No conjunctival injection. ENT: No discharge. Pharynx clear. Neck: Trachea midline. Normal thyroid. Resp: + accessory muscle use. No crackles. Few wheezes. No rhonchi. No dullness on percussion. CV: Regular rate. Regular rhythm. No murmur or rub. No edema. GI: Non-tender. Non-distended. No masses. No organomegaly. Normal bowel sounds. No hernia. Skin: Warm and dry. No nodule on exposed extremities. No rash on exposed extremities. Lymph: No cervical LAD. No supraclavicular LAD. M/S: No cyanosis. No joint deformity. No clubbing. Neuro: Awake. Moves all extremities. CN grossly intact. Psych: Oriented x 3. No anxiety or agitation.      Scheduled Meds:   ipratropium-albuterol  1 ampule Inhalation 4x daily    mupirocin   Nasal BID    guaiFENesin  600 mg Oral BID    insulin lispro  0-8 Units SubCUTAneous TID     insulin lispro  0-4 Units SubCUTAneous Nightly    lisinopril  10 mg Oral Daily    amLODIPine  5 mg Oral Daily    phosphorus  500 mg Oral BID    tiotropium-olodaterol  2 puff Inhalation Daily    sodium chloride flush  5-40 mL IntraVENous 2 times per day    enoxaparin  40 mg SubCUTAneous Daily     Continuous Infusions:   dextrose      sodium chloride       PRN Meds:  glucose, dextrose bolus **OR** dextrose bolus, glucagon (rDNA), dextrose, hydrOXYzine HCl, sodium chloride flush, sodium chloride, ondansetron **OR** ondansetron, polyethylene glycol, acetaminophen **OR** acetaminophen, potassium chloride **OR** potassium alternative oral replacement **OR** potassium chloride    Labs:  CBC:   Recent Labs     11/17/22  1511 11/18/22  0344   WBC 4.6 2.6*   HGB 11.1* 11.5*   HCT 30.9* 32.9*   MCV 88.0 90.7    295     BMP:   Recent Labs     11/18/22  1453 11/18/22  2102 11/19/22  0232   * 117* 121*   K 4.3 4.0 3.7   CL 82* 81* 85*   CO2 28 24 27   PHOS 2.0* 2.4* 2.7   BUN 14 12 11   CREATININE 0.6 0.6 <0.5*     LIVER PROFILE:   Recent Labs     11/17/22  1511   AST 62*   ALT 26   BILITOT 1.0   ALKPHOS 113     PT/INR: No results for input(s): PROTIME, INR in the last 72 hours. APTT: No results for input(s): APTT in the last 72 hours. UA:  Recent Labs     11/17/22  2245   COLORU Yellow   PHUR 6.5   WBCUA None seen   RBCUA 3-4   CLARITYU Clear   SPECGRAV <=1.005   LEUKOCYTESUR Negative   UROBILINOGEN 0.2   BILIRUBINUR Negative   BLOODU TRACE-INTACT*   GLUCOSEU 500*   AMORPHOUS 2+     No results for input(s): PHART, MYO0SSI, PO2ART in the last 72 hours.     Cultures:   11/17 COVID-19 not detected    Films:  Chest x-ray 11/17 imaging was reviewed by me and showed   Left lung nodule  No acute infiltrates       ASSESSMENT:  Acute hyponatremia  Acute encephalopathy  Chronic hypoxemic respiratory failure on 3 L O2  KAMLA hypermetabolic pulmonary nodule post SBRT  Moderate COPD/emphysema-not in exacerbation  History of COVID-pneumonia August 2022    PLAN:  Supplemental oxygen to maintain SaO2 >92%; wean as tolerated  Inhaled bronchodilator therapy  Fluid management per nephrology   Acapella and Mucinex  Resumed BP meds   Home medications were addressed   Will need repeat CT chest before discharge   Blood sugar control ISS, with goal 150-180  DVT prophylaxis: Lovenox  MRSA prophylaxis: Bactroban  Okay to move out of the ICU from our perspective

## 2022-11-19 NOTE — PROGRESS NOTES
Department of Internal Medicine  Nephrology Progress Note        SUBJECTIVE:    We are following this patient for Hyponatremia. The patient was seen and examined; she feels well today with no CP, SOB, nausea or vomiting. ROS: No fever or chills. Social: No family at bedside. Physical Exam:    VITALS:  /63   Pulse (!) 106   Temp 97.8 °F (36.6 °C) (Oral)   Resp 18   Ht 5' 3\" (1.6 m)   Wt 159 lb 14.4 oz (72.5 kg)   SpO2 92%   BMI 28.33 kg/m²     General appearance: Seems comfortable, no acute distress. Neck: Trachea midline, thyroid normal.   Lungs:  Non labored breathing, CTA to anterior auscultation. Heart:  S1S2 normal, rub or gallop. No peripheral edema. Abdomen: Soft, non-tender, no organomegaly. Skin: No lesions or rashes, warm to touch.      DATA:    CBC with Differential:    Lab Results   Component Value Date/Time    WBC 6.7 11/19/2022 02:32 AM    RBC 3.40 11/19/2022 02:32 AM    HGB 10.7 11/19/2022 02:32 AM    HCT 32.0 11/19/2022 02:32 AM     11/19/2022 02:32 AM    MCV 94.2 11/19/2022 02:32 AM    MCH 31.5 11/19/2022 02:32 AM    MCHC 33.4 11/19/2022 02:32 AM    RDW 13.0 11/19/2022 02:32 AM    SEGSPCT 63.4 02/07/2013 10:24 AM    BANDSPCT 1 12/15/2021 04:30 AM    LYMPHOPCT 15.8 11/19/2022 02:32 AM    MONOPCT 11.4 11/19/2022 02:32 AM    EOSPCT 0.2 08/30/2010 04:05 PM    BASOPCT 0.1 11/19/2022 02:32 AM    MONOSABS 0.8 11/19/2022 02:32 AM    LYMPHSABS 1.1 11/19/2022 02:32 AM    EOSABS 0.0 11/19/2022 02:32 AM    BASOSABS 0.0 11/19/2022 02:32 AM    DIFFTYPE Auto-K 02/07/2013 10:24 AM     BMP:    Lab Results   Component Value Date/Time     11/19/2022 09:05 AM    K 3.9 11/19/2022 09:05 AM    K 3.9 11/17/2022 03:11 PM    CL 86 11/19/2022 09:05 AM    CO2 24 11/19/2022 09:05 AM    BUN 7 11/19/2022 09:05 AM    LABALBU 4.3 11/19/2022 09:05 AM    CREATININE <0.5 11/19/2022 09:05 AM    CALCIUM 9.3 11/19/2022 09:05 AM    GFRAA >60 09/01/2022 05:24 AM    GFRAA >60 02/07/2013 10:24 AM LABGLOM >60 11/19/2022 09:05 AM    GLUCOSE 119 11/19/2022 09:05 AM       Assessment    -Acute on chronic hyponatremia.   Baseline sodium of around 125-128                Sodium on admission on 11/17/2022 at 1500 was 108     - COPD  -Acute metabolic encephalopathy        Plan    -Continue current IV fluid regimen   - Daily free water restriction   - Monitor serial labs

## 2022-11-19 NOTE — PROGRESS NOTES
Admit: 2022    Name:  Camilo Schulz  Room:  [facility]  MRN:    [id number]    Critical Care Daily Progress Note for 2022   Admitted with altered mental status and hyponatremia    Interval History:     Was on BiPAP last night  Currently on 4 L of oxygen   Scheduled Meds:   ipratropium-albuterol  1 ampule Inhalation 4x daily    mupirocin   Nasal BID    guaiFENesin  600 mg Oral BID    insulin lispro  0-8 Units SubCUTAneous TID WC    insulin lispro  0-4 Units SubCUTAneous Nightly    lisinopril  10 mg Oral Daily    amLODIPine  5 mg Oral Daily    phosphorus  500 mg Oral BID    tiotropium-olodaterol  2 puff Inhalation Daily    sodium chloride flush  5-40 mL IntraVENous 2 times per day    enoxaparin  40 mg SubCUTAneous Daily       Continuous Infusions:   dextrose      sodium chloride         PRN Meds:  glucose, dextrose bolus **OR** dextrose bolus, glucagon (rDNA), dextrose, hydrOXYzine HCl, sodium chloride flush, sodium chloride, ondansetron **OR** ondansetron, polyethylene glycol, acetaminophen **OR** acetaminophen, potassium chloride **OR** potassium alternative oral replacement **OR** potassium chloride                  Objective:     Temp  Av °F (36.7 °C)  Min: 98 °F (36.7 °C)  Max: 98.1 °F (36.7 °C)  Pulse  Av.8  Min: 66  Max: 99  BP  Min: 98/55  Max: 136/81  SpO2  Av.8 %  Min: 86 %  Max: 100 %  FiO2   Av %  Min: 30 %  Max: 35 %  Patient Vitals for the past 4 hrs:   BP Pulse Resp SpO2   22 1000 106/80 96 18 97 %   22 0901 131/82 -- -- --   22 0900 131/82 85 19 91 %   22 0800 116/63 67 17 100 %   22 0700 109/75 66 17 97 %           Intake/Output Summary (Last 24 hours) at 2022 1025  Last data filed at 2022 1000  Gross per 24 hour   Intake 2502.74 ml   Output 2850 ml   Net -347.26 ml         Physical Exam:  General:  Awake, alert and oriented.  Appears to be not in any distress  Mucous Membranes:  Pink , anicteric  Neck: No JVD, no carotid bruit, no thyromegaly  Chest: Normal respiratory effort, no accessory muscle, few wheezes, no crackles  Cardiovascular:  RRR S1S2 heard, no murmurs or gallops  Abdomen:  Soft, undistended, non tender, no organomegaly, BS present  Extremities: No edema or cyanosis. Distal pulses well felt  Neurological : no focal deficits    Lab Data:  CBC:   Recent Labs     11/17/22  1511 11/18/22  0344   WBC 4.6 2.6*   RBC 3.51* 3.63*   HGB 11.1* 11.5*   HCT 30.9* 32.9*   MCV 88.0 90.7   RDW 13.8 12.8    295       BMP:   Recent Labs     11/18/22  2102 11/19/22  0232 11/19/22  0905   * 121* 121*   K 4.0 3.7 3.9   CL 81* 85* 86*   CO2 24 27 24   PHOS 2.4* 2.7 2.1*   BUN 12 11 7   CREATININE 0.6 <0.5* <0.5*       BNP: No results for input(s): BNP in the last 72 hours. PT/INR: No results for input(s): PROTIME, INR in the last 72 hours. APTT:No results for input(s): APTT in the last 72 hours. CARDIAC ENZYMES:   Recent Labs     11/17/22  1511   TROPONINI <0.01       FASTING LIPID PANEL:  Lab Results   Component Value Date/Time    CHOL 154 09/15/2017 08:18 AM    HDL 81 09/15/2017 08:18 AM    HDL 64 05/14/2012 06:55 AM    TRIG 44 09/15/2017 08:18 AM     LIVER PROFILE:   Recent Labs     11/17/22  1511   AST 62*   ALT 26   BILITOT 1.0   ALKPHOS 113           XR CHEST PORTABLE   Final Result   Left lung nodule that is suspicious for malignancy until proven otherwise. Severe pulmonary emphysema and associated interstitial pulmonary fibrosis. Pulmonary artery hypertension. Possible osteoporosis. CT Head W/O Contrast   Final Result   No acute intracranial abnormality. XR CHEST PORTABLE   Final Result   No radiographic evidence of acute pulmonary disease.                Assessment & Plan:     Patient Active Problem List    Diagnosis Date Noted    Metabolic encephalopathy 23/74/3207    Acute encephalopathy 11/18/2022    Chronic hypoxemic respiratory failure ([address] 11/18/2022    Chronic respiratory failure with hypoxia ([address] 08/31/2022    Hypokalemia 08/29/2022    Low TSH level 08/29/2022    History of COVID-19 08/29/2022    Leukocytosis 08/29/2022    Hypertension 08/15/2022    COVID-19 08/15/2022    Pneumonia due to COVID-19 virus 08/14/2022    Community acquired pneumonia     Transaminitis 12/10/2021    Acute hypoxemic respiratory failure (HCC)     Pneumonia due to infectious organism     Pancreatic abnormality     Common bile duct dilatation     Acute on chronic respiratory failure with hypoxia ([address] 02/04/2021    Community acquired pneumonia of right lung     Acute exacerbation of chronic obstructive pulmonary disease (COPD) ([address]     Acute on chronic respiratory failure with hypoxia and hypercapnia ([address] 02/04/2020    Lung nodule     Pulmonary emphysema (HCC)     Hyponatremia 01/30/2020    Acute respiratory failure with hypoxia ([address] 05/21/2018    COPD exacerbation ([address] 05/20/2018    Moderate COPD (chronic obstructive pulmonary disease) ([address] 04/21/2016    Tobacco use 02/12/2016    Chest pain 10/14/2011    Shortness of breath 25/24/5385     Metabolic encephalopathy  Likely secondary to hyponatremia  Resolved    Acute on chronic hyponatremia  Admitted to the ICU. Seizure precautions. Nephrology consult. Continue normal saline for now.    follow sodium levels closely    COPD and chronic hypoxic respiratory failure  Stable on home O2  Continue inhalers    Lung nodule  Pulmonary consult    Hypertension  Stable  Continue amlodipine and lisinopril    Full code  General diet  Lovenox for DVT prophylaxis    Transfer out of Ladarius Chu MD

## 2022-11-19 NOTE — PROGRESS NOTES
Shift assessment, completed, see flow sheet. Pt is alert and oriented x 2-3. Following commands. SR 85, /82 (96), SpO2 91% 4L/NC. Respirations are easy, even, and unlabored. Bilateral lung sounds diminished. PIV, WNL, saline locked. Purewick in place and patent to 40 mmgHg. Call light within reach. Bed in lowest position. Bed alarm on.

## 2022-11-19 NOTE — PROGRESS NOTES
RT Inhaler-Nebulizer Bronchodilator Protocol Note    There is a bronchodilator order in the chart from a provider indicating to follow the RT Bronchodilator Protocol and there is an Initiate RT Inhaler-Nebulizer Bronchodilator Protocol order as well (see protocol at bottom of note). CXR Findings:  XR CHEST PORTABLE    Result Date: 11/17/2022  Left lung nodule that is suspicious for malignancy until proven otherwise. Severe pulmonary emphysema and associated interstitial pulmonary fibrosis. Pulmonary artery hypertension. Possible osteoporosis. XR CHEST PORTABLE    Result Date: 11/17/2022  No radiographic evidence of acute pulmonary disease. The findings from the last RT Protocol Assessment were as follows:   History Pulmonary Disease: (P) Chronic pulmonary disease  Respiratory Pattern: (P) Regular pattern and RR 12-20 bpm  Breath Sounds: (P) Slightly diminished and/or crackles  Cough: (P) Strong, spontaneous, non-productive  Indication for Bronchodilator Therapy: (P) Decreased or absent breath sounds  Bronchodilator Assessment Score: (P) 4    Aerosolized bronchodilator medication orders have been revised according to the RT Inhaler-Nebulizer Bronchodilator Protocol below. Respiratory Therapist to perform RT Therapy Protocol Assessment initially then follow the protocol. Repeat RT Therapy Protocol Assessment PRN for score 0-3 or on second treatment, BID, and PRN for scores above 3. No Indications - adjust the frequency to every 6 hours PRN wheezing or bronchospasm, if no treatments needed after 48 hours then discontinue using Per Protocol order mode. If indication present, adjust the RT bronchodilator orders based on the Bronchodilator Assessment Score as indicated below.   Use Inhaler orders unless patient has one or more of the following: on home nebulizer, not able to hold breath for 10 seconds, is not alert and oriented, cannot activate and use MDI correctly, or respiratory rate 25 breaths per minute or more, then use the equivalent nebulizer order(s) with same Frequency and PRN reasons based on the score. If a patient is on this medication at home then do not decrease Frequency below that used at home. 0-3 - enter or revise RT bronchodilator order(s) to equivalent RT Bronchodilator order with Frequency of every 4 hours PRN for wheezing or increased work of breathing using Per Protocol order mode. 4-6 - enter or revise RT Bronchodilator order(s) to two equivalent RT bronchodilator orders with one order with BID Frequency and one order with Frequency of every 4 hours PRN wheezing or increased work of breathing using Per Protocol order mode. 7-10 - enter or revise RT Bronchodilator order(s) to two equivalent RT bronchodilator orders with one order with TID Frequency and one order with Frequency of every 4 hours PRN wheezing or increased work of breathing using Per Protocol order mode. 11-13 - enter or revise RT Bronchodilator order(s) to one equivalent RT bronchodilator order with QID Frequency and an Albuterol order with Frequency of every 4 hours PRN wheezing or increased work of breathing using Per Protocol order mode. Greater than 13 - enter or revise RT Bronchodilator order(s) to one equivalent RT bronchodilator order with every 4 hours Frequency and an Albuterol order with Frequency of every 2 hours PRN wheezing or increased work of breathing using Per Protocol order mode.          Electronically signed by Víctor Gandhi RCP on 11/19/2022 at 8:12 AM

## 2022-11-19 NOTE — PROGRESS NOTES
11/19/22 0329   NIV Type   Skin Protection for O2 Device Yes   Equipment Type [id number]   Mode Bilevel   Mask Type Full face mask   Mask Size Medium   Settings/Measurements   IPAP 12 cmH20   CPAP/EPAP 5 cmH2O   Vt (Measured) 717 mL   Rate Ordered 20   Resp 20   FiO2  35 %   Minute Volume (L/min) 9.5 Liters   Mask Leak (lpm) 10 lpm   Comfort Level Good   Using Accessory Muscles No   SpO2 100   Patient Observation   Observations spo2 100% on 35% bipap   Oxygen Therapy/Pulse Ox   Heart Rate 72   SpO2 100 %

## 2022-11-20 LAB
ALBUMIN SERPL-MCNC: 3.6 G/DL (ref 3.4–5)
ALBUMIN SERPL-MCNC: 4.1 G/DL (ref 3.4–5)
ANION GAP SERPL CALCULATED.3IONS-SCNC: 10 MMOL/L (ref 3–16)
ANION GAP SERPL CALCULATED.3IONS-SCNC: 9 MMOL/L (ref 3–16)
BASOPHILS ABSOLUTE: 0 K/UL (ref 0–0.2)
BASOPHILS RELATIVE PERCENT: 0.9 %
BUN BLDV-MCNC: 7 MG/DL (ref 7–20)
BUN BLDV-MCNC: 8 MG/DL (ref 7–20)
CALCIUM SERPL-MCNC: 8 MG/DL (ref 8.3–10.6)
CALCIUM SERPL-MCNC: 9.2 MG/DL (ref 8.3–10.6)
CHLORIDE BLD-SCNC: 91 MMOL/L (ref 99–110)
CHLORIDE BLD-SCNC: 93 MMOL/L (ref 99–110)
CO2: 25 MMOL/L (ref 21–32)
CO2: 27 MMOL/L (ref 21–32)
CREAT SERPL-MCNC: <0.5 MG/DL (ref 0.6–1.2)
CREAT SERPL-MCNC: <0.5 MG/DL (ref 0.6–1.2)
EOSINOPHILS ABSOLUTE: 0.2 K/UL (ref 0–0.6)
EOSINOPHILS RELATIVE PERCENT: 3.4 %
GFR SERPL CREATININE-BSD FRML MDRD: >60 ML/MIN/{1.73_M2}
GFR SERPL CREATININE-BSD FRML MDRD: >60 ML/MIN/{1.73_M2}
GLUCOSE BLD-MCNC: 117 MG/DL (ref 70–99)
GLUCOSE BLD-MCNC: 136 MG/DL (ref 70–99)
GLUCOSE BLD-MCNC: 153 MG/DL (ref 70–99)
GLUCOSE BLD-MCNC: 87 MG/DL (ref 70–99)
GLUCOSE BLD-MCNC: 93 MG/DL (ref 70–99)
GLUCOSE BLD-MCNC: 99 MG/DL (ref 70–99)
HCT VFR BLD CALC: 30.7 % (ref 36–48)
HEMOGLOBIN: 10.7 G/DL (ref 12–16)
LYMPHOCYTES ABSOLUTE: 1 K/UL (ref 1–5.1)
LYMPHOCYTES RELATIVE PERCENT: 18.1 %
MCH RBC QN AUTO: 32.2 PG (ref 26–34)
MCHC RBC AUTO-ENTMCNC: 34.8 G/DL (ref 31–36)
MCV RBC AUTO: 92.7 FL (ref 80–100)
MONOCYTES ABSOLUTE: 0.7 K/UL (ref 0–1.3)
MONOCYTES RELATIVE PERCENT: 12.8 %
NEUTROPHILS ABSOLUTE: 3.5 K/UL (ref 1.7–7.7)
NEUTROPHILS RELATIVE PERCENT: 64.8 %
PDW BLD-RTO: 13.1 % (ref 12.4–15.4)
PERFORMED ON: ABNORMAL
PERFORMED ON: NORMAL
PHOSPHORUS: 2.5 MG/DL (ref 2.5–4.9)
PHOSPHORUS: 2.7 MG/DL (ref 2.5–4.9)
PLATELET # BLD: 299 K/UL (ref 135–450)
PMV BLD AUTO: 6.1 FL (ref 5–10.5)
POTASSIUM SERPL-SCNC: 3.6 MMOL/L (ref 3.5–5.1)
POTASSIUM SERPL-SCNC: 3.8 MMOL/L (ref 3.5–5.1)
RBC # BLD: 3.31 M/UL (ref 4–5.2)
SODIUM BLD-SCNC: 127 MMOL/L (ref 136–145)
SODIUM BLD-SCNC: 128 MMOL/L (ref 136–145)
T4 FREE: 1.8 NG/DL (ref 0.9–1.8)
WBC # BLD: 5.4 K/UL (ref 4–11)

## 2022-11-20 PROCEDURE — 6370000000 HC RX 637 (ALT 250 FOR IP): Performed by: INTERNAL MEDICINE

## 2022-11-20 PROCEDURE — 2700000000 HC OXYGEN THERAPY PER DAY

## 2022-11-20 PROCEDURE — 36415 COLL VENOUS BLD VENIPUNCTURE: CPT

## 2022-11-20 PROCEDURE — 6360000002 HC RX W HCPCS: Performed by: INTERNAL MEDICINE

## 2022-11-20 PROCEDURE — 2580000003 HC RX 258: Performed by: INTERNAL MEDICINE

## 2022-11-20 PROCEDURE — 94640 AIRWAY INHALATION TREATMENT: CPT

## 2022-11-20 PROCEDURE — 2060000000 HC ICU INTERMEDIATE R&B

## 2022-11-20 PROCEDURE — 85025 COMPLETE CBC W/AUTO DIFF WBC: CPT

## 2022-11-20 PROCEDURE — 80069 RENAL FUNCTION PANEL: CPT

## 2022-11-20 PROCEDURE — 99232 SBSQ HOSP IP/OBS MODERATE 35: CPT | Performed by: INTERNAL MEDICINE

## 2022-11-20 PROCEDURE — 94761 N-INVAS EAR/PLS OXIMETRY MLT: CPT

## 2022-11-20 PROCEDURE — 97162 PT EVAL MOD COMPLEX 30 MIN: CPT

## 2022-11-20 PROCEDURE — 94660 CPAP INITIATION&MGMT: CPT

## 2022-11-20 PROCEDURE — 97530 THERAPEUTIC ACTIVITIES: CPT

## 2022-11-20 PROCEDURE — 94669 MECHANICAL CHEST WALL OSCILL: CPT

## 2022-11-20 PROCEDURE — 99233 SBSQ HOSP IP/OBS HIGH 50: CPT | Performed by: INTERNAL MEDICINE

## 2022-11-20 PROCEDURE — 97166 OT EVAL MOD COMPLEX 45 MIN: CPT

## 2022-11-20 RX ORDER — GUAIFENESIN 600 MG/1
600 TABLET, EXTENDED RELEASE ORAL 2 TIMES DAILY
Status: DISCONTINUED | OUTPATIENT
Start: 2022-11-20 | End: 2022-11-23 | Stop reason: HOSPADM

## 2022-11-20 RX ORDER — PREDNISONE 20 MG/1
40 TABLET ORAL DAILY
Status: DISCONTINUED | OUTPATIENT
Start: 2022-11-20 | End: 2022-11-23 | Stop reason: HOSPADM

## 2022-11-20 RX ADMIN — MUPIROCIN: 20 OINTMENT TOPICAL at 12:59

## 2022-11-20 RX ADMIN — IPRATROPIUM BROMIDE AND ALBUTEROL SULFATE 1 AMPULE: 2.5; .5 SOLUTION RESPIRATORY (INHALATION) at 15:09

## 2022-11-20 RX ADMIN — ACETAMINOPHEN 650 MG: 325 TABLET ORAL at 16:02

## 2022-11-20 RX ADMIN — GUAIFENESIN 600 MG: 600 TABLET, EXTENDED RELEASE ORAL at 12:52

## 2022-11-20 RX ADMIN — HYDROXYZINE HYDROCHLORIDE 25 MG: 25 TABLET ORAL at 17:28

## 2022-11-20 RX ADMIN — PREDNISONE 40 MG: 20 TABLET ORAL at 16:07

## 2022-11-20 RX ADMIN — MUPIROCIN: 20 OINTMENT TOPICAL at 20:11

## 2022-11-20 RX ADMIN — IPRATROPIUM BROMIDE AND ALBUTEROL SULFATE 1 AMPULE: 2.5; .5 SOLUTION RESPIRATORY (INHALATION) at 09:15

## 2022-11-20 RX ADMIN — Medication 10 ML: at 12:58

## 2022-11-20 RX ADMIN — LISINOPRIL 10 MG: 10 TABLET ORAL at 12:52

## 2022-11-20 RX ADMIN — TIOTROPIUM BROMIDE AND OLODATEROL 2 PUFF: 3.124; 2.736 SPRAY, METERED RESPIRATORY (INHALATION) at 09:15

## 2022-11-20 RX ADMIN — VASOPRESSIN: 20 INJECTION, SOLUTION INTRAVENOUS at 07:41

## 2022-11-20 RX ADMIN — AMLODIPINE BESYLATE 5 MG: 5 TABLET ORAL at 12:52

## 2022-11-20 RX ADMIN — IPRATROPIUM BROMIDE AND ALBUTEROL SULFATE 1 AMPULE: 2.5; .5 SOLUTION RESPIRATORY (INHALATION) at 20:27

## 2022-11-20 RX ADMIN — ENOXAPARIN SODIUM 40 MG: 100 INJECTION SUBCUTANEOUS at 12:52

## 2022-11-20 RX ADMIN — GUAIFENESIN 600 MG: 600 TABLET, EXTENDED RELEASE ORAL at 20:11

## 2022-11-20 RX ADMIN — DIBASIC SODIUM PHOSPHATE, MONOBASIC POTASSIUM PHOSPHATE AND MONOBASIC SODIUM PHOSPHATE 2 TABLET: 852; 155; 130 TABLET ORAL at 12:52

## 2022-11-20 ASSESSMENT — PAIN DESCRIPTION - DESCRIPTORS: DESCRIPTORS: ACHING

## 2022-11-20 ASSESSMENT — PAIN SCALES - GENERAL: PAINLEVEL_OUTOF10: 3

## 2022-11-20 ASSESSMENT — PAIN DESCRIPTION - LOCATION: LOCATION: HEAD

## 2022-11-20 NOTE — PROGRESS NOTES
Physical /Occupational Therapy  Attempt, patient stating the need to have BM. Offered to assist patient to [facility] as part of assessment. Patient declined,requesting bed pan. Offered to place bedpan-patient declined, preferring nursing staff to do so. Contacted PCA by phone to inform her of patients need. Washington Dunbar, PT #[id number]   Real Anton.  Chio Tucker OTR/L #[id number]   No charge @8:30

## 2022-11-20 NOTE — PROGRESS NOTES
RT Inhaler-Nebulizer Bronchodilator Protocol Note    There is a bronchodilator order in the chart from a provider indicating to follow the RT Bronchodilator Protocol and there is an Initiate RT Inhaler-Nebulizer Bronchodilator Protocol order as well (see protocol at bottom of note). CXR Findings:  No results found. The findings from the last RT Protocol Assessment were as follows:   History Pulmonary Disease: (P) Chronic pulmonary disease  Respiratory Pattern: (P) Dyspnea on exertion or RR 21-25 bpm  Breath Sounds: (P) Intermittent or unilateral wheezes  Cough: (P) Strong, spontaneous, non-productive  Indication for Bronchodilator Therapy: (P) Wheezing associated with pulm disorder  Bronchodilator Assessment Score: (P) 8    Aerosolized bronchodilator medication orders have been revised according to the RT Inhaler-Nebulizer Bronchodilator Protocol below. Respiratory Therapist to perform RT Therapy Protocol Assessment initially then follow the protocol. Repeat RT Therapy Protocol Assessment PRN for score 0-3 or on second treatment, BID, and PRN for scores above 3. No Indications - adjust the frequency to every 6 hours PRN wheezing or bronchospasm, if no treatments needed after 48 hours then discontinue using Per Protocol order mode. If indication present, adjust the RT bronchodilator orders based on the Bronchodilator Assessment Score as indicated below. Use Inhaler orders unless patient has one or more of the following: on home nebulizer, not able to hold breath for 10 seconds, is not alert and oriented, cannot activate and use MDI correctly, or respiratory rate 25 breaths per minute or more, then use the equivalent nebulizer order(s) with same Frequency and PRN reasons based on the score. If a patient is on this medication at home then do not decrease Frequency below that used at home.     0-3 - enter or revise RT bronchodilator order(s) to equivalent RT Bronchodilator order with Frequency of every 4 hours PRN for wheezing or increased work of breathing using Per Protocol order mode. 4-6 - enter or revise RT Bronchodilator order(s) to two equivalent RT bronchodilator orders with one order with BID Frequency and one order with Frequency of every 4 hours PRN wheezing or increased work of breathing using Per Protocol order mode. 7-10 - enter or revise RT Bronchodilator order(s) to two equivalent RT bronchodilator orders with one order with TID Frequency and one order with Frequency of every 4 hours PRN wheezing or increased work of breathing using Per Protocol order mode. 11-13 - enter or revise RT Bronchodilator order(s) to one equivalent RT bronchodilator order with QID Frequency and an Albuterol order with Frequency of every 4 hours PRN wheezing or increased work of breathing using Per Protocol order mode. Greater than 13 - enter or revise RT Bronchodilator order(s) to one equivalent RT bronchodilator order with every 4 hours Frequency and an Albuterol order with Frequency of every 2 hours PRN wheezing or increased work of breathing using Per Protocol order mode.          Electronically signed by Ciarra Chaparro RCP on 11/20/2022 at 9:33 AM

## 2022-11-20 NOTE — PROGRESS NOTES
Department of Internal Medicine  Nephrology Progress Note        SUBJECTIVE:    We are following this patient for Hyponatremia. The patient was seen and examined; she feels well today with no CP, SOB, nausea or vomiting. ROS: No fever or chills. Social: No family at bedside. Physical Exam:    VITALS:  /80   Pulse 67   Temp 97.2 °F (36.2 °C) (Oral)   Resp 18   Ht 5' 3\" (1.6 m)   Wt 159 lb 14.4 oz (72.5 kg)   SpO2 100%   BMI 28.33 kg/m²     General appearance: Seems comfortable, no acute distress. Neck: Trachea midline, thyroid normal.   Lungs:  Non labored breathing, CTA to anterior auscultation. Heart:  S1S2 normal, rub or gallop. No peripheral edema. Abdomen: Soft, non-tender, no organomegaly. Skin: No lesions or rashes, warm to touch.      DATA:    CBC with Differential:    Lab Results   Component Value Date/Time    WBC 5.4 11/20/2022 02:55 AM    RBC 3.31 11/20/2022 02:55 AM    HGB 10.7 11/20/2022 02:55 AM    HCT 30.7 11/20/2022 02:55 AM     11/20/2022 02:55 AM    MCV 92.7 11/20/2022 02:55 AM    MCH 32.2 11/20/2022 02:55 AM    MCHC 34.8 11/20/2022 02:55 AM    RDW 13.1 11/20/2022 02:55 AM    SEGSPCT 63.4 02/07/2013 10:24 AM    BANDSPCT 1 12/15/2021 04:30 AM    LYMPHOPCT 18.1 11/20/2022 02:55 AM    MONOPCT 12.8 11/20/2022 02:55 AM    EOSPCT 0.2 08/30/2010 04:05 PM    BASOPCT 0.9 11/20/2022 02:55 AM    MONOSABS 0.7 11/20/2022 02:55 AM    LYMPHSABS 1.0 11/20/2022 02:55 AM    EOSABS 0.2 11/20/2022 02:55 AM    BASOSABS 0.0 11/20/2022 02:55 AM    DIFFTYPE Auto-K 02/07/2013 10:24 AM     BMP:    Lab Results   Component Value Date/Time     11/20/2022 09:05 AM    K 3.6 11/20/2022 09:05 AM    K 3.9 11/17/2022 03:11 PM    CL 93 11/20/2022 09:05 AM    CO2 25 11/20/2022 09:05 AM    BUN 7 11/20/2022 09:05 AM    LABALBU 4.1 11/20/2022 09:05 AM    CREATININE <0.5 11/20/2022 09:05 AM    CALCIUM 9.2 11/20/2022 09:05 AM    GFRAA >60 09/01/2022 05:24 AM    GFRAA >60 02/07/2013 10:24 AM    LABGLOM >60 11/20/2022 09:05 AM    GLUCOSE 93 11/20/2022 09:05 AM       Assessment    -Acute on chronic hyponatremia.   Baseline sodium of around 125-128                Sodium on admission on 11/17/2022 at 1500 was 108     - COPD  -Acute metabolic encephalopathy        Plan    - Continue current IV fluid regimen   - Daily free water restriction   - Monitor serial labs

## 2022-11-20 NOTE — PROGRESS NOTES
4 Eyes Skin Assessment     The patient is being assess for   Admission    I agree that 2 RN's have performed a thorough Head to Toe Skin Assessment on the patient. ALL assessment sites listed below have been assessed. Areas assessed for pressure by both nurses:   [x]   Head, Face, and Ears   [x]   Shoulders, Back, and Chest, Abdomen  [x]   Arms, Elbows, and Hands   [x]   Coccyx, Sacrum, and Ischium =- CHRONIC Diarrhea - redness in lionel area   [x]   Legs, Feet, and Heels        Skin Assessed Under all Medical Devices by both nurses:  O2 device tubing              All Mepilex Borders were peeled back and area peeked at by both nurses:  No: NONE   Please list where Mepilex Borders are located:  NONE              **SHARE this note so that the co-signing nurse is able to place an eSignature**    Co-signer eSignature: Electronically signed by Sunita Junior RN on 11/20/22 at 4:52 PM EST    Does the Patient have Skin Breakdown related to pressure?   No     (Insert Photo here)         William Prevention initiated:  No   Wound Care Orders initiated:  No      [facility] nurse consulted for Pressure Injury (Stage 3,4, Unstageable, DTI, NWPT, Complex wounds)and New or Established Ostomies:  No      Primary Nurse eSignature: Electronically signed by Mary Restrepo RN on 11/20/22 at 4:50 PM EST

## 2022-11-20 NOTE — PROGRESS NOTES
Dr. Chandler Allen at the bedside to examine pt. See progress note for details.      COntinues q6h BMP, call if Sodium < 121 or >129  Fluid restriction decreased to 1000ml  Add 100 meq NaCl 60 ml/hr

## 2022-11-20 NOTE — PROGRESS NOTES
Pt A & O x 4, was able to tell month and year which she was not able to the night prior. Daughter and son in law were at the bedside earlier, updated on pt condition. Na-125 at 2100. Recheck at 0300. Pt still receiving IV fluids @ 60 and 1000ml fluid restriction.

## 2022-11-20 NOTE — FLOWSHEET NOTE
Afternoon vitals complete. Patient states tylenol helped with headache but would like something for her itchy skin. Also states she feels anxious    11/20/22 1615   Vital Signs   Temp 97 °F (36.1 °C)   Temp Source Oral   Heart Rate 78   Heart Rate Source Monitor   Resp 18   /70   MAP (Calculated) 86   BP Location Right upper arm   BP Method Automatic   Patient Position Semi fowlers   Level of Consciousness 0   MEWS Score 1   Pain Assessment   Pain Assessment 0-10   Pain Level 3   Pain Location Head   Pain Descriptors Aching   Response to Pain Intervention Patient satisfied   Side Effects Itching; Other (Comment)  (feeling anxious)   Oxygen Therapy   SpO2 98 %   O2 Device Nasal cannula   Skin Assessment Clean, dry, & intact   O2 Flow Rate (L/min) 3 L/min

## 2022-11-20 NOTE — PROGRESS NOTES
Occupational Therapy  Inpatient Occupational Therapy  Evaluation and Treatment    Unit: PCU  Date:  11/20/2022  Patient Name:    Ingrid Harry  Admitting diagnosis:  Hyponatremia [E87.1]  Admit Date:  11/17/2022  Precautions/Restrictions/WB Status/ Lines/ Wounds/ Oxygen: fall risk, IV, bed/chair alarm, purewick catheter, supplemental O2 (3L), telemetry, and continuous pulse ox    Treatment Time:  0926-1007  Treatment Number: 1     Billable Treatment Time: 31 minutes   Total Treatment Time:   41   minutes    Patient Goals for Therapy:  None stated      Discharge Recommendations: SNF  DME needs for discharge: defer to facility       Therapy recommendations for staff:   Assist of 1 with use of rolling walker (RW) and gait belt for all transfers to/from [facility]  to/from [facility]- at nursing discretion based upon respiratory status       History of Present Illness:   \"60 y.o. female who presented to the hospital with a chief complaint of altered mental status. Patient apparently woke up this morning at a baseline and family later on found her confused and lethargic. He called [facility] and she was brought to the emergency department by EMS. They stated that when they saw the patient she appeared to be in respiratory distress, she was given a dose of Solu-Medrol in route and duo nebs as there was a concern for COPD exacerbation. When she presented to the emergency department she was altered and did not necessarily have any respiratory distress even though she was placed on BiPAP for short while. Further work-up in emergency department revealed a sodium level of 108. Patient has a history of SIADH and chronic hyponatremia. She was admitted to the ICU for further care. When I saw the patient she was still confused but was arousable and directable. She has since been started on a saline drip for her hyponatremia. \"  Angina pectoris ([address]        Chronic pain       knees and lower back     COPD exacerbation ([address] 5/20/2018 Depression      Essential hypertension 8/15/2022    Panic disorder      Pneumonia        Admitted to ICU -      Home Health S4 Level Recommendation:  NA  AM-PAC Score: AM-PAC Inpatient Daily Activity Raw Score: 17    Preadmission Environment  Information gathered from previous admission on  and updated on   Pt. Lives with family (daughter)  Home environment:    one story home  Steps to enter first floor:   bilateral hand rails and ramp          Steps to second floor: N/A  Bathroom:       Tub/Shower unit, shower chair and Standard height toilet  Equipment owned:      [facility], Rolling Walker, manual W/C, BSC, quad cane, home O2 (3 L) PRN, inhaler and nebulizer , electric wheelchair (can't find )        Preadmission Status / PLOF:    History of falls             Yes - pt reports 3 or 4 falls \"got dizzy and fell down\"  Pt. Able to drive          No, daughter drives  Pt Fully independent with ADL's         Yes, dtr provides supervision if pt needs. Pt. Required assistance from family (dtr) for:  Cooking, Cleaning and Laundry shares with daughter  Pt. Fully independent for transfers and gait and walked with: RW  Pt sleeps on a loveseat in her home. Dtr is home     Subjective  Patient lying supine in bed with no family present   Pt agreeable to this OT eval & tx. Cognition    A&O Person and Situation, month, year    Able to follow 2 step commands    Pain  Yes  Ratin  Location:[facility]  Pain Medicine Status: RN notified        Upper Extremity ROM:    WFL,  pt able to perform all bed mobility, transfers, and gait without ROM limitation. Upper Extremity Strength:    BUE strength WFL, but not formally assessed w/ MMT      Upper Extremity Sensation    NT    Upper Extremity Proprioception:  NT    Coordination and Tone  NT    Balance  Sitting:             Good ; Supervision; sat EOB ~8-10 minutes.     Standing:         Good ; CGA     Bed Mobility   Supine to Sit:               Min A   Sit to Supine:               Min A   Rolling:                        Not Tested  Scooting in sitting:       SBA  Scooting in supine:     SBA     Transfer Training                    Sit to stand:                 CGA; 2 trials   Stand to sit:                 CGA  Bed to Chair:               Not Tested with use of N/A    Dressing:      UE:   Not Tested  LE:    Not Tested    Bathing:    UE:  Not Tested  LE:  Not Tested    Eating:   Not Tested    Toileting:  Not Tested    Grooming/hygiene: Not Tested    Activity Tolerance     Pt tolerated 2 trials of sit>stand with reports of dizziness noted. Pt retunred to supine and RN made aware. BP (mmHg)  HR (bpm) SpO2 (%)   Semifowlers before activity  138/76  99% on 3L   Seated EOB  128/77 79 bpm 98%   Seated following sit>stand 148/80               Positioning Needs: In bed, call light and needs in reach. Alarm Set    Exercise / Activities Initiated:   N/A    Patient/Family Education:   Role of OT  Recommendations for DC    Assessment of Deficits: Pt seen for Occupational therapy evaluation in acute care setting. Pt demonstrated decreased Activity tolerance, ADLs, Bed mobility, and Strength. Pt functioning below baseline and will likely benefit from skilled occupational therapy services to maximize safety and independence. Goal(s) : To be met in 3 Visits:  1). Bed to toilet/BSC: SBA    To be met in 5 Visits:  1). Supine to/from Sit:  Supervision  2). Upper Body Bathing:   Supervision  3). Lower Body Bathing:   Supervision  4). Upper Body Dressing:  Supervision  5). Lower Body Dressing:  Supervision  6). Pt to demonstrate UE exs x 15 reps with minimal cues    Rehabilitation Potential:  Good for goals listed above. Strengths for achieving goals include: PLOF  Barriers to achieving goals include:  Complexity of condition     Plan:   To be seen 3-5 x/wk while in acute care setting for therapeutic exercises, bed mobility, transfers, dressing, bathing, family/patient education, ADL/IADL retraining, energy conservation training. Jose Francisco Yanez OTR/L #[id number]      If patient discharges from this facility prior to next visit, this note will serve as the Discharge Summary

## 2022-11-20 NOTE — PROGRESS NOTES
Bedside report and transfer of care given to Bryce Cintron[address]. Pt currently resting in bed with the call light within reach. Pt denies any other care needs at this time. Pt stable at this time.

## 2022-11-20 NOTE — PROGRESS NOTES
11/20/22 0342   NIV Type   Equipment Type v60   Mode Bilevel   Mask Type Full face mask   Mask Size Medium   Settings/Measurements   IPAP 12 cmH20   CPAP/EPAP 5 cmH2O   Vt (Measured) 727 mL   Rate Ordered 20   Resp 21   FiO2  30 %   Minute Volume (L/min) 14.1 Liters   Mask Leak (lpm) 15 lpm   Comfort Level Good   Using Accessory Muscles No   SpO2 100   Patient Observation   Observations spo2 100% on 30% bipap   Oxygen Therapy/Pulse Ox   Heart Rate 67   SpO2 100 %

## 2022-11-20 NOTE — PROGRESS NOTES
11/19/22 2200   RT Protocol   History Pulmonary Disease 2   Respiratory pattern 0   Breath sounds 2   Cough 0   Indications for Bronchodilator Therapy Decreased or absent breath sounds   Bronchodilator Assessment Score 4   RT Inhaler-Nebulizer Bronchodilator Protocol Note    There is a bronchodilator order in the chart from a provider indicating to follow the RT Bronchodilator Protocol and there is an Initiate RT Inhaler-Nebulizer Bronchodilator Protocol order as well (see protocol at bottom of note). CXR Findings:  No results found. The findings from the last RT Protocol Assessment were as follows:   History Pulmonary Disease: Chronic pulmonary disease  Respiratory Pattern: Regular pattern and RR 12-20 bpm  Breath Sounds: Slightly diminished and/or crackles  Cough: Strong, spontaneous, non-productive  Indication for Bronchodilator Therapy: Decreased or absent breath sounds  Bronchodilator Assessment Score: 4    Aerosolized bronchodilator medication orders have been revised according to the RT Inhaler-Nebulizer Bronchodilator Protocol below. Respiratory Therapist to perform RT Therapy Protocol Assessment initially then follow the protocol. Repeat RT Therapy Protocol Assessment PRN for score 0-3 or on second treatment, BID, and PRN for scores above 3. No Indications - adjust the frequency to every 6 hours PRN wheezing or bronchospasm, if no treatments needed after 48 hours then discontinue using Per Protocol order mode. If indication present, adjust the RT bronchodilator orders based on the Bronchodilator Assessment Score as indicated below. Use Inhaler orders unless patient has one or more of the following: on home nebulizer, not able to hold breath for 10 seconds, is not alert and oriented, cannot activate and use MDI correctly, or respiratory rate 25 breaths per minute or more, then use the equivalent nebulizer order(s) with same Frequency and PRN reasons based on the score.   If a patient is on this medication at home then do not decrease Frequency below that used at home. 0-3 - enter or revise RT bronchodilator order(s) to equivalent RT Bronchodilator order with Frequency of every 4 hours PRN for wheezing or increased work of breathing using Per Protocol order mode. 4-6 - enter or revise RT Bronchodilator order(s) to two equivalent RT bronchodilator orders with one order with BID Frequency and one order with Frequency of every 4 hours PRN wheezing or increased work of breathing using Per Protocol order mode. 7-10 - enter or revise RT Bronchodilator order(s) to two equivalent RT bronchodilator orders with one order with TID Frequency and one order with Frequency of every 4 hours PRN wheezing or increased work of breathing using Per Protocol order mode. 11-13 - enter or revise RT Bronchodilator order(s) to one equivalent RT bronchodilator order with QID Frequency and an Albuterol order with Frequency of every 4 hours PRN wheezing or increased work of breathing using Per Protocol order mode. Greater than 13 - enter or revise RT Bronchodilator order(s) to one equivalent RT bronchodilator order with every 4 hours Frequency and an Albuterol order with Frequency of every 2 hours PRN wheezing or increased work of breathing using Per Protocol order mode.        Electronically signed by Ze Feng RCP on 11/19/2022 at 10:15 PM

## 2022-11-20 NOTE — PROGRESS NOTES
Admit: 2022    Name:  Wesly White  Room:  /0301-01  MRN:    [id number]    Critical Care Daily Progress Note for 2022   Admitted with altered mental status and hyponatremia    Interval History:     Was on BiPAP last night  Currently on 4 L of oxygen   Scheduled Meds:   ipratropium-albuterol  1 ampule Inhalation 4x daily    mupirocin   Nasal BID    guaiFENesin  600 mg Oral BID    insulin lispro  0-8 Units SubCUTAneous TID WC    insulin lispro  0-4 Units SubCUTAneous Nightly    lisinopril  10 mg Oral Daily    amLODIPine  5 mg Oral Daily    phosphorus  500 mg Oral BID    tiotropium-olodaterol  2 puff Inhalation Daily    sodium chloride flush  5-40 mL IntraVENous 2 times per day    enoxaparin  40 mg SubCUTAneous Daily       Continuous Infusions:   IV infusion builder 60 mL/hr at 22 1349    dextrose      sodium chloride         PRN Meds:  glucose, dextrose bolus **OR** dextrose bolus, glucagon (rDNA), dextrose, hydrOXYzine HCl, sodium chloride flush, sodium chloride, ondansetron **OR** ondansetron, polyethylene glycol, acetaminophen **OR** acetaminophen, potassium chloride **OR** potassium alternative oral replacement **OR** potassium chloride                  Objective:     Temp  Av.5 °F (36.4 °C)  Min: 97.2 °F (36.2 °C)  Max: 97.9 °F (36.6 °C)  Pulse  Av.4  Min: 67  Max: 106  BP  Min: 104/63  Max: 147/80  SpO2  Av.1 %  Min: 91 %  Max: 100 %  FiO2   Av %  Min: 30 %  Max: 30 %  Patient Vitals for the past 4 hrs:   BP Temp Temp src Pulse Resp SpO2   22 0658 (!) 147/80 97.2 °F (36.2 °C) Oral 67 18 98 %   22 0400 (!) 143/78 97.5 °F (36.4 °C) Axillary 71 19 100 %   22 0342 -- -- -- 67 21 100 %           Intake/Output Summary (Last 24 hours) at 2022 0713  Last data filed at 2022 0600  Gross per 24 hour   Intake 2071 ml   Output 2100 ml   Net -29 ml         Physical Exam:  General:  Awake, alert and oriented.  Appears to be not in any distress  Mucous Membranes:  Pink , anicteric  Neck: No JVD, no carotid bruit, no thyromegaly  Chest: Normal respiratory effort, no accessory muscle, few wheezes, no crackles  Cardiovascular:  RRR S1S2 heard, no murmurs or gallops  Abdomen:  Soft, undistended, non tender, no organomegaly, BS present  Extremities: No edema or cyanosis. Distal pulses well felt  Neurological : no focal deficits    Lab Data:  CBC:   Recent Labs     11/18/22  0344 11/19/22  0232 11/20/22  0255   WBC 2.6* 6.7 5.4   RBC 3.63* 3.40* 3.31*   HGB 11.5* 10.7* 10.7*   HCT 32.9* 32.0* 30.7*   MCV 90.7 94.2 92.7   RDW 12.8 13.0 13.1    324 299       BMP:   Recent Labs     11/19/22  1456 11/19/22  2101 11/20/22  0255   * 127* 127*   K 4.1 4.1 3.8   CL 88* 90* 91*   CO2 27 29 27   PHOS 2.8 3.2 2.7   BUN 12 11 8   CREATININE <0.5* <0.5* <0.5*       BNP: No results for input(s): BNP in the last 72 hours. PT/INR: No results for input(s): PROTIME, INR in the last 72 hours. APTT:No results for input(s): APTT in the last 72 hours. CARDIAC ENZYMES:   Recent Labs     11/17/22  1511   TROPONINI <0.01       FASTING LIPID PANEL:  Lab Results   Component Value Date/Time    CHOL 154 09/15/2017 08:18 AM    HDL 81 09/15/2017 08:18 AM    HDL 64 05/14/2012 06:55 AM    TRIG 44 09/15/2017 08:18 AM     LIVER PROFILE:   Recent Labs     11/17/22  1511   AST 62*   ALT 26   BILITOT 1.0   ALKPHOS 113           XR CHEST PORTABLE   Final Result   Left lung nodule that is suspicious for malignancy until proven otherwise. Severe pulmonary emphysema and associated interstitial pulmonary fibrosis. Pulmonary artery hypertension. Possible osteoporosis. CT Head W/O Contrast   Final Result   No acute intracranial abnormality. XR CHEST PORTABLE   Final Result   No radiographic evidence of acute pulmonary disease.                Assessment & Plan:     Patient Active Problem List    Diagnosis Date Noted    Metabolic encephalopathy 70/50/3760    Acute encephalopathy 11/18/2022    Chronic hypoxemic respiratory failure ([address] 11/18/2022    Chronic respiratory failure with hypoxia (HCC) 08/31/2022    Hypokalemia 08/29/2022    Low TSH level 08/29/2022    History of COVID-19 08/29/2022    Leukocytosis 08/29/2022    Hypertension 08/15/2022    COVID-19 08/15/2022    Pneumonia due to COVID-19 virus 08/14/2022    Community acquired pneumonia     Transaminitis 12/10/2021    Acute hypoxemic respiratory failure (HCC)     Pneumonia due to infectious organism     Pancreatic abnormality     Common bile duct dilatation     Acute on chronic respiratory failure with hypoxia ([address] 02/04/2021    Community acquired pneumonia of right lung     Acute exacerbation of chronic obstructive pulmonary disease (COPD) ([address]     Acute on chronic respiratory failure with hypoxia and hypercapnia ([address] 02/04/2020    Lung nodule     Pulmonary emphysema (HCC)     Hyponatremia 01/30/2020    Acute respiratory failure with hypoxia ([address] 05/21/2018    COPD exacerbation ([address] 05/20/2018    Moderate COPD (chronic obstructive pulmonary disease) ([address] 04/21/2016    Tobacco use 02/12/2016    Chest pain 10/14/2011    Shortness of breath 54/32/4950     Metabolic encephalopathy  Likely secondary to hyponatremia  Resolved    Acute on chronic hyponatremia  Admitted to the ICU. Seizure precautions. Nephrology consult. Continue normal saline for now.    follow sodium levels closely    COPD and chronic hypoxic respiratory failure  Stable on home O2  Continue inhalers    Lung nodule  Pulmonary consult    Hypertension  Stable  Continue amlodipine and lisinopril    Full code  General diet  Lovenox for DVT prophylaxis    Pt/ot      Trista Serrato MD

## 2022-11-20 NOTE — PROGRESS NOTES
Inpatient Physical Therapy Evaluation and Treatment    Unit: PCU  Date:  11/20/2022  Patient Name:    Jose Siegel  Admitting diagnosis:  Hyponatremia [E87.1]  Admit Date:  11/17/2022  Precautions/Restrictions/WB Status/ Lines/ Wounds/ Oxygen: Fall risk, Bed/chair alarm, Lines -IV, Supplemental O2 (3L), and Purewick catheter, Telemetry, and Continuous pulse oximetry    Treatment Time:  9:26-10:07  Treatment Number:  1   Timed Code Treatment Minutes: 31 minutes  Total Treatment Minutes:  41  minutes    Patient Goals for Therapy: none stated          Discharge Recommendations: SNF  DME needs for discharge: defer to facility       Therapy recommendation for EMS Transport: can transport by wheelchair    Therapy recommendations for staff:   Assist of 1 with use of rolling walker (RW) and gait belt for all transfers to/from [facility]  to/from [facility]- at nursing discretion based upon respiratory status    History Of Present Illness:    \"60 y.o. female who presented to the hospital with a chief complaint of altered mental status. Patient apparently woke up this morning at a baseline and family later on found her confused and lethargic. He called [facility] and she was brought to the emergency department by EMS. They stated that when they saw the patient she appeared to be in respiratory distress, she was given a dose of Solu-Medrol in route and duo nebs as there was a concern for COPD exacerbation. When she presented to the emergency department she was altered and did not necessarily have any respiratory distress even though she was placed on BiPAP for short while. Further work-up in emergency department revealed a sodium level of 108. Patient has a history of SIADH and chronic hyponatremia. She was admitted to the ICU for further care. When I saw the patient she was still confused but was arousable and directable. She has since been started on a saline drip for her hyponatremia. \"  Angina pectoris (HCC)       Chronic pain       knees and lower back     COPD exacerbation ([facility] [address] 2018    Depression      Essential hypertension 8/15/2022    Panic disorder      Pneumonia      Admitted to ICU -  Home Health S4 Level Recommendation:  Level 3 Safety  AM-PAC Mobility Score             Preadmission Environment  Information gathered from previous admission on  and updated on   Pt. Lives with family (daughter)  Home environment:    one story home  Steps to enter first floor:   bilateral hand rails and ramp          Steps to second floor: N/A  Bathroom:       Tub/Shower unit, shower chair and Standard height toilet  Equipment owned:      [facility], Rolling Walker, manual W/C, BSC, quad cane, home O2 (3 L) PRN, inhaler and nebulizer , electric wheelchair (can't find )        Preadmission Status / PLOF:    History of falls             Yes - pt reports 3 or 4 falls \"got dizzy and fell down\"  Pt. Able to drive          No, daughter drives  Pt Fully independent with ADL's         Yes, dtr provides supervision if pt needs. Pt. Required assistance from family (dtr) for:  Cooking, Cleaning and Laundry shares with daughter  Pt. Fully independent for transfers and gait and walked with: RW  Pt sleeps on a loveseat in her home. Dtr is home        Pain   Yes  Location: Headache  Ratin /10  Pain Medicine Status: RN notified    Cognition    A&O Person, Place, and Situation ,month ,year  Able to follow 2 step commands    Subjective  Patient lying supine in bed with no family present. Pt agreeable to this PT eval & tx. Upper Extremity ROM/Strength  Please see OT evaluation.       Lower Extremity ROM / Strength   AROM WFL: Yes  ROM limitations:     Strength Assessment (measured on a 0-5 scale):  R LE   Quad   3+   Ant Tib  4   Hamstring 3+   Iliopsoas 3+  L LE  Quad   3+   Ant Tib  4   Hamstring 3+   Iliopsoas 3+      Lower Extremity Sensation    States numbness in L great toe    Lower Extremity Proprioception: NT    Coordination and Tone  NT    Balance  Sitting:  Good ; Supervision  Comments: sat EOB ~8-10 minutes. Some increased WOB noted throughout and with positional changes    Standing: Good ; CGA  Comments: using RW/gait belt, practiced x 2. First trial, stated dizziness and sat to recover. Second trial no dizziness, but increased fatigue and WOB    Bed Mobility   Supine to Sit:    Min A   Sit to Supine:   Min A   Rolling:   Not Tested  Scooting in sitting: SBA  Scooting in supine:  SBA    Transfer Training     Sit to stand:   CGA  Stand to sit:   CGA  Bed to Chair:   Not Tested with use of N/A    Gait gait deferred due to fatigue; pt ambulated 0 ft. Activity Tolerance   Pt completed therapy session with Dizziness noted with standing. SOB and fatigued    Positioning Needs   Pt in bed, alarm set, positioned in proper neutral alignment and pressure relief provided. Call light provided and all needs within reach    Other  RN aware of level of assist and tolerance to session    Patient/Family Education   Pt educated on role of inpatient PT, POC, importance of continued activity, DC recommendations, safety awareness, transfer techniques, pacing activity, and calling for assist with mobility. Assessment  Pt seen for Physical Therapy evaluation in acute care setting. Pt demonstrated decreased Activity tolerance, Balance, Safety, and Strength as well as decreased independence with Ambulation, Bed Mobility , and Transfers. Patient will benefit from skilled PT to maximize functional mobility while in acute care. Patient will need to be ambulatory to return to home setting. Recommending SNF upon discharge as patient functioning well below baseline, demonstrates good rehab potential and unable to return home due to burden of care beyond caregiver ability and home environment not conducive to patient recovery. Goals : To be met in 3 visits:  1).  Independent with LE Ex x 10 reps  2.) Bed to chair: CGA    To be met in 6 visits:  1). Supine to/from sit: Supervision  2). Sit to/from stand: Supervision  3). Bed to chair: Supervision  4). Gait: Ambulate  100 ft.   with  SBA and use of rolling walker (RW)  5). Tolerate B LE exercises 3 sets of 10-15 reps    Rehabilitation Potential: Good  Strengths for achieving goals include:   Pt motivated, PLOF, Family Support, and Pt cooperative   Barriers to achieving goals include:    Complexity of condition and Weakness    Plan    To be seen 3-5 x / week  while in acute care setting for therapeutic exercises, bed mobility, transfers, progressive gait training, balance training, and family/patient education. Signature: Dheeraj Garner, PT #[id number]     If patient discharges from this facility prior to next visit, this note will serve as the Discharge Summary.

## 2022-11-20 NOTE — PROGRESS NOTES
Pulmonary Progress Note    CC: Acute encephalopathy    Subjective:   3LPM- uses 3-4 L at home   BiPAP overnight  More shortness of breath and cough, wheezes on exam      IV line: PIVs    Intake/Output Summary (Last 24 hours) at 11/20/2022 0837  Last data filed at 11/20/2022 0600  Gross per 24 hour   Intake 2071 ml   Output 2100 ml   Net -29 ml       Exam:   BP (!) 147/80   Pulse 67   Temp 97.2 °F (36.2 °C) (Oral)   Resp 18   Ht 5' 3\" (1.6 m)   Wt 159 lb 14.4 oz (72.5 kg)   SpO2 98%   BMI 28.33 kg/m²  on 3LPM   Gen: No distress. Eyes: PERRL. No sclera icterus. No conjunctival injection. ENT: No discharge. Pharynx clear. Neck: Trachea midline. Normal thyroid. Resp: Mild accessory muscle use. No crackles. Bilateral wheezes. No rhonchi. No dullness on percussion. CV: Regular rate. Regular rhythm. No murmur or rub. No edema. GI: Non-tender. Non-distended. No masses. No organomegaly. Normal bowel sounds. No hernia. Skin: Warm and dry. No nodule on exposed extremities. No rash on exposed extremities. Lymph: No cervical LAD. No supraclavicular LAD. M/S: No cyanosis. No joint deformity. No clubbing. Neuro: Awake. Moves all extremities. CN grossly intact. Psych: Oriented x 3. No anxiety or agitation.      Scheduled Meds:   ipratropium-albuterol  1 ampule Inhalation 4x daily    mupirocin   Nasal BID    guaiFENesin  600 mg Oral BID    insulin lispro  0-8 Units SubCUTAneous TID     insulin lispro  0-4 Units SubCUTAneous Nightly    lisinopril  10 mg Oral Daily    amLODIPine  5 mg Oral Daily    phosphorus  500 mg Oral BID    tiotropium-olodaterol  2 puff Inhalation Daily    sodium chloride flush  5-40 mL IntraVENous 2 times per day    enoxaparin  40 mg SubCUTAneous Daily     Continuous Infusions:   IV infusion builder 60 mL/hr at 11/20/22 0741    dextrose      sodium chloride       PRN Meds:  glucose, dextrose bolus **OR** dextrose bolus, glucagon (rDNA), dextrose, hydrOXYzine HCl, sodium chloride flush, sodium chloride, ondansetron **OR** ondansetron, polyethylene glycol, acetaminophen **OR** acetaminophen, potassium chloride **OR** potassium alternative oral replacement **OR** potassium chloride    Labs:  CBC:   Recent Labs     11/18/22  0344 11/19/22  0232 11/20/22  0255   WBC 2.6* 6.7 5.4   HGB 11.5* 10.7* 10.7*   HCT 32.9* 32.0* 30.7*   MCV 90.7 94.2 92.7    324 299     BMP:   Recent Labs     11/19/22  1456 11/19/22  2101 11/20/22  0255   * 127* 127*   K 4.1 4.1 3.8   CL 88* 90* 91*   CO2 27 29 27   PHOS 2.8 3.2 2.7   BUN 12 11 8   CREATININE <0.5* <0.5* <0.5*     LIVER PROFILE:   Recent Labs     11/17/22  1511   AST 62*   ALT 26   BILITOT 1.0   ALKPHOS 113     PT/INR: No results for input(s): PROTIME, INR in the last 72 hours. APTT: No results for input(s): APTT in the last 72 hours. UA:  Recent Labs     11/17/22  2245   COLORU Yellow   PHUR 6.5   WBCUA None seen   RBCUA 3-4   CLARITYU Clear   SPECGRAV <=1.005   LEUKOCYTESUR Negative   UROBILINOGEN 0.2   BILIRUBINUR Negative   BLOODU TRACE-INTACT*   GLUCOSEU 500*   AMORPHOUS 2+     No results for input(s): PHART, LJV7LWT, PO2ART in the last 72 hours.     Cultures:   11/17 COVID-19 not detected    Films:  Chest x-ray 11/17 imaging was reviewed by me and showed   Left lung nodule  No acute infiltrates       ASSESSMENT:  Moderate COPD/emphysema with acute exacerbation  Acute hyponatremia  Acute encephalopathy-improving  Chronic hypoxemic respiratory failure on 3 L O2  KAMLA hypermetabolic pulmonary nodule post SBRT  History of COVID-pneumonia August 2022    PLAN:  Supplemental oxygen to maintain SaO2 >92%; wean as tolerated  Inhaled bronchodilator therapy  Prednisone taper  Acapella and Mucinex  Fluid management per nephrology   Will need repeat CT chest before discharge   DVT prophylaxis: Lovenox

## 2022-11-20 NOTE — PROGRESS NOTES
Atarax given for itch. Patient reports satisfaction and would like to take a nap. No other needs stated at this time, call light within reach.

## 2022-11-20 NOTE — PROGRESS NOTES
11/19/22 2321   NIV Type   Skin Protection for O2 Device Yes   Equipment Type v60   Mode Bilevel   Mask Type Full face mask   Mask Size Medium   Settings/Measurements   IPAP 12 cmH20   CPAP/EPAP 5 cmH2O   Vt (Measured) 500 mL   Rate Ordered 20   Resp 22   FiO2  30 %   Minute Volume (L/min) 10.4 Liters   Mask Leak (lpm) 0 lpm   Comfort Level Good   Using Accessory Muscles No   SpO2 100   Patient's Home Machine No   Patient Observation   Observations spo2 100% on 30% bipap   Oxygen Therapy/Pulse Ox   Heart Rate 85   SpO2 100 %

## 2022-11-21 PROBLEM — J96.20 ACUTE ON CHRONIC RESPIRATORY FAILURE (HCC): Status: ACTIVE | Noted: 2021-02-04

## 2022-11-21 LAB
ALBUMIN SERPL-MCNC: 4 G/DL (ref 3.4–5)
ANION GAP SERPL CALCULATED.3IONS-SCNC: 8 MMOL/L (ref 3–16)
ANION GAP SERPL CALCULATED.3IONS-SCNC: 9 MMOL/L (ref 3–16)
BASOPHILS ABSOLUTE: 0 K/UL (ref 0–0.2)
BASOPHILS RELATIVE PERCENT: 0.1 %
BUN BLDV-MCNC: 10 MG/DL (ref 7–20)
BUN BLDV-MCNC: 7 MG/DL (ref 7–20)
CALCIUM SERPL-MCNC: 9.4 MG/DL (ref 8.3–10.6)
CALCIUM SERPL-MCNC: 9.5 MG/DL (ref 8.3–10.6)
CHLORIDE BLD-SCNC: 93 MMOL/L (ref 99–110)
CHLORIDE BLD-SCNC: 95 MMOL/L (ref 99–110)
CO2: 23 MMOL/L (ref 21–32)
CO2: 25 MMOL/L (ref 21–32)
CREAT SERPL-MCNC: <0.5 MG/DL (ref 0.6–1.2)
CREAT SERPL-MCNC: <0.5 MG/DL (ref 0.6–1.2)
EOSINOPHILS ABSOLUTE: 0 K/UL (ref 0–0.6)
EOSINOPHILS RELATIVE PERCENT: 0.2 %
GFR SERPL CREATININE-BSD FRML MDRD: >60 ML/MIN/{1.73_M2}
GFR SERPL CREATININE-BSD FRML MDRD: >60 ML/MIN/{1.73_M2}
GLUCOSE BLD-MCNC: 119 MG/DL (ref 70–99)
GLUCOSE BLD-MCNC: 120 MG/DL (ref 70–99)
GLUCOSE BLD-MCNC: 142 MG/DL (ref 70–99)
GLUCOSE BLD-MCNC: 154 MG/DL (ref 70–99)
GLUCOSE BLD-MCNC: 223 MG/DL (ref 70–99)
GLUCOSE BLD-MCNC: 225 MG/DL (ref 70–99)
HCT VFR BLD CALC: 33.8 % (ref 36–48)
HEMOGLOBIN: 11.3 G/DL (ref 12–16)
LYMPHOCYTES ABSOLUTE: 0.6 K/UL (ref 1–5.1)
LYMPHOCYTES RELATIVE PERCENT: 11.1 %
MCH RBC QN AUTO: 31.5 PG (ref 26–34)
MCHC RBC AUTO-ENTMCNC: 33.5 G/DL (ref 31–36)
MCV RBC AUTO: 94.2 FL (ref 80–100)
MONOCYTES ABSOLUTE: 0.4 K/UL (ref 0–1.3)
MONOCYTES RELATIVE PERCENT: 7.2 %
NEUTROPHILS ABSOLUTE: 4.7 K/UL (ref 1.7–7.7)
NEUTROPHILS RELATIVE PERCENT: 81.4 %
PDW BLD-RTO: 13.2 % (ref 12.4–15.4)
PERFORMED ON: ABNORMAL
PHOSPHORUS: 2.8 MG/DL (ref 2.5–4.9)
PLATELET # BLD: 321 K/UL (ref 135–450)
PMV BLD AUTO: 6 FL (ref 5–10.5)
POTASSIUM SERPL-SCNC: 4.6 MMOL/L (ref 3.5–5.1)
POTASSIUM SERPL-SCNC: 4.6 MMOL/L (ref 3.5–5.1)
RBC # BLD: 3.59 M/UL (ref 4–5.2)
SODIUM BLD-SCNC: 126 MMOL/L (ref 136–145)
SODIUM BLD-SCNC: 127 MMOL/L (ref 136–145)
WBC # BLD: 5.8 K/UL (ref 4–11)

## 2022-11-21 PROCEDURE — 80069 RENAL FUNCTION PANEL: CPT

## 2022-11-21 PROCEDURE — 2580000003 HC RX 258: Performed by: INTERNAL MEDICINE

## 2022-11-21 PROCEDURE — 6370000000 HC RX 637 (ALT 250 FOR IP): Performed by: INTERNAL MEDICINE

## 2022-11-21 PROCEDURE — 94669 MECHANICAL CHEST WALL OSCILL: CPT

## 2022-11-21 PROCEDURE — 6360000002 HC RX W HCPCS: Performed by: INTERNAL MEDICINE

## 2022-11-21 PROCEDURE — 36415 COLL VENOUS BLD VENIPUNCTURE: CPT

## 2022-11-21 PROCEDURE — 94761 N-INVAS EAR/PLS OXIMETRY MLT: CPT

## 2022-11-21 PROCEDURE — 2700000000 HC OXYGEN THERAPY PER DAY

## 2022-11-21 PROCEDURE — 85025 COMPLETE CBC W/AUTO DIFF WBC: CPT

## 2022-11-21 PROCEDURE — 99232 SBSQ HOSP IP/OBS MODERATE 35: CPT | Performed by: INTERNAL MEDICINE

## 2022-11-21 PROCEDURE — 2060000000 HC ICU INTERMEDIATE R&B

## 2022-11-21 PROCEDURE — 94640 AIRWAY INHALATION TREATMENT: CPT

## 2022-11-21 PROCEDURE — 99233 SBSQ HOSP IP/OBS HIGH 50: CPT | Performed by: INTERNAL MEDICINE

## 2022-11-21 RX ORDER — IPRATROPIUM BROMIDE AND ALBUTEROL SULFATE 2.5; .5 MG/3ML; MG/3ML
1 SOLUTION RESPIRATORY (INHALATION) 3 TIMES DAILY
Status: DISCONTINUED | OUTPATIENT
Start: 2022-11-21 | End: 2022-11-23

## 2022-11-21 RX ORDER — IPRATROPIUM BROMIDE AND ALBUTEROL SULFATE 2.5; .5 MG/3ML; MG/3ML
1 SOLUTION RESPIRATORY (INHALATION) EVERY 4 HOURS PRN
Status: DISCONTINUED | OUTPATIENT
Start: 2022-11-21 | End: 2022-11-23 | Stop reason: HOSPADM

## 2022-11-21 RX ADMIN — ACETAMINOPHEN 650 MG: 325 TABLET ORAL at 16:54

## 2022-11-21 RX ADMIN — IPRATROPIUM BROMIDE AND ALBUTEROL SULFATE 1 AMPULE: 2.5; .5 SOLUTION RESPIRATORY (INHALATION) at 15:27

## 2022-11-21 RX ADMIN — IPRATROPIUM BROMIDE AND ALBUTEROL SULFATE 1 AMPULE: 2.5; .5 SOLUTION RESPIRATORY (INHALATION) at 07:05

## 2022-11-21 RX ADMIN — HYDROXYZINE HYDROCHLORIDE 25 MG: 25 TABLET ORAL at 05:16

## 2022-11-21 RX ADMIN — AMLODIPINE BESYLATE 5 MG: 5 TABLET ORAL at 09:19

## 2022-11-21 RX ADMIN — ACETAMINOPHEN 650 MG: 325 TABLET ORAL at 05:02

## 2022-11-21 RX ADMIN — IPRATROPIUM BROMIDE AND ALBUTEROL SULFATE 1 AMPULE: 2.5; .5 SOLUTION RESPIRATORY (INHALATION) at 10:53

## 2022-11-21 RX ADMIN — HYDROXYZINE HYDROCHLORIDE 25 MG: 25 TABLET ORAL at 17:00

## 2022-11-21 RX ADMIN — ENOXAPARIN SODIUM 40 MG: 100 INJECTION SUBCUTANEOUS at 09:19

## 2022-11-21 RX ADMIN — MUPIROCIN: 20 OINTMENT TOPICAL at 19:59

## 2022-11-21 RX ADMIN — GUAIFENESIN 600 MG: 600 TABLET, EXTENDED RELEASE ORAL at 09:19

## 2022-11-21 RX ADMIN — PREDNISONE 40 MG: 20 TABLET ORAL at 09:19

## 2022-11-21 RX ADMIN — LISINOPRIL 10 MG: 10 TABLET ORAL at 09:19

## 2022-11-21 RX ADMIN — GUAIFENESIN 600 MG: 600 TABLET, EXTENDED RELEASE ORAL at 19:57

## 2022-11-21 RX ADMIN — VASOPRESSIN: 20 INJECTION, SOLUTION INTRAVENOUS at 04:31

## 2022-11-21 RX ADMIN — Medication 10 ML: at 19:58

## 2022-11-21 RX ADMIN — MUPIROCIN: 20 OINTMENT TOPICAL at 09:19

## 2022-11-21 RX ADMIN — IPRATROPIUM BROMIDE AND ALBUTEROL SULFATE 1 AMPULE: 2.5; .5 SOLUTION RESPIRATORY (INHALATION) at 20:03

## 2022-11-21 ASSESSMENT — PAIN DESCRIPTION - LOCATION
LOCATION: HEAD
LOCATION: GENERALIZED

## 2022-11-21 ASSESSMENT — PAIN SCALES - GENERAL
PAINLEVEL_OUTOF10: 3
PAINLEVEL_OUTOF10: 9

## 2022-11-21 ASSESSMENT — PAIN DESCRIPTION - DESCRIPTORS
DESCRIPTORS: DISCOMFORT
DESCRIPTORS: ACHING;DISCOMFORT;POUNDING

## 2022-11-21 ASSESSMENT — PAIN DESCRIPTION - ORIENTATION: ORIENTATION: MID

## 2022-11-21 ASSESSMENT — PAIN DESCRIPTION - ONSET: ONSET: ON-GOING

## 2022-11-21 ASSESSMENT — PAIN DESCRIPTION - PAIN TYPE: TYPE: ACUTE PAIN

## 2022-11-21 ASSESSMENT — PAIN DESCRIPTION - FREQUENCY: FREQUENCY: CONTINUOUS

## 2022-11-21 NOTE — PROGRESS NOTES
Occupational Therapy  Attempted treatment this afternoon. Patient declined participation in out of bed activity at this time. Patient reports \"I have too much going on right now and I can't do therapy today\". OT will follow up tomorrow as appropriate.        [address], OTR/L #[id number]  Mitchell Cam DPT, OMT-C  #[id number]

## 2022-11-21 NOTE — FLOWSHEET NOTE
Shift assessment completed. Patient in bed with eyes closed. Awakened for PM med administration. A/Ox4. See flowsheet for vitals. Patient denies any needs at this time. Call light and bedside table within reach.

## 2022-11-21 NOTE — PROGRESS NOTES
Patient resting in bed with eyes closed. Denies any needs at this time. Call light within reach. Bed in lowest position. Bedside table within reach.

## 2022-11-21 NOTE — PROGRESS NOTES
Department of Internal Medicine  Nephrology Progress Note        SUBJECTIVE:    We are following this patient for Hyponatremia. The patient was seen and examined; she feels well today with no CP, SOB, nausea or vomiting. ROS: No fever or chills. Social: No family at bedside. Physical Exam:    VITALS:  /73   Pulse 92   Temp 98.2 °F (36.8 °C) (Oral)   Resp 17   Ht 5' 3\" (1.6 m)   Wt 162 lb 5 oz (73.6 kg)   SpO2 98%   BMI 28.75 kg/m²     General appearance: Seems comfortable, no acute distress. Neck: Trachea midline, thyroid normal.   Lungs:  Non labored breathing, CTA to anterior auscultation. Heart:  S1S2 normal, rub or gallop. No peripheral edema. Abdomen: Soft, non-tender, no organomegaly. Skin: No lesions or rashes, warm to touch.      DATA:    CBC with Differential:    Lab Results   Component Value Date/Time    WBC 5.8 11/21/2022 05:43 AM    RBC 3.59 11/21/2022 05:43 AM    HGB 11.3 11/21/2022 05:43 AM    HCT 33.8 11/21/2022 05:43 AM     11/21/2022 05:43 AM    MCV 94.2 11/21/2022 05:43 AM    MCH 31.5 11/21/2022 05:43 AM    MCHC 33.5 11/21/2022 05:43 AM    RDW 13.2 11/21/2022 05:43 AM    SEGSPCT 63.4 02/07/2013 10:24 AM    BANDSPCT 1 12/15/2021 04:30 AM    LYMPHOPCT 11.1 11/21/2022 05:43 AM    MONOPCT 7.2 11/21/2022 05:43 AM    EOSPCT 0.2 08/30/2010 04:05 PM    BASOPCT 0.1 11/21/2022 05:43 AM    MONOSABS 0.4 11/21/2022 05:43 AM    LYMPHSABS 0.6 11/21/2022 05:43 AM    EOSABS 0.0 11/21/2022 05:43 AM    BASOSABS 0.0 11/21/2022 05:43 AM    DIFFTYPE Auto-K 02/07/2013 10:24 AM     BMP:    Lab Results   Component Value Date/Time     11/21/2022 01:30 PM    K 4.6 11/21/2022 01:30 PM    K 3.9 11/17/2022 03:11 PM    CL 95 11/21/2022 01:30 PM    CO2 23 11/21/2022 01:30 PM    BUN 10 11/21/2022 01:30 PM    LABALBU 4.0 11/21/2022 05:43 AM    CREATININE <0.5 11/21/2022 01:30 PM    CALCIUM 9.5 11/21/2022 01:30 PM    GFRAA >60 09/01/2022 05:24 AM    GFRAA >60 02/07/2013 10:24 AM    LABGLOM >60 11/21/2022 01:30 PM    GLUCOSE 223 11/21/2022 01:30 PM       Assessment    -Acute on chronic hyponatremia.   Baseline sodium of around 125-128                Sodium on admission on 11/17/2022 at 1500 was 108     - COPD  -Acute metabolic encephalopathy        Plan    - discontinue 1.5% saline   - Daily free water restriction 1200 ml/day  - Monitor serial labs

## 2022-11-21 NOTE — PROGRESS NOTES
Admit: 2022    Name:  Chato Jamil  Room:  /8129-94  MRN:    [id number]    Critical Care Daily Progress Note for 2022   Admitted with altered mental status and hyponatremia    Interval History:       Currently on 4 L of oxygen    Scheduled Meds:   predniSONE  40 mg Oral Daily    guaiFENesin  600 mg Oral BID    ipratropium-albuterol  1 ampule Inhalation 4x daily    mupirocin   Nasal BID    insulin lispro  0-8 Units SubCUTAneous TID WC    insulin lispro  0-4 Units SubCUTAneous Nightly    lisinopril  10 mg Oral Daily    amLODIPine  5 mg Oral Daily    sodium chloride flush  5-40 mL IntraVENous 2 times per day    enoxaparin  40 mg SubCUTAneous Daily       Continuous Infusions:   IV infusion builder 60 mL/hr at 22 0431    dextrose      sodium chloride         PRN Meds:  glucose, dextrose bolus **OR** dextrose bolus, glucagon (rDNA), dextrose, hydrOXYzine HCl, sodium chloride flush, sodium chloride, ondansetron **OR** ondansetron, polyethylene glycol, acetaminophen **OR** acetaminophen, potassium chloride **OR** potassium alternative oral replacement **OR** potassium chloride                  Objective:     Temp  Av.4 °F (36.3 °C)  Min: 97 °F (36.1 °C)  Max: 97.7 °F (36.5 °C)  Pulse  Av  Min: 68  Max: 78  BP  Min: 119/70  Max: 130/84  SpO2  Av.9 %  Min: 95 %  Max: 100 %  Patient Vitals for the past 4 hrs:   BP Temp Temp src Pulse Resp SpO2   22 0727 130/84 97.4 °F (36.3 °C) Oral 68 17 96 %   22 0706 -- -- -- -- -- 95 %           Intake/Output Summary (Last 24 hours) at 2022 0749  Last data filed at 2022 0441  Gross per 24 hour   Intake 1266 ml   Output 1200 ml   Net 66 ml         Physical Exam:  General:  Awake, alert and oriented.  Appears to be not in any distress  Mucous Membranes:  Pink , anicteric  Neck: No JVD, no carotid bruit, no thyromegaly  Chest: Normal respiratory effort, no accessory muscle, few wheezes, no crackles  Cardiovascular:  RRR S1S2 heard, no murmurs or gallops  Abdomen:  Soft, undistended, non tender, no organomegaly, BS present  Extremities: No edema or cyanosis. Distal pulses well felt  Neurological : no focal deficits    Lab Data:  CBC:   Recent Labs     11/19/22  0232 11/20/22 0255 11/21/22  0543   WBC 6.7 5.4 5.8   RBC 3.40* 3.31* 3.59*   HGB 10.7* 10.7* 11.3*   HCT 32.0* 30.7* 33.8*   MCV 94.2 92.7 94.2   RDW 13.0 13.1 13.2    299 321       BMP:   Recent Labs     11/20/22  0255 11/20/22  0905 11/21/22  0543   * 128* 126*   K 3.8 3.6 4.6   CL 91* 93* 93*   CO2 27 25 25   PHOS 2.7 2.5 2.8   BUN 8 7 7   CREATININE <0.5* <0.5* <0.5*       BNP: No results for input(s): BNP in the last 72 hours. PT/INR: No results for input(s): PROTIME, INR in the last 72 hours. APTT:No results for input(s): APTT in the last 72 hours. CARDIAC ENZYMES:   No results for input(s): CKMB, CKMBINDEX, TROPONINI in the last 72 hours. Invalid input(s): CKTOTAL;3    FASTING LIPID PANEL:  Lab Results   Component Value Date/Time    CHOL 154 09/15/2017 08:18 AM    HDL 81 09/15/2017 08:18 AM    HDL 64 05/14/2012 06:55 AM    TRIG 44 09/15/2017 08:18 AM     LIVER PROFILE:   No results for input(s): AST, ALT, ALB, BILIDIR, BILITOT, ALKPHOS in the last 72 hours. XR CHEST PORTABLE   Final Result   Left lung nodule that is suspicious for malignancy until proven otherwise. Severe pulmonary emphysema and associated interstitial pulmonary fibrosis. Pulmonary artery hypertension. Possible osteoporosis. CT Head W/O Contrast   Final Result   No acute intracranial abnormality. XR CHEST PORTABLE   Final Result   No radiographic evidence of acute pulmonary disease.                Assessment & Plan:     Patient Active Problem List    Diagnosis Date Noted    Metabolic encephalopathy 83/02/6878    Acute encephalopathy 11/18/2022    Chronic hypoxemic respiratory failure ([facility] 11/18/2022    Chronic respiratory failure with hypoxia ([facility] 08/31/2022    Hypokalemia 08/29/2022    Low TSH level 08/29/2022    History of COVID-19 08/29/2022    Leukocytosis 08/29/2022    Hypertension 08/15/2022    COVID-19 08/15/2022    Pneumonia due to COVID-19 virus 08/14/2022    Community acquired pneumonia     Transaminitis 12/10/2021    Acute hypoxemic respiratory failure (HCC)     Pneumonia due to infectious organism     Pancreatic abnormality     Common bile duct dilatation     Acute on chronic respiratory failure with hypoxia ([address] 02/04/2021    Community acquired pneumonia of right lung     Acute exacerbation of chronic obstructive pulmonary disease (COPD) ([address]     Acute on chronic respiratory failure with hypoxia and hypercapnia ([address] 02/04/2020    Lung nodule     Pulmonary emphysema (HCC)     Hyponatremia 01/30/2020    Acute respiratory failure with hypoxia ([address] 05/21/2018    COPD exacerbation ([address] 05/20/2018    Moderate COPD (chronic obstructive pulmonary disease) ([address] 04/21/2016    Tobacco use 02/12/2016    Chest pain 10/14/2011    Shortness of breath 64/28/9001     Metabolic encephalopathy  Likely secondary to hyponatremia  Resolved    Acute on chronic hyponatremia  Admitted to the ICU. Seizure precautions. Nephrology consult. Continue normal saline for now.    follow sodium levels closely    COPD and chronic hypoxic respiratory failure  Stable on home O2  Continue inhalers    Lung nodule  Pulmonary consult    Hypertension  Stable  Continue amlodipine and lisinopril    Full code  General diet  Lovenox for DVT prophylaxis    Pt/ot      Ashlyn Jaime MD

## 2022-11-21 NOTE — PROGRESS NOTES
RT Inhaler-Nebulizer Bronchodilator Protocol Note    There is a bronchodilator order in the chart from a provider indicating to follow the RT Bronchodilator Protocol and there is an Initiate RT Inhaler-Nebulizer Bronchodilator Protocol order as well (see protocol at bottom of note). CXR Findings:  No results found. The findings from the last RT Protocol Assessment were as follows:   History Pulmonary Disease: (P) Chronic pulmonary disease  Respiratory Pattern: (P) Regular pattern and RR 12-20 bpm  Breath Sounds: (P) Slightly diminished and/or crackles  Cough: (P) Strong, spontaneous, non-productive  Indication for Bronchodilator Therapy: (P) Decreased or absent breath sounds  Bronchodilator Assessment Score: (P) 4    Aerosolized bronchodilator medication orders have been revised according to the RT Inhaler-Nebulizer Bronchodilator Protocol below. Respiratory Therapist to perform RT Therapy Protocol Assessment initially then follow the protocol. Repeat RT Therapy Protocol Assessment PRN for score 0-3 or on second treatment, BID, and PRN for scores above 3. No Indications - adjust the frequency to every 6 hours PRN wheezing or bronchospasm, if no treatments needed after 48 hours then discontinue using Per Protocol order mode. If indication present, adjust the RT bronchodilator orders based on the Bronchodilator Assessment Score as indicated below. Use Inhaler orders unless patient has one or more of the following: on home nebulizer, not able to hold breath for 10 seconds, is not alert and oriented, cannot activate and use MDI correctly, or respiratory rate 25 breaths per minute or more, then use the equivalent nebulizer order(s) with same Frequency and PRN reasons based on the score. If a patient is on this medication at home then do not decrease Frequency below that used at home.     0-3 - enter or revise RT bronchodilator order(s) to equivalent RT Bronchodilator order with Frequency of every 4 hours PRN for wheezing or increased work of breathing using Per Protocol order mode. 4-6 - enter or revise RT Bronchodilator order(s) to two equivalent RT bronchodilator orders with one order with BID Frequency and one order with Frequency of every 4 hours PRN wheezing or increased work of breathing using Per Protocol order mode. 7-10 - enter or revise RT Bronchodilator order(s) to two equivalent RT bronchodilator orders with one order with TID Frequency and one order with Frequency of every 4 hours PRN wheezing or increased work of breathing using Per Protocol order mode. 11-13 - enter or revise RT Bronchodilator order(s) to one equivalent RT bronchodilator order with QID Frequency and an Albuterol order with Frequency of every 4 hours PRN wheezing or increased work of breathing using Per Protocol order mode. Greater than 13 - enter or revise RT Bronchodilator order(s) to one equivalent RT bronchodilator order with every 4 hours Frequency and an Albuterol order with Frequency of every 2 hours PRN wheezing or increased work of breathing using Per Protocol order mode.          Electronically signed by Marianne Carr RCP on 11/21/2022 at 7:08 AM

## 2022-11-21 NOTE — CARE COORDINATION
INTERDISCIPLINARY PLAN OF CARE CONFERENCE    Date/Time: 11/21/2022 11:23 AM  Completed by: CHAITANYA Adorno Student Case Management      Patient Name:  Janay Bruno  YOB: 1955  Admitting Diagnosis: Hyponatremia [E87.1]     Admit Date/Time:  11/17/2022  2:54 PM    Chart reviewed. Interdisciplinary team contacted or reviewed plan related to patient progress and discharge plans. Disciplines included Case Management, Nursing, and Dietitian. Current Status:ongoing  PT/OT recommendation for discharge plan of care: SNF    Expected D/C Disposition:  Skilled nursing facility    Discharge Plan Comments: Chart review complete. CM will continue to follow and assist. Please notify CM if needs or concerns arise. CM addressed PT/OT rec for d/c to SNF. CM provided pt with SNF listing and inquired if the pt already knew a SNF she would like to go to. Pt stated no, but that she would like to remain in [facility]. CM assisted pt in finding options on list in [facility] stated she would like some time to think about it before pre-cert. CM will follow up later today. Home O2 in place on admit: Yes    Addendum 2:00pm: CM followed up with pt about SNF selection. Pt stated that she wants more time to discuss with her family. CM stated we would follow up tomorrow.

## 2022-11-21 NOTE — FLOWSHEET NOTE
11/21/22 1449   Vital Signs   Temp 98.2 °F (36.8 °C)   Temp Source Oral   Heart Rate 92   Heart Rate Source Monitor   Resp 17   /73   MAP (Calculated) 90   BP Location Left upper arm   BP Method Automatic   Patient Position Semi fowlers   Level of Consciousness 0   MEWS Score 1   Oxygen Therapy   SpO2 98 %   O2 Device Nasal cannula   O2 Flow Rate (L/min) 3 L/min   RN reviewed sodium with . Orders to stop concentrated saline gtt at this time. RN reviewed the importance of adhering to fluid restrictions. She states she understands but it also very thirsty. RN also reviewed the importance of shifting sides and moving from one position as she is high risk for pressure injuries. Patient verbalized understanding.

## 2022-11-21 NOTE — FLOWSHEET NOTE
11/21/22 0727   Vital Signs   Temp 97.4 °F (36.3 °C)   Temp Source Oral   Heart Rate 68   Heart Rate Source Monitor   Resp 17   /84   MAP (Calculated) 99   BP Location Left upper arm   BP Method Automatic   Patient Position Semi fowlers   Level of Consciousness 0   MEWS Score 1   Oxygen Therapy   SpO2 96 %   O2 Device Nasal cannula   O2 Flow Rate (L/min) 3 L/min   Patient is resting showing no s/s of distress. Patient is alert and oriented. Meds were given, see MAR. Patient is denying any needs. Bed is in lowest position and call light is within reach. Will continue to monitor. Shift assessment complete, see flowsheets.

## 2022-11-21 NOTE — PROGRESS NOTES
11/20/22 2000   RT Protocol   History Pulmonary Disease 2   Respiratory pattern 2   Breath sounds 4   Cough 0   Indications for Bronchodilator Therapy Decreased or absent breath sounds   Bronchodilator Assessment Score 8   RT Inhaler-Nebulizer Bronchodilator Protocol Note    There is a bronchodilator order in the chart from a provider indicating to follow the RT Bronchodilator Protocol and there is an Initiate RT Inhaler-Nebulizer Bronchodilator Protocol order as well (see protocol at bottom of note). CXR Findings:  No results found. The findings from the last RT Protocol Assessment were as follows:   History Pulmonary Disease: Chronic pulmonary disease  Respiratory Pattern: Dyspnea on exertion or RR 21-25 bpm  Breath Sounds: Intermittent or unilateral wheezes  Cough: Strong, spontaneous, non-productive  Indication for Bronchodilator Therapy: Decreased or absent breath sounds  Bronchodilator Assessment Score: 8    Aerosolized bronchodilator medication orders have been revised according to the RT Inhaler-Nebulizer Bronchodilator Protocol below. Respiratory Therapist to perform RT Therapy Protocol Assessment initially then follow the protocol. Repeat RT Therapy Protocol Assessment PRN for score 0-3 or on second treatment, BID, and PRN for scores above 3. No Indications - adjust the frequency to every 6 hours PRN wheezing or bronchospasm, if no treatments needed after 48 hours then discontinue using Per Protocol order mode. If indication present, adjust the RT bronchodilator orders based on the Bronchodilator Assessment Score as indicated below. Use Inhaler orders unless patient has one or more of the following: on home nebulizer, not able to hold breath for 10 seconds, is not alert and oriented, cannot activate and use MDI correctly, or respiratory rate 25 breaths per minute or more, then use the equivalent nebulizer order(s) with same Frequency and PRN reasons based on the score.   If a patient is on this medication at home then do not decrease Frequency below that used at home. 0-3 - enter or revise RT bronchodilator order(s) to equivalent RT Bronchodilator order with Frequency of every 4 hours PRN for wheezing or increased work of breathing using Per Protocol order mode. 4-6 - enter or revise RT Bronchodilator order(s) to two equivalent RT bronchodilator orders with one order with BID Frequency and one order with Frequency of every 4 hours PRN wheezing or increased work of breathing using Per Protocol order mode. 7-10 - enter or revise RT Bronchodilator order(s) to two equivalent RT bronchodilator orders with one order with TID Frequency and one order with Frequency of every 4 hours PRN wheezing or increased work of breathing using Per Protocol order mode. 11-13 - enter or revise RT Bronchodilator order(s) to one equivalent RT bronchodilator order with QID Frequency and an Albuterol order with Frequency of every 4 hours PRN wheezing or increased work of breathing using Per Protocol order mode. Greater than 13 - enter or revise RT Bronchodilator order(s) to one equivalent RT bronchodilator order with every 4 hours Frequency and an Albuterol order with Frequency of every 2 hours PRN wheezing or increased work of breathing using Per Protocol order mode.        Electronically signed by Jada Moy RCP on 11/20/2022 at 8:30 PM

## 2022-11-21 NOTE — PLAN OF CARE
Problem: Discharge Planning  Goal: Discharge to home or other facility with appropriate resources  11/20/2022 2305 by Wen Mora RN  Outcome: Progressing  11/20/2022 1853 by Marianne Parisi RN  Outcome: Progressing     Problem: Safety - Adult  Goal: Free from fall injury  11/20/2022 2305 by Wen Mora RN  Outcome: Progressing  11/20/2022 1853 by Marianne Parisi RN  Outcome: Progressing     Problem: Skin/Tissue Integrity  Goal: Absence of new skin breakdown  Description: 1. Monitor for areas of redness and/or skin breakdown  2. Assess vascular access sites hourly  3. Every 4-6 hours minimum:  Change oxygen saturation probe site  4. Every 4-6 hours:  If on nasal continuous positive airway pressure, respiratory therapy assess nares and determine need for appliance change or resting period.   11/20/2022 2305 by Wen Mora RN  Outcome: Progressing  11/20/2022 1853 by Marianne Parisi RN  Outcome: Progressing     Problem: Respiratory - Adult  Goal: Achieves optimal ventilation and oxygenation  11/20/2022 2305 by Wen Mora RN  Outcome: Progressing  11/20/2022 1853 by Marianne Parisi RN  Outcome: Progressing     Problem: Cardiovascular - Adult  Goal: Maintains optimal cardiac output and hemodynamic stability  11/20/2022 2305 by Wen Mora RN  Outcome: Progressing  11/20/2022 1853 by Marianne Parisi RN  Outcome: Progressing     Problem: Skin/Tissue Integrity - Adult  Goal: Skin integrity remains intact  11/20/2022 2305 by Wen Mora RN  Outcome: Progressing  11/20/2022 1853 by Marianne Parisi RN  Outcome: Progressing  Flowsheets (Taken 11/20/2022 1000)  Skin Integrity Remains Intact: Monitor for areas of redness and/or skin breakdown  Goal: Oral mucous membranes remain intact  11/20/2022 2305 by Wen Mora RN  Outcome: Progressing  11/20/2022 1853 by Marianne Parisi RN  Outcome: Progressing     Problem: Gastrointestinal - Adult  Goal: Maintains adequate nutritional intake  11/20/2022 2305 by Alexx Arelis Barry  Outcome: Progressing  11/20/2022 1853 by Ren Pérez RN  Outcome: Progressing     Problem: Metabolic/Fluid and Electrolytes - Adult  Goal: Electrolytes maintained within normal limits  11/20/2022 2305 by Chiara Gurrola RN  Outcome: Progressing  11/20/2022 1853 by Ren Pérez RN  Outcome: Progressing  Goal: Hemodynamic stability and optimal renal function maintained  11/20/2022 2305 by Chiara Gurrola RN  Outcome: Progressing  11/20/2022 1853 by Ren Pérez RN  Outcome: Progressing     Problem: Hematologic - Adult  Goal: Maintains hematologic stability  11/20/2022 2305 by Chiara Gurrola RN  Outcome: Progressing  11/20/2022 1853 by Ren Pérez RN  Outcome: Progressing     Problem: Pain  Goal: Verbalizes/displays adequate comfort level or baseline comfort level  11/20/2022 2305 by Chiara Gurrola RN  Outcome: Progressing  11/20/2022 1853 by Ren Pérez RN  Outcome: Progressing

## 2022-11-21 NOTE — PROGRESS NOTES
Pulmonary Progress Note    CC: Acute encephalopathy    Subjective:   3LPM- uses 3-4 L at home   BiPAP overnight  More shortness of breath and cough, wheezes on exam      IV line: PIVs    Intake/Output Summary (Last 24 hours) at 11/21/2022 1712  Last data filed at 11/21/2022 1345  Gross per 24 hour   Intake 1077 ml   Output 1200 ml   Net -123 ml         Exam:   /73   Pulse 92   Temp 98.2 °F (36.8 °C) (Oral)   Resp 17   Ht 5' 3\" (1.6 m)   Wt 162 lb 5 oz (73.6 kg)   SpO2 98%   BMI 28.75 kg/m²  on 3LPM   Gen: No distress. Eyes: PERRL. No sclera icterus. No conjunctival injection. ENT: No discharge. Pharynx clear. Neck: Trachea midline. Normal thyroid. Resp: Mild accessory muscle use. No crackles. Bilateral wheezes. No rhonchi. CV: Regular rate. Regular rhythm. No murmur or rub. No edema. GI: Non-tender. Non-distended. No masses. No organomegaly. Normal bowel sounds. Skin: Warm and dry. No nodule on exposed extremities. M/S: No cyanosis. No joint deformity. No clubbing. Neuro: Awake. Moves all extremities. CN grossly intact. Psych: Oriented x 3. No anxiety or agitation.      Scheduled Meds:   ipratropium-albuterol  1 ampule Inhalation TID    predniSONE  40 mg Oral Daily    guaiFENesin  600 mg Oral BID    mupirocin   Nasal BID    insulin lispro  0-8 Units SubCUTAneous TID WC    insulin lispro  0-4 Units SubCUTAneous Nightly    lisinopril  10 mg Oral Daily    amLODIPine  5 mg Oral Daily    sodium chloride flush  5-40 mL IntraVENous 2 times per day    enoxaparin  40 mg SubCUTAneous Daily     Continuous Infusions:   dextrose      sodium chloride       PRN Meds:  ipratropium-albuterol, glucose, dextrose bolus **OR** dextrose bolus, glucagon (rDNA), dextrose, hydrOXYzine HCl, sodium chloride flush, sodium chloride, ondansetron **OR** ondansetron, polyethylene glycol, acetaminophen **OR** acetaminophen, potassium chloride **OR** potassium alternative oral replacement **OR** potassium chloride    Labs:  CBC:   Recent Labs     11/19/22  0232 11/20/22  0255 11/21/22  0543   WBC 6.7 5.4 5.8   HGB 10.7* 10.7* 11.3*   HCT 32.0* 30.7* 33.8*   MCV 94.2 92.7 94.2    299 321       BMP:   Recent Labs     11/20/22  0255 11/20/22  0905 11/21/22  0543 11/21/22  1330   * 128* 126* 127*   K 3.8 3.6 4.6 4.6   CL 91* 93* 93* 95*   CO2 27 25 25 23   PHOS 2.7 2.5 2.8  --    BUN 8 7 7 10   CREATININE <0.5* <0.5* <0.5* <0.5*       LIVER PROFILE:   No results for input(s): AST, ALT, LIPASE, BILIDIR, BILITOT, ALKPHOS in the last 72 hours. Invalid input(s): AMYLASE,  ALB    PT/INR: No results for input(s): PROTIME, INR in the last 72 hours. APTT: No results for input(s): APTT in the last 72 hours. UA:  No results for input(s): NITRITE, COLORU, PHUR, LABCAST, WBCUA, RBCUA, MUCUS, TRICHOMONAS, YEAST, BACTERIA, CLARITYU, SPECGRAV, LEUKOCYTESUR, UROBILINOGEN, BILIRUBINUR, BLOODU, GLUCOSEU, AMORPHOUS in the last 72 hours. Invalid input(s): KETONESU    No results for input(s): PHART, BYF5AYZ, PO2ART in the last 72 hours.     Cultures:   11/17 COVID-19 not detected    Films:  Chest x-ray 11/17 imaging was reviewed by me and showed   Left lung nodule  No acute infiltrates       ASSESSMENT:  Moderate COPD/emphysema with acute exacerbation  Acute hyponatremia  Acute encephalopathy-improving  Chronic hypoxemic respiratory failure on 3 L O2  KAMLA hypermetabolic pulmonary nodule post SBRT   History of COVID-pneumonia August 2022    PLAN:  Supplemental oxygen to maintain SaO2 >92%; wean as tolerated  Inhaled bronchodilator therapy  Prednisone taper  Acapella and Mucinex  Fluid management per nephrology   Pt instructed to follow up with rad onc or pulmonary for f/u chest ct post SBRT

## 2022-11-22 ENCOUNTER — TELEPHONE (OUTPATIENT)
Dept: PULMONOLOGY | Age: 67
End: 2022-11-22

## 2022-11-22 LAB
ALBUMIN SERPL-MCNC: 3.8 G/DL (ref 3.4–5)
ANION GAP SERPL CALCULATED.3IONS-SCNC: 10 MMOL/L (ref 3–16)
BUN BLDV-MCNC: 15 MG/DL (ref 7–20)
CALCIUM SERPL-MCNC: 8.7 MG/DL (ref 8.3–10.6)
CHLORIDE BLD-SCNC: 94 MMOL/L (ref 99–110)
CO2: 25 MMOL/L (ref 21–32)
CREAT SERPL-MCNC: <0.5 MG/DL (ref 0.6–1.2)
GFR SERPL CREATININE-BSD FRML MDRD: >60 ML/MIN/{1.73_M2}
GLUCOSE BLD-MCNC: 100 MG/DL (ref 70–99)
GLUCOSE BLD-MCNC: 123 MG/DL (ref 70–99)
GLUCOSE BLD-MCNC: 167 MG/DL (ref 70–99)
GLUCOSE BLD-MCNC: 190 MG/DL (ref 70–99)
GLUCOSE BLD-MCNC: 97 MG/DL (ref 70–99)
PERFORMED ON: ABNORMAL
PERFORMED ON: NORMAL
PHOSPHORUS: 3 MG/DL (ref 2.5–4.9)
POTASSIUM SERPL-SCNC: 4.1 MMOL/L (ref 3.5–5.1)
SODIUM BLD-SCNC: 129 MMOL/L (ref 136–145)

## 2022-11-22 PROCEDURE — 36415 COLL VENOUS BLD VENIPUNCTURE: CPT

## 2022-11-22 PROCEDURE — 97530 THERAPEUTIC ACTIVITIES: CPT

## 2022-11-22 PROCEDURE — 2700000000 HC OXYGEN THERAPY PER DAY

## 2022-11-22 PROCEDURE — 2580000003 HC RX 258: Performed by: INTERNAL MEDICINE

## 2022-11-22 PROCEDURE — 94640 AIRWAY INHALATION TREATMENT: CPT

## 2022-11-22 PROCEDURE — 97535 SELF CARE MNGMENT TRAINING: CPT

## 2022-11-22 PROCEDURE — 6370000000 HC RX 637 (ALT 250 FOR IP): Performed by: INTERNAL MEDICINE

## 2022-11-22 PROCEDURE — 99232 SBSQ HOSP IP/OBS MODERATE 35: CPT | Performed by: INTERNAL MEDICINE

## 2022-11-22 PROCEDURE — 94761 N-INVAS EAR/PLS OXIMETRY MLT: CPT

## 2022-11-22 PROCEDURE — 97110 THERAPEUTIC EXERCISES: CPT

## 2022-11-22 PROCEDURE — 80069 RENAL FUNCTION PANEL: CPT

## 2022-11-22 PROCEDURE — 2060000000 HC ICU INTERMEDIATE R&B

## 2022-11-22 PROCEDURE — 99233 SBSQ HOSP IP/OBS HIGH 50: CPT | Performed by: INTERNAL MEDICINE

## 2022-11-22 PROCEDURE — 6360000002 HC RX W HCPCS: Performed by: INTERNAL MEDICINE

## 2022-11-22 PROCEDURE — 94669 MECHANICAL CHEST WALL OSCILL: CPT

## 2022-11-22 RX ADMIN — Medication 10 ML: at 21:46

## 2022-11-22 RX ADMIN — ENOXAPARIN SODIUM 40 MG: 100 INJECTION SUBCUTANEOUS at 08:29

## 2022-11-22 RX ADMIN — HYDROXYZINE HYDROCHLORIDE 25 MG: 25 TABLET ORAL at 08:34

## 2022-11-22 RX ADMIN — LISINOPRIL 10 MG: 10 TABLET ORAL at 08:28

## 2022-11-22 RX ADMIN — ACETAMINOPHEN 650 MG: 325 TABLET ORAL at 11:14

## 2022-11-22 RX ADMIN — IPRATROPIUM BROMIDE AND ALBUTEROL SULFATE 1 AMPULE: 2.5; .5 SOLUTION RESPIRATORY (INHALATION) at 19:03

## 2022-11-22 RX ADMIN — HYDROXYZINE HYDROCHLORIDE 25 MG: 25 TABLET ORAL at 16:40

## 2022-11-22 RX ADMIN — MUPIROCIN: 20 OINTMENT TOPICAL at 08:34

## 2022-11-22 RX ADMIN — ACETAMINOPHEN 650 MG: 325 TABLET ORAL at 02:50

## 2022-11-22 RX ADMIN — MUPIROCIN: 20 OINTMENT TOPICAL at 21:46

## 2022-11-22 RX ADMIN — GUAIFENESIN 600 MG: 600 TABLET, EXTENDED RELEASE ORAL at 08:28

## 2022-11-22 RX ADMIN — ACETAMINOPHEN 650 MG: 325 TABLET ORAL at 18:12

## 2022-11-22 RX ADMIN — AMLODIPINE BESYLATE 5 MG: 5 TABLET ORAL at 08:28

## 2022-11-22 RX ADMIN — GUAIFENESIN 600 MG: 600 TABLET, EXTENDED RELEASE ORAL at 21:46

## 2022-11-22 RX ADMIN — IPRATROPIUM BROMIDE AND ALBUTEROL SULFATE 1 AMPULE: 2.5; .5 SOLUTION RESPIRATORY (INHALATION) at 07:13

## 2022-11-22 RX ADMIN — PREDNISONE 40 MG: 20 TABLET ORAL at 08:28

## 2022-11-22 RX ADMIN — Medication 10 ML: at 08:34

## 2022-11-22 RX ADMIN — IPRATROPIUM BROMIDE AND ALBUTEROL SULFATE 1 AMPULE: 2.5; .5 SOLUTION RESPIRATORY (INHALATION) at 11:29

## 2022-11-22 ASSESSMENT — PAIN SCALES - GENERAL: PAINLEVEL_OUTOF10: 7

## 2022-11-22 ASSESSMENT — PAIN DESCRIPTION - ORIENTATION: ORIENTATION: MID

## 2022-11-22 ASSESSMENT — PAIN DESCRIPTION - LOCATION: LOCATION: HEAD

## 2022-11-22 ASSESSMENT — PAIN DESCRIPTION - DESCRIPTORS: DESCRIPTORS: ACHING;DISCOMFORT

## 2022-11-22 ASSESSMENT — PAIN - FUNCTIONAL ASSESSMENT: PAIN_FUNCTIONAL_ASSESSMENT: ACTIVITIES ARE NOT PREVENTED

## 2022-11-22 NOTE — PROGRESS NOTES
RT Inhaler-Nebulizer Bronchodilator Protocol Note    There is a bronchodilator order in the chart from a provider indicating to follow the RT Bronchodilator Protocol and there is an Initiate RT Inhaler-Nebulizer Bronchodilator Protocol order as well (see protocol at bottom of note). CXR Findings:  No results found. The findings from the last RT Protocol Assessment were as follows:   History Pulmonary Disease: (P) Chronic pulmonary disease  Respiratory Pattern: (P) Dyspnea on exertion or RR 21-25 bpm  Breath Sounds: (P) Intermittent or unilateral wheezes  Cough: (P) Strong, spontaneous, non-productive  Indication for Bronchodilator Therapy: (P) On home bronchodilators  Bronchodilator Assessment Score: (P) 8    Aerosolized bronchodilator medication orders have been revised according to the RT Inhaler-Nebulizer Bronchodilator Protocol below. Respiratory Therapist to perform RT Therapy Protocol Assessment initially then follow the protocol. Repeat RT Therapy Protocol Assessment PRN for score 0-3 or on second treatment, BID, and PRN for scores above 3. No Indications - adjust the frequency to every 6 hours PRN wheezing or bronchospasm, if no treatments needed after 48 hours then discontinue using Per Protocol order mode. If indication present, adjust the RT bronchodilator orders based on the Bronchodilator Assessment Score as indicated below. Use Inhaler orders unless patient has one or more of the following: on home nebulizer, not able to hold breath for 10 seconds, is not alert and oriented, cannot activate and use MDI correctly, or respiratory rate 25 breaths per minute or more, then use the equivalent nebulizer order(s) with same Frequency and PRN reasons based on the score. If a patient is on this medication at home then do not decrease Frequency below that used at home.     0-3 - enter or revise RT bronchodilator order(s) to equivalent RT Bronchodilator order with Frequency of every 4 hours PRN for wheezing or increased work of breathing using Per Protocol order mode. 4-6 - enter or revise RT Bronchodilator order(s) to two equivalent RT bronchodilator orders with one order with BID Frequency and one order with Frequency of every 4 hours PRN wheezing or increased work of breathing using Per Protocol order mode. 7-10 - enter or revise RT Bronchodilator order(s) to two equivalent RT bronchodilator orders with one order with TID Frequency and one order with Frequency of every 4 hours PRN wheezing or increased work of breathing using Per Protocol order mode. 11-13 - enter or revise RT Bronchodilator order(s) to one equivalent RT bronchodilator order with QID Frequency and an Albuterol order with Frequency of every 4 hours PRN wheezing or increased work of breathing using Per Protocol order mode. Greater than 13 - enter or revise RT Bronchodilator order(s) to one equivalent RT bronchodilator order with every 4 hours Frequency and an Albuterol order with Frequency of every 2 hours PRN wheezing or increased work of breathing using Per Protocol order mode. RT to enter RT Home Evaluation for COPD & MDI Assessment order using Per Protocol order mode.     Electronically signed by Luis Pendleton RCP on 11/22/2022 at 7:19 AM

## 2022-11-22 NOTE — PLAN OF CARE
Problem: Discharge Planning  Goal: Discharge to home or other facility with appropriate resources  Outcome: Progressing     Problem: Safety - Adult  Goal: Free from fall injury  Outcome: Progressing     Problem: Skin/Tissue Integrity  Goal: Absence of new skin breakdown  Description: 1. Monitor for areas of redness and/or skin breakdown  2. Assess vascular access sites hourly  3. Every 4-6 hours minimum:  Change oxygen saturation probe site  4. Every 4-6 hours:  If on nasal continuous positive airway pressure, respiratory therapy assess nares and determine need for appliance change or resting period.   Outcome: Progressing     Problem: Respiratory - Adult  Goal: Achieves optimal ventilation and oxygenation  Outcome: Progressing     Problem: Cardiovascular - Adult  Goal: Maintains optimal cardiac output and hemodynamic stability  Outcome: Progressing     Problem: Skin/Tissue Integrity - Adult  Goal: Skin integrity remains intact  Outcome: Progressing  Flowsheets (Taken 11/21/2022 0931 by Ana Lilia Posada RN)  Skin Integrity Remains Intact:   Monitor for areas of redness and/or skin breakdown   Assess vascular access sites hourly  Goal: Oral mucous membranes remain intact  Outcome: Progressing     Problem: Gastrointestinal - Adult  Goal: Maintains adequate nutritional intake  Outcome: Progressing     Problem: Metabolic/Fluid and Electrolytes - Adult  Goal: Electrolytes maintained within normal limits  Outcome: Progressing  Goal: Hemodynamic stability and optimal renal function maintained  Outcome: Progressing     Problem: Hematologic - Adult  Goal: Maintains hematologic stability  Outcome: Progressing     Problem: Pain  Goal: Verbalizes/displays adequate comfort level or baseline comfort level  Outcome: Progressing

## 2022-11-22 NOTE — PROGRESS NOTES
RT Inhaler-Nebulizer Bronchodilator Protocol Note    There is a bronchodilator order in the chart from a provider indicating to follow the RT Bronchodilator Protocol and there is an Initiate RT Inhaler-Nebulizer Bronchodilator Protocol order as well (see protocol at bottom of note). CXR Findings:  No results found. The findings from the last RT Protocol Assessment were as follows:   History Pulmonary Disease: (P) Chronic pulmonary disease  Respiratory Pattern: (P) Dyspnea on exertion or RR 21-25 bpm  Breath Sounds: (P) Intermittent or unilateral wheezes  Cough: (P) Strong, spontaneous, non-productive  Indication for Bronchodilator Therapy: (P) On home bronchodilators, Decreased or absent breath sounds  Bronchodilator Assessment Score: (P) 8    Aerosolized bronchodilator medication orders have been revised according to the RT Inhaler-Nebulizer Bronchodilator Protocol below. Respiratory Therapist to perform RT Therapy Protocol Assessment initially then follow the protocol. Repeat RT Therapy Protocol Assessment PRN for score 0-3 or on second treatment, BID, and PRN for scores above 3. No Indications - adjust the frequency to every 6 hours PRN wheezing or bronchospasm, if no treatments needed after 48 hours then discontinue using Per Protocol order mode. If indication present, adjust the RT bronchodilator orders based on the Bronchodilator Assessment Score as indicated below. Use Inhaler orders unless patient has one or more of the following: on home nebulizer, not able to hold breath for 10 seconds, is not alert and oriented, cannot activate and use MDI correctly, or respiratory rate 25 breaths per minute or more, then use the equivalent nebulizer order(s) with same Frequency and PRN reasons based on the score. If a patient is on this medication at home then do not decrease Frequency below that used at home.     0-3 - enter or revise RT bronchodilator order(s) to equivalent RT Bronchodilator order with Frequency of every 4 hours PRN for wheezing or increased work of breathing using Per Protocol order mode. 4-6 - enter or revise RT Bronchodilator order(s) to two equivalent RT bronchodilator orders with one order with BID Frequency and one order with Frequency of every 4 hours PRN wheezing or increased work of breathing using Per Protocol order mode. 7-10 - enter or revise RT Bronchodilator order(s) to two equivalent RT bronchodilator orders with one order with TID Frequency and one order with Frequency of every 4 hours PRN wheezing or increased work of breathing using Per Protocol order mode. 11-13 - enter or revise RT Bronchodilator order(s) to one equivalent RT bronchodilator order with QID Frequency and an Albuterol order with Frequency of every 4 hours PRN wheezing or increased work of breathing using Per Protocol order mode. Greater than 13 - enter or revise RT Bronchodilator order(s) to one equivalent RT bronchodilator order with every 4 hours Frequency and an Albuterol order with Frequency of every 2 hours PRN wheezing or increased work of breathing using Per Protocol order mode.        Electronically signed by Gabi Puente RCP on 11/21/2022 at 8:06 PM

## 2022-11-22 NOTE — FLOWSHEET NOTE
11/21/22 1954   Vital Signs   Temp 97.1 °F (36.2 °C)   Temp Source Oral   Heart Rate 94   Heart Rate Source Monitor   Resp 18   /67   MAP (Calculated) 84   BP Location Left upper arm   BP Method Automatic   Patient Position High fowlers   Level of Consciousness 0   MEWS Score 1   Pain Assessment   Pain Assessment None - Denies Pain   Oxygen Therapy   SpO2 97 %   O2 Device Nasal cannula   O2 Flow Rate (L/min) 3 L/min   Shift assessment completed. Patient awake in bed. A/Ox4. VSS. PM medication given. Patient denies any pain or needs at this time. Call light and bedside table within reach.

## 2022-11-22 NOTE — PROGRESS NOTES
Occupational Therapy Daily Treatment Note    Unit: PCU  Date:  2022  Patient Name:    Roc Sharma  Admitting diagnosis:  Hyponatremia [E87.1]  Admit Date:  2022  Precautions/Restrictions:  fall risk, IV, bed/chair alarm, purewick catheter, supplemental O2 (3L), telemetry, and continuous pulse ox        Discharge Recommendations: SNF  DME needs for discharge: defer to facility       Therapy recommendations for staff:   Assist of 1 (minimal assist) with use of rolling walker (RW) and gait belt for all transfers and ambulation to/from BSC/chair    AM-PAC Score: AM-PAC Inpatient Daily Activity Raw Score: 17  Home Health S4 Level: NA       Treatment Time:  935-1030  Treatment number:  2   Total Treatment Time:   55 minutes    History of Present Illness: per Dr Marianne aSndhu H&P 22:  \"60 y.o. female who presented to the hospital with a chief complaint of altered mental status. Patient apparently woke up this morning at a baseline and family later on found her confused and lethargic. He called [facility] and she was brought to the emergency department by EMS. They stated that when they saw the patient she appeared to be in respiratory distress, she was given a dose of Solu-Medrol in route and duo nebs as there was a concern for COPD exacerbation. When she presented to the emergency department she was altered and did not necessarily have any respiratory distress even though she was placed on BiPAP for short while. Further work-up in emergency department revealed a sodium level of 108. Patient has a history of SIADH and chronic hyponatremia. She was admitted to the ICU for further care. When I saw the patient she was still confused but was arousable and directable. She has since been started on a saline drip for her hyponatremia. \"      Subjective:  Patient in bed and agreeable to therapy.     Pain   Yes  Ratin  Location:upper back  Pain Medicine Status: RN notified      Bed Mobility:   Supine to Sit:  Min A  Sit to Supine:  Not Tested  Rolling:           Not Tested  Scooting:        SBA    Transfer Training:   Sit to stand:   VC for hand placement  Stand to sit:  CGA and VC for hand placement  Bed to Chair:  Min A, with use of RW, and VC for hand placement  Bed to [facility]:   Not Tested  Standard toilet:   Not Tested    Functional mobility in room - about 6 feet - with RW and gait belt, additional time allotted plus MIN A and intermittent walker safety cues. Patient stood again after seated rest to manage back pain, able to stand with BUE support x 5 min with SBA. Activity Tolerance   Pt completed therapy session with No adverse symptoms noted w/activity  Back pain improved somewhat with mobility. At end of session:  SpO2: 96% on 3L  HR: 79    Balance  Sitting Balance :     Independent  Standing Balance   SBA with RW    ADL Training:   Upper body dressing:  Not Tested  Upper body bathing:  Not Tested  Lower body dressing:  Not Tested  Lower body bathing:  Not Tested  Toileting:   Not Tested  Grooming/Hygiene: Mod A comb tangled hair and pull hair up  Feeding :   Independent beverage management    Therapeutic Exercise:   Shoulder rolls CW/CCW: x10  Cervical stretches    Patient Education:   Role of OT  Recommendations for DC  Energy conservation techniques  Safe RW use/hand placement  Oxygen tubing management  HEP    Positioning Needs:   Up in chair, call light and needs in reach. Alarm Set    Family Present:  No    Assessment: Patient making progress toward goals, good effort. Additional time allotted for all tasks, possibly due to slowed processing. GOALS  To be met in 3 Visits:  1). Bed to toilet/BSC: SBA     To be met in 5 Visits:  1). Supine to/from Sit:             Supervision  2). Upper Body Bathing:         Supervision  3). Lower Body Bathing:         Supervision  4). Upper Body Dressing:       Supervision  5). Lower Body Dressing:       Supervision  6).  Pt to demonstrate UE exs x 15 reps with minimal cues      Plan: cont with [address], OTR/L 6182        If patient discharges from this facility prior to next visit, this note will serve as the Discharge Summary

## 2022-11-22 NOTE — PLAN OF CARE
Problem: Discharge Planning  Goal: Discharge to home or other facility with appropriate resources  11/22/2022 0734 by Eusebia Wing RN  Outcome: Progressing  11/21/2022 2206 by Nabila River RN  Outcome: Progressing     Problem: Safety - Adult  Goal: Free from fall injury  11/22/2022 0734 by Eusebia Wing RN  Outcome: Progressing  11/21/2022 2206 by Nabila River RN  Outcome: Progressing     Problem: Skin/Tissue Integrity  Goal: Absence of new skin breakdown  Description: 1. Monitor for areas of redness and/or skin breakdown  2. Assess vascular access sites hourly  3. Every 4-6 hours minimum:  Change oxygen saturation probe site  4. Every 4-6 hours:  If on nasal continuous positive airway pressure, respiratory therapy assess nares and determine need for appliance change or resting period.   11/22/2022 0734 by Eusebia Wing RN  Outcome: Progressing  11/21/2022 2206 by Nabila River RN  Outcome: Progressing     Problem: Respiratory - Adult  Goal: Achieves optimal ventilation and oxygenation  11/22/2022 0734 by Eusebia Wing RN  Outcome: Progressing  11/21/2022 2206 by Nabila River RN  Outcome: Progressing     Problem: Cardiovascular - Adult  Goal: Maintains optimal cardiac output and hemodynamic stability  11/22/2022 0734 by Eusebia Wing RN  Outcome: Progressing  11/21/2022 2206 by Nabila River RN  Outcome: Progressing     Problem: Skin/Tissue Integrity - Adult  Goal: Skin integrity remains intact  11/22/2022 0734 by Eusebia Wing RN  Outcome: Progressing  11/21/2022 2206 by Nabila River RN  Outcome: Progressing  Flowsheets (Taken 11/21/2022 0931 by Gissel Bo RN)  Skin Integrity Remains Intact:   Monitor for areas of redness and/or skin breakdown   Assess vascular access sites hourly  Goal: Oral mucous membranes remain intact  11/22/2022 0734 by Eusebia Wing RN  Outcome: Progressing  11/21/2022 2206 by Nabila River RN  Outcome: Progressing     Problem: Gastrointestinal - Adult  Goal: Maintains adequate nutritional intake  11/22/2022 0734 by Frantz Dorado RN  Outcome: Progressing  11/21/2022 2206 by Joni Shah RN  Outcome: Progressing     Problem: Hematologic - Adult  Goal: Maintains hematologic stability  11/22/2022 0734 by Frantz Dorado RN  Outcome: Progressing  11/21/2022 2206 by Joni Shah RN  Outcome: Progressing     Problem: Pain  Goal: Verbalizes/displays adequate comfort level or baseline comfort level  11/22/2022 0734 by Frantz Dorado RN  Outcome: Progressing  11/21/2022 2206 by Joni Shah RN  Outcome: Progressing

## 2022-11-22 NOTE — PROGRESS NOTES
Department of Internal Medicine  Nephrology Progress Note        SUBJECTIVE:    We are following this patient for Hyponatremia. The patient was seen and examined; she feels well today with no CP, SOB, nausea or vomiting. Awaiting discharge  Sodium 129    ROS: No fever or chills. Social: No family at bedside. Physical Exam:    VITALS:  /74   Pulse 91   Temp 98.1 °F (36.7 °C) (Oral)   Resp 18   Ht 5' 3\" (1.6 m)   Wt 159 lb 8 oz (72.3 kg)   SpO2 98%   BMI 28.25 kg/m²     General appearance: Seems comfortable, no acute distress. Neck: Trachea midline, thyroid normal.   Lungs:  Non labored breathing, CTA to anterior auscultation. Heart:  S1S2 normal, rub or gallop. No peripheral edema. Abdomen: Soft, non-tender, no organomegaly. Skin: No lesions or rashes, warm to touch.      DATA:    CBC with Differential:    Lab Results   Component Value Date/Time    WBC 5.8 11/21/2022 05:43 AM    RBC 3.59 11/21/2022 05:43 AM    HGB 11.3 11/21/2022 05:43 AM    HCT 33.8 11/21/2022 05:43 AM     11/21/2022 05:43 AM    MCV 94.2 11/21/2022 05:43 AM    MCH 31.5 11/21/2022 05:43 AM    MCHC 33.5 11/21/2022 05:43 AM    RDW 13.2 11/21/2022 05:43 AM    SEGSPCT 63.4 02/07/2013 10:24 AM    BANDSPCT 1 12/15/2021 04:30 AM    LYMPHOPCT 11.1 11/21/2022 05:43 AM    MONOPCT 7.2 11/21/2022 05:43 AM    EOSPCT 0.2 08/30/2010 04:05 PM    BASOPCT 0.1 11/21/2022 05:43 AM    MONOSABS 0.4 11/21/2022 05:43 AM    LYMPHSABS 0.6 11/21/2022 05:43 AM    EOSABS 0.0 11/21/2022 05:43 AM    BASOSABS 0.0 11/21/2022 05:43 AM    DIFFTYPE Auto-K 02/07/2013 10:24 AM     BMP:    Lab Results   Component Value Date/Time     11/22/2022 04:12 AM    K 4.1 11/22/2022 04:12 AM    K 3.9 11/17/2022 03:11 PM    CL 94 11/22/2022 04:12 AM    CO2 25 11/22/2022 04:12 AM    BUN 15 11/22/2022 04:12 AM    LABALBU 3.8 11/22/2022 04:12 AM    CREATININE <0.5 11/22/2022 04:12 AM    CALCIUM 8.7 11/22/2022 04:12 AM    GFRAA >60 09/01/2022 05:24 AM    GFRAA >60 02/07/2013 10:24 AM    LABGLOM >60 11/22/2022 04:12 AM    GLUCOSE 100 11/22/2022 04:12 AM       Assessment    -Acute on chronic hyponatremia.   Baseline sodium of around 125-128                Sodium on admission on 11/17/2022 at 1500 was 108     - COPD  -Acute metabolic encephalopathy, resolved        Plan  - Daily free water restriction 1500 ml/day and encourage protein intake  - Monitor serial labs

## 2022-11-22 NOTE — PROGRESS NOTES
Inpatient Physical Therapy Daily Treatment Note    Unit: PCU  Date:  11/22/2022  Patient Name:    Marilyn Daniels  Admitting diagnosis:  Hyponatremia [E87.1]  Admit Date:  11/17/2022  Precautions/Restrictions:  Fall risk, Bed/chair alarm, Lines -IV, Supplemental O2 (3 L), and Purewick catheter, Telemetry, and Continuous pulse oximetry      Discharge Recommendations: SNF  DME needs for discharge: defer to facility       Therapy recommendation for EMS Transport: can transport by wheelchair    Therapy recommendations for staff:   Assist of 1 with use of rolling walker (RW) and gait belt for all transfers to/from [facility]  to/from chair    History of Present Illness: per Dr Brisa Jin H&P 11/17/22:  \"60 y.o. female who presented to the hospital with a chief complaint of altered mental status. Patient apparently woke up this morning at a baseline and family later on found her confused and lethargic. He called [facility] and she was brought to the emergency department by EMS. They stated that when they saw the patient she appeared to be in respiratory distress, she was given a dose of Solu-Medrol in route and duo nebs as there was a concern for COPD exacerbation. When she presented to the emergency department she was altered and did not necessarily have any respiratory distress even though she was placed on BiPAP for short while. Further work-up in emergency department revealed a sodium level of 108. Patient has a history of SIADH and chronic hyponatremia. She was admitted to the ICU for further care. When I saw the patient she was still confused but was arousable and directable. She has since been started on a saline drip for her hyponatremia. \"    Home Health S4 Level Recommendation: NA  AM-PAC Mobility Score   AM-PAC Inpatient Mobility Raw Score : 16   Annie Tyler scored a 16/24 on the AM-PAC short mobility form.  Current research shows that an AM-PAC score of 17 or less is typically not associated with a discharge to the patient's home setting. If patient discharges prior to next session this note will serve as a discharge summary. Please see below for the latest assessment towards goals. Treatment Time:  14:26-15:05  Treatment number: 2  Timed Code Treatment Minutes: 39 minutes  Total Treatment Minutes:  39  minutes    Cognition    A&O orientation not directly assessed. Able to follow 2 step commands    Subjective  Patient sitting up in chair with no family present   Pt agreeable to this PT tx. States she thinks she is wet  Pain   Yes  Location: headache and back pain  Rating:    moderate/10  Pain Medicine Status: Received pain med prior to tx     Bed Mobility   Supine to Sit:    Not Tested  Sit to Supine:   CGA  Rolling:   Not Tested  Scooting:   SBA    Transfer Training     Sit to stand:   CGA,practiced x 4; last time to change brief/pericare  Stand to sit:   [facility]  Bed to Chair:   [facility] with use of gait belt and rolling walker (RW)    Gait Training gait completed as indicated below  Distance:      4 ft  Deviations (firm surface/linoleum):  decreased raphael and forward flexed posture  Assistive Device Used:    gait belt and rolling walker (RW)  Level of Assist:    SBA  Comment: steady,no LOB    Stair Training deferred, pt unsafe/not appropriate to complete stairs at this time      Therapeutic Exercise all completed bilaterally unless indicated  LAQ x 10 reps  marching x 10 reps  Hip adduction/pillow squeeze: x 10 reps    Balance  Sitting:  Normal; Supervision  Comments:     Standing: Good - ; CGA  Comments: stood for pericare and brief change    Patient Education      Role of PT, POC, Discharge recommendations, DC recommendations, safety awareness, transfer techniques, and calling for assist with mobility. Positioning Needs       Pt in bed, alarm set, positioned in proper neutral alignment and pressure relief provided.    Call light provided and all needs within reach      Activity Tolerance   Pt completed therapy session with Pain noted with post activity,headache increased . Spo2 remained above 95%  HR 84 rest, 104 post activity  Other  RN aware that suction bottle likely not working for [employer]  Added new purewick once in bed  Assessment :  Patient demonstrated good progress with mobility. Continue to recommend SNf at [place] to progress activity level. Recommending SNF upon discharge as patient functioning well below baseline, demonstrates good rehab potential and unable to return home due to burden of care beyond caregiver ability and home environment not conducive to patient recovery. Goals : To be met in 3 visits:  1). Independent with LE Ex x 10 reps  2.) Bed to chair: CGA     To be met in 6 visits:  1). Supine to/from sit: Supervision  2). Sit to/from stand: Supervision  3). Bed to chair: Supervision  4). Gait: Ambulate  100 ft.   with  SBA and use of rolling walker (RW)  5). Tolerate B LE exercises 3 sets of 10-15 reps    Plan   Continue with plan of care. Signature: Azul Velasco, PT #[id number]     If patient discharges from this facility prior to next visit, this note will serve as the Discharge Summary.

## 2022-11-22 NOTE — FLOWSHEET NOTE
11/22/22 0736   Vital Signs   Temp 98 °F (36.7 °C)   Temp Source Oral   Heart Rate 65   Heart Rate Source Monitor   Resp 16   /75   MAP (Calculated) 89   BP Location Left Arm   Level of Consciousness 0   MEWS Score 1   Care Plan - Pain Goals   Verbalizes/displays adequate comfort level or baseline comfort level Encourage patient to monitor pain and request assistance;Assess pain using appropriate pain scale   Oxygen Therapy   SpO2 96 %   O2 Device Nasal cannula   O2 Flow Rate (L/min) 3 L/min   Pt resting quietly in bed no s/s of distress noted no needs voiced easily aroused from rest . Speech clear call light within reach and bed in low position see flowsheet for full assessment

## 2022-11-22 NOTE — LETTER
Edith Abad MD        December 5, 2022    [address] [place] [address]      Dear Edi Dos Santos:    I am writing because my staff has left multiple messages asking that you call the office to schedule you CT chest, but to date they have not heard from you. We care about you and the management of your healthcare and want to make sure that you follow up as recommended. As your Pulmonologist I must stress to you how important it is for you to have the tests I order as well to see me in follow up. The tests are necessary so that I can monitor your pulmonary nodule for any changes that could be cancer. I have to also mention, that in an effort to provide quality care and timely appointments to all my patients, it is our policy that patients be discharged from the practice if they do not show for three scheduled appointments. You would be informed of this termination by mail, and would have 30 days from the date of discharge to locate another physician. Please call the office to let us know your plans for treatment and to reschedule your appointment.      Sincerely,        Edith Abad MD

## 2022-11-22 NOTE — CARE COORDINATION
INTERDISCIPLINARY PLAN OF CARE CONFERENCE    Date/Time: 11/22/2022 8:41 AM  Completed by: DHEERAJ Samayoa   Case Management      Patient Name:  Elke Monreal  YOB: 1955  Admitting Diagnosis: Hyponatremia [E87.1]     Admit Date/Time:  11/17/2022  2:54 PM    Chart reviewed. Interdisciplinary team contacted or reviewed plan related to patient progress and discharge plans. Disciplines included Case Management, Nursing, and Dietitian. Current Status:ongoing   PT/OT recommendation for discharge plan of care: SNF    Expected D/C Disposition:  Skilled nursing facility  Confirmed plan with patient and/or family Yes confirmed with: (name) pt  Met with:pt    Discharge Plan Comments: Chart review completed. Met with pt at bedside. Discussed SNF and answered her questions. She requested referral to 1. [facility] (OVM) and  2. [facility] (VGT). She states first opening on transportation. Referral called to Kelsea at [facility] who states will review and will let CM know. Maribell aware to start precert if able to accept. MD updated    Home O2 in place on admit: Yes    Addendum at 9:30am: Spoke with Edwina at [facility] who states they are able to accept pt pending precert and will start that today with PT/OT notes on the 20th but will need updated notes. She states even though pt has DARIEL secondary, they must wait for precert to accept pt. Cherrie PT aware that updated PT/OT notes are needed today for precert and she stated they will see pt. Spoke with pt at bedside with OT present. Pt remains in agreement with OV.      CM wished pt happy birthday    Addendum at 3:51pm: Received call from [facility] at [facility] who states precert is still pending and will have michele call CM in the AM. She states pt could still come under her DARIEL if denied but unsure if she would be able to get Part b therapy services as this would be a question for Chris Araujo

## 2022-11-22 NOTE — TELEPHONE ENCOUNTER
Message  Received: Today  MD Som Starkey MA  Pt needs to follow up with pulm or Dr Priyanka Mccauley concerning her lung nodule that had SBRT this spring.

## 2022-11-22 NOTE — PROGRESS NOTES
Pulmonary Progress Note    CC: Acute encephalopathy    Subjective:   Feels less SOB today    Exam:   /75   Pulse 82   Temp 98 °F (36.7 °C) (Oral)   Resp 18   Ht 5' 3\" (1.6 m)   Wt 159 lb 8 oz (72.3 kg)   SpO2 97%   BMI 28.25 kg/m²  on 3LPM   Gen: No distress. Eyes: PERRL. No sclera icterus. No conjunctival injection. ENT: No discharge. Pharynx clear. Neck: Trachea midline. Normal thyroid. Resp: Mild accessory muscle use. No crackles. Bilateral wheezes. No rhonchi. CV: Regular rate. Regular rhythm. No murmur or rub. No edema. GI: Non-tender. Non-distended. No masses. No organomegaly. Normal bowel sounds. M/S: No cyanosis. No joint deformity. No clubbing. Neuro: Awake. Moves all extremities. CN grossly intact. Psych: Oriented x 3. No anxiety or agitation.      Scheduled Meds:   ipratropium-albuterol  1 ampule Inhalation TID    predniSONE  40 mg Oral Daily    guaiFENesin  600 mg Oral BID    mupirocin   Nasal BID    insulin lispro  0-8 Units SubCUTAneous TID WC    insulin lispro  0-4 Units SubCUTAneous Nightly    lisinopril  10 mg Oral Daily    amLODIPine  5 mg Oral Daily    sodium chloride flush  5-40 mL IntraVENous 2 times per day    enoxaparin  40 mg SubCUTAneous Daily     Continuous Infusions:   dextrose      sodium chloride       PRN Meds:  ipratropium-albuterol, glucose, dextrose bolus **OR** dextrose bolus, glucagon (rDNA), dextrose, hydrOXYzine HCl, sodium chloride flush, sodium chloride, ondansetron **OR** ondansetron, polyethylene glycol, acetaminophen **OR** acetaminophen, potassium chloride **OR** potassium alternative oral replacement **OR** potassium chloride    Labs:  CBC:   Recent Labs     11/20/22  0255 11/21/22  0543   WBC 5.4 5.8   HGB 10.7* 11.3*   HCT 30.7* 33.8*   MCV 92.7 94.2    321       BMP:   Recent Labs     11/20/22  0905 11/21/22  0543 11/21/22  1330 11/22/22  0412   * 126* 127* 129*   K 3.6 4.6 4.6 4.1   CL 93* 93* 95* 94*   CO2 25 25 23 25   PHOS 2.5 2.8  --  3.0   BUN 7 7 10 15   CREATININE <0.5* <0.5* <0.5* <0.5*       LIVER PROFILE:   No results for input(s): AST, ALT, LIPASE, BILIDIR, BILITOT, ALKPHOS in the last 72 hours. Invalid input(s): AMYLASE,  ALB    PT/INR: No results for input(s): PROTIME, INR in the last 72 hours. APTT: No results for input(s): APTT in the last 72 hours. UA:  No results for input(s): NITRITE, COLORU, PHUR, LABCAST, WBCUA, RBCUA, MUCUS, TRICHOMONAS, YEAST, BACTERIA, CLARITYU, SPECGRAV, LEUKOCYTESUR, UROBILINOGEN, BILIRUBINUR, BLOODU, GLUCOSEU, AMORPHOUS in the last 72 hours. Invalid input(s): KETONESU    No results for input(s): PHART, XLC7SQB, PO2ART in the last 72 hours. Cultures:   11/17 COVID-19 not detected    Films:  Chest x-ray 11/17 imaging was reviewed by me and showed   Left lung nodule  No acute infiltrates       ASSESSMENT:  Moderate COPD/emphysema with acute exacerbation  Acute hyponatremia  Acute encephalopathy-improving  Chronic hypoxemic respiratory failure on 3 L O2  KAMLA hypermetabolic pulmonary nodule post SBRT   History of COVID-pneumonia August 2022    PLAN:  Supplemental oxygen to maintain SaO2 >92%; wean as tolerated  Inhaled bronchodilator therapy  Prednisone taper  Acapella and Mucinex  Fluid management per nephrology   Pt instructed to follow up with rad onc or pulmonary for f/u chest ct post SBRT  DC planning. Office contacted as well.

## 2022-11-23 VITALS
WEIGHT: 160.1 LBS | SYSTOLIC BLOOD PRESSURE: 113 MMHG | RESPIRATION RATE: 17 BRPM | HEIGHT: 63 IN | DIASTOLIC BLOOD PRESSURE: 67 MMHG | OXYGEN SATURATION: 100 % | TEMPERATURE: 98.2 F | BODY MASS INDEX: 28.37 KG/M2 | HEART RATE: 74 BPM

## 2022-11-23 LAB
ALBUMIN SERPL-MCNC: 3.8 G/DL (ref 3.4–5)
ANION GAP SERPL CALCULATED.3IONS-SCNC: 8 MMOL/L (ref 3–16)
BUN BLDV-MCNC: 17 MG/DL (ref 7–20)
CALCIUM SERPL-MCNC: 9.4 MG/DL (ref 8.3–10.6)
CHLORIDE BLD-SCNC: 94 MMOL/L (ref 99–110)
CO2: 27 MMOL/L (ref 21–32)
CREAT SERPL-MCNC: <0.5 MG/DL (ref 0.6–1.2)
GFR SERPL CREATININE-BSD FRML MDRD: >60 ML/MIN/{1.73_M2}
GLUCOSE BLD-MCNC: 122 MG/DL (ref 70–99)
GLUCOSE BLD-MCNC: 84 MG/DL (ref 70–99)
GLUCOSE BLD-MCNC: 94 MG/DL (ref 70–99)
PERFORMED ON: ABNORMAL
PERFORMED ON: NORMAL
PHOSPHORUS: 3 MG/DL (ref 2.5–4.9)
POTASSIUM SERPL-SCNC: 4.2 MMOL/L (ref 3.5–5.1)
SODIUM BLD-SCNC: 129 MMOL/L (ref 136–145)

## 2022-11-23 PROCEDURE — 6370000000 HC RX 637 (ALT 250 FOR IP): Performed by: INTERNAL MEDICINE

## 2022-11-23 PROCEDURE — 94761 N-INVAS EAR/PLS OXIMETRY MLT: CPT

## 2022-11-23 PROCEDURE — 94669 MECHANICAL CHEST WALL OSCILL: CPT

## 2022-11-23 PROCEDURE — 97530 THERAPEUTIC ACTIVITIES: CPT

## 2022-11-23 PROCEDURE — 97535 SELF CARE MNGMENT TRAINING: CPT

## 2022-11-23 PROCEDURE — 2700000000 HC OXYGEN THERAPY PER DAY

## 2022-11-23 PROCEDURE — 36415 COLL VENOUS BLD VENIPUNCTURE: CPT

## 2022-11-23 PROCEDURE — 80069 RENAL FUNCTION PANEL: CPT

## 2022-11-23 PROCEDURE — 6360000002 HC RX W HCPCS: Performed by: INTERNAL MEDICINE

## 2022-11-23 PROCEDURE — 2580000003 HC RX 258: Performed by: INTERNAL MEDICINE

## 2022-11-23 PROCEDURE — 94640 AIRWAY INHALATION TREATMENT: CPT

## 2022-11-23 PROCEDURE — G0008 ADMIN INFLUENZA VIRUS VAC: HCPCS | Performed by: INTERNAL MEDICINE

## 2022-11-23 PROCEDURE — 99239 HOSP IP/OBS DSCHRG MGMT >30: CPT | Performed by: INTERNAL MEDICINE

## 2022-11-23 PROCEDURE — 99233 SBSQ HOSP IP/OBS HIGH 50: CPT | Performed by: INTERNAL MEDICINE

## 2022-11-23 PROCEDURE — 90686 IIV4 VACC NO PRSV 0.5 ML IM: CPT | Performed by: INTERNAL MEDICINE

## 2022-11-23 RX ORDER — PREDNISONE 20 MG/1
TABLET ORAL
Qty: 9 TABLET | Refills: 0
Start: 2022-11-23 | End: 2022-11-23 | Stop reason: SDUPTHER

## 2022-11-23 RX ORDER — PREDNISONE 20 MG/1
TABLET ORAL
Qty: 9 TABLET | Refills: 0 | Status: SHIPPED | OUTPATIENT
Start: 2022-11-23

## 2022-11-23 RX ORDER — IPRATROPIUM BROMIDE AND ALBUTEROL SULFATE 2.5; .5 MG/3ML; MG/3ML
1 SOLUTION RESPIRATORY (INHALATION) 2 TIMES DAILY
Status: DISCONTINUED | OUTPATIENT
Start: 2022-11-23 | End: 2022-11-23 | Stop reason: HOSPADM

## 2022-11-23 RX ADMIN — LISINOPRIL 10 MG: 10 TABLET ORAL at 08:57

## 2022-11-23 RX ADMIN — IPRATROPIUM BROMIDE AND ALBUTEROL SULFATE 1 AMPULE: 2.5; .5 SOLUTION RESPIRATORY (INHALATION) at 07:30

## 2022-11-23 RX ADMIN — GUAIFENESIN 600 MG: 600 TABLET, EXTENDED RELEASE ORAL at 08:58

## 2022-11-23 RX ADMIN — PREDNISONE 40 MG: 20 TABLET ORAL at 08:57

## 2022-11-23 RX ADMIN — INFLUENZA A VIRUS A/VICTORIA/2570/2019 IVR-215 (H1N1) ANTIGEN (PROPIOLACTONE INACTIVATED), INFLUENZA A VIRUS A/DARWIN/6/2021 IVR-227 (H3N2) ANTIGEN (PROPIOLACTONE INACTIVATED), INFLUENZA B VIRUS B/AUSTRIA/1359417/2021 BVR-26 ANTIGEN (PROPIOLACTONE INACTIVATED), INFLUENZA B VIRUS B/PHUKET/3073/2013 BVR-1B ANTIGEN (PROPIOLACTONE INACTIVATED) 0.5 ML: 15; 15; 15; 15 INJECTION, SOLUTION INTRAMUSCULAR at 12:33

## 2022-11-23 RX ADMIN — ENOXAPARIN SODIUM 40 MG: 100 INJECTION SUBCUTANEOUS at 08:58

## 2022-11-23 RX ADMIN — AMLODIPINE BESYLATE 5 MG: 5 TABLET ORAL at 08:57

## 2022-11-23 RX ADMIN — ACETAMINOPHEN 650 MG: 325 TABLET ORAL at 09:03

## 2022-11-23 RX ADMIN — Medication 10 ML: at 08:58

## 2022-11-23 RX ADMIN — ACETAMINOPHEN 650 MG: 325 TABLET ORAL at 01:25

## 2022-11-23 ASSESSMENT — PAIN DESCRIPTION - DESCRIPTORS
DESCRIPTORS: ACHING
DESCRIPTORS: ACHING

## 2022-11-23 ASSESSMENT — PAIN - FUNCTIONAL ASSESSMENT: PAIN_FUNCTIONAL_ASSESSMENT: ACTIVITIES ARE NOT PREVENTED

## 2022-11-23 ASSESSMENT — PAIN SCALES - GENERAL
PAINLEVEL_OUTOF10: 7
PAINLEVEL_OUTOF10: 7

## 2022-11-23 ASSESSMENT — PAIN DESCRIPTION - LOCATION
LOCATION: HEAD
LOCATION: HEAD

## 2022-11-23 ASSESSMENT — PAIN DESCRIPTION - ORIENTATION: ORIENTATION: MID

## 2022-11-23 NOTE — DISCHARGE INSTR - COC
CASE MANAGEMENT/SOCIAL WORK SECTION    Inpatient Status Date: 11/17/2022    Readmission Risk Assessment Score:  Readmission Risk              Risk of Unplanned Readmission:  22           Discharging to Facility/ Agency   Name: [facility]   Address:  Phone: [phone]  Fax:    / signature: Electronically signed by Whit Sams RN on 11/23/22 at 9:40 AM EST    PHYSICIAN SECTION    Prognosis: Good    Condition at Discharge: Stable    Rehab Potential (if transferring to Rehab): Good    Recommended Labs or Other Treatments After Discharge: none    Physician Certification: I certify the above information and transfer of David Cage  is necessary for the continuing treatment of the diagnosis listed and that she requires home care for less 30 days.      Update Admission H&P: No change in H&P    PHYSICIAN SIGNATURE:  Electronically signed by Madi Vazquez MD on 11/23/22 at 9:13 AM EST

## 2022-11-23 NOTE — DISCHARGE INSTR - DIET
Good nutrition is important when healing from an illness, injury, or surgery. Follow any nutrition recommendations given to you during your hospital stay. If you were given an oral nutrition supplement while in the hospital, continue to take this supplement at home. You can take it with meals, in-between meals, and/or before bedtime. These supplements can be purchased at most local grocery stores, pharmacies, and chain [employer]-stores. If you have any questions about your diet or nutrition, call the hospital and ask for the dietitian.       General diet    1500ml Fluid Restriction

## 2022-11-23 NOTE — PROGRESS NOTES
RT Inhaler-Nebulizer Bronchodilator Protocol Note    There is a bronchodilator order in the chart from a provider indicating to follow the RT Bronchodilator Protocol and there is an Initiate RT Inhaler-Nebulizer Bronchodilator Protocol order as well (see protocol at bottom of note). CXR Findings:  No results found. The findings from the last RT Protocol Assessment were as follows:   History Pulmonary Disease: Chronic pulmonary disease  Respiratory Pattern: Dyspnea on exertion or RR 21-25 bpm  Breath Sounds: Intermittent or unilateral wheezes  Cough: Strong, spontaneous, non-productive  Indication for Bronchodilator Therapy: Wheezing associated with pulm disorder  Bronchodilator Assessment Score: 8    Aerosolized bronchodilator medication orders have been revised according to the RT Inhaler-Nebulizer Bronchodilator Protocol below. Respiratory Therapist to perform RT Therapy Protocol Assessment initially then follow the protocol. Repeat RT Therapy Protocol Assessment PRN for score 0-3 or on second treatment, BID, and PRN for scores above 3. No Indications - adjust the frequency to every 6 hours PRN wheezing or bronchospasm, if no treatments needed after 48 hours then discontinue using Per Protocol order mode. If indication present, adjust the RT bronchodilator orders based on the Bronchodilator Assessment Score as indicated below. Use Inhaler orders unless patient has one or more of the following: on home nebulizer, not able to hold breath for 10 seconds, is not alert and oriented, cannot activate and use MDI correctly, or respiratory rate 25 breaths per minute or more, then use the equivalent nebulizer order(s) with same Frequency and PRN reasons based on the score. If a patient is on this medication at home then do not decrease Frequency below that used at home.     0-3 - enter or revise RT bronchodilator order(s) to equivalent RT Bronchodilator order with Frequency of every 4 hours PRN for wheezing or increased work of breathing using Per Protocol order mode. 4-6 - enter or revise RT Bronchodilator order(s) to two equivalent RT bronchodilator orders with one order with BID Frequency and one order with Frequency of every 4 hours PRN wheezing or increased work of breathing using Per Protocol order mode. 7-10 - enter or revise RT Bronchodilator order(s) to two equivalent RT bronchodilator orders with one order with TID Frequency and one order with Frequency of every 4 hours PRN wheezing or increased work of breathing using Per Protocol order mode. 11-13 - enter or revise RT Bronchodilator order(s) to one equivalent RT bronchodilator order with QID Frequency and an Albuterol order with Frequency of every 4 hours PRN wheezing or increased work of breathing using Per Protocol order mode. Greater than 13 - enter or revise RT Bronchodilator order(s) to one equivalent RT bronchodilator order with every 4 hours Frequency and an Albuterol order with Frequency of every 2 hours PRN wheezing or increased work of breathing using Per Protocol order mode.      .    Electronically signed by Leotha Apley, RCP on 11/22/2022 at 7:06 PM

## 2022-11-23 NOTE — PROGRESS NOTES
Pt awake in bed. Assessment completed and medications given per MAR. VS as charted in flowsheet. A&Ox4. Pt currently on 3L of oxygen per NC. Purwick in place and hooked to suction. Pt denies any further needs at this time. Call light in reach and bed in lowest position.

## 2022-11-23 NOTE — PROGRESS NOTES
Department of Internal Medicine  Nephrology Progress Note        SUBJECTIVE:    We are following this patient for Hyponatremia. Doing well  Sodium 129    ROS: No fever or chills. Social:  family at bedside. Physical Exam:    VITALS:  /67   Pulse 74   Temp 98.2 °F (36.8 °C) (Oral)   Resp 17   Ht 5' 3\" (1.6 m)   Wt 160 lb 1.6 oz (72.6 kg)   SpO2 100%   BMI 28.36 kg/m²     General appearance: Seems comfortable, no acute distress. Neck: Trachea midline, thyroid normal.   Lungs:  Non labored breathing, CTA to anterior auscultation. Heart:  S1S2 normal, rub or gallop. No peripheral edema. Abdomen: Soft, non-tender, no organomegaly. Skin: No lesions or rashes, warm to touch.      DATA:    CBC with Differential:    Lab Results   Component Value Date/Time    WBC 5.8 11/21/2022 05:43 AM    RBC 3.59 11/21/2022 05:43 AM    HGB 11.3 11/21/2022 05:43 AM    HCT 33.8 11/21/2022 05:43 AM     11/21/2022 05:43 AM    MCV 94.2 11/21/2022 05:43 AM    MCH 31.5 11/21/2022 05:43 AM    MCHC 33.5 11/21/2022 05:43 AM    RDW 13.2 11/21/2022 05:43 AM    SEGSPCT 63.4 02/07/2013 10:24 AM    BANDSPCT 1 12/15/2021 04:30 AM    LYMPHOPCT 11.1 11/21/2022 05:43 AM    MONOPCT 7.2 11/21/2022 05:43 AM    EOSPCT 0.2 08/30/2010 04:05 PM    BASOPCT 0.1 11/21/2022 05:43 AM    MONOSABS 0.4 11/21/2022 05:43 AM    LYMPHSABS 0.6 11/21/2022 05:43 AM    EOSABS 0.0 11/21/2022 05:43 AM    BASOSABS 0.0 11/21/2022 05:43 AM    DIFFTYPE Auto-K 02/07/2013 10:24 AM     BMP:    Lab Results   Component Value Date/Time     11/23/2022 04:23 AM    K 4.2 11/23/2022 04:23 AM    K 3.9 11/17/2022 03:11 PM    CL 94 11/23/2022 04:23 AM    CO2 27 11/23/2022 04:23 AM    BUN 17 11/23/2022 04:23 AM    LABALBU 3.8 11/23/2022 04:23 AM    CREATININE <0.5 11/23/2022 04:23 AM    CALCIUM 9.4 11/23/2022 04:23 AM    GFRAA >60 09/01/2022 05:24 AM    GFRAA >60 02/07/2013 10:24 AM    LABGLOM >60 11/23/2022 04:23 AM    GLUCOSE 84 11/23/2022 04:23 AM Assessment    -Acute on chronic hyponatremia.   Baseline sodium of around 125-128                Sodium on admission on 11/17/2022 at 1500 was 108     - COPD  -Acute metabolic encephalopathy, resolved        Plan  - Daily free water restriction 1500 ml/day and encourage protein intake  - Monitor serial labs

## 2022-11-23 NOTE — PROGRESS NOTES
Patient educated on discharge instructions as well as new medications use, dosage, administration and possible side effects. Patient verified knowledge. IV removed without difficulty and dry dressing in place. Telemetry monitor removed and returned to [facility]. Pt left facility in stable condition to Home on home oxygen 3L/min NC with all of their personal belongings.

## 2022-11-23 NOTE — CARE COORDINATION
INTERDISCIPLINARY PLAN OF CARE CONFERENCE and Discharge Summary     Date/Time: 2022 8:36 AM  Completed by: DHEERAJ Lopez  Case Management      Patient Name:  Mely Strickland  YOB: 1955  Admitting Diagnosis: Hyponatremia [E87.1]     Admit Date/Time:  2022  2:54 PM    Chart reviewed. Interdisciplinary team contacted or reviewed plan related to patient progress and discharge plans. Disciplines included Case Management, Nursing, and Dietitian. Current Status:ongoing   PT/OT recommendation for discharge plan of care: SNF    Expected D/C Disposition:  Home  Confirmed plan with patient and/or family Yes confirmed with: (name) pt  Met with:pt    Discharge Plan Comments: Chart review completed. Spoke with Fletcher Vaughan at [address] who states precert is still pending and they have to await outcome of precert prior to taking pt under her DARIEL. If denied by Payton Lazcano, pt would need a level of care and would get part b therapy. [place] states to set up transport for 5pm incase precert comes back. Called [facility] at [phone] and spoke with Patrick Padilla, provider services department to request the precert be expedited for [facility]. Patrick Padilla transferred [facility] to [facility] in the pre-certification department who states the precert has an intent to deny for SNF admission and there is no option for a peer to peer. Updated MD.     DISCHARGE ORDER  Date/Time 2022 9:49 AM  Completed by:DHEERAJ Mott  Case Management    Patient Name: Mely Strickland      : 1955  Admitting Diagnosis: Hyponatremia [E87.1]      Admit order Date and Status: 2022   (verify MD's last order for status of admission)      Noted discharge order. If applicable PT/OT recommendation at Discharge: OT home with  assist and home therapy.  PT: SNF -yesterday  DME recommendation by PT/OT:pulse ox    Confirmed discharge plan: Yes  with whom pt  If pt confirmed DC plan does family need to be contacted by CM No if yes who n/a pt stated she will call her daughter. Discharge Plan:     Date of Last IMM Given: today; pt in agreement with returning home. Reviewed chart. Role of discharge planner explained and patient verbalized understanding. Discharge order is noted. Met with pt at bedside with OT present. Pt updated on the above. Pt stated she now wants to go home with home care and not to [facility]. She was aware she could go under her DARIEL to [facility] and she declined stating she wants home. Pt stated her daughter is with her 24/7 and she will pick her up with an o2 tank. Pt requested referral to [address] [place] [address] home care as she has used them in the past. OT states no DME needed. Pt stated no concerns of returning home. Verified address and phone number    MD/RN updated. Referral called to Rodney Abdi  52. at [facility] for RN/PT/OT who states they can accept pt and will pull the information from [facility]. She is aware of discharge  home today. Page out to Dr. Francesca Locke to confirm no home bipap needed. At 9:56am: Received return call from Dr. Francesca Locke who states pt doesn't need a home bipap. Yuni Sharp at [facility] aware pt going home via voicemail; at 9:58am: received call from Yuni Sharp who states there is an option for a peer to peer. She is aware pt wants to go home and MD discharged her home. Transport canceled with [place] at [facility]. Has Home O2 in place on admit:  Yes  Informed of need to bring portable home O2 tank on day of discharge for nursing to connect prior to leaving:   Yes  Verbalized agreement/Understanding:   Yes    Pt is being d/c'd to home with [facility] [id number], [facility] home care today. Pt's O2 sats are 100% on 3 liters per vitals in epic. Discharge timeout done with Gonzalez Hazel. All discharge needs and concerns addressed.     Home O2 in place on admit: Yes

## 2022-11-23 NOTE — PLAN OF CARE
Problem: Discharge Planning  Goal: Discharge to home or other facility with appropriate resources  Outcome: Progressing     Problem: Safety - Adult  Goal: Free from fall injury  Outcome: Progressing     Problem: Skin/Tissue Integrity  Goal: Absence of new skin breakdown  Description: 1. Monitor for areas of redness and/or skin breakdown  2. Assess vascular access sites hourly  3. Every 4-6 hours minimum:  Change oxygen saturation probe site  4. Every 4-6 hours:  If on nasal continuous positive airway pressure, respiratory therapy assess nares and determine need for appliance change or resting period.   Outcome: Progressing     Problem: Respiratory - Adult  Goal: Achieves optimal ventilation and oxygenation  Outcome: Progressing     Problem: Cardiovascular - Adult  Goal: Maintains optimal cardiac output and hemodynamic stability  Outcome: Progressing     Problem: Skin/Tissue Integrity - Adult  Goal: Skin integrity remains intact  Outcome: Progressing  Goal: Oral mucous membranes remain intact  Outcome: Progressing     Problem: Gastrointestinal - Adult  Goal: Maintains adequate nutritional intake  Outcome: Progressing     Problem: Metabolic/Fluid and Electrolytes - Adult  Goal: Electrolytes maintained within normal limits  Outcome: Progressing  Goal: Hemodynamic stability and optimal renal function maintained  Outcome: Progressing     Problem: Hematologic - Adult  Goal: Maintains hematologic stability  Outcome: Progressing     Problem: Pain  Goal: Verbalizes/displays adequate comfort level or baseline comfort level  Outcome: Progressing

## 2022-11-23 NOTE — FLOWSHEET NOTE
11/23/22 0114   Vital Signs   Temp 97.1 °F (36.2 °C)   Temp Source Oral   Heart Rate 81   Heart Rate Source Monitor   Resp 18   /75   MAP (Calculated) 95   BP Location Left upper arm   Level of Consciousness 0   MEWS Score 1   Oxygen Therapy   SpO2 99 %   Pulse Oximeter Device Mode Intermittent   Pulse Oximeter Device Location Finger   O2 Device Nasal cannula   O2 Flow Rate (L/min) 3 L/min   Height and Weight   Weight 160 lb 1.6 oz (72.6 kg)   Weight Method Bed scale   BMI (Calculated) 28.4     Pt awake in bed. VS as shown above. Pt states she has a headache and rates pain as a 7 out of 10. PRN tylenol given at this time. Call light in reach and bed in lowest position.

## 2022-11-23 NOTE — PROGRESS NOTES
Occupational Therapy Daily Treatment Note    Unit: PCU  Date:  11/23/2022  Patient Name:    Quin Mayberry  Admitting diagnosis:  Hyponatremia [E87.1]  Admit Date:  11/17/2022  Precautions/Restrictions:  fall risk, IV, bed/chair alarm, purewick catheter, supplemental O2 (3L), telemetry, and continuous pulse ox        Discharge Recommendations: Home with 24/7 assist and home therapy  DME needs for discharge:  pulse ox       Therapy recommendations for staff:   Assist of 1 (stand by assist) with use of rolling walker (RW) and gait belt for all transfers and ambulation to/from BSC/chair    AM-PAC Score: AM-PAC Inpatient Daily Activity Raw Score: 17  Home Health S4 Level: NA       Treatment Time:  8:55-9:35  Treatment number:  3  Total Treatment Time:  40 minutes    History of Present Illness: per Dr Humberto Diaz H&P 11/17/22:  \"60 y.o. female who presented to the hospital with a chief complaint of altered mental status. Patient apparently woke up this morning at a baseline and family later on found her confused and lethargic. He called [facility] and she was brought to the emergency department by EMS. They stated that when they saw the patient she appeared to be in respiratory distress, she was given a dose of Solu-Medrol in route and duo nebs as there was a concern for COPD exacerbation. When she presented to the emergency department she was altered and did not necessarily have any respiratory distress even though she was placed on BiPAP for short while. Further work-up in emergency department revealed a sodium level of 108. Patient has a history of SIADH and chronic hyponatremia. She was admitted to the ICU for further care. When I saw the patient she was still confused but was arousable and directable. She has since been started on a saline drip for her hyponatremia. \"      Subjective:  Patient in bed and agreeable to therapy.     Pain   No  Rating: NA  Location:  Pain Medicine Status: Denies need      Bed

## 2022-11-23 NOTE — PROGRESS NOTES
Pulmonary Progress Note    CC: Acute encephalopathy    Subjective:   Feels less SOB today    Exam:   /67   Pulse 74   Temp 98.2 °F (36.8 °C) (Oral)   Resp 17   Ht 5' 3\" (1.6 m)   Wt 160 lb 1.6 oz (72.6 kg)   SpO2 100%   BMI 28.36 kg/m²  on 3LPM   Gen: No distress. Eyes: PERRL. No sclera icterus. No conjunctival injection. ENT: No discharge. Pharynx clear. Neck: Trachea midline. Normal thyroid. Resp: Mild accessory muscle use. No crackles. Bilateral wheezes. No rhonchi. CV: Regular rate. Regular rhythm. No murmur or rub. No edema. GI: Non-tender. Non-distended. No masses. No organomegaly. Normal bowel sounds. M/S: No cyanosis. No joint deformity. No clubbing. Neuro: Awake. Moves all extremities. CN grossly intact. Psych: Oriented x 3. No anxiety or agitation.      Scheduled Meds:   ipratropium-albuterol  1 ampule Inhalation BID    predniSONE  40 mg Oral Daily    guaiFENesin  600 mg Oral BID    insulin lispro  0-8 Units SubCUTAneous TID WC    insulin lispro  0-4 Units SubCUTAneous Nightly    lisinopril  10 mg Oral Daily    amLODIPine  5 mg Oral Daily    sodium chloride flush  5-40 mL IntraVENous 2 times per day    enoxaparin  40 mg SubCUTAneous Daily     Continuous Infusions:   dextrose      sodium chloride       PRN Meds:  ipratropium-albuterol, glucose, dextrose bolus **OR** dextrose bolus, glucagon (rDNA), dextrose, hydrOXYzine HCl, sodium chloride flush, sodium chloride, ondansetron **OR** ondansetron, polyethylene glycol, acetaminophen **OR** acetaminophen, potassium chloride **OR** potassium alternative oral replacement **OR** potassium chloride    Labs:  CBC:   Recent Labs     11/21/22  0543   WBC 5.8   HGB 11.3*   HCT 33.8*   MCV 94.2          BMP:   Recent Labs     11/21/22  0543 11/21/22  1330 11/22/22  0412 11/23/22  0423   * 127* 129* 129*   K 4.6 4.6 4.1 4.2   CL 93* 95* 94* 94*   CO2 25 23 25 27   PHOS 2.8  --  3.0 3.0   BUN 7 10 15 17   CREATININE <0.5* <0.5* <0.5* <0.5*       LIVER PROFILE:   No results for input(s): AST, ALT, LIPASE, BILIDIR, BILITOT, ALKPHOS in the last 72 hours. Invalid input(s): AMYLASE,  ALB    PT/INR: No results for input(s): PROTIME, INR in the last 72 hours. APTT: No results for input(s): APTT in the last 72 hours. UA:  No results for input(s): NITRITE, COLORU, PHUR, LABCAST, WBCUA, RBCUA, MUCUS, TRICHOMONAS, YEAST, BACTERIA, CLARITYU, SPECGRAV, LEUKOCYTESUR, UROBILINOGEN, BILIRUBINUR, BLOODU, GLUCOSEU, AMORPHOUS in the last 72 hours. Invalid input(s): KETONESU    No results for input(s): PHART, ZOP6ZAA, PO2ART in the last 72 hours. Cultures:   11/17 COVID-19 not detected    Films:  Chest x-ray 11/17 imaging was reviewed by me and showed   Left lung nodule  No acute infiltrates       ASSESSMENT:  Moderate COPD/emphysema with acute exacerbation  Acute hyponatremia  Acute encephalopathy-improving  Chronic hypoxemic respiratory failure on 3 L O2  KAMLA hypermetabolic pulmonary nodule post SBRT   History of COVID-pneumonia August 2022    PLAN:  Supplemental oxygen to maintain SaO2 >92%; wean as tolerated  Inhaled bronchodilator therapy  Prednisone taper  Acapella and Mucinex  Fluid management per nephrology   Pt instructed to follow up with rad onc or pulmonary for f/u chest ct post SBRT  DC planning. Office contacted as well.

## 2022-11-23 NOTE — PLAN OF CARE
Problem: Discharge Planning  Goal: Discharge to home or other facility with appropriate resources  11/23/2022 1114 by Danilo Vegas RN  Outcome: Adequate for Discharge  11/23/2022 0138 by Carlota Worthington RN  Outcome: Progressing     Problem: Safety - Adult  Goal: Free from fall injury  11/23/2022 1114 by Danilo Vegas RN  Outcome: Adequate for Discharge  11/23/2022 0138 by Carlota Worthington RN  Outcome: Progressing     Problem: Skin/Tissue Integrity  Goal: Absence of new skin breakdown  Description: 1. Monitor for areas of redness and/or skin breakdown  2. Assess vascular access sites hourly  3. Every 4-6 hours minimum:  Change oxygen saturation probe site  4. Every 4-6 hours:  If on nasal continuous positive airway pressure, respiratory therapy assess nares and determine need for appliance change or resting period.   11/23/2022 1114 by Danilo Vegas RN  Outcome: Adequate for Discharge  11/23/2022 0138 by Carlota Worthington RN  Outcome: Progressing     Problem: Respiratory - Adult  Goal: Achieves optimal ventilation and oxygenation  11/23/2022 1114 by Danilo Vegas RN  Outcome: Adequate for Discharge  11/23/2022 0138 by Carlota Worthington RN  Outcome: Progressing     Problem: Cardiovascular - Adult  Goal: Maintains optimal cardiac output and hemodynamic stability  11/23/2022 1114 by Danilo Vegas RN  Outcome: Adequate for Discharge  11/23/2022 0138 by Carlota Worthington RN  Outcome: Progressing     Problem: Skin/Tissue Integrity - Adult  Goal: Skin integrity remains intact  11/23/2022 1114 by Danilo Vegas RN  Outcome: Adequate for Discharge  11/23/2022 0138 by Carlota Worthington RN  Outcome: Progressing  Goal: Oral mucous membranes remain intact  11/23/2022 1114 by Danilo Vegas RN  Outcome: Adequate for Discharge  11/23/2022 0138 by Carlota Worthington RN  Outcome: Progressing     Problem: Gastrointestinal - Adult  Goal: Maintains adequate nutritional intake  11/23/2022 1114 by Danilo Vegas RN  Outcome: Adequate for Discharge  11/23/2022 0138 by Katy Hare RN  Outcome: Progressing     Problem: Metabolic/Fluid and Electrolytes - Adult  Goal: Electrolytes maintained within normal limits  11/23/2022 1114 by Kartik Ohara RN  Outcome: Adequate for Discharge  11/23/2022 0138 by Katy Hare RN  Outcome: Progressing  Goal: Hemodynamic stability and optimal renal function maintained  11/23/2022 1114 by Kartik Ohara RN  Outcome: Adequate for Discharge  11/23/2022 0138 by Katy Hare RN  Outcome: Progressing     Problem: Hematologic - Adult  Goal: Maintains hematologic stability  11/23/2022 1114 by Kartik Ohara RN  Outcome: Adequate for Discharge  11/23/2022 0138 by Katy Hare RN  Outcome: Progressing     Problem: Pain  Goal: Verbalizes/displays adequate comfort level or baseline comfort level  11/23/2022 1114 by Kartik Ohara RN  Outcome: Adequate for Discharge  11/23/2022 0138 by Katy Hare RN  Outcome: Progressing

## 2022-11-23 NOTE — PROGRESS NOTES
Page to Dr. Jon Ochoa concerning discharge, requested call back to Darryl Saleh, spoke to Beth Phillips

## 2022-11-23 NOTE — PROGRESS NOTES
RT Inhaler-Nebulizer Bronchodilator Protocol Note    There is a bronchodilator order in the chart from a provider indicating to follow the RT Bronchodilator Protocol and there is an Initiate RT Inhaler-Nebulizer Bronchodilator Protocol order as well (see protocol at bottom of note). CXR Findings:  No results found. The findings from the last RT Protocol Assessment were as follows:   History Pulmonary Disease: Chronic pulmonary disease  Respiratory Pattern: Dyspnea on exertion or RR 21-25 bpm  Breath Sounds: Slightly diminished and/or crackles  Cough: Strong, spontaneous, non-productive  Indication for Bronchodilator Therapy: Wheezing associated with pulm disorder  Bronchodilator Assessment Score: 6    Aerosolized bronchodilator medication orders have been revised according to the RT Inhaler-Nebulizer Bronchodilator Protocol below. Respiratory Therapist to perform RT Therapy Protocol Assessment initially then follow the protocol. Repeat RT Therapy Protocol Assessment PRN for score 0-3 or on second treatment, BID, and PRN for scores above 3. No Indications - adjust the frequency to every 6 hours PRN wheezing or bronchospasm, if no treatments needed after 48 hours then discontinue using Per Protocol order mode. If indication present, adjust the RT bronchodilator orders based on the Bronchodilator Assessment Score as indicated below. Use Inhaler orders unless patient has one or more of the following: on home nebulizer, not able to hold breath for 10 seconds, is not alert and oriented, cannot activate and use MDI correctly, or respiratory rate 25 breaths per minute or more, then use the equivalent nebulizer order(s) with same Frequency and PRN reasons based on the score. If a patient is on this medication at home then do not decrease Frequency below that used at home.     0-3 - enter or revise RT bronchodilator order(s) to equivalent RT Bronchodilator order with Frequency of every 4 hours PRN for wheezing or increased work of breathing using Per Protocol order mode. 4-6 - enter or revise RT Bronchodilator order(s) to two equivalent RT bronchodilator orders with one order with BID Frequency and one order with Frequency of every 4 hours PRN wheezing or increased work of breathing using Per Protocol order mode. 7-10 - enter or revise RT Bronchodilator order(s) to two equivalent RT bronchodilator orders with one order with TID Frequency and one order with Frequency of every 4 hours PRN wheezing or increased work of breathing using Per Protocol order mode. 11-13 - enter or revise RT Bronchodilator order(s) to one equivalent RT bronchodilator order with QID Frequency and an Albuterol order with Frequency of every 4 hours PRN wheezing or increased work of breathing using Per Protocol order mode. Greater than 13 - enter or revise RT Bronchodilator order(s) to one equivalent RT bronchodilator order with every 4 hours Frequency and an Albuterol order with Frequency of every 2 hours PRN wheezing or increased work of breathing using Per Protocol order mode. RT to enter RT Home Evaluation for COPD & MDI Assessment order using Per Protocol order mode.     Electronically signed by Rosi Morales RCP on 11/23/2022 at 7:34 AM

## 2022-11-23 NOTE — PROGRESS NOTES
Patient assessment as noted per flowsheet  -  alert and oriented, denying complaints other than headache of 7 which was given tylenol per patient request.  Therapy in to see/evaluate patient. On oxygen 3L min NC. Will further monitor.

## 2022-11-23 NOTE — PROGRESS NOTES
Admit: 2022    Name:  Jack Crowley  Room:  [facility]/7208-53  MRN:    [id number]    Daily Progress Note for 2022   Admitted with altered mental status and hyponatremia    Interval History:     Currently on 4 L of oxygen. Scheduled Meds:   ipratropium-albuterol  1 ampule Inhalation BID    predniSONE  40 mg Oral Daily    guaiFENesin  600 mg Oral BID    insulin lispro  0-8 Units SubCUTAneous TID WC    insulin lispro  0-4 Units SubCUTAneous Nightly    lisinopril  10 mg Oral Daily    amLODIPine  5 mg Oral Daily    sodium chloride flush  5-40 mL IntraVENous 2 times per day    enoxaparin  40 mg SubCUTAneous Daily       Continuous Infusions:   dextrose      sodium chloride         PRN Meds:  ipratropium-albuterol, glucose, dextrose bolus **OR** dextrose bolus, glucagon (rDNA), dextrose, hydrOXYzine HCl, sodium chloride flush, sodium chloride, ondansetron **OR** ondansetron, polyethylene glycol, acetaminophen **OR** acetaminophen, potassium chloride **OR** potassium alternative oral replacement **OR** potassium chloride                  Objective:     Temp  Av.7 °F (36.5 °C)  Min: 97.1 °F (36.2 °C)  Max: 98.2 °F (36.8 °C)  Pulse  Av  Min: 74  Max: 102  BP  Min: 113/67  Max: 136/75  SpO2  Av.3 %  Min: 97 %  Max: 100 %  Patient Vitals for the past 4 hrs:   BP Temp Temp src Pulse Resp SpO2   22 0730 -- -- -- -- -- 100 %   22 0719 113/67 98.2 °F (36.8 °C) Oral 74 17 100 %           Intake/Output Summary (Last 24 hours) at 2022 0743  Last data filed at 2022 0600  Gross per 24 hour   Intake 1565 ml   Output 1400 ml   Net 165 ml         Physical Exam:  General:  Awake, alert and oriented.  Appears to be not in any distress  Mucous Membranes:  Pink , anicteric  Neck: No JVD, no carotid bruit, no thyromegaly  Chest: Normal respiratory effort, no accessory muscle, few wheezes, no crackles  Cardiovascular:  RRR S1S2 heard, no murmurs or gallops  Abdomen:  Soft, undistended, non tender, no organomegaly, BS present  Extremities: No edema or cyanosis. Distal pulses well felt  Neurological : no focal deficits    Lab Data:  CBC:   Recent Labs     11/21/22  0543   WBC 5.8   RBC 3.59*   HGB 11.3*   HCT 33.8*   MCV 94.2   RDW 13.2          BMP:   Recent Labs     11/21/22  0543 11/21/22  1330 11/22/22  0412 11/23/22  0423   * 127* 129* 129*   K 4.6 4.6 4.1 4.2   CL 93* 95* 94* 94*   CO2 25 23 25 27   PHOS 2.8  --  3.0 3.0   BUN 7 10 15 17   CREATININE <0.5* <0.5* <0.5* <0.5*       BNP: No results for input(s): BNP in the last 72 hours. PT/INR: No results for input(s): PROTIME, INR in the last 72 hours. APTT:No results for input(s): APTT in the last 72 hours. CARDIAC ENZYMES:   No results for input(s): CKMB, CKMBINDEX, TROPONINI in the last 72 hours. Invalid input(s): CKTOTAL;3    FASTING LIPID PANEL:  Lab Results   Component Value Date/Time    CHOL 154 09/15/2017 08:18 AM    HDL 81 09/15/2017 08:18 AM    HDL 64 05/14/2012 06:55 AM    TRIG 44 09/15/2017 08:18 AM     LIVER PROFILE:   No results for input(s): AST, ALT, ALB, BILIDIR, BILITOT, ALKPHOS in the last 72 hours. XR CHEST PORTABLE   Final Result   Left lung nodule that is suspicious for malignancy until proven otherwise. Severe pulmonary emphysema and associated interstitial pulmonary fibrosis. Pulmonary artery hypertension. Possible osteoporosis. CT Head W/O Contrast   Final Result   No acute intracranial abnormality. XR CHEST PORTABLE   Final Result   No radiographic evidence of acute pulmonary disease.                Assessment & Plan:     Patient Active Problem List    Diagnosis Date Noted    Metabolic encephalopathy 81/12/1994    Acute encephalopathy 11/18/2022    Chronic hypoxemic respiratory failure ([facility] 11/18/2022    Chronic respiratory failure with hypoxia ([facility] 08/31/2022    Hypokalemia 08/29/2022    Low TSH level 08/29/2022    History of COVID-19 08/29/2022    Leukocytosis 08/29/2022 Hypertension 08/15/2022    COVID-19 08/15/2022    Pneumonia due to COVID-19 virus 08/14/2022    Community acquired pneumonia     Transaminitis 12/10/2021    Acute hypoxemic respiratory failure (HCC)     Pneumonia due to infectious organism     Pancreatic abnormality     Common bile duct dilatation     Acute on chronic respiratory failure ([facility] 02/04/2021    Community acquired pneumonia of right lung     Acute exacerbation of chronic obstructive pulmonary disease (COPD) ([facility]     Acute on chronic respiratory failure with hypoxia and hypercapnia ([address] 02/04/2020    Lung nodule     Pulmonary emphysema (HCC)     Hyponatremia 01/30/2020    Acute respiratory failure with hypoxia ([address] 05/21/2018    COPD exacerbation ([facility] 05/20/2018    Moderate COPD (chronic obstructive pulmonary disease) ([facility] 04/21/2016    Tobacco use 02/12/2016    Chest pain 10/14/2011    Shortness of breath 91/55/4152     Metabolic encephalopathy  Likely secondary to hyponatremia  Resolved    Acute on chronic hyponatremia  Admitted to the ICU. Seizure precautions. Nephrology consult.   Continue normal saline for now- DC IVF  1500 ml FR    follow sodium levels closely  Na at her baseline today    COPD and chronic hypoxic respiratory failure  Stable on home O2  Continue inhalers    Lung nodule  Pulmonary consult    Hypertension  Stable  Continue amlodipine and lisinopril    Full code  General diet  Lovenox for DVT prophylaxis    Pt/ot  Needs precert for SNF       Gale Hurd MD

## 2022-11-28 NOTE — DISCHARGE SUMMARY
Name:  Chandni Pettit  Room:  /7664-44  MRN:    [id number]    Discharge Summary      This discharge summary is in conjunction with a complete physical exam done on the day of discharge. Discharging Physician: Shelly Luis MD      Admit: 11/17/2022  Discharge:  11/23/2022    Diagnoses this Admission    Principal Problem:    Hyponatremia  Active Problems:    Hypertension    Chronic respiratory failure with hypoxia (HCC)    Metabolic encephalopathy    Acute encephalopathy    Chronic hypoxemic respiratory failure (HCC)    Acute on chronic respiratory failure (HCC)  Resolved Problems:    * No resolved hospital problems. *          Procedures (Please Review Full Report for Details)      Consults    IP CONSULT TO HOSPITALIST  IP CONSULT TO NEPHROLOGY  IP CONSULT TO PULMONOLOGY  IP CONSULT TO HOME CARE NEEDS      HPI:  61 y.o. female who presented to the hospital with a chief complaint of altered mental status. Patient apparently woke up this morning at a baseline and family later on found her confused and lethargic. He called [facility] and she was brought to the emergency department by EMS. They stated that when they saw the patient she appeared to be in respiratory distress, she was given a dose of Solu-Medrol in route and duo nebs as there was a concern for COPD exacerbation. When she presented to the emergency department she was altered and did not necessarily have any respiratory distress even though she was placed on BiPAP for short while. Further work-up in emergency department revealed a sodium level of 108. Patient has a history of SIADH and chronic hyponatremia. She was admitted to the ICU for further care. When I saw the patient she was still confused but was arousable and directable. She has since been started on a saline drip for her hyponatremia.            Hospital Course      Metabolic encephalopathy  Likely secondary to hyponatremia  Resolved     Acute on chronic hyponatremia  Admitted to the ICU. Seizure precautions. Nephrology consult. Continue normal saline for now- DC IVF  1500 ml FR    follow sodium levels closely  Na at her baseline today     COPD and chronic hypoxic respiratory failure  Stable on home O2  Continue inhalers     Lung nodule  Pulmonary consult     Hypertension  Stable  Continue amlodipine and lisinopril      XR CHEST PORTABLE   Final Result   Left lung nodule that is suspicious for malignancy until proven otherwise. Severe pulmonary emphysema and associated interstitial pulmonary fibrosis. Pulmonary artery hypertension. Possible osteoporosis. CT Head W/O Contrast   Final Result   No acute intracranial abnormality. XR CHEST PORTABLE   Final Result   No radiographic evidence of acute pulmonary disease.                 Discharge Medications     Medication List        START taking these medications      predniSONE 20 MG tablet  Commonly known as: DELTASONE  1 1/2 qd- 3 days, 1 qd- 3 days, 1/2 qd- 3 days            CONTINUE taking these medications      acetaminophen 500 MG tablet  Commonly known as: TYLENOL     * albuterol sulfate  (90 Base) MCG/ACT inhaler  Commonly known as: PROVENTIL;VENTOLIN;PROAIR  Inhale 2 puffs into the lungs every 6 hours as needed for Wheezing orShortness of Breath     * albuterol 1.25 MG/3ML nebulizer solution  Commonly known as: ACCUNEB  Inhale 3 mLs into the lungs every 6 hours as needed for Wheezing     alendronate 70 MG tablet  Commonly known as: FOSAMAX     amLODIPine 5 MG tablet  Commonly known as: NORVASC  Take 1 tablet by mouth daily     Anoro Ellipta 62.5-25 MCG/INH Aepb inhaler  Generic drug: umeclidinium-vilanterol  INHALE ONE DOSE BY MOUTH DAILY     budesonide-formoterol 80-4.5 MCG/ACT Aero  Commonly known as: Symbicort  Inhale 2 puffs into the lungs 2 times daily     CENTRUM SILVER 50+WOMEN PO     cetirizine 10 MG tablet  Commonly known as: ZYRTEC     hydrOXYzine HCl 25 MG tablet  Commonly known as: ATARAX     lidocaine 4 % external patch  Place 1 patch onto the skin daily     lisinopril 10 MG tablet  Commonly known as: PRINIVIL;ZESTRIL     Nebulizer/Tubing/Mouthpiece Kit  Use with accuneb           * This list has 2 medication(s) that are the same as other medications prescribed for you. Read the directions carefully, and ask your doctor or other care provider to review them with you. STOP taking these medications      Biotin 300 MCG Tabs     escitalopram 10 MG tablet  Commonly known as: LEXAPRO     furosemide 40 MG tablet  Commonly known as: LASIX     guaiFENesin 600 MG extended release tablet  Commonly known as: MUCINEX     loperamide 2 MG capsule  Commonly known as: IMODIUM     meloxicam 15 MG tablet  Commonly known as: MOBIC     oxybutynin 5 MG tablet  Commonly known as: DITROPAN               Where to Get Your Medications        These medications were sent to [id number] [address], [address] [place] [address] [address] [address]      Phone: [phone]   predniSONE 20 MG tablet           Discharge Condition/Location: Stable to home on home O2    Follow Up: Follow up with PCP.       Total time spent on discharge is 35 minutes    Ganesh Chung MD 11/28/2022 1:29 PM

## 2023-02-27 ENCOUNTER — HOSPITAL ENCOUNTER (EMERGENCY)
Age: 68
Discharge: HOME | End: 2023-02-27
Attending: EMERGENCY MEDICINE
Payer: MEDICARE

## 2023-02-27 ENCOUNTER — APPOINTMENT (OUTPATIENT)
Dept: GENERAL RADIOLOGY | Age: 68
End: 2023-02-27
Payer: MEDICARE

## 2023-02-27 VITALS
DIASTOLIC BLOOD PRESSURE: 77 MMHG | WEIGHT: 172 LBS | HEIGHT: 63 IN | BODY MASS INDEX: 30.48 KG/M2 | RESPIRATION RATE: 16 BRPM | HEART RATE: 89 BPM | TEMPERATURE: 98 F | SYSTOLIC BLOOD PRESSURE: 133 MMHG | OXYGEN SATURATION: 99 %

## 2023-02-27 DIAGNOSIS — E87.1 CHRONIC HYPONATREMIA: ICD-10-CM

## 2023-02-27 DIAGNOSIS — R91.8 MASS OF LEFT LUNG: ICD-10-CM

## 2023-02-27 DIAGNOSIS — M79.602 LEFT ARM PAIN: Primary | ICD-10-CM

## 2023-02-27 LAB
A/G RATIO: 1.4 (ref 1.1–2.2)
ALBUMIN SERPL-MCNC: 4.4 G/DL (ref 3.4–5)
ALP BLD-CCNC: 110 U/L (ref 40–129)
ALT SERPL-CCNC: 26 U/L (ref 10–40)
ANION GAP SERPL CALCULATED.3IONS-SCNC: 7 MMOL/L (ref 3–16)
AST SERPL-CCNC: 32 U/L (ref 15–37)
BASOPHILS ABSOLUTE: 0 K/UL (ref 0–0.2)
BASOPHILS RELATIVE PERCENT: 0.8 %
BILIRUB SERPL-MCNC: 0.6 MG/DL (ref 0–1)
BUN BLDV-MCNC: 9 MG/DL (ref 7–20)
CALCIUM SERPL-MCNC: 10.4 MG/DL (ref 8.3–10.6)
CHLORIDE BLD-SCNC: 84 MMOL/L (ref 99–110)
CO2: 34 MMOL/L (ref 21–32)
CREAT SERPL-MCNC: <0.5 MG/DL (ref 0.6–1.2)
EOSINOPHILS ABSOLUTE: 0.1 K/UL (ref 0–0.6)
EOSINOPHILS RELATIVE PERCENT: 2.1 %
GFR SERPL CREATININE-BSD FRML MDRD: >60 ML/MIN/{1.73_M2}
GLUCOSE BLD-MCNC: 136 MG/DL (ref 70–99)
HCT VFR BLD CALC: 33.7 % (ref 36–48)
HEMOGLOBIN: 11.3 G/DL (ref 12–16)
LYMPHOCYTES ABSOLUTE: 0.8 K/UL (ref 1–5.1)
LYMPHOCYTES RELATIVE PERCENT: 13.2 %
MCH RBC QN AUTO: 30.5 PG (ref 26–34)
MCHC RBC AUTO-ENTMCNC: 33.5 G/DL (ref 31–36)
MCV RBC AUTO: 91.1 FL (ref 80–100)
MONOCYTES ABSOLUTE: 0.7 K/UL (ref 0–1.3)
MONOCYTES RELATIVE PERCENT: 10.4 %
NEUTROPHILS ABSOLUTE: 4.8 K/UL (ref 1.7–7.7)
NEUTROPHILS RELATIVE PERCENT: 73.5 %
PDW BLD-RTO: 12.5 % (ref 12.4–15.4)
PLATELET # BLD: 261 K/UL (ref 135–450)
PMV BLD AUTO: 6.2 FL (ref 5–10.5)
POTASSIUM SERPL-SCNC: 3.9 MMOL/L (ref 3.5–5.1)
RBC # BLD: 3.7 M/UL (ref 4–5.2)
SODIUM BLD-SCNC: 125 MMOL/L (ref 136–145)
TOTAL PROTEIN: 7.6 G/DL (ref 6.4–8.2)
WBC # BLD: 6.5 K/UL (ref 4–11)

## 2023-02-27 PROCEDURE — 73030 X-RAY EXAM OF SHOULDER: CPT

## 2023-02-27 PROCEDURE — 36415 COLL VENOUS BLD VENIPUNCTURE: CPT

## 2023-02-27 PROCEDURE — 80053 COMPREHEN METABOLIC PANEL: CPT

## 2023-02-27 PROCEDURE — 99284 EMERGENCY DEPT VISIT MOD MDM: CPT

## 2023-02-27 PROCEDURE — 96374 THER/PROPH/DIAG INJ IV PUSH: CPT

## 2023-02-27 PROCEDURE — 71046 X-RAY EXAM CHEST 2 VIEWS: CPT

## 2023-02-27 PROCEDURE — 6360000002 HC RX W HCPCS: Performed by: EMERGENCY MEDICINE

## 2023-02-27 PROCEDURE — 85025 COMPLETE CBC W/AUTO DIFF WBC: CPT

## 2023-02-27 PROCEDURE — 6370000000 HC RX 637 (ALT 250 FOR IP): Performed by: EMERGENCY MEDICINE

## 2023-02-27 RX ORDER — IPRATROPIUM BROMIDE AND ALBUTEROL SULFATE 2.5; .5 MG/3ML; MG/3ML
1 SOLUTION RESPIRATORY (INHALATION) ONCE
Status: COMPLETED | OUTPATIENT
Start: 2023-02-27 | End: 2023-02-27

## 2023-02-27 RX ORDER — METHYLPREDNISOLONE SODIUM SUCCINATE 125 MG/2ML
125 INJECTION, POWDER, LYOPHILIZED, FOR SOLUTION INTRAMUSCULAR; INTRAVENOUS ONCE
Status: COMPLETED | OUTPATIENT
Start: 2023-02-27 | End: 2023-02-27

## 2023-02-27 RX ORDER — HYDROCODONE BITARTRATE AND ACETAMINOPHEN 5; 325 MG/1; MG/1
1 TABLET ORAL EVERY 6 HOURS PRN
Qty: 10 TABLET | Refills: 0 | Status: SHIPPED | OUTPATIENT
Start: 2023-02-27 | End: 2023-03-03

## 2023-02-27 RX ADMIN — METHYLPREDNISOLONE SODIUM SUCCINATE 125 MG: 125 INJECTION, POWDER, FOR SOLUTION INTRAMUSCULAR; INTRAVENOUS at 16:31

## 2023-02-27 RX ADMIN — IPRATROPIUM BROMIDE AND ALBUTEROL SULFATE 1 AMPULE: 2.5; .5 SOLUTION RESPIRATORY (INHALATION) at 16:31

## 2023-02-27 ASSESSMENT — PAIN - FUNCTIONAL ASSESSMENT: PAIN_FUNCTIONAL_ASSESSMENT: NONE - DENIES PAIN

## 2023-02-27 ASSESSMENT — ENCOUNTER SYMPTOMS
WHEEZING: 0
COUGH: 0
SHORTNESS OF BREATH: 0

## 2023-02-27 NOTE — ED NOTES
Pt discharge instructions, follow up and rx x 1 reviewed with pt. Pt verbalized understanding. No further needs. Pt discharged at this time.         Pam Gutierrez RN  02/27/23 [id number]

## 2023-02-27 NOTE — ED PROVIDER NOTES
[facility] [facility]      Pt Name: Robbin Vasquez  MRN: [id number]  [place] 1955  Date of evaluation: 2/27/2023  Provider: Lety Vaughn MD    [address] [place] [address]       Chief Complaint   Patient presents with    Arm Pain     Pt having pain in her L arm that radiates into her back. Pt denies any Chest pain at this time. Reports having pain when she moves her arm. HISTORY OF PRESENT ILLNESS   (Location/Symptom, Timing/Onset, Context/Setting, Quality, Duration, Modifying Factors, Severity)  Note limiting factors. Robbin Vasquez is a 74 y.o. female who presents to the emergency department     Emergency department having some left shoulder pain for 1 week. Denies any specific trauma but it is painful to move she is tender over the pectoralis muscle group. She does have a little tenderness when I palpate the top of her breast area no supraclavicular lymphadenopathy no in the back  Her history is complex and the fact that the last fall was late last summer she did have a pulmonary nodule she did get radiation but did not really follow-up no biopsy was ever obtained she has been a heavy smoker and does have COPD and wears oxygen 24/7    The history is provided by the patient. Nursing Notes were reviewed. REVIEW OF SYSTEMS    (2-9 systems for level 4, 10 or more for level 5)     Review of Systems   Constitutional:  Positive for activity change. Respiratory:  Negative for cough, shortness of breath and wheezing. Cardiovascular:  Positive for chest pain. All other systems reviewed and are negative. Except as noted above the remainder of the review of systems was reviewed and negative.        PAST MEDICAL HISTORY     Past Medical History:   Diagnosis Date    Angina pectoris ([address]     Chronic pain     knees and lower back     COPD exacerbation ([address] 5/20/2018    Depression     Essential hypertension 8/15/2022    Panic disorder     Pneumonia SURGICAL HISTORY       Past Surgical History:   Procedure Laterality Date    CHOLECYSTECTOMY      COLONOSCOPY           CURRENT MEDICATIONS       Previous Medications    ACETAMINOPHEN (TYLENOL) 500 MG TABLET    Take 500 mg by mouth every 6 hours as needed for Pain    ALBUTEROL (ACCUNEB) 1.25 MG/3ML NEBULIZER SOLUTION    Inhale 3 mLs into the lungs every 6 hours as needed for Wheezing    ALBUTEROL SULFATE  (90 BASE) MCG/ACT INHALER    Inhale 2 puffs into the lungs every 6 hours as needed for Wheezing orShortness of Breath    ALENDRONATE (FOSAMAX) 70 MG TABLET    Take 70 mg by mouth every 7 days Takes every Monday    AMLODIPINE (NORVASC) 5 MG TABLET    Take 1 tablet by mouth daily    ANORO ELLIPTA 62.5-25 MCG/INH AEPB INHALER    INHALE ONE DOSE BY MOUTH DAILY    BUDESONIDE-FORMOTEROL (SYMBICORT) 80-4.5 MCG/ACT AERO    Inhale 2 puffs into the lungs 2 times daily    CETIRIZINE (ZYRTEC) 10 MG TABLET    Take 10 mg by mouth daily    HYDROXYZINE (ATARAX) 25 MG TABLET    Take 25 mg by mouth 3 times daily as needed for Itching    LIDOCAINE 4 % EXTERNAL PATCH    Place 1 patch onto the skin daily    LISINOPRIL (PRINIVIL;ZESTRIL) 10 MG TABLET    Take 10 mg by mouth daily    MULTIPLE VITAMINS-MINERALS (CENTRUM SILVER 50+WOMEN PO)    Take 1 tablet by mouth daily    PREDNISONE (DELTASONE) 20 MG TABLET    1 1/2 qd- 3 days, 1 qd- 3 days, 1/2 qd- 3 days    RESPIRATORY THERAPY SUPPLIES (NEBULIZER/TUBING/MOUTHPIECE) KIT    Use with accuneb       ALLERGIES     Cephalosporins, Pcn [penicillins], and Hctz [hydrochlorothiazide]    FAMILY HISTORY       Family History   Problem Relation Age of Onset    COPD Mother     Hypertension Mother     Asthma Neg Hx     Cancer Neg Hx     Diabetes Neg Hx     Emphysema Neg Hx     Heart Failure Neg Hx           SOCIAL HISTORY       Social History     Socioeconomic History    Marital status:       Spouse name: None    Number of children: 6    Years of education: None    Highest education level: None   Tobacco Use    Smoking status: Former     Packs/day: 1.00     Years: 52.00     Pack years: 52.00     Types: Cigarettes     Start date:      Quit date:      Years since quittin.1     Passive exposure: Past    Smokeless tobacco: Never   Vaping Use    Vaping Use: Never used   Substance and Sexual Activity    Alcohol use: Not Currently     Alcohol/week: 12.0 standard drinks     Types: 12 Cans of beer per week    Drug use: No    Sexual activity: Never       SCREENINGS    [place] Coma Scale  Eye Opening: Spontaneous  Best Verbal Response: Oriented  Best Motor Response: Obeys commands  [place] Coma Scale Score: 15          PHYSICAL EXAM    (up to 7 for level 4, 8 or more for level 5)     ED Triage Vitals [23 1538]   BP Temp Temp Source Heart Rate Resp SpO2 Height Weight   (!) 161/63 98 °F (36.7 °C) Oral 92 16 98 % 5' 3\" (1.6 m) 172 lb (78 kg)       Physical Exam  Vitals and nursing note reviewed. Constitutional:       Appearance: She is normal weight. Cardiovascular:      Rate and Rhythm: Normal rate and regular rhythm. Pulmonary:      Effort: Pulmonary effort is normal.      Breath sounds: Wheezing and rhonchi present. Musculoskeletal:         General: Tenderness present. DIAGNOSTIC RESULTS     EKG: All EKG's are interpreted by the Emergency Department Physician who either signs or Co-signs this chart in the absence of a cardiologist.        RADIOLOGY:   Non-plain film images such as CT, Ultrasound and MRI are read by the radiologist. Plain radiographic images are visualized and preliminarily interpreted by the emergency physician with the below findings:        Interpretation per the Radiologist below, if available at the time of this note:    XR SHOULDER LEFT (MIN 2 VIEWS)   Final Result   Unremarkable         XR CHEST (2 VW)   Final Result   1. Left lung opacity is compatible with the known left upper lobe pulmonary   nodule. No acute infiltrates.       2. Severe emphysema                 LABS:  Results for orders placed or performed during the hospital encounter of 02/27/23   CBC with Auto Differential   Result Value Ref Range    WBC 6.5 4.0 - 11.0 K/uL    RBC 3.70 (L) 4.00 - 5.20 M/uL    Hemoglobin 11.3 (L) 12.0 - 16.0 g/dL    Hematocrit 33.7 (L) 36.0 - 48.0 %    MCV 91.1 80.0 - 100.0 fL    MCH 30.5 26.0 - 34.0 pg    MCHC 33.5 31.0 - 36.0 g/dL    RDW 12.5 12.4 - 15.4 %    Platelets 635 647 - 648 K/uL    MPV 6.2 5.0 - 10.5 fL    Neutrophils % 73.5 %    Lymphocytes % 13.2 %    Monocytes % 10.4 %    Eosinophils % 2.1 %    Basophils % 0.8 %    Neutrophils Absolute 4.8 1.7 - 7.7 K/uL    Lymphocytes Absolute 0.8 (L) 1.0 - 5.1 K/uL    Monocytes Absolute 0.7 0.0 - 1.3 K/uL    Eosinophils Absolute 0.1 0.0 - 0.6 K/uL    Basophils Absolute 0.0 0.0 - 0.2 K/uL   Comprehensive Metabolic Panel   Result Value Ref Range    Sodium 125 (L) 136 - 145 mmol/L    Potassium 3.9 3.5 - 5.1 mmol/L    Chloride 84 (L) 99 - 110 mmol/L    CO2 34 (H) 21 - 32 mmol/L    Anion Gap 7 3 - 16    Glucose 136 (H) 70 - 99 mg/dL    BUN 9 7 - 20 mg/dL    Creatinine <0.5 (L) 0.6 - 1.2 mg/dL    Est, Glom Filt Rate >60 >60    Calcium 10.4 8.3 - 10.6 mg/dL    Total Protein 7.6 6.4 - 8.2 g/dL    Albumin 4.4 3.4 - 5.0 g/dL    Albumin/Globulin Ratio 1.4 1.1 - 2.2    Total Bilirubin 0.6 0.0 - 1.0 mg/dL    Alkaline Phosphatase 110 40 - 129 U/L    ALT 26 10 - 40 U/L    AST 32 15 - 37 U/L            EMERGENCY DEPARTMENT COURSE and DIFFERENTIAL DIAGNOSIS/MDM:     Vitals:    02/27/23 1538   BP: (!) 161/63   Pulse: 92   Resp: 16   Temp: 98 °F (36.7 °C)   TempSrc: Oral   SpO2: 98%   Weight: 172 lb (78 kg)   Height: 5' 3\" (1.6 m)           [facility]  Historians: Patient and patient's daughter  Limitations of history: None  Medical appropriate history this patient presents with a 1 week history of some left-sided chest pain worse when she moves her shoulder also tender over the left superior aspect of her left breast I did not feel a large mass or even a nodule in her breast but she seems to have the pain over the pectoralis muscle or at the top of the breast tissue no redness no warmth is noted. No other abnormalities noted she does have bad emphysema and does wear oxygen 24/7    Medically appropriate physical exam vital signs are stable pulse ox 98% on her 2 L it is tender over the superior aspect of the left breast I did not see any abnormal skin dimpling or lesions or masses no other abnormalities are noted. She has no hemoptysis no productive cough she is afebrile    Patient does use a nebulizer occasionally but most the time she says it is too much trouble just to mix together and take it. Therefore she does not do it  Differential diagnosis:  1. Acute shoulder injury  2. Possibly referred pain from a left lung mass or and/or nodule  Also possibility of a new carcinoma or trauma in her left ribs also possibility of breast pathology  Patient's old records were reviewed.   Patient's x-rays were ordered and reviewed of the chest x-ray and left shoulder  Denies hemoptysis productive cough fever chills pain is worse with movement also    Since electrolytes revealed a sodium of 125 which is close to her lower sodiums in the past but I suspect this may be due to her lung disease and/or possibly lung mass possibly SIADH  Her white count is normal showing no signs of infection she is afebrile  She, lost connection with her oncologist although her daughter says they do have the number at home I think reviewing the x-ray myself I think that this could be worked up further as an outpatient the daughter will call tomorrow to get an appointment this week with the oncologist to revisit whether add on this she did receive radiation therapy but no chemo and no surgery due to her severe COPD it may be that there is nothing to do but in any respect she needs to be told that as far as her left shoulder I see no signs of fracture dislocation or metastatic lesion she is advised on pain control in the form of Tylenol and then if she needs it I did give her Vicodin until she can get in to be seen patient's visit summary patient presents with shoulder pain other etiologies are on detected she does have a mass in the left upper lung which I think is probably causing the shoulder pain  Diagnosis acute shoulder pain secondary to lung mass  Social deterrence patient has had several to multiple excuses on why she did not follow-up including COVID but she has no COVID at this point so she was cheerleading and advised of the importance of following up prescriptions given  1. Vicodin    REASSESSMENT          CRITICAL CARE TIME     CONSULTS:  None      PROCEDURES:     Procedures    MEDICATIONS GIVEN THIS VISIT:  Medications   ipratropium-albuterol (DUONEB) nebulizer solution 1 ampule (1 ampule Inhalation Given 2/27/23 1631)   methylPREDNISolone sodium (SOLU-MEDROL) injection 125 mg (125 mg IntraVENous Given 2/27/23 1631)        FINAL IMPRESSION      1. Left arm pain    2. Chronic hyponatremia    3. Mass of left lung            DISPOSITION/PLAN   DISPOSITION Decision To Discharge 02/27/2023 06:25:37 PM      PATIENT REFERRED TO:  Your oncologist    Schedule an appointment as soon as possible for a visit in 3 days      DISCHARGE MEDICATIONS:  New Prescriptions    HYDROCODONE-ACETAMINOPHEN (NORCO) 5-325 MG PER TABLET    Take 1 tablet by mouth every 6 hours as needed for Pain for up to 10 doses. Max Daily Amount: 4 tablets       Controlled Substances Monitoring  No flowsheet data found. (Please note that portions of this note were completed with a voice recognition program.  Efforts were made to edit the dictations but occasionally words are mis-transcribed.)    Patient was advised to return to the Emergency Department if there was any worsening.     Karon Brannon MD (electronically signed)  Attending Emergency Physician         Ora Noble MD  02/27/23 3216

## 2023-03-10 ENCOUNTER — HOSPITAL ENCOUNTER (OUTPATIENT)
Dept: CT IMAGING | Age: 68
Discharge: HOME | End: 2023-03-10
Payer: MEDICARE

## 2023-03-10 DIAGNOSIS — C34.12 SQUAMOUS CELL CARCINOMA OF BRONCHUS IN LEFT UPPER LOBE (HCC): ICD-10-CM

## 2023-03-10 PROCEDURE — 71250 CT THORAX DX C-: CPT

## 2023-04-28 NOTE — PROGRESS NOTES
Admit: 2022    Name:  Barbara Ruiz  Room:  /0301-01  MRN:    [id number]    Daily Progress Note for 2022   Admitted with altered mental status and hyponatremia    Interval History:       Currently on 4 L of oxygen    Scheduled Meds:   ipratropium-albuterol  1 ampule Inhalation TID    predniSONE  40 mg Oral Daily    guaiFENesin  600 mg Oral BID    mupirocin   Nasal BID    insulin lispro  0-8 Units SubCUTAneous TID WC    insulin lispro  0-4 Units SubCUTAneous Nightly    lisinopril  10 mg Oral Daily    amLODIPine  5 mg Oral Daily    sodium chloride flush  5-40 mL IntraVENous 2 times per day    enoxaparin  40 mg SubCUTAneous Daily       Continuous Infusions:   dextrose      sodium chloride         PRN Meds:  ipratropium-albuterol, glucose, dextrose bolus **OR** dextrose bolus, glucagon (rDNA), dextrose, hydrOXYzine HCl, sodium chloride flush, sodium chloride, ondansetron **OR** ondansetron, polyethylene glycol, acetaminophen **OR** acetaminophen, potassium chloride **OR** potassium alternative oral replacement **OR** potassium chloride                  Objective:     Temp  Av.6 °F (36.4 °C)  Min: 97 °F (36.1 °C)  Max: 98.2 °F (36.8 °C)  Pulse  Av.2  Min: 65  Max: 94  BP  Min: 116/75  Max: 124/83  SpO2  Av.6 %  Min: 95 %  Max: 100 %  Patient Vitals for the past 4 hrs:   BP Temp Temp src Pulse Resp SpO2   22 0736 116/75 98 °F (36.7 °C) Oral 65 16 96 %   22 0713 -- -- -- 65 16 95 %           Intake/Output Summary (Last 24 hours) at 2022 0805  Last data filed at 2022 0200  Gross per 24 hour   Intake 1146 ml   Output 1200 ml   Net -54 ml         Physical Exam:  General:  Awake, alert and oriented.  Appears to be not in any distress  Mucous Membranes:  Pink , anicteric  Neck: No JVD, no carotid bruit, no thyromegaly  Chest: Normal respiratory effort, no accessory muscle, few wheezes, no crackles  Cardiovascular:  RRR S1S2 heard, no murmurs or gallops  Abdomen:  Soft, undistended, non tender, no organomegaly, BS present  Extremities: No edema or cyanosis. Distal pulses well felt  Neurological : no focal deficits    Lab Data:  CBC:   Recent Labs     11/20/22  0255 11/21/22  0543   WBC 5.4 5.8   RBC 3.31* 3.59*   HGB 10.7* 11.3*   HCT 30.7* 33.8*   MCV 92.7 94.2   RDW 13.1 13.2    321       BMP:   Recent Labs     11/20/22  0905 11/21/22  0543 11/21/22  1330 11/22/22  0412   * 126* 127* 129*   K 3.6 4.6 4.6 4.1   CL 93* 93* 95* 94*   CO2 25 25 23 25   PHOS 2.5 2.8  --  3.0   BUN 7 7 10 15   CREATININE <0.5* <0.5* <0.5* <0.5*       BNP: No results for input(s): BNP in the last 72 hours. PT/INR: No results for input(s): PROTIME, INR in the last 72 hours. APTT:No results for input(s): APTT in the last 72 hours. CARDIAC ENZYMES:   No results for input(s): CKMB, CKMBINDEX, TROPONINI in the last 72 hours. Invalid input(s): CKTOTAL;3    FASTING LIPID PANEL:  Lab Results   Component Value Date/Time    CHOL 154 09/15/2017 08:18 AM    HDL 81 09/15/2017 08:18 AM    HDL 64 05/14/2012 06:55 AM    TRIG 44 09/15/2017 08:18 AM     LIVER PROFILE:   No results for input(s): AST, ALT, ALB, BILIDIR, BILITOT, ALKPHOS in the last 72 hours. XR CHEST PORTABLE   Final Result   Left lung nodule that is suspicious for malignancy until proven otherwise. Severe pulmonary emphysema and associated interstitial pulmonary fibrosis. Pulmonary artery hypertension. Possible osteoporosis. CT Head W/O Contrast   Final Result   No acute intracranial abnormality. XR CHEST PORTABLE   Final Result   No radiographic evidence of acute pulmonary disease.                Assessment & Plan:     Patient Active Problem List    Diagnosis Date Noted    Metabolic encephalopathy 16/60/0017    Acute encephalopathy 11/18/2022    Chronic hypoxemic respiratory failure ([facility] 11/18/2022    Chronic [facility] failure with hypoxia [facility] 08/31/2022    Hypokalemia 08/29/2022    Low TSH level 08/29/2022    History of COVID-19 08/29/2022    Leukocytosis 08/29/2022    Hypertension 08/15/2022    COVID-19 08/15/2022    Pneumonia due to COVID-19 virus 08/14/2022    Community acquired pneumonia     Transaminitis 12/10/2021    Acute hypoxemic respiratory failure (HCC)     Pneumonia due to infectious organism     Pancreatic abnormality     Common bile duct dilatation     Acute on chronic respiratory failure ([address] 02/04/2021    Community acquired pneumonia of right lung     Acute exacerbation of chronic obstructive pulmonary disease (COPD) ([address]     Acute on chronic respiratory failure with hypoxia and hypercapnia ([address] 02/04/2020    Lung nodule     Pulmonary emphysema (HCC)     Hyponatremia 01/30/2020    Acute respiratory failure with hypoxia ([address] 05/21/2018    COPD exacerbation ([address] 05/20/2018    Moderate COPD (chronic obstructive pulmonary disease) ([address] 04/21/2016    Tobacco use 02/12/2016    Chest pain 10/14/2011    Shortness of breath 85/63/9283     Metabolic encephalopathy  Likely secondary to hyponatremia  Resolved    Acute on chronic hyponatremia  Admitted to the ICU. Seizure precautions. Nephrology consult.   Continue normal saline for now- DC IVF  1200 ml FR    follow sodium levels closely  Na at her baseline today    COPD and chronic hypoxic respiratory failure  Stable on home O2  Continue inhalers    Lung nodule  Pulmonary consult    Hypertension  Stable  Continue amlodipine and lisinopril    Full code  General diet  Lovenox for DVT prophylaxis    Pt/ot  Needs SNF       Bindu Kapoor MD normal...

## 2023-05-01 ENCOUNTER — HOSPITAL ENCOUNTER (INPATIENT)
Age: 68
LOS: 5 days | Discharge: HOME | DRG: 640 | End: 2023-05-06
Attending: EMERGENCY MEDICINE | Admitting: HOSPITALIST
Payer: MEDICARE

## 2023-05-01 ENCOUNTER — APPOINTMENT (OUTPATIENT)
Dept: GENERAL RADIOLOGY | Age: 68
DRG: 640 | End: 2023-05-01
Payer: MEDICARE

## 2023-05-01 DIAGNOSIS — J18.9 PNEUMONIA OF BOTH LOWER LOBES DUE TO INFECTIOUS ORGANISM: Primary | ICD-10-CM

## 2023-05-01 DIAGNOSIS — J96.01 ACUTE RESPIRATORY FAILURE WITH HYPOXIA (HCC): ICD-10-CM

## 2023-05-01 DIAGNOSIS — E87.1 HYPONATREMIA: ICD-10-CM

## 2023-05-01 LAB
ALBUMIN SERPL-MCNC: 4.4 G/DL (ref 3.4–5)
ALBUMIN/GLOB SERPL: 1.3 {RATIO} (ref 1.1–2.2)
ALP SERPL-CCNC: 112 U/L (ref 40–129)
ALT SERPL-CCNC: 25 U/L (ref 10–40)
ANION GAP SERPL CALCULATED.3IONS-SCNC: 11 MMOL/L (ref 3–16)
AST SERPL-CCNC: 44 U/L (ref 15–37)
BASE EXCESS BLDV CALC-SCNC: 1.7 MMOL/L (ref -3–3)
BASOPHILS # BLD: 0 K/UL (ref 0–0.2)
BASOPHILS NFR BLD: 0 %
BILIRUB SERPL-MCNC: 0.8 MG/DL (ref 0–1)
BUN SERPL-MCNC: 11 MG/DL (ref 7–20)
CALCIUM SERPL-MCNC: 9.5 MG/DL (ref 8.3–10.6)
CHLORIDE SERPL-SCNC: 75 MMOL/L (ref 99–110)
CO2 BLDV-SCNC: 27 MMOL/L
CO2 SERPL-SCNC: 27 MMOL/L (ref 21–32)
COHGB MFR BLDV: 2.4 % (ref 0–1.5)
CREAT SERPL-MCNC: <0.5 MG/DL (ref 0.6–1.2)
DEPRECATED RDW RBC AUTO: 12.5 % (ref 12.4–15.4)
EOSINOPHIL # BLD: 0 K/UL (ref 0–0.6)
EOSINOPHIL NFR BLD: 0 %
FLUAV RNA UPPER RESP QL NAA+PROBE: NEGATIVE
FLUBV AG NPH QL: NEGATIVE
GFR SERPLBLD CREATININE-BSD FMLA CKD-EPI: >60 ML/MIN/{1.73_M2}
GLUCOSE BLD-MCNC: 146 MG/DL (ref 70–99)
GLUCOSE SERPL-MCNC: 128 MG/DL (ref 70–99)
HCO3 BLDV-SCNC: 26 MMOL/L (ref 23–29)
HCT VFR BLD AUTO: 33.8 % (ref 36–48)
HGB BLD-MCNC: 11.5 G/DL (ref 12–16)
LACTATE BLDV-SCNC: 1.7 MMOL/L (ref 0.4–1.9)
LIPASE SERPL-CCNC: 25 U/L (ref 13–60)
LYMPHOCYTES # BLD: 0.5 K/UL (ref 1–5.1)
LYMPHOCYTES NFR BLD: 4.3 %
MCH RBC QN AUTO: 30.2 PG (ref 26–34)
MCHC RBC AUTO-ENTMCNC: 34.1 G/DL (ref 31–36)
MCV RBC AUTO: 88.5 FL (ref 80–100)
METHGB MFR BLDV: 0.3 %
MONOCYTES # BLD: 0.5 K/UL (ref 0–1.3)
MONOCYTES NFR BLD: 4 %
NEUTROPHILS # BLD: 10.8 K/UL (ref 1.7–7.7)
NEUTROPHILS NFR BLD: 91.7 %
NT-PROBNP SERPL-MCNC: 91 PG/ML (ref 0–124)
O2 THERAPY: ABNORMAL
OSMOLALITY SERPL: 240 MOSM/KG (ref 280–301)
PCO2 BLDV: 39.8 MMHG (ref 40–50)
PERFORMED ON: ABNORMAL
PH BLDV: 7.43 [PH] (ref 7.35–7.45)
PLATELET # BLD AUTO: 298 K/UL (ref 135–450)
PMV BLD AUTO: 6.7 FL (ref 5–10.5)
PO2 BLDV: 44.1 MMHG (ref 25–40)
POTASSIUM SERPL-SCNC: 4.5 MMOL/L (ref 3.5–5.1)
PROT SERPL-MCNC: 7.8 G/DL (ref 6.4–8.2)
RBC # BLD AUTO: 3.82 M/UL (ref 4–5.2)
SAO2 % BLDV: 82 %
SARS-COV-2 RDRP RESP QL NAA+PROBE: NOT DETECTED
SODIUM SERPL-SCNC: 113 MMOL/L (ref 136–145)
SODIUM SERPL-SCNC: 115 MMOL/L (ref 136–145)
WBC # BLD AUTO: 11.8 K/UL (ref 4–11)

## 2023-05-01 PROCEDURE — 71045 X-RAY EXAM CHEST 1 VIEW: CPT

## 2023-05-01 PROCEDURE — 83605 ASSAY OF LACTIC ACID: CPT

## 2023-05-01 PROCEDURE — 82803 BLOOD GASES ANY COMBINATION: CPT

## 2023-05-01 PROCEDURE — 2000000000 HC ICU R&B

## 2023-05-01 PROCEDURE — 96365 THER/PROPH/DIAG IV INF INIT: CPT

## 2023-05-01 PROCEDURE — 83690 ASSAY OF LIPASE: CPT

## 2023-05-01 PROCEDURE — 87804 INFLUENZA ASSAY W/OPTIC: CPT

## 2023-05-01 PROCEDURE — 99285 EMERGENCY DEPT VISIT HI MDM: CPT

## 2023-05-01 PROCEDURE — 84295 ASSAY OF SERUM SODIUM: CPT

## 2023-05-01 PROCEDURE — 87040 BLOOD CULTURE FOR BACTERIA: CPT

## 2023-05-01 PROCEDURE — 93005 ELECTROCARDIOGRAM TRACING: CPT | Performed by: EMERGENCY MEDICINE

## 2023-05-01 PROCEDURE — 83880 ASSAY OF NATRIURETIC PEPTIDE: CPT

## 2023-05-01 PROCEDURE — 87635 SARS-COV-2 COVID-19 AMP PRB: CPT

## 2023-05-01 PROCEDURE — 6360000002 HC RX W HCPCS: Performed by: EMERGENCY MEDICINE

## 2023-05-01 PROCEDURE — 80053 COMPREHEN METABOLIC PANEL: CPT

## 2023-05-01 PROCEDURE — 85025 COMPLETE CBC W/AUTO DIFF WBC: CPT

## 2023-05-01 PROCEDURE — 6370000000 HC RX 637 (ALT 250 FOR IP): Performed by: EMERGENCY MEDICINE

## 2023-05-01 PROCEDURE — 96366 THER/PROPH/DIAG IV INF ADDON: CPT

## 2023-05-01 PROCEDURE — 36415 COLL VENOUS BLD VENIPUNCTURE: CPT

## 2023-05-01 PROCEDURE — 83930 ASSAY OF BLOOD OSMOLALITY: CPT

## 2023-05-01 RX ORDER — LEVOFLOXACIN 5 MG/ML
750 INJECTION, SOLUTION INTRAVENOUS ONCE
Status: COMPLETED | OUTPATIENT
Start: 2023-05-01 | End: 2023-05-01

## 2023-05-01 RX ORDER — IPRATROPIUM BROMIDE AND ALBUTEROL SULFATE 2.5; .5 MG/3ML; MG/3ML
1 SOLUTION RESPIRATORY (INHALATION) ONCE
Status: COMPLETED | OUTPATIENT
Start: 2023-05-01 | End: 2023-05-01

## 2023-05-01 RX ADMIN — LEVOFLOXACIN 750 MG: 5 INJECTION, SOLUTION INTRAVENOUS at 21:05

## 2023-05-01 RX ADMIN — IPRATROPIUM BROMIDE AND ALBUTEROL SULFATE 1 AMPULE: 2.5; .5 SOLUTION RESPIRATORY (INHALATION) at 21:06

## 2023-05-01 ASSESSMENT — PAIN - FUNCTIONAL ASSESSMENT
PAIN_FUNCTIONAL_ASSESSMENT: 0-10
PAIN_FUNCTIONAL_ASSESSMENT: NONE - DENIES PAIN
PAIN_FUNCTIONAL_ASSESSMENT: NONE - DENIES PAIN

## 2023-05-01 ASSESSMENT — PAIN SCALES - GENERAL: PAINLEVEL_OUTOF10: 8

## 2023-05-01 NOTE — ED PROVIDER NOTES
EKG Interpretation    Interpreted by emergency department physician    EKG obtained today emergency department at [phone] shows a normal sinus rhythm with a ventricular rate of 94 bpm.  No ST segment lobation, 7 depression, hyperacute T waves. There is motion artifact. Otherwise, QRS 72, QTc 435. WA interval reassuring at 154.     HARRIETT HENRY, DO      [address] [place] [address]  05/01/23 3540
presented for SOB, fatigue, mild diarrhea for 1-2 weeks. Patient was hypoxic with her 3L so she was placed on 5L via NC to keep her O2 sat > 90%. EKG showed NSR without acute ST segment changes. Troponin also WNL. Labs show elevated white count at 11.6k. Patient has hyponatremia with sodium of 113. Her baseline sodium is in the mid 120s. I did repeat the sodium to make sure it was not a lab error and repeat sodium was 115. With edema noted in the lower legs, I considered CHF. I reviewed patient's chart and she does not have a history of CHF. I ordered BNP and it was normal.  Chest x-ray show bilateral perihilar basilar infiltrate which could be atelectasis versus pneumonia. Given that patient has elevated white count and has increased O2 requirement compared to her baseline, we will treat as pneumonia. Pneumonia can also account for patient's hyponatremia. I reviewed her chart and noted that patient has not been admitted within the past 3 months. We will treat this as community-acquired pneumonia. Patient has allergies to both penicillin and cephalosporins so I chose Levaquin. Patient does not have fever and lactate normal.  She does not meet severe sepsis criteria. - for her hyponatremia, fluid restriction implemented. Serum osm and urine labs ordered. - Patient was placed on telemetry during his/her ED stay and no malignant dysrhythmia observed.       - Patient was given    ED Medication Orders (From admission, onward)      Start Ordered     Status Ordering Provider    05/01/23 2100 05/01/23 2039  levoFLOXacin (LEVAQUIN) 750 MG/150ML infusion 750 mg  ONCE        Question:  Antimicrobial Indications  Answer:  Pneumonia (CAP)    Last MAR action: Stopped - by Tori Thornton on 05/01/23 at [address]    05/01/23 2100 05/01/23 2040  ipratropium-albuterol (DUONEB) nebulizer solution 1 ampule  ONCE        Question:  Initiate RT Bronchodilator Protocol  Answer:  No    Last MAR action: Given - by Gallito Schultz

## 2023-05-02 LAB
ALBUMIN SERPL-MCNC: 3.9 G/DL (ref 3.4–5)
ALBUMIN SERPL-MCNC: 4 G/DL (ref 3.4–5)
AMORPH SED URNS QL MICRO: ABNORMAL /HPF
ANION GAP SERPL CALCULATED.3IONS-SCNC: 10 MMOL/L (ref 3–16)
ANION GAP SERPL CALCULATED.3IONS-SCNC: 10 MMOL/L (ref 3–16)
ANION GAP SERPL CALCULATED.3IONS-SCNC: 11 MMOL/L (ref 3–16)
ANION GAP SERPL CALCULATED.3IONS-SCNC: 11 MMOL/L (ref 3–16)
ANION GAP SERPL CALCULATED.3IONS-SCNC: 9 MMOL/L (ref 3–16)
BACTERIA URNS QL MICRO: ABNORMAL /HPF
BACTERIA URNS QL MICRO: ABNORMAL /HPF
BASOPHILS # BLD: 0 K/UL (ref 0–0.2)
BASOPHILS NFR BLD: 0.2 %
BILIRUB UR QL STRIP.AUTO: NEGATIVE
BILIRUB UR QL STRIP.AUTO: NEGATIVE
BUN SERPL-MCNC: 11 MG/DL (ref 7–20)
BUN SERPL-MCNC: 7 MG/DL (ref 7–20)
BUN SERPL-MCNC: 7 MG/DL (ref 7–20)
BUN SERPL-MCNC: 9 MG/DL (ref 7–20)
BUN SERPL-MCNC: 9 MG/DL (ref 7–20)
CALCIUM SERPL-MCNC: 8.5 MG/DL (ref 8.3–10.6)
CALCIUM SERPL-MCNC: 8.9 MG/DL (ref 8.3–10.6)
CALCIUM SERPL-MCNC: 8.9 MG/DL (ref 8.3–10.6)
CALCIUM SERPL-MCNC: 9 MG/DL (ref 8.3–10.6)
CALCIUM SERPL-MCNC: 9.1 MG/DL (ref 8.3–10.6)
CHLORIDE SERPL-SCNC: 78 MMOL/L (ref 99–110)
CHLORIDE SERPL-SCNC: 83 MMOL/L (ref 99–110)
CHLORIDE SERPL-SCNC: 84 MMOL/L (ref 99–110)
CHLORIDE SERPL-SCNC: 84 MMOL/L (ref 99–110)
CHLORIDE SERPL-SCNC: 85 MMOL/L (ref 99–110)
CLARITY UR: CLEAR
CLARITY UR: CLEAR
CO2 SERPL-SCNC: 27 MMOL/L (ref 21–32)
CO2 SERPL-SCNC: 28 MMOL/L (ref 21–32)
COLOR UR: YELLOW
COLOR UR: YELLOW
CREAT SERPL-MCNC: 0.7 MG/DL (ref 0.6–1.2)
CREAT SERPL-MCNC: <0.5 MG/DL (ref 0.6–1.2)
CREAT UR-MCNC: 93 MG/DL (ref 28–259)
DEPRECATED RDW RBC AUTO: 12.9 % (ref 12.4–15.4)
EKG ATRIAL RATE: 94 BPM
EKG DIAGNOSIS: NORMAL
EKG P AXIS: 66 DEGREES
EKG P-R INTERVAL: 154 MS
EKG Q-T INTERVAL: 348 MS
EKG QRS DURATION: 72 MS
EKG QTC CALCULATION (BAZETT): 435 MS
EKG R AXIS: -9 DEGREES
EKG T AXIS: 64 DEGREES
EKG VENTRICULAR RATE: 94 BPM
EOSINOPHIL # BLD: 0 K/UL (ref 0–0.6)
EOSINOPHIL NFR BLD: 0.1 %
EPI CELLS #/AREA URNS HPF: ABNORMAL /HPF (ref 0–5)
EPI CELLS #/AREA URNS HPF: ABNORMAL /HPF (ref 0–5)
GFR SERPLBLD CREATININE-BSD FMLA CKD-EPI: >60 ML/MIN/{1.73_M2}
GLUCOSE SERPL-MCNC: 110 MG/DL (ref 70–99)
GLUCOSE SERPL-MCNC: 110 MG/DL (ref 70–99)
GLUCOSE SERPL-MCNC: 117 MG/DL (ref 70–99)
GLUCOSE SERPL-MCNC: 118 MG/DL (ref 70–99)
GLUCOSE SERPL-MCNC: 251 MG/DL (ref 70–99)
GLUCOSE UR STRIP.AUTO-MCNC: NEGATIVE MG/DL
GLUCOSE UR STRIP.AUTO-MCNC: NEGATIVE MG/DL
HCT VFR BLD AUTO: 31.9 % (ref 36–48)
HGB BLD-MCNC: 11.2 G/DL (ref 12–16)
HGB UR QL STRIP.AUTO: ABNORMAL
HGB UR QL STRIP.AUTO: ABNORMAL
KETONES UR STRIP.AUTO-MCNC: 15 MG/DL
KETONES UR STRIP.AUTO-MCNC: NEGATIVE MG/DL
LEUKOCYTE ESTERASE UR QL STRIP.AUTO: ABNORMAL
LEUKOCYTE ESTERASE UR QL STRIP.AUTO: NEGATIVE
LYMPHOCYTES # BLD: 0.6 K/UL (ref 1–5.1)
LYMPHOCYTES NFR BLD: 5.7 %
MAGNESIUM SERPL-MCNC: 1.4 MG/DL (ref 1.8–2.4)
MCH RBC QN AUTO: 31.2 PG (ref 26–34)
MCHC RBC AUTO-ENTMCNC: 35.3 G/DL (ref 31–36)
MCV RBC AUTO: 88.4 FL (ref 80–100)
MONOCYTES # BLD: 1.1 K/UL (ref 0–1.3)
MONOCYTES NFR BLD: 11.3 %
NEUTROPHILS # BLD: 8.3 K/UL (ref 1.7–7.7)
NEUTROPHILS NFR BLD: 82.7 %
NITRITE UR QL STRIP.AUTO: NEGATIVE
NITRITE UR QL STRIP.AUTO: NEGATIVE
OSMOLALITY UR: 487 MOSM/KG (ref 390–1070)
OSMOLALITY UR: 89 MOSM/KG (ref 390–1070)
PH UR STRIP.AUTO: 6 [PH] (ref 5–8)
PH UR STRIP.AUTO: 7 [PH] (ref 5–8)
PHOSPHATE SERPL-MCNC: 2.1 MG/DL (ref 2.5–4.9)
PHOSPHATE SERPL-MCNC: 2.7 MG/DL (ref 2.5–4.9)
PLATELET # BLD AUTO: 258 K/UL (ref 135–450)
PMV BLD AUTO: 6.2 FL (ref 5–10.5)
POTASSIUM SERPL-SCNC: 3.8 MMOL/L (ref 3.5–5.1)
POTASSIUM SERPL-SCNC: 4 MMOL/L (ref 3.5–5.1)
POTASSIUM SERPL-SCNC: 4 MMOL/L (ref 3.5–5.1)
POTASSIUM SERPL-SCNC: 4.1 MMOL/L (ref 3.5–5.1)
POTASSIUM SERPL-SCNC: 4.2 MMOL/L (ref 3.5–5.1)
PROCALCITONIN SERPL IA-MCNC: 0.09 NG/ML (ref 0–0.15)
PROT UR STRIP.AUTO-MCNC: 30 MG/DL
PROT UR STRIP.AUTO-MCNC: NEGATIVE MG/DL
RBC # BLD AUTO: 3.61 M/UL (ref 4–5.2)
RBC #/AREA URNS HPF: ABNORMAL /HPF (ref 0–4)
RBC #/AREA URNS HPF: ABNORMAL /HPF (ref 0–4)
SODIUM SERPL-SCNC: 115 MMOL/L (ref 136–145)
SODIUM SERPL-SCNC: 122 MMOL/L (ref 136–145)
SODIUM SERPL-SCNC: 123 MMOL/L (ref 136–145)
SODIUM UR-SCNC: <20 MMOL/L
SODIUM UR-SCNC: <20 MMOL/L
SP GR UR STRIP.AUTO: 1.02 (ref 1–1.03)
SP GR UR STRIP.AUTO: <=1.005 (ref 1–1.03)
UA COMPLETE W REFLEX CULTURE PNL UR: ABNORMAL
UA COMPLETE W REFLEX CULTURE PNL UR: YES
UA DIPSTICK W REFLEX MICRO PNL UR: YES
UA DIPSTICK W REFLEX MICRO PNL UR: YES
URN SPEC COLLECT METH UR: ABNORMAL
URN SPEC COLLECT METH UR: ABNORMAL
UROBILINOGEN UR STRIP-ACNC: 0.2 E.U./DL
UROBILINOGEN UR STRIP-ACNC: 0.2 E.U./DL
WBC # BLD AUTO: 10 K/UL (ref 4–11)
WBC #/AREA URNS HPF: ABNORMAL /HPF (ref 0–5)
WBC #/AREA URNS HPF: ABNORMAL /HPF (ref 0–5)

## 2023-05-02 PROCEDURE — 84300 ASSAY OF URINE SODIUM: CPT

## 2023-05-02 PROCEDURE — 6370000000 HC RX 637 (ALT 250 FOR IP): Performed by: INTERNAL MEDICINE

## 2023-05-02 PROCEDURE — 6370000000 HC RX 637 (ALT 250 FOR IP): Performed by: HOSPITALIST

## 2023-05-02 PROCEDURE — 94640 AIRWAY INHALATION TREATMENT: CPT

## 2023-05-02 PROCEDURE — 93010 ELECTROCARDIOGRAM REPORT: CPT | Performed by: INTERNAL MEDICINE

## 2023-05-02 PROCEDURE — 6360000002 HC RX W HCPCS: Performed by: INTERNAL MEDICINE

## 2023-05-02 PROCEDURE — 36415 COLL VENOUS BLD VENIPUNCTURE: CPT

## 2023-05-02 PROCEDURE — 6360000002 HC RX W HCPCS: Performed by: HOSPITALIST

## 2023-05-02 PROCEDURE — 83935 ASSAY OF URINE OSMOLALITY: CPT

## 2023-05-02 PROCEDURE — 85025 COMPLETE CBC W/AUTO DIFF WBC: CPT

## 2023-05-02 PROCEDURE — 94761 N-INVAS EAR/PLS OXIMETRY MLT: CPT

## 2023-05-02 PROCEDURE — 81001 URINALYSIS AUTO W/SCOPE: CPT

## 2023-05-02 PROCEDURE — 2000000000 HC ICU R&B

## 2023-05-02 PROCEDURE — 83735 ASSAY OF MAGNESIUM: CPT

## 2023-05-02 PROCEDURE — 87086 URINE CULTURE/COLONY COUNT: CPT

## 2023-05-02 PROCEDURE — 99223 1ST HOSP IP/OBS HIGH 75: CPT | Performed by: INTERNAL MEDICINE

## 2023-05-02 PROCEDURE — 2700000000 HC OXYGEN THERAPY PER DAY

## 2023-05-02 PROCEDURE — 2580000003 HC RX 258: Performed by: INTERNAL MEDICINE

## 2023-05-02 PROCEDURE — 2500000003 HC RX 250 WO HCPCS: Performed by: INTERNAL MEDICINE

## 2023-05-02 PROCEDURE — 2580000003 HC RX 258: Performed by: HOSPITALIST

## 2023-05-02 PROCEDURE — 82570 ASSAY OF URINE CREATININE: CPT

## 2023-05-02 PROCEDURE — 84145 PROCALCITONIN (PCT): CPT

## 2023-05-02 PROCEDURE — 80069 RENAL FUNCTION PANEL: CPT

## 2023-05-02 RX ORDER — LEVOFLOXACIN 5 MG/ML
750 INJECTION, SOLUTION INTRAVENOUS ONCE
Status: DISCONTINUED | OUTPATIENT
Start: 2023-05-03 | End: 2023-05-02

## 2023-05-02 RX ORDER — POLYETHYLENE GLYCOL 3350 17 G/17G
17 POWDER, FOR SOLUTION ORAL DAILY PRN
Status: DISCONTINUED | OUTPATIENT
Start: 2023-05-02 | End: 2023-05-06 | Stop reason: HOSPADM

## 2023-05-02 RX ORDER — SODIUM CHLORIDE 0.9 % (FLUSH) 0.9 %
5-40 SYRINGE (ML) INJECTION EVERY 12 HOURS SCHEDULED
Status: DISCONTINUED | OUTPATIENT
Start: 2023-05-02 | End: 2023-05-06 | Stop reason: HOSPADM

## 2023-05-02 RX ORDER — ESCITALOPRAM OXALATE 20 MG/1
20 TABLET ORAL DAILY
COMMUNITY

## 2023-05-02 RX ORDER — ENOXAPARIN SODIUM 100 MG/ML
40 INJECTION SUBCUTANEOUS DAILY
Status: DISCONTINUED | OUTPATIENT
Start: 2023-05-02 | End: 2023-05-06 | Stop reason: HOSPADM

## 2023-05-02 RX ORDER — ONDANSETRON 4 MG/1
4 TABLET, ORALLY DISINTEGRATING ORAL EVERY 8 HOURS PRN
Status: DISCONTINUED | OUTPATIENT
Start: 2023-05-02 | End: 2023-05-06 | Stop reason: HOSPADM

## 2023-05-02 RX ORDER — SODIUM CHLORIDE 9 MG/ML
INJECTION, SOLUTION INTRAVENOUS CONTINUOUS
Status: DISCONTINUED | OUTPATIENT
Start: 2023-05-02 | End: 2023-05-02

## 2023-05-02 RX ORDER — SODIUM CHLORIDE 0.9 % (FLUSH) 0.9 %
5-40 SYRINGE (ML) INJECTION PRN
Status: DISCONTINUED | OUTPATIENT
Start: 2023-05-02 | End: 2023-05-06 | Stop reason: HOSPADM

## 2023-05-02 RX ORDER — ACETAMINOPHEN 325 MG/1
650 TABLET ORAL EVERY 6 HOURS PRN
Status: DISCONTINUED | OUTPATIENT
Start: 2023-05-02 | End: 2023-05-06 | Stop reason: HOSPADM

## 2023-05-02 RX ORDER — IPRATROPIUM BROMIDE AND ALBUTEROL SULFATE 2.5; .5 MG/3ML; MG/3ML
1 SOLUTION RESPIRATORY (INHALATION) EVERY 4 HOURS PRN
Status: DISCONTINUED | OUTPATIENT
Start: 2023-05-02 | End: 2023-05-06 | Stop reason: HOSPADM

## 2023-05-02 RX ORDER — HYDROXYZINE HYDROCHLORIDE 25 MG/1
25 TABLET, FILM COATED ORAL 3 TIMES DAILY PRN
Status: DISCONTINUED | OUTPATIENT
Start: 2023-05-02 | End: 2023-05-06 | Stop reason: HOSPADM

## 2023-05-02 RX ORDER — BUDESONIDE AND FORMOTEROL FUMARATE DIHYDRATE 160; 4.5 UG/1; UG/1
2 AEROSOL RESPIRATORY (INHALATION) 2 TIMES DAILY
Status: DISCONTINUED | OUTPATIENT
Start: 2023-05-02 | End: 2023-05-02

## 2023-05-02 RX ORDER — SODIUM CHLORIDE 9 MG/ML
INJECTION, SOLUTION INTRAVENOUS PRN
Status: DISCONTINUED | OUTPATIENT
Start: 2023-05-02 | End: 2023-05-06 | Stop reason: HOSPADM

## 2023-05-02 RX ORDER — IPRATROPIUM BROMIDE AND ALBUTEROL SULFATE 2.5; .5 MG/3ML; MG/3ML
1 SOLUTION RESPIRATORY (INHALATION)
Status: DISCONTINUED | OUTPATIENT
Start: 2023-05-02 | End: 2023-05-04

## 2023-05-02 RX ORDER — ALBUTEROL SULFATE 90 UG/1
2 AEROSOL, METERED RESPIRATORY (INHALATION) EVERY 6 HOURS PRN
Status: DISCONTINUED | OUTPATIENT
Start: 2023-05-02 | End: 2023-05-06 | Stop reason: HOSPADM

## 2023-05-02 RX ORDER — ONDANSETRON 2 MG/ML
4 INJECTION INTRAMUSCULAR; INTRAVENOUS EVERY 6 HOURS PRN
Status: DISCONTINUED | OUTPATIENT
Start: 2023-05-02 | End: 2023-05-06 | Stop reason: HOSPADM

## 2023-05-02 RX ORDER — MAGNESIUM SULFATE IN WATER 40 MG/ML
2000 INJECTION, SOLUTION INTRAVENOUS ONCE
Status: COMPLETED | OUTPATIENT
Start: 2023-05-02 | End: 2023-05-02

## 2023-05-02 RX ORDER — LEVOFLOXACIN 500 MG/1
500 TABLET, FILM COATED ORAL
Status: DISCONTINUED | OUTPATIENT
Start: 2023-05-02 | End: 2023-05-06 | Stop reason: HOSPADM

## 2023-05-02 RX ORDER — PREDNISONE 20 MG/1
40 TABLET ORAL DAILY
Status: DISCONTINUED | OUTPATIENT
Start: 2023-05-02 | End: 2023-05-04

## 2023-05-02 RX ORDER — MELOXICAM 15 MG/1
15 TABLET ORAL DAILY
Status: ON HOLD | COMMUNITY
End: 2023-05-06 | Stop reason: HOSPADM

## 2023-05-02 RX ORDER — LISINOPRIL AND HYDROCHLOROTHIAZIDE 12.5; 1 MG/1; MG/1
1 TABLET ORAL DAILY
Status: ON HOLD | COMMUNITY
End: 2023-05-06 | Stop reason: HOSPADM

## 2023-05-02 RX ORDER — ACETAMINOPHEN 650 MG/1
650 SUPPOSITORY RECTAL EVERY 6 HOURS PRN
Status: DISCONTINUED | OUTPATIENT
Start: 2023-05-02 | End: 2023-05-06 | Stop reason: HOSPADM

## 2023-05-02 RX ADMIN — IPRATROPIUM BROMIDE AND ALBUTEROL SULFATE 1 AMPULE: .5; 2.5 SOLUTION RESPIRATORY (INHALATION) at 12:02

## 2023-05-02 RX ADMIN — SODIUM CHLORIDE: 9 INJECTION, SOLUTION INTRAVENOUS at 04:53

## 2023-05-02 RX ADMIN — ENOXAPARIN SODIUM 40 MG: 100 INJECTION SUBCUTANEOUS at 10:17

## 2023-05-02 RX ADMIN — LEVOFLOXACIN 500 MG: 500 TABLET, FILM COATED ORAL at 21:11

## 2023-05-02 RX ADMIN — Medication 10 ML: at 20:01

## 2023-05-02 RX ADMIN — ACETAMINOPHEN 650 MG: 325 TABLET ORAL at 10:30

## 2023-05-02 RX ADMIN — IPRATROPIUM BROMIDE AND ALBUTEROL SULFATE 1 AMPULE: .5; 2.5 SOLUTION RESPIRATORY (INHALATION) at 08:04

## 2023-05-02 RX ADMIN — MAGNESIUM SULFATE HEPTAHYDRATE 2000 MG: 40 INJECTION, SOLUTION INTRAVENOUS at 10:27

## 2023-05-02 RX ADMIN — IPRATROPIUM BROMIDE AND ALBUTEROL SULFATE 1 AMPULE: .5; 2.5 SOLUTION RESPIRATORY (INHALATION) at 03:08

## 2023-05-02 RX ADMIN — IPRATROPIUM BROMIDE AND ALBUTEROL SULFATE 1 AMPULE: .5; 2.5 SOLUTION RESPIRATORY (INHALATION) at 19:28

## 2023-05-02 RX ADMIN — SODIUM PHOSPHATE, MONOBASIC, MONOHYDRATE AND SODIUM PHOSPHATE, DIBASIC, ANHYDROUS 20 MMOL: 276; 142 INJECTION, SOLUTION INTRAVENOUS at 10:21

## 2023-05-02 RX ADMIN — Medication 10 ML: at 10:18

## 2023-05-02 RX ADMIN — PREDNISONE 40 MG: 20 TABLET ORAL at 10:25

## 2023-05-02 RX ADMIN — MUPIROCIN: 20 OINTMENT TOPICAL at 20:05

## 2023-05-02 ASSESSMENT — PAIN DESCRIPTION - DESCRIPTORS
DESCRIPTORS: ACHING
DESCRIPTORS: ACHING

## 2023-05-02 ASSESSMENT — PAIN SCALES - GENERAL
PAINLEVEL_OUTOF10: 7
PAINLEVEL_OUTOF10: 3
PAINLEVEL_OUTOF10: 5

## 2023-05-02 ASSESSMENT — LIFESTYLE VARIABLES
HOW MANY STANDARD DRINKS CONTAINING ALCOHOL DO YOU HAVE ON A TYPICAL DAY: PATIENT DOES NOT DRINK
HOW OFTEN DO YOU HAVE A DRINK CONTAINING ALCOHOL: NEVER

## 2023-05-02 ASSESSMENT — PAIN DESCRIPTION - ONSET
ONSET: GRADUAL
ONSET: GRADUAL

## 2023-05-02 ASSESSMENT — PAIN DESCRIPTION - FREQUENCY
FREQUENCY: INTERMITTENT
FREQUENCY: INTERMITTENT

## 2023-05-02 ASSESSMENT — PAIN DESCRIPTION - ORIENTATION
ORIENTATION: UPPER
ORIENTATION: UPPER

## 2023-05-02 ASSESSMENT — PAIN - FUNCTIONAL ASSESSMENT
PAIN_FUNCTIONAL_ASSESSMENT: PREVENTS OR INTERFERES SOME ACTIVE ACTIVITIES AND ADLS
PAIN_FUNCTIONAL_ASSESSMENT: PREVENTS OR INTERFERES SOME ACTIVE ACTIVITIES AND ADLS
PAIN_FUNCTIONAL_ASSESSMENT: NONE - DENIES PAIN

## 2023-05-02 ASSESSMENT — PAIN DESCRIPTION - LOCATION
LOCATION: SHOULDER
LOCATION: SHOULDER
LOCATION: HEAD

## 2023-05-02 ASSESSMENT — PAIN DESCRIPTION - PAIN TYPE
TYPE: ACUTE PAIN
TYPE: ACUTE PAIN

## 2023-05-02 NOTE — ED NOTES
Pt report provided to ICU.   Awaiting squad transport     [facility]  05/02/23 9699

## 2023-05-02 NOTE — CONSULTS
Pharmacy consulted to complete home med rec. SureScripts, encounter notes, and a call to pt's sister (via [address]) were used to complete the record. Dr. Rylee Godinez contacted to order admission meds per consult.      Mikayla Baez, PharmD, [facility], 5/2/2023 8:53 AM
[facility] Consult Note           Reason for Consult: Hyponatremia  Requesting Physician:  Dr. 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 Patient had to be increased on oxygen requirements and was admitted for pneumonia and hyponatremia management. Nephrology consulted for worsening acute on chronic hyponatremia    Patient at bedside reports that she has about 1-2 episodes of diarrhea for the last week. Has been drinking a lot more water given her diarrhea. She says she is on multiple diuretics for leg swelling. Denies any vomiting or diarrhea in the last 24 hours. Problem: Hyponatremia  Location: Systemic  Severity: Sodium 115  Onset: Subacute  Associated problems: Shortness of breath  Aggravating factors: Lung disease   Past Medical History:   Diagnosis Date    Angina pectoris (HCC)     Chronic pain     knees and lower back     COPD exacerbation ([facility] [address] 5/20/2018    Depression     Essential hypertension 8/15/2022    Panic disorder     Pneumonia          Past Surgical History:   Procedure Laterality Date    CHOLECYSTECTOMY      COLONOSCOPY         Home Medications:  No current facility-administered medications on file prior to encounter.      Current Outpatient Medications on File Prior to Encounter   Medication Sig Dispense Refill    meloxicam (MOBIC) 15 MG tablet Take 1 tablet by mouth daily      escitalopram (LEXAPRO) 20 MG tablet Take 1 tablet by mouth daily      lisinopril-hydroCHLOROthiazide (PRINZIDE;ZESTORETIC) 10-12.5 MG per tablet Take 1 tablet by mouth daily      ANORO ELLIPTA 62.5-25 MCG/INH AEPB
appropriate clinical setting    ASSESSMENT:  Acute on chronic hypoxic respiratory failure  Acute on chronic hyponatremia   COPD exacerbation  UTI  LE edema    PLAN:  Supplemental oxygen to maintain SaO2 >92%; wean as tolerated  Intensive inhaled bronchodilator therapy. Prednisone taper   Nephrology consult; serial BMP  Levaquin  Urine and Sputum GS&C.   Replace Mg/Phos  Lovenox DVT prophylaxis     Thank you Mary Mccoy MD for this consult

## 2023-05-02 NOTE — PLAN OF CARE
74 y.o. female with a PMH significant for COPD with baseline O2 requirement of 3L  and h/o hyponatremai  2/2 SIADH  presented today for SOB x 1 week, fatigue, mild diarrhea for 1-2 weeks. Patient was hypoxic with her 3L so she was placed on 5L via NC to keep her O2 sat       A/P:  Probable  Pneumonia  Acute  on chronic respiratory failure with hypoxia   now on 5L NC  Acute  on chronic  Hyponatremia   No mental status changes     white count at 11.6k. sodium of 113. Her baseline sodium is in the mid 120s. repeat the sodium was 115. Admit to ICU 2/2 Severe Hyponatremia     -For her hyponatremia, fluid restriction implemented. Serum osm and urine labs ordered.   Renal consulted    -Given that patient has elevated white count and has increased O2 requirement compared to her baseline,will treat as pneumonia.   -Patient has allergies to both penicillin and cephalosporins so cont  Levaquin  - Pulmn consulted      COPD  PO Pred and NB

## 2023-05-02 NOTE — ED NOTES
End of shift report given to Pilot barney, [facility]  05/01/23 2005

## 2023-05-02 NOTE — PLAN OF CARE
Problem: Discharge Planning  Goal: Discharge to home or other facility with appropriate resources  Outcome: Progressing  Flowsheets (Taken 5/2/2023 0240)  Discharge to home or other facility with appropriate resources:   Identify barriers to discharge with patient and caregiver   Identify discharge learning needs (meds, wound care, etc)   Refer to discharge planning if patient needs post-hospital services based on physician order or complex needs related to functional status, cognitive ability or social support system     Problem: Pain  Goal: Verbalizes/displays adequate comfort level or baseline comfort level  Outcome: Progressing     Problem: Safety - Adult  Goal: Free from fall injury  Outcome: Progressing     Problem: ABCDS Injury Assessment  Goal: Absence of physical injury  Outcome: Progressing     Problem: Skin/Tissue Integrity  Goal: Absence of new skin breakdown  Description: 1. Monitor for areas of redness and/or skin breakdown  2. Assess vascular access sites hourly  3. Every 4-6 hours minimum:  Change oxygen saturation probe site  4. Every 4-6 hours:  If on nasal continuous positive airway pressure, respiratory therapy assess nares and determine need for appliance change or resting period.   Outcome: Progressing

## 2023-05-02 NOTE — H&P
History and Physical        HISTORY OF PRESENT ILLNESS: A 62-year-old female history hypertension, recent hyponatremia, COPD presented emergency with shortness of breath and hypoxia. Admits having some nausea. Denies any cough. No fever or chills. Has been having diarrhea for the past few weeks. No abdominal pain. Found to be severely hyponatremic in the emergency room    Patient is allergic to cephalosporins, pcn [penicillins], and hctz [hydrochlorothiazide]. Past Medical History:   Diagnosis Date    Angina pectoris (HCC)     Chronic pain     knees and lower back     COPD exacerbation ([facility] [address] 5/20/2018    Depression     Essential hypertension 8/15/2022    Panic disorder     Pneumonia        Past Surgical History:   Procedure Laterality Date    CHOLECYSTECTOMY      COLONOSCOPY         Scheduled Meds:   sodium chloride flush  5-40 mL IntraVENous 2 times per day    enoxaparin  40 mg SubCUTAneous Daily    predniSONE  40 mg Oral Daily    ipratropium-albuterol  1 ampule Inhalation Q4H WA    budesonide-formoterol  2 puff Inhalation BID    mupirocin   Each Nostril BID    [START ON 5/3/2023] levofloxacin  750 mg IntraVENous Once       Continuous Infusions:   sodium chloride         PRN Meds:  ipratropium-albuterol, sodium chloride flush, sodium chloride, ondansetron **OR** ondansetron, polyethylene glycol, acetaminophen **OR** acetaminophen       reports that she quit smoking about 2 years ago. Her smoking use included cigarettes. She started smoking about 54 years ago. She has a 104.00 pack-year smoking history. She has been exposed to tobacco smoke. She has never used smokeless tobacco.    Family History   Problem Relation Age of Onset    COPD Mother     Hypertension Mother     Asthma Neg Hx     Cancer Neg Hx     Diabetes Neg Hx     Emphysema Neg Hx     Heart Failure Neg Hx        Social History     Socioeconomic History    Marital status:       Spouse name: None    Number of children: 6    Years of

## 2023-05-03 LAB
ANION GAP SERPL CALCULATED.3IONS-SCNC: 13 MMOL/L (ref 3–16)
ANION GAP SERPL CALCULATED.3IONS-SCNC: 7 MMOL/L (ref 3–16)
ANION GAP SERPL CALCULATED.3IONS-SCNC: 9 MMOL/L (ref 3–16)
BACTERIA UR CULT: NORMAL
BUN SERPL-MCNC: 11 MG/DL (ref 7–20)
BUN SERPL-MCNC: 12 MG/DL (ref 7–20)
BUN SERPL-MCNC: 9 MG/DL (ref 7–20)
C DIFF TOX A+B STL QL IA: NORMAL
CALCIUM SERPL-MCNC: 8.7 MG/DL (ref 8.3–10.6)
CALCIUM SERPL-MCNC: 8.9 MG/DL (ref 8.3–10.6)
CALCIUM SERPL-MCNC: 9.1 MG/DL (ref 8.3–10.6)
CHLORIDE SERPL-SCNC: 88 MMOL/L (ref 99–110)
CO2 SERPL-SCNC: 24 MMOL/L (ref 21–32)
CO2 SERPL-SCNC: 29 MMOL/L (ref 21–32)
CO2 SERPL-SCNC: 30 MMOL/L (ref 21–32)
CREAT SERPL-MCNC: <0.5 MG/DL (ref 0.6–1.2)
DEPRECATED RDW RBC AUTO: 13 % (ref 12.4–15.4)
GFR SERPLBLD CREATININE-BSD FMLA CKD-EPI: >60 ML/MIN/{1.73_M2}
GLUCOSE SERPL-MCNC: 100 MG/DL (ref 70–99)
GLUCOSE SERPL-MCNC: 104 MG/DL (ref 70–99)
GLUCOSE SERPL-MCNC: 210 MG/DL (ref 70–99)
HCT VFR BLD AUTO: 35 % (ref 36–48)
HGB BLD-MCNC: 11.8 G/DL (ref 12–16)
MCH RBC QN AUTO: 30.8 PG (ref 26–34)
MCHC RBC AUTO-ENTMCNC: 33.6 G/DL (ref 31–36)
MCV RBC AUTO: 91.6 FL (ref 80–100)
OSMOLALITY UR: 745 MOSM/KG (ref 390–1070)
PLATELET # BLD AUTO: 301 K/UL (ref 135–450)
PMV BLD AUTO: 6.5 FL (ref 5–10.5)
POTASSIUM SERPL-SCNC: 3.9 MMOL/L (ref 3.5–5.1)
POTASSIUM SERPL-SCNC: 4.1 MMOL/L (ref 3.5–5.1)
POTASSIUM SERPL-SCNC: 4.1 MMOL/L (ref 3.5–5.1)
RBC # BLD AUTO: 3.82 M/UL (ref 4–5.2)
SODIUM SERPL-SCNC: 125 MMOL/L (ref 136–145)
SODIUM SERPL-SCNC: 125 MMOL/L (ref 136–145)
SODIUM SERPL-SCNC: 126 MMOL/L (ref 136–145)
SODIUM UR-SCNC: 60 MMOL/L
WBC # BLD AUTO: 7.6 K/UL (ref 4–11)

## 2023-05-03 PROCEDURE — 6370000000 HC RX 637 (ALT 250 FOR IP): Performed by: INTERNAL MEDICINE

## 2023-05-03 PROCEDURE — 87449 NOS EACH ORGANISM AG IA: CPT

## 2023-05-03 PROCEDURE — 97110 THERAPEUTIC EXERCISES: CPT

## 2023-05-03 PROCEDURE — 97530 THERAPEUTIC ACTIVITIES: CPT

## 2023-05-03 PROCEDURE — 94640 AIRWAY INHALATION TREATMENT: CPT

## 2023-05-03 PROCEDURE — 99232 SBSQ HOSP IP/OBS MODERATE 35: CPT | Performed by: INTERNAL MEDICINE

## 2023-05-03 PROCEDURE — 2700000000 HC OXYGEN THERAPY PER DAY

## 2023-05-03 PROCEDURE — 83935 ASSAY OF URINE OSMOLALITY: CPT

## 2023-05-03 PROCEDURE — 36415 COLL VENOUS BLD VENIPUNCTURE: CPT

## 2023-05-03 PROCEDURE — 97162 PT EVAL MOD COMPLEX 30 MIN: CPT

## 2023-05-03 PROCEDURE — 6360000002 HC RX W HCPCS: Performed by: HOSPITALIST

## 2023-05-03 PROCEDURE — 84300 ASSAY OF URINE SODIUM: CPT

## 2023-05-03 PROCEDURE — 87324 CLOSTRIDIUM AG IA: CPT

## 2023-05-03 PROCEDURE — 85027 COMPLETE CBC AUTOMATED: CPT

## 2023-05-03 PROCEDURE — 97165 OT EVAL LOW COMPLEX 30 MIN: CPT

## 2023-05-03 PROCEDURE — 1200000000 HC SEMI PRIVATE

## 2023-05-03 PROCEDURE — 6370000000 HC RX 637 (ALT 250 FOR IP): Performed by: HOSPITALIST

## 2023-05-03 PROCEDURE — 94761 N-INVAS EAR/PLS OXIMETRY MLT: CPT

## 2023-05-03 PROCEDURE — 97116 GAIT TRAINING THERAPY: CPT

## 2023-05-03 PROCEDURE — 80048 BASIC METABOLIC PNL TOTAL CA: CPT

## 2023-05-03 RX ADMIN — IPRATROPIUM BROMIDE AND ALBUTEROL SULFATE 1 AMPULE: .5; 2.5 SOLUTION RESPIRATORY (INHALATION) at 20:25

## 2023-05-03 RX ADMIN — IPRATROPIUM BROMIDE AND ALBUTEROL SULFATE 1 AMPULE: .5; 2.5 SOLUTION RESPIRATORY (INHALATION) at 07:20

## 2023-05-03 RX ADMIN — PREDNISONE 40 MG: 20 TABLET ORAL at 08:14

## 2023-05-03 RX ADMIN — IPRATROPIUM BROMIDE AND ALBUTEROL SULFATE 1 AMPULE: .5; 2.5 SOLUTION RESPIRATORY (INHALATION) at 11:21

## 2023-05-03 RX ADMIN — ACETAMINOPHEN 650 MG: 325 TABLET ORAL at 12:49

## 2023-05-03 RX ADMIN — TIOTROPIUM BROMIDE AND OLODATEROL 2 PUFF: 3.124; 2.736 SPRAY, METERED RESPIRATORY (INHALATION) at 07:21

## 2023-05-03 RX ADMIN — Medication 15 G: at 23:41

## 2023-05-03 RX ADMIN — ENOXAPARIN SODIUM 40 MG: 100 INJECTION SUBCUTANEOUS at 08:14

## 2023-05-03 RX ADMIN — ACETAMINOPHEN 650 MG: 325 TABLET ORAL at 21:10

## 2023-05-03 RX ADMIN — IPRATROPIUM BROMIDE AND ALBUTEROL SULFATE 1 AMPULE: .5; 2.5 SOLUTION RESPIRATORY (INHALATION) at 23:04

## 2023-05-03 RX ADMIN — LEVOFLOXACIN 500 MG: 500 TABLET, FILM COATED ORAL at 21:10

## 2023-05-03 RX ADMIN — IPRATROPIUM BROMIDE AND ALBUTEROL SULFATE 1 AMPULE: .5; 2.5 SOLUTION RESPIRATORY (INHALATION) at 15:11

## 2023-05-03 ASSESSMENT — PAIN SCALES - GENERAL
PAINLEVEL_OUTOF10: 0
PAINLEVEL_OUTOF10: 0
PAINLEVEL_OUTOF10: 2
PAINLEVEL_OUTOF10: 5
PAINLEVEL_OUTOF10: 8

## 2023-05-03 ASSESSMENT — PAIN DESCRIPTION - LOCATION: LOCATION: ELBOW;KNEE

## 2023-05-03 ASSESSMENT — PAIN DESCRIPTION - ORIENTATION: ORIENTATION: RIGHT;LEFT

## 2023-05-03 ASSESSMENT — PAIN DESCRIPTION - DESCRIPTORS: DESCRIPTORS: ACHING

## 2023-05-03 NOTE — PLAN OF CARE
Pt resting in bed, A&O to person/place/situation, reoriented to month. Pt tolerating 5L NC with SpO2 94%, has moist NPC, states she is SOB at rest and exertion, VSS. Pt denies pain and nausea, requesting turkey sandwich. Reviewed with pt plan of care for shift, medications, and fluid restriction. All questions answered, pt voices no concerns. Problem: Discharge Planning  Goal: Discharge to home or other facility with appropriate resources  Outcome: Progressing  Flowsheets (Taken 5/2/2023 2003)  Discharge to home or other facility with appropriate resources:   Identify barriers to discharge with patient and caregiver   Arrange for needed discharge resources and transportation as appropriate     Problem: Pain  Goal: Verbalizes/displays adequate comfort level or baseline comfort level  Outcome: Progressing  Flowsheets (Taken 5/2/2023 2003)  Verbalizes/displays adequate comfort level or baseline comfort level:   Encourage patient to monitor pain and request assistance   Assess pain using appropriate pain scale   Administer analgesics based on type and severity of pain and evaluate response   Implement non-pharmacological measures as appropriate and evaluate response     Problem: Safety - Adult  Goal: Free from fall injury  Outcome: Progressing     Problem: ABCDS Injury Assessment  Goal: Absence of physical injury  Outcome: Progressing     Problem: Skin/Tissue Integrity  Goal: Absence of new skin breakdown  Description: 1. Monitor for areas of redness and/or skin breakdown  2. Assess vascular access sites hourly  3. Every 4-6 hours minimum:  Change oxygen saturation probe site  4. Every 4-6 hours:  If on nasal continuous positive airway pressure, respiratory therapy assess nares and determine need for appliance change or resting period.   Outcome: Progressing     Problem: Neurosensory - Adult  Goal: Achieves stable or improved neurological status  Outcome: Progressing     Problem: Respiratory - Adult  Goal:

## 2023-05-04 LAB
ANION GAP SERPL CALCULATED.3IONS-SCNC: 10 MMOL/L (ref 3–16)
ANION GAP SERPL CALCULATED.3IONS-SCNC: 8 MMOL/L (ref 3–16)
BUN SERPL-MCNC: 13 MG/DL (ref 7–20)
BUN SERPL-MCNC: 21 MG/DL (ref 7–20)
CALCIUM SERPL-MCNC: 9 MG/DL (ref 8.3–10.6)
CALCIUM SERPL-MCNC: 9.1 MG/DL (ref 8.3–10.6)
CHLORIDE SERPL-SCNC: 89 MMOL/L (ref 99–110)
CHLORIDE SERPL-SCNC: 90 MMOL/L (ref 99–110)
CO2 SERPL-SCNC: 26 MMOL/L (ref 21–32)
CO2 SERPL-SCNC: 29 MMOL/L (ref 21–32)
CREAT SERPL-MCNC: 0.6 MG/DL (ref 0.6–1.2)
CREAT SERPL-MCNC: <0.5 MG/DL (ref 0.6–1.2)
GFR SERPLBLD CREATININE-BSD FMLA CKD-EPI: >60 ML/MIN/{1.73_M2}
GFR SERPLBLD CREATININE-BSD FMLA CKD-EPI: >60 ML/MIN/{1.73_M2}
GLUCOSE SERPL-MCNC: 105 MG/DL (ref 70–99)
GLUCOSE SERPL-MCNC: 136 MG/DL (ref 70–99)
POTASSIUM SERPL-SCNC: 3.9 MMOL/L (ref 3.5–5.1)
POTASSIUM SERPL-SCNC: 4.9 MMOL/L (ref 3.5–5.1)
SODIUM SERPL-SCNC: 125 MMOL/L (ref 136–145)
SODIUM SERPL-SCNC: 127 MMOL/L (ref 136–145)

## 2023-05-04 PROCEDURE — 6370000000 HC RX 637 (ALT 250 FOR IP): Performed by: INTERNAL MEDICINE

## 2023-05-04 PROCEDURE — 80048 BASIC METABOLIC PNL TOTAL CA: CPT

## 2023-05-04 PROCEDURE — 99232 SBSQ HOSP IP/OBS MODERATE 35: CPT | Performed by: INTERNAL MEDICINE

## 2023-05-04 PROCEDURE — 2700000000 HC OXYGEN THERAPY PER DAY

## 2023-05-04 PROCEDURE — 99233 SBSQ HOSP IP/OBS HIGH 50: CPT | Performed by: INTERNAL MEDICINE

## 2023-05-04 PROCEDURE — 94640 AIRWAY INHALATION TREATMENT: CPT

## 2023-05-04 PROCEDURE — 1200000000 HC SEMI PRIVATE

## 2023-05-04 PROCEDURE — 36415 COLL VENOUS BLD VENIPUNCTURE: CPT

## 2023-05-04 PROCEDURE — 6360000002 HC RX W HCPCS: Performed by: INTERNAL MEDICINE

## 2023-05-04 PROCEDURE — 94761 N-INVAS EAR/PLS OXIMETRY MLT: CPT

## 2023-05-04 PROCEDURE — 2580000003 HC RX 258: Performed by: INTERNAL MEDICINE

## 2023-05-04 RX ORDER — SODIUM CHLORIDE 1000 MG
1 TABLET, SOLUBLE MISCELLANEOUS
Status: DISCONTINUED | OUTPATIENT
Start: 2023-05-04 | End: 2023-05-06

## 2023-05-04 RX ORDER — IPRATROPIUM BROMIDE AND ALBUTEROL SULFATE 2.5; .5 MG/3ML; MG/3ML
1 SOLUTION RESPIRATORY (INHALATION) 3 TIMES DAILY
Status: DISCONTINUED | OUTPATIENT
Start: 2023-05-04 | End: 2023-05-06 | Stop reason: HOSPADM

## 2023-05-04 RX ADMIN — Medication 10 ML: at 20:23

## 2023-05-04 RX ADMIN — TIOTROPIUM BROMIDE AND OLODATEROL 2 PUFF: 3.124; 2.736 SPRAY, METERED RESPIRATORY (INHALATION) at 07:20

## 2023-05-04 RX ADMIN — IPRATROPIUM BROMIDE AND ALBUTEROL SULFATE 1 AMPULE: .5; 2.5 SOLUTION RESPIRATORY (INHALATION) at 19:37

## 2023-05-04 RX ADMIN — SODIUM CHLORIDE TAB 1 GM 1 G: 1 TAB at 16:43

## 2023-05-04 RX ADMIN — ACETAMINOPHEN 650 MG: 325 TABLET ORAL at 04:51

## 2023-05-04 RX ADMIN — Medication 10 ML: at 09:02

## 2023-05-04 RX ADMIN — ENOXAPARIN SODIUM 40 MG: 100 INJECTION SUBCUTANEOUS at 09:02

## 2023-05-04 RX ADMIN — IPRATROPIUM BROMIDE AND ALBUTEROL SULFATE 1 AMPULE: .5; 2.5 SOLUTION RESPIRATORY (INHALATION) at 11:04

## 2023-05-04 RX ADMIN — PREDNISONE 40 MG: 20 TABLET ORAL at 09:02

## 2023-05-04 RX ADMIN — ACETAMINOPHEN 650 MG: 325 TABLET ORAL at 16:48

## 2023-05-04 RX ADMIN — Medication 15 G: at 09:02

## 2023-05-04 RX ADMIN — LEVOFLOXACIN 500 MG: 500 TABLET, FILM COATED ORAL at 21:53

## 2023-05-04 RX ADMIN — SODIUM CHLORIDE TAB 1 GM 1 G: 1 TAB at 12:06

## 2023-05-04 RX ADMIN — IPRATROPIUM BROMIDE AND ALBUTEROL SULFATE 1 AMPULE: .5; 2.5 SOLUTION RESPIRATORY (INHALATION) at 07:19

## 2023-05-04 RX ADMIN — MUPIROCIN: 20 OINTMENT TOPICAL at 20:18

## 2023-05-04 ASSESSMENT — PAIN SCALES - GENERAL
PAINLEVEL_OUTOF10: 2
PAINLEVEL_OUTOF10: 8

## 2023-05-04 ASSESSMENT — PAIN DESCRIPTION - LOCATION: LOCATION: BACK

## 2023-05-04 NOTE — FLOWSHEET NOTE
05/04/23 0857   Vital Signs   Temp 98.5 °F (36.9 °C)   Temp Source Oral   Pulse 87   Heart Rate Source Monitor   Respirations 18   BP (!) 157/81   MAP (Calculated) 106   BP Location Left upper arm   BP Method Automatic   Patient Position High fowlers   Level of Consciousness 0   MEWS Score 1   Oxygen Therapy   SpO2 97 %   O2 Device Nasal cannula   O2 Flow Rate (L/min) 3 L/min     Pt assessment completed, vss, see flow sheet. Pt alert and oriented x 4. Pt denies any needs at this time.  Jake Phan RN

## 2023-05-04 NOTE — CARE COORDINATION
Case Management Assessment  Initial Evaluation    Date/Time of Evaluation: 5/4/2023 1:24 PM  Assessment Completed by: Mark Jackson RN    If patient is discharged prior to next notation, then this note serves as note for discharge by case management. Patient Name: Alysa Kelly                   YOB: 1955  Diagnosis: Hyponatremia [E87.1]  Acute respiratory failure with hypoxia ([address] [J96.01]  Pneumonia of both lower lobes due to infectious organism [J18.9]                   Date / Time: 5/1/2023  6:42 PM    Patient Admission Status: Inpatient   Readmission Risk (Low < 19, Mod (19-27), High > 27): Readmission Risk Score: 15.1    Current PCP: Pamela German MD  PCP verified by CM? Yes    Chart Reviewed: Yes      History Provided by: Patient  Patient Orientation: Alert and Oriented    Patient Cognition: Alert    Hospitalization in the last 30 days (Readmission):  No    If yes, Readmission Assessment in CM Navigator will be completed. Advance Directives:      Code Status: Full Code   Patient's Primary Decision Maker is: Legal Next of Kin (pt's daughter, Yamilet Gallardo)    Primary Decision MakerEual Feliberto  Child - [phone]    Secondary Decision Maker: [facility] Child - [phone]    Discharge Planning:    Patient lives with: Children Type of Home: House  Primary Care Giver: Self  Patient Support Systems include: Children, Family Members   Current Financial resources: Medicare, Medicaid ([employer])  Current community resources: None  Current services prior to admission: Oxygen Therapy ([facility], 3 liters cont), confirmed with Mehul Lopez with [facility] order are for 2 liters cont at baseline. Current DME:              Type of Home Care services:  Skilled Therapy, PT, OT, Nursing Services    ADLS  Prior functional level: Independent in ADLs/IADLs  Current functional level:  Independent in ADLs/IADLs    PT AM-PAC:   /24  OT AM-PAC: 20 /24    Family can provide assistance at

## 2023-05-04 NOTE — ACP (ADVANCE CARE PLANNING)
Advance Care Planning     General Advance Care Planning (ACP) Conversation    Date of Conversation: 5/1/2023  Conducted with: Patient with Decision Making Capacity    Healthcare Decision Maker:    Primary Decision Maker: Gonzalez Cisneros - [phone]    Secondary Decision Maker: Farooq Pineda - Child - [phone]  Click here to complete Healthcare Decision Makers including selection of the Healthcare Decision Maker Relationship (ie \"Primary\"). Content/Action Overview:   Has ACP document(s) on file - reflects the patient's care preferences  Reviewed DNR/DNI and patient elects Full Code (Attempt Resuscitation)        Length of Voluntary ACP Conversation in minutes:  <16 minutes (Non-Billable)    Cristy Bryan RN

## 2023-05-05 LAB
ANION GAP SERPL CALCULATED.3IONS-SCNC: 10 MMOL/L (ref 3–16)
BACTERIA BLD CULT ORG #2: NORMAL
BACTERIA BLD CULT: NORMAL
BASOPHILS # BLD: 0 K/UL (ref 0–0.2)
BASOPHILS NFR BLD: 0.2 %
BUN SERPL-MCNC: 26 MG/DL (ref 7–20)
CALCIUM SERPL-MCNC: 9.6 MG/DL (ref 8.3–10.6)
CHLORIDE SERPL-SCNC: 92 MMOL/L (ref 99–110)
CO2 SERPL-SCNC: 26 MMOL/L (ref 21–32)
CREAT SERPL-MCNC: 0.6 MG/DL (ref 0.6–1.2)
DEPRECATED RDW RBC AUTO: 13 % (ref 12.4–15.4)
EOSINOPHIL # BLD: 0 K/UL (ref 0–0.6)
EOSINOPHIL NFR BLD: 0 %
GFR SERPLBLD CREATININE-BSD FMLA CKD-EPI: >60 ML/MIN/{1.73_M2}
GLUCOSE BLD-MCNC: 121 MG/DL (ref 70–99)
GLUCOSE BLD-MCNC: 94 MG/DL (ref 70–99)
GLUCOSE SERPL-MCNC: 178 MG/DL (ref 70–99)
HCT VFR BLD AUTO: 35 % (ref 36–48)
HGB BLD-MCNC: 11.6 G/DL (ref 12–16)
LYMPHOCYTES # BLD: 0.4 K/UL (ref 1–5.1)
LYMPHOCYTES NFR BLD: 6.3 %
MAGNESIUM SERPL-MCNC: 1.8 MG/DL (ref 1.8–2.4)
MCH RBC QN AUTO: 30.5 PG (ref 26–34)
MCHC RBC AUTO-ENTMCNC: 33.1 G/DL (ref 31–36)
MCV RBC AUTO: 92.2 FL (ref 80–100)
MONOCYTES # BLD: 0.2 K/UL (ref 0–1.3)
MONOCYTES NFR BLD: 3.6 %
NEUTROPHILS # BLD: 6.1 K/UL (ref 1.7–7.7)
NEUTROPHILS NFR BLD: 89.9 %
PERFORMED ON: ABNORMAL
PERFORMED ON: NORMAL
PLATELET # BLD AUTO: 315 K/UL (ref 135–450)
PMV BLD AUTO: 6.8 FL (ref 5–10.5)
POTASSIUM SERPL-SCNC: 4.8 MMOL/L (ref 3.5–5.1)
RBC # BLD AUTO: 3.79 M/UL (ref 4–5.2)
SODIUM SERPL-SCNC: 128 MMOL/L (ref 136–145)
WBC # BLD AUTO: 6.8 K/UL (ref 4–11)

## 2023-05-05 PROCEDURE — 36415 COLL VENOUS BLD VENIPUNCTURE: CPT

## 2023-05-05 PROCEDURE — 94640 AIRWAY INHALATION TREATMENT: CPT

## 2023-05-05 PROCEDURE — 6370000000 HC RX 637 (ALT 250 FOR IP): Performed by: INTERNAL MEDICINE

## 2023-05-05 PROCEDURE — 97530 THERAPEUTIC ACTIVITIES: CPT

## 2023-05-05 PROCEDURE — 94761 N-INVAS EAR/PLS OXIMETRY MLT: CPT

## 2023-05-05 PROCEDURE — 2700000000 HC OXYGEN THERAPY PER DAY

## 2023-05-05 PROCEDURE — 83735 ASSAY OF MAGNESIUM: CPT

## 2023-05-05 PROCEDURE — 80048 BASIC METABOLIC PNL TOTAL CA: CPT

## 2023-05-05 PROCEDURE — 1200000000 HC SEMI PRIVATE

## 2023-05-05 PROCEDURE — 6360000002 HC RX W HCPCS: Performed by: INTERNAL MEDICINE

## 2023-05-05 PROCEDURE — 99233 SBSQ HOSP IP/OBS HIGH 50: CPT | Performed by: INTERNAL MEDICINE

## 2023-05-05 PROCEDURE — 99232 SBSQ HOSP IP/OBS MODERATE 35: CPT | Performed by: INTERNAL MEDICINE

## 2023-05-05 PROCEDURE — 85025 COMPLETE CBC W/AUTO DIFF WBC: CPT

## 2023-05-05 PROCEDURE — 2580000003 HC RX 258: Performed by: INTERNAL MEDICINE

## 2023-05-05 RX ORDER — GUAIFENESIN 600 MG/1
600 TABLET, EXTENDED RELEASE ORAL 2 TIMES DAILY
Status: DISCONTINUED | OUTPATIENT
Start: 2023-05-05 | End: 2023-05-06 | Stop reason: HOSPADM

## 2023-05-05 RX ADMIN — SODIUM CHLORIDE TAB 1 GM 1 G: 1 TAB at 16:25

## 2023-05-05 RX ADMIN — SODIUM CHLORIDE TAB 1 GM 1 G: 1 TAB at 08:52

## 2023-05-05 RX ADMIN — LEVOFLOXACIN 500 MG: 500 TABLET, FILM COATED ORAL at 21:41

## 2023-05-05 RX ADMIN — Medication 10 ML: at 08:53

## 2023-05-05 RX ADMIN — TIOTROPIUM BROMIDE AND OLODATEROL 2 PUFF: 3.124; 2.736 SPRAY, METERED RESPIRATORY (INHALATION) at 08:45

## 2023-05-05 RX ADMIN — ACETAMINOPHEN 650 MG: 325 TABLET ORAL at 07:39

## 2023-05-05 RX ADMIN — PREDNISONE 30 MG: 20 TABLET ORAL at 08:52

## 2023-05-05 RX ADMIN — MUPIROCIN: 20 OINTMENT TOPICAL at 08:52

## 2023-05-05 RX ADMIN — Medication 15 G: at 08:56

## 2023-05-05 RX ADMIN — IPRATROPIUM BROMIDE AND ALBUTEROL SULFATE 1 AMPULE: .5; 2.5 SOLUTION RESPIRATORY (INHALATION) at 08:43

## 2023-05-05 RX ADMIN — ENOXAPARIN SODIUM 40 MG: 100 INJECTION SUBCUTANEOUS at 08:52

## 2023-05-05 RX ADMIN — IPRATROPIUM BROMIDE AND ALBUTEROL SULFATE 1 AMPULE: .5; 2.5 SOLUTION RESPIRATORY (INHALATION) at 20:27

## 2023-05-05 RX ADMIN — Medication 10 ML: at 21:49

## 2023-05-05 RX ADMIN — ACETAMINOPHEN 650 MG: 325 TABLET ORAL at 21:48

## 2023-05-05 RX ADMIN — SODIUM CHLORIDE TAB 1 GM 1 G: 1 TAB at 11:32

## 2023-05-05 RX ADMIN — GUAIFENESIN 600 MG: 600 TABLET, EXTENDED RELEASE ORAL at 21:41

## 2023-05-05 ASSESSMENT — PAIN DESCRIPTION - ORIENTATION
ORIENTATION: LOWER
ORIENTATION: LOWER

## 2023-05-05 ASSESSMENT — PAIN DESCRIPTION - LOCATION
LOCATION: BACK
LOCATION: HEAD
LOCATION: BACK

## 2023-05-05 ASSESSMENT — PAIN DESCRIPTION - DESCRIPTORS
DESCRIPTORS: ACHING;DISCOMFORT
DESCRIPTORS: ACHING
DESCRIPTORS: ACHING;DISCOMFORT

## 2023-05-05 ASSESSMENT — PAIN DESCRIPTION - ONSET: ONSET: ON-GOING

## 2023-05-05 ASSESSMENT — PAIN - FUNCTIONAL ASSESSMENT
PAIN_FUNCTIONAL_ASSESSMENT: ACTIVITIES ARE NOT PREVENTED

## 2023-05-05 ASSESSMENT — PAIN DESCRIPTION - FREQUENCY: FREQUENCY: INTERMITTENT

## 2023-05-05 ASSESSMENT — PAIN SCALES - GENERAL
PAINLEVEL_OUTOF10: 7
PAINLEVEL_OUTOF10: 0
PAINLEVEL_OUTOF10: 7
PAINLEVEL_OUTOF10: 4

## 2023-05-05 ASSESSMENT — PAIN DESCRIPTION - PAIN TYPE: TYPE: CHRONIC PAIN

## 2023-05-05 NOTE — PLAN OF CARE
Problem: Discharge Planning  Goal: Discharge to home or other facility with appropriate resources  5/4/2023 2225 by Nataliya Hines RN  Outcome: Progressing  5/4/2023 1129 by Karey Parker RN  Outcome: Progressing     Problem: Pain  Goal: Verbalizes/displays adequate comfort level or baseline comfort level  5/4/2023 2225 by Nataliya Hines RN  Outcome: Progressing  5/4/2023 1129 by Karey Parker RN  Outcome: Progressing     Problem: Safety - Adult  Goal: Free from fall injury  5/4/2023 2225 by Nataliya Hines RN  Outcome: Progressing  5/4/2023 1129 by Karey Parkre RN  Outcome: Progressing     Problem: ABCDS Injury Assessment  Goal: Absence of physical injury  5/4/2023 2225 by Nataliya Hines RN  Outcome: Progressing  5/4/2023 1129 by Karey Parker RN  Outcome: Progressing     Problem: Skin/Tissue Integrity  Goal: Absence of new skin breakdown  Description: 1. Monitor for areas of redness and/or skin breakdown  2. Assess vascular access sites hourly  3. Every 4-6 hours minimum:  Change oxygen saturation probe site  4. Every 4-6 hours:  If on nasal continuous positive airway pressure, respiratory therapy assess nares and determine need for appliance change or resting period.   Outcome: Progressing     Problem: Neurosensory - Adult  Goal: Achieves stable or improved neurological status  Outcome: Progressing     Problem: Respiratory - Adult  Goal: Achieves optimal ventilation and oxygenation  Outcome: Progressing     Problem: Gastrointestinal - Adult  Goal: Minimal or absence of nausea and vomiting  Outcome: Progressing  Goal: Maintains or returns to baseline bowel function  Outcome: Progressing     Problem: Metabolic/Fluid and Electrolytes - Adult  Goal: Electrolytes maintained within normal limits  Outcome: Progressing

## 2023-05-05 NOTE — CARE COORDINATION
[facility]  Received referral regarding [facility], [facility]. services from [address]. [facility] to [facility]. Notified [facility] of possible discharge over weekend.       Electronically signed by Nomi Bruner RN on 5/5/2023 at 4:40 PM

## 2023-05-05 NOTE — CARE COORDINATION
INTERDISCIPLINARY PLAN OF CARE CONFERENCE    Date/Time: 5/5/2023 3:30 PM  Completed by: Wisam Shirley RN, Case Management      Patient Name:  Sammy Boland  YOB: 1955  Admitting Diagnosis: Hyponatremia [E87.1]  Acute respiratory failure with hypoxia ([facility] [address] [J96.01]  Pneumonia of both lower lobes due to infectious organism [J18.9]     Admit Date/Time:  5/1/2023  6:42 PM    Chart reviewed. Interdisciplinary team contacted or reviewed plan related to patient progress and discharge plans. Disciplines included Case Management, Nursing, and Dietitian. Current Status: IP 05/01/2023  PT/OT recommendation for discharge plan of care: [facility]  Discharge Recommendations: Home with 24/7 assist  and Home OT/PT  DME needs for discharge: Needs Met     Expected D/C Disposition:  Home  Confirmed plan with patient  Discharge Plan Comments: Pt will DC home w/ family. Possible weekend DC with [facility].  Referral to [facility] for SN PT/OT  CM following  Home O2 in place on admit: No Currently requiring supplemental O2 (3 liters)- will need walk test prior to DC  Pt informed of need to bring portable home O2 tank on day of discharge for nursing to connect prior to leaving:  No  Verbalized agreement/Understanding:  No

## 2023-05-06 VITALS
TEMPERATURE: 98 F | RESPIRATION RATE: 19 BRPM | BODY MASS INDEX: 29.41 KG/M2 | HEART RATE: 81 BPM | HEIGHT: 63 IN | OXYGEN SATURATION: 96 % | DIASTOLIC BLOOD PRESSURE: 80 MMHG | WEIGHT: 166 LBS | SYSTOLIC BLOOD PRESSURE: 137 MMHG

## 2023-05-06 LAB
ANION GAP SERPL CALCULATED.3IONS-SCNC: 12 MMOL/L (ref 3–16)
BASOPHILS # BLD: 0.1 K/UL (ref 0–0.2)
BASOPHILS NFR BLD: 0.9 %
BUN SERPL-MCNC: 14 MG/DL (ref 7–20)
CALCIUM SERPL-MCNC: 9.4 MG/DL (ref 8.3–10.6)
CHLORIDE SERPL-SCNC: 92 MMOL/L (ref 99–110)
CO2 SERPL-SCNC: 25 MMOL/L (ref 21–32)
CREAT SERPL-MCNC: <0.5 MG/DL (ref 0.6–1.2)
DEPRECATED RDW RBC AUTO: 12.9 % (ref 12.4–15.4)
EOSINOPHIL # BLD: 0 K/UL (ref 0–0.6)
EOSINOPHIL NFR BLD: 0.5 %
GFR SERPLBLD CREATININE-BSD FMLA CKD-EPI: >60 ML/MIN/{1.73_M2}
GLUCOSE SERPL-MCNC: 93 MG/DL (ref 70–99)
HCT VFR BLD AUTO: 34 % (ref 36–48)
HGB BLD-MCNC: 11.4 G/DL (ref 12–16)
LYMPHOCYTES # BLD: 1.4 K/UL (ref 1–5.1)
LYMPHOCYTES NFR BLD: 20.3 %
MCH RBC QN AUTO: 30.9 PG (ref 26–34)
MCHC RBC AUTO-ENTMCNC: 33.5 G/DL (ref 31–36)
MCV RBC AUTO: 92.2 FL (ref 80–100)
MONOCYTES # BLD: 0.8 K/UL (ref 0–1.3)
MONOCYTES NFR BLD: 11.1 %
NEUTROPHILS # BLD: 4.7 K/UL (ref 1.7–7.7)
NEUTROPHILS NFR BLD: 67.2 %
PLATELET # BLD AUTO: 297 K/UL (ref 135–450)
PMV BLD AUTO: 6.5 FL (ref 5–10.5)
POTASSIUM SERPL-SCNC: 4.2 MMOL/L (ref 3.5–5.1)
RBC # BLD AUTO: 3.69 M/UL (ref 4–5.2)
SODIUM SERPL-SCNC: 129 MMOL/L (ref 136–145)
WBC # BLD AUTO: 7 K/UL (ref 4–11)

## 2023-05-06 PROCEDURE — 2700000000 HC OXYGEN THERAPY PER DAY

## 2023-05-06 PROCEDURE — 6370000000 HC RX 637 (ALT 250 FOR IP): Performed by: INTERNAL MEDICINE

## 2023-05-06 PROCEDURE — 99232 SBSQ HOSP IP/OBS MODERATE 35: CPT | Performed by: INTERNAL MEDICINE

## 2023-05-06 PROCEDURE — 94761 N-INVAS EAR/PLS OXIMETRY MLT: CPT

## 2023-05-06 PROCEDURE — 6360000002 HC RX W HCPCS: Performed by: INTERNAL MEDICINE

## 2023-05-06 PROCEDURE — 2580000003 HC RX 258: Performed by: INTERNAL MEDICINE

## 2023-05-06 PROCEDURE — 85025 COMPLETE CBC W/AUTO DIFF WBC: CPT

## 2023-05-06 PROCEDURE — 36415 COLL VENOUS BLD VENIPUNCTURE: CPT

## 2023-05-06 PROCEDURE — 80048 BASIC METABOLIC PNL TOTAL CA: CPT

## 2023-05-06 PROCEDURE — 94640 AIRWAY INHALATION TREATMENT: CPT

## 2023-05-06 PROCEDURE — 99238 HOSP IP/OBS DSCHRG MGMT 30/<: CPT | Performed by: INTERNAL MEDICINE

## 2023-05-06 RX ORDER — GUAIFENESIN 600 MG/1
600 TABLET, EXTENDED RELEASE ORAL 2 TIMES DAILY
COMMUNITY
Start: 2023-05-06

## 2023-05-06 RX ORDER — LEVOFLOXACIN 500 MG/1
500 TABLET, FILM COATED ORAL
Qty: 2 TABLET | Refills: 0 | Status: SHIPPED | OUTPATIENT
Start: 2023-05-06 | End: 2023-05-08

## 2023-05-06 RX ORDER — LISINOPRIL 5 MG/1
10 TABLET ORAL DAILY
Qty: 90 TABLET | Refills: 1 | Status: SHIPPED | OUTPATIENT
Start: 2023-05-06

## 2023-05-06 RX ORDER — PREDNISONE 10 MG/1
TABLET ORAL
Qty: 12 TABLET | Refills: 0 | Status: SHIPPED | OUTPATIENT
Start: 2023-05-07 | End: 2023-05-13

## 2023-05-06 RX ADMIN — Medication 15 G: at 07:37

## 2023-05-06 RX ADMIN — IPRATROPIUM BROMIDE AND ALBUTEROL SULFATE 1 AMPULE: .5; 2.5 SOLUTION RESPIRATORY (INHALATION) at 15:01

## 2023-05-06 RX ADMIN — IPRATROPIUM BROMIDE AND ALBUTEROL SULFATE 1 AMPULE: .5; 2.5 SOLUTION RESPIRATORY (INHALATION) at 08:20

## 2023-05-06 RX ADMIN — SODIUM CHLORIDE TAB 1 GM 1 G: 1 TAB at 11:54

## 2023-05-06 RX ADMIN — MUPIROCIN: 20 OINTMENT TOPICAL at 07:37

## 2023-05-06 RX ADMIN — ENOXAPARIN SODIUM 40 MG: 100 INJECTION SUBCUTANEOUS at 07:36

## 2023-05-06 RX ADMIN — ACETAMINOPHEN 650 MG: 325 TABLET ORAL at 18:45

## 2023-05-06 RX ADMIN — SODIUM CHLORIDE TAB 1 GM 1 G: 1 TAB at 07:36

## 2023-05-06 RX ADMIN — Medication 10 ML: at 07:37

## 2023-05-06 RX ADMIN — TIOTROPIUM BROMIDE AND OLODATEROL 2 PUFF: 3.124; 2.736 SPRAY, METERED RESPIRATORY (INHALATION) at 08:24

## 2023-05-06 RX ADMIN — GUAIFENESIN 600 MG: 600 TABLET, EXTENDED RELEASE ORAL at 07:36

## 2023-05-06 RX ADMIN — PREDNISONE 30 MG: 20 TABLET ORAL at 07:37

## 2023-05-06 RX ADMIN — IPRATROPIUM BROMIDE AND ALBUTEROL SULFATE 1 AMPULE: .5; 2.5 SOLUTION RESPIRATORY (INHALATION) at 01:41

## 2023-05-06 ASSESSMENT — PAIN SCALES - GENERAL: PAINLEVEL_OUTOF10: 3

## 2023-05-06 NOTE — PLAN OF CARE
Problem: Discharge Planning  Goal: Discharge to home or other facility with appropriate resources  5/6/2023 1608 by Silvia Hicks RN  Outcome: Adequate for Discharge  5/6/2023 0725 by Silvia Hicks RN  Outcome: Progressing     Problem: Pain  Goal: Verbalizes/displays adequate comfort level or baseline comfort level  5/6/2023 1608 by Silvia Hicks RN  Outcome: Adequate for Discharge  5/6/2023 0725 by Silvia Hicks RN  Outcome: Progressing     Problem: Safety - Adult  Goal: Free from fall injury  5/6/2023 1608 by Silvia Hicks RN  Outcome: Adequate for Discharge  5/6/2023 0725 by Silvia Hicks RN  Outcome: Progressing     Problem: ABCDS Injury Assessment  Goal: Absence of physical injury  5/6/2023 1608 by Silvia Hicks RN  Outcome: Adequate for Discharge  5/6/2023 0725 by Silvia Hicks RN  Outcome: Progressing     Problem: Respiratory - Adult  Goal: Achieves optimal ventilation and oxygenation  5/6/2023 1608 by Silvia Hicks RN  Outcome: Adequate for Discharge  5/6/2023 0725 by Silvia Hicks RN  Outcome: Progressing     Problem: Gastrointestinal - Adult  Goal: Minimal or absence of nausea and vomiting  5/6/2023 1608 by Silvia Hicks RN  Outcome: Adequate for Discharge  5/6/2023 0725 by Silvia Hicks RN  Outcome: Progressing  Goal: Maintains or returns to baseline bowel function  5/6/2023 1608 by Silvia Hicks RN  Outcome: Adequate for Discharge  5/6/2023 0725 by Silvia Hicks RN  Outcome: Progressing     Problem: Metabolic/Fluid and Electrolytes - Adult  Goal: Electrolytes maintained within normal limits  5/6/2023 1608 by Silvia Hicks RN  Outcome: Adequate for Discharge  5/6/2023 0725 by Silvia Hicks RN  Outcome: Progressing

## 2023-05-06 NOTE — CARE COORDINATION
Discharge order is pending nephrology     DISCHARGE ORDER  Date/Time 2023 12:22 PM  Completed by: DHEERAJ Herndon  Case Management Department  Phone: [phone] Fax: [phone]   Case Management    Patient Name: Tracy Alas      : 1955  Admitting Diagnosis: Hyponatremia [E87.1]  Acute respiratory failure with hypoxia ([address] [J96.01]  Pneumonia of both lower lobes due to infectious organism [J18.9]      Admit order Date and Status:2023 Inpatient   (verify MD's last order for status of admission)      Noted discharge order. If applicable PT/OT recommendation at Discharge: home with 24 hr supervision and home PT  DME recommendation by PT/OT:n/a    Confirmed discharge plan: Yes  with whom pt  If pt confirmed DC plan does family need to be contacted by CM No if yes who n/a    Discharge Plan:     Date of Last IMM Given: today    Reviewed chart. Role of discharge planner explained and patient verbalized understanding. Discharge order is noted. Met with pt at bedside. Pt confirmed she is returning home and will have a ride but it won't be until after 4pm this evening as this is her ONLY ride. She stated they will bring her o2 tank. She is still agreeable to [facility] and denied needs from . Spoke with Armaan Jackson at [facility] ([facility] care) who confirmed can accept and will do start of care Monday. She stated she will pull information from epic and  doesn't need to fax anything. Chava Vargas RN aware of the above    Has Home O2 in place on admit:  Yes  Informed of need to bring portable home O2 tank on day of discharge for nursing to connect prior to leaving:   Yes  Verbalized agreement/Understanding:   Yes    Pt is being d/c'd to home today. Pt's O2 sats are 97% on 3 liters per vitals in epic. [facility]charge [facility]. All discharge needs and concerns addressed.

## 2023-05-06 NOTE — PLAN OF CARE
Problem: Discharge Planning  Goal: Discharge to home or other facility with appropriate resources  Outcome: Progressing     Problem: Pain  Goal: Verbalizes/displays adequate comfort level or baseline comfort level  Outcome: Progressing     Problem: Safety - Adult  Goal: Free from fall injury  Outcome: Progressing     Problem: ABCDS Injury Assessment  Goal: Absence of physical injury  Outcome: Progressing     Problem: Skin/Tissue Integrity  Goal: Absence of new skin breakdown  Description: 1. Monitor for areas of redness and/or skin breakdown  2. Assess vascular access sites hourly  3. Every 4-6 hours minimum:  Change oxygen saturation probe site  4. Every 4-6 hours:  If on nasal continuous positive airway pressure, respiratory therapy assess nares and determine need for appliance change or resting period.   Outcome: Progressing     Problem: Neurosensory - Adult  Goal: Achieves stable or improved neurological status  Outcome: Progressing     Problem: Respiratory - Adult  Goal: Achieves optimal ventilation and oxygenation  Outcome: Progressing     Problem: Gastrointestinal - Adult  Goal: Minimal or absence of nausea and vomiting  Outcome: Progressing  Goal: Maintains or returns to baseline bowel function  Outcome: Progressing     Problem: Metabolic/Fluid and Electrolytes - Adult  Goal: Electrolytes maintained within normal limits  Outcome: Progressing

## 2023-05-06 NOTE — FLOWSHEET NOTE
05/06/23 0723   Vital Signs   Temp 98.6 °F (37 °C)   Temp Source Oral   Pulse 84   Heart Rate Source Monitor   Respirations 16   BP (!) 164/84   MAP (Calculated) 111   BP Location Right upper arm   BP Method Automatic   Patient Position Semi fowlers   Level of Consciousness 0   MEWS Score 1   Pain Assessment   Pain Assessment None - Denies Pain   Oxygen Therapy   SpO2 98 %   O2 Device Nasal cannula   O2 Flow Rate (L/min) 3 L/min     AM assessment complete. Gave am meds due. See mar. A/O x 4. Up as tolerated SBA to BCS. Denies any pain. Ice water given. FR 1200. Tray table cleaned for breakfast. Bed check. Bed locked/lowest position. Call light within reach.

## 2023-05-06 NOTE — DISCHARGE INSTRUCTIONS
Check your blood pressures at home twice daily every day after at last 5 minutes at rest with feet on the ground and back supported. Bring your cuff to the doctors office to compare. Call your primary care or cardiologist tomorrow to make an appointment for hospital follow-up.      Stop hydrochlorothiazide  Ok to take the lisinopril part only, will give new prescription  Please follow-up with primary provider for blood pressure treatment    Restrict fluids to 32 oz per day

## 2023-05-06 NOTE — PLAN OF CARE
Problem: Discharge Planning  Goal: Discharge to home or other facility with appropriate resources  5/6/2023 0725 by Lulu Benavides RN  Outcome: Progressing  5/5/2023 2144 by Griffin Rodríguez RN  Outcome: Progressing     Problem: Pain  Goal: Verbalizes/displays adequate comfort level or baseline comfort level  5/6/2023 0725 by Lulu Benavides RN  Outcome: Progressing  5/5/2023 2144 by Griffin Rodríguez RN  Outcome: Progressing     Problem: Safety - Adult  Goal: Free from fall injury  5/6/2023 0725 by Lulu Benavides RN  Outcome: Progressing  5/5/2023 2144 by Griffin Rodríguez RN  Outcome: Progressing     Problem: ABCDS Injury Assessment  Goal: Absence of physical injury  5/6/2023 0725 by Lulu Benavides RN  Outcome: Progressing  5/5/2023 2144 by Griffin Rodríguez RN  Outcome: Progressing

## 2023-05-06 NOTE — DISCHARGE INSTR - COC
Treatments: ***  Oxygen Therapy:  {Therapy; copd oxygen:15491}  Ventilator:    {MH CC Vent VXPZ:864092917}    Rehab Therapies: {THERAPEUTIC INTERVENTION:3585302177}  Weight Bearing Status/Restrictions: [address] CC Weight Bearin}  Other Medical Equipment (for information only, NOT a DME order):  {EQUIPMENT:414866156}  Other Treatments: ***    Patient's personal belongings (please select all that are sent with patient):  {CHP DME Belongings:361737956}    RN SIGNATURE:  {Esignature:197208888}    CASE MANAGEMENT/SOCIAL WORK SECTION    Inpatient Status Date: 2023    Readmission Risk Assessment Score:  Readmission Risk              Risk of Unplanned Readmission:  15           Discharging to Facility/ Agency   Name: Freda Winkler   Address:  Phone: [phone]  Fax:      / signature: Electronically signed by CHAITANYA Ng LISW-S on 23 at 12:24 PM EDT    PHYSICIAN SECTION    Prognosis: Good    Condition at Discharge: Stable    Rehab Potential (if transferring to Rehab): Good    Recommended Labs or Other Treatments After Discharge:     PT/OT  BMP weekly  Vital signs monitoring    Physician Certification: I certify the above information and transfer of Mery Broussard  is necessary for the continuing treatment of the diagnosis listed and that she requires [address] for less than 30 days.      Update Admission H&P: No change in H&P    PHYSICIAN SIGNATURE:  Electronically signed by Queenie Olivares DO on 23 at 12:23 PM EDT

## 2023-05-06 NOTE — CARE COORDINATION
CM delivered 2nd IMM and provided verbal explanation at bedside. Pt voiced understanding of discharge MCR rights and is agreeable to discharge within 4 hours of delivery of the IMM notice.     DHEERAJ Goncalves  Case Management Department  Phone: [phone] Fax: [phone]

## 2023-05-08 ENCOUNTER — TELEPHONE (OUTPATIENT)
Dept: PULMONOLOGY | Age: 68
End: 2023-05-08

## 2023-05-08 NOTE — TELEPHONE ENCOUNTER
Message  Received: 2 days ago  MD Veronica Marshall MA  Please follow for COPD hospitalization with Dr. Jada Quintero
Pt was seen by Dr. Jerry Nevarez 5/1/23. Pt is calling saying Dr. Jerry Nevarez advised her to call the office to get a nebulizer. Please advise.
Pt will need appt prior to sending orders as she has not seen Dr Rebbeca Seip.  Patient scheduled for fua 5/9
PT

## 2023-05-12 NOTE — CARE COORDINATION
5/12  [facility]  Pt was a non admit to [facility] due to pt declining HC services. PCP notified.       Electronically signed by Valentine Espana RN on 5/12/2023 at 11:41 AM

## 2023-06-06 ENCOUNTER — TELEPHONE (OUTPATIENT)
Dept: PULMONOLOGY | Age: 68
End: 2023-06-06

## 2023-07-05 ENCOUNTER — TELEPHONE (OUTPATIENT)
Dept: PULMONOLOGY | Age: 68
End: 2023-07-05

## 2023-08-07 ENCOUNTER — APPOINTMENT (OUTPATIENT)
Dept: GENERAL RADIOLOGY | Age: 68
End: 2023-08-07
Payer: MEDICARE

## 2023-08-07 ENCOUNTER — HOSPITAL ENCOUNTER (INPATIENT)
Age: 68
LOS: 3 days | Discharge: NURSING, REHAB OR LONG-TERM CARE | End: 2023-08-10
Attending: EMERGENCY MEDICINE | Admitting: STUDENT IN AN ORGANIZED HEALTH CARE EDUCATION/TRAINING PROGRAM
Payer: MEDICARE

## 2023-08-07 DIAGNOSIS — J96.22 ACUTE ON CHRONIC RESPIRATORY FAILURE WITH HYPERCAPNIA (HCC): Primary | ICD-10-CM

## 2023-08-07 DIAGNOSIS — R91.8 OPACITIES OF BOTH LUNGS PRESENT ON CHEST X-RAY: ICD-10-CM

## 2023-08-07 DIAGNOSIS — M54.50 CHRONIC MIDLINE LOW BACK PAIN WITHOUT SCIATICA: ICD-10-CM

## 2023-08-07 DIAGNOSIS — R06.03 RESPIRATORY DISTRESS: ICD-10-CM

## 2023-08-07 DIAGNOSIS — G89.29 CHRONIC MIDLINE LOW BACK PAIN WITHOUT SCIATICA: ICD-10-CM

## 2023-08-07 DIAGNOSIS — J44.1 COPD EXACERBATION (HCC): ICD-10-CM

## 2023-08-07 LAB
ALBUMIN SERPL-MCNC: 4.4 G/DL (ref 3.4–5)
ALBUMIN/GLOB SERPL: 1.4 {RATIO} (ref 1.1–2.2)
ALP SERPL-CCNC: 99 U/L (ref 40–129)
ALT SERPL-CCNC: 22 U/L (ref 10–40)
ANION GAP SERPL CALCULATED.3IONS-SCNC: 7 MMOL/L (ref 3–16)
AST SERPL-CCNC: 32 U/L (ref 15–37)
BASE EXCESS BLDV CALC-SCNC: 5.2 MMOL/L (ref -3–3)
BASE EXCESS BLDV CALC-SCNC: 5.8 MMOL/L (ref -3–3)
BASOPHILS # BLD: 0 K/UL (ref 0–0.2)
BASOPHILS NFR BLD: 0.5 %
BILIRUB SERPL-MCNC: 0.5 MG/DL (ref 0–1)
BUN SERPL-MCNC: 13 MG/DL (ref 7–20)
CALCIUM SERPL-MCNC: 9.7 MG/DL (ref 8.3–10.6)
CHLORIDE SERPL-SCNC: 91 MMOL/L (ref 99–110)
CO2 BLDV-SCNC: 33 MMOL/L
CO2 BLDV-SCNC: 36 MMOL/L
CO2 SERPL-SCNC: 34 MMOL/L (ref 21–32)
COHGB MFR BLDV: 2.1 % (ref 0–1.5)
COHGB MFR BLDV: 2.2 % (ref 0–1.5)
CREAT SERPL-MCNC: <0.5 MG/DL (ref 0.6–1.2)
DEPRECATED RDW RBC AUTO: 12.9 % (ref 12.4–15.4)
EKG ATRIAL RATE: 82 BPM
EKG DIAGNOSIS: NORMAL
EKG P AXIS: 65 DEGREES
EKG P-R INTERVAL: 146 MS
EKG Q-T INTERVAL: 366 MS
EKG QRS DURATION: 68 MS
EKG QTC CALCULATION (BAZETT): 427 MS
EKG R AXIS: -46 DEGREES
EKG T AXIS: 56 DEGREES
EKG VENTRICULAR RATE: 82 BPM
EOSINOPHIL # BLD: 0.2 K/UL (ref 0–0.6)
EOSINOPHIL NFR BLD: 3.7 %
FLUAV RNA RESP QL NAA+PROBE: NOT DETECTED
FLUBV RNA RESP QL NAA+PROBE: NOT DETECTED
GFR SERPLBLD CREATININE-BSD FMLA CKD-EPI: >60 ML/MIN/{1.73_M2}
GLUCOSE BLD-MCNC: 144 MG/DL (ref 70–99)
GLUCOSE SERPL-MCNC: 144 MG/DL (ref 70–99)
HCO3 BLDV-SCNC: 31.6 MMOL/L (ref 23–29)
HCO3 BLDV-SCNC: 33.5 MMOL/L (ref 23–29)
HCT VFR BLD AUTO: 35.7 % (ref 36–48)
HGB BLD-MCNC: 12.2 G/DL (ref 12–16)
LACTATE BLDV-SCNC: 1 MMOL/L (ref 0.4–1.9)
LYMPHOCYTES # BLD: 0.9 K/UL (ref 1–5.1)
LYMPHOCYTES NFR BLD: 14.5 %
MCH RBC QN AUTO: 31.9 PG (ref 26–34)
MCHC RBC AUTO-ENTMCNC: 34.2 G/DL (ref 31–36)
MCV RBC AUTO: 93.3 FL (ref 80–100)
METHGB MFR BLDV: 0.3 %
METHGB MFR BLDV: 0.3 %
MONOCYTES # BLD: 0.6 K/UL (ref 0–1.3)
MONOCYTES NFR BLD: 10.5 %
MRSA DNA SPEC QL NAA+PROBE: NORMAL
NEUTROPHILS # BLD: 4.2 K/UL (ref 1.7–7.7)
NEUTROPHILS NFR BLD: 70.8 %
NT-PROBNP SERPL-MCNC: 42 PG/ML (ref 0–124)
O2 CT VFR BLDV CALC: 17 VOL %
O2 CT VFR BLDV CALC: 17 VOL %
O2 THERAPY: ABNORMAL
O2 THERAPY: ABNORMAL
PCO2 BLDV: 51.4 MMHG (ref 40–50)
PCO2 BLDV: 67.2 MMHG (ref 40–50)
PERFORMED ON: ABNORMAL
PH BLDV: 7.32 [PH] (ref 7.35–7.45)
PH BLDV: 7.41 [PH] (ref 7.35–7.45)
PLATELET # BLD AUTO: 115 K/UL (ref 135–450)
PMV BLD AUTO: 8.1 FL (ref 5–10.5)
PO2 BLDV: 132.7 MMHG (ref 25–40)
PO2 BLDV: 60.8 MMHG (ref 25–40)
POTASSIUM SERPL-SCNC: 4.1 MMOL/L (ref 3.5–5.1)
PROCALCITONIN SERPL IA-MCNC: 0.04 NG/ML (ref 0–0.15)
PROT SERPL-MCNC: 7.5 G/DL (ref 6.4–8.2)
RBC # BLD AUTO: 3.83 M/UL (ref 4–5.2)
SAO2 % BLDV: 88 %
SAO2 % BLDV: 99 %
SARS-COV-2 RNA RESP QL NAA+PROBE: NOT DETECTED
SODIUM SERPL-SCNC: 132 MMOL/L (ref 136–145)
TROPONIN, HIGH SENSITIVITY: 32 NG/L (ref 0–14)
TROPONIN, HIGH SENSITIVITY: 40 NG/L (ref 0–14)
TROPONIN, HIGH SENSITIVITY: 43 NG/L (ref 0–14)
WBC # BLD AUTO: 5.9 K/UL (ref 4–11)

## 2023-08-07 PROCEDURE — 6360000002 HC RX W HCPCS: Performed by: STUDENT IN AN ORGANIZED HEALTH CARE EDUCATION/TRAINING PROGRAM

## 2023-08-07 PROCEDURE — 84145 PROCALCITONIN (PCT): CPT

## 2023-08-07 PROCEDURE — 2000000000 HC ICU R&B

## 2023-08-07 PROCEDURE — 6370000000 HC RX 637 (ALT 250 FOR IP): Performed by: EMERGENCY MEDICINE

## 2023-08-07 PROCEDURE — 87636 SARSCOV2 & INF A&B AMP PRB: CPT

## 2023-08-07 PROCEDURE — 94761 N-INVAS EAR/PLS OXIMETRY MLT: CPT

## 2023-08-07 PROCEDURE — 6360000002 HC RX W HCPCS: Performed by: INTERNAL MEDICINE

## 2023-08-07 PROCEDURE — 82803 BLOOD GASES ANY COMBINATION: CPT

## 2023-08-07 PROCEDURE — 97166 OT EVAL MOD COMPLEX 45 MIN: CPT

## 2023-08-07 PROCEDURE — 6370000000 HC RX 637 (ALT 250 FOR IP): Performed by: INTERNAL MEDICINE

## 2023-08-07 PROCEDURE — 93010 ELECTROCARDIOGRAM REPORT: CPT | Performed by: INTERNAL MEDICINE

## 2023-08-07 PROCEDURE — 2580000003 HC RX 258: Performed by: STUDENT IN AN ORGANIZED HEALTH CARE EDUCATION/TRAINING PROGRAM

## 2023-08-07 PROCEDURE — 87040 BLOOD CULTURE FOR BACTERIA: CPT

## 2023-08-07 PROCEDURE — 99223 1ST HOSP IP/OBS HIGH 75: CPT | Performed by: INTERNAL MEDICINE

## 2023-08-07 PROCEDURE — 97116 GAIT TRAINING THERAPY: CPT

## 2023-08-07 PROCEDURE — 97530 THERAPEUTIC ACTIVITIES: CPT

## 2023-08-07 PROCEDURE — 83880 ASSAY OF NATRIURETIC PEPTIDE: CPT

## 2023-08-07 PROCEDURE — 94660 CPAP INITIATION&MGMT: CPT

## 2023-08-07 PROCEDURE — 85025 COMPLETE CBC W/AUTO DIFF WBC: CPT

## 2023-08-07 PROCEDURE — 2700000000 HC OXYGEN THERAPY PER DAY

## 2023-08-07 PROCEDURE — 5A09357 ASSISTANCE WITH RESPIRATORY VENTILATION, LESS THAN 24 CONSECUTIVE HOURS, CONTINUOUS POSITIVE AIRWAY PRESSURE: ICD-10-PCS | Performed by: INTERNAL MEDICINE

## 2023-08-07 PROCEDURE — 80053 COMPREHEN METABOLIC PANEL: CPT

## 2023-08-07 PROCEDURE — 6370000000 HC RX 637 (ALT 250 FOR IP): Performed by: STUDENT IN AN ORGANIZED HEALTH CARE EDUCATION/TRAINING PROGRAM

## 2023-08-07 PROCEDURE — 36415 COLL VENOUS BLD VENIPUNCTURE: CPT

## 2023-08-07 PROCEDURE — 84484 ASSAY OF TROPONIN QUANT: CPT

## 2023-08-07 PROCEDURE — 96365 THER/PROPH/DIAG IV INF INIT: CPT

## 2023-08-07 PROCEDURE — 83605 ASSAY OF LACTIC ACID: CPT

## 2023-08-07 PROCEDURE — 99285 EMERGENCY DEPT VISIT HI MDM: CPT

## 2023-08-07 PROCEDURE — 96375 TX/PRO/DX INJ NEW DRUG ADDON: CPT

## 2023-08-07 PROCEDURE — 94640 AIRWAY INHALATION TREATMENT: CPT

## 2023-08-07 PROCEDURE — 96366 THER/PROPH/DIAG IV INF ADDON: CPT

## 2023-08-07 PROCEDURE — 87641 MR-STAPH DNA AMP PROBE: CPT

## 2023-08-07 PROCEDURE — 97162 PT EVAL MOD COMPLEX 30 MIN: CPT

## 2023-08-07 PROCEDURE — 93005 ELECTROCARDIOGRAM TRACING: CPT | Performed by: EMERGENCY MEDICINE

## 2023-08-07 PROCEDURE — 71046 X-RAY EXAM CHEST 2 VIEWS: CPT

## 2023-08-07 PROCEDURE — 97535 SELF CARE MNGMENT TRAINING: CPT

## 2023-08-07 PROCEDURE — 6360000002 HC RX W HCPCS: Performed by: EMERGENCY MEDICINE

## 2023-08-07 RX ORDER — LISINOPRIL 10 MG/1
10 TABLET ORAL DAILY
Status: DISCONTINUED | OUTPATIENT
Start: 2023-08-07 | End: 2023-08-10 | Stop reason: HOSPADM

## 2023-08-07 RX ORDER — NICOTINE 21 MG/24HR
1 PATCH, TRANSDERMAL 24 HOURS TRANSDERMAL DAILY PRN
Status: DISCONTINUED | OUTPATIENT
Start: 2023-08-07 | End: 2023-08-10 | Stop reason: HOSPADM

## 2023-08-07 RX ORDER — AZITHROMYCIN 250 MG/1
500 TABLET, FILM COATED ORAL DAILY
Status: DISCONTINUED | OUTPATIENT
Start: 2023-08-08 | End: 2023-08-08

## 2023-08-07 RX ORDER — ASPIRIN 81 MG/1
324 TABLET, CHEWABLE ORAL ONCE
Status: COMPLETED | OUTPATIENT
Start: 2023-08-07 | End: 2023-08-07

## 2023-08-07 RX ORDER — ENOXAPARIN SODIUM 100 MG/ML
40 INJECTION SUBCUTANEOUS EVERY EVENING
Status: DISCONTINUED | OUTPATIENT
Start: 2023-08-08 | End: 2023-08-10 | Stop reason: HOSPADM

## 2023-08-07 RX ORDER — PREDNISONE 20 MG/1
40 TABLET ORAL DAILY
Status: DISCONTINUED | OUTPATIENT
Start: 2023-08-08 | End: 2023-08-10 | Stop reason: HOSPADM

## 2023-08-07 RX ORDER — ACETAMINOPHEN 650 MG/1
650 SUPPOSITORY RECTAL EVERY 6 HOURS PRN
Status: DISCONTINUED | OUTPATIENT
Start: 2023-08-07 | End: 2023-08-10 | Stop reason: HOSPADM

## 2023-08-07 RX ORDER — LEVOFLOXACIN 5 MG/ML
750 INJECTION, SOLUTION INTRAVENOUS ONCE
Status: COMPLETED | OUTPATIENT
Start: 2023-08-07 | End: 2023-08-07

## 2023-08-07 RX ORDER — ONDANSETRON 2 MG/ML
4 INJECTION INTRAMUSCULAR; INTRAVENOUS EVERY 6 HOURS PRN
Status: DISCONTINUED | OUTPATIENT
Start: 2023-08-07 | End: 2023-08-10 | Stop reason: HOSPADM

## 2023-08-07 RX ORDER — MORPHINE SULFATE 4 MG/ML
4 INJECTION, SOLUTION INTRAMUSCULAR; INTRAVENOUS ONCE
Status: COMPLETED | OUTPATIENT
Start: 2023-08-07 | End: 2023-08-07

## 2023-08-07 RX ORDER — IPRATROPIUM BROMIDE AND ALBUTEROL SULFATE 2.5; .5 MG/3ML; MG/3ML
1 SOLUTION RESPIRATORY (INHALATION) ONCE
Status: COMPLETED | OUTPATIENT
Start: 2023-08-07 | End: 2023-08-07

## 2023-08-07 RX ORDER — POLYETHYLENE GLYCOL 3350 17 G/17G
17 POWDER, FOR SOLUTION ORAL DAILY PRN
Status: DISCONTINUED | OUTPATIENT
Start: 2023-08-07 | End: 2023-08-10 | Stop reason: HOSPADM

## 2023-08-07 RX ORDER — MULTIVIT-MIN/IRON/FOLIC ACID/K 18-600-40
CAPSULE ORAL
COMMUNITY

## 2023-08-07 RX ORDER — SODIUM CHLORIDE 9 MG/ML
INJECTION, SOLUTION INTRAVENOUS PRN
Status: DISCONTINUED | OUTPATIENT
Start: 2023-08-07 | End: 2023-08-10 | Stop reason: HOSPADM

## 2023-08-07 RX ORDER — HYDROXYZINE HYDROCHLORIDE 25 MG/1
25 TABLET, FILM COATED ORAL 3 TIMES DAILY PRN
Status: DISCONTINUED | OUTPATIENT
Start: 2023-08-07 | End: 2023-08-10 | Stop reason: HOSPADM

## 2023-08-07 RX ORDER — ALBUTEROL SULFATE 90 UG/1
2 AEROSOL, METERED RESPIRATORY (INHALATION)
Status: DISCONTINUED | OUTPATIENT
Start: 2023-08-07 | End: 2023-08-07

## 2023-08-07 RX ORDER — ALBUTEROL SULFATE 90 UG/1
2 AEROSOL, METERED RESPIRATORY (INHALATION)
Status: DISCONTINUED | OUTPATIENT
Start: 2023-08-07 | End: 2023-08-10 | Stop reason: HOSPADM

## 2023-08-07 RX ORDER — METHYLPREDNISOLONE SODIUM SUCCINATE 1 G/16ML
125 INJECTION, POWDER, LYOPHILIZED, FOR SOLUTION INTRAMUSCULAR; INTRAVENOUS ONCE
Status: COMPLETED | OUTPATIENT
Start: 2023-08-07 | End: 2023-08-07

## 2023-08-07 RX ORDER — ESCITALOPRAM OXALATE 10 MG/1
20 TABLET ORAL DAILY
Status: DISCONTINUED | OUTPATIENT
Start: 2023-08-07 | End: 2023-08-10 | Stop reason: HOSPADM

## 2023-08-07 RX ORDER — SODIUM CHLORIDE 0.9 % (FLUSH) 0.9 %
5-40 SYRINGE (ML) INJECTION PRN
Status: DISCONTINUED | OUTPATIENT
Start: 2023-08-07 | End: 2023-08-10 | Stop reason: HOSPADM

## 2023-08-07 RX ORDER — ONDANSETRON 4 MG/1
4 TABLET, ORALLY DISINTEGRATING ORAL EVERY 8 HOURS PRN
Status: DISCONTINUED | OUTPATIENT
Start: 2023-08-07 | End: 2023-08-10 | Stop reason: HOSPADM

## 2023-08-07 RX ORDER — GUAIFENESIN 600 MG/1
600 TABLET, EXTENDED RELEASE ORAL 2 TIMES DAILY
Status: DISCONTINUED | OUTPATIENT
Start: 2023-08-07 | End: 2023-08-10 | Stop reason: HOSPADM

## 2023-08-07 RX ORDER — POTASSIUM CHLORIDE 7.45 MG/ML
10 INJECTION INTRAVENOUS PRN
Status: DISCONTINUED | OUTPATIENT
Start: 2023-08-07 | End: 2023-08-10 | Stop reason: HOSPADM

## 2023-08-07 RX ORDER — ACETAMINOPHEN 325 MG/1
650 TABLET ORAL EVERY 6 HOURS PRN
Status: DISCONTINUED | OUTPATIENT
Start: 2023-08-07 | End: 2023-08-10 | Stop reason: HOSPADM

## 2023-08-07 RX ORDER — MAGNESIUM SULFATE IN WATER 40 MG/ML
2000 INJECTION, SOLUTION INTRAVENOUS PRN
Status: DISCONTINUED | OUTPATIENT
Start: 2023-08-07 | End: 2023-08-10 | Stop reason: HOSPADM

## 2023-08-07 RX ORDER — METHYLPREDNISOLONE SODIUM SUCCINATE 1 G/16ML
40 INJECTION, POWDER, LYOPHILIZED, FOR SOLUTION INTRAMUSCULAR; INTRAVENOUS 2 TIMES DAILY
Status: DISCONTINUED | OUTPATIENT
Start: 2023-08-07 | End: 2023-08-07

## 2023-08-07 RX ORDER — GLUCOSAM/CHONDRO/HERB 149/HYAL 750-100 MG
1 TABLET ORAL DAILY
COMMUNITY

## 2023-08-07 RX ORDER — FUROSEMIDE 10 MG/ML
20 INJECTION INTRAMUSCULAR; INTRAVENOUS ONCE
Status: COMPLETED | OUTPATIENT
Start: 2023-08-07 | End: 2023-08-07

## 2023-08-07 RX ORDER — SODIUM CHLORIDE 0.9 % (FLUSH) 0.9 %
5-40 SYRINGE (ML) INJECTION EVERY 12 HOURS SCHEDULED
Status: DISCONTINUED | OUTPATIENT
Start: 2023-08-07 | End: 2023-08-10 | Stop reason: HOSPADM

## 2023-08-07 RX ADMIN — MUPIROCIN: 20 OINTMENT TOPICAL at 11:02

## 2023-08-07 RX ADMIN — Medication 10 ML: at 20:21

## 2023-08-07 RX ADMIN — ASPIRIN 324 MG: 81 TABLET, CHEWABLE ORAL at 02:58

## 2023-08-07 RX ADMIN — ACETAMINOPHEN 650 MG: 325 TABLET ORAL at 15:17

## 2023-08-07 RX ADMIN — METHYLPREDNISOLONE SODIUM SUCCINATE 40 MG: 40 INJECTION INTRAMUSCULAR; INTRAVENOUS at 09:12

## 2023-08-07 RX ADMIN — SODIUM CHLORIDE: 9 INJECTION, SOLUTION INTRAVENOUS at 09:11

## 2023-08-07 RX ADMIN — LISINOPRIL 10 MG: 10 TABLET ORAL at 11:01

## 2023-08-07 RX ADMIN — METHYLPREDNISOLONE SODIUM SUCCINATE 125 MG: 125 INJECTION INTRAMUSCULAR; INTRAVENOUS at 02:59

## 2023-08-07 RX ADMIN — GUAIFENESIN 600 MG: 600 TABLET, EXTENDED RELEASE ORAL at 20:23

## 2023-08-07 RX ADMIN — Medication 10 ML: at 09:11

## 2023-08-07 RX ADMIN — LEVOFLOXACIN 750 MG: 5 INJECTION, SOLUTION INTRAVENOUS at 03:08

## 2023-08-07 RX ADMIN — GUAIFENESIN 600 MG: 600 TABLET, EXTENDED RELEASE ORAL at 11:02

## 2023-08-07 RX ADMIN — MUPIROCIN: 20 OINTMENT TOPICAL at 20:21

## 2023-08-07 RX ADMIN — Medication 2 PUFF: at 20:08

## 2023-08-07 RX ADMIN — AZITHROMYCIN MONOHYDRATE 500 MG: 500 INJECTION, POWDER, LYOPHILIZED, FOR SOLUTION INTRAVENOUS at 09:16

## 2023-08-07 RX ADMIN — TIOTROPIUM BROMIDE AND OLODATEROL 2 PUFF: 3.124; 2.736 SPRAY, METERED RESPIRATORY (INHALATION) at 08:12

## 2023-08-07 RX ADMIN — ESCITALOPRAM OXALATE 20 MG: 10 TABLET ORAL at 11:01

## 2023-08-07 RX ADMIN — MORPHINE SULFATE 4 MG: 4 INJECTION, SOLUTION INTRAMUSCULAR; INTRAVENOUS at 03:03

## 2023-08-07 RX ADMIN — Medication 2 PUFF: at 08:12

## 2023-08-07 RX ADMIN — FUROSEMIDE 20 MG: 10 INJECTION, SOLUTION INTRAMUSCULAR; INTRAVENOUS at 15:17

## 2023-08-07 RX ADMIN — IPRATROPIUM BROMIDE AND ALBUTEROL SULFATE 1 DOSE: 2.5; .5 SOLUTION RESPIRATORY (INHALATION) at 02:57

## 2023-08-07 ASSESSMENT — ENCOUNTER SYMPTOMS
RHINORRHEA: 0
BACK PAIN: 0
CHEST TIGHTNESS: 1
VOMITING: 0
COUGH: 1
SHORTNESS OF BREATH: 1
ABDOMINAL PAIN: 0
DIARRHEA: 0

## 2023-08-07 ASSESSMENT — PAIN DESCRIPTION - ORIENTATION
ORIENTATION: MID
ORIENTATION: LOWER
ORIENTATION: LOWER;MID
ORIENTATION: LOWER;MID

## 2023-08-07 ASSESSMENT — PAIN SCALES - GENERAL
PAINLEVEL_OUTOF10: 7
PAINLEVEL_OUTOF10: 4
PAINLEVEL_OUTOF10: 6
PAINLEVEL_OUTOF10: 0

## 2023-08-07 ASSESSMENT — PAIN SCALES - WONG BAKER
WONGBAKER_NUMERICALRESPONSE: 4
WONGBAKER_NUMERICALRESPONSE: 0

## 2023-08-07 ASSESSMENT — PAIN - FUNCTIONAL ASSESSMENT
PAIN_FUNCTIONAL_ASSESSMENT: ACTIVITIES ARE NOT PREVENTED
PAIN_FUNCTIONAL_ASSESSMENT: NONE - DENIES PAIN
PAIN_FUNCTIONAL_ASSESSMENT: ACTIVITIES ARE NOT PREVENTED

## 2023-08-07 ASSESSMENT — PAIN DESCRIPTION - DESCRIPTORS
DESCRIPTORS: ACHING;DISCOMFORT
DESCRIPTORS: ACHING
DESCRIPTORS: ACHING;DISCOMFORT
DESCRIPTORS: ACHING

## 2023-08-07 ASSESSMENT — PAIN DESCRIPTION - LOCATION
LOCATION: BACK

## 2023-08-07 ASSESSMENT — PAIN DESCRIPTION - FREQUENCY: FREQUENCY: CONTINUOUS

## 2023-08-07 ASSESSMENT — PAIN DESCRIPTION - PAIN TYPE: TYPE: CHRONIC PAIN

## 2023-08-07 ASSESSMENT — PAIN DESCRIPTION - ONSET: ONSET: ON-GOING

## 2023-08-07 NOTE — PROGRESS NOTES
Inpatient Occupational Therapy Evaluation and Treatment    Unit: ICU  Date:  8/7/2023  Patient Name:    Joy Bruce  Admitting diagnosis:  Respiratory distress [R06.03]  COPD exacerbation ([address]) [J44.1]  Acute on chronic respiratory failure with hypercapnia ([address]) [J96.22]  Opacities of both lungs present on chest x-ray [R91.8]  Admit Date:  8/7/2023  Precautions/Restrictions/WB Status/ Lines/ Wounds/ Oxygen: Fall risk, Bed/chair alarm, Lines (IV, Supplemental O2 (4L), and external catheter), Telemetry, and Continuous pulse oximetry    Treatment Time:  7151-3126  Treatment Number:  1  Timed Code Treatment Minutes: 44 minutes  Total Treatment Minutes:  54  minutes    Patient Goals for Therapy: \"get better\"          Discharge Recommendations: SNF  DME needs for discharge: Defer to facility       Therapy recommendations for staff:   Assist of 1 for transfers with use of rolling walker (RW) and gait belt to/from [facility]  to/from chair    History of Present Illness: per Dr Hang Mendez H&P 8/7/23:  \"Patient is a 74 y.o.  female  With known h.o  COPD, chronic hypoxic respiratory failure, hyponatremia who presented to the ED with increasing sob x 1 week with progressive worsening. Reports she has recurrent copd flare ups and started with wheezing, dry cough and exertional dyspnea . Has not been using inhalers as suppose to. No chest pain or fevers or chills. Has chronic diarrhea but no bleeding  Workup in ER showed resp distress needing bipap and admission to iCU  After couple hrs of bipap she feels much improved and now on 3 L o2\"     Home Health S4 Level Recommendation:  NA    AM-PAC Score: AM-PAC Inpatient Daily Activity Raw Score: 17     Subjective:  Patient lying reclined in bed with no family present. Pt agreeable to this OT session.      Cognition:    A&O Person, Place, and Situation (knows it's August, doesn't know year - \"I don't care about that anymore\")  Able to follow 2 step commands    Pain: No  Location:   Rating: NA /10  Pain Medicine Status: No request made    Preadmission Environment:   Pt. Lives with family (daughter)  Home environment:    one story home  Steps to enter first floor:   bilateral hand rails and ramp          Steps to second floor: N/A  Bathroom:       Tub/Shower unit, shower chair and Standard height toilet with handrails around toilet. Equipment owned:      [address], Rolling Walker, manual W/C, BSC, quad cane, home O2 (3 L), inhaler and nebulizer , electric wheelchair (can't find )     Preadmission Status / PLOF:  History of falls             Yes - 1-2 in last month; when last evaluated in May 2023, she had had 3-4 falls in prior month. \"Just lose my balance\". Pt. Able to drive          No, daughter drives. \"I don't go anywhere anymore\", including MD appts - \"I just miss them\". Pt Fully independent with ADL's         Yes, dtr provides supervision if pt needs. Pt. Required assistance from family (dtr) for:  Cooking, Cleaning and Laundry. \"But not so much anymore, we need help with that\". Pt. Fully independent for transfers and gait and walked with: RW sometimes. Pt sleeps on a loveseat in her home. Dtr is home 24/7  Of note, daughter is moving soon, and patient plans to eventually move in with daughter again but this will not happen right away (unclear exact situation). Objective:  Does this pt have an acute or acute on chronic diagnosis of CHF?  No    Upper Extremity ROM:    Appears WFL's    Upper Extremity Strength:    BUE strength impaired but not formally assessed w/ MMT    Upper Extremity Sensation:    WFL    Upper Extremity Proprioception:  WFL    Coordination:  WFL    Tone:  WFL    Balance:  Sitting:    SBA  Standing:    CGA and With RW and gait belt    Bed Mobility:   Supine to Sit:    SBA  Sit to Supine:   Not Tested  Rolling:   Not Tested  Scooting in sitting: SBA  Scooting in supine:  Not Tested    Transfers:    Sit to stand:    CGA  Stand to

## 2023-08-07 NOTE — CONSULTS
[facility]                                 Pulmonary Consult /Progress Note :                                                                  CHIEF COMPLAINT: worsening SOB     Consulting provider: pinky    HPI:     Gasper Gonzalez is a 74 y.o. female with 48 PPY smoking history   [place] PMHx of COPD on 3 L nasal cannula, depression,     She presenting emergency department yesterday with worsening SOB and EDGAR with cough and general weakness   In  the ED, patient arrives quite tachypneic, with belly breathing. And some resp distress , denies any chest pain, lightheadedness, dizziness. No other concerns today, including increased butyrin production. No significant back pain. No falls. Has been using her albuterol at home with no improvement. No recent steroids or antibiotics. Past Medical History:   Diagnosis Date    Angina pectoris ([address])     Chronic pain     knees and lower back     COPD exacerbation ([address]) 5/20/2018    Depression     Essential hypertension 8/15/2022    Panic disorder     Pneumonia        Past Surgical History:        Procedure Laterality Date    CHOLECYSTECTOMY      COLONOSCOPY         Allergies:  is allergic to cephalosporins, pcn [penicillins], and hctz [hydrochlorothiazide]. Social History:    TOBACCO:   reports that she quit smoking about 2 years ago. Her smoking use included cigarettes. She started smoking about 54 years ago. She has a 104.00 pack-year smoking history. She has been exposed to tobacco smoke. She has never used smokeless tobacco.  ETOH:   reports that she does not currently use alcohol after a past usage of about 12.0 standard drinks per week.       Family History:       Problem Relation Age of Onset    COPD Mother     Hypertension Mother     Asthma Neg Hx     Cancer Neg Hx     Diabetes Neg Hx     Emphysema Neg Hx     Heart Failure Neg Hx        Current Medications:    Current Facility-Administered Medications:     escitalopram (LEXAPRO) tablet 20 mg, 20 mg, Oral, Daily, Anabella Rodas MD    hydrOXYzine HCl (ATARAX) tablet 25 mg, 25 mg, Oral, TID PRN, Anabella Rodas MD    guaiFENesin (MUCINEX) extended release tablet 600 mg, 600 mg, Oral, BID, Anabella Rodas MD    lisinopril (PRINIVIL;ZESTRIL) tablet 10 mg, 10 mg, Oral, Daily, Anabella Rodas MD    methylPREDNISolone sodium (SOLU-MEDROL) injection 40 mg, 40 mg, IntraVENous, BID, Anabella Rodas MD    azithromycin (ZITHROMAX) 500 mg in 250 mL addavial, 500 mg, IntraVENous, Q24H, Anabella Rodas MD    sodium chloride flush 0.9 % injection 5-40 mL, 5-40 mL, IntraVENous, 2 times per day, Anabella Rodas MD    sodium chloride flush 0.9 % injection 5-40 mL, 5-40 mL, IntraVENous, PRN, Anabella Rodas MD    0.9 % sodium chloride infusion, , IntraVENous, PRN, Anabella Rodas MD    potassium chloride 10 mEq/100 mL IVPB (Peripheral Line), 10 mEq, IntraVENous, PRN, Anabella Rodas MD    magnesium sulfate 2000 mg in 50 mL IVPB premix, 2,000 mg, IntraVENous, PRN, Anabella Rodas MD    [START ON 8/8/2023] enoxaparin (LOVENOX) injection 40 mg, 40 mg, SubCUTAneous, QPM, Anabella Rodas MD    ondansetron (ZOFRAN-ODT) disintegrating tablet 4 mg, 4 mg, Oral, Q8H PRN **OR** ondansetron (ZOFRAN) injection 4 mg, 4 mg, IntraVENous, Q6H PRN, Anabella Rodas MD    polyethylene glycol (GLYCOLAX) packet 17 g, 17 g, Oral, Daily PRN, Anabella Rodas MD    nicotine (NICODERM CQ) 21 MG/24HR 1 patch, 1 patch, TransDERmal, Daily PRN, Anabella Rodas MD    acetaminophen (TYLENOL) tablet 650 mg, 650 mg, Oral, Q6H PRN **OR** acetaminophen (TYLENOL) suppository 650 mg, 650 mg, Rectal, Q6H PRN, Anabella Rodas MD    albuterol sulfate HFA (PROVENTIL;VENTOLIN;PROAIR) 108 (90 Base) MCG/ACT inhaler 2 puff, 2 puff, Inhalation, 4x Daily RT, Laure Alonzo MD    [START ON 8/8/2023] tiotropium-olodaterol (STIOLTO) 2.5-2.5 MCG/ACT inhaler 2 puff, 2 puff,

## 2023-08-07 NOTE — PROGRESS NOTES
Pt admitted to ICU 11. A&O x4, placed on Bipap by RT. MARILIAS. Daughter at the bedside and updated on plan of care. All questions answered. 4 Eyes Skin Assessment     NAME:  Farzana Ordonez  YOB: 1955  MEDICAL RECORD NUMBER:  [id number]    The patient is being assessed for  Shift Handoff    I agree that at least one RN has performed a thorough Head to Toe Skin Assessment on the patient. ALL assessment sites listed below have been assessed. Areas assessed by both nurses:    Head, Face, Ears, Shoulders, Back, Chest, Arms, Elbows, Hands, Sacrum. Buttock, Coccyx, Ischium, Legs. Feet and Heels, and Under Medical Devices         Does the Patient have a Wound?  No noted wound(s)       William Prevention initiated by RN: Yes  Wound Care Orders initiated by RN: No    Pressure Injury (Stage 3,4, Unstageable, DTI, NWPT, and Complex wounds) if present, place Wound referral order by RN under : No    New Ostomies, if present place, Ostomy referral order under : No     Nurse 1 eSignature: Electronically signed by Hubert Cuadra RN on 8/7/23 at 7:26 AM EDT    **SHARE this note so that the co-signing nurse can place an eSignature**    Nurse 2 eSignature: Electronically signed by Katei Mohr RN on 8/7/23 at 7:30 AM EDT

## 2023-08-07 NOTE — CARE COORDINATION
08/07/23 1724   Service Assessment   Patient Orientation Alert and Oriented;Place;Person;Situation   Cognition Alert   History Provided By Patient   Primary [facility]   Patient's Healthcare Decision Maker is: Named in [facility]ess[facility] Rivera   PCP Verified by CM Yes   Last Visit to PCP Within last 3 months   Prior Functional Level Independent in ADLs/IADLs   Current Functional Level Independent in ADLs/IADLs   Can patient return to prior living arrangement Yes   Ability to make needs known: Good   Family able to assist with home care needs: Yes   Would you like for me to discuss the discharge plan with any other family members/significant others, and if so, who? No   Financial Resources Medicare   Community Resources None   Discharge Planning   Type of Residence House   Living Arrangements Children   Current Services Prior To Admission Durable Medical Equipment; Oxygen Therapy  (3L - [place])   [facility] [address]   DME Ordered? No   Potential Assistance Purchasing Medications No   Type of Home Care Services None   Patient expects to be discharged to: House   Follow Up Appointment: Best Day/Time    (self)   One/Two Story Residence One story   History of falls? 1     Case Management Assessment  Initial Evaluation    Date/Time of Evaluation: 8/7/2023 5:27 PM  Assessment Completed by: Jamila Alvarez RN    If patient is discharged prior to next notation, then this note serves as note for discharge by case management.     Patient Name: Leopold Finger                   YOB: 1955  Diagnosis: Respiratory distress [R06.03]  COPD exacerbation ([address]) [J44.1]  Acute on chronic respiratory failure with hypercapnia ([address]) [J96.22]  Opacities of both lungs present on chest x-ray [R91.8]                   Date / Time: 8/7/2023  1:16 AM    Patient Admission Status: Inpatient   Readmission Risk (Low < 19, Mod No  Meds-to-Beds request: Yes      [facility] - [facility] [phone] - F [phone]  [address] [place] [address] [address]  Phone: [phone] Fax: [phone]    [facility] [phone] - [address], [address] [facility] [phone] [facility] [phone]  [address] [place] [address]  Phone: [phone] Fax: [phone]      Notes:    Factors facilitating achievement of predicted outcomes: Motivated, Cooperative, and Pleasant    Barriers to discharge: Needs SNF arranged    Additional Case Management Notes: CM met with patient for one on one discussion of discharge planning. The patient will need a SNF. Discussed possibilities with the patient. The patient is still deciding. The Plan for Transition of Care is related to the following treatment goals of Respiratory distress [R06.03]  COPD exacerbation (HCC) [J44.1]  Acute on chronic respiratory failure with hypercapnia (HCC) [J96.22]  Opacities of both lungs present on chest x-ray [E50.4]    IF APPLICABLE: The Patient and/or patient representative Lorena Wright and her family were provided with a choice of provider and agrees with the discharge plan. Freedom of choice list with basic dialogue that supports the patient's individualized plan of care/goals and shares the quality data associated with the providers was provided to:     Patient Representative Name:       The Patient and/or Patient Representative Agree with the Discharge Plan?       Jamila Alvarez RN  Case Management Department  Ph: [phone]

## 2023-08-07 NOTE — PROGRESS NOTES
Patient provided a COPD Educational Folder that includes the following materials:     [x]  [address]: Managing your COPD  [x]  ALA: Getting the Most Out of Medication Delivery Devices  [x]  ALA: My COPD Action Plan  [x]  Better Breathers Club: [facility]   [x]  Smoking Cessation Classes  [x]  Outpatient Spiritual Care Services  [x]  Magnet: Signs of COPD    PATIENT/CAREGIVER TEACHING:   Level of patient/caregiver understanding able to:   [x] Verbalize understanding   [] Demonstrate understanding       [] Teach back        [] Needs reinforcement     []  Other:     Electronically signed by Roque Herrera RN on 8/7/2023 at 2:28 PM

## 2023-08-07 NOTE — PROGRESS NOTES
Admission assessment was completed (see flow sheet). Pt is A/O, denies any pain or other needs at this time. *Dr Hang Mendez at bedside, Dr Dell Reaves removed patient from [address] [place] [address] and placed on 2L NC.- orders to follow. Respirations are even, unlabored, with diminished sounds. On now 3.5L O2 via NC. Scheduled medications to follow- Whole with water. Infusing:  N/A  Call light within reach. Bed in lowest position. Bed alarm on. Will continue to monitor. Patient is not able to demonstrated the ability to move from a reclining position to an upright position within the recliner.  however patient is alert, oriented and able to provide informed consent

## 2023-08-07 NOTE — PLAN OF CARE
Worsening SOB x3 days. Baseline on 3L NC, recently increased to 5L with limited response. Initially very SOB on arrival to the ED, requiring BPAP for acute hypercapnic respiratory failure. CXR with possible infiltrates, although improved from previously. Received Levaquin in the ED, switched to Azithromycin. Admit to ICU for treatment of acute COPD exacerbation. Pulmonology consulted for evaluation, appreciate assistance.

## 2023-08-07 NOTE — PROGRESS NOTES
Care rounds completed with Dr. Sonido Donohue and multidisciplinary team. Reviewed labs, meds, VS, assessment, & plan of care for today.  -Ok for Diet. -Bipap PRN, and nightly. -Give Lasix- d/t edema. See dictated note and new orders for details. Daughter at bedside and updated on POC.

## 2023-08-07 NOTE — PROGRESS NOTES
Reassessment completed (see Flowsheet). All ICU lines remain intact, ICU monitoring continued-   Infusing:  N/A    pt Up to a chair, Tolerated ambulation fairly well- becomes SOB easily. Lung sounds Diminished, on 4L NC O2.   pt's blood pressures WDL. Continuing to monitor.

## 2023-08-07 NOTE — ED PROVIDER NOTES
Emergency Department Attending Physician Note  Location: [address] [place] [address] ED  8/7/2023       Pt Name: Yanet Mejia  MRN: [id number]  [id number] 1955    Date of evaluation: 8/7/2023  Provider: Marcel Lozano DO  PCP: [place] MD    Note Started: 2:37 AM EDT 8/7/23    CHIEF COMPLAINT  Chief Complaint   Patient presents with    Shortness of Breath     Started 3-4 days ago with sob, abd pain. Hx copd wears 3L all the time but now on 5 did not check oxygen at home. Oxygen is 95% but appears sob. Also c/o diarrhea for months has pcp appt. Daughter feels pt is dehydrated. HISTORY OF PRESENT ILLNESS:  History obtained by patient. Limitations to history : None. Yanet Mejia is a 74 y.o. female with a significant PMHx of COPD on 3 L nasal cannula, depression, and other comorbidities as listed below, presenting emergency department today with concerns of increased work of breathing, and shortness of breath, over the past couple of days, but really worse today. Patient also had some abdominal pain, but she says she feels fine now, was having some diarrhea, but that is mostly gone. Sound like they had Teressa's earlier this week and both of patient and daughter had \"possibly food poisoning or something. \"  Here in the ED, patient arrives quite tachypneic, belly breathing. Says she is very short of breath, denies any chest pain, lightheadedness, dizziness. No other concerns today, including increased butyrin production. No significant back pain. No falls. Has been using her albuterol at home with no improvement. No recent steroids or antibiotics. On arrival to the ER, quite dyspneic, and she is on 5 L nasal cannula. Has a PCP appointment for the diarrhea. Sound like she is told staff that she has had it for couple months, but told me a couple days. Nursing Notes were all reviewed and agreed with or any disagreements were addressed in the HPI.       MEDICAL HISTORY  Past respiratory distress; belly breathing, accessory muscle use, tachypneic with conversation, speaking in 2-3 word sentences  Abdominal:      Palpations: Abdomen is soft. There is no hepatomegaly or mass. Comments: Even to deep palpation, patient has no significant tenderness, abdomen is soft, nonperitoneal.   Musculoskeletal:      Cervical back: Normal range of motion and neck supple. Right lower leg: No tenderness. No edema. Left lower leg: No tenderness. No edema. Skin:     General: Skin is warm and dry. Coloration: Skin is not cyanotic. Findings: No ecchymosis. Neurological:      General: No focal deficit present. Mental Status: She is alert and oriented to person, place, and time. Psychiatric:         Mood and Affect: Mood normal.         Behavior: Behavior normal.     DIAGNOSTIC RESULTS:  LABS:    Labs Reviewed   COMPREHENSIVE METABOLIC PANEL W/ REFLEX TO MG FOR LOW K - Abnormal; Notable for the following components:       Result Value    Sodium 132 (*)     Chloride 91 (*)     CO2 34 (*)     Glucose 144 (*)     Creatinine <0.5 (*)     All other components within normal limits   BLOOD GAS, VENOUS - Abnormal; Notable for the following components:    pH, Kevin 7.316 (*)     pCO2, Kevin 67.2 (*)     pO2, Kevin 60.8 (*)     HCO3, Venous 33.5 (*)     Base Excess, Kevin 5.2 (*)     Carboxyhemoglobin 2.1 (*)     All other components within normal limits   TROPONIN - Abnormal; Notable for the following components:    Troponin, High Sensitivity 40 (*)     All other components within normal limits   CULTURE, BLOOD 1   CULTURE, BLOOD 2   BRAIN NATRIURETIC PEPTIDE   LACTATE, SEPSIS   CBC WITH AUTO DIFFERENTIAL   TROPONIN   BLOOD GAS, ARTERIAL   BLOOD GAS, VENOUS       When ordered, only abnormal lab results are displayed. All other labs were within normal range or not returned as of this dictation.       Independent EKG Interpretation: EKG obtained in the emergency department shows a normal sinus rhythm with a ventricular to 82 bpm.  No ST segment elevation, ST segment depression, hyperacute T waves, although there is some motion artifact. Left axis deviation. QTc 427. RADIOLOGY:    Non-plain film images such as CT, Ultrasound and MRI are read by the radiologist. Interpretation per the Radiologist below, if available at the time of this note:  XR CHEST (2 VW)   Preliminary Result   1. Airspace opacities in bilateral lower lung zones suspicious for infection. However, findings do appear improved since May 2023.   2. Multiple compression deformities of the spine. PROCEDURES:  Unless otherwise noted below, none. Procedures      MEDICATIONS:   Medications   ipratropium 0.5 mg-albuterol 2.5 mg (DUONEB) nebulizer solution 1 Dose (has no administration in time range)   methylPREDNISolone sodium (PF) (SOLU-MEDROL PF) injection 125 mg (has no administration in time range)   morphine sulfate (PF) injection 4 mg (has no administration in time range)   aspirin chewable tablet 324 mg (has no administration in time range)   levoFLOXacin (LEVAQUIN) 750 MG/150ML infusion 750 mg (has no administration in time range)     _____________________________________    MEDICAL DECISION MAKING:     Patient is a 74 y.o. female with a significant PMHx of COPD on 3 L at baseline, presenting emergency department today with concerns of shortness of breath, over the past 3 to 4 days, but really getting worse today. Additionally she had some diarrhea earlier this week, having some fast food, daughter also sick, but that has resolved. Here in the ED, has significant work of breathing, conversational dyspnea, not hypoxic, but I am quite concerned about her work of breathing. Initial labs show hypercapnia to 51, patient's baseline seems to be about 30. Given this, will start BiPAP. This is new BiPAP for patient, has never had it before, does not wear it at home.     2:37 AM EDT  No two SIRS criteria, no severe sepsis

## 2023-08-07 NOTE — PROGRESS NOTES
Reassessment completed (see Flowsheet). All ICU lines remain intact, ICU monitoring continued-   Infusing:  N/A    To work with PT/OT. Lung sounds Diminished, On 4L via NC.  pt's blood pressures WDL. Continuing to monitor.

## 2023-08-07 NOTE — PROGRESS NOTES
08/07/23 0328   NIV Type   $NIV $Daily Charge   NIV Started/Stopped (S)  On   Equipment Type v60   Mode Bilevel   Mask Type Full face mask   Mask Size Small   Assessment   Pulse 86   SpO2 94 %   Settings/Measurements   IPAP 12 cmH20   CPAP/EPAP 5 cmH2O   Vt (Measured) 558 mL   Rate Ordered 14   FiO2  30 %   I Time/ I Time % 1 s   Minute Volume (L/min) 11 Liters   Mask Leak (lpm) 0 lpm   Patient's Home Machine No   Alarm Settings   Alarms On Y   Low Pressure (cmH2O) 5 cmH2O   High Pressure (cmH2O) 30 cmH2O   Delay Alarm 20 sec(s)   RR Low (bpm) 14   RR High (bpm) 40 br/min

## 2023-08-07 NOTE — PROGRESS NOTES
Shift handoff report given to [facility] RN at bedside. Pt is Awake and alert  IV handoff completed. 4 eyes completed. Call light within reach, bed in lowest position, bed alarm on. End of shift checks completed. Pt has been free of falls for duration of shift. Apolonio Fothergill

## 2023-08-07 NOTE — ED NOTES
Pt left floor in stable condition via stretcher per writer, pt transferred to ICU, report given to NIKIA.      Saranya Bernard RN  08/07/23 6404

## 2023-08-07 NOTE — PROGRESS NOTES
08/07/23 0814   NIV Type   Suction Setup and Functional Yes   NIV Started/Stopped On   Equipment Type V60   Mode Bilevel   Mask Type Full face mask   Mask Size Small   Assessment   Pulse 63   Respirations 17   SpO2 97 %   Level of Consciousness 0   Comfort Level Good   Using Accessory Muscles No   Mask Compliance Good   Skin Assessment Clean, dry, & intact   Skin Protection for O2 Device Yes   Orientation Middle   Location Nose   Breath Sounds   Breath Sounds Bilateral Diminished   Right Upper Lobe Diminished   Right Middle Lobe Diminished   Right Lower Lobe Diminished   Left Upper Lobe Diminished   Left Lower Lobe Diminished   Settings/Measurements   PIP Observed 12 cm H20   IPAP 12 cmH20   CPAP/EPAP 5 cmH2O   Vt (Measured) 550 mL   Rate Ordered 14   FiO2  40 %   I Time/ I Time % 1 s   Minute Volume (L/min) 10.8 Liters   Mask Leak (lpm) 0 lpm   Patient's Home Machine No   Alarm Settings   Alarms On Y   Low Pressure (cmH2O) 8 cmH2O   High Pressure (cmH2O) 30 cmH2O   Apnea (secs) 20 secs   RR Low (bpm) 14   RR High (bpm) 40 br/min

## 2023-08-07 NOTE — ACP (ADVANCE CARE PLANNING)
Advance Care Planning     General Advance Care Planning (ACP) Conversation    Date of Conversation: 8/7/2023  Conducted with: Patient with Decision Making Capacity    Healthcare Decision Maker:    Primary Decision Maker: Cristi Miller - [phone]    Secondary Decision Maker: Courtney Curtis - [phone]  Click here to complete Healthcare Decision Makers including selection of the Healthcare Decision Maker Relationship (ie \"Primary\"). Today we documented Decision Maker(s) consistent with ACP documents on file. Content/Action Overview:   Has ACP document(s) on file - reflects the patient's care preferences  Reviewed DNR/DNI and patient elects Full Code (Attempt Resuscitation)      Length of Voluntary ACP Conversation in minutes:  16 minutes    Jeremy Trujillo RN

## 2023-08-07 NOTE — PROGRESS NOTES
Inpatient Physical Therapy Evaluation & Treatment    Unit: ICU  Date:  8/7/2023  Patient Name:    Farzana Ordonez  Admitting diagnosis:  Respiratory distress [R06.03]  COPD exacerbation ([address]) [J44.1]  Acute on chronic respiratory failure with hypercapnia ([address]) [J96.22]  Opacities of both lungs present on chest x-ray [R91.8]  Admit Date:  8/7/2023  Precautions/Restrictions/WB Status/ Lines/ Wounds/ Oxygen: Fall risk, Bed/chair alarm, Lines (IV, Supplemental O2 (4L), and external catheter), Telemetry, and Continuous pulse oximetry    Treatment Time:  13:50-14:38  Treatment Number:  1   Timed Code Treatment Minutes: 38 minutes  Total Treatment Minutes:  48  minutes    Patient Stated Goals for Therapy: \" not stated \"   Agreeable to evaluation; agreeable to STR. Discharge Recommendations: SNF  DME needs for discharge: Defer to facility       Therapy recommendation for EMS Transport: can transport by wheelchair    Therapy recommendations for staff:   Assist of 1 for ambulation with use of rolling walker (RW) and gait belt within room    History of Present Illness:   Per Dr. Orlando Goncalves H&P 8/7: \"3 y.o.  female  With known h.o  COPD, chronic hypoxic respiratory failure, hyponatremia who presented to the ED with increasing sob x 1 week with progressive worsening. Reports she has recurrent copd flare ups and started with wheezing, dry cough and exertional dyspnea . Has not been using inhalers as suppose to. No chest pain or fevers or chills. Has chronic diarrhea but no bleeding  Workup in ER showed resp distress needing bipap and admission to iCU  After couple hrs of bipap she feels much improved and now on 3 L o2 \"    Home Health S4 Level Recommendation:  NA    AM-PAC Mobility Score       AM-PAC Inpatient Mobility without Stair Climbing Raw Score : 17    Subjective  Patient lying reclined in bed with no family present. Pt agreeable to this PT session.      Cognition    A&O Person, Place, and Situation (knows decreased independence with Ambulation and Transfers. Pt desaturates with very short bouts of seated and standing functional tasks (<30 seconds). She normally lives with daughter 24/7 but they are moving into another home which is under renovation and daughter will not be with her for next few weeks. Based on poor activity tolerance currently she would not be able to care for herself on her own. Recommending SNF upon discharge as patient functioning well below baseline, demonstrates good rehab potential and unable to return home due to burden of care beyond caregiver ability and home environment not conducive to patient recovery. Goals : To be met in 3 visits:  1). Independent with LE Ex x 10 reps  2). Sit to/from stand: Supervision  3). Bed to chair: Supervision  4). CHF goal: N/A    To be met in 6 visits:  1). Supine to/from sit: Independent  2). Sit to/from stand: Modified Independent  3). Bed to chair: Modified Independent  4). Gait: Ambulate 150 ft.  with Modified Independent and use of LRAD (least restrictive assistive device)  5). Tolerate B LE exercises 3 sets of 10-15 reps    Rehabilitation Potential: Good  Strengths for achieving goals include:   Pt motivated, PLOF, Family Support, and Pt cooperative   Barriers to achieving goals include:    Chronicity of condition (COPD)    Plan    To be seen 3-5 x / week  while in acute care setting for therapeutic exercises, bed mobility, transfers, progressive gait training, balance training, and family/patient education. Signature: Beth Cano, PT     If patient discharges from this facility prior to next visit, this note will serve as the Discharge Summary.

## 2023-08-07 NOTE — H&P
Admission [facility];  MRN: [id number] ;   Admit Date: 8/7/2023  1:16 AM   Current Date and Time: 8/7/2023 6:42 AM    PCP  --  Carolyn Crump MD         Chief Complaint   Patient presents with    Shortness of Breath     Started 3-4 days ago with sob, abd pain. Hx copd wears 3L all the time but now on 5 did not check oxygen at home. Oxygen is 95% but appears sob. Also c/o diarrhea for months has pcp appt. Daughter feels pt is dehydrated. HPI:  Patient is a 74 y.o.  female  With known h.o  COPD, chronic hypoxic respiratory failure, hyponatremia who presented to the ED with increasing sob x 1 week with progressive worsening. Reports she has recurrent copd flare ups and started with wheezing, dry cough and exertional dyspnea . Has not been using inhalers as suppose to. No chest pain or fevers or chills.  Has chronic diarrhea but no bleeding  Workup in ER showed resp distress needing bipap and admission to iCU  After couple hrs of bipap she feels much improved and now on 3 L o2         Allergies   Allergen Reactions    Cephalosporins Shortness Of Breath     OK to take aztreonam and meropenem     Pcn [Penicillins] Anaphylaxis and Hives     OK to take aztreonam and meropenem     Hctz [Hydrochlorothiazide] Other (See Comments)     Recurrent low sodium       Past Medical History:   Diagnosis Date    Angina pectoris (HCC)     Chronic pain     knees and lower back     COPD exacerbation ([facility]) 5/20/2018    Depression     Essential hypertension 8/15/2022    Panic disorder     Pneumonia       Past Surgical History:   Procedure Laterality Date    CHOLECYSTECTOMY      COLONOSCOPY        Medications Prior to Admission: lisinopril (PRINIVIL;ZESTRIL) 5 MG tablet, Take 2 tablets by mouth daily  guaiFENesin (MUCINEX) 600 MG extended release tablet, Take 1 tablet by mouth 2 times daily  escitalopram (LEXAPRO) 20 MG tablet, Take 1 tablet by mouth daily  ANORO ELLIPTA 62.5-25 MCG/INH AEPB inhaler, INHALE ONE DOSE BY MOUTH DAILY  lidocaine 4 % external patch, Place 1 patch onto the skin daily  acetaminophen (TYLENOL) 500 MG tablet, Take 1 tablet by mouth every 6 hours as needed for Pain  albuterol sulfate  (90 Base) MCG/ACT inhaler, Inhale 2 puffs into the lungs every 6 hours as needed for Wheezing orShortness of Breath  cetirizine (ZYRTEC) 10 MG tablet, Take 1 tablet by mouth daily  hydrOXYzine (ATARAX) 25 MG tablet, Take 1 tablet by mouth 3 times daily as needed for Itching  Multiple Vitamins-Minerals (CENTRUM SILVER 50+WOMEN PO), Take 1 tablet by mouth daily  Allergies   Allergen Reactions    Cephalosporins Shortness Of Breath     OK to take aztreonam and meropenem     Pcn [Penicillins] Anaphylaxis and Hives     OK to take aztreonam and meropenem     Hctz [Hydrochlorothiazide] Other (See Comments)     Recurrent low sodium      Social History     Tobacco Use    Smoking status: Former     Packs/day: 2.00     Years: 52.00     Pack years: 104.00     Types: Cigarettes     Start date:      Quit date:      Years since quittin.5     Passive exposure: Past    Smokeless tobacco: Never   Substance Use Topics    Alcohol use: Not Currently     Alcohol/week: 12.0 standard drinks     Types: 12 Cans of beer per week      Family History   Problem Relation Age of Onset    COPD Mother     Hypertension Mother     Asthma Neg Hx     Cancer Neg Hx     Diabetes Neg Hx     Emphysema Neg Hx     Heart Failure Neg Hx           Review of systems    Constitutional: Negative for fever or chills  HENT: Negative for sore throat   Eyes: Negative for redness   Respiratory: +ve  dyspnea, cough   Cardiovascular: Negative for chest pain   Gastrointestinal:neg for abd pain, nausea or vomiting or diarrhea  No melena or BRPR  Genitourinary: Negative for hematuria   Musculoskeletal: Negative for arthralgias   Skin: Negative for rash   Neurological: Negative for syncope   Hematological: Negative for adenopathy   Psychiatric/Behavorial: 0.99 02/04/2021    INR 0.93 01/04/2021    PROTIME 11.9 02/28/2021    PROTIME 11.5 02/04/2021    PROTIME 10.8 01/04/2021         EKG:nsr     Radiology:    XR CHEST (2 VW)   Preliminary Result   1. Airspace opacities in bilateral lower lung zones suspicious for infection. However, findings do appear improved since May 2023.   2. Multiple compression deformities of the spine. ASSESMENT & PLAN:           #Acute on chronic hypoxic hypercarbic respiratory failure   copd exacebration   -Patient uses 2 to 3 L of nasal cannula oxygen at home.   increased RR and dyspnea , needing bipap on arrival  Treated with steroids, HHN, imaging with improving previous infiltrates , doubt pna    Given levaquin and steroids improved now back to 3 L nasal cannula at rest  -wean off steroids        # abn chest xray   - bilateral airspace opacities improving from previous imaging in 5/23  - rule out infection , sputum cx pending  - on levaquin- trial of lasix today  - pulmonary consulted           # hx of chronic Hyponatremia  -stable today with no acute issues  -Her baseline sodium is in the low 130s  - remains of diuretics             #HTN  - on ACEI     # chronic anxiety - on lexapro       Diet: regular   GI/DVT PX: /lovenox  CODE STATUS: full code    Kathi Buck MD,  8/7/2023 6:42 AM

## 2023-08-08 LAB
ANION GAP SERPL CALCULATED.3IONS-SCNC: 7 MMOL/L (ref 3–16)
BASOPHILS # BLD: 0 K/UL (ref 0–0.2)
BASOPHILS NFR BLD: 0.1 %
BUN SERPL-MCNC: 18 MG/DL (ref 7–20)
CALCIUM SERPL-MCNC: 9.1 MG/DL (ref 8.3–10.6)
CHLORIDE SERPL-SCNC: 93 MMOL/L (ref 99–110)
CO2 SERPL-SCNC: 34 MMOL/L (ref 21–32)
CREAT SERPL-MCNC: <0.5 MG/DL (ref 0.6–1.2)
DEPRECATED RDW RBC AUTO: 12.9 % (ref 12.4–15.4)
EOSINOPHIL # BLD: 0 K/UL (ref 0–0.6)
EOSINOPHIL NFR BLD: 0.5 %
GFR SERPLBLD CREATININE-BSD FMLA CKD-EPI: >60 ML/MIN/{1.73_M2}
GLUCOSE SERPL-MCNC: 107 MG/DL (ref 70–99)
HCT VFR BLD AUTO: 34.4 % (ref 36–48)
HGB BLD-MCNC: 11.7 G/DL (ref 12–16)
INR PPP: 0.93 (ref 0.84–1.16)
LYMPHOCYTES # BLD: 1.1 K/UL (ref 1–5.1)
LYMPHOCYTES NFR BLD: 16.9 %
MCH RBC QN AUTO: 31.7 PG (ref 26–34)
MCHC RBC AUTO-ENTMCNC: 34.1 G/DL (ref 31–36)
MCV RBC AUTO: 92.9 FL (ref 80–100)
MONOCYTES # BLD: 0.9 K/UL (ref 0–1.3)
MONOCYTES NFR BLD: 13.9 %
NEUTROPHILS # BLD: 4.6 K/UL (ref 1.7–7.7)
NEUTROPHILS NFR BLD: 68.6 %
PLATELET # BLD AUTO: 216 K/UL (ref 135–450)
PMV BLD AUTO: 7.4 FL (ref 5–10.5)
POTASSIUM SERPL-SCNC: 4.2 MMOL/L (ref 3.5–5.1)
PROTHROMBIN TIME: 12.5 SEC (ref 11.5–14.8)
RBC # BLD AUTO: 3.71 M/UL (ref 4–5.2)
SODIUM SERPL-SCNC: 134 MMOL/L (ref 136–145)
WBC # BLD AUTO: 6.7 K/UL (ref 4–11)

## 2023-08-08 PROCEDURE — 6370000000 HC RX 637 (ALT 250 FOR IP): Performed by: INTERNAL MEDICINE

## 2023-08-08 PROCEDURE — 80048 BASIC METABOLIC PNL TOTAL CA: CPT

## 2023-08-08 PROCEDURE — 2700000000 HC OXYGEN THERAPY PER DAY

## 2023-08-08 PROCEDURE — 85610 PROTHROMBIN TIME: CPT

## 2023-08-08 PROCEDURE — 94761 N-INVAS EAR/PLS OXIMETRY MLT: CPT

## 2023-08-08 PROCEDURE — 1200000000 HC SEMI PRIVATE

## 2023-08-08 PROCEDURE — 99233 SBSQ HOSP IP/OBS HIGH 50: CPT | Performed by: INTERNAL MEDICINE

## 2023-08-08 PROCEDURE — 94640 AIRWAY INHALATION TREATMENT: CPT

## 2023-08-08 PROCEDURE — 99232 SBSQ HOSP IP/OBS MODERATE 35: CPT | Performed by: INTERNAL MEDICINE

## 2023-08-08 PROCEDURE — 36415 COLL VENOUS BLD VENIPUNCTURE: CPT

## 2023-08-08 PROCEDURE — 2580000003 HC RX 258: Performed by: STUDENT IN AN ORGANIZED HEALTH CARE EDUCATION/TRAINING PROGRAM

## 2023-08-08 PROCEDURE — 6370000000 HC RX 637 (ALT 250 FOR IP): Performed by: STUDENT IN AN ORGANIZED HEALTH CARE EDUCATION/TRAINING PROGRAM

## 2023-08-08 PROCEDURE — 6360000002 HC RX W HCPCS: Performed by: STUDENT IN AN ORGANIZED HEALTH CARE EDUCATION/TRAINING PROGRAM

## 2023-08-08 PROCEDURE — 85025 COMPLETE CBC W/AUTO DIFF WBC: CPT

## 2023-08-08 RX ORDER — HYDROCODONE BITARTRATE AND ACETAMINOPHEN 5; 325 MG/1; MG/1
1 TABLET ORAL EVERY 6 HOURS PRN
Status: DISCONTINUED | OUTPATIENT
Start: 2023-08-08 | End: 2023-08-10 | Stop reason: HOSPADM

## 2023-08-08 RX ORDER — AZITHROMYCIN 250 MG/1
500 TABLET, FILM COATED ORAL DAILY
Status: DISCONTINUED | OUTPATIENT
Start: 2023-08-09 | End: 2023-08-10 | Stop reason: HOSPADM

## 2023-08-08 RX ADMIN — ESCITALOPRAM OXALATE 20 MG: 10 TABLET ORAL at 08:18

## 2023-08-08 RX ADMIN — MUPIROCIN: 20 OINTMENT TOPICAL at 08:18

## 2023-08-08 RX ADMIN — GUAIFENESIN 600 MG: 600 TABLET, EXTENDED RELEASE ORAL at 20:27

## 2023-08-08 RX ADMIN — Medication 2 PUFF: at 07:35

## 2023-08-08 RX ADMIN — MUPIROCIN: 20 OINTMENT TOPICAL at 20:25

## 2023-08-08 RX ADMIN — GUAIFENESIN 600 MG: 600 TABLET, EXTENDED RELEASE ORAL at 08:18

## 2023-08-08 RX ADMIN — Medication 10 ML: at 20:24

## 2023-08-08 RX ADMIN — Medication 10 ML: at 08:19

## 2023-08-08 RX ADMIN — PREDNISONE 40 MG: 20 TABLET ORAL at 08:18

## 2023-08-08 RX ADMIN — AZITHROMYCIN 500 MG: 250 TABLET, FILM COATED ORAL at 08:18

## 2023-08-08 RX ADMIN — HYDROCODONE BITARTRATE AND ACETAMINOPHEN 1 TABLET: 5; 325 TABLET ORAL at 15:19

## 2023-08-08 RX ADMIN — ENOXAPARIN SODIUM 40 MG: 40 INJECTION SUBCUTANEOUS at 18:57

## 2023-08-08 RX ADMIN — Medication 2 PUFF: at 14:41

## 2023-08-08 RX ADMIN — LISINOPRIL 10 MG: 10 TABLET ORAL at 08:18

## 2023-08-08 RX ADMIN — Medication 2 PUFF: at 19:35

## 2023-08-08 RX ADMIN — HYDROCODONE BITARTRATE AND ACETAMINOPHEN 1 TABLET: 5; 325 TABLET ORAL at 22:31

## 2023-08-08 RX ADMIN — ACETAMINOPHEN 650 MG: 325 TABLET ORAL at 04:27

## 2023-08-08 ASSESSMENT — PAIN SCALES - GENERAL
PAINLEVEL_OUTOF10: 7
PAINLEVEL_OUTOF10: 9
PAINLEVEL_OUTOF10: 8

## 2023-08-08 ASSESSMENT — PAIN DESCRIPTION - ORIENTATION
ORIENTATION: RIGHT;LEFT;MID;LOWER
ORIENTATION: MID;LOWER

## 2023-08-08 ASSESSMENT — PAIN DESCRIPTION - DESCRIPTORS
DESCRIPTORS: ACHING

## 2023-08-08 ASSESSMENT — PAIN DESCRIPTION - LOCATION
LOCATION: BACK

## 2023-08-08 NOTE — PROGRESS NOTES
08/08/23 1900   RT Protocol   History Pulmonary Disease 2   Respiratory pattern 2   Breath sounds 2   Cough 0   Indications for Bronchodilator Therapy On home bronchodilators   Bronchodilator Assessment Score 6

## 2023-08-08 NOTE — CARE COORDINATION
INTERDISCIPLINARY PLAN OF CARE CONFERENCE    Date/Time: 8/8/2023   Completed by: Susy Osuna RN, Case Management      Patient Name:  Farzana Ordonez  YOB: 1955  Admitting Diagnosis: Respiratory distress [R06.03]  COPD exacerbation ([address]) [J44.1]  Acute on chronic respiratory failure with hypercapnia ([address]) [J96.22]  Opacities of both lungs present on chest x-ray [R91.8]     Admit Date/Time:  8/7/2023  1:16 AM    Chart reviewed. Interdisciplinary team contacted or reviewed plan related to patient progress and discharge plans. Disciplines included Case Management, Nursing, and Dietitian. Current Status:8/7/23  PT/OT recommendation for discharge plan of care: SNF    Expected D/C Disposition:  Rehab  Confirmed plan with patient and/or family Yes confirmed with: (name) the patient and daughter Nilda. The choices for SNF are:    (1) [facility] - referral made 8/8/23 [facility]  (2) [facility]  (3) [facility] will review patient and call back on Wednesday.

## 2023-08-08 NOTE — PLAN OF CARE
Problem: Respiratory - Adult  Goal: Achieves optimal ventilation and oxygenation  Outcome: Progressing     Problem: Pain  Goal: Verbalizes/displays adequate comfort level or baseline comfort level  Outcome: Progressing     Problem: Safety - Adult  Goal: Free from fall injury  Outcome: Progressing

## 2023-08-08 NOTE — DISCHARGE INSTR - COC
Continuity of Care Form    Patient Name: Nicola Davidson   :  1955  MRN:  [id number]    Admit date:  2023  Discharge date:      Code Status Order: Full Code   Advance Directives:     Admitting Physician:  Burak Le MD  PCP: Ines Lozano MD    Discharging Nurse: [facility] Unit/Room#: 3011/3011-01  Discharging Unit Phone Number: [phone]      Emergency Contact:   Extended Emergency Contact Information  Primary Emergency Contact: [address] Phone: [phone]  Mobile Phone: [phone]  Relation: Child  Secondary Emergency Contact: Syl [place] [address] [address] Phone: [phone]  Work Phone: [phone]  Mobile Phone: [phone]  Relation: Child    Past Surgical History:  Past Surgical History:   Procedure Laterality Date    CHOLECYSTECTOMY      COLONOSCOPY         Immunization History:   Immunization History   Administered Date(s) Administered    Influenza Vaccine, unspecified formulation 2013, 10/26/2016    Influenza Virus Vaccine 2013, 10/26/2016, 2017    Influenza, FLUARIX, Robbin Duff (age 10 mo+) AND AFLURIA, (age 1 y+), PF, 0.5mL 10/26/2016, 2017, 2020, 2022    TDaP, ADACEL (age 6y-58y), BOOSTRIX (age 10y+), IM, 0.5mL 2017       Active Problems:  Patient Active Problem List   Diagnosis Code    Chest pain R07.9    Shortness of breath R06.02    Tobacco use Z72.0    Moderate COPD (chronic obstructive pulmonary disease) ([address]) J44.9    COPD exacerbation ([address]) J44.1    Acute respiratory failure with hypoxia ([address]) J96.01    Hyponatremia E87.1    Lung nodule R91.1    Pulmonary emphysema ([address]) J43.9    Acute on chronic respiratory failure with hypoxia and hypercapnia (HCC) J96.21, J96.22    Community acquired pneumonia of right lung J18.9    Acute exacerbation of chronic obstructive pulmonary disease (COPD) ([address]) J44.1    Acute on chronic respiratory failure ([address]) J96.20    Acute

## 2023-08-08 NOTE — PROGRESS NOTES
09:55 Dr. Catracho Mckinley @ bedside for critical care rounds, pt downgrades to M/S status with plans to d/c to home tomorrow per MD, pt tolerated BF well pt  o2 sats 93-94% on 3 Liters o2 per NC

## 2023-08-08 NOTE — PROGRESS NOTES
4 Eyes Skin Assessment     The patient is being assess for   Shift Handoff    I agree that 2 RN's have performed a thorough Head to Toe Skin Assessment on the patient. ALL assessment sites listed below have been assessed. Areas assessed by both nurses:   [x]   Head, Face, and Ears   [x]   Shoulders, Back, and Chest, Abdomen  [x]   Arms, Elbows, and Hands   [x]   Coccyx, Sacrum, and Ischium  [x]   Legs, Feet, and Heels        ****    **SHARE this note so that the co-signing nurse is able to place an eSignature**    Co-signer eSignature: {Esignature:[id number]}    Does the Patient have Skin Breakdown?  No  William Prevention initiated:  Yes   Wound Care Orders initiated:  NA      [facility] nurse consulted for Pressure Injury (Stage 3,4, Unstageable, DTI, NWPT, Complex wounds)and New or Established Ostomies:  NA      Primary Nurse eSignature: Electronically signed by Sami Busch RN on 8/8/23 at 6:44 PM EDT

## 2023-08-09 PROCEDURE — 94761 N-INVAS EAR/PLS OXIMETRY MLT: CPT

## 2023-08-09 PROCEDURE — 6370000000 HC RX 637 (ALT 250 FOR IP): Performed by: STUDENT IN AN ORGANIZED HEALTH CARE EDUCATION/TRAINING PROGRAM

## 2023-08-09 PROCEDURE — 6360000002 HC RX W HCPCS: Performed by: STUDENT IN AN ORGANIZED HEALTH CARE EDUCATION/TRAINING PROGRAM

## 2023-08-09 PROCEDURE — 6370000000 HC RX 637 (ALT 250 FOR IP): Performed by: INTERNAL MEDICINE

## 2023-08-09 PROCEDURE — 2580000003 HC RX 258: Performed by: STUDENT IN AN ORGANIZED HEALTH CARE EDUCATION/TRAINING PROGRAM

## 2023-08-09 PROCEDURE — 94640 AIRWAY INHALATION TREATMENT: CPT

## 2023-08-09 PROCEDURE — 2700000000 HC OXYGEN THERAPY PER DAY

## 2023-08-09 PROCEDURE — 1200000000 HC SEMI PRIVATE

## 2023-08-09 PROCEDURE — 99232 SBSQ HOSP IP/OBS MODERATE 35: CPT | Performed by: INTERNAL MEDICINE

## 2023-08-09 PROCEDURE — 99239 HOSP IP/OBS DSCHRG MGMT >30: CPT | Performed by: INTERNAL MEDICINE

## 2023-08-09 RX ORDER — HYDROCODONE BITARTRATE AND ACETAMINOPHEN 5; 325 MG/1; MG/1
1 TABLET ORAL EVERY 6 HOURS PRN
Qty: 10 TABLET | Refills: 0 | Status: SHIPPED | OUTPATIENT
Start: 2023-08-09 | End: 2023-08-10 | Stop reason: SDUPTHER

## 2023-08-09 RX ORDER — AZITHROMYCIN 500 MG/1
500 TABLET, FILM COATED ORAL DAILY
Qty: 2 TABLET | Refills: 0 | Status: SHIPPED | OUTPATIENT
Start: 2023-08-10 | End: 2023-08-12

## 2023-08-09 RX ORDER — FLUTICASONE FUROATE, UMECLIDINIUM BROMIDE AND VILANTEROL TRIFENATATE 100; 62.5; 25 UG/1; UG/1; UG/1
1 POWDER RESPIRATORY (INHALATION) DAILY
Qty: 1 EACH | Refills: 0 | Status: SHIPPED | OUTPATIENT
Start: 2023-08-09

## 2023-08-09 RX ORDER — PREDNISONE 10 MG/1
TABLET ORAL
Qty: 18 TABLET | Refills: 0 | Status: SHIPPED | OUTPATIENT
Start: 2023-08-09

## 2023-08-09 RX ADMIN — Medication 10 ML: at 20:35

## 2023-08-09 RX ADMIN — HYDROCODONE BITARTRATE AND ACETAMINOPHEN 1 TABLET: 5; 325 TABLET ORAL at 20:36

## 2023-08-09 RX ADMIN — MUPIROCIN: 20 OINTMENT TOPICAL at 20:35

## 2023-08-09 RX ADMIN — LISINOPRIL 10 MG: 10 TABLET ORAL at 09:26

## 2023-08-09 RX ADMIN — Medication 2 PUFF: at 19:42

## 2023-08-09 RX ADMIN — MUPIROCIN: 20 OINTMENT TOPICAL at 10:08

## 2023-08-09 RX ADMIN — PREDNISONE 40 MG: 20 TABLET ORAL at 09:26

## 2023-08-09 RX ADMIN — HYDROCODONE BITARTRATE AND ACETAMINOPHEN 1 TABLET: 5; 325 TABLET ORAL at 09:26

## 2023-08-09 RX ADMIN — Medication 2 PUFF: at 07:47

## 2023-08-09 RX ADMIN — Medication 2 PUFF: at 15:44

## 2023-08-09 RX ADMIN — Medication 10 ML: at 10:09

## 2023-08-09 RX ADMIN — AZITHROMYCIN 500 MG: 250 TABLET, FILM COATED ORAL at 09:26

## 2023-08-09 RX ADMIN — GUAIFENESIN 600 MG: 600 TABLET, EXTENDED RELEASE ORAL at 09:26

## 2023-08-09 RX ADMIN — ENOXAPARIN SODIUM 40 MG: 40 INJECTION SUBCUTANEOUS at 18:25

## 2023-08-09 RX ADMIN — ESCITALOPRAM OXALATE 20 MG: 10 TABLET ORAL at 09:26

## 2023-08-09 RX ADMIN — GUAIFENESIN 600 MG: 600 TABLET, EXTENDED RELEASE ORAL at 20:35

## 2023-08-09 ASSESSMENT — PAIN SCALES - GENERAL
PAINLEVEL_OUTOF10: 8
PAINLEVEL_OUTOF10: 6
PAINLEVEL_OUTOF10: 7
PAINLEVEL_OUTOF10: 6

## 2023-08-09 ASSESSMENT — PAIN - FUNCTIONAL ASSESSMENT: PAIN_FUNCTIONAL_ASSESSMENT: ACTIVITIES ARE NOT PREVENTED

## 2023-08-09 ASSESSMENT — PAIN DESCRIPTION - LOCATION
LOCATION: BACK
LOCATION: BACK

## 2023-08-09 ASSESSMENT — PAIN DESCRIPTION - DESCRIPTORS
DESCRIPTORS: ACHING
DESCRIPTORS: ACHING;BURNING;THROBBING

## 2023-08-09 ASSESSMENT — PAIN DESCRIPTION - ONSET: ONSET: ON-GOING

## 2023-08-09 ASSESSMENT — PAIN DESCRIPTION - PAIN TYPE: TYPE: CHRONIC PAIN

## 2023-08-09 ASSESSMENT — PAIN DESCRIPTION - ORIENTATION: ORIENTATION: MID;LOWER

## 2023-08-09 ASSESSMENT — PAIN DESCRIPTION - FREQUENCY: FREQUENCY: CONTINUOUS

## 2023-08-09 NOTE — CARE COORDINATION
INTERDISCIPLINARY PLAN OF CARE CONFERENCE    Date/Time: 8/9/2023 10:53 AM  Completed by: Natalie Phoenix, Case Management      Patient Name:  Mary Jean  YOB: 1955  Admitting Diagnosis: Respiratory distress [R06.03]  COPD exacerbation ([address]) [J44.1]  Acute on chronic respiratory failure with hypercapnia ([address]) [J96.22]  Opacities of both lungs present on chest x-ray [R91.8]     Admit Date/Time:  8/7/2023  1:16 AM    Chart reviewed. Interdisciplinary team contacted or reviewed plan related to patient progress and discharge plans. Disciplines included Case Management, Nursing, and Dietitian. Current Status: Stable  PT/OT recommendation for discharge plan of care: Discharge Recommendations: SNF  DME needs for discharge: Defer to facility    Expected D/C Disposition:  Skilled nursing facility  Confirmed plan with patient and/or family Yes confirmed with: Mary Perry  Met with:Patient    Discharge Plan Comments: Reviewed chart, met with pt at bedside. Anjali Leon from [facility] can accept and will start precert, pt to be DC tomorrow. Pt agrees with plan and will inform daughter. Will continue to monitor. Giuliano Jimmy back for patient to come to [facility]. Transport set up for 11 am through [facility]. Pt aware and verbally states understanding. Home O2 in place on admit: Yes  Pt informed of need to bring portable home O2 tank on day of discharge for nursing to connect prior to leaving:  Yes  Verbalized agreement/Understanding:   Yes

## 2023-08-09 NOTE — PROGRESS NOTES
CM-SR, VSS, pt remains afebrile. UO WNL. Purewick in place. Pt is taking PO w/out difficulty. O2 is at 3 liters per NC. She is resting w/out evidence of distress @ this time.

## 2023-08-10 ENCOUNTER — TELEPHONE (OUTPATIENT)
Dept: PULMONOLOGY | Age: 68
End: 2023-08-10

## 2023-08-10 VITALS
HEART RATE: 73 BPM | BODY MASS INDEX: 28.74 KG/M2 | WEIGHT: 162.2 LBS | DIASTOLIC BLOOD PRESSURE: 66 MMHG | HEIGHT: 63 IN | TEMPERATURE: 98.2 F | RESPIRATION RATE: 18 BRPM | SYSTOLIC BLOOD PRESSURE: 123 MMHG | OXYGEN SATURATION: 96 %

## 2023-08-10 PROCEDURE — 94640 AIRWAY INHALATION TREATMENT: CPT

## 2023-08-10 PROCEDURE — 6370000000 HC RX 637 (ALT 250 FOR IP): Performed by: INTERNAL MEDICINE

## 2023-08-10 PROCEDURE — 2700000000 HC OXYGEN THERAPY PER DAY

## 2023-08-10 PROCEDURE — 6370000000 HC RX 637 (ALT 250 FOR IP): Performed by: STUDENT IN AN ORGANIZED HEALTH CARE EDUCATION/TRAINING PROGRAM

## 2023-08-10 PROCEDURE — 94761 N-INVAS EAR/PLS OXIMETRY MLT: CPT

## 2023-08-10 PROCEDURE — 99232 SBSQ HOSP IP/OBS MODERATE 35: CPT | Performed by: INTERNAL MEDICINE

## 2023-08-10 PROCEDURE — 2580000003 HC RX 258: Performed by: STUDENT IN AN ORGANIZED HEALTH CARE EDUCATION/TRAINING PROGRAM

## 2023-08-10 RX ORDER — HYDROCODONE BITARTRATE AND ACETAMINOPHEN 5; 325 MG/1; MG/1
1 TABLET ORAL EVERY 6 HOURS PRN
Qty: 10 TABLET | Refills: 0 | Status: SHIPPED | OUTPATIENT
Start: 2023-08-10 | End: 2023-08-13

## 2023-08-10 RX ADMIN — Medication 2 PUFF: at 11:12

## 2023-08-10 RX ADMIN — LISINOPRIL 10 MG: 10 TABLET ORAL at 09:32

## 2023-08-10 RX ADMIN — Medication 2 PUFF: at 07:47

## 2023-08-10 RX ADMIN — AZITHROMYCIN 500 MG: 250 TABLET, FILM COATED ORAL at 09:32

## 2023-08-10 RX ADMIN — PREDNISONE 40 MG: 20 TABLET ORAL at 09:32

## 2023-08-10 RX ADMIN — HYDROCODONE BITARTRATE AND ACETAMINOPHEN 1 TABLET: 5; 325 TABLET ORAL at 03:25

## 2023-08-10 RX ADMIN — ESCITALOPRAM OXALATE 20 MG: 10 TABLET ORAL at 09:32

## 2023-08-10 RX ADMIN — Medication 10 ML: at 09:32

## 2023-08-10 RX ADMIN — GUAIFENESIN 600 MG: 600 TABLET, EXTENDED RELEASE ORAL at 09:32

## 2023-08-10 ASSESSMENT — PAIN DESCRIPTION - ONSET: ONSET: ON-GOING

## 2023-08-10 ASSESSMENT — COPD QUESTIONNAIRES
QUESTION4_WALKINCLINE: 5
QUESTION8_ENERGYLEVEL: 4
QUESTION7_SLEEPQUALITY: 5
QUESTION1_COUGHFREQUENCY: 4
QUESTION3_CHESTTIGHTNESS: 0
QUESTION2_CHESTPHLEGM: 3
QUESTION6_LEAVINGHOUSE: 5
QUESTION5_HOMEACTIVITIES: 5
CAT_TOTALSCORE: 31

## 2023-08-10 ASSESSMENT — PAIN DESCRIPTION - FREQUENCY: FREQUENCY: CONTINUOUS

## 2023-08-10 ASSESSMENT — PAIN SCALES - GENERAL
PAINLEVEL_OUTOF10: 5
PAINLEVEL_OUTOF10: 7
PAINLEVEL_OUTOF10: 0

## 2023-08-10 ASSESSMENT — PAIN DESCRIPTION - ORIENTATION: ORIENTATION: MID;LOWER

## 2023-08-10 ASSESSMENT — PAIN DESCRIPTION - DESCRIPTORS: DESCRIPTORS: ACHING;BURNING;THROBBING

## 2023-08-10 ASSESSMENT — PAIN DESCRIPTION - PAIN TYPE: TYPE: CHRONIC PAIN

## 2023-08-10 ASSESSMENT — PAIN - FUNCTIONAL ASSESSMENT: PAIN_FUNCTIONAL_ASSESSMENT: ACTIVITIES ARE NOT PREVENTED

## 2023-08-10 ASSESSMENT — PAIN DESCRIPTION - LOCATION: LOCATION: BACK

## 2023-08-10 NOTE — PROGRESS NOTES
Gave am meds due per primary care nurse. A/O x 4. Denies any pain. CMU notified removed telemetry box. Bed check. Bed locked/lowest position. Call light within reach.

## 2023-08-10 NOTE — CARE COORDINATION
DISCHARGE ORDER  Date/Time 8/10/2023 8:43 AM  Completed by: Parisa Garza RN, Case Management    Patient Name: Nicola Davidson    : 1955      Admit order Date and Status: IP 2023  Noted discharge order. (verify MD's last order for status of admission/Traditional Medicare 3 [place] Inpatient qualifying stay required for SNF)    Confirmed discharge plan with:              Patient:  Le Tyler               Discharge to Facility:   Name: [facility]  Address:  [address] [place] [address] Kenneth., Prakash Dsouza  Phone:  [phone] RN Report [phone]  Fax:  [phone]    Facility phone number for staff giving report:    Pre-certification completed:  Approved  Hospital Exemption Notification (HENS) completed: CRUZ   Discharge orders and Continuity of Care faxed to facility:  tp be pulled from [facility]      Transportation:               Medical Transport explained with choice list offered to pt/family. Choice:  (no preference)  Agency used: [address] time:  11:00 AM      Pt/family/Nursing/Facility aware of  time:   Patient, attempted to call daughter Thuy, unable to leave VM, call placed to Nilda (dtr). Ambulance form completed: YES     Date Last IMM Given: 2023    Comments:    Pt is being d/c'd to [address] [place] [address] (skilled) today. Pt's O2 sats are 95% on 4 liters. Discharge timeout done with [facility] RN. All discharge needs and concerns addressed. Discharging nurse to complete CHIKI, reconcile AVS, and place final copy with patient's discharge packet. Discharging RN to ensure that written prescriptions for  Level II medications are sent with patient to the facility as per protocol.

## 2023-08-10 NOTE — PLAN OF CARE
Problem: Discharge Planning  Goal: Discharge to home or other facility with appropriate resources  Outcome: Progressing  Flowsheets (Taken 8/9/2023 2035)  Discharge to home or other facility with appropriate resources: Identify barriers to discharge with patient and caregiver     Problem: Respiratory - Adult  Goal: Achieves optimal ventilation and oxygenation  Outcome: Progressing  Flowsheets (Taken 8/9/2023 2035)  Achieves optimal ventilation and oxygenation: Assess for changes in respiratory status     Problem: Chronic Conditions and Co-morbidities  Goal: Patient's chronic conditions and co-morbidity symptoms are monitored and maintained or improved  Outcome: Progressing  Flowsheets (Taken 8/9/2023 2035)  Care Plan - Patient's Chronic Conditions and Co-Morbidity Symptoms are Monitored and Maintained or Improved: Monitor and assess patient's chronic conditions and comorbid symptoms for stability, deterioration, or improvement     Problem: Pain  Goal: Verbalizes/displays adequate comfort level or baseline comfort level  Outcome: Progressing  Flowsheets (Taken 8/9/2023 2036)  Verbalizes/displays adequate comfort level or baseline comfort level: Encourage patient to monitor pain and request assistance     Problem: Safety - Adult  Goal: Free from fall injury  Outcome: Progressing     Problem: Neurosensory - Adult  Goal: Achieves stable or improved neurological status  Outcome: Progressing

## 2023-08-10 NOTE — PROGRESS NOTES
Pt resting. Resp e/e. Shift assessment completed and charted. No needs. Will monitor.  Maryam Colunga RN

## 2023-08-11 LAB
BACTERIA BLD CULT ORG #2: NORMAL
BACTERIA BLD CULT: NORMAL

## 2023-08-11 NOTE — PROGRESS NOTES
Shift assessment complete. See flow sheet. Scheduled medications given, See MAR. Head to toe complete. Vital signs logged and active bowel sounds in all 4 quadrants. Pt awake in bed. Medications given. No further needs noted at this time. Call light and bedside table within reach. Bed in lowest position, wheels locked and side rails up x2.      Harriett De La Cruz RN

## 2023-08-11 NOTE — PLAN OF CARE
Problem: Discharge Planning  Goal: Discharge to home or other facility with appropriate resources  Outcome: Progressing  Flowsheets (Taken 8/10/2023 0900)  Discharge to home or other facility with appropriate resources: Identify barriers to discharge with patient and caregiver     Problem: Respiratory - Adult  Goal: Achieves optimal ventilation and oxygenation  Outcome: Progressing     Problem: Neurosensory - Adult  Goal: Achieves stable or improved neurological status  Outcome: Progressing     Problem: Chronic Conditions and Co-morbidities  Goal: Patient's chronic conditions and co-morbidity symptoms are monitored and maintained or improved  Outcome: Progressing     Problem: Pain  Goal: Verbalizes/displays adequate comfort level or baseline comfort level  Outcome: Progressing     Problem: Safety - Adult  Goal: Free from fall injury  Outcome: Progressing

## 2023-10-05 ENCOUNTER — APPOINTMENT (OUTPATIENT)
Dept: CT IMAGING | Age: 68
DRG: 177 | End: 2023-10-05
Payer: MEDICARE

## 2023-10-05 ENCOUNTER — APPOINTMENT (OUTPATIENT)
Dept: GENERAL RADIOLOGY | Age: 68
DRG: 177 | End: 2023-10-05
Payer: MEDICARE

## 2023-10-05 ENCOUNTER — HOSPITAL ENCOUNTER (INPATIENT)
Age: 68
DRG: 177 | End: 2023-10-05
Attending: EMERGENCY MEDICINE | Admitting: INTERNAL MEDICINE
Payer: MEDICARE

## 2023-10-05 DIAGNOSIS — J44.1 COPD EXACERBATION (HCC): ICD-10-CM

## 2023-10-05 DIAGNOSIS — J96.01 ACUTE RESPIRATORY FAILURE WITH HYPOXIA AND HYPERCAPNIA (HCC): Primary | ICD-10-CM

## 2023-10-05 DIAGNOSIS — G93.40 ACUTE ENCEPHALOPATHY: ICD-10-CM

## 2023-10-05 DIAGNOSIS — J96.02 ACUTE RESPIRATORY FAILURE WITH HYPOXIA AND HYPERCAPNIA (HCC): Primary | ICD-10-CM

## 2023-10-05 DIAGNOSIS — U07.1 COVID-19: ICD-10-CM

## 2023-10-05 DIAGNOSIS — E87.1 HYPONATREMIA: ICD-10-CM

## 2023-10-05 DIAGNOSIS — R91.1 PULMONARY NODULE: ICD-10-CM

## 2023-10-05 DIAGNOSIS — M79.18 MUSCULOSKELETAL PAIN: ICD-10-CM

## 2023-10-05 DIAGNOSIS — S22.42XA CLOSED FRACTURE OF MULTIPLE RIBS OF LEFT SIDE, INITIAL ENCOUNTER: ICD-10-CM

## 2023-10-05 LAB
ALBUMIN SERPL-MCNC: 4.3 G/DL (ref 3.4–5)
ALBUMIN/GLOB SERPL: 1.5 {RATIO} (ref 1.1–2.2)
ALP SERPL-CCNC: 103 U/L (ref 40–129)
ALT SERPL-CCNC: 26 U/L (ref 10–40)
AMPHETAMINES UR QL SCN>1000 NG/ML: NORMAL
ANION GAP SERPL CALCULATED.3IONS-SCNC: 12 MMOL/L (ref 3–16)
APAP SERPL-MCNC: <5 UG/ML (ref 10–30)
AST SERPL-CCNC: 47 U/L (ref 15–37)
BACTERIA URNS QL MICRO: ABNORMAL /HPF
BARBITURATES UR QL SCN>200 NG/ML: NORMAL
BASE EXCESS BLDA CALC-SCNC: 4.1 MMOL/L (ref -3–3)
BENZODIAZ UR QL SCN>200 NG/ML: NORMAL
BILIRUB SERPL-MCNC: 0.6 MG/DL (ref 0–1)
BILIRUB UR QL STRIP.AUTO: NEGATIVE
BUN SERPL-MCNC: 11 MG/DL (ref 7–20)
CALCIUM SERPL-MCNC: 8.9 MG/DL (ref 8.3–10.6)
CANNABINOIDS UR QL SCN>50 NG/ML: NORMAL
CHLORIDE SERPL-SCNC: 81 MMOL/L (ref 99–110)
CLARITY UR: CLEAR
CO2 BLDA-SCNC: 32 MMOL/L
CO2 SERPL-SCNC: 29 MMOL/L (ref 21–32)
COCAINE UR QL SCN: NORMAL
COHGB MFR BLDA: 0.4 % (ref 0–1.5)
COLOR UR: YELLOW
CREAT SERPL-MCNC: <0.5 MG/DL (ref 0.6–1.2)
DRUG SCREEN COMMENT UR-IMP: NORMAL
EPI CELLS #/AREA URNS HPF: ABNORMAL /HPF (ref 0–5)
ETHANOLAMINE SERPL-MCNC: NORMAL MG/DL (ref 0–0.08)
FENTANYL SCREEN, URINE: NORMAL
FLUAV RNA RESP QL NAA+PROBE: NOT DETECTED
FLUBV RNA RESP QL NAA+PROBE: NOT DETECTED
GFR SERPLBLD CREATININE-BSD FMLA CKD-EPI: >60 ML/MIN/{1.73_M2}
GLUCOSE SERPL-MCNC: 125 MG/DL (ref 70–99)
GLUCOSE UR STRIP.AUTO-MCNC: NEGATIVE MG/DL
HCO3 BLDA-SCNC: 30.3 MMOL/L (ref 21–29)
HGB BLDA-MCNC: 12.2 G/DL (ref 12–16)
HGB UR QL STRIP.AUTO: ABNORMAL
KETONES UR STRIP.AUTO-MCNC: NEGATIVE MG/DL
LACTATE BLDV-SCNC: 0.8 MMOL/L (ref 0.4–2)
LEUKOCYTE ESTERASE UR QL STRIP.AUTO: NEGATIVE
MAGNESIUM SERPL-MCNC: 1.8 MG/DL (ref 1.8–2.4)
METHADONE UR QL SCN>300 NG/ML: NORMAL
METHGB MFR BLDA: 0.3 %
NITRITE UR QL STRIP.AUTO: NEGATIVE
NT-PROBNP SERPL-MCNC: 275 PG/ML (ref 0–124)
O2 THERAPY: ABNORMAL
OPIATES UR QL SCN>300 NG/ML: NORMAL
OXYCODONE UR QL SCN: NORMAL
PCO2 BLDA: 53.2 MMHG (ref 35–45)
PCP UR QL SCN>25 NG/ML: NORMAL
PH BLDA: 7.37 [PH] (ref 7.35–7.45)
PH UR STRIP.AUTO: 6.5 [PH] (ref 5–8)
PH UR STRIP: 6 [PH]
PHOSPHATE SERPL-MCNC: 3.9 MG/DL (ref 2.5–4.9)
PO2 BLDA: 69 MMHG (ref 75–108)
POTASSIUM SERPL-SCNC: 4.5 MMOL/L (ref 3.5–5.1)
PROT SERPL-MCNC: 7.2 G/DL (ref 6.4–8.2)
PROT UR STRIP.AUTO-MCNC: ABNORMAL MG/DL
RBC #/AREA URNS HPF: ABNORMAL /HPF (ref 0–4)
RENAL EPI CELLS #/AREA UR COMP ASSIST: ABNORMAL /HPF (ref 0–1)
RSV AG NOSE QL: NEGATIVE
SAO2 % BLDA: 93.1 %
SARS-COV-2 RNA RESP QL NAA+PROBE: DETECTED
SODIUM SERPL-SCNC: 122 MMOL/L (ref 136–145)
SP GR UR STRIP.AUTO: <=1.005 (ref 1–1.03)
TROPONIN, HIGH SENSITIVITY: 36 NG/L (ref 0–14)
TROPONIN, HIGH SENSITIVITY: 40 NG/L (ref 0–14)
UA COMPLETE W REFLEX CULTURE PNL UR: ABNORMAL
UA DIPSTICK W REFLEX MICRO PNL UR: YES
URN SPEC COLLECT METH UR: ABNORMAL
UROBILINOGEN UR STRIP-ACNC: 0.2 E.U./DL
WBC #/AREA URNS HPF: ABNORMAL /HPF (ref 0–5)

## 2023-10-05 PROCEDURE — 5A09457 ASSISTANCE WITH RESPIRATORY VENTILATION, 24-96 CONSECUTIVE HOURS, CONTINUOUS POSITIVE AIRWAY PRESSURE: ICD-10-PCS | Performed by: INTERNAL MEDICINE

## 2023-10-05 PROCEDURE — 83605 ASSAY OF LACTIC ACID: CPT

## 2023-10-05 PROCEDURE — 83880 ASSAY OF NATRIURETIC PEPTIDE: CPT

## 2023-10-05 PROCEDURE — 87807 RSV ASSAY W/OPTIC: CPT

## 2023-10-05 PROCEDURE — 6360000004 HC RX CONTRAST MEDICATION: Performed by: EMERGENCY MEDICINE

## 2023-10-05 PROCEDURE — 2580000003 HC RX 258: Performed by: EMERGENCY MEDICINE

## 2023-10-05 PROCEDURE — 2700000000 HC OXYGEN THERAPY PER DAY

## 2023-10-05 PROCEDURE — 6360000002 HC RX W HCPCS: Performed by: EMERGENCY MEDICINE

## 2023-10-05 PROCEDURE — 83735 ASSAY OF MAGNESIUM: CPT

## 2023-10-05 PROCEDURE — 87040 BLOOD CULTURE FOR BACTERIA: CPT

## 2023-10-05 PROCEDURE — 96374 THER/PROPH/DIAG INJ IV PUSH: CPT

## 2023-10-05 PROCEDURE — 84484 ASSAY OF TROPONIN QUANT: CPT

## 2023-10-05 PROCEDURE — 93005 ELECTROCARDIOGRAM TRACING: CPT | Performed by: EMERGENCY MEDICINE

## 2023-10-05 PROCEDURE — 87636 SARSCOV2 & INF A&B AMP PRB: CPT

## 2023-10-05 PROCEDURE — 71045 X-RAY EXAM CHEST 1 VIEW: CPT

## 2023-10-05 PROCEDURE — 80143 DRUG ASSAY ACETAMINOPHEN: CPT

## 2023-10-05 PROCEDURE — 80307 DRUG TEST PRSMV CHEM ANLYZR: CPT

## 2023-10-05 PROCEDURE — 82077 ASSAY SPEC XCP UR&BREATH IA: CPT

## 2023-10-05 PROCEDURE — 94660 CPAP INITIATION&MGMT: CPT

## 2023-10-05 PROCEDURE — 6370000000 HC RX 637 (ALT 250 FOR IP): Performed by: EMERGENCY MEDICINE

## 2023-10-05 PROCEDURE — 82803 BLOOD GASES ANY COMBINATION: CPT

## 2023-10-05 PROCEDURE — 36600 WITHDRAWAL OF ARTERIAL BLOOD: CPT

## 2023-10-05 PROCEDURE — 81001 URINALYSIS AUTO W/SCOPE: CPT

## 2023-10-05 PROCEDURE — 84100 ASSAY OF PHOSPHORUS: CPT

## 2023-10-05 PROCEDURE — 94761 N-INVAS EAR/PLS OXIMETRY MLT: CPT

## 2023-10-05 PROCEDURE — 36415 COLL VENOUS BLD VENIPUNCTURE: CPT

## 2023-10-05 PROCEDURE — 71260 CT THORAX DX C+: CPT

## 2023-10-05 PROCEDURE — 96361 HYDRATE IV INFUSION ADD-ON: CPT

## 2023-10-05 PROCEDURE — 80053 COMPREHEN METABOLIC PANEL: CPT

## 2023-10-05 PROCEDURE — 99285 EMERGENCY DEPT VISIT HI MDM: CPT

## 2023-10-05 PROCEDURE — 70450 CT HEAD/BRAIN W/O DYE: CPT

## 2023-10-05 RX ORDER — 0.9 % SODIUM CHLORIDE 0.9 %
500 INTRAVENOUS SOLUTION INTRAVENOUS ONCE
Status: COMPLETED | OUTPATIENT
Start: 2023-10-05 | End: 2023-10-06

## 2023-10-05 RX ORDER — DEXAMETHASONE SODIUM PHOSPHATE 10 MG/ML
8 INJECTION, SOLUTION INTRAMUSCULAR; INTRAVENOUS ONCE
Status: COMPLETED | OUTPATIENT
Start: 2023-10-05 | End: 2023-10-05

## 2023-10-05 RX ORDER — IPRATROPIUM BROMIDE AND ALBUTEROL SULFATE 2.5; .5 MG/3ML; MG/3ML
2 SOLUTION RESPIRATORY (INHALATION) ONCE
Status: COMPLETED | OUTPATIENT
Start: 2023-10-05 | End: 2023-10-05

## 2023-10-05 RX ADMIN — SODIUM CHLORIDE 500 ML: 9 INJECTION, SOLUTION INTRAVENOUS at 23:25

## 2023-10-05 RX ADMIN — IPRATROPIUM BROMIDE AND ALBUTEROL SULFATE 2 DOSE: .5; 2.5 SOLUTION RESPIRATORY (INHALATION) at 21:33

## 2023-10-05 RX ADMIN — DEXAMETHASONE SODIUM PHOSPHATE 8 MG: 10 INJECTION, SOLUTION INTRAMUSCULAR; INTRAVENOUS at 21:32

## 2023-10-05 RX ADMIN — IOPAMIDOL 75 ML: 755 INJECTION, SOLUTION INTRAVENOUS at 22:41

## 2023-10-05 ASSESSMENT — PAIN - FUNCTIONAL ASSESSMENT: PAIN_FUNCTIONAL_ASSESSMENT: NONE - DENIES PAIN

## 2023-10-06 PROBLEM — S22.42XA MULTIPLE CLOSED FRACTURES OF RIBS OF LEFT SIDE: Status: ACTIVE | Noted: 2023-10-06

## 2023-10-06 LAB
ALBUMIN SERPL-MCNC: 3.9 G/DL (ref 3.4–5)
ALBUMIN/GLOB SERPL: 1.4 {RATIO} (ref 1.1–2.2)
ALP SERPL-CCNC: 95 U/L (ref 40–129)
ALT SERPL-CCNC: 25 U/L (ref 10–40)
ANION GAP SERPL CALCULATED.3IONS-SCNC: 10 MMOL/L (ref 3–16)
AST SERPL-CCNC: 38 U/L (ref 15–37)
BASOPHILS # BLD: 0 K/UL (ref 0–0.2)
BASOPHILS NFR BLD: 0.3 %
BILIRUB SERPL-MCNC: 0.4 MG/DL (ref 0–1)
BUN SERPL-MCNC: 10 MG/DL (ref 7–20)
CALCIUM SERPL-MCNC: 9 MG/DL (ref 8.3–10.6)
CHLORIDE SERPL-SCNC: 91 MMOL/L (ref 99–110)
CO2 SERPL-SCNC: 30 MMOL/L (ref 21–32)
CREAT SERPL-MCNC: 0.6 MG/DL (ref 0.6–1.2)
DEPRECATED RDW RBC AUTO: 12.4 % (ref 12.4–15.4)
EKG ATRIAL RATE: 83 BPM
EKG ATRIAL RATE: 93 BPM
EKG DIAGNOSIS: NORMAL
EKG DIAGNOSIS: NORMAL
EKG P AXIS: 62 DEGREES
EKG P AXIS: 81 DEGREES
EKG P-R INTERVAL: 146 MS
EKG P-R INTERVAL: 146 MS
EKG Q-T INTERVAL: 372 MS
EKG Q-T INTERVAL: 390 MS
EKG QRS DURATION: 70 MS
EKG QRS DURATION: 70 MS
EKG QTC CALCULATION (BAZETT): 437 MS
EKG QTC CALCULATION (BAZETT): 484 MS
EKG R AXIS: -26 DEGREES
EKG R AXIS: 5 DEGREES
EKG T AXIS: 205 DEGREES
EKG T AXIS: 49 DEGREES
EKG VENTRICULAR RATE: 83 BPM
EKG VENTRICULAR RATE: 93 BPM
EOSINOPHIL # BLD: 0 K/UL (ref 0–0.6)
EOSINOPHIL NFR BLD: 0 %
FERRITIN SERPL IA-MCNC: 104.2 NG/ML (ref 15–150)
GFR SERPLBLD CREATININE-BSD FMLA CKD-EPI: >60 ML/MIN/{1.73_M2}
GLUCOSE BLD-MCNC: 103 MG/DL (ref 70–99)
GLUCOSE BLD-MCNC: 104 MG/DL (ref 70–99)
GLUCOSE BLD-MCNC: 142 MG/DL (ref 70–99)
GLUCOSE BLD-MCNC: 153 MG/DL (ref 70–99)
GLUCOSE BLD-MCNC: 197 MG/DL (ref 70–99)
GLUCOSE SERPL-MCNC: 128 MG/DL (ref 70–99)
HCT VFR BLD AUTO: 33.7 % (ref 36–48)
HGB BLD-MCNC: 11.6 G/DL (ref 12–16)
LDH SERPL L TO P-CCNC: 255 U/L (ref 100–190)
LYMPHOCYTES # BLD: 0.4 K/UL (ref 1–5.1)
LYMPHOCYTES NFR BLD: 9.3 %
MCH RBC QN AUTO: 31.9 PG (ref 26–34)
MCHC RBC AUTO-ENTMCNC: 34.3 G/DL (ref 31–36)
MCV RBC AUTO: 93.1 FL (ref 80–100)
MONOCYTES # BLD: 0.6 K/UL (ref 0–1.3)
MONOCYTES NFR BLD: 13.5 %
NEUTROPHILS # BLD: 3.2 K/UL (ref 1.7–7.7)
NEUTROPHILS NFR BLD: 76.9 %
PERFORMED ON: ABNORMAL
PLATELET # BLD AUTO: 182 K/UL (ref 135–450)
PMV BLD AUTO: 7.4 FL (ref 5–10.5)
POTASSIUM SERPL-SCNC: 4.6 MMOL/L (ref 3.5–5.1)
PROT SERPL-MCNC: 6.6 G/DL (ref 6.4–8.2)
RBC # BLD AUTO: 3.62 M/UL (ref 4–5.2)
SODIUM SERPL-SCNC: 131 MMOL/L (ref 136–145)
WBC # BLD AUTO: 4.1 K/UL (ref 4–11)

## 2023-10-06 PROCEDURE — 87633 RESP VIRUS 12-25 TARGETS: CPT

## 2023-10-06 PROCEDURE — 2000000000 HC ICU R&B

## 2023-10-06 PROCEDURE — 6360000002 HC RX W HCPCS: Performed by: INTERNAL MEDICINE

## 2023-10-06 PROCEDURE — 94640 AIRWAY INHALATION TREATMENT: CPT

## 2023-10-06 PROCEDURE — 94660 CPAP INITIATION&MGMT: CPT

## 2023-10-06 PROCEDURE — 2700000000 HC OXYGEN THERAPY PER DAY

## 2023-10-06 PROCEDURE — 2500000003 HC RX 250 WO HCPCS: Performed by: INTERNAL MEDICINE

## 2023-10-06 PROCEDURE — 94010 BREATHING CAPACITY TEST: CPT

## 2023-10-06 PROCEDURE — 80053 COMPREHEN METABOLIC PANEL: CPT

## 2023-10-06 PROCEDURE — 99233 SBSQ HOSP IP/OBS HIGH 50: CPT | Performed by: INTERNAL MEDICINE

## 2023-10-06 PROCEDURE — 6370000000 HC RX 637 (ALT 250 FOR IP): Performed by: INTERNAL MEDICINE

## 2023-10-06 PROCEDURE — 2580000003 HC RX 258: Performed by: INTERNAL MEDICINE

## 2023-10-06 PROCEDURE — 99291 CRITICAL CARE FIRST HOUR: CPT | Performed by: INTERNAL MEDICINE

## 2023-10-06 PROCEDURE — 93010 ELECTROCARDIOGRAM REPORT: CPT | Performed by: INTERNAL MEDICINE

## 2023-10-06 PROCEDURE — 85025 COMPLETE CBC W/AUTO DIFF WBC: CPT

## 2023-10-06 PROCEDURE — 94761 N-INVAS EAR/PLS OXIMETRY MLT: CPT

## 2023-10-06 PROCEDURE — 82728 ASSAY OF FERRITIN: CPT

## 2023-10-06 PROCEDURE — 83615 LACTATE (LD) (LDH) ENZYME: CPT

## 2023-10-06 PROCEDURE — 36415 COLL VENOUS BLD VENIPUNCTURE: CPT

## 2023-10-06 RX ORDER — FUROSEMIDE 40 MG/1
40 TABLET ORAL DAILY
Status: DISCONTINUED | OUTPATIENT
Start: 2023-10-06 | End: 2023-10-12 | Stop reason: HOSPADM

## 2023-10-06 RX ORDER — NICOTINE 21 MG/24HR
1 PATCH, TRANSDERMAL 24 HOURS TRANSDERMAL DAILY
Status: DISCONTINUED | OUTPATIENT
Start: 2023-10-06 | End: 2023-10-06

## 2023-10-06 RX ORDER — OXYCODONE HYDROCHLORIDE AND ACETAMINOPHEN 5; 325 MG/1; MG/1
1 TABLET ORAL EVERY 6 HOURS PRN
Status: DISCONTINUED | OUTPATIENT
Start: 2023-10-06 | End: 2023-10-12 | Stop reason: HOSPADM

## 2023-10-06 RX ORDER — ACETAMINOPHEN 650 MG/1
650 SUPPOSITORY RECTAL EVERY 6 HOURS PRN
Status: DISCONTINUED | OUTPATIENT
Start: 2023-10-06 | End: 2023-10-12 | Stop reason: HOSPADM

## 2023-10-06 RX ORDER — IPRATROPIUM BROMIDE AND ALBUTEROL SULFATE 2.5; .5 MG/3ML; MG/3ML
1 SOLUTION RESPIRATORY (INHALATION)
Status: DISCONTINUED | OUTPATIENT
Start: 2023-10-06 | End: 2023-10-06

## 2023-10-06 RX ORDER — ENOXAPARIN SODIUM 100 MG/ML
40 INJECTION SUBCUTANEOUS 2 TIMES DAILY
Status: DISCONTINUED | OUTPATIENT
Start: 2023-10-06 | End: 2023-10-12 | Stop reason: HOSPADM

## 2023-10-06 RX ORDER — IPRATROPIUM BROMIDE AND ALBUTEROL SULFATE 2.5; .5 MG/3ML; MG/3ML
1 SOLUTION RESPIRATORY (INHALATION)
Status: DISCONTINUED | OUTPATIENT
Start: 2023-10-06 | End: 2023-10-10

## 2023-10-06 RX ORDER — ALBUTEROL SULFATE 2.5 MG/3ML
2.5 SOLUTION RESPIRATORY (INHALATION)
Status: DISCONTINUED | OUTPATIENT
Start: 2023-10-06 | End: 2023-10-12 | Stop reason: HOSPADM

## 2023-10-06 RX ORDER — POLYETHYLENE GLYCOL 3350 17 G/17G
17 POWDER, FOR SOLUTION ORAL DAILY PRN
Status: DISCONTINUED | OUTPATIENT
Start: 2023-10-06 | End: 2023-10-12 | Stop reason: HOSPADM

## 2023-10-06 RX ORDER — POTASSIUM CHLORIDE 7.45 MG/ML
10 INJECTION INTRAVENOUS PRN
Status: DISCONTINUED | OUTPATIENT
Start: 2023-10-06 | End: 2023-10-12 | Stop reason: HOSPADM

## 2023-10-06 RX ORDER — LISINOPRIL 10 MG/1
10 TABLET ORAL DAILY
Status: DISCONTINUED | OUTPATIENT
Start: 2023-10-06 | End: 2023-10-12 | Stop reason: HOSPADM

## 2023-10-06 RX ORDER — PREDNISONE 20 MG/1
40 TABLET ORAL DAILY
Status: DISCONTINUED | OUTPATIENT
Start: 2023-10-06 | End: 2023-10-06

## 2023-10-06 RX ORDER — GUAIFENESIN/DEXTROMETHORPHAN 100-10MG/5
5 SYRUP ORAL EVERY 4 HOURS PRN
Status: DISCONTINUED | OUTPATIENT
Start: 2023-10-06 | End: 2023-10-12 | Stop reason: HOSPADM

## 2023-10-06 RX ORDER — ESCITALOPRAM OXALATE 10 MG/1
20 TABLET ORAL DAILY
Status: DISCONTINUED | OUTPATIENT
Start: 2023-10-06 | End: 2023-10-12 | Stop reason: HOSPADM

## 2023-10-06 RX ORDER — ACETAMINOPHEN 325 MG/1
650 TABLET ORAL EVERY 6 HOURS PRN
Status: DISCONTINUED | OUTPATIENT
Start: 2023-10-06 | End: 2023-10-12 | Stop reason: HOSPADM

## 2023-10-06 RX ORDER — MAGNESIUM SULFATE IN WATER 40 MG/ML
2000 INJECTION, SOLUTION INTRAVENOUS PRN
Status: DISCONTINUED | OUTPATIENT
Start: 2023-10-06 | End: 2023-10-12 | Stop reason: HOSPADM

## 2023-10-06 RX ORDER — ONDANSETRON 2 MG/ML
4 INJECTION INTRAMUSCULAR; INTRAVENOUS EVERY 6 HOURS PRN
Status: DISCONTINUED | OUTPATIENT
Start: 2023-10-06 | End: 2023-10-12 | Stop reason: HOSPADM

## 2023-10-06 RX ORDER — PROMETHAZINE HYDROCHLORIDE 25 MG/1
12.5 TABLET ORAL EVERY 6 HOURS PRN
Status: DISCONTINUED | OUTPATIENT
Start: 2023-10-06 | End: 2023-10-12 | Stop reason: HOSPADM

## 2023-10-06 RX ADMIN — FUROSEMIDE 40 MG: 40 TABLET ORAL at 12:06

## 2023-10-06 RX ADMIN — ENOXAPARIN SODIUM 40 MG: 100 INJECTION SUBCUTANEOUS at 03:51

## 2023-10-06 RX ADMIN — MUPIROCIN: 20 OINTMENT TOPICAL at 19:36

## 2023-10-06 RX ADMIN — ENOXAPARIN SODIUM 40 MG: 100 INJECTION SUBCUTANEOUS at 19:46

## 2023-10-06 RX ADMIN — OXYCODONE AND ACETAMINOPHEN 1 TABLET: 5; 325 TABLET ORAL at 12:06

## 2023-10-06 RX ADMIN — OXYCODONE AND ACETAMINOPHEN 1 TABLET: 5; 325 TABLET ORAL at 18:26

## 2023-10-06 RX ADMIN — ESCITALOPRAM OXALATE 20 MG: 10 TABLET ORAL at 12:06

## 2023-10-06 RX ADMIN — GUAIFENESIN AND DEXTROMETHORPHAN 5 ML: 100; 10 SYRUP ORAL at 18:29

## 2023-10-06 RX ADMIN — IPRATROPIUM BROMIDE AND ALBUTEROL SULFATE 1 DOSE: .5; 2.5 SOLUTION RESPIRATORY (INHALATION) at 15:22

## 2023-10-06 RX ADMIN — DOXYCYCLINE 100 MG: 100 INJECTION, POWDER, LYOPHILIZED, FOR SOLUTION INTRAVENOUS at 12:09

## 2023-10-06 RX ADMIN — DOXYCYCLINE 100 MG: 100 INJECTION, POWDER, LYOPHILIZED, FOR SOLUTION INTRAVENOUS at 03:48

## 2023-10-06 RX ADMIN — MUPIROCIN: 20 OINTMENT TOPICAL at 09:12

## 2023-10-06 RX ADMIN — IPRATROPIUM BROMIDE AND ALBUTEROL SULFATE 1 DOSE: .5; 2.5 SOLUTION RESPIRATORY (INHALATION) at 11:20

## 2023-10-06 RX ADMIN — IPRATROPIUM BROMIDE AND ALBUTEROL SULFATE 1 DOSE: .5; 2.5 SOLUTION RESPIRATORY (INHALATION) at 19:59

## 2023-10-06 RX ADMIN — PREDNISONE 40 MG: 20 TABLET ORAL at 09:12

## 2023-10-06 RX ADMIN — IPRATROPIUM BROMIDE AND ALBUTEROL SULFATE 1 DOSE: .5; 2.5 SOLUTION RESPIRATORY (INHALATION) at 07:59

## 2023-10-06 RX ADMIN — LISINOPRIL 10 MG: 10 TABLET ORAL at 14:00

## 2023-10-06 RX ADMIN — ENOXAPARIN SODIUM 40 MG: 100 INJECTION SUBCUTANEOUS at 09:12

## 2023-10-06 ASSESSMENT — PAIN SCALES - GENERAL
PAINLEVEL_OUTOF10: 9
PAINLEVEL_OUTOF10: 6
PAINLEVEL_OUTOF10: 9
PAINLEVEL_OUTOF10: 5
PAINLEVEL_OUTOF10: 7
PAINLEVEL_OUTOF10: 7

## 2023-10-06 ASSESSMENT — COPD QUESTIONNAIRES
QUESTION5_HOMEACTIVITIES: 3
QUESTION7_SLEEPQUALITY: 3
QUESTION3_CHESTTIGHTNESS: 3
QUESTION8_ENERGYLEVEL: 3
QUESTION6_LEAVINGHOUSE: 3
QUESTION1_COUGHFREQUENCY: 0
CAT_TOTALSCORE: 24
QUESTION4_WALKINCLINE: 5
QUESTION2_CHESTPHLEGM: 4

## 2023-10-06 ASSESSMENT — PAIN DESCRIPTION - LOCATION
LOCATION: GENERALIZED

## 2023-10-06 ASSESSMENT — LIFESTYLE VARIABLES
HOW OFTEN DO YOU HAVE A DRINK CONTAINING ALCOHOL: 2-3 TIMES A WEEK
HOW MANY STANDARD DRINKS CONTAINING ALCOHOL DO YOU HAVE ON A TYPICAL DAY: 1 OR 2

## 2023-10-06 ASSESSMENT — PAIN DESCRIPTION - DESCRIPTORS
DESCRIPTORS: ACHING

## 2023-10-06 ASSESSMENT — PAIN SCALES - WONG BAKER
WONGBAKER_NUMERICALRESPONSE: 0

## 2023-10-06 NOTE — PROGRESS NOTES
Patient is able to demonstrate the ability to move from a reclining position to an upright position within the recliner. 4 Eyes Skin Assessment     NAME:  Zaira Liz  YOB: 1955  MEDICAL RECORD NUMBER:  [id number]    The patient is being assessed for  Admission    I agree that at least one RN has performed a thorough Head to Toe Skin Assessment on the patient. ALL assessment sites listed below have been assessed. Areas assessed by both nurses:    Head, Face, Ears, Shoulders, Back, Chest, Arms, Elbows, Hands, Sacrum. Buttock, Coccyx, Ischium, Legs. Feet and Heels, and Under Medical Devices         Does the Patient have a Wound?  No noted wound(s)       William Prevention initiated by RN: Yes  Wound Care Orders initiated by RN: No    Pressure Injury (Stage 3,4, Unstageable, DTI, NWPT, and Complex wounds) if present, place Wound referral order by RN under : No    New Ostomies, if present place, Ostomy referral order under : No     Nurse 1 eSignature: Electronically signed by Ruth Gleason RN on 10/6/23 at 3:04 AM EDT    **SHARE this note so that the co-signing nurse can place an eSignature**    Nurse 2 eSignature: Electronically signed by Anika Ray RN on 10/6/23 at 4:43 AM EDT

## 2023-10-06 NOTE — PROGRESS NOTES
Occupational Therapy/Physical Therapy    PT/OT orders received and appreciated. Hold this date per nursing secondary to increased O2 requirement.     Carrie Pompa, OTR/L #[id number]   Criss Hare, PT, DPT

## 2023-10-06 NOTE — ED NOTES
Allred catheter inserted as ordered using f16; aseptic technique followed. with Initial output of 400ml.      Chichi Casillas RN  10/06/23 0964

## 2023-10-06 NOTE — DISCHARGE INSTR - COC
Continuity of Care Form    Patient Name: Hortencia Mendoza   :  1955  MRN:  [id number]    Admit date:  10/5/2023  Discharge date:  ***    Code Status Order: Full Code   Advance Directives:     Admitting Physician:  Thong Taveras DO  PCP: Salma Gomez MD    Discharging Nurse: [facility] Unit/Room#: 8128/3825-58  Discharging Unit Phone Number: ***    Emergency Contact:   Extended Emergency Contact Information  Primary Emergency Contact: [address] Phone: [phone]  Mobile Phone: [phone]  Relation: Child  Secondary Emergency Contact: [facility] [address] [address] Phone: [phone]  Work Phone: [phone]  Mobile Phone: [phone]  Relation: Child    Past Surgical History:  Past Surgical History:   Procedure Laterality Date    CHOLECYSTECTOMY      COLONOSCOPY         Immunization History:   Immunization History   Administered Date(s) Administered    Influenza Vaccine, unspecified formulation 2013, 10/26/2016    Influenza Virus Vaccine 2013, 10/26/2016, 2017    Influenza, FLUARIX, Olivia Sruthi (age 10 mo+) AND AFLURIA, (age 1 y+), PF, 0.5mL 10/26/2016, 2017, 2020, 2022    TDaP, ADACEL (age 6y-58y), BOOSTRIX (age 10y+), IM, 0.5mL 2017       Active Problems:  Patient Active Problem List   Diagnosis Code    Chest pain R07.9    Shortness of breath R06.02    Tobacco use Z72.0    Moderate COPD (chronic obstructive pulmonary disease) ([address]) J44.9    COPD exacerbation ([address]) J44.1    Acute respiratory failure with hypoxia ([address]) J96.01    Hyponatremia E87.1    Lung nodule R91.1    Pulmonary emphysema ([address]) J43.9    Acute on chronic respiratory failure with hypoxia and hypercapnia (HCC) J96.21, J96.22    Community acquired pneumonia of right lung J18.9    Acute exacerbation of chronic obstructive pulmonary disease (COPD) ([address]) J44.1    Acute on chronic respiratory failure ([address]) J96.20    Acute hypoxemic respiratory

## 2023-10-06 NOTE — PLAN OF CARE

## 2023-10-06 NOTE — PROGRESS NOTES
10/06/23 0328   NIV Type   $NIV $Daily Charge   Equipment Type v60   Mode Bilevel   Mask Type Full face mask   Mask Size Medium   Assessment   Pulse 70   Respirations 18   SpO2 98 %   Comfort Level Good   Using Accessory Muscles No   Settings/Measurements   IPAP 20 cmH20   CPAP/EPAP 8 cmH2O   Vt (Measured) 608 mL   Rate Ordered 18   FiO2  35 %   Minute Volume (L/min) 10.9 Liters   Mask Leak (lpm) 4 lpm   Patient's Home Machine No   Alarm Settings   Alarms On Y   Low Pressure (cmH2O) 8 cmH2O   High Pressure (cmH2O) 30 cmH2O   Apnea (secs) 20 secs   RR Low (bpm) 18   RR High (bpm) 30 br/min

## 2023-10-06 NOTE — PROGRESS NOTES
10/06/23 1100   COPD Assessment (CAT Score)   Cough Assessment 0   Phlegm Assessment 4   Chest tightness 3   Walking on an incline 5   Home Activities 3   Confident Leaving The Home 3   Sleeping Soundly 3   Have Energy 3   Assessment Score 24   $RT COPD Assessment Yes

## 2023-10-06 NOTE — PROGRESS NOTES
Patient has tolerated bipap over night and remained asleep. She wakes easily but goes back to sleep. No acute distress noted. Call light in reach. Bed lowest position and wheels locked.     Electronically signed by Alisa Israel RN on 10/6/2023 at 6:35 AM

## 2023-10-06 NOTE — PROGRESS NOTES
CM-SR, VSS, pt remains afebrile, UO WNL  Pt is resting on 4 liters per NC. Pt is taking PO w/out difficulty. Will continue to monitor.

## 2023-10-06 NOTE — CONSULTS
[facility]                                 Pulmonary Consult /Progress Note :                                                                  CHIEF COMPLAINT: SOB     Consulting provider: Dr Chuckie Leon   Reason for consult COVID pneumonia     HPI:     74 y.o. female who presented to [facility] with worsening SOB      Patient has been having shortness of breath for the past 3 days progressively worsened and is normally oxygen dependent on 3 L of nasal cannula due to her COPD has been compliant with her medications patient does report that she does have a family number who is sick around her dog got her sick    COVID was checked yesterday     Past Medical History:   Diagnosis Date    Angina pectoris ([address])     Chronic pain     knees and lower back     COPD exacerbation ([address]) 5/20/2018    Depression     Essential hypertension 8/15/2022    Panic disorder     Pneumonia        Past Surgical History:        Procedure Laterality Date    CHOLECYSTECTOMY      COLONOSCOPY         Allergies:  is allergic to cephalosporins, pcn [penicillins], and hctz [hydrochlorothiazide]. Social History:    TOBACCO:   reports that she quit smoking about 2 years ago. Her smoking use included cigarettes. She started smoking about 54 years ago. She has a 104.00 pack-year smoking history. She has been exposed to tobacco smoke. She has never used smokeless tobacco.  ETOH:   reports that she does not currently use alcohol after a past usage of about 12.0 standard drinks of alcohol per week.       Family History:       Problem Relation Age of Onset    COPD Mother     Hypertension Mother     Asthma Neg Hx     Cancer Neg Hx     Diabetes Neg Hx     Emphysema Neg Hx     Heart Failure Neg Hx        Current Medications:    Current Facility-Administered Medications:     potassium chloride 10 mEq/100 mL IVPB (Peripheral Line), 10 mEq, IntraVENous, PRN, Denia Marino, DO    magnesium sulfate hours.  APTT: No results for input(s): \"APTT\" in the last 72 hours. MV Settings:     / / /FiO2 : 35 %    IV:          Medications:  Scheduled Meds:   enoxaparin  40 mg SubCUTAneous BID    predniSONE  40 mg Oral Daily    doxycycline (VIBRAMYCIN) IV  100 mg IntraVENous Q12H    ipratropium 0.5 mg-albuterol 2.5 mg  1 Dose Inhalation 4x Daily RT    mupirocin   Each Nostril BID       PRN Meds:  potassium chloride, magnesium sulfate, promethazine **OR** ondansetron, polyethylene glycol, acetaminophen **OR** acetaminophen, guaiFENesin-dextromethorphan, albuterol    Results:  CBC:   Recent Labs     10/06/23  0451   WBC 4.1   HGB 11.6*   HCT 33.7*   MCV 93.1        BMP:   Recent Labs     10/05/23  2110 10/05/23  2225 10/06/23  0451   *  --  131*   K 4.5  --  4.6   CL 81*  --  91*   CO2 29  --  30   PHOS  --  3.9  --    BUN 11  --  10   CREATININE <0.5*  --  0.6     LIVER PROFILE:   Recent Labs     10/05/23  2110 10/06/23  0451   AST 47* 38*   ALT 26 25   BILITOT 0.6 0.4   ALKPHOS 103 95     PT/INR: No results for input(s): \"PROTIME\", \"INR\" in the last 72 hours. APTT: No results for input(s): \"APTT\" in the last 72 hours.   UA:  Recent Labs     10/05/23  2310   COLORU Yellow   PHUR 6.0  6.5   WBCUA 3-5   RBCUA 3-4   BACTERIA 1+*   CLARITYU Clear   SPECGRAV <=1.005   LEUKOCYTESUR Negative   UROBILINOGEN 0.2   BILIRUBINUR Negative   BLOODU TRACE-INTACT*   GLUCOSEU Negative       Cultures:      Films:  CXR reviewed by me and it showed         Assessment:     -Acute COPD exacerbation   COVID 19   -Possible fluid overload   -Acute on chronic hypoxic /hypercapnic respi failure   -Possible Pneumonia   -cough   Lung nodule  Severe Emphysema      Plan:     adjusted by BiPAP by me,see orders  *-IV steroids ,decadron 6 mg daily   *- BiPAP as needed and night   *-IV abx on doxy   *- Sputum culture   *_ CPAP/BiPAP  *- procalcitonin  *Pneumonia panel  *-BD  ICS   Incentive spirmetery and flutter valve   CT chest :shows lung nodule RUL 5 mm ,spiculated will folow aup as OP ,office notified    Check o2 at ambulation before discharge  May needed NIMV   DVT lovenox  Restart diuresis and watch bmp      Care reviewed with nursing staff, medical and surgical specialty care, primary care and the patient's family as available. Chart review/lab review/X-ray viewing/documentation and had long Conversation with patient/family re: prognosis, care options and any end of life issues:      Critical care time spent reviewing labs/films, examining patient, collaborating with other physicians more than 35  Minutes  excluding procedures . Libby Sanchez M.D.   10/6/2023  8:18 PM      Thank you very much for allowing me to participate in the care of this pleasant patient , should you have any questions ,please do not hesitate to contact me      Dylan Luis MD,[facility]  Pulmonary&Critical Care Medicine    Miley Ramos    NOTE: This report was transcribed using voice recognition software. Every effort was made to ensure accuracy; however, inadvertent computerized transcription errors may be present.

## 2023-10-06 NOTE — PROGRESS NOTES
Inpatient Occupational Therapy Evaluation and Treatment    Unit: PCU  Date:  10/9/2023  Patient Name:    Eriberto Michel  Admitting diagnosis:  Hyponatremia [E87.1]  Acute exacerbation of chronic obstructive pulmonary disease (COPD) ([address]) [J44.1]  Acute encephalopathy [G93.40]  Acute respiratory failure with hypoxia and hypercapnia ([address]) [J96.01, J96.02]  COVID-19 [U07.1]  Admit Date:  10/5/2023  Precautions/Restrictions/WB Status/ Lines/ Wounds/ Oxygen: Fall risk, Lines (IV, Supplemental O2 (4L), and internal catheter), Telemetry, Continuous pulse oximetry, and Isolation Precautions: Droplet Plus - COVID    Pt seen for cotreatment this date due to patient safety, patient endurance, and limited functional status information    Treatment Time:  [id number]  Treatment Number:  1  Timed Code Treatment Minutes: 30 minutes  Total Treatment Minutes:  40  minutes    Patient Goals for Therapy: \"to go home \"          Discharge Recommendations: SNF  DME needs for discharge: Defer to facility       Therapy recommendations for staff:   Assist of 1 for transfers with use of rolling walker (RW) and gait belt to/from [facility]  to/from chair    History of Present Illness: Per Dr. Cooper July H&P:  \"81 y.o. female who presented to [facility] with past medical history of shortness of breath   Patient is on BiPAP very difficult to communicate but does answer commands and giving numbers with commands. Per report patient has been having shortness of breath for the past 3 days progressively worsened and is normally oxygen dependent on 3 L of nasal canula due to her COPD has been compliant with her medications patient does report that she does have a family number who is sick around her dog got her sick. \"  Assessment:   -Acute COPD exacerbation   COVID 19   -Possible fluid overload   -Acute on chronic hypoxic /hypercapnic respi failure   -Possible Pneumonia   -cough   Lung nodule    Home Health S4 Level Recommendation:  NA    AM-PAC Score: AM-PAC follow {Cognition:60532}    Pain:   {Yes No Unknown:54338}  Location: ***  Rating: {Pain Rating :61217} /10  Pain Medicine Status: {Pain Med Status:45342}    Preadmission Environment:   Pt. Lives     {Preadmission Environmet:90485}  Home environment:    {Preadmission Environment Home Environment:06878}  Steps to enter first floor:   {Preadmission Environment Steps 1st:16225}  Steps to second floor/basement: {Steps to enter 2nd floor:95125}  Laundry:     {Laundry location:62632}  Bathroom:     {Bathroom Environment:16291}  Pt sleeps in a:    {Pt sleeps in }  Equipment owned:    {equipment ODXYB:54828}    Preadmission Status:  Pt. Able to drive:    {Yes No VYRFLOY:52018}  Pt. Fully independent with ADLs:  {Yes No Unknown:79235}  Pt. Required assistance for:   {Pt. Required Assistance:48927}  Pt. {preadmit assist:61353} for functional transfers and utilized {amb/tsf with:97022} for mobility in home and {amb/tsf with:90265} out in community  History of falls:    {Yes No Unknown:55555}  Home Health Services:  {Home Health Services:61373}    Objective:  Does this pt have an acute or acute on chronic diagnosis of CHF?  {CHF yes/no:13618}    Upper Extremity ROM:    {UE Restrictions:55750}    Dominant Hand: Right/Left    Upper Extremity Strength:    {UE strength assessment:34795}    Upper Extremity Sensation:    {WNL Impaired:07853}    Upper Extremity Proprioception:  {WNL/WFL/impaired/NT:45364}    Coordination:  {WNL Impaired:53261}    Tone:  {WNL/WFL/impaired/NT:70865}    Balance:  Sitting:    {IP lvl of assist:61183}  Standing:    {IP lvl of assist:68035}    Bed Mobility:   Supine to Sit:    {IP Bed mobility:70763}  Sit to Supine:   {IP Bed mobility:85964}  Rolling:   {IP Bed mobility:33008}  Scooting in sitting: {IP Bed mobility:46433}  Scooting in supine:  {IP Bed mobility:06872}    Transfers:    Sit to stand:    {IP lvl of assist:74768}  Stand to sit:    {IP lvl of assist:44298}  Bed to chair:     {IP lvl of

## 2023-10-06 NOTE — ED NOTES
Blood culture 1& 2 withdrawn and sent to lab.       Ephraim Hashimoto, [address]  10/06/23 0656

## 2023-10-06 NOTE — PROGRESS NOTES
10/05/23 2149   NIV Type   $NIV $Daily Charge   NIV Started/Stopped On   Equipment Type V60   Mask Type Full face mask   Mask Size Medium   Assessment   Pulse 95   Respirations 23   SpO2 100 %   Settings/Measurements   PIP Observed 21 cm H20   IPAP 20 cmH20   CPAP/EPAP 8 cmH2O   Vt (Measured) 736 mL   Rate Ordered 18   Insp Rise Time (%) 2 %   FiO2  30 %   I Time/ I Time % 0.95 s   Minute Volume (L/min) 19.6 Liters   Mask Leak (lpm) 12 lpm   Patient's Home Machine No   Alarm Settings   Alarms On Y   Low Pressure (cmH2O) 8 cmH2O   High Pressure (cmH2O) 30 cmH2O   Delay Alarm 20 sec(s)   RR Low (bpm) 18   RR High (bpm) 30 br/min

## 2023-10-06 NOTE — PLAN OF CARE
Problem: Chronic Conditions and Co-morbidities  Goal: Patient's chronic conditions and co-morbidity symptoms are monitored and maintained or improved  Outcome: Progressing  Flowsheets (Taken 10/6/2023 0230)  Care Plan - Patient's Chronic Conditions and Co-Morbidity Symptoms are Monitored and Maintained or Improved:   Monitor and assess patient's chronic conditions and comorbid symptoms for stability, deterioration, or improvement   Collaborate with multidisciplinary team to address chronic and comorbid conditions and prevent exacerbation or deterioration     Problem: Skin/Tissue Integrity  Goal: Absence of new skin breakdown  Description: 1. Monitor for areas of redness and/or skin breakdown  2. Assess vascular access sites hourly  3. Every 4-6 hours minimum:  Change oxygen saturation probe site  4. Every 4-6 hours:  If on nasal continuous positive airway pressure, respiratory therapy assess nares and determine need for appliance change or resting period.   Outcome: Progressing

## 2023-10-06 NOTE — PLAN OF CARE
Called and spoke to pt unfortunately we haven't had any cancellations for today. Problem: Discharge Planning  Goal: Discharge to home or other facility with appropriate resources  8/16/2022 0722 by Magalie Sanchez RN  Outcome: Progressing  8/15/2022 2220 by Reza Bajwa RN  Outcome: Progressing     Problem: Safety - Adult  Goal: Free from fall injury  8/16/2022 0722 by Magalie Sanchez RN  Outcome: Progressing  8/15/2022 2220 by Reza Bajwa RN  Outcome: Progressing     Problem: ABCDS Injury Assessment  Goal: Absence of physical injury  8/16/2022 0722 by Magalie Sanchez RN  Outcome: Progressing  8/15/2022 2220 by Reza Bajwa RN  Outcome: Progressing

## 2023-10-06 NOTE — ED NOTES
Pt is drowsy but arousable on arrival; able to answer questions correctly and follows command.       Gayle Mcneil RN  10/06/23 0678

## 2023-10-06 NOTE — CARE COORDINATION
10/06/23 5197   Service Assessment   Patient Orientation   (Patient unable to communicate with CM at this time. Spoke with saran Daley (POA). )   Cognition   (Patient unable to communicate with CM at this time. Spoke with saran Daley (POA). )   History Provided By   (Patient unable to communicate with CM at this time. Spoke with saran Daley (POA). )   Primary Caregiver Self   Accompanied By/Relationship Supported daily by daughter Timmy Workman. [address] is: Named in [address]    PCP Verified by CM Yes   Last Visit to PCP Within last 6 months   Prior Functional Level Assistance with the following:;Cooking;Housework; Shopping  (Pt can shower alone (with someone nearby) and can go to the BR alone.)   Current Functional Level Assistance with the following:;Cooking;Housework; Shopping  (Pt can shower alone (with someone nearby) and can go to the BR alone.)   Can patient return to prior living arrangement Yes   Ability to make needs known: Good   Family able to assist with home care needs: Yes   Would you like for me to discuss the discharge plan with any other family members/significant others, and if so, who? No   Financial Resources Medicare   Community Resources None   Discharge Planning   Type of Residence House   Living Arrangements Alone  (Daughter Timmy Workman is with patient daily.)   Current Services Prior To Admission Oxygen Therapy;Durable Medical Equipment  (do not have name of 02 supplier at this time)   Current DME Prior to [employer]; Wheelchair  (Wheelchair used outside of the home)   Potential Assistance Needed N/A   DME Ordered? No   Potential Assistance Purchasing Medications No   Type of Home Care Services OT;PT;Skilled Therapy  (Patient/family open to SNF or home OT/PT if needed.)   Patient expects to be discharged to: House   Follow Up Appointment: Best Day/Time    (self)   One/Two Story Residence One story   History of falls?

## 2023-10-06 NOTE — H&P
Hospital Medicine History & Physical      PCP: Sayra Pablo MD    Date of Admission: 10/5/2023    Date of Service: Pt seen/examined on 10/5/2023    Pt seen/examined face to face on and admitted as inpatient with expected LOS to be two days but can change depending on diagnostic work up and treatment response. Chief Complaint:    Chief Complaint   Patient presents with    Shortness of Breath     SOB for approx 2-3 days. On 3l baseline. Hx of COPD. SPO2 of 90% with EMS on her baseline o2. History Of Present Illness:      74 y.o. female who presented to [facility] with past medical history of shortness of breath    Patient is on BiPAP very difficult to communicate but does answer commands and giving numbers with commands. Per report patient has been having shortness of breath for the past 3 days progressively worsened and is normally oxygen dependent on 3 L of nasal cannula due to her COPD has been compliant with her medications patient does report that she does have a family number who is sick around her dog got her sick. Past Medical History:          Diagnosis Date    Angina pectoris ([address])     Chronic pain     knees and lower back     COPD exacerbation ([address]) 5/20/2018    Depression     Essential hypertension 8/15/2022    Panic disorder     Pneumonia        Past Surgical History:          Procedure Laterality Date    CHOLECYSTECTOMY      COLONOSCOPY         Medications Prior to Admission:      Prior to Admission medications    Medication Sig Start Date End Date Taking?  Authorizing Provider   predniSONE (DELTASONE) 10 MG tablet 30 mg x 3 days, 20 mg x 3 days, 10 mg x 3 days then stop  Patient not taking: Reported on 10/5/2023 8/9/23   Yasmine Lara MD   fluticasone-umeclidin-vilant (TRELEGY ELLIPTA) 403-43.5-24 MCG/ACT AEPB inhaler Inhale 1 puff into the lungs daily 8/9/23   Yasmine Lara MD   Cholecalciferol (VITAMIN D) 50 MCG (2000 UT) CAPS capsule Take by mouth sounds. Musculoskeletal:  No clubbing, cyanosis. trace edema LE bilaterally. Skin: turgor normal.  No new rashes or lesions. Neurologic: Alert and oriented x4, no new focal sensory/motor deficits. Labs:     No results for input(s): \"WBC\", \"HGB\", \"HCT\", \"PLT\" in the last 72 hours. Recent Labs     10/05/23  2110 10/05/23  2225   *  --    K 4.5  --    CL 81*  --    CO2 29  --    BUN 11  --    CREATININE <0.5*  --    CALCIUM 8.9  --    PHOS  --  3.9     Recent Labs     10/05/23  2110   AST 47*   ALT 26   BILITOT 0.6   ALKPHOS 103     No results for input(s): \"INR\" in the last 72 hours. No results for input(s): \"CKTOTAL\", \"TROPONINI\" in the last 72 hours. Urinalysis:      Lab Results   Component Value Date/Time    NITRU Negative 10/05/2023 11:10 PM    WBCUA 3-5 10/05/2023 11:10 PM    BACTERIA 1+ 10/05/2023 11:10 PM    RBCUA 3-4 10/05/2023 11:10 PM    BLOODU TRACE-INTACT 10/05/2023 11:10 PM    SPECGRAV <=1.005 10/05/2023 11:10 PM    GLUCOSEU Negative 10/05/2023 11:10 PM       Radiology:     CXR: I have reviewed the CXR with the following interpretation:   No acute process  EKG:  I have reviewed the EKG with the following interpretation:   Normal sinus rhythm  CT CHEST PULMONARY EMBOLISM W CONTRAST   Final Result   No evidence of pulmonary embolism. Severe emphysematous change of the lungs. 5 mm solid pulmonary nodule along the right lung apex within the right upper   lobe. Subacute nondisplaced fractures of the left anterolateral 3rd and 4th ribs. RECOMMENDATIONS:   Fleischner Society guidelines for follow-up and management of incidentally   detected pulmonary nodules:      Single Solid Nodule:      Nodule size less than 6 mm   In a low-risk patient, no routine follow-up. In a high-risk patient, optional CT at 12 months.      -Low risk patients include individuals with minimal or absent history of   smoking and other known risk factors.       - High risk patients include individuals

## 2023-10-06 NOTE — PROGRESS NOTES
Patient provided a COPD Educational Folder that includes the following materials:     [x]  [address]: Managing your COPD  [x]  ALA: Getting the Most Out of Medication Delivery Devices  [x]  ALA: My COPD Action Plan  [x]  Better Breathers Club: [facility]   [x]  Smoking Cessation Classes  [x]  Outpatient Spiritual Care Services  [x]  [employer]: Signs of COPD    PATIENT/CAREGIVER TEACHING:   Level of patient/caregiver understanding able to:   [x] Verbalize understanding   [] Demonstrate understanding       [] Teach back        [] Needs reinforcement     []  Other:     Electronically signed by Jimena Gómez RN on 10/6/2023 at 12:28 PM

## 2023-10-06 NOTE — PROGRESS NOTES
2145  Patient arrived from ER per stretcher with portable oxygen via nasal canula at 6 LPM. She was admitted to ICU room 14. With assist of three staff members, patient was transferred to the bed and connected to room monitor. /82, MAP 99, HR 81, RR 21, Spo2 94%    Complete CHG bath given and 4 eyes skin assessment completed. Patient is very drowsy but is able to answer questions. Notified RT that patient was in room. History obtained by Alexandru Herrera RN from patient. Patient oriented to room, staff, and procedures.     Electronically signed by Zeynep Jean RN on 10/6/2023 at 3:02 AM

## 2023-10-07 LAB
25(OH)D3 SERPL-MCNC: 38.4 NG/ML
ALBUMIN SERPL-MCNC: 3.8 G/DL (ref 3.4–5)
ALBUMIN/GLOB SERPL: 1.3 {RATIO} (ref 1.1–2.2)
ALP SERPL-CCNC: 92 U/L (ref 40–129)
ALT SERPL-CCNC: 27 U/L (ref 10–40)
ANION GAP SERPL CALCULATED.3IONS-SCNC: 10 MMOL/L (ref 3–16)
APTT BLD: 34.4 SEC (ref 22.7–35.9)
AST SERPL-CCNC: 51 U/L (ref 15–37)
BACTERIA BLD CULT ORG #2: NORMAL
BACTERIA BLD CULT: NORMAL
BASOPHILS # BLD: 0 K/UL (ref 0–0.2)
BASOPHILS NFR BLD: 0.3 %
BILIRUB SERPL-MCNC: 0.5 MG/DL (ref 0–1)
BUN SERPL-MCNC: 13 MG/DL (ref 7–20)
CALCIUM SERPL-MCNC: 8.6 MG/DL (ref 8.3–10.6)
CHLORIDE SERPL-SCNC: 89 MMOL/L (ref 99–110)
CO2 SERPL-SCNC: 28 MMOL/L (ref 21–32)
CREAT SERPL-MCNC: <0.5 MG/DL (ref 0.6–1.2)
DEPRECATED RDW RBC AUTO: 12.7 % (ref 12.4–15.4)
EOSINOPHIL # BLD: 0 K/UL (ref 0–0.6)
EOSINOPHIL NFR BLD: 0.4 %
FIBRINOGEN PPP-MCNC: 313 MG/DL (ref 243–550)
GFR SERPLBLD CREATININE-BSD FMLA CKD-EPI: >60 ML/MIN/{1.73_M2}
GLUCOSE BLD-MCNC: 143 MG/DL (ref 70–99)
GLUCOSE BLD-MCNC: 150 MG/DL (ref 70–99)
GLUCOSE BLD-MCNC: 95 MG/DL (ref 70–99)
GLUCOSE SERPL-MCNC: 123 MG/DL (ref 70–99)
HCT VFR BLD AUTO: 35.6 % (ref 36–48)
HGB BLD-MCNC: 11.9 G/DL (ref 12–16)
INR PPP: 0.95 (ref 0.84–1.16)
LACTATE BLDV-SCNC: 0.7 MMOL/L (ref 0.4–2)
LYMPHOCYTES # BLD: 0.9 K/UL (ref 1–5.1)
LYMPHOCYTES NFR BLD: 15.9 %
MCH RBC QN AUTO: 31.5 PG (ref 26–34)
MCHC RBC AUTO-ENTMCNC: 33.3 G/DL (ref 31–36)
MCV RBC AUTO: 94.6 FL (ref 80–100)
MONOCYTES # BLD: 1.1 K/UL (ref 0–1.3)
MONOCYTES NFR BLD: 19.3 %
NEUTROPHILS # BLD: 3.7 K/UL (ref 1.7–7.7)
NEUTROPHILS NFR BLD: 64.1 %
PERFORMED ON: ABNORMAL
PERFORMED ON: ABNORMAL
PERFORMED ON: NORMAL
PLATELET # BLD AUTO: 209 K/UL (ref 135–450)
PMV BLD AUTO: 7.6 FL (ref 5–10.5)
PNEUMONIA PANEL MOLECULAR: NORMAL
POTASSIUM SERPL-SCNC: 4.2 MMOL/L (ref 3.5–5.1)
PROT SERPL-MCNC: 6.8 G/DL (ref 6.4–8.2)
PROTHROMBIN TIME: 12.7 SEC (ref 11.5–14.8)
RBC # BLD AUTO: 3.77 M/UL (ref 4–5.2)
REPORT: NORMAL
SODIUM SERPL-SCNC: 127 MMOL/L (ref 136–145)
TROPONIN, HIGH SENSITIVITY: 63 NG/L (ref 0–14)
WBC # BLD AUTO: 5.7 K/UL (ref 4–11)

## 2023-10-07 PROCEDURE — 2580000003 HC RX 258: Performed by: INTERNAL MEDICINE

## 2023-10-07 PROCEDURE — 85384 FIBRINOGEN ACTIVITY: CPT

## 2023-10-07 PROCEDURE — 85610 PROTHROMBIN TIME: CPT

## 2023-10-07 PROCEDURE — 6370000000 HC RX 637 (ALT 250 FOR IP): Performed by: INTERNAL MEDICINE

## 2023-10-07 PROCEDURE — 99233 SBSQ HOSP IP/OBS HIGH 50: CPT | Performed by: INTERNAL MEDICINE

## 2023-10-07 PROCEDURE — 94761 N-INVAS EAR/PLS OXIMETRY MLT: CPT

## 2023-10-07 PROCEDURE — 36415 COLL VENOUS BLD VENIPUNCTURE: CPT

## 2023-10-07 PROCEDURE — 94660 CPAP INITIATION&MGMT: CPT

## 2023-10-07 PROCEDURE — 2000000000 HC ICU R&B

## 2023-10-07 PROCEDURE — 84484 ASSAY OF TROPONIN QUANT: CPT

## 2023-10-07 PROCEDURE — 82306 VITAMIN D 25 HYDROXY: CPT

## 2023-10-07 PROCEDURE — 99291 CRITICAL CARE FIRST HOUR: CPT | Performed by: INTERNAL MEDICINE

## 2023-10-07 PROCEDURE — 83605 ASSAY OF LACTIC ACID: CPT

## 2023-10-07 PROCEDURE — 94640 AIRWAY INHALATION TREATMENT: CPT

## 2023-10-07 PROCEDURE — 85025 COMPLETE CBC W/AUTO DIFF WBC: CPT

## 2023-10-07 PROCEDURE — 2700000000 HC OXYGEN THERAPY PER DAY

## 2023-10-07 PROCEDURE — 6360000002 HC RX W HCPCS: Performed by: INTERNAL MEDICINE

## 2023-10-07 PROCEDURE — 80053 COMPREHEN METABOLIC PANEL: CPT

## 2023-10-07 PROCEDURE — 85730 THROMBOPLASTIN TIME PARTIAL: CPT

## 2023-10-07 PROCEDURE — 2500000003 HC RX 250 WO HCPCS: Performed by: INTERNAL MEDICINE

## 2023-10-07 RX ORDER — HYDROXYZINE HYDROCHLORIDE 25 MG/1
25 TABLET, FILM COATED ORAL 3 TIMES DAILY PRN
Status: DISCONTINUED | OUTPATIENT
Start: 2023-10-07 | End: 2023-10-12 | Stop reason: HOSPADM

## 2023-10-07 RX ADMIN — FUROSEMIDE 40 MG: 40 TABLET ORAL at 08:26

## 2023-10-07 RX ADMIN — OXYCODONE AND ACETAMINOPHEN 1 TABLET: 5; 325 TABLET ORAL at 00:21

## 2023-10-07 RX ADMIN — LISINOPRIL 10 MG: 10 TABLET ORAL at 08:26

## 2023-10-07 RX ADMIN — ENOXAPARIN SODIUM 40 MG: 100 INJECTION SUBCUTANEOUS at 20:47

## 2023-10-07 RX ADMIN — ESCITALOPRAM OXALATE 20 MG: 10 TABLET ORAL at 08:26

## 2023-10-07 RX ADMIN — DOXYCYCLINE 100 MG: 100 INJECTION, POWDER, LYOPHILIZED, FOR SOLUTION INTRAVENOUS at 13:15

## 2023-10-07 RX ADMIN — IPRATROPIUM BROMIDE AND ALBUTEROL SULFATE 1 DOSE: .5; 2.5 SOLUTION RESPIRATORY (INHALATION) at 15:51

## 2023-10-07 RX ADMIN — DEXAMETHASONE 6 MG: 4 TABLET ORAL at 08:26

## 2023-10-07 RX ADMIN — ENOXAPARIN SODIUM 40 MG: 100 INJECTION SUBCUTANEOUS at 08:26

## 2023-10-07 RX ADMIN — OXYCODONE AND ACETAMINOPHEN 1 TABLET: 5; 325 TABLET ORAL at 14:31

## 2023-10-07 RX ADMIN — DOXYCYCLINE 100 MG: 100 INJECTION, POWDER, LYOPHILIZED, FOR SOLUTION INTRAVENOUS at 02:52

## 2023-10-07 RX ADMIN — HYDROXYZINE HYDROCHLORIDE 25 MG: 25 TABLET ORAL at 21:47

## 2023-10-07 RX ADMIN — IPRATROPIUM BROMIDE AND ALBUTEROL SULFATE 1 DOSE: .5; 2.5 SOLUTION RESPIRATORY (INHALATION) at 20:13

## 2023-10-07 RX ADMIN — IPRATROPIUM BROMIDE AND ALBUTEROL SULFATE 1 DOSE: .5; 2.5 SOLUTION RESPIRATORY (INHALATION) at 07:59

## 2023-10-07 RX ADMIN — OXYCODONE AND ACETAMINOPHEN 1 TABLET: 5; 325 TABLET ORAL at 21:48

## 2023-10-07 RX ADMIN — IPRATROPIUM BROMIDE AND ALBUTEROL SULFATE 1 DOSE: .5; 2.5 SOLUTION RESPIRATORY (INHALATION) at 10:59

## 2023-10-07 RX ADMIN — MUPIROCIN: 20 OINTMENT TOPICAL at 20:47

## 2023-10-07 RX ADMIN — MUPIROCIN: 20 OINTMENT TOPICAL at 08:26

## 2023-10-07 RX ADMIN — HYDROXYZINE HYDROCHLORIDE 25 MG: 25 TABLET ORAL at 13:10

## 2023-10-07 RX ADMIN — OXYCODONE AND ACETAMINOPHEN 1 TABLET: 5; 325 TABLET ORAL at 08:26

## 2023-10-07 ASSESSMENT — PAIN DESCRIPTION - LOCATION
LOCATION: GENERALIZED

## 2023-10-07 ASSESSMENT — PAIN DESCRIPTION - DESCRIPTORS
DESCRIPTORS: ACHING

## 2023-10-07 ASSESSMENT — PAIN SCALES - WONG BAKER

## 2023-10-07 ASSESSMENT — PAIN SCALES - GENERAL
PAINLEVEL_OUTOF10: 8
PAINLEVEL_OUTOF10: 7
PAINLEVEL_OUTOF10: 4
PAINLEVEL_OUTOF10: 8
PAINLEVEL_OUTOF10: 8
PAINLEVEL_OUTOF10: 5
PAINLEVEL_OUTOF10: 8
PAINLEVEL_OUTOF10: 6

## 2023-10-07 NOTE — PLAN OF CARE
Problem: Discharge Planning  Goal: Discharge to home or other facility with appropriate resources  10/7/2023 1948 by Abelino Marinelli RN  Outcome: Progressing  10/7/2023 1836 by Kinza Levine RN  Outcome: Progressing     Problem: Chronic Conditions and Co-morbidities  Goal: Patient's chronic conditions and co-morbidity symptoms are monitored and maintained or improved  10/7/2023 1948 by Abelino Marinelli RN  Outcome: Progressing  10/7/2023 1836 by Kinza Levine RN  Outcome: Progressing     Problem: Skin/Tissue Integrity  Goal: Absence of new skin breakdown  Description: 1. Monitor for areas of redness and/or skin breakdown  2. Assess vascular access sites hourly  3. Every 4-6 hours minimum:  Change oxygen saturation probe site  4. Every 4-6 hours:  If on nasal continuous positive airway pressure, respiratory therapy assess nares and determine need for appliance change or resting period.   10/7/2023 1948 by Abelino Marinelli RN  Outcome: Progressing  10/7/2023 1836 by Kinza Levine RN  Outcome: Progressing     Problem: Safety - Adult  Goal: Free from fall injury  10/7/2023 1948 by Abelino Marinelli RN  Outcome: Progressing  10/7/2023 1836 by Kinza Levine RN  Outcome: Progressing     Problem: ABCDS Injury Assessment  Goal: Absence of physical injury  Outcome: Progressing     Problem: Pain  Goal: Verbalizes/displays adequate comfort level or baseline comfort level  10/7/2023 1948 by Abelino Marinelli RN  Outcome: Progressing  10/7/2023 1836 by Kinza Levine RN  Outcome: Progressing

## 2023-10-07 NOTE — PROGRESS NOTES
Occupational/Physical Therapy Attempt    Unit: ICU   Date:  10/7/2023  Patient Name:    Ismael King  Admitting diagnosis:  Hyponatremia [E87.1]  Acute exacerbation of chronic obstructive pulmonary disease (COPD) ([address]) [J44.1]  Acute encephalopathy [G93.40]  Acute respiratory failure with hypoxia and hypercapnia ([address]) [J96.01, J96.02]  COVID-19 [U07.1]  Admit Date:  10/5/2023    Attempt: Hold. Pt SOB upon entry. Reports she had a rough morning with getting cleaned up. SpO2 drop to 90% with conversation. Will hold today.        Electronically signed by Roselyn Hayden OT on 10/7/2023 at 12:36 PM  MELINDA Sheffield, OTR/L   [id number]  Jeanine Shen, PT, DPT #[id number]      No Charge

## 2023-10-07 NOTE — PROGRESS NOTES
10/06/23 2000   RT Protocol   History Pulmonary Disease 2   Respiratory pattern 2   Breath sounds 2   Cough 0   Indications for Bronchodilator Therapy Decreased or absent breath sounds   Bronchodilator Assessment Score 6

## 2023-10-07 NOTE — PROGRESS NOTES
10/06/23 3825   NIV Type   Equipment Type v60   Mode Bilevel   Mask Type Full face mask   Mask Size Medium   Settings/Measurements   IPAP 16 cmH20   CPAP/EPAP 8 cmH2O   Vt (Measured) 785 mL   Rate Ordered 18   FiO2  35 %   Minute Volume (L/min) 19.9 Liters   Mask Leak (lpm) 0 lpm   Patient's Home Machine No   Patient Observation   Observations spo2 97% on 35% bipap

## 2023-10-07 NOTE — PROGRESS NOTES
CM-SR, VSS, pt slighlty febrile,  UO WNL Pt is taking PO well. O2 remains @ 7 liters per NC. Pt is resting @ this time. Will continue to monitor.

## 2023-10-08 ENCOUNTER — APPOINTMENT (OUTPATIENT)
Dept: GENERAL RADIOLOGY | Age: 68
DRG: 177 | End: 2023-10-08
Payer: MEDICARE

## 2023-10-08 VITALS
RESPIRATION RATE: 20 BRPM | HEIGHT: 63 IN | SYSTOLIC BLOOD PRESSURE: 185 MMHG | WEIGHT: 150.35 LBS | TEMPERATURE: 97.9 F | BODY MASS INDEX: 26.64 KG/M2 | HEART RATE: 99 BPM | OXYGEN SATURATION: 95 % | DIASTOLIC BLOOD PRESSURE: 93 MMHG

## 2023-10-08 LAB
ALBUMIN SERPL-MCNC: 3.5 G/DL (ref 3.4–5)
ALBUMIN/GLOB SERPL: 1.3 {RATIO} (ref 1.1–2.2)
ALP SERPL-CCNC: 77 U/L (ref 40–129)
ALT SERPL-CCNC: 24 U/L (ref 10–40)
ANION GAP SERPL CALCULATED.3IONS-SCNC: 8 MMOL/L (ref 3–16)
AST SERPL-CCNC: 37 U/L (ref 15–37)
BASOPHILS # BLD: 0 K/UL (ref 0–0.2)
BASOPHILS NFR BLD: 0 %
BILIRUB SERPL-MCNC: 0.4 MG/DL (ref 0–1)
BUN SERPL-MCNC: 16 MG/DL (ref 7–20)
CALCIUM SERPL-MCNC: 9 MG/DL (ref 8.3–10.6)
CHLORIDE SERPL-SCNC: 88 MMOL/L (ref 99–110)
CO2 SERPL-SCNC: 31 MMOL/L (ref 21–32)
CREAT SERPL-MCNC: <0.5 MG/DL (ref 0.6–1.2)
DEPRECATED RDW RBC AUTO: 12.4 % (ref 12.4–15.4)
EOSINOPHIL # BLD: 0 K/UL (ref 0–0.6)
EOSINOPHIL NFR BLD: 0 %
GFR SERPLBLD CREATININE-BSD FMLA CKD-EPI: >60 ML/MIN/{1.73_M2}
GLUCOSE BLD-MCNC: 101 MG/DL (ref 70–99)
GLUCOSE BLD-MCNC: 140 MG/DL (ref 70–99)
GLUCOSE BLD-MCNC: 147 MG/DL (ref 70–99)
GLUCOSE BLD-MCNC: 188 MG/DL (ref 70–99)
GLUCOSE SERPL-MCNC: 105 MG/DL (ref 70–99)
HCT VFR BLD AUTO: 33.3 % (ref 36–48)
HGB BLD-MCNC: 11.5 G/DL (ref 12–16)
LYMPHOCYTES # BLD: 0.6 K/UL (ref 1–5.1)
LYMPHOCYTES NFR BLD: 12.1 %
MCH RBC QN AUTO: 32 PG (ref 26–34)
MCHC RBC AUTO-ENTMCNC: 34.5 G/DL (ref 31–36)
MCV RBC AUTO: 92.8 FL (ref 80–100)
MONOCYTES # BLD: 1 K/UL (ref 0–1.3)
MONOCYTES NFR BLD: 21.8 %
NEUTROPHILS # BLD: 3.1 K/UL (ref 1.7–7.7)
NEUTROPHILS NFR BLD: 66.1 %
PERFORMED ON: ABNORMAL
PLATELET # BLD AUTO: 205 K/UL (ref 135–450)
PMV BLD AUTO: 8.2 FL (ref 5–10.5)
POTASSIUM SERPL-SCNC: 4.6 MMOL/L (ref 3.5–5.1)
PROT SERPL-MCNC: 6.2 G/DL (ref 6.4–8.2)
RBC # BLD AUTO: 3.6 M/UL (ref 4–5.2)
SODIUM SERPL-SCNC: 127 MMOL/L (ref 136–145)
WBC # BLD AUTO: 4.7 K/UL (ref 4–11)

## 2023-10-08 PROCEDURE — 51702 INSERT TEMP BLADDER CATH: CPT

## 2023-10-08 PROCEDURE — 2580000003 HC RX 258: Performed by: INTERNAL MEDICINE

## 2023-10-08 PROCEDURE — 71045 X-RAY EXAM CHEST 1 VIEW: CPT

## 2023-10-08 PROCEDURE — 99233 SBSQ HOSP IP/OBS HIGH 50: CPT | Performed by: INTERNAL MEDICINE

## 2023-10-08 PROCEDURE — 6360000002 HC RX W HCPCS: Performed by: INTERNAL MEDICINE

## 2023-10-08 PROCEDURE — 80053 COMPREHEN METABOLIC PANEL: CPT

## 2023-10-08 PROCEDURE — 94660 CPAP INITIATION&MGMT: CPT

## 2023-10-08 PROCEDURE — 36415 COLL VENOUS BLD VENIPUNCTURE: CPT

## 2023-10-08 PROCEDURE — 6370000000 HC RX 637 (ALT 250 FOR IP): Performed by: INTERNAL MEDICINE

## 2023-10-08 PROCEDURE — 2700000000 HC OXYGEN THERAPY PER DAY

## 2023-10-08 PROCEDURE — 1200000000 HC SEMI PRIVATE

## 2023-10-08 PROCEDURE — 94640 AIRWAY INHALATION TREATMENT: CPT

## 2023-10-08 PROCEDURE — 2500000003 HC RX 250 WO HCPCS: Performed by: INTERNAL MEDICINE

## 2023-10-08 PROCEDURE — 94761 N-INVAS EAR/PLS OXIMETRY MLT: CPT

## 2023-10-08 PROCEDURE — 85025 COMPLETE CBC W/AUTO DIFF WBC: CPT

## 2023-10-08 RX ADMIN — IPRATROPIUM BROMIDE AND ALBUTEROL SULFATE 1 DOSE: .5; 2.5 SOLUTION RESPIRATORY (INHALATION) at 08:20

## 2023-10-08 RX ADMIN — OXYCODONE AND ACETAMINOPHEN 1 TABLET: 5; 325 TABLET ORAL at 20:36

## 2023-10-08 RX ADMIN — HYDROXYZINE HYDROCHLORIDE 25 MG: 25 TABLET ORAL at 20:36

## 2023-10-08 RX ADMIN — ESCITALOPRAM OXALATE 20 MG: 10 TABLET ORAL at 07:57

## 2023-10-08 RX ADMIN — MUPIROCIN: 20 OINTMENT TOPICAL at 07:57

## 2023-10-08 RX ADMIN — IPRATROPIUM BROMIDE AND ALBUTEROL SULFATE 1 DOSE: .5; 2.5 SOLUTION RESPIRATORY (INHALATION) at 19:45

## 2023-10-08 RX ADMIN — DEXAMETHASONE 6 MG: 4 TABLET ORAL at 07:56

## 2023-10-08 RX ADMIN — DOXYCYCLINE 100 MG: 100 INJECTION, POWDER, LYOPHILIZED, FOR SOLUTION INTRAVENOUS at 13:17

## 2023-10-08 RX ADMIN — GUAIFENESIN AND DEXTROMETHORPHAN 5 ML: 100; 10 SYRUP ORAL at 07:57

## 2023-10-08 RX ADMIN — IPRATROPIUM BROMIDE AND ALBUTEROL SULFATE 1 DOSE: .5; 2.5 SOLUTION RESPIRATORY (INHALATION) at 15:50

## 2023-10-08 RX ADMIN — MUPIROCIN: 20 OINTMENT TOPICAL at 20:21

## 2023-10-08 RX ADMIN — HYDROXYZINE HYDROCHLORIDE 25 MG: 25 TABLET ORAL at 06:52

## 2023-10-08 RX ADMIN — ENOXAPARIN SODIUM 40 MG: 100 INJECTION SUBCUTANEOUS at 07:57

## 2023-10-08 RX ADMIN — OXYCODONE AND ACETAMINOPHEN 1 TABLET: 5; 325 TABLET ORAL at 13:13

## 2023-10-08 RX ADMIN — LISINOPRIL 10 MG: 10 TABLET ORAL at 07:56

## 2023-10-08 RX ADMIN — DOXYCYCLINE 100 MG: 100 INJECTION, POWDER, LYOPHILIZED, FOR SOLUTION INTRAVENOUS at 01:52

## 2023-10-08 RX ADMIN — OXYCODONE AND ACETAMINOPHEN 1 TABLET: 5; 325 TABLET ORAL at 06:52

## 2023-10-08 RX ADMIN — ENOXAPARIN SODIUM 40 MG: 100 INJECTION SUBCUTANEOUS at 20:36

## 2023-10-08 ASSESSMENT — PAIN SCALES - GENERAL
PAINLEVEL_OUTOF10: 7
PAINLEVEL_OUTOF10: 5
PAINLEVEL_OUTOF10: 7
PAINLEVEL_OUTOF10: 4
PAINLEVEL_OUTOF10: 7

## 2023-10-08 ASSESSMENT — PAIN SCALES - WONG BAKER
WONGBAKER_NUMERICALRESPONSE: 0

## 2023-10-08 ASSESSMENT — PAIN DESCRIPTION - LOCATION
LOCATION: GENERALIZED
LOCATION: GENERALIZED
LOCATION: BACK
LOCATION: BACK

## 2023-10-08 ASSESSMENT — PAIN - FUNCTIONAL ASSESSMENT
PAIN_FUNCTIONAL_ASSESSMENT: PREVENTS OR INTERFERES SOME ACTIVE ACTIVITIES AND ADLS
PAIN_FUNCTIONAL_ASSESSMENT: ACTIVITIES ARE NOT PREVENTED
PAIN_FUNCTIONAL_ASSESSMENT: ACTIVITIES ARE NOT PREVENTED

## 2023-10-08 ASSESSMENT — PAIN DESCRIPTION - DESCRIPTORS
DESCRIPTORS: ACHING
DESCRIPTORS: ACHING;DISCOMFORT
DESCRIPTORS: ACHING
DESCRIPTORS: ACHING;DISCOMFORT

## 2023-10-08 ASSESSMENT — PAIN DESCRIPTION - ORIENTATION
ORIENTATION: LOWER
ORIENTATION: MID
ORIENTATION: LOWER

## 2023-10-08 ASSESSMENT — PAIN DESCRIPTION - PAIN TYPE: TYPE: CHRONIC PAIN

## 2023-10-08 ASSESSMENT — PAIN DESCRIPTION - FREQUENCY: FREQUENCY: CONTINUOUS

## 2023-10-08 ASSESSMENT — PAIN DESCRIPTION - ONSET: ONSET: ON-GOING

## 2023-10-08 NOTE — PROGRESS NOTES
Reassessment completed (see Flowsheet). All ICU lines remain intact, ICU monitoring continued-   Infusing:  Ryann Bartlett  pt O2 increased to 10L with Linen changes and bed bath. SpO2 decreased to 85%, and patient felt SOB. Lung sounds Diminished   pt's blood pressures WDL. PRN pain medications given (see MAR). Continuing to monitor.

## 2023-10-08 NOTE — PROGRESS NOTES
Shift handoff report given to [facility] RN at bedside. Pt is Awake and alert  IV handoff completed. 4 eyes completed. Reassessment completed prior- no changes to note. Pt has been free of falls for duration of shift. Catherine Kaufman

## 2023-10-08 NOTE — PROGRESS NOTES
Shift assessment was completed (see flow sheet). Pt is A/O- disoriented to year. Pt makes statements/ asks questions that does not follow flow of conversation. Pt currently denies any pain or other needs at this time. Respirations are even, unlabored, with diminished sounds. On 6L O2 via HFNC. Scheduled medications to follow- whole with water. Infusing:  N/A  Call light within reach. Bed in lowest position. Bed alarm on. Will continue to monitor.

## 2023-10-08 NOTE — PROGRESS NOTES
Inpatient Physical/ Occupational Therapy Attempt    Unit: ICU  Date:  10/8/2023  Patient Name:    Michael Benítez  Admitting diagnosis:  Hyponatremia [E87.1]  Acute exacerbation of chronic obstructive pulmonary disease (COPD) ([address]) [J44.1]  Acute encephalopathy [G93.40]  Acute respiratory failure with hypoxia and hypercapnia ([address]) [J96.01, J96.02]  COVID-19 [U07.1]  Admit Date:  10/5/2023    Orders received and chart reviewed. Upon attempt to see pt for PT/OT evaluation pt declined stating, \" I am not ready to do it today. Please check back tomorrow. \" PT educated that we will continue to follow and re attempt as appropriate and as schedule allows.      No Charge    Thanks  Mikal Green, PT, DPT PT [id number]  Mateus Mendoza [facility]

## 2023-10-08 NOTE — PROGRESS NOTES
4 Eyes Skin Assessment     NAME:  Joy Bruce  YOB: 1955  MEDICAL RECORD NUMBER:  [id number]    The patient is being assessed for  Other shift    I agree that at least one RN has performed a thorough Head to Toe Skin Assessment on the patient. ALL assessment sites listed below have been assessed. Areas assessed by both nurses:    Head, Face, Ears, Shoulders, Back, Chest, Arms, Elbows, Hands, Sacrum. Buttock, Coccyx, Ischium, Legs. Feet and Heels, and Under Medical Devices       Scattered redness and scabbing/ Ecchymosis  Does the Patient have a Wound?  No noted wound(s)       William Prevention initiated by RN: Yes  Wound Care Orders initiated by RN: Yes    Pressure Injury (Stage 3,4, Unstageable, DTI, NWPT, and Complex wounds) if present, place Wound referral order by RN under : No    New Ostomies, if present place, Ostomy referral order under : No     Nurse 1 eSignature: Electronically signed by Chloé Cifuentes RN on 10/8/23 at 4:03 PM EDT    **SHARE this note so that the co-signing nurse can place an eSignature**    Nurse 2 eSignature: Electronically signed by Peggy Meehan RN on 10/8/23 at 4:10 PM EDT

## 2023-10-08 NOTE — PROGRESS NOTES
10/08/23 0304   NIV Type   Equipment Type v60   Mode Bilevel   Mask Type Full face mask   Mask Size Medium   Settings/Measurements   IPAP 16 cmH20   CPAP/EPAP 8 cmH2O   Vt (Measured) 649 mL   Rate Ordered 18   FiO2  35 %   Minute Volume (L/min) 12.5 Liters   Mask Leak (lpm) 0 lpm   Patient's Home Machine No   Patient Observation   Observations spo2 98% on 35% bipap

## 2023-10-08 NOTE — PROGRESS NOTES
Care rounds completed with Dr. Lashaun Arredondo and multidisciplinary team. Reviewed labs, meds, VS, assessment, & plan of care for today.  -Ok to move off unit  -Bipap QHS. See dictated note and new orders for details.

## 2023-10-09 LAB
ALBUMIN SERPL-MCNC: 3.7 G/DL (ref 3.4–5)
ALBUMIN/GLOB SERPL: 1.4 {RATIO} (ref 1.1–2.2)
ALP SERPL-CCNC: 79 U/L (ref 40–129)
ALT SERPL-CCNC: 27 U/L (ref 10–40)
ANION GAP SERPL CALCULATED.3IONS-SCNC: 7 MMOL/L (ref 3–16)
AST SERPL-CCNC: 31 U/L (ref 15–37)
BASOPHILS # BLD: 0 K/UL (ref 0–0.2)
BASOPHILS NFR BLD: 0.1 %
BILIRUB SERPL-MCNC: 0.4 MG/DL (ref 0–1)
BUN SERPL-MCNC: 12 MG/DL (ref 7–20)
CALCIUM SERPL-MCNC: 9 MG/DL (ref 8.3–10.6)
CHLORIDE SERPL-SCNC: 92 MMOL/L (ref 99–110)
CO2 SERPL-SCNC: 31 MMOL/L (ref 21–32)
CREAT SERPL-MCNC: <0.5 MG/DL (ref 0.6–1.2)
DEPRECATED RDW RBC AUTO: 12.6 % (ref 12.4–15.4)
EOSINOPHIL # BLD: 0 K/UL (ref 0–0.6)
EOSINOPHIL NFR BLD: 0 %
GFR SERPLBLD CREATININE-BSD FMLA CKD-EPI: >60 ML/MIN/{1.73_M2}
GLUCOSE SERPL-MCNC: 108 MG/DL (ref 70–99)
HCT VFR BLD AUTO: 35.8 % (ref 36–48)
HGB BLD-MCNC: 12.4 G/DL (ref 12–16)
LYMPHOCYTES # BLD: 0.7 K/UL (ref 1–5.1)
LYMPHOCYTES NFR BLD: 15.3 %
MCH RBC QN AUTO: 32.4 PG (ref 26–34)
MCHC RBC AUTO-ENTMCNC: 34.7 G/DL (ref 31–36)
MCV RBC AUTO: 93.6 FL (ref 80–100)
MONOCYTES # BLD: 0.8 K/UL (ref 0–1.3)
MONOCYTES NFR BLD: 15.6 %
NEUTROPHILS # BLD: 3.3 K/UL (ref 1.7–7.7)
NEUTROPHILS NFR BLD: 69 %
PLATELET # BLD AUTO: 223 K/UL (ref 135–450)
PMV BLD AUTO: 7.6 FL (ref 5–10.5)
POTASSIUM SERPL-SCNC: 4.3 MMOL/L (ref 3.5–5.1)
PROT SERPL-MCNC: 6.3 G/DL (ref 6.4–8.2)
RBC # BLD AUTO: 3.82 M/UL (ref 4–5.2)
SODIUM SERPL-SCNC: 130 MMOL/L (ref 136–145)
WBC # BLD AUTO: 4.8 K/UL (ref 4–11)

## 2023-10-09 PROCEDURE — 2580000003 HC RX 258: Performed by: INTERNAL MEDICINE

## 2023-10-09 PROCEDURE — 2500000003 HC RX 250 WO HCPCS: Performed by: INTERNAL MEDICINE

## 2023-10-09 PROCEDURE — 97166 OT EVAL MOD COMPLEX 45 MIN: CPT

## 2023-10-09 PROCEDURE — 97162 PT EVAL MOD COMPLEX 30 MIN: CPT

## 2023-10-09 PROCEDURE — 80053 COMPREHEN METABOLIC PANEL: CPT

## 2023-10-09 PROCEDURE — 6360000002 HC RX W HCPCS: Performed by: INTERNAL MEDICINE

## 2023-10-09 PROCEDURE — 6370000000 HC RX 637 (ALT 250 FOR IP): Performed by: INTERNAL MEDICINE

## 2023-10-09 PROCEDURE — 97530 THERAPEUTIC ACTIVITIES: CPT

## 2023-10-09 PROCEDURE — 99232 SBSQ HOSP IP/OBS MODERATE 35: CPT | Performed by: INTERNAL MEDICINE

## 2023-10-09 PROCEDURE — 2700000000 HC OXYGEN THERAPY PER DAY

## 2023-10-09 PROCEDURE — 6370000000 HC RX 637 (ALT 250 FOR IP): Performed by: STUDENT IN AN ORGANIZED HEALTH CARE EDUCATION/TRAINING PROGRAM

## 2023-10-09 PROCEDURE — 85025 COMPLETE CBC W/AUTO DIFF WBC: CPT

## 2023-10-09 PROCEDURE — 94640 AIRWAY INHALATION TREATMENT: CPT

## 2023-10-09 PROCEDURE — 1200000000 HC SEMI PRIVATE

## 2023-10-09 PROCEDURE — 97535 SELF CARE MNGMENT TRAINING: CPT

## 2023-10-09 PROCEDURE — 94761 N-INVAS EAR/PLS OXIMETRY MLT: CPT

## 2023-10-09 PROCEDURE — 36415 COLL VENOUS BLD VENIPUNCTURE: CPT

## 2023-10-09 PROCEDURE — 94660 CPAP INITIATION&MGMT: CPT

## 2023-10-09 PROCEDURE — 99233 SBSQ HOSP IP/OBS HIGH 50: CPT | Performed by: INTERNAL MEDICINE

## 2023-10-09 RX ORDER — ALPRAZOLAM 0.5 MG/1
0.5 TABLET ORAL ONCE
Status: COMPLETED | OUTPATIENT
Start: 2023-10-09 | End: 2023-10-09

## 2023-10-09 RX ADMIN — DOXYCYCLINE 100 MG: 100 INJECTION, POWDER, LYOPHILIZED, FOR SOLUTION INTRAVENOUS at 14:36

## 2023-10-09 RX ADMIN — MUPIROCIN: 20 OINTMENT TOPICAL at 21:06

## 2023-10-09 RX ADMIN — IPRATROPIUM BROMIDE AND ALBUTEROL SULFATE 1 DOSE: .5; 2.5 SOLUTION RESPIRATORY (INHALATION) at 07:33

## 2023-10-09 RX ADMIN — ALPRAZOLAM 0.5 MG: 0.5 TABLET ORAL at 01:22

## 2023-10-09 RX ADMIN — ENOXAPARIN SODIUM 40 MG: 100 INJECTION SUBCUTANEOUS at 21:06

## 2023-10-09 RX ADMIN — ENOXAPARIN SODIUM 40 MG: 100 INJECTION SUBCUTANEOUS at 08:29

## 2023-10-09 RX ADMIN — OXYCODONE AND ACETAMINOPHEN 1 TABLET: 5; 325 TABLET ORAL at 21:15

## 2023-10-09 RX ADMIN — OXYCODONE AND ACETAMINOPHEN 1 TABLET: 5; 325 TABLET ORAL at 14:18

## 2023-10-09 RX ADMIN — DEXAMETHASONE 6 MG: 4 TABLET ORAL at 08:27

## 2023-10-09 RX ADMIN — ESCITALOPRAM OXALATE 20 MG: 10 TABLET ORAL at 08:27

## 2023-10-09 RX ADMIN — FUROSEMIDE 40 MG: 40 TABLET ORAL at 08:27

## 2023-10-09 RX ADMIN — IPRATROPIUM BROMIDE AND ALBUTEROL SULFATE 1 DOSE: .5; 2.5 SOLUTION RESPIRATORY (INHALATION) at 14:48

## 2023-10-09 RX ADMIN — IPRATROPIUM BROMIDE AND ALBUTEROL SULFATE 1 DOSE: .5; 2.5 SOLUTION RESPIRATORY (INHALATION) at 20:29

## 2023-10-09 RX ADMIN — GUAIFENESIN AND DEXTROMETHORPHAN 5 ML: 100; 10 SYRUP ORAL at 14:19

## 2023-10-09 RX ADMIN — OXYCODONE AND ACETAMINOPHEN 1 TABLET: 5; 325 TABLET ORAL at 06:26

## 2023-10-09 RX ADMIN — HYDROXYZINE HYDROCHLORIDE 25 MG: 25 TABLET ORAL at 14:42

## 2023-10-09 RX ADMIN — MUPIROCIN: 20 OINTMENT TOPICAL at 08:37

## 2023-10-09 RX ADMIN — DOXYCYCLINE 100 MG: 100 INJECTION, POWDER, LYOPHILIZED, FOR SOLUTION INTRAVENOUS at 01:30

## 2023-10-09 RX ADMIN — IPRATROPIUM BROMIDE AND ALBUTEROL SULFATE 1 DOSE: .5; 2.5 SOLUTION RESPIRATORY (INHALATION) at 11:04

## 2023-10-09 RX ADMIN — HYDROXYZINE HYDROCHLORIDE 25 MG: 25 TABLET ORAL at 21:15

## 2023-10-09 RX ADMIN — LISINOPRIL 10 MG: 10 TABLET ORAL at 08:27

## 2023-10-09 ASSESSMENT — PAIN DESCRIPTION - LOCATION
LOCATION: GENERALIZED;HEAD;BACK
LOCATION: BACK
LOCATION: PELVIS
LOCATION: PELVIS

## 2023-10-09 ASSESSMENT — PAIN DESCRIPTION - ORIENTATION
ORIENTATION: LOWER;UPPER
ORIENTATION: LOWER
ORIENTATION: LOWER

## 2023-10-09 ASSESSMENT — PAIN SCALES - GENERAL
PAINLEVEL_OUTOF10: 10
PAINLEVEL_OUTOF10: 9
PAINLEVEL_OUTOF10: 8
PAINLEVEL_OUTOF10: 10

## 2023-10-09 ASSESSMENT — PAIN DESCRIPTION - DESCRIPTORS
DESCRIPTORS: DISCOMFORT;HEAVINESS
DESCRIPTORS: ACHING
DESCRIPTORS: HEAVINESS;SHARP

## 2023-10-09 ASSESSMENT — PAIN DESCRIPTION - FREQUENCY
FREQUENCY: CONTINUOUS
FREQUENCY: CONTINUOUS

## 2023-10-09 ASSESSMENT — PAIN DESCRIPTION - ONSET: ONSET: ON-GOING

## 2023-10-09 ASSESSMENT — PAIN - FUNCTIONAL ASSESSMENT: PAIN_FUNCTIONAL_ASSESSMENT: ACTIVITIES ARE NOT PREVENTED

## 2023-10-09 ASSESSMENT — PAIN DESCRIPTION - PAIN TYPE
TYPE: ACUTE PAIN
TYPE: CHRONIC PAIN

## 2023-10-09 NOTE — PROGRESS NOTES
Shift assessment done,patient is awake,calm,alert and well oriented X 4. She is on oxygen via NC at 5l/min,has diminished breath sounds bilaterally. She is on a carb control diet and tolerates oral fluids well. She has a flood in for urine output,urine noted to be yellow in colour,with some sediments in it. Med given as per [facility]. Bed is at its lowest level,call light within reach,will continue to monitor patient.

## 2023-10-09 NOTE — PROGRESS NOTES
Inpatient Physical Therapy Evaluation & Treatment    Unit: ICU  Date:  10/9/2023  Patient Name:    Claudene Saran  Admitting diagnosis:  Hyponatremia [E87.1]  Acute exacerbation of chronic obstructive pulmonary disease (COPD) ([address]) [J44.1]  Acute encephalopathy [G93.40]  Acute respiratory failure with hypoxia and hypercapnia ([address]) [J96.01, J96.02]  COVID-19 [U07.1]  Admit Date:  10/5/2023  Precautions/Restrictions/WB Status/ Lines/ Wounds/ Oxygen: Fall risk, Lines (IV, Supplemental O2 (4L), and internal catheter), Telemetry, Continuous pulse oximetry, and Isolation Precautions: Droplet Plus - COVID      Pt seen for cotreatment this date due to patient safety, patient endurance, and limited functional status information    Treatment Time:  0928-1008  Treatment Number:  1   Timed Code Treatment Minutes: 30 minutes  Total Treatment Minutes:  40  minutes    Patient Stated Goals for Therapy: \" to go home \"          Discharge Recommendations: SNF  DME needs for discharge: Defer to facility       Therapy recommendation for EMS Transport: can transport by wheelchair    Therapy recommendations for staff:   Assist of 1 for transfers with use of rolling walker (RW) and gait belt to/from [facility]  to/from chair    History of Present Illness:   Per Dr. Mino Pennington H&P:  \"81 y.o. female who presented to [facility] with past medical history of shortness of breath   Patient is on BiPAP very difficult to communicate but does answer commands and giving numbers with commands. Per report patient has been having shortness of breath for the past 3 days progressively worsened and is normally oxygen dependent on 3 L of nasal canula due to her COPD has been compliant with her medications patient does report that she does have a family number who is sick around her dog got her sick. \"  Assessment:   -Acute COPD exacerbation   COVID 19   -Possible fluid overload   -Acute on chronic hypoxic /hypercapnic respi failure   -Possible Pneumonia   -cough visit, this note will serve as the Discharge Summary.

## 2023-10-09 NOTE — PROGRESS NOTES
Telemetry Assignment Communication Form    Patient has orders for continuous telemetry OR pulse oximeter only orders    To be filled out by Clinical    Patient has Admission or Transfer orders and is assigned to Room Number: 315      (Once this top section is completed:  Select \"Route\" and send note to Fax number: [phone] ))      ___________________________________________________________________________      [facility]    Patient assigned to tele box number: __________________               (to be written in by [address] when telemetry box is assigned to patient)    ___________________________________________________________________________      Bedside RN confirming that the box listed above is in fact the telemetry box number being placed on the patient listed above.         X________________________________________ RN signature        __________________________________________RN assigned to Patient (please print)    _______________ Date    ____________ Time

## 2023-10-09 NOTE — PROGRESS NOTES
10/09/23 0129   NIV Type   $NIV $Daily Charge   Equipment Type v60   Mode Bilevel   Mask Type Full face mask   Mask Size Medium   Settings/Measurements   IPAP 12 cmH20   CPAP/EPAP 5 cmH2O   Vt (Measured) 537 mL   FiO2  35 %   Minute Volume (L/min) 11 Liters   Mask Leak (lpm) 0 lpm   Patient's Home Machine No   Patient Observation   Observations spo2 93% on 35% bipap

## 2023-10-09 NOTE — PROGRESS NOTES
Physician Progress Note      PATIENTDelories Griffin  CSN #:                  [id number]  :                       1955  ADMIT DATE:       10/5/2023 8:46 PM  [facility] DATE:  Leidy Sheldon  PROVIDER #:        Kandi Gallegos TEXT:    Patient admitted with Covid. Noted documentation of encephalopathy per H&P. In   order to support the diagnosis of encephalopathy, please include additional   clinical indicators in your documentation. Or please document if the   diagnosis of encephalopathy has been ruled out after further study. The medical record reflects the following:  Risk Factors: Covid, acute respiratory failure, encephalopathy, ae COPD  Clinical Indicators: H&P- Acute encephalopathy  CT head negative  Neurologic: Alert and oriented x4, no new focal sensory/motor deficits. Treatment: BiPAP, DuoNebs, prednisone, doxycycline    Thank You Evon Figueredo RN, [email or web address] [email or web address]  Options provided:  -- Metabolic encephalopathy present as evidenced by, Please document evidence. -- Encephalopathy was ruled out  -- Other - I will add my own diagnosis  -- Disagree - Not applicable / Not valid  -- Disagree - Clinically unable to determine / Unknown  -- Refer to Clinical Documentation Reviewer    PROVIDER RESPONSE TEXT:    Metabolic encephalopathy is present as evidenced by hypercapnea    Query created by: Evon Figueredo on 10/9/2023 9:21 AM      Electronically signed by:   Miladis Marino X 10/9/2023 6:54 PM

## 2023-10-09 NOTE — PLAN OF CARE
Problem: Discharge Planning  Goal: Discharge to home or other facility with appropriate resources  10/9/2023 0025 by Amilcar Dietz RN  Outcome: Progressing  10/8/2023 2252 by Chung Guevara RN  Outcome: Progressing     Problem: Chronic Conditions and Co-morbidities  Goal: Patient's chronic conditions and co-morbidity symptoms are monitored and maintained or improved  10/9/2023 0025 by Amilcar Dietz RN  Outcome: Progressing  10/8/2023 2252 by Chung Guevara RN  Outcome: Progressing     Problem: Skin/Tissue Integrity  Goal: Absence of new skin breakdown  Description: 1. Monitor for areas of redness and/or skin breakdown  2. Assess vascular access sites hourly  3. Every 4-6 hours minimum:  Change oxygen saturation probe site  4. Every 4-6 hours:  If on nasal continuous positive airway pressure, respiratory therapy assess nares and determine need for appliance change or resting period.   10/9/2023 0025 by Amilcar Dietz RN  Outcome: Progressing  10/8/2023 2252 by Chung Guevara RN  Outcome: Progressing     Problem: Safety - Adult  Goal: Free from fall injury  10/9/2023 0025 by Amilcar Dietz RN  Outcome: Progressing  10/8/2023 2252 by Chung Guevara RN  Outcome: Progressing     Problem: ABCDS Injury Assessment  Goal: Absence of physical injury  10/9/2023 0025 by Amilcar Dietz RN  Outcome: Progressing  10/8/2023 2252 by Chung Guevara RN  Outcome: Progressing     Problem: Pain  Goal: Verbalizes/displays adequate comfort level or baseline comfort level  10/9/2023 0025 by Amilcar Dietz RN  Outcome: Progressing  10/8/2023 2252 by Chung Guevara RN  Outcome: Progressing

## 2023-10-09 NOTE — PROGRESS NOTES
Patient admitted to room 315 from ICU. Patient oriented to room, call light, bed rails, phone, lights and bathroom. Patient instructed about the schedule of the day including: vital sign frequency, lab draws, possible tests, frequency of MD and staff rounds, daily weights, I &O's and prescribed diet. Telemetry box in place, patient aware of placement and reason. Bed locked, in lowest position, side rails up 2/4, call light within reach. Recliner Assessment  Patient is not able to demonstrated the ability to move from a reclining position to an upright position within the recliner. however patient is alert, oriented and able to provide informed consent       4 Eyes Skin Assessment     NAME:  Bryan Deleon  YOB: 1955  MEDICAL RECORD NUMBER:  [id number]    The patient is being assessed for  Transfer to New Unit    I agree that at least one RN has performed a thorough Head to Toe Skin Assessment on the patient. ALL assessment sites listed below have been assessed. Areas assessed by both nurses:    Head, Face, Ears, Shoulders, Back, Chest, Arms, Elbows, Hands, Sacrum. Buttock, Coccyx, Ischium, and Legs. Feet and Heels        Does the Patient have a Wound? No noted wound(s)    Scattered ecchymosis noted on BUE.        William Prevention initiated by RN: No  Wound Care Orders initiated by RN: No    Pressure Injury (Stage 3,4, Unstageable, DTI, NWPT, and Complex wounds) if present, place Wound referral order by RN under : No    New Ostomies, if present place, Ostomy referral order under : No     Nurse 1 eSignature: Electronically signed by Dionisio Canada RN on 10/9/23 at 12:19 AM EDT    **SHARE this note so that the co-signing nurse can place an eSignature**    Nurse 2 eSignature: Electronically signed by Norman Pat RN on 10/9/23 at 12:39 AM EDT

## 2023-10-09 NOTE — PROGRESS NOTES
Pt. In bed awake. No signs of distress noted. Pt. denies further needs at this time. Will continue to monitor. Call light is within reach.  Benitez Gilliam RN

## 2023-10-09 NOTE — CARE COORDINATION
INTERDISCIPLINARY PLAN OF CARE CONFERENCE    Date/Time: 10/9/2023 1:13 PM  Completed by: Liyah Benito RN, Case Management      Patient Name:  Zaira Liz  YOB: 1955  Admitting Diagnosis: Hyponatremia [E87.1]  Acute exacerbation of chronic obstructive pulmonary disease (COPD) ([address]) [J44.1]  Acute encephalopathy [G93.40]  Acute respiratory failure with hypoxia and hypercapnia ([address]) [J96.01, J96.02]  COVID-19 [U07.1]     Admit Date/Time:  10/5/2023  8:46 PM    Chart reviewed. Interdisciplinary team contacted or reviewed plan related to patient progress and discharge plans. Disciplines included Case Management, Nursing, and Dietitian. Current Status:ip  PT/OT recommendation for discharge plan of care: SNF    Expected D/C Disposition:  Skilled nursing facility  Confirmed plan with patient and/or family Yes confirmed with: (name) rogers  Met with:pt    Discharge Plan Comments: met with pt at bedside. Pt is agreeable to DC to SNF. [facility] choice. Referral made. Awaiting return call    Home O2 in place on admit: Yes  Pt informed of need to bring portable home O2 tank on day of discharge for nursing to connect prior to leaving:  Not Indicated  Verbalized agreement/Understanding:  Not Indicated    Pt to DC to facility.  Transport to provide O2

## 2023-10-09 NOTE — PROGRESS NOTES
10/09/23 0300   NIV Type   NIV Started/Stopped On   Equipment Type v60   Mode Bilevel   Mask Type Full face mask   Mask Size Medium   Settings/Measurements   IPAP 12 cmH20   CPAP/EPAP 5 cmH2O   Vt (Measured) 655 mL   FiO2  35 %   I Time/ I Time % 0.95 s   Minute Volume (L/min) 12 Liters   Mask Leak (lpm) 0 lpm   Patient's Home Machine No

## 2023-10-10 LAB
ALBUMIN SERPL-MCNC: 3.7 G/DL (ref 3.4–5)
ALBUMIN/GLOB SERPL: 1.4 {RATIO} (ref 1.1–2.2)
ALP SERPL-CCNC: 81 U/L (ref 40–129)
ALT SERPL-CCNC: 37 U/L (ref 10–40)
ANION GAP SERPL CALCULATED.3IONS-SCNC: 8 MMOL/L (ref 3–16)
AST SERPL-CCNC: 34 U/L (ref 15–37)
BACTERIA BLD CULT ORG #2: NORMAL
BACTERIA BLD CULT: NORMAL
BASOPHILS # BLD: 0 K/UL (ref 0–0.2)
BASOPHILS NFR BLD: 0.1 %
BILIRUB SERPL-MCNC: 0.4 MG/DL (ref 0–1)
BUN SERPL-MCNC: 14 MG/DL (ref 7–20)
CALCIUM SERPL-MCNC: 9.2 MG/DL (ref 8.3–10.6)
CHLORIDE SERPL-SCNC: 88 MMOL/L (ref 99–110)
CO2 SERPL-SCNC: 33 MMOL/L (ref 21–32)
CREAT SERPL-MCNC: <0.5 MG/DL (ref 0.6–1.2)
DEPRECATED RDW RBC AUTO: 12.7 % (ref 12.4–15.4)
EOSINOPHIL # BLD: 0 K/UL (ref 0–0.6)
EOSINOPHIL NFR BLD: 0.1 %
GFR SERPLBLD CREATININE-BSD FMLA CKD-EPI: >60 ML/MIN/{1.73_M2}
GLUCOSE SERPL-MCNC: 127 MG/DL (ref 70–99)
HCT VFR BLD AUTO: 37.4 % (ref 36–48)
HGB BLD-MCNC: 12.7 G/DL (ref 12–16)
LYMPHOCYTES # BLD: 0.8 K/UL (ref 1–5.1)
LYMPHOCYTES NFR BLD: 12.1 %
MCH RBC QN AUTO: 31.7 PG (ref 26–34)
MCHC RBC AUTO-ENTMCNC: 33.9 G/DL (ref 31–36)
MCV RBC AUTO: 93.3 FL (ref 80–100)
MONOCYTES # BLD: 1.2 K/UL (ref 0–1.3)
MONOCYTES NFR BLD: 19 %
NEUTROPHILS # BLD: 4.3 K/UL (ref 1.7–7.7)
NEUTROPHILS NFR BLD: 68.7 %
PLATELET # BLD AUTO: 250 K/UL (ref 135–450)
PMV BLD AUTO: 7.8 FL (ref 5–10.5)
POTASSIUM SERPL-SCNC: 4.4 MMOL/L (ref 3.5–5.1)
PROT SERPL-MCNC: 6.3 G/DL (ref 6.4–8.2)
RBC # BLD AUTO: 4.01 M/UL (ref 4–5.2)
SODIUM SERPL-SCNC: 129 MMOL/L (ref 136–145)
WBC # BLD AUTO: 6.2 K/UL (ref 4–11)

## 2023-10-10 PROCEDURE — 1200000000 HC SEMI PRIVATE

## 2023-10-10 PROCEDURE — 80053 COMPREHEN METABOLIC PANEL: CPT

## 2023-10-10 PROCEDURE — 6370000000 HC RX 637 (ALT 250 FOR IP): Performed by: INTERNAL MEDICINE

## 2023-10-10 PROCEDURE — 99232 SBSQ HOSP IP/OBS MODERATE 35: CPT | Performed by: INTERNAL MEDICINE

## 2023-10-10 PROCEDURE — 2580000003 HC RX 258: Performed by: INTERNAL MEDICINE

## 2023-10-10 PROCEDURE — 6360000002 HC RX W HCPCS: Performed by: INTERNAL MEDICINE

## 2023-10-10 PROCEDURE — 36415 COLL VENOUS BLD VENIPUNCTURE: CPT

## 2023-10-10 PROCEDURE — 2500000003 HC RX 250 WO HCPCS: Performed by: INTERNAL MEDICINE

## 2023-10-10 PROCEDURE — 94640 AIRWAY INHALATION TREATMENT: CPT

## 2023-10-10 PROCEDURE — 2700000000 HC OXYGEN THERAPY PER DAY

## 2023-10-10 PROCEDURE — 94761 N-INVAS EAR/PLS OXIMETRY MLT: CPT

## 2023-10-10 PROCEDURE — 99232 SBSQ HOSP IP/OBS MODERATE 35: CPT

## 2023-10-10 PROCEDURE — 85025 COMPLETE CBC W/AUTO DIFF WBC: CPT

## 2023-10-10 RX ORDER — IPRATROPIUM BROMIDE AND ALBUTEROL SULFATE 2.5; .5 MG/3ML; MG/3ML
1 SOLUTION RESPIRATORY (INHALATION) 3 TIMES DAILY
Status: DISCONTINUED | OUTPATIENT
Start: 2023-10-10 | End: 2023-10-12

## 2023-10-10 RX ADMIN — IPRATROPIUM BROMIDE AND ALBUTEROL SULFATE 1 DOSE: .5; 2.5 SOLUTION RESPIRATORY (INHALATION) at 11:14

## 2023-10-10 RX ADMIN — OXYCODONE AND ACETAMINOPHEN 1 TABLET: 5; 325 TABLET ORAL at 20:25

## 2023-10-10 RX ADMIN — IPRATROPIUM BROMIDE AND ALBUTEROL SULFATE 1 DOSE: .5; 2.5 SOLUTION RESPIRATORY (INHALATION) at 07:26

## 2023-10-10 RX ADMIN — HYDROXYZINE HYDROCHLORIDE 25 MG: 25 TABLET ORAL at 09:40

## 2023-10-10 RX ADMIN — OXYCODONE AND ACETAMINOPHEN 1 TABLET: 5; 325 TABLET ORAL at 12:38

## 2023-10-10 RX ADMIN — MUPIROCIN: 20 OINTMENT TOPICAL at 09:42

## 2023-10-10 RX ADMIN — OXYCODONE AND ACETAMINOPHEN 1 TABLET: 5; 325 TABLET ORAL at 04:57

## 2023-10-10 RX ADMIN — ENOXAPARIN SODIUM 40 MG: 100 INJECTION SUBCUTANEOUS at 09:41

## 2023-10-10 RX ADMIN — ENOXAPARIN SODIUM 40 MG: 100 INJECTION SUBCUTANEOUS at 20:12

## 2023-10-10 RX ADMIN — LISINOPRIL 10 MG: 10 TABLET ORAL at 09:41

## 2023-10-10 RX ADMIN — ESCITALOPRAM OXALATE 20 MG: 10 TABLET ORAL at 09:41

## 2023-10-10 RX ADMIN — GUAIFENESIN AND DEXTROMETHORPHAN 5 ML: 100; 10 SYRUP ORAL at 09:41

## 2023-10-10 RX ADMIN — IPRATROPIUM BROMIDE AND ALBUTEROL SULFATE 1 DOSE: 2.5; .5 SOLUTION RESPIRATORY (INHALATION) at 19:29

## 2023-10-10 RX ADMIN — DEXAMETHASONE 6 MG: 4 TABLET ORAL at 09:40

## 2023-10-10 RX ADMIN — HYDROXYZINE HYDROCHLORIDE 25 MG: 25 TABLET ORAL at 20:25

## 2023-10-10 RX ADMIN — DOXYCYCLINE 100 MG: 100 INJECTION, POWDER, LYOPHILIZED, FOR SOLUTION INTRAVENOUS at 13:55

## 2023-10-10 RX ADMIN — FUROSEMIDE 40 MG: 40 TABLET ORAL at 09:41

## 2023-10-10 RX ADMIN — DOXYCYCLINE 100 MG: 100 INJECTION, POWDER, LYOPHILIZED, FOR SOLUTION INTRAVENOUS at 01:19

## 2023-10-10 RX ADMIN — MUPIROCIN: 20 OINTMENT TOPICAL at 20:13

## 2023-10-10 ASSESSMENT — PAIN DESCRIPTION - ORIENTATION
ORIENTATION: LOWER
ORIENTATION: UPPER

## 2023-10-10 ASSESSMENT — PAIN SCALES - GENERAL
PAINLEVEL_OUTOF10: 2
PAINLEVEL_OUTOF10: 8
PAINLEVEL_OUTOF10: 7
PAINLEVEL_OUTOF10: 7
PAINLEVEL_OUTOF10: 9
PAINLEVEL_OUTOF10: 8
PAINLEVEL_OUTOF10: 5
PAINLEVEL_OUTOF10: 5

## 2023-10-10 ASSESSMENT — PAIN DESCRIPTION - ONSET: ONSET: ON-GOING

## 2023-10-10 ASSESSMENT — PAIN SCALES - WONG BAKER
WONGBAKER_NUMERICALRESPONSE: 0

## 2023-10-10 ASSESSMENT — PAIN DESCRIPTION - LOCATION
LOCATION: BACK;HEAD;NECK
LOCATION: HEAD;BACK;NECK
LOCATION: BACK;NECK
LOCATION: HEAD;NECK;BACK

## 2023-10-10 ASSESSMENT — PAIN DESCRIPTION - FREQUENCY: FREQUENCY: CONTINUOUS

## 2023-10-10 ASSESSMENT — PAIN DESCRIPTION - DESCRIPTORS
DESCRIPTORS: ACHING

## 2023-10-10 ASSESSMENT — PAIN DESCRIPTION - PAIN TYPE: TYPE: ACUTE PAIN

## 2023-10-10 NOTE — PROGRESS NOTES
Shift assessment complete, morning medications given, patient resting with complaints of pain, medication available later this morning, will continue to monitor.  Shad Carlisle RN

## 2023-10-10 NOTE — DISCHARGE SUMMARY
Patient assisted to the wheelchair to discharge and did well with this. She did not c/o dizziness or nausea at that time. Once in W/C writer removed her PIV. Afterwards she vomited another moderate amount in an emesis bag. She continued to want to discharge because she felt better afterward vomiting. Once down in the parking the motion caused nausea again and some dry heaving. Educated patient's parents about staying hydrated and if the vomiting continues to call and/or come into the ED. They agreed to plan and wanted to proceed with discharge since pt said she felt better after lying down in van. They will all be seeing Dr. Biswas tomorrow in clinic.    medications from any pharmacy    Bring a paper prescription for each of these medications  · predniSONE 10 MG tablet           Discharged in stable condition to Home     Follow Up: Follow up with PCP in 1 week .  F/U with pulm in 3-4 weeks         Christina Thompson MD  2/7/2020

## 2023-10-10 NOTE — PROGRESS NOTES
Report given to [facility] RN and Joe GALVEZ. Pt. denies further needs at this time. Call light is within reach.   Emily Arrington RN

## 2023-10-10 NOTE — PROGRESS NOTES
New Telemetry box number assigned to Patient      To be filled out by [facility]    Patient assigned to tele box number: ________[id number]_________               (to be written in by [facility] when telemetry box is assigned to patient)    ___________________________________________________________________________      Bedside RN confirming that the box listed above is in fact the telemetry box number being placed on the patient listed above.         X________________________________________ RN signature        __________________________________________RN assigned to Patient (please print)    _______________ Date    ____________ Time

## 2023-10-11 PROCEDURE — 6370000000 HC RX 637 (ALT 250 FOR IP): Performed by: INTERNAL MEDICINE

## 2023-10-11 PROCEDURE — 94640 AIRWAY INHALATION TREATMENT: CPT

## 2023-10-11 PROCEDURE — 97530 THERAPEUTIC ACTIVITIES: CPT

## 2023-10-11 PROCEDURE — 97166 OT EVAL MOD COMPLEX 45 MIN: CPT

## 2023-10-11 PROCEDURE — 99232 SBSQ HOSP IP/OBS MODERATE 35: CPT | Performed by: INTERNAL MEDICINE

## 2023-10-11 PROCEDURE — 1200000000 HC SEMI PRIVATE

## 2023-10-11 PROCEDURE — 6360000002 HC RX W HCPCS: Performed by: INTERNAL MEDICINE

## 2023-10-11 PROCEDURE — 97535 SELF CARE MNGMENT TRAINING: CPT

## 2023-10-11 PROCEDURE — 2700000000 HC OXYGEN THERAPY PER DAY

## 2023-10-11 PROCEDURE — 94761 N-INVAS EAR/PLS OXIMETRY MLT: CPT

## 2023-10-11 RX ADMIN — HYDROXYZINE HYDROCHLORIDE 25 MG: 25 TABLET ORAL at 09:01

## 2023-10-11 RX ADMIN — OXYCODONE AND ACETAMINOPHEN 1 TABLET: 5; 325 TABLET ORAL at 05:17

## 2023-10-11 RX ADMIN — OXYCODONE AND ACETAMINOPHEN 1 TABLET: 5; 325 TABLET ORAL at 16:24

## 2023-10-11 RX ADMIN — DEXAMETHASONE 6 MG: 4 TABLET ORAL at 08:56

## 2023-10-11 RX ADMIN — FUROSEMIDE 40 MG: 40 TABLET ORAL at 08:56

## 2023-10-11 RX ADMIN — IPRATROPIUM BROMIDE AND ALBUTEROL SULFATE 1 DOSE: 2.5; .5 SOLUTION RESPIRATORY (INHALATION) at 11:32

## 2023-10-11 RX ADMIN — ENOXAPARIN SODIUM 40 MG: 100 INJECTION SUBCUTANEOUS at 20:22

## 2023-10-11 RX ADMIN — LISINOPRIL 10 MG: 10 TABLET ORAL at 08:56

## 2023-10-11 RX ADMIN — HYDROXYZINE HYDROCHLORIDE 25 MG: 25 TABLET ORAL at 16:24

## 2023-10-11 RX ADMIN — ESCITALOPRAM OXALATE 20 MG: 10 TABLET ORAL at 08:56

## 2023-10-11 RX ADMIN — ENOXAPARIN SODIUM 40 MG: 100 INJECTION SUBCUTANEOUS at 08:56

## 2023-10-11 RX ADMIN — GUAIFENESIN AND DEXTROMETHORPHAN 5 ML: 100; 10 SYRUP ORAL at 11:49

## 2023-10-11 RX ADMIN — IPRATROPIUM BROMIDE AND ALBUTEROL SULFATE 1 DOSE: 2.5; .5 SOLUTION RESPIRATORY (INHALATION) at 07:22

## 2023-10-11 ASSESSMENT — PAIN SCALES - GENERAL
PAINLEVEL_OUTOF10: 9
PAINLEVEL_OUTOF10: 7
PAINLEVEL_OUTOF10: 7
PAINLEVEL_OUTOF10: 6

## 2023-10-11 ASSESSMENT — PAIN DESCRIPTION - PAIN TYPE
TYPE: ACUTE PAIN
TYPE: ACUTE PAIN

## 2023-10-11 ASSESSMENT — PAIN - FUNCTIONAL ASSESSMENT
PAIN_FUNCTIONAL_ASSESSMENT: ACTIVITIES ARE NOT PREVENTED

## 2023-10-11 ASSESSMENT — PAIN SCALES - WONG BAKER
WONGBAKER_NUMERICALRESPONSE: 0

## 2023-10-11 ASSESSMENT — PAIN DESCRIPTION - ORIENTATION
ORIENTATION: LOWER

## 2023-10-11 ASSESSMENT — PAIN DESCRIPTION - LOCATION
LOCATION: NECK;BACK
LOCATION: BACK;HEAD
LOCATION: BACK;HEAD
LOCATION: BACK;CHEST

## 2023-10-11 ASSESSMENT — PAIN DESCRIPTION - DESCRIPTORS
DESCRIPTORS: ACHING

## 2023-10-11 ASSESSMENT — PAIN DESCRIPTION - FREQUENCY: FREQUENCY: CONTINUOUS

## 2023-10-11 ASSESSMENT — PAIN DESCRIPTION - ONSET: ONSET: ON-GOING

## 2023-10-11 NOTE — PROGRESS NOTES
Shift report given to nurse Garcia and Tammy at bedside. Patient care handed off in stable condition at this time.  Isrrael Velasco RN

## 2023-10-11 NOTE — PROGRESS NOTES
Inpatient Occupational Therapy Treatment    Unit: PCU  Date:  10/11/2023  Patient Name:    Ismael King  Admitting diagnosis:  Hyponatremia [E87.1]  Acute exacerbation of chronic obstructive pulmonary disease (COPD) ([address]) [J44.1]  Acute encephalopathy [G93.40]  Acute respiratory failure with hypoxia and hypercapnia ([address]) [J96.01, J96.02]  COVID-19 [U07.1]  Admit Date:  10/5/2023  Precautions/Restrictions/WB Status/ Lines/ Wounds/ Oxygen: Fall risk, Lines (IV, Supplemental O2 (2.5L), and external catheter), Telemetry, Continuous pulse oximetry, and Isolation Precautions: Droplet Plus - COVID    Treatment Time: 8136-1448   Treatment Number:  2  Timed Code Treatment Minutes: 47 minutes  Total Treatment Minutes:   47 minutes    Patient Goals for Therapy: \"to go home \"          Discharge Recommendations: SNF  DME needs for discharge: Defer to facility       Therapy recommendations for staff:   Assist of 1 for transfers with use of rolling walker (RW) and gait belt to/from [facility]  to/from [facility]    History of Present Illness: Per Dr. Michelle Peer H&P:  \"81 y.o. female who presented to [facility] with past medical history of shortness of breath   Patient is on BiPAP very difficult to communicate but does answer commands and giving numbers with commands. Per report patient has been having shortness of breath for the past 3 days progressively worsened and is normally oxygen dependent on 3 L of nasal canula due to her COPD has been compliant with her medications patient does report that she does have a family number who is sick around her dog got her sick. \"  Assessment:   -Acute COPD exacerbation   COVID 19   -Possible fluid overload   -Acute on chronic hypoxic /hypercapnic respi failure   -Possible Pneumonia   -cough   Lung nodule    Home Health S4 Level Recommendation:  NA    AM-PAC Score: AM-PAC Inpatient Daily Activity Raw Score: 18     Subjective:  Patient lying reclined in bed with no family present.    Pt agreeable to

## 2023-10-11 NOTE — PROGRESS NOTES
IM Progress Note    Admit Date:  10/5/2023    Was on BiPAP previously. Apparently somewhat confused at the time of admission per nocturnist.  Awake and alert at present. Uses 3 L of oxygen at baseline. Used BiPAP at the time of admission. 10/7- used bipap last night. She got short of breath off Bipap and she was put back on it and felt better. This am she is on 5 L of oxygen. Still feeling tired. Appetite is poor. 10/8- feeling better. Oxygen requirements are coming down. Transferred from ICU to PCU       Pt has remained stable    Stable on 4 L NC   Awaiting SNF bed       Subjective:  Ms. Ramsey Fix stable;   Decreased  cough and  SOB. Sats stable. No fever     In droplet plus precautions      Doing well, awaiting Precert for SNF    Objective:   Patient Vitals for the past 4 hrs:   BP Pulse Resp SpO2   10/11/23 1145 139/81 83 16 92 %   10/11/23 1133 -- -- -- 94 %            Intake/Output Summary (Last 24 hours) at 10/11/2023 1413  Last data filed at 10/11/2023 1008  Gross per 24 hour   Intake 624 ml   Output 1601 ml   Net -977 ml         Physical Exam:  Gen: No distress. Alert. Awake, oriented. Ill appearing    Eyes: PERRL. No sclera icterus. No conjunctival injection. ENT: No discharge. Pharynx clear. Neck: No JVD. Trachea midline. Resp: No accessory muscle use. Diminished breath sounds, no wheezes or rhonchi . No crackles. No wheezes. CV: Regular rate. Regular rhythm. No murmur. No rub. No edema. Capillary Refill: Brisk,< 3 seconds   Peripheral Pulses: +2 palpable, equal bilaterally   GI: Non-tender. Non-distended. Normal bowel sounds. Skin: Warm and dry. No nodule on exposed extremities. No rash on exposed extremities. M/S: No cyanosis. No joint deformity. No clubbing. Neuro: Awake. Grossly nonfocal    Psych: Oriented x 3. No anxiety or agitation.        Scheduled Meds:   ipratropium 0.5 mg-albuterol 2.5 mg  1 Dose Inhalation TID    enoxaparin  40 mg SubCUTAneous BID

## 2023-10-11 NOTE — CARE COORDINATION
Pt to DC to [facility]. Precert pending. Precert started 88/65.  Pt ready for DC when Sahra Fernando is approved

## 2023-10-12 VITALS
RESPIRATION RATE: 18 BRPM | HEART RATE: 101 BPM | HEIGHT: 63 IN | SYSTOLIC BLOOD PRESSURE: 137 MMHG | OXYGEN SATURATION: 94 % | DIASTOLIC BLOOD PRESSURE: 76 MMHG | BODY MASS INDEX: 29.91 KG/M2 | WEIGHT: 168.8 LBS | TEMPERATURE: 98.6 F

## 2023-10-12 PROCEDURE — 6370000000 HC RX 637 (ALT 250 FOR IP): Performed by: INTERNAL MEDICINE

## 2023-10-12 PROCEDURE — 99232 SBSQ HOSP IP/OBS MODERATE 35: CPT | Performed by: INTERNAL MEDICINE

## 2023-10-12 PROCEDURE — 99238 HOSP IP/OBS DSCHRG MGMT 30/<: CPT | Performed by: INTERNAL MEDICINE

## 2023-10-12 PROCEDURE — 6360000002 HC RX W HCPCS: Performed by: INTERNAL MEDICINE

## 2023-10-12 PROCEDURE — 94761 N-INVAS EAR/PLS OXIMETRY MLT: CPT

## 2023-10-12 PROCEDURE — 2700000000 HC OXYGEN THERAPY PER DAY

## 2023-10-12 PROCEDURE — 94640 AIRWAY INHALATION TREATMENT: CPT

## 2023-10-12 RX ORDER — DEXAMETHASONE 2 MG/1
TABLET ORAL
Qty: 15 TABLET | Refills: 0 | DISCHARGE
Start: 2023-10-13 | End: 2023-10-20

## 2023-10-12 RX ORDER — GUAIFENESIN/DEXTROMETHORPHAN 100-10MG/5
5 SYRUP ORAL EVERY 4 HOURS PRN
Qty: 120 ML | COMMUNITY
Start: 2023-10-12 | End: 2023-10-22

## 2023-10-12 RX ORDER — IPRATROPIUM BROMIDE AND ALBUTEROL SULFATE 2.5; .5 MG/3ML; MG/3ML
1 SOLUTION RESPIRATORY (INHALATION)
Status: DISCONTINUED | OUTPATIENT
Start: 2023-10-12 | End: 2023-10-12 | Stop reason: HOSPADM

## 2023-10-12 RX ORDER — OXYCODONE HYDROCHLORIDE AND ACETAMINOPHEN 5; 325 MG/1; MG/1
1 TABLET ORAL EVERY 6 HOURS PRN
Qty: 12 TABLET | Refills: 0 | Status: SHIPPED | OUTPATIENT
Start: 2023-10-12 | End: 2023-10-15

## 2023-10-12 RX ADMIN — OXYCODONE AND ACETAMINOPHEN 1 TABLET: 5; 325 TABLET ORAL at 00:37

## 2023-10-12 RX ADMIN — PROMETHAZINE HYDROCHLORIDE 12.5 MG: 25 TABLET ORAL at 13:13

## 2023-10-12 RX ADMIN — OXYCODONE AND ACETAMINOPHEN 1 TABLET: 5; 325 TABLET ORAL at 09:09

## 2023-10-12 RX ADMIN — HYDROXYZINE HYDROCHLORIDE 25 MG: 25 TABLET ORAL at 00:37

## 2023-10-12 RX ADMIN — LISINOPRIL 10 MG: 10 TABLET ORAL at 09:10

## 2023-10-12 RX ADMIN — ESCITALOPRAM OXALATE 20 MG: 10 TABLET ORAL at 09:09

## 2023-10-12 RX ADMIN — ENOXAPARIN SODIUM 40 MG: 100 INJECTION SUBCUTANEOUS at 09:12

## 2023-10-12 RX ADMIN — IPRATROPIUM BROMIDE AND ALBUTEROL SULFATE 1 DOSE: 2.5; .5 SOLUTION RESPIRATORY (INHALATION) at 00:04

## 2023-10-12 RX ADMIN — IPRATROPIUM BROMIDE AND ALBUTEROL SULFATE 1 DOSE: 2.5; .5 SOLUTION RESPIRATORY (INHALATION) at 07:41

## 2023-10-12 RX ADMIN — FUROSEMIDE 40 MG: 40 TABLET ORAL at 09:10

## 2023-10-12 RX ADMIN — DEXAMETHASONE 6 MG: 4 TABLET ORAL at 09:09

## 2023-10-12 RX ADMIN — HYDROXYZINE HYDROCHLORIDE 25 MG: 25 TABLET ORAL at 13:14

## 2023-10-12 ASSESSMENT — PAIN SCALES - WONG BAKER
WONGBAKER_NUMERICALRESPONSE: 0

## 2023-10-12 ASSESSMENT — PAIN SCALES - GENERAL
PAINLEVEL_OUTOF10: 8
PAINLEVEL_OUTOF10: 9
PAINLEVEL_OUTOF10: 6

## 2023-10-12 ASSESSMENT — PAIN DESCRIPTION - ORIENTATION: ORIENTATION: MID;LOWER

## 2023-10-12 ASSESSMENT — PAIN DESCRIPTION - DESCRIPTORS
DESCRIPTORS: ACHING;BURNING
DESCRIPTORS: ACHING

## 2023-10-12 ASSESSMENT — PAIN DESCRIPTION - LOCATION
LOCATION: NECK;BACK
LOCATION: BACK

## 2023-10-12 ASSESSMENT — PAIN DESCRIPTION - PAIN TYPE: TYPE: CHRONIC PAIN;ACUTE PAIN

## 2023-10-12 ASSESSMENT — PAIN DESCRIPTION - ONSET: ONSET: ON-GOING

## 2023-10-12 ASSESSMENT — PAIN DESCRIPTION - FREQUENCY: FREQUENCY: CONTINUOUS

## 2023-10-12 NOTE — PROGRESS NOTES
Hand off report given to  [address] [place] [address] [place], RN. Patient is stable showing no signs of distress and has no current needs at this time. Call light is in reach and bed is in lowest position. Care is transferred at this time.

## 2023-10-12 NOTE — PLAN OF CARE
Problem: Discharge Planning  Goal: Discharge to home or other facility with appropriate resources  10/12/2023 0752 by Robbie Gold RN  Outcome: Progressing  10/11/2023 2027 by Mikhail Parsons RN  Outcome: Progressing  Flowsheets (Taken 10/11/2023 2027)  Discharge to home or other facility with appropriate resources:   Identify barriers to discharge with patient and caregiver   Arrange for needed discharge resources and transportation as appropriate     Problem: Chronic Conditions and Co-morbidities  Goal: Patient's chronic conditions and co-morbidity symptoms are monitored and maintained or improved  10/12/2023 0752 by Robbie Gold RN  Outcome: Progressing  10/11/2023 2027 by Mikhail Parsons RN  Outcome: Progressing     Problem: Skin/Tissue Integrity  Goal: Absence of new skin breakdown  Description: 1. Monitor for areas of redness and/or skin breakdown  2. Assess vascular access sites hourly  3. Every 4-6 hours minimum:  Change oxygen saturation probe site  4. Every 4-6 hours:  If on nasal continuous positive airway pressure, respiratory therapy assess nares and determine need for appliance change or resting period.   Outcome: Progressing     Problem: Safety - Adult  Goal: Free from fall injury  10/12/2023 0752 by Robbie Gold RN  Outcome: Progressing  10/11/2023 2027 by Mikhail Parsons RN  Outcome: Progressing     Problem: ABCDS Injury Assessment  Goal: Absence of physical injury  10/12/2023 0752 by Robbie Gold RN  Outcome: Progressing  10/11/2023 2027 by Mikhail Parsons RN  Outcome: Progressing  Flowsheets (Taken 10/11/2023 2027)  Absence of Physical Injury: Implement safety measures based on patient assessment     Problem: Pain  Goal: Verbalizes/displays adequate comfort level or baseline comfort level  Outcome: Progressing

## 2023-10-12 NOTE — PLAN OF CARE
Problem: Discharge Planning  Goal: Discharge to home or other facility with appropriate resources  10/11/2023 2027 by Katharina Taylor RN  Outcome: Progressing  Flowsheets (Taken 10/11/2023 2027)  Discharge to home or other facility with appropriate resources:   Identify barriers to discharge with patient and caregiver   Arrange for needed discharge resources and transportation as appropriate  10/11/2023 1117 by Stephanie Flanagan RN  Outcome: Progressing     Problem: Chronic Conditions and Co-morbidities  Goal: Patient's chronic conditions and co-morbidity symptoms are monitored and maintained or improved  10/11/2023 2027 by Katharina Taylor RN  Outcome: Progressing  10/11/2023 1117 by Stephanie Flanagan RN  Outcome: Progressing     Problem: Skin/Tissue Integrity  Goal: Absence of new skin breakdown  Description: 1. Monitor for areas of redness and/or skin breakdown  2. Assess vascular access sites hourly  3. Every 4-6 hours minimum:  Change oxygen saturation probe site  4. Every 4-6 hours:  If on nasal continuous positive airway pressure, respiratory therapy assess nares and determine need for appliance change or resting period.   10/11/2023 1117 by Stephanie Flanagan RN  Outcome: Progressing     Problem: Safety - Adult  Goal: Free from fall injury  10/11/2023 2027 by Katharina Taylor RN  Outcome: Progressing  10/11/2023 1117 by Stephanie Flanagan RN  Outcome: Progressing     Problem: ABCDS Injury Assessment  Goal: Absence of physical injury  10/11/2023 2027 by Katharina Taylor RN  Outcome: Progressing  Flowsheets (Taken 10/11/2023 2027)  Absence of Physical Injury: Implement safety measures based on patient assessment  10/11/2023 1117 by Stephanie Flanagan RN  Outcome: Progressing     Problem: Pain  Goal: Verbalizes/displays adequate comfort level or baseline comfort level  10/11/2023 1117 by Stephanie Flanagan RN  Outcome: Progressing

## 2023-10-12 NOTE — PLAN OF CARE
Problem: Discharge Planning  Goal: Discharge to home or other facility with appropriate resources  10/12/2023 1405 by Vivienne Manzanares RN  Outcome: Adequate for Discharge  10/12/2023 0752 by Vivienne Manzanares RN  Outcome: Progressing     Problem: Chronic Conditions and Co-morbidities  Goal: Patient's chronic conditions and co-morbidity symptoms are monitored and maintained or improved  10/12/2023 1405 by Vivienne Manzanares RN  Outcome: Adequate for Discharge  10/12/2023 0752 by Vivienne Manzanares RN  Outcome: Progressing     Problem: Skin/Tissue Integrity  Goal: Absence of new skin breakdown  Description: 1. Monitor for areas of redness and/or skin breakdown  2. Assess vascular access sites hourly  3. Every 4-6 hours minimum:  Change oxygen saturation probe site  4. Every 4-6 hours:  If on nasal continuous positive airway pressure, respiratory therapy assess nares and determine need for appliance change or resting period.   10/12/2023 1405 by Vivienne Manzanares RN  Outcome: Adequate for Discharge  10/12/2023 0752 by Vivienne Manzanares RN  Outcome: Progressing     Problem: Safety - Adult  Goal: Free from fall injury  10/12/2023 1405 by Vivienne Manzanares RN  Outcome: Adequate for Discharge  10/12/2023 0752 by Vivienne Manzanares RN  Outcome: Progressing     Problem: ABCDS Injury Assessment  Goal: Absence of physical injury  10/12/2023 1405 by Vivienne Manzanares RN  Outcome: Adequate for Discharge  10/12/2023 0752 by Vivienne Manzanares RN  Outcome: Progressing     Problem: Pain  Goal: Verbalizes/displays adequate comfort level or baseline comfort level  10/12/2023 1405 by Vivienne Manzanares RN  Outcome: Adequate for Discharge  10/12/2023 0752 by Vivienne Manzanares RN  Outcome: Progressing Atrial fibrillation    DVT (deep venous thrombosis)    HTN (hypertension)    Hypothyroid

## 2023-10-12 NOTE — DISCHARGE SUMMARY
Hospital Medicine Discharge Summary    Patient: Yanet Mejia     Gender: female  : 1955   Age: 74 y.o. MRN: [id number]    Admitting Physician: Meeta Garcia DO  Discharge Physician: Eladio Pascual DO    Code Status: Full Code     Admit Date: 10/5/2023   Discharge Date:  10/12/2023     Discharge Diagnoses: Active Hospital Problems    Diagnosis Date Noted    Hypertension [I10] 08/15/2022     Priority: Medium    COVID-19 [U07.1] 08/15/2022     Priority: Medium    Multiple closed fractures of ribs of left side [S22.42XA] 10/06/2023    Acute respiratory failure with hypoxia and hypercapnia (HCC) [J96.01, J96.02]     Acute exacerbation of chronic obstructive pulmonary disease (COPD) ([address]) [J44.1]     Acute on chronic respiratory failure with hypoxia and hypercapnia (HCC) [X42.79, J96.22] 2020    Pulmonary nodule [R91.1]     Hyponatremia [E87.1] 2020       Condition at Discharge: Stable    Hospital Course:   74 y.o. female who presented to [facility] with past medical history of shortness of breath     Patient is on BiPAP very difficult to communicate but does answer commands and giving numbers with commands. Per report patient has been having shortness of breath for the past 3 days progressively worsened and is normally oxygen dependent on 3 L of nasal cannula due to her COPD has been compliant with her medications patient does report that she does have a family number who is sick around her dog got her sick. #Acute hypoxemic respiratory failure  - due to COVID infection and COPD exacerbation  - Admitted the ICU  - started on BiPAP. - pulmonary consultation.    - doing better, now on 3 L NC   - sputum cultures neg      #COVID-19  - IV decadron changed to PO      #Acute exacerbation of COPD    -on COPD protocol  -hand-held nebulizers and steroids.   -Doxy     #RUL lung nodule   - imaging as above  - follow with pulmonary as outpatient      #AMS  -Patient apparently had altered mental portion of the orbits demonstrate no acute abnormality. SINUSES: The visualized paranasal sinuses and mastoid air cells demonstrate no acute abnormality. SOFT TISSUES/SKULL:  No acute abnormality of the visualized skull or soft tissues. Stable CT of the head with no acute intracranial abnormality detected. XR CHEST PORTABLE    Result Date: 10/5/2023  EXAMINATION: ONE XRAY VIEW OF THE CHEST 10/5/2023 9:30 pm COMPARISON: 05/01/2023 HISTORY: ORDERING SYSTEM PROVIDED HISTORY: SOB TECHNOLOGIST PROVIDED HISTORY: Reason for exam:->SOB Reason for Exam: SOB FINDINGS: Stable areas of scarring in the left mid and lower lung. No lung infiltrate or consolidation. No definite pneumothorax or pleural effusion. Heart size is normal.     No acute abnormality. The patient was seen and examined on day of discharge and this discharge summary is in conjunction with any daily progress note from day of discharge. Time Spent on discharge is less than 30 minutes in the examination, evaluation, counseling and review of medications and discharge plan. Abdiel Snowden DO   10/12/2023      Thank you Cathy Waters MD for the opportunity to be involved in this patient's care. If you have any questions or concerns please feel free to contact me at [facility].

## 2023-10-12 NOTE — PROGRESS NOTES
4 Eyes Skin Assessment     NAME:  Estela Shirley  YOB: 1955  MEDICAL RECORD NUMBER:  [id number]    The patient is being assessed for  Other d/c to SNF    I agree that at least one RN has performed a thorough Head to Toe Skin Assessment on the patient. ALL assessment sites listed below have been assessed. Areas assessed by both nurses:    Head, Face, Ears, Shoulders, Back, Chest, Arms, Elbows, Hands, Sacrum. Buttock, Coccyx, Ischium, Legs. Feet and Heels, and Under Medical Devices     Scattered bruising and scratches (pt states that she has kittens and puppies at home). Does the Patient have a Wound?  No noted wound(s)       William Prevention initiated by RN: No  Wound Care Orders initiated by RN: No    Pressure Injury (Stage 3,4, Unstageable, DTI, NWPT, and Complex wounds) if present, place Wound referral order by RN under : No    New Ostomies, if present place, Ostomy referral order under : No     Nurse 1 eSignature: Electronically signed by Robbie Gold RN on 10/12/23 at 2:34 PM EDT    **SHARE this note so that the co-signing nurse can place an eSignature**    Nurse 2 eSignature: Electronically signed by Emani South RN on 10/12/23 at 5:06 PM EDT

## 2023-10-12 NOTE — PROGRESS NOTES
Pt was having anxiety, states that when she has an event at home, she titrates her supplemental o2 to 6L and asked to be put on 6L. O2 titrated to 6L at this time and PRN medications given for anxiety.

## 2023-10-12 NOTE — CARE COORDINATION
DISCHARGE ORDER  Date/Time 10/12/2023 1:43 PM  Completed by: Jordyn Guillermo RN, Case Management    Patient Name: Bryan Deleon    : 1955      Admit order Date and Status:ip 10/6  Noted discharge order. (verify MD's last order for status of admission/Traditional Medicare 3 [place] Inpatient qualifying stay required for SNF)    Confirmed discharge plan with:              Patient:  Yes              When pt confirms DC plan does any support person need to be contacted by CM No                   Discharge to Facility: [id number] phone number for staff giving report: [phone]   Pre-certification completed: yes   Hospital Exemption Notification (HENS) completed: yes   Discharge orders and Continuity of Care faxed to facility:  [facility]      Transportation:               Medical Transport explained with choice list offered to pt/family. Choice:(no preference)  Agency used: [facility]   time:   [phone]      Pt/family/Nursing/Facility aware of  time:   Yes Names: [facility] garduno jess  Ambulance form completed:  yes:      Date Last IMM Given: 10/12    Comments:met with pt at bedside. Pt is agreeable to DC to [facility] today. Chelly aware of pt's DC. Pt on 3 liters baseline. No additional DC or DME needs identified. Pt spoke to dtr to alert her of DC today    Pt is being d/c'd to [facility] today. Pt's O2 sats are 92% on 3 liters (baseline). Discharge timeout done with rn, cm, pt. All discharge needs and concerns addressed. Discharging nurse to complete CHIKI, reconcile AVS, and place final copy with patient's discharge packet. Discharging RN to ensure that written prescriptions for  Level II medications are sent with patient to the facility as per protocol.

## 2023-11-30 ENCOUNTER — APPOINTMENT (OUTPATIENT)
Dept: VASCULAR LAB | Age: 68
DRG: 189 | End: 2023-11-30
Payer: MEDICARE

## 2023-11-30 ENCOUNTER — APPOINTMENT (OUTPATIENT)
Dept: CT IMAGING | Age: 68
DRG: 189 | End: 2023-11-30
Payer: MEDICARE

## 2023-11-30 ENCOUNTER — APPOINTMENT (OUTPATIENT)
Dept: GENERAL RADIOLOGY | Age: 68
DRG: 189 | End: 2023-11-30
Payer: MEDICARE

## 2023-11-30 ENCOUNTER — HOSPITAL ENCOUNTER (INPATIENT)
Age: 68
LOS: 5 days | Discharge: HOME | DRG: 189 | End: 2023-12-05
Attending: EMERGENCY MEDICINE | Admitting: INTERNAL MEDICINE
Payer: MEDICARE

## 2023-11-30 DIAGNOSIS — J44.1 COPD EXACERBATION (HCC): ICD-10-CM

## 2023-11-30 DIAGNOSIS — J18.9 PNEUMONIA DUE TO INFECTIOUS ORGANISM, UNSPECIFIED LATERALITY, UNSPECIFIED PART OF LUNG: ICD-10-CM

## 2023-11-30 DIAGNOSIS — J96.21 ACUTE ON CHRONIC RESPIRATORY FAILURE WITH HYPOXIA (HCC): ICD-10-CM

## 2023-11-30 DIAGNOSIS — R09.02 HYPOXIA: Primary | ICD-10-CM

## 2023-11-30 DIAGNOSIS — J44.9 MODERATE COPD (CHRONIC OBSTRUCTIVE PULMONARY DISEASE) (HCC): ICD-10-CM

## 2023-11-30 PROBLEM — R60.0 LEG EDEMA: Status: ACTIVE | Noted: 2023-11-30

## 2023-11-30 LAB
ALBUMIN SERPL-MCNC: 4.3 G/DL (ref 3.4–5)
ALBUMIN/GLOB SERPL: 1.7 {RATIO} (ref 1.1–2.2)
ALP SERPL-CCNC: 91 U/L (ref 40–129)
ALT SERPL-CCNC: 26 U/L (ref 10–40)
ANION GAP SERPL CALCULATED.3IONS-SCNC: 8 MMOL/L (ref 3–16)
AST SERPL-CCNC: 36 U/L (ref 15–37)
BASE EXCESS BLDV CALC-SCNC: 5.2 MMOL/L (ref -3–3)
BASE EXCESS BLDV CALC-SCNC: 5.5 MMOL/L (ref -3–3)
BASOPHILS # BLD: 0 K/UL (ref 0–0.2)
BASOPHILS NFR BLD: 0.6 %
BILIRUB SERPL-MCNC: 0.7 MG/DL (ref 0–1)
BUN SERPL-MCNC: 8 MG/DL (ref 7–20)
CALCIUM SERPL-MCNC: 9.3 MG/DL (ref 8.3–10.6)
CHLORIDE SERPL-SCNC: 86 MMOL/L (ref 99–110)
CO2 BLDV-SCNC: 31 MMOL/L
CO2 BLDV-SCNC: 34 MMOL/L
CO2 SERPL-SCNC: 31 MMOL/L (ref 21–32)
COHGB MFR BLDV: 1.9 % (ref 0–1.5)
COHGB MFR BLDV: 2.8 % (ref 0–1.5)
CREAT SERPL-MCNC: <0.5 MG/DL (ref 0.6–1.2)
D DIMER: 2.17 UG/ML FEU (ref 0–0.6)
DEPRECATED RDW RBC AUTO: 13.2 % (ref 12.4–15.4)
EKG ATRIAL RATE: 97 BPM
EKG DIAGNOSIS: NORMAL
EKG P AXIS: 108 DEGREES
EKG P-R INTERVAL: 152 MS
EKG Q-T INTERVAL: 330 MS
EKG QRS DURATION: 68 MS
EKG QTC CALCULATION (BAZETT): 419 MS
EKG R AXIS: 240 DEGREES
EKG T AXIS: 158 DEGREES
EKG VENTRICULAR RATE: 97 BPM
EOSINOPHIL # BLD: 0.2 K/UL (ref 0–0.6)
EOSINOPHIL NFR BLD: 2.9 %
FLUAV RNA RESP QL NAA+PROBE: NOT DETECTED
FLUBV RNA RESP QL NAA+PROBE: NOT DETECTED
GFR SERPLBLD CREATININE-BSD FMLA CKD-EPI: >60 ML/MIN/{1.73_M2}
GLUCOSE BLD-MCNC: 161 MG/DL (ref 70–99)
GLUCOSE BLD-MCNC: 163 MG/DL (ref 70–99)
GLUCOSE BLD-MCNC: 163 MG/DL (ref 70–99)
GLUCOSE SERPL-MCNC: 167 MG/DL (ref 70–99)
HCO3 BLDV-SCNC: 29.4 MMOL/L (ref 23–29)
HCO3 BLDV-SCNC: 32.1 MMOL/L (ref 23–29)
HCT VFR BLD AUTO: 34 % (ref 36–48)
HGB BLD-MCNC: 11.4 G/DL (ref 12–16)
LYMPHOCYTES # BLD: 0.7 K/UL (ref 1–5.1)
LYMPHOCYTES NFR BLD: 9 %
MAGNESIUM SERPL-MCNC: 1.7 MG/DL (ref 1.8–2.4)
MCH RBC QN AUTO: 31.6 PG (ref 26–34)
MCHC RBC AUTO-ENTMCNC: 33.5 G/DL (ref 31–36)
MCV RBC AUTO: 94.2 FL (ref 80–100)
METHGB MFR BLDV: 0.3 %
METHGB MFR BLDV: 0.3 %
MONOCYTES # BLD: 0.7 K/UL (ref 0–1.3)
MONOCYTES NFR BLD: 9.1 %
NEUTROPHILS # BLD: 6.2 K/UL (ref 1.7–7.7)
NEUTROPHILS NFR BLD: 78.4 %
NT-PROBNP SERPL-MCNC: 177 PG/ML (ref 0–124)
O2 CT VFR BLDV CALC: 16 VOL %
O2 CT VFR BLDV CALC: 17 VOL %
O2 THERAPY: ABNORMAL
O2 THERAPY: ABNORMAL
PCO2 BLDV: 41.8 MMHG (ref 40–50)
PCO2 BLDV: 56.1 MMHG (ref 40–50)
PERFORMED ON: ABNORMAL
PH BLDV: 7.38 [PH] (ref 7.35–7.45)
PH BLDV: 7.46 [PH] (ref 7.35–7.45)
PLATELET # BLD AUTO: 221 K/UL (ref 135–450)
PMV BLD AUTO: 6.9 FL (ref 5–10.5)
PO2 BLDV: 132 MMHG (ref 25–40)
PO2 BLDV: 64.6 MMHG (ref 25–40)
POTASSIUM SERPL-SCNC: 4.1 MMOL/L (ref 3.5–5.1)
PROT SERPL-MCNC: 6.9 G/DL (ref 6.4–8.2)
RBC # BLD AUTO: 3.61 M/UL (ref 4–5.2)
SAO2 % BLDV: 92 %
SAO2 % BLDV: 99 %
SARS-COV-2 RNA RESP QL NAA+PROBE: NOT DETECTED
SODIUM SERPL-SCNC: 125 MMOL/L (ref 136–145)
TROPONIN, HIGH SENSITIVITY: 43 NG/L (ref 0–14)
TROPONIN, HIGH SENSITIVITY: 45 NG/L (ref 0–14)
WBC # BLD AUTO: 8 K/UL (ref 4–11)

## 2023-11-30 PROCEDURE — 6360000004 HC RX CONTRAST MEDICATION: Performed by: EMERGENCY MEDICINE

## 2023-11-30 PROCEDURE — 99285 EMERGENCY DEPT VISIT HI MDM: CPT

## 2023-11-30 PROCEDURE — 2500000003 HC RX 250 WO HCPCS

## 2023-11-30 PROCEDURE — 6370000000 HC RX 637 (ALT 250 FOR IP): Performed by: INTERNAL MEDICINE

## 2023-11-30 PROCEDURE — 6370000000 HC RX 637 (ALT 250 FOR IP): Performed by: EMERGENCY MEDICINE

## 2023-11-30 PROCEDURE — 6360000002 HC RX W HCPCS: Performed by: INTERNAL MEDICINE

## 2023-11-30 PROCEDURE — 99223 1ST HOSP IP/OBS HIGH 75: CPT | Performed by: INTERNAL MEDICINE

## 2023-11-30 PROCEDURE — 71260 CT THORAX DX C+: CPT

## 2023-11-30 PROCEDURE — 94640 AIRWAY INHALATION TREATMENT: CPT

## 2023-11-30 PROCEDURE — 83735 ASSAY OF MAGNESIUM: CPT

## 2023-11-30 PROCEDURE — 93010 ELECTROCARDIOGRAM REPORT: CPT | Performed by: INTERNAL MEDICINE

## 2023-11-30 PROCEDURE — 2580000003 HC RX 258

## 2023-11-30 PROCEDURE — 6360000002 HC RX W HCPCS

## 2023-11-30 PROCEDURE — 5A09557 ASSISTANCE WITH RESPIRATORY VENTILATION, GREATER THAN 96 CONSECUTIVE HOURS, CONTINUOUS POSITIVE AIRWAY PRESSURE: ICD-10-PCS | Performed by: INTERNAL MEDICINE

## 2023-11-30 PROCEDURE — 2580000003 HC RX 258: Performed by: EMERGENCY MEDICINE

## 2023-11-30 PROCEDURE — 6360000002 HC RX W HCPCS: Performed by: EMERGENCY MEDICINE

## 2023-11-30 PROCEDURE — 94010 BREATHING CAPACITY TEST: CPT

## 2023-11-30 PROCEDURE — 94761 N-INVAS EAR/PLS OXIMETRY MLT: CPT

## 2023-11-30 PROCEDURE — 85379 FIBRIN DEGRADATION QUANT: CPT

## 2023-11-30 PROCEDURE — 36415 COLL VENOUS BLD VENIPUNCTURE: CPT

## 2023-11-30 PROCEDURE — 6370000000 HC RX 637 (ALT 250 FOR IP)

## 2023-11-30 PROCEDURE — 71045 X-RAY EXAM CHEST 1 VIEW: CPT

## 2023-11-30 PROCEDURE — 93005 ELECTROCARDIOGRAM TRACING: CPT | Performed by: EMERGENCY MEDICINE

## 2023-11-30 PROCEDURE — 83880 ASSAY OF NATRIURETIC PEPTIDE: CPT

## 2023-11-30 PROCEDURE — 2000000000 HC ICU R&B

## 2023-11-30 PROCEDURE — 80053 COMPREHEN METABOLIC PANEL: CPT

## 2023-11-30 PROCEDURE — 84484 ASSAY OF TROPONIN QUANT: CPT

## 2023-11-30 PROCEDURE — 85025 COMPLETE CBC W/AUTO DIFF WBC: CPT

## 2023-11-30 PROCEDURE — 96374 THER/PROPH/DIAG INJ IV PUSH: CPT

## 2023-11-30 PROCEDURE — 87636 SARSCOV2 & INF A&B AMP PRB: CPT

## 2023-11-30 PROCEDURE — 2700000000 HC OXYGEN THERAPY PER DAY

## 2023-11-30 PROCEDURE — 82803 BLOOD GASES ANY COMBINATION: CPT

## 2023-11-30 PROCEDURE — 93970 EXTREMITY STUDY: CPT

## 2023-11-30 PROCEDURE — 94660 CPAP INITIATION&MGMT: CPT

## 2023-11-30 RX ORDER — POLYETHYLENE GLYCOL 3350 17 G/17G
17 POWDER, FOR SOLUTION ORAL DAILY PRN
Status: DISCONTINUED | OUTPATIENT
Start: 2023-11-30 | End: 2023-12-05 | Stop reason: HOSPADM

## 2023-11-30 RX ORDER — ACETAMINOPHEN 325 MG/1
650 TABLET ORAL EVERY 6 HOURS PRN
Status: DISCONTINUED | OUTPATIENT
Start: 2023-11-30 | End: 2023-12-05 | Stop reason: HOSPADM

## 2023-11-30 RX ORDER — MAGNESIUM SULFATE IN WATER 40 MG/ML
2000 INJECTION, SOLUTION INTRAVENOUS ONCE
Status: COMPLETED | OUTPATIENT
Start: 2023-11-30 | End: 2023-11-30

## 2023-11-30 RX ORDER — ONDANSETRON 2 MG/ML
4 INJECTION INTRAMUSCULAR; INTRAVENOUS EVERY 6 HOURS PRN
Status: DISCONTINUED | OUTPATIENT
Start: 2023-11-30 | End: 2023-12-05 | Stop reason: HOSPADM

## 2023-11-30 RX ORDER — AMLODIPINE BESYLATE 5 MG/1
5 TABLET ORAL DAILY
Status: DISCONTINUED | OUTPATIENT
Start: 2023-11-30 | End: 2023-12-05 | Stop reason: HOSPADM

## 2023-11-30 RX ORDER — SODIUM CHLORIDE 9 MG/ML
INJECTION, SOLUTION INTRAVENOUS PRN
Status: DISCONTINUED | OUTPATIENT
Start: 2023-11-30 | End: 2023-12-05 | Stop reason: HOSPADM

## 2023-11-30 RX ORDER — SODIUM CHLORIDE 0.9 % (FLUSH) 0.9 %
5-40 SYRINGE (ML) INJECTION PRN
Status: DISCONTINUED | OUTPATIENT
Start: 2023-11-30 | End: 2023-12-05 | Stop reason: HOSPADM

## 2023-11-30 RX ORDER — PREDNISONE 20 MG/1
40 TABLET ORAL DAILY
Status: DISCONTINUED | OUTPATIENT
Start: 2023-12-02 | End: 2023-12-05 | Stop reason: HOSPADM

## 2023-11-30 RX ORDER — ONDANSETRON 4 MG/1
4 TABLET, ORALLY DISINTEGRATING ORAL EVERY 8 HOURS PRN
Status: DISCONTINUED | OUTPATIENT
Start: 2023-11-30 | End: 2023-12-05 | Stop reason: HOSPADM

## 2023-11-30 RX ORDER — IPRATROPIUM BROMIDE AND ALBUTEROL SULFATE 2.5; .5 MG/3ML; MG/3ML
1 SOLUTION RESPIRATORY (INHALATION) EVERY 4 HOURS PRN
Status: DISCONTINUED | OUTPATIENT
Start: 2023-11-30 | End: 2023-12-05 | Stop reason: HOSPADM

## 2023-11-30 RX ORDER — ESCITALOPRAM OXALATE 10 MG/1
20 TABLET ORAL DAILY
Status: DISCONTINUED | OUTPATIENT
Start: 2023-11-30 | End: 2023-12-05 | Stop reason: HOSPADM

## 2023-11-30 RX ORDER — INSULIN LISPRO 100 [IU]/ML
0-4 INJECTION, SOLUTION INTRAVENOUS; SUBCUTANEOUS
Status: DISCONTINUED | OUTPATIENT
Start: 2023-11-30 | End: 2023-12-05 | Stop reason: HOSPADM

## 2023-11-30 RX ORDER — ACETAMINOPHEN 650 MG/1
650 SUPPOSITORY RECTAL EVERY 6 HOURS PRN
Status: DISCONTINUED | OUTPATIENT
Start: 2023-11-30 | End: 2023-12-05 | Stop reason: HOSPADM

## 2023-11-30 RX ORDER — IPRATROPIUM BROMIDE AND ALBUTEROL SULFATE 2.5; .5 MG/3ML; MG/3ML
1 SOLUTION RESPIRATORY (INHALATION)
Status: COMPLETED | OUTPATIENT
Start: 2023-11-30 | End: 2023-11-30

## 2023-11-30 RX ORDER — AMLODIPINE BESYLATE 10 MG/1
TABLET ORAL
COMMUNITY
Start: 2023-11-16

## 2023-11-30 RX ORDER — IPRATROPIUM BROMIDE AND ALBUTEROL SULFATE 2.5; .5 MG/3ML; MG/3ML
1 SOLUTION RESPIRATORY (INHALATION)
Status: DISCONTINUED | OUTPATIENT
Start: 2023-11-30 | End: 2023-12-03

## 2023-11-30 RX ORDER — INSULIN LISPRO 100 [IU]/ML
0-4 INJECTION, SOLUTION INTRAVENOUS; SUBCUTANEOUS NIGHTLY
Status: DISCONTINUED | OUTPATIENT
Start: 2023-11-30 | End: 2023-12-05 | Stop reason: HOSPADM

## 2023-11-30 RX ORDER — SODIUM CHLORIDE 9 MG/ML
INJECTION, SOLUTION INTRAVENOUS CONTINUOUS
Status: DISCONTINUED | OUTPATIENT
Start: 2023-11-30 | End: 2023-12-01

## 2023-11-30 RX ORDER — ENOXAPARIN SODIUM 100 MG/ML
40 INJECTION SUBCUTANEOUS DAILY
Status: DISCONTINUED | OUTPATIENT
Start: 2023-11-30 | End: 2023-12-05 | Stop reason: HOSPADM

## 2023-11-30 RX ORDER — DEXTROSE MONOHYDRATE 100 MG/ML
INJECTION, SOLUTION INTRAVENOUS CONTINUOUS PRN
Status: DISCONTINUED | OUTPATIENT
Start: 2023-11-30 | End: 2023-12-05 | Stop reason: HOSPADM

## 2023-11-30 RX ORDER — SODIUM CHLORIDE 0.9 % (FLUSH) 0.9 %
5-40 SYRINGE (ML) INJECTION EVERY 12 HOURS SCHEDULED
Status: DISCONTINUED | OUTPATIENT
Start: 2023-11-30 | End: 2023-12-05 | Stop reason: HOSPADM

## 2023-11-30 RX ORDER — KETOROLAC TROMETHAMINE 15 MG/ML
15 INJECTION, SOLUTION INTRAMUSCULAR; INTRAVENOUS ONCE
Status: COMPLETED | OUTPATIENT
Start: 2023-11-30 | End: 2023-11-30

## 2023-11-30 RX ADMIN — IPRATROPIUM BROMIDE AND ALBUTEROL SULFATE 1 DOSE: 2.5; .5 SOLUTION RESPIRATORY (INHALATION) at 20:14

## 2023-11-30 RX ADMIN — DOXYCYCLINE 100 MG: 100 INJECTION, POWDER, LYOPHILIZED, FOR SOLUTION INTRAVENOUS at 21:02

## 2023-11-30 RX ADMIN — ENOXAPARIN SODIUM 40 MG: 100 INJECTION SUBCUTANEOUS at 11:25

## 2023-11-30 RX ADMIN — KETOROLAC TROMETHAMINE 15 MG: 15 INJECTION, SOLUTION INTRAMUSCULAR; INTRAVENOUS at 08:34

## 2023-11-30 RX ADMIN — METHYLPREDNISOLONE SODIUM SUCCINATE 125 MG: 125 INJECTION INTRAMUSCULAR; INTRAVENOUS at 04:06

## 2023-11-30 RX ADMIN — SODIUM CHLORIDE 1000 MG: 900 INJECTION INTRAVENOUS at 08:27

## 2023-11-30 RX ADMIN — MUPIROCIN: 20 OINTMENT TOPICAL at 11:25

## 2023-11-30 RX ADMIN — DOXYCYCLINE 100 MG: 100 INJECTION, POWDER, LYOPHILIZED, FOR SOLUTION INTRAVENOUS at 11:24

## 2023-11-30 RX ADMIN — IOPAMIDOL 75 ML: 755 INJECTION, SOLUTION INTRAVENOUS at 06:13

## 2023-11-30 RX ADMIN — IPRATROPIUM BROMIDE AND ALBUTEROL SULFATE 1 DOSE: 2.5; .5 SOLUTION RESPIRATORY (INHALATION) at 04:05

## 2023-11-30 RX ADMIN — SODIUM CHLORIDE, PRESERVATIVE FREE 10 ML: 5 INJECTION INTRAVENOUS at 11:26

## 2023-11-30 RX ADMIN — IPRATROPIUM BROMIDE AND ALBUTEROL SULFATE 1 DOSE: 2.5; .5 SOLUTION RESPIRATORY (INHALATION) at 15:11

## 2023-11-30 RX ADMIN — ESCITALOPRAM OXALATE 20 MG: 10 TABLET ORAL at 11:25

## 2023-11-30 RX ADMIN — METHYLPREDNISOLONE SODIUM SUCCINATE 40 MG: 40 INJECTION INTRAMUSCULAR; INTRAVENOUS at 21:04

## 2023-11-30 RX ADMIN — SODIUM CHLORIDE: 9 INJECTION, SOLUTION INTRAVENOUS at 11:23

## 2023-11-30 RX ADMIN — ACETAMINOPHEN 650 MG: 325 TABLET ORAL at 23:19

## 2023-11-30 RX ADMIN — AMLODIPINE BESYLATE 5 MG: 5 TABLET ORAL at 11:26

## 2023-11-30 RX ADMIN — IPRATROPIUM BROMIDE AND ALBUTEROL SULFATE 1 DOSE: 2.5; .5 SOLUTION RESPIRATORY (INHALATION) at 23:08

## 2023-11-30 RX ADMIN — METHYLPREDNISOLONE SODIUM SUCCINATE 40 MG: 40 INJECTION INTRAMUSCULAR; INTRAVENOUS at 11:25

## 2023-11-30 RX ADMIN — IPRATROPIUM BROMIDE AND ALBUTEROL SULFATE 1 DOSE: 2.5; .5 SOLUTION RESPIRATORY (INHALATION) at 10:27

## 2023-11-30 RX ADMIN — MUPIROCIN: 20 OINTMENT TOPICAL at 21:04

## 2023-11-30 RX ADMIN — SODIUM CHLORIDE, PRESERVATIVE FREE 10 ML: 5 INJECTION INTRAVENOUS at 21:07

## 2023-11-30 RX ADMIN — MAGNESIUM SULFATE HEPTAHYDRATE 2000 MG: 40 INJECTION, SOLUTION INTRAVENOUS at 11:28

## 2023-11-30 ASSESSMENT — LIFESTYLE VARIABLES
HOW MANY STANDARD DRINKS CONTAINING ALCOHOL DO YOU HAVE ON A TYPICAL DAY: PATIENT DOES NOT DRINK
HOW OFTEN DO YOU HAVE A DRINK CONTAINING ALCOHOL: NEVER
HOW OFTEN DO YOU HAVE A DRINK CONTAINING ALCOHOL: NEVER
HOW MANY STANDARD DRINKS CONTAINING ALCOHOL DO YOU HAVE ON A TYPICAL DAY: PATIENT DOES NOT DRINK

## 2023-11-30 ASSESSMENT — COPD QUESTIONNAIRES
QUESTION8_ENERGYLEVEL: 4
QUESTION1_COUGHFREQUENCY: 4
QUESTION2_CHESTPHLEGM: 3
QUESTION5_HOMEACTIVITIES: 3
CAT_TOTALSCORE: 27
QUESTION3_CHESTTIGHTNESS: 3
QUESTION7_SLEEPQUALITY: 3
QUESTION6_LEAVINGHOUSE: 3
QUESTION4_WALKINCLINE: 4

## 2023-11-30 ASSESSMENT — ENCOUNTER SYMPTOMS
CHEST TIGHTNESS: 0
EYES NEGATIVE: 1
GASTROINTESTINAL NEGATIVE: 1
SHORTNESS OF BREATH: 1
ALLERGIC/IMMUNOLOGIC NEGATIVE: 1

## 2023-11-30 ASSESSMENT — PAIN SCALES - GENERAL
PAINLEVEL_OUTOF10: 0
PAINLEVEL_OUTOF10: 3

## 2023-11-30 ASSESSMENT — PAIN DESCRIPTION - LOCATION: LOCATION: ABDOMEN;BACK

## 2023-11-30 ASSESSMENT — PAIN - FUNCTIONAL ASSESSMENT: PAIN_FUNCTIONAL_ASSESSMENT: NONE - DENIES PAIN

## 2023-11-30 ASSESSMENT — PULMONARY FUNCTION TESTS: PIF_VALUE: 60

## 2023-11-30 NOTE — ACP (ADVANCE CARE PLANNING)
Advance Care Planning     General Advance Care Planning (ACP) Conversation    Date of Conversation: 11/30/2023  Conducted with: Patient with Decision Making Capacity    Healthcare Decision Maker:    Primary Decision Maker: Lydia Johnson - [phone]    Secondary Decision Maker: Rolando Curtis - [phone]  Click here to complete Healthcare Decision Makers including selection of the Healthcare Decision Maker Relationship (ie \"Primary\"). Today we documented Decision Maker(s) consistent with Legal Next of Kin hierarchy. Content/Action Overview:   Has ACP document(s) on file - reflects the patient's care preferences  Reviewed DNR/DNI and patient elects Full Code (Attempt Resuscitation)        Length of Voluntary ACP Conversation in minutes:  <16 minutes (Non-Billable)    Delmy Mccauley

## 2023-11-30 NOTE — ED PROVIDER NOTES
[address] [address] ED  EMERGENCY DEPARTMENT ENCOUNTER      Pt Name: Ismael King  MRN: [id number]  [facility] 1955  Date of evaluation: 11/30/2023  Provider: Alexis Metz MD    CHIEF COMPLAINT       Chief Complaint   Patient presents with    Shortness of Breath     Started couple days ago. Hx of COPD and wears oxygen at home at 3.5 liters         HISTORY OF PRESENT ILLNESS   (Location/Symptom, Timing/Onset, Context/Setting, Quality, Duration, Modifying Factors, Severity)  Note limiting factors. Ismael King is a 79 y.o. female who presents to the emergency department with shortness of breath for the past 2 to 3 days. She has a history of COPD and is on home oxygen at 3 L but is increased in her oxygen requirement to 5 L. She is a former smoker. No fevers. Bilateral lower extremity swelling she states is her baseline. She denies fevers or new cough. She has nebulizer at home which she does not use frequently. Symptoms are worse with exertion. No chest pain        Nursing Notes were reviewed. REVIEW OF SYSTEMS    (2-9 systems for level 4, 10 or more for level 5)     Review of Systems   Constitutional: Negative. HENT: Negative. Eyes: Negative. Respiratory:  Positive for shortness of breath. Negative for chest tightness. Cardiovascular:  Positive for leg swelling. Gastrointestinal: Negative. Endocrine: Negative. Genitourinary: Negative. Musculoskeletal: Negative. Skin: Negative. Allergic/Immunologic: Negative. Neurological: Negative. Hematological: Negative. Psychiatric/Behavioral: Negative. Except as noted above the remainder of the review of systems was reviewed and negative.        PAST MEDICAL HISTORY     Past Medical History:   Diagnosis Date    Angina pectoris ([address])     Chronic pain     knees and lower back     COPD exacerbation ([address]) 5/20/2018    Depression     Essential hypertension 8/15/2022    Panic disorder     Pneumonia

## 2023-11-30 NOTE — ED NOTES
Report given to Joaquin Magallanes ICU RN face to face. Questions answered, care transferred. VSS for transport.      Christal Fung, RN  11/30/23 7868

## 2023-11-30 NOTE — DISCHARGE INSTRUCTIONS
Your information:  Name: Kayden Gallagher  : 1955    Your instructions: Follow up with PCP. Follow up with Pulmonology in January. What to do after you leave the hospital:    Recommended diet: regular diet    Recommended activity: activity as tolerated        The following personal items were collected during your admission and were returned to you:    Belongings  Dental Appliances: Uppers  Vision - Corrective Lenses: Eyeglasses  Hearing Aid: None  Clothing: Footwear, Socks, Pants, Shirt, Jacket/Coat, Other (Comment) (no underwear but did bring a bra.)  Jewelry: Ring, Watch, Other (Comment) (4 rings in total)  Body Piercings Removed: N/A  Electronic Devices: Cell Phone,   Weapons (Notify Protective Services/Security): None  Other Valuables: Purse  Home Medications: None  Valuables Given To: Family (Comment)  Provide Name(s) of Who Valuable(s) Were Given To: Pt  Patient approves for provider to speak to responsible person post operatively: Yes    Information obtained by:  By signing below, I understand that if any problems occur once I leave the hospital I am to contact MD.  I understand and acknowledge receipt of the instructions indicated above.

## 2023-11-30 NOTE — H&P
\"SPECGRAV\", \"LEUKOCYTESUR\", \"UROBILINOGEN\", \"BILIRUBINUR\", \"BLOODU\", \"GLUCOSEU\", \"AMORPHOUS\" in the last 72 hours. Invalid input(s): \"KETONESU\"    CT CHEST PULMONARY EMBOLISM W CONTRAST   Final Result   No evidence of pulmonary embolism or acute pulmonary abnormality. Severe emphysema with redemonstration of a 7 mm somewhat spiculated nodule   right upper lobe unchanged compared to 10/05/2023 but progressed since   03/10/2023. Recommend next follow-up chest CT in 6 months. XR CHEST PORTABLE   Final Result   Bibasilar opacities are present which could relate to bibasilar atelectasis   or infectious infiltrate. Will need to correlate clinically. Interstitial reticulation and scarring along the periphery of the left upper   lung are stable. ASSESSMENT:    Principal Problem:    Acute on chronic hypoxic respiratory failure (HCC)  Resolved Problems:    * No resolved hospital problems. *        PLAN:  Acute on chronic hypoxic and hypercarbic resp failure  2 to acute COPD. Placed on BiPAP  Pulm consult  Uses 3 L of O2 at home.     Acute COPD exacerbation  Solumedrol,IBD,doxy  Sputum cultures    Hyponatremia  Placed on IV NS   Will monitor    Lung nodule  Will need follow up    Hypertension  Continue amlodipine     RLE swelling   Elevated d dimer   Venous doppler LE    Full Code   Lovenox  NPO      Deanne Moeller MD 11/30/2023 8:34 AM Yes

## 2023-11-30 NOTE — ED NOTES
Pt calm and resting quietly with equal rise and fall of chest. Pt in NAD, RR even and unlabored. Side rails up, bed locked and in lowest position. Pt updated on plan of care. No needs at this time. Pt instructed on use of call light if needed.       Kamran Orourke, [address]  11/30/23 9482

## 2023-11-30 NOTE — DISCHARGE INSTR - COC
Continuity of Care Form    Patient Name: Claudene Saran   :  1955  MRN:  [id number]    Admit date:  2023  Discharge date:  23     Code Status Order: Full Code   Advance Directives:     Admitting Physician:  Rohit Mckenzie MD  PCP: Anna Marie Reyes MD    Discharging Nurse: [facility] Unit/Room#: 3007/3007-01  Discharging Unit Phone Number: [phone]    Emergency Contact:   Extended Emergency Contact Information  Primary Emergency Contact: [address] Phone: [phone]  Mobile Phone: [phone]  Relation: Child  Secondary Emergency Contact: Sheela [facility] of [address] Phone: [phone]  Work Phone: [phone]  Mobile Phone: [phone]  Relation: Child    Past Surgical History:  Past Surgical History:   Procedure Laterality Date    CHOLECYSTECTOMY      COLONOSCOPY         Immunization History:   Immunization History   Administered Date(s) Administered    Influenza Vaccine, unspecified formulation 2013, 10/26/2016    Influenza Virus Vaccine 2013, 10/26/2016, 2017    Influenza, FLUARIX, Debroah Alma (age 10 mo+) AND AFLURIA, (age 1 y+), PF, 0.5mL 10/26/2016, 2017, 2020, 2022    TDaP, ADACEL (age 6y-58y), BOOSTRIX (age 10y+), IM, 0.5mL 2017       Active Problems:  Patient Active Problem List   Diagnosis Code    Chest pain R07.9    Shortness of breath R06.02    Tobacco use Z72.0    Moderate COPD (chronic obstructive pulmonary disease) ([address]) J44.9    COPD exacerbation ([address]) J44.1    Acute respiratory failure with hypoxia ([address]) J96.01    Hyponatremia E87.1    Pulmonary nodule R91.1    Pulmonary emphysema ([address]) J43.9    Acute on chronic respiratory failure with hypoxia and hypercapnia ([address]) J96.21, J96.22    Community acquired pneumonia of right lung J18.9    Acute exacerbation of chronic obstructive pulmonary disease (COPD) ([address]) J44.1    Acute on chronic respiratory failure ([address]) J96.20

## 2023-11-30 NOTE — ED NOTES
Report given to [facility] for transfer of care.      Candelaria Dias RN  11/30/23 0522

## 2023-11-30 NOTE — CONSULTS
Pulmonary & Critical Care Consultation Note    Patient is being seen at the request of JUAN Joya Asa, CNP  for a consultation for respiratory failure BiPAP     HISTORY OF PRESENT ILLNESS:   80 yo with COPD presented for SOB 3 days. Severe. Associated with hypoxia. Worse with exertion and better with resting. No sputum. No hemoptysis. In ED placed on BIPAP for hypercapnia and respiratory distress. No smoking. Patient is compliant with inhaled bronchodilators and O2. PAST MEDICAL HISTORY:  Past Medical History:   Diagnosis Date    Angina pectoris (HCC)     Chronic pain     knees and lower back     COPD exacerbation ([address]) 5/20/2018    Depression     Essential hypertension 8/15/2022    Panic disorder     Pneumonia      PAST SURGICAL HISTORY:  Past Surgical History:   Procedure Laterality Date    CHOLECYSTECTOMY      COLONOSCOPY         FAMILY HISTORY:  family history includes COPD in her mother; Hypertension in her mother. SOCIAL HISTORY:   reports that she quit smoking about 2 years ago. Her smoking use included cigarettes. She started smoking about 54 years ago. She has a 104.00 pack-year smoking history. She has been exposed to tobacco smoke. She has never used smokeless tobacco.    Scheduled Meds:   sodium chloride flush  5-40 mL IntraVENous 2 times per day    enoxaparin  40 mg SubCUTAneous Daily    ipratropium 0.5 mg-albuterol 2.5 mg  1 Dose Inhalation Q4H WA RT    methylPREDNISolone  40 mg IntraVENous Q12H    Followed by    [facility] ON 12/2/2023] predniSONE  40 mg Oral Daily    doxycycline (VIBRAMYCIN) IV  100 mg IntraVENous Q12H     Continuous Infusions:   sodium chloride      sodium chloride       PRN Meds:  sodium chloride flush, sodium chloride, ondansetron **OR** ondansetron, polyethylene glycol, acetaminophen **OR** acetaminophen    ALLERGIES:  Patient is allergic to cephalosporins, pcn [penicillins], and hctz [hydrochlorothiazide].     REVIEW OF SYSTEMS:  Constitutional: Negative for

## 2023-11-30 NOTE — ED NOTES
Pt calm and resting quietly with equal rise and fall of chest. Pt in NAD, RR even and unlabored. Side rails up, bed locked and in lowest position. Pt updated on plan of care. No needs at this time. Pt instructed on use of call light if needed.       Johanna Conte  11/30/23 8508

## 2023-11-30 NOTE — PLAN OF CARE
Admit ICU    Acute on chronic hypoxic respiratory failure, COPD AE    Baseline 3 L NC -> requiring BIPAP   Doxy  Solumedrol   Pulm/Intensivist consult     Home medications not reordered as home med rec was not completed on admission. Nursing communication placed.      Keenan Thornton, JUAN - CNP  11/30/23  7:52 AM

## 2023-11-30 NOTE — ED NOTES
[facility]  sent to Dr. Shiela Thompson Hospitalist for consult placed by Dr. Dimas Ybarra. [facility]  11/30/23 [phone]-Call back received from Dustin ASH with the Hospitalist group spoke with Dr. Dimas Ybarra.       [facility]  11/30/23 5587

## 2023-12-01 PROBLEM — E87.8 DISORDER OF ELECTROLYTES: Status: ACTIVE | Noted: 2023-12-01

## 2023-12-01 LAB
ANION GAP SERPL CALCULATED.3IONS-SCNC: 6 MMOL/L (ref 3–16)
BASOPHILS # BLD: 0 K/UL (ref 0–0.2)
BASOPHILS NFR BLD: 0.1 %
BUN SERPL-MCNC: 14 MG/DL (ref 7–20)
CALCIUM SERPL-MCNC: 8.3 MG/DL (ref 8.3–10.6)
CHLORIDE SERPL-SCNC: 95 MMOL/L (ref 99–110)
CO2 SERPL-SCNC: 28 MMOL/L (ref 21–32)
CREAT SERPL-MCNC: <0.5 MG/DL (ref 0.6–1.2)
DEPRECATED RDW RBC AUTO: 13.2 % (ref 12.4–15.4)
EOSINOPHIL # BLD: 0 K/UL (ref 0–0.6)
EOSINOPHIL NFR BLD: 0 %
GFR SERPLBLD CREATININE-BSD FMLA CKD-EPI: >60 ML/MIN/{1.73_M2}
GLUCOSE BLD-MCNC: 142 MG/DL (ref 70–99)
GLUCOSE BLD-MCNC: 198 MG/DL (ref 70–99)
GLUCOSE BLD-MCNC: 204 MG/DL (ref 70–99)
GLUCOSE BLD-MCNC: 88 MG/DL (ref 70–99)
GLUCOSE SERPL-MCNC: 152 MG/DL (ref 70–99)
HCT VFR BLD AUTO: 30.5 % (ref 36–48)
HGB BLD-MCNC: 10.3 G/DL (ref 12–16)
LYMPHOCYTES # BLD: 0.4 K/UL (ref 1–5.1)
LYMPHOCYTES NFR BLD: 6 %
MAGNESIUM SERPL-MCNC: 2.1 MG/DL (ref 1.8–2.4)
MCH RBC QN AUTO: 31.7 PG (ref 26–34)
MCHC RBC AUTO-ENTMCNC: 33.9 G/DL (ref 31–36)
MCV RBC AUTO: 93.7 FL (ref 80–100)
MONOCYTES # BLD: 0.4 K/UL (ref 0–1.3)
MONOCYTES NFR BLD: 6 %
NEUTROPHILS # BLD: 5.2 K/UL (ref 1.7–7.7)
NEUTROPHILS NFR BLD: 87.9 %
PERFORMED ON: ABNORMAL
PERFORMED ON: NORMAL
PLATELET # BLD AUTO: 195 K/UL (ref 135–450)
PMV BLD AUTO: 7.8 FL (ref 5–10.5)
POTASSIUM SERPL-SCNC: 4.4 MMOL/L (ref 3.5–5.1)
RBC # BLD AUTO: 3.26 M/UL (ref 4–5.2)
SODIUM SERPL-SCNC: 129 MMOL/L (ref 136–145)
WBC # BLD AUTO: 5.9 K/UL (ref 4–11)

## 2023-12-01 PROCEDURE — 6360000002 HC RX W HCPCS

## 2023-12-01 PROCEDURE — 97166 OT EVAL MOD COMPLEX 45 MIN: CPT

## 2023-12-01 PROCEDURE — 97530 THERAPEUTIC ACTIVITIES: CPT

## 2023-12-01 PROCEDURE — 97162 PT EVAL MOD COMPLEX 30 MIN: CPT

## 2023-12-01 PROCEDURE — 94640 AIRWAY INHALATION TREATMENT: CPT

## 2023-12-01 PROCEDURE — 6370000000 HC RX 637 (ALT 250 FOR IP)

## 2023-12-01 PROCEDURE — 85025 COMPLETE CBC W/AUTO DIFF WBC: CPT

## 2023-12-01 PROCEDURE — 6360000002 HC RX W HCPCS: Performed by: INTERNAL MEDICINE

## 2023-12-01 PROCEDURE — 6370000000 HC RX 637 (ALT 250 FOR IP): Performed by: INTERNAL MEDICINE

## 2023-12-01 PROCEDURE — 94660 CPAP INITIATION&MGMT: CPT

## 2023-12-01 PROCEDURE — 2700000000 HC OXYGEN THERAPY PER DAY

## 2023-12-01 PROCEDURE — 99291 CRITICAL CARE FIRST HOUR: CPT | Performed by: INTERNAL MEDICINE

## 2023-12-01 PROCEDURE — 99233 SBSQ HOSP IP/OBS HIGH 50: CPT | Performed by: INTERNAL MEDICINE

## 2023-12-01 PROCEDURE — 94761 N-INVAS EAR/PLS OXIMETRY MLT: CPT

## 2023-12-01 PROCEDURE — 83735 ASSAY OF MAGNESIUM: CPT

## 2023-12-01 PROCEDURE — 6370000000 HC RX 637 (ALT 250 FOR IP): Performed by: REGISTERED NURSE

## 2023-12-01 PROCEDURE — 36415 COLL VENOUS BLD VENIPUNCTURE: CPT

## 2023-12-01 PROCEDURE — 2580000003 HC RX 258

## 2023-12-01 PROCEDURE — 2000000000 HC ICU R&B

## 2023-12-01 PROCEDURE — 80048 BASIC METABOLIC PNL TOTAL CA: CPT

## 2023-12-01 RX ORDER — DOXYCYCLINE HYCLATE 100 MG
100 TABLET ORAL EVERY 12 HOURS SCHEDULED
Status: COMPLETED | OUTPATIENT
Start: 2023-12-01 | End: 2023-12-04

## 2023-12-01 RX ORDER — LIDOCAINE 4 G/G
2 PATCH TOPICAL DAILY
Status: DISCONTINUED | OUTPATIENT
Start: 2023-12-01 | End: 2023-12-05 | Stop reason: HOSPADM

## 2023-12-01 RX ORDER — TRAMADOL HYDROCHLORIDE 50 MG/1
50 TABLET ORAL EVERY 6 HOURS PRN
Status: DISCONTINUED | OUTPATIENT
Start: 2023-12-01 | End: 2023-12-05 | Stop reason: HOSPADM

## 2023-12-01 RX ORDER — FUROSEMIDE 10 MG/ML
20 INJECTION INTRAMUSCULAR; INTRAVENOUS EVERY 12 HOURS
Status: DISCONTINUED | OUTPATIENT
Start: 2023-12-01 | End: 2023-12-04

## 2023-12-01 RX ADMIN — SODIUM CHLORIDE: 9 INJECTION, SOLUTION INTRAVENOUS at 02:49

## 2023-12-01 RX ADMIN — FUROSEMIDE 20 MG: 10 INJECTION, SOLUTION INTRAMUSCULAR; INTRAVENOUS at 23:09

## 2023-12-01 RX ADMIN — IPRATROPIUM BROMIDE AND ALBUTEROL SULFATE 1 DOSE: 2.5; .5 SOLUTION RESPIRATORY (INHALATION) at 23:41

## 2023-12-01 RX ADMIN — TRAMADOL HYDROCHLORIDE 50 MG: 50 TABLET ORAL at 12:17

## 2023-12-01 RX ADMIN — IPRATROPIUM BROMIDE AND ALBUTEROL SULFATE 1 DOSE: 2.5; .5 SOLUTION RESPIRATORY (INHALATION) at 15:58

## 2023-12-01 RX ADMIN — ESCITALOPRAM OXALATE 20 MG: 10 TABLET ORAL at 09:43

## 2023-12-01 RX ADMIN — SODIUM CHLORIDE, PRESERVATIVE FREE 10 ML: 5 INJECTION INTRAVENOUS at 19:47

## 2023-12-01 RX ADMIN — DOXYCYCLINE HYCLATE 100 MG: 100 TABLET, COATED ORAL at 19:48

## 2023-12-01 RX ADMIN — METHYLPREDNISOLONE SODIUM SUCCINATE 40 MG: 40 INJECTION INTRAMUSCULAR; INTRAVENOUS at 12:10

## 2023-12-01 RX ADMIN — IPRATROPIUM BROMIDE AND ALBUTEROL SULFATE 1 DOSE: 2.5; .5 SOLUTION RESPIRATORY (INHALATION) at 07:33

## 2023-12-01 RX ADMIN — AMLODIPINE BESYLATE 5 MG: 5 TABLET ORAL at 09:43

## 2023-12-01 RX ADMIN — DOXYCYCLINE HYCLATE 100 MG: 100 TABLET, COATED ORAL at 12:10

## 2023-12-01 RX ADMIN — SODIUM CHLORIDE, PRESERVATIVE FREE 10 ML: 5 INJECTION INTRAVENOUS at 12:11

## 2023-12-01 RX ADMIN — FUROSEMIDE 20 MG: 10 INJECTION, SOLUTION INTRAMUSCULAR; INTRAVENOUS at 12:10

## 2023-12-01 RX ADMIN — METHYLPREDNISOLONE SODIUM SUCCINATE 40 MG: 40 INJECTION INTRAMUSCULAR; INTRAVENOUS at 23:09

## 2023-12-01 RX ADMIN — MUPIROCIN: 20 OINTMENT TOPICAL at 19:48

## 2023-12-01 RX ADMIN — TRAMADOL HYDROCHLORIDE 50 MG: 50 TABLET ORAL at 07:00

## 2023-12-01 RX ADMIN — IPRATROPIUM BROMIDE AND ALBUTEROL SULFATE 1 DOSE: 2.5; .5 SOLUTION RESPIRATORY (INHALATION) at 19:37

## 2023-12-01 RX ADMIN — MUPIROCIN: 20 OINTMENT TOPICAL at 09:43

## 2023-12-01 RX ADMIN — ENOXAPARIN SODIUM 40 MG: 100 INJECTION SUBCUTANEOUS at 09:43

## 2023-12-01 RX ADMIN — TRAMADOL HYDROCHLORIDE 50 MG: 50 TABLET ORAL at 19:10

## 2023-12-01 RX ADMIN — IPRATROPIUM BROMIDE AND ALBUTEROL SULFATE 1 DOSE: 2.5; .5 SOLUTION RESPIRATORY (INHALATION) at 12:40

## 2023-12-01 ASSESSMENT — PAIN DESCRIPTION - LOCATION
LOCATION: BACK
LOCATION: BACK
LOCATION: BACK;ABDOMEN

## 2023-12-01 ASSESSMENT — PAIN SCALES - GENERAL
PAINLEVEL_OUTOF10: 5
PAINLEVEL_OUTOF10: 0
PAINLEVEL_OUTOF10: 7
PAINLEVEL_OUTOF10: 5
PAINLEVEL_OUTOF10: 7
PAINLEVEL_OUTOF10: 6
PAINLEVEL_OUTOF10: 5

## 2023-12-01 ASSESSMENT — PAIN - FUNCTIONAL ASSESSMENT: PAIN_FUNCTIONAL_ASSESSMENT: PREVENTS OR INTERFERES SOME ACTIVE ACTIVITIES AND ADLS

## 2023-12-01 ASSESSMENT — PAIN DESCRIPTION - DESCRIPTORS
DESCRIPTORS: ACHING

## 2023-12-01 ASSESSMENT — PAIN DESCRIPTION - ORIENTATION
ORIENTATION: MID
ORIENTATION: MID;LOWER

## 2023-12-01 NOTE — PLAN OF CARE
Problem: Respiratory - Adult  Goal: Achieves optimal ventilation and oxygenation  Outcome: Progressing     Problem: Pain  Goal: Verbalizes/displays adequate comfort level or baseline comfort level  Outcome: Progressing

## 2023-12-01 NOTE — PLAN OF CARE
Problem: Discharge Planning  Goal: Discharge to home or other facility with appropriate resources  Outcome: Progressing  Flowsheets (Taken 11/30/2023 2001)  Discharge to home or other facility with appropriate resources: Identify barriers to discharge with patient and caregiver     Problem: Pain  Goal: Verbalizes/displays adequate comfort level or baseline comfort level  Outcome: Progressing     Problem: Respiratory - Adult  Goal: Achieves optimal ventilation and oxygenation  Outcome: Progressing  Flowsheets (Taken 11/30/2023 2001)  Achieves optimal ventilation and oxygenation: Assess for changes in respiratory status     Problem: Safety - Adult  Goal: Free from fall injury  Outcome: Progressing     Problem: Skin/Tissue Integrity  Goal: Absence of new skin breakdown  Description: 1. Monitor for areas of redness and/or skin breakdown  2. Assess vascular access sites hourly  3. Every 4-6 hours minimum:  Change oxygen saturation probe site  4. Every 4-6 hours:  If on nasal continuous positive airway pressure, respiratory therapy assess nares and determine need for appliance change or resting period.   Outcome: Progressing

## 2023-12-02 PROBLEM — R09.02 HYPOXIA: Status: ACTIVE | Noted: 2023-12-02

## 2023-12-02 LAB
ANION GAP SERPL CALCULATED.3IONS-SCNC: 11 MMOL/L (ref 3–16)
BASOPHILS # BLD: 0 K/UL (ref 0–0.2)
BASOPHILS NFR BLD: 0.1 %
BUN SERPL-MCNC: 18 MG/DL (ref 7–20)
CALCIUM SERPL-MCNC: 9.3 MG/DL (ref 8.3–10.6)
CHLORIDE SERPL-SCNC: 91 MMOL/L (ref 99–110)
CO2 SERPL-SCNC: 30 MMOL/L (ref 21–32)
CREAT SERPL-MCNC: <0.5 MG/DL (ref 0.6–1.2)
DEPRECATED RDW RBC AUTO: 13.8 % (ref 12.4–15.4)
EOSINOPHIL # BLD: 0 K/UL (ref 0–0.6)
EOSINOPHIL NFR BLD: 0 %
GFR SERPLBLD CREATININE-BSD FMLA CKD-EPI: >60 ML/MIN/{1.73_M2}
GLUCOSE BLD-MCNC: 123 MG/DL (ref 70–99)
GLUCOSE BLD-MCNC: 133 MG/DL (ref 70–99)
GLUCOSE BLD-MCNC: 150 MG/DL (ref 70–99)
GLUCOSE BLD-MCNC: 195 MG/DL (ref 70–99)
GLUCOSE SERPL-MCNC: 141 MG/DL (ref 70–99)
HCT VFR BLD AUTO: 35.5 % (ref 36–48)
HGB BLD-MCNC: 11.8 G/DL (ref 12–16)
LYMPHOCYTES # BLD: 0.4 K/UL (ref 1–5.1)
LYMPHOCYTES NFR BLD: 4.6 %
MCH RBC QN AUTO: 31.7 PG (ref 26–34)
MCHC RBC AUTO-ENTMCNC: 33.3 G/DL (ref 31–36)
MCV RBC AUTO: 95.3 FL (ref 80–100)
MONOCYTES # BLD: 0.3 K/UL (ref 0–1.3)
MONOCYTES NFR BLD: 3 %
NEUTROPHILS # BLD: 8 K/UL (ref 1.7–7.7)
NEUTROPHILS NFR BLD: 92.3 %
PERFORMED ON: ABNORMAL
PLATELET # BLD AUTO: 248 K/UL (ref 135–450)
PMV BLD AUTO: 7.6 FL (ref 5–10.5)
POTASSIUM SERPL-SCNC: 4.4 MMOL/L (ref 3.5–5.1)
RBC # BLD AUTO: 3.73 M/UL (ref 4–5.2)
SODIUM SERPL-SCNC: 132 MMOL/L (ref 136–145)
WBC # BLD AUTO: 8.6 K/UL (ref 4–11)

## 2023-12-02 PROCEDURE — 6370000000 HC RX 637 (ALT 250 FOR IP)

## 2023-12-02 PROCEDURE — 2580000003 HC RX 258

## 2023-12-02 PROCEDURE — 80048 BASIC METABOLIC PNL TOTAL CA: CPT

## 2023-12-02 PROCEDURE — 6360000002 HC RX W HCPCS

## 2023-12-02 PROCEDURE — 94761 N-INVAS EAR/PLS OXIMETRY MLT: CPT

## 2023-12-02 PROCEDURE — 6360000002 HC RX W HCPCS: Performed by: INTERNAL MEDICINE

## 2023-12-02 PROCEDURE — 94640 AIRWAY INHALATION TREATMENT: CPT

## 2023-12-02 PROCEDURE — 6370000000 HC RX 637 (ALT 250 FOR IP): Performed by: REGISTERED NURSE

## 2023-12-02 PROCEDURE — 85025 COMPLETE CBC W/AUTO DIFF WBC: CPT

## 2023-12-02 PROCEDURE — 36415 COLL VENOUS BLD VENIPUNCTURE: CPT

## 2023-12-02 PROCEDURE — 2700000000 HC OXYGEN THERAPY PER DAY

## 2023-12-02 PROCEDURE — 99233 SBSQ HOSP IP/OBS HIGH 50: CPT | Performed by: INTERNAL MEDICINE

## 2023-12-02 PROCEDURE — 94660 CPAP INITIATION&MGMT: CPT

## 2023-12-02 PROCEDURE — 6370000000 HC RX 637 (ALT 250 FOR IP): Performed by: INTERNAL MEDICINE

## 2023-12-02 PROCEDURE — 99291 CRITICAL CARE FIRST HOUR: CPT | Performed by: INTERNAL MEDICINE

## 2023-12-02 PROCEDURE — 2000000000 HC ICU R&B

## 2023-12-02 RX ADMIN — TRAMADOL HYDROCHLORIDE 50 MG: 50 TABLET ORAL at 09:31

## 2023-12-02 RX ADMIN — IPRATROPIUM BROMIDE AND ALBUTEROL SULFATE 1 DOSE: 2.5; .5 SOLUTION RESPIRATORY (INHALATION) at 12:45

## 2023-12-02 RX ADMIN — ESCITALOPRAM OXALATE 20 MG: 10 TABLET ORAL at 09:31

## 2023-12-02 RX ADMIN — AMLODIPINE BESYLATE 5 MG: 5 TABLET ORAL at 09:31

## 2023-12-02 RX ADMIN — IPRATROPIUM BROMIDE AND ALBUTEROL SULFATE 1 DOSE: 2.5; .5 SOLUTION RESPIRATORY (INHALATION) at 08:11

## 2023-12-02 RX ADMIN — PREDNISONE 40 MG: 20 TABLET ORAL at 09:31

## 2023-12-02 RX ADMIN — DOXYCYCLINE HYCLATE 100 MG: 100 TABLET, COATED ORAL at 09:31

## 2023-12-02 RX ADMIN — IPRATROPIUM BROMIDE AND ALBUTEROL SULFATE 1 DOSE: 2.5; .5 SOLUTION RESPIRATORY (INHALATION) at 16:12

## 2023-12-02 RX ADMIN — TRAMADOL HYDROCHLORIDE 50 MG: 50 TABLET ORAL at 01:51

## 2023-12-02 RX ADMIN — SODIUM CHLORIDE, PRESERVATIVE FREE 10 ML: 5 INJECTION INTRAVENOUS at 20:01

## 2023-12-02 RX ADMIN — FUROSEMIDE 20 MG: 10 INJECTION, SOLUTION INTRAMUSCULAR; INTRAVENOUS at 23:08

## 2023-12-02 RX ADMIN — IPRATROPIUM BROMIDE AND ALBUTEROL SULFATE 1 DOSE: 2.5; .5 SOLUTION RESPIRATORY (INHALATION) at 19:55

## 2023-12-02 RX ADMIN — TRAMADOL HYDROCHLORIDE 50 MG: 50 TABLET ORAL at 20:01

## 2023-12-02 RX ADMIN — MUPIROCIN: 20 OINTMENT TOPICAL at 20:01

## 2023-12-02 RX ADMIN — MUPIROCIN: 20 OINTMENT TOPICAL at 09:31

## 2023-12-02 RX ADMIN — ENOXAPARIN SODIUM 40 MG: 100 INJECTION SUBCUTANEOUS at 09:31

## 2023-12-02 RX ADMIN — DOXYCYCLINE HYCLATE 100 MG: 100 TABLET, COATED ORAL at 20:00

## 2023-12-02 RX ADMIN — FUROSEMIDE 20 MG: 10 INJECTION, SOLUTION INTRAMUSCULAR; INTRAVENOUS at 09:33

## 2023-12-02 ASSESSMENT — PAIN SCALES - GENERAL
PAINLEVEL_OUTOF10: 6
PAINLEVEL_OUTOF10: 5
PAINLEVEL_OUTOF10: 6
PAINLEVEL_OUTOF10: 5

## 2023-12-02 ASSESSMENT — PAIN DESCRIPTION - ORIENTATION
ORIENTATION: UPPER
ORIENTATION: UPPER

## 2023-12-02 ASSESSMENT — PAIN DESCRIPTION - LOCATION
LOCATION: BACK;NECK
LOCATION: NECK;BACK

## 2023-12-02 ASSESSMENT — PAIN DESCRIPTION - DESCRIPTORS
DESCRIPTORS: ACHING
DESCRIPTORS: ACHING

## 2023-12-02 ASSESSMENT — PAIN - FUNCTIONAL ASSESSMENT
PAIN_FUNCTIONAL_ASSESSMENT: PREVENTS OR INTERFERES SOME ACTIVE ACTIVITIES AND ADLS
PAIN_FUNCTIONAL_ASSESSMENT: PREVENTS OR INTERFERES SOME ACTIVE ACTIVITIES AND ADLS

## 2023-12-02 NOTE — PLAN OF CARE
Problem: Pain  Goal: Verbalizes/displays adequate comfort level or baseline comfort level  Outcome: Progressing     Problem: Respiratory - Adult  Goal: Achieves optimal ventilation and oxygenation  Outcome: Progressing

## 2023-12-02 NOTE — PLAN OF CARE
Problem: Discharge Planning  Goal: Discharge to home or other facility with appropriate resources  12/2/2023 0320 by Stephanie Plata RN  Outcome: Progressing  Flowsheets (Taken 12/2/2023 0320)  Discharge to home or other facility with appropriate resources: Identify barriers to discharge with patient and caregiver  12/1/2023 1823 by Carter Miranda RN  Outcome: Progressing     Problem: Pain  Goal: Verbalizes/displays adequate comfort level or baseline comfort level  12/2/2023 0320 by Stephanie Plata RN  Outcome: Progressing  Flowsheets (Taken 12/2/2023 0320)  Verbalizes/displays adequate comfort level or baseline comfort level:   Encourage patient to monitor pain and request assistance   Assess pain using appropriate pain scale  12/1/2023 1823 by Carter Miranda RN  Outcome: Progressing     Problem: Respiratory - Adult  Goal: Achieves optimal ventilation and oxygenation  12/2/2023 0320 by Stephanie Plata RN  Outcome: Progressing  Flowsheets (Taken 12/2/2023 0320)  Achieves optimal ventilation and oxygenation:   Assess for changes in respiratory status   Assess for changes in mentation and behavior   Position to facilitate oxygenation and minimize respiratory effort  12/1/2023 1823 by Carter Miranda RN  Outcome: Progressing     Problem: Safety - Adult  Goal: Free from fall injury  12/2/2023 0320 by Stephanie Plata RN  Outcome: Progressing  Flowsheets (Taken 12/2/2023 0320)  Free From Fall Injury: Instruct family/caregiver on patient safety  12/1/2023 1823 by Carter Miranda RN  Outcome: Progressing     Problem: Skin/Tissue Integrity  Goal: Absence of new skin breakdown  Description: 1. Monitor for areas of redness and/or skin breakdown  2. Assess vascular access sites hourly  3. Every 4-6 hours minimum:  Change oxygen saturation probe site  4. Every 4-6 hours:  If on nasal continuous positive airway pressure, respiratory therapy assess nares and determine need for appliance change or resting period.   Outcome:

## 2023-12-03 LAB
ANION GAP SERPL CALCULATED.3IONS-SCNC: 8 MMOL/L (ref 3–16)
BASOPHILS # BLD: 0 K/UL (ref 0–0.2)
BASOPHILS NFR BLD: 0 %
BUN SERPL-MCNC: 25 MG/DL (ref 7–20)
CALCIUM SERPL-MCNC: 9.1 MG/DL (ref 8.3–10.6)
CHLORIDE SERPL-SCNC: 93 MMOL/L (ref 99–110)
CO2 SERPL-SCNC: 36 MMOL/L (ref 21–32)
CREAT SERPL-MCNC: 0.6 MG/DL (ref 0.6–1.2)
DEPRECATED RDW RBC AUTO: 13.5 % (ref 12.4–15.4)
EOSINOPHIL # BLD: 0 K/UL (ref 0–0.6)
EOSINOPHIL NFR BLD: 0.1 %
GFR SERPLBLD CREATININE-BSD FMLA CKD-EPI: >60 ML/MIN/{1.73_M2}
GLUCOSE BLD-MCNC: 102 MG/DL (ref 70–99)
GLUCOSE BLD-MCNC: 113 MG/DL (ref 70–99)
GLUCOSE BLD-MCNC: 128 MG/DL (ref 70–99)
GLUCOSE BLD-MCNC: 231 MG/DL (ref 70–99)
GLUCOSE BLD-MCNC: 90 MG/DL (ref 70–99)
GLUCOSE SERPL-MCNC: 109 MG/DL (ref 70–99)
HCT VFR BLD AUTO: 36.4 % (ref 36–48)
HGB BLD-MCNC: 12.2 G/DL (ref 12–16)
LYMPHOCYTES # BLD: 1.4 K/UL (ref 1–5.1)
LYMPHOCYTES NFR BLD: 16.6 %
MCH RBC QN AUTO: 31.5 PG (ref 26–34)
MCHC RBC AUTO-ENTMCNC: 33.5 G/DL (ref 31–36)
MCV RBC AUTO: 94 FL (ref 80–100)
MONOCYTES # BLD: 1.1 K/UL (ref 0–1.3)
MONOCYTES NFR BLD: 12.5 %
NEUTROPHILS # BLD: 6 K/UL (ref 1.7–7.7)
NEUTROPHILS NFR BLD: 70.8 %
PERFORMED ON: ABNORMAL
PERFORMED ON: NORMAL
PLATELET # BLD AUTO: 278 K/UL (ref 135–450)
PMV BLD AUTO: 7.5 FL (ref 5–10.5)
POTASSIUM SERPL-SCNC: 4.5 MMOL/L (ref 3.5–5.1)
RBC # BLD AUTO: 3.88 M/UL (ref 4–5.2)
SODIUM SERPL-SCNC: 137 MMOL/L (ref 136–145)
WBC # BLD AUTO: 8.5 K/UL (ref 4–11)

## 2023-12-03 PROCEDURE — 6370000000 HC RX 637 (ALT 250 FOR IP)

## 2023-12-03 PROCEDURE — 80048 BASIC METABOLIC PNL TOTAL CA: CPT

## 2023-12-03 PROCEDURE — 99233 SBSQ HOSP IP/OBS HIGH 50: CPT | Performed by: INTERNAL MEDICINE

## 2023-12-03 PROCEDURE — 94640 AIRWAY INHALATION TREATMENT: CPT

## 2023-12-03 PROCEDURE — 97116 GAIT TRAINING THERAPY: CPT

## 2023-12-03 PROCEDURE — 97530 THERAPEUTIC ACTIVITIES: CPT

## 2023-12-03 PROCEDURE — 94660 CPAP INITIATION&MGMT: CPT

## 2023-12-03 PROCEDURE — 2000000000 HC ICU R&B

## 2023-12-03 PROCEDURE — 85025 COMPLETE CBC W/AUTO DIFF WBC: CPT

## 2023-12-03 PROCEDURE — 6370000000 HC RX 637 (ALT 250 FOR IP): Performed by: INTERNAL MEDICINE

## 2023-12-03 PROCEDURE — 6360000002 HC RX W HCPCS: Performed by: INTERNAL MEDICINE

## 2023-12-03 PROCEDURE — 6360000002 HC RX W HCPCS

## 2023-12-03 PROCEDURE — 2580000003 HC RX 258

## 2023-12-03 PROCEDURE — 2700000000 HC OXYGEN THERAPY PER DAY

## 2023-12-03 PROCEDURE — 36415 COLL VENOUS BLD VENIPUNCTURE: CPT

## 2023-12-03 PROCEDURE — 6370000000 HC RX 637 (ALT 250 FOR IP): Performed by: REGISTERED NURSE

## 2023-12-03 PROCEDURE — 94761 N-INVAS EAR/PLS OXIMETRY MLT: CPT

## 2023-12-03 RX ORDER — IPRATROPIUM BROMIDE AND ALBUTEROL SULFATE 2.5; .5 MG/3ML; MG/3ML
1 SOLUTION RESPIRATORY (INHALATION)
Status: DISCONTINUED | OUTPATIENT
Start: 2023-12-03 | End: 2023-12-05 | Stop reason: HOSPADM

## 2023-12-03 RX ADMIN — DOXYCYCLINE HYCLATE 100 MG: 100 TABLET, COATED ORAL at 20:56

## 2023-12-03 RX ADMIN — IPRATROPIUM BROMIDE AND ALBUTEROL SULFATE 1 DOSE: 2.5; .5 SOLUTION RESPIRATORY (INHALATION) at 08:18

## 2023-12-03 RX ADMIN — PREDNISONE 40 MG: 20 TABLET ORAL at 09:05

## 2023-12-03 RX ADMIN — AMLODIPINE BESYLATE 5 MG: 5 TABLET ORAL at 09:05

## 2023-12-03 RX ADMIN — IPRATROPIUM BROMIDE AND ALBUTEROL SULFATE 1 DOSE: 2.5; .5 SOLUTION RESPIRATORY (INHALATION) at 00:05

## 2023-12-03 RX ADMIN — SODIUM CHLORIDE, PRESERVATIVE FREE 10 ML: 5 INJECTION INTRAVENOUS at 20:57

## 2023-12-03 RX ADMIN — IPRATROPIUM BROMIDE AND ALBUTEROL SULFATE 1 DOSE: 2.5; .5 SOLUTION RESPIRATORY (INHALATION) at 15:20

## 2023-12-03 RX ADMIN — MUPIROCIN: 20 OINTMENT TOPICAL at 20:56

## 2023-12-03 RX ADMIN — ENOXAPARIN SODIUM 40 MG: 100 INJECTION SUBCUTANEOUS at 09:04

## 2023-12-03 RX ADMIN — MUPIROCIN: 20 OINTMENT TOPICAL at 09:04

## 2023-12-03 RX ADMIN — IPRATROPIUM BROMIDE AND ALBUTEROL SULFATE 1 DOSE: 2.5; .5 SOLUTION RESPIRATORY (INHALATION) at 12:03

## 2023-12-03 RX ADMIN — IPRATROPIUM BROMIDE AND ALBUTEROL SULFATE 1 DOSE: 2.5; .5 SOLUTION RESPIRATORY (INHALATION) at 19:35

## 2023-12-03 RX ADMIN — FUROSEMIDE 20 MG: 10 INJECTION, SOLUTION INTRAMUSCULAR; INTRAVENOUS at 20:57

## 2023-12-03 RX ADMIN — TRAMADOL HYDROCHLORIDE 50 MG: 50 TABLET ORAL at 09:05

## 2023-12-03 RX ADMIN — FUROSEMIDE 20 MG: 10 INJECTION, SOLUTION INTRAMUSCULAR; INTRAVENOUS at 09:05

## 2023-12-03 RX ADMIN — TRAMADOL HYDROCHLORIDE 50 MG: 50 TABLET ORAL at 18:18

## 2023-12-03 RX ADMIN — ESCITALOPRAM OXALATE 20 MG: 10 TABLET ORAL at 09:04

## 2023-12-03 RX ADMIN — DOXYCYCLINE HYCLATE 100 MG: 100 TABLET, COATED ORAL at 09:05

## 2023-12-03 ASSESSMENT — PAIN SCALES - GENERAL
PAINLEVEL_OUTOF10: 6
PAINLEVEL_OUTOF10: 6
PAINLEVEL_OUTOF10: 4
PAINLEVEL_OUTOF10: 7
PAINLEVEL_OUTOF10: 7

## 2023-12-03 ASSESSMENT — PAIN DESCRIPTION - ORIENTATION
ORIENTATION: MID

## 2023-12-03 ASSESSMENT — PAIN DESCRIPTION - DESCRIPTORS
DESCRIPTORS: ACHING

## 2023-12-03 ASSESSMENT — PAIN DESCRIPTION - LOCATION
LOCATION: NECK

## 2023-12-03 ASSESSMENT — PAIN - FUNCTIONAL ASSESSMENT: PAIN_FUNCTIONAL_ASSESSMENT: ACTIVITIES ARE NOT PREVENTED

## 2023-12-03 NOTE — PLAN OF CARE
Problem: Discharge Planning  Goal: Discharge to home or other facility with appropriate resources  12/2/2023 2154 by Malik Miguel RN  Outcome: Progressing  Flowsheets (Taken 12/2/2023 2154)  Discharge to home or other facility with appropriate resources: Identify barriers to discharge with patient and caregiver  12/2/2023 1835 by Nettie Padilla RN  Outcome: Progressing     Problem: Pain  Goal: Verbalizes/displays adequate comfort level or baseline comfort level  12/2/2023 2154 by Malik Miguel RN  Outcome: Progressing  Flowsheets (Taken 12/2/2023 2154)  Verbalizes/displays adequate comfort level or baseline comfort level: Encourage patient to monitor pain and request assistance  12/2/2023 1835 by Nettie Padilla RN  Outcome: Progressing     Problem: Respiratory - Adult  Goal: Achieves optimal ventilation and oxygenation  12/2/2023 2154 by Malik Miguel RN  Outcome: Progressing  Flowsheets (Taken 12/2/2023 2154)  Achieves optimal ventilation and oxygenation:   Assess for changes in respiratory status   Assess for changes in mentation and behavior   Oxygen supplementation based on oxygen saturation or arterial blood gases   Position to facilitate oxygenation and minimize respiratory effort  12/2/2023 1835 by Nettie Padilla RN  Outcome: Progressing     Problem: Safety - Adult  Goal: Free from fall injury  12/2/2023 2154 by Malik Miguel RN  Outcome: Reola Prader (Taken 12/2/2023 2154)  Free From Fall Injury: Instruct family/caregiver on patient safety  12/2/2023 1835 by Nettie Padilla RN  Outcome: Progressing     Problem: Skin/Tissue Integrity  Goal: Absence of new skin breakdown  Description: 1. Monitor for areas of redness and/or skin breakdown  2. Assess vascular access sites hourly  3. Every 4-6 hours minimum:  Change oxygen saturation probe site  4.   Every 4-6 hours:  If on nasal continuous positive airway pressure, respiratory therapy assess nares and determine need for appliance change or

## 2023-12-04 LAB
ANION GAP SERPL CALCULATED.3IONS-SCNC: 9 MMOL/L (ref 3–16)
BASOPHILS # BLD: 0 K/UL (ref 0–0.2)
BASOPHILS NFR BLD: 0.2 %
BUN SERPL-MCNC: 27 MG/DL (ref 7–20)
CALCIUM SERPL-MCNC: 7.9 MG/DL (ref 8.3–10.6)
CHLORIDE SERPL-SCNC: 91 MMOL/L (ref 99–110)
CO2 SERPL-SCNC: 34 MMOL/L (ref 21–32)
CREAT SERPL-MCNC: <0.5 MG/DL (ref 0.6–1.2)
DEPRECATED RDW RBC AUTO: 13.4 % (ref 12.4–15.4)
EOSINOPHIL # BLD: 0.1 K/UL (ref 0–0.6)
EOSINOPHIL NFR BLD: 1.5 %
GFR SERPLBLD CREATININE-BSD FMLA CKD-EPI: >60 ML/MIN/{1.73_M2}
GLUCOSE BLD-MCNC: 143 MG/DL (ref 70–99)
GLUCOSE BLD-MCNC: 165 MG/DL (ref 70–99)
GLUCOSE BLD-MCNC: 83 MG/DL (ref 70–99)
GLUCOSE BLD-MCNC: 89 MG/DL (ref 70–99)
GLUCOSE BLD-MCNC: 93 MG/DL (ref 70–99)
GLUCOSE SERPL-MCNC: 111 MG/DL (ref 70–99)
HCT VFR BLD AUTO: 39.6 % (ref 36–48)
HGB BLD-MCNC: 13.1 G/DL (ref 12–16)
LYMPHOCYTES # BLD: 1.9 K/UL (ref 1–5.1)
LYMPHOCYTES NFR BLD: 20.9 %
MCH RBC QN AUTO: 31.1 PG (ref 26–34)
MCHC RBC AUTO-ENTMCNC: 33.1 G/DL (ref 31–36)
MCV RBC AUTO: 93.9 FL (ref 80–100)
MONOCYTES # BLD: 1.2 K/UL (ref 0–1.3)
MONOCYTES NFR BLD: 13.3 %
NEUTROPHILS # BLD: 6 K/UL (ref 1.7–7.7)
NEUTROPHILS NFR BLD: 64.1 %
PERFORMED ON: ABNORMAL
PERFORMED ON: ABNORMAL
PERFORMED ON: NORMAL
PLATELET # BLD AUTO: 312 K/UL (ref 135–450)
PMV BLD AUTO: 7.1 FL (ref 5–10.5)
POTASSIUM SERPL-SCNC: 3.8 MMOL/L (ref 3.5–5.1)
RBC # BLD AUTO: 4.22 M/UL (ref 4–5.2)
SODIUM SERPL-SCNC: 134 MMOL/L (ref 136–145)
WBC # BLD AUTO: 9.3 K/UL (ref 4–11)

## 2023-12-04 PROCEDURE — 6370000000 HC RX 637 (ALT 250 FOR IP): Performed by: INTERNAL MEDICINE

## 2023-12-04 PROCEDURE — 94660 CPAP INITIATION&MGMT: CPT

## 2023-12-04 PROCEDURE — 6360000002 HC RX W HCPCS

## 2023-12-04 PROCEDURE — 94640 AIRWAY INHALATION TREATMENT: CPT

## 2023-12-04 PROCEDURE — 6370000000 HC RX 637 (ALT 250 FOR IP): Performed by: REGISTERED NURSE

## 2023-12-04 PROCEDURE — 1200000000 HC SEMI PRIVATE

## 2023-12-04 PROCEDURE — 80048 BASIC METABOLIC PNL TOTAL CA: CPT

## 2023-12-04 PROCEDURE — 2580000003 HC RX 258: Performed by: INTERNAL MEDICINE

## 2023-12-04 PROCEDURE — 99232 SBSQ HOSP IP/OBS MODERATE 35: CPT | Performed by: INTERNAL MEDICINE

## 2023-12-04 PROCEDURE — 36415 COLL VENOUS BLD VENIPUNCTURE: CPT

## 2023-12-04 PROCEDURE — 6360000002 HC RX W HCPCS: Performed by: INTERNAL MEDICINE

## 2023-12-04 PROCEDURE — 85025 COMPLETE CBC W/AUTO DIFF WBC: CPT

## 2023-12-04 PROCEDURE — 94761 N-INVAS EAR/PLS OXIMETRY MLT: CPT

## 2023-12-04 PROCEDURE — 2700000000 HC OXYGEN THERAPY PER DAY

## 2023-12-04 PROCEDURE — 2580000003 HC RX 258

## 2023-12-04 PROCEDURE — 6370000000 HC RX 637 (ALT 250 FOR IP)

## 2023-12-04 PROCEDURE — 99233 SBSQ HOSP IP/OBS HIGH 50: CPT | Performed by: INTERNAL MEDICINE

## 2023-12-04 RX ORDER — IPRATROPIUM BROMIDE AND ALBUTEROL SULFATE 2.5; .5 MG/3ML; MG/3ML
3 SOLUTION RESPIRATORY (INHALATION) EVERY 4 HOURS PRN
Qty: 360 ML | Refills: 1 | Status: SHIPPED | OUTPATIENT
Start: 2023-12-04

## 2023-12-04 RX ORDER — FUROSEMIDE 40 MG/1
40 TABLET ORAL DAILY
Status: DISCONTINUED | OUTPATIENT
Start: 2023-12-05 | End: 2023-12-05 | Stop reason: HOSPADM

## 2023-12-04 RX ORDER — FUROSEMIDE 40 MG/1
40 TABLET ORAL DAILY
Status: DISCONTINUED | OUTPATIENT
Start: 2023-12-04 | End: 2023-12-04

## 2023-12-04 RX ORDER — PREDNISONE 10 MG/1
TABLET ORAL
Qty: 30 TABLET | Refills: 0 | Status: SHIPPED | OUTPATIENT
Start: 2023-12-04

## 2023-12-04 RX ORDER — DOXYCYCLINE HYCLATE 100 MG
100 TABLET ORAL EVERY 12 HOURS SCHEDULED
Qty: 1 TABLET | Refills: 0 | Status: SHIPPED | OUTPATIENT
Start: 2023-12-04 | End: 2023-12-05

## 2023-12-04 RX ADMIN — DOXYCYCLINE HYCLATE 100 MG: 100 TABLET, COATED ORAL at 20:54

## 2023-12-04 RX ADMIN — IPRATROPIUM BROMIDE AND ALBUTEROL SULFATE 1 DOSE: 2.5; .5 SOLUTION RESPIRATORY (INHALATION) at 14:51

## 2023-12-04 RX ADMIN — PREDNISONE 40 MG: 20 TABLET ORAL at 09:15

## 2023-12-04 RX ADMIN — IPRATROPIUM BROMIDE AND ALBUTEROL SULFATE 1 DOSE: 2.5; .5 SOLUTION RESPIRATORY (INHALATION) at 07:30

## 2023-12-04 RX ADMIN — SODIUM CHLORIDE, PRESERVATIVE FREE 10 ML: 5 INJECTION INTRAVENOUS at 20:56

## 2023-12-04 RX ADMIN — TRAMADOL HYDROCHLORIDE 50 MG: 50 TABLET ORAL at 09:14

## 2023-12-04 RX ADMIN — DOXYCYCLINE HYCLATE 100 MG: 100 TABLET, COATED ORAL at 09:15

## 2023-12-04 RX ADMIN — TRAMADOL HYDROCHLORIDE 50 MG: 50 TABLET ORAL at 20:53

## 2023-12-04 RX ADMIN — FUROSEMIDE 20 MG: 10 INJECTION, SOLUTION INTRAMUSCULAR; INTRAVENOUS at 09:15

## 2023-12-04 RX ADMIN — ESCITALOPRAM OXALATE 20 MG: 10 TABLET ORAL at 09:15

## 2023-12-04 RX ADMIN — ENOXAPARIN SODIUM 40 MG: 100 INJECTION SUBCUTANEOUS at 09:15

## 2023-12-04 RX ADMIN — SODIUM CHLORIDE, PRESERVATIVE FREE 10 ML: 5 INJECTION INTRAVENOUS at 09:16

## 2023-12-04 RX ADMIN — IPRATROPIUM BROMIDE AND ALBUTEROL SULFATE 1 DOSE: 2.5; .5 SOLUTION RESPIRATORY (INHALATION) at 19:23

## 2023-12-04 RX ADMIN — TRAMADOL HYDROCHLORIDE 50 MG: 50 TABLET ORAL at 01:14

## 2023-12-04 RX ADMIN — MUPIROCIN: 20 OINTMENT TOPICAL at 20:54

## 2023-12-04 RX ADMIN — MUPIROCIN: 20 OINTMENT TOPICAL at 09:15

## 2023-12-04 RX ADMIN — AMLODIPINE BESYLATE 5 MG: 5 TABLET ORAL at 09:15

## 2023-12-04 ASSESSMENT — PAIN SCALES - GENERAL
PAINLEVEL_OUTOF10: 8
PAINLEVEL_OUTOF10: 8
PAINLEVEL_OUTOF10: 7
PAINLEVEL_OUTOF10: 6
PAINLEVEL_OUTOF10: 7
PAINLEVEL_OUTOF10: 6
PAINLEVEL_OUTOF10: 5

## 2023-12-04 ASSESSMENT — PAIN DESCRIPTION - LOCATION
LOCATION: BACK
LOCATION: NECK
LOCATION: BACK

## 2023-12-04 ASSESSMENT — PAIN DESCRIPTION - ORIENTATION
ORIENTATION: LOWER
ORIENTATION: MID

## 2023-12-04 ASSESSMENT — PAIN DESCRIPTION - ONSET: ONSET: ON-GOING

## 2023-12-04 ASSESSMENT — PAIN DESCRIPTION - PAIN TYPE: TYPE: CHRONIC PAIN

## 2023-12-04 ASSESSMENT — PAIN DESCRIPTION - DESCRIPTORS
DESCRIPTORS: DULL;THROBBING
DESCRIPTORS: ACHING

## 2023-12-04 NOTE — DISCHARGE SUMMARY
Course    Acute on chronic hypoxic and hypercarbic resp failure   2 to acute COPD. Placed on BiPAP  Pulm consult  Uses 3 L of O2 at home. On 5 L. I decreased it to 3 L. Trial of lasix 20 mg IV q 12. Improved. Discharge home on Doxy and Prednisone  See pulmonary note for OP NIV- Trelegy or AVAPS for use at home. Acute COPD exacerbation  Solumedrol,IBD,doxy day # 4  Sputum cultures     Hyponatremia  Placed on IV NS now stopped. Will monitor  Resolved. Dc IVF   1500 ml FR      Lung nodule  Will need follow up. Concern for malignancy. Hypertension  Continue amlodipine      RLE swelling   Elevated d dimer   Venous doppler LE negative for DVT        CBC:   Recent Labs     12/03/23  0417 12/04/23  0802 12/05/23  0701   WBC 8.5 9.3 8.9   HGB 12.2 13.1 13.0   HCT 36.4 39.6 38.7   MCV 94.0 93.9 93.2    312 314     BMP:   Recent Labs     12/03/23 0417 12/04/23  0802 12/05/23  0701    134* 133*   K 4.5 3.8 4.1   CL 93* 91* 92*   CO2 36* 34* 32   BUN 25* 27* 23*   CREATININE 0.6 <0.5* 0.6     Results in Past 30 Days  Result Component Current Result Ref Range Previous Result Ref Range   Troponin, High Sensitivity 43 (H) (11/30/2023) 0 - 14 ng/L 45 (H) (11/30/2023) 0 - 14 ng/L          Cultures  COVID 19 neg  Flu neg    VL Extremity Venous Bilateral   Final Result      CT CHEST PULMONARY EMBOLISM W CONTRAST   Final Result   No evidence of pulmonary embolism or acute pulmonary abnormality. Severe emphysema with redemonstration of a 7 mm somewhat spiculated nodule   right upper lobe unchanged compared to 10/05/2023 but progressed since   03/10/2023. Recommend next follow-up chest CT in 6 months. XR CHEST PORTABLE   Final Result   Bibasilar opacities are present which could relate to bibasilar atelectasis   or infectious infiltrate. Will need to correlate clinically. Interstitial reticulation and scarring along the periphery of the left upper   lung are stable.            Venous

## 2023-12-04 NOTE — DISCHARGE INSTR - DIET
Good nutrition is important when healing from an illness, injury, or surgery. Follow any nutrition recommendations given to you during your hospital stay. If you were given an oral nutrition supplement while in the hospital, continue to take this supplement at home. You can take it with meals, in-between meals, and/or before bedtime. These supplements can be purchased at most local grocery stores, pharmacies, and chain [employer]-stores. If you have any questions about your diet or nutrition, call the hospital and ask for the dietitian.      Regular Diet with 2000 ml fluid restriction

## 2023-12-05 VITALS
HEIGHT: 63 IN | WEIGHT: 172.2 LBS | OXYGEN SATURATION: 95 % | TEMPERATURE: 98.6 F | RESPIRATION RATE: 20 BRPM | HEART RATE: 100 BPM | DIASTOLIC BLOOD PRESSURE: 81 MMHG | BODY MASS INDEX: 30.51 KG/M2 | SYSTOLIC BLOOD PRESSURE: 151 MMHG

## 2023-12-05 LAB
ANION GAP SERPL CALCULATED.3IONS-SCNC: 9 MMOL/L (ref 3–16)
BASOPHILS # BLD: 0 K/UL (ref 0–0.2)
BASOPHILS NFR BLD: 0.1 %
BUN SERPL-MCNC: 23 MG/DL (ref 7–20)
CALCIUM SERPL-MCNC: 9.1 MG/DL (ref 8.3–10.6)
CHLORIDE SERPL-SCNC: 92 MMOL/L (ref 99–110)
CO2 SERPL-SCNC: 32 MMOL/L (ref 21–32)
CREAT SERPL-MCNC: 0.6 MG/DL (ref 0.6–1.2)
DEPRECATED RDW RBC AUTO: 13.4 % (ref 12.4–15.4)
EOSINOPHIL # BLD: 0.2 K/UL (ref 0–0.6)
EOSINOPHIL NFR BLD: 2.4 %
GFR SERPLBLD CREATININE-BSD FMLA CKD-EPI: >60 ML/MIN/{1.73_M2}
GLUCOSE BLD-MCNC: 100 MG/DL (ref 70–99)
GLUCOSE BLD-MCNC: 222 MG/DL (ref 70–99)
GLUCOSE BLD-MCNC: 89 MG/DL (ref 70–99)
GLUCOSE SERPL-MCNC: 101 MG/DL (ref 70–99)
HCT VFR BLD AUTO: 38.7 % (ref 36–48)
HGB BLD-MCNC: 13 G/DL (ref 12–16)
LYMPHOCYTES # BLD: 1.9 K/UL (ref 1–5.1)
LYMPHOCYTES NFR BLD: 22 %
MCH RBC QN AUTO: 31.3 PG (ref 26–34)
MCHC RBC AUTO-ENTMCNC: 33.5 G/DL (ref 31–36)
MCV RBC AUTO: 93.2 FL (ref 80–100)
MONOCYTES # BLD: 1.2 K/UL (ref 0–1.3)
MONOCYTES NFR BLD: 13 %
NEUTROPHILS # BLD: 5.5 K/UL (ref 1.7–7.7)
NEUTROPHILS NFR BLD: 62.5 %
PERFORMED ON: ABNORMAL
PERFORMED ON: ABNORMAL
PERFORMED ON: NORMAL
PLATELET # BLD AUTO: 314 K/UL (ref 135–450)
PMV BLD AUTO: 7.2 FL (ref 5–10.5)
POTASSIUM SERPL-SCNC: 4.1 MMOL/L (ref 3.5–5.1)
RBC # BLD AUTO: 4.16 M/UL (ref 4–5.2)
SODIUM SERPL-SCNC: 133 MMOL/L (ref 136–145)
WBC # BLD AUTO: 8.9 K/UL (ref 4–11)

## 2023-12-05 PROCEDURE — 6370000000 HC RX 637 (ALT 250 FOR IP): Performed by: INTERNAL MEDICINE

## 2023-12-05 PROCEDURE — 94660 CPAP INITIATION&MGMT: CPT

## 2023-12-05 PROCEDURE — 6360000002 HC RX W HCPCS: Performed by: INTERNAL MEDICINE

## 2023-12-05 PROCEDURE — 36415 COLL VENOUS BLD VENIPUNCTURE: CPT

## 2023-12-05 PROCEDURE — 99232 SBSQ HOSP IP/OBS MODERATE 35: CPT | Performed by: INTERNAL MEDICINE

## 2023-12-05 PROCEDURE — 80048 BASIC METABOLIC PNL TOTAL CA: CPT

## 2023-12-05 PROCEDURE — 94761 N-INVAS EAR/PLS OXIMETRY MLT: CPT

## 2023-12-05 PROCEDURE — 2580000003 HC RX 258: Performed by: INTERNAL MEDICINE

## 2023-12-05 PROCEDURE — 6370000000 HC RX 637 (ALT 250 FOR IP)

## 2023-12-05 PROCEDURE — 99239 HOSP IP/OBS DSCHRG MGMT >30: CPT | Performed by: INTERNAL MEDICINE

## 2023-12-05 PROCEDURE — 94640 AIRWAY INHALATION TREATMENT: CPT

## 2023-12-05 PROCEDURE — 2700000000 HC OXYGEN THERAPY PER DAY

## 2023-12-05 PROCEDURE — 85025 COMPLETE CBC W/AUTO DIFF WBC: CPT

## 2023-12-05 RX ORDER — HYDROXYZINE HYDROCHLORIDE 25 MG/1
25 TABLET, FILM COATED ORAL ONCE
Status: COMPLETED | OUTPATIENT
Start: 2023-12-05 | End: 2023-12-05

## 2023-12-05 RX ORDER — BENZONATATE 100 MG/1
100 CAPSULE ORAL 3 TIMES DAILY PRN
Qty: 30 CAPSULE | Refills: 0 | Status: SHIPPED | OUTPATIENT
Start: 2023-12-05 | End: 2023-12-15

## 2023-12-05 RX ADMIN — SODIUM CHLORIDE, PRESERVATIVE FREE 10 ML: 5 INJECTION INTRAVENOUS at 10:02

## 2023-12-05 RX ADMIN — FUROSEMIDE 40 MG: 40 TABLET ORAL at 10:01

## 2023-12-05 RX ADMIN — ENOXAPARIN SODIUM 40 MG: 100 INJECTION SUBCUTANEOUS at 10:00

## 2023-12-05 RX ADMIN — IPRATROPIUM BROMIDE AND ALBUTEROL SULFATE 1 DOSE: 2.5; .5 SOLUTION RESPIRATORY (INHALATION) at 11:15

## 2023-12-05 RX ADMIN — ESCITALOPRAM OXALATE 20 MG: 10 TABLET ORAL at 10:01

## 2023-12-05 RX ADMIN — TRAMADOL HYDROCHLORIDE 50 MG: 50 TABLET ORAL at 05:31

## 2023-12-05 RX ADMIN — HYDROXYZINE HYDROCHLORIDE 25 MG: 25 TABLET ORAL at 14:14

## 2023-12-05 RX ADMIN — PREDNISONE 40 MG: 20 TABLET ORAL at 10:00

## 2023-12-05 RX ADMIN — AMLODIPINE BESYLATE 5 MG: 5 TABLET ORAL at 10:01

## 2023-12-05 RX ADMIN — IPRATROPIUM BROMIDE AND ALBUTEROL SULFATE 1 DOSE: 2.5; .5 SOLUTION RESPIRATORY (INHALATION) at 07:33

## 2023-12-05 RX ADMIN — IPRATROPIUM BROMIDE AND ALBUTEROL SULFATE 1 DOSE: 2.5; .5 SOLUTION RESPIRATORY (INHALATION) at 15:33

## 2023-12-05 RX ADMIN — INSULIN LISPRO 1 UNITS: 100 INJECTION, SOLUTION INTRAVENOUS; SUBCUTANEOUS at 17:19

## 2023-12-05 ASSESSMENT — PAIN SCALES - GENERAL
PAINLEVEL_OUTOF10: 8
PAINLEVEL_OUTOF10: 4
PAINLEVEL_OUTOF10: 5
PAINLEVEL_OUTOF10: 7

## 2023-12-05 ASSESSMENT — PAIN DESCRIPTION - LOCATION
LOCATION: BACK
LOCATION: BACK

## 2023-12-05 ASSESSMENT — PAIN DESCRIPTION - ORIENTATION
ORIENTATION: LOWER
ORIENTATION: LOWER

## 2023-12-05 ASSESSMENT — PAIN DESCRIPTION - PAIN TYPE: TYPE: CHRONIC PAIN

## 2023-12-05 ASSESSMENT — PAIN DESCRIPTION - DESCRIPTORS
DESCRIPTORS: ACHING
DESCRIPTORS: ACHING

## 2023-12-05 ASSESSMENT — PAIN SCALES - WONG BAKER
WONGBAKER_NUMERICALRESPONSE: 0
WONGBAKER_NUMERICALRESPONSE: 0

## 2023-12-05 NOTE — FLOWSHEET NOTE
12/04/23 1920   Handoff   Communication Given Shift Handoff   Handoff Given To Petros Hobbs Received From [facility] Communication Face to Face; At bedside   Time Handoff Given 1918   End of Shift Check Performed Yes     Bedside report given to Rhea Walsh RN. Oncoming RN aware skin assessment still needed for transfer. 1918.

## 2023-12-05 NOTE — FLOWSHEET NOTE
Shift assessment complete, see flow sheet, schedule medications given, see MAR. Bed in lowest position, bed alarm activated for pt safety,Call light and bed side table within reach, pt educated on use of call light if needing assist., pt verbalized understanding, denies further questions at this time. Denies any needs at this time, continue to monitor.

## 2023-12-05 NOTE — CARE COORDINATION
CASE MANAGEMENT DISCHARGE SUMMARY      Discharge to: Home with family    Precertification completed: [id number] Exemption Notification (HENS) completed: N/A    IMM given: (date) N/A    New Durable Medical Equipment ordered/agency: Patient has O2 through [employer]. They will provide trilogy per orders of the Pulmonologist.    Transportation:    Family/car: family [employer] auto     Confirmed discharge plan with: Patient is ware that she will need to call [employer] when she arrives home. Patient also spoke of need for walker with a seat. I informed her she will need to talk to her local medical supply store to have that ordered. Patient: yes. Patient will review discharge with family when they arrive to take her home        RN, name: Griselda Diehl    Note: Discharging nurse to complete CHIKI, reconcile AVS, and place final copy with patient's discharge packet. RN to ensure that written prescriptions for  Level II medications are sent with patient to the facility as per protocol.   Shanna Humphreys RN
CM update: LOS # 4; I have faxed Pulmonology note from today to qualify patient for Trilogy to [facility] that takes care of patient's O2 needs at home. Will follow for approval for Trilogy. Samara Nelson RN        [address] [employer] approved patient will call [employer] after she gets home and they will come out and bring machine to her with setting provided by Pulmonologist. Patient is aware of need to call them. Samara Nelson RN
Received call from [employer] asking about patient as they were to deliver Trilogy to her yesterday. Per chart review the patient refused discharge. CM spoke with patient who stated she will go home today. Her house is being inspected today and she is waiting for the inspection. CM advised that the patient is medically ready for discharge and insurance does not cover for anyone to stay in the hospital for personal reasons. CM advised the patient she could incur a bill because she was medically ready for discharge as of  yesterday. The patient acknowledged understanding and stated she doesn't want a bill. The patient stated she has someone to pick her up and she will go home today. CM advised  will let [facility] know. Spoke to [facility] to advise the patient will be discharging home today. Ivonne Bill stated the patient will need to call [facility] when she arrives home.
patient's individualized plan of care/goals and shares the quality data associated with the providers was provided to:     Patient Representative Name:       The Patient and/or Patient Representative Agree with the Discharge Plan?       [facility]  Case Management Department  Ph: [phone] Fax: [phone]

## 2023-12-05 NOTE — PLAN OF CARE
Problem: Discharge Planning  Goal: Discharge to home or other facility with appropriate resources  Outcome: Progressing     Problem: Pain  Goal: Verbalizes/displays adequate comfort level or baseline comfort level  Outcome: Progressing     Problem: Respiratory - Adult  Goal: Achieves optimal ventilation and oxygenation  Outcome: Progressing     Problem: Cardiovascular - Adult  Goal: Absence of cardiac dysrhythmias or at baseline  Outcome: Progressing     Problem: Musculoskeletal - Adult  Goal: Return mobility to safest level of function  Outcome: Progressing     Problem: Infection - Adult  Goal: Absence of infection at discharge  Outcome: Progressing     Problem: Metabolic/Fluid and Electrolytes - Adult  Goal: Electrolytes maintained within normal limits  Outcome: Progressing     Problem: Safety - Adult  Goal: Free from fall injury  Outcome: Progressing

## 2023-12-06 ENCOUNTER — TELEPHONE (OUTPATIENT)
Dept: PULMONOLOGY | Age: 68
End: 2023-12-06

## 2023-12-06 NOTE — TELEPHONE ENCOUNTER
Referral for Oncology? Referral pended.        ASSESSMENT:  Acute hypoxemic hypercapnic respiratory failure   COPD with AE  Growing RUL 7 mm nodule speculated - concerning for malignancy   LLE edema- negative DVT 11/30   Electrolytes replacement   Chronic hypoxemic respiratory failure on O2 3-4 LPM  H/O KAMLA lung cancer, clinical diagnosis, saw Dr. Darya Lynn & was not a surgical candidate, s/p 5 fractions of SBRT 5/3/22 - 5/13/22 by Dr. Argelia Peters   Former smoker, >100 pack years, quit 2021      PLAN:  BIPAP QHS and PRN during the day ,to arrange Trilogy as OP   Supplemental oxygen to maintain SaO2 >92%; wean as tolerated  Intensive inhaled bronchodilator therapy   oral prednisone taper d  IV antibiotics to include Doxy D#5 ,change PO   Lasix 20 mg IV Q12 hrs ,as per IM   SBRT for her growing RUL nodule as an outpatient  Acapella and Mucinex  Home medications were addressed   Blood sugar control ISS, with goal 150-180  GI prophylaxis: Pepcid  DVT prophylaxis: Lovenox  MRSA prophylaxis: Bactroban  perspective   This patient has Chronic Respiratory Failure due to severe copd

## 2023-12-07 NOTE — TELEPHONE ENCOUNTER
Called [phone] (home)   Spoke w/ pt and informed MD wants appt first week of jan. Sched appt for 01-02-24 @ 6031. Subjective:   Xander Saldivar  is a 43 y.o. male who presents for URI (x1 week, has been on abx for strep throat)  Patient is a 43-year-old male who returns clinic today for reevaluation of an ongoing respiratory infection x1 week.  Patient states that Friday of last week he was driving home from [place] when he developed a sore throat.  Patient states that he was started on Ceftin prescribed by his primary care provider for suspected strep infection.  Patient then followed up at the urgent care on six 3/8 which she was advised to continue taking the prescribed antibiotic.  patient has since developed a cough, shortness of breath and fever that has progressively worsened. Patient having no close contacts to COVID-19       URI    This is a new problem. The current episode started in the past 7 days. The problem has been unchanged. The maximum temperature recorded prior to his arrival was 101 - 101.9 F. Associated symptoms include congestion, coughing, rhinorrhea and a sore throat. Pertinent negatives include no chest pain, ear pain, headaches, nausea, rash, sinus pain, vomiting or wheezing. He has tried acetaminophen (abx) for the symptoms. The treatment provided no relief.     Review of Systems   Constitutional: Positive for fever. Negative for chills.   HENT: Positive for congestion, rhinorrhea and sore throat. Negative for ear pain and sinus pain.    Eyes: Negative for pain.   Respiratory: Positive for cough. Negative for shortness of breath and wheezing.    Cardiovascular: Negative for chest pain.   Gastrointestinal: Negative for nausea and vomiting.   Genitourinary: Negative for hematuria.   Musculoskeletal: Negative for myalgias.   Skin: Negative for rash.   Neurological: Negative for dizziness and headaches.     Allergies   Allergen Reactions   • Cillins [Penicillins]      Hives, swelling       Objective:   Pulse 78   Temp 36.6 °C (97.8 °F)   Resp 16   SpO2 96%   Physical Exam  Vitals signs and  nursing note reviewed.   Constitutional:       General: He is not in acute distress.     Appearance: He is well-developed.   HENT:      Head: Normocephalic and atraumatic.      Right Ear: Tympanic membrane and external ear normal.      Left Ear: Tympanic membrane and external ear normal.      Nose: Nose normal.      Right Sinus: No maxillary sinus tenderness or frontal sinus tenderness.      Left Sinus: No maxillary sinus tenderness or frontal sinus tenderness.      Mouth/Throat:      Mouth: Mucous membranes are moist.      Pharynx: Uvula midline. No posterior oropharyngeal erythema.      Tonsils: No tonsillar exudate or tonsillar abscesses.   Eyes:      General:         Right eye: No discharge.         Left eye: No discharge.      Conjunctiva/sclera: Conjunctivae normal.      Pupils: Pupils are equal, round, and reactive to light.   Cardiovascular:      Rate and Rhythm: Normal rate and regular rhythm.      Heart sounds: No murmur.   Pulmonary:      Effort: Pulmonary effort is normal. No respiratory distress.      Breath sounds: Normal breath sounds.   Abdominal:      General: There is no distension.      Palpations: Abdomen is soft.      Tenderness: There is no abdominal tenderness.   Musculoskeletal: Normal range of motion.   Skin:     General: Skin is warm and dry.   Neurological:      General: No focal deficit present.      Mental Status: He is alert and oriented to person, place, and time. Mental status is at baseline.      Gait: Gait (gait at baseline) normal.   Psychiatric:         Judgment: Judgment normal.           Assessment/Plan:     1. Pharyngitis, unspecified etiology  POCT Rapid Strep A   2. RTI (respiratory tract infection)  POCT Influenza A/B    Influenza A/B By PCR (Adult - Flu Only)     Rapid strep negative    Influenza negative      We will call back today for influenza results if positive, otherwise assume negative. Patient meets PUI criteria for COVID-19. Viral respiratory panel (Rule Out  Covid-19) will be ordered. Will call patient for any positive results and will treat accordingly.  Did recommend continue taking prescribed antibiotics as directed until confirmative labs are obtained.  If all results negative, they will be contacted by [facility] for interviewing and any further management they see fit.    Treatment of plenty of fluids, rest, ibuprofen, Tylenol for fevers or chills or pain, salt water gargles. Disinfect surfaces. Wash your hands with soap and water frequently for at least 20 seconds.   Patient is otherwise well-appearing in no distress.  There are no obvious signs of emergent pathology.  Instructed to present to the emergency room with any severe difficulty breathing, severe abdominal pain, vomiting, fever or any other concerns.  Patient understood and agreed.   Discharge Instructions given to patient.   Self-Isolation Instructions given to patient.   Work note given to patient.

## 2023-12-14 NOTE — FLOWSHEET NOTE
12/14/21 0830   Vital Signs   Temp 98.3 °F (36.8 °C)   Temp Source Oral   Pulse 93   Heart Rate Source Monitor   Resp 17   /77   BP Location Left upper arm   Patient Position Semi fowlers   Level of Consciousness Alert (0)   MEWS Score 1   Patient Currently in Pain Denies   Pain Assessment   Pain Assessment 0-10   Pain Level 0   Patient's Stated Pain Goal No pain   Oxygen Therapy   SpO2 94 %   O2 Device Nasal cannula   O2 Flow Rate (L/min) 2 L/min       Pt assessment complete; see flow sheets. Vitals completed. Meds given per MAR. Pt currently resting in bed with the call light within reach; eating breakfast. Pt denies any other care needs at this time.      Dimas Mares RN Headache Education:   Instructed the patient on over-the-counter headache management medications including: CoQ10, magnesium oxide, and butterbur. To avoid a pain medication overuse headache trying not to take pain medicines more than 3 doses a week. Avoid use of Fioricet or opioids to treat headaches as this can increase risk for rebound headaches. To help relieve headache symptoms without taking pain medicine lie down under darkroom and drink glass of water. Consider lifestyle modification including good sleep hygiene, routine medial schedules, regular exercise and managing triggers. Keep a headache diary  to reveal triggers and possible patterns. Triggers may be: Food, stress, perfumes, alcohol, or even chocolate. Drink plenty of water and try to get 8 hours of sleep each night to reduce risk factors that may cause headaches.

## 2023-12-31 ENCOUNTER — APPOINTMENT (OUTPATIENT)
Dept: CT IMAGING | Age: 68
End: 2023-12-31
Payer: MEDICARE

## 2023-12-31 ENCOUNTER — HOSPITAL ENCOUNTER (INPATIENT)
Age: 68
LOS: 5 days | Discharge: HOME | End: 2024-01-05
Attending: EMERGENCY MEDICINE | Admitting: INTERNAL MEDICINE
Payer: MEDICARE

## 2023-12-31 ENCOUNTER — APPOINTMENT (OUTPATIENT)
Dept: GENERAL RADIOLOGY | Age: 68
End: 2023-12-31
Payer: MEDICARE

## 2023-12-31 DIAGNOSIS — R09.02 HYPOXIA: Primary | ICD-10-CM

## 2023-12-31 DIAGNOSIS — G89.29 OTHER CHRONIC PAIN: ICD-10-CM

## 2023-12-31 DIAGNOSIS — J44.1 COPD EXACERBATION (HCC): ICD-10-CM

## 2023-12-31 DIAGNOSIS — I50.9 CONGESTIVE HEART FAILURE, UNSPECIFIED HF CHRONICITY, UNSPECIFIED HEART FAILURE TYPE (HCC): ICD-10-CM

## 2023-12-31 LAB
ALBUMIN SERPL-MCNC: 4 G/DL (ref 3.4–5)
ALBUMIN/GLOB SERPL: 1.4 {RATIO} (ref 1.1–2.2)
ALP SERPL-CCNC: 114 U/L (ref 40–129)
ALT SERPL-CCNC: 26 U/L (ref 10–40)
ANION GAP SERPL CALCULATED.3IONS-SCNC: 10 MMOL/L (ref 3–16)
AST SERPL-CCNC: 28 U/L (ref 15–37)
BASE EXCESS BLDV CALC-SCNC: 4.9 MMOL/L (ref -3–3)
BASOPHILS # BLD: 0 K/UL (ref 0–0.2)
BASOPHILS NFR BLD: 0.6 %
BILIRUB SERPL-MCNC: 0.3 MG/DL (ref 0–1)
BUN SERPL-MCNC: 15 MG/DL (ref 7–20)
CALCIUM SERPL-MCNC: 9.4 MG/DL (ref 8.3–10.6)
CHLORIDE SERPL-SCNC: 94 MMOL/L (ref 99–110)
CO2 BLDV-SCNC: 35 MMOL/L
CO2 SERPL-SCNC: 30 MMOL/L (ref 21–32)
COHGB MFR BLDV: 1.8 % (ref 0–1.5)
CREAT SERPL-MCNC: <0.5 MG/DL (ref 0.6–1.2)
DEPRECATED RDW RBC AUTO: 13.5 % (ref 12.4–15.4)
EKG ATRIAL RATE: 105 BPM
EKG DIAGNOSIS: NORMAL
EKG P AXIS: 51 DEGREES
EKG P-R INTERVAL: 134 MS
EKG Q-T INTERVAL: 300 MS
EKG QRS DURATION: 60 MS
EKG QTC CALCULATION (BAZETT): 396 MS
EKG R AXIS: -33 DEGREES
EKG T AXIS: 50 DEGREES
EKG VENTRICULAR RATE: 105 BPM
EOSINOPHIL # BLD: 0.3 K/UL (ref 0–0.6)
EOSINOPHIL NFR BLD: 3.4 %
FLUAV RNA RESP QL NAA+PROBE: NOT DETECTED
FLUBV RNA RESP QL NAA+PROBE: NOT DETECTED
GFR SERPLBLD CREATININE-BSD FMLA CKD-EPI: >60 ML/MIN/{1.73_M2}
GLUCOSE BLD-MCNC: 239 MG/DL (ref 70–99)
GLUCOSE SERPL-MCNC: 136 MG/DL (ref 70–99)
HCO3 BLDV-SCNC: 32.7 MMOL/L (ref 23–29)
HCT VFR BLD AUTO: 34.8 % (ref 36–48)
HGB BLD-MCNC: 11.8 G/DL (ref 12–16)
LYMPHOCYTES # BLD: 0.7 K/UL (ref 1–5.1)
LYMPHOCYTES NFR BLD: 9.2 %
MCH RBC QN AUTO: 32 PG (ref 26–34)
MCHC RBC AUTO-ENTMCNC: 33.9 G/DL (ref 31–36)
MCV RBC AUTO: 94.3 FL (ref 80–100)
METHGB MFR BLDV: 0.3 %
MONOCYTES # BLD: 0.8 K/UL (ref 0–1.3)
MONOCYTES NFR BLD: 10.2 %
NEUTROPHILS # BLD: 5.8 K/UL (ref 1.7–7.7)
NEUTROPHILS NFR BLD: 76.6 %
NT-PROBNP SERPL-MCNC: 49 PG/ML (ref 0–124)
O2 THERAPY: ABNORMAL
PCO2 BLDV: 63.5 MMHG (ref 40–50)
PERFORMED ON: ABNORMAL
PH BLDV: 7.33 [PH] (ref 7.35–7.45)
PLATELET # BLD AUTO: 287 K/UL (ref 135–450)
PMV BLD AUTO: 7 FL (ref 5–10.5)
PO2 BLDV: 39.1 MMHG (ref 25–40)
POTASSIUM SERPL-SCNC: 4.4 MMOL/L (ref 3.5–5.1)
PROCALCITONIN SERPL IA-MCNC: 0.06 NG/ML (ref 0–0.15)
PROT SERPL-MCNC: 6.9 G/DL (ref 6.4–8.2)
RBC # BLD AUTO: 3.69 M/UL (ref 4–5.2)
REASON FOR REJECTION: NORMAL
REJECTED TEST: NORMAL
SAO2 % BLDV: 68 %
SARS-COV-2 RNA RESP QL NAA+PROBE: NOT DETECTED
SODIUM SERPL-SCNC: 134 MMOL/L (ref 136–145)
TROPONIN, HIGH SENSITIVITY: 32 NG/L (ref 0–14)
TROPONIN, HIGH SENSITIVITY: 33 NG/L (ref 0–14)
WBC # BLD AUTO: 7.5 K/UL (ref 4–11)

## 2023-12-31 PROCEDURE — 5A09557 ASSISTANCE WITH RESPIRATORY VENTILATION, GREATER THAN 96 CONSECUTIVE HOURS, CONTINUOUS POSITIVE AIRWAY PRESSURE: ICD-10-PCS | Performed by: INTERNAL MEDICINE

## 2023-12-31 PROCEDURE — 6370000000 HC RX 637 (ALT 250 FOR IP): Performed by: INTERNAL MEDICINE

## 2023-12-31 PROCEDURE — 6370000000 HC RX 637 (ALT 250 FOR IP): Performed by: NURSE PRACTITIONER

## 2023-12-31 PROCEDURE — 1200000000 HC SEMI PRIVATE

## 2023-12-31 PROCEDURE — 93005 ELECTROCARDIOGRAM TRACING: CPT | Performed by: EMERGENCY MEDICINE

## 2023-12-31 PROCEDURE — 96374 THER/PROPH/DIAG INJ IV PUSH: CPT

## 2023-12-31 PROCEDURE — 71046 X-RAY EXAM CHEST 2 VIEWS: CPT

## 2023-12-31 PROCEDURE — 80053 COMPREHEN METABOLIC PANEL: CPT

## 2023-12-31 PROCEDURE — 99285 EMERGENCY DEPT VISIT HI MDM: CPT

## 2023-12-31 PROCEDURE — 6360000002 HC RX W HCPCS: Performed by: EMERGENCY MEDICINE

## 2023-12-31 PROCEDURE — 94761 N-INVAS EAR/PLS OXIMETRY MLT: CPT

## 2023-12-31 PROCEDURE — 71260 CT THORAX DX C+: CPT

## 2023-12-31 PROCEDURE — 93010 ELECTROCARDIOGRAM REPORT: CPT | Performed by: INTERNAL MEDICINE

## 2023-12-31 PROCEDURE — 85025 COMPLETE CBC W/AUTO DIFF WBC: CPT

## 2023-12-31 PROCEDURE — 6360000004 HC RX CONTRAST MEDICATION: Performed by: EMERGENCY MEDICINE

## 2023-12-31 PROCEDURE — 87636 SARSCOV2 & INF A&B AMP PRB: CPT

## 2023-12-31 PROCEDURE — 99223 1ST HOSP IP/OBS HIGH 75: CPT | Performed by: INTERNAL MEDICINE

## 2023-12-31 PROCEDURE — 2580000003 HC RX 258: Performed by: INTERNAL MEDICINE

## 2023-12-31 PROCEDURE — 82803 BLOOD GASES ANY COMBINATION: CPT

## 2023-12-31 PROCEDURE — 36415 COLL VENOUS BLD VENIPUNCTURE: CPT

## 2023-12-31 PROCEDURE — 2060000000 HC ICU INTERMEDIATE R&B

## 2023-12-31 PROCEDURE — 6360000002 HC RX W HCPCS: Performed by: INTERNAL MEDICINE

## 2023-12-31 PROCEDURE — 84484 ASSAY OF TROPONIN QUANT: CPT

## 2023-12-31 PROCEDURE — 83880 ASSAY OF NATRIURETIC PEPTIDE: CPT

## 2023-12-31 PROCEDURE — 2580000003 HC RX 258: Performed by: EMERGENCY MEDICINE

## 2023-12-31 PROCEDURE — 94640 AIRWAY INHALATION TREATMENT: CPT

## 2023-12-31 PROCEDURE — 84145 PROCALCITONIN (PCT): CPT

## 2023-12-31 PROCEDURE — 2700000000 HC OXYGEN THERAPY PER DAY

## 2023-12-31 PROCEDURE — 96375 TX/PRO/DX INJ NEW DRUG ADDON: CPT

## 2023-12-31 RX ORDER — LISINOPRIL 20 MG/1
20 TABLET ORAL DAILY
Status: DISCONTINUED | OUTPATIENT
Start: 2024-01-01 | End: 2024-01-05 | Stop reason: HOSPADM

## 2023-12-31 RX ORDER — LORATADINE 10 MG/1
10 TABLET ORAL DAILY
COMMUNITY

## 2023-12-31 RX ORDER — ENOXAPARIN SODIUM 100 MG/ML
40 INJECTION SUBCUTANEOUS DAILY
Status: DISCONTINUED | OUTPATIENT
Start: 2023-12-31 | End: 2024-01-05 | Stop reason: HOSPADM

## 2023-12-31 RX ORDER — TRAMADOL HYDROCHLORIDE 50 MG/1
50 TABLET ORAL DAILY PRN
Status: ON HOLD | COMMUNITY
End: 2024-01-05

## 2023-12-31 RX ORDER — AMLODIPINE BESYLATE 5 MG/1
10 TABLET ORAL DAILY
Status: DISCONTINUED | OUTPATIENT
Start: 2023-12-31 | End: 2023-12-31

## 2023-12-31 RX ORDER — SODIUM CHLORIDE 0.9 % (FLUSH) 0.9 %
5-40 SYRINGE (ML) INJECTION EVERY 12 HOURS SCHEDULED
Status: DISCONTINUED | OUTPATIENT
Start: 2023-12-31 | End: 2024-01-05 | Stop reason: HOSPADM

## 2023-12-31 RX ORDER — TRAMADOL HYDROCHLORIDE 50 MG/1
50 TABLET ORAL 2 TIMES DAILY PRN
Status: DISCONTINUED | OUTPATIENT
Start: 2023-12-31 | End: 2024-01-05 | Stop reason: HOSPADM

## 2023-12-31 RX ORDER — M-VIT,TX,IRON,MINS/CALC/FOLIC 27MG-0.4MG
1 TABLET ORAL DAILY
Status: DISCONTINUED | OUTPATIENT
Start: 2024-01-01 | End: 2024-01-05 | Stop reason: HOSPADM

## 2023-12-31 RX ORDER — LISINOPRIL 10 MG/1
10 TABLET ORAL DAILY
COMMUNITY

## 2023-12-31 RX ORDER — FUROSEMIDE 10 MG/ML
20 INJECTION INTRAMUSCULAR; INTRAVENOUS ONCE
Status: COMPLETED | OUTPATIENT
Start: 2023-12-31 | End: 2023-12-31

## 2023-12-31 RX ORDER — ONDANSETRON 4 MG/1
4 TABLET, ORALLY DISINTEGRATING ORAL EVERY 8 HOURS PRN
Status: DISCONTINUED | OUTPATIENT
Start: 2023-12-31 | End: 2024-01-05 | Stop reason: HOSPADM

## 2023-12-31 RX ORDER — BUDESONIDE AND FORMOTEROL FUMARATE DIHYDRATE 160; 4.5 UG/1; UG/1
2 AEROSOL RESPIRATORY (INHALATION)
Status: DISCONTINUED | OUTPATIENT
Start: 2023-12-31 | End: 2024-01-02

## 2023-12-31 RX ORDER — SODIUM CHLORIDE 9 MG/ML
INJECTION, SOLUTION INTRAVENOUS PRN
Status: DISCONTINUED | OUTPATIENT
Start: 2023-12-31 | End: 2024-01-05 | Stop reason: HOSPADM

## 2023-12-31 RX ORDER — FUROSEMIDE 20 MG/1
20 TABLET ORAL DAILY
COMMUNITY

## 2023-12-31 RX ORDER — ONDANSETRON 2 MG/ML
4 INJECTION INTRAMUSCULAR; INTRAVENOUS EVERY 6 HOURS PRN
Status: DISCONTINUED | OUTPATIENT
Start: 2023-12-31 | End: 2024-01-05 | Stop reason: HOSPADM

## 2023-12-31 RX ORDER — VITAMIN B COMPLEX
2000 TABLET ORAL DAILY
Status: DISCONTINUED | OUTPATIENT
Start: 2024-01-01 | End: 2024-01-05 | Stop reason: HOSPADM

## 2023-12-31 RX ORDER — SODIUM CHLORIDE 0.9 % (FLUSH) 0.9 %
5-40 SYRINGE (ML) INJECTION PRN
Status: DISCONTINUED | OUTPATIENT
Start: 2023-12-31 | End: 2024-01-05 | Stop reason: HOSPADM

## 2023-12-31 RX ORDER — AMLODIPINE BESYLATE 5 MG/1
5 TABLET ORAL DAILY
Status: DISCONTINUED | OUTPATIENT
Start: 2024-01-01 | End: 2024-01-05 | Stop reason: HOSPADM

## 2023-12-31 RX ORDER — ESCITALOPRAM OXALATE 10 MG/1
20 TABLET ORAL NIGHTLY
Status: DISCONTINUED | OUTPATIENT
Start: 2023-12-31 | End: 2024-01-05 | Stop reason: HOSPADM

## 2023-12-31 RX ORDER — OXYBUTYNIN CHLORIDE 5 MG/1
5 TABLET ORAL DAILY
Status: ON HOLD | COMMUNITY
End: 2023-12-31 | Stop reason: ALTCHOICE

## 2023-12-31 RX ORDER — IPRATROPIUM BROMIDE AND ALBUTEROL SULFATE 2.5; .5 MG/3ML; MG/3ML
1 SOLUTION RESPIRATORY (INHALATION)
Status: DISCONTINUED | OUTPATIENT
Start: 2023-12-31 | End: 2024-01-05 | Stop reason: HOSPADM

## 2023-12-31 RX ORDER — IPRATROPIUM BROMIDE AND ALBUTEROL SULFATE 2.5; .5 MG/3ML; MG/3ML
1 SOLUTION RESPIRATORY (INHALATION) EVERY 4 HOURS PRN
Status: DISCONTINUED | OUTPATIENT
Start: 2023-12-31 | End: 2024-01-05 | Stop reason: HOSPADM

## 2023-12-31 RX ORDER — GUAIFENESIN/DEXTROMETHORPHAN 100-10MG/5
5 SYRUP ORAL EVERY 4 HOURS PRN
Status: DISCONTINUED | OUTPATIENT
Start: 2023-12-31 | End: 2024-01-05 | Stop reason: HOSPADM

## 2023-12-31 RX ORDER — ACETAMINOPHEN 325 MG/1
650 TABLET ORAL EVERY 6 HOURS PRN
Status: DISCONTINUED | OUTPATIENT
Start: 2023-12-31 | End: 2024-01-05 | Stop reason: HOSPADM

## 2023-12-31 RX ORDER — LISINOPRIL 10 MG/1
10 TABLET ORAL DAILY
Status: DISCONTINUED | OUTPATIENT
Start: 2023-12-31 | End: 2023-12-31

## 2023-12-31 RX ORDER — DOXYCYCLINE HYCLATE 100 MG
100 TABLET ORAL EVERY 12 HOURS SCHEDULED
Status: COMPLETED | OUTPATIENT
Start: 2023-12-31 | End: 2024-01-05

## 2023-12-31 RX ORDER — CETIRIZINE HYDROCHLORIDE 10 MG/1
10 TABLET ORAL DAILY
Status: DISCONTINUED | OUTPATIENT
Start: 2023-12-31 | End: 2024-01-05 | Stop reason: HOSPADM

## 2023-12-31 RX ORDER — POLYETHYLENE GLYCOL 3350 17 G/17G
17 POWDER, FOR SOLUTION ORAL DAILY PRN
Status: DISCONTINUED | OUTPATIENT
Start: 2023-12-31 | End: 2024-01-05 | Stop reason: HOSPADM

## 2023-12-31 RX ORDER — ACETAMINOPHEN 650 MG/1
650 SUPPOSITORY RECTAL EVERY 6 HOURS PRN
Status: DISCONTINUED | OUTPATIENT
Start: 2023-12-31 | End: 2024-01-05 | Stop reason: HOSPADM

## 2023-12-31 RX ADMIN — WATER 40 MG: 1 INJECTION INTRAMUSCULAR; INTRAVENOUS; SUBCUTANEOUS at 08:34

## 2023-12-31 RX ADMIN — ESCITALOPRAM OXALATE 20 MG: 10 TABLET ORAL at 21:35

## 2023-12-31 RX ADMIN — CETIRIZINE HYDROCHLORIDE 10 MG: 10 TABLET ORAL at 14:20

## 2023-12-31 RX ADMIN — DOXYCYCLINE HYCLATE 100 MG: 100 TABLET, COATED ORAL at 21:34

## 2023-12-31 RX ADMIN — IPRATROPIUM BROMIDE AND ALBUTEROL SULFATE 1 DOSE: 2.5; .5 SOLUTION RESPIRATORY (INHALATION) at 20:23

## 2023-12-31 RX ADMIN — AMLODIPINE BESYLATE 10 MG: 5 TABLET ORAL at 14:20

## 2023-12-31 RX ADMIN — Medication 2 PUFF: at 20:23

## 2023-12-31 RX ADMIN — LISINOPRIL 10 MG: 10 TABLET ORAL at 14:20

## 2023-12-31 RX ADMIN — IPRATROPIUM BROMIDE AND ALBUTEROL SULFATE 1 DOSE: 2.5; .5 SOLUTION RESPIRATORY (INHALATION) at 23:42

## 2023-12-31 RX ADMIN — FUROSEMIDE 20 MG: 10 INJECTION, SOLUTION INTRAMUSCULAR; INTRAVENOUS at 05:24

## 2023-12-31 RX ADMIN — IPRATROPIUM BROMIDE AND ALBUTEROL SULFATE 1 DOSE: 2.5; .5 SOLUTION RESPIRATORY (INHALATION) at 10:00

## 2023-12-31 RX ADMIN — IPRATROPIUM BROMIDE AND ALBUTEROL SULFATE 1 DOSE: 2.5; .5 SOLUTION RESPIRATORY (INHALATION) at 16:23

## 2023-12-31 RX ADMIN — Medication 10 ML: at 21:35

## 2023-12-31 RX ADMIN — METHYLPREDNISOLONE SODIUM SUCCINATE 40 MG: 40 INJECTION INTRAMUSCULAR; INTRAVENOUS at 15:55

## 2023-12-31 RX ADMIN — IOPAMIDOL 75 ML: 755 INJECTION, SOLUTION INTRAVENOUS at 05:31

## 2023-12-31 RX ADMIN — TRAMADOL HYDROCHLORIDE 50 MG: 50 TABLET ORAL at 21:34

## 2023-12-31 RX ADMIN — ENOXAPARIN SODIUM 40 MG: 100 INJECTION SUBCUTANEOUS at 14:20

## 2023-12-31 ASSESSMENT — PAIN SCALES - GENERAL
PAINLEVEL_OUTOF10: 3
PAINLEVEL_OUTOF10: 7

## 2023-12-31 ASSESSMENT — PAIN DESCRIPTION - PAIN TYPE: TYPE: ACUTE PAIN

## 2023-12-31 ASSESSMENT — PAIN DESCRIPTION - LOCATION
LOCATION: CHEST
LOCATION: BACK

## 2023-12-31 ASSESSMENT — PAIN DESCRIPTION - FREQUENCY: FREQUENCY: CONTINUOUS

## 2023-12-31 ASSESSMENT — PAIN DESCRIPTION - DESCRIPTORS
DESCRIPTORS: ACHING
DESCRIPTORS: PRESSURE;HEAVINESS

## 2023-12-31 ASSESSMENT — PAIN - FUNCTIONAL ASSESSMENT: PAIN_FUNCTIONAL_ASSESSMENT: 0-10

## 2023-12-31 NOTE — PROGRESS NOTES
Telemetry Assignment Communication Form    Patient has orders for continuous telemetry OR pulse oximeter only orders    To be filled out by Clinical    Patient has Admission or Transfer orders and is assigned to Room Number: 311      (Once this top section is completed:  Select \"Route\" and send note to Fax number: [phone]))      ___________________________________________________________________________      To be filled out by CM[facility]    Patient assigned to tele box number: __________________               (to be written in by CMU when telemetry box is assigned to patient)    ___________________________________________________________________________      Bedside RN confirming that the box listed above is in fact the telemetry box number being placed on the patient listed above.        X________________________________________ RN signature        __________________________________________RN assigned to Patient (please print)    _______________ Date    ____________ Time

## 2023-12-31 NOTE — H&P
home independantly    TOBACCO:   reports that she quit smoking about 2 years ago. Her smoking use included cigarettes. She started smoking about 55 years ago. She has a 104.0 pack-year smoking history. She has been exposed to tobacco smoke. She has never used smokeless tobacco.  ETOH:   reports that she does not currently use alcohol after a past usage of about 12.0 standard drinks of alcohol per week.      Family History:   Positive as follows:        Problem Relation Age of Onset    COPD Mother     Hypertension Mother     Asthma Neg Hx     Cancer Neg Hx     Diabetes Neg Hx     Emphysema Neg Hx     Heart Failure Neg Hx        REVIEW OF SYSTEMS:       Constitutional: Negative for fever   HENT: Negative for sore throat   Eyes: Negative for redness   Respiratory +Ve for dyspnea, cough   Cardiovascular: Negative for chest pain   Gastrointestinal: Negative for vomiting, diarrhea   Genitourinary: Negative for hematuria   Musculoskeletal: Negative for arthralgias   Skin: Negative for rash   Neurological: Negative for syncope   Hematological: Negative for adenopathy   Psychiatric/Behavorial: Negative for anxiety    PHYSICAL EXAM:    BP (!) 165/86   Pulse 99   Temp 98.5 °F (36.9 °C) (Oral)   Resp 15   Ht 1.6 m (5' 3\")   Wt 79.4 kg (175 lb)   SpO2 96%   BMI 31.00 kg/m²         General: elderly female chronically ill appearing    Awake, alert and oriented. Appears to be not in any distress  Mucous Membranes:  Pink , anicteric  Neck: No JVD, no carotid bruit, no thyromegaly  Chest: diminished javier with scattered wheeze   Cardiovascular:  RRR S1S2 heard, no murmurs or gallops  Abdomen:  Soft, undistended, non tender, no organomegaly, BS present  Extremities: 2+ javier Le edema . Distal pulses well felt  Neurological : grossly normal    CBC:   Recent Labs     12/31/23  0212   WBC 7.5   HGB 11.8*   HCT 34.8*   MCV 94.3        BMP:   Recent Labs     12/31/23  0249   *   K 4.4   CL 94*   CO2 30   BUN 15   CREATININE  <0.5*     LIVER PROFILE:   Recent Labs     12/31/23  0249   AST 28   ALT 26   BILITOT 0.3   ALKPHOS 114         U/A:    Lab Results   Component Value Date/Time    COLORU Yellow 10/05/2023 11:10 PM    WBCUA 3-5 10/05/2023 11:10 PM    RBCUA 3-4 10/05/2023 11:10 PM    BACTERIA 1+ 10/05/2023 11:10 PM    CLARITYU Clear 10/05/2023 11:10 PM    SPECGRAV <=1.005 10/05/2023 11:10 PM    LEUKOCYTESUR Negative 10/05/2023 11:10 PM    BLOODU TRACE-INTACT 10/05/2023 11:10 PM    GLUCOSEU Negative 10/05/2023 11:10 PM    AMORPHOUS Rare 05/02/2023 01:11 AM       ABG    Lab Results   Component Value Date/Time    PUU7UYR 30.3 10/05/2023 09:54 PM    BEART 4.1 10/05/2023 09:54 PM    U9AZQIRV 93.1 10/05/2023 09:54 PM    PHART 7.374 10/05/2023 09:54 PM    WZD2WYT 53.2 10/05/2023 09:54 PM    PO2ART 69.0 10/05/2023 09:54 PM    RXL0ZUT 32.0 10/05/2023 09:54 PM       CULTURES  COVID/flu: not detected    EKG:  I have reviewed the EKG with the following interpretation:   Sinus tachycardia, Left axis deviation, Low voltage QRS, Inferior infarct , age undetermined     RADIOLOGY  CT CHEST PULMONARY EMBOLISM W CONTRAST   Final Result   1. No acute pulmonary artery embolism.   2. More prominent bibasilar airspace disease concerning for pneumonia in the   correct clinical setting.   3. Emphysema.   4. 8 mm right solid pulmonary nodule within the upper lobe again   demonstrated, for which follow-up noncontrast CT is recommended in 6 months.         XR CHEST (2 VW)   Final Result   Shallow inflation. Chronic appearing basilar opacities again noted.               Principal Problem:    COPD exacerbation (HCC)  Resolved Problems:    * No resolved hospital problems. *        ASSESSMENT/PLAN:    #Acute on chronic hypoxic respiratory failure  #COPD exacerbation    -admitted for further management/care.  Pulmonology consulted  -IV Solu-medrol , HHN , doubt pneumonia  - check procal   - no features of CHF, pedal edema from norvasc high dose , BNP wnl , CT neg    -Echo ordered. Cont Trelegy    #Pulmonary nodule  -8 mm right upper lobe.  -pulmonology consulted  -recommends f/u in 6 months.     #Elevated troponin   - likely with resp distress  -33-->32  -monitor on tele.     #Hypertension   -BP elevated.  Resumed Amlodipine - lowered dose for edema , Lisinopril  Can add cardura if needed    #Depression, anxiety  -cont Lexapro    Hx of tobacco use - quit 2 yrs ago       Reports having birds and cats at home and planning to move them away    DVT Prophylaxis: Lovenox  Diet: ADULT DIET; Regular; 4 carb choices (60 gm/meal); No Added Salt (3-4 gm)  Code Status: Full Code    Byron Faulkner MD, 12/31/2023 2:20 PM     Parents

## 2023-12-31 NOTE — CONSULTS
PULMONARY CONSULT NOTE  Annie Tyler is being seen at the request of Dr. Grayson for a consultation for COPD exacerbation     HISTORY OF PRESENT ILLNESS: Annie Tyler is a 68 y.o. female with a PMHx of COPD, who presented to the ED by EMS on 12/31/23 with a 1 week history of increasing severe shortness of breath, worse with exertion, associated with cough and sputum production that became severe yesterday, and was associated with significant hypoxia on her home 4 L O2; she was saturating in the 60s.  Has birds & cats including several kittens in her home.  She has quit smoking.     PAST MEDICAL HISTORY:  Past Medical History:   Diagnosis Date    Angina pectoris (HCC)     Chronic pain     knees and lower back     COPD exacerbation (HCC) 05/20/2018    Depression     Essential hypertension 08/15/2022    Panic disorder     Pneumonia     Pulmonary emphysema (HCC)      PAST SURGICAL HISTORY:  Past Surgical History:   Procedure Laterality Date    CHOLECYSTECTOMY      COLONOSCOPY       FAMILY HISTORY:  family history includes COPD in her mother; Hypertension in her mother.    SOCIAL HISTORY:   reports that she quit smoking about 2 years ago. Her smoking use included cigarettes. She started smoking about 55 years ago. She has a 104.0 pack-year smoking history. She has been exposed to tobacco smoke. She has never used smokeless tobacco.    Scheduled Meds:   sodium chloride flush  5-40 mL IntraVENous 2 times per day    enoxaparin  40 mg SubCUTAneous Daily    ipratropium 0.5 mg-albuterol 2.5 mg  1 Dose Inhalation Q4H WA RT    [START ON 1/1/2024] methylPREDNISolone  40 mg IntraVENous Daily    methylPREDNISolone  40 mg IntraVENous Once    amLODIPine  10 mg Oral Daily    cetirizine  10 mg Oral Daily    [START ON 1/1/2024] Vitamin D  2,000 Units Oral Daily    escitalopram  20 mg Oral Daily    lisinopril  10 mg Oral Daily    [START ON 1/1/2024] therapeutic multivitamin-minerals  1 tablet Oral Daily    budesonide-formoterol  2  bibasilar airspace disease    3. Emphysema.   4. 8 mm right solid pulmonary nodule within the upper lobe again   demonstrated, for which follow-up noncontrast CT is recommended in 6 months.     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 Navarro & was not a surgical candidate, s/p 5 fractions of SBRT 5/3/22 - 5/13/22 by Dr. Gaitan   H/O COVID19 pneumonia Aug 2022 with hospitalization  Former smoker, >100 pack years, quit 2021     PLAN:  Supplemental O2 to maintain SaO2 >92%; wean as tolerated    BiPAP 12/5 HS and PRN (as previously in hospital, home settings are unknown)   IV solumedrol 40 qd  Inhaled bronchodilators   Doxy D#1   Outpatient f/u imaging has been recommended & we have emphasized importance of imaging f/u.  We also previously provided information in discharge instructions.  Our office has been unable to contact patient when she is not admitted (letter was sent).   My recommendation is to obtain CT PET imaging in 2-4 weeks and then if no other hypermetabolism, I recommend SBRT for lung cancer.   Limit environmental exposures, clarify bird exposure     The severe acute exacerbation of COPD in the setting of chronic hypoxemic respiratory failure makes the patient higher risk for morbidity and mortality & results in need for ongoing testing and treatment.    I spent a total of 30 minutes on this encounter personally obtaining the patient history, reviewing labs, imaging and other data.  I independently performed the above physical exam and updated this encounter note, assessment and plan

## 2023-12-31 NOTE — ED PROVIDER NOTES
8:10 AM EST  Patient has been on the board for 6 hours and 12 minutes, looks like outgoing ED physician signed their note at 5 AM.  Not quite sure if she sent the [employer] to the hospitalist; I just messaged daytime hospitalist, Dr. Grayson for admission orders.  CHF versus COPD exacerbation, hypoxic on her home O2, 4 L, and was requiring 5-60 in the emergency department.  Got Solu-Medrol and Lasix in the emergency department.    DO Tammy GROSS Tiffany L, DO  12/31/23 0810

## 2023-12-31 NOTE — PROGRESS NOTES
Pt requesting something for back pain.  States that her NP ordered her tramadol recently, but she is unsure of the dose.   Message sent to Dr. Faulkner.

## 2023-12-31 NOTE — PROGRESS NOTES
RT Inhaler-Nebulizer Bronchodilator Protocol Note    There is a bronchodilator order in the chart from a provider indicating to follow the RT Bronchodilator Protocol and there is an “Initiate RT Inhaler-Nebulizer Bronchodilator Protocol” order as well (see protocol at bottom of note).    CXR Findings:  No results found.    The findings from the last RT Protocol Assessment were as follows:   History Pulmonary Disease: Chronic pulmonary disease  Respiratory Pattern: Dyspnea on exertion or RR 21-25 bpm  Breath Sounds: Severe inspiratory and expiratory wheezing or severely diminished  Cough: Strong, spontaneous, non-productive  Indication for Bronchodilator Therapy: Wheezing associated with pulm disorder  Bronchodilator Assessment Score: 12    Aerosolized bronchodilator medication orders have been revised according to the RT Inhaler-Nebulizer Bronchodilator Protocol below.    Respiratory Therapist to perform RT Therapy Protocol Assessment initially then follow the protocol.  Repeat RT Therapy Protocol Assessment PRN for score 0-3 or on second treatment, BID, and PRN for scores above 3.    No Indications - adjust the frequency to every 6 hours PRN wheezing or bronchospasm, if no treatments needed after 48 hours then discontinue using Per Protocol order mode.     If indication present, adjust the RT bronchodilator orders based on the Bronchodilator Assessment Score as indicated below.  Use Inhaler orders unless patient has one or more of the following: on home nebulizer, not able to hold breath for 10 seconds, is not alert and oriented, cannot activate and use MDI correctly, or respiratory rate 25 breaths per minute or more, then use the equivalent nebulizer order(s) with same Frequency and PRN reasons based on the score.  If a patient is on this medication at home then do not decrease Frequency below that used at home.    0-3 - enter or revise RT bronchodilator order(s) to equivalent RT Bronchodilator order with  Frequency of every 4 hours PRN for wheezing or increased work of breathing using Per Protocol order mode.        4-6 - enter or revise RT Bronchodilator order(s) to two equivalent RT bronchodilator orders with one order with BID Frequency and one order with Frequency of every 4 hours PRN wheezing or increased work of breathing using Per Protocol order mode.        7-10 - enter or revise RT Bronchodilator order(s) to two equivalent RT bronchodilator orders with one order with TID Frequency and one order with Frequency of every 4 hours PRN wheezing or increased work of breathing using Per Protocol order mode.       11-13 - enter or revise RT Bronchodilator order(s) to one equivalent RT bronchodilator order with QID Frequency and an Albuterol order with Frequency of every 4 hours PRN wheezing or increased work of breathing using Per Protocol order mode.      Greater than 13 - enter or revise RT Bronchodilator order(s) to one equivalent RT bronchodilator order with every 4 hours Frequency and an Albuterol order with Frequency of every 2 hours PRN wheezing or increased work of breathing using Per Protocol order mode.     Electronically signed by Elma Fishman RCP on 12/31/2023 at 12:25 PM

## 2023-12-31 NOTE — PROGRESS NOTES
Report given to Sammy GALVEZ .  All belongings gathered and sent with patient.   Left ER in inpatient bed and on 02 via [facility] without incident.

## 2023-12-31 NOTE — ED PROVIDER NOTES
[facility] ED  EMERGENCY DEPARTMENT ENCOUNTER      Pt Name: Annie Tyler  MRN: [id number]  Birthdate 1955  Date of evaluation: 12/31/2023  Provider: Kortney Neal MD    CHIEF COMPLAINT       Chief Complaint   Patient presents with    Shortness of Breath     Pt arrives via EMS from home with complains of SOB that began yesterday and got worse this evening. PT is normally on 4L @ home and was 85%, arrived  on rebreather @ 15L and got up to 100%. Pt has non-productive cough. Hx of COPD          HISTORY OF PRESENT ILLNESS   (Location/Symptom, Timing/Onset, Context/Setting, Quality, Duration, Modifying Factors, Severity)  Note limiting factors.   Annie Tyler is a 68 y.o. female who presents to the emergency department with a history of COPD on 4 L at home.  She has been having worsening shortness of breath with a nonproductive cough.  She has been doing breathing treatments frequently.  She notes that she has had some bilateral lower extremity swelling over the last 3 days which is new for her.  She denies history of congestive heart failure but is on diuretic as an outpatient.  She has not had chest pain today but did have anterior chest pain yesterday.  No fever or nasal congestion.  No abdominal pain.  No dysuria or hematuria.  No nausea or vomiting        Nursing Notes were reviewed.    REVIEW OF SYSTEMS    (2-9 systems for level 4, 10 or more for level 5)     As per HPI    Except as noted above the remainder of the review of systems was reviewed and negative.       PAST MEDICAL HISTORY     Past Medical History:   Diagnosis Date    Angina pectoris (HCC)     Chronic pain     knees and lower back     COPD exacerbation (HCC) 05/20/2018    Depression     Essential hypertension 08/15/2022    Panic disorder     Pneumonia     Pulmonary emphysema (HCC)          SURGICAL HISTORY       Past Surgical History:   Procedure Laterality Date    CHOLECYSTECTOMY      COLONOSCOPY           CURRENT

## 2023-12-31 NOTE — ED NOTES
After breathing pt treatment pt continues to dsat; pt placed on hi flow o2 @ 7L NC returned to 92%

## 2024-01-01 LAB
ANION GAP SERPL CALCULATED.3IONS-SCNC: 11 MMOL/L (ref 3–16)
BASOPHILS # BLD: 0 K/UL (ref 0–0.2)
BASOPHILS NFR BLD: 0.1 %
BUN SERPL-MCNC: 16 MG/DL (ref 7–20)
CALCIUM SERPL-MCNC: 8.7 MG/DL (ref 8.3–10.6)
CHLORIDE SERPL-SCNC: 92 MMOL/L (ref 99–110)
CO2 SERPL-SCNC: 26 MMOL/L (ref 21–32)
CREAT SERPL-MCNC: <0.5 MG/DL (ref 0.6–1.2)
DEPRECATED RDW RBC AUTO: 13.8 % (ref 12.4–15.4)
EOSINOPHIL # BLD: 0 K/UL (ref 0–0.6)
EOSINOPHIL NFR BLD: 0.1 %
GFR SERPLBLD CREATININE-BSD FMLA CKD-EPI: >60 ML/MIN/{1.73_M2}
GLUCOSE SERPL-MCNC: 204 MG/DL (ref 70–99)
HCT VFR BLD AUTO: 32.3 % (ref 36–48)
HGB BLD-MCNC: 10.8 G/DL (ref 12–16)
LYMPHOCYTES # BLD: 0.5 K/UL (ref 1–5.1)
LYMPHOCYTES NFR BLD: 8.9 %
MCH RBC QN AUTO: 32 PG (ref 26–34)
MCHC RBC AUTO-ENTMCNC: 33.4 G/DL (ref 31–36)
MCV RBC AUTO: 95.6 FL (ref 80–100)
MONOCYTES # BLD: 0.2 K/UL (ref 0–1.3)
MONOCYTES NFR BLD: 3.4 %
NEUTROPHILS # BLD: 5 K/UL (ref 1.7–7.7)
NEUTROPHILS NFR BLD: 87.5 %
PLATELET # BLD AUTO: 259 K/UL (ref 135–450)
PLATELET BLD QL SMEAR: ADEQUATE
PMV BLD AUTO: 8.6 FL (ref 5–10.5)
POTASSIUM SERPL-SCNC: 4.5 MMOL/L (ref 3.5–5.1)
RBC # BLD AUTO: 3.38 M/UL (ref 4–5.2)
SLIDE REVIEW: ABNORMAL
SODIUM SERPL-SCNC: 129 MMOL/L (ref 136–145)
WBC # BLD AUTO: 5.8 K/UL (ref 4–11)

## 2024-01-01 PROCEDURE — 99232 SBSQ HOSP IP/OBS MODERATE 35: CPT | Performed by: INTERNAL MEDICINE

## 2024-01-01 PROCEDURE — 36415 COLL VENOUS BLD VENIPUNCTURE: CPT

## 2024-01-01 PROCEDURE — 6370000000 HC RX 637 (ALT 250 FOR IP): Performed by: INTERNAL MEDICINE

## 2024-01-01 PROCEDURE — 2580000003 HC RX 258: Performed by: INTERNAL MEDICINE

## 2024-01-01 PROCEDURE — 85025 COMPLETE CBC W/AUTO DIFF WBC: CPT

## 2024-01-01 PROCEDURE — 94010 BREATHING CAPACITY TEST: CPT

## 2024-01-01 PROCEDURE — 6360000002 HC RX W HCPCS: Performed by: INTERNAL MEDICINE

## 2024-01-01 PROCEDURE — 2700000000 HC OXYGEN THERAPY PER DAY

## 2024-01-01 PROCEDURE — 94761 N-INVAS EAR/PLS OXIMETRY MLT: CPT

## 2024-01-01 PROCEDURE — 1200000000 HC SEMI PRIVATE

## 2024-01-01 PROCEDURE — 80048 BASIC METABOLIC PNL TOTAL CA: CPT

## 2024-01-01 PROCEDURE — 6370000000 HC RX 637 (ALT 250 FOR IP): Performed by: NURSE PRACTITIONER

## 2024-01-01 PROCEDURE — 94640 AIRWAY INHALATION TREATMENT: CPT

## 2024-01-01 RX ADMIN — IPRATROPIUM BROMIDE AND ALBUTEROL SULFATE 1 DOSE: 2.5; .5 SOLUTION RESPIRATORY (INHALATION) at 15:13

## 2024-01-01 RX ADMIN — Medication 2 PUFF: at 08:09

## 2024-01-01 RX ADMIN — ENOXAPARIN SODIUM 40 MG: 100 INJECTION SUBCUTANEOUS at 08:02

## 2024-01-01 RX ADMIN — TRAMADOL HYDROCHLORIDE 50 MG: 50 TABLET ORAL at 22:25

## 2024-01-01 RX ADMIN — Medication 10 ML: at 08:01

## 2024-01-01 RX ADMIN — IPRATROPIUM BROMIDE AND ALBUTEROL SULFATE 1 DOSE: 2.5; .5 SOLUTION RESPIRATORY (INHALATION) at 11:58

## 2024-01-01 RX ADMIN — LISINOPRIL 20 MG: 20 TABLET ORAL at 07:57

## 2024-01-01 RX ADMIN — WATER 40 MG: 1 INJECTION INTRAMUSCULAR; INTRAVENOUS; SUBCUTANEOUS at 07:58

## 2024-01-01 RX ADMIN — TRAMADOL HYDROCHLORIDE 50 MG: 50 TABLET ORAL at 07:57

## 2024-01-01 RX ADMIN — DOXYCYCLINE HYCLATE 100 MG: 100 TABLET, COATED ORAL at 07:57

## 2024-01-01 RX ADMIN — ESCITALOPRAM OXALATE 20 MG: 10 TABLET ORAL at 22:21

## 2024-01-01 RX ADMIN — AMLODIPINE BESYLATE 5 MG: 5 TABLET ORAL at 07:57

## 2024-01-01 RX ADMIN — Medication 2000 UNITS: at 07:57

## 2024-01-01 RX ADMIN — Medication 10 ML: at 22:21

## 2024-01-01 RX ADMIN — DOXYCYCLINE HYCLATE 100 MG: 100 TABLET, COATED ORAL at 22:21

## 2024-01-01 RX ADMIN — IPRATROPIUM BROMIDE AND ALBUTEROL SULFATE 1 DOSE: 2.5; .5 SOLUTION RESPIRATORY (INHALATION) at 21:10

## 2024-01-01 RX ADMIN — CETIRIZINE HYDROCHLORIDE 10 MG: 10 TABLET ORAL at 07:57

## 2024-01-01 RX ADMIN — Medication 2 PUFF: at 21:11

## 2024-01-01 RX ADMIN — IPRATROPIUM BROMIDE AND ALBUTEROL SULFATE 1 DOSE: 2.5; .5 SOLUTION RESPIRATORY (INHALATION) at 08:07

## 2024-01-01 RX ADMIN — Medication 1 TABLET: at 07:57

## 2024-01-01 ASSESSMENT — PAIN DESCRIPTION - DESCRIPTORS
DESCRIPTORS: ACHING

## 2024-01-01 ASSESSMENT — PAIN SCALES - GENERAL
PAINLEVEL_OUTOF10: 7
PAINLEVEL_OUTOF10: 7
PAINLEVEL_OUTOF10: 6
PAINLEVEL_OUTOF10: 2

## 2024-01-01 ASSESSMENT — PAIN - FUNCTIONAL ASSESSMENT: PAIN_FUNCTIONAL_ASSESSMENT: ACTIVITIES ARE NOT PREVENTED

## 2024-01-01 ASSESSMENT — COPD QUESTIONNAIRES
QUESTION3_CHESTTIGHTNESS: 2
QUESTION4_WALKINCLINE: 2
QUESTION8_ENERGYLEVEL: 4
QUESTION7_SLEEPQUALITY: 4
QUESTION5_HOMEACTIVITIES: 4
CAT_TOTALSCORE: 27
QUESTION6_LEAVINGHOUSE: 5
QUESTION2_CHESTPHLEGM: 3
QUESTION1_COUGHFREQUENCY: 3

## 2024-01-01 ASSESSMENT — PAIN DESCRIPTION - LOCATION
LOCATION: ABDOMEN
LOCATION: BACK
LOCATION: BACK;NECK

## 2024-01-01 ASSESSMENT — PAIN DESCRIPTION - ONSET: ONSET: ON-GOING

## 2024-01-01 ASSESSMENT — PAIN DESCRIPTION - FREQUENCY: FREQUENCY: CONTINUOUS

## 2024-01-01 ASSESSMENT — PAIN DESCRIPTION - PAIN TYPE: TYPE: CHRONIC PAIN

## 2024-01-01 ASSESSMENT — PAIN DESCRIPTION - ORIENTATION
ORIENTATION: LOWER

## 2024-01-01 ASSESSMENT — PULMONARY FUNCTION TESTS: PIF_VALUE: 120

## 2024-01-01 NOTE — PLAN OF CARE
Problem: Discharge Planning  Goal: Discharge to home or other facility with appropriate resources  1/1/2024 0922 by iVlma Medley RN  Outcome: Progressing  1/1/2024 0112 by Carla Niño RN  Outcome: Progressing     Problem: Pain  Goal: Verbalizes/displays adequate comfort level or baseline comfort level  1/1/2024 0922 by Vilma Medley RN  Outcome: Progressing  1/1/2024 0112 by Carla Niño RN  Outcome: Progressing     Problem: Safety - Adult  Goal: Free from fall injury  1/1/2024 0922 by Vilma Medley RN  Outcome: Progressing  1/1/2024 0112 by Carla Niño RN  Outcome: Progressing     Problem: Skin/Tissue Integrity  Goal: Absence of new skin breakdown  Description: 1.  Monitor for areas of redness and/or skin breakdown  2.  Assess vascular access sites hourly  3.  Every 4-6 hours minimum:  Change oxygen saturation probe site  4.  Every 4-6 hours:  If on nasal continuous positive airway pressure, respiratory therapy assess nares and determine need for appliance change or resting period.  1/1/2024 0922 by Vilma Medley RN  Outcome: Progressing  1/1/2024 0112 by Carla Niño RN  Outcome: Progressing     Problem: Respiratory - Adult  Goal: Achieves optimal ventilation and oxygenation  Outcome: Progressing     Problem: Cardiovascular - Adult  Goal: Maintains optimal cardiac output and hemodynamic stability  Outcome: Progressing  Goal: Absence of cardiac dysrhythmias or at baseline  Outcome: Progressing

## 2024-01-01 NOTE — DISCHARGE INSTRUCTIONS
Your information:  Name: Annie Tyler  : 1955    Your instructions:    Follow instructions below.    What to do after you leave the hospital:    Recommended diet: diabetic diet and fluid restrictions of 1500ml a day    Recommended activity: activity as tolerated        The following personal items were collected during your admission and were returned to you:    Belongings  Dental Appliances: Uppers  Vision - Corrective Lenses: Eyeglasses  Hearing Aid: None  Clothing: Footwear, Jacket/Coat, Pants, Shirt, Socks, Undergarments  Jewelry: Ring (5 rings)  Electronic Devices: Cell Phone,   Weapons (Notify Protective Services/Security): None  Home Medications: None  Valuables Given To: Patient  Provide Name(s) of Who Valuable(s) Were Given To: patient    Information obtained by:  By signing below, I understand that if any problems occur once I leave the hospital I am to contact MD.  I understand and acknowledge receipt of the instructions indicated above.       Please follow-up with pulmonology and your primary care provider    Your CT scan shows:  8 mm right solid pulmonary nodule within the upper lobe again demonstrated, for which follow-up noncontrast CT is recommended in 6 months.

## 2024-01-01 NOTE — FLOWSHEET NOTE
12/31/23 1928   Handoff   Communication Given Shift Handoff   Handoff Given To Carla   Handoff Received From Sammy   Handoff Communication Face to Face;At bedside   Time Handoff Given 1905   End of Shift Check Performed Yes

## 2024-01-01 NOTE — PROGRESS NOTES
PULMONARY PROGRESS NOTE  CC: 1 week worsening SOB, cough, sputum & hypoxia on home 4 L O2    Subjective:   Wore BiPAP 12/6 for ~1/2 hour overnight.     IV line: Peripheral    PHYSICAL EXAM:   /77   Pulse 80   Temp 97.4 °F (36.3 °C) (Oral)   Resp 16   Ht 1.6 m (5' 2.99\")   Wt 84.1 kg (185 lb 8 oz)   SpO2 98%   BMI 32.87 kg/m² ' on 6 L HFNC  Constitutional:  No acute distress - looks better  HEENT:  No scleral icterus  Neck:  No tracheal deviation present.    Cardiovascular:  Normal heart sounds.   Pulmonary/Chest:  No accessory muscle usage. + decreased breath sounds.  No wheezes. No rhonchi. No rales.   Abdominal:  Soft.   Musculoskeletal:  No cyanosis. No clubbing.  Skin:  Skin is warm and dry.   Neuro: awake and alert      Scheduled Meds:   sodium chloride flush  5-40 mL IntraVENous 2 times per day    enoxaparin  40 mg SubCUTAneous Daily    ipratropium 0.5 mg-albuterol 2.5 mg  1 Dose Inhalation Q4H WA RT    methylPREDNISolone  40 mg IntraVENous Daily    cetirizine  10 mg Oral Daily    Vitamin D  2,000 Units Oral Daily    escitalopram  20 mg Oral Nightly    therapeutic multivitamin-minerals  1 tablet Oral Daily    budesonide-formoterol  2 puff Inhalation BID RT    amLODIPine  5 mg Oral Daily    lisinopril  20 mg Oral Daily    doxycycline hyclate  100 mg Oral 2 times per day     Continuous Infusions:   sodium chloride       PRN Meds:  sodium chloride flush, sodium chloride, ondansetron **OR** ondansetron, polyethylene glycol, acetaminophen **OR** acetaminophen, guaiFENesin-dextromethorphan, perflutren lipid microspheres, ipratropium 0.5 mg-albuterol 2.5 mg, traMADol    Labs:  CBC:   Recent Labs     12/31/23  0212 01/01/24  0434   WBC 7.5 5.8   HGB 11.8* 10.8*   HCT 34.8* 32.3*   MCV 94.3 95.6    259     BMP:   Recent Labs     12/31/23  0249 01/01/24  0434   * 129*   K 4.4 4.5   CL 94* 92*   CO2 30 26   BUN 15 16   CREATININE <0.5* <0.5*     PCT 0.06  Pro-BNP 49  VBG 7.34/64/39    Micro:    12/31/23 SARS-CoV-2 & influenza not detected    Imaging:   CTPA 12/31/2023  IMPRESSION:   1. No acute pulmonary artery embolism.   2. More prominent bibasilar airspace disease    3. Emphysema.   4. 8 mm right solid pulmonary nodule within the upper lobe again   demonstrated, for which follow-up noncontrast CT is recommended in 6 months.     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 Navarro & was not a surgical candidate, s/p 5 fractions of SBRT 5/3/22 - 5/13/22 by Dr. Gaitan   H/O COVID19 pneumonia Aug 2022 with hospitalization  Has parakeets in the time, x2 yrs   Former smoker, >100 pack years, quit 2021     PLAN:  Supplemental O2 to maintain SaO2 >92%; wean as tolerated    BiPAP 12/5 HS and PRN (as previously in hospital, home settings are unknown)   IV solumedrol 40 qd  Inhaled bronchodilators   Doxy D#2  Consider CT PET imaging & no other hypermetabolism, I recommend SBRT for lung cancer for the RUL nodule.  Patient is aware of recommendation; we confirmed correct contact #.   Limit environmental exposures, I recommended eliminating bird (parakeet) exposure entirely     I spent a total of 12 minutes on this encounter personally obtaining the patient history, reviewing labs, imaging and other data.  I independently performed the above physical exam and updated this encounter note, assessment and plan as needed.  Moe Meehan MD on 1/1/24 at 1:10 PM EST    I spent a total of 11 minutes on this encounter on chart review, history taking and review of data.  I independently performed a physical  exam and updated this encounter note as needed.   Junie Weinstein PA-C on 1/1/24 at 9:35 AM EST

## 2024-01-01 NOTE — PROGRESS NOTES
01/01/24 1500   Spirometry Assessment    L/min   COPD Assessment (CAT Score)   Cough Assessment 3   Phlegm Assessment 3   Chest tightness 2   Walking on an incline 2   Home Activities 4   Confident Leaving The Home 5   Sleeping Soundly 4   Have Energy 4   Assessment Score 27   $RT COPD Assessment Yes

## 2024-01-01 NOTE — PROGRESS NOTES
IM Progress Note    Admit Date:  12/31/2023  1    Interval history:  copd exacerbation     Subjective:  Ms. Tyler seen up in bed,feels better today   Remains on home level of 4 L    Used bipap last night    Objective:   /81   Pulse 98   Temp 97.3 °F (36.3 °C) (Oral)   Resp 18   Ht 1.6 m (5' 2.99\")   Wt 84.1 kg (185 lb 8 oz)   SpO2 90%   BMI 32.87 kg/m²     Intake/Output Summary (Last 24 hours) at 1/1/2024 0745  Last data filed at 1/1/2024 0614  Gross per 24 hour   Intake 420 ml   Output 1700 ml   Net -1280 ml       Physical Exam:    General: elderly female chronically ill appearing    Awake, alert and oriented. Appears to be not in any distress  Mucous Membranes:  Pink , anicteric  Neck: No JVD, no carotid bruit, no thyromegaly  Chest: improving airentry javier with scattered wheeze   Cardiovascular:  RRR S1S2 heard, no murmurs or gallops  Abdomen:  Soft, undistended, non tender, no organomegaly, BS present  Extremities: 1+ javier Le edema . Distal pulses well felt  Neurological : grossly normal       Medications:   Scheduled Medications:    sodium chloride flush  5-40 mL IntraVENous 2 times per day    enoxaparin  40 mg SubCUTAneous Daily    ipratropium 0.5 mg-albuterol 2.5 mg  1 Dose Inhalation Q4H WA RT    methylPREDNISolone  40 mg IntraVENous Daily    cetirizine  10 mg Oral Daily    Vitamin D  2,000 Units Oral Daily    escitalopram  20 mg Oral Nightly    therapeutic multivitamin-minerals  1 tablet Oral Daily    budesonide-formoterol  2 puff Inhalation BID RT    amLODIPine  5 mg Oral Daily    lisinopril  20 mg Oral Daily    doxycycline hyclate  100 mg Oral 2 times per day     I   sodium chloride       sodium chloride flush, sodium chloride, ondansetron **OR** ondansetron, polyethylene glycol, acetaminophen **OR** acetaminophen, guaiFENesin-dextromethorphan, perflutren lipid microspheres, ipratropium 0.5 mg-albuterol 2.5 mg, traMADol    Lab Data:  Recent Labs     12/31/23  0212 01/01/24  0434   WBC 7.5 5.8

## 2024-01-01 NOTE — PROGRESS NOTES
4 Eyes Skin Assessment     NAME:  Annie Tyler  YOB: 1955  MEDICAL RECORD NUMBER:  [id number]    The patient is being assessed for  Admission    I agree that at least one RN has performed a thorough Head to Toe Skin Assessment on the patient. ALL assessment sites listed below have been assessed.      Areas assessed by both nurses:    Head, Face, Ears, Shoulders, Back, Chest, Arms, Elbows, Hands, Sacrum. Buttock, Coccyx, Ischium, and Legs. Feet and Heels        Does the Patient have a Wound? No noted wound(s)     Patient has scattered small scabs, [facility] initiated by RN: No  Wound Care Orders initiated by RN: No    Pressure Injury (Stage 3,4, Unstageable, DTI, NWPT, and Complex wounds) if present, place Wound referral order by RN under : No    New Ostomies, if present place, Ostomy referral order under : No     Nurse 1 eSignature: Electronically signed by Meghana Gallo RN on 12/31/23 at 7:23 PM EST    **SHARE this note so that the co-signing nurse can place an eSignature**    Nurse 2 eSignature: Electronically signed by Hillary Vilchis RN on 12/31/23 at 7:26 PM EST

## 2024-01-01 NOTE — FLOWSHEET NOTE
12/31/23 2130   Vital Signs   Temp 97.2 °F (36.2 °C)   Temp Source Oral   Pulse (!) 110   Heart Rate Source Monitor   Respirations 18   /74   MAP (Calculated) 92   BP Location Right upper arm   BP Method Automatic   Patient Position Semi fowlers   Pain Assessment   Pain Assessment 0-10   Pain Level 7   Patient's Stated Pain Goal 0 - No pain   Pain Location Back   Pain Descriptors Aching   Oxygen Therapy   SpO2 93 %   O2 Device High flow nasal cannula   O2 Flow Rate (L/min) 6 L/min

## 2024-01-01 NOTE — PROGRESS NOTES
RT Inhaler-Nebulizer Bronchodilator Protocol Note    There is a bronchodilator order in the chart from a provider indicating to follow the RT Bronchodilator Protocol and there is an “Initiate RT Inhaler-Nebulizer Bronchodilator Protocol” order as well (see protocol at bottom of note).    CXR Findings:  No results found.    The findings from the last RT Protocol Assessment were as follows:   History Pulmonary Disease: Chronic pulmonary disease  Respiratory Pattern: Dyspnea on exertion or RR 21-25 bpm  Breath Sounds: Inspiratory and expiratory or bilateral wheezing and/or rhonchi  Cough: Strong, spontaneous, non-productive  Indication for Bronchodilator Therapy: Wheezing associated with pulm disorder  Bronchodilator Assessment Score: 10    Aerosolized bronchodilator medication orders have been revised according to the RT Inhaler-Nebulizer Bronchodilator Protocol below.    Respiratory Therapist to perform RT Therapy Protocol Assessment initially then follow the protocol.  Repeat RT Therapy Protocol Assessment PRN for score 0-3 or on second treatment, BID, and PRN for scores above 3.    No Indications - adjust the frequency to every 6 hours PRN wheezing or bronchospasm, if no treatments needed after 48 hours then discontinue using Per Protocol order mode.     If indication present, adjust the RT bronchodilator orders based on the Bronchodilator Assessment Score as indicated below.  Use Inhaler orders unless patient has one or more of the following: on home nebulizer, not able to hold breath for 10 seconds, is not alert and oriented, cannot activate and use MDI correctly, or respiratory rate 25 breaths per minute or more, then use the equivalent nebulizer order(s) with same Frequency and PRN reasons based on the score.  If a patient is on this medication at home then do not decrease Frequency below that used at home.    0-3 - enter or revise RT bronchodilator order(s) to equivalent RT Bronchodilator order with Frequency

## 2024-01-01 NOTE — PROGRESS NOTES
HEART FAILURE CARE PLAN:    Comorbidities Reviewed: Yes   Patient has a past medical history of Angina pectoris (HCC), Chronic pain, COPD exacerbation (HCC), Depression, Essential hypertension, Panic disorder, Pneumonia, and Pulmonary emphysema (HCC).     ECHOCARDIOGRAM Reviewed: Yes   Patient's Ejection Fraction (EF) is new echo done 12/31 awaiting results    Weights Reviewed: Yes   Admission weight: 79.4 kg (175 lb)   Wt Readings from Last 3 Encounters:   01/01/24 84.1 kg (185 lb 8 oz)   12/04/23 78.1 kg (172 lb 3.2 oz)   10/12/23 76.6 kg (168 lb 12.8 oz)     Intake & Output Reviewed: Yes     Intake/Output Summary (Last 24 hours) at 1/1/2024 0919  Last data filed at 1/1/2024 0855  Gross per 24 hour   Intake 852 ml   Output 1700 ml   Net -848 ml     Medications Reviewed: Yes   SCHEDULED HOSPITAL MEDICATIONS:   sodium chloride flush  5-40 mL IntraVENous 2 times per day    enoxaparin  40 mg SubCUTAneous Daily    ipratropium 0.5 mg-albuterol 2.5 mg  1 Dose Inhalation Q4H WA RT    methylPREDNISolone  40 mg IntraVENous Daily    cetirizine  10 mg Oral Daily    Vitamin D  2,000 Units Oral Daily    escitalopram  20 mg Oral Nightly    therapeutic multivitamin-minerals  1 tablet Oral Daily    budesonide-formoterol  2 puff Inhalation BID RT    amLODIPine  5 mg Oral Daily    lisinopril  20 mg Oral Daily    doxycycline hyclate  100 mg Oral 2 times per day     ACE/ARB/ARNI is REQUIRED for EF </= 40% SYSTOLIC FAILURE:   ACE:: Lisinopril  ARB:: None  ARNI:: None    Evidenced-Based Beta Blocker is REQUIRED for EF </= 40% SYSTOLIC FAILURE:   :: None    Diuretics:  :: none    Diet Reviewed: Yes   ADULT DIET; Regular; 4 carb choices (60 gm/meal); No Added Salt (3-4 gm)    Goal of Care Reviewed: Yes   Patient and/or Family's stated Goal of Care this Admission: reduce shortness of breath, increase activity tolerance, better understand heart failure and disease management, be more comfortable, and reduce lower extremity edema prior to

## 2024-01-01 NOTE — FLOWSHEET NOTE
01/01/24 0745   Vital Signs   Temp 97.4 °F (36.3 °C)   Temp Source Oral   Pulse 80   Heart Rate Source Monitor   Respirations 18   /77   MAP (Calculated) 95   BP Location Right upper arm   BP Method Automatic   Patient Position High fowlers   Pain Assessment   Pain Assessment 0-10   Pain Level 6   Pain Location Back;Neck   Pain Orientation Lower   Pain Descriptors Aching   Functional Pain Assessment Activities are not prevented   Pain Type Chronic pain   Pain Frequency Continuous   Pain Onset On-going   Non-Pharmaceutical Pain Intervention(s) Repositioned   Opioid-Induced Sedation   POSS Score 1   RASS   Johnson Agitation Sedation Scale (RASS) 0   Oxygen Therapy   SpO2 94 %   O2 Device High flow nasal cannula   O2 Flow Rate (L/min) 6 L/min     Alert and oriented. Skin w/d. Resp e/e unlabored. Dyspnea with exertion.  Insp/exp wheezes noted. Telemetry in place.  Meds given. Nrsg asmt completed. See flow sheet and MAR. Bed alarm on. Call light in easy reach.

## 2024-01-01 NOTE — PROGRESS NOTES

## 2024-01-01 NOTE — PROGRESS NOTES
12/31/23 2348   NIV Type   NIV Started/Stopped (S)  On   Equipment Type v60   Mode Bilevel   Mask Type Full face mask   Mask Size Small   Settings/Measurements   IPAP 12 cmH20   CPAP/EPAP 5 cmH2O   Vt (Measured) 605 mL   Rate Ordered 16   FiO2  35 %   I Time/ I Time % 1 s   Minute Volume (L/min) 11 Liters   Mask Leak (lpm) 0 lpm   Patient's Home Machine No   Alarm Settings   Alarms On Y   Low Pressure (cmH2O) 5 cmH2O   High Pressure (cmH2O) 30 cmH2O   Delay Alarm 20 sec(s)   RR Low (bpm) 16   RR High (bpm) 40 br/min

## 2024-01-02 ENCOUNTER — APPOINTMENT (OUTPATIENT)
Dept: GENERAL RADIOLOGY | Age: 69
End: 2024-01-02
Payer: MEDICARE

## 2024-01-02 LAB
ANION GAP SERPL CALCULATED.3IONS-SCNC: 7 MMOL/L (ref 3–16)
BASOPHILS # BLD: 0 K/UL (ref 0–0.2)
BASOPHILS NFR BLD: 0.3 %
BUN SERPL-MCNC: 17 MG/DL (ref 7–20)
CALCIUM SERPL-MCNC: 9.1 MG/DL (ref 8.3–10.6)
CHLORIDE SERPL-SCNC: 95 MMOL/L (ref 99–110)
CO2 SERPL-SCNC: 31 MMOL/L (ref 21–32)
CREAT SERPL-MCNC: <0.5 MG/DL (ref 0.6–1.2)
DEPRECATED RDW RBC AUTO: 13.6 % (ref 12.4–15.4)
EOSINOPHIL # BLD: 0 K/UL (ref 0–0.6)
EOSINOPHIL NFR BLD: 0.4 %
GFR SERPLBLD CREATININE-BSD FMLA CKD-EPI: >60 ML/MIN/{1.73_M2}
GLUCOSE SERPL-MCNC: 85 MG/DL (ref 70–99)
HCT VFR BLD AUTO: 33.6 % (ref 36–48)
HGB BLD-MCNC: 11.3 G/DL (ref 12–16)
LYMPHOCYTES # BLD: 1.3 K/UL (ref 1–5.1)
LYMPHOCYTES NFR BLD: 14.7 %
MCH RBC QN AUTO: 31.5 PG (ref 26–34)
MCHC RBC AUTO-ENTMCNC: 33.6 G/DL (ref 31–36)
MCV RBC AUTO: 93.8 FL (ref 80–100)
MONOCYTES # BLD: 0.9 K/UL (ref 0–1.3)
MONOCYTES NFR BLD: 10.3 %
NEUTROPHILS # BLD: 6.6 K/UL (ref 1.7–7.7)
NEUTROPHILS NFR BLD: 74.3 %
PLATELET # BLD AUTO: 327 K/UL (ref 135–450)
PMV BLD AUTO: 7.3 FL (ref 5–10.5)
POTASSIUM SERPL-SCNC: 4.6 MMOL/L (ref 3.5–5.1)
RBC # BLD AUTO: 3.58 M/UL (ref 4–5.2)
SODIUM SERPL-SCNC: 133 MMOL/L (ref 136–145)
WBC # BLD AUTO: 8.9 K/UL (ref 4–11)

## 2024-01-02 PROCEDURE — 94761 N-INVAS EAR/PLS OXIMETRY MLT: CPT

## 2024-01-02 PROCEDURE — 6370000000 HC RX 637 (ALT 250 FOR IP): Performed by: INTERNAL MEDICINE

## 2024-01-02 PROCEDURE — 99233 SBSQ HOSP IP/OBS HIGH 50: CPT | Performed by: INTERNAL MEDICINE

## 2024-01-02 PROCEDURE — 2580000003 HC RX 258: Performed by: INTERNAL MEDICINE

## 2024-01-02 PROCEDURE — 6360000002 HC RX W HCPCS: Performed by: INTERNAL MEDICINE

## 2024-01-02 PROCEDURE — 6370000000 HC RX 637 (ALT 250 FOR IP): Performed by: NURSE PRACTITIONER

## 2024-01-02 PROCEDURE — 94640 AIRWAY INHALATION TREATMENT: CPT

## 2024-01-02 PROCEDURE — 71045 X-RAY EXAM CHEST 1 VIEW: CPT

## 2024-01-02 PROCEDURE — 94660 CPAP INITIATION&MGMT: CPT

## 2024-01-02 PROCEDURE — 1200000000 HC SEMI PRIVATE

## 2024-01-02 PROCEDURE — 85025 COMPLETE CBC W/AUTO DIFF WBC: CPT

## 2024-01-02 PROCEDURE — 2700000000 HC OXYGEN THERAPY PER DAY

## 2024-01-02 PROCEDURE — 80048 BASIC METABOLIC PNL TOTAL CA: CPT

## 2024-01-02 PROCEDURE — 36415 COLL VENOUS BLD VENIPUNCTURE: CPT

## 2024-01-02 PROCEDURE — 93306 TTE W/DOPPLER COMPLETE: CPT

## 2024-01-02 RX ORDER — LOPERAMIDE HYDROCHLORIDE 2 MG/1
2 CAPSULE ORAL DAILY PRN
Status: DISCONTINUED | OUTPATIENT
Start: 2024-01-02 | End: 2024-01-05 | Stop reason: HOSPADM

## 2024-01-02 RX ORDER — FUROSEMIDE 10 MG/ML
20 INJECTION INTRAMUSCULAR; INTRAVENOUS ONCE
Status: COMPLETED | OUTPATIENT
Start: 2024-01-02 | End: 2024-01-02

## 2024-01-02 RX ORDER — BUDESONIDE 0.5 MG/2ML
1 INHALANT ORAL
Status: DISCONTINUED | OUTPATIENT
Start: 2024-01-02 | End: 2024-01-05 | Stop reason: HOSPADM

## 2024-01-02 RX ADMIN — BUDESONIDE INHALATION 1000 MCG: 0.5 SUSPENSION RESPIRATORY (INHALATION) at 20:22

## 2024-01-02 RX ADMIN — FUROSEMIDE 20 MG: 10 INJECTION, SOLUTION INTRAMUSCULAR; INTRAVENOUS at 11:39

## 2024-01-02 RX ADMIN — BUDESONIDE INHALATION 1000 MCG: 0.5 SUSPENSION RESPIRATORY (INHALATION) at 11:24

## 2024-01-02 RX ADMIN — AMLODIPINE BESYLATE 5 MG: 5 TABLET ORAL at 09:17

## 2024-01-02 RX ADMIN — Medication 2000 UNITS: at 09:17

## 2024-01-02 RX ADMIN — IPRATROPIUM BROMIDE AND ALBUTEROL SULFATE 1 DOSE: 2.5; .5 SOLUTION RESPIRATORY (INHALATION) at 15:21

## 2024-01-02 RX ADMIN — TRAMADOL HYDROCHLORIDE 50 MG: 50 TABLET ORAL at 09:18

## 2024-01-02 RX ADMIN — GUAIFENESIN AND DEXTROMETHORPHAN 5 ML: 100; 10 SYRUP ORAL at 20:09

## 2024-01-02 RX ADMIN — ESCITALOPRAM OXALATE 20 MG: 10 TABLET ORAL at 20:07

## 2024-01-02 RX ADMIN — Medication 10 ML: at 20:07

## 2024-01-02 RX ADMIN — IPRATROPIUM BROMIDE AND ALBUTEROL SULFATE 1 DOSE: 2.5; .5 SOLUTION RESPIRATORY (INHALATION) at 20:21

## 2024-01-02 RX ADMIN — CETIRIZINE HYDROCHLORIDE 10 MG: 10 TABLET ORAL at 09:18

## 2024-01-02 RX ADMIN — IPRATROPIUM BROMIDE AND ALBUTEROL SULFATE 1 DOSE: 2.5; .5 SOLUTION RESPIRATORY (INHALATION) at 11:13

## 2024-01-02 RX ADMIN — IPRATROPIUM BROMIDE AND ALBUTEROL SULFATE 1 DOSE: 2.5; .5 SOLUTION RESPIRATORY (INHALATION) at 00:09

## 2024-01-02 RX ADMIN — WATER 40 MG: 1 INJECTION INTRAMUSCULAR; INTRAVENOUS; SUBCUTANEOUS at 09:21

## 2024-01-02 RX ADMIN — Medication 10 ML: at 09:22

## 2024-01-02 RX ADMIN — LOPERAMIDE HYDROCHLORIDE 2 MG: 2 CAPSULE ORAL at 11:39

## 2024-01-02 RX ADMIN — DOXYCYCLINE HYCLATE 100 MG: 100 TABLET, COATED ORAL at 20:07

## 2024-01-02 RX ADMIN — Medication 2 PUFF: at 07:33

## 2024-01-02 RX ADMIN — METHYLPREDNISOLONE SODIUM SUCCINATE 40 MG: 40 INJECTION INTRAMUSCULAR; INTRAVENOUS at 16:55

## 2024-01-02 RX ADMIN — DOXYCYCLINE HYCLATE 100 MG: 100 TABLET, COATED ORAL at 09:17

## 2024-01-02 RX ADMIN — Medication 1 TABLET: at 09:17

## 2024-01-02 RX ADMIN — ENOXAPARIN SODIUM 40 MG: 100 INJECTION SUBCUTANEOUS at 09:17

## 2024-01-02 RX ADMIN — IPRATROPIUM BROMIDE AND ALBUTEROL SULFATE 1 DOSE: 2.5; .5 SOLUTION RESPIRATORY (INHALATION) at 07:33

## 2024-01-02 RX ADMIN — LISINOPRIL 20 MG: 20 TABLET ORAL at 09:17

## 2024-01-02 ASSESSMENT — PAIN SCALES - GENERAL
PAINLEVEL_OUTOF10: 8
PAINLEVEL_OUTOF10: 1
PAINLEVEL_OUTOF10: 5
PAINLEVEL_OUTOF10: 0

## 2024-01-02 ASSESSMENT — PAIN DESCRIPTION - LOCATION: LOCATION: NECK;BACK

## 2024-01-02 ASSESSMENT — PAIN DESCRIPTION - ORIENTATION: ORIENTATION: LOWER

## 2024-01-02 ASSESSMENT — PAIN DESCRIPTION - DESCRIPTORS: DESCRIPTORS: ACHING

## 2024-01-02 NOTE — PROGRESS NOTES
Patient removed BiPAP at this time and doesn't want to go back on tonight   Placed back on NC at this time

## 2024-01-02 NOTE — FLOWSHEET NOTE
01/01/24 2109   Vital Signs   Temp 98.5 °F (36.9 °C)   Temp Source Oral   Pulse 93   Heart Rate Source Monitor   Respirations 16   /79   MAP (Calculated) 99   BP Method Automatic   Patient Position Semi fowlers   Pain Assessment   Pain Assessment 0-10   Pain Level 7   Patient's Stated Pain Goal 0 - No pain   Pain Location Back   Pain Orientation Lower   Pain Descriptors Aching   Oxygen Therapy   SpO2 92 %   O2 Device Nasal cannula   O2 Flow Rate (L/min) 6 L/min

## 2024-01-02 NOTE — CARE COORDINATION
Case Management Assessment  Initial Evaluation    Date/Time of Evaluation: 1/2/2024 9:20 AM  Assessment Completed by: Baljinder Cardoza RN    If patient is discharged prior to next notation, then this note serves as note for discharge by case management.    Patient Name: Annie Tyler                   YOB: 1955  Diagnosis: Hypoxia [R09.02]  COPD exacerbation (HCC) [J44.1]  Congestive heart failure, unspecified HF chronicity, unspecified heart failure type (HCC) [I50.9]                   Date / Time: 12/31/2023  1:57 AM    Patient Admission Status: Inpatient   Readmission Risk (Low < 19, Mod (19-27), High > 27): Readmission Risk Score: 24.5    Current PCP: Keyla Mtz MD  PCP verified by CM? Yes (jake)    Chart Reviewed: Yes      History Provided by: Patient  Patient Orientation: Alert and Oriented    Patient Cognition: Alert    Hospitalization in the last 30 days (Readmission):  Yes    If yes, Readmission Assessment in CM Navigator will be completed.    Advance Directives:      Code Status: Full Code   Patient's Primary Decision Maker is: Named in Scanned ACP Document    Primary Decision Maker: HUMERA TYLER Child - [phone]    Secondary Decision Maker: Nilda Tyler  Child - [phone]    Discharge Planning:    Patient lives with: Children Type of Home: House  Primary Care Giver: Self  Patient Support Systems include: Children   Current Financial resources: Medicaid, Medicare  Current community resources: None  Current services prior to admission: Durable Medical Equipment, Oxygen Therapy ([facility]ilogy--maureen)            Current DME: Cane, Walker, Wheelchair, Shower Chair (rollator)            Type of Home Care services:  None    ADLS  Prior functional level: Independent in ADLs/IADLs  Current functional level: Independent in ADLs/IADLs    PT AM-PAC:   /24  OT AM-PAC:   /24    Family can provide assistance at DC: Yes  Would you like Case Management to discuss the discharge plan with any  other family members/significant others, and if so, who? No  Plans to Return to Present Housing: Yes  Other Identified Issues/Barriers to RETURNING to current housing: na  Potential Assistance needed at discharge: N/A            Potential DME:    Patient expects to discharge to: House  Plan for transportation at discharge:      Financial    Payor: [employer] MEDICARE / Plan: [employer] MEDICARE ADVANTAGE HMO / Product Type: Medicare /     Does insurance require precert for SNF: Yes    Potential assistance Purchasing Medications: No  Meds-to-Beds request:        [facility] [place] [facility] [address]  - P [phone] - F [phone]  [address] [facility] [place] [address]  Phone: [phone] Fax: [phone]    [id number], [place] - [address] - P [phone] - F [phone]  [address]  [place] [address]  Phone: [phone] Fax: [phone]      Notes:    Factors facilitating achievement of predicted outcomes: Family support, Cooperative, and Pleasant    Barriers to discharge: hypoxia    Additional Case Management Notes: Reviewed chart and met with pt at bedside. Role of CM explained. Pt is mostly IPTA and lives in one story home. Dtr there almost 24/7. Pt has maureen home O2 and trilogy. Pt states she will call medicaid to see if they are able to provide her with HHA during the week. No additional DC or DME needs at this time. Pt has rollator, walker, WC and shower chair. Watch for potential needs.     The Plan for Transition of Care is related to the following treatment goals of Hypoxia [R09.02]  COPD exacerbation (HCC) [J44.1]  Congestive heart failure, unspecified HF chronicity, unspecified heart failure type (HCC) [I50.9]    IF APPLICABLE: The Patient and/or patient representative Annie and her family were provided with a choice of provider and agrees with the discharge plan. Freedom of choice list with basic dialogue that supports the patient's individualized

## 2024-01-02 NOTE — PROGRESS NOTES
01/01/24 2100   RT Protocol   History Pulmonary Disease 2   Respiratory pattern 2   Breath sounds 6   Cough 0   Indications for Bronchodilator Therapy Wheezing associated with pulm disorder   Bronchodilator Assessment Score 10     RT Inhaler-Nebulizer Bronchodilator Protocol Note    There is a bronchodilator order in the chart from a provider indicating to follow the RT Bronchodilator Protocol and there is an “Initiate RT Inhaler-Nebulizer Bronchodilator Protocol” order as well (see protocol at bottom of note).    CXR Findings:  No results found.    The findings from the last RT Protocol Assessment were as follows:   History Pulmonary Disease: Chronic pulmonary disease  Respiratory Pattern: Dyspnea on exertion or RR 21-25 bpm  Breath Sounds: Inspiratory and expiratory or bilateral wheezing and/or rhonchi  Cough: Strong, spontaneous, non-productive  Indication for Bronchodilator Therapy: Wheezing associated with pulm disorder  Bronchodilator Assessment Score: 10    Aerosolized bronchodilator medication orders have been revised according to the RT Inhaler-Nebulizer Bronchodilator Protocol below.    Respiratory Therapist to perform RT Therapy Protocol Assessment initially then follow the protocol.  Repeat RT Therapy Protocol Assessment PRN for score 0-3 or on second treatment, BID, and PRN for scores above 3.    No Indications - adjust the frequency to every 6 hours PRN wheezing or bronchospasm, if no treatments needed after 48 hours then discontinue using Per Protocol order mode.     If indication present, adjust the RT bronchodilator orders based on the Bronchodilator Assessment Score as indicated below.  Use Inhaler orders unless patient has one or more of the following: on home nebulizer, not able to hold breath for 10 seconds, is not alert and oriented, cannot activate and use MDI correctly, or respiratory rate 25 breaths per minute or more, then use the equivalent nebulizer order(s) with same Frequency and PRN

## 2024-01-02 NOTE — PROGRESS NOTES
IM Progress Note    Admit Date:  12/31/2023  2    Interval history:  copd exacerbation     Subjective:  Ms. Tyler seen up in bed,feels better today   Remains sob and  has to increase to 6 L overnight    Used bipap last night    Objective:   BP (!) 151/74   Pulse 79   Temp 97.3 °F (36.3 °C) (Oral)   Resp 22   Ht 1.6 m (5' 2.99\")   Wt 83.2 kg (183 lb 8 oz)   SpO2 94%   BMI 32.51 kg/m²     Intake/Output Summary (Last 24 hours) at 1/2/2024 0748  Last data filed at 1/2/2024 0702  Gross per 24 hour   Intake 1132 ml   Output 1700 ml   Net -568 ml         Physical Exam:    General: elderly female chronically ill appearing    Awake, alert and oriented. Appears to be not in any distress  Mucous Membranes:  Pink , anicteric  Neck: No JVD, no carotid bruit, no thyromegaly  Chest: improving airentry javier with scattered wheeze   Cardiovascular:  RRR S1S2 heard, no murmurs or gallops  Abdomen:  Soft, undistended, non tender, no organomegaly, BS present  Extremities: 1+ javier Le edema . Distal pulses well felt  Neurological : grossly normal       Medications:   Scheduled Medications:    sodium chloride flush  5-40 mL IntraVENous 2 times per day    enoxaparin  40 mg SubCUTAneous Daily    ipratropium 0.5 mg-albuterol 2.5 mg  1 Dose Inhalation Q4H WA RT    methylPREDNISolone  40 mg IntraVENous Daily    cetirizine  10 mg Oral Daily    Vitamin D  2,000 Units Oral Daily    escitalopram  20 mg Oral Nightly    therapeutic multivitamin-minerals  1 tablet Oral Daily    budesonide-formoterol  2 puff Inhalation BID RT    amLODIPine  5 mg Oral Daily    lisinopril  20 mg Oral Daily    doxycycline hyclate  100 mg Oral 2 times per day     I   sodium chloride       sodium chloride flush, sodium chloride, ondansetron **OR** ondansetron, polyethylene glycol, acetaminophen **OR** acetaminophen, guaiFENesin-dextromethorphan, perflutren lipid microspheres, ipratropium 0.5 mg-albuterol 2.5 mg, traMADol    Lab Data:  Recent Labs     12/31/23  2312  01/01/24  0434 01/02/24  0442   WBC 7.5 5.8 8.9   HGB 11.8* 10.8* 11.3*   HCT 34.8* 32.3* 33.6*   MCV 94.3 95.6 93.8    259 327       Recent Labs     12/31/23  0249 01/01/24 0434 01/02/24 0442   * 129* 133*   K 4.4 4.5 4.6   CL 94* 92* 95*   CO2 30 26 31   BUN 15 16 17   CREATININE <0.5* <0.5* <0.5*       No results for input(s): \"CKTOTAL\", \"CKMB\", \"CKMBINDEX\", \"TROPONINI\" in the last 72 hours.    Coagulation:   Lab Results   Component Value Date/Time    INR 0.95 10/07/2023 08:07 AM    APTT 34.4 10/07/2023 08:07 AM     Cardiac markers:   Lab Results   Component Value Date/Time    TROPONINI <0.01 11/17/2022 03:11 PM         Lab Results   Component Value Date    ALT 26 12/31/2023    AST 28 12/31/2023    ALKPHOS 114 12/31/2023    BILITOT 0.3 12/31/2023       Lab Results   Component Value Date    INR 0.95 10/07/2023    INR 0.93 08/08/2023    INR 1.03 02/28/2021    PROTIME 12.7 10/07/2023    PROTIME 12.5 08/08/2023    PROTIME 11.9 02/28/2021       CULTURES  COVID/flu: not detected     EKG:  I have reviewed the EKG with the following interpretation:   Sinus tachycardia, Left axis deviation, Low voltage QRS, Inferior infarct , age undetermined      RADIOLOGY  CT CHEST PULMONARY EMBOLISM W CONTRAST   Final Result   1. No acute pulmonary artery embolism.   2. More prominent bibasilar airspace disease concerning for pneumonia in the   correct clinical setting.   3. Emphysema.   4. 8 mm right solid pulmonary nodule within the upper lobe again   demonstrated, for which follow-up noncontrast CT is recommended in 6 months.           XR CHEST (2 VW)   Final Result   Shallow inflation. Chronic appearing basilar opacities again noted.                  echo pending        ASSESSMENT/PLAN:     #Acute on chronic hypoxic respiratory failure  #COPD exacerbation     -admitted for further management/care.  Pulmonology consulted  -IV Solu-medrol , HHN , doubt pneumonia  -  procal neg   - no features of CHF, pedal edema from

## 2024-01-02 NOTE — PROGRESS NOTES
Patient assisted OOB to chair  -  tolerated transfer well.  Call light in patient's reach.  Patient requested purewick catheter to remain in place while OOB.  Draining clear yellow urine.

## 2024-01-02 NOTE — PROGRESS NOTES
Patient appears sleeping w/ resp easy and even  -  purewick catheter draining clear yellow urine.  Call light in patient's reach.

## 2024-01-02 NOTE — PLAN OF CARE
Problem: Cardiovascular - Adult  Goal: Maintains optimal cardiac output and hemodynamic stability  Note:   HEART FAILURE CARE PLAN:    Comorbidities Reviewed: Yes   Patient has a past medical history of Angina pectoris (HCC), Chronic pain, COPD exacerbation (HCC), Depression, Essential hypertension, Panic disorder, Pneumonia, and Pulmonary emphysema (HCC).     ECHOCARDIOGRAM Reviewed: Yes   Patient's Ejection Fraction (EF) is greater than 40%    Weights Reviewed: Yes   Admission weight: 79.4 kg (175 lb)   Wt Readings from Last 3 Encounters:   01/02/24 83.2 kg (183 lb 8 oz)   12/04/23 78.1 kg (172 lb 3.2 oz)   10/12/23 76.6 kg (168 lb 12.8 oz)     Intake & Output Reviewed: Yes     Intake/Output Summary (Last 24 hours) at 1/2/2024 1816  Last data filed at 1/2/2024 1733  Gross per 24 hour   Intake 1800 ml   Output 3150 ml   Net -1350 ml     Medications Reviewed: Yes   SCHEDULED HOSPITAL MEDICATIONS:   methylPREDNISolone  40 mg IntraVENous Q8H    budesonide  1 mg Nebulization BID RT    sodium chloride flush  5-40 mL IntraVENous 2 times per day    enoxaparin  40 mg SubCUTAneous Daily    ipratropium 0.5 mg-albuterol 2.5 mg  1 Dose Inhalation Q4H WA RT    cetirizine  10 mg Oral Daily    Vitamin D  2,000 Units Oral Daily    escitalopram  20 mg Oral Nightly    therapeutic multivitamin-minerals  1 tablet Oral Daily    amLODIPine  5 mg Oral Daily    lisinopril  20 mg Oral Daily    doxycycline hyclate  100 mg Oral 2 times per day     ACE/ARB/ARNI is REQUIRED for EF </= 40% SYSTOLIC FAILURE:   ACE:: Lisinopril  ARB:: None  ARNI:: None    Evidenced-Based Beta Blocker is REQUIRED for EF </= 40% SYSTOLIC FAILURE:   :: None    Diuretics:  :: Furosemide    Diet Reviewed: Yes   ADULT DIET; Regular; 4 carb choices (60 gm/meal); No Added Salt (3-4 gm)    Goal of Care Reviewed: Yes   Patient and/or Family's stated Goal of Care this Admission: reduce shortness of breath, increase activity tolerance, better understand heart failure and  disease management, be more comfortable, and reduce lower extremity edema prior to discharge.

## 2024-01-02 NOTE — PROGRESS NOTES
PULMONARY PROGRESS NOTE  CC: 1 week worsening SOB, cough, sputum & hypoxia on home 4 L O2    Subjective:   Worsening and not able to awake  Feels slightly better but not much   Wore BiPAP 12/6 for ~1/2 hour overnight.   Quit smoking 3 years ago   IV line: Peripheral  Diffuse wheezing     PHYSICAL EXAM:   BP (!) 151/74   Pulse 79   Temp 97.3 °F (36.3 °C) (Oral)   Resp 22   Ht 1.6 m (5' 2.99\")   Wt 83.2 kg (183 lb 8 oz)   SpO2 94%   BMI 32.51 kg/m² ' on 6 L HFNC  Constitutional:  No acute distress - looks better  HEENT:  No scleral icterus  Neck:  No tracheal deviation present.    Cardiovascular:  Normal heart sounds.   Pulmonary/Chest:  No accessory muscle usage. + decreased breath sounds.  No wheezes. No rhonchi. No rales.   Abdominal:  Soft.   Musculoskeletal:  No cyanosis. No clubbing.  Skin:  Skin is warm and dry.   Neuro: awake and alert      Scheduled Meds:   sodium chloride flush  5-40 mL IntraVENous 2 times per day    enoxaparin  40 mg SubCUTAneous Daily    ipratropium 0.5 mg-albuterol 2.5 mg  1 Dose Inhalation Q4H WA RT    methylPREDNISolone  40 mg IntraVENous Daily    cetirizine  10 mg Oral Daily    Vitamin D  2,000 Units Oral Daily    escitalopram  20 mg Oral Nightly    therapeutic multivitamin-minerals  1 tablet Oral Daily    budesonide-formoterol  2 puff Inhalation BID RT    amLODIPine  5 mg Oral Daily    lisinopril  20 mg Oral Daily    doxycycline hyclate  100 mg Oral 2 times per day     Continuous Infusions:   sodium chloride       PRN Meds:  sodium chloride flush, sodium chloride, ondansetron **OR** ondansetron, polyethylene glycol, acetaminophen **OR** acetaminophen, guaiFENesin-dextromethorphan, perflutren lipid microspheres, ipratropium 0.5 mg-albuterol 2.5 mg, traMADol    Labs:  CBC:   Recent Labs     12/31/23  0212 01/01/24  0434 01/02/24  0442   WBC 7.5 5.8 8.9   HGB 11.8* 10.8* 11.3*   HCT 34.8* 32.3* 33.6*   MCV 94.3 95.6 93.8    259 327       BMP:   Recent Labs

## 2024-01-02 NOTE — PLAN OF CARE
Problem: Discharge Planning  Goal: Discharge to home or other facility with appropriate resources  1/1/2024 2319 by Carla Niño RN  Outcome: Progressing  1/1/2024 0922 by Vilma Medley RN  Outcome: Progressing     Problem: Pain  Goal: Verbalizes/displays adequate comfort level or baseline comfort level  1/1/2024 2319 by Carla Niño RN  Outcome: Progressing  1/1/2024 0922 by Vilma Medley RN  Outcome: Progressing     Problem: Safety - Adult  Goal: Free from fall injury  1/1/2024 2319 by Carla Niño RN  Outcome: Progressing  1/1/2024 0922 by Vilma Medley RN  Outcome: Progressing     Problem: Skin/Tissue Integrity  Goal: Absence of new skin breakdown  Description: 1.  Monitor for areas of redness and/or skin breakdown  2.  Assess vascular access sites hourly  3.  Every 4-6 hours minimum:  Change oxygen saturation probe site  4.  Every 4-6 hours:  If on nasal continuous positive airway pressure, respiratory therapy assess nares and determine need for appliance change or resting period.  1/1/2024 2319 by Carla Niño RN  Outcome: Progressing  1/1/2024 0922 by Vilma Medley RN  Outcome: Progressing     Problem: Respiratory - Adult  Goal: Achieves optimal ventilation and oxygenation  1/1/2024 2319 by Carla Niño RN  Outcome: Progressing  1/1/2024 0922 by Vilma Medley RN  Outcome: Progressing     Problem: Cardiovascular - Adult  Goal: Maintains optimal cardiac output and hemodynamic stability  1/1/2024 2319 by Carla Niño RN  Outcome: Progressing  1/1/2024 0922 by Vilma Medley RN  Outcome: Progressing  Goal: Absence of cardiac dysrhythmias or at baseline  1/1/2024 2319 by Carla Niño RN  Outcome: Progressing  1/1/2024 0922 by Vilma Medley RN  Outcome: Progressing

## 2024-01-02 NOTE — CARDIO/PULMONARY
Followed up with pt after Pulmonary Rehab consult was placed. Pt is interested will schedule when discharged date is determined.     Electronically signed by Siomara Bill on 1/2/2024 at 4:08 PM

## 2024-01-02 NOTE — ACP (ADVANCE CARE PLANNING)
Advance Care Planning     General Advance Care Planning (ACP) Conversation    Date of Conversation: 12/31/2023  Conducted with: Patient with Decision Making Capacity    Healthcare Decision Maker:    Primary Decision Maker: HUMERA STEIN - Ever - [phone]    Secondary Decision Maker: Nilda Stein - Ever - [phone]  Click here to complete Healthcare Decision Makers including selection of the Healthcare Decision Maker Relationship (ie \"Primary\").   Today we documented Decision Maker(s) consistent with ACP documents on file.    Content/Action Overview:  Has ACP document(s) on file - reflects the patient's care preferences  Reviewed DNR/DNI and patient elects Full Code (Attempt Resuscitation)        Length of Voluntary ACP Conversation in minutes:  <16 minutes (Non-Billable)    Baljinder Cardoza RN

## 2024-01-02 NOTE — PROGRESS NOTES
01/02/24 0014   NIV Type   Mode Bilevel   Mask Type Full face mask   Mask Size Small   Assessment   Respirations 20   SpO2 92 %   Settings/Measurements   IPAP 12 cmH20   CPAP/EPAP 5 cmH2O   Vt (Measured) 638 mL   Rate Ordered 16   FiO2  35 %

## 2024-01-03 LAB
ANION GAP SERPL CALCULATED.3IONS-SCNC: 15 MMOL/L (ref 3–16)
BASOPHILS # BLD: 0 K/UL (ref 0–0.2)
BASOPHILS NFR BLD: 0 %
BUN SERPL-MCNC: 15 MG/DL (ref 7–20)
CALCIUM SERPL-MCNC: 9.7 MG/DL (ref 8.3–10.6)
CHLORIDE SERPL-SCNC: 84 MMOL/L (ref 99–110)
CO2 SERPL-SCNC: 32 MMOL/L (ref 21–32)
CREAT SERPL-MCNC: <0.5 MG/DL (ref 0.6–1.2)
DEPRECATED RDW RBC AUTO: 13.6 % (ref 12.4–15.4)
EOSINOPHIL # BLD: 0 K/UL (ref 0–0.6)
EOSINOPHIL NFR BLD: 0 %
GFR SERPLBLD CREATININE-BSD FMLA CKD-EPI: >60 ML/MIN/{1.73_M2}
GLUCOSE BLD-MCNC: 169 MG/DL (ref 70–99)
GLUCOSE SERPL-MCNC: 159 MG/DL (ref 70–99)
HCT VFR BLD AUTO: 38.5 % (ref 36–48)
HGB BLD-MCNC: 12.9 G/DL (ref 12–16)
LYMPHOCYTES # BLD: 0.4 K/UL (ref 1–5.1)
LYMPHOCYTES NFR BLD: 6.3 %
MCH RBC QN AUTO: 31.9 PG (ref 26–34)
MCHC RBC AUTO-ENTMCNC: 33.5 G/DL (ref 31–36)
MCV RBC AUTO: 95.3 FL (ref 80–100)
MONOCYTES # BLD: 0.1 K/UL (ref 0–1.3)
MONOCYTES NFR BLD: 1.7 %
NEUTROPHILS # BLD: 5.2 K/UL (ref 1.7–7.7)
NEUTROPHILS NFR BLD: 92 %
PERFORMED ON: ABNORMAL
PLATELET # BLD AUTO: 336 K/UL (ref 135–450)
PMV BLD AUTO: 7.2 FL (ref 5–10.5)
POTASSIUM SERPL-SCNC: 4.3 MMOL/L (ref 3.5–5.1)
RBC # BLD AUTO: 4.04 M/UL (ref 4–5.2)
SODIUM SERPL-SCNC: 131 MMOL/L (ref 136–145)
WBC # BLD AUTO: 5.7 K/UL (ref 4–11)

## 2024-01-03 PROCEDURE — 6370000000 HC RX 637 (ALT 250 FOR IP): Performed by: INTERNAL MEDICINE

## 2024-01-03 PROCEDURE — 1200000000 HC SEMI PRIVATE

## 2024-01-03 PROCEDURE — 6370000000 HC RX 637 (ALT 250 FOR IP): Performed by: NURSE PRACTITIONER

## 2024-01-03 PROCEDURE — 85025 COMPLETE CBC W/AUTO DIFF WBC: CPT

## 2024-01-03 PROCEDURE — 94761 N-INVAS EAR/PLS OXIMETRY MLT: CPT

## 2024-01-03 PROCEDURE — 36415 COLL VENOUS BLD VENIPUNCTURE: CPT

## 2024-01-03 PROCEDURE — 6360000002 HC RX W HCPCS: Performed by: INTERNAL MEDICINE

## 2024-01-03 PROCEDURE — 80048 BASIC METABOLIC PNL TOTAL CA: CPT

## 2024-01-03 PROCEDURE — 2580000003 HC RX 258: Performed by: INTERNAL MEDICINE

## 2024-01-03 PROCEDURE — 99232 SBSQ HOSP IP/OBS MODERATE 35: CPT | Performed by: INTERNAL MEDICINE

## 2024-01-03 PROCEDURE — 2700000000 HC OXYGEN THERAPY PER DAY

## 2024-01-03 PROCEDURE — 99233 SBSQ HOSP IP/OBS HIGH 50: CPT | Performed by: INTERNAL MEDICINE

## 2024-01-03 PROCEDURE — 94640 AIRWAY INHALATION TREATMENT: CPT

## 2024-01-03 RX ORDER — TOLVAPTAN 15 MG/1
15 TABLET ORAL ONCE
Status: COMPLETED | OUTPATIENT
Start: 2024-01-03 | End: 2024-01-03

## 2024-01-03 RX ADMIN — DOXYCYCLINE HYCLATE 100 MG: 100 TABLET, COATED ORAL at 08:44

## 2024-01-03 RX ADMIN — LOPERAMIDE HYDROCHLORIDE 2 MG: 2 CAPSULE ORAL at 20:30

## 2024-01-03 RX ADMIN — TOLVAPTAN 15 MG: 15 TABLET ORAL at 13:06

## 2024-01-03 RX ADMIN — IPRATROPIUM BROMIDE AND ALBUTEROL SULFATE 1 DOSE: 2.5; .5 SOLUTION RESPIRATORY (INHALATION) at 00:34

## 2024-01-03 RX ADMIN — GUAIFENESIN AND DEXTROMETHORPHAN 5 ML: 100; 10 SYRUP ORAL at 08:55

## 2024-01-03 RX ADMIN — IPRATROPIUM BROMIDE AND ALBUTEROL SULFATE 1 DOSE: 2.5; .5 SOLUTION RESPIRATORY (INHALATION) at 17:53

## 2024-01-03 RX ADMIN — METHYLPREDNISOLONE SODIUM SUCCINATE 40 MG: 40 INJECTION INTRAMUSCULAR; INTRAVENOUS at 00:45

## 2024-01-03 RX ADMIN — BUDESONIDE INHALATION 1000 MCG: 0.5 SUSPENSION RESPIRATORY (INHALATION) at 07:12

## 2024-01-03 RX ADMIN — IPRATROPIUM BROMIDE AND ALBUTEROL SULFATE 1 DOSE: 2.5; .5 SOLUTION RESPIRATORY (INHALATION) at 15:12

## 2024-01-03 RX ADMIN — Medication 10 ML: at 08:45

## 2024-01-03 RX ADMIN — GUAIFENESIN AND DEXTROMETHORPHAN 5 ML: 100; 10 SYRUP ORAL at 20:30

## 2024-01-03 RX ADMIN — Medication 2000 UNITS: at 08:44

## 2024-01-03 RX ADMIN — AMLODIPINE BESYLATE 5 MG: 5 TABLET ORAL at 08:45

## 2024-01-03 RX ADMIN — BUDESONIDE INHALATION 1000 MCG: 0.5 SUSPENSION RESPIRATORY (INHALATION) at 17:53

## 2024-01-03 RX ADMIN — METHYLPREDNISOLONE SODIUM SUCCINATE 40 MG: 40 INJECTION INTRAMUSCULAR; INTRAVENOUS at 08:43

## 2024-01-03 RX ADMIN — IPRATROPIUM BROMIDE AND ALBUTEROL SULFATE 1 DOSE: 2.5; .5 SOLUTION RESPIRATORY (INHALATION) at 11:12

## 2024-01-03 RX ADMIN — LISINOPRIL 20 MG: 20 TABLET ORAL at 08:44

## 2024-01-03 RX ADMIN — ESCITALOPRAM OXALATE 20 MG: 10 TABLET ORAL at 20:30

## 2024-01-03 RX ADMIN — IPRATROPIUM BROMIDE AND ALBUTEROL SULFATE 1 DOSE: 2.5; .5 SOLUTION RESPIRATORY (INHALATION) at 07:12

## 2024-01-03 RX ADMIN — CETIRIZINE HYDROCHLORIDE 10 MG: 10 TABLET ORAL at 08:44

## 2024-01-03 RX ADMIN — ENOXAPARIN SODIUM 40 MG: 100 INJECTION SUBCUTANEOUS at 08:45

## 2024-01-03 RX ADMIN — Medication 10 ML: at 20:31

## 2024-01-03 RX ADMIN — DOXYCYCLINE HYCLATE 100 MG: 100 TABLET, COATED ORAL at 20:30

## 2024-01-03 RX ADMIN — Medication 1 TABLET: at 08:44

## 2024-01-03 RX ADMIN — GUAIFENESIN AND DEXTROMETHORPHAN 5 ML: 100; 10 SYRUP ORAL at 00:45

## 2024-01-03 RX ADMIN — TRAMADOL HYDROCHLORIDE 50 MG: 50 TABLET ORAL at 09:02

## 2024-01-03 RX ADMIN — TRAMADOL HYDROCHLORIDE 50 MG: 50 TABLET ORAL at 20:30

## 2024-01-03 ASSESSMENT — PAIN DESCRIPTION - ORIENTATION: ORIENTATION: MID;LOWER

## 2024-01-03 ASSESSMENT — PAIN DESCRIPTION - FREQUENCY: FREQUENCY: CONTINUOUS

## 2024-01-03 ASSESSMENT — PAIN SCALES - GENERAL
PAINLEVEL_OUTOF10: 7
PAINLEVEL_OUTOF10: 4
PAINLEVEL_OUTOF10: 7
PAINLEVEL_OUTOF10: 4

## 2024-01-03 ASSESSMENT — PAIN SCALES - WONG BAKER: WONGBAKER_NUMERICALRESPONSE: 0

## 2024-01-03 ASSESSMENT — PAIN DESCRIPTION - PAIN TYPE: TYPE: CHRONIC PAIN

## 2024-01-03 ASSESSMENT — PAIN DESCRIPTION - ONSET: ONSET: ON-GOING

## 2024-01-03 ASSESSMENT — PAIN DESCRIPTION - LOCATION
LOCATION: BACK;NECK
LOCATION: BACK

## 2024-01-03 ASSESSMENT — PAIN DESCRIPTION - DESCRIPTORS
DESCRIPTORS: ACHING;DISCOMFORT;SORE
DESCRIPTORS: ACHING

## 2024-01-03 ASSESSMENT — PAIN - FUNCTIONAL ASSESSMENT: PAIN_FUNCTIONAL_ASSESSMENT: ACTIVITIES ARE NOT PREVENTED

## 2024-01-03 NOTE — PROGRESS NOTES
Offered to help patient back to bed  -  stated she wanted to remain OOB in chair for a while longer.

## 2024-01-03 NOTE — PROGRESS NOTES
Patient educated on Samsca, it's purpose and 1500ml fluid restrictions.  Patient verbalized understanding and stated.  \"I know I have been drinking too much\".  Will further monitor

## 2024-01-03 NOTE — FLOWSHEET NOTE
01/02/24 1900   Vital Signs   Temp 98.3 °F (36.8 °C)   Temp Source Oral   Pulse (!) 104   Heart Rate Source Monitor   Respirations 22   BP (!) 149/81   MAP (Calculated) 104   BP Location Right upper arm   BP Method Automatic   Patient Position High fowlers   Pain Assessment   Pain Assessment 0-10   Oxygen Therapy   SpO2 95 %   O2 Device High flow nasal cannula   O2 Flow Rate (L/min) 6 L/min

## 2024-01-03 NOTE — PROGRESS NOTES
End of shift report given to Carla GALVEZ  -  care transferred.  Patient remains OOB in chair.  Call light in reach.

## 2024-01-03 NOTE — PROGRESS NOTES
Patient medicated w/ tramadol per patient request for complaints of lower back pain of 7.  Will monitor patient for effectiveness.

## 2024-01-03 NOTE — PROGRESS NOTES
PULMONARY PROGRESS NOTE  CC: 1 week worsening SOB, cough, sputum & hypoxia on home 4 L O2    Subjective:   Doing much better   On 4 L O2   Wore BiPAP 12/6 for ~1/2 hour overnight.   Quit smoking 3 years ago   Diffuse wheezing much improved ,still with some    PHYSICAL EXAM:   BP (!) 150/87   Pulse 97   Temp 97.4 °F (36.3 °C) (Oral)   Resp 19   Ht 1.6 m (5' 2.99\")   Wt 83.3 kg (183 lb 9.6 oz)   SpO2 97%   BMI 32.53 kg/m² ' on 6 L HFNC  Constitutional:  No acute distress - looks better  HEENT:  No scleral icterus  Neck:  No tracheal deviation present.    Cardiovascular:  Normal heart sounds.   Pulmonary/Chest:  No accessory muscle usage. + decreased breath sounds.  No wheezes. No rhonchi. No rales.   Abdominal:  Soft.   Musculoskeletal:  No cyanosis. No clubbing.  Skin:  Skin is warm and dry.   Neuro: awake and alert      Scheduled Meds:   methylPREDNISolone  40 mg IntraVENous Q8H    budesonide  1 mg Nebulization BID RT    sodium chloride flush  5-40 mL IntraVENous 2 times per day    enoxaparin  40 mg SubCUTAneous Daily    ipratropium 0.5 mg-albuterol 2.5 mg  1 Dose Inhalation Q4H WA RT    cetirizine  10 mg Oral Daily    Vitamin D  2,000 Units Oral Daily    escitalopram  20 mg Oral Nightly    therapeutic multivitamin-minerals  1 tablet Oral Daily    amLODIPine  5 mg Oral Daily    lisinopril  20 mg Oral Daily    doxycycline hyclate  100 mg Oral 2 times per day     Continuous Infusions:   sodium chloride       PRN Meds:  loperamide, sodium chloride flush, sodium chloride, ondansetron **OR** ondansetron, polyethylene glycol, acetaminophen **OR** acetaminophen, guaiFENesin-dextromethorphan, perflutren lipid microspheres, ipratropium 0.5 mg-albuterol 2.5 mg, traMADol    Labs:  CBC:   Recent Labs     01/01/24  0434 01/02/24  0442 01/03/24  0429   WBC 5.8 8.9 5.7   HGB 10.8* 11.3* 12.9   HCT 32.3* 33.6* 38.5   MCV 95.6 93.8 95.3    327 336       BMP:   Recent Labs     01/01/24  0434 01/02/24  0445

## 2024-01-03 NOTE — PROGRESS NOTES
Patient assessment as noted per flowsheet  -  requesting cough medication for her persistent cough.  Spo2 stable on 6L oxygen HFNC.

## 2024-01-03 NOTE — PROGRESS NOTES
IM Progress Note    Admit Date:  12/31/2023  3    Interval history:  copd exacerbation     Subjective:  Ms. Tyler seen up in bed,feels better today     Improving sob , given lasix yesterday  Now down to home level of 4 L       Used bipap last night    Objective:   BP (!) 150/87   Pulse 97   Temp 97.4 °F (36.3 °C) (Oral)   Resp 19   Ht 1.6 m (5' 2.99\")   Wt 83.3 kg (183 lb 9.6 oz)   SpO2 97%   BMI 32.53 kg/m²     Intake/Output Summary (Last 24 hours) at 1/3/2024 0800  Last data filed at 1/3/2024 0756  Gross per 24 hour   Intake 1502 ml   Output 3950 ml   Net -2448 ml         Physical Exam:    General: elderly female chronically ill appearing    Awake, alert and oriented. Appears to be not in any distress  Mucous Membranes:  Pink , anicteric  Neck: No JVD, no carotid bruit, no thyromegaly  Chest: improving airentry javier with resolving wheeze   Cardiovascular:  RRR S1S2 heard, no murmurs or gallops  Abdomen:  Soft, undistended, non tender, no organomegaly, BS present  Extremities: 1+ javier Le edema resolving. Distal pulses well felt  Neurological : grossly normal       Medications:   Scheduled Medications:    methylPREDNISolone  40 mg IntraVENous Q8H    budesonide  1 mg Nebulization BID RT    sodium chloride flush  5-40 mL IntraVENous 2 times per day    enoxaparin  40 mg SubCUTAneous Daily    ipratropium 0.5 mg-albuterol 2.5 mg  1 Dose Inhalation Q4H WA RT    cetirizine  10 mg Oral Daily    Vitamin D  2,000 Units Oral Daily    escitalopram  20 mg Oral Nightly    therapeutic multivitamin-minerals  1 tablet Oral Daily    amLODIPine  5 mg Oral Daily    lisinopril  20 mg Oral Daily    doxycycline hyclate  100 mg Oral 2 times per day     I   sodium chloride       loperamide, sodium chloride flush, sodium chloride, ondansetron **OR** ondansetron, polyethylene glycol, acetaminophen **OR** acetaminophen, guaiFENesin-dextromethorphan, perflutren lipid microspheres, ipratropium 0.5 mg-albuterol 2.5 mg, traMADol    Lab  Data:  Recent Labs     01/01/24  0434 01/02/24 0442 01/03/24  0429   WBC 5.8 8.9 5.7   HGB 10.8* 11.3* 12.9   HCT 32.3* 33.6* 38.5   MCV 95.6 93.8 95.3    327 336       Recent Labs     01/01/24  0434 01/02/24 0442 01/03/24  0429   * 133* 123*   K 4.5 4.6 4.3   CL 92* 95* 84*   CO2 26 31 32   BUN 16 17 15   CREATININE <0.5* <0.5* <0.5*       No results for input(s): \"CKTOTAL\", \"CKMB\", \"CKMBINDEX\", \"TROPONINI\" in the last 72 hours.    Coagulation:   Lab Results   Component Value Date/Time    INR 0.95 10/07/2023 08:07 AM    APTT 34.4 10/07/2023 08:07 AM     Cardiac markers:   Lab Results   Component Value Date/Time    TROPONINI <0.01 11/17/2022 03:11 PM         Lab Results   Component Value Date    ALT 26 12/31/2023    AST 28 12/31/2023    ALKPHOS 114 12/31/2023    BILITOT 0.3 12/31/2023       Lab Results   Component Value Date    INR 0.95 10/07/2023    INR 0.93 08/08/2023    INR 1.03 02/28/2021    PROTIME 12.7 10/07/2023    PROTIME 12.5 08/08/2023    PROTIME 11.9 02/28/2021       CULTURES  COVID/flu: not detected     EKG:  I have reviewed the EKG with the following interpretation:   Sinus tachycardia, Left axis deviation, Low voltage QRS, Inferior infarct , age undetermined      RADIOLOGY  CT CHEST PULMONARY EMBOLISM W CONTRAST   Final Result   1. No acute pulmonary artery embolism.   2. More prominent bibasilar airspace disease concerning for pneumonia in the   correct clinical setting.   3. Emphysema.   4. 8 mm right solid pulmonary nodule within the upper lobe again   demonstrated, for which follow-up noncontrast CT is recommended in 6 months.           XR CHEST (2 VW)   Final Result   Shallow inflation. Chronic appearing basilar opacities again noted.                 ECHO 1/24       Left ventricular systolic function is normal with ejection fraction   estimated at 55%.   No regional wall motion abnormalities.   There is mild concentric left ventricular hypertrophy.   Grade II diastolic dysfunction

## 2024-01-03 NOTE — PLAN OF CARE
Problem: Discharge Planning  Goal: Discharge to home or other facility with appropriate resources  Outcome: Progressing     Problem: Pain  Goal: Verbalizes/displays adequate comfort level or baseline comfort level  Outcome: Progressing     Problem: Safety - Adult  Goal: Free from fall injury  Outcome: Progressing     Problem: Skin/Tissue Integrity  Goal: Absence of new skin breakdown  Description: 1.  Monitor for areas of redness and/or skin breakdown  2.  Assess vascular access sites hourly  3.  Every 4-6 hours minimum:  Change oxygen saturation probe site  4.  Every 4-6 hours:  If on nasal continuous positive airway pressure, respiratory therapy assess nares and determine need for appliance change or resting period.  Outcome: Progressing     Problem: Respiratory - Adult  Goal: Achieves optimal ventilation and oxygenation  Outcome: Progressing     Problem: Cardiovascular - Adult  Goal: Maintains optimal cardiac output and hemodynamic stability  1/2/2024 2125 by Carla Niño, RN  Outcome: Progressing  1/2/2024 1815 by Holli Corea, RN  Note:   HEART FAILURE CARE PLAN:    Comorbidities Reviewed: Yes   Patient has a past medical history of Angina pectoris ([facility]), Chronic pain, COPD exacerbation ([facility]), Depression, Essential hypertension, Panic disorder, Pneumonia, and Pulmonary emphysema ([facility]).     ECHOCARDIOGRAM Reviewed: Yes   Patient's Ejection Fraction (EF) is greater than 40%    Weights Reviewed: Yes   Admission weight: 79.4 kg (175 lb)   Wt Readings from Last 3 Encounters:   01/02/24 83.2 kg (183 lb 8 oz)   12/04/23 78.1 kg (172 lb 3.2 oz)   10/12/23 76.6 kg (168 lb 12.8 oz)     Intake & Output Reviewed: Yes     Intake/Output Summary (Last 24 hours) at 1/2/2024 1816  Last data filed at 1/2/2024 1733  Gross per 24 hour   Intake 1800 ml   Output 3150 ml   Net -1350 ml     Medications Reviewed: Yes   SCHEDULED HOSPITAL MEDICATIONS:   methylPREDNISolone  40 mg IntraVENous Q8H    budesonide  1 mg Nebulization

## 2024-01-03 NOTE — PROGRESS NOTES
01/02/24 2000   RT Protocol   History Pulmonary Disease 2   Respiratory pattern 0   Breath sounds 6   Cough 0   Indications for Bronchodilator Therapy Wheezing associated with pulm disorder   Bronchodilator Assessment Score 8     RT Inhaler-Nebulizer Bronchodilator Protocol Note    There is a bronchodilator order in the chart from a provider indicating to follow the RT Bronchodilator Protocol and there is an “Initiate RT Inhaler-Nebulizer Bronchodilator Protocol” order as well (see protocol at bottom of note).    CXR Findings:  XR CHEST PORTABLE    Result Date: 1/2/2024  Bilateral lower lobe airspace disease and new small infiltrate in the left upper lobe consistent with pneumonia in the appropriate clinical setting       The findings from the last RT Protocol Assessment were as follows:   History Pulmonary Disease: Chronic pulmonary disease  Respiratory Pattern: Regular pattern and RR 12-20 bpm  Breath Sounds: Inspiratory and expiratory or bilateral wheezing and/or rhonchi  Cough: Strong, spontaneous, non-productive  Indication for Bronchodilator Therapy: Wheezing associated with pulm disorder  Bronchodilator Assessment Score: 8    Aerosolized bronchodilator medication orders have been revised according to the RT Inhaler-Nebulizer Bronchodilator Protocol below.    Respiratory Therapist to perform RT Therapy Protocol Assessment initially then follow the protocol.  Repeat RT Therapy Protocol Assessment PRN for score 0-3 or on second treatment, BID, and PRN for scores above 3.    No Indications - adjust the frequency to every 6 hours PRN wheezing or bronchospasm, if no treatments needed after 48 hours then discontinue using Per Protocol order mode.     If indication present, adjust the RT bronchodilator orders based on the Bronchodilator Assessment Score as indicated below.  Use Inhaler orders unless patient has one or more of the following: on home nebulizer, not able to hold breath for 10 seconds, is not alert

## 2024-01-03 NOTE — PROGRESS NOTES
Pt sodium this AM is 123. Yesterday it was 133. Perfect serve sent to nocturnist. No new orders at this time. Carla Niño RN

## 2024-01-04 LAB
GLUCOSE BLD-MCNC: 123 MG/DL (ref 70–99)
GLUCOSE BLD-MCNC: 133 MG/DL (ref 70–99)
GLUCOSE BLD-MCNC: 133 MG/DL (ref 70–99)
GLUCOSE BLD-MCNC: 96 MG/DL (ref 70–99)
PERFORMED ON: ABNORMAL
PERFORMED ON: NORMAL

## 2024-01-04 PROCEDURE — 6360000002 HC RX W HCPCS: Performed by: INTERNAL MEDICINE

## 2024-01-04 PROCEDURE — 99232 SBSQ HOSP IP/OBS MODERATE 35: CPT | Performed by: INTERNAL MEDICINE

## 2024-01-04 PROCEDURE — 94660 CPAP INITIATION&MGMT: CPT

## 2024-01-04 PROCEDURE — 6370000000 HC RX 637 (ALT 250 FOR IP): Performed by: INTERNAL MEDICINE

## 2024-01-04 PROCEDURE — 2580000003 HC RX 258: Performed by: INTERNAL MEDICINE

## 2024-01-04 PROCEDURE — 6370000000 HC RX 637 (ALT 250 FOR IP): Performed by: NURSE PRACTITIONER

## 2024-01-04 PROCEDURE — 94640 AIRWAY INHALATION TREATMENT: CPT

## 2024-01-04 PROCEDURE — 1200000000 HC SEMI PRIVATE

## 2024-01-04 RX ORDER — DOXYCYCLINE HYCLATE 100 MG
100 TABLET ORAL EVERY 12 HOURS SCHEDULED
Qty: 2 TABLET | Refills: 0 | Status: SHIPPED | OUTPATIENT
Start: 2024-01-04 | End: 2024-01-05

## 2024-01-04 RX ORDER — PREDNISONE 10 MG/1
TABLET ORAL
Qty: 16 TABLET | Refills: 0 | Status: SHIPPED | OUTPATIENT
Start: 2024-01-05 | End: 2024-01-12

## 2024-01-04 RX ADMIN — Medication 10 ML: at 21:09

## 2024-01-04 RX ADMIN — IPRATROPIUM BROMIDE AND ALBUTEROL SULFATE 1 DOSE: 2.5; .5 SOLUTION RESPIRATORY (INHALATION) at 10:50

## 2024-01-04 RX ADMIN — METHYLPREDNISOLONE SODIUM SUCCINATE 40 MG: 40 INJECTION INTRAMUSCULAR; INTRAVENOUS at 09:14

## 2024-01-04 RX ADMIN — AMLODIPINE BESYLATE 5 MG: 5 TABLET ORAL at 09:14

## 2024-01-04 RX ADMIN — GUAIFENESIN AND DEXTROMETHORPHAN 5 ML: 100; 10 SYRUP ORAL at 21:08

## 2024-01-04 RX ADMIN — TRAMADOL HYDROCHLORIDE 50 MG: 50 TABLET ORAL at 09:20

## 2024-01-04 RX ADMIN — BUDESONIDE INHALATION 1000 MCG: 0.5 SUSPENSION RESPIRATORY (INHALATION) at 20:14

## 2024-01-04 RX ADMIN — DOXYCYCLINE HYCLATE 100 MG: 100 TABLET, COATED ORAL at 09:14

## 2024-01-04 RX ADMIN — ENOXAPARIN SODIUM 40 MG: 100 INJECTION SUBCUTANEOUS at 09:14

## 2024-01-04 RX ADMIN — IPRATROPIUM BROMIDE AND ALBUTEROL SULFATE 1 DOSE: 2.5; .5 SOLUTION RESPIRATORY (INHALATION) at 23:48

## 2024-01-04 RX ADMIN — IPRATROPIUM BROMIDE AND ALBUTEROL SULFATE 1 DOSE: 2.5; .5 SOLUTION RESPIRATORY (INHALATION) at 20:13

## 2024-01-04 RX ADMIN — IPRATROPIUM BROMIDE AND ALBUTEROL SULFATE 1 DOSE: 2.5; .5 SOLUTION RESPIRATORY (INHALATION) at 14:36

## 2024-01-04 RX ADMIN — Medication 2000 UNITS: at 09:13

## 2024-01-04 RX ADMIN — TRAMADOL HYDROCHLORIDE 50 MG: 50 TABLET ORAL at 21:08

## 2024-01-04 RX ADMIN — ESCITALOPRAM OXALATE 20 MG: 10 TABLET ORAL at 21:08

## 2024-01-04 RX ADMIN — GUAIFENESIN AND DEXTROMETHORPHAN 5 ML: 100; 10 SYRUP ORAL at 09:21

## 2024-01-04 RX ADMIN — IPRATROPIUM BROMIDE AND ALBUTEROL SULFATE 1 DOSE: 2.5; .5 SOLUTION RESPIRATORY (INHALATION) at 07:42

## 2024-01-04 RX ADMIN — LISINOPRIL 20 MG: 20 TABLET ORAL at 09:14

## 2024-01-04 RX ADMIN — DOXYCYCLINE HYCLATE 100 MG: 100 TABLET, COATED ORAL at 21:08

## 2024-01-04 RX ADMIN — Medication 1 TABLET: at 09:14

## 2024-01-04 RX ADMIN — ACETAMINOPHEN 650 MG: 325 TABLET ORAL at 19:25

## 2024-01-04 RX ADMIN — CETIRIZINE HYDROCHLORIDE 10 MG: 10 TABLET ORAL at 09:13

## 2024-01-04 RX ADMIN — GUAIFENESIN AND DEXTROMETHORPHAN 5 ML: 100; 10 SYRUP ORAL at 13:12

## 2024-01-04 RX ADMIN — BUDESONIDE INHALATION 1000 MCG: 0.5 SUSPENSION RESPIRATORY (INHALATION) at 07:42

## 2024-01-04 RX ADMIN — ACETAMINOPHEN 650 MG: 325 TABLET ORAL at 13:12

## 2024-01-04 ASSESSMENT — PAIN - FUNCTIONAL ASSESSMENT
PAIN_FUNCTIONAL_ASSESSMENT: ACTIVITIES ARE NOT PREVENTED

## 2024-01-04 ASSESSMENT — PAIN DESCRIPTION - DESCRIPTORS
DESCRIPTORS: ACHING;SORE
DESCRIPTORS: ACHING;SORE
DESCRIPTORS: DULL;PRESSURE
DESCRIPTORS: ACHING;SORE

## 2024-01-04 ASSESSMENT — PAIN DESCRIPTION - LOCATION
LOCATION: BACK;NECK
LOCATION: BACK;NECK
LOCATION: BACK
LOCATION: BACK

## 2024-01-04 ASSESSMENT — PAIN DESCRIPTION - FREQUENCY: FREQUENCY: INTERMITTENT

## 2024-01-04 ASSESSMENT — PAIN SCALES - GENERAL
PAINLEVEL_OUTOF10: 5
PAINLEVEL_OUTOF10: 8
PAINLEVEL_OUTOF10: 0
PAINLEVEL_OUTOF10: 7
PAINLEVEL_OUTOF10: 7
PAINLEVEL_OUTOF10: 8
PAINLEVEL_OUTOF10: 0

## 2024-01-04 ASSESSMENT — PAIN DESCRIPTION - PAIN TYPE: TYPE: CHRONIC PAIN

## 2024-01-04 ASSESSMENT — PAIN DESCRIPTION - ORIENTATION
ORIENTATION: LOWER
ORIENTATION: RIGHT
ORIENTATION: RIGHT

## 2024-01-04 ASSESSMENT — PAIN DESCRIPTION - ONSET: ONSET: ON-GOING

## 2024-01-04 NOTE — PROGRESS NOTES
PULMONARY PROGRESS NOTE  CC: 1 week worsening SOB, cough, sputum & hypoxia on home 4 L O2    Subjective:   Doing much better   On 4 L O2   No wheezing   Wore BiPAP 12/6 for ~1/2 hour overnight.   Quit smoking 3 years ago   Much less wheezing and wants to go home    PHYSICAL EXAM:   /74   Pulse 81   Temp 97.9 °F (36.6 °C) (Axillary)   Resp 20   Ht 1.6 m (5' 2.99\")   Wt 83.6 kg (184 lb 3.2 oz)   SpO2 95%   BMI 32.64 kg/m² ' on 6 L HFNC  Constitutional:  No acute distress - looks better  HEENT:  No scleral icterus  Neck:  No tracheal deviation present.    Cardiovascular:  Normal heart sounds.   Pulmonary/Chest:  No accessory muscle usage. + decreased breath sounds.  No wheezes. No rhonchi. No rales.   Abdominal:  Soft.   Musculoskeletal:  No cyanosis. No clubbing.  Skin:  Skin is warm and dry.   Neuro: awake and alert      Scheduled Meds:   methylPREDNISolone  40 mg IntraVENous Daily    budesonide  1 mg Nebulization BID RT    sodium chloride flush  5-40 mL IntraVENous 2 times per day    enoxaparin  40 mg SubCUTAneous Daily    ipratropium 0.5 mg-albuterol 2.5 mg  1 Dose Inhalation Q4H WA RT    cetirizine  10 mg Oral Daily    Vitamin D  2,000 Units Oral Daily    escitalopram  20 mg Oral Nightly    therapeutic multivitamin-minerals  1 tablet Oral Daily    amLODIPine  5 mg Oral Daily    lisinopril  20 mg Oral Daily    doxycycline hyclate  100 mg Oral 2 times per day     Continuous Infusions:   sodium chloride       PRN Meds:  loperamide, sodium chloride flush, sodium chloride, ondansetron **OR** ondansetron, polyethylene glycol, acetaminophen **OR** acetaminophen, guaiFENesin-dextromethorphan, perflutren lipid microspheres, ipratropium 0.5 mg-albuterol 2.5 mg, traMADol    Labs:  CBC:   Recent Labs     01/02/24  0442 01/03/24 0429   WBC 8.9 5.7   HGB 11.3* 12.9   HCT 33.6* 38.5   MCV 93.8 95.3    336       BMP:   Recent Labs     01/02/24 0442 01/03/24 0429   * 131*   K 4.6 4.3   CL 95* 84*   CO2

## 2024-01-04 NOTE — PROGRESS NOTES
01/04/24 0013   NIV Type   $NIV $Daily Charge   NIV Started/Stopped (S)  On   Equipment Type v60   Mode Bilevel   Mask Type Full face mask   Mask Size Small   Settings/Measurements   IPAP 12 cmH20   CPAP/EPAP 5 cmH2O   Vt (Measured) 700 mL   Rate Ordered 16   FiO2  35 %   I Time/ I Time % 1 s   Minute Volume (L/min) 10 Liters   Mask Leak (lpm) 0 lpm   Patient's Home Machine No

## 2024-01-04 NOTE — PROGRESS NOTES
Bedside report given to Raven GALVEZ  pt in stable condition no needs at this time. Call light within reach

## 2024-01-04 NOTE — PROGRESS NOTES
Pt OOB and up to chair for dinner. Pt given heat pack for comfort with chronic back pain. Call light in reach.

## 2024-01-04 NOTE — DISCHARGE SUMMARY
Hospital Medicine Discharge Summary    Patient: Annie Tyler     Gender: female  : 1955   Age: 68 y.o.  MRN: [id number]    Admitting Physician: Fiorella Grayson DO  Discharge Physician: Fiorella Grayson DO    Code Status: Full Code     Admit Date: 2023   Discharge Date:  2024     Discharge Diagnoses:    Active Hospital Problems    Diagnosis Date Noted    Benign essential HTN [I10] 08/15/2022     Priority: Medium    COPD exacerbation (HCC) [J44.1] 2018       Condition at Discharge: Stable    Hospital Course:     #Acute on chronic hypoxic respiratory failure  #COPD exacerbation     -admitted for further management/care.  Pulmonology consulted  -IV Solu-medrol , HHN , doubt pneumonia  -  procal neg   - no features of CHF, pedal edema from norvasc high dose , BNP wnl , CT neg   - Echo ordered. Preserved EF, SPAP 51 mm Hg, grade II diastolic dysfunction  Cont Trelegy  - on baseline O2, appears euvolemic     #Pulmonary nodule concerning for cancer  -8 mm right upper lobe increasing in size when compared to before, non compliant with follow up   Previous hx of left UL lung cancer s.p SBRT   -pulmonology consulted  -recommends f/u      #Elevated troponin   - likely with resp distress  -33-->32  -monitor on tele.      #Hypertension   -BP elevated.  Resumed Amlodipine - lowered dose for edema , increased Lisinopril  Can add cardura if needed     #Depression, anxiety  -cont Lexapro     Hx of tobacco use - quit 2 yrs ago         Reports having birds and cats at home and planning to move them away      Additional findings or notes to primary provider:   8 mm right solid pulmonary nodule within the upper lobe again demonstrated, for which follow-up noncontrast CT is recommended in 6 months.     Discharge Medications:   Current Discharge Medication List        START taking these medications    Details   doxycycline hyclate (VIBRA-TABS) 100 MG tablet Take 1 tablet by mouth every 12 hours for 2 doses  Qty: 2  tablet, Refills: 0      predniSONE (DELTASONE) 10 MG tablet Take 4 tablets by mouth daily for 1 day, THEN 3 tablets daily for 2 days, THEN 2 tablets daily for 2 days, THEN 1 tablet daily for 2 days.  Qty: 16 tablet, Refills: 0           Current Discharge Medication List        Current Discharge Medication List        CONTINUE these medications which have NOT CHANGED    Details   furosemide (LASIX) 20 MG tablet Take 1 tablet by mouth daily      traMADol (ULTRAM) 50 MG tablet Take 1 tablet by mouth daily as needed for Pain.      lisinopril (PRINIVIL;ZESTRIL) 10 MG tablet Take 1 tablet by mouth daily      loratadine (CLARITIN) 10 MG tablet Take 1 tablet by mouth daily      ipratropium 0.5 mg-albuterol 2.5 mg (DUONEB) 0.5-2.5 (3) MG/3ML SOLN nebulizer solution Inhale 3 mLs into the lungs every 4 hours as needed for Shortness of Breath  Qty: 360 mL, Refills: 1      amLODIPine (NORVASC) 10 MG tablet Take 1 tablet by mouth daily      fluticasone-umeclidin-vilant (TRELEGY ELLIPTA) 100-62.5-25 MCG/ACT AEPB inhaler Inhale 1 puff into the lungs daily  Qty: 1 each, Refills: 0      Cholecalciferol (VITAMIN D) 50 MCG (2000 UT) CAPS capsule Take by mouth      escitalopram (LEXAPRO) 20 MG tablet Take 1 tablet by mouth at bedtime      lidocaine 4 % external patch Place 1 patch onto the skin daily  Qty: 30 each, Refills: 0      acetaminophen (TYLENOL) 500 MG tablet Take 1 tablet by mouth every 6 hours as needed for Pain      albuterol sulfate  (90 Base) MCG/ACT inhaler Inhale 2 puffs into the lungs every 6 hours as needed for Wheezing orShortness of Breath  Qty: 3 Inhaler, Refills: 5      hydrOXYzine (ATARAX) 25 MG tablet Take 1 tablet by mouth daily as needed for Anxiety      Multiple Vitamins-Minerals (CENTRUM SILVER 50+WOMEN PO) Take 1 tablet by mouth daily           Current Discharge Medication List        STOP taking these medications       Biotin 300 MCG TABS Comments:   Reason for Stopping:         cetirizine (ZYRTEC)  10 MG tablet Comments:   Reason for Stopping:         oxybutynin (DITROPAN) 5 MG tablet Comments:   Reason for Stopping:               Discharge ROS:  A complete review of systems was asked and negative.    Discharge Exam:    /61   Pulse 99   Temp 98.5 °F (36.9 °C) (Oral)   Resp 18   Ht 1.6 m (5' 2.99\")   Wt 76.2 kg (168 lb)   SpO2 93%   BMI 29.77 kg/m²   General appearance:  NAD  HEENT:   Normal cephalic, atraumatic, moist mucous membranes, no oropharyngeal erythema or exudate  Neck: Supple, trachea midline, no anterior cervical or SC LAD  Heart:: Normal s1/s2, RRR, no murmurs, gallops, or rubs. No leg edema  Lungs:  Normal respiratory effort. Clear to auscultation bilaterally, no wheeze, rales or rhonchi.  Abdomen: Soft, non-tender, non-distended, bowel sounds present, no masses  Musculoskeletal:  No clubbing, no cyanosis, or edema  Skin: No lesion or masses  Neurologic:  Neurovascularly intact without any focal sensory/motor deficits. Cranial nerves: II-XII intact, grossly non-focal.  Psychiatric:  Alert and oriented, thought content appropriate    Labs: For convenience and continuity at follow-up the following most recent labs are provided:    Lab Results   Component Value Date/Time    WBC 5.7 01/03/2024 04:29 AM    HGB 12.9 01/03/2024 04:29 AM    HCT 38.5 01/03/2024 04:29 AM    MCV 95.3 01/03/2024 04:29 AM     01/03/2024 04:29 AM     01/03/2024 04:29 AM    K 4.3 01/03/2024 04:29 AM    CL 84 01/03/2024 04:29 AM    CO2 32 01/03/2024 04:29 AM    BUN 15 01/03/2024 04:29 AM    CREATININE <0.5 01/03/2024 04:29 AM    CALCIUM 9.7 01/03/2024 04:29 AM    PHOS 3.9 10/05/2023 10:25 PM    ALKPHOS 114 12/31/2023 02:49 AM    ALT 26 12/31/2023 02:49 AM    AST 28 12/31/2023 02:49 AM    BILITOT 0.3 12/31/2023 02:49 AM    BILIDIR <0.2 08/15/2022 05:30 AM    LABALBU 4.0 12/31/2023 02:49 AM    LDLCALC 64 09/15/2017 08:18 AM    TRIG 44 09/15/2017 08:18 AM     Lab Results   Component Value Date    INR 0.95

## 2024-01-04 NOTE — FLOWSHEET NOTE
01/03/24 2018   Vital Signs   Temp 98.8 °F (37.1 °C)   Temp Source Oral   Pulse (!) 103   Heart Rate Source Monitor   Respirations 18   /77   MAP (Calculated) 96   BP Location Left upper arm   BP Method Automatic   Patient Position High fowlers   Oxygen Therapy   SpO2 94 %   O2 Device Nasal cannula   O2 Flow Rate (L/min) 4 L/min     Pt A/O assessment completed. Meds given per MAR. Pt hooked up to pure wick per her request. Pt denies any needs at this time. Call light within reach

## 2024-01-04 NOTE — FLOWSHEET NOTE
01/04/24 0225   Vital Signs   Temp 97.9 °F (36.6 °C)   Temp Source Axillary   Pulse 81   Heart Rate Source Monitor   Respirations 20   /74   MAP (Calculated) 91   BP Location Left upper arm   BP Method Automatic   Patient Position High fowlers   Oxygen Therapy   SpO2 93 %   O2 Device PAP (positive airway pressure)   Height and Weight   Weight - Scale 83.6 kg (184 lb 3.2 oz)   Weight Method Bed scale   BMI (Calculated) 32.7     Pt requesting to be taken of BIPAP. BIPAP removed and 4 liters nasal cannula placed 94 %

## 2024-01-04 NOTE — PROGRESS NOTES
Pt c/o cough and chronic back pain, medicated see MAR. Encouraged OOB and up to chair this afternoon. Call light in reach.

## 2024-01-04 NOTE — PLAN OF CARE
Problem: Discharge Planning  Goal: Discharge to home or other facility with appropriate resources  1/4/2024 1026 by Brittney Quiroz RN  Outcome: Progressing  1/3/2024 2216 by Fiorella Grajeda RN  Outcome: Progressing     Problem: Pain  Goal: Verbalizes/displays adequate comfort level or baseline comfort level  1/4/2024 1026 by Brittney Quiroz RN  Outcome: Progressing  1/3/2024 2216 by Fiorella Grajeda RN  Outcome: Progressing     Problem: Safety - Adult  Goal: Free from fall injury  1/4/2024 1026 by Brittney Quiroz RN  Outcome: Progressing  1/3/2024 2216 by Fiorella Grajeda RN  Outcome: Progressing     Problem: Skin/Tissue Integrity  Goal: Absence of new skin breakdown  Description: 1.  Monitor for areas of redness and/or skin breakdown  2.  Assess vascular access sites hourly  3.  Every 4-6 hours minimum:  Change oxygen saturation probe site  4.  Every 4-6 hours:  If on nasal continuous positive airway pressure, respiratory therapy assess nares and determine need for appliance change or resting period.  1/4/2024 1026 by Brittney Quiroz RN  Outcome: Progressing  1/3/2024 2216 by Fiorella Grajeda RN  Outcome: Progressing     Problem: Respiratory - Adult  Goal: Achieves optimal ventilation and oxygenation  Outcome: Progressing     Problem: Cardiovascular - Adult  Goal: Maintains optimal cardiac output and hemodynamic stability  1/4/2024 1026 by Brittney Quiroz RN  Outcome: Progressing  1/3/2024 2216 by Fiorella Grajeda RN  Outcome: Progressing  Goal: Absence of cardiac dysrhythmias or at baseline  Outcome: Progressing

## 2024-01-04 NOTE — CARE COORDINATION
Reviewed chart, noted DC order, met with pt bedside. Pt states she will be staying another day per Dr. Wolfe. Dr. rGayson made aware. Will continue to monitor.

## 2024-01-04 NOTE — PLAN OF CARE
HEART FAILURE CARE PLAN:    Comorbidities Reviewed: Yes   Patient has a past medical history of Angina pectoris (HCC), Chronic pain, COPD exacerbation (HCC), Depression, Essential hypertension, Panic disorder, Pneumonia, and Pulmonary emphysema (HCC).     ECHOCARDIOGRAM Reviewed: Yes   Patient's Ejection Fraction (EF) is greater than 40%    Weights Reviewed: Yes   Admission weight: 79.4 kg (175 lb)   Wt Readings from Last 3 Encounters:   01/03/24 83.3 kg (183 lb 9.6 oz)   12/04/23 78.1 kg (172 lb 3.2 oz)   10/12/23 76.6 kg (168 lb 12.8 oz)     Intake & Output Reviewed: Yes     Intake/Output Summary (Last 24 hours) at 1/3/2024 2320  Last data filed at 1/3/2024 2302  Gross per 24 hour   Intake 1008 ml   Output 3600 ml   Net -2592 ml     Medications Reviewed: Yes   SCHEDULED HOSPITAL MEDICATIONS:   [START ON 1/4/2024] methylPREDNISolone  40 mg IntraVENous Daily    budesonide  1 mg Nebulization BID RT    sodium chloride flush  5-40 mL IntraVENous 2 times per day    enoxaparin  40 mg SubCUTAneous Daily    ipratropium 0.5 mg-albuterol 2.5 mg  1 Dose Inhalation Q4H WA RT    cetirizine  10 mg Oral Daily    Vitamin D  2,000 Units Oral Daily    escitalopram  20 mg Oral Nightly    therapeutic multivitamin-minerals  1 tablet Oral Daily    amLODIPine  5 mg Oral Daily    lisinopril  20 mg Oral Daily    doxycycline hyclate  100 mg Oral 2 times per day     ACE/ARB/ARNI is REQUIRED for EF </= 40% SYSTOLIC FAILURE:   ACE:: Lisinopril  ARB:: None  ARNI:: None    Evidenced-Based Beta Blocker is REQUIRED for EF </= 40% SYSTOLIC FAILURE:   :: None    Diuretics:  :: None    Diet Reviewed: Yes   ADULT DIET; Regular; 4 carb choices (60 gm/meal); No Added Salt (3-4 gm); 1500 ml    Goal of Care Reviewed: Yes   Patient and/or Family's stated Goal of Care this Admission: reduce shortness of breath, increase activity tolerance, better understand heart failure and disease management, be more comfortable, and reduce lower extremity edema prior to  discharge.

## 2024-01-04 NOTE — PROGRESS NOTES
RT Inhaler-Nebulizer Bronchodilator Protocol Note    There is a bronchodilator order in the chart from a provider indicating to follow the RT Bronchodilator Protocol and there is an “Initiate RT Inhaler-Nebulizer Bronchodilator Protocol” order as well (see protocol at bottom of note).    CXR Findings:  XR CHEST PORTABLE    Result Date: 1/2/2024  Bilateral lower lobe airspace disease and new small infiltrate in the left upper lobe consistent with pneumonia in the appropriate clinical setting       The findings from the last RT Protocol Assessment were as follows:   History Pulmonary Disease: Chronic pulmonary disease  Respiratory Pattern: Dyspnea on exertion or RR 21-25 bpm  Breath Sounds: Inspiratory and expiratory or bilateral wheezing and/or rhonchi  Cough: Strong, spontaneous, non-productive  Indication for Bronchodilator Therapy: Wheezing associated with pulm disorder  Bronchodilator Assessment Score: 10    Aerosolized bronchodilator medication orders have been revised according to the RT Inhaler-Nebulizer Bronchodilator Protocol below.    Respiratory Therapist to perform RT Therapy Protocol Assessment initially then follow the protocol.  Repeat RT Therapy Protocol Assessment PRN for score 0-3 or on second treatment, BID, and PRN for scores above 3.    No Indications - adjust the frequency to every 6 hours PRN wheezing or bronchospasm, if no treatments needed after 48 hours then discontinue using Per Protocol order mode.     If indication present, adjust the RT bronchodilator orders based on the Bronchodilator Assessment Score as indicated below.  Use Inhaler orders unless patient has one or more of the following: on home nebulizer, not able to hold breath for 10 seconds, is not alert and oriented, cannot activate and use MDI correctly, or respiratory rate 25 breaths per minute or more, then use the equivalent nebulizer order(s) with same Frequency and PRN reasons based on the score.  If a patient is on this

## 2024-01-04 NOTE — PROGRESS NOTES
HEART FAILURE CARE PLAN:    Comorbidities Reviewed: Yes   Patient has a past medical history of Angina pectoris (HCC), Chronic pain, COPD exacerbation (HCC), Depression, Essential hypertension, Panic disorder, Pneumonia, and Pulmonary emphysema (HCC).     ECHOCARDIOGRAM Reviewed: Yes   Patient's Ejection Fraction (EF) is 55% greater than 40%    Weights Reviewed: Yes   Admission weight: 79.4 kg (175 lb)   Wt Readings from Last 3 Encounters:   01/04/24 83.6 kg (184 lb 3.2 oz)   12/04/23 78.1 kg (172 lb 3.2 oz)   10/12/23 76.6 kg (168 lb 12.8 oz)     Intake & Output Reviewed: Yes     Intake/Output Summary (Last 24 hours) at 1/4/2024 1027  Last data filed at 1/4/2024 0902  Gross per 24 hour   Intake 1008 ml   Output 1800 ml   Net -792 ml     Medications Reviewed: Yes   SCHEDULED HOSPITAL MEDICATIONS:   methylPREDNISolone  40 mg IntraVENous Daily    budesonide  1 mg Nebulization BID RT    sodium chloride flush  5-40 mL IntraVENous 2 times per day    enoxaparin  40 mg SubCUTAneous Daily    ipratropium 0.5 mg-albuterol 2.5 mg  1 Dose Inhalation Q4H WA RT    cetirizine  10 mg Oral Daily    Vitamin D  2,000 Units Oral Daily    escitalopram  20 mg Oral Nightly    therapeutic multivitamin-minerals  1 tablet Oral Daily    amLODIPine  5 mg Oral Daily    lisinopril  20 mg Oral Daily    doxycycline hyclate  100 mg Oral 2 times per day     ACE/ARB/ARNI is REQUIRED for EF </= 40% SYSTOLIC FAILURE:   ACE:: Lisinopril  ARB:: N/A  ARNI:: N/A    Evidenced-Based Beta Blocker is REQUIRED for EF </= 40% SYSTOLIC FAILURE:   :: NA    Diuretics:  :: N/A     Diet Reviewed: Yes   ADULT DIET; Regular; 4 carb choices (60 gm/meal); No Added Salt (3-4 gm); 1500 ml    Goal of Care Reviewed: Yes   Patient and/or Family's stated Goal of Care this Admission: reduce shortness of breath, increase activity tolerance, better understand heart failure and disease management, be more comfortable, and reduce lower extremity edema prior to discharge.

## 2024-01-05 ENCOUNTER — TELEPHONE (OUTPATIENT)
Dept: PULMONOLOGY | Age: 69
End: 2024-01-05

## 2024-01-05 VITALS
SYSTOLIC BLOOD PRESSURE: 121 MMHG | WEIGHT: 168 LBS | DIASTOLIC BLOOD PRESSURE: 76 MMHG | OXYGEN SATURATION: 92 % | HEIGHT: 63 IN | RESPIRATION RATE: 18 BRPM | HEART RATE: 95 BPM | TEMPERATURE: 97.9 F | BODY MASS INDEX: 29.77 KG/M2

## 2024-01-05 PROBLEM — I50.9 CONGESTIVE HEART FAILURE (HCC): Status: ACTIVE | Noted: 2024-01-05

## 2024-01-05 LAB
GLUCOSE BLD-MCNC: 108 MG/DL (ref 70–99)
GLUCOSE BLD-MCNC: 139 MG/DL (ref 70–99)
GLUCOSE BLD-MCNC: 92 MG/DL (ref 70–99)
PERFORMED ON: ABNORMAL
PERFORMED ON: ABNORMAL
PERFORMED ON: NORMAL

## 2024-01-05 PROCEDURE — 2580000003 HC RX 258: Performed by: INTERNAL MEDICINE

## 2024-01-05 PROCEDURE — 99232 SBSQ HOSP IP/OBS MODERATE 35: CPT | Performed by: INTERNAL MEDICINE

## 2024-01-05 PROCEDURE — 94761 N-INVAS EAR/PLS OXIMETRY MLT: CPT

## 2024-01-05 PROCEDURE — 94640 AIRWAY INHALATION TREATMENT: CPT

## 2024-01-05 PROCEDURE — 94660 CPAP INITIATION&MGMT: CPT

## 2024-01-05 PROCEDURE — 6370000000 HC RX 637 (ALT 250 FOR IP): Performed by: INTERNAL MEDICINE

## 2024-01-05 PROCEDURE — 6360000002 HC RX W HCPCS: Performed by: INTERNAL MEDICINE

## 2024-01-05 PROCEDURE — 99238 HOSP IP/OBS DSCHRG MGMT 30/<: CPT | Performed by: INTERNAL MEDICINE

## 2024-01-05 PROCEDURE — 6370000000 HC RX 637 (ALT 250 FOR IP): Performed by: NURSE PRACTITIONER

## 2024-01-05 PROCEDURE — 2700000000 HC OXYGEN THERAPY PER DAY

## 2024-01-05 RX ORDER — TRAMADOL HYDROCHLORIDE 50 MG/1
50 TABLET ORAL EVERY 8 HOURS PRN
Qty: 9 TABLET | Refills: 0 | Status: SHIPPED | OUTPATIENT
Start: 2024-01-05 | End: 2024-01-08

## 2024-01-05 RX ADMIN — GUAIFENESIN AND DEXTROMETHORPHAN 5 ML: 100; 10 SYRUP ORAL at 09:12

## 2024-01-05 RX ADMIN — BUDESONIDE INHALATION 1000 MCG: 0.5 SUSPENSION RESPIRATORY (INHALATION) at 07:50

## 2024-01-05 RX ADMIN — Medication 2000 UNITS: at 09:05

## 2024-01-05 RX ADMIN — Medication 1 TABLET: at 09:05

## 2024-01-05 RX ADMIN — METHYLPREDNISOLONE SODIUM SUCCINATE 40 MG: 40 INJECTION INTRAMUSCULAR; INTRAVENOUS at 09:06

## 2024-01-05 RX ADMIN — LISINOPRIL 20 MG: 20 TABLET ORAL at 09:05

## 2024-01-05 RX ADMIN — IPRATROPIUM BROMIDE AND ALBUTEROL SULFATE 1 DOSE: 2.5; .5 SOLUTION RESPIRATORY (INHALATION) at 11:28

## 2024-01-05 RX ADMIN — IPRATROPIUM BROMIDE AND ALBUTEROL SULFATE 1 DOSE: 2.5; .5 SOLUTION RESPIRATORY (INHALATION) at 15:00

## 2024-01-05 RX ADMIN — IPRATROPIUM BROMIDE AND ALBUTEROL SULFATE 1 DOSE: 2.5; .5 SOLUTION RESPIRATORY (INHALATION) at 07:49

## 2024-01-05 RX ADMIN — ENOXAPARIN SODIUM 40 MG: 100 INJECTION SUBCUTANEOUS at 09:05

## 2024-01-05 RX ADMIN — CETIRIZINE HYDROCHLORIDE 10 MG: 10 TABLET ORAL at 09:05

## 2024-01-05 RX ADMIN — GUAIFENESIN AND DEXTROMETHORPHAN 5 ML: 100; 10 SYRUP ORAL at 16:14

## 2024-01-05 RX ADMIN — AMLODIPINE BESYLATE 5 MG: 5 TABLET ORAL at 09:05

## 2024-01-05 RX ADMIN — TRAMADOL HYDROCHLORIDE 50 MG: 50 TABLET ORAL at 07:50

## 2024-01-05 RX ADMIN — DOXYCYCLINE HYCLATE 100 MG: 100 TABLET, COATED ORAL at 09:05

## 2024-01-05 ASSESSMENT — PAIN DESCRIPTION - DESCRIPTORS: DESCRIPTORS: ACHING;DISCOMFORT

## 2024-01-05 ASSESSMENT — PAIN SCALES - GENERAL
PAINLEVEL_OUTOF10: 8
PAINLEVEL_OUTOF10: 0

## 2024-01-05 ASSESSMENT — PAIN DESCRIPTION - LOCATION: LOCATION: BACK

## 2024-01-05 ASSESSMENT — PAIN DESCRIPTION - ORIENTATION: ORIENTATION: MID;LOWER

## 2024-01-05 ASSESSMENT — PAIN - FUNCTIONAL ASSESSMENT: PAIN_FUNCTIONAL_ASSESSMENT: ACTIVITIES ARE NOT PREVENTED

## 2024-01-05 NOTE — PROGRESS NOTES
Report given in room to [facility], for transferral of care. Pt sitting up in chair. Pt c/o chronic back pain 7/10, medicated see MAR. Call light in reach.

## 2024-01-05 NOTE — TELEPHONE ENCOUNTER
Still IP.  Needs PET after d/c?    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 Navarro & was not a surgical candidate, s/p 5 fractions of SBRT 5/3/22 - 5/13/22 by Dr. Gaitan   H/O COVID19 pneumonia Aug 2022 with hospitalization  Has parakeets in the time, x2 yrs   Former smoker, >100 pack years, quit 2021      PLAN:  Supplemental O2 to maintain SaO2 >92%; wean as tolerated    Cough with no sputum ,she feels all in throat   BiPAP 12/5 HS and PRN (as previously in hospital, home settings are unknown)   Prednisone PO and taper over 7 days   Inhaled bronchodilators   Continue home symbicort on discharge   Doxy D#5  Consider CT PET imaging , she will go for  SBRT EVAL wvL  for lung cancer for the RUL nodule.  Patient is aware of recommendation;   Limit environmental exposures, I recommended eliminating bird (parakeet) exposure entirely

## 2024-01-05 NOTE — PROGRESS NOTES
RT Inhaler-Nebulizer Bronchodilator Protocol Note    There is a bronchodilator order in the chart from a provider indicating to follow the RT Bronchodilator Protocol and there is an “Initiate RT Inhaler-Nebulizer Bronchodilator Protocol” order as well (see protocol at bottom of note).    CXR Findings:  No results found.    The findings from the last RT Protocol Assessment were as follows:   History Pulmonary Disease: (P) Chronic pulmonary disease  Respiratory Pattern: (P) Dyspnea on exertion or RR 21-25 bpm  Breath Sounds: (P) Inspiratory and expiratory or bilateral wheezing and/or rhonchi  Cough: (P) Strong, spontaneous, non-productive  Indication for Bronchodilator Therapy: (P) Wheezing associated with pulm disorder  Bronchodilator Assessment Score: (P) 10    Aerosolized bronchodilator medication orders have been revised according to the RT Inhaler-Nebulizer Bronchodilator Protocol below.    Respiratory Therapist to perform RT Therapy Protocol Assessment initially then follow the protocol.  Repeat RT Therapy Protocol Assessment PRN for score 0-3 or on second treatment, BID, and PRN for scores above 3.    No Indications - adjust the frequency to every 6 hours PRN wheezing or bronchospasm, if no treatments needed after 48 hours then discontinue using Per Protocol order mode.     If indication present, adjust the RT bronchodilator orders based on the Bronchodilator Assessment Score as indicated below.  Use Inhaler orders unless patient has one or more of the following: on home nebulizer, not able to hold breath for 10 seconds, is not alert and oriented, cannot activate and use MDI correctly, or respiratory rate 25 breaths per minute or more, then use the equivalent nebulizer order(s) with same Frequency and PRN reasons based on the score.  If a patient is on this medication at home then do not decrease Frequency below that used at home.    0-3 - enter or revise RT bronchodilator order(s) to equivalent RT

## 2024-01-05 NOTE — PROGRESS NOTES
/80   Pulse 82   Temp 97.7 °F (36.5 °C) (Oral)   Resp 20   Ht 1.6 m (5' 2.99\")   Wt 83.6 kg (184 lb 3.2 oz)   SpO2 96%   BMI 32.64 kg/m²     Patient alert and oriented. Assisted patient back to bed.With telemetry and oxygen at 4 lpm via high flow.  Assessment completed. Respiration easy, even and unlabored. PRN pain and cough meds given as ordered. Refer to flowsheet and MAR. Patient tolerated night meds well. Call light and bedside table within reach. Bed at lowest position, locked, side rails x2.  Pt denies needs at this time.

## 2024-01-05 NOTE — PROGRESS NOTES
D/c instructions given to pt with prescriptions given to pt. Explanation of new medications given. Verbalized understanding of medications and instructions. Calling for ride home. Call light in reach.

## 2024-01-05 NOTE — CARE COORDINATION
CM delivered 2nd IMM and provided verbal explanation at bedside. Pt voiced understanding of discharge MCR rights and is agreeable to discharge within 4 hours of delivery of the IMM notice.

## 2024-01-05 NOTE — PROGRESS NOTES
D/c per wheelchair with full oxygen tank with oxygen at 2 liters. D/c to private car in stable condition.

## 2024-01-05 NOTE — PROGRESS NOTES
PULMONARY PROGRESS NOTE  CC: 1 week worsening SOB, cough, sputum & hypoxia on home 4 L O2    Subjective:   Doing great  SOB ,wheezing has resolved    On 4 L O2   No wheezing   Wore BiPAP 12/6 for ~1/2 hour overnight.   Much less wheezing and wants to go home    PHYSICAL EXAM:   /69   Pulse 87   Temp 98.5 °F (36.9 °C) (Oral)   Resp 18   Ht 1.6 m (5' 2.99\")   Wt 76.2 kg (168 lb)   SpO2 99%   BMI 29.77 kg/m² ' on 6 L HFNC  Constitutional:  No acute distress - looks better  HEENT:  No scleral icterus  Neck:  No tracheal deviation present.    Cardiovascular:  Normal heart sounds.   Pulmonary/Chest:  No accessory muscle usage. + decreased breath sounds.  No wheezes. No rhonchi. No rales.   Abdominal:  Soft.   Musculoskeletal:  No cyanosis. No clubbing.  Skin:  Skin is warm and dry.   Neuro: awake and alert      Scheduled Meds:   methylPREDNISolone  40 mg IntraVENous Daily    budesonide  1 mg Nebulization BID RT    sodium chloride flush  5-40 mL IntraVENous 2 times per day    enoxaparin  40 mg SubCUTAneous Daily    ipratropium 0.5 mg-albuterol 2.5 mg  1 Dose Inhalation Q4H WA RT    cetirizine  10 mg Oral Daily    Vitamin D  2,000 Units Oral Daily    escitalopram  20 mg Oral Nightly    therapeutic multivitamin-minerals  1 tablet Oral Daily    amLODIPine  5 mg Oral Daily    lisinopril  20 mg Oral Daily    doxycycline hyclate  100 mg Oral 2 times per day     Continuous Infusions:   sodium chloride       PRN Meds:  loperamide, sodium chloride flush, sodium chloride, ondansetron **OR** ondansetron, polyethylene glycol, acetaminophen **OR** acetaminophen, guaiFENesin-dextromethorphan, perflutren lipid microspheres, ipratropium 0.5 mg-albuterol 2.5 mg, traMADol    Labs:  CBC:   Recent Labs     01/03/24 0429   WBC 5.7   HGB 12.9   HCT 38.5   MCV 95.3          BMP:   Recent Labs     01/03/24 0429   *   K 4.3   CL 84*   CO2 32   BUN 15   CREATININE <0.5*       PCT 0.06  Pro-BNP 49  VBG

## 2024-01-05 NOTE — PLAN OF CARE
Problem: Discharge Planning  Goal: Discharge to home or other facility with appropriate resources  Outcome: Progressing  Flowsheets (Taken 1/4/2024 2104)  Discharge to home or other facility with appropriate resources: Identify barriers to discharge with patient and caregiver     Problem: Pain  Goal: Verbalizes/displays adequate comfort level or baseline comfort level  Outcome: Progressing  Flowsheets (Taken 1/4/2024 2040)  Verbalizes/displays adequate comfort level or baseline comfort level: Encourage patient to monitor pain and request assistance     Problem: Safety - Adult  Goal: Free from fall injury  Outcome: Progressing     Problem: Skin/Tissue Integrity  Goal: Absence of new skin breakdown  Description: 1.  Monitor for areas of redness and/or skin breakdown  2.  Assess vascular access sites hourly  3.  Every 4-6 hours minimum:  Change oxygen saturation probe site  4.  Every 4-6 hours:  If on nasal continuous positive airway pressure, respiratory therapy assess nares and determine need for appliance change or resting period.  Outcome: Progressing     Problem: Respiratory - Adult  Goal: Achieves optimal ventilation and oxygenation  Outcome: Progressing     Problem: Cardiovascular - Adult  Goal: Maintains optimal cardiac output and hemodynamic stability  Outcome: Progressing  Goal: Absence of cardiac dysrhythmias or at baseline  Outcome: Progressing

## 2024-01-05 NOTE — PROGRESS NOTES
01/04/24 9640   NIV Type   NIV Started/Stopped (S)  On   Equipment Type V60   Mode Bilevel   Mask Type Full face mask   Mask Size Small   Assessment   Pulse 87   Respirations 19   SpO2 95 %   Level of Consciousness 0   Comfort Level Good   Using Accessory Muscles No   Mask Compliance Good   Skin Assessment Clean, dry, & intact   Skin Protection for O2 Device Yes   Settings/Measurements   IPAP 12 cmH20   CPAP/EPAP 5 cmH2O   Vt (Measured) 824 mL   Rate Ordered 16   FiO2  35 %   Minute Volume (L/min) 17.4 Liters   Mask Leak (lpm) 9 lpm   Patient's Home Machine No   Alarm Settings   Alarms On Y

## 2024-01-05 NOTE — PROGRESS NOTES
C/o lower back pain rate as 8/10. Requesting pain medication. Tramadol 1 tablet po given. See mar.

## 2024-01-05 NOTE — PROGRESS NOTES
Prevention  dressing applied to bridge of nose and other facial bony prominences upon BiPAP/CPAP initiation.  Consulted RN regarding the assessment of skin integrity.

## 2024-01-05 NOTE — CARE COORDINATION
DISCHARGE ORDER  Date/Time 2024 2:33 PM  Completed by: Elma Ojeda, Case Management    Patient Name: Annie Tyler      : 1955  Admitting Diagnosis: Hypoxia [R09.02]  COPD exacerbation (HCC) [J44.1]  Congestive heart failure, unspecified HF chronicity, unspecified heart failure type (HCC) [I50.9]      Admit order Date and Status:2023 Stable  (verify MD's last order for status of admission)      Noted discharge order.     If applicable PT/OT recommendation at Discharge: Discharge Recommendations: Home with 24hr supervision and Home PT  DME needs for discharge: [employer] - has at home    Confirmed discharge plan: Yes  with whom__Annie_____________  If pt confirmed DC plan does family need to be contacted by CM No     Discharge Plan: Reviewed chart, noted DC order, met with pt at bedside. Pt to be DC home today, family providing transportation. [facility] provides oxygen and [employer]. She is working with Medicaid for aide services in home.       Date of Last IMM Given: 2024    Reviewed chart.  Role of discharge planner explained and patient verbalized understanding.     Discharge order is noted.      Has Home O2 in place on admit:  Yes  Informed of need to bring portable home O2 tank on day of discharge for nursing to connect prior to leaving:   Yes  Verbalized agreement/Understanding:   Yes    Pt is being d/c'd to home today. Pt's O2 sats are 95% on 4 L NC.    Discharge timeout done with PT, CM, Brandi RN. All discharge needs and concerns addressed.

## 2024-01-05 NOTE — PROGRESS NOTES
Resting in bed awake. No complaints or needs at present time. Am assessment complete. Alert and oriented. Call light in reach.    Bedside Mobility Assessment Tool (BMAT):     Assessment Level 1- Sit and Shake    1. From a semi-reclined position, ask patient to sit up and rotate to a seated position at the side of the bed. Can use the bedrail.    2. Ask patient to reach out and grab your hand and shake making sure patient reaches across his/her midline.   Pass- Patient is able to come to a seated position, maintain core strength. Maintains seated balance while reaching across midline. Move on to Assessment Level 2.     Assessment Level 2- Stretch and Point   1. With patient in seated position at the side of the bed, have patient place both feet on the floor (or stool) with knees no higher than hips.    2. Ask patient to stretch one leg and straighten the knee, then bend the ankle/flex and point the toes. If appropriate, repeat with the other leg.   Pass- Patient is able to demonstrate appropriate quad strength on intended weight bearing limb(s). Move onto Assessment Level 3.     Assessment Level 3- Stand   1. Ask patient to elevate off the bed or chair (seated to standing) using an assistive device (cane, bedrail).    2. Patient should be able to raise buttocks off be and hold for a count of five. May repeat once.   Pass- Patient maintains standing stability for at least 5 seconds, proceed to assessment level 4.    Assessment Level 4- Walk   1. Ask patient to march in place at bedside.    2. Then ask patient to advance step and return each foot. Some medical conditions may render a patient from stepping backwards, use your best clinical judgement.   Pass- Patient demonstrates balance while shifting weight and ability to step, takes independent steps, does not use assistive device patient is MOBILITY LEVEL 4.      Mobility Level- 4

## 2024-01-05 NOTE — PROGRESS NOTES
IM Progress Note    Admit Date:  12/31/2023  5    Interval history:  copd exacerbation     Late entry: Plan was for dc yesterday    Subjective:  Ms. Tyler seen up in bed, reported she was feeling much better.    Objective:   /61   Pulse 87   Temp 98.5 °F (36.9 °C) (Oral)   Resp 18   Ht 1.6 m (5' 2.99\")   Wt 76.2 kg (168 lb)   SpO2 99%   BMI 29.77 kg/m²     Intake/Output Summary (Last 24 hours) at 1/5/2024 1015  Last data filed at 1/5/2024 0910  Gross per 24 hour   Intake 1362 ml   Output 450 ml   Net 912 ml         Physical Exam:    General: elderly female chronically ill appearing    Awake, alert and oriented. Appears to be not in any distress  Mucous Membranes:  Pink , anicteric  Neck: No JVD, no carotid bruit, no thyromegaly  Chest: improving airentry javier with resolving wheeze   Cardiovascular:  RRR S1S2 heard, no murmurs or gallops  Abdomen:  Soft, undistended, non tender, no organomegaly, BS present  Extremities: 1+ javier Le edema resolving. Distal pulses well felt  Neurological : grossly normal       Medications:   Scheduled Medications:    methylPREDNISolone  40 mg IntraVENous Daily    budesonide  1 mg Nebulization BID RT    sodium chloride flush  5-40 mL IntraVENous 2 times per day    enoxaparin  40 mg SubCUTAneous Daily    ipratropium 0.5 mg-albuterol 2.5 mg  1 Dose Inhalation Q4H WA RT    cetirizine  10 mg Oral Daily    Vitamin D  2,000 Units Oral Daily    escitalopram  20 mg Oral Nightly    therapeutic multivitamin-minerals  1 tablet Oral Daily    amLODIPine  5 mg Oral Daily    lisinopril  20 mg Oral Daily     I   sodium chloride       loperamide, sodium chloride flush, sodium chloride, ondansetron **OR** ondansetron, polyethylene glycol, acetaminophen **OR** acetaminophen, guaiFENesin-dextromethorphan, perflutren lipid microspheres, ipratropium 0.5 mg-albuterol 2.5 mg, traMADol    Lab Data:  Recent Labs     01/03/24  0429   WBC 5.7   HGB 12.9   HCT 38.5   MCV 95.3          Recent Labs  pulmonary hypertension (Right atrial pressure of 8 mmHg).     ASSESSMENT/PLAN:     #Acute on chronic hypoxic respiratory failure  #COPD exacerbation     -admitted for further management/care.  Pulmonology consulted  -IV Solu-medrol , HHN , doubt pneumonia  -  procal neg   - no features of CHF, pedal edema from norvasc high dose , BNP wnl , CT neg   - Echo with normal EF and stage 2 DD , moderate pulm HTN  - can add prn lasix at dc for edema    # Acute on chronic hyponatremia  - multiple admissions in the past with low Na, off hctz for the same  - trial of samsca today      #Pulmonary nodule concerning for cancer  -8 mm right upper lobe increasing in size when compared to before, non compliant with follow up   Previous hx of left UL lung cancer s.p SBRT 2022  - pulmonology consulted  - recommends f/u      #Elevated troponin   - likely with resp distress  -33-->32  -monitor on tele     #Hypertension   -BP elevated.  Resumed Amlodipine - lowered dose for edema , increased Lisinopril       #Depression, anxiety  -cont Lexapro     Hx of tobacco use - quit 2 yrs ago         Reports having birds and cats at home and planning to move them away     DVT Prophylaxis: Lovenox  Diet: ADULT DIET; Regular; 4 carb choices (60 gm/meal); No Added Salt (3-4 gm)  Code Status: Full Code    Up in chair  Dc planning     Patient plan was for discharge however notified patient not wanting to leave hospital, will reassess    Fiorella Grayson DO, 1/5/2024 10:15 AM

## 2024-01-12 ENCOUNTER — TELEPHONE (OUTPATIENT)
Dept: PULMONOLOGY | Age: 69
End: 2024-01-12

## 2024-01-15 ENCOUNTER — OFFICE VISIT (OUTPATIENT)
Dept: PULMONOLOGY | Age: 69
End: 2024-01-15
Payer: MEDICARE

## 2024-01-15 VITALS
BODY MASS INDEX: 29.77 KG/M2 | HEART RATE: 74 BPM | HEIGHT: 63 IN | OXYGEN SATURATION: 90 % | SYSTOLIC BLOOD PRESSURE: 120 MMHG | DIASTOLIC BLOOD PRESSURE: 74 MMHG | RESPIRATION RATE: 24 BRPM | WEIGHT: 168 LBS

## 2024-01-15 DIAGNOSIS — R91.1 LUNG NODULE: Primary | ICD-10-CM

## 2024-01-15 PROCEDURE — 99214 OFFICE O/P EST MOD 30 MIN: CPT | Performed by: INTERNAL MEDICINE

## 2024-01-15 PROCEDURE — 1123F ACP DISCUSS/DSCN MKR DOCD: CPT | Performed by: INTERNAL MEDICINE

## 2024-01-15 PROCEDURE — 3078F DIAST BP <80 MM HG: CPT | Performed by: INTERNAL MEDICINE

## 2024-01-15 PROCEDURE — 3074F SYST BP LT 130 MM HG: CPT | Performed by: INTERNAL MEDICINE

## 2024-01-15 RX ORDER — PREDNISONE 20 MG/1
20 TABLET ORAL DAILY
Qty: 5 TABLET | Refills: 0 | Status: ON HOLD | OUTPATIENT
Start: 2024-01-15 | End: 2024-01-20

## 2024-01-15 RX ORDER — ROFLUMILAST 250 UG/1
250 TABLET ORAL DAILY
Qty: 28 TABLET | Refills: 1 | Status: ON HOLD | OUTPATIENT
Start: 2024-01-15

## 2024-01-15 RX ORDER — AZITHROMYCIN 250 MG/1
250 TABLET, FILM COATED ORAL DAILY
Qty: 7 TABLET | Refills: 0 | Status: ON HOLD | OUTPATIENT
Start: 2024-01-15 | End: 2024-01-22

## 2024-01-15 NOTE — PROGRESS NOTES
PULMONARY PROGRESS NOTE  CC: 1 week worsening SOB, cough, sputum & hypoxia on home 4 L O2    Subjective:   Was admitted in 12/23 with acute asthma ,.COPD exacerbation   Quit 3 years after smoke for 45 year   Doing great  Still with cough and SOB   On 4 L O2   No wheezing   Wore BiPAP 12/6 for ~1/2 hour overnight.   Much less wheezing and wants to go home  Use BiPAP but no to much   PHYSICAL EXAM:   /74   Pulse 74   Resp 24   Ht 1.6 m (5' 3\")   Wt 76.2 kg (168 lb)   SpO2 90% Comment: on 4 LPM  BMI 29.76 kg/m² ' on 6 L HFNC  Constitutional:  No acute distress - looks better  HEENT:  No scleral icterus  Neck:  No tracheal deviation present.    Cardiovascular:  Normal heart sounds.   Pulmonary/Chest:  No accessory muscle usage. + decreased breath sounds.  No wheezes. No rhonchi. No rales.   Abdominal:  Soft.   Musculoskeletal:  No cyanosis. No clubbing.  Skin:  Skin is warm and dry.   Neuro: awake and alert      Scheduled Meds:      Continuous Infusions:      PRN Meds:      Labs:  CBC:   No results for input(s): \"WBC\", \"HGB\", \"HCT\", \"MCV\", \"PLT\" in the last 72 hours.  BMP:   No results for input(s): \"NA\", \"K\", \"CL\", \"CO2\", \"PHOS\", \"BUN\", \"CREATININE\", \"CA\" in the last 72 hours.  PCT 0.06  Pro-BNP 49  VBG 7.34/64/39    Micro:   12/31/23 SARS-CoV-2 & influenza not detected  FINDINGS:  1.  Spirometry:  The FVC is 72% of predicted.  The FEV1 is 0.85 liters  which is 36% of predicted.  The FEV1/FVC ratio is 0.38.  There is no  response to bronchodilators.  2.  Plethysmography:  The total lung capacity is 89% of predicted.  3.  The diffusion capacity of carbon monoxide is 21% of predicted.  4.  The flow volume loop shows an obstructive pattern.     IMPRESSION:  This patient has severe COPD with a decreased DLCO.     A six-minute walk test was conducted by [facility] mary anne.  The  patient walked 280 feet and required 3 liters of oxygen to maintain  saturations of 88% or greater.  The heart rate at rest was 75

## 2024-01-19 ENCOUNTER — HOSPITAL ENCOUNTER (INPATIENT)
Age: 69
LOS: 7 days | Discharge: NURSING, REHAB OR LONG-TERM CARE | End: 2024-01-26
Attending: STUDENT IN AN ORGANIZED HEALTH CARE EDUCATION/TRAINING PROGRAM | Admitting: INTERNAL MEDICINE
Payer: MEDICARE

## 2024-01-19 ENCOUNTER — TELEPHONE (OUTPATIENT)
Dept: PULMONOLOGY | Age: 69
End: 2024-01-19

## 2024-01-19 ENCOUNTER — APPOINTMENT (OUTPATIENT)
Dept: GENERAL RADIOLOGY | Age: 69
End: 2024-01-19
Payer: MEDICARE

## 2024-01-19 DIAGNOSIS — J96.21 ACUTE ON CHRONIC RESPIRATORY FAILURE WITH HYPOXIA (HCC): Primary | ICD-10-CM

## 2024-01-19 DIAGNOSIS — R79.89 ELEVATED TROPONIN: ICD-10-CM

## 2024-01-19 DIAGNOSIS — J44.1 COPD EXACERBATION (HCC): ICD-10-CM

## 2024-01-19 PROBLEM — R00.0 TACHYCARDIA: Status: ACTIVE | Noted: 2024-01-19

## 2024-01-19 PROBLEM — J96.01 ACUTE HYPOXEMIC RESPIRATORY FAILURE (HCC): Status: ACTIVE | Noted: 2024-01-19

## 2024-01-19 PROBLEM — B33.8 RSV INFECTION: Status: ACTIVE | Noted: 2024-01-19

## 2024-01-19 LAB
ALBUMIN SERPL-MCNC: 4 G/DL (ref 3.4–5)
ALBUMIN/GLOB SERPL: 1.4 {RATIO} (ref 1.1–2.2)
ALP SERPL-CCNC: 93 U/L (ref 40–129)
ALT SERPL-CCNC: 25 U/L (ref 10–40)
ANION GAP SERPL CALCULATED.3IONS-SCNC: 11 MMOL/L (ref 3–16)
AST SERPL-CCNC: 31 U/L (ref 15–37)
BASE EXCESS BLDA CALC-SCNC: -0.7 MMOL/L (ref -3–3)
BASE EXCESS BLDV CALC-SCNC: 2.5 MMOL/L (ref -3–3)
BASOPHILS # BLD: 0 K/UL (ref 0–0.2)
BASOPHILS NFR BLD: 0.2 %
BILIRUB SERPL-MCNC: 0.4 MG/DL (ref 0–1)
BUN SERPL-MCNC: 12 MG/DL (ref 7–20)
CALCIUM SERPL-MCNC: 9.3 MG/DL (ref 8.3–10.6)
CHLORIDE SERPL-SCNC: 97 MMOL/L (ref 99–110)
CO2 BLDA-SCNC: 26.4 MMOL/L
CO2 BLDV-SCNC: 30 MMOL/L
CO2 SERPL-SCNC: 30 MMOL/L (ref 21–32)
COHGB MFR BLDA: 1.8 % (ref 0–1.5)
COHGB MFR BLDV: 3.2 % (ref 0–1.5)
CREAT SERPL-MCNC: <0.5 MG/DL (ref 0.6–1.2)
DEPRECATED RDW RBC AUTO: 14 % (ref 12.4–15.4)
EKG ATRIAL RATE: 108 BPM
EKG DIAGNOSIS: NORMAL
EKG P AXIS: 42 DEGREES
EKG P-R INTERVAL: 130 MS
EKG Q-T INTERVAL: 310 MS
EKG QRS DURATION: 72 MS
EKG QTC CALCULATION (BAZETT): 415 MS
EKG R AXIS: -35 DEGREES
EKG T AXIS: 50 DEGREES
EKG VENTRICULAR RATE: 108 BPM
EOSINOPHIL # BLD: 0.2 K/UL (ref 0–0.6)
EOSINOPHIL NFR BLD: 1.3 %
FLUAV RNA RESP QL NAA+PROBE: NOT DETECTED
FLUBV RNA RESP QL NAA+PROBE: NOT DETECTED
GFR SERPLBLD CREATININE-BSD FMLA CKD-EPI: >60 ML/MIN/{1.73_M2}
GLUCOSE SERPL-MCNC: 147 MG/DL (ref 70–99)
HCO3 BLDA-SCNC: 25 MMOL/L (ref 21–29)
HCO3 BLDV-SCNC: 28.4 MMOL/L (ref 23–29)
HCT VFR BLD AUTO: 35.7 % (ref 36–48)
HGB BLD-MCNC: 11.6 G/DL (ref 12–16)
HGB BLDA-MCNC: 11.9 G/DL (ref 12–16)
INR PPP: 0.88 (ref 0.84–1.16)
LYMPHOCYTES # BLD: 0.6 K/UL (ref 1–5.1)
LYMPHOCYTES NFR BLD: 4.3 %
MCH RBC QN AUTO: 30.5 PG (ref 26–34)
MCHC RBC AUTO-ENTMCNC: 32.4 G/DL (ref 31–36)
MCV RBC AUTO: 93.9 FL (ref 80–100)
METHGB MFR BLDA: 0.3 %
METHGB MFR BLDV: 0.3 %
MONOCYTES # BLD: 0.6 K/UL (ref 0–1.3)
MONOCYTES NFR BLD: 4.1 %
NEUTROPHILS # BLD: 13.1 K/UL (ref 1.7–7.7)
NEUTROPHILS NFR BLD: 90.1 %
NT-PROBNP SERPL-MCNC: 134 PG/ML (ref 0–124)
O2 CT VFR BLDV CALC: 15 VOL %
O2 THERAPY: ABNORMAL
O2 THERAPY: ABNORMAL
PCO2 BLDA: 45.6 MMHG (ref 35–45)
PCO2 BLDV: 49.2 MMHG (ref 40–50)
PH BLDA: 7.36 [PH] (ref 7.35–7.45)
PH BLDV: 7.38 [PH] (ref 7.35–7.45)
PLATELET # BLD AUTO: 302 K/UL (ref 135–450)
PMV BLD AUTO: 6.9 FL (ref 5–10.5)
PO2 BLDA: 80.8 MMHG (ref 75–108)
PO2 BLDV: 50.8 MMHG (ref 25–40)
POTASSIUM SERPL-SCNC: 3.7 MMOL/L (ref 3.5–5.1)
PROCALCITONIN SERPL IA-MCNC: 0.08 NG/ML (ref 0–0.15)
PROT SERPL-MCNC: 6.8 G/DL (ref 6.4–8.2)
PROTHROMBIN TIME: 12 SEC (ref 11.5–14.8)
RBC # BLD AUTO: 3.8 M/UL (ref 4–5.2)
RSV AG NOSE QL: POSITIVE
SAO2 % BLDA: 95.4 %
SAO2 % BLDV: 85 %
SARS-COV-2 RNA RESP QL NAA+PROBE: NOT DETECTED
SODIUM SERPL-SCNC: 138 MMOL/L (ref 136–145)
TROPONIN, HIGH SENSITIVITY: 22 NG/L (ref 0–14)
TROPONIN, HIGH SENSITIVITY: 25 NG/L (ref 0–14)
TROPONIN, HIGH SENSITIVITY: 28 NG/L (ref 0–14)
WBC # BLD AUTO: 14.5 K/UL (ref 4–11)

## 2024-01-19 PROCEDURE — 87636 SARSCOV2 & INF A&B AMP PRB: CPT

## 2024-01-19 PROCEDURE — 6370000000 HC RX 637 (ALT 250 FOR IP)

## 2024-01-19 PROCEDURE — 93005 ELECTROCARDIOGRAM TRACING: CPT | Performed by: STUDENT IN AN ORGANIZED HEALTH CARE EDUCATION/TRAINING PROGRAM

## 2024-01-19 PROCEDURE — 80053 COMPREHEN METABOLIC PANEL: CPT

## 2024-01-19 PROCEDURE — 99223 1ST HOSP IP/OBS HIGH 75: CPT

## 2024-01-19 PROCEDURE — 6370000000 HC RX 637 (ALT 250 FOR IP): Performed by: STUDENT IN AN ORGANIZED HEALTH CARE EDUCATION/TRAINING PROGRAM

## 2024-01-19 PROCEDURE — 87040 BLOOD CULTURE FOR BACTERIA: CPT

## 2024-01-19 PROCEDURE — 2700000000 HC OXYGEN THERAPY PER DAY

## 2024-01-19 PROCEDURE — 2580000003 HC RX 258

## 2024-01-19 PROCEDURE — 36415 COLL VENOUS BLD VENIPUNCTURE: CPT

## 2024-01-19 PROCEDURE — 94660 CPAP INITIATION&MGMT: CPT

## 2024-01-19 PROCEDURE — 82803 BLOOD GASES ANY COMBINATION: CPT

## 2024-01-19 PROCEDURE — 71045 X-RAY EXAM CHEST 1 VIEW: CPT

## 2024-01-19 PROCEDURE — 94761 N-INVAS EAR/PLS OXIMETRY MLT: CPT

## 2024-01-19 PROCEDURE — 94640 AIRWAY INHALATION TREATMENT: CPT

## 2024-01-19 PROCEDURE — 83880 ASSAY OF NATRIURETIC PEPTIDE: CPT

## 2024-01-19 PROCEDURE — 6360000002 HC RX W HCPCS

## 2024-01-19 PROCEDURE — 2060000000 HC ICU INTERMEDIATE R&B

## 2024-01-19 PROCEDURE — 85610 PROTHROMBIN TIME: CPT

## 2024-01-19 PROCEDURE — 96374 THER/PROPH/DIAG INJ IV PUSH: CPT

## 2024-01-19 PROCEDURE — 2500000003 HC RX 250 WO HCPCS

## 2024-01-19 PROCEDURE — 5A09457 ASSISTANCE WITH RESPIRATORY VENTILATION, 24-96 CONSECUTIVE HOURS, CONTINUOUS POSITIVE AIRWAY PRESSURE: ICD-10-PCS | Performed by: INTERNAL MEDICINE

## 2024-01-19 PROCEDURE — 87641 MR-STAPH DNA AMP PROBE: CPT

## 2024-01-19 PROCEDURE — 85025 COMPLETE CBC W/AUTO DIFF WBC: CPT

## 2024-01-19 PROCEDURE — 93010 ELECTROCARDIOGRAM REPORT: CPT | Performed by: INTERNAL MEDICINE

## 2024-01-19 PROCEDURE — 99285 EMERGENCY DEPT VISIT HI MDM: CPT

## 2024-01-19 PROCEDURE — 99223 1ST HOSP IP/OBS HIGH 75: CPT | Performed by: INTERNAL MEDICINE

## 2024-01-19 PROCEDURE — 84484 ASSAY OF TROPONIN QUANT: CPT

## 2024-01-19 PROCEDURE — 2000000000 HC ICU R&B

## 2024-01-19 PROCEDURE — 87807 RSV ASSAY W/OPTIC: CPT

## 2024-01-19 PROCEDURE — 84145 PROCALCITONIN (PCT): CPT

## 2024-01-19 RX ORDER — ONDANSETRON 2 MG/ML
4 INJECTION INTRAMUSCULAR; INTRAVENOUS EVERY 6 HOURS PRN
Status: DISCONTINUED | OUTPATIENT
Start: 2024-01-19 | End: 2024-01-26 | Stop reason: HOSPADM

## 2024-01-19 RX ORDER — SODIUM CHLORIDE 9 MG/ML
INJECTION, SOLUTION INTRAVENOUS PRN
Status: DISCONTINUED | OUTPATIENT
Start: 2024-01-19 | End: 2024-01-26 | Stop reason: HOSPADM

## 2024-01-19 RX ORDER — ACETAMINOPHEN 325 MG/1
650 TABLET ORAL EVERY 6 HOURS PRN
Status: DISCONTINUED | OUTPATIENT
Start: 2024-01-19 | End: 2024-01-26 | Stop reason: HOSPADM

## 2024-01-19 RX ORDER — POLYETHYLENE GLYCOL 3350 17 G/17G
17 POWDER, FOR SOLUTION ORAL DAILY PRN
Status: DISCONTINUED | OUTPATIENT
Start: 2024-01-19 | End: 2024-01-26 | Stop reason: HOSPADM

## 2024-01-19 RX ORDER — IPRATROPIUM BROMIDE AND ALBUTEROL SULFATE 2.5; .5 MG/3ML; MG/3ML
SOLUTION RESPIRATORY (INHALATION)
Status: COMPLETED
Start: 2024-01-19 | End: 2024-01-19

## 2024-01-19 RX ORDER — ROFLUMILAST 500 UG/1
250 TABLET ORAL DAILY
Status: DISCONTINUED | OUTPATIENT
Start: 2024-01-19 | End: 2024-01-26 | Stop reason: HOSPADM

## 2024-01-19 RX ORDER — LISINOPRIL 5 MG/1
5 TABLET ORAL DAILY
Status: DISCONTINUED | OUTPATIENT
Start: 2024-01-19 | End: 2024-01-26 | Stop reason: HOSPADM

## 2024-01-19 RX ORDER — BENZONATATE 100 MG/1
100 CAPSULE ORAL 3 TIMES DAILY PRN
Status: DISCONTINUED | OUTPATIENT
Start: 2024-01-19 | End: 2024-01-26 | Stop reason: HOSPADM

## 2024-01-19 RX ORDER — LEVOFLOXACIN 5 MG/ML
750 INJECTION, SOLUTION INTRAVENOUS EVERY 24 HOURS
Status: DISCONTINUED | OUTPATIENT
Start: 2024-01-19 | End: 2024-01-19

## 2024-01-19 RX ORDER — ESCITALOPRAM OXALATE 10 MG/1
20 TABLET ORAL NIGHTLY
Status: DISCONTINUED | OUTPATIENT
Start: 2024-01-19 | End: 2024-01-26 | Stop reason: HOSPADM

## 2024-01-19 RX ORDER — ACETAMINOPHEN 650 MG/1
650 SUPPOSITORY RECTAL EVERY 6 HOURS PRN
Status: DISCONTINUED | OUTPATIENT
Start: 2024-01-19 | End: 2024-01-26 | Stop reason: HOSPADM

## 2024-01-19 RX ORDER — ENOXAPARIN SODIUM 100 MG/ML
40 INJECTION SUBCUTANEOUS DAILY
Status: DISCONTINUED | OUTPATIENT
Start: 2024-01-19 | End: 2024-01-26 | Stop reason: HOSPADM

## 2024-01-19 RX ORDER — IPRATROPIUM BROMIDE AND ALBUTEROL SULFATE 2.5; .5 MG/3ML; MG/3ML
3 SOLUTION RESPIRATORY (INHALATION) ONCE
Status: COMPLETED | OUTPATIENT
Start: 2024-01-19 | End: 2024-01-19

## 2024-01-19 RX ORDER — SODIUM CHLORIDE 0.9 % (FLUSH) 0.9 %
5-40 SYRINGE (ML) INJECTION EVERY 12 HOURS SCHEDULED
Status: DISCONTINUED | OUTPATIENT
Start: 2024-01-19 | End: 2024-01-26 | Stop reason: HOSPADM

## 2024-01-19 RX ORDER — KETOROLAC TROMETHAMINE 15 MG/ML
15 INJECTION, SOLUTION INTRAMUSCULAR; INTRAVENOUS ONCE
Status: COMPLETED | OUTPATIENT
Start: 2024-01-19 | End: 2024-01-19

## 2024-01-19 RX ORDER — HYDROXYZINE HYDROCHLORIDE 25 MG/1
25 TABLET, FILM COATED ORAL DAILY PRN
Status: DISCONTINUED | OUTPATIENT
Start: 2024-01-19 | End: 2024-01-26 | Stop reason: HOSPADM

## 2024-01-19 RX ORDER — IPRATROPIUM BROMIDE AND ALBUTEROL SULFATE 2.5; .5 MG/3ML; MG/3ML
1 SOLUTION RESPIRATORY (INHALATION)
Status: DISCONTINUED | OUTPATIENT
Start: 2024-01-19 | End: 2024-01-26

## 2024-01-19 RX ORDER — SODIUM CHLORIDE 0.9 % (FLUSH) 0.9 %
5-40 SYRINGE (ML) INJECTION PRN
Status: DISCONTINUED | OUTPATIENT
Start: 2024-01-19 | End: 2024-01-26 | Stop reason: HOSPADM

## 2024-01-19 RX ORDER — HYDROXYZINE HYDROCHLORIDE 25 MG/1
25 TABLET, FILM COATED ORAL ONCE
Status: DISCONTINUED | OUTPATIENT
Start: 2024-01-19 | End: 2024-01-19

## 2024-01-19 RX ORDER — SODIUM CHLORIDE 9 MG/ML
INJECTION, SOLUTION INTRAVENOUS CONTINUOUS
Status: DISCONTINUED | OUTPATIENT
Start: 2024-01-19 | End: 2024-01-20

## 2024-01-19 RX ORDER — AMLODIPINE BESYLATE 5 MG/1
10 TABLET ORAL DAILY
Status: DISCONTINUED | OUTPATIENT
Start: 2024-01-19 | End: 2024-01-24

## 2024-01-19 RX ORDER — ONDANSETRON 4 MG/1
4 TABLET, ORALLY DISINTEGRATING ORAL EVERY 8 HOURS PRN
Status: DISCONTINUED | OUTPATIENT
Start: 2024-01-19 | End: 2024-01-26 | Stop reason: HOSPADM

## 2024-01-19 RX ADMIN — IPRATROPIUM BROMIDE AND ALBUTEROL SULFATE 1 DOSE: 2.5; .5 SOLUTION RESPIRATORY (INHALATION) at 16:12

## 2024-01-19 RX ADMIN — LISINOPRIL 5 MG: 10 TABLET ORAL at 20:09

## 2024-01-19 RX ADMIN — VANCOMYCIN HYDROCHLORIDE 1250 MG: 10 INJECTION, POWDER, LYOPHILIZED, FOR SOLUTION INTRAVENOUS at 23:17

## 2024-01-19 RX ADMIN — IPRATROPIUM BROMIDE AND ALBUTEROL SULFATE 1 DOSE: 2.5; .5 SOLUTION RESPIRATORY (INHALATION) at 20:17

## 2024-01-19 RX ADMIN — DOXYCYCLINE 100 MG: 100 INJECTION, POWDER, LYOPHILIZED, FOR SOLUTION INTRAVENOUS at 20:12

## 2024-01-19 RX ADMIN — ROFLUMILAST 250 MCG: 500 TABLET ORAL at 20:09

## 2024-01-19 RX ADMIN — KETOROLAC TROMETHAMINE 15 MG: 15 INJECTION, SOLUTION INTRAMUSCULAR; INTRAVENOUS at 16:13

## 2024-01-19 RX ADMIN — METHYLPREDNISOLONE SODIUM SUCCINATE 40 MG: 40 INJECTION INTRAMUSCULAR; INTRAVENOUS at 21:07

## 2024-01-19 RX ADMIN — ESCITALOPRAM OXALATE 20 MG: 10 TABLET ORAL at 21:00

## 2024-01-19 RX ADMIN — IPRATROPIUM BROMIDE AND ALBUTEROL SULFATE 3 DOSE: 2.5; .5 SOLUTION RESPIRATORY (INHALATION) at 13:08

## 2024-01-19 RX ADMIN — AMLODIPINE BESYLATE 10 MG: 5 TABLET ORAL at 20:09

## 2024-01-19 RX ADMIN — Medication 10 ML: at 20:15

## 2024-01-19 ASSESSMENT — PAIN - FUNCTIONAL ASSESSMENT: PAIN_FUNCTIONAL_ASSESSMENT: NONE - DENIES PAIN

## 2024-01-19 ASSESSMENT — PAIN SCALES - GENERAL: PAINLEVEL_OUTOF10: 0

## 2024-01-19 NOTE — CONSULTS
Pulmonary & Critical Care Consultation Note    Patient is being seen at the request of  MIHIR Molina   for a consultation for COPD, pneumonia, hypoxia    HISTORY OF PRESENT ILLNESS:   68 years old with history of COPD presented with shortness of breath for the past 3 days.  Severe lately.  Worse with exertion better with resting.  Associate with cough and wheezes.  Denies any sputum production.  Some loose stool.  No chest pain.  No smoking.  Compliant with her O2 and inhaled bronchodilators at home.  Uses 4 L O2.  In ED tachycardic in respiratory distress wheezes.         PAST MEDICAL HISTORY:  Past Medical History:   Diagnosis Date    Angina pectoris (HCC)     Chronic pain     knees and lower back     Congestive heart failure (HCC) 1/5/2024    COPD exacerbation ([facility]) 05/20/2018    Depression     Essential hypertension 08/15/2022    Panic disorder     Pneumonia     Pulmonary emphysema ([facility])      PAST SURGICAL HISTORY:  Past Surgical History:   Procedure Laterality Date    CHOLECYSTECTOMY      COLONOSCOPY         FAMILY HISTORY:  family history includes COPD in her mother; Hypertension in her mother.    SOCIAL HISTORY:   reports that she quit smoking about 3 years ago. Her smoking use included cigarettes. She started smoking about 55 years ago. She has a 104.0 pack-year smoking history. She has been exposed to tobacco smoke. She has never used smokeless tobacco.    Scheduled Meds:   ipratropium 0.5 mg-albuterol 2.5 mg  1 Dose Inhalation Q4H WA RT    hydrOXYzine HCl  25 mg Oral Once     Continuous Infusions:    PRN Meds:      ALLERGIES:  Patient is allergic to cephalosporins, pcn [penicillins], and hctz [hydrochlorothiazide].    REVIEW OF SYSTEMS:  Constitutional: Negative for fever  HENT: Negative for sore throat  Eyes: Negative for redness   Respiratory: + Dyspnea, cough  Cardiovascular: Negative for chest pain  Gastrointestinal: + Loose stool  Genitourinary: Negative for hematuria   Musculoskeletal:

## 2024-01-19 NOTE — TELEPHONE ENCOUNTER
Per Carmelina- O2 not staying at normal levels. 1 dose prednisone left, 3 tabs zithromax. Ok to start Roflumilast? Should prednisone be upped- pt does better on higher dose of prednisone.      Spoke w/ Dr. Wolfe and he wants pt to go to ED.     Called [phone]   Spoke w/ Carmelina home health nurse and advised Dr. Wolfe wants pt to go to ED. Carmelina understood.

## 2024-01-19 NOTE — H&P
Hospital Medicine History & Physical      PCP: Keyla Mtz MD    Date of Admission: 1/19/2024    Date of Service: Pt seen/examined on 1/19/2024     Chief Complaint:    Chief Complaint   Patient presents with    Shortness of Breath     Wheezing and sob x 2 days.  Hx of copd.  Wears 4 liters at home all day.  Squad gave 125 mg of solumedrol ivp and a duoneb with some improvement to pt condition.         History Of Present Illness:      The patient is a 68 y.o. female with pmhx of COPD, chronic hypoxic respiratory failure, CHF, HTN, depression who presented to [facility] ED with complaint of shortness of breath that began suddenly yesterday. She saw Dr. Wolfe in office a couple days ago and felt fine at the time. Has been tapering on steroids and started on zithromax but did have not improvement. She has been wearing 4 L at home when her baseline is 3. Has significant wheezing and productive cough.  No fever, chills, chest pain, vomiting, diarrhea    History limited 2/2 to severe tachypnea and dyspnea on exam.     Past Medical History:        Diagnosis Date    Angina pectoris (HCC)     Chronic pain     knees and lower back     Congestive heart failure (HCC) 1/5/2024    COPD exacerbation (HCC) 05/20/2018    Depression     Essential hypertension 08/15/2022    Panic disorder     Pneumonia     Pulmonary emphysema (HCC)        Past Surgical History:        Procedure Laterality Date    CHOLECYSTECTOMY      COLONOSCOPY         Medications Prior to Admission:    Prior to Admission medications    Medication Sig Start Date End Date Taking? Authorizing Provider   azithromycin (ZITHROMAX) 250 MG tablet Take 1 tablet by mouth daily for 7 days 1/15/24 1/22/24  Chiqui Wolfe MD   predniSONE (DELTASONE) 20 MG tablet Take 1 tablet by mouth daily for 5 days 1/15/24 1/20/24  Chiqui Wolfe MD   Roflumilast (DALIRESP) 250 MCG tablet Take 1 tablet by mouth daily 1/15/24   Chiqui Wolfe MD   furosemide (LASIX) 20 MG  She started smoking about 55 years ago. She has a 104.0 pack-year smoking history. She has been exposed to tobacco smoke. She has never used smokeless tobacco.  ETOH:   reports that she does not currently use alcohol after a past usage of about 12.0 standard drinks of alcohol per week.      Family History:   Positive as follows:        Problem Relation Age of Onset    COPD Mother     Hypertension Mother     Asthma Neg Hx     Cancer Neg Hx     Diabetes Neg Hx     Emphysema Neg Hx     Heart Failure Neg Hx        REVIEW OF SYSTEMS:       Constitutional: Negative for fever   HENT: Negative for sore throat   Eyes: Negative for redness   Respiratory: +dyspnea,+ cough   Cardiovascular: Negative for chest pain   Gastrointestinal: Negative for vomiting, diarrhea   Genitourinary: Negative for hematuria   Musculoskeletal: Negative for arthralgias   Skin: Negative for rash   Neurological: Negative for syncope   Hematological: Negative for adenopathy   Psychiatric/Behavorial: Negative for anxiety    PHYSICAL EXAM:    /76   Pulse (!) 121   Temp 97.5 °F (36.4 °C) (Oral)   Resp 20   SpO2 93%     Gen: No distress. Alert. ++Ill appearing elderly female   Eyes: PERRL. No sclera icterus. No conjunctival injection.   ENT: No discharge. Pharynx clear.   Neck: No JVD.  No Carotid Bruit. Trachea midline.  Resp: ++accessory muscle use.  ++poor air exchange with wheezing and rhonchi throughout lung fields   CV: +Tachycardic rate. Regular rhythm. No murmur.  No rub. No edema.   Peripheral Pulses: +2 palpable, equal bilaterally   GI: Non-tender. Non-distended. No masses. No organomegaly. Normal bowel sounds. No hernia.   Skin: Warm and dry. No nodule on exposed extremities. No rash on exposed extremities.   M/S: No cyanosis. No joint deformity. No clubbing.   Neuro: Awake. Grossly nonfocal    Psych: Oriented x 3. No agitation. ++Anxious appearing     Lab Results   Component Value Date    WBC 14.5 (H) 01/19/2024    HGB 11.6 (L)

## 2024-01-19 NOTE — PROGRESS NOTES
Placed pt on bipap. Bipap settings 12/6 40%- Vt:600ml-800ml. Ve: 18.7ml. Leak: 2%. Patient was barely tolerating the current settings. Pt said she will try to wear at least 3 hours.

## 2024-01-19 NOTE — CONSULTS
Pharmacy to dose IV Vanco - HAPx7 days  Please give 1,250mg IV Q12hrs to provide Gram+ organism coverage to include MRSA.  Check a Trough 1/21 at 0500.  Pred , Tr 12.2, Tox 7%  Russel Castellano [facility] PharmD 1/19/2024 6:00 PM

## 2024-01-19 NOTE — PROGRESS NOTES
01/19/24 1710   NIV Type   $NIV $Daily Charge   NIV Started/Stopped On   Mode Bilevel   Mask Type Full face mask   Mask Size Medium   Assessment   Pulse (!) 136   Respirations 20   BP (!) 154/79   SpO2 93 %   Level of Consciousness 0   Comfort Level Good   Using Accessory Muscles No   Skin Protection for O2 Device Yes   Breath Sounds   Breath Sounds Bilateral Diminished   Right Upper Lobe Diminished   Right Middle Lobe Diminished   Right Lower Lobe Diminished   Left Upper Lobe Diminished   Left Lower Lobe Diminished   Settings/Measurements   PIP Observed 12 cm H20   IPAP 12 cmH20   CPAP/EPAP 6 cmH2O   Vt (Measured) 24 mL   Rate Ordered 14   Insp Rise Time (%) 3 %   FiO2  40 %   Minute Volume (L/min) 18.7 Liters   Mask Leak (lpm) 2 lpm   Patient's Home Machine No   Alarm Settings   Alarms On Y   Low Pressure (cmH2O) 8 cmH2O   High Pressure (cmH2O) 40 cmH2O   Delay Alarm 20 sec(s)   RR Low (bpm) 40   RR High (bpm) 12 br/min

## 2024-01-19 NOTE — ED PROVIDER NOTES
[facility] ED      CHIEF COMPLAINT  Shortness of Breath (Wheezing and sob x 2 days.  Hx of copd.  Wears 4 liters at home all day.  Squad gave 125 mg of solumedrol ivp and a duoneb with some improvement to pt condition.)       HISTORY OF PRESENT ILLNESS  Annie Tyler is a 68 y.o. female  who presents to the ED complaining of worsening shortness of breath.  Patient has had recent hospitalizations for COPD and has been weaned down on steroids.  More short of breath for 2 days, but significantly worse this morning.  Chronically wears 3 L oxamide nasal cannula at home, has been wearing 4 L recently due to shortness of breath.  Denies any chest pain, nausea, vomiting.  No increased sputum production.  No fevers.  Received Solu-Medrol and DuoNeb in route with mild improvement.    No other complaints, modifying factors or associated symptoms.     I have reviewed the following from the nursing documentation.    Past Medical History:   Diagnosis Date    Angina pectoris (HCC)     Chronic pain     knees and lower back     Congestive heart failure (HCC) 1/5/2024    COPD exacerbation ([facility]) 05/20/2018    Depression     Essential hypertension 08/15/2022    Panic disorder     Pneumonia     Pulmonary emphysema (HCC)      Past Surgical History:   Procedure Laterality Date    CHOLECYSTECTOMY      COLONOSCOPY       Family History   Problem Relation Age of Onset    COPD Mother     Hypertension Mother     Asthma Neg Hx     Cancer Neg Hx     Diabetes Neg Hx     Emphysema Neg Hx     Heart Failure Neg Hx      Social History     Socioeconomic History    Marital status:      Spouse name: Not on file    Number of children: 6    Years of education: Not on file    Highest education level: Not on file   Occupational History    Not on file   Tobacco Use    Smoking status: Former     Current packs/day: 0.00     Average packs/day: 2.0 packs/day for 52.0 years (104.0 ttl pk-yrs)     Types: Cigarettes     Start date: 1969

## 2024-01-20 PROBLEM — R45.1 AGITATION: Status: ACTIVE | Noted: 2024-01-20

## 2024-01-20 PROBLEM — J18.9 PNEUMONIA OF BOTH LOWER LOBES DUE TO INFECTIOUS ORGANISM: Status: ACTIVE | Noted: 2024-01-20

## 2024-01-20 LAB
ANION GAP SERPL CALCULATED.3IONS-SCNC: 9 MMOL/L (ref 3–16)
BASOPHILS # BLD: 0 K/UL (ref 0–0.2)
BASOPHILS NFR BLD: 0.1 %
BUN SERPL-MCNC: 16 MG/DL (ref 7–20)
CALCIUM SERPL-MCNC: 9 MG/DL (ref 8.3–10.6)
CHLORIDE SERPL-SCNC: 99 MMOL/L (ref 99–110)
CO2 SERPL-SCNC: 28 MMOL/L (ref 21–32)
CREAT SERPL-MCNC: <0.5 MG/DL (ref 0.6–1.2)
DEPRECATED RDW RBC AUTO: 13.8 % (ref 12.4–15.4)
EOSINOPHIL # BLD: 0 K/UL (ref 0–0.6)
EOSINOPHIL NFR BLD: 0 %
GFR SERPLBLD CREATININE-BSD FMLA CKD-EPI: >60 ML/MIN/{1.73_M2}
GLUCOSE BLD-MCNC: 151 MG/DL (ref 70–99)
GLUCOSE SERPL-MCNC: 150 MG/DL (ref 70–99)
HCT VFR BLD AUTO: 31.7 % (ref 36–48)
HGB BLD-MCNC: 10.5 G/DL (ref 12–16)
LYMPHOCYTES # BLD: 0.3 K/UL (ref 1–5.1)
LYMPHOCYTES NFR BLD: 6.7 %
MCH RBC QN AUTO: 30.9 PG (ref 26–34)
MCHC RBC AUTO-ENTMCNC: 33.1 G/DL (ref 31–36)
MCV RBC AUTO: 93.3 FL (ref 80–100)
MONOCYTES # BLD: 0.1 K/UL (ref 0–1.3)
MONOCYTES NFR BLD: 1.8 %
MRSA DNA SPEC QL NAA+PROBE: NORMAL
NEUTROPHILS # BLD: 4.7 K/UL (ref 1.7–7.7)
NEUTROPHILS NFR BLD: 91.4 %
PERFORMED ON: ABNORMAL
PLATELET # BLD AUTO: 246 K/UL (ref 135–450)
PMV BLD AUTO: 6.6 FL (ref 5–10.5)
POTASSIUM SERPL-SCNC: 4.6 MMOL/L (ref 3.5–5.1)
RBC # BLD AUTO: 3.4 M/UL (ref 4–5.2)
SODIUM SERPL-SCNC: 136 MMOL/L (ref 136–145)
WBC # BLD AUTO: 5.1 K/UL (ref 4–11)

## 2024-01-20 PROCEDURE — 6360000002 HC RX W HCPCS

## 2024-01-20 PROCEDURE — 36415 COLL VENOUS BLD VENIPUNCTURE: CPT

## 2024-01-20 PROCEDURE — 99233 SBSQ HOSP IP/OBS HIGH 50: CPT | Performed by: INTERNAL MEDICINE

## 2024-01-20 PROCEDURE — 6370000000 HC RX 637 (ALT 250 FOR IP)

## 2024-01-20 PROCEDURE — 94761 N-INVAS EAR/PLS OXIMETRY MLT: CPT

## 2024-01-20 PROCEDURE — 2580000003 HC RX 258

## 2024-01-20 PROCEDURE — 85025 COMPLETE CBC W/AUTO DIFF WBC: CPT

## 2024-01-20 PROCEDURE — 2700000000 HC OXYGEN THERAPY PER DAY

## 2024-01-20 PROCEDURE — 99291 CRITICAL CARE FIRST HOUR: CPT | Performed by: INTERNAL MEDICINE

## 2024-01-20 PROCEDURE — 80048 BASIC METABOLIC PNL TOTAL CA: CPT

## 2024-01-20 PROCEDURE — 94640 AIRWAY INHALATION TREATMENT: CPT

## 2024-01-20 PROCEDURE — 94660 CPAP INITIATION&MGMT: CPT

## 2024-01-20 PROCEDURE — 2000000000 HC ICU R&B

## 2024-01-20 PROCEDURE — 6370000000 HC RX 637 (ALT 250 FOR IP): Performed by: INTERNAL MEDICINE

## 2024-01-20 PROCEDURE — 2500000003 HC RX 250 WO HCPCS

## 2024-01-20 RX ORDER — BUSPIRONE HYDROCHLORIDE 5 MG/1
5 TABLET ORAL 3 TIMES DAILY
Status: DISCONTINUED | OUTPATIENT
Start: 2024-01-20 | End: 2024-01-21

## 2024-01-20 RX ADMIN — METHYLPREDNISOLONE SODIUM SUCCINATE 40 MG: 40 INJECTION INTRAMUSCULAR; INTRAVENOUS at 06:20

## 2024-01-20 RX ADMIN — ACETAMINOPHEN 650 MG: 325 TABLET ORAL at 18:13

## 2024-01-20 RX ADMIN — BUSPIRONE HYDROCHLORIDE 5 MG: 5 TABLET ORAL at 13:16

## 2024-01-20 RX ADMIN — ENOXAPARIN SODIUM 40 MG: 100 INJECTION SUBCUTANEOUS at 08:42

## 2024-01-20 RX ADMIN — Medication 10 ML: at 20:37

## 2024-01-20 RX ADMIN — HYDROXYZINE HYDROCHLORIDE 25 MG: 25 TABLET ORAL at 18:14

## 2024-01-20 RX ADMIN — IPRATROPIUM BROMIDE AND ALBUTEROL SULFATE 1 DOSE: 2.5; .5 SOLUTION RESPIRATORY (INHALATION) at 20:09

## 2024-01-20 RX ADMIN — IPRATROPIUM BROMIDE AND ALBUTEROL SULFATE 1 DOSE: 2.5; .5 SOLUTION RESPIRATORY (INHALATION) at 16:30

## 2024-01-20 RX ADMIN — ROFLUMILAST 250 MCG: 500 TABLET ORAL at 13:16

## 2024-01-20 RX ADMIN — IPRATROPIUM BROMIDE AND ALBUTEROL SULFATE 1 DOSE: 2.5; .5 SOLUTION RESPIRATORY (INHALATION) at 11:53

## 2024-01-20 RX ADMIN — BUSPIRONE HYDROCHLORIDE 5 MG: 5 TABLET ORAL at 20:24

## 2024-01-20 RX ADMIN — IPRATROPIUM BROMIDE AND ALBUTEROL SULFATE 1 DOSE: 2.5; .5 SOLUTION RESPIRATORY (INHALATION) at 08:10

## 2024-01-20 RX ADMIN — LISINOPRIL 5 MG: 10 TABLET ORAL at 08:42

## 2024-01-20 RX ADMIN — ACETAMINOPHEN 650 MG: 325 TABLET ORAL at 08:47

## 2024-01-20 RX ADMIN — METHYLPREDNISOLONE SODIUM SUCCINATE 40 MG: 40 INJECTION INTRAMUSCULAR; INTRAVENOUS at 18:10

## 2024-01-20 RX ADMIN — MEROPENEM 1000 MG: 1 INJECTION, POWDER, FOR SOLUTION INTRAVENOUS at 13:16

## 2024-01-20 RX ADMIN — AMLODIPINE BESYLATE 10 MG: 5 TABLET ORAL at 08:42

## 2024-01-20 RX ADMIN — MEROPENEM 1000 MG: 1 INJECTION, POWDER, FOR SOLUTION INTRAVENOUS at 18:08

## 2024-01-20 RX ADMIN — VANCOMYCIN HYDROCHLORIDE 1250 MG: 10 INJECTION, POWDER, LYOPHILIZED, FOR SOLUTION INTRAVENOUS at 21:54

## 2024-01-20 RX ADMIN — ESCITALOPRAM OXALATE 20 MG: 10 TABLET ORAL at 20:25

## 2024-01-20 RX ADMIN — MEROPENEM 1000 MG: 1 INJECTION, POWDER, FOR SOLUTION INTRAVENOUS at 01:35

## 2024-01-20 RX ADMIN — DOXYCYCLINE 100 MG: 100 INJECTION, POWDER, LYOPHILIZED, FOR SOLUTION INTRAVENOUS at 20:36

## 2024-01-20 RX ADMIN — DOXYCYCLINE 100 MG: 100 INJECTION, POWDER, LYOPHILIZED, FOR SOLUTION INTRAVENOUS at 08:43

## 2024-01-20 RX ADMIN — VANCOMYCIN HYDROCHLORIDE 1250 MG: 10 INJECTION, POWDER, LYOPHILIZED, FOR SOLUTION INTRAVENOUS at 10:12

## 2024-01-20 ASSESSMENT — PAIN SCALES - WONG BAKER
WONGBAKER_NUMERICALRESPONSE: 0
WONGBAKER_NUMERICALRESPONSE: 0

## 2024-01-20 ASSESSMENT — PAIN DESCRIPTION - DESCRIPTORS: DESCRIPTORS: ACHING

## 2024-01-20 ASSESSMENT — PAIN DESCRIPTION - ONSET: ONSET: ON-GOING

## 2024-01-20 ASSESSMENT — PAIN DESCRIPTION - FREQUENCY: FREQUENCY: CONTINUOUS

## 2024-01-20 ASSESSMENT — PAIN SCALES - GENERAL
PAINLEVEL_OUTOF10: 8
PAINLEVEL_OUTOF10: 3
PAINLEVEL_OUTOF10: 0
PAINLEVEL_OUTOF10: 8

## 2024-01-20 ASSESSMENT — PAIN DESCRIPTION - PAIN TYPE: TYPE: ACUTE PAIN

## 2024-01-20 ASSESSMENT — PAIN DESCRIPTION - ORIENTATION: ORIENTATION: LOWER

## 2024-01-20 ASSESSMENT — PAIN DESCRIPTION - LOCATION
LOCATION: HEAD
LOCATION: HEAD
LOCATION: HEAD;BACK

## 2024-01-20 ASSESSMENT — PAIN - FUNCTIONAL ASSESSMENT: PAIN_FUNCTIONAL_ASSESSMENT: ACTIVITIES ARE NOT PREVENTED

## 2024-01-20 NOTE — PROGRESS NOTES
01/19/24 2019   NIV Type   NIV Started/Stopped On   Equipment Type v60   Mode Bilevel   Mask Type Full face mask   Mask Size Medium   Assessment   Pulse (!) 105   SpO2 93 %   Settings/Measurements   IPAP 12 cmH20   CPAP/EPAP 6 cmH2O   Vt (Measured) 770 mL   Rate Ordered 14   FiO2  40 %   I Time/ I Time % 0.9 s   Minute Volume (L/min) 15 Liters   Mask Leak (lpm) 0 lpm   Patient's Home Machine No   Alarm Settings   Alarms On Y   Low Pressure (cmH2O) 8 cmH2O   High Pressure (cmH2O) 40 cmH2O   Delay Alarm 20 sec(s)   RR Low (bpm) 12   RR High (bpm) 40 br/min

## 2024-01-20 NOTE — PROGRESS NOTES
Pt c/o headache- prn versed given. Pt very anxious this am- states she cannot breathe, pt placed back on BiPap.  Shirley Gallardo RN, BSN

## 2024-01-20 NOTE — PROGRESS NOTES
Admit: 2024    Name:  Annie Tyler  Room:  [address] [place] [address]  MRN:    [id number]    Critical Care Daily Progress Note for 2024   Acute on chronic resp failure   Placed on BiPAP  Interval History:   Used BiPAP last night   Now on 6 L     C/o anxiety    Scheduled Meds:   sodium chloride flush  5-40 mL IntraVENous 2 times per day    enoxaparin  40 mg SubCUTAneous Daily    ipratropium 0.5 mg-albuterol 2.5 mg  1 Dose Inhalation Q4H WA RT    methylPREDNISolone  40 mg IntraVENous Q12H    meropenem  1,000 mg IntraVENous Q8H    doxycycline (VIBRAMYCIN) IV  100 mg IntraVENous Q12H    amLODIPine  10 mg Oral Daily    escitalopram  20 mg Oral Nightly    lisinopril  5 mg Oral Daily    Roflumilast  250 mcg Oral Daily    vancomycin  1,250 mg IntraVENous Q12H       Continuous Infusions:   sodium chloride      sodium chloride         PRN Meds:  sodium chloride flush, sodium chloride, ondansetron **OR** ondansetron, polyethylene glycol, acetaminophen **OR** acetaminophen, benzonatate, hydrOXYzine HCl                  Objective:     Temp  Av.1 °F (36.7 °C)  Min: 97.5 °F (36.4 °C)  Max: 98.8 °F (37.1 °C)  Pulse  Av.4  Min: 74  Max: 136  BP  Min: 98/56  Max: 155/82  SpO2  Av.6 %  Min: 90 %  Max: 100 %  FiO2   Av %  Min: 40 %  Max: 40 %  Patient Vitals for the past 4 hrs:   BP Temp Temp src Pulse Resp SpO2   24 1158 -- -- -- -- 20 --   24 1102 113/66 98.8 °F (37.1 °C) Oral 96 -- 90 %   24 0813 -- -- -- -- 20 --         Intake/Output Summary (Last 24 hours) at 2024 1207  Last data filed at 2024 0600  Gross per 24 hour   Intake 60 ml   Output 460 ml   Net -400 ml       Physical Exam:  Gen: No distress. Alert. ++Ill appearing elderly female   Eyes: PERRL. No sclera icterus. No conjunctival injection.   ENT: No discharge. Pharynx clear.   Neck: No JVD.  No Carotid Bruit. Trachea midline.  Resp: +accessory muscle use.  ++poor air exchange with wheezing and rhonchi throughout lung fields

## 2024-01-20 NOTE — PROGRESS NOTES
01/19/24 6553   NIV Type   NIV Started/Stopped On   Equipment Type v60   Mode Bilevel   Mask Type Full face mask   Mask Size Medium   Assessment   Pulse 93   SpO2 94 %   Settings/Measurements   IPAP 12 cmH20   CPAP/EPAP 6 cmH2O   Vt (Measured) 578 mL   Rate Ordered 14   FiO2  40 %   I Time/ I Time % 0.9 s   Minute Volume (L/min) 10.8 Liters   Mask Leak (lpm) 0 lpm   Patient's Home Machine No

## 2024-01-20 NOTE — PROGRESS NOTES
01/20/24 0338   NIV Type   $NIV $Daily Charge   NIV Started/Stopped On   Equipment Type v60   Mode Bilevel   Mask Type Full face mask   Mask Size Medium   Assessment   Pulse 82   SpO2 96 %   Settings/Measurements   IPAP 12 cmH20   CPAP/EPAP 6 cmH2O   Vt (Measured) 596 mL   Rate Ordered 14   FiO2  40 %   I Time/ I Time % 0.9 s   Minute Volume (L/min) 12 Liters   Mask Leak (lpm) 0 lpm   Patient's Home Machine No

## 2024-01-20 NOTE — PROGRESS NOTES
RT Inhaler-Nebulizer Bronchodilator Protocol Note    There is a bronchodilator order in the chart from a provider indicating to follow the RT Bronchodilator Protocol and there is an “Initiate RT Inhaler-Nebulizer Bronchodilator Protocol” order as well (see protocol at bottom of note).    CXR Findings:  XR CHEST PORTABLE    Result Date: 1/19/2024  Bibasilar airspace opacities and small left pleural effusion.  This could represent atelectasis versus pneumonia in the appropriate clinical setting       The findings from the last RT Protocol Assessment were as follows:   History Pulmonary Disease: (P) Chronic pulmonary disease  Respiratory Pattern: (P) Dyspnea on exertion or RR 21-25 bpm  Breath Sounds: (P) Inspiratory and expiratory or bilateral wheezing and/or rhonchi  Cough: (P) Strong, spontaneous, non-productive  Indication for Bronchodilator Therapy: (P) Wheezing associated with pulm disorder  Bronchodilator Assessment Score: (P) 10    Aerosolized bronchodilator medication orders have been revised according to the RT Inhaler-Nebulizer Bronchodilator Protocol below.    Respiratory Therapist to perform RT Therapy Protocol Assessment initially then follow the protocol.  Repeat RT Therapy Protocol Assessment PRN for score 0-3 or on second treatment, BID, and PRN for scores above 3.    No Indications - adjust the frequency to every 6 hours PRN wheezing or bronchospasm, if no treatments needed after 48 hours then discontinue using Per Protocol order mode.     If indication present, adjust the RT bronchodilator orders based on the Bronchodilator Assessment Score as indicated below.  Use Inhaler orders unless patient has one or more of the following: on home nebulizer, not able to hold breath for 10 seconds, is not alert and oriented, cannot activate and use MDI correctly, or respiratory rate 25 breaths per minute or more, then use the equivalent nebulizer order(s) with same Frequency and PRN reasons based on the score.   If a patient is on this medication at home then do not decrease Frequency below that used at home.    0-3 - enter or revise RT bronchodilator order(s) to equivalent RT Bronchodilator order with Frequency of every 4 hours PRN for wheezing or increased work of breathing using Per Protocol order mode.        4-6 - enter or revise RT Bronchodilator order(s) to two equivalent RT bronchodilator orders with one order with BID Frequency and one order with Frequency of every 4 hours PRN wheezing or increased work of breathing using Per Protocol order mode.        7-10 - enter or revise RT Bronchodilator order(s) to two equivalent RT bronchodilator orders with one order with TID Frequency and one order with Frequency of every 4 hours PRN wheezing or increased work of breathing using Per Protocol order mode.       11-13 - enter or revise RT Bronchodilator order(s) to one equivalent RT bronchodilator order with QID Frequency and an Albuterol order with Frequency of every 4 hours PRN wheezing or increased work of breathing using Per Protocol order mode.      Greater than 13 - enter or revise RT Bronchodilator order(s) to one equivalent RT bronchodilator order with every 4 hours Frequency and an Albuterol order with Frequency of every 2 hours PRN wheezing or increased work of breathing using Per Protocol order mode.         Electronically signed by Dallas Delacruz RCP on 1/20/2024 at 8:14 AM

## 2024-01-20 NOTE — PROGRESS NOTES
Pulmonary & Critical Care Medicine ICU Progress Note    CC: Respiratory failure    Events of Last 24 hours:   BiPAP overnight 16/8 40%   Up to 6L when off       Vascular lines: IV: PIVs         / / /FiO2 : 40 %  Recent Labs     24  1739   PHART 7.357   FLR5FWA 45.6*   PO2ART 80.8       IV:   sodium chloride      sodium chloride         Vitals:  Blood pressure 122/66, pulse 74, temperature 97.8 °F (36.6 °C), temperature source Axillary, resp. rate 26, height 1.6 m (5' 3\"), weight 76.1 kg (167 lb 13 oz), SpO2 96 %, not currently breastfeeding.  on BiPAP   Temp  Av.8 °F (36.6 °C)  Min: 97.5 °F (36.4 °C)  Max: 98.4 °F (36.9 °C)    Intake/Output Summary (Last 24 hours) at 2024 0805  Last data filed at 2024 0600  Gross per 24 hour   Intake 60 ml   Output 460 ml   Net -400 ml     PE:  Gen: + Distress.  Ill-appearing  Eyes: PERRL. No sclera icterus. No conjunctival injection.   ENT: No discharge. Pharynx clear.   Neck: Trachea midline. No obvious mass.    Resp: + Accessory muscle use. No crackles. Bilateral wheezes. No rhonchi. No dullness on percussion.  CV: Tacky rate. Regular rhythm. No murmur or rub. No edema.   GI: Non-tender. Non-distended. No hernia.   Skin: Warm and dry. No nodule on exposed extremities.   Lymph: No cervical LAD. No supraclavicular LAD.   M/S: No cyanosis. No joint deformity. No clubbing.   Neuro: Awake. Alert. Moves all four extremities.   Psych: Oriented x 3.  + Anxiety.     Scheduled Meds:   sodium chloride flush  5-40 mL IntraVENous 2 times per day    enoxaparin  40 mg SubCUTAneous Daily    ipratropium 0.5 mg-albuterol 2.5 mg  1 Dose Inhalation Q4H WA RT    methylPREDNISolone  40 mg IntraVENous Q12H    meropenem  1,000 mg IntraVENous Q8H    doxycycline (VIBRAMYCIN) IV  100 mg IntraVENous Q12H    amLODIPine  10 mg Oral Daily    escitalopram  20 mg Oral Nightly    lisinopril  5 mg Oral Daily    Roflumilast  250 mcg Oral Daily    vancomycin  1,250 mg IntraVENous Q12H

## 2024-01-20 NOTE — PROGRESS NOTES
Pt retrieved from ED on stretcher, removed bipap, placed on 6 L O2 for transport.  Pt placed on bipap on floor, requested to eat dinner, begin coughing, thinking she was choking, sats to 77%. This writer removed food from pt, placed on bipap, sats to 98%.

## 2024-01-20 NOTE — PROGRESS NOTES
Pt incontinent of large amount of stool. Pt given bath, attends and purewick replaced. PIVS WNL. Pt remains 5-7 LNC. Donnie, RT at bedside for treatment. Pt SOB w/ minimal exertion.

## 2024-01-20 NOTE — PROGRESS NOTES
decrease Frequency below that used at home.    0-3 - enter or revise RT bronchodilator order(s) to equivalent RT Bronchodilator order with Frequency of every 4 hours PRN for wheezing or increased work of breathing using Per Protocol order mode.        4-6 - enter or revise RT Bronchodilator order(s) to two equivalent RT bronchodilator orders with one order with BID Frequency and one order with Frequency of every 4 hours PRN wheezing or increased work of breathing using Per Protocol order mode.        7-10 - enter or revise RT Bronchodilator order(s) to two equivalent RT bronchodilator orders with one order with TID Frequency and one order with Frequency of every 4 hours PRN wheezing or increased work of breathing using Per Protocol order mode.       11-13 - enter or revise RT Bronchodilator order(s) to one equivalent RT bronchodilator order with QID Frequency and an Albuterol order with Frequency of every 4 hours PRN wheezing or increased work of breathing using Per Protocol order mode.      Greater than 13 - enter or revise RT Bronchodilator order(s) to one equivalent RT bronchodilator order with every 4 hours Frequency and an Albuterol order with Frequency of every 2 hours PRN wheezing or increased work of breathing using Per Protocol order mode.     Electronically signed by Shakeel Kenyon RCP on 1/19/2024 at 8:21 PM

## 2024-01-20 NOTE — PROGRESS NOTES
Vancomycin Day 2/7  Current dose:  1250 mg q12h  Indication:  HAP  Renal function stable;  WBC's coming down  Vancomycin trough ordered for tomorrow @ 0800.  Continue same dose and schedule until then.

## 2024-01-21 LAB
ANION GAP SERPL CALCULATED.3IONS-SCNC: 10 MMOL/L (ref 3–16)
BASOPHILS # BLD: 0 K/UL (ref 0–0.2)
BASOPHILS NFR BLD: 0.1 %
BUN SERPL-MCNC: 17 MG/DL (ref 7–20)
CALCIUM SERPL-MCNC: 8.9 MG/DL (ref 8.3–10.6)
CHLORIDE SERPL-SCNC: 102 MMOL/L (ref 99–110)
CO2 SERPL-SCNC: 29 MMOL/L (ref 21–32)
CREAT SERPL-MCNC: <0.5 MG/DL (ref 0.6–1.2)
DEPRECATED RDW RBC AUTO: 14.1 % (ref 12.4–15.4)
EOSINOPHIL # BLD: 0 K/UL (ref 0–0.6)
EOSINOPHIL NFR BLD: 0 %
GFR SERPLBLD CREATININE-BSD FMLA CKD-EPI: >60 ML/MIN/{1.73_M2}
GLUCOSE SERPL-MCNC: 138 MG/DL (ref 70–99)
HCT VFR BLD AUTO: 31.7 % (ref 36–48)
HGB BLD-MCNC: 10.5 G/DL (ref 12–16)
LYMPHOCYTES # BLD: 0.3 K/UL (ref 1–5.1)
LYMPHOCYTES NFR BLD: 2.9 %
MCH RBC QN AUTO: 31 PG (ref 26–34)
MCHC RBC AUTO-ENTMCNC: 33.2 G/DL (ref 31–36)
MCV RBC AUTO: 93.5 FL (ref 80–100)
MONOCYTES # BLD: 0.4 K/UL (ref 0–1.3)
MONOCYTES NFR BLD: 3.1 %
NEUTROPHILS # BLD: 10.9 K/UL (ref 1.7–7.7)
NEUTROPHILS NFR BLD: 93.9 %
PLATELET # BLD AUTO: 292 K/UL (ref 135–450)
PMV BLD AUTO: 6.7 FL (ref 5–10.5)
POTASSIUM SERPL-SCNC: 4.8 MMOL/L (ref 3.5–5.1)
RBC # BLD AUTO: 3.39 M/UL (ref 4–5.2)
SODIUM SERPL-SCNC: 141 MMOL/L (ref 136–145)
VANCOMYCIN TROUGH SERPL-MCNC: 4.3 UG/ML (ref 10–20)
WBC # BLD AUTO: 11.6 K/UL (ref 4–11)

## 2024-01-21 PROCEDURE — 36415 COLL VENOUS BLD VENIPUNCTURE: CPT

## 2024-01-21 PROCEDURE — 94640 AIRWAY INHALATION TREATMENT: CPT

## 2024-01-21 PROCEDURE — 6370000000 HC RX 637 (ALT 250 FOR IP)

## 2024-01-21 PROCEDURE — 2500000003 HC RX 250 WO HCPCS

## 2024-01-21 PROCEDURE — 80202 ASSAY OF VANCOMYCIN: CPT

## 2024-01-21 PROCEDURE — 94761 N-INVAS EAR/PLS OXIMETRY MLT: CPT

## 2024-01-21 PROCEDURE — 2700000000 HC OXYGEN THERAPY PER DAY

## 2024-01-21 PROCEDURE — 94660 CPAP INITIATION&MGMT: CPT

## 2024-01-21 PROCEDURE — 6370000000 HC RX 637 (ALT 250 FOR IP): Performed by: INTERNAL MEDICINE

## 2024-01-21 PROCEDURE — 80048 BASIC METABOLIC PNL TOTAL CA: CPT

## 2024-01-21 PROCEDURE — 85025 COMPLETE CBC W/AUTO DIFF WBC: CPT

## 2024-01-21 PROCEDURE — 2580000003 HC RX 258

## 2024-01-21 PROCEDURE — 99291 CRITICAL CARE FIRST HOUR: CPT | Performed by: INTERNAL MEDICINE

## 2024-01-21 PROCEDURE — 99233 SBSQ HOSP IP/OBS HIGH 50: CPT | Performed by: INTERNAL MEDICINE

## 2024-01-21 PROCEDURE — 6360000002 HC RX W HCPCS

## 2024-01-21 PROCEDURE — 2000000000 HC ICU R&B

## 2024-01-21 RX ORDER — LOPERAMIDE HYDROCHLORIDE 2 MG/1
2 CAPSULE ORAL 4 TIMES DAILY PRN
Status: DISCONTINUED | OUTPATIENT
Start: 2024-01-21 | End: 2024-01-26 | Stop reason: HOSPADM

## 2024-01-21 RX ORDER — BUSPIRONE HYDROCHLORIDE 10 MG/1
10 TABLET ORAL 3 TIMES DAILY
Status: DISCONTINUED | OUTPATIENT
Start: 2024-01-21 | End: 2024-01-26 | Stop reason: HOSPADM

## 2024-01-21 RX ADMIN — METHYLPREDNISOLONE SODIUM SUCCINATE 40 MG: 40 INJECTION INTRAMUSCULAR; INTRAVENOUS at 05:33

## 2024-01-21 RX ADMIN — BUSPIRONE HYDROCHLORIDE 10 MG: 10 TABLET ORAL at 13:42

## 2024-01-21 RX ADMIN — ESCITALOPRAM OXALATE 20 MG: 10 TABLET ORAL at 21:34

## 2024-01-21 RX ADMIN — IPRATROPIUM BROMIDE AND ALBUTEROL SULFATE 1 DOSE: 2.5; .5 SOLUTION RESPIRATORY (INHALATION) at 19:38

## 2024-01-21 RX ADMIN — IPRATROPIUM BROMIDE AND ALBUTEROL SULFATE 1 DOSE: 2.5; .5 SOLUTION RESPIRATORY (INHALATION) at 23:28

## 2024-01-21 RX ADMIN — MEROPENEM 1000 MG: 1 INJECTION, POWDER, FOR SOLUTION INTRAVENOUS at 01:45

## 2024-01-21 RX ADMIN — DOXYCYCLINE 100 MG: 100 INJECTION, POWDER, LYOPHILIZED, FOR SOLUTION INTRAVENOUS at 08:58

## 2024-01-21 RX ADMIN — MEROPENEM 1000 MG: 1 INJECTION, POWDER, FOR SOLUTION INTRAVENOUS at 12:42

## 2024-01-21 RX ADMIN — AMLODIPINE BESYLATE 10 MG: 5 TABLET ORAL at 08:56

## 2024-01-21 RX ADMIN — IPRATROPIUM BROMIDE AND ALBUTEROL SULFATE 1 DOSE: 2.5; .5 SOLUTION RESPIRATORY (INHALATION) at 08:28

## 2024-01-21 RX ADMIN — LOPERAMIDE HYDROCHLORIDE 2 MG: 2 CAPSULE ORAL at 14:17

## 2024-01-21 RX ADMIN — DOXYCYCLINE 100 MG: 100 INJECTION, POWDER, LYOPHILIZED, FOR SOLUTION INTRAVENOUS at 21:35

## 2024-01-21 RX ADMIN — MEROPENEM 1000 MG: 1 INJECTION, POWDER, FOR SOLUTION INTRAVENOUS at 17:30

## 2024-01-21 RX ADMIN — IPRATROPIUM BROMIDE AND ALBUTEROL SULFATE 1 DOSE: 2.5; .5 SOLUTION RESPIRATORY (INHALATION) at 11:53

## 2024-01-21 RX ADMIN — Medication 10 ML: at 08:59

## 2024-01-21 RX ADMIN — IPRATROPIUM BROMIDE AND ALBUTEROL SULFATE 1 DOSE: 2.5; .5 SOLUTION RESPIRATORY (INHALATION) at 15:53

## 2024-01-21 RX ADMIN — ROFLUMILAST 250 MCG: 500 TABLET ORAL at 08:56

## 2024-01-21 RX ADMIN — BUSPIRONE HYDROCHLORIDE 5 MG: 5 TABLET ORAL at 08:56

## 2024-01-21 RX ADMIN — HYDROXYZINE HYDROCHLORIDE 25 MG: 25 TABLET ORAL at 05:34

## 2024-01-21 RX ADMIN — BUSPIRONE HYDROCHLORIDE 10 MG: 10 TABLET ORAL at 21:34

## 2024-01-21 RX ADMIN — ENOXAPARIN SODIUM 40 MG: 100 INJECTION SUBCUTANEOUS at 08:56

## 2024-01-21 RX ADMIN — METHYLPREDNISOLONE SODIUM SUCCINATE 40 MG: 40 INJECTION INTRAMUSCULAR; INTRAVENOUS at 19:30

## 2024-01-21 RX ADMIN — VANCOMYCIN HYDROCHLORIDE 1750 MG: 10 INJECTION, POWDER, LYOPHILIZED, FOR SOLUTION INTRAVENOUS at 10:06

## 2024-01-21 RX ADMIN — LISINOPRIL 5 MG: 10 TABLET ORAL at 08:56

## 2024-01-21 ASSESSMENT — PAIN SCALES - WONG BAKER
WONGBAKER_NUMERICALRESPONSE: 0

## 2024-01-21 NOTE — PROGRESS NOTES
RT Inhaler-Nebulizer Bronchodilator Protocol Note    There is a bronchodilator order in the chart from a provider indicating to follow the RT Bronchodilator Protocol and there is an “Initiate RT Inhaler-Nebulizer Bronchodilator Protocol” order as well (see protocol at bottom of note).    CXR Findings:  XR CHEST PORTABLE    Result Date: 1/19/2024  Bibasilar airspace opacities and small left pleural effusion.  This could represent atelectasis versus pneumonia in the appropriate clinical setting       The findings from the last RT Protocol Assessment were as follows:   History Pulmonary Disease: Chronic pulmonary disease  Respiratory Pattern: Dyspnea on exertion or RR 21-25 bpm  Breath Sounds: Slightly diminished and/or crackles  Cough: Strong, spontaneous, non-productive  Indication for Bronchodilator Therapy: Decreased or absent breath sounds  Bronchodilator Assessment Score: 6    Aerosolized bronchodilator medication orders have been revised according to the RT Inhaler-Nebulizer Bronchodilator Protocol below.    Respiratory Therapist to perform RT Therapy Protocol Assessment initially then follow the protocol.  Repeat RT Therapy Protocol Assessment PRN for score 0-3 or on second treatment, BID, and PRN for scores above 3.    No Indications - adjust the frequency to every 6 hours PRN wheezing or bronchospasm, if no treatments needed after 48 hours then discontinue using Per Protocol order mode.     If indication present, adjust the RT bronchodilator orders based on the Bronchodilator Assessment Score as indicated below.  Use Inhaler orders unless patient has one or more of the following: on home nebulizer, not able to hold breath for 10 seconds, is not alert and oriented, cannot activate and use MDI correctly, or respiratory rate 25 breaths per minute or more, then use the equivalent nebulizer order(s) with same Frequency and PRN reasons based on the score.  If a patient is on this medication at home then do not

## 2024-01-21 NOTE — PROGRESS NOTES
RT Inhaler-Nebulizer Bronchodilator Protocol Note    There is a bronchodilator order in the chart from a provider indicating to follow the RT Bronchodilator Protocol and there is an “Initiate RT Inhaler-Nebulizer Bronchodilator Protocol” order as well (see protocol at bottom of note).    CXR Findings:  XR CHEST PORTABLE    Result Date: 1/19/2024  Bibasilar airspace opacities and small left pleural effusion.  This could represent atelectasis versus pneumonia in the appropriate clinical setting       The findings from the last RT Protocol Assessment were as follows:   History Pulmonary Disease: (P) Chronic pulmonary disease  Respiratory Pattern: (P) Dyspnea on exertion or RR 21-25 bpm  Breath Sounds: (P) Inspiratory and expiratory or bilateral wheezing and/or rhonchi  Cough: (P) Strong, productive  Indication for Bronchodilator Therapy: (P) Wheezing associated with pulm disorder  Bronchodilator Assessment Score: (P) 11    Aerosolized bronchodilator medication orders have been revised according to the RT Inhaler-Nebulizer Bronchodilator Protocol below.    Respiratory Therapist to perform RT Therapy Protocol Assessment initially then follow the protocol.  Repeat RT Therapy Protocol Assessment PRN for score 0-3 or on second treatment, BID, and PRN for scores above 3.    No Indications - adjust the frequency to every 6 hours PRN wheezing or bronchospasm, if no treatments needed after 48 hours then discontinue using Per Protocol order mode.     If indication present, adjust the RT bronchodilator orders based on the Bronchodilator Assessment Score as indicated below.  Use Inhaler orders unless patient has one or more of the following: on home nebulizer, not able to hold breath for 10 seconds, is not alert and oriented, cannot activate and use MDI correctly, or respiratory rate 25 breaths per minute or more, then use the equivalent nebulizer order(s) with same Frequency and PRN reasons based on the score.  If a patient is on  this medication at home then do not decrease Frequency below that used at home.    0-3 - enter or revise RT bronchodilator order(s) to equivalent RT Bronchodilator order with Frequency of every 4 hours PRN for wheezing or increased work of breathing using Per Protocol order mode.        4-6 - enter or revise RT Bronchodilator order(s) to two equivalent RT bronchodilator orders with one order with BID Frequency and one order with Frequency of every 4 hours PRN wheezing or increased work of breathing using Per Protocol order mode.        7-10 - enter or revise RT Bronchodilator order(s) to two equivalent RT bronchodilator orders with one order with TID Frequency and one order with Frequency of every 4 hours PRN wheezing or increased work of breathing using Per Protocol order mode.       11-13 - enter or revise RT Bronchodilator order(s) to one equivalent RT bronchodilator order with QID Frequency and an Albuterol order with Frequency of every 4 hours PRN wheezing or increased work of breathing using Per Protocol order mode.      Greater than 13 - enter or revise RT Bronchodilator order(s) to one equivalent RT bronchodilator order with every 4 hours Frequency and an Albuterol order with Frequency of every 2 hours PRN wheezing or increased work of breathing using Per Protocol order mode.         Electronically signed by Dallas Delacruz RCP on 1/21/2024 at 8:31 AM

## 2024-01-21 NOTE — PROGRESS NOTES
AM assessment completed. AM labs reviewed. VSS. Pt on 6 LNC. Pt continues to c/o anxiety- on scheduled Buspar, states its helping some but wears off. Purewick in place, functioning properly. PIV wnl. AM meds given. No new orders at present time. Call light in reach & bed alarm on.   Shirley Gallardo RN, BSN

## 2024-01-21 NOTE — PROGRESS NOTES
Blood pressure 117/70, pulse 96, temperature 98.8 °F (37.1 °C), temperature source Axillary, resp. rate 26, height 1.6 m (5' 3\"), weight 76.1 kg (167 lb 13 oz), SpO2 92 %, not currently breastfeeding.    Patient is alert oriented x 4. States she does still have a headache but did take a tylenol earlier for it. Patient back on 6L NC to take her medication and to take a break before she goes on to sleep for the night.     Shift assessment complete. See doc flow. Nightly medications given see MAR. Patient with no complaints at this time. Call light and bedside table within easy reach.     Electronically signed by Nirmala Mesa RN on 1/20/2024 at 9:14 PM

## 2024-01-21 NOTE — PROGRESS NOTES
01/20/24 2335   NIV Type   NIV Started/Stopped On   Equipment Type v60   Mode Bilevel   Mask Type Full face mask   Mask Size Medium   Assessment   Pulse 89   SpO2 97 %   Settings/Measurements   IPAP 12 cmH20   CPAP/EPAP 6 cmH2O   Vt (Measured) 630 mL   Rate Ordered 14   FiO2  40 %   I Time/ I Time % 0.9 s   Minute Volume (L/min) 15.6 Liters   Mask Leak (lpm) 0 lpm   Patient's Home Machine No

## 2024-01-21 NOTE — PROGRESS NOTES
01/21/24 0338   NIV Type   Equipment Type v60   Mode Bilevel   Mask Type Full face mask   Mask Size Medium   Assessment   Pulse 75   SpO2 95 %   Settings/Measurements   IPAP 12 cmH20   CPAP/EPAP 6 cmH2O   Vt (Measured) 571 mL   Rate Ordered 14   FiO2  40 %   Minute Volume (L/min) 12.8 Liters   Mask Leak (lpm) 0 lpm   Patient's Home Machine No   Patient Observation   Observations spo2 95% on 40% bipap

## 2024-01-21 NOTE — PROGRESS NOTES
Blood pressure (!) 142/83, pulse 85, temperature 98 °F (36.7 °C), temperature source Oral, resp. rate 18, height 1.6 m (5' 3\"), weight 76.1 kg (167 lb 13 oz), SpO2 95 %, not currently breastfeeding.    Patient continues on Bi-Pap. No respiratory distress at this time. Patient becomes very anxious at times. No other changes at this time.     Electronically signed by Nirmala Mesa RN on 1/21/2024 at 6:57 AM

## 2024-01-21 NOTE — PROGRESS NOTES
HEART FAILURE CARE PLAN:    Comorbidities Reviewed: Yes   Patient has a past medical history of Angina pectoris (HCC), Chronic pain, Congestive heart failure (HCC), COPD exacerbation (HCC), Depression, Essential hypertension, Panic disorder, Pneumonia, and Pulmonary emphysema (HCC).     ECHOCARDIOGRAM Reviewed: Yes   Patient's Ejection Fraction (EF) is greater than 40%    Weights Reviewed: Yes   Admission weight: 76.1 kg (167 lb 13 oz)   Wt Readings from Last 3 Encounters:   01/19/24 76.1 kg (167 lb 13 oz)   01/15/24 76.2 kg (168 lb)   01/05/24 76.2 kg (168 lb)     Intake & Output Reviewed: Yes     Intake/Output Summary (Last 24 hours) at 1/21/2024 0417  Last data filed at 1/20/2024 1809  Gross per 24 hour   Intake 818.93 ml   Output 1560 ml   Net -741.07 ml     Medications Reviewed: Yes   SCHEDULED HOSPITAL MEDICATIONS:   busPIRone  5 mg Oral TID    sodium chloride flush  5-40 mL IntraVENous 2 times per day    enoxaparin  40 mg SubCUTAneous Daily    ipratropium 0.5 mg-albuterol 2.5 mg  1 Dose Inhalation Q4H WA RT    methylPREDNISolone  40 mg IntraVENous Q12H    meropenem  1,000 mg IntraVENous Q8H    doxycycline (VIBRAMYCIN) IV  100 mg IntraVENous Q12H    amLODIPine  10 mg Oral Daily    escitalopram  20 mg Oral Nightly    lisinopril  5 mg Oral Daily    Roflumilast  250 mcg Oral Daily    vancomycin  1,250 mg IntraVENous Q12H     ACE/ARB/ARNI is REQUIRED for EF </= 40% SYSTOLIC FAILURE:   ACE:: Lisinopril  ARB:: None  ARNI:: None    Evidenced-Based Beta Blocker is REQUIRED for EF </= 40% SYSTOLIC FAILURE:   :: None    Diuretics:  :: Furosemide    Diet Reviewed: Yes   ADULT DIET; Regular    Goal of Care Reviewed: Yes   Patient and/or Family's stated Goal of Care this Admission: reduce shortness of breath, increase activity tolerance, better understand heart failure and disease management, be more comfortable, and reduce lower extremity edema prior to discharge.

## 2024-01-21 NOTE — PROGRESS NOTES
Pharmacy Vancomycin Consult     Vancomycin Day: 3/7  Current Dosin mg IV every 12 hours  Current indication: HAP    Temp max:  98    Recent Labs     24  0527 24  0444   BUN 16 17   CREATININE <0.5* <0.5*   WBC 5.1 11.6*       Intake/Output Summary (Last 24 hours) at 2024 0926  Last data filed at 2024 0654  Gross per 24 hour   Intake 1488.99 ml   Output 1950 ml   Net -461.01 ml       Ht Readings from Last 1 Encounters:   24 1.6 m (5' 3\")        Wt Readings from Last 1 Encounters:   24 80.7 kg (178 lb)       Body mass index is 31.53 kg/m².    Estimated Creatinine Clearance: 111 mL/min (based on SCr of 0.5 mg/dL).    Trough: 4.3 ( 0748)    Assessment/Plan:  Will increase the dose to 1750 mg IV every 12 hours. Predicted AUC = 472.  Draw vancomycin trough level  at 2000.  Pharmacy will continue to monitor and adjust as necessary.

## 2024-01-21 NOTE — PROGRESS NOTES
Admit: 2024    Name:  Annie Tyler  Room:  [address] [place] [address]  MRN:    [id number]    Critical Care Daily Progress Note for 2024   Acute on chronic resp failure   Placed on BiPAP  Interval History:   Used BiPAP last night and uses prn during the day    Now on 6 L     Anxiety a little better with buspar     Scheduled Meds:   busPIRone  5 mg Oral TID    sodium chloride flush  5-40 mL IntraVENous 2 times per day    enoxaparin  40 mg SubCUTAneous Daily    ipratropium 0.5 mg-albuterol 2.5 mg  1 Dose Inhalation Q4H WA RT    methylPREDNISolone  40 mg IntraVENous Q12H    meropenem  1,000 mg IntraVENous Q8H    doxycycline (VIBRAMYCIN) IV  100 mg IntraVENous Q12H    amLODIPine  10 mg Oral Daily    escitalopram  20 mg Oral Nightly    lisinopril  5 mg Oral Daily    Roflumilast  250 mcg Oral Daily       Continuous Infusions:   sodium chloride         PRN Meds:  sodium chloride flush, sodium chloride, ondansetron **OR** ondansetron, polyethylene glycol, acetaminophen **OR** acetaminophen, benzonatate, hydrOXYzine HCl                  Objective:     Temp  Av.9 °F (36.6 °C)  Min: 96.9 °F (36.1 °C)  Max: 98.8 °F (37.1 °C)  Pulse  Av.6  Min: 75  Max: 114  BP  Min: 113/66  Max: 164/115  SpO2  Av %  Min: 81 %  Max: 98 %  FiO2   Av %  Min: 40 %  Max: 40 %  Patient Vitals for the past 4 hrs:   BP Temp Temp src Pulse Resp SpO2 Weight   24 1000 137/76 -- -- 100 18 92 % --   24 0900 (!) 148/105 -- -- 98 19 98 % --   24 0800 (!) 152/74 96.9 °F (36.1 °C) Temporal 92 19 93 % --   24 0730 126/85 -- -- 78 23 96 % --   24 0700 129/82 -- -- 84 24 95 % 80.7 kg (178 lb)           Intake/Output Summary (Last 24 hours) at 2024 1048  Last data filed at 2024 0654  Gross per 24 hour   Intake 1488.99 ml   Output 1950 ml   Net -461.01 ml         Physical Exam:  Gen: No distress. Alert. ++Ill appearing elderly female   Eyes: PERRL. No sclera icterus. No conjunctival injection.   ENT: No

## 2024-01-21 NOTE — PROGRESS NOTES
01/20/24 2011   NIV Type   NIV Started/Stopped On   Equipment Type v60   Mode Bilevel   Mask Type Full face mask   Mask Size Medium   Assessment   Pulse 99   SpO2 96 %   Settings/Measurements   IPAP 12 cmH20   CPAP/EPAP 6 cmH2O   Vt (Measured) 894 mL   Rate Ordered 14   FiO2  40 %   I Time/ I Time % 0.9 s   Minute Volume (L/min) 20 Liters   Mask Leak (lpm) 0 lpm   Patient's Home Machine No   Alarm Settings   Alarms On Y   Low Pressure (cmH2O) 8 cmH2O   High Pressure (cmH2O) 40 cmH2O   Delay Alarm 20 sec(s)   RR Low (bpm) 14   RR High (bpm) 40 br/min

## 2024-01-21 NOTE — PROGRESS NOTES
Pulmonary & Critical Care Medicine ICU Progress Note    CC: Respiratory failure    Events of Last 24 hours:   BiPAP overnight 16/8 40%   This am in round placed back on BiPAP for increased WOB  Anxious and agitated       Vascular lines: IV: PIVs         / / /FiO2 : 40 %  Recent Labs     24  1739   PHART 7.357   JIH1HWK 45.6*   PO2ART 80.8       IV:   sodium chloride         Vitals:  Blood pressure 129/82, pulse 84, temperature 98 °F (36.7 °C), temperature source Oral, resp. rate 24, height 1.6 m (5' 3\"), weight 80.7 kg (178 lb), SpO2 95 %, not currently breastfeeding.  on BiPAP   Temp  Av.2 °F (36.8 °C)  Min: 97.2 °F (36.2 °C)  Max: 98.8 °F (37.1 °C)    Intake/Output Summary (Last 24 hours) at 2024 0750  Last data filed at 2024 0654  Gross per 24 hour   Intake 1488.99 ml   Output 1950 ml   Net -461.01 ml     PE:  Gen: + Distress.  Ill-appearing  Eyes: PERRL. No sclera icterus. No conjunctival injection.   ENT: No discharge. Pharynx clear.   Neck: Trachea midline. No obvious mass.    Resp: + Accessory muscle use. No crackles. Bilateral wheezes. No rhonchi. No dullness on percussion.  CV: Tacky rate. Regular rhythm. No murmur or rub. No edema.   GI: Non-tender. Non-distended. No hernia.   Skin: Warm and dry. No nodule on exposed extremities.   Lymph: No cervical LAD. No supraclavicular LAD.   M/S: No cyanosis. No joint deformity. No clubbing.   Neuro: Awake. Alert. Moves all four extremities.   Psych: Oriented x 3.  + Anxiety.     Scheduled Meds:   busPIRone  5 mg Oral TID    sodium chloride flush  5-40 mL IntraVENous 2 times per day    enoxaparin  40 mg SubCUTAneous Daily    ipratropium 0.5 mg-albuterol 2.5 mg  1 Dose Inhalation Q4H WA RT    methylPREDNISolone  40 mg IntraVENous Q12H    meropenem  1,000 mg IntraVENous Q8H    doxycycline (VIBRAMYCIN) IV  100 mg IntraVENous Q12H    amLODIPine  10 mg Oral Daily    escitalopram  20 mg Oral Nightly    lisinopril  5 mg Oral Daily    Roflumilast  250

## 2024-01-21 NOTE — FLOWSHEET NOTE
01/20/24 1930   Vital Signs   Temp 98.8 °F (37.1 °C)   Temp Source Axillary   Pulse (!) 114   Heart Rate Source Monitor   Respirations 26   BP (!) 164/115   MAP (Calculated) 131   BP Location Right upper arm   BP Method Automatic   Patient Position Semi fowlers   Pain Assessment   Pain Assessment 0-10   Pain Level 8   Patient's Stated Pain Goal 0 - No pain   Pain Location Head;Back   Pain Orientation Lower   Pain Descriptors Aching   Functional Pain Assessment Activities are not prevented   Pain Type Acute pain   Pain Frequency Continuous   Pain Onset On-going   Non-Pharmaceutical Pain Intervention(s) Rest;Repositioned   Response to Pain Intervention None (pain unchanged or no improvement)   Side Effects No reported side effects   Multiple Pain Sites No   Opioid-Induced Sedation   POSS Score 1   Oxygen Therapy   SpO2 (!) 81 %   Pulse Oximetry Type Continuous   Pulse Oximeter Device Mode Continuous   Pulse Oximeter Device Location Finger   O2 Device Nasal cannula   O2 Flow Rate (L/min) 5 L/min     Patient having anxiety attack. Bipap placed on patient. O2 saturations now 96%. Patient is calming down now.

## 2024-01-22 LAB
ANION GAP SERPL CALCULATED.3IONS-SCNC: 5 MMOL/L (ref 3–16)
BASOPHILS # BLD: 0 K/UL (ref 0–0.2)
BASOPHILS NFR BLD: 0 %
BUN SERPL-MCNC: 12 MG/DL (ref 7–20)
CALCIUM SERPL-MCNC: 9.3 MG/DL (ref 8.3–10.6)
CHLORIDE SERPL-SCNC: 92 MMOL/L (ref 99–110)
CO2 SERPL-SCNC: 35 MMOL/L (ref 21–32)
CREAT SERPL-MCNC: <0.5 MG/DL (ref 0.6–1.2)
DEPRECATED RDW RBC AUTO: 14 % (ref 12.4–15.4)
EOSINOPHIL # BLD: 0 K/UL (ref 0–0.6)
EOSINOPHIL NFR BLD: 0.1 %
GFR SERPLBLD CREATININE-BSD FMLA CKD-EPI: >60 ML/MIN/{1.73_M2}
GLUCOSE SERPL-MCNC: 126 MG/DL (ref 70–99)
HCT VFR BLD AUTO: 36 % (ref 36–48)
HGB BLD-MCNC: 11.8 G/DL (ref 12–16)
LYMPHOCYTES # BLD: 1.1 K/UL (ref 1–5.1)
LYMPHOCYTES NFR BLD: 8.2 %
MCH RBC QN AUTO: 30.9 PG (ref 26–34)
MCHC RBC AUTO-ENTMCNC: 32.8 G/DL (ref 31–36)
MCV RBC AUTO: 94.2 FL (ref 80–100)
MONOCYTES # BLD: 0.9 K/UL (ref 0–1.3)
MONOCYTES NFR BLD: 6.7 %
NEUTROPHILS # BLD: 11.5 K/UL (ref 1.7–7.7)
NEUTROPHILS NFR BLD: 85 %
PLATELET # BLD AUTO: 320 K/UL (ref 135–450)
PMV BLD AUTO: 6.8 FL (ref 5–10.5)
POTASSIUM SERPL-SCNC: 4 MMOL/L (ref 3.5–5.1)
RBC # BLD AUTO: 3.82 M/UL (ref 4–5.2)
SODIUM SERPL-SCNC: 132 MMOL/L (ref 136–145)
WBC # BLD AUTO: 13.5 K/UL (ref 4–11)

## 2024-01-22 PROCEDURE — 94640 AIRWAY INHALATION TREATMENT: CPT

## 2024-01-22 PROCEDURE — 94010 BREATHING CAPACITY TEST: CPT

## 2024-01-22 PROCEDURE — 36415 COLL VENOUS BLD VENIPUNCTURE: CPT

## 2024-01-22 PROCEDURE — 6370000000 HC RX 637 (ALT 250 FOR IP)

## 2024-01-22 PROCEDURE — 6370000000 HC RX 637 (ALT 250 FOR IP): Performed by: INTERNAL MEDICINE

## 2024-01-22 PROCEDURE — 99233 SBSQ HOSP IP/OBS HIGH 50: CPT | Performed by: INTERNAL MEDICINE

## 2024-01-22 PROCEDURE — 94761 N-INVAS EAR/PLS OXIMETRY MLT: CPT

## 2024-01-22 PROCEDURE — 85025 COMPLETE CBC W/AUTO DIFF WBC: CPT

## 2024-01-22 PROCEDURE — 2700000000 HC OXYGEN THERAPY PER DAY

## 2024-01-22 PROCEDURE — 6360000002 HC RX W HCPCS

## 2024-01-22 PROCEDURE — 2500000003 HC RX 250 WO HCPCS

## 2024-01-22 PROCEDURE — 2580000003 HC RX 258

## 2024-01-22 PROCEDURE — 80048 BASIC METABOLIC PNL TOTAL CA: CPT

## 2024-01-22 PROCEDURE — 2000000000 HC ICU R&B

## 2024-01-22 PROCEDURE — 94660 CPAP INITIATION&MGMT: CPT

## 2024-01-22 RX ORDER — IPRATROPIUM BROMIDE AND ALBUTEROL SULFATE 2.5; .5 MG/3ML; MG/3ML
1 SOLUTION RESPIRATORY (INHALATION) EVERY 4 HOURS PRN
Status: DISCONTINUED | OUTPATIENT
Start: 2024-01-22 | End: 2024-01-26 | Stop reason: HOSPADM

## 2024-01-22 RX ORDER — LEVOFLOXACIN 750 MG/1
750 TABLET, FILM COATED ORAL DAILY
Status: DISCONTINUED | OUTPATIENT
Start: 2024-01-22 | End: 2024-01-26 | Stop reason: HOSPADM

## 2024-01-22 RX ADMIN — IPRATROPIUM BROMIDE AND ALBUTEROL SULFATE 1 DOSE: 2.5; .5 SOLUTION RESPIRATORY (INHALATION) at 08:36

## 2024-01-22 RX ADMIN — AMLODIPINE BESYLATE 10 MG: 5 TABLET ORAL at 09:10

## 2024-01-22 RX ADMIN — ESCITALOPRAM OXALATE 20 MG: 10 TABLET ORAL at 19:58

## 2024-01-22 RX ADMIN — LOPERAMIDE HYDROCHLORIDE 2 MG: 2 CAPSULE ORAL at 05:17

## 2024-01-22 RX ADMIN — METOPROLOL TARTRATE 25 MG: 25 TABLET, FILM COATED ORAL at 19:58

## 2024-01-22 RX ADMIN — ACETAMINOPHEN 650 MG: 325 TABLET ORAL at 05:17

## 2024-01-22 RX ADMIN — BUSPIRONE HYDROCHLORIDE 10 MG: 10 TABLET ORAL at 19:58

## 2024-01-22 RX ADMIN — LISINOPRIL 5 MG: 10 TABLET ORAL at 09:10

## 2024-01-22 RX ADMIN — METHYLPREDNISOLONE SODIUM SUCCINATE 40 MG: 40 INJECTION INTRAMUSCULAR; INTRAVENOUS at 17:51

## 2024-01-22 RX ADMIN — LEVOFLOXACIN 750 MG: 750 TABLET, FILM COATED ORAL at 12:25

## 2024-01-22 RX ADMIN — METHYLPREDNISOLONE SODIUM SUCCINATE 40 MG: 40 INJECTION INTRAMUSCULAR; INTRAVENOUS at 05:13

## 2024-01-22 RX ADMIN — DOXYCYCLINE 100 MG: 100 INJECTION, POWDER, LYOPHILIZED, FOR SOLUTION INTRAVENOUS at 09:13

## 2024-01-22 RX ADMIN — HYDROXYZINE HYDROCHLORIDE 25 MG: 25 TABLET ORAL at 12:32

## 2024-01-22 RX ADMIN — Medication 10 ML: at 09:10

## 2024-01-22 RX ADMIN — Medication 10 ML: at 19:58

## 2024-01-22 RX ADMIN — METOPROLOL TARTRATE 25 MG: 25 TABLET, FILM COATED ORAL at 14:21

## 2024-01-22 RX ADMIN — ENOXAPARIN SODIUM 40 MG: 100 INJECTION SUBCUTANEOUS at 09:10

## 2024-01-22 RX ADMIN — ACETAMINOPHEN 650 MG: 325 TABLET ORAL at 19:59

## 2024-01-22 RX ADMIN — IPRATROPIUM BROMIDE AND ALBUTEROL SULFATE 1 DOSE: 2.5; .5 SOLUTION RESPIRATORY (INHALATION) at 15:27

## 2024-01-22 RX ADMIN — MEROPENEM 1000 MG: 1 INJECTION, POWDER, FOR SOLUTION INTRAVENOUS at 01:50

## 2024-01-22 RX ADMIN — IPRATROPIUM BROMIDE AND ALBUTEROL SULFATE 1 DOSE: 2.5; .5 SOLUTION RESPIRATORY (INHALATION) at 11:06

## 2024-01-22 RX ADMIN — ROFLUMILAST 250 MCG: 500 TABLET ORAL at 09:10

## 2024-01-22 RX ADMIN — BUSPIRONE HYDROCHLORIDE 10 MG: 10 TABLET ORAL at 09:10

## 2024-01-22 RX ADMIN — LOPERAMIDE HYDROCHLORIDE 2 MG: 2 CAPSULE ORAL at 12:25

## 2024-01-22 RX ADMIN — IPRATROPIUM BROMIDE AND ALBUTEROL SULFATE 1 DOSE: 2.5; .5 SOLUTION RESPIRATORY (INHALATION) at 23:38

## 2024-01-22 RX ADMIN — IPRATROPIUM BROMIDE AND ALBUTEROL SULFATE 1 DOSE: 2.5; .5 SOLUTION RESPIRATORY (INHALATION) at 20:13

## 2024-01-22 RX ADMIN — BUSPIRONE HYDROCHLORIDE 10 MG: 10 TABLET ORAL at 14:21

## 2024-01-22 RX ADMIN — BENZONATATE 100 MG: 100 CAPSULE ORAL at 19:59

## 2024-01-22 RX ADMIN — MEROPENEM 1000 MG: 1 INJECTION, POWDER, FOR SOLUTION INTRAVENOUS at 10:29

## 2024-01-22 ASSESSMENT — PAIN DESCRIPTION - DESCRIPTORS
DESCRIPTORS: ACHING
DESCRIPTORS: ACHING

## 2024-01-22 ASSESSMENT — PAIN SCALES - WONG BAKER
WONGBAKER_NUMERICALRESPONSE: 0

## 2024-01-22 ASSESSMENT — COPD QUESTIONNAIRES
QUESTION7_SLEEPQUALITY: 4
CAT_TOTALSCORE: 27
QUESTION5_HOMEACTIVITIES: 5
QUESTION6_LEAVINGHOUSE: 5
QUESTION1_COUGHFREQUENCY: 2
QUESTION3_CHESTTIGHTNESS: 3
QUESTION4_WALKINCLINE: 3
QUESTION2_CHESTPHLEGM: 2
QUESTION8_ENERGYLEVEL: 3

## 2024-01-22 ASSESSMENT — PAIN DESCRIPTION - LOCATION
LOCATION: HEAD
LOCATION: HEAD

## 2024-01-22 ASSESSMENT — PAIN SCALES - GENERAL
PAINLEVEL_OUTOF10: 8
PAINLEVEL_OUTOF10: 6
PAINLEVEL_OUTOF10: 0

## 2024-01-22 ASSESSMENT — PAIN DESCRIPTION - ORIENTATION: ORIENTATION: MID

## 2024-01-22 NOTE — PROGRESS NOTES
RT Inhaler-Nebulizer Bronchodilator Protocol Note    There is a bronchodilator order in the chart from a provider indicating to follow the RT Bronchodilator Protocol and there is an “Initiate RT Inhaler-Nebulizer Bronchodilator Protocol” order as well (see protocol at bottom of note).    CXR Findings:  No results found.    The findings from the last RT Protocol Assessment were as follows:   History Pulmonary Disease: (P) Chronic pulmonary disease  Respiratory Pattern: (P) Dyspnea on exertion or RR 21-25 bpm  Breath Sounds: (P) Slightly diminished and/or crackles  Cough: (P) Strong, spontaneous, non-productive  Indication for Bronchodilator Therapy: (P) On home bronchodilators  Bronchodilator Assessment Score: (P) 6    Aerosolized bronchodilator medication orders have been revised according to the RT Inhaler-Nebulizer Bronchodilator Protocol below.    Respiratory Therapist to perform RT Therapy Protocol Assessment initially then follow the protocol.  Repeat RT Therapy Protocol Assessment PRN for score 0-3 or on second treatment, BID, and PRN for scores above 3.    No Indications - adjust the frequency to every 6 hours PRN wheezing or bronchospasm, if no treatments needed after 48 hours then discontinue using Per Protocol order mode.     If indication present, adjust the RT bronchodilator orders based on the Bronchodilator Assessment Score as indicated below.  Use Inhaler orders unless patient has one or more of the following: on home nebulizer, not able to hold breath for 10 seconds, is not alert and oriented, cannot activate and use MDI correctly, or respiratory rate 25 breaths per minute or more, then use the equivalent nebulizer order(s) with same Frequency and PRN reasons based on the score.  If a patient is on this medication at home then do not decrease Frequency below that used at home.    0-3 - enter or revise RT bronchodilator order(s) to equivalent RT Bronchodilator order with Frequency of every 4 hours PRN

## 2024-01-22 NOTE — PROGRESS NOTES
Pulmonary & Critical Care Medicine ICU Progress Note    CC: Respiratory failure    Events of Last 24 hours:   Dooing much better  SOB has improved  On 4 L   BiPAP overnight 16/ 40%   Anxious and agitated ,less  + RSV +      Vascular lines: IV: PIVs         / / /FiO2 : 40 %  Recent Labs     24  1739   PHART 7.357   FAJ2NLR 45.6*   PO2ART 80.8         IV:   sodium chloride         Vitals:  Blood pressure 127/81, pulse 98, temperature 98.2 °F (36.8 °C), temperature source Oral, resp. rate 20, height 1.6 m (5' 3\"), weight 80.6 kg (177 lb 9.6 oz), SpO2 97 %, not currently breastfeeding.  on BiPAP   Temp  Av.5 °F (36.4 °C)  Min: 96.6 °F (35.9 °C)  Max: 98.2 °F (36.8 °C)    Intake/Output Summary (Last 24 hours) at 2024 0833  Last data filed at 2024 0513  Gross per 24 hour   Intake 523.91 ml   Output 3550 ml   Net -3026.09 ml       PE:  Gen: + Distress.  Ill-appearing  Eyes: PERRL. No sclera icterus. No conjunctival injection.   ENT: No discharge. Pharynx clear.   Neck: Trachea midline. No obvious mass.    Resp: + Accessory muscle use. No crackles. Bilateral wheezes. No rhonchi. No dullness on percussion.  CV: Tacky rate. Regular rhythm. No murmur or rub. No edema.   GI: Non-tender. Non-distended. No hernia.   Skin: Warm and dry. No nodule on exposed extremities.   Lymph: No cervical LAD. No supraclavicular LAD.   M/S: No cyanosis. No joint deformity. No clubbing.   Neuro: Awake. Alert. Moves all four extremities.   Psych: Oriented x 3.  + Anxiety.     Scheduled Meds:   busPIRone  10 mg Oral TID    sodium chloride flush  5-40 mL IntraVENous 2 times per day    enoxaparin  40 mg SubCUTAneous Daily    ipratropium 0.5 mg-albuterol 2.5 mg  1 Dose Inhalation Q4H WA RT    methylPREDNISolone  40 mg IntraVENous Q12H    meropenem  1,000 mg IntraVENous Q8H    doxycycline (VIBRAMYCIN) IV  100 mg IntraVENous Q12H    amLODIPine  10 mg Oral Daily    escitalopram  20 mg Oral Nightly    lisinopril  5 mg Oral Daily

## 2024-01-22 NOTE — PROGRESS NOTES
01/22/24 0340   NIV Type   $NIV $Daily Charge   Equipment Type v60   Mode Bilevel   Mask Type Full face mask   Mask Size Medium   Settings/Measurements   IPAP 12 cmH20   CPAP/EPAP 6 cmH2O   Vt (Measured) 663 mL   Rate Ordered 14   FiO2  40 %   Minute Volume (L/min) 10.6 Liters   Mask Leak (lpm) 0 lpm   Patient's Home Machine No   Patient Observation   Observations spo2 98% on 40% bipap

## 2024-01-22 NOTE — PROGRESS NOTES
01/21/24 1939   Oxygen Therapy/Pulse Ox   O2 Therapy Oxygen humidified   O2 Device High flow nasal cannula   O2 Flow Rate (L/min) (S)  6 L/min  (decreased to 5L)   SpO2 98 %

## 2024-01-22 NOTE — PROGRESS NOTES
Admit: 2024    Name:  Annie Tyler  Room:  [facility]-  MRN:    [id number]    Critical Care Daily Progress Note for 2024     Acute on chronic resp failure   Placed on BiPAP    Interval History:       Used BiPAP last night and uses prn during the day    Now on 6 L   Easy desats with movement    Anxiety a little better with buspar     Scheduled Meds:   busPIRone  10 mg Oral TID    sodium chloride flush  5-40 mL IntraVENous 2 times per day    enoxaparin  40 mg SubCUTAneous Daily    ipratropium 0.5 mg-albuterol 2.5 mg  1 Dose Inhalation Q4H WA RT    methylPREDNISolone  40 mg IntraVENous Q12H    meropenem  1,000 mg IntraVENous Q8H    doxycycline (VIBRAMYCIN) IV  100 mg IntraVENous Q12H    amLODIPine  10 mg Oral Daily    escitalopram  20 mg Oral Nightly    lisinopril  5 mg Oral Daily    Roflumilast  250 mcg Oral Daily       Continuous Infusions:   sodium chloride         PRN Meds:  ipratropium 0.5 mg-albuterol 2.5 mg, loperamide, sodium chloride flush, sodium chloride, ondansetron **OR** ondansetron, polyethylene glycol, acetaminophen **OR** acetaminophen, benzonatate, hydrOXYzine HCl                  Objective:     Temp  Av.4 °F (36.3 °C)  Min: 96.6 °F (35.9 °C)  Max: 98.2 °F (36.8 °C)  Pulse  Av.2  Min: 78  Max: 108  BP  Min: 121/88  Max: 161/75  SpO2  Av.2 %  Min: 91 %  Max: 100 %  FiO2   Av %  Min: 40 %  Max: 40 %  Patient Vitals for the past 4 hrs:   BP Temp Temp src Pulse Resp SpO2 Weight   24 0735 (!) 141/92 -- -- 91 26 94 % --   24 0517 -- -- -- -- -- -- 80.6 kg (177 lb 9.6 oz)   24 0500 -- 98.2 °F (36.8 °C) Oral 83 21 96 % --   24 0400 (!) 148/83 -- -- 78 20 96 % --           Intake/Output Summary (Last 24 hours) at 2024 0749  Last data filed at 2024 0513  Gross per 24 hour   Intake 523.91 ml   Output 3550 ml   Net -3026.09 ml         Physical Exam:    General: elderly female chronically ill appearing    Awake, alert and oriented. Appears to be

## 2024-01-22 NOTE — PROGRESS NOTES
01/21/24 2330   NIV Type   NIV Started/Stopped (S)  On   Equipment Type v60   Mode Bilevel   Mask Type Full face mask   Mask Size Medium   Assessment   Pulse 93   Respirations 21   SpO2 96 %   Settings/Measurements   IPAP 12 cmH20   CPAP/EPAP 6 cmH2O   Vt (Measured) 460 mL   Rate Ordered 14   FiO2  40 %   I Time/ I Time % 0.9 s   Minute Volume (L/min) 16 Liters   Mask Leak (lpm) 0 lpm   Patient's Home Machine No   Alarm Settings   Alarms On Y   Low Pressure (cmH2O) 8 cmH2O   High Pressure (cmH2O) 40 cmH2O   Delay Alarm 20 sec(s)   RR Low (bpm) 14   RR High (bpm) 40 br/min

## 2024-01-22 NOTE — PROGRESS NOTES
01/22/24 1028   Encounter Summary   Encounter Overview/Reason  Initial Encounter   Service Provided For: Patient   Last Encounter  01/22/24  (Initial, no spiritual needs at this time.)   Complexity of Encounter Low   Begin Time 0930   End Time  0940   Total Time Calculated 10 min   Assessment/Intervention/Outcome   Assessment Calm   Intervention Active listening;Explored/Affirmed feelings, thoughts, concerns;Nurtured Hope   Outcome Engaged in conversation;Receptive   Plan and Referrals   Plan/Referrals No future visits requested

## 2024-01-22 NOTE — FLOWSHEET NOTE
01/22/24 1315   Vitals   Pulse (!) 117     Patient with higher HR, from  at rest, patient reports feeling more anxious, denies CP,when getting up to bedside commode patient HR to 139 still sinus with infrequent PAC and PVC, O2 increased to 9l per high flow to maintain oxygen saturation. O2 safely reduced to 5 L via high when returned to bed. Dr Faulkner notified of increased resting heart rate. Awaiting new orders, will continue to monitor.

## 2024-01-22 NOTE — PROGRESS NOTES
RT Inhaler-Nebulizer Bronchodilator Protocol Note    There is a bronchodilator order in the chart from a provider indicating to follow the RT Bronchodilator Protocol and there is an “Initiate RT Inhaler-Nebulizer Bronchodilator Protocol” order as well (see protocol at bottom of note).    CXR Findings:  No results found.    The findings from the last RT Protocol Assessment were as follows:   History Pulmonary Disease: Chronic pulmonary disease  Respiratory Pattern: Dyspnea on exertion or RR 21-25 bpm  Breath Sounds: Intermittent or unilateral wheezes  Cough: Strong, spontaneous, non-productive  Indication for Bronchodilator Therapy: Wheezing associated with pulm disorder  Bronchodilator Assessment Score: 8    Aerosolized bronchodilator medication orders have been revised according to the RT Inhaler-Nebulizer Bronchodilator Protocol below.    Respiratory Therapist to perform RT Therapy Protocol Assessment initially then follow the protocol.  Repeat RT Therapy Protocol Assessment PRN for score 0-3 or on second treatment, BID, and PRN for scores above 3.    No Indications - adjust the frequency to every 6 hours PRN wheezing or bronchospasm, if no treatments needed after 48 hours then discontinue using Per Protocol order mode.     If indication present, adjust the RT bronchodilator orders based on the Bronchodilator Assessment Score as indicated below.  Use Inhaler orders unless patient has one or more of the following: on home nebulizer, not able to hold breath for 10 seconds, is not alert and oriented, cannot activate and use MDI correctly, or respiratory rate 25 breaths per minute or more, then use the equivalent nebulizer order(s) with same Frequency and PRN reasons based on the score.  If a patient is on this medication at home then do not decrease Frequency below that used at home.    0-3 - enter or revise RT bronchodilator order(s) to equivalent RT Bronchodilator order with Frequency of every 4 hours PRN for

## 2024-01-22 NOTE — DISCHARGE INSTRUCTIONS
Heart Failure Resources:  Heart Failure Interactive Workbook:  Go to https://[employer].Mobile Action/publication/?e=286852 for a Free Heart Failure Interactive Workbook provided by The American Heart Association. This interactive workbook will provide information on Healthier Living with Heart Failure. Please copy and paste link into search bar. Use your mouse to scroll through the pages.    HF [place] rhonda:   Heart Failure Free smart phone rhonda available for iPhone and Android download. Use your phone to track sodium intake, fluid intake, symptoms, and weight.     Low Sodium Diet / Recipes:  Go to www.[employer]found website for “renal” diet which is Low Sodium! [employer] is a dialysis company, but this website offers free seasonal cookbooks. Each quarter, they will release 25-30 new recipes with a breakdown of calories, sodium, and glucose. You can also go to [employer]/recipes website for free recipes.     Discharge Instruction Video:  Scan the QR code below with your camera and click the canva.com link to open the video and watch educational information on Heart Failure and Medications from one of our nurses.   https://www.[employer]/design/DAFZnsH_JRk/7YpijceRVHYkpPKhdQ9cmy/edit    Home Exercise Program:   Identification of Green/Yellow/Red zones:  You should be able to identify when you feel good (green zone), if you have 1-2 symptoms of HF (yellow zone), or if you are in need of medical attention (red zone).  In your CHF education folder you were provided a “stop light tool” to outline this information.     We want to you to rate your exertion levels:    Our therapy team has discussed means of identification with you such as the \"Laisha scale.\"  The Laisha rating scale ranges from 6 to 20, where 6 means \"no exertion at all\" and 20 means \"maximal exertion.\" The goal is to use this to gauge how much effort it is taking for you to do your normal daily tasks.   You should be able to recognize when too much

## 2024-01-23 LAB
ANION GAP SERPL CALCULATED.3IONS-SCNC: 8 MMOL/L (ref 3–16)
BACTERIA BLD CULT ORG #2: NORMAL
BACTERIA BLD CULT: NORMAL
BUN SERPL-MCNC: 16 MG/DL (ref 7–20)
CALCIUM SERPL-MCNC: 9.3 MG/DL (ref 8.3–10.6)
CHLORIDE SERPL-SCNC: 96 MMOL/L (ref 99–110)
CO2 SERPL-SCNC: 31 MMOL/L (ref 21–32)
CREAT SERPL-MCNC: <0.5 MG/DL (ref 0.6–1.2)
GFR SERPLBLD CREATININE-BSD FMLA CKD-EPI: >60 ML/MIN/{1.73_M2}
GLUCOSE SERPL-MCNC: 151 MG/DL (ref 70–99)
POTASSIUM SERPL-SCNC: 4.6 MMOL/L (ref 3.5–5.1)
SODIUM SERPL-SCNC: 135 MMOL/L (ref 136–145)

## 2024-01-23 PROCEDURE — 97166 OT EVAL MOD COMPLEX 45 MIN: CPT

## 2024-01-23 PROCEDURE — 2000000000 HC ICU R&B

## 2024-01-23 PROCEDURE — 6370000000 HC RX 637 (ALT 250 FOR IP): Performed by: INTERNAL MEDICINE

## 2024-01-23 PROCEDURE — 2700000000 HC OXYGEN THERAPY PER DAY

## 2024-01-23 PROCEDURE — 97530 THERAPEUTIC ACTIVITIES: CPT

## 2024-01-23 PROCEDURE — 97162 PT EVAL MOD COMPLEX 30 MIN: CPT

## 2024-01-23 PROCEDURE — 2580000003 HC RX 258

## 2024-01-23 PROCEDURE — 94640 AIRWAY INHALATION TREATMENT: CPT

## 2024-01-23 PROCEDURE — 6370000000 HC RX 637 (ALT 250 FOR IP)

## 2024-01-23 PROCEDURE — 6360000002 HC RX W HCPCS

## 2024-01-23 PROCEDURE — 99233 SBSQ HOSP IP/OBS HIGH 50: CPT | Performed by: INTERNAL MEDICINE

## 2024-01-23 PROCEDURE — 80048 BASIC METABOLIC PNL TOTAL CA: CPT

## 2024-01-23 PROCEDURE — 94660 CPAP INITIATION&MGMT: CPT

## 2024-01-23 PROCEDURE — 36415 COLL VENOUS BLD VENIPUNCTURE: CPT

## 2024-01-23 PROCEDURE — 94761 N-INVAS EAR/PLS OXIMETRY MLT: CPT

## 2024-01-23 RX ORDER — QUETIAPINE FUMARATE 25 MG/1
12.5 TABLET, FILM COATED ORAL 2 TIMES DAILY
Status: DISCONTINUED | OUTPATIENT
Start: 2024-01-23 | End: 2024-01-24

## 2024-01-23 RX ADMIN — ENOXAPARIN SODIUM 40 MG: 100 INJECTION SUBCUTANEOUS at 09:33

## 2024-01-23 RX ADMIN — BUSPIRONE HYDROCHLORIDE 10 MG: 10 TABLET ORAL at 09:33

## 2024-01-23 RX ADMIN — BUSPIRONE HYDROCHLORIDE 10 MG: 10 TABLET ORAL at 20:45

## 2024-01-23 RX ADMIN — METHYLPREDNISOLONE SODIUM SUCCINATE 40 MG: 40 INJECTION INTRAMUSCULAR; INTRAVENOUS at 05:55

## 2024-01-23 RX ADMIN — METOPROLOL TARTRATE 25 MG: 25 TABLET, FILM COATED ORAL at 20:44

## 2024-01-23 RX ADMIN — Medication 10 ML: at 09:33

## 2024-01-23 RX ADMIN — QUETIAPINE FUMARATE 12.5 MG: 25 TABLET ORAL at 20:45

## 2024-01-23 RX ADMIN — LOPERAMIDE HYDROCHLORIDE 2 MG: 2 CAPSULE ORAL at 09:33

## 2024-01-23 RX ADMIN — IPRATROPIUM BROMIDE AND ALBUTEROL SULFATE 1 DOSE: 2.5; .5 SOLUTION RESPIRATORY (INHALATION) at 23:26

## 2024-01-23 RX ADMIN — LISINOPRIL 5 MG: 10 TABLET ORAL at 09:33

## 2024-01-23 RX ADMIN — IPRATROPIUM BROMIDE AND ALBUTEROL SULFATE 1 DOSE: 2.5; .5 SOLUTION RESPIRATORY (INHALATION) at 08:15

## 2024-01-23 RX ADMIN — IPRATROPIUM BROMIDE AND ALBUTEROL SULFATE 1 DOSE: 2.5; .5 SOLUTION RESPIRATORY (INHALATION) at 16:36

## 2024-01-23 RX ADMIN — IPRATROPIUM BROMIDE AND ALBUTEROL SULFATE 1 DOSE: 2.5; .5 SOLUTION RESPIRATORY (INHALATION) at 12:58

## 2024-01-23 RX ADMIN — METOPROLOL TARTRATE 25 MG: 25 TABLET, FILM COATED ORAL at 09:33

## 2024-01-23 RX ADMIN — IPRATROPIUM BROMIDE AND ALBUTEROL SULFATE 1 DOSE: 2.5; .5 SOLUTION RESPIRATORY (INHALATION) at 20:58

## 2024-01-23 RX ADMIN — MUPIROCIN: 20 OINTMENT TOPICAL at 20:47

## 2024-01-23 RX ADMIN — BUSPIRONE HYDROCHLORIDE 10 MG: 10 TABLET ORAL at 14:00

## 2024-01-23 RX ADMIN — ROFLUMILAST 250 MCG: 500 TABLET ORAL at 09:33

## 2024-01-23 RX ADMIN — ACETAMINOPHEN 650 MG: 325 TABLET ORAL at 18:26

## 2024-01-23 RX ADMIN — ESCITALOPRAM OXALATE 20 MG: 10 TABLET ORAL at 20:45

## 2024-01-23 RX ADMIN — Medication 10 ML: at 20:47

## 2024-01-23 RX ADMIN — METHYLPREDNISOLONE SODIUM SUCCINATE 40 MG: 40 INJECTION INTRAMUSCULAR; INTRAVENOUS at 18:26

## 2024-01-23 RX ADMIN — LEVOFLOXACIN 750 MG: 750 TABLET, FILM COATED ORAL at 09:33

## 2024-01-23 RX ADMIN — AMLODIPINE BESYLATE 10 MG: 5 TABLET ORAL at 09:33

## 2024-01-23 RX ADMIN — MUPIROCIN: 20 OINTMENT TOPICAL at 09:33

## 2024-01-23 RX ADMIN — QUETIAPINE FUMARATE 12.5 MG: 25 TABLET ORAL at 10:44

## 2024-01-23 ASSESSMENT — PAIN DESCRIPTION - LOCATION
LOCATION: HEAD
LOCATION: BACK;NECK

## 2024-01-23 ASSESSMENT — PAIN SCALES - GENERAL
PAINLEVEL_OUTOF10: 3
PAINLEVEL_OUTOF10: 2

## 2024-01-23 NOTE — PROGRESS NOTES
AM assessment completed. AM labs reviewed. VSS. Pt on 5 LNC. PIV WNL. Pt assisted on bedpan for BM. Pure wick in place- functioning properly. No new orders at present time.   Shirley Gallardo RN, BSN

## 2024-01-23 NOTE — CARE COORDINATION
01/23/24 1136   Service Assessment   Patient Orientation Alert and Oriented;Person;Place   Cognition Alert   History Provided By Patient   Primary Caregiver Self   Support Systems Children   Patient's Healthcare Decision Maker is: Named in Scanned ACP Document   PCP Verified by CM Yes   Last Visit to PCP Within last 3 months   Prior Functional Level Independent in ADLs/IADLs   Current Functional Level Independent in ADLs/IADLs   Can patient return to prior living arrangement Other (see comment)  (requesting rehab)   Ability to make needs known: Good   Family able to assist with home care needs: Yes   Would you like for me to discuss the discharge plan with any other family members/significant others, and if so, who? No   Financial Resources Medicare;Medicaid   Community Resources None   Discharge Planning   Type of Residence House   Living Arrangements Children   Current Services Prior To Admission Oxygen Therapy;Home Bipap;Durable Medical Equipment;Meals On Wheels   Current DME Prior to Arrival Walker;Oxygen Therapy (Comment)   Potential Assistance Needed Skilled Nursing Facility   DME Ordered? No   Potential Assistance Purchasing Medications No   Type of Home Care Services None   Patient expects to be discharged to: House   Follow Up Appointment: Best Day/Time    (self)   One/Two Story Residence One story   History of falls? 0     Case Management Assessment  Initial Evaluation    Date/Time of Evaluation: 1/23/2024 11:40 AM  Assessment Completed by: Teresa Boeck, RN    If patient is discharged prior to next notation, then this note serves as note for discharge by case management.    Patient Name: Annie Tyler                   YOB: 1955  Diagnosis: Elevated troponin [R79.89]  COPD exacerbation (HCC) [J44.1]  COPD with acute exacerbation (HCC) [J44.1]  Acute on chronic respiratory failure with hypoxia (HCC) [J96.21]                   Date / Time: 1/19/2024 12:41 PM    Patient Admission Status:      Does insurance require precert for SNF: Yes    Potential assistance Purchasing Medications: (P) No  Meds-to-Beds request: Yes      [place] [facility] [place] - [address] - P [phone] - F [phone]  [address]  [place] [address]  Phone: [phone] Fax: [phone]    [id number] - [place], [place] - [address] - P [phone] - F [phone]  [address]  [place] [address]  Phone: [phone] Fax: [phone]      Notes:    Factors facilitating achievement of predicted outcomes: family support    Barriers to discharge: Patient req SNF - precert    Additional Case Management Notes: Met with the patient for a face to face discussion of discharge planning. The patient lives with her daughter in [place]. The patient is on 2-3 L of oxygen at baseline. The patient uses a walker most of the time. The patient just started bipap. Oxygen and bipap are through [facility]. The patient also has meals on wheels.     The patient is requesting rehab at [facility]. OT/PT has been ordered. A referral has been made to Ines at [facility] via voice mail.     The Plan for Transition of Care is related to the following treatment goals of Elevated troponin [R79.89]  COPD exacerbation (HCC) [J44.1]  COPD with acute exacerbation (HCC) [J44.1]  Acute on chronic respiratory failure with hypoxia (HCC) [J96.21]    IF APPLICABLE: The Patient and/or patient representative Annie and her family were provided with a choice of provider and agrees with the discharge plan. Freedom of choice list with basic dialogue that supports the patient's individualized plan of care/goals and shares the quality data associated with the providers was provided to:     Patient Representative Name:       The Patient and/or Patient Representative Agree with the Discharge Plan?      Teresa Boeck, RN  Case Management Department  Ph: [phone]

## 2024-01-23 NOTE — PROGRESS NOTES
Admit: 2024    Name:  Annie Tyler  Room:  [address] [place] [address]  MRN:    [id number]    Critical Care Daily Progress Note for 2024     Acute on chronic resp failure   Placed on BiPAP    Interval History:       Used BiPAP last night and uses prn during the day    Now on 6 L   Easy desats with movement  Improved HR with adding metoprolol    Anxiety a little better with buspar  , added seroquel today     Scheduled Meds:   mupirocin   Each Nostril BID    levoFLOXacin  750 mg Oral Daily    metoprolol tartrate  25 mg Oral BID    busPIRone  10 mg Oral TID    sodium chloride flush  5-40 mL IntraVENous 2 times per day    enoxaparin  40 mg SubCUTAneous Daily    ipratropium 0.5 mg-albuterol 2.5 mg  1 Dose Inhalation Q4H WA RT    methylPREDNISolone  40 mg IntraVENous Q12H    amLODIPine  10 mg Oral Daily    escitalopram  20 mg Oral Nightly    lisinopril  5 mg Oral Daily    Roflumilast  250 mcg Oral Daily       Continuous Infusions:   sodium chloride         PRN Meds:  ipratropium 0.5 mg-albuterol 2.5 mg, loperamide, sodium chloride flush, sodium chloride, ondansetron **OR** ondansetron, polyethylene glycol, acetaminophen **OR** acetaminophen, benzonatate, hydrOXYzine HCl                  Objective:     Temp  Av.5 °F (36.9 °C)  Min: 96.8 °F (36 °C)  Max: 99.4 °F (37.4 °C)  Pulse  Av.9  Min: 61  Max: 139  BP  Min: 115/78  Max: 147/84  SpO2  Av %  Min: 88 %  Max: 100 %  FiO2   Av %  Min: 40 %  Max: 40 %  Patient Vitals for the past 4 hrs:   BP Temp Temp src Pulse Resp SpO2 Weight   24 0600 (!) 145/83 -- -- 67 19 95 % 80.7 kg (177 lb 14.4 oz)   24 0500 135/76 -- -- 63 -- 100 % --   24 0400 (!) 144/77 98.3 °F (36.8 °C) Oral 63 -- 95 % --           Intake/Output Summary (Last 24 hours) at 2024 0728  Last data filed at 2024 0600  Gross per 24 hour   Intake 360 ml   Output 2100 ml   Net -1740 ml         Physical Exam:    General: elderly female chronically ill appearing    Awake, alert

## 2024-01-23 NOTE — PROGRESS NOTES
Pulmonary & Critical Care Medicine ICU Progress Note    CC: Respiratory failure    Events of Last 24 hours:   Dooing much better  SOB has improved  On 4 -5 L   BiPAP overnight 16/8 40%   Anxious and agitated ,less  + RSV +      Vascular lines: IV: PIVs         / / /FiO2 : 40 %  No results for input(s): \"PHART\", \"[id number]\", \"PO2ART\" in the last 72 hours.      IV:   sodium chloride         Vitals:  Blood pressure 138/70, pulse 78, temperature 96.9 °F (36.1 °C), temperature source Temporal, resp. rate 29, height 1.6 m (5' 3\"), weight 80.7 kg (177 lb 14.4 oz), SpO2 96 %, not currently breastfeeding.  on BiPAP   Temp  Av °F (36.7 °C)  Min: 96.8 °F (36 °C)  Max: 99.4 °F (37.4 °C)    Intake/Output Summary (Last 24 hours) at 2024 0849  Last data filed at 2024 0600  Gross per 24 hour   Intake 360 ml   Output 1700 ml   Net -1340 ml       PE:  Gen: + Distress.  Ill-appearing  Eyes: PERRL. No sclera icterus. No conjunctival injection.   ENT: No discharge. Pharynx clear.   Neck: Trachea midline. No obvious mass.    Resp: + Accessory muscle use. No crackles. Bilateral wheezes. No rhonchi. No dullness on percussion.  CV: Tacky rate. Regular rhythm. No murmur or rub. No edema.   GI: Non-tender. Non-distended. No hernia.   Skin: Warm and dry. No nodule on exposed extremities.   Lymph: No cervical LAD. No supraclavicular LAD.   M/S: No cyanosis. No joint deformity. No clubbing.   Neuro: Awake. Alert. Moves all four extremities.   Psych: Oriented x 3.  + Anxiety.     Scheduled Meds:   mupirocin   Each Nostril BID    levoFLOXacin  750 mg Oral Daily    metoprolol tartrate  25 mg Oral BID    busPIRone  10 mg Oral TID    sodium chloride flush  5-40 mL IntraVENous 2 times per day    enoxaparin  40 mg SubCUTAneous Daily    ipratropium 0.5 mg-albuterol 2.5 mg  1 Dose Inhalation Q4H WA RT    methylPREDNISolone  40 mg IntraVENous Q12H    amLODIPine  10 mg Oral Daily    escitalopram  20 mg Oral Nightly    lisinopril  5 mg Oral

## 2024-01-23 NOTE — PROGRESS NOTES
01/22/24 2345   NIV Type   Mode Bilevel   Mask Type Full face mask   Assessment   Pulse 74   Respirations 22   SpO2 96 %   Settings/Measurements   IPAP 12 cmH20   CPAP/EPAP 6 cmH2O   Vt (Measured) 720 mL   Rate Ordered 14   FiO2  40 %

## 2024-01-23 NOTE — ACP (ADVANCE CARE PLANNING)
Advance Care Planning     General Advance Care Planning (ACP) Conversation    Date of Conversation: 1/19/2024  Conducted with: Patient with Decision Making Capacity   Healthcare Decision Maker: Named in Advance Directive or Healthcare Power of  (name) Both daughters are listed in POA - Humera and then Nilda.     Healthcare Decision Maker:    Primary Decision Maker: HUMERA STEIN - Child - [phone]    Secondary Decision Maker: Nilda Stein - Child - [phone]  Click here to complete Healthcare Decision Makers including selection of the Healthcare Decision Maker Relationship (ie \"Primary\").   Today we documented Decision Maker(s) consistent with ACP documents on file.    Content/Action Overview:  Has ACP document(s) on file - reflects the patient's care preferences  Reviewed DNR/DNI and patient elects Full Code (Attempt Resuscitation)        Length of Voluntary ACP Conversation in minutes:  <16 minutes (Non-Billable)    Teresa Boeck, RN

## 2024-01-23 NOTE — CARE COORDINATION
Received VM from [facility] advising she is waiting on approval from director that she will be able to accept the patient. Also advised - [employer] has been taking three days for precert approval.     Waiting on OT/PT notes.     2:34 - CM spoke to [facility] to advise OT/PT notes are in. Ines still waiting to hear back from her director for approval to accept. [facility]es will follow-up with director and will start precert if can accept.     1/23 - [facility] can accept patient. Precert started.

## 2024-01-23 NOTE — PROGRESS NOTES
Inpatient Occupational Therapy Evaluation and Treatment    Unit: ICU  Date:  2024  Patient Name:    Annie Tyler  Admitting diagnosis:  Elevated troponin [R79.89]  COPD exacerbation (HCC) [J44.1]  COPD with acute exacerbation (HCC) [J44.1]  Acute on chronic respiratory failure with hypoxia (HCC) [J96.21]  Admit Date:  2024  Precautions/Restrictions/WB Status/ Lines/ Wounds/ Oxygen: Fall risk, Lines (Supplemental O2 (3.5L)), and Continuous pulse oximetry    Pt seen for cotreatment this date due to patient safety, patient endurance, and limited functional status information    Treatment Time:  4338-3080  Treatment Number:  1  Timed Code Treatment Minutes: 38 minutes  Total Treatment Minutes:  48  minutes    Patient Goals for Therapy: \"to go to rehab \"          Discharge Recommendations: SNF  DME needs for discharge: Defer to facility       Therapy recommendations for staff:   Assist of 1 for transfers with use of rolling walker (RW) and gait belt to/from chair    History of Present Illness: Per REESE Escobar H&P:  \"68 y.o. female with pmhx of COPD, chronic hypoxic respiratory failure, CHF, HTN, depression who presented to [facility] ED with complaint of shortness of breath that began suddenly yesterday. She saw Dr. Wolfe in office a couple days ago and felt fine at the time. Has been tapering on steroids and started on zithromax but did have not improvement. She has been wearing 4 L at home when her baseline is 3. Has significant wheezing and productive cough.\"     Home Health S4 Level Recommendation:  NA    AM-PAC Score: AM-PAC Inpatient Daily Activity Raw Score: 16     Subjective:  Patient lying reclined in bed with no family present.   Pt agreeable to this OT session.     Cognition:    A&O x4   Able to follow 2 step commands    Pain:   Yes  Location: back and neck  Ratin /10  Pain Medicine Status: No request made    Preadmission Environment:   Pt. Lives     with family (daughter and daughter's  fiance)  Home environment:    one story home  Steps to enter first floor:   Ramp  Steps to second floor/basement: N/A  Laundry:     NA  Bathroom:     tub/shower unit, shower chair , and standard height toilet  Pt sleeps in a:    Couch secondary to breathing comfort  Equipment owned:    RW, SW, rollator, quad cane, manual WC, and home O2 (3L) continous    Preadmission Status:  Pt. Able to drive:    No  Pt. Fully independent with ADLs:  Yes sponge bathing  Pt. Required assistance for:   Cleaning, Cooking, and grocery shopping  Pt. independent for functional transfers and utilized Rollator for mobility in home and Manual W/C out in community  History of falls:    No  Home Health Services:  None    Objective:  Does this pt have an acute or acute on chronic diagnosis of CHF? No    Upper Extremity ROM:    Appears WFL's    Dominant Hand: Right/Left    Upper Extremity Strength:    BUE strength impaired but not formally assessed w/ MMT    Upper Extremity Sensation:    NT    Upper Extremity Proprioception:  NT    Coordination:  WFL    Tone:  WFL    Balance:  Sitting:    Supervision static at EOB  Standing:    CGA and With increased time    Bed Mobility:   Supine to Sit:    SBA with extended time to complete  Sit to Supine:   SBA  Rolling:   Not Tested  Scooting in sitting: Supervision to scoot to EOB  Scooting in supine:  Not Tested    Transfers:    Sit to stand:    CGA  Stand to sit:    CGA  Bed to chair:     Not Tested  Bed/ chair to standard toilet:  Not Tested  Bed/chair to BSC:   Not Tested  Functional Mobility:   Not Tested; pt took a step forward and two sidesteps toward head of bed with use of gait belt and RW, no LOB noted and pt able to complete with CGA.  Pt did report feeling \"shaky\" and needing to sit down following completion of these tasks.    See PT note for gait analysis.    ADLs:  Dressing:      UE:   Not Tested  LE:    Not Tested    Bathing:    UE:  Not Tested  LE:  Not Tested    Eating:   Not

## 2024-01-23 NOTE — PROGRESS NOTES
RT Inhaler-Nebulizer Bronchodilator Protocol Note    There is a bronchodilator order in the chart from a provider indicating to follow the RT Bronchodilator Protocol and there is an “Initiate RT Inhaler-Nebulizer Bronchodilator Protocol” order as well (see protocol at bottom of note).    CXR Findings:  No results found.    The findings from the last RT Protocol Assessment were as follows:   History Pulmonary Disease: Chronic pulmonary disease  Respiratory Pattern: Dyspnea on exertion or RR 21-25 bpm  Breath Sounds: Slightly diminished and/or crackles  Cough: Strong, spontaneous, non-productive  Indication for Bronchodilator Therapy: On home bronchodilators  Bronchodilator Assessment Score: 6    Aerosolized bronchodilator medication orders have been revised according to the RT Inhaler-Nebulizer Bronchodilator Protocol below.    Respiratory Therapist to perform RT Therapy Protocol Assessment initially then follow the protocol.  Repeat RT Therapy Protocol Assessment PRN for score 0-3 or on second treatment, BID, and PRN for scores above 3.    No Indications - adjust the frequency to every 6 hours PRN wheezing or bronchospasm, if no treatments needed after 48 hours then discontinue using Per Protocol order mode.     If indication present, adjust the RT bronchodilator orders based on the Bronchodilator Assessment Score as indicated below.  Use Inhaler orders unless patient has one or more of the following: on home nebulizer, not able to hold breath for 10 seconds, is not alert and oriented, cannot activate and use MDI correctly, or respiratory rate 25 breaths per minute or more, then use the equivalent nebulizer order(s) with same Frequency and PRN reasons based on the score.  If a patient is on this medication at home then do not decrease Frequency below that used at home.    0-3 - enter or revise RT bronchodilator order(s) to equivalent RT Bronchodilator order with Frequency of every 4 hours PRN for wheezing or  increased work of breathing using Per Protocol order mode.        4-6 - enter or revise RT Bronchodilator order(s) to two equivalent RT bronchodilator orders with one order with BID Frequency and one order with Frequency of every 4 hours PRN wheezing or increased work of breathing using Per Protocol order mode.        7-10 - enter or revise RT Bronchodilator order(s) to two equivalent RT bronchodilator orders with one order with TID Frequency and one order with Frequency of every 4 hours PRN wheezing or increased work of breathing using Per Protocol order mode.       11-13 - enter or revise RT Bronchodilator order(s) to one equivalent RT bronchodilator order with QID Frequency and an Albuterol order with Frequency of every 4 hours PRN wheezing or increased work of breathing using Per Protocol order mode.      Greater than 13 - enter or revise RT Bronchodilator order(s) to one equivalent RT bronchodilator order with every 4 hours Frequency and an Albuterol order with Frequency of every 2 hours PRN wheezing or increased work of breathing using Per Protocol order mode.     RT to enter RT Home Evaluation for COPD & MDI Assessment order using Per Protocol order mode.    Electronically signed by Jeniffer David RCP on 1/23/2024 at 9:01 AM

## 2024-01-23 NOTE — PROGRESS NOTES
Inpatient Physical Therapy Evaluation & Treatment    Unit: ICU  Date:  1/23/2024  Patient Name:    Annie Tyler  Admitting diagnosis:  Elevated troponin [R79.89]  COPD exacerbation (HCC) [J44.1]  COPD with acute exacerbation (HCC) [J44.1]  Acute on chronic respiratory failure with hypoxia (HCC) [J96.21]  Admit Date:  1/19/2024  Precautions/Restrictions/WB Status/ Lines/ Wounds/ Oxygen: Fall risk, Lines (IV, Supplemental O2 (3L), and external catheter), Telemetry, Continuous pulse oximetry, and Isolation Precautions: Droplet      Pt seen for cotreatment this date due to patient safety, patient endurance, and acute illness/injury    Treatment Time:  13:25-14:13  Treatment Number:  1   Timed Code Treatment Minutes: 38 minutes  Total Treatment Minutes:  48  minutes    Patient Stated Goals for Therapy: \" to go to rehab \"          Discharge Recommendations: SNF  DME needs for discharge: Defer to facility       Therapy recommendation for EMS Transport: can transport by wheelchair    Therapy recommendations for staff:   Assist of 2 for transfers with use of rolling walker (RW) and gait belt to/from BSC  to/from chair    History of Present Illness: [place]:  The patient is a 68 y.o. female with pmhx of COPD, chronic hypoxic respiratory failure, CHF, HTN, depression who presented to [facility] ED with complaint of shortness of breath that began suddenly yesterday. She saw Dr. Wolfe in office a couple days ago and felt fine at the time. Has been tapering on steroids and started on zithromax but did have not improvement. She has been wearing 4 L at home when her baseline is 3. Has significant wheezing and productive cough.  No fever, chills, chest pain, vomiting, diarrhea     History limited 2/2 to severe tachypnea and dyspnea on exam.     Home Health S4 Level Recommendation:  NA        AM-PAC Mobility Score    AM-PAC Inpatient Mobility Raw Score : 16     Annie Tyler scored a 16/24 on the AM-PAC short mobility  awareness, transfer techniques, and calling for assist with mobility.      Assessment  Pt seen today for physical therapy Evaluation & Treatment. Pt demonstrated decreased Activity tolerance, Balance, Safety, and Strength as well as decreased independence with Ambulation, Bed Mobility , and Transfers. Patient will benefit from skilled PT to maximize functional mobility while in acute care.    Recommending SNF upon discharge as patient functioning well below baseline, demonstrates good rehab potential and unable to return home due to burden of care beyond caregiver ability and home environment not conducive to patient recovery.    Goals :   To be met in 3 visits:  1). Independent with LE Ex x 10 reps  2). Sit to/from stand: SBA  3). Bed to chair: CGA  4). CHF goal: N/A    To be met in 6 visits:  1).  Supine to/from sit: Independent  2).  Sit to/from stand: Independent  3).  Bed to chair: Independent  4).  Gait: Ambulate 150 ft.  with SBA and use of rolling walker (RW)  5).  Tolerate B LE exercises 3 sets of 10-15 reps      Rehabilitation Potential: Good  Strengths for achieving goals include:   Pt motivated, PLOF, Family Support, and Pt cooperative   Barriers to achieving goals include:    Complexity of condition and Weakness    Plan    To be seen 3-5 x / week  while in acute care setting for therapeutic exercises, bed mobility, transfers, progressive gait training, balance training, and family/patient education.    Signature:Cherrie Marinelli, PT #[id number]     If patient discharges from this facility prior to next visit, this note will serve as the Discharge Summary.    CHF Education  N/A

## 2024-01-23 NOTE — PROGRESS NOTES
01/22/24 2000   RT Protocol   History Pulmonary Disease 2   Respiratory pattern 2   Breath sounds 2   Cough 0   Indications for Bronchodilator Therapy On home bronchodilators   Bronchodilator Assessment Score 6     RT Inhaler-Nebulizer Bronchodilator Protocol Note    There is a bronchodilator order in the chart from a provider indicating to follow the RT Bronchodilator Protocol and there is an “Initiate RT Inhaler-Nebulizer Bronchodilator Protocol” order as well (see protocol at bottom of note).    CXR Findings:  No results found.    The findings from the last RT Protocol Assessment were as follows:   History Pulmonary Disease: Chronic pulmonary disease  Respiratory Pattern: Dyspnea on exertion or RR 21-25 bpm  Breath Sounds: Slightly diminished and/or crackles  Cough: Strong, spontaneous, non-productive  Indication for Bronchodilator Therapy: On home bronchodilators  Bronchodilator Assessment Score: 6    Aerosolized bronchodilator medication orders have been revised according to the RT Inhaler-Nebulizer Bronchodilator Protocol below.    Respiratory Therapist to perform RT Therapy Protocol Assessment initially then follow the protocol.  Repeat RT Therapy Protocol Assessment PRN for score 0-3 or on second treatment, BID, and PRN for scores above 3.    No Indications - adjust the frequency to every 6 hours PRN wheezing or bronchospasm, if no treatments needed after 48 hours then discontinue using Per Protocol order mode.     If indication present, adjust the RT bronchodilator orders based on the Bronchodilator Assessment Score as indicated below.  Use Inhaler orders unless patient has one or more of the following: on home nebulizer, not able to hold breath for 10 seconds, is not alert and oriented, cannot activate and use MDI correctly, or respiratory rate 25 breaths per minute or more, then use the equivalent nebulizer order(s) with same Frequency and PRN reasons based on the score.  If a patient is on this

## 2024-01-24 LAB
ANION GAP SERPL CALCULATED.3IONS-SCNC: 8 MMOL/L (ref 3–16)
BUN SERPL-MCNC: 19 MG/DL (ref 7–20)
CALCIUM SERPL-MCNC: 8.8 MG/DL (ref 8.3–10.6)
CHLORIDE SERPL-SCNC: 94 MMOL/L (ref 99–110)
CO2 SERPL-SCNC: 30 MMOL/L (ref 21–32)
CREAT SERPL-MCNC: <0.5 MG/DL (ref 0.6–1.2)
DEPRECATED RDW RBC AUTO: 13.7 % (ref 12.4–15.4)
GFR SERPLBLD CREATININE-BSD FMLA CKD-EPI: >60 ML/MIN/{1.73_M2}
GLUCOSE SERPL-MCNC: 115 MG/DL (ref 70–99)
HCT VFR BLD AUTO: 38.8 % (ref 36–48)
HGB BLD-MCNC: 12.7 G/DL (ref 12–16)
MCH RBC QN AUTO: 30.6 PG (ref 26–34)
MCHC RBC AUTO-ENTMCNC: 32.8 G/DL (ref 31–36)
MCV RBC AUTO: 93.5 FL (ref 80–100)
PLATELET # BLD AUTO: 325 K/UL (ref 135–450)
PMV BLD AUTO: 6.8 FL (ref 5–10.5)
POTASSIUM SERPL-SCNC: 4.6 MMOL/L (ref 3.5–5.1)
RBC # BLD AUTO: 4.15 M/UL (ref 4–5.2)
SODIUM SERPL-SCNC: 132 MMOL/L (ref 136–145)
WBC # BLD AUTO: 11.3 K/UL (ref 4–11)

## 2024-01-24 PROCEDURE — 97530 THERAPEUTIC ACTIVITIES: CPT

## 2024-01-24 PROCEDURE — 99233 SBSQ HOSP IP/OBS HIGH 50: CPT | Performed by: INTERNAL MEDICINE

## 2024-01-24 PROCEDURE — 6370000000 HC RX 637 (ALT 250 FOR IP)

## 2024-01-24 PROCEDURE — 6360000002 HC RX W HCPCS

## 2024-01-24 PROCEDURE — 36415 COLL VENOUS BLD VENIPUNCTURE: CPT

## 2024-01-24 PROCEDURE — 6370000000 HC RX 637 (ALT 250 FOR IP): Performed by: INTERNAL MEDICINE

## 2024-01-24 PROCEDURE — 94640 AIRWAY INHALATION TREATMENT: CPT

## 2024-01-24 PROCEDURE — 94660 CPAP INITIATION&MGMT: CPT

## 2024-01-24 PROCEDURE — 2580000003 HC RX 258

## 2024-01-24 PROCEDURE — 85027 COMPLETE CBC AUTOMATED: CPT

## 2024-01-24 PROCEDURE — 2580000003 HC RX 258: Performed by: INTERNAL MEDICINE

## 2024-01-24 PROCEDURE — 94761 N-INVAS EAR/PLS OXIMETRY MLT: CPT

## 2024-01-24 PROCEDURE — 1200000000 HC SEMI PRIVATE

## 2024-01-24 PROCEDURE — 80048 BASIC METABOLIC PNL TOTAL CA: CPT

## 2024-01-24 PROCEDURE — 2700000000 HC OXYGEN THERAPY PER DAY

## 2024-01-24 RX ORDER — PREDNISONE 20 MG/1
40 TABLET ORAL DAILY
Status: DISCONTINUED | OUTPATIENT
Start: 2024-01-25 | End: 2024-01-26 | Stop reason: HOSPADM

## 2024-01-24 RX ORDER — QUETIAPINE FUMARATE 25 MG/1
12.5 TABLET, FILM COATED ORAL NIGHTLY
Status: DISCONTINUED | OUTPATIENT
Start: 2024-01-24 | End: 2024-01-26 | Stop reason: HOSPADM

## 2024-01-24 RX ORDER — AMLODIPINE BESYLATE 5 MG/1
5 TABLET ORAL DAILY
Status: DISCONTINUED | OUTPATIENT
Start: 2024-01-25 | End: 2024-01-26 | Stop reason: HOSPADM

## 2024-01-24 RX ADMIN — HYDROXYZINE HYDROCHLORIDE 25 MG: 25 TABLET ORAL at 20:57

## 2024-01-24 RX ADMIN — ACETAMINOPHEN 650 MG: 325 TABLET ORAL at 05:57

## 2024-01-24 RX ADMIN — METHYLPREDNISOLONE SODIUM SUCCINATE 40 MG: 40 INJECTION INTRAMUSCULAR; INTRAVENOUS at 05:48

## 2024-01-24 RX ADMIN — QUETIAPINE FUMARATE 12.5 MG: 25 TABLET ORAL at 20:57

## 2024-01-24 RX ADMIN — IPRATROPIUM BROMIDE AND ALBUTEROL SULFATE 1 DOSE: 2.5; .5 SOLUTION RESPIRATORY (INHALATION) at 19:26

## 2024-01-24 RX ADMIN — AMLODIPINE BESYLATE 10 MG: 5 TABLET ORAL at 08:41

## 2024-01-24 RX ADMIN — IPRATROPIUM BROMIDE AND ALBUTEROL SULFATE 1 DOSE: 2.5; .5 SOLUTION RESPIRATORY (INHALATION) at 12:15

## 2024-01-24 RX ADMIN — BUSPIRONE HYDROCHLORIDE 10 MG: 10 TABLET ORAL at 08:41

## 2024-01-24 RX ADMIN — BUSPIRONE HYDROCHLORIDE 10 MG: 10 TABLET ORAL at 20:57

## 2024-01-24 RX ADMIN — LOPERAMIDE HYDROCHLORIDE 2 MG: 2 CAPSULE ORAL at 08:41

## 2024-01-24 RX ADMIN — Medication 10 ML: at 20:58

## 2024-01-24 RX ADMIN — IPRATROPIUM BROMIDE AND ALBUTEROL SULFATE 1 DOSE: 2.5; .5 SOLUTION RESPIRATORY (INHALATION) at 15:11

## 2024-01-24 RX ADMIN — ENOXAPARIN SODIUM 40 MG: 100 INJECTION SUBCUTANEOUS at 08:41

## 2024-01-24 RX ADMIN — LOPERAMIDE HYDROCHLORIDE 2 MG: 2 CAPSULE ORAL at 20:57

## 2024-01-24 RX ADMIN — ESCITALOPRAM OXALATE 20 MG: 10 TABLET ORAL at 20:58

## 2024-01-24 RX ADMIN — MUPIROCIN: 20 OINTMENT TOPICAL at 08:41

## 2024-01-24 RX ADMIN — IPRATROPIUM BROMIDE AND ALBUTEROL SULFATE 1 DOSE: 2.5; .5 SOLUTION RESPIRATORY (INHALATION) at 23:12

## 2024-01-24 RX ADMIN — IPRATROPIUM BROMIDE AND ALBUTEROL SULFATE 1 DOSE: 2.5; .5 SOLUTION RESPIRATORY (INHALATION) at 08:44

## 2024-01-24 RX ADMIN — METOPROLOL TARTRATE 25 MG: 25 TABLET, FILM COATED ORAL at 08:41

## 2024-01-24 RX ADMIN — BUSPIRONE HYDROCHLORIDE 10 MG: 10 TABLET ORAL at 16:09

## 2024-01-24 RX ADMIN — LISINOPRIL 5 MG: 10 TABLET ORAL at 08:41

## 2024-01-24 RX ADMIN — ACETAMINOPHEN 650 MG: 325 TABLET ORAL at 16:18

## 2024-01-24 RX ADMIN — QUETIAPINE FUMARATE 12.5 MG: 25 TABLET ORAL at 08:41

## 2024-01-24 RX ADMIN — LEVOFLOXACIN 750 MG: 750 TABLET, FILM COATED ORAL at 08:41

## 2024-01-24 RX ADMIN — METOPROLOL TARTRATE 25 MG: 25 TABLET, FILM COATED ORAL at 20:57

## 2024-01-24 RX ADMIN — ROFLUMILAST 250 MCG: 500 TABLET ORAL at 08:41

## 2024-01-24 ASSESSMENT — PAIN DESCRIPTION - LOCATION
LOCATION: NECK
LOCATION: BACK;NECK
LOCATION: HEAD

## 2024-01-24 ASSESSMENT — PAIN SCALES - GENERAL
PAINLEVEL_OUTOF10: 4
PAINLEVEL_OUTOF10: 0
PAINLEVEL_OUTOF10: 7
PAINLEVEL_OUTOF10: 5

## 2024-01-24 ASSESSMENT — PAIN DESCRIPTION - PAIN TYPE
TYPE: CHRONIC PAIN
TYPE: ACUTE PAIN

## 2024-01-24 ASSESSMENT — PAIN DESCRIPTION - DESCRIPTORS: DESCRIPTORS: ACHING

## 2024-01-24 NOTE — PROGRESS NOTES
RT Inhaler-Nebulizer Bronchodilator Protocol Note    There is a bronchodilator order in the chart from a provider indicating to follow the RT Bronchodilator Protocol and there is an “Initiate RT Inhaler-Nebulizer Bronchodilator Protocol” order as well (see protocol at bottom of note).    CXR Findings:  No results found.    The findings from the last RT Protocol Assessment were as follows:   History Pulmonary Disease: (P) Chronic pulmonary disease  Respiratory Pattern: (P) Dyspnea on exertion or RR 21-25 bpm  Breath Sounds: (P) Slightly diminished and/or crackles  Cough: (P) Strong, spontaneous, non-productive  Indication for Bronchodilator Therapy: (P) On home bronchodilators  Bronchodilator Assessment Score: (P) 6    Aerosolized bronchodilator medication orders have been revised according to the RT Inhaler-Nebulizer Bronchodilator Protocol below.    Respiratory Therapist to perform RT Therapy Protocol Assessment initially then follow the protocol.  Repeat RT Therapy Protocol Assessment PRN for score 0-3 or on second treatment, BID, and PRN for scores above 3.    No Indications - adjust the frequency to every 6 hours PRN wheezing or bronchospasm, if no treatments needed after 48 hours then discontinue using Per Protocol order mode.     If indication present, adjust the RT bronchodilator orders based on the Bronchodilator Assessment Score as indicated below.  Use Inhaler orders unless patient has one or more of the following: on home nebulizer, not able to hold breath for 10 seconds, is not alert and oriented, cannot activate and use MDI correctly, or respiratory rate 25 breaths per minute or more, then use the equivalent nebulizer order(s) with same Frequency and PRN reasons based on the score.  If a patient is on this medication at home then do not decrease Frequency below that used at home.    0-3 - enter or revise RT bronchodilator order(s) to equivalent RT Bronchodilator order with Frequency of every 4 hours PRN  for wheezing or increased work of breathing using Per Protocol order mode.        4-6 - enter or revise RT Bronchodilator order(s) to two equivalent RT bronchodilator orders with one order with BID Frequency and one order with Frequency of every 4 hours PRN wheezing or increased work of breathing using Per Protocol order mode.        7-10 - enter or revise RT Bronchodilator order(s) to two equivalent RT bronchodilator orders with one order with TID Frequency and one order with Frequency of every 4 hours PRN wheezing or increased work of breathing using Per Protocol order mode.       11-13 - enter or revise RT Bronchodilator order(s) to one equivalent RT bronchodilator order with QID Frequency and an Albuterol order with Frequency of every 4 hours PRN wheezing or increased work of breathing using Per Protocol order mode.      Greater than 13 - enter or revise RT Bronchodilator order(s) to one equivalent RT bronchodilator order with every 4 hours Frequency and an Albuterol order with Frequency of every 2 hours PRN wheezing or increased work of breathing using Per Protocol order mode.         Electronically signed by Dallas Delacruz RCP on 1/24/2024 at 8:51 AM

## 2024-01-24 NOTE — PROGRESS NOTES
Reassessment completed, see flow sheet. BL lungs diminished.  On BiPAP. Denies pain.    All ICU Lines and monitoring remain in place. Bed locked in lowest position. Call light within reach.

## 2024-01-24 NOTE — PROGRESS NOTES
Pulmonary & Critical Care Medicine ICU Progress Note    CC: Respiratory failure    Events of Last 24 hours:   Doing great  No CP  SOB has improved  On 4 -5 L   BiPAP overnight 16/8 40%   Anxious and agitated ,less  + RSV +    Less anxious       Vascular lines: IV: PIVs         / / /FiO2 : 40 %  No results for input(s): \"PHART\", \"ZVR3LUB\", \"PO2ART\" in the last 72 hours.      IV:   sodium chloride         Vitals:  Blood pressure 125/62, pulse 68, temperature 98.7 °F (37.1 °C), temperature source Oral, resp. rate 20, height 1.6 m (5' 3\"), weight 76.1 kg (167 lb 11.2 oz), SpO2 93 %, not currently breastfeeding.  on BiPAP   Temp  Av.6 °F (37 °C)  Min: 98.1 °F (36.7 °C)  Max: 98.9 °F (37.2 °C)    Intake/Output Summary (Last 24 hours) at 2024 0906  Last data filed at 2024 0545  Gross per 24 hour   Intake 702 ml   Output 2250 ml   Net -1548 ml       PE:  Gen: + Distress.  Ill-appearing  Eyes: PERRL. No sclera icterus. No conjunctival injection.   ENT: No discharge. Pharynx clear.   Neck: Trachea midline. No obvious mass.    Resp: + Accessory muscle use. No crackles. Bilateral wheezes. No rhonchi. No dullness on percussion.  CV: Tacky rate. Regular rhythm. No murmur or rub. No edema.   GI: Non-tender. Non-distended. No hernia.   Skin: Warm and dry. No nodule on exposed extremities.   Lymph: No cervical LAD. No supraclavicular LAD.   M/S: No cyanosis. No joint deformity. No clubbing.   Neuro: Awake. Alert. Moves all four extremities.   Psych: Oriented x 3.  + Anxiety.     Scheduled Meds:   mupirocin   Each Nostril BID    QUEtiapine  12.5 mg Oral BID    levoFLOXacin  750 mg Oral Daily    metoprolol tartrate  25 mg Oral BID    busPIRone  10 mg Oral TID    sodium chloride flush  5-40 mL IntraVENous 2 times per day    enoxaparin  40 mg SubCUTAneous Daily    ipratropium 0.5 mg-albuterol 2.5 mg  1 Dose Inhalation Q4H WA RT    methylPREDNISolone  40 mg IntraVENous Q12H    amLODIPine  10 mg Oral Daily

## 2024-01-24 NOTE — PROGRESS NOTES
Bed side report received from LENNY Watson. Remote tele and continuous pulse ox applied per transfer orders. Called and verified w/ Nataly in [facility] that patient is seen on monitor, and notified them that patient is still in ICU, transport order placed. Awaiting transport to .

## 2024-01-24 NOTE — PROGRESS NOTES
Patient is able to demonstrate the ability to move from a reclining position to an upright position within the recliner.

## 2024-01-24 NOTE — PROGRESS NOTES
Transfer assessment completed. VSS. Pt A/OX4. Denies needs at this time. Call light within reach. Bed alarm on.

## 2024-01-24 NOTE — PROGRESS NOTES
Inpatient Occupational Therapy Treatment    Unit: 2 [place]  Date:  2024  Patient Name:    Annie Tyler  Admitting diagnosis:  Elevated troponin [R79.89]  COPD exacerbation (HCC) [J44.1]  COPD with acute exacerbation (HCC) [J44.1]  Acute on chronic respiratory failure with hypoxia (HCC) [J96.21]  Admit Date:  2024  Precautions/Restrictions/WB Status/ Lines/ Wounds/ Oxygen: Fall risk, Lines (Supplemental O2 (4.5L)), and Continuous pulse oximetry    Treatment Time:  16:00-16:30  Treatment Number:  2  Timed Code Treatment Minutes: 30 minutes  Total Treatment Minutes:  30  minutes    Patient Goals for Therapy: \"to go to rehab \"          Discharge Recommendations: SNF  DME needs for discharge: Defer to facility       Therapy recommendations for staff:   Assist of 1 for transfers with use of rolling walker (RW) and gait belt to/from chair    History of Present Illness: Per A Shawn H&P:  \"68 y.o. female with pmhx of COPD, chronic hypoxic respiratory failure, CHF, HTN, depression who presented to [facility] ED with complaint of shortness of breath that began suddenly yesterday. She saw Dr. Wolfe in office a couple days ago and felt fine at the time. Has been tapering on steroids and started on zithromax but did have not improvement. She has been wearing 4 L at home when her baseline is 3. Has significant wheezing and productive cough.\"     Home Health S4 Level Recommendation:  NA    AM-PAC Score: AM-PAC Inpatient Daily Activity Raw Score: 16     Subjective:  Patient lying reclined in bed with no family present.   Pt agreeable to this OT session.     Cognition:    A&O x4   Able to follow 2 step commands    Pain:   Yes  Location: back and head  Ratin /10  Pain Medicine Status: No request made    Preadmission Environment:   Pt. Lives     with family (daughter and daughter's fiance)  Home environment:    one story home  Steps to enter first floor:   Ramp  Steps to second floor/basement: N/A  Laundry:

## 2024-01-24 NOTE — PROGRESS NOTES
Bed side report given to LENNY Shah. Patient up in chair, wanting to go back to bed. Assisted patient back to bed. Belongings and call light within reach. Pure wick in place. Bed alarm on. Diet pepsi provided per pt request. Pt denies further needs at this time.

## 2024-01-24 NOTE — PLAN OF CARE
HEART FAILURE CARE PLAN:    Comorbidities Reviewed: Yes   Patient has a past medical history of Angina pectoris (HCC), Chronic pain, Congestive heart failure (HCC), COPD exacerbation (HCC), Depression, Essential hypertension, Panic disorder, Pneumonia, and Pulmonary emphysema (HCC).     ECHOCARDIOGRAM Reviewed: Yes   Patient's Ejection Fraction (EF) is greater than 40%    Weights Reviewed: Yes   Admission weight: 76.1 kg (167 lb 13 oz)   Wt Readings from Last 3 Encounters:   01/23/24 80.7 kg (177 lb 14.4 oz)   01/15/24 76.2 kg (168 lb)   01/05/24 76.2 kg (168 lb)     Intake & Output Reviewed: Yes     Intake/Output Summary (Last 24 hours) at 1/23/2024 2125  Last data filed at 1/23/2024 2046  Gross per 24 hour   Intake 840 ml   Output 2650 ml   Net -1810 ml     Medications Reviewed: Yes   SCHEDULED HOSPITAL MEDICATIONS:   mupirocin   Each Nostril BID    QUEtiapine  12.5 mg Oral BID    levoFLOXacin  750 mg Oral Daily    metoprolol tartrate  25 mg Oral BID    busPIRone  10 mg Oral TID    sodium chloride flush  5-40 mL IntraVENous 2 times per day    enoxaparin  40 mg SubCUTAneous Daily    ipratropium 0.5 mg-albuterol 2.5 mg  1 Dose Inhalation Q4H WA RT    methylPREDNISolone  40 mg IntraVENous Q12H    amLODIPine  10 mg Oral Daily    escitalopram  20 mg Oral Nightly    lisinopril  5 mg Oral Daily    Roflumilast  250 mcg Oral Daily     ACE/ARB/ARNI is REQUIRED for EF </= 40% SYSTOLIC FAILURE:   ACE:: None  ARB:: None  ARNI:: None    Evidenced-Based Beta Blocker is REQUIRED for EF </= 40% SYSTOLIC FAILURE:   :: Metoprolol SUCCinate- Toprol XL    Diuretics:  :: None    Diet Reviewed: Yes   ADULT DIET; Regular    Goal of Care Reviewed: Yes   Patient and/or Family's stated Goal of Care this Admission: reduce shortness of breath, increase activity tolerance, better understand heart failure and disease management, be more comfortable, and reduce lower extremity edema prior to discharge.

## 2024-01-24 NOTE — PROGRESS NOTES
HEART FAILURE CARE PLAN:    Comorbidities Reviewed: Yes   Patient has a past medical history of Angina pectoris (HCC), Chronic pain, Congestive heart failure (HCC), COPD exacerbation (HCC), Depression, Essential hypertension, Panic disorder, Pneumonia, and Pulmonary emphysema (HCC).     ECHOCARDIOGRAM Reviewed: Yes   Patient's Ejection Fraction (EF) is greater than 40%    Weights Reviewed: Yes   Admission weight: 76.1 kg (167 lb 13 oz)   Wt Readings from Last 3 Encounters:   01/24/24 76.1 kg (167 lb 11.2 oz)   01/15/24 76.2 kg (168 lb)   01/05/24 76.2 kg (168 lb)     Intake & Output Reviewed: Yes     Intake/Output Summary (Last 24 hours) at 1/24/2024 1845  Last data filed at 1/24/2024 1729  Gross per 24 hour   Intake 1026 ml   Output 1700 ml   Net -674 ml     Medications Reviewed: Yes   SCHEDULED HOSPITAL MEDICATIONS:   [START ON 1/25/2024] amLODIPine  5 mg Oral Daily    QUEtiapine  12.5 mg Oral Nightly    [START ON 1/25/2024] predniSONE  40 mg Oral Daily    levoFLOXacin  750 mg Oral Daily    metoprolol tartrate  25 mg Oral BID    busPIRone  10 mg Oral TID    sodium chloride flush  5-40 mL IntraVENous 2 times per day    enoxaparin  40 mg SubCUTAneous Daily    ipratropium 0.5 mg-albuterol 2.5 mg  1 Dose Inhalation Q4H WA RT    escitalopram  20 mg Oral Nightly    lisinopril  5 mg Oral Daily    Roflumilast  250 mcg Oral Daily     ACE/ARB/ARNI is REQUIRED for EF </= 40% SYSTOLIC FAILURE:   ACE:: Lisinopril  ARB:: None  ARNI:: None    Evidenced-Based Beta Blocker is REQUIRED for EF </= 40% SYSTOLIC FAILURE:   :: Metoprolol SUCCinate- Toprol XL    Diuretics:  :: None    Diet Reviewed: Yes   ADULT DIET; Regular    Goal of Care Reviewed: Yes   Patient and/or Family's stated Goal of Care this Admission: reduce shortness of breath and increase activity tolerance prior to discharge.

## 2024-01-24 NOTE — PROGRESS NOTES
Reassessment completed, see flow sheet. BL lungs diminished.  Pt continues to be on BiPAP.   Incontinent of large BM. Pt bathed and repositioned.  All ICU Lines and monitoring remain in place. Bed locked in lowest position. Call light within reach.

## 2024-01-24 NOTE — PROGRESS NOTES
Admit: 2024    Name:  Annie Tyler  Room:  [address] [place] [address]  MRN:    [id number]    Critical Care Daily Progress Note for 2024     Acute on chronic resp failure   Placed on BiPAP    Interval History:       Used BiPAP last night and uses prn during the day    Now on 4 L     Improved HR with adding metoprolol    Reports she is doing better and anxiety improved but seroquel added yesterday and it made her sleepy    Scheduled Meds:   mupirocin   Each Nostril BID    QUEtiapine  12.5 mg Oral BID    levoFLOXacin  750 mg Oral Daily    metoprolol tartrate  25 mg Oral BID    busPIRone  10 mg Oral TID    sodium chloride flush  5-40 mL IntraVENous 2 times per day    enoxaparin  40 mg SubCUTAneous Daily    ipratropium 0.5 mg-albuterol 2.5 mg  1 Dose Inhalation Q4H WA RT    methylPREDNISolone  40 mg IntraVENous Q12H    amLODIPine  10 mg Oral Daily    escitalopram  20 mg Oral Nightly    lisinopril  5 mg Oral Daily    Roflumilast  250 mcg Oral Daily       Continuous Infusions:   sodium chloride         PRN Meds:  ipratropium 0.5 mg-albuterol 2.5 mg, loperamide, sodium chloride flush, sodium chloride, ondansetron **OR** ondansetron, polyethylene glycol, acetaminophen **OR** acetaminophen, benzonatate, hydrOXYzine HCl                  Objective:     Temp  Av.2 °F (36.8 °C)  Min: 96.9 °F (36.1 °C)  Max: 98.9 °F (37.2 °C)  Pulse  Av.6  Min: 55  Max: 85  BP  Min: 99/85  Max: 149/128  SpO2  Av.2 %  Min: 68 %  Max: 100 %  FiO2   Av %  Min: 40 %  Max: 40 %  Patient Vitals for the past 4 hrs:   BP Temp Temp src Pulse Resp SpO2 Weight   24 0622 139/77 -- -- 65 16 (!) 86 % --   24 0600 (!) 140/104 -- -- 69 22 95 % --   24 0553 -- -- -- -- -- 96 % --   24 0519 109/62 -- -- 56 20 100 % --   24 0500 (!) 149/128 -- -- 60 19 100 % --   24 0400 119/70 98.6 °F (37 °C) Axillary 55 22 99 % 76.1 kg (167 lb 11.2 oz)   24 0333 -- -- -- 57 20 100 % --   24 0300 119/69 -- -- 59 22

## 2024-01-24 NOTE — PROGRESS NOTES
AM assessment completed. AM labs reviewed. VSS. Pt on 4-6 LNC depending on need/exertion. Pt assisted on/off bedpan- large soft/liquid BM. PIV wnl. Pt c/o SOB w/ minimal exertion. No immediate needs expressed. Will monitor.  Shirley Gallardo RN, BSN

## 2024-01-24 NOTE — PROGRESS NOTES
Physician Progress Note      PATIENT:               JAKI STEIN  [facility] #:                  [id number]  :                       1955  ADMIT DATE:       2024 12:41 PM  DISCH DATE:  RESPONDING  PROVIDER #:        Sebas Rodriguez MD          QUERY TEXT:    Patient admitted with COPD AE and pneumonia.  Sepsis was documented in the h/p   and through progress note dated  and then dropped in the progress note   dated .  Please further specify:    The medical record reflects the following:  Risk Factors: age, copd, RSV +, pneumonia  Clinical Indicators: on [id number] with tachycardia and acute/chronic resp   failure  Treatment: pct, blood cultures, IV vancomycin, merrem and doxycycline given   and now downgraded to levaquin, IVF, ICU    Thank you,  Jennifer Neal RN,BSN,CCDS,CRCR  Options provided:  -- Sepsis due to pneumonia treated and resolved  -- Sepsis ruled out after study  -- Other - I will add my own diagnosis  -- Disagree - Not applicable / Not valid  -- Disagree - Clinically unable to determine / Unknown  -- Refer to Clinical Documentation Reviewer    PROVIDER RESPONSE TEXT:    The sepsis was ruled out after study.    Query created by: Jennifer Neal on 2024 1:55 PM      QUERY TEXT:    Pt admitted with documentation of HCAP.  RSV positive.  Treatment includes IV   vancomycin, merrem and doxycycline given and now downgraded to levaquin. If   possible, please document in the progress notes and discharge summary if you   are evaluating and/or treating any of the following:    Note: CAP and HCAP indicate where the pneumonia was acquired, not a specific   type.    The medical record reflects the following:  Risk Factors: age, copd, chronic resp failure, lung cancer, CHF  Clinical Indicators: HCAP, RSV +  Treatment: -IV vancomycin, merrem and doxycycline given and now downgraded to   levaquin, -blood cultures negative  -droplet precaution, RSV positive    Thank youJennifer

## 2024-01-24 NOTE — PROGRESS NOTES
01/24/24 0333   NIV Type   Equipment Type v60   Mode Bilevel   Mask Type Full face mask   Mask Size Medium   Assessment   Pulse 57   Respirations 20   SpO2 100 %   Comfort Level Good   Using Accessory Muscles No   Mask Compliance Good   Location Nose   Settings/Measurements   IPAP 12 cmH20   CPAP/EPAP 6 cmH2O   Vt (Measured) 579 mL   Rate Ordered 14   FiO2  40 %   Minute Volume (L/min) 10.3 Liters   Mask Leak (lpm) 9 lpm   Patient's Home Machine No   Alarm Settings   Alarms On Y   Low Pressure (cmH2O) 8 cmH2O   High Pressure (cmH2O) 40 cmH2O   Apnea (secs) 20 secs   RR Low (bpm) 14   RR High (bpm) 40 br/min

## 2024-01-24 NOTE — PROGRESS NOTES
Bedside Mobility Assessment Tool (BMAT):     Assessment Level 1- Sit and Shake    1. From a semi-reclined position, ask patient to sit up and rotate to a seated position at the side of the bed. Can use the bedrail.    2. Ask patient to reach out and grab your hand and shake making sure patient reaches across his/her midline.   Pass- Patient is able to come to a seated position, maintain core strength. Maintains seated balance while reaching across midline. Move on to Assessment Level 2.     Assessment Level 2- Stretch and Point   1. With patient in seated position at the side of the bed, have patient place both feet on the floor (or stool) with knees no higher than hips.    2. Ask patient to stretch one leg and straighten the knee, then bend the ankle/flex and point the toes. If appropriate, repeat with the other leg.   Fail- Patient is unable to complete task. Patient is MOBILITY LEVEL 2.     Assessment Level 3- Stand   1. Ask patient to elevate off the bed or chair (seated to standing) using an assistive device (cane, bedrail).    2. Patient should be able to raise buttocks off be and hold for a count of five. May repeat once.   Fail- Patient unable to demonstrate standing stability. Patient is MOBILITY LEVEL 3.     Assessment Level 4- Walk   1. Ask patient to march in place at bedside.    2. Then ask patient to advance step and return each foot. Some medical conditions may render a patient from stepping backwards, use your best clinical judgement.   Fail- Patient not able to complete tasks OR requires use of assistive device. Patient is MOBILITY LEVEL 3.       Mobility Level- 2

## 2024-01-24 NOTE — PROGRESS NOTES
01/23/24 2100   RT Protocol   History Pulmonary Disease 2   Respiratory pattern 2   Breath sounds 2   Cough 0   Indications for Bronchodilator Therapy On home bronchodilators   Bronchodilator Assessment Score 6     RT Inhaler-Nebulizer Bronchodilator Protocol Note    There is a bronchodilator order in the chart from a provider indicating to follow the RT Bronchodilator Protocol and there is an “Initiate RT Inhaler-Nebulizer Bronchodilator Protocol” order as well (see protocol at bottom of note).    CXR Findings:  No results found.    The findings from the last RT Protocol Assessment were as follows:   History Pulmonary Disease: Chronic pulmonary disease  Respiratory Pattern: Dyspnea on exertion or RR 21-25 bpm  Breath Sounds: Slightly diminished and/or crackles  Cough: Strong, spontaneous, non-productive  Indication for Bronchodilator Therapy: On home bronchodilators  Bronchodilator Assessment Score: 6    Aerosolized bronchodilator medication orders have been revised according to the RT Inhaler-Nebulizer Bronchodilator Protocol below.    Respiratory Therapist to perform RT Therapy Protocol Assessment initially then follow the protocol.  Repeat RT Therapy Protocol Assessment PRN for score 0-3 or on second treatment, BID, and PRN for scores above 3.    No Indications - adjust the frequency to every 6 hours PRN wheezing or bronchospasm, if no treatments needed after 48 hours then discontinue using Per Protocol order mode.     If indication present, adjust the RT bronchodilator orders based on the Bronchodilator Assessment Score as indicated below.  Use Inhaler orders unless patient has one or more of the following: on home nebulizer, not able to hold breath for 10 seconds, is not alert and oriented, cannot activate and use MDI correctly, or respiratory rate 25 breaths per minute or more, then use the equivalent nebulizer order(s) with same Frequency and PRN reasons based on the score.  If a patient is on this

## 2024-01-24 NOTE — PROGRESS NOTES
01/23/24 2330   NIV Type   NIV Started/Stopped (S)  On   Equipment Type v60   Mode Bilevel   Mask Type Full face mask   Mask Size Medium   Assessment   Comfort Level Good   Using Accessory Muscles No   Mask Compliance Good   Skin Protection for O2 Device Yes   Location Nose   Breath Sounds   Breath Sounds Bilateral Diminished   Settings/Measurements   IPAP 12 cmH20   CPAP/EPAP 6 cmH2O   Vt (Measured) 653 mL   Rate Ordered 14   FiO2  40 %   Minute Volume (L/min) 13.6 Liters   Mask Leak (lpm) 0 lpm   Patient's Home Machine No   Alarm Settings   Alarms On Y   Low Pressure (cmH2O) 8 cmH2O   High Pressure (cmH2O) 40 cmH2O   Apnea (secs) 20 secs   RR Low (bpm) 14   RR High (bpm) 40 br/min

## 2024-01-24 NOTE — PROGRESS NOTES
Shift assessment, completed, see flow sheet. Pt is alert and oriented x 4. Following commands.     SR 83, /75, SpO2 98%, 4lNC. Respirations are easy, even, and unlabored. Bilateral lung sounds diminished. PIV WNL and SL.    External catheter in place and draining clear yellow urine    Call light within reach. Bed in lowest position. Bed alarm on.

## 2024-01-25 LAB
ANION GAP SERPL CALCULATED.3IONS-SCNC: 12 MMOL/L (ref 3–16)
BUN SERPL-MCNC: 22 MG/DL (ref 7–20)
CALCIUM SERPL-MCNC: 9.2 MG/DL (ref 8.3–10.6)
CHLORIDE SERPL-SCNC: 97 MMOL/L (ref 99–110)
CO2 SERPL-SCNC: 28 MMOL/L (ref 21–32)
CREAT SERPL-MCNC: <0.5 MG/DL (ref 0.6–1.2)
GFR SERPLBLD CREATININE-BSD FMLA CKD-EPI: >60 ML/MIN/{1.73_M2}
GLUCOSE SERPL-MCNC: 95 MG/DL (ref 70–99)
POTASSIUM SERPL-SCNC: 4.6 MMOL/L (ref 3.5–5.1)
SODIUM SERPL-SCNC: 137 MMOL/L (ref 136–145)

## 2024-01-25 PROCEDURE — 99232 SBSQ HOSP IP/OBS MODERATE 35: CPT | Performed by: INTERNAL MEDICINE

## 2024-01-25 PROCEDURE — 1200000000 HC SEMI PRIVATE

## 2024-01-25 PROCEDURE — 36415 COLL VENOUS BLD VENIPUNCTURE: CPT

## 2024-01-25 PROCEDURE — 94640 AIRWAY INHALATION TREATMENT: CPT

## 2024-01-25 PROCEDURE — 6370000000 HC RX 637 (ALT 250 FOR IP): Performed by: INTERNAL MEDICINE

## 2024-01-25 PROCEDURE — 2580000003 HC RX 258: Performed by: INTERNAL MEDICINE

## 2024-01-25 PROCEDURE — 2700000000 HC OXYGEN THERAPY PER DAY

## 2024-01-25 PROCEDURE — 94761 N-INVAS EAR/PLS OXIMETRY MLT: CPT

## 2024-01-25 PROCEDURE — 6360000002 HC RX W HCPCS: Performed by: INTERNAL MEDICINE

## 2024-01-25 PROCEDURE — 80048 BASIC METABOLIC PNL TOTAL CA: CPT

## 2024-01-25 PROCEDURE — 94660 CPAP INITIATION&MGMT: CPT

## 2024-01-25 PROCEDURE — 99233 SBSQ HOSP IP/OBS HIGH 50: CPT | Performed by: INTERNAL MEDICINE

## 2024-01-25 RX ADMIN — ACETAMINOPHEN 650 MG: 325 TABLET ORAL at 16:31

## 2024-01-25 RX ADMIN — LISINOPRIL 5 MG: 10 TABLET ORAL at 10:27

## 2024-01-25 RX ADMIN — IPRATROPIUM BROMIDE AND ALBUTEROL SULFATE 1 DOSE: 2.5; .5 SOLUTION RESPIRATORY (INHALATION) at 18:54

## 2024-01-25 RX ADMIN — BUSPIRONE HYDROCHLORIDE 10 MG: 10 TABLET ORAL at 15:39

## 2024-01-25 RX ADMIN — LEVOFLOXACIN 750 MG: 750 TABLET, FILM COATED ORAL at 10:23

## 2024-01-25 RX ADMIN — BENZONATATE 100 MG: 100 CAPSULE ORAL at 12:15

## 2024-01-25 RX ADMIN — LOPERAMIDE HYDROCHLORIDE 2 MG: 2 CAPSULE ORAL at 12:15

## 2024-01-25 RX ADMIN — ACETAMINOPHEN 650 MG: 325 TABLET ORAL at 10:25

## 2024-01-25 RX ADMIN — HYDROXYZINE HYDROCHLORIDE 25 MG: 25 TABLET ORAL at 23:11

## 2024-01-25 RX ADMIN — IPRATROPIUM BROMIDE AND ALBUTEROL SULFATE 1 DOSE: 2.5; .5 SOLUTION RESPIRATORY (INHALATION) at 07:39

## 2024-01-25 RX ADMIN — LOPERAMIDE HYDROCHLORIDE 2 MG: 2 CAPSULE ORAL at 23:11

## 2024-01-25 RX ADMIN — IPRATROPIUM BROMIDE AND ALBUTEROL SULFATE 1 DOSE: 2.5; .5 SOLUTION RESPIRATORY (INHALATION) at 15:29

## 2024-01-25 RX ADMIN — ESCITALOPRAM OXALATE 20 MG: 10 TABLET ORAL at 21:58

## 2024-01-25 RX ADMIN — PREDNISONE 40 MG: 20 TABLET ORAL at 10:27

## 2024-01-25 RX ADMIN — Medication 10 ML: at 21:58

## 2024-01-25 RX ADMIN — ACETAMINOPHEN 650 MG: 325 TABLET ORAL at 23:10

## 2024-01-25 RX ADMIN — METOPROLOL TARTRATE 25 MG: 25 TABLET, FILM COATED ORAL at 10:24

## 2024-01-25 RX ADMIN — AMLODIPINE BESYLATE 5 MG: 5 TABLET ORAL at 10:28

## 2024-01-25 RX ADMIN — BUSPIRONE HYDROCHLORIDE 10 MG: 10 TABLET ORAL at 21:57

## 2024-01-25 RX ADMIN — IPRATROPIUM BROMIDE AND ALBUTEROL SULFATE 1 DOSE: 2.5; .5 SOLUTION RESPIRATORY (INHALATION) at 23:48

## 2024-01-25 RX ADMIN — BUSPIRONE HYDROCHLORIDE 10 MG: 10 TABLET ORAL at 10:24

## 2024-01-25 RX ADMIN — ACETAMINOPHEN 650 MG: 325 TABLET ORAL at 04:59

## 2024-01-25 RX ADMIN — QUETIAPINE FUMARATE 12.5 MG: 25 TABLET ORAL at 21:57

## 2024-01-25 RX ADMIN — IPRATROPIUM BROMIDE AND ALBUTEROL SULFATE 1 DOSE: 2.5; .5 SOLUTION RESPIRATORY (INHALATION) at 11:33

## 2024-01-25 RX ADMIN — Medication 5 ML: at 10:28

## 2024-01-25 RX ADMIN — ROFLUMILAST 250 MCG: 500 TABLET ORAL at 10:26

## 2024-01-25 RX ADMIN — METOPROLOL TARTRATE 25 MG: 25 TABLET, FILM COATED ORAL at 21:58

## 2024-01-25 RX ADMIN — ENOXAPARIN SODIUM 40 MG: 100 INJECTION SUBCUTANEOUS at 10:28

## 2024-01-25 ASSESSMENT — PAIN SCALES - GENERAL
PAINLEVEL_OUTOF10: 4
PAINLEVEL_OUTOF10: 6
PAINLEVEL_OUTOF10: 6
PAINLEVEL_OUTOF10: 7
PAINLEVEL_OUTOF10: 7
PAINLEVEL_OUTOF10: 0
PAINLEVEL_OUTOF10: 7
PAINLEVEL_OUTOF10: 7
PAINLEVEL_OUTOF10: 3

## 2024-01-25 ASSESSMENT — PAIN DESCRIPTION - FREQUENCY
FREQUENCY: CONTINUOUS

## 2024-01-25 ASSESSMENT — PAIN DESCRIPTION - ONSET
ONSET: GRADUAL
ONSET: ON-GOING
ONSET: ON-GOING

## 2024-01-25 ASSESSMENT — PAIN - FUNCTIONAL ASSESSMENT
PAIN_FUNCTIONAL_ASSESSMENT: ACTIVITIES ARE NOT PREVENTED
PAIN_FUNCTIONAL_ASSESSMENT_SITE2: ACTIVITIES ARE NOT PREVENTED
PAIN_FUNCTIONAL_ASSESSMENT: ACTIVITIES ARE NOT PREVENTED

## 2024-01-25 ASSESSMENT — PAIN DESCRIPTION - INTENSITY: RATING_2: 7

## 2024-01-25 ASSESSMENT — PAIN DESCRIPTION - DESCRIPTORS
DESCRIPTORS: ACHING;DISCOMFORT
DESCRIPTORS_2: ACHING;DISCOMFORT
DESCRIPTORS: ACHING;DISCOMFORT

## 2024-01-25 ASSESSMENT — PAIN DESCRIPTION - LOCATION
LOCATION: BACK
LOCATION_2: HEAD
LOCATION: BACK
LOCATION: HEAD
LOCATION: BACK

## 2024-01-25 ASSESSMENT — PAIN DESCRIPTION - PAIN TYPE
TYPE: CHRONIC PAIN

## 2024-01-25 ASSESSMENT — PAIN DESCRIPTION - ORIENTATION
ORIENTATION_2: POSTERIOR
ORIENTATION: LOWER
ORIENTATION: LOWER
ORIENTATION: POSTERIOR
ORIENTATION: LOWER

## 2024-01-25 NOTE — PROGRESS NOTES
RT Inhaler-Nebulizer Bronchodilator Protocol Note    There is a bronchodilator order in the chart from a provider indicating to follow the RT Bronchodilator Protocol and there is an “Initiate RT Inhaler-Nebulizer Bronchodilator Protocol” order as well (see protocol at bottom of note).    CXR Findings:  No results found.    The findings from the last RT Protocol Assessment were as follows:   History Pulmonary Disease: Chronic pulmonary disease  Respiratory Pattern: Dyspnea on exertion or RR 21-25 bpm  Breath Sounds: Slightly diminished and/or crackles  Cough: Strong, spontaneous, non-productive  Indication for Bronchodilator Therapy: Decreased or absent breath sounds  Bronchodilator Assessment Score: 6    Aerosolized bronchodilator medication orders have been revised according to the RT Inhaler-Nebulizer Bronchodilator Protocol below.    Respiratory Therapist to perform RT Therapy Protocol Assessment initially then follow the protocol.  Repeat RT Therapy Protocol Assessment PRN for score 0-3 or on second treatment, BID, and PRN for scores above 3.    No Indications - adjust the frequency to every 6 hours PRN wheezing or bronchospasm, if no treatments needed after 48 hours then discontinue using Per Protocol order mode.     If indication present, adjust the RT bronchodilator orders based on the Bronchodilator Assessment Score as indicated below.  Use Inhaler orders unless patient has one or more of the following: on home nebulizer, not able to hold breath for 10 seconds, is not alert and oriented, cannot activate and use MDI correctly, or respiratory rate 25 breaths per minute or more, then use the equivalent nebulizer order(s) with same Frequency and PRN reasons based on the score.  If a patient is on this medication at home then do not decrease Frequency below that used at home.    0-3 - enter or revise RT bronchodilator order(s) to equivalent RT Bronchodilator order with Frequency of every 4 hours PRN for wheezing

## 2024-01-25 NOTE — FLOWSHEET NOTE
Pt A/Ox4. VSS. Chronic lower back pain 7/10, see assessment below. Pt requested Tylenol. Pt unlabored; respirations even, regular, effortless. Baseline O2 in place at 4 L/NC. No distress noted. Shift assessment complete. See flowsheet. AM med's given. PRN Tylenol 650 mg given per patient request. See MAR. Denies needs at this time. External catheter connected to suction and remains in place. Telemetry and continuous pulse ox remains in place. Bed alarm on. Side rails 2/4. Bed in low position. Call light within reach.     Bedside Mobility Assessment Tool (BMAT):     Assessment Level 1- Sit and Shake    1. From a semi-reclined position, ask patient to sit up and rotate to a seated position at the side of the bed. Can use the bedrail.    2. Ask patient to reach out and grab your hand and shake making sure patient reaches across his/her midline.   Pass- Patient is able to come to a seated position, maintain core strength. Maintains seated balance while reaching across midline. Move on to Assessment Level 2.     Assessment Level 2- Stretch and Point   1. With patient in seated position at the side of the bed, have patient place both feet on the floor (or stool) with knees no higher than hips.    2. Ask patient to stretch one leg and straighten the knee, then bend the ankle/flex and point the toes. If appropriate, repeat with the other leg.   Pass- Patient is able to demonstrate appropriate quad strength on intended weight bearing limb(s). Move onto Assessment Level 3.     Assessment Level 3- Stand   1. Ask patient to elevate off the bed or chair (seated to standing) using an assistive device (cane, bedrail).    2. Patient should be able to raise buttocks off be and hold for a count of five. May repeat once.   Pass- Patient maintains standing stability for at least 5 seconds, proceed to assessment level 4.    Assessment Level 4- Walk   1. Ask patient to march in place at bedside.    2. Then ask patient to advance step and

## 2024-01-25 NOTE — PLAN OF CARE
HEART FAILURE CARE PLAN:    Comorbidities Reviewed: Yes   Patient has a past medical history of Angina pectoris (HCC), Chronic pain, Congestive heart failure (HCC), COPD exacerbation (HCC), Depression, Essential hypertension, Panic disorder, Pneumonia, and Pulmonary emphysema (HCC).     ECHOCARDIOGRAM Reviewed: Yes   Patient's Ejection Fraction (EF) is greater than 40%    Weights Reviewed: Yes   Admission weight: 76.1 kg (167 lb 13 oz)   Wt Readings from Last 3 Encounters:   01/25/24 81.5 kg (179 lb 9.6 oz)   01/15/24 76.2 kg (168 lb)   01/05/24 76.2 kg (168 lb)     Intake & Output Reviewed: Yes     Intake/Output Summary (Last 24 hours) at 1/25/2024 1342  Last data filed at 1/25/2024 1230  Gross per 24 hour   Intake 1085 ml   Output 1400 ml   Net -315 ml     Medications Reviewed: Yes   SCHEDULED HOSPITAL MEDICATIONS:   amLODIPine  5 mg Oral Daily    QUEtiapine  12.5 mg Oral Nightly    predniSONE  40 mg Oral Daily    levoFLOXacin  750 mg Oral Daily    metoprolol tartrate  25 mg Oral BID    busPIRone  10 mg Oral TID    sodium chloride flush  5-40 mL IntraVENous 2 times per day    enoxaparin  40 mg SubCUTAneous Daily    ipratropium 0.5 mg-albuterol 2.5 mg  1 Dose Inhalation Q4H WA RT    escitalopram  20 mg Oral Nightly    lisinopril  5 mg Oral Daily    Roflumilast  250 mcg Oral Daily     ACE/ARB/ARNI is REQUIRED for EF </= 40% SYSTOLIC FAILURE:   ACE:: Lisinopril  ARB:: None  ARNI:: None    Evidenced-Based Beta Blocker is REQUIRED for EF </= 40% SYSTOLIC FAILURE:   ::  Metoprolol tartrate    Diuretics:  :: None    Diet Reviewed: Yes   ADULT DIET; Regular; Low Sodium (2 gm)    Goal of Care Reviewed: No   Patient and/or Family's stated Goal of Care this Admission:      Problem: Discharge Planning  Goal: Discharge to home or other facility with appropriate resources  Outcome: Progressing  Flowsheets (Taken 1/25/2024 1032)  Discharge to home or other facility with appropriate resources:   Identify barriers to discharge

## 2024-01-25 NOTE — PROGRESS NOTES
01/25/24 0322   NIV Type   Mode Bilevel   Mask Type Full face mask   Mask Size Medium   Assessment   Respirations 20   SpO2 95 %   Settings/Measurements   IPAP 12 cmH20   CPAP/EPAP 6 cmH2O   Vt (Measured) 449 mL   Rate Ordered 14   FiO2  40 %

## 2024-01-25 NOTE — CARE COORDINATION
Spoke with pt who cont plan to go to [facility] for rheab. Spoke with Ines at [facility] who states can accept and pre cert is completed. Can accept when discharged.

## 2024-01-25 NOTE — PLAN OF CARE
Problem: Discharge Planning  Goal: Discharge to home or other facility with appropriate resources  1/24/2024 2226 by Alyssa Arroyo RN  Outcome: Progressing  1/24/2024 1843 by Courtney Lazaro RN  Outcome: Progressing     Problem: Pain  Goal: Verbalizes/displays adequate comfort level or baseline comfort level  1/24/2024 2226 by Alyssa Arroyo RN  Outcome: Progressing  1/24/2024 1843 by Courtney Lazaro RN  Outcome: Progressing     Problem: Safety - Adult  Goal: Free from fall injury  1/24/2024 2226 by Alyssa Arroyo RN  Outcome: Progressing  1/24/2024 1843 by Courtney Lazaro RN  Outcome: Progressing     Problem: Skin/Tissue Integrity  Goal: Absence of new skin breakdown  Description: 1.  Monitor for areas of redness and/or skin breakdown  2.  Assess vascular access sites hourly  3.  Every 4-6 hours minimum:  Change oxygen saturation probe site  4.  Every 4-6 hours:  If on nasal continuous positive airway pressure, respiratory therapy assess nares and determine need for appliance change or resting period.  1/24/2024 2226 by Alyssa Arroyo RN  Outcome: Progressing  1/24/2024 1843 by Courtney Lazaro RN  Outcome: Progressing     Problem: Chronic Conditions and Co-morbidities  Goal: Patient's chronic conditions and co-morbidity symptoms are monitored and maintained or improved  1/24/2024 2226 by Alyssa Arroyo RN  Outcome: Progressing  1/24/2024 1843 by Courtney Lazaro RN  Outcome: Progressing     Problem: Respiratory - Adult  Goal: Achieves optimal ventilation and oxygenation  1/24/2024 2226 by Alyssa Arroyo RN  Outcome: Progressing  1/24/2024 1843 by Courtney Lazaro RN  Outcome: Progressing

## 2024-01-25 NOTE — PROGRESS NOTES
Patient provided a COPD Educational Folder that includes the following materials:     [x]  LEYIO Booklet: Managing your COPD  [x]  ALA: Getting the Most Out of Medication Delivery Devices  [x]  ALA: My COPD Action Plan  [x]  Better Breathers [facility]: [facility]   [x]  Smoking Cessation Classes  [x]  Outpatient Spiritual Care Services  [x]  Magnet: Signs of COPD    PATIENT/CAREGIVER TEACHING:   Level of patient/caregiver understanding able to:   [] Verbalize understanding   [] Demonstrate understanding       [] Teach back        [] Needs reinforcement     [x]  Other:     COPD ASSESSMENT TOOL (CAT):   Cough Assessment: 2   Phlegm Assessment: 2   Chest tightness:  3   Walking on an incline: 3   Home Activities: 5   Confident Leaving the Home: 5   Sleeping Soundly: 4   Have Energy: 3   Assessment Score: 27    CAT score of 27. Patient received COPD educational folder on 1/1. Patient denied the need for an additional folder at this time. Plans SNF at discharge. Patient wants outpatient pulmonary rehab after she is finished with SNF and improving strength and mobility.     Electronically signed by Christina Cormier RN on 1/25/2024 at 12:51 PM

## 2024-01-25 NOTE — PROGRESS NOTES
01/24/24 2321   NIV Type   Mode Bilevel   Mask Type Full face mask   Mask Size Medium   Assessment   Respirations 22   SpO2 99 %   Settings/Measurements   IPAP 12 cmH20   CPAP/EPAP 6 cmH2O   Vt (Measured) 642 mL   Rate Ordered 14   FiO2  40 %

## 2024-01-25 NOTE — PROGRESS NOTES
Admit: 2024    Name:  Annie Tyler  Room:  [facility]  MRN:    [id number]    Daily Progress Note for 2024     Acute on chronic resp failure   Placed on BiPAP    Interval History:       Used BiPAP last night and uses prn during the day    Now on 4 L     Improved HR with adding metoprolol      Scheduled Meds:   amLODIPine  5 mg Oral Daily    QUEtiapine  12.5 mg Oral Nightly    predniSONE  40 mg Oral Daily    levoFLOXacin  750 mg Oral Daily    metoprolol tartrate  25 mg Oral BID    busPIRone  10 mg Oral TID    sodium chloride flush  5-40 mL IntraVENous 2 times per day    enoxaparin  40 mg SubCUTAneous Daily    ipratropium 0.5 mg-albuterol 2.5 mg  1 Dose Inhalation Q4H WA RT    escitalopram  20 mg Oral Nightly    lisinopril  5 mg Oral Daily    Roflumilast  250 mcg Oral Daily       Continuous Infusions:   sodium chloride         PRN Meds:  ipratropium 0.5 mg-albuterol 2.5 mg, loperamide, sodium chloride flush, sodium chloride, ondansetron **OR** ondansetron, polyethylene glycol, acetaminophen **OR** acetaminophen, benzonatate, hydrOXYzine HCl                  Objective:     Temp  Av.7 °F (36.5 °C)  Min: 97.4 °F (36.3 °C)  Max: 97.9 °F (36.6 °C)  Pulse  Av.8  Min: 65  Max: 93  BP  Min: 101/60  Max: 124/64  SpO2  Av.6 %  Min: 93 %  Max: 99 %  FiO2   Av %  Min: 40 %  Max: 40 %  Patient Vitals for the past 4 hrs:   BP Temp Temp src Pulse Resp SpO2   24 0905 108/65 97.9 °F (36.6 °C) Oral 93 18 94 %   24 0739 -- -- -- 65 18 94 %           Intake/Output Summary (Last 24 hours) at 2024 0957  Last data filed at 2024 0517  Gross per 24 hour   Intake 924 ml   Output 800 ml   Net 124 ml         Physical Exam:    General: elderly female chronically ill appearing    Awake, alert and oriented. Appears to be not in any distress  Mucous Membranes:  Pink , anicteric  Neck: No JVD, no carotid bruit, no thyromegaly  Chest: improving airentry javier with resolving wheeze   Cardiovascular:   with pulmonology      #Chronic diastolic HF   #Pulmonary HTN   -does not appear acutely decompensated  -resume lasix as tolerated  -daily weights, intake and output      #HTN   -on norvasc  and lisinopril - decrease  norvasc as adding BB     #Depression   #Anxiety   -continue lexapro and atarax    added seroquel and  buspar      DVT Prophylaxis: Lovenox   Diet: gen  Code Status: Full    Pt/ot  Needs SNF       POPEYE TIRADO MD

## 2024-01-25 NOTE — PROGRESS NOTES
Pulmonary & Critical Care Medicine ICU Progress Note    CC: Respiratory failure    Events of Last 24 hours:   Doing great  No CP    Wheezing has almost resolved    Feels a lot better however she still has some cough seems dry    On 4 -5 L   BiPAP overnight 16/8 40%   Less anxious        Vascular lines: IV: PIVs         / / /FiO2 : 40 %  No results for input(s): \"PHART\", \"MIE3PHQ\", \"PO2ART\" in the last 72 hours.      IV:   sodium chloride         Vitals:  Blood pressure 124/64, pulse 65, temperature 97.4 °F (36.3 °C), temperature source Axillary, resp. rate 18, height 1.6 m (5' 3\"), weight 81.5 kg (179 lb 9.6 oz), SpO2 94 %, not currently breastfeeding.  on BiPAP   Temp  Av.6 °F (36.4 °C)  Min: 97.4 °F (36.3 °C)  Max: 97.9 °F (36.6 °C)    Intake/Output Summary (Last 24 hours) at 2024 0825  Last data filed at 2024 0517  Gross per 24 hour   Intake 924 ml   Output 800 ml   Net 124 ml       PE:  Gen: + Distress.  Ill-appearing  Eyes: PERRL. No sclera icterus. No conjunctival injection.   ENT: No discharge. Pharynx clear.   Neck: Trachea midline. No obvious mass.    Resp: + Accessory muscle use. No crackles. Bilateral wheezes. No rhonchi. No dullness on percussion.  CV: Tacky rate. Regular rhythm. No murmur or rub. No edema.   GI: Non-tender. Non-distended. No hernia.   Skin: Warm and dry. No nodule on exposed extremities.   Lymph: No cervical LAD. No supraclavicular LAD.   M/S: No cyanosis. No joint deformity. No clubbing.   Neuro: Awake. Alert. Moves all four extremities.   Psych: Oriented x 3.  + Anxiety.     Scheduled Meds:   amLODIPine  5 mg Oral Daily    QUEtiapine  12.5 mg Oral Nightly    predniSONE  40 mg Oral Daily    levoFLOXacin  750 mg Oral Daily    metoprolol tartrate  25 mg Oral BID    busPIRone  10 mg Oral TID    sodium chloride flush  5-40 mL IntraVENous 2 times per day    enoxaparin  40 mg SubCUTAneous Daily    ipratropium 0.5 mg-albuterol 2.5 mg  1 Dose Inhalation Q4H WA RT    escitalopram

## 2024-01-25 NOTE — FLOWSHEET NOTE
01/24/24 2045   Vital Signs   Temp 97.7 °F (36.5 °C)   Temp Source Oral   Pulse 83   Heart Rate Source Monitor   Respirations 20   /60   MAP (Calculated) 81   BP Location Left upper arm   BP Method Automatic   Patient Position Semi cheyennewlers   Pain Assessment   Pain Assessment 0-10   Pain Level 0   Oxygen Therapy   SpO2 93 %   O2 Device Nasal cannula   O2 Flow Rate (L/min) 4.5 L/min     HS assessment completed. No complaints of pain or discomfort voiced. No signs or symptoms of distress noted. Patient tolerated night medications well. Respirations easy and even. Bed in lowest position, bed alarm in place and functioning properly, bed rails x2 up,  Call light within reach.     Bedside Mobility Assessment Tool (BMAT):     Assessment Level 1- Sit and Shake    1. From a semi-reclined position, ask patient to sit up and rotate to a seated position at the side of the bed. Can use the bedrail.    2. Ask patient to reach out and grab your hand and shake making sure patient reaches across his/her midline.   Pass- Patient is able to come to a seated position, maintain core strength. Maintains seated balance while reaching across midline. Move on to Assessment Level 2.     Assessment Level 2- Stretch and Point   1. With patient in seated position at the side of the bed, have patient place both feet on the floor (or stool) with knees no higher than hips.    2. Ask patient to stretch one leg and straighten the knee, then bend the ankle/flex and point the toes. If appropriate, repeat with the other leg.   Pass- Patient is able to demonstrate appropriate quad strength on intended weight bearing limb(s). Move onto Assessment Level 3.     Assessment Level 3- Stand   1. Ask patient to elevate off the bed or chair (seated to standing) using an assistive device (cane, bedrail).    2. Patient should be able to raise buttocks off be and hold for a count of five. May repeat once.   Pass- Patient maintains standing stability for at

## 2024-01-26 VITALS
SYSTOLIC BLOOD PRESSURE: 120 MMHG | OXYGEN SATURATION: 93 % | HEIGHT: 63 IN | HEART RATE: 72 BPM | BODY MASS INDEX: 32.07 KG/M2 | DIASTOLIC BLOOD PRESSURE: 68 MMHG | RESPIRATION RATE: 18 BRPM | TEMPERATURE: 99 F | WEIGHT: 181 LBS

## 2024-01-26 PROCEDURE — 6370000000 HC RX 637 (ALT 250 FOR IP): Performed by: INTERNAL MEDICINE

## 2024-01-26 PROCEDURE — 6360000002 HC RX W HCPCS: Performed by: INTERNAL MEDICINE

## 2024-01-26 PROCEDURE — 99232 SBSQ HOSP IP/OBS MODERATE 35: CPT | Performed by: INTERNAL MEDICINE

## 2024-01-26 PROCEDURE — 2700000000 HC OXYGEN THERAPY PER DAY

## 2024-01-26 PROCEDURE — 94640 AIRWAY INHALATION TREATMENT: CPT

## 2024-01-26 PROCEDURE — 94761 N-INVAS EAR/PLS OXIMETRY MLT: CPT

## 2024-01-26 PROCEDURE — 94660 CPAP INITIATION&MGMT: CPT

## 2024-01-26 PROCEDURE — 99239 HOSP IP/OBS DSCHRG MGMT >30: CPT | Performed by: INTERNAL MEDICINE

## 2024-01-26 PROCEDURE — 2580000003 HC RX 258: Performed by: INTERNAL MEDICINE

## 2024-01-26 RX ORDER — BUSPIRONE HYDROCHLORIDE 10 MG/1
10 TABLET ORAL 3 TIMES DAILY
Qty: 90 TABLET | Refills: 0
Start: 2024-01-26

## 2024-01-26 RX ORDER — PREDNISONE 20 MG/1
TABLET ORAL
Qty: 13 TABLET | Refills: 0
Start: 2024-01-27

## 2024-01-26 RX ORDER — IPRATROPIUM BROMIDE AND ALBUTEROL SULFATE 2.5; .5 MG/3ML; MG/3ML
1 SOLUTION RESPIRATORY (INHALATION)
Status: DISCONTINUED | OUTPATIENT
Start: 2024-01-26 | End: 2024-01-26 | Stop reason: HOSPADM

## 2024-01-26 RX ORDER — QUETIAPINE FUMARATE 25 MG/1
12.5 TABLET, FILM COATED ORAL NIGHTLY
Qty: 15 TABLET | Refills: 0
Start: 2024-01-26

## 2024-01-26 RX ADMIN — LEVOFLOXACIN 750 MG: 750 TABLET, FILM COATED ORAL at 10:12

## 2024-01-26 RX ADMIN — IPRATROPIUM BROMIDE AND ALBUTEROL SULFATE 1 DOSE: 2.5; .5 SOLUTION RESPIRATORY (INHALATION) at 07:46

## 2024-01-26 RX ADMIN — ACETAMINOPHEN 650 MG: 325 TABLET ORAL at 06:43

## 2024-01-26 RX ADMIN — Medication 5 ML: at 10:14

## 2024-01-26 RX ADMIN — AMLODIPINE BESYLATE 5 MG: 5 TABLET ORAL at 10:13

## 2024-01-26 RX ADMIN — LISINOPRIL 5 MG: 10 TABLET ORAL at 10:14

## 2024-01-26 RX ADMIN — ENOXAPARIN SODIUM 40 MG: 100 INJECTION SUBCUTANEOUS at 10:14

## 2024-01-26 RX ADMIN — LOPERAMIDE HYDROCHLORIDE 2 MG: 2 CAPSULE ORAL at 10:13

## 2024-01-26 RX ADMIN — METOPROLOL TARTRATE 25 MG: 25 TABLET, FILM COATED ORAL at 10:14

## 2024-01-26 RX ADMIN — BENZONATATE 100 MG: 100 CAPSULE ORAL at 10:13

## 2024-01-26 RX ADMIN — BUSPIRONE HYDROCHLORIDE 10 MG: 10 TABLET ORAL at 14:55

## 2024-01-26 RX ADMIN — IPRATROPIUM BROMIDE AND ALBUTEROL SULFATE 1 DOSE: 2.5; .5 SOLUTION RESPIRATORY (INHALATION) at 13:04

## 2024-01-26 RX ADMIN — ROFLUMILAST 250 MCG: 500 TABLET ORAL at 10:12

## 2024-01-26 RX ADMIN — PREDNISONE 40 MG: 20 TABLET ORAL at 10:14

## 2024-01-26 RX ADMIN — BUSPIRONE HYDROCHLORIDE 10 MG: 10 TABLET ORAL at 10:14

## 2024-01-26 ASSESSMENT — PAIN SCALES - GENERAL
PAINLEVEL_OUTOF10: 6
PAINLEVEL_OUTOF10: 5
PAINLEVEL_OUTOF10: 0

## 2024-01-26 ASSESSMENT — PAIN DESCRIPTION - LOCATION
LOCATION: BACK
LOCATION: BACK;NECK

## 2024-01-26 ASSESSMENT — PAIN DESCRIPTION - FREQUENCY: FREQUENCY: CONTINUOUS

## 2024-01-26 ASSESSMENT — PAIN DESCRIPTION - DESCRIPTORS: DESCRIPTORS: ACHING;DISCOMFORT

## 2024-01-26 ASSESSMENT — PAIN DESCRIPTION - ORIENTATION: ORIENTATION: LOWER

## 2024-01-26 ASSESSMENT — PAIN DESCRIPTION - PAIN TYPE: TYPE: CHRONIC PAIN

## 2024-01-26 ASSESSMENT — PAIN DESCRIPTION - ONSET: ONSET: ON-GOING

## 2024-01-26 NOTE — PROGRESS NOTES
4 Eyes Skin Assessment     NAME:  Annie Tyler  YOB: 1955  MEDICAL RECORD NUMBER:  [id number]    The patient is being assessed for  Other D/c to SNF    I agree that at least one RN has performed a thorough Head to Toe Skin Assessment on the patient. ALL assessment sites listed below have been assessed.      Areas assessed by both nurses: Scattered bruising to BUE.  Scab to left cheek on face.    Head, Face, Ears, Shoulders, Back, Chest, Arms, Elbows, Hands, Sacrum. Buttock, Coccyx, Ischium, Legs. Feet and Heels, and Under Medical Devices         Does the Patient have a Wound? No noted wound(s)           Nurse 1 eSignature: Electronically signed by Charmaine Lynn RN on 1/26/24 at 3:12 PM EST    **SHARE this note so that the co-signing nurse can place an eSignature**    Nurse 2 eSignature: Electronically signed by Maria R Nelson RN on 1/26/24 at 3:17 PM EST

## 2024-01-26 NOTE — PLAN OF CARE
HEART FAILURE CARE PLAN:    Comorbidities Reviewed: Yes   Patient has a past medical history of Angina pectoris (HCC), Chronic pain, Congestive heart failure (HCC), COPD exacerbation (HCC), Depression, Essential hypertension, Panic disorder, Pneumonia, and Pulmonary emphysema (HCC).     ECHOCARDIOGRAM Reviewed: Yes   Patient's Ejection Fraction (EF) is greater than 40%    Weights Reviewed: Yes   Admission weight: 76.1 kg (167 lb 13 oz)   Wt Readings from Last 3 Encounters:   01/26/24 82.1 kg (181 lb)   01/15/24 76.2 kg (168 lb)   01/05/24 76.2 kg (168 lb)     Intake & Output Reviewed: Yes     Intake/Output Summary (Last 24 hours) at 1/26/2024 1040  Last data filed at 1/26/2024 1029  Gross per 24 hour   Intake 1709 ml   Output 1100 ml   Net 609 ml     Medications Reviewed: Yes   SCHEDULED HOSPITAL MEDICATIONS:   ipratropium 0.5 mg-albuterol 2.5 mg  1 Dose Inhalation BID RT    amLODIPine  5 mg Oral Daily    QUEtiapine  12.5 mg Oral Nightly    predniSONE  40 mg Oral Daily    levoFLOXacin  750 mg Oral Daily    metoprolol tartrate  25 mg Oral BID    busPIRone  10 mg Oral TID    sodium chloride flush  5-40 mL IntraVENous 2 times per day    enoxaparin  40 mg SubCUTAneous Daily    escitalopram  20 mg Oral Nightly    lisinopril  5 mg Oral Daily    Roflumilast  250 mcg Oral Daily     ACE/ARB/ARNI is REQUIRED for EF </= 40% SYSTOLIC FAILURE:   ACE:: Lisinopril  ARB:: None  ARNI:: None    Evidenced-Based Beta Blocker is REQUIRED for EF </= 40% SYSTOLIC FAILURE:   ::  Metoprolol tartrate    Diuretics:  :: None    Diet Reviewed: Yes   ADULT DIET; Regular; Low Sodium (2 gm)    Goal of Care Reviewed: Yes   Patient and/or Family's stated Goal of Care this Admission: better understand heart failure and disease management prior to discharge.        Problem: Discharge Planning  Goal: Discharge to home or other facility with appropriate resources  1/26/2024 1040 by Charmaine Lynn, RN  Outcome: Progressing  Flowsheets (Taken 1/26/2024  period.  Outcome: Progressing     Problem: Chronic Conditions and Co-morbidities  Goal: Patient's chronic conditions and co-morbidity symptoms are monitored and maintained or improved  1/26/2024 1040 by Charmaine Lynn, RN  Outcome: Progressing  Flowsheets (Taken 1/26/2024 1004)  Care Plan - Patient's Chronic Conditions and Co-Morbidity Symptoms are Monitored and Maintained or Improved:   Monitor and assess patient's chronic conditions and comorbid symptoms for stability, deterioration, or improvement   Collaborate with multidisciplinary team to address chronic and comorbid conditions and prevent exacerbation or deterioration   Update acute care plan with appropriate goals if chronic or comorbid symptoms are exacerbated and prevent overall improvement and discharge  1/26/2024 0058 by Danielle Stark RN  Outcome: Progressing  Flowsheets (Taken 1/25/2024 2158)  Care Plan - Patient's Chronic Conditions and Co-Morbidity Symptoms are Monitored and Maintained or Improved:   Monitor and assess patient's chronic conditions and comorbid symptoms for stability, deterioration, or improvement   Collaborate with multidisciplinary team to address chronic and comorbid conditions and prevent exacerbation or deterioration   Update acute care plan with appropriate goals if chronic or comorbid symptoms are exacerbated and prevent overall improvement and discharge     Problem: Respiratory - Adult  Goal: Achieves optimal ventilation and oxygenation  1/26/2024 1040 by Charmaine Lynn, RN  Outcome: Progressing  Flowsheets (Taken 1/26/2024 1004)  Achieves optimal ventilation and oxygenation:   Assess for changes in mentation and behavior   Assess for changes in respiratory status   Position to facilitate oxygenation and minimize respiratory effort   Oxygen supplementation based on oxygen saturation or arterial blood gases   Encourage broncho-pulmonary hygiene including cough, deep breathe, incentive spirometry   Assess and

## 2024-01-26 NOTE — PROGRESS NOTES
Patient provided a COPD Educational Folder that includes the following materials:     []  MoneyDesktop Booklet: Managing your COPD  []  ALA: Getting the Most Out of Medication Delivery Devices  []  ALA: My COPD Action Plan  []  Better Breathers Club: [facility]   []  Smoking Cessation Classes  []  Outpatient Spiritual Care Services  [x]  Magnet: Signs of COPD    PATIENT/CAREGIVER TEACHING:   Level of patient/caregiver understanding able to:   [x] Verbalize understanding   [] Demonstrate understanding       [] Teach back        [x] Needs reinforcement     []  Other:     Electronically signed by Danielle Stark RN on 1/26/2024 at 1:01 AM

## 2024-01-26 NOTE — PROGRESS NOTES
HEART FAILURE CARE PLAN:    Comorbidities Reviewed: {Blank single:74029::\"Yes\",\"No\"}   Patient has a past medical history of Angina pectoris (HCC), Chronic pain, Congestive heart failure (HCC), COPD exacerbation (HCC), Depression, Essential hypertension, Panic disorder, Pneumonia, and Pulmonary emphysema (HCC).     ECHOCARDIOGRAM Reviewed: {Blank single:49877::\"Yes\",\"No\"}   Patient's Ejection Fraction (EF) is {HSP GEN GREATER/LESS THAN:706891261} 40%    Weights Reviewed: {Blank single:88511::\"Yes\",\"No\"}   Admission weight: 76.1 kg (167 lb 13 oz)   Wt Readings from Last 3 Encounters:   01/25/24 81.5 kg (179 lb 9.6 oz)   01/15/24 76.2 kg (168 lb)   01/05/24 76.2 kg (168 lb)     Intake & Output Reviewed: {Blank single:82106::\"Yes\",\"No\"}     Intake/Output Summary (Last 24 hours) at 1/26/2024 0102  Last data filed at 1/25/2024 2100  Gross per 24 hour   Intake 1445 ml   Output 1400 ml   Net 45 ml     Medications Reviewed: {Blank single:03089::\"Yes\",\"No\"}   SCHEDULED HOSPITAL MEDICATIONS:   amLODIPine  5 mg Oral Daily    QUEtiapine  12.5 mg Oral Nightly    predniSONE  40 mg Oral Daily    levoFLOXacin  750 mg Oral Daily    metoprolol tartrate  25 mg Oral BID    busPIRone  10 mg Oral TID    sodium chloride flush  5-40 mL IntraVENous 2 times per day    enoxaparin  40 mg SubCUTAneous Daily    ipratropium 0.5 mg-albuterol 2.5 mg  1 Dose Inhalation Q4H WA RT    escitalopram  20 mg Oral Nightly    lisinopril  5 mg Oral Daily    Roflumilast  250 mcg Oral Daily     ACE/ARB/ARNI is REQUIRED for EF </= 40% SYSTOLIC FAILURE:   ACE:: Lisinopril  ARB:: None  ARNI:: None    Evidenced-Based Beta Blocker is REQUIRED for EF </= 40% SYSTOLIC FAILURE:   :: Metoprolol SUCCinate- Toprol XL    Diuretics. none    Diet Reviewed: Yes   ADULT DIET; Regular; Low Sodium (2 gm)    Goal of Care Reviewed: Yes   Patient and/or Family's stated Goal of Care this Admission: reduce shortness of breath, increase activity tolerance, better understand heart  failure and disease management, be more comfortable, and reduce lower extremity edema prior to discharge.

## 2024-01-26 NOTE — FLOWSHEET NOTE
01/25/24 2145   Vital Signs   Temp 98.6 °F (37 °C)   Temp Source Oral   Pulse 87   Heart Rate Source Monitor   Respirations 18   /67   MAP (Calculated) 86   MAP (mmHg) 80   BP Location Left upper arm   BP Method Automatic   Patient Position Semi fowlers   Pain Assessment   Pain Assessment 0-10   Pain Level 7   Patient's Stated Pain Goal 3   Pain Location Back   Pain Orientation Lower   Pain Descriptors Aching;Discomfort   Functional Pain Assessment Activities are not prevented   Pain Type Chronic pain   Pain Frequency Continuous   Pain Onset On-going   Non-Pharmaceutical Pain Intervention(s) Cold pack;Elevation;Emotional support   Care Plan - Pain Goals   Verbalizes/displays adequate comfort level or baseline comfort level Encourage patient to monitor pain and request assistance   Opioid-Induced Sedation   POSS Score 1   RASS   Johnson Agitation Sedation Scale (RASS) 0   Oxygen Therapy   SpO2 94 %   Pulse Oximetry Type Intermittent   Pulse Oximeter Device Mode Continuous   Pulse Oximeter Device Location Right;Finger   O2 Device Nasal cannula   Skin Assessment Clean, dry, & intact   O2 Flow Rate (L/min) 4 L/min     Shift assessment complete, see flow sheet, schedule medications given, see MAR. On 4l o2,   Pt VSS.  Bed in lowest position, bed alarm activated for pt safety,Call light and bed side table within reach, pt educated on use of call light if needing assist., pt verbalized understanding, denies further questions at this time. Denies any needs at this time, continue to monitor.  Bedside Mobility Assessment Tool (BMAT):     Assessment Level 1- Sit and Shake    1. From a semi-reclined position, ask patient to sit up and rotate to a seated position at the side of the bed. Can use the bedrail.    2. Ask patient to reach out and grab your hand and shake making sure patient reaches across his/her midline.   Pass- Patient is able to come to a seated position, maintain core strength. Maintains seated balance

## 2024-01-26 NOTE — CARE COORDINATION
DISCHARGE ORDER  Date/Time 2024 3:16 PM  Completed by: Radha Stovall, RN, Case Management    Patient Name: Annie Tyler    : 1955      Admit order Date and Status:24 inpt  Noted discharge order. (verify MD's last order for status of admission/Traditional Medicare 3 [place] Inpatient qualifying stay required for SNF)    Confirmed discharge plan with:              Patient:  Yes              When pt confirms DC plan does any support person need to be contacted by CM No                       Discharge to Facility: [facility]   Facility phone number for staff giving report: [phone]   Pre-certification completed: Yes   Hospital Exemption Notification (HENS) completed: Hes   Discharge orders and Continuity of Care faxed to facility:  facility will pull from [facility]      Transportation:               Medical Transport explained with choice list offered to pt/family.                Choice:(no preference)  Agency used: [facility] via roundtrip   time:   15:45      Pt/family/Nursing/Facility aware of  time:   Yes Names: [facility], [facility],   Ambulance form completed:  Yes via roundtrip:      Date Last IMM Given: 24    Comments:Order for dc noted. Spoke with pt who cont plan for [facility] for rehab. Spoke with Ines who states can accept today for rehab. Pt states she will notify family    Pt is being d/c'd to Skilled Nursing Facility (SNF)  today. Pt's O2 sats are 93% on 4LNC.    Discharge timeout done with nsg, CM and pt. All discharge needs and concerns addressed.    Discharging nurse to complete CHIKI, reconcile AVS, and place final copy with patient's discharge packet. Discharging RN to ensure that written prescriptions for  Level II medications are sent with patient to the facility as per protocol.

## 2024-01-26 NOTE — PLAN OF CARE
Problem: Discharge Planning  Goal: Discharge to home or other facility with appropriate resources  1/26/2024 1649 by Charmaine Lynn RN  Outcome: Adequate for Discharge  1/26/2024 1040 by Charmaine Lynn RN  Outcome: Progressing  Flowsheets (Taken 1/26/2024 1004)  Discharge to home or other facility with appropriate resources:   Identify barriers to discharge with patient and caregiver   Refer to discharge planning if patient needs post-hospital services based on physician order or complex needs related to functional status, cognitive ability or social support system     Problem: Pain  Goal: Verbalizes/displays adequate comfort level or baseline comfort level  1/26/2024 1649 by Charmaine Lynn RN  Outcome: Adequate for Discharge  1/26/2024 1040 by Charmaine Lynn RN  Outcome: Progressing  Flowsheets (Taken 1/26/2024 1004)  Verbalizes/displays adequate comfort level or baseline comfort level:   Encourage patient to monitor pain and request assistance   Assess pain using appropriate pain scale   Implement non-pharmacological measures as appropriate and evaluate response     Problem: Safety - Adult  Goal: Free from fall injury  1/26/2024 1649 by Charmaine Lynn RN  Outcome: Adequate for Discharge  1/26/2024 1040 by Charmaine Lynn RN  Outcome: Progressing     Problem: Skin/Tissue Integrity  Goal: Absence of new skin breakdown  Description: 1.  Monitor for areas of redness and/or skin breakdown  2.  Assess vascular access sites hourly  3.  Every 4-6 hours minimum:  Change oxygen saturation probe site  4.  Every 4-6 hours:  If on nasal continuous positive airway pressure, respiratory therapy assess nares and determine need for appliance change or resting period.  1/26/2024 1649 by Charmaine Lynn RN  Outcome: Adequate for Discharge  1/26/2024 1040 by Charmaine Lynn RN  Outcome: Progressing     Problem: Chronic Conditions and Co-morbidities  Goal: Patient's chronic conditions and co-morbidity symptoms  are monitored and maintained or improved  1/26/2024 1649 by Charmaine Lynn RN  Outcome: Adequate for Discharge  1/26/2024 1040 by Charmaine Lynn RN  Outcome: Progressing  Flowsheets (Taken 1/26/2024 1004)  Care Plan - Patient's Chronic Conditions and Co-Morbidity Symptoms are Monitored and Maintained or Improved:   Monitor and assess patient's chronic conditions and comorbid symptoms for stability, deterioration, or improvement   Collaborate with multidisciplinary team to address chronic and comorbid conditions and prevent exacerbation or deterioration   Update acute care plan with appropriate goals if chronic or comorbid symptoms are exacerbated and prevent overall improvement and discharge     Problem: Respiratory - Adult  Goal: Achieves optimal ventilation and oxygenation  1/26/2024 1649 by Charmaine Lynn RN  Outcome: Adequate for Discharge  1/26/2024 1040 by Charmaine Lynn RN  Outcome: Progressing  Flowsheets (Taken 1/26/2024 1004)  Achieves optimal ventilation and oxygenation:   Assess for changes in mentation and behavior   Assess for changes in respiratory status   Position to facilitate oxygenation and minimize respiratory effort   Oxygen supplementation based on oxygen saturation or arterial blood gases   Encourage broncho-pulmonary hygiene including cough, deep breathe, incentive spirometry   Assess and instruct to report shortness of breath or any respiratory difficulty   Respiratory therapy support as indicated     Problem: Cardiovascular - Adult  Goal: Maintains optimal cardiac output and hemodynamic stability  1/26/2024 1649 by Charmaine Lynn RN  Outcome: Adequate for Discharge  1/26/2024 1040 by Charmaine Lynn RN  Outcome: Progressing  Flowsheets  Taken 1/26/2024 1004 by Charmaine Lynn RN  Maintains optimal cardiac output and hemodynamic stability:   Monitor blood pressure and heart rate   Monitor urine output and notify Licensed Independent Practitioner for values outside of

## 2024-01-26 NOTE — FLOWSHEET NOTE
Pt A/Ox4. VSS. Pain 6/10, see assessment below. Pt feels mildly dyspnea at rest, accessory muscle use noted. Reported dyspnea on exertion. RR even, regular, tachy, and shallow. Shift assessment complete. See flowsheet. AM med's given. PRN Tessalon capsule given for cough and imodium for loose stools. See MAR. Denies needs at this time. Telemetry and continuous pulse ox remains in place. Bed alarm on. Side rails 2/4. Bed in low position. Call light within reach.     Bedside Mobility Assessment Tool (BMAT):     Assessment Level 1- Sit and Shake    1. From a semi-reclined position, ask patient to sit up and rotate to a seated position at the side of the bed. Can use the bedrail.    2. Ask patient to reach out and grab your hand and shake making sure patient reaches across his/her midline.   Pass- Patient is able to come to a seated position, maintain core strength. Maintains seated balance while reaching across midline. Move on to Assessment Level 2.     Assessment Level 2- Stretch and Point   1. With patient in seated position at the side of the bed, have patient place both feet on the floor (or stool) with knees no higher than hips.    2. Ask patient to stretch one leg and straighten the knee, then bend the ankle/flex and point the toes. If appropriate, repeat with the other leg.   Pass- Patient is able to demonstrate appropriate quad strength on intended weight bearing limb(s). Move onto Assessment Level 3.     Assessment Level 3- Stand   1. Ask patient to elevate off the bed or chair (seated to standing) using an assistive device (cane, bedrail).    2. Patient should be able to raise buttocks off be and hold for a count of five. May repeat once.   Pass- Patient maintains standing stability for at least 5 seconds, proceed to assessment level 4.    Assessment Level 4- Walk   1. Ask patient to march in place at bedside.    2. Then ask patient to advance step and return each foot. Some medical conditions may render a

## 2024-01-26 NOTE — PROGRESS NOTES
01/26/24 0300   NIV Type   Equipment Type V60   Mode Bilevel   Mask Type Full face mask   Mask Size Medium   Assessment   Pulse 65   Respirations 20   SpO2 100 %   Settings/Measurements   IPAP 12 cmH20   CPAP/EPAP 6 cmH2O   Vt (Measured) 531 mL   Rate Ordered 14   FiO2  40 %   Minute Volume (L/min) 10.7 Liters   Mask Leak (lpm) 6 lpm   Patient's Home Machine No   Alarm Settings   Alarms On Y

## 2024-01-26 NOTE — PROGRESS NOTES
01/25/24 0878   NIV Type   NIV Started/Stopped (S)  On   Equipment Type V60   Mode Bilevel   Mask Type Full face mask   Mask Size Medium   Assessment   Pulse 74   Respirations 22   SpO2 96 %   Level of Consciousness 0   Comfort Level Good   Using Accessory Muscles No   Mask Compliance Good   Skin Assessment Clean, dry, & intact   Skin Protection for O2 Device Yes   Settings/Measurements   IPAP 12 cmH20   CPAP/EPAP 6 cmH2O   Vt (Measured) 690 mL   Rate Ordered 14   FiO2  40 %   Minute Volume (L/min) 15.2 Liters   Mask Leak (lpm) 8 lpm   Patient's Home Machine No   Alarm Settings   Alarms On Y

## 2024-01-26 NOTE — DISCHARGE SUMMARY
negative  Now on levaquin     #Tachycardia   -likely 2/2 to above   -EKG with sinus tach   -IVF given with no change  - added BB and improving   -remain on tele     #Elevated troponin  -28--> 25   -likely demand 2/2 to hypoxia   -no CP   -EKG without acute ischemic changes      #Right lung nodule  H/O KAMLA lung cancer, clinical dx, saw Dr. Navarro & was not a surgical candidate, s/p 5 fractions of SBRT 5/3/22 - 5/13/22 by Dr. Gaitan   -follows with pulmonology      #Chronic diastolic HF   #Pulmonary HTN   -does not appear acutely decompensated  -resume lasix as tolerated  -daily weights, intake and output      #HTN   -on norvasc  and lisinopril - decrease  norvasc as adding BB     #Depression   #Anxiety   -continue lexapro and atarax    added seroquel and  buspar       XR CHEST PORTABLE   Final Result   Bibasilar airspace opacities and small left pleural effusion.  This could   represent atelectasis versus pneumonia in the appropriate clinical setting                Discharge Medications     Medication List        START taking these medications      busPIRone 10 MG tablet  Commonly known as: BUSPAR  Take 1 tablet by mouth 3 times daily  Notes to patient: Treats anxiety. Side effects: dizziness, drowsiness, headache, diarrhea, & dry mouth.     metoprolol tartrate 25 MG tablet  Commonly known as: LOPRESSOR  Take 1 tablet by mouth 2 times daily  Notes to patient: Monitor blood pressure and pulse. Treats heart failure, lowers blood pressure & heart rate. Side effects: dizziness, fatigue, diarrhea, & depression.     QUEtiapine 25 MG tablet  Commonly known as: SEROQUEL  Take 0.5 tablets by mouth nightly  Notes to patient: Side effects: sleepiness, drowsiness, headache, blood pressure changes, & constipation.            CHANGE how you take these medications      lidocaine 4 % external patch  Place 1 patch onto the skin daily  What changed: additional instructions     predniSONE 20 MG tablet  Commonly known as:  DELTASONE  2 qd- 2 days, 1 1/2 qd- 3 days, 1 qd- 3 days, 1/2 qd- 3 days  Start taking on: January 27, 2024  What changed:   how much to take  how to take this  when to take this  additional instructions            CONTINUE taking these medications      acetaminophen 500 MG tablet  Commonly known as: TYLENOL     albuterol sulfate  (90 Base) MCG/ACT inhaler  Commonly known as: PROVENTIL;VENTOLIN;PROAIR  Inhale 2 puffs into the lungs every 6 hours as needed for Wheezing orShortness of Breath     amLODIPine 10 MG tablet  Commonly known as: NORVASC     CENTRUM SILVER 50+WOMEN PO     escitalopram 20 MG tablet  Commonly known as: LEXAPRO     furosemide 20 MG tablet  Commonly known as: LASIX     hydrOXYzine HCl 25 MG tablet  Commonly known as: ATARAX     ipratropium 0.5 mg-albuterol 2.5 mg 0.5-2.5 (3) MG/3ML Soln nebulizer solution  Commonly known as: DUONEB  Inhale 3 mLs into the lungs every 4 hours as needed for Shortness of Breath     lisinopril 10 MG tablet  Commonly known as: PRINIVIL;ZESTRIL     loratadine 10 MG tablet  Commonly known as: CLARITIN     Roflumilast 250 MCG tablet  Commonly known as: Daliresp  Take 1 tablet by mouth daily     Trelegy Ellipta 100-62.5-25 MCG/ACT Aepb inhaler  Generic drug: fluticasone-umeclidin-vilant  Inhale 1 puff into the lungs daily     vitamin D 50 MCG (2000 UT) Caps capsule            STOP taking these medications      azithromycin 250 MG tablet  Commonly known as: Zithromax     Biotin 300 MCG Tabs               Where to Get Your Medications        Information about where to get these medications is not yet available    Ask your nurse or doctor about these medications  busPIRone 10 MG tablet  metoprolol tartrate 25 MG tablet  predniSONE 20 MG tablet  QUEtiapine 25 MG tablet           Discharge Condition/Location: Stable to SNF     Follow Up:  Follow up with PCP.    Total time spent on discharge is 35 minutes      POPEYE TIRADO MD 1/26/2024 1:30 PM

## 2024-01-26 NOTE — PROGRESS NOTES
Pulmonary & Critical Care Medicine ICU Progress Note    CC: Respiratory failure    Events of Last 24 hours:   Doing great  No CP    Wheezing resolved    Feels a lot better however she still has some cough seems dry  Want nt to go home  On 4 - L   BiPAP overnight 16/8 40%   Less anxious        Vascular lines: IV: PIVs         / / /FiO2 : 40 %  No results for input(s): \"PHART\", \"TMH9AUY\", \"PO2ART\" in the last 72 hours.      IV:   sodium chloride         Vitals:  Blood pressure 115/62, pulse 81, temperature 98.7 °F (37.1 °C), temperature source Oral, resp. rate 22, height 1.6 m (5' 2.99\"), weight 82.1 kg (181 lb), SpO2 94 %, not currently breastfeeding.  on BiPAP   Temp  Av.4 °F (36.9 °C)  Min: 97.3 °F (36.3 °C)  Max: 99 °F (37.2 °C)    Intake/Output Summary (Last 24 hours) at 2024 1256  Last data filed at 2024 1029  Gross per 24 hour   Intake 1469 ml   Output 1100 ml   Net 369 ml       PE:  Gen: + Distress.  Ill-appearing  Eyes: PERRL. No sclera icterus. No conjunctival injection.   ENT: No discharge. Pharynx clear.   Neck: Trachea midline. No obvious mass.    Resp: + Accessory muscle use. No crackles. Bilateral wheezes. No rhonchi. No dullness on percussion.  CV: Tacky rate. Regular rhythm. No murmur or rub. No edema.   GI: Non-tender. Non-distended. No hernia.   Skin: Warm and dry. No nodule on exposed extremities.   Lymph: No cervical LAD. No supraclavicular LAD.   M/S: No cyanosis. No joint deformity. No clubbing.   Neuro: Awake. Alert. Moves all four extremities.   Psych: Oriented x 3.  + Anxiety.     Scheduled Meds:   ipratropium 0.5 mg-albuterol 2.5 mg  1 Dose Inhalation BID RT    amLODIPine  5 mg Oral Daily    QUEtiapine  12.5 mg Oral Nightly    predniSONE  40 mg Oral Daily    levoFLOXacin  750 mg Oral Daily    metoprolol tartrate  25 mg Oral BID    busPIRone  10 mg Oral TID    sodium chloride flush  5-40 mL IntraVENous 2 times per day    enoxaparin  40 mg SubCUTAneous Daily    escitalopram  20

## 2024-01-26 NOTE — PROGRESS NOTES
Admit: 2024    Name:  Annie Tyler  Room:  [facility] [place]  MRN:    [id number]    Daily Progress Note for 2024     Acute on chronic resp failure   Placed on BiPAP    Interval History:       Used BiPAP last night and uses prn during the day    Now on 4 L     Improved HR with adding metoprolol      Scheduled Meds:   ipratropium 0.5 mg-albuterol 2.5 mg  1 Dose Inhalation BID RT    amLODIPine  5 mg Oral Daily    QUEtiapine  12.5 mg Oral Nightly    predniSONE  40 mg Oral Daily    levoFLOXacin  750 mg Oral Daily    metoprolol tartrate  25 mg Oral BID    busPIRone  10 mg Oral TID    sodium chloride flush  5-40 mL IntraVENous 2 times per day    enoxaparin  40 mg SubCUTAneous Daily    escitalopram  20 mg Oral Nightly    lisinopril  5 mg Oral Daily    Roflumilast  250 mcg Oral Daily       Continuous Infusions:   sodium chloride         PRN Meds:  ipratropium 0.5 mg-albuterol 2.5 mg, loperamide, sodium chloride flush, sodium chloride, ondansetron **OR** ondansetron, polyethylene glycol, acetaminophen **OR** acetaminophen, benzonatate, hydrOXYzine HCl                  Objective:     Temp  Av.4 °F (36.9 °C)  Min: 97.3 °F (36.3 °C)  Max: 99 °F (37.2 °C)  Pulse  Av.5  Min: 50  Max: 87  BP  Min: 111/61  Max: 125/67  SpO2  Av.4 %  Min: 92 %  Max: 100 %  FiO2   Av %  Min: 40 %  Max: 40 %  Patient Vitals for the past 4 hrs:   BP Temp Temp src Pulse Resp SpO2   24 1004 115/62 98.7 °F (37.1 °C) Oral 81 22 94 %   24 0746 -- -- -- 50 18 98 %           Intake/Output Summary (Last 24 hours) at 2024 1049  Last data filed at 2024 1029  Gross per 24 hour   Intake 1709 ml   Output 1100 ml   Net 609 ml         Physical Exam:    General: elderly female chronically ill appearing    Awake, alert and oriented. Appears to be not in any distress  Mucous Membranes:  Pink , anicteric  Neck: No JVD, no carotid bruit, no thyromegaly  Chest: improving airentry javier with resolving wheeze   Cardiovascular:   RRR S1S2 heard, no murmurs or gallops  Abdomen:  Soft, undistended, non tender, no organomegaly, BS present  Extremities: 1+ javier LE edema resolving. Distal pulses well felt  Neurological : grossly normal         Lab Data:  CBC:   Recent Labs     01/24/24  0430   WBC 11.3*   RBC 4.15   HGB 12.7   HCT 38.8   MCV 93.5   RDW 13.7          BMP:   Recent Labs     01/24/24  0430 01/25/24  0524   * 137   K 4.6 4.6   CL 94* 97*   CO2 30 28   BUN 19 22*   CREATININE <0.5* <0.5*       BNP: No results for input(s): \"BNP\" in the last 72 hours.  PT/INR:   No results for input(s): \"PROTIME\", \"INR\" in the last 72 hours.  LIVER PROFILE:   No results for input(s): \"AST\", \"ALT\", \"ALB\", \"BILIDIR\", \"BILITOT\", \"ALKPHOS\" in the last 72 hours.        XR CHEST PORTABLE   Final Result   Bibasilar airspace opacities and small left pleural effusion.  This could   represent atelectasis versus pneumonia in the appropriate clinical setting           Labs and imaging reviewed       Assessment & Plan:       COPD with AE   #RSV infection   # HCAP  #Acute on chronic hypoxic respiratory failure   -wears 3 L baseline--> 6L -->now requiring BIPAP   -BIPAP orders per pulm   -HR, WBC, + source (WBC could be 2/2 to recent steroids)  -IV solumedrol  -duonebs  -IV vancomycin, merrem and doxycycline given and now downgraded to levaquin  -blood cultures negative  -droplet precautions   RSV positive    on4 L of O2 now  Increase buspar dose   Dc iv vanc as MRSA screen negative  Now on levaquin    #Tachycardia   -likely 2/2 to above   -EKG with sinus tach   -IVF given with no change  - added BB and improving   -remain on tele    #Elevated troponin  -28--> 25   -likely demand 2/2 to hypoxia   -no CP   -EKG without acute ischemic changes      #Right lung nodule  H/O KAMLA lung cancer, clinical dx, saw Dr. Navarro & was not a surgical candidate, s/p 5 fractions of SBRT 5/3/22 - 5/13/22 by Dr. Gaitan   -follows with pulmonology      #Chronic diastolic HF

## 2024-02-12 RX ORDER — BUSPIRONE HYDROCHLORIDE 10 MG/1
10 TABLET ORAL 3 TIMES DAILY
Qty: 90 TABLET | Refills: 0 | OUTPATIENT
Start: 2024-02-12

## 2024-02-18 PROBLEM — R79.89 ELEVATED TROPONIN: Status: RESOLVED | Noted: 2022-08-29 | Resolved: 2024-02-18

## 2024-02-27 ENCOUNTER — HOSPITAL ENCOUNTER (OUTPATIENT)
Age: 69
Discharge: HOME | End: 2024-02-27
Payer: MEDICARE

## 2024-02-27 ENCOUNTER — HOSPITAL ENCOUNTER (OUTPATIENT)
Dept: CT IMAGING | Age: 69
Discharge: HOME | End: 2024-02-27
Payer: MEDICARE

## 2024-02-27 ENCOUNTER — OFFICE VISIT (OUTPATIENT)
Dept: PULMONOLOGY | Age: 69
End: 2024-02-27
Payer: MEDICARE

## 2024-02-27 VITALS
WEIGHT: 178 LBS | HEIGHT: 62 IN | DIASTOLIC BLOOD PRESSURE: 62 MMHG | BODY MASS INDEX: 32.76 KG/M2 | HEART RATE: 62 BPM | RESPIRATION RATE: 12 BRPM | OXYGEN SATURATION: 90 % | SYSTOLIC BLOOD PRESSURE: 118 MMHG

## 2024-02-27 DIAGNOSIS — J45.909 MODERATE ASTHMA WITHOUT COMPLICATION, UNSPECIFIED WHETHER PERSISTENT: Primary | ICD-10-CM

## 2024-02-27 DIAGNOSIS — R91.1 LUNG NODULE: ICD-10-CM

## 2024-02-27 PROCEDURE — 36415 COLL VENOUS BLD VENIPUNCTURE: CPT

## 2024-02-27 PROCEDURE — 3078F DIAST BP <80 MM HG: CPT | Performed by: INTERNAL MEDICINE

## 2024-02-27 PROCEDURE — 3074F SYST BP LT 130 MM HG: CPT | Performed by: INTERNAL MEDICINE

## 2024-02-27 PROCEDURE — 82785 ASSAY OF IGE: CPT

## 2024-02-27 PROCEDURE — 85048 AUTOMATED LEUKOCYTE COUNT: CPT

## 2024-02-27 PROCEDURE — 71250 CT THORAX DX C-: CPT

## 2024-02-27 PROCEDURE — 99214 OFFICE O/P EST MOD 30 MIN: CPT | Performed by: INTERNAL MEDICINE

## 2024-02-27 PROCEDURE — 86003 ALLG SPEC IGE CRUDE XTRC EA: CPT

## 2024-02-27 PROCEDURE — 1123F ACP DISCUSS/DSCN MKR DOCD: CPT | Performed by: INTERNAL MEDICINE

## 2024-02-27 RX ORDER — BUDESONIDE 0.5 MG/2ML
1 INHALANT ORAL 2 TIMES DAILY
Qty: 1 EACH | Refills: 3 | Status: SHIPPED | OUTPATIENT
Start: 2024-02-27 | End: 2024-03-28

## 2024-02-27 RX ORDER — ROFLUMILAST 500 UG/1
500 TABLET ORAL DAILY
Qty: 30 TABLET | Refills: 3 | Status: SHIPPED | OUTPATIENT
Start: 2024-02-27

## 2024-02-27 RX ORDER — TRAMADOL HYDROCHLORIDE 50 MG/1
TABLET ORAL
COMMUNITY
Start: 2024-02-05

## 2024-02-27 RX ORDER — PREDNISONE 5 MG/1
TABLET ORAL
COMMUNITY
Start: 2024-02-22

## 2024-02-27 RX ORDER — IBUPROFEN 400 MG/1
400 TABLET ORAL EVERY 6 HOURS PRN
COMMUNITY

## 2024-02-27 NOTE — PROGRESS NOTES
PULMONARY PROGRESS NOTE  CC: 1 week worsening SOB, cough, sputum & hypoxia on home 4 L O2    Subjective:   Was admitted in 12/23 with acute asthma ,.COPD exacerbation   Quit 4 years after smoke for 45 year   Last weeks has been doing slightly worse  Some cough   Still with cough and SOB   On 4 L O2   No wheezing   Use BiPAP 12/6 for ~1/2 hour overnight.     On Trelegy   Albuterol almost very day       PHYSICAL EXAM:   /62 (Site: Left Upper Arm, Position: Sitting, Cuff Size: Large Adult)   Pulse 62   Resp 12   Ht 1.575 m (5' 2\")   Wt 80.7 kg (178 lb)   SpO2 90% Comment: 3-4 LPM  BMI 32.56 kg/m² ' on 6 L HFNC  Constitutional:  No acute distress - looks better  HEENT:  No scleral icterus  Neck:  No tracheal deviation present.    Cardiovascular:  Normal heart sounds.   Pulmonary/Chest:  No accessory muscle usage. + decreased breath sounds.  No wheezes. No rhonchi. No rales.   Abdominal:  Soft.   Musculoskeletal:  No cyanosis. No clubbing.  Skin:  Skin is warm and dry.   Neuro: awake and alert      Scheduled Meds:      Continuous Infusions:      PRN Meds:      Labs:  CBC:   No results for input(s): \"WBC\", \"HGB\", \"HCT\", \"MCV\", \"PLT\" in the last 72 hours.  BMP:   No results for input(s): \"NA\", \"K\", \"CL\", \"CO2\", \"PHOS\", \"BUN\", \"CREATININE\", \"CA\" in the last 72 hours.  PCT 0.06  Pro-BNP 49  VBG 7.34/64/39    Micro:   12/31/23 SARS-CoV-2 & influenza not detected  FINDINGS:  1.  Spirometry:  The FVC is 72% of predicted.  The FEV1 is 0.85 liters  which is 36% of predicted.  The FEV1/FVC ratio is 0.38.  There is no  response to bronchodilators.  2.  Plethysmography:  The total lung capacity is 89% of predicted.  3.  The diffusion capacity of carbon monoxide is 21% of predicted.  4.  The flow volume loop shows an obstructive pattern.     IMPRESSION:  This patient has severe COPD with a decreased DLCO.     A six-minute walk test was conducted by [facility] [place].  The  patient walked 280 feet and required 3

## 2024-02-28 LAB — EOSINOPHIL # BLD: 0.1 K/UL (ref 0–0.6)

## 2024-02-29 ENCOUNTER — TELEPHONE (OUTPATIENT)
Dept: PULMONOLOGY | Age: 69
End: 2024-02-29

## 2024-02-29 NOTE — TELEPHONE ENCOUNTER
[facility] PA department called and stated that CT from 2/27/24 was denied.    Code: 01874  Reference#:  [id number]

## 2024-03-02 LAB — IGE SERPL-ACNC: <1 IU/ML

## 2024-03-05 LAB
A ALTERNATA IGE QN: <0.1 KU/L (ref 0–0.34)
AMER SYCAMORE IGE QN: <0.1 KU/L (ref 0–0.34)
CALIF WALNUT IGE QN: <0.1 KU/L (ref 0–0.34)
CMN PIGWEED IGE QN: <0.1 KU/L (ref 0–0.34)
COTTONWOOD IGE QN: <0.1 KU/L (ref 0–0.34)
IGE SERPL-ACNC: <1 IU/ML
M RACEMOSUS IGE QN: <0.1 KU/L (ref 0–0.34)
P NOTATUM IGE QN: <0.1 KU/L (ref 0–0.34)
PECAN/HICK TREE IGE QN: <0.1 KU/L (ref 0–0.34)
RED CEDAR IGE QN: <0.1 KU/L (ref 0–0.34)
SHEEP SORREL IGE QN: <0.1 KU/L (ref 0–0.34)
TIMOTHY IGE QN: <0.1 KU/L (ref 0–0.34)
WHITE ASH IGE QN: <0.1 KU/L (ref 0–0.34)
WHITE MULBERRY IGE QN: <0.1 KU/L (ref 0–0.34)
WHITE OAK IGE QN: <0.1 KU/L (ref 0–0.34)

## 2024-03-11 RX ORDER — ROFLUMILAST 250 UG/1
250 TABLET ORAL DAILY
Qty: 28 TABLET | Refills: 1 | OUTPATIENT
Start: 2024-03-11

## 2024-03-14 ENCOUNTER — HOSPITAL ENCOUNTER (EMERGENCY)
Age: 69
Discharge: HOME | End: 2024-03-14
Attending: EMERGENCY MEDICINE
Payer: MEDICARE

## 2024-03-14 ENCOUNTER — APPOINTMENT (OUTPATIENT)
Dept: CT IMAGING | Age: 69
End: 2024-03-14
Payer: MEDICARE

## 2024-03-14 ENCOUNTER — APPOINTMENT (OUTPATIENT)
Dept: GENERAL RADIOLOGY | Age: 69
End: 2024-03-14
Payer: MEDICARE

## 2024-03-14 VITALS
OXYGEN SATURATION: 96 % | WEIGHT: 174 LBS | BODY MASS INDEX: 32.02 KG/M2 | HEART RATE: 108 BPM | TEMPERATURE: 98.3 F | HEIGHT: 62 IN | RESPIRATION RATE: 22 BRPM | SYSTOLIC BLOOD PRESSURE: 138 MMHG | DIASTOLIC BLOOD PRESSURE: 79 MMHG

## 2024-03-14 DIAGNOSIS — S42.225A CLOSED 2-PART NONDISPLACED FRACTURE OF SURGICAL NECK OF LEFT HUMERUS, INITIAL ENCOUNTER: Primary | ICD-10-CM

## 2024-03-14 PROCEDURE — 73030 X-RAY EXAM OF SHOULDER: CPT

## 2024-03-14 PROCEDURE — 6370000000 HC RX 637 (ALT 250 FOR IP): Performed by: EMERGENCY MEDICINE

## 2024-03-14 PROCEDURE — 96374 THER/PROPH/DIAG INJ IV PUSH: CPT

## 2024-03-14 PROCEDURE — 99284 EMERGENCY DEPT VISIT MOD MDM: CPT

## 2024-03-14 PROCEDURE — 6360000002 HC RX W HCPCS: Performed by: EMERGENCY MEDICINE

## 2024-03-14 RX ORDER — ONDANSETRON 4 MG/1
8 TABLET, ORALLY DISINTEGRATING ORAL ONCE
Status: COMPLETED | OUTPATIENT
Start: 2024-03-14 | End: 2024-03-14

## 2024-03-14 RX ORDER — OXYCODONE HYDROCHLORIDE AND ACETAMINOPHEN 5; 325 MG/1; MG/1
1 TABLET ORAL ONCE
Status: COMPLETED | OUTPATIENT
Start: 2024-03-14 | End: 2024-03-14

## 2024-03-14 RX ORDER — OXYCODONE HYDROCHLORIDE AND ACETAMINOPHEN 5; 325 MG/1; MG/1
1 TABLET ORAL EVERY 6 HOURS PRN
Qty: 10 TABLET | Refills: 0 | Status: SHIPPED | OUTPATIENT
Start: 2024-03-14 | End: 2024-03-17

## 2024-03-14 RX ORDER — ONDANSETRON 8 MG/1
8 TABLET, ORALLY DISINTEGRATING ORAL EVERY 8 HOURS PRN
Qty: 12 TABLET | Refills: 0 | Status: SHIPPED | OUTPATIENT
Start: 2024-03-14 | End: 2024-03-19

## 2024-03-14 RX ADMIN — OXYCODONE HYDROCHLORIDE AND ACETAMINOPHEN 1 TABLET: 5; 325 TABLET ORAL at 21:00

## 2024-03-14 RX ADMIN — OXYCODONE HYDROCHLORIDE AND ACETAMINOPHEN 1 TABLET: 5; 325 TABLET ORAL at 19:28

## 2024-03-14 RX ADMIN — HYDROMORPHONE HYDROCHLORIDE 1 MG: 1 INJECTION, SOLUTION INTRAMUSCULAR; INTRAVENOUS; SUBCUTANEOUS at 17:57

## 2024-03-14 RX ADMIN — ONDANSETRON 8 MG: 4 TABLET, ORALLY DISINTEGRATING ORAL at 19:28

## 2024-03-14 ASSESSMENT — PAIN DESCRIPTION - LOCATION
LOCATION: SHOULDER

## 2024-03-14 ASSESSMENT — PAIN DESCRIPTION - PAIN TYPE
TYPE: ACUTE PAIN

## 2024-03-14 ASSESSMENT — PAIN DESCRIPTION - ONSET: ONSET: SUDDEN

## 2024-03-14 ASSESSMENT — PAIN SCALES - GENERAL
PAINLEVEL_OUTOF10: 10
PAINLEVEL_OUTOF10: 9

## 2024-03-14 ASSESSMENT — PAIN DESCRIPTION - ORIENTATION
ORIENTATION: LEFT

## 2024-03-14 ASSESSMENT — PAIN DESCRIPTION - DESCRIPTORS
DESCRIPTORS: SHARP;SHOOTING;DISCOMFORT
DESCRIPTORS: SHARP;SHOOTING
DESCRIPTORS: SHOOTING;SHARP
DESCRIPTORS: DISCOMFORT;SHARP;SHOOTING
DESCRIPTORS: SHARP;SHOOTING
DESCRIPTORS: SHARP;SHOOTING;THROBBING

## 2024-03-14 ASSESSMENT — PAIN DESCRIPTION - FREQUENCY: FREQUENCY: CONTINUOUS

## 2024-03-14 ASSESSMENT — ENCOUNTER SYMPTOMS: CHEST TIGHTNESS: 0

## 2024-03-14 ASSESSMENT — PAIN - FUNCTIONAL ASSESSMENT: PAIN_FUNCTIONAL_ASSESSMENT: 0-10

## 2024-03-14 NOTE — DISCHARGE INSTRUCTIONS
Wear your sling especially when you are up  Continue your oxygen therapy  Take Percocet for pain  Follow-up with the orthopedic physician in approximately 3 to 4 days

## 2024-03-14 NOTE — ED PROVIDER NOTES
minor if any at all the daughter feels very comfortable just watching her so we did cancel the CT of the cervical spine and head    REASSESSMENT          CRITICAL CARE TIME     CONSULTS:  None      PROCEDURES:     Procedures    MEDICATIONS GIVEN THIS VISIT:  Medications   oxyCODONE-acetaminophen (PERCOCET) 5-325 MG per tablet 1 tablet (has no administration in time range)   ondansetron (ZOFRAN-ODT) disintegrating tablet 8 mg (has no administration in time range)   HYDROmorphone (DILAUDID) injection 1 mg (1 mg IntraVENous Given 3/14/24 5778)        FINAL IMPRESSION      1. Closed 2-part nondisplaced fracture of surgical neck of left humerus, initial encounter            DISPOSITION/PLAN   DISPOSITION Decision To Discharge 03/14/2024 07:19:56 PM      PATIENT REFERRED TO:  Ligia Montanez MD  [address] [facility] [address]  [phone]    Schedule an appointment as soon as possible for a visit in 3 days        DISCHARGE MEDICATIONS:  New Prescriptions    ONDANSETRON (ZOFRAN-ODT) 8 MG TBDP DISINTEGRATING TABLET    Place 1 tablet under the tongue every 8 hours as needed for Nausea or Vomiting    OXYCODONE-ACETAMINOPHEN (PERCOCET) 5-325 MG PER TABLET    Take 1 tablet by mouth every 6 hours as needed for Pain for up to 10 doses. Max Daily Amount: 4 tablets       Controlled Substances Monitoring       No data to display                    (Please note that portions of this note were completed with a voice recognition program.  Efforts were made to edit the dictations but occasionally words are mis-transcribed.)    Patient was advised to return to the Emergency Department if there was any worsening.    EMA LICONA MD (electronically signed)  Attending Emergency Physician           Ema Licona MD  03/14/24 5841

## 2024-03-14 NOTE — ED NOTES
Pt returned from CT unable to do the CAT scans due to pt being in pain with left shoulder and also pt has COPD and is on o2 chronically with sob while lying down. MD aware.

## 2024-03-18 ENCOUNTER — TELEPHONE (OUTPATIENT)
Dept: PULMONOLOGY | Age: 69
End: 2024-03-18

## 2024-03-18 NOTE — TELEPHONE ENCOUNTER
Siomara called from [facility] and stated pt's o2 varies from 86-92%- to fax report.     Siomara states when she left pt was 90% Siomara says pt and daughter said this is normal for pt.     She states pt has only used vent for 32 hours since being set up in December 23. PT states it makes her feel like she is being suffocated.

## 2024-03-19 ENCOUNTER — APPOINTMENT (OUTPATIENT)
Dept: GENERAL RADIOLOGY | Age: 69
End: 2024-03-19
Payer: MEDICARE

## 2024-03-19 ENCOUNTER — HOSPITAL ENCOUNTER (EMERGENCY)
Age: 69
Discharge: HOME | End: 2024-03-19
Payer: MEDICARE

## 2024-03-19 ENCOUNTER — TELEPHONE (OUTPATIENT)
Dept: ORTHOPEDIC SURGERY | Age: 69
End: 2024-03-19

## 2024-03-19 VITALS
BODY MASS INDEX: 30.83 KG/M2 | HEIGHT: 63 IN | OXYGEN SATURATION: 93 % | HEART RATE: 89 BPM | DIASTOLIC BLOOD PRESSURE: 74 MMHG | RESPIRATION RATE: 17 BRPM | SYSTOLIC BLOOD PRESSURE: 136 MMHG | TEMPERATURE: 98.2 F | WEIGHT: 174 LBS

## 2024-03-19 DIAGNOSIS — S42.215A CLOSED NONDISPLACED FRACTURE OF SURGICAL NECK OF LEFT HUMERUS, UNSPECIFIED FRACTURE MORPHOLOGY, INITIAL ENCOUNTER: Primary | ICD-10-CM

## 2024-03-19 PROCEDURE — 99284 EMERGENCY DEPT VISIT MOD MDM: CPT

## 2024-03-19 PROCEDURE — 6370000000 HC RX 637 (ALT 250 FOR IP)

## 2024-03-19 PROCEDURE — 73060 X-RAY EXAM OF HUMERUS: CPT

## 2024-03-19 RX ORDER — OXYCODONE HYDROCHLORIDE 5 MG/1
5 TABLET ORAL ONCE
Status: COMPLETED | OUTPATIENT
Start: 2024-03-19 | End: 2024-03-19

## 2024-03-19 RX ORDER — OXYCODONE HYDROCHLORIDE AND ACETAMINOPHEN 5; 325 MG/1; MG/1
1 TABLET ORAL EVERY 6 HOURS PRN
Qty: 12 TABLET | Refills: 0 | Status: SHIPPED | OUTPATIENT
Start: 2024-03-19 | End: 2024-03-22

## 2024-03-19 RX ADMIN — OXYCODONE 5 MG: 5 TABLET ORAL at 16:27

## 2024-03-19 ASSESSMENT — ENCOUNTER SYMPTOMS
VOMITING: 0
CHEST TIGHTNESS: 0
COUGH: 0
SINUS PAIN: 0
SHORTNESS OF BREATH: 0
TROUBLE SWALLOWING: 0
SORE THROAT: 0
NAUSEA: 0
RHINORRHEA: 0
BACK PAIN: 0
COLOR CHANGE: 1
ABDOMINAL PAIN: 0
SINUS PRESSURE: 0
WHEEZING: 0

## 2024-03-19 ASSESSMENT — PAIN DESCRIPTION - ORIENTATION: ORIENTATION: LEFT

## 2024-03-19 ASSESSMENT — PAIN SCALES - GENERAL
PAINLEVEL_OUTOF10: 10
PAINLEVEL_OUTOF10: 10
PAINLEVEL_OUTOF10: 8

## 2024-03-19 ASSESSMENT — PAIN DESCRIPTION - PAIN TYPE: TYPE: ACUTE PAIN

## 2024-03-19 ASSESSMENT — PAIN DESCRIPTION - LOCATION: LOCATION: ARM

## 2024-03-19 ASSESSMENT — PAIN DESCRIPTION - DESCRIPTORS: DESCRIPTORS: ACHING

## 2024-03-19 ASSESSMENT — PAIN - FUNCTIONAL ASSESSMENT: PAIN_FUNCTIONAL_ASSESSMENT: 0-10

## 2024-03-19 NOTE — ED NOTES
Patient discharged with all belongings, discharge paperwork and prescriptions x 1 sent to [facility] pharmacy. Patient verbalized understanding of discharge instructions and follow up with orthopedic surgery. Patient taken by wheelchair out of ED with family.

## 2024-03-19 NOTE — TELEPHONE ENCOUNTER
SON CALLED THEY RUNNING ABOUT 15 MINS LATE     SPOKE WITH SOMEONE AT THE  , ITS OKAY TO CONTINUE ON

## 2024-03-19 NOTE — TELEPHONE ENCOUNTER
Called [facility] and spoke w/ Carm[facility]. [facility]el is to ask [facility] to re-send CR for pt.

## 2024-03-19 NOTE — ED PROVIDER NOTES
a time was given to answer questions, a plan was proposed and they agreed with plan.    I am the Primary Clinician of Record.  FINAL IMPRESSION      1. Closed nondisplaced fracture of surgical neck of left humerus, unspecified fracture morphology, initial encounter          DISPOSITION/PLAN     DISPOSITION Decision To Discharge 03/19/2024 05:25:29 PM      PATIENT REFERRED TO:  Dexter Baig MD  [address] [facility] [address]  [phone]    Call   To reschedule your appointment    Dallas Luna MD  [address] [facility] [address]  [phone]    Call   To reschedule your appoint      DISCHARGE MEDICATIONS:  New Prescriptions    OXYCODONE-ACETAMINOPHEN (PERCOCET) 5-325 MG PER TABLET    Take 1 tablet by mouth every 6 hours as needed for Pain for up to 3 days. Intended supply: 3 days. Take lowest dose possible to manage pain Max Daily Amount: 4 tablets       DISCONTINUED MEDICATIONS:  Discontinued Medications    No medications on file              (Please note that portions of this note were completed with a voice recognition program.  Efforts were made to edit the dictations but occasionally words are mis-transcribed.)    JUAN Gutierrez CNP (electronically signed)      Yuni Maldonado APRN - CNP  03/19/24 1938

## 2024-03-19 NOTE — DISCHARGE INSTRUCTIONS
Repeat x-ray shows minimal displacement compared to prior x-ray.  You will need to follow-up with orthopedics.  I did speak with Dr. Baig who is the orthopedist that you are scheduled with today.  Contact the clinic first thing tomorrow morning at the phone number listed above.  Dr. Luna is in the clinic tomorrow and should be able to fit you into the schedule.  Otherwise Dr. Baig or one of his partners can see you as soon as Thursday in the clinic.  Pain medication was sent to your pharmacy.   and take as directed.  Maintain sling for protection of fracture.    I do not recommend replacing the rings on the affected extremity until after surgical intervention is or can be swelling associated with surgery and this can cause neurovascular compromise of the fingers with too much swelling this can result in loss of function.

## 2024-03-20 ENCOUNTER — TELEPHONE (OUTPATIENT)
Dept: ORTHOPEDIC SURGERY | Age: 69
End: 2024-03-20

## 2024-03-20 NOTE — TELEPHONE ENCOUNTER
Unable to leave VM regarding ED follow up. Upon return call please schedule with Dr. Luna or Safia

## 2024-05-22 NOTE — PROGRESS NOTES
Bedside Mobility Assessment Tool (BMAT):     Assessment Level 1- Sit and Shake    1. From a semi-reclined position, ask patient to sit up and rotate to a seated position at the side of the bed. Can use the bedrail. 2. Ask patient to reach out and grab your hand and shake making sure patient reaches across his/her midline. Pass- Patient is able to come to a seated position, maintain core strength. Maintains seated balance while reaching across midline. Move on to Assessment Level 2. Assessment Level 2- Stretch and Point   1. With patient in seated position at the side of the bed, have patient place both feet on the floor (or stool) with knees no higher than hips. 2. Ask patient to stretch one leg and straighten the knee, then bend the ankle/flex and point the toes. If appropriate, repeat with the other leg. Pass- Patient is able to demonstrate appropriate quad strength on intended weight bearing limb(s). Move onto Assessment Level 3. Assessment Level 3- Stand   1. Ask patient to elevate off the bed or chair (seated to standing) using an assistive device (cane, bedrail). 2. Patient should be able to raise buttocks off be and hold for a count of five. May repeat once. Pass- Patient maintains standing stability for at least 5 seconds, proceed to assessment level 4. Assessment Level 4- Walk   1. Ask patient to march in place at bedside. 2. Then ask patient to advance step and return each foot. Some medical conditions may render a patient from stepping backwards, use your best clinical judgement. Fail- Patient not able to complete tasks OR requires use of assistive device. Patient is MOBILITY LEVEL 3.        Mobility Level- 3 Chronic condition.  Discussed with the patient regarding diet, exercise, and lifestyle modification to help achieve and maintain healthy weight

## 2024-05-28 NOTE — DISCHARGE INSTR - COC
Continuity of Care Form    Patient Name: Quin Mayberry   :  1955  MRN:  [id number]    Admit date:  2022  Discharge date:  2022      Code Status Order: Full Code   Advance Directives:     Admitting Physician:  Sherlyn De La Fuente MD  PCP: Collette Sheffield, MD    [facility] /[facility]17  Discharging Unit Phone Number: [phone]      Emergency Contact:   Extended Emergency Contact Information  Primary Emergency Contact: Tracey Leahy[address] Phone: [phone]  Work Phone: [phone]  Mobile Phone: [phone]  Relation: Child  Secondary Emergency Contact: [address] Phone: [phone]  Mobile Phone: [phone]  Relation: Child    Past Surgical History:  Past Surgical History:   Procedure Laterality Date    CHOLECYSTECTOMY      COLONOSCOPY         Immunization History:   Immunization History   Administered Date(s) Administered    Influenza Vaccine, unspecified formulation 2013, 10/26/2016    Influenza Virus Vaccine 2013, 10/26/2016, 2017    Influenza, Alveda Cipro, (age 10 mo+) AND AFLURIA, FLUZONE (age 1 y+), PF 10/26/2016, 2017, 2020    Tdap (Boostrix, Adacel) 2017       Active Problems:  Patient Active Problem List   Diagnosis Code    Chest pain R07.9    SOB (shortness of breath) R06.02    Tobacco use Z72.0    Moderate COPD (chronic obstructive pulmonary disease) ([address] J44.9    COPD exacerbation (HCC) J44.1    Acute respiratory failure with hypoxia (HCC) J96.01    Hyponatremia E87.1    Lung nodule R91.1    Pulmonary emphysema ([address] J43.9    Acute on chronic respiratory failure with hypoxia and hypercapnia (HCC) J96.21, J96.22    Community acquired pneumonia of right lung J18.9    Acute exacerbation of chronic obstructive pulmonary disease (COPD) ([address] J44.1    Acute on chronic respiratory failure with hypoxia ([facility] J96.21    Acute hypoxemic respiratory failure (HCC) J96.01    Pneumonia due to infectious organism J18.9    Transaminitis R74.01    Pancreatic abnormality Q45.3    Common bile duct dilatation K83.8    Community acquired pneumonia J18.9    Pneumonia due to COVID-19 virus U07.1, J12.82    Essential hypertension I10    COVID-19 U07.1       Isolation/Infection:   Isolation            Droplet Plus          Patient Infection Status       Infection Onset Added Last Indicated Last Indicated By Review Planned Expiration Resolved Resolved By    COVID-19 22 COVID-19, Rapid 22      Resolved    COVID-19 (Rule Out) 22 COVID-19, Rapid (Ordered)   22 Rule-Out Test Resulted    COVID-19 (Rule Out) 22 COVID-19, Rapid (Ordered)   22 Rule-Out Test Resulted    COVID-19 (Rule Out) 22 COVID-19, Rapid (Ordered)   22 Rule-Out Test Resulted    COVID-19 (Rule Out) 12/10/21 12/10/21 12/10/21 COVID-19, Rapid (Ordered)   12/10/21 Rule-Out Test Resulted    COVID-19 (Rule Out) 21 COVID-19 (Ordered)   21 Rule-Out Test Resulted    COVID-19 (Rule Out) 21 COVID-19 (Ordered)   21     COVID-19 (Rule Out) 20 COVID-19 (Ordered)   20 Rule-Out Test Resulted            Nurse Assessment:  Last Vital Signs: /77   Pulse 69   Temp 98.3 °F (36.8 °C) (Oral)   Resp 19   Ht 5' 3\" (1.6 m)   Wt 165 lb 3.2 oz (74.9 kg)   SpO2 96%   Breastfeeding No   BMI 29.26 kg/m²     Last documented pain score (0-10 scale): Pain Level: 10  Last Weight:   Wt Readings from Last 1 Encounters:   22 165 lb 3.2 oz (74.9 kg)     Mental Status:  oriented and alert    IV Access:  - None    Nursing Mobility/ADLs:  Walking   Assisted  Transfer  Assisted  Bathing  Assisted  Dressing  Assisted  Toileting  Assisted  Feeding  Independent  Med Admin  Assisted  Med Delivery whole    Wound Care Documentation and Therapy:        Elimination:  Continence: Bowel: Yes  Bladder: Yes  Urinary Catheter: None   Colostomy/Ileostomy/Ileal Conduit: No       Date of Last BM: 8/18    Intake/Output Summary (Last 24 hours) at 8/18/2022 1658  Last data filed at 8/18/2022 1530  Gross per 24 hour   Intake 1325 ml   Output 2000 ml   Net -675 ml     I/O last 3 completed shifts: In: 3649 [P.O.:1555]  Out: 1800 [Urine:1800]    Safety Concerns:     None    Impairments/Disabilities:      None    Nutrition Therapy:  Current Nutrition Therapy:   - Oral Diet:  General    Routes of Feeding: Oral  Liquids: Thin Liquids  Daily Fluid Restriction: no  Last Modified Barium Swallow with Video (Video Swallowing Test): not done    Treatments at the Time of Hospital Discharge:   Respiratory Treatments: 3L baseline  Oxygen Therapy:  is on oxygen at 3 L/min per nasal cannula.   Ventilator:    - No ventilator support    Rehab Therapies: Physical Therapy  Weight Bearing Status/Restrictions: No weight bearing restrictions  Other Medical Equipment (for information only, NOT a DME order):  walker  Other Treatments:     Patient's personal belongings (please select all that are sent with patient):  None    RN SIGNATURE:  Electronically signed by Catie Sanders RN on 8/18/22 at 4:59 PM EDT    CASE MANAGEMENT/SOCIAL WORK SECTION    Inpatient Status Date: ***    Readmission Risk Assessment Score:  Readmission Risk              Risk of Unplanned Readmission:  15           Discharging to Facility/ Agency   Name:   Address:  Phone:  Fax:    Dialysis Facility (if applicable)   Name:  Address:  Dialysis Schedule:  Phone:  Fax:    / signature: {Esignature:[id number]}    PHYSICIAN SECTION    Prognosis: {Prognosis:9604779597}    Condition at Discharge: [facility] Patient Condition:[id number]}    Rehab Potential (if transferring to Rehab): {Prognosis:5196902998}    Recommended Labs or Other Treatments After Discharge: ***    Physician Certification: I certify the above information and transfer of Jose Siegel  is necessary for the continuing treatment of the diagnosis listed and that she requires {Admit to Appropriate Level of Care:69788} for {GREATER/LESS:920270499} 30 days.      Update Admission H&P: {CHP DME Changes in SVKPU:709422805}    PHYSICIAN SIGNATURE:  {Esignature:[id number]} 169

## 2024-06-24 ENCOUNTER — HOSPITAL ENCOUNTER (INPATIENT)
Age: 69
LOS: 2 days | Discharge: NURSING, REHAB OR LONG-TERM CARE | DRG: 208 | End: 2024-06-26
Attending: EMERGENCY MEDICINE | Admitting: INTERNAL MEDICINE
Payer: MEDICARE

## 2024-06-24 ENCOUNTER — APPOINTMENT (OUTPATIENT)
Dept: GENERAL RADIOLOGY | Age: 69
DRG: 208 | End: 2024-06-24
Payer: MEDICARE

## 2024-06-24 DIAGNOSIS — J90 PLEURAL EFFUSION: ICD-10-CM

## 2024-06-24 DIAGNOSIS — Z93.0 TRACHEOSTOMY DEPENDENCE (HCC): ICD-10-CM

## 2024-06-24 DIAGNOSIS — R09.02 HYPOXIC EPISODE: Primary | ICD-10-CM

## 2024-06-24 DIAGNOSIS — Z99.11 VENTILATOR DEPENDENCE (HCC): ICD-10-CM

## 2024-06-24 DIAGNOSIS — R79.89 ELEVATED TROPONIN: ICD-10-CM

## 2024-06-24 DIAGNOSIS — R00.0 TACHYCARDIA: ICD-10-CM

## 2024-06-24 PROBLEM — J96.21 ACUTE ON CHRONIC RESPIRATORY FAILURE WITH HYPOXIA (HCC): Status: ACTIVE | Noted: 2024-06-24

## 2024-06-24 PROBLEM — R09.89 PULMONARY CONGESTION: Status: ACTIVE | Noted: 2024-06-24

## 2024-06-24 LAB
ALBUMIN SERPL-MCNC: 3.6 G/DL (ref 3.4–5)
ALBUMIN/GLOB SERPL: 1.1 {RATIO} (ref 1.1–2.2)
ALP SERPL-CCNC: 126 U/L (ref 40–129)
ALT SERPL-CCNC: 18 U/L (ref 10–40)
ANION GAP SERPL CALCULATED.3IONS-SCNC: 11 MMOL/L (ref 3–16)
AST SERPL-CCNC: 23 U/L (ref 15–37)
BASE EXCESS BLDV CALC-SCNC: 0.4 MMOL/L (ref -3–3)
BASOPHILS # BLD: 0.1 K/UL (ref 0–0.2)
BASOPHILS NFR BLD: 0.6 %
BILIRUB SERPL-MCNC: 0.4 MG/DL (ref 0–1)
BUN SERPL-MCNC: 4 MG/DL (ref 7–20)
CALCIUM SERPL-MCNC: 9.7 MG/DL (ref 8.3–10.6)
CHLORIDE SERPL-SCNC: 96 MMOL/L (ref 99–110)
CO2 BLDV-SCNC: 28 MMOL/L
CO2 SERPL-SCNC: 27 MMOL/L (ref 21–32)
COHGB MFR BLDV: 1.4 % (ref 0–1.5)
CREAT SERPL-MCNC: <0.5 MG/DL (ref 0.6–1.2)
D-DIMER QUANTITATIVE: 1.82 UG/ML FEU (ref 0–0.6)
DEPRECATED RDW RBC AUTO: 15.9 % (ref 12.4–15.4)
EKG ATRIAL RATE: 113 BPM
EKG DIAGNOSIS: NORMAL
EKG P AXIS: 56 DEGREES
EKG P-R INTERVAL: 128 MS
EKG Q-T INTERVAL: 304 MS
EKG QRS DURATION: 66 MS
EKG QTC CALCULATION (BAZETT): 416 MS
EKG R AXIS: -64 DEGREES
EKG T AXIS: 54 DEGREES
EKG VENTRICULAR RATE: 113 BPM
EOSINOPHIL # BLD: 0.1 K/UL (ref 0–0.6)
EOSINOPHIL NFR BLD: 1.1 %
GFR SERPLBLD CREATININE-BSD FMLA CKD-EPI: >90 ML/MIN/{1.73_M2}
GLUCOSE SERPL-MCNC: 104 MG/DL (ref 70–99)
HCO3 BLDV-SCNC: 26.5 MMOL/L (ref 23–29)
HCT VFR BLD AUTO: 35.5 % (ref 36–48)
HGB BLD-MCNC: 11.6 G/DL (ref 12–16)
LACTATE BLDV-SCNC: 1.4 MMOL/L (ref 0.4–1.9)
LYMPHOCYTES # BLD: 0.8 K/UL (ref 1–5.1)
LYMPHOCYTES NFR BLD: 9 %
MCH RBC QN AUTO: 28.1 PG (ref 26–34)
MCHC RBC AUTO-ENTMCNC: 32.8 G/DL (ref 31–36)
MCV RBC AUTO: 85.8 FL (ref 80–100)
METHGB MFR BLDV: 0.3 %
MONOCYTES # BLD: 0.7 K/UL (ref 0–1.3)
MONOCYTES NFR BLD: 7.9 %
NEUTROPHILS # BLD: 7.6 K/UL (ref 1.7–7.7)
NEUTROPHILS NFR BLD: 81.4 %
NT-PROBNP SERPL-MCNC: 70 PG/ML (ref 0–124)
O2 CT VFR BLDV CALC: 16 VOL %
O2 THERAPY: ABNORMAL
PCO2 BLDV: 48.7 MMHG (ref 40–50)
PH BLDV: 7.35 [PH] (ref 7.35–7.45)
PLATELET # BLD AUTO: 363 K/UL (ref 135–450)
PMV BLD AUTO: 7.4 FL (ref 5–10.5)
PO2 BLDV: 55.5 MMHG (ref 25–40)
POTASSIUM SERPL-SCNC: 4 MMOL/L (ref 3.5–5.1)
PROCALCITONIN SERPL IA-MCNC: 0.05 NG/ML (ref 0–0.15)
PROT SERPL-MCNC: 7 G/DL (ref 6.4–8.2)
RBC # BLD AUTO: 4.14 M/UL (ref 4–5.2)
REASON FOR REJECTION: NORMAL
REJECTED TEST: NORMAL
SAO2 % BLDV: 87 %
SARS-COV-2 RDRP RESP QL NAA+PROBE: NOT DETECTED
SODIUM SERPL-SCNC: 134 MMOL/L (ref 136–145)
TROPONIN, HIGH SENSITIVITY: 49 NG/L (ref 0–14)
TROPONIN, HIGH SENSITIVITY: 51 NG/L (ref 0–14)
TSH SERPL DL<=0.005 MIU/L-ACNC: 0.11 UIU/ML (ref 0.27–4.2)
WBC # BLD AUTO: 9.3 K/UL (ref 4–11)

## 2024-06-24 PROCEDURE — 6370000000 HC RX 637 (ALT 250 FOR IP): Performed by: NURSE PRACTITIONER

## 2024-06-24 PROCEDURE — 94640 AIRWAY INHALATION TREATMENT: CPT

## 2024-06-24 PROCEDURE — 87635 SARS-COV-2 COVID-19 AMP PRB: CPT

## 2024-06-24 PROCEDURE — 2500000003 HC RX 250 WO HCPCS: Performed by: NURSE PRACTITIONER

## 2024-06-24 PROCEDURE — 93005 ELECTROCARDIOGRAM TRACING: CPT | Performed by: NURSE PRACTITIONER

## 2024-06-24 PROCEDURE — 84439 ASSAY OF FREE THYROXINE: CPT

## 2024-06-24 PROCEDURE — 85379 FIBRIN DEGRADATION QUANT: CPT

## 2024-06-24 PROCEDURE — 2580000003 HC RX 258: Performed by: NURSE PRACTITIONER

## 2024-06-24 PROCEDURE — 83880 ASSAY OF NATRIURETIC PEPTIDE: CPT

## 2024-06-24 PROCEDURE — 93010 ELECTROCARDIOGRAM REPORT: CPT | Performed by: INTERNAL MEDICINE

## 2024-06-24 PROCEDURE — 94761 N-INVAS EAR/PLS OXIMETRY MLT: CPT

## 2024-06-24 PROCEDURE — 85025 COMPLETE CBC W/AUTO DIFF WBC: CPT

## 2024-06-24 PROCEDURE — 2700000000 HC OXYGEN THERAPY PER DAY

## 2024-06-24 PROCEDURE — 84145 PROCALCITONIN (PCT): CPT

## 2024-06-24 PROCEDURE — 83605 ASSAY OF LACTIC ACID: CPT

## 2024-06-24 PROCEDURE — 71046 X-RAY EXAM CHEST 2 VIEWS: CPT

## 2024-06-24 PROCEDURE — 80053 COMPREHEN METABOLIC PANEL: CPT

## 2024-06-24 PROCEDURE — 84443 ASSAY THYROID STIM HORMONE: CPT

## 2024-06-24 PROCEDURE — 5A1945Z RESPIRATORY VENTILATION, 24-96 CONSECUTIVE HOURS: ICD-10-PCS | Performed by: INTERNAL MEDICINE

## 2024-06-24 PROCEDURE — 6370000000 HC RX 637 (ALT 250 FOR IP): Performed by: INTERNAL MEDICINE

## 2024-06-24 PROCEDURE — 84484 ASSAY OF TROPONIN QUANT: CPT

## 2024-06-24 PROCEDURE — 87040 BLOOD CULTURE FOR BACTERIA: CPT

## 2024-06-24 PROCEDURE — 99223 1ST HOSP IP/OBS HIGH 75: CPT | Performed by: INTERNAL MEDICINE

## 2024-06-24 PROCEDURE — 84480 ASSAY TRIIODOTHYRONINE (T3): CPT

## 2024-06-24 PROCEDURE — 82803 BLOOD GASES ANY COMBINATION: CPT

## 2024-06-24 PROCEDURE — 94002 VENT MGMT INPAT INIT DAY: CPT

## 2024-06-24 PROCEDURE — 2000000000 HC ICU R&B

## 2024-06-24 PROCEDURE — 36415 COLL VENOUS BLD VENIPUNCTURE: CPT

## 2024-06-24 PROCEDURE — 6360000002 HC RX W HCPCS: Performed by: NURSE PRACTITIONER

## 2024-06-24 PROCEDURE — 99285 EMERGENCY DEPT VISIT HI MDM: CPT

## 2024-06-24 RX ORDER — SODIUM CHLORIDE 9 MG/ML
INJECTION, SOLUTION INTRAVENOUS PRN
Status: DISCONTINUED | OUTPATIENT
Start: 2024-06-24 | End: 2024-06-26 | Stop reason: HOSPADM

## 2024-06-24 RX ORDER — POLYETHYLENE GLYCOL 3350 17 G/17G
17 POWDER, FOR SOLUTION ORAL DAILY PRN
Status: DISCONTINUED | OUTPATIENT
Start: 2024-06-24 | End: 2024-06-26 | Stop reason: HOSPADM

## 2024-06-24 RX ORDER — ACETAMINOPHEN 650 MG/1
650 SUPPOSITORY RECTAL EVERY 6 HOURS PRN
Status: DISCONTINUED | OUTPATIENT
Start: 2024-06-24 | End: 2024-06-26 | Stop reason: HOSPADM

## 2024-06-24 RX ORDER — ONDANSETRON 4 MG/1
4 TABLET, ORALLY DISINTEGRATING ORAL EVERY 8 HOURS PRN
Status: DISCONTINUED | OUTPATIENT
Start: 2024-06-24 | End: 2024-06-26 | Stop reason: HOSPADM

## 2024-06-24 RX ORDER — SODIUM CHLORIDE FOR INHALATION 3 %
4 VIAL, NEBULIZER (ML) INHALATION 3 TIMES DAILY
Status: DISCONTINUED | OUTPATIENT
Start: 2024-06-24 | End: 2024-06-24

## 2024-06-24 RX ORDER — IPRATROPIUM BROMIDE AND ALBUTEROL SULFATE 2.5; .5 MG/3ML; MG/3ML
1 SOLUTION RESPIRATORY (INHALATION)
Status: DISCONTINUED | OUTPATIENT
Start: 2024-06-24 | End: 2024-06-24

## 2024-06-24 RX ORDER — POTASSIUM CHLORIDE 29.8 MG/ML
20 INJECTION INTRAVENOUS PRN
Status: DISCONTINUED | OUTPATIENT
Start: 2024-06-24 | End: 2024-06-24

## 2024-06-24 RX ORDER — SODIUM CHLORIDE 0.9 % (FLUSH) 0.9 %
5-40 SYRINGE (ML) INJECTION PRN
Status: DISCONTINUED | OUTPATIENT
Start: 2024-06-24 | End: 2024-06-26 | Stop reason: HOSPADM

## 2024-06-24 RX ORDER — POTASSIUM CHLORIDE 7.45 MG/ML
10 INJECTION INTRAVENOUS PRN
Status: DISCONTINUED | OUTPATIENT
Start: 2024-06-24 | End: 2024-06-26 | Stop reason: HOSPADM

## 2024-06-24 RX ORDER — HYDROXYZINE HYDROCHLORIDE 25 MG/1
25 TABLET, FILM COATED ORAL 3 TIMES DAILY PRN
Status: DISCONTINUED | OUTPATIENT
Start: 2024-06-24 | End: 2024-06-25

## 2024-06-24 RX ORDER — 0.9 % SODIUM CHLORIDE 0.9 %
500 INTRAVENOUS SOLUTION INTRAVENOUS ONCE
Status: COMPLETED | OUTPATIENT
Start: 2024-06-24 | End: 2024-06-24

## 2024-06-24 RX ORDER — ONDANSETRON 2 MG/ML
4 INJECTION INTRAMUSCULAR; INTRAVENOUS EVERY 6 HOURS PRN
Status: DISCONTINUED | OUTPATIENT
Start: 2024-06-24 | End: 2024-06-26 | Stop reason: HOSPADM

## 2024-06-24 RX ORDER — MAGNESIUM SULFATE IN WATER 40 MG/ML
2000 INJECTION, SOLUTION INTRAVENOUS PRN
Status: DISCONTINUED | OUTPATIENT
Start: 2024-06-24 | End: 2024-06-26 | Stop reason: HOSPADM

## 2024-06-24 RX ORDER — ENOXAPARIN SODIUM 100 MG/ML
40 INJECTION SUBCUTANEOUS NIGHTLY
Status: DISCONTINUED | OUTPATIENT
Start: 2024-06-24 | End: 2024-06-26 | Stop reason: HOSPADM

## 2024-06-24 RX ORDER — ACETAMINOPHEN 325 MG/1
650 TABLET ORAL EVERY 6 HOURS PRN
Status: DISCONTINUED | OUTPATIENT
Start: 2024-06-24 | End: 2024-06-26 | Stop reason: HOSPADM

## 2024-06-24 RX ORDER — IPRATROPIUM BROMIDE AND ALBUTEROL SULFATE 2.5; .5 MG/3ML; MG/3ML
1 SOLUTION RESPIRATORY (INHALATION)
Status: DISCONTINUED | OUTPATIENT
Start: 2024-06-25 | End: 2024-06-25

## 2024-06-24 RX ORDER — ALBUTEROL SULFATE 2.5 MG/3ML
2.5 SOLUTION RESPIRATORY (INHALATION) EVERY 4 HOURS PRN
Status: DISCONTINUED | OUTPATIENT
Start: 2024-06-24 | End: 2024-06-26 | Stop reason: HOSPADM

## 2024-06-24 RX ORDER — SODIUM CHLORIDE 0.9 % (FLUSH) 0.9 %
5-40 SYRINGE (ML) INJECTION EVERY 12 HOURS SCHEDULED
Status: DISCONTINUED | OUTPATIENT
Start: 2024-06-24 | End: 2024-06-26 | Stop reason: HOSPADM

## 2024-06-24 RX ORDER — SODIUM CHLORIDE FOR INHALATION 3 %
5 VIAL, NEBULIZER (ML) INHALATION 3 TIMES DAILY
Status: DISCONTINUED | OUTPATIENT
Start: 2024-06-25 | End: 2024-06-26

## 2024-06-24 RX ADMIN — IPRATROPIUM BROMIDE AND ALBUTEROL SULFATE 1 DOSE: 2.5; .5 SOLUTION RESPIRATORY (INHALATION) at 19:29

## 2024-06-24 RX ADMIN — Medication 10 ML: at 22:09

## 2024-06-24 RX ADMIN — SODIUM CHLORIDE 500 ML: 9 INJECTION, SOLUTION INTRAVENOUS at 16:12

## 2024-06-24 RX ADMIN — WATER 40 MG: 1 INJECTION INTRAMUSCULAR; INTRAVENOUS; SUBCUTANEOUS at 21:46

## 2024-06-24 RX ADMIN — ENOXAPARIN SODIUM 40 MG: 100 INJECTION SUBCUTANEOUS at 21:46

## 2024-06-24 RX ADMIN — DOXYCYCLINE 100 MG: 100 INJECTION, POWDER, LYOPHILIZED, FOR SOLUTION INTRAVENOUS at 21:13

## 2024-06-24 RX ADMIN — HYDROXYZINE HYDROCHLORIDE 25 MG: 25 TABLET ORAL at 21:47

## 2024-06-24 RX ADMIN — ACETAMINOPHEN 650 MG: 325 TABLET ORAL at 21:47

## 2024-06-24 ASSESSMENT — ENCOUNTER SYMPTOMS
COLOR CHANGE: 0
SORE THROAT: 0
SHORTNESS OF BREATH: 1
FACIAL SWELLING: 0
ABDOMINAL PAIN: 0
RHINORRHEA: 0

## 2024-06-24 ASSESSMENT — PULMONARY FUNCTION TESTS
PIF_VALUE: 21
PIF_VALUE: 17

## 2024-06-24 ASSESSMENT — PAIN SCALES - GENERAL
PAINLEVEL_OUTOF10: 3
PAINLEVEL_OUTOF10: 8
PAINLEVEL_OUTOF10: 9

## 2024-06-24 ASSESSMENT — PAIN DESCRIPTION - LOCATION
LOCATION: SHOULDER
LOCATION: SHOULDER;BACK

## 2024-06-24 ASSESSMENT — PAIN DESCRIPTION - DESCRIPTORS: DESCRIPTORS: ACHING

## 2024-06-24 ASSESSMENT — PAIN DESCRIPTION - ORIENTATION: ORIENTATION: LEFT

## 2024-06-24 ASSESSMENT — PAIN - FUNCTIONAL ASSESSMENT: PAIN_FUNCTIONAL_ASSESSMENT: 0-10

## 2024-06-24 NOTE — ED PROVIDER NOTES
[facility] ED  EMERGENCY DEPARTMENT ENCOUNTER      I am the Primary Clinician of Record    Note started: 1:43 PM EDT 6/24/24     I have seen and evaluated this patient with my supervising physician Franko Cortez MD.        Pt Name: Annie Tyler  MRN: [id number]  Birthdate 1955  Dateof evaluation: 6/24/2024  Provider: Meghana Ye, APRN - CNP  PCP: Keyla Mtz MD  ED Attending: No att. providers found      CHIEF COMPLAINT       Chief Complaint   Patient presents with    Shortness of Breath     Pt presents to the hospital via EMS from nursing home with complaints of respiratory issues. The pt just got cleared to eat and desated after eating lunch this afternoon. The patient is on a vent that assists her every once in a while. Pt is 100% on arrival.       HISTORY OF PRESENTILLNESS   (Location/Symptom, Timing/Onset, Context/Setting, Quality, Duration, Modifying Factors, Severity)  Note limiting factors.     nAnie Tyler is a 68 y.o. female for increased shortness of breath. Onset was today.  Context includes patient has a trach and is on a  ventilator.  Patient reports that she was recently cleared to eat  by mouth.  Patient reportedly had a desaturation today after lunch.  Patient denies any fevers nausea vomiting or diarrhea.  Patient denies chest pain or shortness of breath. Alleviating factors include nothing.  Aggravating factors include nothing. Pain is 8/10.  Nothing has been used for pain today.     Nursing Notes were all reviewed and agreed with or any disagreements were addressed  in the HPI.      REVIEW OF SYSTEMS       Review of Systems   Constitutional:  Negative for activity change, appetite change and fever.   HENT:  Negative for congestion, facial swelling, rhinorrhea and sore throat.    Eyes:  Negative for visual disturbance.   Respiratory:  Positive for shortness of breath.    Cardiovascular:  Negative for chest pain.   Gastrointestinal:  Negative

## 2024-06-24 NOTE — ED PROVIDER NOTES
THIS IS MY LUCY SUPERVISORY AND SHARED VISIT NOTE:    I personally saw the patient and made/approved the management plan and take responsibility for the patient management.    History: 68-year-old female present for evaluation of shortness of breath, possible aspiration.  She is from local nursing facility.  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  She is cleared to eat food today and had a coughing episode while eating and had apparent oxygen desaturation.  She was then sent to the emergency department as the facility did not have a respiratory therapist.    Exam: No acute distress no tachypnea or increased respiratory effort.  There is clear tracheal secretions.  No focal neurological deficits.    MDM: 68-year-old female presenting for evaluation of concern for possible aspiration episode.  She is typically on trach mask 8 to 10 L.  When arrived to the emergency department, she was placed on ventilator support.  Chest x-ray shows small pleural effusion otherwise no obvious signs of aspiration.  She is mildly tachycardic.  There is no significant leukocytosis.    On discussion with the patient, patient does not want to be admitted to the hospital.  She does not want to undergo potential bronchoscopy which could be reasonable as it sounds that the patient may have had an aspiration episode however patient is adamant that she did not aspirate.  We will trial the patient on home trach mask settings and if she is stable from a respiratory standpoint as vital signs and laboratory evaluation is otherwise unremarkable and patient does not want to undergo bronchoscopy, patient could be reasonable for discharge.        I personally saw the patient and independently provided 15 minutes of non-concurrent critical care out of the total shared critical care time provided.     EKG  The Ekg interpreted by myself in the emergency department in the absence of a cardiologist.  sinus tachycardia, qctm=415   with a rate of 113  Axis is   Left axis deviation  QTc is  within an acceptable range  Intervals and Durations are unremarkable.      No specific ST-T wave changes appreciated.  No evidence of acute ischemia.   No significant change from prior EKG dated 1/19/2024    No results found.      I, Dr. Cortez, am the primary clinician of record.     Comment: Please note this report has been produced using speech recognition software and may contain errors related to that system including errors in grammar, punctuation, and spelling, as well as words and phrases that may be inappropriate. If there are any questions or concerns please feel free to contact the dictating provider for clarification.     Franko Cortez MD  06/24/24 [id number]

## 2024-06-24 NOTE — CONSULTS
Pulmonary & Critical Care Consultation Note    Patient is being seen at the request of Meghana Ye APRN - CNP for a consultation for ICU    HISTORY OF PRESENT ILLNESS:   68 years old with history of COPD presented with 1 week of progressive shortness of breath.  Mild to moderate.  Worse with exertion better with resting.  Chest congestion for the past 4 to 5 days.  Brought to ED today for hypoxemia.  Mild to moderate amount of clear secretions per her tracheostomy.  Denies any fever.  Denied any chest pain.  Denies any vomiting.  No diarrhea.  Chronic cough trach.  Denied using ventilator at night.  Normally uses 8 to 10 L at the nursing home.    PAST MEDICAL HISTORY:  Past Medical History:   Diagnosis Date    Angina pectoris (HCC)     Chronic pain     knees and lower back     Congestive heart failure (HCC) 1/5/2024    COPD exacerbation ([facility]) 05/20/2018    Depression     Essential hypertension 08/15/2022    Panic disorder     Pneumonia     Pulmonary emphysema ([facility])      PAST SURGICAL HISTORY:  Past Surgical History:   Procedure Laterality Date    CHOLECYSTECTOMY      COLONOSCOPY         FAMILY HISTORY:  family history includes COPD in her mother; Hypertension in her mother.    SOCIAL HISTORY:   reports that she quit smoking about 3 years ago. Her smoking use included cigarettes. She started smoking about 55 years ago. She has a 104.0 pack-year smoking history. She has been exposed to tobacco smoke. She has never used smokeless tobacco.    Scheduled Meds:  Inhaled bronchodilators  Continuous Infusions:    PRN Meds:      ALLERGIES:  Patient is allergic to cephalosporins, penicillins, and hctz [hydrochlorothiazide].    REVIEW OF SYSTEMS:  Constitutional: Negative for fever  HENT: Negative for sore throat  Eyes: Negative for redness   Respiratory: + Dyspnea, cough  Cardiovascular: Negative for chest pain  Gastrointestinal: Negative for vomiting, diarrhea   Genitourinary: Negative for hematuria    Musculoskeletal: Negative for arthralgias   Skin: Negative for rash  Neurological: Negative for syncope  Hematological: Negative for adenopathy  Psychiatric/Behavorial: Negative for anxiety    PHYSICAL EXAM:  Blood pressure 133/87, pulse (!) 107, temperature 99 °F (37.2 °C), temperature source Oral, resp. rate 21, SpO2 100 %, not currently breastfeeding.' on 50%  Gen: + Distress.  Alert wearing  Eyes: PERRL. No sclera icterus. No conjunctival injection.   ENT: No discharge. Pharynx clear.  Tracheostomy tube in place.  Neck: Trachea midline. No obvious mass.    Resp: + Accessory muscle use. No crackles.  Scattered wheezes.  Bilateral rhonchi. No dullness on percussion.  CV: Regular rate. Regular rhythm. No murmur or rub. No edema.  GI: Non-tender. Non-distended. No hernia.   Skin: Warm and dry. No nodule on exposed extremities.   Lymph: No cervical LAD. No supraclavicular LAD.   M/S: No cyanosis. No joint deformity. No clubbing.   Neuro: Awake. Alert. Moves all four extremities.   Psych: Oriented x 3. No anxiety.     LABS:  CBC:   Recent Labs     06/24/24  1331   WBC 9.3   HGB 11.6*   HCT 35.5*   MCV 85.8        BMP:   Recent Labs     06/24/24  1354   *   K 4.0   CL 96*   CO2 27   BUN 4*   CREATININE <0.5*     LIVER PROFILE:   Recent Labs     06/24/24  1354   AST 23   ALT 18   BILITOT 0.4   ALKPHOS 126     PT/INR: No results for input(s): \"PROTIME\", \"INR\" in the last 72 hours.  APTT: No results for input(s): \"APTT\" in the last 72 hours.  UA:No results for input(s): \"NITRITE\", \"COLORU\", \"PHUR\", \"LABCAST\", \"WBCUA\", \"RBCUA\", \"MUCUS\", \"TRICHOMONAS\", \"YEAST\", \"BACTERIA\", \"CLARITYU\", \"SPECGRAV\", \"LEUKOCYTESUR\", \"UROBILINOGEN\", \"BILIRUBINUR\", \"BLOODU\", \"GLUCOSEU\", \"AMORPHOUS\" in the last 72 hours.    Invalid input(s): \"KETONESU\"  No results for input(s): \"PHART\", \"DXW2VGH\", \"PO2ART\" in the last 72 hours.    Microbiology:  6/24 BC     Imaging:  Chest x-ray 6/24 imaging was reviewed by me and showed   Left

## 2024-06-24 NOTE — PROGRESS NOTES
Pt to ICU room from ER bed.   According to [place] RT pt had a modified barrium swallow. She did not do well with that and sats were in the 60s. She did not recover and they sent her to the ER.  Pt on cool mist at this time.   RT Jennifer spoke with Melodie Ortiz and the pt settings there are:  Cool mist trial during the day to try to get her home.   During the day pt is on pressure support 10/5.  At night pt is on AVAPS settings:  Rate 15    PS 10-20  EPAP 5-10  Max 30  Itime 1.5    Page to Dr Wolf for updated orders.   Dr Baum gives orders to place pt on AVAPS setting for night as follows:  Rate 15    PS 10  EPAP 5  Max 30  Itime 1.5

## 2024-06-24 NOTE — ED NOTES
Called [facility] to get med list d/t pts list being printed of incorrectly and only showing half the paper.

## 2024-06-24 NOTE — PLAN OF CARE
Admit to ICU    Chronic Trach/vent  Hypoxic after eating  Pleural effusion  Doxycyline  Solumedrol  NPO  Persistent tachycardia- d-dimer added, if positive likely will need CTPA  Check TSH  Discussed with Dr. Maryann HERNANDEZ pending at time of admission.       Home  medication not ordered on admission, not verified.  Please update on call provider once verified.       JUAN Urban - CNP

## 2024-06-24 NOTE — PROGRESS NOTES
RT Inhaler-Nebulizer Bronchodilator Protocol Note    There is a bronchodilator order in the chart from a provider indicating to follow the RT Bronchodilator Protocol and there is an “Initiate RT Inhaler-Nebulizer Bronchodilator Protocol” order as well (see protocol at bottom of note).    CXR Findings:  No results found.    The findings from the last RT Protocol Assessment were as follows:   History Pulmonary Disease: (P) Chronic pulmonary disease  Respiratory Pattern: (P) Dyspnea on exertion or RR 21-25 bpm  Breath Sounds: (P) Inspiratory and expiratory or bilateral wheezing and/or rhonchi  Cough: (P) Strong, productive  Indication for Bronchodilator Therapy: (P) Decreased or absent breath sounds  Bronchodilator Assessment Score: (P) 11    Aerosolized bronchodilator medication orders have been revised according to the RT Inhaler-Nebulizer Bronchodilator Protocol below.    Respiratory Therapist to perform RT Therapy Protocol Assessment initially then follow the protocol.  Repeat RT Therapy Protocol Assessment PRN for score 0-3 or on second treatment, BID, and PRN for scores above 3.    No Indications - adjust the frequency to every 6 hours PRN wheezing or bronchospasm, if no treatments needed after 48 hours then discontinue using Per Protocol order mode.     If indication present, adjust the RT bronchodilator orders based on the Bronchodilator Assessment Score as indicated below.  Use Inhaler orders unless patient has one or more of the following: on home nebulizer, not able to hold breath for 10 seconds, is not alert and oriented, cannot activate and use MDI correctly, or respiratory rate 25 breaths per minute or more, then use the equivalent nebulizer order(s) with same Frequency and PRN reasons based on the score.  If a patient is on this medication at home then do not decrease Frequency below that used at home.    0-3 - enter or revise RT bronchodilator order(s) to equivalent RT Bronchodilator order with  Frequency of every 4 hours PRN for wheezing or increased work of breathing using Per Protocol order mode.        4-6 - enter or revise RT Bronchodilator order(s) to two equivalent RT bronchodilator orders with one order with BID Frequency and one order with Frequency of every 4 hours PRN wheezing or increased work of breathing using Per Protocol order mode.        7-10 - enter or revise RT Bronchodilator order(s) to two equivalent RT bronchodilator orders with one order with TID Frequency and one order with Frequency of every 4 hours PRN wheezing or increased work of breathing using Per Protocol order mode.       11-13 - enter or revise RT Bronchodilator order(s) to one equivalent RT bronchodilator order with QID Frequency and an Albuterol order with Frequency of every 4 hours PRN wheezing or increased work of breathing using Per Protocol order mode.      Greater than 13 - enter or revise RT Bronchodilator order(s) to one equivalent RT bronchodilator order with every 4 hours Frequency and an Albuterol order with Frequency of every 2 hours PRN wheezing or increased work of breathing using Per Protocol order mode.       Electronically signed by Lyudmila Matias RCP on 6/24/2024 at 7:34 PM

## 2024-06-25 ENCOUNTER — APPOINTMENT (OUTPATIENT)
Dept: CT IMAGING | Age: 69
DRG: 208 | End: 2024-06-25
Payer: MEDICARE

## 2024-06-25 ENCOUNTER — TELEPHONE (OUTPATIENT)
Dept: PULMONOLOGY | Age: 69
End: 2024-06-25

## 2024-06-25 PROBLEM — E44.1 MILD PROTEIN-CALORIE MALNUTRITION (HCC): Chronic | Status: ACTIVE | Noted: 2024-06-25

## 2024-06-25 PROBLEM — Z93.0 TRACHEOSTOMY DEPENDENCE (HCC): Status: ACTIVE | Noted: 2024-06-25

## 2024-06-25 LAB
ANION GAP SERPL CALCULATED.3IONS-SCNC: 13 MMOL/L (ref 3–16)
BASOPHILS # BLD: 0 K/UL (ref 0–0.2)
BASOPHILS NFR BLD: 0.3 %
BUN SERPL-MCNC: 6 MG/DL (ref 7–20)
CALCIUM SERPL-MCNC: 9.4 MG/DL (ref 8.3–10.6)
CHLORIDE SERPL-SCNC: 98 MMOL/L (ref 99–110)
CO2 SERPL-SCNC: 23 MMOL/L (ref 21–32)
CREAT SERPL-MCNC: <0.5 MG/DL (ref 0.6–1.2)
DEPRECATED RDW RBC AUTO: 15.9 % (ref 12.4–15.4)
EOSINOPHIL # BLD: 0 K/UL (ref 0–0.6)
EOSINOPHIL NFR BLD: 0.1 %
GFR SERPLBLD CREATININE-BSD FMLA CKD-EPI: >90 ML/MIN/{1.73_M2}
GLUCOSE BLD-MCNC: 124 MG/DL (ref 70–99)
GLUCOSE BLD-MCNC: 128 MG/DL (ref 70–99)
GLUCOSE BLD-MCNC: 133 MG/DL (ref 70–99)
GLUCOSE BLD-MCNC: 148 MG/DL (ref 70–99)
GLUCOSE BLD-MCNC: 152 MG/DL (ref 70–99)
GLUCOSE SERPL-MCNC: 132 MG/DL (ref 70–99)
HCT VFR BLD AUTO: 33 % (ref 36–48)
HGB BLD-MCNC: 10.8 G/DL (ref 12–16)
LYMPHOCYTES # BLD: 0.4 K/UL (ref 1–5.1)
LYMPHOCYTES NFR BLD: 6.2 %
MCH RBC QN AUTO: 28.2 PG (ref 26–34)
MCHC RBC AUTO-ENTMCNC: 32.9 G/DL (ref 31–36)
MCV RBC AUTO: 85.7 FL (ref 80–100)
MONOCYTES # BLD: 0.1 K/UL (ref 0–1.3)
MONOCYTES NFR BLD: 0.9 %
NEUTROPHILS # BLD: 6.3 K/UL (ref 1.7–7.7)
NEUTROPHILS NFR BLD: 92.5 %
PERFORMED ON: ABNORMAL
PLATELET # BLD AUTO: 331 K/UL (ref 135–450)
PMV BLD AUTO: 7.6 FL (ref 5–10.5)
POTASSIUM SERPL-SCNC: 4.1 MMOL/L (ref 3.5–5.1)
RBC # BLD AUTO: 3.85 M/UL (ref 4–5.2)
SODIUM SERPL-SCNC: 134 MMOL/L (ref 136–145)
T3 SERPL-MCNC: 1.3 NG/ML (ref 0.8–2)
T4 FREE SERPL-MCNC: 1.3 NG/DL (ref 0.9–1.8)
WBC # BLD AUTO: 6.8 K/UL (ref 4–11)

## 2024-06-25 PROCEDURE — 2000000000 HC ICU R&B

## 2024-06-25 PROCEDURE — 2700000000 HC OXYGEN THERAPY PER DAY

## 2024-06-25 PROCEDURE — 94761 N-INVAS EAR/PLS OXIMETRY MLT: CPT

## 2024-06-25 PROCEDURE — 92526 ORAL FUNCTION THERAPY: CPT

## 2024-06-25 PROCEDURE — 99291 CRITICAL CARE FIRST HOUR: CPT | Performed by: INTERNAL MEDICINE

## 2024-06-25 PROCEDURE — 6370000000 HC RX 637 (ALT 250 FOR IP): Performed by: INTERNAL MEDICINE

## 2024-06-25 PROCEDURE — 2580000003 HC RX 258: Performed by: INTERNAL MEDICINE

## 2024-06-25 PROCEDURE — 71275 CT ANGIOGRAPHY CHEST: CPT

## 2024-06-25 PROCEDURE — 92610 EVALUATE SWALLOWING FUNCTION: CPT

## 2024-06-25 PROCEDURE — 6360000002 HC RX W HCPCS: Performed by: NURSE PRACTITIONER

## 2024-06-25 PROCEDURE — 92612 ENDOSCOPY SWALLOW (FEES) VID: CPT

## 2024-06-25 PROCEDURE — 2500000003 HC RX 250 WO HCPCS: Performed by: NURSE PRACTITIONER

## 2024-06-25 PROCEDURE — 94640 AIRWAY INHALATION TREATMENT: CPT

## 2024-06-25 PROCEDURE — 6360000004 HC RX CONTRAST MEDICATION: Performed by: INTERNAL MEDICINE

## 2024-06-25 PROCEDURE — 94003 VENT MGMT INPAT SUBQ DAY: CPT

## 2024-06-25 PROCEDURE — 36415 COLL VENOUS BLD VENIPUNCTURE: CPT

## 2024-06-25 PROCEDURE — 85025 COMPLETE CBC W/AUTO DIFF WBC: CPT

## 2024-06-25 PROCEDURE — 2580000003 HC RX 258: Performed by: NURSE PRACTITIONER

## 2024-06-25 PROCEDURE — 6370000000 HC RX 637 (ALT 250 FOR IP): Performed by: NURSE PRACTITIONER

## 2024-06-25 PROCEDURE — 99223 1ST HOSP IP/OBS HIGH 75: CPT | Performed by: INTERNAL MEDICINE

## 2024-06-25 PROCEDURE — 80048 BASIC METABOLIC PNL TOTAL CA: CPT

## 2024-06-25 RX ORDER — ESCITALOPRAM OXALATE 10 MG/1
20 TABLET ORAL NIGHTLY
Status: DISCONTINUED | OUTPATIENT
Start: 2024-06-25 | End: 2024-06-26 | Stop reason: HOSPADM

## 2024-06-25 RX ORDER — ROFLUMILAST 500 UG/1
500 TABLET ORAL DAILY
Status: DISCONTINUED | OUTPATIENT
Start: 2024-06-25 | End: 2024-06-26 | Stop reason: HOSPADM

## 2024-06-25 RX ORDER — HYDROXYZINE HYDROCHLORIDE 25 MG/1
25 TABLET, FILM COATED ORAL DAILY PRN
Status: DISCONTINUED | OUTPATIENT
Start: 2024-06-25 | End: 2024-06-26 | Stop reason: HOSPADM

## 2024-06-25 RX ORDER — IPRATROPIUM BROMIDE AND ALBUTEROL SULFATE 2.5; .5 MG/3ML; MG/3ML
1 SOLUTION RESPIRATORY (INHALATION) 3 TIMES DAILY
Status: DISCONTINUED | OUTPATIENT
Start: 2024-06-25 | End: 2024-06-26

## 2024-06-25 RX ORDER — BUSPIRONE HYDROCHLORIDE 10 MG/1
10 TABLET ORAL 3 TIMES DAILY
Status: DISCONTINUED | OUTPATIENT
Start: 2024-06-25 | End: 2024-06-26 | Stop reason: HOSPADM

## 2024-06-25 RX ORDER — FUROSEMIDE 20 MG/1
20 TABLET ORAL DAILY
Status: DISCONTINUED | OUTPATIENT
Start: 2024-06-25 | End: 2024-06-26 | Stop reason: HOSPADM

## 2024-06-25 RX ORDER — TRAMADOL HYDROCHLORIDE 50 MG/1
50 TABLET ORAL
Status: COMPLETED | OUTPATIENT
Start: 2024-06-25 | End: 2024-06-25

## 2024-06-25 RX ADMIN — TRAMADOL HYDROCHLORIDE 50 MG: 50 TABLET, COATED ORAL at 18:55

## 2024-06-25 RX ADMIN — Medication 10 ML: at 20:00

## 2024-06-25 RX ADMIN — WATER 40 MG: 1 INJECTION INTRAMUSCULAR; INTRAVENOUS; SUBCUTANEOUS at 08:45

## 2024-06-25 RX ADMIN — IPRATROPIUM BROMIDE AND ALBUTEROL SULFATE 1 DOSE: 2.5; .5 SOLUTION RESPIRATORY (INHALATION) at 08:21

## 2024-06-25 RX ADMIN — SODIUM CHLORIDE 30 MG/ML INHALATION SOLUTION 5 ML: 30 SOLUTION INHALANT at 16:24

## 2024-06-25 RX ADMIN — FUROSEMIDE 20 MG: 20 TABLET ORAL at 11:38

## 2024-06-25 RX ADMIN — HYDROXYZINE HYDROCHLORIDE 25 MG: 25 TABLET ORAL at 11:38

## 2024-06-25 RX ADMIN — MUPIROCIN: 20 OINTMENT TOPICAL at 19:59

## 2024-06-25 RX ADMIN — ENOXAPARIN SODIUM 40 MG: 100 INJECTION SUBCUTANEOUS at 19:59

## 2024-06-25 RX ADMIN — DOXYCYCLINE 100 MG: 100 INJECTION, POWDER, LYOPHILIZED, FOR SOLUTION INTRAVENOUS at 08:44

## 2024-06-25 RX ADMIN — IOPAMIDOL 75 ML: 755 INJECTION, SOLUTION INTRAVENOUS at 10:54

## 2024-06-25 RX ADMIN — BUSPIRONE HYDROCHLORIDE 10 MG: 10 TABLET ORAL at 11:38

## 2024-06-25 RX ADMIN — SODIUM CHLORIDE 30 MG/ML INHALATION SOLUTION 5 ML: 30 SOLUTION INHALANT at 08:21

## 2024-06-25 RX ADMIN — DOXYCYCLINE 100 MG: 100 INJECTION, POWDER, LYOPHILIZED, FOR SOLUTION INTRAVENOUS at 20:13

## 2024-06-25 RX ADMIN — Medication 10 ML: at 08:45

## 2024-06-25 RX ADMIN — IPRATROPIUM BROMIDE AND ALBUTEROL SULFATE 1 DOSE: 2.5; .5 SOLUTION RESPIRATORY (INHALATION) at 19:26

## 2024-06-25 RX ADMIN — ESCITALOPRAM OXALATE 20 MG: 10 TABLET ORAL at 19:58

## 2024-06-25 RX ADMIN — ACETAMINOPHEN 650 MG: 325 TABLET ORAL at 12:42

## 2024-06-25 RX ADMIN — WATER 40 MG: 1 INJECTION INTRAMUSCULAR; INTRAVENOUS; SUBCUTANEOUS at 19:59

## 2024-06-25 RX ADMIN — SODIUM CHLORIDE 30 MG/ML INHALATION SOLUTION 5 ML: 30 SOLUTION INHALANT at 19:26

## 2024-06-25 RX ADMIN — BUSPIRONE HYDROCHLORIDE 10 MG: 10 TABLET ORAL at 19:59

## 2024-06-25 RX ADMIN — IPRATROPIUM BROMIDE AND ALBUTEROL SULFATE 1 DOSE: 2.5; .5 SOLUTION RESPIRATORY (INHALATION) at 16:23

## 2024-06-25 RX ADMIN — ROFLUMILAST 500 MCG: 500 TABLET ORAL at 11:38

## 2024-06-25 RX ADMIN — BUSPIRONE HYDROCHLORIDE 10 MG: 10 TABLET ORAL at 17:09

## 2024-06-25 ASSESSMENT — PAIN DESCRIPTION - LOCATION
LOCATION: BUTTOCKS
LOCATION: SHOULDER
LOCATION: SHOULDER

## 2024-06-25 ASSESSMENT — PULMONARY FUNCTION TESTS
PIF_VALUE: 20
PIF_VALUE: 20
PIF_VALUE: 9
PIF_VALUE: 18
PIF_VALUE: 19
PIF_VALUE: 13
PIF_VALUE: 38
PIF_VALUE: 22

## 2024-06-25 ASSESSMENT — PAIN SCALES - GENERAL
PAINLEVEL_OUTOF10: 5
PAINLEVEL_OUTOF10: 8
PAINLEVEL_OUTOF10: 5
PAINLEVEL_OUTOF10: 6
PAINLEVEL_OUTOF10: 8
PAINLEVEL_OUTOF10: 3
PAINLEVEL_OUTOF10: 3

## 2024-06-25 ASSESSMENT — PAIN DESCRIPTION - ORIENTATION: ORIENTATION: LEFT

## 2024-06-25 ASSESSMENT — PAIN DESCRIPTION - DESCRIPTORS: DESCRIPTORS: ACHING

## 2024-06-25 NOTE — PROGRESS NOTES
"     Office Note      Date: 2024  Patient Name: Laury Eugene  MRN: [id number]  : 1940    Chief Complaint   Patient presents with    Thyroid Problem     Multinodular goiter         History of Present Illness:   Laury Eugene is a 84 y.o. female who presents for Thyroid Problem (Multinodular goiter/)    She isn't taking any thyroid meds. She denies any excess iodine intake. She denies any recent steroids. She hasn't noted any change in the size of her neck. She denies any compressive sxs, aside from occ trouble swallowing. She denies any sxs of hypo- or hyperthyroidism at this time, aside from fatigue.  She is on MVI with low dose biotin.       She was hospitalized with seizure since her last visit here.  She is on keppra.    She has h/o paroxysmal a.fib.  She is on rythmol and metoprolol.    Subjective      Review of Systems:   Review of Systems   Constitutional:  Positive for fatigue.   Cardiovascular: Negative.    Gastrointestinal: Negative.    Endocrine: Negative.        The following portions of the patient's history were reviewed and updated as appropriate: allergies, current medications, past family history, past medical history, past social history, past surgical history, and problem list.    Objective     Visit Vitals  /68 (BP Location: Left arm, Patient Position: Sitting, Cuff Size: Adult)   Pulse 58   Ht 154.9 cm (61\")   Wt 65.3 kg (144 lb)   SpO2 100%   BMI 27.21 kg/m²       Physical Exam:  Physical Exam  Constitutional:       Appearance: Normal appearance.   Neurological:      Mental Status: She is alert.         Labs:    TSH  No results found for: \"TSHBASE\"     Free T4  Free T4   Date Value Ref Range Status   2024 1.12 0.93 - 1.70 ng/dL Final       T3  T3, Total   Date Value Ref Range Status   2020 98.4 80.0 - 200.0 ng/dl Final         TPO  No results found for: \"THYROIDAB\"    TG AB  No results found for: \"THGAB\"    TG  No results found for: \"THYROGLB\"    CBC " Pt admitted to ICU room 7. Admission assessment completed. Admission information received from Pt. See flowsheet. Pt alert and oriented x4. Pt's vitals are stable.      Temp-99.0  HR-93 RR-26  BP-131/81  SpO2-95%    All lines in place. Dressings C/D/I.     Pure wick placed.     Call light in reach.    w/DIFF  Lab Results   Component Value Date    WBC 8.61 04/14/2024    RBC 3.42 (L) 04/14/2024    HGB 10.0 (L) 04/14/2024    HCT 31.7 (L) 04/14/2024    MCV 92.7 04/14/2024    MCH 29.2 04/14/2024    MCHC 31.5 04/14/2024    RDW 17.4 (H) 04/14/2024    RDWSD 59.0 (H) 04/14/2024    MPV 10.8 04/14/2024     04/14/2024    NEUTRORELPCT 53.9 04/13/2024    LYMPHORELPCT 28.3 04/13/2024    MONORELPCT 14.1 (H) 04/13/2024    EOSRELPCT 3.0 04/13/2024    BASORELPCT 0.6 04/13/2024    AUTOIGPER 0.1 04/13/2024    NEUTROABS 3.72 04/13/2024    LYMPHSABS 1.95 04/13/2024    MONOSABS 0.97 (H) 04/13/2024    EOSABS 0.21 04/13/2024    BASOSABS 0.04 04/13/2024    AUTOIGNUM 0.01 04/13/2024    NRBC 0.0 04/13/2024           Assessment / Plan      Assessment & Plan:  Diagnoses and all orders for this visit:    1. Multinodular goiter (Primary)  Assessment & Plan:  Given the stability of the nodules, her age and health status, I don't feel that we need to keep doing neck u/s. Will continue with periodic neck exams.       2. Abnormal thyroid function test  Assessment & Plan:  Her thyroid labs have improved.  Recent TSH normal at 1.12.  Will continue observation.        Current Outpatient Medications   Medication Instructions    apixaban (ELIQUIS) 5 mg, Oral, Every 12 Hours Scheduled    ascorbic acid (VITAMIN C) 500 mg, Oral, Daily    atorvastatin (LIPITOR) 80 mg, Oral, Nightly    escitalopram (LEXAPRO) 10 mg, Oral, Daily    folic acid (FOLVITE) 800 mcg, Oral, Daily    levETIRAcetam ER (KEPPRA XR) 1,000 mg, Oral, Daily, Can take two of your 500 mg tablets until you run out, then start 1000 mg tablets    metoprolol succinate XL (TOPROL-XL) 25 mg, Oral, Daily, Pt taking 12.5 mg daily    pantoprazole (PROTONIX) 40 mg, Oral, Daily, Started at [facility]     propafenone SR (RYTHMOL SR) 325 mg, Oral, Every 12 Hours Scheduled    vitamin B-12 (CYANOCOBALAMIN) 1,000 mcg, Oral, Daily      Return in about 6 months (around 11/21/2024) for Recheck with TSH,  free T4.    Electronically signed by: Roger Hodgson MD  05/21/2024

## 2024-06-25 NOTE — TELEPHONE ENCOUNTER
Patient cancelled appointment on 6/26/24 with Dr. Wolfe for F/U LN .    Reason: Pt is currently IP- Nursing home staff will call to r/s at a later time.    Patient did not reschedule appointment.    Appointment rescheduled for n/a.     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 Navarro & was not a surgical candidate, s/p 5 fractions of SBRT 5/3/22 - 5/13/22 by Dr. Gaitan   H/O COVID19 pneumonia Aug 2022 with hospitalization  Has parakeets in the time, x2 yrs   Former smoker, >100 pack years, quit 2021   RUL 8,mm nodule-     PLAN:  Patient will right upper lobe lung nodule very concerning for malignant, she is to see radiation oncology for possible SBRT  Daughter to call me after he she see radiation oncology, I have no doubt this nodule most likely malignant  If biopsy needed we will proceed with  Increase Trelegy 200 \  Increase Daliresp 500 mcg and will increase   Start Budesonide   BiPAP 12/5 HS and PRN (as previously I,to start after she finish [facility], home settings are unknown)   Inhaled bronchodilators   In term of RUL ,.I feel it new since March 23 and minimal growth ,could be slpow growing adenocarcinoma ,she to see radiation oncology  for possible SBR  She is tpo call me after she see onology     If biopsy needed will do robotic

## 2024-06-25 NOTE — PROGRESS NOTES
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 Preliminary rec's include regular solids and thin liquids. Meds whole in puree or via peg. STRICT ASPIRATION PRECAUTIONS. Formal report to follow pending review of study. Call with questions or concerns.     Thank you,    Carmel Luke MA CCC-SLP #[id number]  Speech Language Pathologist

## 2024-06-25 NOTE — PROGRESS NOTES
4 Eyes Skin Assessment     NAME:  Annie Tyler  YOB: 1955  MEDICAL RECORD NUMBER:  [id number]    The patient is being assessed for  Admission    I agree that at least one RN has performed a thorough Head to Toe Skin Assessment on the patient. ALL assessment sites listed below have been assessed.      Areas assessed by both nurses:    Head, Face, Ears, Shoulders, Back, Chest, Arms, Elbows, Hands, Sacrum. Buttock, Coccyx, Ischium, Legs. Feet and Heels, and Under Medical Devices                       Does the Patient have a Wound? No noted wound(s)       William Prevention initiated by RN: Yes  Wound Care Orders initiated by RN: Yes    Pressure Injury (Stage 3,4, Unstageable, DTI, NWPT, and Complex wounds) if present, place Wound referral order by RN under : No    New Ostomies, if present place, Ostomy referral order under : No     Nurse 1 eSignature: Electronically signed by Mariya Duque RN on 6/25/24 at 4:13 AM EDT    **SHARE this note so that the co-signing nurse can place an eSignature**    Nurse 2 eSignature: Electronically signed by Edna Starks RN on 6/25/24 at 4:38 AM EDT     Patient is not able to demonstrated the ability to move from a reclining position to an upright position within the recliner. however patient is alert, oriented and able to provide informed consent

## 2024-06-25 NOTE — PROGRESS NOTES
Speech Language Pathology  Swallowing Disorders and Dysphagia  Clinical Bedside Swallow Assessment  Facility/Department: [facility] ICU    Instrumentation: Yes. FEES recommended to further evaluate pharyngeal phase and mechanisms (I.e. 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 The pt just got cleared to eat and desated after eating lunch this afternoon. The patient is on a vent that assists her every once in a while. Pt is 100% on arrival.     Patient Active Problem List    Diagnosis Date Noted    Metabolic encephalopathy 2022    Acute encephalopathy 2022    Chronic hypoxemic respiratory failure (HCC) 2022    Chronic respiratory failure with hypoxia (HCC) 2022    Hypokalemia 2022    History of COVID-19 2022    Leukocytosis 2022    Primary hypertension 08/15/2022    COVID-19 08/15/2022    Pneumonia due to COVID-19 virus 2022    Tracheostomy dependence (HCC) 2024    Acute on chronic respiratory failure with hypoxia (HCC) 2024    Pulmonary congestion 2024    Agitation 2024    Pneumonia of both lower lobes due to infectious organism 2024    COPD with acute exacerbation (HCC) 2024    RSV infection 2024     care  Diet tolerance monitoring  Patient/family education  Therapeutic PO trials    Referrals:   N/A    Goals:  Short Term Goals:  Timeframe for Short Term Goals: (5 days; 06/30/24)  Goal 1: The patient will tolerate instrumental assessment when able   Goal 2: The patient will tolerate recommended diet with no clinical s/s of aspiration 5/5  Goal 3: The patient/caregiver will demonstrate understanding of compensatory swallow strategies, for improved swallow safety      Long Term Goals:   Timeframe for Long Term Goals: (7 days; 07/03/24  Goal 1: The patient will tolerate least restrictive diet with no clinical s/s of aspiration or worsening respiratory/pulmonary status    Treatment:  Skilled instruction completed with patient re: evidenced based practice regarding recommendations and POC, importance of oral care to reduce adverse affects in the event of aspiration, and instruction of recommended compensatory strategies developed based upon clinical exam. Pt able to recall/demonstrate compensatory strategies with min cues.      Pt Education: SLP educated the patient re: Role of SLP, rationale for completion of assessment, anatomical components of swallow structures as they pertain to airway protection, results of assessment, recommendations, POC, and rationale for FEES  Pt Education Response: verbalized understanding    Duration/Frequency of Tx: 3-5x/wk    Individuals Consulted:   Patient   RN      Safety Devices / Report:   All fall risk precautions in place  Bed alarm in place  call light in reach  Left in bed                         Total Treatment Time / Charges       Time in Time out Total Time / units   Swallow Eval/Tx Time  0930 1000 30 min / 2 units      Signature:  Carmel Luke MA CCC-SLP #[id number]  Speech Language Pathologist

## 2024-06-25 NOTE — PROGRESS NOTES
Comprehensive Nutrition Assessment    Type and Reason for Visit:  Initial, Positive Nutrition Screen (+ screen for MST = 3)    Nutrition Recommendations/Plan:   Continue NPO status until patient is medically cleared/cleared by SLP to receive nutrition therapy with appropriate po consistencies, if possible.   Monitor whether patient needs EN regimen during admission.   Monitor nutrition-related labs, bowel function, and weight trends.      Malnutrition Assessment:  Malnutrition Status:  Mild malnutrition (06/25/24 1103)    Context:  Chronic Illness     Findings of the 6 clinical characteristics of malnutrition:  Energy Intake:  Mild decrease in energy intake   Weight Loss:  Greater than 10% over 6 months (-22# or 13.3% weight loss since 1/24/24)     Body Fat Loss:  Unable to assess     Muscle Mass Loss:  Unable to assess    Fluid Accumulation:  No significant fluid accumulation     Strength:  Not Performed    Nutrition Assessment:    patient is nutritionally compromised AEB she was recently cleared to take po nutrition and she started to have oxygenation issues after lunch on day of admission + NPO status and she is at risk for further compromise d/t SLP evaluated patient and recommended NPO status + alternate means of nutrition and altered nutrition-related labs; will continue NPO status and monitor nutrition plan of care    Nutrition Related Findings:    patient is A & O x 3; patient presented to ED with c/o SOB; she is from [place]; patient was recently cleared to receive po nutrition and she started to have oxygenation issues after lunch on day of admission; SLP evaluated patient today and recommended NPO status + alternate means of nutrition; + BM on 6/24/24; + chronic trach - placed on 4/9/24 Wound Type: None       Current Nutrition Intake & Therapies:    Average Meal Intake: NPO  Average Supplements Intake: NPO  Diet NPO    Anthropometric Measures:  Height: 160 cm (5' 2.99\")  Ideal Body Weight (IBW):

## 2024-06-25 NOTE — CONSULTS
Spoke with staff rn and photos reviewed.  Pt has mild skin fold rash in breast and abdominal folds, sacrum intact in photo.  Fidel being treated by staff appropriately.  Wound care rn will sign off, call for worsening

## 2024-06-25 NOTE — DISCHARGE INSTR - COC
Continuity of Care Form    Patient Name: Annie Stein   :  1955  MRN:  [id number]    Admit date:  2024  Discharge date:  27    Code Status Order: Full Code   Advance Directives:     Admitting Physician:  Jean Manuel MD  PCP: Keyla Mtz MD    Discharging Nurse: [facility] Unit/Room#: 3007/3007-01  Discharging Unit Phone Number: [phone]    Emergency Contact:   Extended Emergency Contact Information  Primary Emergency Contact: HUMERA STEIN  Home Phone: [phone]  Mobile Phone: [phone]  Relation: Child  Secondary Emergency Contact: Carla Stein   [facility]  Home Phone: [phone]  Mobile Phone: [phone]  Relation: Child    Past Surgical History:  Past Surgical History:   Procedure Laterality Date    CHOLECYSTECTOMY      COLONOSCOPY         Immunization History:   Immunization History   Administered Date(s) Administered    Influenza Vaccine, unspecified formulation 2013, 10/26/2016    Influenza Virus Vaccine 2013, 10/26/2016, 2017    Influenza, FLUARIX, FLULAVAL, FLUZONE (age 6 mo+) AND AFLURIA, (age 3 y+), PF, 0.5mL 10/26/2016, 2017, 2020, 2022    TDaP, ADACEL (age 10y-64y), BOOSTRIX (age 10y+), IM, 0.5mL 2017       Active Problems:  Patient Active Problem List   Diagnosis Code    Chest pain R07.9    Shortness of breath R06.02    Tobacco use Z72.0    Moderate COPD (chronic obstructive pulmonary disease) ([facility]) J44.9    COPD exacerbation ([facility]) J44.1    Acute respiratory failure with hypoxia ([facility]) J96.01    Hyponatremia E87.1    Pulmonary nodule R91.1    Pulmonary emphysema ([facility]) J43.9    Acute on chronic respiratory failure with hypoxia and hypercapnia ([facility]) J96.21, J96.22    Community acquired pneumonia of right lung J18.9    Acute exacerbation of chronic obstructive pulmonary disease (COPD) ([facility]) J44.1    Acute on chronic respiratory failure ([facility]) J96.20    Acute respiratory  COVID-19 10/05/23 10/05/23 10/05/23 COVID-19 & Influenza Combo   10/19/23 Infection     COVID-19 22 COVID-19, Rapid   22 Carmel Larson RN    Collected: 22 0727  Result status: Final  Resulting lab: [facility]  Reference range: Not Detected                           Nurse Assessment:  Last Vital Signs: /75   Pulse 83   Temp 97.9 °F (36.6 °C) (Temporal)   Resp 22   Ht 1.6 m (5' 3\")   Wt 66 kg (145 lb 6.4 oz)   SpO2 96%   BMI 25.76 kg/m²     Last documented pain score (0-10 scale): Pain Level: 3  Last Weight:   Wt Readings from Last 1 Encounters:   24 66 kg (145 lb 6.4 oz)     Mental Status:  oriented and alert    IV Access:  - None    Nursing Mobility/ADLs:  Walking   Dependent  Transfer  Dependent  Bathing  Dependent  Dressing  Dependent  Toileting  Dependent  Feeding  Assisted  Med Admin  Dependent  Med Delivery   whole    Wound Care Documentation and Therapy:        Elimination:  Continence:   Bowel: No  Bladder: Yes  Urinary Catheter: None   Colostomy/Ileostomy/Ileal Conduit: No       Date of Last BM: 24    Intake/Output Summary (Last 24 hours) at 2024 0633  Last data filed at 2024 0235  Gross per 24 hour   Intake 100 ml   Output 250 ml   Net -150 ml     No intake/output data recorded.    Safety Concerns:     At Risk for Falls and Aspiration Risk    Impairments/Disabilities:      Speech and Vision    Nutrition Therapy:  Current Nutrition Therapy:   - Oral Diet:  General    Routes of Feeding: Oral and Gastrostomy Tube  Liquids: Thin Liquids  Daily Fluid Restriction: no  Last Modified Barium Swallow with Video (Video Swallowing Test): not done    Treatments at the Time of Hospital Discharge:   Respiratory Treatments: ***  Oxygen Therapy:  {Therapy; copd oxygen:86536}  Ventilator:    { REBECCA Vent List:[id number]}    Rehab Therapies: {THERAPEUTIC INTERVENTION:0745060128}  Weight Bearing Status/Restrictions: { REBECCA

## 2024-06-25 NOTE — PROGRESS NOTES
06/25/24 1036   Encounter Summary   Encounter Overview/Reason Initial Encounter;Spiritual/Emotional Needs   Service Provided For Patient   Referral/Consult From [facility]   Support System Children   Last Encounter  06/25/24  ([facility] provided emotional support, nurtured hope, Pt reports feeling better,)   Complexity of Encounter Low   Begin Time 1030   End Time  1040   Total Time Calculated 10 min   Grief, Loss, and Adjustments   Type Life Adjustments;Adjustment to illness   Assessment/Intervention/Outcome   Assessment Calm;Coping   Intervention Explored/Affirmed feelings, thoughts, concerns;Explored Coping Skills/Resources;Nurtured Hope   Outcome Coping;Comfort

## 2024-06-25 NOTE — PROGRESS NOTES
Pulmonary & Critical Care Medicine ICU Progress Note    CC: Respiratory failure    Events of Last 24 hours:   AC overnight   This am on TC   Clear cloudy secretions   60% TC this am   History of NH desat after MBS    Vascular lines: IV: PIVs      Vent Mode: AC/VC Resp Rate (Set): 15 bpm/Vt (Set, mL): 400 mL/ /FiO2 : 40 %  No results for input(s): \"PHART\", \"QCA1RSM\", \"PO2ART\" in the last 72 hours.    IV:   sodium chloride         Vitals:  Blood pressure 134/81, pulse (!) 105, temperature 97 °F (36.1 °C), temperature source Temporal, resp. rate 16, height 1.6 m (5' 3\"), weight 66 kg (145 lb 6.4 oz), SpO2 94 %, not currently breastfeeding.  on 60%   Temp  Av.4 °F (36.9 °C)  Min: 97 °F (36.1 °C)  Max: 99 °F (37.2 °C)    Intake/Output Summary (Last 24 hours) at 2024 0806  Last data filed at 2024 0235  Gross per 24 hour   Intake 100 ml   Output 250 ml   Net -150 ml     PE:  Gen: + Distress.  Alert wearing  Eyes: PERRL. No sclera icterus. No conjunctival injection.   ENT: No discharge. Pharynx clear.  Tracheostomy tube in place.  Neck: Trachea midline. No obvious mass.    Resp: + Accessory muscle use. No crackles.  Scattered wheezes.  Few rhonchi. No dullness on percussion.  CV: Regular rate. Regular rhythm. No murmur or rub. No edema.  GI: Non-tender. Non-distended. No hernia.   Skin: Warm and dry. No nodule on exposed extremities.   Lymph: No cervical LAD. No supraclavicular LAD.   M/S: No cyanosis. No joint deformity. No clubbing.   Neuro: Awake. Alert. Moves all four extremities.   Psych: Oriented x 3. No anxiety.       Scheduled Meds:   mupirocin   Each Nostril BID    sodium chloride flush  5-40 mL IntraVENous 2 times per day    enoxaparin  40 mg SubCUTAneous Nightly    methylPREDNISolone  40 mg IntraVENous Q12H    doxycycline (VIBRAMYCIN) IV  100 mg IntraVENous Q12H    sodium chloride (Inhalant)  5 mL Nebulization TID    ipratropium 0.5 mg-albuterol 2.5 mg  1 Dose Inhalation 4x Daily RT

## 2024-06-25 NOTE — PROGRESS NOTES
Pt taken to CT Scan . Pt SaO2 decreased with any activity. Pt SaO2 dropped to the 70's pt bagged until SaO2 recovered to 90% and then pt placed on 65% trach collar.

## 2024-06-25 NOTE — PROCEDURES
Speech Language Pathology  [facility]  Swallowing Disorders and Dysphagia  Fiberoptic Endoscopic Evaluation of Swallowing  (FEES)    Diet Recommendation: IDDSI 6 Soft and Bite Sized Solids ; IDDSI 0 Thin Liquids; Meds whole in puree  Risk Management: upright for all intake, stay upright for at least 30 mins after intake, small bites/sips, close supervision, oral care 2-3x/day to reduce adverse affects in the event of aspiration, STRICT aspiration precautions, and hold PO and contact SLP if s/s of aspiration or worsening respiratory status develop.    Consulted MD for abnormal findings, mentioned below, with recommendation of ENT evaluation.     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 The pt just got cleared to eat and desated after eating lunch this afternoon. The patient is on a vent that assists her every once in a while. Pt is 100% on arrival.     Patient Active Problem List    Diagnosis Date Noted    Metabolic encephalopathy 2022    Acute encephalopathy 2022    Chronic hypoxemic respiratory failure (HCC) 2022    Chronic respiratory failure with hypoxia (HCC) 2022    Hypokalemia 2022    History of COVID-19 2022    Leukocytosis 2022    Primary hypertension 08/15/2022    COVID-19 08/15/2022    Pneumonia due to COVID-19 virus 2022    Tracheostomy dependence (HCC) 2024    Mild protein-calorie malnutrition (HCC) 2024    Acute on chronic respiratory  in shallow penetration during the swallow that is completely cleared 2/2 adequate laryngeal vestibule closure with functional anterior tilt of arytenoids and abutment to epiglottic petiole creating A-P peristaltic-like seal, effectively clearing penetrated material    Impaired laryngeal vestibule closure       COMPENSATORY STRATEGIES / POSTURAL CHANGES TRIALED:   upright for all intake, stay upright for at least 30 mins after intake, small bites/sips, close supervision, oral care 2-3x/day to reduce adverse affects in the event of aspiration, increase physical mobility as able, STRICT aspiration precautions, and hold PO and contact SLP if s/s of aspiration or worsening respiratory status develop.      Penetration/Aspiration Scores across consistencies (Yassine et. al. 1996)    CONSISTENCY  Pen/Asp rating Description    Thin   5) Material enters the airway, contacts the vocal folds, and is not ejected from the airway     Mildly (nectar) thick   N/A - consistency not provided     Moderately (honey) thick   N/A - consistency not provided     Puree   1) Material does not enter the airway     Soft Solid   N/A - consistency not provided     Regular Solid   1) Material does not enter the airway            The [place] Pharyngeal Residue Severity Rating Scale (Eldon et. al. 2015)     CONSISTENCY  Vallecular residue  Pyriform residue    Thin   Trace (1-5%, Trace coating of mucosa)  None (0%, No residue)   Mildly (nectar) thick   N/A, not provided   N/A, not provided    Moderately (honey) thick   N/A, not provided   N/A, not provided    Puree   None (0%, No residue)  None (0%, No residue)   Soft Solid   N/A, not provided   N/A, not provided    Regular Solid   None (0%, No residue)  None (0%, No residue)            IMPRESSION:  Patient presents with mild oropharyngeal dysphagia, likely acute-on-chronic, complicated by hx of dysphagia, reduced physical mobility, increased O2 demands, impaired respiratory-swallow coordination,

## 2024-06-25 NOTE — H&P
History and Physical        HISTORY OF PRESENT ILLNESS: A 68-year-old female with a history of COPD, status post tracheostomy presented emergency room with shortness of breath of 1 week duration.  Admits to having some increased secretions in her tracheostomy.  Denies any fever or chills.  No chest pain.  Was hypoxemic at the nursing home.    Patient is allergic to cephalosporins, penicillins, and hctz [hydrochlorothiazide].    Past Medical History:   Diagnosis Date    Angina pectoris ([facility])     Chronic pain     knees and lower back     Congestive heart failure (HCC) 1/5/2024    COPD exacerbation ([facility]) 05/20/2018    Depression     Essential hypertension 08/15/2022    Panic disorder     Pneumonia     Pulmonary emphysema (HCC)        Past Surgical History:   Procedure Laterality Date    CHOLECYSTECTOMY      COLONOSCOPY         Scheduled Meds:   mupirocin   Each Nostril BID    sodium chloride flush  5-40 mL IntraVENous 2 times per day    enoxaparin  40 mg SubCUTAneous Nightly    methylPREDNISolone  40 mg IntraVENous Q12H    doxycycline (VIBRAMYCIN) IV  100 mg IntraVENous Q12H    sodium chloride (Inhalant)  5 mL Nebulization TID    ipratropium 0.5 mg-albuterol 2.5 mg  1 Dose Inhalation 4x Daily RT       Continuous Infusions:   sodium chloride         PRN Meds:  sodium chloride flush, sodium chloride, [DISCONTINUED] potassium chloride **OR** potassium chloride, magnesium sulfate, ondansetron **OR** ondansetron, polyethylene glycol, acetaminophen **OR** acetaminophen, albuterol, hydrOXYzine HCl       reports that she quit smoking about 3 years ago. Her smoking use included cigarettes. She started smoking about 55 years ago. She has a 104.0 pack-year smoking history. She has been exposed to tobacco smoke. She has never used smokeless tobacco.    Family History   Problem Relation Age of Onset    COPD Mother     Hypertension Mother     Asthma Neg Hx     Cancer Neg Hx     Diabetes Neg Hx     Emphysema Neg Hx     Heart

## 2024-06-25 NOTE — PLAN OF CARE
SLP completed evaluation. Please refer to notes in EMR.      Carmel Luke MA CCC-SLP #[id number]  Speech Language Pathologist

## 2024-06-25 NOTE — PROGRESS NOTES
06/25/24 1935   Patient Observation   Pulse (!) 104   Respirations (!) 31   SpO2 99 %   Ventilator Settings   PEEP/CPAP (cmH2O) 5   FiO2  40 %   Vt (Set, mL) 400 mL   Resp Rate (Set) 15 bpm   Vent Patient Data (Readings)   Vt (Measured) 466 mL   Peak Inspiratory Pressure (cmH2O) 9 cmH2O   Rate Measured 27 br/min   Minute Volume (L/min) 11.6 Liters   Peak Inspiratory Flow (lpm) 60 L/sec   Mean Airway Pressure (cmH2O) 3.6 cmH20   Plateau Pressure (cm H2O) 0 cm H2O   Driving Pressure -5   I:E Ratio 1:2.80   Flow Sensitivity 3 L/min   Vent Alarm Settings   High Pressure (cmH2O) 40 cmH2O   Low Minute Volume (lpm) 3 L/min   High Minute Volume (lpm) 25.5 L/min   Low Exhaled Vt (ml) 250 mL   High Exhaled Vt (ml) 1100 mL   RR High (bpm) 40 br/min   Apnea (secs) 20 secs   Additional Respiratoray Assessments   Humidification Source Heated wire   Humidification Temp 36.4   Circuit Condensation Drained   Ambu Bag With Mask At Bedside Yes   Backup Trachs Available (Size) 4.0;6.0   Airway Clearance   Suction Trach   Suction Device Inline suction catheter   Sputum Method Obtained Tracheal   Sputum Amount Moderate   Sputum Color/Odor Tan;Yellow   Sputum Consistency Thick   Surgical Airway (Trach) Shiley Cuffed   No placement date or time found.   Present on Admission/Arrival: Yes  Surgical Airway Type: Tracheostomy  Brand: Sascha  Style: Cuffed  Size: 6   Status Secured   Site Assessment Clean;Dry   Spare Trach at Bedside Yes   Spare Trach Tube One Size Smaller at Bedside Yes   Ambu Bag With Mask at Bedside Yes

## 2024-06-26 ENCOUNTER — APPOINTMENT (OUTPATIENT)
Dept: INTERVENTIONAL RADIOLOGY/VASCULAR | Age: 69
DRG: 208 | End: 2024-06-26
Payer: MEDICARE

## 2024-06-26 ENCOUNTER — HOSPITAL ENCOUNTER (INPATIENT)
Age: 69
LOS: 1 days | Discharge: ANOTHER HOSPITAL | DRG: 208 | End: 2024-06-27
Attending: STUDENT IN AN ORGANIZED HEALTH CARE EDUCATION/TRAINING PROGRAM | Admitting: INTERNAL MEDICINE
Payer: MEDICARE

## 2024-06-26 ENCOUNTER — APPOINTMENT (OUTPATIENT)
Dept: GENERAL RADIOLOGY | Age: 69
DRG: 208 | End: 2024-06-26
Payer: MEDICARE

## 2024-06-26 VITALS
OXYGEN SATURATION: 99 % | HEIGHT: 63 IN | RESPIRATION RATE: 16 BRPM | SYSTOLIC BLOOD PRESSURE: 139 MMHG | WEIGHT: 148.81 LBS | TEMPERATURE: 97.1 F | BODY MASS INDEX: 26.37 KG/M2 | DIASTOLIC BLOOD PRESSURE: 66 MMHG | HEART RATE: 104 BPM

## 2024-06-26 DIAGNOSIS — R94.39 ABNORMAL STRESS TEST: ICD-10-CM

## 2024-06-26 DIAGNOSIS — I21.4 NSTEMI (NON-ST ELEVATED MYOCARDIAL INFARCTION) (HCC): ICD-10-CM

## 2024-06-26 DIAGNOSIS — R06.02 SHORTNESS OF BREATH: ICD-10-CM

## 2024-06-26 DIAGNOSIS — R06.09 DOE (DYSPNEA ON EXERTION): ICD-10-CM

## 2024-06-26 DIAGNOSIS — J96.21 ACUTE ON CHRONIC RESPIRATORY FAILURE WITH HYPOXIA (HCC): Primary | ICD-10-CM

## 2024-06-26 LAB
ALBUMIN SERPL-MCNC: 3.9 G/DL (ref 3.4–5)
ALBUMIN/GLOB SERPL: 1 {RATIO} (ref 1.1–2.2)
ALP SERPL-CCNC: 121 U/L (ref 40–129)
ALT SERPL-CCNC: 16 U/L (ref 10–40)
ANION GAP SERPL CALCULATED.3IONS-SCNC: 10 MMOL/L (ref 3–16)
ANION GAP SERPL CALCULATED.3IONS-SCNC: 14 MMOL/L (ref 3–16)
AST SERPL-CCNC: 26 U/L (ref 15–37)
BASE EXCESS BLDV CALC-SCNC: -0.6 MMOL/L (ref -3–3)
BASOPHILS # BLD: 0 K/UL (ref 0–0.2)
BASOPHILS # BLD: 0 K/UL (ref 0–0.2)
BASOPHILS NFR BLD: 0 %
BASOPHILS NFR BLD: 0.2 %
BILIRUB SERPL-MCNC: 0.4 MG/DL (ref 0–1)
BUN SERPL-MCNC: 8 MG/DL (ref 7–20)
BUN SERPL-MCNC: 9 MG/DL (ref 7–20)
C DIFF TOX A+B STL QL IA: NORMAL
CALCIUM SERPL-MCNC: 9.2 MG/DL (ref 8.3–10.6)
CALCIUM SERPL-MCNC: 9.7 MG/DL (ref 8.3–10.6)
CHLORIDE SERPL-SCNC: 97 MMOL/L (ref 99–110)
CHLORIDE SERPL-SCNC: 99 MMOL/L (ref 99–110)
CO2 BLDV-SCNC: 25 MMOL/L
CO2 SERPL-SCNC: 25 MMOL/L (ref 21–32)
CO2 SERPL-SCNC: 26 MMOL/L (ref 21–32)
COHGB MFR BLDV: 1.2 % (ref 0–1.5)
CREAT SERPL-MCNC: <0.5 MG/DL (ref 0.6–1.2)
CREAT SERPL-MCNC: <0.5 MG/DL (ref 0.6–1.2)
DEPRECATED RDW RBC AUTO: 16 % (ref 12.4–15.4)
DEPRECATED RDW RBC AUTO: 16.1 % (ref 12.4–15.4)
EOSINOPHIL # BLD: 0 K/UL (ref 0–0.6)
EOSINOPHIL # BLD: 0 K/UL (ref 0–0.6)
EOSINOPHIL NFR BLD: 0 %
EOSINOPHIL NFR BLD: 0 %
GFR SERPLBLD CREATININE-BSD FMLA CKD-EPI: >90 ML/MIN/{1.73_M2}
GFR SERPLBLD CREATININE-BSD FMLA CKD-EPI: >90 ML/MIN/{1.73_M2}
GLUCOSE SERPL-MCNC: 130 MG/DL (ref 70–99)
GLUCOSE SERPL-MCNC: 143 MG/DL (ref 70–99)
HCO3 BLDV-SCNC: 24 MMOL/L (ref 23–29)
HCT VFR BLD AUTO: 30.9 % (ref 36–48)
HCT VFR BLD AUTO: 36.3 % (ref 36–48)
HGB BLD-MCNC: 10.4 G/DL (ref 12–16)
HGB BLD-MCNC: 11.8 G/DL (ref 12–16)
HYPERCHROMIA BLD QL SMEAR: ABNORMAL
LYMPHOCYTES # BLD: 0.4 K/UL (ref 1–5.1)
LYMPHOCYTES # BLD: 1.8 K/UL (ref 1–5.1)
LYMPHOCYTES NFR BLD: 12 %
LYMPHOCYTES NFR BLD: 8.5 %
MAGNESIUM SERPL-MCNC: 1.5 MG/DL (ref 1.8–2.4)
MCH RBC QN AUTO: 27.8 PG (ref 26–34)
MCH RBC QN AUTO: 28.5 PG (ref 26–34)
MCHC RBC AUTO-ENTMCNC: 32.7 G/DL (ref 31–36)
MCHC RBC AUTO-ENTMCNC: 33.7 G/DL (ref 31–36)
MCV RBC AUTO: 84.4 FL (ref 80–100)
MCV RBC AUTO: 85.2 FL (ref 80–100)
METHGB MFR BLDV: 0.3 %
MONOCYTES # BLD: 0.2 K/UL (ref 0–1.3)
MONOCYTES # BLD: 0.6 K/UL (ref 0–1.3)
MONOCYTES NFR BLD: 3.1 %
MONOCYTES NFR BLD: 4 %
NEUTROPHILS # BLD: 12.9 K/UL (ref 1.7–7.7)
NEUTROPHILS # BLD: 4.6 K/UL (ref 1.7–7.7)
NEUTROPHILS NFR BLD: 84 %
NEUTROPHILS NFR BLD: 88.2 %
NT-PROBNP SERPL-MCNC: 5084 PG/ML (ref 0–124)
O2 THERAPY: ABNORMAL
OVALOCYTES BLD QL SMEAR: ABNORMAL
PCO2 BLDV: 39.6 MMHG (ref 40–50)
PH BLDV: 7.4 [PH] (ref 7.35–7.45)
PLATELET # BLD AUTO: 306 K/UL (ref 135–450)
PLATELET # BLD AUTO: 456 K/UL (ref 135–450)
PLATELET BLD QL SMEAR: ABNORMAL
PMV BLD AUTO: 7.7 FL (ref 5–10.5)
PMV BLD AUTO: 7.8 FL (ref 5–10.5)
PO2 BLDV: 42.8 MMHG (ref 25–40)
POTASSIUM SERPL-SCNC: 3.4 MMOL/L (ref 3.5–5.1)
POTASSIUM SERPL-SCNC: 4 MMOL/L (ref 3.5–5.1)
PROT SERPL-MCNC: 7.8 G/DL (ref 6.4–8.2)
RBC # BLD AUTO: 3.66 M/UL (ref 4–5.2)
RBC # BLD AUTO: 4.25 M/UL (ref 4–5.2)
SAO2 % BLDV: 79 %
SLIDE REVIEW: ABNORMAL
SODIUM SERPL-SCNC: 135 MMOL/L (ref 136–145)
SODIUM SERPL-SCNC: 136 MMOL/L (ref 136–145)
TROPONIN, HIGH SENSITIVITY: 166 NG/L (ref 0–14)
TROPONIN, HIGH SENSITIVITY: 182 NG/L (ref 0–14)
WBC # BLD AUTO: 15.4 K/UL (ref 4–11)
WBC # BLD AUTO: 5.2 K/UL (ref 4–11)

## 2024-06-26 PROCEDURE — 83880 ASSAY OF NATRIURETIC PEPTIDE: CPT

## 2024-06-26 PROCEDURE — 96366 THER/PROPH/DIAG IV INF ADDON: CPT

## 2024-06-26 PROCEDURE — 92526 ORAL FUNCTION THERAPY: CPT

## 2024-06-26 PROCEDURE — 99239 HOSP IP/OBS DSCHRG MGMT >30: CPT | Performed by: INTERNAL MEDICINE

## 2024-06-26 PROCEDURE — 87449 NOS EACH ORGANISM AG IA: CPT

## 2024-06-26 PROCEDURE — 2500000003 HC RX 250 WO HCPCS: Performed by: STUDENT IN AN ORGANIZED HEALTH CARE EDUCATION/TRAINING PROGRAM

## 2024-06-26 PROCEDURE — 87493 C DIFF AMPLIFIED PROBE: CPT

## 2024-06-26 PROCEDURE — 71045 X-RAY EXAM CHEST 1 VIEW: CPT

## 2024-06-26 PROCEDURE — 6370000000 HC RX 637 (ALT 250 FOR IP): Performed by: INTERNAL MEDICINE

## 2024-06-26 PROCEDURE — 6370000000 HC RX 637 (ALT 250 FOR IP): Performed by: STUDENT IN AN ORGANIZED HEALTH CARE EDUCATION/TRAINING PROGRAM

## 2024-06-26 PROCEDURE — 6360000002 HC RX W HCPCS: Performed by: NURSE PRACTITIONER

## 2024-06-26 PROCEDURE — 94640 AIRWAY INHALATION TREATMENT: CPT

## 2024-06-26 PROCEDURE — 99291 CRITICAL CARE FIRST HOUR: CPT | Performed by: INTERNAL MEDICINE

## 2024-06-26 PROCEDURE — 85025 COMPLETE CBC W/AUTO DIFF WBC: CPT

## 2024-06-26 PROCEDURE — 36415 COLL VENOUS BLD VENIPUNCTURE: CPT

## 2024-06-26 PROCEDURE — 2580000003 HC RX 258: Performed by: NURSE PRACTITIONER

## 2024-06-26 PROCEDURE — 93005 ELECTROCARDIOGRAM TRACING: CPT | Performed by: STUDENT IN AN ORGANIZED HEALTH CARE EDUCATION/TRAINING PROGRAM

## 2024-06-26 PROCEDURE — 2700000000 HC OXYGEN THERAPY PER DAY

## 2024-06-26 PROCEDURE — 83735 ASSAY OF MAGNESIUM: CPT

## 2024-06-26 PROCEDURE — 87324 CLOSTRIDIUM AG IA: CPT

## 2024-06-26 PROCEDURE — 94761 N-INVAS EAR/PLS OXIMETRY MLT: CPT

## 2024-06-26 PROCEDURE — 99285 EMERGENCY DEPT VISIT HI MDM: CPT

## 2024-06-26 PROCEDURE — 96375 TX/PRO/DX INJ NEW DRUG ADDON: CPT

## 2024-06-26 PROCEDURE — 96365 THER/PROPH/DIAG IV INF INIT: CPT

## 2024-06-26 PROCEDURE — 94002 VENT MGMT INPAT INIT DAY: CPT

## 2024-06-26 PROCEDURE — 82803 BLOOD GASES ANY COMBINATION: CPT

## 2024-06-26 PROCEDURE — 80053 COMPREHEN METABOLIC PANEL: CPT

## 2024-06-26 PROCEDURE — 94003 VENT MGMT INPAT SUBQ DAY: CPT

## 2024-06-26 PROCEDURE — 6360000002 HC RX W HCPCS: Performed by: STUDENT IN AN ORGANIZED HEALTH CARE EDUCATION/TRAINING PROGRAM

## 2024-06-26 PROCEDURE — 84484 ASSAY OF TROPONIN QUANT: CPT

## 2024-06-26 PROCEDURE — 2580000003 HC RX 258: Performed by: STUDENT IN AN ORGANIZED HEALTH CARE EDUCATION/TRAINING PROGRAM

## 2024-06-26 RX ORDER — FUROSEMIDE 10 MG/ML
40 INJECTION INTRAMUSCULAR; INTRAVENOUS ONCE
Status: COMPLETED | OUTPATIENT
Start: 2024-06-26 | End: 2024-06-27

## 2024-06-26 RX ORDER — TRAMADOL HYDROCHLORIDE 50 MG/1
50 TABLET ORAL EVERY 6 HOURS PRN
Status: DISCONTINUED | OUTPATIENT
Start: 2024-06-26 | End: 2024-06-26 | Stop reason: HOSPADM

## 2024-06-26 RX ORDER — IPRATROPIUM BROMIDE AND ALBUTEROL SULFATE 2.5; .5 MG/3ML; MG/3ML
SOLUTION RESPIRATORY (INHALATION)
Status: DISPENSED
Start: 2024-06-26 | End: 2024-06-27

## 2024-06-26 RX ORDER — DOXYCYCLINE HYCLATE 100 MG
100 TABLET ORAL EVERY 12 HOURS SCHEDULED
Status: DISCONTINUED | OUTPATIENT
Start: 2024-06-26 | End: 2024-06-26 | Stop reason: HOSPADM

## 2024-06-26 RX ORDER — SODIUM CHLORIDE FOR INHALATION 3 %
5 VIAL, NEBULIZER (ML) INHALATION 2 TIMES DAILY
Status: DISCONTINUED | OUTPATIENT
Start: 2024-06-26 | End: 2024-06-26 | Stop reason: HOSPADM

## 2024-06-26 RX ORDER — PREDNISONE 20 MG/1
TABLET ORAL
Qty: 9 TABLET | Refills: 0
Start: 2024-06-26

## 2024-06-26 RX ORDER — DILTIAZEM HYDROCHLORIDE 5 MG/ML
10 INJECTION INTRAVENOUS ONCE
Status: COMPLETED | OUTPATIENT
Start: 2024-06-26 | End: 2024-06-26

## 2024-06-26 RX ORDER — LORAZEPAM 2 MG/ML
1 INJECTION INTRAMUSCULAR ONCE
Status: COMPLETED | OUTPATIENT
Start: 2024-06-26 | End: 2024-06-26

## 2024-06-26 RX ORDER — DOXYCYCLINE HYCLATE 100 MG
100 TABLET ORAL EVERY 12 HOURS SCHEDULED
Qty: 8 TABLET | Refills: 0 | Status: ON HOLD
Start: 2024-06-26 | End: 2024-07-01 | Stop reason: HOSPADM

## 2024-06-26 RX ORDER — HYDROXYZINE HYDROCHLORIDE 25 MG/1
50 TABLET, FILM COATED ORAL ONCE
Status: COMPLETED | OUTPATIENT
Start: 2024-06-26 | End: 2024-06-26

## 2024-06-26 RX ORDER — MAGNESIUM SULFATE IN WATER 40 MG/ML
2000 INJECTION, SOLUTION INTRAVENOUS ONCE
Status: COMPLETED | OUTPATIENT
Start: 2024-06-26 | End: 2024-06-27

## 2024-06-26 RX ADMIN — DILTIAZEM HYDROCHLORIDE 10 MG: 5 INJECTION, SOLUTION INTRAVENOUS at 23:21

## 2024-06-26 RX ADMIN — ROFLUMILAST 500 MCG: 500 TABLET ORAL at 08:21

## 2024-06-26 RX ADMIN — IPRATROPIUM BROMIDE AND ALBUTEROL SULFATE 1 DOSE: 2.5; .5 SOLUTION RESPIRATORY (INHALATION) at 07:44

## 2024-06-26 RX ADMIN — DOXYCYCLINE HYCLATE 100 MG: 100 TABLET, COATED ORAL at 11:31

## 2024-06-26 RX ADMIN — WATER 125 MG: 1 INJECTION INTRAMUSCULAR; INTRAVENOUS; SUBCUTANEOUS at 21:39

## 2024-06-26 RX ADMIN — MAGNESIUM SULFATE HEPTAHYDRATE 2000 MG: 40 INJECTION, SOLUTION INTRAVENOUS at 21:40

## 2024-06-26 RX ADMIN — Medication 10 ML: at 08:22

## 2024-06-26 RX ADMIN — BUSPIRONE HYDROCHLORIDE 10 MG: 10 TABLET ORAL at 12:50

## 2024-06-26 RX ADMIN — MUPIROCIN: 20 OINTMENT TOPICAL at 08:23

## 2024-06-26 RX ADMIN — MUPIROCIN: 20 OINTMENT TOPICAL at 08:22

## 2024-06-26 RX ADMIN — HYDROXYZINE HYDROCHLORIDE 25 MG: 25 TABLET ORAL at 17:53

## 2024-06-26 RX ADMIN — LORAZEPAM 1 MG: 2 INJECTION INTRAMUSCULAR; INTRAVENOUS at 22:43

## 2024-06-26 RX ADMIN — POTASSIUM BICARBONATE 40 MEQ: 782 TABLET, EFFERVESCENT ORAL at 22:47

## 2024-06-26 RX ADMIN — FUROSEMIDE 20 MG: 20 TABLET ORAL at 08:21

## 2024-06-26 RX ADMIN — TRAMADOL HYDROCHLORIDE 50 MG: 50 TABLET ORAL at 13:01

## 2024-06-26 RX ADMIN — HYDROXYZINE HYDROCHLORIDE 50 MG: 25 TABLET ORAL at 21:57

## 2024-06-26 RX ADMIN — SODIUM CHLORIDE 30 MG/ML INHALATION SOLUTION 5 ML: 30 SOLUTION INHALANT at 07:44

## 2024-06-26 RX ADMIN — BUSPIRONE HYDROCHLORIDE 10 MG: 10 TABLET ORAL at 08:21

## 2024-06-26 RX ADMIN — WATER 40 MG: 1 INJECTION INTRAMUSCULAR; INTRAVENOUS; SUBCUTANEOUS at 08:21

## 2024-06-26 ASSESSMENT — PAIN SCALES - GENERAL
PAINLEVEL_OUTOF10: 7
PAINLEVEL_OUTOF10: 3

## 2024-06-26 ASSESSMENT — PULMONARY FUNCTION TESTS
PIF_VALUE: 16
PIF_VALUE: 20
PIF_VALUE: 19
PIF_VALUE: 25
PIF_VALUE: 19
PIF_VALUE: 21
PIF_VALUE: 17
PIF_VALUE: 20

## 2024-06-26 ASSESSMENT — PAIN DESCRIPTION - LOCATION
LOCATION: SHOULDER
LOCATION: ARM

## 2024-06-26 ASSESSMENT — PAIN - FUNCTIONAL ASSESSMENT: PAIN_FUNCTIONAL_ASSESSMENT: 0-10

## 2024-06-26 NOTE — PROGRESS NOTES
4 Eyes Skin Assessment     NAME:  Annie Tyler  YOB: 1955  MEDICAL RECORD NUMBER:  [id number]    The patient is being assessed for  Other shift assessment     I agree that at least one RN has performed a thorough Head to Toe Skin Assessment on the patient. ALL assessment sites listed below have been assessed.      Areas assessed by both nurses:    Head, Face, Ears, Shoulders, Back, Chest, Arms, Elbows, Hands, Sacrum. Buttock, Coccyx, Ischium, Legs. Feet and Heels, and Under Medical Devices                       Does the Patient have a Wound? No noted wound(s)       William Prevention initiated by RN: Yes  Wound Care Orders initiated by RN: Yes    Pressure Injury (Stage 3,4, Unstageable, DTI, NWPT, and Complex wounds) if present, place Wound referral order by RN under : No    New Ostomies, if present place, Ostomy referral order under : No     Nurse 1 eSignature: Electronically signed by Mariya Duque RN on 6/26/24 at 7:51 AM EDT    **SHARE this note so that the co-signing nurse can place an eSignature**    Nurse 2 eSignature: Electronically signed by Aparna Merchant RN on 6/26/24 at 2:03 PM EDT

## 2024-06-26 NOTE — PROGRESS NOTES
Shift assessment, completed, see flow sheet. Pt is alert and oriented x4.     Pt is a chronic trach with blow by 02 during the day and on a vent QHS. Respirations are easy, even, and unlabored. Bilateral lung sounds are noted with rhonchi and diminished bases. PEG tube in place and patent, clamped.     PIV x2 present but both leaking when flushed, removed and will attempt to place new PIV. Purewick remains in place and patent.     Call light within reach. Bed in lowest position. Bed alarm on. Pt denies any further needs at this time.

## 2024-06-26 NOTE — PROGRESS NOTES
06/25/24 2312   Patient Observation   Pulse 100   Respirations 23   SpO2 99 %   Vent Information   Vent Mode AC/VC   Ventilator Settings   PEEP/CPAP (cmH2O) 5   FiO2  40 %   Vt (Set, mL) 400 mL   Resp Rate (Set) 15 bpm   Vent Patient Data (Readings)   Vt (Measured) 413 mL   Peak Inspiratory Pressure (cmH2O) 20 cmH2O   Rate Measured 23 br/min   Minute Volume (L/min) 9.75 Liters   Mean Airway Pressure (cmH2O) 9.3 cmH20   Plateau Pressure (cm H2O) 27 cm H2O   Driving Pressure 22   I:E Ratio 1:2.60   Flow Sensitivity 3 L/min   Vent Alarm Settings   High Pressure (cmH2O) 40 cmH2O   Low Minute Volume (lpm) 3 L/min   Low Exhaled Vt (ml) 250 mL   RR High (bpm) 40 br/min   Apnea (secs) 20 secs   Additional Respiratoray Assessments   Humidification Source Heated wire   Humidification Temp 37   Circuit Condensation Drained   Ambu Bag With Mask At Bedside Yes   Surgical Airway (Trach) Shiley Cuffed   No placement date or time found.   Present on Admission/Arrival: Yes  Surgical Airway Type: Tracheostomy  Brand: Shiley  Style: Cuffed  Size: 6   Status Secured   Site Assessment Oozing Secretions   Site Care Cleansed;Dried;Dressing applied   Inner Cannula Care Changed/new   Ties Assessment Clean;Dry;Intact

## 2024-06-26 NOTE — PROGRESS NOTES
RN has attempted to call [facility] [phone] this evening to give them updates on pt that is coming back to them this evening. Will attempt again before leaving tonight. ETA for [facility] is 1830.

## 2024-06-26 NOTE — CARE COORDINATION
06/26/24 1255   Service Assessment   Patient Orientation Other (see comment)  (Trach/vent dependent. Spoke with facility)   Cognition Other (see comment)  (Trach/vent dependent. Spoke with facility)   History Provided By Other (see comment)  (Trach/vent dependent. Spoke with facility)   Primary Caregiver Other (Comment)  (Facility)   Support Systems Other (Comment)  (Facility)   Patient's Healthcare Decision Maker is: Named in Scanned ACP Document   PCP Verified by CM Yes  (Mya Resendiz)   Last Visit to PCP Within last 3 months   Prior Functional Level Assistance with the following:;Bathing;Dressing;Toileting;Feeding;Cooking;Housework;Shopping;Mobility;Other (see comment)   Current Functional Level Assistance with the following:;Bathing;Dressing;Toileting;Feeding;Cooking;Housework;Shopping;Mobility;Other (see comment)   Can patient return to prior living arrangement Yes   Ability to make needs known: Unable   Family able to assist with home care needs:   (Mya Resendiz)   Would you like for me to discuss the discharge plan with any other family members/significant others, and if so, who? No   Financial Resources Medicaid   Community Resources None   Discharge Planning   Type of Residence Long-Term Care   Living Arrangements Other (Comment)  ([facility])   Current Services Prior To Admission Other (Comment)  (Trach/Vent dependent)   Potential Assistance Needed N/A   DME Ordered? No   Potential Assistance Purchasing Medications No   Type of Home Care Services None   Patient expects to be discharged to: Long-term care   Follow Up Appointment: Best Day/Time    (LTC)   One/Two Story Residence One story   History of falls? 0     Case Management Assessment  Initial Evaluation    Date/Time of Evaluation: 6/26/2024 1:00 PM  Assessment Completed by: Teresa Boeck, RN    If patient is discharged prior to next notation, then this note serves as note for discharge by case management.    Patient Name: Annie Tyler     [Z93.0]  Hypoxic episode [R09.02]  Acute on chronic respiratory failure with hypoxia (HCC) [J96.21]    IF APPLICABLE: The Patient and/or patient representative Annie and her family were provided with a choice of provider and agrees with the discharge plan. Freedom of choice list with basic dialogue that supports the patient's individualized plan of care/goals and shares the quality data associated with the providers was provided to:     Patient Representative Name:       The Patient and/or Patient Representative Agree with the Discharge Plan?      Teresa Boeck, RN  Case Management Department  Ph: [phone]

## 2024-06-26 NOTE — PROGRESS NOTES
06/26/24 0256   Patient Observation   Pulse 93   Respirations 22   SpO2 99 %   Observations #6 SHILEY.  AMBU BAG AT HEAD OF BED.  WATER GOOD.   Breath Sounds   Right Upper Lobe Rhonchi   Right Middle Lobe Rhonchi   Right Lower Lobe Diminished   Left Upper Lobe Rhonchi   Left Lower Lobe Diminished   Vent Information   Vent Mode AC/VC   Ventilator Settings   PEEP/CPAP (cmH2O) 5   FiO2  40 %   Vt (Set, mL) 400 mL   Resp Rate (Set) 15 bpm   Vent Patient Data (Readings)   Vt (Measured) 431 mL   Peak Inspiratory Pressure (cmH2O) 19 cmH2O   Rate Measured 20 br/min   Minute Volume (L/min) 8.61 Liters   Peak Inspiratory Flow (lpm) 60 L/sec   Mean Airway Pressure (cmH2O) 8.2 cmH20   Plateau Pressure (cm H2O) 27 cm H2O   Driving Pressure 22   I:E Ratio 1:3.50   Flow Sensitivity 3 L/min   Static Compliance (L/cm H2O) 20   Dynamic Compliance (L/cm H2O) 31 L/cm H2O   Vent Alarm Settings   High Pressure (cmH2O) 40 cmH2O   Low Minute Volume (lpm) 3 L/min   High Minute Volume (lpm) 25.5 L/min   Low Exhaled Vt (ml) 250 mL   High Exhaled Vt (ml) 1100 mL   RR High (bpm) 40 br/min   Apnea (secs) 20 secs   Additional Respiratoray Assessments   Humidification Source Heated wire   Humidification Temp 36.9   Circuit Condensation Drained   Ambu Bag With Mask At Bedside Yes   Backup Trachs Available (Size) 4.0;6.0   Airway Clearance   Suction Trach   Suction Device Inline suction catheter   Sputum Method Obtained Tracheal   Sputum Amount Small   Sputum Color/Odor Tan;Yellow   Sputum Consistency Thick   Surgical Airway (Trach) Shiley Cuffed   No placement date or time found.   Present on Admission/Arrival: Yes  Surgical Airway Type: Tracheostomy  Brand: Sascha  Style: Cuffed  Size: 6   Status Secured   Site Assessment Clean;Dry   Spare Trach at Bedside Yes   Spare Trach Tube One Size Smaller at Bedside Yes   Ambu Bag With Mask at Bedside Yes

## 2024-06-26 NOTE — CONSULTS
Fees reviewed from SLP.  No significant abnormal finding that warrants rescope.  Okay to DC from ENT perspective.      Kala Hamilton,   Otolaryngology

## 2024-06-26 NOTE — PROGRESS NOTES
Pt left ICU at this time with squad to return back to [facility]. Pt was pre-medicated prior to transport with PRN atarax per pt's request. VSS. Pt left in her personal night gown with her cell phone in her hand. No other personal belongings noted.

## 2024-06-26 NOTE — PROGRESS NOTES
Care rounds completed with Dr. Wolf and multidisciplinary team. Reviewed labs, meds, VS, assessment, & plan of care for today. See dictated note and new orders for details. New orders received.    Pt able to sign her own consent for bronchoscopy later today. Pt agreeable to midline placement as well.

## 2024-06-26 NOTE — PLAN OF CARE
Problem: Discharge Planning  Goal: Discharge to home or other facility with appropriate resources  Outcome: Progressing     Problem: Pain  Goal: Verbalizes/displays adequate comfort level or baseline comfort level  Outcome: Progressing     Problem: Safety - Adult  Goal: Free from fall injury  Outcome: Progressing     Problem: Skin/Tissue Integrity  Goal: Absence of new skin breakdown  Description: 1.  Monitor for areas of redness and/or skin breakdown  2.  Assess vascular access sites hourly  3.  Every 4-6 hours minimum:  Change oxygen saturation probe site  4.  Every 4-6 hours:  If on nasal continuous positive airway pressure, respiratory therapy assess nares and determine need for appliance change or resting period.  Outcome: Progressing     Problem: Chronic Conditions and Co-morbidities  Goal: Patient's chronic conditions and co-morbidity symptoms are monitored and maintained or improved  Outcome: Progressing     Problem: Nutrition Deficit:  Goal: Optimize nutritional status  Outcome: Progressing

## 2024-06-26 NOTE — PROGRESS NOTES
Pulmonary & Critical Care Medicine ICU Progress Note    CC: Respiratory failure    Events of Last 24 hours:   AC overnight   This am on TC   Clear cloudy secretions   60%---> 40 TC this am     Clinically improving    Vascular lines: IV: PIVs      Vent Mode: AC/VC Resp Rate (Set): 15 bpm/Vt (Set, mL): 400 mL/ /FiO2 : 40 %  No results for input(s): \"PHART\", \"DTF3HLG\", \"PO2ART\" in the last 72 hours.    IV:   sodium chloride         Vitals:  Blood pressure 134/73, pulse 87, temperature 98.4 °F (36.9 °C), temperature source Oral, resp. rate 23, height 1.6 m (5' 2.99\"), weight 67.5 kg (148 lb 13 oz), SpO2 98 %, not currently breastfeeding.  on 40%   Temp  Av °F (36.7 °C)  Min: 97 °F (36.1 °C)  Max: 98.6 °F (37 °C)    Intake/Output Summary (Last 24 hours) at 2024 0723  Last data filed at 2024 0315  Gross per 24 hour   Intake 321.49 ml   Output 950 ml   Net -628.51 ml     PE:  Gen: No distress.  Alert wearing  Eyes: PERRL. No sclera icterus. No conjunctival injection.   ENT: No discharge. Pharynx clear.  Tracheostomy tube in place.  Neck: Trachea midline. No obvious mass.    Resp: + Accessory muscle use. No crackles.  Scattered wheezes.  Few rhonchi. No dullness on percussion.  CV: Regular rate. Regular rhythm. No murmur or rub. No edema.  GI: Non-tender. Non-distended. No hernia.   Skin: Warm and dry. No nodule on exposed extremities.   Lymph: No cervical LAD. No supraclavicular LAD.   M/S: No cyanosis. No joint deformity. No clubbing.   Neuro: Awake. Alert. Moves all four extremities.   Psych: Oriented x 3. No anxiety.       Scheduled Meds:   mupirocin   Each Nostril BID    busPIRone  10 mg Oral TID    escitalopram  20 mg Oral Nightly    furosemide  20 mg Oral Daily    Roflumilast  500 mcg Oral Daily    ipratropium 0.5 mg-albuterol 2.5 mg  1 Dose Inhalation TID    sodium chloride flush  5-40 mL IntraVENous 2 times per day    enoxaparin  40 mg SubCUTAneous Nightly    methylPREDNISolone  40 mg IntraVENous  prednisone taper when improved  Doxycycline D#3---> PO  Follow up cx   Hypertonic saline nebulizer 3 times daily  Speech pathology - soft and bite size   Home medications were addressed   Blood sugar control ISS, with goal 150-180  DVT prophylaxis: Lovenox  MRSA prophylaxis: Bactroban          Total critical care time caring for this patient with life threatening, unstable organ failure, including direct patient contact, management of life support systems, review of data including imaging and labs, discussions with other team members and physicians is 31 minutes, excluding procedures.

## 2024-06-26 NOTE — PROGRESS NOTES
Dr. Hamilton (ENT) was just by and reviewed pt's chart d/t consult being placed. She does not believe that pt needs seen by ENT for any reason at this time. Passed along the information to Dr. Wolf and he said if Dr. HOLCOMB was okay with sending the pt back to the nursing home today that he would be okay with that as well. Awaiting response from Dr. HOLCOMB at this time. Midline put on hold until we hear back from Dr. HOLCOMB.     No bronchoscopy necessary today as pt has been able to clear secretions on her own today. RT made aware.

## 2024-06-26 NOTE — PROGRESS NOTES
although metastatic disease or malignancy is   difficult to exclude.  Recommend short interval follow-up or further   evaluation with PET.         XR CHEST (2 VW)   Final Result   Small left pleural effusion with atelectasis.               Assessment & Plan:     Patient Active Problem List    Diagnosis Date Noted    Metabolic encephalopathy 11/18/2022    Acute encephalopathy 11/18/2022    Chronic hypoxemic respiratory failure (HCC) 11/18/2022    Chronic respiratory failure with hypoxia (HCC) 08/31/2022    Hypokalemia 08/29/2022    History of COVID-19 08/29/2022    Leukocytosis 08/29/2022    Primary hypertension 08/15/2022    COVID-19 08/15/2022    Pneumonia due to COVID-19 virus 08/14/2022    Tracheostomy dependence (HCC) 06/25/2024    Mild protein-calorie malnutrition (HCC) 06/25/2024    Acute on chronic respiratory failure with hypoxia (HCC) 06/24/2024    Pulmonary congestion 06/24/2024    Agitation 01/20/2024    Pneumonia of both lower lobes due to infectious organism 01/20/2024    COPD with acute exacerbation (HCC) 01/19/2024    RSV infection 01/19/2024    Acute hypoxemic respiratory failure (HCC) 01/19/2024    Tachycardia 01/19/2024    Congestive heart failure (HCC) 01/05/2024    Hypoxia 12/02/2023    Disorder of electrolytes 12/01/2023    Acute on chronic hypoxic respiratory failure (HCC) 11/30/2023    Leg edema 11/30/2023    Multiple closed fractures of ribs of left side 10/06/2023    Opacities of both lungs present on chest x-ray 08/07/2023    Respiratory distress 08/07/2023    Community acquired pneumonia     Transaminitis 12/10/2021    Acute respiratory failure with hypoxia and hypercapnia (HCC)     HCAP (healthcare-associated pneumonia)     Pancreatic abnormality     Common bile duct dilatation     Acute on chronic respiratory failure (HCC) 02/04/2021    Community acquired pneumonia of right lung     Acute exacerbation of chronic obstructive pulmonary disease (COPD) (HCC)     Acute on chronic respiratory  failure with hypoxia and hypercapnia (HCC) 02/04/2020    Pulmonary nodule     Pulmonary emphysema (HCC)     Hyponatremia 01/30/2020    Acute respiratory failure with hypoxia (HCC) 05/21/2018    COPD exacerbation (HCC) 05/20/2018    Moderate COPD (chronic obstructive pulmonary disease) (HCC) 04/21/2016    Tobacco use 02/12/2016    Chest pain 10/14/2011    Shortness of breath 10/14/2011     Acute on chronic hypoxic respiratory failure  Secondary to acute COPD exacerbation  Supplemental oxygen to maintain oxygen saturation above 92%  D-dimer ordered by the pulmonologist is elevated.  Clinically she does not seem to have a PE  CTA no PE,spiculated nodule   Therapeutic bronch today given large amount of secretion     COPD exacerbation  Continue IV Solu-Medrol  Doxycycline  Inhaled bronchodilators  Sputum culture  Hypertonic saline nebulizer  Continue Daliresp     History of left upper lobe lung cancer  Not a surgical candidate  S/p SBRT  Enlarging right upper lobe nodule on CT     Hypertension  Hold amlodipine for now as blood pressure readings are stable     Generalized anxiety disorder  Continue Lexapro and BuSpar     Low TSH  Check free T4 normal.     Lovenox for DVT prophylaxis  Full code  G-tube feeds      POPEYE TIRADO MD

## 2024-06-26 NOTE — PLAN OF CARE

## 2024-06-26 NOTE — CARE COORDINATION
DISCHARGE ORDER  Date/Time 2024 2:38 PM  Completed by: Teresa Boeck, RN, Case Management    Patient Name: Annie Tyler    : 1955      Admit order Date and Status:24  Noted discharge order. (verify MD's last order for status of admission/Traditional Medicare 3 [place] Inpatient qualifying stay required for SNF)    Confirmed discharge plan with:              Patient:  No              When pt confirms DC plan does any support person need to be contacted by CM Yes if yes who______                      Discharge to Facility: [facility]   Facility phone number for staff giving report: [phone]   Pre-certification completed: NA   Hospital Exemption Notification (HENS) completed: NA   Discharge orders and Continuity of Care faxed to facility:  [facility]      Transportation:               Medical Transport explained with choice list offered to pt/family.                Choice:No(no preference)  Agency used: [facility]   time:   6:30      Pt/family/Nursing/Facility aware of  time:  Yes Names: GLEN/nadege, Aparna GALVEZ, Enrique/Mya,    Attempted to contact Thuy Tyler/daughter. Does not have VM set up.  Attempted to contact Nilda Tyler/daughter. VM is full.  LVM for Carla Tyler advising unable to reach Primary and Secondary contacts. Requested Carla let family know about transportation back to [facility].       Ambulance form completed:  Yes:      Date Last IMM Given: 24    Comments: Pt is being d/c'd to [facility] today. Pt's O2 sats are 100% on 10L trach collar.     Discharge timeout done with Aparna GALVEZ. All discharge needs and concerns addressed.    Discharging nurse to complete CHIKI, reconcile AVS, and place final copy with patient's discharge packet. Discharging RN to ensure that written prescriptions for  Level II medications are sent with patient to the

## 2024-06-26 NOTE — PROGRESS NOTES
Speech Language Pathology  Swallowing Disorders and Dysphagia    Dysphagia Treatment/Follow-Up Note  Facility/Department: [facility] ICU  Recommendations: SLP recommends to advance to IDDSI 7 Regular Solids; IDDSI 0 Thin Liquids; Meds whole in puree  Risk Management: upright for all intake, stay upright for at least 30 mins after intake, small bites/sips, close supervision, oral care 2-3x/day to reduce adverse affects in the event of aspiration, STRICT aspiration precautions, and hold PO and contact SLP if s/s of aspiration or worsening respiratory status develop.     Pt with questions regarding PMV (speaking valve) currently at nursing home. SLP recommends f/u with nursing home SLP and RT for further education. Pt with concerns of increased difficulty with breathing with PMV in place in comparison to when first fit.    NAME:Annie Tyler  : 1955 (68 y.o.)   MRN: [id number]  ROOM: [facility]3007-  ADMISSION DATE: 2024  PATIENT DIAGNOSIS(ES): Pleural effusion [J90]  Tachycardia [R00.0]  Ventilator dependence (HCC) [Z99.11]  Elevated troponin [R79.89]  Tracheostomy dependence (HCC) [Z93.0]  Hypoxic episode [R09.02]  Acute on chronic respiratory failure with hypoxia (HCC) [J96.21]  Allergies   Allergen Reactions    Cephalosporins Shortness Of Breath     OK to take aztreonam and meropenem     Penicillins Anaphylaxis, Hives and Other (See Comments)     OK to take aztreonam and meropenem    Hctz [Hydrochlorothiazide] Other (See Comments)     Recurrent low sodium       DATE ONSET: 2024    Pain: The patient does not complain of pain       Current Diet: ADULT DIET; Dysphagia - Soft and Bite Sized      Dysphagia Treatment and Impressions:  Subjective: Pt seen in room at bedside with RN (Frida) permission  Noteworthy events reported/Chart Review: Per RN note: Dr. Hamilton (ENT) was just by and reviewed pt's chart d/t consult being placed. She does not believe that pt needs seen by ENT for any reason at this

## 2024-06-26 NOTE — DISCHARGE SUMMARY
Name:  Annie Tyler  Room:  3007/3007-01  MRN:    [id number]    Discharge Summary      This discharge summary is in conjunction with a complete physical exam done on the day of discharge.      Discharging Physician: POPEYE TIRADO MD      Admit: 6/24/2024  Discharge:  6/26/2024    Diagnoses this Admission    Principal Problem:    Acute on chronic respiratory failure with hypoxia (HCC)  Active Problems:    Primary hypertension    Pulmonary congestion    Tracheostomy dependence (HCC)    Mild protein-calorie malnutrition (HCC)  Resolved Problems:    * No resolved hospital problems. *          Procedures (Please Review Full Report for Details)      Consults    IP CONSULT TO CRITICAL CARE  IP CONSULT TO CRITICAL CARE  IP CONSULT TO OTOLARYNGOLOGY      HPI:  A 68-year-old female with a history of COPD, status post tracheostomy presented emergency room with shortness of breath of 1 week duration.  Admits to having some increased secretions in her tracheostomy.  Denies any fever or chills.  No chest pain.  Was hypoxemic at the nursing home.      Hospital Course      Acute on chronic hypoxic respiratory failure  Secondary to acute COPD exacerbation  Supplemental oxygen to maintain oxygen saturation above 92%  D-dimer ordered by the pulmonologist is elevated.  Clinically she does not seem to have a PE  CTA no PE,spiculated nodule   Therapeutic bronch today given large amount of secretion     COPD exacerbation  Continue IV Solu-Medrol  Doxycycline  Inhaled bronchodilators  Sputum culture  Hypertonic saline nebulizer  Continue Daliresp     History of left upper lobe lung cancer  Not a surgical candidate  S/p SBRT  Enlarging right upper lobe nodule on CT     Hypertension  Hold amlodipine for now as blood pressure readings are stable     Generalized anxiety disorder  Continue Lexapro and BuSpar      CTA PULMONARY W CONTRAST   Final Result   1. No evidence for pulmonary embolism.   2. Spiculated nodule in the right

## 2024-06-26 NOTE — PROGRESS NOTES
06/25/24 1935   Patient Observation   Pulse (!) 104   Respirations (!) 31   SpO2 99 %   Breath Sounds   Right Upper Lobe Rhonchi   Right Middle Lobe Rhonchi   Right Lower Lobe Rhonchi   Left Upper Lobe Rhonchi   Left Lower Lobe Rhonchi   Vent Information   Vent Mode (S)  AC/VC   Ventilator Settings   PEEP/CPAP (cmH2O) 5   FiO2  40 %   Vt (Set, mL) 400 mL   Resp Rate (Set) 15 bpm   Vent Patient Data (Readings)   Vt (Measured) 466 mL   Peak Inspiratory Pressure (cmH2O) 9 cmH2O   Rate Measured 27 br/min   Minute Volume (L/min) 11.6 Liters   Peak Inspiratory Flow (lpm) 60 L/sec   Mean Airway Pressure (cmH2O) 3.6 cmH20   Plateau Pressure (cm H2O) 0 cm H2O   Driving Pressure -5   I:E Ratio 1:2.80   Flow Sensitivity 3 L/min   Vent Alarm Settings   High Pressure (cmH2O) 40 cmH2O   Low Minute Volume (lpm) 3 L/min   High Minute Volume (lpm) 25.5 L/min   Low Exhaled Vt (ml) 250 mL   High Exhaled Vt (ml) 1100 mL   RR High (bpm) 40 br/min   Apnea (secs) 20 secs   Additional Respiratoray Assessments   Humidification Source Heated wire   Humidification Temp 36.4   Circuit Condensation Drained   Ambu Bag With Mask At Bedside Yes   Backup Trachs Available (Size) 4.0;6.0   Airway Clearance   Suction Trach   Suction Device Inline suction catheter   Sputum Method Obtained Tracheal   Sputum Amount Moderate   Sputum Color/Odor Tan;Yellow   Sputum Consistency Thick   Surgical Airway (Trach) Shiley Cuffed   No placement date or time found.   Present on Admission/Arrival: Yes  Surgical Airway Type: Tracheostomy  Brand: Sascha  Style: Cuffed  Size: 6   Status Secured   Site Assessment Clean;Dry   Spare Trach at Bedside Yes   Spare Trach Tube One Size Smaller at Bedside Yes   Ambu Bag With Mask at Bedside Yes

## 2024-06-26 NOTE — PROGRESS NOTES
Shift assessment completed, see flow sheet. Pt is alert and oriented. Vital signs are stable.     Temp-98.6  HR-117  RR-19  BP-146/73  SpO2-100%      Patient currently on trach vent Vent settings are 15/400/+40%/+5.    ICU lines in place. Dressings C/D/I.    Pure wick remains in place.     Pt bathed with chlorhexidine wipes, and oral care provided.     Personal belongings and call light within reach.

## 2024-06-27 ENCOUNTER — HOSPITAL ENCOUNTER (INPATIENT)
Age: 69
LOS: 4 days | Discharge: NURSING, REHAB OR LONG-TERM CARE | DRG: 208 | End: 2024-07-01
Attending: INTERNAL MEDICINE | Admitting: INTERNAL MEDICINE
Payer: MEDICARE

## 2024-06-27 ENCOUNTER — APPOINTMENT (OUTPATIENT)
Age: 69
DRG: 208 | End: 2024-06-27
Attending: STUDENT IN AN ORGANIZED HEALTH CARE EDUCATION/TRAINING PROGRAM
Payer: MEDICARE

## 2024-06-27 VITALS
WEIGHT: 134 LBS | HEIGHT: 63 IN | SYSTOLIC BLOOD PRESSURE: 125 MMHG | BODY MASS INDEX: 23.74 KG/M2 | TEMPERATURE: 97.3 F | OXYGEN SATURATION: 98 % | DIASTOLIC BLOOD PRESSURE: 81 MMHG | HEART RATE: 111 BPM | RESPIRATION RATE: 23 BRPM

## 2024-06-27 DIAGNOSIS — I42.9 CARDIOMYOPATHY (HCC): ICD-10-CM

## 2024-06-27 DIAGNOSIS — I42.9 CARDIOMYOPATHY, UNSPECIFIED TYPE (HCC): Primary | ICD-10-CM

## 2024-06-27 DIAGNOSIS — R06.09 DOE (DYSPNEA ON EXERTION): ICD-10-CM

## 2024-06-27 DIAGNOSIS — R94.39 ABNORMAL STRESS TEST: ICD-10-CM

## 2024-06-27 PROBLEM — R91.1 LUNG NODULE: Status: ACTIVE | Noted: 2024-06-27

## 2024-06-27 PROBLEM — R79.89 ELEVATED TROPONIN: Status: ACTIVE | Noted: 2024-06-27

## 2024-06-27 PROBLEM — I50.21 ACUTE HFREF (HEART FAILURE WITH REDUCED EJECTION FRACTION) (HCC): Status: ACTIVE | Noted: 2024-06-27

## 2024-06-27 PROBLEM — I21.4 NSTEMI (NON-ST ELEVATED MYOCARDIAL INFARCTION) (HCC): Status: ACTIVE | Noted: 2024-06-27

## 2024-06-27 PROBLEM — J96.90 RESPIRATORY FAILURE (HCC): Status: ACTIVE | Noted: 2024-06-27

## 2024-06-27 LAB
ALBUMIN SERPL-MCNC: 3.5 G/DL (ref 3.4–5)
ALBUMIN/GLOB SERPL: 0.9 {RATIO} (ref 1.1–2.2)
ALP SERPL-CCNC: 109 U/L (ref 40–129)
ALT SERPL-CCNC: 14 U/L (ref 10–40)
ANION GAP SERPL CALCULATED.3IONS-SCNC: 12 MMOL/L (ref 3–16)
ANION GAP SERPL CALCULATED.3IONS-SCNC: 19 MMOL/L (ref 3–16)
ANTI-XA UNFRAC HEPARIN: <0.1 IU/ML (ref 0.3–0.7)
APTT BLD: 24 SEC (ref 22.1–36.4)
AST SERPL-CCNC: 26 U/L (ref 15–37)
BASE EXCESS BLDA CALC-SCNC: -0.9 MMOL/L (ref -3–3)
BASOPHILS # BLD: 0 K/UL (ref 0–0.2)
BASOPHILS NFR BLD: 0.1 %
BILIRUB SERPL-MCNC: 0.3 MG/DL (ref 0–1)
BUN SERPL-MCNC: 16 MG/DL (ref 7–20)
BUN SERPL-MCNC: 9 MG/DL (ref 7–20)
C DIFF TOX GENS STL QL NAA+PROBE: NORMAL
CALCIUM SERPL-MCNC: 9.1 MG/DL (ref 8.3–10.6)
CALCIUM SERPL-MCNC: 9.3 MG/DL (ref 8.3–10.6)
CHLORIDE SERPL-SCNC: 96 MMOL/L (ref 99–110)
CHLORIDE SERPL-SCNC: 96 MMOL/L (ref 99–110)
CO2 BLDA-SCNC: 25 MMOL/L
CO2 SERPL-SCNC: 22 MMOL/L (ref 21–32)
CO2 SERPL-SCNC: 28 MMOL/L (ref 21–32)
COHGB MFR BLDA: 0.1 % (ref 0–1.5)
CREAT SERPL-MCNC: 0.6 MG/DL (ref 0.6–1.2)
CREAT SERPL-MCNC: <0.5 MG/DL (ref 0.6–1.2)
DEPRECATED RDW RBC AUTO: 15.7 % (ref 12.4–15.4)
DEPRECATED RDW RBC AUTO: 16.3 % (ref 12.4–15.4)
DEPRECATED RDW RBC AUTO: 16.3 % (ref 12.4–15.4)
ECHO BSA: 1.64 M2
ECHO BSA: 1.64 M2
ECHO LV EDV A2C: 48 ML
ECHO LV EDV A4C: 76 ML
ECHO LV EDV INDEX A4C: 47 ML/M2
ECHO LV EDV NDEX A2C: 29 ML/M2
ECHO LV EJECTION FRACTION A2C: 33 %
ECHO LV EJECTION FRACTION A4C: 51 %
ECHO LV EJECTION FRACTION BIPLANE: 44 % (ref 55–100)
ECHO LV ESV A2C: 32 ML
ECHO LV ESV A4C: 38 ML
ECHO LV ESV INDEX A2C: 20 ML/M2
ECHO LV ESV INDEX A4C: 23 ML/M2
EKG ATRIAL RATE: 119 BPM
EKG ATRIAL RATE: 129 BPM
EKG ATRIAL RATE: 133 BPM
EKG DIAGNOSIS: NORMAL
EKG P AXIS: 23 DEGREES
EKG P AXIS: 59 DEGREES
EKG P AXIS: 64 DEGREES
EKG P-R INTERVAL: 136 MS
EKG P-R INTERVAL: 142 MS
EKG P-R INTERVAL: 88 MS
EKG Q-T INTERVAL: 304 MS
EKG Q-T INTERVAL: 304 MS
EKG Q-T INTERVAL: 316 MS
EKG QRS DURATION: 64 MS
EKG QRS DURATION: 66 MS
EKG QRS DURATION: 86 MS
EKG QTC CALCULATION (BAZETT): 427 MS
EKG QTC CALCULATION (BAZETT): 453 MS
EKG QTC CALCULATION (BAZETT): 462 MS
EKG R AXIS: -69 DEGREES
EKG R AXIS: -71 DEGREES
EKG R AXIS: -81 DEGREES
EKG T AXIS: 58 DEGREES
EKG T AXIS: 72 DEGREES
EKG T AXIS: 83 DEGREES
EKG VENTRICULAR RATE: 119 BPM
EKG VENTRICULAR RATE: 129 BPM
EKG VENTRICULAR RATE: 134 BPM
EOSINOPHIL # BLD: 0 K/UL (ref 0–0.6)
EOSINOPHIL NFR BLD: 0 %
GFR SERPLBLD CREATININE-BSD FMLA CKD-EPI: >90 ML/MIN/{1.73_M2}
GFR SERPLBLD CREATININE-BSD FMLA CKD-EPI: >90 ML/MIN/{1.73_M2}
GLUCOSE BLD-MCNC: 144 MG/DL (ref 70–99)
GLUCOSE SERPL-MCNC: 132 MG/DL (ref 70–99)
GLUCOSE SERPL-MCNC: 180 MG/DL (ref 70–99)
HCO3 BLDA-SCNC: 23.7 MMOL/L (ref 21–29)
HCT VFR BLD AUTO: 32.7 % (ref 36–48)
HCT VFR BLD AUTO: 35.5 % (ref 36–48)
HCT VFR BLD AUTO: 35.7 % (ref 36–48)
HGB BLD-MCNC: 10.6 G/DL (ref 12–16)
HGB BLD-MCNC: 11.6 G/DL (ref 12–16)
HGB BLD-MCNC: 11.8 G/DL (ref 12–16)
HGB BLDA-MCNC: 13.2 G/DL (ref 12–16)
INR PPP: 1.03 (ref 0.85–1.15)
LACTATE BLDV-SCNC: 2.6 MMOL/L (ref 0.4–2)
LYMPHOCYTES # BLD: 0.4 K/UL (ref 1–5.1)
LYMPHOCYTES NFR BLD: 4.9 %
MCH RBC QN AUTO: 28 PG (ref 26–34)
MCH RBC QN AUTO: 28.2 PG (ref 26–34)
MCH RBC QN AUTO: 28.4 PG (ref 26–34)
MCHC RBC AUTO-ENTMCNC: 32.5 G/DL (ref 31–36)
MCHC RBC AUTO-ENTMCNC: 32.5 G/DL (ref 31–36)
MCHC RBC AUTO-ENTMCNC: 33.3 G/DL (ref 31–36)
MCV RBC AUTO: 85.3 FL (ref 80–100)
MCV RBC AUTO: 86.2 FL (ref 80–100)
MCV RBC AUTO: 86.5 FL (ref 80–100)
METHGB MFR BLDA: 0.3 %
MONOCYTES # BLD: 0.1 K/UL (ref 0–1.3)
MONOCYTES NFR BLD: 1.4 %
NEUTROPHILS # BLD: 7.3 K/UL (ref 1.7–7.7)
NEUTROPHILS NFR BLD: 93.6 %
O2 THERAPY: NORMAL
PCO2 BLDA: 39.4 MMHG (ref 35–45)
PERFORMED ON: ABNORMAL
PH BLDA: 7.4 [PH] (ref 7.35–7.45)
PLATELET # BLD AUTO: 363 K/UL (ref 135–450)
PLATELET # BLD AUTO: 376 K/UL (ref 135–450)
PLATELET # BLD AUTO: 387 K/UL (ref 135–450)
PMV BLD AUTO: 7.3 FL (ref 5–10.5)
PMV BLD AUTO: 7.6 FL (ref 5–10.5)
PMV BLD AUTO: 8.1 FL (ref 5–10.5)
PO2 BLDA: 92.6 MMHG (ref 75–108)
POC ACT LR: 146 SEC
POTASSIUM SERPL-SCNC: 3.8 MMOL/L (ref 3.5–5.1)
POTASSIUM SERPL-SCNC: 3.9 MMOL/L (ref 3.5–5.1)
PROT SERPL-MCNC: 7.2 G/DL (ref 6.4–8.2)
PROTHROMBIN TIME: 13.7 SEC (ref 11.9–14.9)
RBC # BLD AUTO: 3.78 M/UL (ref 4–5.2)
RBC # BLD AUTO: 4.14 M/UL (ref 4–5.2)
RBC # BLD AUTO: 4.16 M/UL (ref 4–5.2)
SAO2 % BLDA: 97.1 %
SODIUM SERPL-SCNC: 136 MMOL/L (ref 136–145)
SODIUM SERPL-SCNC: 137 MMOL/L (ref 136–145)
TROPONIN, HIGH SENSITIVITY: 111 NG/L (ref 0–14)
TROPONIN, HIGH SENSITIVITY: 135 NG/L (ref 0–14)
WBC # BLD AUTO: 4.6 K/UL (ref 4–11)
WBC # BLD AUTO: 5.8 K/UL (ref 4–11)
WBC # BLD AUTO: 7.8 K/UL (ref 4–11)

## 2024-06-27 PROCEDURE — 83605 ASSAY OF LACTIC ACID: CPT

## 2024-06-27 PROCEDURE — 2580000003 HC RX 258: Performed by: INTERNAL MEDICINE

## 2024-06-27 PROCEDURE — 80048 BASIC METABOLIC PNL TOTAL CA: CPT

## 2024-06-27 PROCEDURE — 99152 MOD SED SAME PHYS/QHP 5/>YRS: CPT | Performed by: INTERNAL MEDICINE

## 2024-06-27 PROCEDURE — 94003 VENT MGMT INPAT SUBQ DAY: CPT

## 2024-06-27 PROCEDURE — 6370000000 HC RX 637 (ALT 250 FOR IP): Performed by: STUDENT IN AN ORGANIZED HEALTH CARE EDUCATION/TRAINING PROGRAM

## 2024-06-27 PROCEDURE — 2700000000 HC OXYGEN THERAPY PER DAY

## 2024-06-27 PROCEDURE — 85610 PROTHROMBIN TIME: CPT

## 2024-06-27 PROCEDURE — 85730 THROMBOPLASTIN TIME PARTIAL: CPT

## 2024-06-27 PROCEDURE — 99223 1ST HOSP IP/OBS HIGH 75: CPT | Performed by: INTERNAL MEDICINE

## 2024-06-27 PROCEDURE — 5A1935Z RESPIRATORY VENTILATION, LESS THAN 24 CONSECUTIVE HOURS: ICD-10-PCS | Performed by: INTERNAL MEDICINE

## 2024-06-27 PROCEDURE — 4A023N8 MEASUREMENT OF CARDIAC SAMPLING AND PRESSURE, BILATERAL, PERCUTANEOUS APPROACH: ICD-10-PCS | Performed by: INTERNAL MEDICINE

## 2024-06-27 PROCEDURE — 2500000003 HC RX 250 WO HCPCS

## 2024-06-27 PROCEDURE — 36415 COLL VENOUS BLD VENIPUNCTURE: CPT

## 2024-06-27 PROCEDURE — C1887 CATHETER, GUIDING: HCPCS | Performed by: INTERNAL MEDICINE

## 2024-06-27 PROCEDURE — 6370000000 HC RX 637 (ALT 250 FOR IP): Performed by: INTERNAL MEDICINE

## 2024-06-27 PROCEDURE — 94761 N-INVAS EAR/PLS OXIMETRY MLT: CPT

## 2024-06-27 PROCEDURE — 6360000002 HC RX W HCPCS: Performed by: STUDENT IN AN ORGANIZED HEALTH CARE EDUCATION/TRAINING PROGRAM

## 2024-06-27 PROCEDURE — 6360000002 HC RX W HCPCS: Performed by: NURSE PRACTITIONER

## 2024-06-27 PROCEDURE — 93460 R&L HRT ART/VENTRICLE ANGIO: CPT | Performed by: INTERNAL MEDICINE

## 2024-06-27 PROCEDURE — C1769 GUIDE WIRE: HCPCS | Performed by: INTERNAL MEDICINE

## 2024-06-27 PROCEDURE — 6360000002 HC RX W HCPCS: Performed by: INTERNAL MEDICINE

## 2024-06-27 PROCEDURE — 93308 TTE F-UP OR LMTD: CPT | Performed by: STUDENT IN AN ORGANIZED HEALTH CARE EDUCATION/TRAINING PROGRAM

## 2024-06-27 PROCEDURE — 6360000002 HC RX W HCPCS

## 2024-06-27 PROCEDURE — 99223 1ST HOSP IP/OBS HIGH 75: CPT | Performed by: STUDENT IN AN ORGANIZED HEALTH CARE EDUCATION/TRAINING PROGRAM

## 2024-06-27 PROCEDURE — 2709999900 HC NON-CHARGEABLE SUPPLY: Performed by: INTERNAL MEDICINE

## 2024-06-27 PROCEDURE — 6370000000 HC RX 637 (ALT 250 FOR IP): Performed by: NURSE PRACTITIONER

## 2024-06-27 PROCEDURE — C9113 INJ PANTOPRAZOLE SODIUM, VIA: HCPCS | Performed by: INTERNAL MEDICINE

## 2024-06-27 PROCEDURE — 2500000003 HC RX 250 WO HCPCS: Performed by: INTERNAL MEDICINE

## 2024-06-27 PROCEDURE — 85520 HEPARIN ASSAY: CPT

## 2024-06-27 PROCEDURE — 2000000000 HC ICU R&B

## 2024-06-27 PROCEDURE — 85347 COAGULATION TIME ACTIVATED: CPT

## 2024-06-27 PROCEDURE — C1894 INTRO/SHEATH, NON-LASER: HCPCS | Performed by: INTERNAL MEDICINE

## 2024-06-27 PROCEDURE — 93010 ELECTROCARDIOGRAM REPORT: CPT | Performed by: INTERNAL MEDICINE

## 2024-06-27 PROCEDURE — 94640 AIRWAY INHALATION TREATMENT: CPT

## 2024-06-27 PROCEDURE — 80053 COMPREHEN METABOLIC PANEL: CPT

## 2024-06-27 PROCEDURE — 85025 COMPLETE CBC W/AUTO DIFF WBC: CPT

## 2024-06-27 PROCEDURE — 99291 CRITICAL CARE FIRST HOUR: CPT | Performed by: INTERNAL MEDICINE

## 2024-06-27 PROCEDURE — C1760 CLOSURE DEV, VASC: HCPCS | Performed by: INTERNAL MEDICINE

## 2024-06-27 PROCEDURE — 85027 COMPLETE CBC AUTOMATED: CPT

## 2024-06-27 PROCEDURE — 99153 MOD SED SAME PHYS/QHP EA: CPT | Performed by: INTERNAL MEDICINE

## 2024-06-27 PROCEDURE — 82803 BLOOD GASES ANY COMBINATION: CPT

## 2024-06-27 PROCEDURE — B2111ZZ FLUOROSCOPY OF MULTIPLE CORONARY ARTERIES USING LOW OSMOLAR CONTRAST: ICD-10-PCS | Performed by: INTERNAL MEDICINE

## 2024-06-27 PROCEDURE — B2151ZZ FLUOROSCOPY OF LEFT HEART USING LOW OSMOLAR CONTRAST: ICD-10-PCS | Performed by: INTERNAL MEDICINE

## 2024-06-27 PROCEDURE — 84484 ASSAY OF TROPONIN QUANT: CPT

## 2024-06-27 PROCEDURE — 93308 TTE F-UP OR LMTD: CPT

## 2024-06-27 PROCEDURE — 6360000004 HC RX CONTRAST MEDICATION: Performed by: INTERNAL MEDICINE

## 2024-06-27 RX ORDER — SODIUM CHLORIDE 9 MG/ML
INJECTION, SOLUTION INTRAVENOUS CONTINUOUS
Status: ACTIVE | OUTPATIENT
Start: 2024-06-27 | End: 2024-06-28

## 2024-06-27 RX ORDER — SODIUM CHLORIDE 9 MG/ML
INJECTION, SOLUTION INTRAVENOUS PRN
Status: DISCONTINUED | OUTPATIENT
Start: 2024-06-27 | End: 2024-07-01

## 2024-06-27 RX ORDER — MAGNESIUM SULFATE IN WATER 40 MG/ML
2000 INJECTION, SOLUTION INTRAVENOUS PRN
Status: DISCONTINUED | OUTPATIENT
Start: 2024-06-27 | End: 2024-06-27 | Stop reason: HOSPADM

## 2024-06-27 RX ORDER — HEPARIN SODIUM 10000 [USP'U]/100ML
12 INJECTION, SOLUTION INTRAVENOUS CONTINUOUS
Status: DISCONTINUED | OUTPATIENT
Start: 2024-06-27 | End: 2024-06-27 | Stop reason: HOSPADM

## 2024-06-27 RX ORDER — POTASSIUM CHLORIDE 7.45 MG/ML
10 INJECTION INTRAVENOUS PRN
Status: DISCONTINUED | OUTPATIENT
Start: 2024-06-27 | End: 2024-07-01 | Stop reason: HOSPADM

## 2024-06-27 RX ORDER — ROFLUMILAST 500 UG/1
500 TABLET ORAL DAILY
Status: DISCONTINUED | OUTPATIENT
Start: 2024-06-27 | End: 2024-07-01 | Stop reason: HOSPADM

## 2024-06-27 RX ORDER — POTASSIUM CHLORIDE 20 MEQ/1
40 TABLET, EXTENDED RELEASE ORAL PRN
Status: DISCONTINUED | OUTPATIENT
Start: 2024-06-27 | End: 2024-07-01 | Stop reason: HOSPADM

## 2024-06-27 RX ORDER — ESCITALOPRAM OXALATE 10 MG/1
20 TABLET ORAL NIGHTLY
Status: DISCONTINUED | OUTPATIENT
Start: 2024-06-27 | End: 2024-07-01 | Stop reason: HOSPADM

## 2024-06-27 RX ORDER — ACETYLCYSTEINE 200 MG/ML
600 SOLUTION ORAL; RESPIRATORY (INHALATION)
Status: DISCONTINUED | OUTPATIENT
Start: 2024-06-27 | End: 2024-07-01 | Stop reason: HOSPADM

## 2024-06-27 RX ORDER — SODIUM CHLORIDE 0.9 % (FLUSH) 0.9 %
5-40 SYRINGE (ML) INJECTION PRN
Status: CANCELLED | OUTPATIENT
Start: 2024-06-27

## 2024-06-27 RX ORDER — POTASSIUM CHLORIDE 29.8 MG/ML
20 INJECTION INTRAVENOUS PRN
Status: DISCONTINUED | OUTPATIENT
Start: 2024-06-27 | End: 2024-06-27 | Stop reason: HOSPADM

## 2024-06-27 RX ORDER — SODIUM CHLORIDE 0.9 % (FLUSH) 0.9 %
5-40 SYRINGE (ML) INJECTION EVERY 12 HOURS SCHEDULED
Status: DISCONTINUED | OUTPATIENT
Start: 2024-06-27 | End: 2024-06-27 | Stop reason: HOSPADM

## 2024-06-27 RX ORDER — HYDROXYZINE HYDROCHLORIDE 25 MG/1
25 TABLET, FILM COATED ORAL DAILY PRN
Status: DISCONTINUED | OUTPATIENT
Start: 2024-06-27 | End: 2024-06-27 | Stop reason: HOSPADM

## 2024-06-27 RX ORDER — BUDESONIDE 0.5 MG/2ML
0.5 INHALANT ORAL
Status: DISCONTINUED | OUTPATIENT
Start: 2024-06-27 | End: 2024-07-01 | Stop reason: HOSPADM

## 2024-06-27 RX ORDER — FENTANYL CITRATE-0.9 % NACL/PF 10 MCG/ML
25-200 PLASTIC BAG, INJECTION (ML) INTRAVENOUS
Status: DISCONTINUED | OUTPATIENT
Start: 2024-06-27 | End: 2024-06-27

## 2024-06-27 RX ORDER — IPRATROPIUM BROMIDE AND ALBUTEROL SULFATE 2.5; .5 MG/3ML; MG/3ML
1 SOLUTION RESPIRATORY (INHALATION) EVERY 4 HOURS PRN
Status: DISCONTINUED | OUTPATIENT
Start: 2024-06-27 | End: 2024-06-27 | Stop reason: HOSPADM

## 2024-06-27 RX ORDER — SODIUM CHLORIDE 9 MG/ML
INJECTION, SOLUTION INTRAVENOUS PRN
Status: DISCONTINUED | OUTPATIENT
Start: 2024-06-27 | End: 2024-07-01 | Stop reason: HOSPADM

## 2024-06-27 RX ORDER — SODIUM CHLORIDE 0.9 % (FLUSH) 0.9 %
5-40 SYRINGE (ML) INJECTION EVERY 12 HOURS SCHEDULED
Status: DISCONTINUED | OUTPATIENT
Start: 2024-06-27 | End: 2024-06-28

## 2024-06-27 RX ORDER — FUROSEMIDE 20 MG/1
20 TABLET ORAL DAILY
Status: DISCONTINUED | OUTPATIENT
Start: 2024-06-27 | End: 2024-06-27 | Stop reason: HOSPADM

## 2024-06-27 RX ORDER — DOXYCYCLINE HYCLATE 100 MG
100 TABLET ORAL EVERY 12 HOURS SCHEDULED
Status: DISCONTINUED | OUTPATIENT
Start: 2024-06-27 | End: 2024-06-27 | Stop reason: HOSPADM

## 2024-06-27 RX ORDER — SODIUM CHLORIDE 9 MG/ML
INJECTION, SOLUTION INTRAVENOUS PRN
Status: CANCELLED | OUTPATIENT
Start: 2024-06-27

## 2024-06-27 RX ORDER — POTASSIUM CHLORIDE 7.45 MG/ML
10 INJECTION INTRAVENOUS PRN
Status: DISCONTINUED | OUTPATIENT
Start: 2024-06-27 | End: 2024-06-27 | Stop reason: HOSPADM

## 2024-06-27 RX ORDER — SODIUM CHLORIDE 0.9 % (FLUSH) 0.9 %
5-40 SYRINGE (ML) INJECTION PRN
Status: DISCONTINUED | OUTPATIENT
Start: 2024-06-27 | End: 2024-06-27 | Stop reason: HOSPADM

## 2024-06-27 RX ORDER — MAGNESIUM SULFATE IN WATER 40 MG/ML
2000 INJECTION, SOLUTION INTRAVENOUS PRN
Status: DISCONTINUED | OUTPATIENT
Start: 2024-06-27 | End: 2024-07-01 | Stop reason: HOSPADM

## 2024-06-27 RX ORDER — DOXYCYCLINE HYCLATE 100 MG
100 TABLET ORAL EVERY 12 HOURS SCHEDULED
Status: DISCONTINUED | OUTPATIENT
Start: 2024-06-27 | End: 2024-06-27 | Stop reason: SDUPTHER

## 2024-06-27 RX ORDER — SODIUM CHLORIDE FOR INHALATION 3 %
4 VIAL, NEBULIZER (ML) INHALATION 2 TIMES DAILY
Status: DISCONTINUED | OUTPATIENT
Start: 2024-06-27 | End: 2024-06-27

## 2024-06-27 RX ORDER — LISINOPRIL 5 MG/1
5 TABLET ORAL DAILY
Status: DISCONTINUED | OUTPATIENT
Start: 2024-06-27 | End: 2024-06-27 | Stop reason: HOSPADM

## 2024-06-27 RX ORDER — ONDANSETRON 4 MG/1
4 TABLET, ORALLY DISINTEGRATING ORAL EVERY 8 HOURS PRN
Status: DISCONTINUED | OUTPATIENT
Start: 2024-06-27 | End: 2024-07-01 | Stop reason: HOSPADM

## 2024-06-27 RX ORDER — HEPARIN SODIUM 1000 [USP'U]/ML
60 INJECTION, SOLUTION INTRAVENOUS; SUBCUTANEOUS ONCE
Status: DISCONTINUED | OUTPATIENT
Start: 2024-06-27 | End: 2024-06-27 | Stop reason: ALTCHOICE

## 2024-06-27 RX ORDER — LORAZEPAM 2 MG/ML
2 INJECTION INTRAMUSCULAR ONCE
Status: COMPLETED | OUTPATIENT
Start: 2024-06-27 | End: 2024-06-27

## 2024-06-27 RX ORDER — HEPARIN SODIUM 1000 [USP'U]/ML
60 INJECTION, SOLUTION INTRAVENOUS; SUBCUTANEOUS ONCE
Status: COMPLETED | OUTPATIENT
Start: 2024-06-27 | End: 2024-06-27

## 2024-06-27 RX ORDER — BUSPIRONE HYDROCHLORIDE 5 MG/1
10 TABLET ORAL 3 TIMES DAILY
Status: DISCONTINUED | OUTPATIENT
Start: 2024-06-27 | End: 2024-07-01 | Stop reason: HOSPADM

## 2024-06-27 RX ORDER — HEPARIN SODIUM 1000 [USP'U]/ML
3700 INJECTION, SOLUTION INTRAVENOUS; SUBCUTANEOUS PRN
Status: DISCONTINUED | OUTPATIENT
Start: 2024-06-27 | End: 2024-06-27

## 2024-06-27 RX ORDER — ONDANSETRON 2 MG/ML
4 INJECTION INTRAMUSCULAR; INTRAVENOUS EVERY 6 HOURS PRN
Status: DISCONTINUED | OUTPATIENT
Start: 2024-06-27 | End: 2024-07-01 | Stop reason: HOSPADM

## 2024-06-27 RX ORDER — IPRATROPIUM BROMIDE AND ALBUTEROL SULFATE 2.5; .5 MG/3ML; MG/3ML
1 SOLUTION RESPIRATORY (INHALATION)
Status: DISCONTINUED | OUTPATIENT
Start: 2024-06-27 | End: 2024-06-27 | Stop reason: HOSPADM

## 2024-06-27 RX ORDER — ONDANSETRON 4 MG/1
4 TABLET, ORALLY DISINTEGRATING ORAL EVERY 8 HOURS PRN
Status: DISCONTINUED | OUTPATIENT
Start: 2024-06-27 | End: 2024-06-27 | Stop reason: HOSPADM

## 2024-06-27 RX ORDER — HEPARIN SODIUM 10000 [USP'U]/100ML
730 INJECTION, SOLUTION INTRAVENOUS CONTINUOUS
Status: DISCONTINUED | OUTPATIENT
Start: 2024-06-27 | End: 2024-06-27

## 2024-06-27 RX ORDER — ENOXAPARIN SODIUM 100 MG/ML
40 INJECTION SUBCUTANEOUS DAILY
Status: DISCONTINUED | OUTPATIENT
Start: 2024-06-28 | End: 2024-06-27

## 2024-06-27 RX ORDER — PROPOFOL 10 MG/ML
5-50 INJECTION, EMULSION INTRAVENOUS CONTINUOUS
Status: DISCONTINUED | OUTPATIENT
Start: 2024-06-27 | End: 2024-06-27

## 2024-06-27 RX ORDER — SODIUM CHLORIDE 0.9 % (FLUSH) 0.9 %
5-40 SYRINGE (ML) INJECTION EVERY 12 HOURS SCHEDULED
Status: DISCONTINUED | OUTPATIENT
Start: 2024-06-27 | End: 2024-07-01 | Stop reason: HOSPADM

## 2024-06-27 RX ORDER — FENTANYL CITRATE 50 UG/ML
50 INJECTION, SOLUTION INTRAMUSCULAR; INTRAVENOUS
Status: DISCONTINUED | OUTPATIENT
Start: 2024-06-27 | End: 2024-06-27 | Stop reason: HOSPADM

## 2024-06-27 RX ORDER — ACETAMINOPHEN 325 MG/1
650 TABLET ORAL EVERY 6 HOURS PRN
Status: DISCONTINUED | OUTPATIENT
Start: 2024-06-27 | End: 2024-06-27 | Stop reason: HOSPADM

## 2024-06-27 RX ORDER — ASPIRIN 81 MG/1
81 TABLET, CHEWABLE ORAL DAILY
Status: DISCONTINUED | OUTPATIENT
Start: 2024-06-28 | End: 2024-07-01 | Stop reason: HOSPADM

## 2024-06-27 RX ORDER — HEPARIN SODIUM 1000 [USP'U]/ML
1800 INJECTION, SOLUTION INTRAVENOUS; SUBCUTANEOUS PRN
Status: DISCONTINUED | OUTPATIENT
Start: 2024-06-27 | End: 2024-06-27

## 2024-06-27 RX ORDER — PREDNISONE 20 MG/1
40 TABLET ORAL DAILY
Status: DISCONTINUED | OUTPATIENT
Start: 2024-06-27 | End: 2024-06-27 | Stop reason: HOSPADM

## 2024-06-27 RX ORDER — HEPARIN SODIUM 1000 [USP'U]/ML
60 INJECTION, SOLUTION INTRAVENOUS; SUBCUTANEOUS PRN
Status: DISCONTINUED | OUTPATIENT
Start: 2024-06-27 | End: 2024-06-27 | Stop reason: HOSPADM

## 2024-06-27 RX ORDER — SODIUM CHLORIDE 0.9 % (FLUSH) 0.9 %
5-40 SYRINGE (ML) INJECTION PRN
Status: DISCONTINUED | OUTPATIENT
Start: 2024-06-27 | End: 2024-06-28

## 2024-06-27 RX ORDER — FENTANYL CITRATE 50 UG/ML
INJECTION, SOLUTION INTRAMUSCULAR; INTRAVENOUS PRN
Status: DISCONTINUED | OUTPATIENT
Start: 2024-06-27 | End: 2024-06-27 | Stop reason: HOSPADM

## 2024-06-27 RX ORDER — BUSPIRONE HYDROCHLORIDE 10 MG/1
10 TABLET ORAL 3 TIMES DAILY
Status: DISCONTINUED | OUTPATIENT
Start: 2024-06-27 | End: 2024-06-27 | Stop reason: HOSPADM

## 2024-06-27 RX ORDER — ACETAMINOPHEN 650 MG/1
650 SUPPOSITORY RECTAL EVERY 6 HOURS PRN
Status: DISCONTINUED | OUTPATIENT
Start: 2024-06-27 | End: 2024-06-27 | Stop reason: HOSPADM

## 2024-06-27 RX ORDER — ARFORMOTEROL TARTRATE 15 UG/2ML
15 SOLUTION RESPIRATORY (INHALATION)
Status: DISCONTINUED | OUTPATIENT
Start: 2024-06-27 | End: 2024-07-01 | Stop reason: HOSPADM

## 2024-06-27 RX ORDER — ONDANSETRON 2 MG/ML
4 INJECTION INTRAMUSCULAR; INTRAVENOUS EVERY 6 HOURS PRN
Status: DISCONTINUED | OUTPATIENT
Start: 2024-06-27 | End: 2024-06-27 | Stop reason: HOSPADM

## 2024-06-27 RX ORDER — ATORVASTATIN CALCIUM 40 MG/1
40 TABLET, FILM COATED ORAL NIGHTLY
Status: DISCONTINUED | OUTPATIENT
Start: 2024-06-27 | End: 2024-06-27 | Stop reason: HOSPADM

## 2024-06-27 RX ORDER — IPRATROPIUM BROMIDE AND ALBUTEROL SULFATE 2.5; .5 MG/3ML; MG/3ML
1 SOLUTION RESPIRATORY (INHALATION) EVERY 4 HOURS PRN
Status: DISCONTINUED | OUTPATIENT
Start: 2024-06-27 | End: 2024-06-27 | Stop reason: SDUPTHER

## 2024-06-27 RX ORDER — AMLODIPINE BESYLATE 5 MG/1
10 TABLET ORAL DAILY
Status: DISCONTINUED | OUTPATIENT
Start: 2024-06-27 | End: 2024-06-27 | Stop reason: HOSPADM

## 2024-06-27 RX ORDER — ENOXAPARIN SODIUM 100 MG/ML
40 INJECTION SUBCUTANEOUS DAILY
Status: DISCONTINUED | OUTPATIENT
Start: 2024-06-27 | End: 2024-06-27 | Stop reason: ALTCHOICE

## 2024-06-27 RX ORDER — ESCITALOPRAM OXALATE 10 MG/1
20 TABLET ORAL NIGHTLY
Status: DISCONTINUED | OUTPATIENT
Start: 2024-06-27 | End: 2024-06-27 | Stop reason: HOSPADM

## 2024-06-27 RX ORDER — SODIUM CHLORIDE 9 MG/ML
INJECTION, SOLUTION INTRAVENOUS PRN
Status: DISCONTINUED | OUTPATIENT
Start: 2024-06-27 | End: 2024-06-27 | Stop reason: HOSPADM

## 2024-06-27 RX ORDER — SODIUM CHLORIDE 0.9 % (FLUSH) 0.9 %
5-40 SYRINGE (ML) INJECTION EVERY 12 HOURS SCHEDULED
Status: CANCELLED | OUTPATIENT
Start: 2024-06-27

## 2024-06-27 RX ORDER — PANTOPRAZOLE SODIUM 40 MG/10ML
40 INJECTION, POWDER, LYOPHILIZED, FOR SOLUTION INTRAVENOUS DAILY
Status: DISCONTINUED | OUTPATIENT
Start: 2024-06-27 | End: 2024-07-01

## 2024-06-27 RX ORDER — NITROGLYCERIN 0.4 MG/1
0.4 TABLET SUBLINGUAL EVERY 5 MIN PRN
Status: DISCONTINUED | OUTPATIENT
Start: 2024-06-27 | End: 2024-07-01 | Stop reason: HOSPADM

## 2024-06-27 RX ORDER — MAGNESIUM SULFATE IN WATER 40 MG/ML
2000 INJECTION, SOLUTION INTRAVENOUS ONCE
Status: DISCONTINUED | OUTPATIENT
Start: 2024-06-27 | End: 2024-06-27 | Stop reason: HOSPADM

## 2024-06-27 RX ORDER — ATORVASTATIN CALCIUM 40 MG/1
40 TABLET, FILM COATED ORAL NIGHTLY
Status: DISCONTINUED | OUTPATIENT
Start: 2024-06-28 | End: 2024-07-01 | Stop reason: HOSPADM

## 2024-06-27 RX ORDER — POLYETHYLENE GLYCOL 3350 17 G/17G
17 POWDER, FOR SOLUTION ORAL DAILY PRN
Status: DISCONTINUED | OUTPATIENT
Start: 2024-06-27 | End: 2024-06-27 | Stop reason: HOSPADM

## 2024-06-27 RX ORDER — MIDAZOLAM HYDROCHLORIDE 1 MG/ML
2 INJECTION INTRAMUSCULAR; INTRAVENOUS
Status: DISCONTINUED | OUTPATIENT
Start: 2024-06-27 | End: 2024-06-27 | Stop reason: HOSPADM

## 2024-06-27 RX ORDER — ROFLUMILAST 500 UG/1
500 TABLET ORAL DAILY
Status: DISCONTINUED | OUTPATIENT
Start: 2024-06-27 | End: 2024-06-27 | Stop reason: HOSPADM

## 2024-06-27 RX ORDER — DEXMEDETOMIDINE HYDROCHLORIDE 4 UG/ML
.1-1.5 INJECTION, SOLUTION INTRAVENOUS
Status: DISCONTINUED | OUTPATIENT
Start: 2024-06-27 | End: 2024-06-27

## 2024-06-27 RX ORDER — TRAMADOL HYDROCHLORIDE 50 MG/1
50 TABLET ORAL EVERY 6 HOURS PRN
Status: DISCONTINUED | OUTPATIENT
Start: 2024-06-27 | End: 2024-06-27 | Stop reason: HOSPADM

## 2024-06-27 RX ORDER — ACETAMINOPHEN 325 MG/1
650 TABLET ORAL EVERY 6 HOURS PRN
Status: DISCONTINUED | OUTPATIENT
Start: 2024-06-27 | End: 2024-06-27 | Stop reason: SDUPTHER

## 2024-06-27 RX ORDER — POLYETHYLENE GLYCOL 3350 17 G/17G
17 POWDER, FOR SOLUTION ORAL DAILY PRN
Status: DISCONTINUED | OUTPATIENT
Start: 2024-06-27 | End: 2024-07-01 | Stop reason: HOSPADM

## 2024-06-27 RX ORDER — ASPIRIN 325 MG
325 TABLET, DELAYED RELEASE (ENTERIC COATED) ORAL ONCE
Status: COMPLETED | OUTPATIENT
Start: 2024-06-27 | End: 2024-06-27

## 2024-06-27 RX ORDER — HEPARIN SODIUM 1000 [USP'U]/ML
30 INJECTION, SOLUTION INTRAVENOUS; SUBCUTANEOUS PRN
Status: DISCONTINUED | OUTPATIENT
Start: 2024-06-27 | End: 2024-06-27 | Stop reason: HOSPADM

## 2024-06-27 RX ORDER — BUDESONIDE 0.5 MG/2ML
500 INHALANT ORAL 2 TIMES DAILY
Status: DISCONTINUED | OUTPATIENT
Start: 2024-06-27 | End: 2024-06-27 | Stop reason: SDUPTHER

## 2024-06-27 RX ORDER — MIDAZOLAM HYDROCHLORIDE 1 MG/ML
INJECTION INTRAMUSCULAR; INTRAVENOUS PRN
Status: DISCONTINUED | OUTPATIENT
Start: 2024-06-27 | End: 2024-06-27 | Stop reason: HOSPADM

## 2024-06-27 RX ORDER — ACETAMINOPHEN 325 MG/1
650 TABLET ORAL EVERY 6 HOURS PRN
Status: DISCONTINUED | OUTPATIENT
Start: 2024-06-27 | End: 2024-07-01 | Stop reason: HOSPADM

## 2024-06-27 RX ORDER — PREDNISONE 20 MG/1
40 TABLET ORAL DAILY
Status: DISCONTINUED | OUTPATIENT
Start: 2024-06-28 | End: 2024-06-27

## 2024-06-27 RX ORDER — IPRATROPIUM BROMIDE AND ALBUTEROL SULFATE 2.5; .5 MG/3ML; MG/3ML
1 SOLUTION RESPIRATORY (INHALATION) EVERY 4 HOURS PRN
Status: DISCONTINUED | OUTPATIENT
Start: 2024-06-27 | End: 2024-07-01 | Stop reason: HOSPADM

## 2024-06-27 RX ORDER — ACETAMINOPHEN 650 MG/1
650 SUPPOSITORY RECTAL EVERY 6 HOURS PRN
Status: DISCONTINUED | OUTPATIENT
Start: 2024-06-27 | End: 2024-07-01 | Stop reason: HOSPADM

## 2024-06-27 RX ORDER — SODIUM CHLORIDE 0.9 % (FLUSH) 0.9 %
5-40 SYRINGE (ML) INJECTION PRN
Status: DISCONTINUED | OUTPATIENT
Start: 2024-06-27 | End: 2024-07-01 | Stop reason: HOSPADM

## 2024-06-27 RX ORDER — LISINOPRIL 5 MG/1
5 TABLET ORAL DAILY
Status: DISCONTINUED | OUTPATIENT
Start: 2024-06-28 | End: 2024-07-01 | Stop reason: HOSPADM

## 2024-06-27 RX ORDER — HYDROXYZINE HYDROCHLORIDE 10 MG/1
10 TABLET, FILM COATED ORAL 3 TIMES DAILY PRN
Status: DISCONTINUED | OUTPATIENT
Start: 2024-06-27 | End: 2024-07-01 | Stop reason: HOSPADM

## 2024-06-27 RX ADMIN — PROPOFOL 20 MCG/KG/MIN: 10 INJECTION, EMULSION INTRAVENOUS at 16:04

## 2024-06-27 RX ADMIN — DOXYCYCLINE 100 MG: 100 INJECTION, POWDER, LYOPHILIZED, FOR SOLUTION INTRAVENOUS at 21:50

## 2024-06-27 RX ADMIN — MAGNESIUM SULFATE HEPTAHYDRATE 2000 MG: 40 INJECTION, SOLUTION INTRAVENOUS at 13:59

## 2024-06-27 RX ADMIN — HEPARIN SODIUM 12 UNITS/KG/HR: 10000 INJECTION, SOLUTION INTRAVENOUS at 10:14

## 2024-06-27 RX ADMIN — PANTOPRAZOLE SODIUM 40 MG: 40 INJECTION, POWDER, FOR SOLUTION INTRAVENOUS at 20:18

## 2024-06-27 RX ADMIN — MIDAZOLAM HYDROCHLORIDE 2 MG: 1 INJECTION, SOLUTION INTRAMUSCULAR; INTRAVENOUS at 14:01

## 2024-06-27 RX ADMIN — TRAMADOL HYDROCHLORIDE 50 MG: 50 TABLET, COATED ORAL at 04:36

## 2024-06-27 RX ADMIN — ACETYLCYSTEINE 600 MG: 200 INHALANT RESPIRATORY (INHALATION) at 19:56

## 2024-06-27 RX ADMIN — WATER 40 MG: 1 INJECTION INTRAMUSCULAR; INTRAVENOUS; SUBCUTANEOUS at 18:27

## 2024-06-27 RX ADMIN — HYDROXYZINE HYDROCHLORIDE 25 MG: 25 TABLET ORAL at 07:39

## 2024-06-27 RX ADMIN — ARFORMOTEROL TARTRATE 15 MCG: 15 SOLUTION RESPIRATORY (INHALATION) at 20:01

## 2024-06-27 RX ADMIN — ASPIRIN 325 MG: 325 TABLET, COATED ORAL at 09:57

## 2024-06-27 RX ADMIN — BUDESONIDE 500 MCG: 0.5 SUSPENSION RESPIRATORY (INHALATION) at 20:03

## 2024-06-27 RX ADMIN — BUSPIRONE HYDROCHLORIDE 10 MG: 10 TABLET ORAL at 07:39

## 2024-06-27 RX ADMIN — IPRATROPIUM BROMIDE AND ALBUTEROL SULFATE 1 DOSE: 2.5; .5 SOLUTION RESPIRATORY (INHALATION) at 07:19

## 2024-06-27 RX ADMIN — PREDNISONE 40 MG: 20 TABLET ORAL at 09:57

## 2024-06-27 RX ADMIN — DOXYCYCLINE HYCLATE 100 MG: 100 TABLET, COATED ORAL at 09:57

## 2024-06-27 RX ADMIN — HYDROXYZINE HYDROCHLORIDE 10 MG: 10 TABLET ORAL at 20:14

## 2024-06-27 RX ADMIN — Medication 10 ML: at 09:58

## 2024-06-27 RX ADMIN — BUSPIRONE HYDROCHLORIDE 10 MG: 5 TABLET ORAL at 20:13

## 2024-06-27 RX ADMIN — FUROSEMIDE 40 MG: 10 INJECTION, SOLUTION INTRAMUSCULAR; INTRAVENOUS at 00:02

## 2024-06-27 RX ADMIN — SODIUM CHLORIDE: 9 INJECTION, SOLUTION INTRAVENOUS at 18:27

## 2024-06-27 RX ADMIN — ROFLUMILAST 500 MCG: 500 TABLET ORAL at 09:57

## 2024-06-27 RX ADMIN — ESCITALOPRAM OXALATE 20 MG: 10 TABLET ORAL at 20:13

## 2024-06-27 RX ADMIN — SODIUM CHLORIDE, PRESERVATIVE FREE 10 ML: 5 INJECTION INTRAVENOUS at 20:13

## 2024-06-27 RX ADMIN — HEPARIN SODIUM 3700 UNITS: 1000 INJECTION INTRAVENOUS; SUBCUTANEOUS at 10:04

## 2024-06-27 RX ADMIN — IPRATROPIUM BROMIDE AND ALBUTEROL SULFATE 1 DOSE: 2.5; .5 SOLUTION RESPIRATORY (INHALATION) at 19:56

## 2024-06-27 RX ADMIN — IPRATROPIUM BROMIDE AND ALBUTEROL SULFATE 1 DOSE: 2.5; .5 SOLUTION RESPIRATORY (INHALATION) at 10:41

## 2024-06-27 RX ADMIN — LORAZEPAM 2 MG: 2 INJECTION INTRAMUSCULAR; INTRAVENOUS at 15:43

## 2024-06-27 ASSESSMENT — PAIN SCALES - GENERAL
PAINLEVEL_OUTOF10: 8

## 2024-06-27 ASSESSMENT — PULMONARY FUNCTION TESTS
PIF_VALUE: 16
PIF_VALUE: 16
PIF_VALUE: 19
PIF_VALUE: 16
PIF_VALUE: 16
PIF_VALUE: 21
PIF_VALUE: 16
PIF_VALUE: 20

## 2024-06-27 ASSESSMENT — PAIN DESCRIPTION - ORIENTATION
ORIENTATION: LEFT

## 2024-06-27 ASSESSMENT — PAIN DESCRIPTION - LOCATION
LOCATION: SHOULDER

## 2024-06-27 ASSESSMENT — PAIN DESCRIPTION - DESCRIPTORS
DESCRIPTORS: ACHING

## 2024-06-27 ASSESSMENT — PAIN DESCRIPTION - PAIN TYPE: TYPE: CHRONIC PAIN

## 2024-06-27 NOTE — CONSULTS
CARDIOLOGY CONSULTATION        Patient Name: Annie Tyler  Date of admission: 6/26/2024  8:29 PM  Admission Dx: Shortness of breath [R06.02]  NSTEMI (non-ST elevated myocardial infarction) (HCC) [I21.4]  Acute on chronic respiratory failure with hypoxia (HCC) [J96.21]  Acute on chronic respiratory failure with hypoxia and hypercapnia (HCC) [J96.21, J96.22]  Requesting Physician: Mark Serrato MD  Primary Care physician: Keyla Mtz MD    Reason for Consultation/Chief Complaint: NSTEMI, acute CM    History of Present Illness:     Annie Tyler is a 68 y.o. patient with past medical history as below who presented to the hospital with complaints of acute hypoxic respiratory failure.  Patient was just discharged yesterday where she was treated for an acute exacerbation of COPD.  Patient has history of tracheostomy with intermittent ventilator dependence.  On arrival after discharge to her nursing facility, patient was found hypoxic to O2 saturation 70s.  She was therefore brought back to [place] ED.  Patient required nonrebreather 15 L with trach collar.  She has since been hooked back to the ventilator and this morning on minimal ventilator support with FiO2 35%.  Patient is awake and alert.  Able to mouth answers to questions.  She reports her breathing is doing okay at this time.  Patient has been diaphoretic throughout the morning.  She denies any chest pain however.      Past Medical History:   has a past medical history of Angina pectoris (HCC), Chronic pain, Congestive heart failure (HCC), COPD exacerbation (HCC), Depression, Essential hypertension, Panic disorder, Pneumonia, and Pulmonary emphysema (HCC).    Surgical History:   has a past surgical history that includes Cholecystectomy and Colonoscopy.     Social History:   reports that she quit smoking about 3 years ago. Her smoking use included cigarettes. She started smoking about 55 years ago. She has a 104.0 pack-year smoking history.  with interventional cards at [place].  Agreeable for transfer to the Cath Lab.  Thereafter patient will need to be admitted to the ICU.  Discussed with primary to have them discuss the case with the admitting hospitalist after the cath.  Patient will definitively have a left heart catheterization plus or minus right heart cath.  -Discussed pulmonary nodule CT findings with pulm/CCM.  No plan for biopsy at this time.  No contraindication to PCI should this be needed from their standpoint.  -Give 325 mg aspirin now. 40 mg Lipitor now. Start heparin drip. Hold further diuresis. Hold all antihypertensives given softer pressures.   -Avoid starting beta-blocker prior to heart cath.     Patient Active Problem List   Diagnosis    Chest pain    Shortness of breath    Tobacco use    Moderate COPD (chronic obstructive pulmonary disease) (HCC)    COPD exacerbation (HCC)    Acute respiratory failure with hypoxia (HCC)    Hyponatremia    Pulmonary nodule    Pulmonary emphysema (HCC)    Acute on chronic respiratory failure with hypoxia and hypercapnia (HCC)    Community acquired pneumonia of right lung    Acute exacerbation of chronic obstructive pulmonary disease (COPD) (HCC)    Acute on chronic respiratory failure (HCC)    Acute respiratory failure with hypoxia and hypercapnia (HCC)    HCAP (healthcare-associated pneumonia)    Transaminitis    Pancreatic abnormality    Common bile duct dilatation    Community acquired pneumonia    Pneumonia due to COVID-19 virus    Primary hypertension    COVID-19    Hypokalemia    History of COVID-19    Leukocytosis    Chronic respiratory failure with hypoxia (HCC)    Metabolic encephalopathy    Acute encephalopathy    Chronic hypoxemic respiratory failure (HCC)    Opacities of both lungs present on chest x-ray    Respiratory distress    Multiple closed fractures of ribs of left side    Acute on chronic hypoxic respiratory failure (HCC)    Leg edema    Disorder of electrolytes    Hypoxia

## 2024-06-27 NOTE — PROGRESS NOTES
RT Nebulizer Bronchodilator Protocol Note    There is a bronchodilator order in the chart from a provider indicating to follow the RT Bronchodilator Protocol and there is an “Initiate RT Bronchodilator Protocol” order as well (see protocol at bottom of note).    CXR Findings:  XR CHEST PORTABLE    Result Date: 6/26/2024  1. No acute cardiopulmonary process. 2. COPD. 3. Tracheostomy tube in good position.       The findings from the last RT Protocol Assessment were as follows:  Smoking: (P) Chronic pulmonary disease  Respiratory Pattern: (P) Dyspnea on exertion or RR 21-25 bpm  Breath Sounds: (P) Slightly diminished and/or crackles  Cough: (P) Strong, productive  Indication for Bronchodilator Therapy: (P) On home bronchodilators  Bronchodilator Assessment Score: (P) 7    Aerosolized bronchodilator medication orders have been revised according to the RT Nebulizer Bronchodilator Protocol below.    Respiratory Therapist to perform RT Therapy Protocol Assessment initially then follow the protocol.  Repeat RT Therapy Protocol Assessment PRN for score 0-3 or on second treatment, BID, and PRN for scores above 3.    No Indications - adjust the frequency to every 6 hours PRN wheezing or bronchospasm, if no treatments needed after 48 hours then discontinue using Per Protocol order mode.     If indication present, adjust the RT bronchodilator orders based on the Bronchodilator Assessment Score as indicated below.  If a patient is on this medication at home then do not decrease Frequency below that used at home.    0-3 - enter or revise RT bronchodilator order(s) to equivalent RT Bronchodilator order with Frequency of every 4 hours PRN for wheezing or increased work of breathing using Per Protocol order mode.       4-6 - enter or revise RT Bronchodilator order(s) to two equivalent RT bronchodilator orders with one order with BID Frequency and one order with Frequency of every 4 hours PRN wheezing or increased work of breathing

## 2024-06-27 NOTE — PROGRESS NOTES
PULMONARY AND CRITICAL CARE INPATIENT NOTE        Annie Tyler   : 1955  MRN: [id number]     Admitting Physician: Moe Ly MD  Attending Physician: Moe Ly MD  PCP: Keyla Mtz MD    Admission: 2024   Date of Service: 2024    No chief complaint on file.          ASSESSMENT & PLAN       68 y.o. pleasant  female patient with:    Assessment:  Suspected non-STEMI versus Takotsubo cardiomyopathy.  Reduced EF 35 to 40% with elevated troponin  Diaphoresis  Severe anxiety attacks  Mild lactic acidosis  Acute hypoxemic respiratory failure  End-stage COPD with severe emphysema with acute exacerbation  Chronic hypoxic respiratory failure/trach dependent.  History after cardiac arrest related admission in 2024 at .  On AVAPS at night and trach collar during the day at nursing home  Growing RUL nodule followed by pulmonary clinic.  Highly concerning for cancer  H/O KAMLA lung cancer, clinical diagnosis, saw Dr. Navarro & was not a surgical candidate, s/p 5 fractions of SBRT 5/3/22 - 22 by Dr. Gaitan   Former smoker, >100 pack years, quit    H/O COVID19 pneumonia Aug 2022 with hospitalization  Has parakeets in the time, x2 yrs       Plan:              Patient is going for left heart cath and possible right heart cath today  Continue comfortable fall for sedation.  Will have Precedex as needed for anxiety.  Will address anxiety medications further tomorrow  Keep patient on mechanical ventilation through tracheostomy.  Settings reviewed  Ventilator bundle  Continue doxycycline.  Patient is allergic to Rocephin  Follow-up sputum culture and respiratory micro panel to decide on the need on adding meropenem  Will monitor closely and see if the patient would benefit from bronchoscopy and evaluation for mucous plugging  Brovana, Pulmicort and Paulina  Continue to wean oxygen with target 90 to 94%.  Fentanyl and propofol.  There is sedation vacation  Head of bed 30 degrees  or higher at all times  ASA statin and Heparin drip   Left heart cath by cardiology today  Nutrition: Tube feeding when able   Blood sugar control ISS, with goal 140-180  DVT prophylaxis: Heparin drip     Time spent on this patient, excluding procedures time, is ~35 minutes.  Time was spent on reviewing the EMR, reviewing overnight events, interpreting labs and imaging, interviewing and directly examining the patient, ventilator/breathing assistance device/drip review and adjustment, running multidisciplinary rounds and coordinating critical care plan with bedside nurse, and other care providers.        Subjective/Objective           CC/Reason for Consult: Mechanical ventilation, suspected acute coronary syndrome        HPI: 6/27/2024  68-year-old female patient with PMH of HTN, HFrEF with EF 40%, COPD/emphysema, LLL cancer s/p SBRT in 2022 not being surgical candidate, more than 100-pack-year smoking history, chronic hypoxic respiratory failure/trach dependent who was transferred from [facility] for interventional cardiology to perform left heart cath.  Patient was recently discharged from [facility] after being admitted for hypoxic respiratory failure, AECOPD and chest congestion with aspiration.  She was treated with antibiotics, mechanical ventilation, bronchodilators, Solu-Medrol, hypertonic saline.  Few hours after discharge to the nursing facility patient bounced back with worsening shortness of breath, hypoxemia and diaphoresis.  Cardiology evaluated the patient and echocardiogram showed reduced ejection fraction with minimally elevated troponin.  There was concern about Takotsubo cardiomyopathy versus acute coronary syndrome and left heart cath recommended thus transferred to our center sent.    Patient was placed on mechanical ventilation and started on sedation.  She remained tachycardic in the 100 and teens range  Blood sugar remained within range  Respiratory viral panel negative at

## 2024-06-27 NOTE — PROGRESS NOTES
Pt admitted to ICU RM 8 from ED.    Admission assessment, completed, see flow sheet. Pt is alert and oriented x 4. Following commands. Trach/Vent.    Shiley size 6.  On  / 14 /45 %/ 5. , /103, SpO2 99%. Respirations are easy, even, EDGAR. Bilateral lung sounds diminished with expiratory wheezing. PEG tube in place and patent. X 2 PIV WNL and SL    External catheter in place and WNL    Call light within reach. Bed in lowest position. Bed alarm on.

## 2024-06-27 NOTE — PRE SEDATION
Sedation Plan  ASA: class 4 - patient with severe systemic disease that is a constant threat to life     Mallampati class: unable to assess.    Sedation plan: local anesthesia and moderate (conscious sedation)    Risks, benefits, and alternatives discussed with patient.        Immediate reassessment prior to sedation:  Patient's status reviewed and vital signs assessed; acceptable to perform procedure and proceed to administer sedation as planned.      Brief Pre-Op Note/Sedation Assessment      Annie Tyler  1955  [id number]  4:09 PM    Planned Procedure: Cardiac Catheterization Procedure  Post Procedure Plan: Return to same level of care  Consent: I have discussed with the patient and/or the patient representative the indication, alternatives, and the possible risks and/or complications of the planned procedure and the anesthesia methods. The patient and/or patient representative appear to understand and agree to proceed.        Chief Complaint:   NSTEMI  Dyspnea on Exertion      Indications for Cath Procedure:  Presentation:  ACS > 24 hrs, New Onset Angina <= 2 months, and Cardiomyopathy  2.  Anginal Classification within 2 weeks:  CCS IV - Inability to perform any activity without angina or angina at rest, i.e., severe limitation  3.  Angina Symptoms Assessment:  Atypical Chest Pain  4.  Heart Failure Class within last 2 weeks:  Yes:  Heart Failure Type: Systolic Severity:  Class IV - Symptoms of HF at rest  5.  Cardiovascular Instability:  Yes:  Decompensating heart failure    Prior Ischemic Workup/Eval:  Pre-Procedural Medications: Yes: ACE/ARB/ARNI and Ca Channel Blockers  2.   Stress Test Completed?    Echocardiogram showing new CMP    Does Patient need surgery?  Cath Valve Surgery:  No    Pre-Procedure Medical History:  Vital Signs:  Pulse (!) 116   Resp 21   SpO2 95%     Allergies:    Allergies   Allergen Reactions    Cephalosporins Shortness Of Breath     OK to take aztreonam and meropenem

## 2024-06-27 NOTE — PROGRESS NOTES
06/27/24 1553   Patient Observation   Pulse (!) 116   Respirations 21   SpO2 95 %   Breath Sounds   Right Upper Lobe Rhonchi   Right Middle Lobe Diminished   Right Lower Lobe Diminished   Left Upper Lobe Rhonchi   Left Lower Lobe Diminished   Vent Information   Vent Mode (S)  CPAP/PS  (pt not tolerating AC/VC NP AWARE)   Ventilator Settings   PEEP/CPAP (cmH2O) 5   FiO2  35 %   Pressure Support (cm H2O) 10 cm H2O   Vent Patient Data (Readings)   Vt (Measured) 521 mL   Peak Inspiratory Pressure (cmH2O) 16 cmH2O   Rate Measured 26 br/min   Minute Volume (L/min) 12.3 Liters   Mean Airway Pressure (cmH2O) 9 cmH20   Plateau Pressure (cm H2O) 0 cm H2O   Driving Pressure -5   I:E Ratio 1:2.00   Backup Apnea On   Vent Alarm Settings   High Pressure (cmH2O) 40 cmH2O   Low Minute Volume (lpm) 2 L/min   Low Exhaled Vt (ml) 200 mL   RR High (bpm) 40 br/min   Apnea (secs) 20 secs   Additional Respiratoray Assessments   Humidification Source Heated wire   Humidification Temp 35.6   Ambu Bag With Mask At Bedside Yes   Backup Trachs Available (Size) 4.0;6.0   Airway Clearance   Suction Trach   Suction Device Inline suction catheter   Sputum Method Obtained Tracheal   Sputum Amount Large   Sputum Color/Odor White;Yellow   Sputum Consistency Thin   Surgical Airway (Trach) Shiley Cuffed   No placement date or time found.   Present on Admission/Arrival: Yes  Surgical Airway Type: Tracheostomy  Brand: Analey  Style: Cuffed  Size: 6   Status Secured   Site Assessment Clean;Dry   Ties Assessment Intact;Secure   Cuff Pressure 30 cm H2O   Spare Trach at Bedside Yes   Spare Trach Tube One Size Smaller at Bedside Yes   Ambu Bag With Mask at Bedside Yes

## 2024-06-27 NOTE — H&P
[facility] Medicine History & Physical      Patient Name: Annie Tyler    : 1955    PCP: Keyla Mtz MD    Date of Service:  Patient seen and examined on 24     Chief Complaint: Shortness of    History Of Present Illness:    Annie Tyler is a 68 y.o. female with a history of COPD, status post tracheostomy presented emergency room with shortness of breath   Of note patient was recently admitted from  to  for acute on chronic hypoxic respiratory failure secondary to acute COPD exacerbation.  Discharged today to a nursing facility.  On arrival to a nursing facility patient was found to be in respiratory distress on her usual vent settings with saturations in the 70s.  Placed on nonrebreather at 15 L on trach collar.  Brought back to the ED with saturations on arrival in the 90s.  Patient denies any symptoms    Vitals show systolic 117 diastolic 82 pulse of 122 temperature 98.6 respiration 25 saturating 100% on her home vent setting..  Labs show potassium of 3.4 chloride of 97 magnesium 1.5 glucose of 130 proBNP of 5084, troponin of 182/166.    WBC of 15.4 hemoglobin 11.8.  D-dimer 1.8.  ABG stable.      Chest x-ray shows no acute cardiopulmonary process, presence of COPD, tracheostomy tube in good position    .  In the ED patient received diltiazem 10 mg IV, 40 mg IV Lasix, Atarax 50 mg, Ativan 1 mg, 2 g of magnesium sulfate, 125 mg IV of methylprednisone, 40 mill equivalent of potassium.    Past Medical History:    Patient has a past medical history of Angina pectoris (HCC), Chronic pain, Congestive heart failure (HCC), COPD exacerbation (HCC), Depression, Essential hypertension, Panic disorder, Pneumonia, and Pulmonary emphysema (HCC).    Past Surgical History:    Patient has a past surgical history that includes Cholecystectomy and Colonoscopy.    Medications Prior to Admission:      Prior to Admission medications    Medication Sig Start Date End Date Taking?    Date/Time Value Ref Range Status   09/15/2017 08:18  0 - 199 mg/dL Final     Triglycerides   Date/Time Value Ref Range Status   09/15/2017 08:18 AM 44 0 - 150 mg/dL Final     HDL   Date/Time Value Ref Range Status   09/15/2017 08:18 AM 81 (H) 40 - 60 mg/dL Final   05/14/2012 06:55 AM 64 (H) 40 - 60 mg/dl Final         Radiology:     XR CHEST PORTABLE   Final Result   1. No acute cardiopulmonary process.   2. COPD.   3. Tracheostomy tube in good position.                 ASSESSMENT/PLAN:  (There is no height or weight on file to calculate BMI.)   68-year-old female with a history of COPD, status post tracheostomy presented emergency room with shortness of breath     Acute on chronic hypoxic respiratory failure  Patient recently admitted and treated for COPD exacerbation  Desaturation to the 70s on the vent, stabilized on home vent setting  Chest imaging shows no acute cardiopulmonary  Recent CTA done 6/25 showed no PE  Plan-continue current vent setting, pulmonary consult    #Concerns for CHF  Elevated proBNP  Troponins elevated in the 100  No acute changes on EKG  Plan-cardiology,, trend troponin     #History of COPD   Recently treated for COPD exacerbation  Continue current medications  Antibiotics, inhaled bronchodilators,     #History of left upper lobe lung cancer  Not a surgical candidate  S/p SBRT  Enlarging right upper lobe nodule on CT     # Hypertension  Resume home medication     # Generalized anxiety disorder  Continue Lexapro and BuSpar    DVT prophylaxis: Lovenox     Diet: REGULAR  Code Status: FULL    Consults:   IP CONSULT TO CARDIOLOGY    Disposition:  Admit to Inpatient   ELOS:  Greater than two midnights due to medical therapy     Please note that portions of this note were completed with a voice recognition program.  Efforts were made to edit the dictations but occasionally words are mis-transcribed.)     Mark Serrato MD

## 2024-06-27 NOTE — CONSULTS
Pulmonary & Critical Care Consultation Note    Patient is being seen at the request of Sebas Rodriguez MD  for a consultation for Vent    HISTORY OF PRESENT ILLNESS:   Patient admitted with pulmonary congestion COPD exacerbation discharge yesterday to the nursing home.  Patient came back to ED few hours later with shortness of breath and hypoxia placed on mechanical ventilation. Not much secretions- creamy mild.   Heparin drip   RR 14     PEEP 5  FiO2 35%          PAST MEDICAL HISTORY:  Past Medical History:   Diagnosis Date    Angina pectoris (HCC)     Chronic pain     knees and lower back     Congestive heart failure (HCC) 1/5/2024    COPD exacerbation (HCC) 05/20/2018    Depression     Essential hypertension 08/15/2022    Panic disorder     Pneumonia     Pulmonary emphysema (HCC)      PAST SURGICAL HISTORY:  Past Surgical History:   Procedure Laterality Date    CHOLECYSTECTOMY      COLONOSCOPY         FAMILY HISTORY:  family history includes COPD in her mother; Hypertension in her mother.    SOCIAL HISTORY:   reports that she quit smoking about 3 years ago. Her smoking use included cigarettes. She started smoking about 55 years ago. She has a 104.0 pack-year smoking history. She has been exposed to tobacco smoke. She has never used smokeless tobacco.    Scheduled Meds:   sodium chloride flush  5-40 mL IntraVENous 2 times per day    enoxaparin  40 mg SubCUTAneous Daily    amLODIPine  10 mg Oral Daily    Roflumilast  500 mcg Oral Daily    lisinopril  5 mg Oral Daily    furosemide  20 mg Oral Daily    escitalopram  20 mg Oral Nightly    busPIRone  10 mg Oral TID    doxycycline hyclate  100 mg Oral 2 times per day    predniSONE  40 mg Oral Daily    ipratropium 0.5 mg-albuterol 2.5 mg  1 Dose Inhalation 4x Daily RT    ipratropium 0.5 mg-albuterol 2.5 mg         Continuous Infusions:   sodium chloride       PRN Meds:  sodium chloride flush, sodium chloride, potassium chloride **OR** potassium chloride,  magnesium sulfate, ondansetron **OR** ondansetron, polyethylene glycol, acetaminophen **OR** acetaminophen, ipratropium 0.5 mg-albuterol 2.5 mg, hydrOXYzine HCl, traMADol, ipratropium 0.5 mg-albuterol 2.5 mg    ALLERGIES:  Patient is allergic to cephalosporins, penicillins, and hctz [hydrochlorothiazide].    REVIEW OF SYSTEMS: on MV not able to obtain       PHYSICAL EXAM:  Blood pressure (!) 94/58, pulse (!) 102, temperature 98.1 °F (36.7 °C), temperature source Oral, resp. rate 23, height 1.6 m (5' 2.99\"), weight 61.1 kg (134 lb 11.2 oz), SpO2 100 %, not currently breastfeeding.' on AC   Gen: No distress.  Alert wearing  Eyes: PERRL. No sclera icterus. No conjunctival injection.   ENT: No discharge. Pharynx clear.  Tracheostomy tube in place.  Neck: Trachea midline. No obvious mass.    Resp: Accessory muscle use. No crackles.  Few wheezes.  Few rhonchi. No dullness on percussion.  CV: Regular rate. Regular rhythm. No murmur or rub. No edema.  GI: Non-tender. Non-distended. No hernia.   Skin: Warm and dry. No nodule on exposed extremities.   Lymph: No cervical LAD. No supraclavicular LAD.   M/S: No cyanosis. No joint deformity. No clubbing.   Neuro: Awake. Alert. Moves all four extremities.   Psych: + anxiety.         LABS:  CBC:   Recent Labs     06/26/24 0426 06/26/24 2057 06/27/24  0420   WBC 5.2 15.4* 7.8   HGB 10.4* 11.8* 11.8*   HCT 30.9* 36.3 35.5*   MCV 84.4 85.2 85.3    456* 376     BMP:   Recent Labs     06/26/24 0426 06/26/24 2057 06/27/24 0612   * 136 137   K 4.0 3.4* 3.9   CL 99 97* 96*   CO2 26 25 22   BUN 9 8 9   CREATININE <0.5* <0.5* <0.5*     LIVER PROFILE:   Recent Labs     06/24/24  1354 06/26/24  2057 06/27/24  0612   AST 23 26 26   ALT 18 16 14   BILITOT 0.4 0.4 0.3   ALKPHOS 126 121 109     PT/INR: No results for input(s): \"PROTIME\", \"INR\" in the last 72 hours.  APTT: No results for input(s): \"APTT\" in the last 72 hours.  UA:No results for input(s): \"NITRITE\", \"COLORU\",

## 2024-06-27 NOTE — PROGRESS NOTES
4 Eyes Skin Assessment     NAME:  Annie Tyler  YOB: 1955  MEDICAL RECORD NUMBER:  [id number]    The patient is being assessed for  Admission    I agree that at least one RN has performed a thorough Head to Toe Skin Assessment on the patient. ALL assessment sites listed below have been assessed.      Areas assessed by both nurses:    Head, Face, Ears, Shoulders, Back, Chest, Arms, Elbows, Hands, Sacrum. Buttock, Coccyx, Ischium, Legs. Feet and Heels, and Under Medical Devices         Does the Patient have a Wound? No noted wound(s)   Excoriation and redness noted around rectum/buttocks r/t incontinence       William Prevention initiated by RN: Yes  Wound Care Orders initiated by RN: No    Pressure Injury (Stage 3,4, Unstageable, DTI, NWPT, and Complex wounds) if present, place Wound referral order by RN under : No    New Ostomies, if present place, Ostomy referral order under : No     Nurse 1 eSignature: Electronically signed by Yohana Boland RN on 6/27/24 at 7:14 PM EDT    **SHARE this note so that the co-signing nurse can place an eSignature**    Nurse 2 eSignature: {Esignature:[id number]}

## 2024-06-27 NOTE — PROGRESS NOTES
Full consult note to follow.      LVEF 35 to 40% multiple wall motion abnormalities.  Possibly Takotsubo.  However troponins elevated 180 on admission downtrending now.  Possible component of supply/demand mismatch.  However with wall motion abnormalities cannot rule out underlying CAD as a contributing factor.  Patient history of tracheostomy.  Intermittent ventilator dependency.  At baseline currently.  Discussed with patient and her daughter.  Agreeable to transfer to [facility] for heart catheterization.  Patient diaphoretic throughout this morning.  Denies any chest pain.    Discussed with interventional cards at [facility].  Agreeable for transfer to the Cath Lab.  Thereafter patient will need to be admitted to the ICU.  Discussed with primary to have them discuss the case with the admitting hospitalist after the cath.  Patient will definitively have a left heart catheterization plus or minus right heart cath.    Patient in no apparent distress aside from intermittent diaphoresis.  Denies any chest pain.  Patient with trach collar and on vent.  Ventilator settings back to her baseline.  FiO2 35%.  Patient reports her breathing feels like is back to baseline.  Extremities are warm on exam.  Very minimal trace pitting edema in lower extremities.  No obvious JVD.    Give 325 mg aspirin now.  40 mg Lipitor now.  Start heparin drip.  Hold further diuresis.  Hold all antihypertensives given softer pressures.

## 2024-06-27 NOTE — PROGRESS NOTES
06/26/24 2103   Patient Observation   Pulse (!) 130   Respirations 14   SpO2 98 %   Breath Sounds   Right Upper Lobe Rhonchi   Right Middle Lobe Diminished   Right Lower Lobe Diminished   Left Upper Lobe Rhonchi   Left Lower Lobe Diminished   Vent Information   Vent Mode AC/VC   $Ventilation $Initial Day   Ventilator Settings   Vt (Set, mL) 400 mL   Resp Rate (Set) 14 bpm   PEEP/CPAP (cmH2O) 5   FiO2  45 %   Vent Patient Data (Readings)   Vt (Measured) 437 mL   Peak Inspiratory Pressure (cmH2O) 20 cmH2O   Rate Measured 27 br/min   Minute Volume (L/min) 11.3 Liters   Mean Airway Pressure (cmH2O) 12 cmH20   I:E Ratio 1:2.2   Flow Sensitivity 3 L/min   Vent Alarm Settings   High Pressure (cmH2O) 40 cmH2O   Low Minute Volume (lpm) 4 L/min   Low Exhaled Vt (ml) 300 mL   RR High (bpm) 40 br/min   Apnea (secs) 20 secs   Additional Respiratoray Assessments   Humidification Source Heated wire   Circuit Condensation Drained   Ambu Bag With Mask At Bedside Yes   Airway Clearance   Suction Trach   Suction Device Inline suction catheter   Sputum Method Obtained Tracheal   Sputum Amount Small   Sputum Color/Odor White   Sputum Consistency Thick   Surgical Airway (Trach) Shiley Cuffed   No placement date or time found.   Present on Admission/Arrival: Yes  Surgical Airway Type: Tracheostomy  Brand: Shiley  Style: Cuffed  Size: 6   Status Secured   Site Assessment Clean;Dry   Ties Assessment Clean;Dry;Intact

## 2024-06-27 NOTE — PROGRESS NOTES
FiO2 decraesed to 35%           06/27/24 0725   NICU Vent Information   Plateau Pressure 16 cmH20   Cough/Sputum   Sputum How Obtained Tracheal;Suctioned   Cough Productive   Sputum Amount Moderate   Sputum Color White   Tenacity Tenacious   Breath Sounds   Right Upper Lobe Diminished   Right Middle Lobe Diminished   Right Lower Lobe Diminished   Left Upper Lobe Diminished   Left Lower Lobe Diminished   Additional Respiratory Assessments   Position Semi-Watts's   Humidification Source Heated wire   Vent Alarm Settings   Apnea (secs) 20 secs   Surgical Airway (Trach) Shiley Cuffed   No placement date or time found.   Present on Admission/Arrival: Yes  Surgical Airway Type: Tracheostomy  Brand: Shiley  Style: Cuffed  Size: 6   Status Secured   Site Assessment Clean;Dry   Site Care Cleansed;Dried;Dressing applied   Inner Cannula Care Changed/new   Ties Assessment Clean;Dry;Intact   Spare Trach at Bedside Yes   Spare Trach Tube One Size Smaller at Bedside Yes   Ambu Bag With Mask at Bedside Yes

## 2024-06-27 NOTE — CONSULTS
Pharmacy to Manage Heparin Infusion per Hospital Nomogram    Dx: NSTEMI  Pt wt = 61.1 kg.  Baseline aPTT = 24 sec at 0957.   ** Transferred from [facility] - will continue rate of 730 units/hr    Oral factor Xa-inhibitors may alter and elevate anti-Xa levels used for unfractionated heparin monitoring. As a result, anti-Xa monitoring is not accurate while Xa-inhibitor activity is detectable. Utilize aPTT monitoring when patient received an oral factor Xa-inhibitor (apixaban, betrixaban, edoxaban or rivaroxaban) within 72 hours prior to admission (please document last administration time). The goal is to allow a washout of oral factor Xa-inhibitors by using aPTT for 72 hours, then change to ant-Xa levels for UFH.       Heparin (weight-based) Infusion: CAD/STEMI/NSTEMI/UA/AFib)   Heparin 60 units/kg IVP bolus (max 4,000 units) followed by Heparin infusion at 12 units/kg/hr (recommended max initial rate: 1000 units/hr).  Recheck anti-Xa (unless aPTT being used) in 6 hours.  Goal anti-Xa 0.3-0.7 IU/mL  Goal aPTT =  seconds.    Pharmacy to manage Heparin - contact for questions.    Heparin 60 units/kg IV x 1 (max 4,000 units), then 12 units/kg/hr (recommended max initial rate 1,000 units/hr). Adjust infusion rate based off anti-Xa results below.     anti-Xa < 0.1    Heparin 60 units/kg bolus  Increase infusion by 4 units/kg/hr  anti-Xa 0.1-0.29 Heparin 30 units/kg bolus Increase infusion by 2 units/kg/hr  anti-Xa 0.3-0.7    No bolus No change   anti-Xa 0.71-0.8   No bolus Decrease infusion by 1 units/kg/hr  anti-Xa 0.81-0.99    No bolus Decrease infusion by 2 units/kg/hr  anti-Xa 1 or more     Hold heparin for 1 hour Decrease infusion by 3 units/kg/hr    Obtain anti-Xa 6 hours after bolus and 6 hours after any dose change until two consecutive therapeutic anti-Xa are achieved- then daily.    anti-Xa due at 1630    Karie Dsouza PharmD 6/27/2024  4:50 PM

## 2024-06-27 NOTE — PROGRESS NOTES
1530: Pt received from transport. Heparin gtt infusing @ 12u/kg/hr and 2g Mg.   Pt anxious, trembling, HR 140s, and expresses feeling of anxiety. Nasra Ambrosio NP at bedside. See orders for 2mg Ativan IVP.       1545: Dr. Davis at bedside. Plans to go to cath lab for heart cath. Per verbal orders, ok'ed to stop heparin gtt at this time.

## 2024-06-27 NOTE — H&P
Hospital Medicine History & Physical      Date of Admission: 6/27/2024    Date of Service:  Pt seen/examined on 6/27/2024      [x]Admitted to Inpatient with expected LOS greater than two midnights due to medical therapy.  []Placed in Observation status.    Chief Admission Complaint: Patient was transferred over here from [facility] ICU for cardiac catheterization following non ST elevation MI    Presenting Admission History:      68 y.o. female who presented to [facility] with above complaint.  PMHx significant for chronic respiratory failure tracheostomy COPD pulmonary nodule cancer hypertension CHF and chronic pain  She was admitted to [facility] this morning with acute on chronic hypoxic respiratory failure possible COPD exacerbation or CHF.  Patient was admitted to ICU and was connected to the ventilator through tracheostomy  Echocardiogram showed reduced ejection fraction that was new and patient was transferred over here for cardiac catheterization  She lives in nursing home  Assessment/Plan:      Current Principal Problem:  Respiratory failure (HCC)    Non-ST elevation MI-patient was started on heparin and transferred over here for cardiac cath-cardiology input appreciated s/p cardiac cath-right and left cardiac catheterization  Takotsubo cardiomyopathy -ejection fraction 35%  No PCI-nonobstructive CAD  Normal right heart cath  -May DC heparin IV  Continue with aspirin statin lisinopril, not on any beta-blocker or Aldactone  Deferred to cardiology    Acute on chronic respiratory failure/chronic tracheostomy-currently on vent admitted to ICU intensivist consult  Possible COPD exacerbation currently getting nebulizers and steroid  Doxycycline IV    Right upper lobe nodule-cancer  Not a surgical candidate s/p SBRT  Enlarging right upper lobe nodule  ?  Palliative care    Hypertension-hold Norvasc, blood pressure soft      C. difficile is pending?  Diarrhea-need to verify    Elevated anion  []Acute Rehab  [x]ECF  []LTAC  []Hospice    Consults:      IP CONSULT TO CRITICAL CARE  IP CONSULT TO CARDIOLOGY      This patient has a high likelihood of being discharged tomorrow and is appropriate for A1 Discharge Unit in AM pending clinical course overnight: []Yes  [x]No    --------------------------------------------------------------------------------------------------------------------------------------------------------------------    Imaging:     Cardiac procedure    Result Date: 6/27/2024  Takotsubo's cardiomyopathy. Normal right heart hemodynamics.     Echo (TTE) limited (PRN contrast/bubble/strain/3D)    Result Date: 6/27/2024    Left Ventricle: Moderately reduced left ventricular systolic function with a visually estimated EF of 40 ± 5%. Mild hypokinesis of the following segments: basal anterior. Moderate hypokinesis of the following segments: basal inferior. Severe hypokinesis of the following segments: mid anterior, mid anterolateral, mid anteroseptal, mid inferior, mid inferolateral, mid inferoseptal and apical anterior. Akinesis of the following segments: apical lateral, apical septal and apical inferior. Hyperkinesis of the following segments: basal anterolateral, basal anteroseptal, basal inferolateral and basal inferoseptal.  Differential most certainly includes ischemic cardiomyopathy and Takotsubo cardiomyopathy.   Image quality is adequate. Technically difficult study due to low parasternal window.     XR CHEST PORTABLE    Result Date: 6/26/2024  EXAMINATION: ONE XRAY VIEW OF THE CHEST 6/26/2024 9:14 pm COMPARISON: None. HISTORY: ORDERING SYSTEM PROVIDED HISTORY: SOB TECHNOLOGIST PROVIDED HISTORY: Reason for exam:->SOB Reason for Exam: sob FINDINGS: There is some a increased markings noted bilaterally, in keeping with the known patient COPD.  There is some scarring noted bilaterally.  There is no evidence of CHF.  The heart appears normal.  Tracheostomy tube is in good position.  There is old  healed fracture of the left humeral neck.  There is no change from prior examination.     1. No acute cardiopulmonary process. 2. COPD. 3. Tracheostomy tube in good position.     CTA PULMONARY W CONTRAST    Result Date: 6/25/2024  EXAMINATION: CTA OF THE CHEST 6/25/2024 10:20 am TECHNIQUE: CTA of the chest was performed after the administration of intravenous contrast.  Multiplanar reformatted images are provided for review.  MIP images are provided for review. Automated exposure control, iterative reconstruction, and/or weight based adjustment of the mA/kV was utilized to reduce the radiation dose to as low as reasonably achievable. COMPARISON: 02/27/2024 HISTORY: ORDERING SYSTEM PROVIDED HISTORY: rule out pulmonary embolus TECHNOLOGIST PROVIDED HISTORY: Reason for exam:->rule out pulmonary embolus Reason for Exam: rule out pulmonary embolus FINDINGS: Pulmonary Arteries: Pulmonary arteries are adequately opacified for evaluation.  No evidence of intraluminal filling defect to suggest pulmonary embolism.  Main pulmonary artery is normal in caliber. Lines and tubes: None Mediastinum and Hilum: No enlarged lymph nodes Heart and Vasculature: Ascending aorta measuring 4 cm.  Moderate coronary calcifications. Pleura: No effusions Upper abdomen: There is a G-tube in place. Lungs and airways: Tracheostomy tube is well position.  Centrilobular emphysema is severe.  There is a spiculated nodule seen in the right upper lobe measuring 1.2 x 0.9 cm, slightly increased from prior examination.  New 1.2 cm spiculated nodules in the posterior aspect of the right upper lobe on image 60 of series 2 was not seen on prior examination.  There is additional posterior consolidations seen in the right apex abutting the pleural surface on image 46 measuring 1 cm.  These areas could relate to focal inflammatory infectious etiology given that they are new from recent prior.  Short oval follow-up would be recommended. Bones/soft tissue: No  clinical significance.   Recent Labs     06/26/24 2057 06/27/24 0420 06/27/24  0956   WBC 15.4* 7.8 4.6   HGB 11.8* 11.8* 11.6*   HCT 36.3 35.5* 35.7*   * 376 387     Recent Labs     06/26/24 0426 06/26/24 2057 06/27/24  0612   * 136 137   K 4.0 3.4* 3.9   CL 99 97* 96*   CO2 26 25 22   BUN 9 8 9   CREATININE <0.5* <0.5* <0.5*   CALCIUM 9.2 9.7 9.3   MG  --  1.50*  --      Recent Labs     06/26/24 2057 06/26/24 2135 06/27/24  0205 06/27/24  0420   PROBNP 5,084*  --   --   --    TROPHS 182* 166* 135* 111*     No results for input(s): \"LABA1C\" in the last 72 hours.  Recent Labs     06/26/24 2057 06/27/24 0612   AST 26 26   ALT 16 14   BILITOT 0.4 0.3   ALKPHOS 121 109     Recent Labs     06/27/24 0420 06/27/24  0957   INR  --  1.03   LACTA 2.6*  --         Edmond Shahid MD

## 2024-06-27 NOTE — CONSULTS
[facility]   Cardiovascular Evaluation    PATIENT: Annie Tyler  DATE: 2024  MRN: [id number]  CSN: [id number]  : 1955    Primary Care Doctor/Referring provider: Keyla Mtz MD, Moe Ly MD     Reason for evaluation/Chief complaint:   Abnormal echocardiogram.    Subjective:    History of present illness on initial date of evaluation:   Annie Tyler is a 68 y.o. patient who presents as a referral from [facility] for the evaluation of an abnormal echocardiogram.   Patient originally presented to [facility] on  with acute on chronic hypoxic respiratory failure that was attributed to COPD.  Patient was discharged to a nursing facility after some medical stabilization.  She was readmitted to the hospital on the  with recurrent shortness of breath.  She was referred for further evaluation with echocardiography which demonstrated a new cardiomyopathy.  Patient was seen by our general cardiology service over at [facility] and referred to [facility] for formal interventional cardiology consultation and consideration for left heart catheterization.  An opportunity seen the patient at bedside here at the medical intensive care unit at [facility].  She was in some mild amount of respiratory distress but able to answer my questions.  She denies any chest pain.      Patient Active Problem List   Diagnosis    Chest pain    Shortness of breath    Tobacco use    Moderate COPD (chronic obstructive pulmonary disease) (HCC)    COPD exacerbation (HCC)    Acute respiratory failure with hypoxia (HCC)    Hyponatremia    Pulmonary nodule    Pulmonary emphysema (HCC)    Acute on chronic respiratory failure with hypoxia and hypercapnia (HCC)    Community acquired pneumonia of right lung    Acute exacerbation of chronic obstructive pulmonary disease (COPD) (HCC)    Acute on chronic respiratory failure (HCC)    Acute  respiratory failure with hypoxia and hypercapnia (HCC)    HCAP (healthcare-associated pneumonia)    Transaminitis    Pancreatic abnormality    Common bile duct dilatation    Community acquired pneumonia    Pneumonia due to COVID-19 virus    Primary hypertension    COVID-19    Hypokalemia    History of COVID-19    Leukocytosis    Chronic respiratory failure with hypoxia (HCC)    Metabolic encephalopathy    Acute encephalopathy    Chronic hypoxemic respiratory failure (HCC)    Opacities of both lungs present on chest x-ray    Respiratory distress    Multiple closed fractures of ribs of left side    Acute on chronic hypoxic respiratory failure (HCC)    Leg edema    Disorder of electrolytes    Hypoxia    Congestive heart failure (HCC)    COPD with acute exacerbation (HCC)    RSV infection    Acute hypoxemic respiratory failure (HCC)    Tachycardia    Agitation    Pneumonia of both lower lobes due to infectious organism    Acute on chronic respiratory failure with hypoxia (HCC)    Pulmonary congestion    Tracheostomy dependence (HCC)    Mild protein-calorie malnutrition (HCC)    EDGAR (dyspnea on exertion)    Cardiomyopathy (HCC)    NSTEMI (non-ST elevated myocardial infarction) (HCC)    Lung nodule         Cardiac Testing: I have reviewed the findings below.  EKG:  ECHO:   STRESS TEST:  CATH:  BYPASS:  VASCULAR:    Past Medical History:   has a past medical history of Angina pectoris (HCC), Chronic pain, Congestive heart failure (HCC), COPD exacerbation (HCC), Depression, Essential hypertension, Panic disorder, Pneumonia, and Pulmonary emphysema (HCC).    Surgical History:   has a past surgical history that includes Cholecystectomy and Colonoscopy.     Social History:   reports that she quit smoking about 3 years ago. Her smoking use included cigarettes. She started smoking about 55 years ago. She has a 104.0 pack-year smoking history. She has been exposed to tobacco smoke. She has never used smokeless tobacco. She  reports that she does not currently use alcohol after a past usage of about 12.0 standard drinks of alcohol per week. She reports that she does not use drugs.     Family History:  No evidence for sudden cardiac death or premature CAD    Medications:  Reviewed and are listed in nursing record. and/or listed below  Outpatient Medications:  Prior to Admission medications    Medication Sig Start Date End Date Taking? Authorizing Provider   doxycycline hyclate (VIBRA-TABS) 100 MG tablet Take 1 tablet by mouth every 12 hours for 4 days 6/26/24 6/30/24  Sebas Rodriguez MD   predniSONE (DELTASONE) 20 MG tablet 1 1/2 qd - 3 days, 1 qd- 3 days, 1/2 qd - 3 days,   Resume 5 mg daily thereafter 6/26/24   Sebas Rodriguez MD   Roflumilast (DALIRESP) 500 MCG tablet Take 1 tablet by mouth daily 2/27/24   Chiqui Wolfe MD   budesonide (PULMICORT) 0.5 MG/2ML nebulizer suspension Take 2 mLs by nebulization 2 times daily 2/27/24 3/28/24  Chiqui Wolfe MD   busPIRone (BUSPAR) 10 MG tablet Take 1 tablet by mouth 3 times daily 1/26/24   Sebas Rodriguez MD   furosemide (LASIX) 20 MG tablet Take 1 tablet by mouth daily    Joey Haney MD   lisinopril (PRINIVIL;ZESTRIL) 10 MG tablet Take 0.5 tablets by mouth daily    Joey Haney MD   ipratropium 0.5 mg-albuterol 2.5 mg (DUONEB) 0.5-2.5 (3) MG/3ML SOLN nebulizer solution Inhale 3 mLs into the lungs every 4 hours as needed for Shortness of Breath 12/4/23   Jean Manuel MD   amLODIPine (NORVASC) 10 MG tablet Take 1 tablet by mouth daily 11/16/23   Joey Haney MD   escitalopram (LEXAPRO) 20 MG tablet Take 1 tablet by mouth at bedtime    Joey Haney MD   lidocaine 4 % external patch Place 1 patch onto the skin daily 9/2/22   Cara Hutton MD   acetaminophen (TYLENOL) 500 MG tablet Take 650 mg by mouth every 6 hours as needed for Pain    Provider, MD Joey   albuterol sulfate  (90 Base)  and the possible need for emergent surgery.  2. Serial troponin levels will be ordered and reviewed  3. The patient has been given instructions on addressing diet, regular exercise, weight control, smoking abstention, medication compliance, and stress minimization.  4. The patient should be treated with these therapies unless contraindicated:   ~asa daily   ~beta-blockers   ~statin therapy   ~ACE/ARB   ~repeat troponin until down trending   ~EKG as clinically needed  5. Keep NPO   6. Pre-cath orders will be placed.      Medical Decision Making:  The following items were considered in medical decision making:  Independent review of images  Review / order clinical lab tests  Review / order radiology tests  Decision to obtain old records  Review and summation of old records as accessed through [facility] (a summary of my findings in these old records)      Time Based Itemization  A total of 80 minutes was spent on today's patient encounter.  If applicable, non-patient-facing activities:  (X )Preparing to see the patient and reviewing records  (X ) Individual interpretation of results  ( ) Discussion or coordination of care with other health care professionals  ( ) Ordering of unique tests, medications, or procedures  (X ) Documentation within the EHR       All questions and concerns were addressed to the patient/family. Alternatives to my treatment were discussed. The note was completed using EMR. Every effort was made to ensure accuracy; however, inadvertent computerized transcription errors may be present.    Merary Davis MD, [facility], [facility], [facility]  [phone] [facility]  [phone] [facility]  6/27/2024  4:14 PM

## 2024-06-27 NOTE — PROGRESS NOTES
4 Eyes Skin Assessment     NAME:  Annie Tyler  YOB: 1955  MEDICAL RECORD NUMBER:  [id number]    The patient is being assessed for  Admission    I agree that at least one RN has performed a thorough Head to Toe Skin Assessment on the patient. ALL assessment sites listed below have been assessed.      Areas assessed by both nurses:    Head, Face, Ears, Shoulders, Back, Chest, Arms, Elbows, Hands, Sacrum. Buttock, Coccyx, Ischium, Legs. Feet and Heels, and Under Medical Devices     Scattered bruising, redness/excoriation to rectal area, Blanchable redness to Bl heels    Does the Patient have a Wound? No noted wound(s)       William Prevention initiated by RN: Yes  Wound Care Orders initiated by RN: No    Pressure Injury (Stage 3,4, Unstageable, DTI, NWPT, and Complex wounds) if present, place Wound referral order by RN under : No    New Ostomies, if present place, Ostomy referral order under : No     Nurse 1 eSignature: Electronically signed by Darcie Nevarez RN on 6/27/24 at 5:44 AM EDT    **SHARE this note so that the co-signing nurse can place an eSignature**    Nurse 2 eSignature: Electronically signed by Tia Ojeda RN on 6/27/24 at 5:51 AM EDT    Patient is not able to demonstrated the ability to move from a reclining position to an upright position within the recliner. however patient is alert, oriented and able to provide informed consent

## 2024-06-27 NOTE — PROGRESS NOTES
Pt came into ER for SOB. Pt is a chronic trach, vent dependant. Placed pt on vent. Vent settings   Ac/14/400/45%/5. Pt complaining of sob after being placed on vent. I suctioned pt, got small amount of white and gave a breathing treatment. Will continue to monitor.

## 2024-06-27 NOTE — ED NOTES
Pt wanted me to call her daughter and inform her that she was here and what was going on. Writer attempted to call Thuy (daughter). Writer will call her back in a little bit.

## 2024-06-27 NOTE — PROGRESS NOTES
Reassessment completed, see flow sheet. BL lungs diminished/clear.  Pt continues to be A/O. PRN tramadol given for L shoulder pain 8/10    All ICU Lines and monitoring remain in place. Bed locked in lowest position. Call light within reach.

## 2024-06-27 NOTE — PROGRESS NOTES
RT Inhaler-Nebulizer Bronchodilator Protocol Note    There is a bronchodilator order in the chart from a provider indicating to follow the RT Bronchodilator Protocol and there is an “Initiate RT Inhaler-Nebulizer Bronchodilator Protocol” order as well (see protocol at bottom of note).    CXR Findings:  XR CHEST PORTABLE    Result Date: 6/26/2024  1. No acute cardiopulmonary process. 2. COPD. 3. Tracheostomy tube in good position.       The findings from the last RT Protocol Assessment were as follows:   History Pulmonary Disease: Chronic pulmonary disease  Respiratory Pattern: Dyspnea on exertion or RR 21-25 bpm  Breath Sounds: Inspiratory and expiratory or bilateral wheezing and/or rhonchi  Cough: Strong, productive  Indication for Bronchodilator Therapy: Decreased or absent breath sounds  Bronchodilator Assessment Score: 11    Aerosolized bronchodilator medication orders have been revised according to the RT Inhaler-Nebulizer Bronchodilator Protocol below.    Respiratory Therapist to perform RT Therapy Protocol Assessment initially then follow the protocol.  Repeat RT Therapy Protocol Assessment PRN for score 0-3 or on second treatment, BID, and PRN for scores above 3.    No Indications - adjust the frequency to every 6 hours PRN wheezing or bronchospasm, if no treatments needed after 48 hours then discontinue using Per Protocol order mode.     If indication present, adjust the RT bronchodilator orders based on the Bronchodilator Assessment Score as indicated below.  Use Inhaler orders unless patient has one or more of the following: on home nebulizer, not able to hold breath for 10 seconds, is not alert and oriented, cannot activate and use MDI correctly, or respiratory rate 25 breaths per minute or more, then use the equivalent nebulizer order(s) with same Frequency and PRN reasons based on the score.  If a patient is on this medication at home then do not decrease Frequency below that used at home.    0-3 -

## 2024-06-27 NOTE — PROGRESS NOTES
AM assessment done. Pt is alert & oriented. She remains on the vent and is resting w/out evidence of distress @ this time.

## 2024-06-27 NOTE — PROGRESS NOTES
06/27/24 0211   Patient Observation   Pulse (!) 124   SpO2 98 %   Breath Sounds   Right Upper Lobe Rhonchi   Right Middle Lobe Diminished   Right Lower Lobe Diminished   Left Upper Lobe Rhonchi   Left Lower Lobe Diminished   Vent Information   Vent Mode AC/VC   $Ventilation $Subsequent Day   Ventilator Settings   Vt (Set, mL) 400 mL   Resp Rate (Set) 14 bpm   PEEP/CPAP (cmH2O) 5   FiO2  45 %   Vent Patient Data (Readings)   Vt (Measured) 433 mL   Peak Inspiratory Pressure (cmH2O) 19 cmH2O   Rate Measured 24 br/min   Minute Volume (L/min) 10 Liters   Mean Airway Pressure (cmH2O) 11 cmH20   I:E Ratio 1:2.3   Flow Sensitivity 3 L/min   Vent Alarm Settings   High Pressure (cmH2O) 40 cmH2O   Low Minute Volume (lpm) 4 L/min   Low Exhaled Vt (ml) 300 mL   RR High (bpm) 40 br/min   Apnea (secs) 20 secs   Additional Respiratoray Assessments   Humidification Source Heated wire   Humidification Temp 37   Circuit Condensation Drained   Ambu Bag With Mask At Bedside Yes   Airway Clearance   Suction Trach   Suction Device Inline suction catheter   Sputum Method Obtained Tracheal   Sputum Amount Small   Sputum Color/Odor White   Sputum Consistency Thick   Surgical Airway (Trach) Shiley Cuffed   No placement date or time found.   Present on Admission/Arrival: Yes  Surgical Airway Type: Tracheostomy  Brand: Shiley  Style: Cuffed  Size: 6   Status Secured   Site Assessment Clean;Dry   Site Care Cleansed;Dried;Dressing applied   Inner Cannula Care Changed/new   Ties Assessment Clean;Dry;Intact

## 2024-06-27 NOTE — ED PROVIDER NOTES
[facility] ED      CHIEF COMPLAINT  Shortness of Breath (Pt presents to the er via ems with reports of being 73% on the vent after returning back to [place] from inpatient stay here. The patient is 15 L on nonrebrether over trac and is at 99% upon arrival. )       HISTORY OF PRESENT ILLNESS  Annie Tyler is a 68 y.o. female  who presents to the ED complaining of shortness of breath and hypoxia.  Patient was discharged from this hospital 2-day after being treated for acute on chronic hypoxic respiratory failure suspect to be due to COPD exacerbation.  On arrival back to her nursing facility patient was found to be in respiratory distress despite her usual vent settings with an oxygen saturation of 73%.  Placed on nonrebreather at 15 L/min on trach collar by EMS, oxygen saturation 99% on arrival.  Patient denies any chest pain, nausea, vomiting, fevers.  States that she was feeling very short of breath earlier but feels improved currently.    No other complaints, modifying factors or associated symptoms.     I have reviewed the following from the nursing documentation.    Past Medical History:   Diagnosis Date    Angina pectoris (HCC)     Chronic pain     knees and lower back     Congestive heart failure (HCC) 1/5/2024    COPD exacerbation (HCC) 05/20/2018    Depression     Essential hypertension 08/15/2022    Panic disorder     Pneumonia     Pulmonary emphysema (HCC)      Past Surgical History:   Procedure Laterality Date    CHOLECYSTECTOMY      COLONOSCOPY       Family History   Problem Relation Age of Onset    COPD Mother     Hypertension Mother     Asthma Neg Hx     Cancer Neg Hx     Diabetes Neg Hx     Emphysema Neg Hx     Heart Failure Neg Hx      Social History     Socioeconomic History    Marital status:      Spouse name: Not on file    Number of children: 6    Years of education: Not on file    Highest education level: Not on file   Occupational History    Not on file   Tobacco Use     considered PE is a possibility regarding the patient's symptoms, however she had a CTPA performed yesterday that showed no evidence of PE and only a spiculated nodule. considering this I do not think a repeat CTPA is necessary.  I did give her a dose of IV Lasix due to her elevated BNP and worsening status since previous admission.  Patient discussed with hospitalist, Dr. Serrato, who agreed to admit.    Critical care    Critical care time 35 minutes exclusive from separate billable procedures that were performed. The following was considered in the determination of critical care but not limited to the level of medical decision making, intensive cardiac and/or respiratory monitoring, frequent vital sign monitoring, evaluation of laboratory studies, evaluation of radiographic studies, oxygen monitoring, and constant monitoring and speaking to family at bedside.  Is this patient to be included in the SEP-1 Core Measure due to severe sepsis or septic shock?   No   Exclusion criteria - the patient is NOT to be included for SEP-1 Core Measure due to:  Infection is not suspected    During the patient's ED course, the patient was given:  Medications   ipratropium 0.5 mg-albuterol 2.5 mg (DUONEB) 0.5-2.5 (3) MG/3ML nebulizer solution (has no administration in time range)   magnesium sulfate 2000 mg in 50 mL IVPB premix (2,000 mg IntraVENous New Bag 6/26/24 2140)   methylPREDNISolone sodium succ (SOLU-MEDROL) 125 mg in sterile water 2 mL injection (125 mg IntraVENous Given 6/26/24 2139)   hydrOXYzine HCl (ATARAX) tablet 50 mg (50 mg PEG Tube Given 6/26/24 2157)   potassium bicarb-citric acid (EFFER-K) effervescent tablet 40 mEq (40 mEq Per G Tube Given 6/26/24 2247)   LORazepam (ATIVAN) injection 1 mg (1 mg IntraVENous Given 6/26/24 2243)   dilTIAZem injection 10 mg (10 mg IntraVENous Given 6/26/24 2321)   furosemide (LASIX) injection 40 mg (40 mg IntraVENous Given 6/27/24 0002)        IBruce DO,  am the primary

## 2024-06-27 NOTE — PROGRESS NOTES
Report called to Tyler. Report called to Casper in ICU. [phone]. Cath Lab updated as well. [phone] and spoke with Tessa.

## 2024-06-27 NOTE — PROGRESS NOTES
Heparin gtt started @ 12 units/hr. Clinical Admin. Updated and Transfer Center noitif. Dr. Cohn updated and Dr. Wolf @ the bedside.

## 2024-06-27 NOTE — PROGRESS NOTES
CM-ST, rate increased with activity. VSS, pt remains afebrile, UO WNL. Pt is very anxious. Versed given.  Transport here to transport pt to [place] ICU, rm 233.  Pt transported on the vent and is stable upon DC from the ICU.

## 2024-06-27 NOTE — PROGRESS NOTES
06/27/24 1741   Patient Transport   Time Spent Transporting 60-75   Transport Ventillation Type Transport vent   Transport From ICU   Transport Destination Other  (CATH LAB)   Transport Destination Other  (CATH LAB)   Transport Destination ICU   Emergency Equipment Included Yes

## 2024-06-27 NOTE — PROCEDURES
PROCEDURE NOTE  Date: 6/27/2024   Name: Annie Tyler  YOB: 1955    Procedures    Right and left heart catheterization performed.    The patient has mild nonobstructive coronary disease involving the left anterior descending artery.      Overall cardiomyopathy is reduced with ejection fraction of 35% and anteroapical hypokinesis which is most consistent with Takotsubo's cardiomyopathy.    Right heart catheterization demonstrates normal filling pressures and right heart hemodynamics.

## 2024-06-28 LAB
ALBUMIN SERPL-MCNC: 3.4 G/DL (ref 3.4–5)
ALP SERPL-CCNC: 90 U/L (ref 40–129)
ALT SERPL-CCNC: 14 U/L (ref 10–40)
ANION GAP SERPL CALCULATED.3IONS-SCNC: 9 MMOL/L (ref 3–16)
AST SERPL-CCNC: 18 U/L (ref 15–37)
BACTERIA BLD CULT ORG #2: NORMAL
BACTERIA BLD CULT: NORMAL
BILIRUB DIRECT SERPL-MCNC: <0.2 MG/DL (ref 0–0.3)
BILIRUB INDIRECT SERPL-MCNC: NORMAL MG/DL (ref 0–1)
BILIRUB SERPL-MCNC: 0.4 MG/DL (ref 0–1)
BUN SERPL-MCNC: 20 MG/DL (ref 7–20)
CALCIUM SERPL-MCNC: 9.2 MG/DL (ref 8.3–10.6)
CHLORIDE SERPL-SCNC: 101 MMOL/L (ref 99–110)
CHOLEST SERPL-MCNC: 180 MG/DL (ref 0–199)
CO2 SERPL-SCNC: 28 MMOL/L (ref 21–32)
CREAT SERPL-MCNC: <0.5 MG/DL (ref 0.6–1.2)
DEPRECATED RDW RBC AUTO: 15.7 % (ref 12.4–15.4)
GFR SERPLBLD CREATININE-BSD FMLA CKD-EPI: >90 ML/MIN/{1.73_M2}
GLUCOSE SERPL-MCNC: 132 MG/DL (ref 70–99)
HCT VFR BLD AUTO: 32.8 % (ref 36–48)
HDLC SERPL-MCNC: 68 MG/DL (ref 40–60)
HGB BLD-MCNC: 10.6 G/DL (ref 12–16)
LDLC SERPL CALC-MCNC: 91 MG/DL
MAGNESIUM SERPL-MCNC: 2.1 MG/DL (ref 1.8–2.4)
MCH RBC QN AUTO: 27.8 PG (ref 26–34)
MCHC RBC AUTO-ENTMCNC: 32.4 G/DL (ref 31–36)
MCV RBC AUTO: 85.7 FL (ref 80–100)
PLATELET # BLD AUTO: 335 K/UL (ref 135–450)
PMV BLD AUTO: 7.3 FL (ref 5–10.5)
POTASSIUM SERPL-SCNC: 3.6 MMOL/L (ref 3.5–5.1)
PROT SERPL-MCNC: 6.7 G/DL (ref 6.4–8.2)
RBC # BLD AUTO: 3.83 M/UL (ref 4–5.2)
SODIUM SERPL-SCNC: 138 MMOL/L (ref 136–145)
TRIGL SERPL-MCNC: 107 MG/DL (ref 0–150)
VLDLC SERPL CALC-MCNC: 21 MG/DL
WBC # BLD AUTO: 4.6 K/UL (ref 4–11)

## 2024-06-28 PROCEDURE — 80048 BASIC METABOLIC PNL TOTAL CA: CPT

## 2024-06-28 PROCEDURE — C9113 INJ PANTOPRAZOLE SODIUM, VIA: HCPCS | Performed by: INTERNAL MEDICINE

## 2024-06-28 PROCEDURE — 87186 SC STD MICRODIL/AGAR DIL: CPT

## 2024-06-28 PROCEDURE — 6370000000 HC RX 637 (ALT 250 FOR IP): Performed by: INTERNAL MEDICINE

## 2024-06-28 PROCEDURE — 0B9B8ZZ DRAINAGE OF LEFT LOWER LOBE BRONCHUS, VIA NATURAL OR ARTIFICIAL OPENING ENDOSCOPIC: ICD-10-PCS | Performed by: INTERNAL MEDICINE

## 2024-06-28 PROCEDURE — 85027 COMPLETE CBC AUTOMATED: CPT

## 2024-06-28 PROCEDURE — 2580000003 HC RX 258: Performed by: INTERNAL MEDICINE

## 2024-06-28 PROCEDURE — 6370000000 HC RX 637 (ALT 250 FOR IP): Performed by: NURSE PRACTITIONER

## 2024-06-28 PROCEDURE — 94640 AIRWAY INHALATION TREATMENT: CPT

## 2024-06-28 PROCEDURE — 2500000003 HC RX 250 WO HCPCS: Performed by: INTERNAL MEDICINE

## 2024-06-28 PROCEDURE — 99291 CRITICAL CARE FIRST HOUR: CPT | Performed by: INTERNAL MEDICINE

## 2024-06-28 PROCEDURE — 6360000002 HC RX W HCPCS: Performed by: INTERNAL MEDICINE

## 2024-06-28 PROCEDURE — 87205 SMEAR GRAM STAIN: CPT

## 2024-06-28 PROCEDURE — 0B988ZZ DRAINAGE OF LEFT UPPER LOBE BRONCHUS, VIA NATURAL OR ARTIFICIAL OPENING ENDOSCOPIC: ICD-10-PCS | Performed by: INTERNAL MEDICINE

## 2024-06-28 PROCEDURE — 80076 HEPATIC FUNCTION PANEL: CPT

## 2024-06-28 PROCEDURE — 87077 CULTURE AEROBIC IDENTIFY: CPT

## 2024-06-28 PROCEDURE — 0B958ZZ DRAINAGE OF RIGHT MIDDLE LOBE BRONCHUS, VIA NATURAL OR ARTIFICIAL OPENING ENDOSCOPIC: ICD-10-PCS | Performed by: INTERNAL MEDICINE

## 2024-06-28 PROCEDURE — 0BCM8ZZ EXTIRPATION OF MATTER FROM BILATERAL LUNGS, VIA NATURAL OR ARTIFICIAL OPENING ENDOSCOPIC: ICD-10-PCS | Performed by: INTERNAL MEDICINE

## 2024-06-28 PROCEDURE — 2000000000 HC ICU R&B

## 2024-06-28 PROCEDURE — 80061 LIPID PANEL: CPT

## 2024-06-28 PROCEDURE — 94002 VENT MGMT INPAT INIT DAY: CPT

## 2024-06-28 PROCEDURE — 87070 CULTURE OTHR SPECIMN AEROBIC: CPT

## 2024-06-28 PROCEDURE — 2700000000 HC OXYGEN THERAPY PER DAY

## 2024-06-28 PROCEDURE — 6360000002 HC RX W HCPCS: Performed by: NURSE PRACTITIONER

## 2024-06-28 PROCEDURE — 0B968ZZ DRAINAGE OF RIGHT LOWER LOBE BRONCHUS, VIA NATURAL OR ARTIFICIAL OPENING ENDOSCOPIC: ICD-10-PCS | Performed by: INTERNAL MEDICINE

## 2024-06-28 PROCEDURE — 83735 ASSAY OF MAGNESIUM: CPT

## 2024-06-28 PROCEDURE — 36415 COLL VENOUS BLD VENIPUNCTURE: CPT

## 2024-06-28 PROCEDURE — 94761 N-INVAS EAR/PLS OXIMETRY MLT: CPT

## 2024-06-28 PROCEDURE — 99233 SBSQ HOSP IP/OBS HIGH 50: CPT | Performed by: INTERNAL MEDICINE

## 2024-06-28 PROCEDURE — 31645 BRNCHSC W/THER ASPIR 1ST: CPT | Performed by: INTERNAL MEDICINE

## 2024-06-28 RX ORDER — PREDNISONE 20 MG/1
40 TABLET ORAL DAILY
Status: DISCONTINUED | OUTPATIENT
Start: 2024-06-29 | End: 2024-07-01

## 2024-06-28 RX ORDER — FENTANYL CITRATE 50 UG/ML
25 INJECTION, SOLUTION INTRAMUSCULAR; INTRAVENOUS ONCE
Status: COMPLETED | OUTPATIENT
Start: 2024-06-28 | End: 2024-06-28

## 2024-06-28 RX ORDER — FENTANYL CITRATE 50 UG/ML
50 INJECTION, SOLUTION INTRAMUSCULAR; INTRAVENOUS ONCE
Status: COMPLETED | OUTPATIENT
Start: 2024-06-28 | End: 2024-06-28

## 2024-06-28 RX ORDER — PROPOFOL 10 MG/ML
5-50 INJECTION, EMULSION INTRAVENOUS CONTINUOUS
Status: DISCONTINUED | OUTPATIENT
Start: 2024-06-28 | End: 2024-06-28

## 2024-06-28 RX ORDER — ENOXAPARIN SODIUM 100 MG/ML
40 INJECTION SUBCUTANEOUS DAILY
Status: DISCONTINUED | OUTPATIENT
Start: 2024-06-28 | End: 2024-07-01 | Stop reason: HOSPADM

## 2024-06-28 RX ORDER — DOXYCYCLINE HYCLATE 100 MG
100 TABLET ORAL EVERY 12 HOURS SCHEDULED
Status: DISCONTINUED | OUTPATIENT
Start: 2024-06-28 | End: 2024-06-30

## 2024-06-28 RX ADMIN — PANTOPRAZOLE SODIUM 40 MG: 40 INJECTION, POWDER, FOR SOLUTION INTRAVENOUS at 09:17

## 2024-06-28 RX ADMIN — BUSPIRONE HYDROCHLORIDE 10 MG: 5 TABLET ORAL at 09:17

## 2024-06-28 RX ADMIN — BUSPIRONE HYDROCHLORIDE 10 MG: 5 TABLET ORAL at 21:07

## 2024-06-28 RX ADMIN — DOXYCYCLINE 100 MG: 100 INJECTION, POWDER, LYOPHILIZED, FOR SOLUTION INTRAVENOUS at 09:27

## 2024-06-28 RX ADMIN — DOXYCYCLINE HYCLATE 100 MG: 100 TABLET, COATED ORAL at 21:07

## 2024-06-28 RX ADMIN — ROFLUMILAST 500 MCG: 500 TABLET ORAL at 09:17

## 2024-06-28 RX ADMIN — BUDESONIDE 500 MCG: 0.5 SUSPENSION RESPIRATORY (INHALATION) at 20:58

## 2024-06-28 RX ADMIN — FENTANYL CITRATE 25 MCG: 50 INJECTION, SOLUTION INTRAMUSCULAR; INTRAVENOUS at 10:46

## 2024-06-28 RX ADMIN — PROPOFOL 30 MCG/KG/MIN: 10 INJECTION, EMULSION INTRAVENOUS at 10:45

## 2024-06-28 RX ADMIN — LISINOPRIL 5 MG: 5 TABLET ORAL at 09:17

## 2024-06-28 RX ADMIN — FENTANYL CITRATE 25 MCG: 50 INJECTION, SOLUTION INTRAMUSCULAR; INTRAVENOUS at 10:49

## 2024-06-28 RX ADMIN — HYDROXYZINE HYDROCHLORIDE 10 MG: 10 TABLET ORAL at 21:07

## 2024-06-28 RX ADMIN — WATER 40 MG: 1 INJECTION INTRAMUSCULAR; INTRAVENOUS; SUBCUTANEOUS at 06:09

## 2024-06-28 RX ADMIN — BUDESONIDE 500 MCG: 0.5 SUSPENSION RESPIRATORY (INHALATION) at 08:04

## 2024-06-28 RX ADMIN — FENTANYL CITRATE 50 MCG: 50 INJECTION, SOLUTION INTRAMUSCULAR; INTRAVENOUS at 10:52

## 2024-06-28 RX ADMIN — ATORVASTATIN CALCIUM 40 MG: 40 TABLET, FILM COATED ORAL at 21:07

## 2024-06-28 RX ADMIN — SODIUM CHLORIDE, PRESERVATIVE FREE 10 ML: 5 INJECTION INTRAVENOUS at 21:07

## 2024-06-28 RX ADMIN — ARFORMOTEROL TARTRATE 15 MCG: 15 SOLUTION RESPIRATORY (INHALATION) at 08:04

## 2024-06-28 RX ADMIN — BUSPIRONE HYDROCHLORIDE 10 MG: 5 TABLET ORAL at 14:07

## 2024-06-28 RX ADMIN — SODIUM CHLORIDE, PRESERVATIVE FREE 10 ML: 5 INJECTION INTRAVENOUS at 09:15

## 2024-06-28 RX ADMIN — MUPIROCIN: 20 OINTMENT TOPICAL at 09:17

## 2024-06-28 RX ADMIN — ACETYLCYSTEINE 600 MG: 200 INHALANT RESPIRATORY (INHALATION) at 08:04

## 2024-06-28 RX ADMIN — ENOXAPARIN SODIUM 40 MG: 100 INJECTION SUBCUTANEOUS at 11:35

## 2024-06-28 RX ADMIN — ASPIRIN 81 MG 81 MG: 81 TABLET ORAL at 09:17

## 2024-06-28 RX ADMIN — ARFORMOTEROL TARTRATE 15 MCG: 15 SOLUTION RESPIRATORY (INHALATION) at 20:58

## 2024-06-28 RX ADMIN — ACETYLCYSTEINE 600 MG: 200 INHALANT RESPIRATORY (INHALATION) at 20:58

## 2024-06-28 RX ADMIN — ESCITALOPRAM OXALATE 20 MG: 10 TABLET ORAL at 21:07

## 2024-06-28 RX ADMIN — MUPIROCIN: 20 OINTMENT TOPICAL at 21:08

## 2024-06-28 ASSESSMENT — PULMONARY FUNCTION TESTS
PIF_VALUE: 40
PIF_VALUE: 16
PIF_VALUE: 17
PIF_VALUE: 40
PIF_VALUE: 15
PIF_VALUE: 16
PIF_VALUE: 40
PIF_VALUE: 16
PIF_VALUE: 21
PIF_VALUE: 16

## 2024-06-28 ASSESSMENT — PAIN SCALES - GENERAL
PAINLEVEL_OUTOF10: 0

## 2024-06-28 NOTE — PROGRESS NOTES
06/27/24 2004   Patient Observation   Pulse 93   Respirations 20   SpO2 97 %   Breath Sounds   Right Upper Lobe Rhonchi   Right Middle Lobe Diminished   Right Lower Lobe Diminished   Left Upper Lobe Rhonchi   Left Lower Lobe Diminished   Vent Information   Vent Mode CPAP/PS   Ventilator Settings   PEEP/CPAP (cmH2O) 5   FiO2  35 %   Pressure Support (cm H2O) 10 cm H2O   Vent Patient Data (Readings)   Vt (Measured) 406 mL   Peak Inspiratory Pressure (cmH2O) 16 cmH2O   Rate Measured 25 br/min   Minute Volume (L/min) 9.52 Liters   Mean Airway Pressure (cmH2O) 9.1 cmH20   Plateau Pressure (cm H2O) 0 cm H2O   Driving Pressure -5   I:E Ratio 1:1.90   Backup Apnea On   Vent Alarm Settings   High Pressure (cmH2O) 40 cmH2O   Low Minute Volume (lpm) 2 L/min   Low Exhaled Vt (ml) 200 mL   RR High (bpm) 40 br/min   Apnea (secs) 20 secs   Additional Respiratoray Assessments   Humidification Source Heated wire   Humidification Temp 36.9   Circuit Condensation Drained   Ambu Bag With Mask At Bedside Yes   Backup Trachs Available (Size) 4.0;6.0   Airway Clearance   Suction Trach   Suction Device Inline suction catheter   Sputum Method Obtained Tracheal   Sputum Amount Moderate   Sputum Color/Odor White;Yellow   Sputum Consistency Thin   Surgical Airway (Trach) Shiley Cuffed   No placement date or time found.   Present on Admission/Arrival: Yes  Surgical Airway Type: Tracheostomy  Brand: Sascha  Style: Cuffed  Size: 6   Status Secured   Site Assessment Clean;Dry   Ties Assessment Intact;Secure   Cuff Pressure 30 cm H2O   Spare Trach at Bedside Yes   Spare Trach Tube One Size Smaller at Bedside Yes   Ambu Bag With Mask at Bedside Yes

## 2024-06-28 NOTE — PROGRESS NOTES
06/28/24 0817   Patient Observation   Pulse 99   Respirations 19   SpO2 93 %   Vent Information   Vent Mode CPAP/PS   Ventilator Settings   PEEP/CPAP (cmH2O) 5   FiO2  35 %   Pressure Support (cm H2O) 10 cm H2O   Vent Patient Data (Readings)   Vt (Measured) 476 mL   Peak Inspiratory Pressure (cmH2O) 16 cmH2O   Rate Measured 26 br/min   Minute Volume (L/min) 9.84 Liters   Mean Airway Pressure (cmH2O) 8.8 cmH20   Plateau Pressure (cm H2O) 0 cm H2O   Driving Pressure -5   I:E Ratio 1:2.80   Flow Sensitivity 3 L/min   Backup Apnea On   Backup Rate 14 Breaths Per Minute   Backup Vt 400   Vent Alarm Settings   High Pressure (cmH2O) 40 cmH2O   Low Minute Volume (lpm) 2 L/min   High Minute Volume (lpm) 20 L/min   Low Exhaled Vt (ml) 200 mL   High Exhaled Vt (ml) 1100 mL   RR High (bpm) 40 br/min   Additional Respiratoray Assessments   Humidification Source Heated wire   Humidification Temp 37   Circuit Condensation Drained   Ambu Bag With Mask At Bedside Yes   Backup Trachs Available (Size) 4.0;6.0   Airway Clearance   Suction Trach   Suction Device Inline suction catheter   Sputum Method Obtained Tracheal   Sputum Amount Moderate   Sputum Color/Odor White   Sputum Consistency Thick;Thin   Surgical Airway (Trach) Shiley Cuffed   No placement date or time found.   Present on Admission/Arrival: Yes  Surgical Airway Type: Tracheostomy  Brand: Sascha  Style: Cuffed  Size: 6   Status Secured   Site Assessment Clean;Dry   Cuff Pressure 30 cm H2O   Spare Trach at Bedside Yes   Spare Trach Tube One Size Smaller at Bedside Yes   Ambu Bag With Mask at Bedside Yes

## 2024-06-28 NOTE — PROGRESS NOTES
06/27/24 2346   Patient Observation   Pulse 91   Respirations 26   SpO2 100 %   Breath Sounds   Right Upper Lobe Rhonchi   Right Middle Lobe Rhonchi   Right Lower Lobe Diminished   Left Upper Lobe Rhonchi   Left Lower Lobe Diminished   Vent Information   Vent Mode CPAP/PS   Ventilator Settings   PEEP/CPAP (cmH2O) 5   FiO2  35 %   Pressure Support (cm H2O) 10 cm H2O   Vent Patient Data (Readings)   Vt (Measured) 350 mL   Peak Inspiratory Pressure (cmH2O) 16 cmH2O   Rate Measured 26 br/min   Minute Volume (L/min) 10.1 Liters   Mean Airway Pressure (cmH2O) 9 cmH20   Plateau Pressure (cm H2O) 0 cm H2O   Driving Pressure -5   I:E Ratio 1:1.90   Backup Apnea On   Vent Alarm Settings   High Pressure (cmH2O) 40 cmH2O   Low Minute Volume (lpm) 2 L/min   Low Exhaled Vt (ml) 200 mL   RR High (bpm) 40 br/min   Apnea (secs) 20 secs   Additional Respiratoray Assessments   Humidification Source Heated wire   Humidification Temp 37.1   Circuit Condensation Drained   Ambu Bag With Mask At Bedside Yes   Backup Trachs Available (Size) 4.0;6.0   Airway Clearance   Suction Trach   Suction Device Inline suction catheter   Sputum Method Obtained Tracheal   Sputum Amount Small   Sputum Color/Odor Yellow   Sputum Consistency Thin   Surgical Airway (Trach) Shiley Cuffed   No placement date or time found.   Present on Admission/Arrival: Yes  Surgical Airway Type: Tracheostomy  Brand: Sascha  Style: Cuffed  Size: 6   Status Secured   Site Assessment Clean;Dry   Ties Assessment Intact;Secure   Cuff Pressure 30 cm H2O   Spare Trach at Bedside Yes   Spare Trach Tube One Size Smaller at Bedside Yes   Ambu Bag With Mask at Bedside Yes

## 2024-06-28 NOTE — PROGRESS NOTES
Only remaining IV has blown. Attempts made by Nasra VILLAR x 3 before pt requested that attempts by stopped. Pt is currently w/o IV access.

## 2024-06-28 NOTE — PROGRESS NOTES
Time out completed at 1040 hours. Verified patient’s name,  for consented procedure Bronchoscopy. Pt’s allergies and code status have been verified. Medical providers present in room include Dr. Aguilar, Sammy RT, Lily RN & writer.. All providers in agreement.

## 2024-06-28 NOTE — PROGRESS NOTES
PULMONARY AND CRITICAL CARE INPATIENT NOTE        Annie Tyler   : 1955  MRN: [id number]     Admitting Physician: Moe Ly MD  Attending Physician: Moe Ly MD  PCP: Keyla Mtz MD    Admission: 2024   Date of Service: 2024    No chief complaint on file.          ASSESSMENT & PLAN       68 y.o. pleasant  female patient with:    Assessment:  Suspected acute stress cardiomyopathy.  Reduced EF 35 to 40% with elevated troponin.  Mild CAD on left heart cath 2024.  No pHTN or right heart cath  Diaphoresis  Severe anxiety attacks  Mild lactic acidosis  Acute hypoxemic respiratory failure  End-stage COPD with severe emphysema with acute exacerbation  Chronic hypoxic respiratory failure/trach dependent.  History after cardiac arrest related admission in 2024 at .  On AVAPS at night/Capped trach and trach collar during the day at nursing home  Growing RUL nodule followed by pulmonary clinic.  Highly concerning for cancer  H/O KAMLA lung cancer, clinical diagnosis, saw Dr. Navarro & was not a surgical candidate, s/p 5 fractions of SBRT 5/3/22 - 22 by Dr. Gaitan   Former smoker, >100 pack years, quit    H/O COVID19 pneumonia Aug 2022 with hospitalization  Has parakeets in the time, x2 yrs       Plan:              LHC/RHC reviewed. Cardiology following.  Resume home anxiety medications  Keep patient on mechanical ventilation through tracheostomy.  Settings reviewed/Trach collar  Ventilator bundle  Continue doxycycline.  Patient is allergic to Rocephin  Follow-up sputum culture and respiratory micro panel to decide on the need on adding meropenem  Bronchoscopy and evaluation for mucous plugging  Brovana, Pulmicort and DuoNebs/Mucomyst  Continue to wean oxygen with target 90 to 94%.  Fentanyl and propofol.  There is sedation vacation  Head of bed 30 degrees or higher at all times  ASA statin and Heparin drip   Nutrition: Diet to resume after bronch  Blood sugar

## 2024-06-28 NOTE — PROCEDURES
PROCEDURE NOTE  Date: 6/28/2024   Name: Annie Tyler  YOB: 1955    Procedures    PRE-PROCEDURE  DIAGNOSIS: Mucous plugging  POST-PROCEDURE  DIAGNOSIS: Mucous plugging    PRE-PROCEDURE ASSESSMENT AND CONSENT:   A full history and physical was performed. The patient is 68-year-old female patient who was admitted with diaphoresis, shortness of breath and respiratory failure.  The patient's medications, allergies and sensitivities have been reviewed. The risks and benefits of the procedure were discussed with the patient. All questions were answered and informed consent was obtained.     PHYSICAL EXAM PRIOR TO PROCEDURE:  Airway Examination: VSS.  Poor antibiotic really.  Tracheostomy in place on mechanical ventilation.. Normal S1/S2. Soft lax abdomen. ASA Grade Assessment: 4.     After reviewing the risks and benefits, the patient was deemed in satisfactory condition to undergo the procedure (benefits: more diagnostic info, risks: bleeding, infection, pneumothorax, death). The anesthesia plan was to use deep sedation.     Immediately prior to administration of medications, the patient was re-assessed for adequacy to receive sedatives. The heart rate, respiratory rate, oxygen saturations, blood pressure, adequacy of pulmonary ventilation, and response to care were monitored throughout the procedure.     MEDICATIONS:   Midazolam 0 mg, Fentanyl 100 mcg, propofol infusion 50 mcg/g/min.  Endobronchial normal saline: 120.      PROCEDURE AND FINDINGS:  A time-out was called verifying the patient's identification immediately before the procedure, then, the scope was advanced through the tracheostomy to the trachea and lower airways.    The lower trachea was normal in caliber. The myriam was sharp. The tracheobronchial tree of the bilateral lungs was examined.   No concerning endobronchial lesions.     Moderate amount of mucous plugs suctioned from both lungs.     Endobronchial washings collected from both  lungs.     The procedure was accomplished without difficulty. The patient tolerated the procedure well with no immediate complications. Estimated blood loss was minimal.       IMPRESSION:   Tracheostomy in good position with no issues.   Normal bronchial tree anatomy.  No concerning endobronchial lesions.   Therapeutic aspiration of mucous plugs completed from both lungs.   Endobronchial washings collected from both lungs.       RECOMMENDATIONS:  Await test results.  Continue bronchial hygiene measures.    _______________________________  Electronically signed by:  Donald Aguilar MD,FACP    6/28/2024    12:09 PM.     [facility]  [address], [place] [address]   Phone (office): [phone]

## 2024-06-28 NOTE — PROGRESS NOTES
06/28/24 0337   Patient Observation   Pulse 89   Respirations 19   SpO2 96 %   Vent Information   Vent Mode CPAP/PS   Ventilator Settings   PEEP/CPAP (cmH2O) 5   FiO2  35 %   Vent Patient Data (Readings)   Vt (Measured) 290 mL   Peak Inspiratory Pressure (cmH2O) 16 cmH2O   Rate Measured 27 br/min   Minute Volume (L/min) 8.25 Liters   Mean Airway Pressure (cmH2O) 9.1 cmH20   Plateau Pressure (cm H2O) 0 cm H2O   Driving Pressure -5   I:E Ratio 1:1.70   Backup Apnea On   Vent Alarm Settings   High Pressure (cmH2O) 40 cmH2O   Low Minute Volume (lpm) 2 L/min   Low Exhaled Vt (ml) 200 mL   RR High (bpm) 40 br/min   Apnea (secs) 20 secs   Additional Respiratoray Assessments   Humidification Source Heated wire   Humidification Temp 37   Circuit Condensation Drained   Ambu Bag With Mask At Bedside Yes   Backup Trachs Available (Size) 4.0;6.0   Surgical Airway (Trach) Analey Cuffed   No placement date or time found.   Present on Admission/Arrival: Yes  Surgical Airway Type: Tracheostomy  Brand: Sascha  Style: Cuffed  Size: 6   Status Secured   Site Assessment Clean;Dry   Ties Assessment Intact;Secure   Cuff Pressure 30 cm H2O   Spare Trach at Bedside Yes   Spare Trach Tube One Size Smaller at Bedside Yes   Ambu Bag With Mask at Bedside Yes

## 2024-06-28 NOTE — CARE COORDINATION
Case Management Assessment  Initial Evaluation    Date/Time of Evaluation: 6/28/2024 10:59 AM  Assessment Completed by: Mouna Nair RN    If patient is discharged prior to next notation, then this note serves as note for discharge by case management.    Patient Name: Annie Tyler                   YOB: 1955  Diagnosis: Respiratory failure (HCC) [J96.90]  Elevated troponin [R79.89]                   Date / Time: 6/27/2024  3:29 PM    Patient Admission Status: Inpatient   Readmission Risk (Low < 19, Mod (19-27), High > 27): Readmission Risk Score: 26.6    Current PCP: Keyla Mtz MD  PCP verified by CM? Yes    Chart Reviewed: Yes      History Provided by: Patient, Medical Record  Patient Orientation: Alert and Oriented, Person, Self, Situation (SHe told me shes a yuliana but from ECF)    Patient Cognition: Alert    Hospitalization in the last 30 days (Readmission):  no trasnfer from [facility]  If yes, Readmission Assessment in  Navigator will be completed.    Advance Directives:      Code Status: Full Code   Patient's Primary Decision Maker is: Named in Scanned ACP Document    Primary Decision Maker: HUMERA TYLER - Ever - [phone]    Secondary Decision Maker: Nilda Tyler  Child - [phone]    Discharge Planning:    Patient lives with: Other (Comment) ([facility]) Type of Home: Long-Term Care  Primary Care Giver: Other (Comment) ([facility])  Patient Support Systems include: Children   Current Financial resources: Medicaid, Medicare  Current community resources: None  Current services prior to admission: Durable Medical Equipment, Oxygen Therapy, Extended Care Facility            Current DME:              Type of Home Care services:  None    ADLS  Prior functional level: Assistance with the following:, Bathing, Dressing, Toileting, Cooking, Housework, Shopping, Mobility  Current functional level: Assistance with the following:, Bathing, Dressing, Toileting, Cooking, Housework,  Shopping, Mobility    PT AM-PAC:   /24  OT AM-PAC:   /24    Family can provide assistance at DC: Yes  Would you like Case Management to discuss the discharge plan with any other family members/significant others, and if so, who? No  Plans to Return to Present Housing: Yes  Other Identified Issues/Barriers to RETURNING to current housing: no  Potential Assistance needed at discharge: N/A            Potential DME:    Patient expects to discharge to: Long-term care  Plan for transportation at discharge:      Financial    Payor: MEDICARE / Plan: MEDICARE PART A AND B / Product Type: *No Product type* /     Does insurance require precert for SNF: No    Potential assistance Purchasing Medications: No  Meds-to-Beds request: No      [facility] [place] - [address] [place] [address] - P [phone] - F [phone]  [address] [facility] [address]  Phone: [phone] Fax: [phone]    [facility] - [place] - [address] [place] [address] [address] - P [phone] - F [phone]  [address] [place] [address] [address]  [facility] [address]  Phone: [phone] Fax: [phone]    [id number] - [place], [place] - [address] - P [phone] - F [phone]  [address]  [place] [address]  Phone: [phone] Fax: [phone]      Notes:    Factors facilitating achievement of predicted outcomes: Cooperative and Pleasant    Barriers to discharge: Limited safety awareness, Decreased motivation, Limited insight into deficits, Communication deficit, Decreased endurance, and Medical complications    Additional Case Management Notes: Spoke with the pt briefly before a bronchoscopy. The pt is from [facility], and was at [facility]. SHe was transferred here because of cardiac concerns. Her EF was down from echo. She had a heart cath. She had her trach to vent and but is currently on O2 @ 10liters @ 60%. SHe has a hx of COPD, Lung cancer and ? CHF. SHe is somewhat confused. She told me she lives with Thuy and  that she can bathe and dress herself. SHe is charted being bedridden  not doing anything for herself. PT may return to [facility] when medically ready.       The Plan for Transition of Care is related to the following treatment goals of Respiratory failure (HCC) [J96.90]  Elevated troponin [R79.89]    IF APPLICABLE: The Patient and/or patient representative Annie and her family were provided with a choice of provider and agrees with the discharge plan. Freedom of choice list with basic dialogue that supports the patient's individualized plan of care/goals and shares the quality data associated with the providers was provided to:     Patient Representative Name:       The Patient and/or Patient Representative Agree with the Discharge Plan?      Mouna Nair RN  Case Management Department

## 2024-06-28 NOTE — PLAN OF CARE
Problem: Safety - Adult  Goal: Free from fall injury  Outcome: Progressing  Flowsheets (Taken 6/28/2024 1314)  Free From Fall Injury:   Instruct family/caregiver on patient safety   Based on caregiver fall risk screen, instruct family/caregiver to ask for assistance with transferring infant if caregiver noted to have fall risk factors     Problem: Discharge Planning  Goal: Discharge to home or other facility with appropriate resources  Outcome: Progressing  Flowsheets (Taken 6/28/2024 0800)  Discharge to home or other facility with appropriate resources:   Identify barriers to discharge with patient and caregiver   Arrange for needed discharge resources and transportation as appropriate   Identify discharge learning needs (meds, wound care, etc)     Problem: Pain  Goal: Verbalizes/displays adequate comfort level or baseline comfort level  Outcome: Progressing  Flowsheets (Taken 6/28/2024 0800)  Verbalizes/displays adequate comfort level or baseline comfort level:   Assess pain using appropriate pain scale   Encourage patient to monitor pain and request assistance   Administer analgesics based on type and severity of pain and evaluate response   Consider cultural and social influences on pain and pain management     Problem: Chronic Conditions and Co-morbidities  Goal: Patient's chronic conditions and co-morbidity symptoms are monitored and maintained or improved  Outcome: Progressing  Flowsheets (Taken 6/28/2024 0800)  Care Plan - Patient's Chronic Conditions and Co-Morbidity Symptoms are Monitored and Maintained or Improved:   Monitor and assess patient's chronic conditions and comorbid symptoms for stability, deterioration, or improvement   Collaborate with multidisciplinary team to address chronic and comorbid conditions and prevent exacerbation or deterioration     Problem: Nutrition Deficit:  Goal: Optimize nutritional status  Outcome: Progressing  Flowsheets (Taken 6/28/2024 0838 by Rosa Sutton, MS, RD,  LD)  Nutrient intake appropriate for improving, restoring, or maintaining nutritional needs:   Assess nutritional status and recommend course of action   Monitor oral intake, labs, and treatment plans   Recommend appropriate diets, oral nutritional supplements, and vitamin/mineral supplements     Problem: Skin/Tissue Integrity  Goal: Absence of new skin breakdown  Description: 1.  Monitor for areas of redness and/or skin breakdown  2.  Assess vascular access sites hourly  3.  Every 4-6 hours minimum:  Change oxygen saturation probe site  4.  Every 4-6 hours:  If on nasal continuous positive airway pressure, respiratory therapy assess nares and determine need for appliance change or resting period.  Outcome: Progressing

## 2024-06-28 NOTE — PROGRESS NOTES
[facility]   Cardiovascular Evaluation    PATIENT: Annie Tyler  DATE: 2024  MRN: [id number]  CSN: [id number]  : 1955    Primary Care Doctor/Referring provider: Keyla Mtz MD, Moe Ly MD     Reason for evaluation/Chief complaint:   Abnormal echocardiogram.    Subjective: Doing well this AM. No SOB or CP. Off vent    History of present illness on initial date of evaluation:   Annie Tyler is a 68 y.o. patient who presents as a referral from [facility] for the evaluation of an abnormal echocardiogram.   Patient originally presented to [facility] on  with acute on chronic hypoxic respiratory failure that was attributed to COPD.  Patient was discharged to a nursing facility after some medical stabilization.  She was readmitted to the hospital on the  with recurrent shortness of breath.  She was referred for further evaluation with echocardiography which demonstrated a new cardiomyopathy.  Patient was seen by our general cardiology service over at [facility] and referred to [facility] for formal interventional cardiology consultation and consideration for left heart catheterization.  An opportunity seen the patient at bedside here at the medical intensive care unit at [facility].  She was in some mild amount of respiratory distress but able to answer my questions.  She denies any chest pain.      Patient Active Problem List   Diagnosis    Chest pain    Shortness of breath    Tobacco use    Moderate COPD (chronic obstructive pulmonary disease) (HCC)    COPD exacerbation (HCC)    Acute respiratory failure with hypoxia (HCC)    Hyponatremia    Pulmonary nodule    Pulmonary emphysema (HCC)    Acute on chronic respiratory failure with hypoxia and hypercapnia (HCC)    Community acquired pneumonia of right lung    Acute exacerbation of chronic obstructive pulmonary disease (COPD) (HCC)    Acute on  chronic respiratory failure (HCC)    Acute respiratory failure with hypoxia and hypercapnia (HCC)    HCAP (healthcare-associated pneumonia)    Transaminitis    Pancreatic abnormality    Common bile duct dilatation    Community acquired pneumonia    Pneumonia due to COVID-19 virus    Primary hypertension    COVID-19    Hypokalemia    History of COVID-19    Leukocytosis    Chronic respiratory failure with hypoxia (HCC)    Metabolic encephalopathy    Acute encephalopathy    Chronic hypoxemic respiratory failure (HCC)    Opacities of both lungs present on chest x-ray    Respiratory distress    Multiple closed fractures of ribs of left side    Acute on chronic hypoxic respiratory failure (HCC)    Leg edema    Disorder of electrolytes    Hypoxia    Congestive heart failure (HCC)    COPD with acute exacerbation (HCC)    RSV infection    Acute hypoxemic respiratory failure (HCC)    Tachycardia    Agitation    Pneumonia of both lower lobes due to infectious organism    Acute on chronic respiratory failure with hypoxia (HCC)    Pulmonary congestion    Tracheostomy dependence (HCC)    Mild protein-calorie malnutrition (HCC)    EDGAR (dyspnea on exertion)    Cardiomyopathy (HCC)    NSTEMI (non-ST elevated myocardial infarction) (HCC)    Lung nodule    Acute HFrEF (heart failure with reduced ejection fraction) (HCC)    Respiratory failure (HCC)    Elevated troponin         Cardiac Testing: I have reviewed the findings below.  EKG:  ECHO:   STRESS TEST:  CATH:  BYPASS:  VASCULAR:    Past Medical History:   has a past medical history of Angina pectoris (HCC), Chronic pain, Congestive heart failure (HCC), COPD exacerbation (HCC), Depression, Essential hypertension, Panic disorder, Pneumonia, and Pulmonary emphysema (HCC).    Surgical History:   has a past surgical history that includes Cholecystectomy; Colonoscopy; and Cardiac procedure (N/A, 6/27/2024).     Social History:   reports that she quit smoking about 3 years ago. Her  daily 9/2/22   Cara Hutton MD   acetaminophen (TYLENOL) 500 MG tablet Take 650 mg by mouth every 6 hours as needed for Pain    ProviderJoey MD   albuterol sulfate  (90 Base) MCG/ACT inhaler Inhale 2 puffs into the lungs every 6 hours as needed for Wheezing orShortness of Breath 8/27/21   Jose Saez MD   Biotin 300 MCG TABS Take 1 tablet by mouth daily  Patient not taking: Reported on 8/28/2022 4/12/21 9/1/22  Pat Liu MD   hydrOXYzine (ATARAX) 25 MG tablet Take 1 tablet by mouth daily as needed for Anxiety    Provider, MD Joey       In-patient schedule medications:   mupirocin   Each Nostril BID    enoxaparin  40 mg SubCUTAneous Daily    doxycycline hyclate  100 mg Oral 2 times per day    [START ON 6/29/2024] predniSONE  40 mg Oral Daily    budesonide  0.5 mg Nebulization BID RT    arformoterol tartrate  15 mcg Nebulization BID RT    acetylcysteine  600 mg Inhalation BID RT    sodium chloride flush  5-40 mL IntraVENous 2 times per day    lisinopril  5 mg Oral Daily    Roflumilast  500 mcg Oral Daily    atorvastatin  40 mg Oral Nightly    escitalopram  20 mg Oral Nightly    busPIRone  10 mg Oral TID    aspirin  81 mg Oral Daily    pantoprazole  40 mg IntraVENous Daily         Infusion Medications:   sodium chloride      sodium chloride      sodium chloride           Allergies:  Cephalosporins, Penicillins, and Hctz [hydrochlorothiazide]     Review of Systems:   14 point review of systems unable to be completed due to patient condition/cooperation. All available positives mentioned in history of present illness.       Physical Examination:    [unfilled]  /77   Pulse 92   Temp 98.6 °F (37 °C) (Oral)   Resp 28   Ht 1.6 m (5' 3\")   Wt 64.5 kg (142 lb 3.2 oz)   SpO2 99%   BMI 25.19 kg/m²    Weight - Scale: 64.5 kg (142 lb 3.2 oz)     Wt Readings from Last 3 Encounters:   06/28/24 64.5 kg (142 lb 3.2 oz)   06/27/24 60.8 kg (134 lb)   06/26/24 67.5 kg (148 lb 13 oz)

## 2024-06-28 NOTE — RT PROTOCOL NOTE
RT Inhaler-Nebulizer Bronchodilator Protocol Note    There is a bronchodilator order in the chart from a provider indicating to follow the RT Bronchodilator Protocol and there is an “Initiate RT Inhaler-Nebulizer Bronchodilator Protocol” order as well (see protocol at bottom of note).    CXR Findings:  XR CHEST PORTABLE    Result Date: 6/26/2024  1. No acute cardiopulmonary process. 2. COPD. 3. Tracheostomy tube in good position.       The findings from the last RT Protocol Assessment were as follows:   History Pulmonary Disease: Chronic pulmonary disease  Respiratory Pattern: Dyspnea on exertion or RR 21-25 bpm  Breath Sounds: Inspiratory and expiratory or bilateral wheezing and/or rhonchi  Cough: Strong, productive  Indication for Bronchodilator Therapy: On home bronchodilators  Bronchodilator Assessment Score: 11    Aerosolized bronchodilator medication orders have been revised according to the RT Inhaler-Nebulizer Bronchodilator Protocol below.    Respiratory Therapist to perform RT Therapy Protocol Assessment initially then follow the protocol.  Repeat RT Therapy Protocol Assessment PRN for score 0-3 or on second treatment, BID, and PRN for scores above 3.    No Indications - adjust the frequency to every 6 hours PRN wheezing or bronchospasm, if no treatments needed after 48 hours then discontinue using Per Protocol order mode.     If indication present, adjust the RT bronchodilator orders based on the Bronchodilator Assessment Score as indicated below.  Use Inhaler orders unless patient has one or more of the following: on home nebulizer, not able to hold breath for 10 seconds, is not alert and oriented, cannot activate and use MDI correctly, or respiratory rate 25 breaths per minute or more, then use the equivalent nebulizer order(s) with same Frequency and PRN reasons based on the score.  If a patient is on this medication at home then do not decrease Frequency below that used at home.    0-3 - enter or  revise RT bronchodilator order(s) to equivalent RT Bronchodilator order with Frequency of every 4 hours PRN for wheezing or increased work of breathing using Per Protocol order mode.        4-6 - enter or revise RT Bronchodilator order(s) to two equivalent RT bronchodilator orders with one order with BID Frequency and one order with Frequency of every 4 hours PRN wheezing or increased work of breathing using Per Protocol order mode.        7-10 - enter or revise RT Bronchodilator order(s) to two equivalent RT bronchodilator orders with one order with TID Frequency and one order with Frequency of every 4 hours PRN wheezing or increased work of breathing using Per Protocol order mode.       11-13 - enter or revise RT Bronchodilator order(s) to one equivalent RT bronchodilator order with QID Frequency and an Albuterol order with Frequency of every 4 hours PRN wheezing or increased work of breathing using Per Protocol order mode.      Greater than 13 - enter or revise RT Bronchodilator order(s) to one equivalent RT bronchodilator order with every 4 hours Frequency and an Albuterol order with Frequency of every 2 hours PRN wheezing or increased work of breathing using Per Protocol order mode.     RT to enter RT Home Evaluation for COPD & MDI Assessment order using Per Protocol order mode.    Electronically signed by Elena Bonilla RCP on 6/27/2024 at 8:07 PM

## 2024-06-28 NOTE — PROGRESS NOTES
Patient with no urine output since 1900. Purewick in place. Brief checked for incontinence. Bladder scan done showing 120 mL. Patient denies urge to urinate at this time. Will continue to monitor for urine and bladder scan as needed.

## 2024-06-28 NOTE — PROGRESS NOTES
Shift: [id number]    Admitting diagnosis: admitted with pulmonary congestion COPD exacerbation discharge yesterday to the nursing home. admitted with pulmonary congestion COPD exacerbation discharge yesterday to the nursing home.     Presentation to hospital: shortness of breath and hypoxia placed on mechanical ventilation. Not much secretions- creamy mild.     Surgery: No     Nursing assessment at handoff  stable    Emergency Contact/POA:    HUMERA STENI (Child)  [phone] (Home Phone)     Family updated: No     Most recent vitals: /78   Pulse (!) 106   Temp 97.9 °F (36.6 °C) (Oral)   Resp 16   Ht 1.6 m (5' 3\")   Wt 64.5 kg (142 lb 3.2 oz)   SpO2 95%   BMI 25.19 kg/m²      Rhythm: Sinus rhythm     NC/HFNC-  N/A   Respiratory support: - No ventilator support  - Trach collar (on CPAP overnight)     Vent days: Day 0    Increased O2 requirements: No     Admission weight Weight - Scale: 66 kg (145 lb 8.1 oz)  Today's weight   Wt Readings from Last 1 Encounters:   06/28/24 64.5 kg (142 lb 3.2 oz)         UOP >30ml/hr: 350 mL     Allrde need assessed each shift: No     Restraints: No Order current and documentation up to date?    Lines/Drains  LDA Insertion Date Discontinued Date Dressing Changes   PIV X1  6/28/24     TLC       Arterial       Allred       Vas Cath      Trach  4/9/24     Peg  5/7/24       Night Shift Hospitalist Interventions    Problem(Brief) Date Time Intervention Physician contacted                                               Drip rates at handoff:    sodium chloride      sodium chloride      sodium chloride         Hospital Course Daily Updates:  Admit Day# 1  Days [id number] 6/28/2024  - Pt off vent to trach collar  - Oral Diet order   - Bronch done. Sputum collected   - Trach Suctioned   - Backup order for midline (signed consent) due to poor IV access       Lab Data:   CBC:   Recent Labs     06/27/24 2027 06/28/24  0429   WBC 5.8 4.6   HGB 10.6* 10.6*   HCT 32.7* 32.8*   MCV 86.5 85.7

## 2024-06-28 NOTE — PROGRESS NOTES
06/28/24 1128   Patient Observation   Pulse (!) 104   SpO2 94 %   Vent Information   Vent Mode CPAP/PS   Ventilator Settings   Vt (Set, mL) 350 mL   Resp Rate (Set) 18 bpm   PEEP/CPAP (cmH2O) 5   FiO2  35 %   Vent Patient Data (Readings)   Vt (Measured) 719 mL   Peak Inspiratory Pressure (cmH2O) 21 cmH2O   Rate Measured 23 br/min   Minute Volume (L/min) 10.4 Liters   Mean Airway Pressure (cmH2O) 10 cmH20   Plateau Pressure (cm H2O) 0 cm H2O   Driving Pressure -5   I:E Ratio 1:2.90   Flow Sensitivity 3 L/min   I Time/ I Time % 0 s   Vent Alarm Settings   High Pressure (cmH2O) 40 cmH2O   Low Minute Volume (lpm) 2 L/min   High Minute Volume (lpm) 20 L/min   Low Exhaled Vt (ml) 200 mL   High Exhaled Vt (ml) 1100 mL   RR High (bpm) 40 br/min   Additional Respiratoray Assessments   Humidification Temp 37   Circuit Condensation Drained   Ambu Bag With Mask At Bedside Yes   Backup Trachs Available (Size) 4.0;6.0   Surgical Airway (Trach) Shiley Cuffed   No placement date or time found.   Present on Admission/Arrival: Yes  Surgical Airway Type: Tracheostomy  Brand: Analey  Style: Cuffed  Size: 6   Status Secured   Inner Cannula Care (S)  Changed/new   Ties Assessment Intact;Secure   Cuff Pressure 30 cm H2O   Spare Trach at Bedside Yes   Spare Trach Tube One Size Smaller at Bedside Yes   Ambu Bag With Mask at Bedside Yes

## 2024-06-28 NOTE — PROGRESS NOTES
Hospital Medicine Progress Note      Date of Admission: 6/27/2024  Hospital Day: 2    Chief Admission Complaint:  Patient was transferred over here from [facility] ICU for cardiac catheterization following non ST elevation MI     Subjective:  no new c/o    Presenting Admission History:         68 y.o. female who presented to [facility] with above complaint.  PMHx significant for chronic respiratory failure tracheostomy COPD pulmonary nodule cancer hypertension CHF and chronic pain  She was admitted to [facility] this morning with acute on chronic hypoxic respiratory failure possible COPD exacerbation or CHF.  Patient was admitted to ICU and was connected to the ventilator through tracheostomy  Echocardiogram showed reduced ejection fraction that was new and patient was transferred over here for cardiac catheterization  She lives in nursing home    Assessment/Plan:      Current Principal Problem:  Respiratory failure (HCC)      Non-ST elevation MI-patient was started on heparin and transferred over here for cardiac cath-cardiology input appreciated s/p cardiac cath-right and left cardiac catheterization  Takotsubo cardiomyopathy -ejection fraction 35%  No PCI-nonobstructive CAD  Normal right heart cath  -May DC heparin IV  Continue with aspirin statin lisinopril, not on any beta-blocker or Aldactone  Deferred to cardiology     Acute on chronic respiratory failure/chronic tracheostomy-currently on vent admitted to ICU intensivist consult  Possible COPD exacerbation currently getting nebulizers and steroid  Doxycycline IV     Right upper lobe nodule-cancer  Not a surgical candidate s/p SBRT  Enlarging right upper lobe nodule  ?  Palliative care     Hypertension-hold Norvasc, blood pressure soft        C. difficile is pending?  Diarrhea-need to verify     Elevated anion gap and lactic acid-possibly secondary to cardiac event/dehydration-/ infection -monitor   no IV fluid because of CHF    Physical  High (any 2)    A. Problems (any 1)  [x] Acute/Chronic Illness/injury posing threat to life or bodily function: In ICU on Vent   [] Severe exacerbation of chronic illness:    ---------------------------------------------------------------------  B. Risk of Treatment (any 1)   [x] Drugs/treatments that require intensive monitoring for toxicity include:    [] IV ABX requiring serial renal monitoring for nephrotoxicity:     [x] Post-Cath/Contrast study requiring serial renal monitoring for Contrast Induced Nephropathy  [] IV Narcotic analgesia for ADR   [] Aggressive IV diuresis requiring serial monitoring for renal impairment and electrolyte derangements  [] Hypertonic Saline requiring serial renal monitoring for appropriate electrolyte correction rate   [] Critical electrolyte abnormalities requiring IV replacement and close serial monitoring  [] Insulin - monitoring FSBS for Hypoglycemic ADR  [] Anticoagulation requiring close serial monitoring and dose adjustments at high risk of ADR   [] HD requiring close serial monitoring of electrolytes and fluid status  [] Other -  [] Change in code status:    [] Decision to escalate care:    [] Major surgery/procedure with associated risk factors:    ----------------------------------------------------------------------  C. Data (any 2)  [] Discussed management of the case with consultants as follows:    [x] Discussed the discharge plan in detail with case mgt including timing/barriers to discharge, need for support services and placement decision   [] Imaging personally reviewed and interpreted, includes:   [x] Telemetry monitoring as noted above  [] Data Review (any 3)  [] Collateral history obtained from:    [x] All available Consultant notes from yesterday/today were reviewed  [x] All current labs were reviewed and interpreted for clinical significance   [x] Appropriate follow-up labs were ordered    Medications:  Personally reviewed in detail in conjunction w/ labs as  0.4 0.3 0.4   ALKPHOS 121 109 90     Recent Labs     06/27/24  0420 06/27/24  0957   INR  --  1.03   LACTA 2.6*  --        Urine Cultures:   Lab Results   Component Value Date/Time    LABURIN  05/02/2023 01:11 AM     <50,000 CFU/ml mixed skin/urogenital pj. No further workup     Blood Cultures:   Lab Results   Component Value Date/Time    BC  06/24/2024 01:31 PM     No Growth to date.  Any change in status will be called.     Lab Results   Component Value Date/Time    BLOODCULT2  06/24/2024 01:31 PM     No Growth to date.  Any change in status will be called.     Organism:   Lab Results   Component Value Date/Time    ORG Enterococcus faecalis 02/28/2021 05:20 PM         Moe Ly MD

## 2024-06-28 NOTE — PROGRESS NOTES
Comprehensive Nutrition Assessment    Type and Reason for Visit:  Initial    Nutrition Recommendations/Plan:   Continue regular diet and encourage PO intake   RD to add ensure BID  Monitor nutrition adequacy, pertinent labs, bowel habits, wt changes, and clinical progress     Malnutrition Assessment:  Malnutrition Status:  At risk for malnutrition (Comment) (06/28/24 3463)    Context:  Chronic Illness     Findings of the 6 clinical characteristics of malnutrition:  Energy Intake:  Mild decrease in energy intake (Comment)  Weight Loss:  Greater than 10% over 6 months     Fluid Accumulation:  Mild Extremities    Nutrition Assessment:    New vent: 68 y.o. f w/ PMHx for chronic respiratory failure, tracheostomy, COPD, pulmonary nodule, cancer, HTN, CHF and chronic pain admitted w/ acute on chronic hypoxic respiratory failure possible COPD exacerbation or CHF. S/p right and left heart cath yesterday. Plan for bronch today. On regular diet. Placed on trach collar this morning, previously on vent. Plan to start propofol today for bronch. Pt unavailable during attempted visit today. -23% wt change x 6 months per EMR. Pt has PEG but only uses it for meds, per RN. Plans to resume diet after bronch. Add ONS when able. Continue to encourage PO intake, will continue to monitor.    Nutrition Related Findings:    Labs reviewed. BLE trace edema. PEG Wound Type: None       Current Nutrition Intake & Therapies:    Average Meal Intake: Unable to assess  Average Supplements Intake: None Ordered  ADULT DIET; Regular    Anthropometric Measures:  Height: 160 cm (5' 3\")  Ideal Body Weight (IBW): 115 lbs (52 kg)       Current Body Weight: 64.4 kg (142 lb), 123.5 % IBW. Weight Source: Bed Scale  Current BMI (kg/m2): 25.2  Usual Body Weight: 83.9 kg (185 lb) (on 12/31/2023)  % Weight Change (Calculated): -23.2  Weight Adjustment For: No Adjustment                 BMI Categories: Overweight (BMI 25.0-29.9)    Estimated Daily Nutrient  Needs:  Energy Requirements Based On: Kcal/kg (25-30 kcals/kg)  Weight Used for Energy Requirements: Current (65 kg)  Energy (kcal/day): 8676-4400  Weight Used for Protein Requirements: Current (1.2-1.5 g/kg)  Protein (g/day): 78-98 g  Method Used for Fluid Requirements: 1 ml/kcal  Fluid (ml/day): 0011-2402 ml    Nutrition Diagnosis:   Inadequate oral intake related to impaired respiratory function, inadequate protein-energy intake as evidenced by poor intake prior to admission, weight loss, weight loss greater than or equal to 10% in 6 months (Trach)    Nutrition Interventions:   Food and/or Nutrient Delivery: Continue Current Diet, Start Oral Nutrition Supplement  Nutrition Education/Counseling: Education not appropriate  Coordination of Nutrition Care: Continue to monitor while inpatient, Interdisciplinary Rounds       Goals:     Goals: Meet at least 75% of estimated needs, prior to discharge       Nutrition Monitoring and Evaluation:   Behavioral-Environmental Outcomes: None Identified  Food/Nutrient Intake Outcomes: Food and Nutrient Intake, Supplement Intake  Physical Signs/Symptoms Outcomes: Biochemical Data, Weight, Nutrition Focused Physical Findings    Discharge Planning:    Too soon to determine     Rosa Sutton MS, RD, LD  Contact: Office: [phone]; Gil: [phone]

## 2024-06-29 PROBLEM — I51.81 TAKOTSUBO CARDIOMYOPATHY: Status: ACTIVE | Noted: 2024-06-26

## 2024-06-29 LAB
ALBUMIN SERPL-MCNC: 3.3 G/DL (ref 3.4–5)
ANION GAP SERPL CALCULATED.3IONS-SCNC: 10 MMOL/L (ref 3–16)
BUN SERPL-MCNC: 25 MG/DL (ref 7–20)
CALCIUM SERPL-MCNC: 8.9 MG/DL (ref 8.3–10.6)
CHLORIDE SERPL-SCNC: 102 MMOL/L (ref 99–110)
CO2 SERPL-SCNC: 27 MMOL/L (ref 21–32)
CREAT SERPL-MCNC: <0.5 MG/DL (ref 0.6–1.2)
DEPRECATED RDW RBC AUTO: 15.7 % (ref 12.4–15.4)
GFR SERPLBLD CREATININE-BSD FMLA CKD-EPI: >90 ML/MIN/{1.73_M2}
GLUCOSE SERPL-MCNC: 86 MG/DL (ref 70–99)
HCT VFR BLD AUTO: 33.5 % (ref 36–48)
HGB BLD-MCNC: 10.9 G/DL (ref 12–16)
MCH RBC QN AUTO: 27.9 PG (ref 26–34)
MCHC RBC AUTO-ENTMCNC: 32.5 G/DL (ref 31–36)
MCV RBC AUTO: 86.1 FL (ref 80–100)
PHOSPHATE SERPL-MCNC: 1.9 MG/DL (ref 2.5–4.9)
PLATELET # BLD AUTO: 349 K/UL (ref 135–450)
PMV BLD AUTO: 7.4 FL (ref 5–10.5)
POTASSIUM SERPL-SCNC: 3.6 MMOL/L (ref 3.5–5.1)
RBC # BLD AUTO: 3.89 M/UL (ref 4–5.2)
SODIUM SERPL-SCNC: 139 MMOL/L (ref 136–145)
WBC # BLD AUTO: 10.3 K/UL (ref 4–11)

## 2024-06-29 PROCEDURE — 94640 AIRWAY INHALATION TREATMENT: CPT

## 2024-06-29 PROCEDURE — 85027 COMPLETE CBC AUTOMATED: CPT

## 2024-06-29 PROCEDURE — C9113 INJ PANTOPRAZOLE SODIUM, VIA: HCPCS | Performed by: INTERNAL MEDICINE

## 2024-06-29 PROCEDURE — 6370000000 HC RX 637 (ALT 250 FOR IP): Performed by: INTERNAL MEDICINE

## 2024-06-29 PROCEDURE — 6370000000 HC RX 637 (ALT 250 FOR IP): Performed by: NURSE PRACTITIONER

## 2024-06-29 PROCEDURE — 2700000000 HC OXYGEN THERAPY PER DAY

## 2024-06-29 PROCEDURE — 94761 N-INVAS EAR/PLS OXIMETRY MLT: CPT

## 2024-06-29 PROCEDURE — 6360000002 HC RX W HCPCS: Performed by: INTERNAL MEDICINE

## 2024-06-29 PROCEDURE — 80069 RENAL FUNCTION PANEL: CPT

## 2024-06-29 PROCEDURE — 99233 SBSQ HOSP IP/OBS HIGH 50: CPT | Performed by: INTERNAL MEDICINE

## 2024-06-29 PROCEDURE — 99233 SBSQ HOSP IP/OBS HIGH 50: CPT | Performed by: NURSE PRACTITIONER

## 2024-06-29 PROCEDURE — 2000000000 HC ICU R&B

## 2024-06-29 PROCEDURE — 2580000003 HC RX 258: Performed by: INTERNAL MEDICINE

## 2024-06-29 PROCEDURE — 6360000002 HC RX W HCPCS: Performed by: NURSE PRACTITIONER

## 2024-06-29 RX ORDER — SPIRONOLACTONE 25 MG/1
12.5 TABLET ORAL DAILY
Status: DISCONTINUED | OUTPATIENT
Start: 2024-06-29 | End: 2024-07-01 | Stop reason: HOSPADM

## 2024-06-29 RX ORDER — METOPROLOL SUCCINATE 25 MG/1
25 TABLET, EXTENDED RELEASE ORAL DAILY
Status: DISCONTINUED | OUTPATIENT
Start: 2024-06-29 | End: 2024-07-01 | Stop reason: HOSPADM

## 2024-06-29 RX ADMIN — LISINOPRIL 5 MG: 5 TABLET ORAL at 09:55

## 2024-06-29 RX ADMIN — IPRATROPIUM BROMIDE AND ALBUTEROL SULFATE 1 DOSE: 2.5; .5 SOLUTION RESPIRATORY (INHALATION) at 08:23

## 2024-06-29 RX ADMIN — ESCITALOPRAM OXALATE 20 MG: 10 TABLET ORAL at 20:21

## 2024-06-29 RX ADMIN — SODIUM CHLORIDE, PRESERVATIVE FREE 10 ML: 5 INJECTION INTRAVENOUS at 09:57

## 2024-06-29 RX ADMIN — ATORVASTATIN CALCIUM 40 MG: 40 TABLET, FILM COATED ORAL at 20:22

## 2024-06-29 RX ADMIN — DOXYCYCLINE HYCLATE 100 MG: 100 TABLET, COATED ORAL at 09:55

## 2024-06-29 RX ADMIN — BUSPIRONE HYDROCHLORIDE 10 MG: 5 TABLET ORAL at 20:22

## 2024-06-29 RX ADMIN — MUPIROCIN: 20 OINTMENT TOPICAL at 20:22

## 2024-06-29 RX ADMIN — ACETYLCYSTEINE 600 MG: 200 INHALANT RESPIRATORY (INHALATION) at 19:56

## 2024-06-29 RX ADMIN — PREDNISONE 40 MG: 20 TABLET ORAL at 09:55

## 2024-06-29 RX ADMIN — ENOXAPARIN SODIUM 40 MG: 100 INJECTION SUBCUTANEOUS at 09:56

## 2024-06-29 RX ADMIN — ACETYLCYSTEINE 600 MG: 200 INHALANT RESPIRATORY (INHALATION) at 08:23

## 2024-06-29 RX ADMIN — BUSPIRONE HYDROCHLORIDE 10 MG: 5 TABLET ORAL at 09:55

## 2024-06-29 RX ADMIN — MUPIROCIN: 20 OINTMENT TOPICAL at 09:57

## 2024-06-29 RX ADMIN — ARFORMOTEROL TARTRATE 15 MCG: 15 SOLUTION RESPIRATORY (INHALATION) at 19:56

## 2024-06-29 RX ADMIN — ROFLUMILAST 500 MCG: 500 TABLET ORAL at 09:55

## 2024-06-29 RX ADMIN — METOPROLOL SUCCINATE 25 MG: 25 TABLET, EXTENDED RELEASE ORAL at 13:12

## 2024-06-29 RX ADMIN — BUSPIRONE HYDROCHLORIDE 10 MG: 5 TABLET ORAL at 13:12

## 2024-06-29 RX ADMIN — DIBASIC SODIUM PHOSPHATE, MONOBASIC POTASSIUM PHOSPHATE AND MONOBASIC SODIUM PHOSPHATE 2 TABLET: 852; 155; 130 TABLET ORAL at 17:18

## 2024-06-29 RX ADMIN — ASPIRIN 81 MG 81 MG: 81 TABLET ORAL at 09:55

## 2024-06-29 RX ADMIN — PANTOPRAZOLE SODIUM 40 MG: 40 INJECTION, POWDER, FOR SOLUTION INTRAVENOUS at 09:57

## 2024-06-29 RX ADMIN — DOXYCYCLINE HYCLATE 100 MG: 100 TABLET, COATED ORAL at 20:22

## 2024-06-29 RX ADMIN — BUDESONIDE 500 MCG: 0.5 SUSPENSION RESPIRATORY (INHALATION) at 19:56

## 2024-06-29 RX ADMIN — HYDROXYZINE HYDROCHLORIDE 10 MG: 10 TABLET ORAL at 20:21

## 2024-06-29 RX ADMIN — SPIRONOLACTONE 12.5 MG: 25 TABLET ORAL at 13:12

## 2024-06-29 RX ADMIN — ONDANSETRON 4 MG: 4 TABLET, ORALLY DISINTEGRATING ORAL at 13:12

## 2024-06-29 RX ADMIN — ACETAMINOPHEN 650 MG: 325 TABLET ORAL at 20:21

## 2024-06-29 ASSESSMENT — PAIN DESCRIPTION - ORIENTATION: ORIENTATION: LEFT

## 2024-06-29 ASSESSMENT — PAIN SCALES - GENERAL
PAINLEVEL_OUTOF10: 9
PAINLEVEL_OUTOF10: 7

## 2024-06-29 ASSESSMENT — PULMONARY FUNCTION TESTS: PIF_VALUE: 16

## 2024-06-29 ASSESSMENT — PAIN DESCRIPTION - LOCATION: LOCATION: SHOULDER

## 2024-06-29 ASSESSMENT — PAIN SCALES - WONG BAKER: WONGBAKER_NUMERICALRESPONSE: NO HURT

## 2024-06-29 ASSESSMENT — PAIN DESCRIPTION - DESCRIPTORS: DESCRIPTORS: ACHING;SHARP

## 2024-06-29 NOTE — PLAN OF CARE
Problem: Safety - Adult  Goal: Free from fall injury  Outcome: Progressing     Problem: Discharge Planning  Goal: Discharge to home or other facility with appropriate resources  Outcome: Progressing  Flowsheets (Taken 6/29/2024 0800)  Discharge to home or other facility with appropriate resources:   Identify barriers to discharge with patient and caregiver   Arrange for needed discharge resources and transportation as appropriate     Problem: Pain  Goal: Verbalizes/displays adequate comfort level or baseline comfort level  Outcome: Progressing  Flowsheets (Taken 6/29/2024 0902)  Verbalizes/displays adequate comfort level or baseline comfort level:   Encourage patient to monitor pain and request assistance   Assess pain using appropriate pain scale     Problem: Chronic Conditions and Co-morbidities  Goal: Patient's chronic conditions and co-morbidity symptoms are monitored and maintained or improved  Outcome: Progressing  Flowsheets (Taken 6/29/2024 0800)  Care Plan - Patient's Chronic Conditions and Co-Morbidity Symptoms are Monitored and Maintained or Improved:   Monitor and assess patient's chronic conditions and comorbid symptoms for stability, deterioration, or improvement   Collaborate with multidisciplinary team to address chronic and comorbid conditions and prevent exacerbation or deterioration     Problem: Nutrition Deficit:  Goal: Optimize nutritional status  Outcome: Progressing     Problem: Skin/Tissue Integrity  Goal: Absence of new skin breakdown  Description: 1.  Monitor for areas of redness and/or skin breakdown  2.  Assess vascular access sites hourly  3.  Every 4-6 hours minimum:  Change oxygen saturation probe site  4.  Every 4-6 hours:  If on nasal continuous positive airway pressure, respiratory therapy assess nares and determine need for appliance change or resting period.  Outcome: Progressing

## 2024-06-29 NOTE — PROGRESS NOTES
Came to place pt on the vent for the night, woke pt to speak to her, pt declined the vent for the night at this time.  Encouraged pt to call at the slightest change, RN made aware.

## 2024-06-29 NOTE — PROGRESS NOTES
Shift: [id number]     Admitting diagnosis: admitted with pulmonary congestion COPD exacerbation discharge yesterday to the nursing home. admitted with pulmonary congestion COPD exacerbation discharge yesterday to the nursing home.     Presentation to hospital: shortness of breath and hypoxia placed on mechanical ventilation. Not much secretions- creamy mild.     Surgery: No     Nursing assessment at handoff  stable    Emergency Contact/POA:    HUMERA STEIN (Child)  [phone] (Home Phone)     Family updated: No     Most recent vitals: /68   Pulse 73   Temp 99 °F (37.2 °C) (Oral)   Resp 17   Ht 1.6 m (5' 3\")   Wt 64.5 kg (142 lb 3.2 oz)   SpO2 (!) 88%   BMI 25.19 kg/m²      Rhythm: Sinus rhythm     NC/HFNC-  N/A   Respiratory support: - No ventilator support  - Trach collar (on CPAP overnight)   8L 35%    Vent days: Day 0    Increased O2 requirements: No     Admission weight Weight - Scale: 66 kg (145 lb 8.1 oz)  Today's weight   Wt Readings from Last 1 Encounters:   06/28/24 64.5 kg (142 lb 3.2 oz)         UOP >30ml/hr: 350 mL     Allred need assessed each shift: No     Restraints: No Order current and documentation up to date?    Lines/Drains  LDA Insertion Date Discontinued Date Dressing Changes   PIV X1  6/28/24     TLC       Arterial       Allred       Vas Cath      Trach  4/9/24     Peg  5/7/24       Night Shift Hospitalist Interventions    Problem(Brief) Date Time Intervention Physician contacted                                               Drip rates at handoff:       Hospital Course Daily Updates:  Admit Day# 1  Days [id number] 6/28/2024  - Pt off vent to trach collar  - Oral Diet order   - Bronch done. Sputum collected   - Trach Suctioned   - Backup order for midline (signed consent) due to poor IV access     Admit Day #2  -decreased O2 requirement = 8L 35%  -phos level replaced  -freq Trach suctioning - copious secretions; de-sat to 86-87% w/tracheal suctioning, take a few minutes to

## 2024-06-29 NOTE — PROGRESS NOTES
[facility]  Cardiology  Progress Note    Admission date:  2024    Reason for follow up visit: Nonischemic/Takotsubo cardiomyopathy    HPI/CC: Annie Tyler is a 68 y.o. female who presented to [facility] [place]24 for acute on chronic respiratory failure, tracheostomy.  Echo showed EF 40±5% with wall motion abnormalities.  R/LHC 2024 showed nonobstructive CAD, Takotsubo cardiomyopathy normal right heart hemodynamics.  Rhythm overnight has been sinus 80s.    Subjective: She denies any chest pain, shortness of breath, palpitations, dizziness.    Vitals:  Blood pressure 113/70, pulse (!) 105, temperature 98.7 °F (37.1 °C), temperature source Oral, resp. rate 22, height 1.6 m (5' 3\"), weight 64.5 kg (142 lb 3.2 oz), SpO2 96 %, not currently breastfeeding.  Temp  Av.4 °F (36.9 °C)  Min: 97.9 °F (36.6 °C)  Max: 98.7 °F (37.1 °C)  Pulse  Av.6  Min: 76  Max: 131  BP  Min: 113/70  Max: 144/80  SpO2  Av.8 %  Min: 90 %  Max: 100 %  FiO2   Av.6 %  Min: 35 %  Max: 60 %    24 hour I/O    Intake/Output Summary (Last 24 hours) at 2024 1152  Last data filed at 2024 0957  Gross per 24 hour   Intake 1537.14 ml   Output 750 ml   Net 787.14 ml     Current Facility-Administered Medications   Medication Dose Route Frequency Provider Last Rate Last Admin    mupirocin (BACTROBAN) 2 % ointment   Each Nostril BID Donald Aguilar MD   Given at 24 0957    enoxaparin (LOVENOX) injection 40 mg  40 mg SubCUTAneous Daily Nasra Ambrosio APRN - CNP   40 mg at 24 0956    doxycycline hyclate (VIBRA-TABS) tablet 100 mg  100 mg Oral 2 times per day Nasra Ambrosio APRN - CNP   100 mg at 24 0955    predniSONE (DELTASONE) tablet 40 mg  40 mg Oral Daily Nasra Ambrosio APRN - CNP   40 mg at 24 0955    budesonide (PULMICORT) nebulizer suspension 500 mcg  0.5 mg Nebulization BID RT Merary Davis MD   500 mcg at 24    arformoterol tartrate (BROVANA) nebulizer  solution 15 mcg  15 mcg Nebulization BID RT Merary Davis MD   15 mcg at 06/28/24 2058    ipratropium 0.5 mg-albuterol 2.5 mg (DUONEB) nebulizer solution 1 Dose  1 Dose Inhalation Q4H PRN Merary Davis MD   1 Dose at 06/29/24 0823    acetylcysteine (MUCOMYST) 20 % solution 600 mg  600 mg Inhalation BID RT Merary Davis MD   600 mg at 06/29/24 0823    sodium chloride flush 0.9 % injection 5-40 mL  5-40 mL IntraVENous 2 times per day Merary Davis MD   10 mL at 06/29/24 0957    sodium chloride flush 0.9 % injection 5-40 mL  5-40 mL IntraVENous PRN Merary Davis MD        0.9 % sodium chloride infusion   IntraVENous PRN Merary Davis MD        lisinopril (PRINIVIL;ZESTRIL) tablet 5 mg  5 mg Oral Daily Edmond Shahid MD   5 mg at 06/29/24 0955    Roflumilast (DALIRESP) tablet 500 mcg  500 mcg Oral Daily Edmond Shahid MD   500 mcg at 06/29/24 0955    atorvastatin (LIPITOR) tablet 40 mg  40 mg Oral Nightly Edmond Shahid MD   40 mg at 06/28/24 2107    escitalopram (LEXAPRO) tablet 20 mg  20 mg Oral Nightly Edmond Shahid MD   20 mg at 06/28/24 2107    busPIRone (BUSPAR) tablet 10 mg  10 mg Oral TID Edmond Shahid MD   10 mg at 06/29/24 0955    0.9 % sodium chloride infusion   IntraVENous PRN Edmond Shahid MD        potassium chloride (KLOR-CON M) extended release tablet 40 mEq  40 mEq Oral PRN Edmond Shahid MD        Or    potassium bicarb-citric acid (EFFER-K) effervescent tablet 40 mEq  40 mEq Oral PRN Edmond Shahid MD        Or    potassium chloride 10 mEq/100 mL IVPB (Peripheral Line)  10 mEq IntraVENous PRN Edmond Shahid MD        magnesium sulfate 2000 mg in 50 mL IVPB premix  2,000 mg IntraVENous PRN Edmond Shahid MD        ondansetron (ZOFRAN-ODT) disintegrating tablet 4 mg  4 mg Oral Q8H PRN Edmond Shahid MD        Or    ondansetron (ZOFRAN) injection 4 mg  4 mg IntraVENous Q6H PRN Edmond Shahid MD        acetaminophen (TYLENOL)  associated with this study.   Documented by Merary Davis MD - 6/27/2024  5:15 PM        Coronary Findings    Diagnostic  Dominance: Right  Left Main   The vessel was visualized by angiography. Size of vessel >=2.0 mm. The vessel is angiographically normal.      Left Anterior Descending   The vessel was visualized by angiography and is large. There is mild disease in the mid segment. The vessel is calcified.   Mid LAD lesion, 30% stenosed.      First Diagonal Branch   1st Diag lesion, 40% stenosed.      Left Circumflex   The vessel was visualized by angiography. Size of vessel >=2.0 mm. The vessel is angiographically normal.      Right Coronary Artery   The vessel was visualized by angiography. Size of vessel >=2.0 mm. The vessel is angiographically normal.      Intervention     No interventions have been documented.     Left Heart    Left Ventricle LV systolic pressure is decreased. LV end diastolic pressure is normal. LV EDP is: 8. The estimated EF = 30 - 35%. There are (is) hypokinesis in the left ventricle. Anterior wall hypokinesis     Right Heart    Right Heart Cath Access site for the RHC was obtained via the right femoral vein. [place]-Jud catheter was used. No pulmonary hypertension noted.       Echo 6/2024:   Left Ventricle: Moderately reduced left ventricular systolic function with a visually estimated EF of 40 ± 5%. Mild hypokinesis of the following segments: basal anterior. Moderate hypokinesis of the following segments: basal inferior. Severe hypokinesis of the following segments: mid anterior, mid anterolateral, mid anteroseptal, mid inferior, mid inferolateral, mid inferoseptal and apical anterior. Akinesis of the following segments: apical lateral, apical septal and apical inferior. Hyperkinesis of the following segments: basal anterolateral, basal anteroseptal, basal inferolateral and basal inferoseptal.  Differential most certainly includes ischemic cardiomyopathy and Takotsubo  reviewed  2.  Needs GDMT for Takotsubo cardiomyopathy  3.  Continue lisinopril 5 mg daily, will add Toprol and low-dose spironolactone  4.  Consider SGLT2i in follow-up if no contraindications  5.  Monitor renal function for toxicity    Acute HFrEF and new cardiomyopathy increases risk of morbidity and mortality necessitating treatment    JUAN Lees-CNP  [facility]  [phone]

## 2024-06-29 NOTE — PROGRESS NOTES
PULMONARY AND CRITICAL CARE INPATIENT NOTE        Annie Tyler   : 1955  MRN: [id number]     Admitting Physician: Moe Ly MD  Attending Physician: Moe Ly MD  PCP: Keyla Mtz MD    Admission: 2024   Date of Service: 2024    No chief complaint on file.          ASSESSMENT & PLAN       68 y.o. pleasant  female patient with:    Assessment:  Suspected acute stress cardiomyopathy.  Reduced EF 35 to 40% with elevated troponin.  Mild CAD on left heart cath 2024.  No pHTN or right heart cath  Diaphoresis  Severe anxiety attacks  Mild lactic acidosis  Acute hypoxemic respiratory failure  End-stage COPD with severe emphysema with acute exacerbation  Chronic hypoxic respiratory failure/trach dependent.  History after cardiac arrest related admission in 2024 at .  On AVAPS at night/Capped trach and trach collar during the day at nursing home  Growing RUL nodule followed by pulmonary clinic.  Highly concerning for cancer  H/O KAMLA lung cancer, clinical diagnosis, saw Dr. Navarro & was not a surgical candidate, s/p 5 fractions of SBRT 5/3/22 - 22 by Dr. Gaitan   Former smoker, >100 pack years, quit    H/O COVID19 pneumonia Aug 2022 with hospitalization  Has parakeets in the time, x2 yrs       Plan:              LHC/RHC reviewed. Cardiology following.  ASA statin  Resume home anxiety medications  Trach collar as tolerated. Back on vent if needed.  Trach care/ suction as needed.  Bronchoscopy /mucous clearance completed on   Continue doxycycline.  Patient is allergic to Rocephin  Follow-up sputum culture and respiratory micro panel to decide on the need on adding meropenem  Brovana, Pulmicort and DuoNebs/Mucomyst  Continue to wean oxygen with target 90 to 94%.  Head of bed 30 degrees or higher at all times  Nutrition: Diet to resume   Blood sugar control ISS, with goal 140-180  DVT prophylaxis: Lovenox      Subjective/Objective           CC/Reason for  urine output of 750  Patient remained on Mucomyst, Brovana and Pulmicort, aspirin, atorvastatin, doxycycline, NexoBrid, Lexapro, lisinopril, pantoprazole, prednisone, Roflumilast  Blood sugar remained within range  No new positive micro results  Chemistry remains relatively stable        Objective    Test Results:  Imaging:  I have reviewed radiology images personally.    Chest x-ray June 27, 2024    XR CHEST PORTABLE    Result Date: 6/26/2024  1. No acute cardiopulmonary process. 2. COPD. 3. Tracheostomy tube in good position.      Chest x-ray 6/26 imaging was reviewed by me and showed   1. No acute cardiopulmonary process.   2. COPD.   3. Tracheostomy tube in good position.         CTPA 6/25 imaging reviewed by me and showed  1. No evidence for pulmonary embolism.   2. Spiculated nodule in the right upper lobe has increased in size from prior   examination concerning for primary malignancy.   3. 2 additional nodular spiculated density seen in the posterior aspect of   the right upper lobe abutting the pleural surface could relate to focal   infectious inflammatory etiology although metastatic disease or malignancy is   difficult to exclude.  Recommend short interval follow-up or further   evaluation with PET.         CTPA 12/31/2023  IMPRESSION:   1. No acute pulmonary artery embolism.   2. More prominent bibasilar airspace disease    3. Emphysema.   4. 8 mm right solid pulmonary nodule within the upper lobe again   demonstrated, for which follow-up noncontrast CT is recommended in 6 months.          PFTS:  1.  Spirometry:  The FVC is 72% of predicted.  The FEV1 is 0.85 liters  which is 36% of predicted.  The FEV1/FVC ratio is 0.38.  There is no  response to bronchodilators.  2.  Plethysmography:  The total lung capacity is 89% of predicted.  3.  The diffusion capacity of carbon monoxide is 21% of predicted.  4.  The flow volume loop shows an obstructive pattern.      Cardiology:  Patient underwent left and right  heart cath on June 27, 2024 that showed mild coronary artery disease and no pHTN.  LVEF 35%    Sleep:  NA      Physical Exam:  General appearance: In no apparent distress.  HEENT: Tracheostomy in place on TC  Cardiac: Normal S1 and S2.  Lungs: Very poor air entry bilaterally with rhonchi  Abdomen: Soft.  PEG tube in place  Back & Extremities: Symmetric pulses with good perfusion.  Neurological: No focal deficit..       ________________________________________________________  Electronically signed by:  Donald Aguilar MD,FACP    6/29/2024    8:03 AM.     [facility]  [address], [place] [address]   Phone (office): [phone]

## 2024-06-29 NOTE — PROGRESS NOTES
Hospital Medicine Progress Note      Date of Admission: 6/27/2024  Hospital Day: 3    Chief Admission Complaint:  Patient was transferred over here from [facility] ICU for cardiac catheterization following non ST elevation MI     Subjective:  no new c/o    Presenting Admission History:         68 y.o. female who presented to [facility] with above complaint.  PMHx significant for chronic respiratory failure tracheostomy COPD pulmonary nodule cancer hypertension CHF and chronic pain  She was admitted to [facility] this morning with acute on chronic hypoxic respiratory failure possible COPD exacerbation or CHF.  Patient was admitted to ICU and was connected to the ventilator through tracheostomy  Echocardiogram showed reduced ejection fraction that was new and patient was transferred over here for cardiac catheterization  She lives in nursing home    Assessment/Plan:      Current Principal Problem:  Respiratory failure (HCC)      Non-ST elevation MI-patient was started on heparin and transferred over here for cardiac cath-cardiology input appreciated s/p cardiac cath-right and left cardiac catheterization  Takotsubo cardiomyopathy -ejection fraction 35%  No PCI-nonobstructive CAD  Normal right heart cath  -May DC heparin IV  Continue with aspirin statin lisinopril, not on any beta-blocker or Aldactone  Deferred to cardiology     Acute on chronic respiratory failure/chronic tracheostomy-currently on vent admitted to ICU intensivist consult  Possible COPD exacerbation currently getting nebulizers and steroid  Doxycycline IV     Right upper lobe nodule-cancer  Not a surgical candidate s/p SBRT  Enlarging right upper lobe nodule  ?  Palliative care     Hypertension-hold Norvasc, blood pressure soft        C. difficile is pending?  Diarrhea-need to verify     Elevated anion gap and lactic acid-possibly secondary to cardiac event/dehydration-/ infection -monitor   no IV fluid because of CHF    Physical  necessarily reflect ongoing patient care which is documented elsewhere in the medical record)    [x] High (any 2)    A. Problems (any 1)  [x] Acute/Chronic Illness/injury posing threat to life or bodily function: In ICU on Vent   [] Severe exacerbation of chronic illness:    ---------------------------------------------------------------------  B. Risk of Treatment (any 1)   [x] Drugs/treatments that require intensive monitoring for toxicity include:    [] IV ABX requiring serial renal monitoring for nephrotoxicity:     [x] Post-Cath/Contrast study requiring serial renal monitoring for Contrast Induced Nephropathy  [] IV Narcotic analgesia for ADR   [] Aggressive IV diuresis requiring serial monitoring for renal impairment and electrolyte derangements  [] Hypertonic Saline requiring serial renal monitoring for appropriate electrolyte correction rate   [] Critical electrolyte abnormalities requiring IV replacement and close serial monitoring  [] Insulin - monitoring FSBS for Hypoglycemic ADR  [] Anticoagulation requiring close serial monitoring and dose adjustments at high risk of ADR   [] HD requiring close serial monitoring of electrolytes and fluid status  [] Other -  [] Change in code status:    [] Decision to escalate care:    [] Major surgery/procedure with associated risk factors:    ----------------------------------------------------------------------  C. Data (any 2)  [] Discussed management of the case with consultants as follows:    [x] Discussed the discharge plan in detail with case mgt including timing/barriers to discharge, need for support services and placement decision   [] Imaging personally reviewed and interpreted, includes:   [x] Telemetry monitoring as noted above  [] Data Review (any 3)  [] Collateral history obtained from:    [x] All available Consultant notes from yesterday/today were reviewed  [x] All current labs were reviewed and interpreted for clinical significance   [x] Appropriate  follow-up labs were ordered    Medications:  Personally reviewed in detail in conjunction w/ labs as documented for evidence of drug toxicity.     Infusion Medications    sodium chloride      sodium chloride      sodium chloride       Scheduled Medications    mupirocin   Each Nostril BID    enoxaparin  40 mg SubCUTAneous Daily    doxycycline hyclate  100 mg Oral 2 times per day    predniSONE  40 mg Oral Daily    budesonide  0.5 mg Nebulization BID RT    arformoterol tartrate  15 mcg Nebulization BID RT    acetylcysteine  600 mg Inhalation BID RT    sodium chloride flush  5-40 mL IntraVENous 2 times per day    lisinopril  5 mg Oral Daily    Roflumilast  500 mcg Oral Daily    atorvastatin  40 mg Oral Nightly    escitalopram  20 mg Oral Nightly    busPIRone  10 mg Oral TID    aspirin  81 mg Oral Daily    pantoprazole  40 mg IntraVENous Daily     PRN Meds: ipratropium 0.5 mg-albuterol 2.5 mg, sodium chloride flush, sodium chloride, sodium chloride, potassium chloride **OR** potassium alternative oral replacement **OR** potassium chloride, magnesium sulfate, ondansetron **OR** ondansetron, acetaminophen **OR** acetaminophen, polyethylene glycol, nitroGLYCERIN, sodium chloride, hydrOXYzine HCl     Labs:  Personally reviewed and interpreted for clinical significance.     Recent Labs     06/27/24 2027 06/28/24 0429 06/29/24 0438   WBC 5.8 4.6 10.3   HGB 10.6* 10.6* 10.9*   HCT 32.7* 32.8* 33.5*    335 349       Recent Labs     06/26/24 2057 06/27/24 0612 06/27/24 2027 06/28/24 0429 06/29/24  0438      < > 136 138 139   K 3.4*   < > 3.8 3.6 3.6   CL 97*   < > 96* 101 102   CO2 25   < > 28 28 27   BUN 8   < > 16 20 25*   CREATININE <0.5*   < > 0.6 <0.5* <0.5*   CALCIUM 9.7   < > 9.1 9.2 8.9   MG 1.50*  --   --  2.10  --    PHOS  --   --   --   --  1.9*    < > = values in this interval not displayed.       Recent Labs     06/26/24 2057 06/26/24 2135 06/27/24  0205 06/27/24  0420   PROBNP 5,084*  --   --

## 2024-06-30 LAB
ALBUMIN SERPL-MCNC: 3.1 G/DL (ref 3.4–5)
ANION GAP SERPL CALCULATED.3IONS-SCNC: 8 MMOL/L (ref 3–16)
BUN SERPL-MCNC: 14 MG/DL (ref 7–20)
CALCIUM SERPL-MCNC: 8.6 MG/DL (ref 8.3–10.6)
CHLORIDE SERPL-SCNC: 99 MMOL/L (ref 99–110)
CO2 SERPL-SCNC: 29 MMOL/L (ref 21–32)
CREAT SERPL-MCNC: <0.5 MG/DL (ref 0.6–1.2)
DEPRECATED RDW RBC AUTO: 15.6 % (ref 12.4–15.4)
GFR SERPLBLD CREATININE-BSD FMLA CKD-EPI: >90 ML/MIN/{1.73_M2}
GLUCOSE SERPL-MCNC: 99 MG/DL (ref 70–99)
HCT VFR BLD AUTO: 32.7 % (ref 36–48)
HGB BLD-MCNC: 10.8 G/DL (ref 12–16)
MCH RBC QN AUTO: 28.5 PG (ref 26–34)
MCHC RBC AUTO-ENTMCNC: 33.1 G/DL (ref 31–36)
MCV RBC AUTO: 85.9 FL (ref 80–100)
PHOSPHATE SERPL-MCNC: 2.4 MG/DL (ref 2.5–4.9)
PLATELET # BLD AUTO: 317 K/UL (ref 135–450)
PMV BLD AUTO: 7.5 FL (ref 5–10.5)
POTASSIUM SERPL-SCNC: 3.2 MMOL/L (ref 3.5–5.1)
RBC # BLD AUTO: 3.8 M/UL (ref 4–5.2)
SODIUM SERPL-SCNC: 136 MMOL/L (ref 136–145)
WBC # BLD AUTO: 8.4 K/UL (ref 4–11)

## 2024-06-30 PROCEDURE — 6370000000 HC RX 637 (ALT 250 FOR IP): Performed by: NURSE PRACTITIONER

## 2024-06-30 PROCEDURE — 6360000002 HC RX W HCPCS: Performed by: NURSE PRACTITIONER

## 2024-06-30 PROCEDURE — 80069 RENAL FUNCTION PANEL: CPT

## 2024-06-30 PROCEDURE — 2000000000 HC ICU R&B

## 2024-06-30 PROCEDURE — C9113 INJ PANTOPRAZOLE SODIUM, VIA: HCPCS | Performed by: INTERNAL MEDICINE

## 2024-06-30 PROCEDURE — 2700000000 HC OXYGEN THERAPY PER DAY

## 2024-06-30 PROCEDURE — 36415 COLL VENOUS BLD VENIPUNCTURE: CPT

## 2024-06-30 PROCEDURE — 6360000002 HC RX W HCPCS: Performed by: INTERNAL MEDICINE

## 2024-06-30 PROCEDURE — 94640 AIRWAY INHALATION TREATMENT: CPT

## 2024-06-30 PROCEDURE — 99233 SBSQ HOSP IP/OBS HIGH 50: CPT | Performed by: INTERNAL MEDICINE

## 2024-06-30 PROCEDURE — 6370000000 HC RX 637 (ALT 250 FOR IP): Performed by: INTERNAL MEDICINE

## 2024-06-30 PROCEDURE — 2580000003 HC RX 258: Performed by: INTERNAL MEDICINE

## 2024-06-30 PROCEDURE — 51798 US URINE CAPACITY MEASURE: CPT

## 2024-06-30 PROCEDURE — 94761 N-INVAS EAR/PLS OXIMETRY MLT: CPT

## 2024-06-30 PROCEDURE — 99232 SBSQ HOSP IP/OBS MODERATE 35: CPT | Performed by: NURSE PRACTITIONER

## 2024-06-30 PROCEDURE — 85027 COMPLETE CBC AUTOMATED: CPT

## 2024-06-30 RX ORDER — OXYCODONE HYDROCHLORIDE 5 MG/1
5 TABLET ORAL EVERY 6 HOURS PRN
Status: DISCONTINUED | OUTPATIENT
Start: 2024-06-30 | End: 2024-07-01

## 2024-06-30 RX ORDER — ALBUTEROL SULFATE 2.5 MG/3ML
2.5 SOLUTION RESPIRATORY (INHALATION)
Status: DISCONTINUED | OUTPATIENT
Start: 2024-06-30 | End: 2024-07-01 | Stop reason: HOSPADM

## 2024-06-30 RX ORDER — LEVOFLOXACIN 500 MG/1
500 TABLET, FILM COATED ORAL DAILY
Status: DISCONTINUED | OUTPATIENT
Start: 2024-06-30 | End: 2024-07-01 | Stop reason: HOSPADM

## 2024-06-30 RX ADMIN — SODIUM CHLORIDE, PRESERVATIVE FREE 10 ML: 5 INJECTION INTRAVENOUS at 09:42

## 2024-06-30 RX ADMIN — SPIRONOLACTONE 12.5 MG: 25 TABLET ORAL at 09:36

## 2024-06-30 RX ADMIN — ALBUTEROL SULFATE 2.5 MG: 2.5 SOLUTION RESPIRATORY (INHALATION) at 20:06

## 2024-06-30 RX ADMIN — METOPROLOL SUCCINATE 25 MG: 25 TABLET, EXTENDED RELEASE ORAL at 09:36

## 2024-06-30 RX ADMIN — ATORVASTATIN CALCIUM 40 MG: 40 TABLET, FILM COATED ORAL at 20:18

## 2024-06-30 RX ADMIN — ESCITALOPRAM OXALATE 20 MG: 10 TABLET ORAL at 20:17

## 2024-06-30 RX ADMIN — BUDESONIDE 500 MCG: 0.5 SUSPENSION RESPIRATORY (INHALATION) at 20:12

## 2024-06-30 RX ADMIN — SODIUM CHLORIDE, PRESERVATIVE FREE 10 ML: 5 INJECTION INTRAVENOUS at 20:22

## 2024-06-30 RX ADMIN — BUSPIRONE HYDROCHLORIDE 10 MG: 5 TABLET ORAL at 20:19

## 2024-06-30 RX ADMIN — BUSPIRONE HYDROCHLORIDE 10 MG: 5 TABLET ORAL at 09:34

## 2024-06-30 RX ADMIN — LEVOFLOXACIN 500 MG: 500 TABLET, FILM COATED ORAL at 09:41

## 2024-06-30 RX ADMIN — OXYCODONE 5 MG: 5 TABLET ORAL at 14:06

## 2024-06-30 RX ADMIN — PREDNISONE 40 MG: 20 TABLET ORAL at 09:36

## 2024-06-30 RX ADMIN — HYDROXYZINE HYDROCHLORIDE 10 MG: 10 TABLET ORAL at 23:12

## 2024-06-30 RX ADMIN — DIBASIC SODIUM PHOSPHATE, MONOBASIC POTASSIUM PHOSPHATE AND MONOBASIC SODIUM PHOSPHATE 2 TABLET: 852; 155; 130 TABLET ORAL at 14:06

## 2024-06-30 RX ADMIN — PANTOPRAZOLE SODIUM 40 MG: 40 INJECTION, POWDER, FOR SOLUTION INTRAVENOUS at 09:37

## 2024-06-30 RX ADMIN — ENOXAPARIN SODIUM 40 MG: 100 INJECTION SUBCUTANEOUS at 09:36

## 2024-06-30 RX ADMIN — OXYCODONE 5 MG: 5 TABLET ORAL at 20:18

## 2024-06-30 RX ADMIN — ACETAMINOPHEN 650 MG: 325 TABLET ORAL at 06:42

## 2024-06-30 RX ADMIN — ARFORMOTEROL TARTRATE 15 MCG: 15 SOLUTION RESPIRATORY (INHALATION) at 20:08

## 2024-06-30 RX ADMIN — BUDESONIDE 500 MCG: 0.5 SUSPENSION RESPIRATORY (INHALATION) at 07:06

## 2024-06-30 RX ADMIN — LISINOPRIL 5 MG: 5 TABLET ORAL at 09:34

## 2024-06-30 RX ADMIN — IPRATROPIUM BROMIDE AND ALBUTEROL SULFATE 1 DOSE: 2.5; .5 SOLUTION RESPIRATORY (INHALATION) at 18:25

## 2024-06-30 RX ADMIN — ACETYLCYSTEINE 600 MG: 200 INHALANT RESPIRATORY (INHALATION) at 07:06

## 2024-06-30 RX ADMIN — ASPIRIN 81 MG 81 MG: 81 TABLET ORAL at 09:37

## 2024-06-30 RX ADMIN — ROFLUMILAST 500 MCG: 500 TABLET ORAL at 09:41

## 2024-06-30 RX ADMIN — ACETYLCYSTEINE 600 MG: 200 INHALANT RESPIRATORY (INHALATION) at 20:06

## 2024-06-30 RX ADMIN — ARFORMOTEROL TARTRATE 15 MCG: 15 SOLUTION RESPIRATORY (INHALATION) at 07:06

## 2024-06-30 RX ADMIN — MUPIROCIN: 20 OINTMENT TOPICAL at 09:37

## 2024-06-30 RX ADMIN — BUSPIRONE HYDROCHLORIDE 10 MG: 5 TABLET ORAL at 14:06

## 2024-06-30 RX ADMIN — MUPIROCIN: 20 OINTMENT TOPICAL at 20:19

## 2024-06-30 ASSESSMENT — PAIN DESCRIPTION - LOCATION
LOCATION: SHOULDER

## 2024-06-30 ASSESSMENT — PAIN DESCRIPTION - DESCRIPTORS
DESCRIPTORS: SORE;SHARP
DESCRIPTORS: ACHING;THROBBING
DESCRIPTORS: ACHING

## 2024-06-30 ASSESSMENT — PAIN SCALES - GENERAL
PAINLEVEL_OUTOF10: 10
PAINLEVEL_OUTOF10: 4
PAINLEVEL_OUTOF10: 6
PAINLEVEL_OUTOF10: 7
PAINLEVEL_OUTOF10: 6

## 2024-06-30 ASSESSMENT — PAIN DESCRIPTION - ORIENTATION
ORIENTATION: LEFT

## 2024-06-30 NOTE — PROGRESS NOTES
Hospital Medicine Progress Note      Date of Admission: 6/27/2024  Hospital Day: 4    Chief Admission Complaint:  Patient was transferred over here from [facility] ICU for cardiac catheterization following non ST elevation MI     Subjective:  no new c/o    Presenting Admission History:         68 y.o. female who presented to [facility] with above complaint.  PMHx significant for chronic respiratory failure tracheostomy COPD pulmonary nodule cancer hypertension CHF and chronic pain  She was admitted to [facility] this morning with acute on chronic hypoxic respiratory failure possible COPD exacerbation or CHF.  Patient was admitted to ICU and was connected to the ventilator through tracheostomy  Echocardiogram showed reduced ejection fraction that was new and patient was transferred over here for cardiac catheterization  She lives in nursing home    Assessment/Plan:      Current Principal Problem:  Respiratory failure (HCC)    NSTEMI - started and Heparin gtt - now off - at OSH and transferred, s/p Cat 27 June w/out complications and no occlusive CAD.     CHF - acute systolic failure w/ reduced EF 35% by cath c/w Takotsubo's CM.  Continue diuresis as currently ordered w/ close monitoring of Renal function and electrolytes for ADR.  Continue current medical management and follow I/O as well as clinical response.  Continue medical mgt per Cardiology - consulted and appreciated     Acute on chronic respiratory failure - w/ chronic tracheostomy - Initially placed on vent but now on trach collar.      Possible COPD exacerbation currently getting nebulizers and steroid/Levaquin     Hx Right upper lobe nodule-cancer  Not a surgical candidate s/p SBRT  Enlarging right upper lobe nodule  ?  Palliative care     Hypertension-hold Norvasc, blood pressure soft      C. difficile is pending?  Diarrhea-need to verify       Physical Exam Performed:      General appearance:  No apparent distress  Respiratory:   patient care which is documented elsewhere in the medical record)    [x] High (any 2)    A. Problems (any 1)  [x] Acute/Chronic Illness/injury posing threat to life or bodily function: In ICU on Vent   [] Severe exacerbation of chronic illness:    ---------------------------------------------------------------------  B. Risk of Treatment (any 1)   [x] Drugs/treatments that require intensive monitoring for toxicity include:    [] IV ABX requiring serial renal monitoring for nephrotoxicity:     [x] Post-Cath/Contrast study requiring serial renal monitoring for Contrast Induced Nephropathy  [] IV Narcotic analgesia for ADR   [] Aggressive IV diuresis requiring serial monitoring for renal impairment and electrolyte derangements  [] Hypertonic Saline requiring serial renal monitoring for appropriate electrolyte correction rate   [] Critical electrolyte abnormalities requiring IV replacement and close serial monitoring  [] Insulin - monitoring FSBS for Hypoglycemic ADR  [] Anticoagulation requiring close serial monitoring and dose adjustments at high risk of ADR   [] HD requiring close serial monitoring of electrolytes and fluid status  [] Other -  [] Change in code status:    [] Decision to escalate care:    [] Major surgery/procedure with associated risk factors:    ----------------------------------------------------------------------  C. Data (any 2)  [] Discussed management of the case with consultants as follows:    [x] Discussed the discharge plan in detail with case mgt including timing/barriers to discharge, need for support services and placement decision   [] Imaging personally reviewed and interpreted, includes:   [x] Telemetry monitoring as noted above  [x] Data Review (any 3)  [] Collateral history obtained from:    [x] All available Consultant notes from yesterday/today were reviewed  [x] All current labs were reviewed and interpreted for clinical significance   [x] Appropriate follow-up labs were  ordered    Medications:  Personally reviewed in detail in conjunction w/ labs as documented for evidence of drug toxicity.     Infusion Medications    sodium chloride      sodium chloride      sodium chloride       Scheduled Medications    levoFLOXacin  500 mg Oral Daily    spironolactone  12.5 mg Oral Daily    metoprolol succinate  25 mg Oral Daily    mupirocin   Each Nostril BID    enoxaparin  40 mg SubCUTAneous Daily    predniSONE  40 mg Oral Daily    budesonide  0.5 mg Nebulization BID RT    arformoterol tartrate  15 mcg Nebulization BID RT    acetylcysteine  600 mg Inhalation BID RT    sodium chloride flush  5-40 mL IntraVENous 2 times per day    lisinopril  5 mg Oral Daily    Roflumilast  500 mcg Oral Daily    atorvastatin  40 mg Oral Nightly    escitalopram  20 mg Oral Nightly    busPIRone  10 mg Oral TID    aspirin  81 mg Oral Daily    pantoprazole  40 mg IntraVENous Daily     PRN Meds: ipratropium 0.5 mg-albuterol 2.5 mg, sodium chloride flush, sodium chloride, sodium chloride, potassium chloride **OR** potassium alternative oral replacement **OR** potassium chloride, magnesium sulfate, ondansetron **OR** ondansetron, acetaminophen **OR** acetaminophen, polyethylene glycol, nitroGLYCERIN, sodium chloride, hydrOXYzine HCl     Labs:  Personally reviewed and interpreted for clinical significance.     Recent Labs     06/28/24 0429 06/29/24 0438 06/30/24 0442   WBC 4.6 10.3 8.4   HGB 10.6* 10.9* 10.8*   HCT 32.8* 33.5* 32.7*    349 317       Recent Labs     06/28/24 0429 06/29/24 0438 06/30/24 0442    139 136   K 3.6 3.6 3.2*    102 99   CO2 28 27 29   BUN 20 25* 14   CREATININE <0.5* <0.5* <0.5*   CALCIUM 9.2 8.9 8.6   MG 2.10  --   --    PHOS  --  1.9* 2.4*       No results for input(s): \"PROBNP\", \"TROPHS\" in the last 72 hours.    No results for input(s): \"LABA1C\" in the last 72 hours.  Recent Labs     06/28/24 0429   AST 18   ALT 14   BILIDIR <0.2   BILITOT 0.4   ALKPHOS 90        Recent Labs     06/27/24  0957   INR 1.03         Urine Cultures:   Lab Results   Component Value Date/Time    LABURIN  05/02/2023 01:11 AM     <50,000 CFU/ml mixed skin/urogenital pj. No further workup     Blood Cultures:   Lab Results   Component Value Date/Time    BC No Growth after 4 days of incubation. 06/24/2024 01:31 PM     Lab Results   Component Value Date/Time    BLOODCULT2 No Growth after 4 days of incubation. 06/24/2024 01:31 PM     Organism:   Lab Results   Component Value Date/Time    ORG Haemophilus influenzae 06/28/2024 10:50 AM    ORG Stenotrophomonas maltophilia 06/28/2024 10:50 AM         Moe Ly MD

## 2024-06-30 NOTE — PROGRESS NOTES
PULMONARY AND CRITICAL CARE INPATIENT NOTE        Annie Tyler   : 1955  MRN: [id number]     Admitting Physician: Moe Ly MD  Attending Physician: Moe Ly MD  PCP: Keyla Mtz MD    Admission: 2024   Date of Service: 2024    No chief complaint on file.          ASSESSMENT & PLAN       68 y.o. pleasant  female patient with:    Assessment:  Suspected acute stress cardiomyopathy.  Reduced EF 35 to 40% with elevated troponin.  Mild CAD on left heart cath 2024.  No pHTN or right heart cath  Diaphoresis  Severe anxiety attacks  Mild lactic acidosis  Acute hypoxemic respiratory failure  End-stage COPD with severe emphysema with acute exacerbation  Positive haemophilus influenza and stenotrophomonas from respiratory secretions.    Chronic hypoxic respiratory failure/trach dependent.  History after cardiac arrest related admission in 2024 at .  On AVAPS at night/Capped trach and trach collar during the day at nursing home  Growing RUL nodule followed by pulmonary clinic.  Highly concerning for cancer  H/O KAMLA lung cancer, clinical diagnosis, saw Dr. Navarro & was not a surgical candidate, s/p 5 fractions of SBRT 5/3/22 - 22 by Dr. Gaitan   Former smoker, >100 pack years, quit    H/O COVID19 pneumonia Aug 2022 with hospitalization  Has parakeets in the time, x2 yrs       Plan:              LHC/RHC reviewed. Cardiology following.  ASA statin  Resumed home anxiety medications  Oxycodone prn for Lt shoulder/arm pain  Trach collar as tolerated. Back on vent if needed.  Trach care/ suction as needed.  Bronchoscopy /mucous clearance completed on   Abx switched to Levaquin for Hib/Stenotrophomonas. Finish 5 days  K-Phos replacement  Brovana, Pulmicort and DuoNebs/Mucomyst  Continue to wean oxygen with target 90 to 94%.  Head of bed 30 degrees or higher at all times  Nutrition: Diet to resume   Blood sugar control ISS, with goal 140-180  DVT prophylaxis:  Lovenox  Placement tomorrow hopefully      Subjective/Objective           CC/Reason for Consult: Mechanical ventilation, suspected acute coronary syndrome        HPI: 6/27/2024  68-year-old female patient with PMH of HTN, HFrEF with EF 40%, COPD/emphysema, LLL cancer s/p SBRT in 2022 not being surgical candidate, more than 100-pack-year smoking history, chronic hypoxic respiratory failure/trach dependent who was transferred from [facility] for interventional cardiology to perform left heart cath.  Patient was recently discharged from [facility] after being admitted for hypoxic respiratory failure, AECOPD and chest congestion with aspiration.  She was treated with antibiotics, mechanical ventilation, bronchodilators, Solu-Medrol, hypertonic saline.  Few hours after discharge to the nursing facility patient bounced back with worsening shortness of breath, hypoxemia and diaphoresis.  Cardiology evaluated the patient and echocardiogram showed reduced ejection fraction with minimally elevated troponin.  There was concern about Takotsubo cardiomyopathy versus acute coronary syndrome and left heart cath recommended thus transferred to our center sent.    Patient was placed on mechanical ventilation and started on sedation.  She remained tachycardic in the 100 and teens range  Blood sugar remained within range  Respiratory viral panel negative at [facility]  proBNP was elevated at 5000 while CBC and chemistry relatively stable mild elevated troponin    Chest x-ray showed no acute cardiopulmonary abnormality with signs of COPD    The following portions of the patient's history were reviewed: past medical history, surgical history, family history, social history, current medications & allergies.     Interval History: 6/30/2024  Patient made hemodynamically stable and afebrile.  Remained on trach collar 35% FiO2 with need for suctioning of tracheal secretions as usual.  I's and O's +250 cc with urine

## 2024-06-30 NOTE — PROGRESS NOTES
[facility]  Cardiology  Progress Note    Admission date:  2024    Reason for follow up visit: Nonischemic/Takotsubo cardiomyopathy    HPI/CC: Annie Tyelr is a 68 y.o. female who presented to [facility] 2024 for acute on chronic respiratory failure, tracheostomy.  Echo showed EF 40±5% with wall motion abnormalities.  R/LHC 2024 showed nonobstructive CAD, Takotsubo cardiomyopathy normal right heart hemodynamics.  Rhythm overnight has been sinus 80s.    Subjective: She denies any chest pain, shortness of breath, palpitations, dizziness.    Vitals:  Blood pressure 106/67, pulse 71, temperature 98.8 °F (37.1 °C), temperature source Oral, resp. rate 23, height 1.6 m (5' 3\"), weight 66.4 kg (146 lb 6.2 oz), SpO2 96 %, not currently breastfeeding.  Temp  Av.9 °F (37.2 °C)  Min: 98.8 °F (37.1 °C)  Max: 99 °F (37.2 °C)  Pulse  Av.8  Min: 64  Max: 109  BP  Min: 99/62  Max: 145/75  SpO2  Av.4 %  Min: 82 %  Max: 100 %  FiO2   Av %  Min: 35 %  Max: 35 %    24 hour I/O    Intake/Output Summary (Last 24 hours) at 2024 1117  Last data filed at 2024 0942  Gross per 24 hour   Intake 130 ml   Output 300 ml   Net -170 ml       Current Facility-Administered Medications   Medication Dose Route Frequency Provider Last Rate Last Admin    levoFLOXacin (LEVAQUIN) tablet 500 mg  500 mg Oral Daily Donald Aguilar MD   500 mg at 24 0941    spironolactone (ALDACTONE) tablet 12.5 mg  12.5 mg Oral Daily Jeremy Flores APRN - CNP   12.5 mg at 24 0936    metoprolol succinate (TOPROL XL) extended release tablet 25 mg  25 mg Oral Daily Jeremy Folres APRN - CNP   25 mg at 24 0936    mupirocin (BACTROBAN) 2 % ointment   Each Nostril BID Donald Aguilar MD   Given at 24 0937    enoxaparin (LOVENOX) injection 40 mg  40 mg SubCUTAneous Daily Nasra Ambrosio, JUAN - CNP   40 mg at 24 0936    predniSONE (DELTASONE) tablet 40 mg  40 mg Oral Daily Nasra Ambrosio, JUAN - CNP

## 2024-07-01 ENCOUNTER — APPOINTMENT (OUTPATIENT)
Dept: GENERAL RADIOLOGY | Age: 69
DRG: 208 | End: 2024-07-01
Attending: INTERNAL MEDICINE
Payer: MEDICARE

## 2024-07-01 VITALS
BODY MASS INDEX: 25.94 KG/M2 | WEIGHT: 146.39 LBS | TEMPERATURE: 98.4 F | OXYGEN SATURATION: 98 % | SYSTOLIC BLOOD PRESSURE: 118 MMHG | RESPIRATION RATE: 14 BRPM | DIASTOLIC BLOOD PRESSURE: 75 MMHG | HEIGHT: 63 IN | HEART RATE: 76 BPM

## 2024-07-01 PROBLEM — E44.0 MODERATE MALNUTRITION (HCC): Status: ACTIVE | Noted: 2024-06-25

## 2024-07-01 LAB
ALBUMIN SERPL-MCNC: 3.3 G/DL (ref 3.4–5)
ANION GAP SERPL CALCULATED.3IONS-SCNC: 12 MMOL/L (ref 3–16)
BUN SERPL-MCNC: 10 MG/DL (ref 7–20)
CALCIUM SERPL-MCNC: 8.6 MG/DL (ref 8.3–10.6)
CHLORIDE SERPL-SCNC: 98 MMOL/L (ref 99–110)
CO2 SERPL-SCNC: 27 MMOL/L (ref 21–32)
CREAT SERPL-MCNC: <0.5 MG/DL (ref 0.6–1.2)
DEPRECATED RDW RBC AUTO: 15.9 % (ref 12.4–15.4)
GFR SERPLBLD CREATININE-BSD FMLA CKD-EPI: >90 ML/MIN/{1.73_M2}
GLUCOSE SERPL-MCNC: 95 MG/DL (ref 70–99)
HCT VFR BLD AUTO: 35.6 % (ref 36–48)
HGB BLD-MCNC: 11.6 G/DL (ref 12–16)
MCH RBC QN AUTO: 27.7 PG (ref 26–34)
MCHC RBC AUTO-ENTMCNC: 32.5 G/DL (ref 31–36)
MCV RBC AUTO: 85.4 FL (ref 80–100)
PHOSPHATE SERPL-MCNC: 3 MG/DL (ref 2.5–4.9)
PLATELET # BLD AUTO: 361 K/UL (ref 135–450)
PMV BLD AUTO: 7.1 FL (ref 5–10.5)
POTASSIUM SERPL-SCNC: 3.1 MMOL/L (ref 3.5–5.1)
RBC # BLD AUTO: 4.17 M/UL (ref 4–5.2)
SODIUM SERPL-SCNC: 137 MMOL/L (ref 136–145)
WBC # BLD AUTO: 9.4 K/UL (ref 4–11)

## 2024-07-01 PROCEDURE — 94761 N-INVAS EAR/PLS OXIMETRY MLT: CPT

## 2024-07-01 PROCEDURE — 99232 SBSQ HOSP IP/OBS MODERATE 35: CPT | Performed by: STUDENT IN AN ORGANIZED HEALTH CARE EDUCATION/TRAINING PROGRAM

## 2024-07-01 PROCEDURE — 6370000000 HC RX 637 (ALT 250 FOR IP): Performed by: INTERNAL MEDICINE

## 2024-07-01 PROCEDURE — 36415 COLL VENOUS BLD VENIPUNCTURE: CPT

## 2024-07-01 PROCEDURE — 6360000002 HC RX W HCPCS: Performed by: INTERNAL MEDICINE

## 2024-07-01 PROCEDURE — 71045 X-RAY EXAM CHEST 1 VIEW: CPT

## 2024-07-01 PROCEDURE — 6360000002 HC RX W HCPCS: Performed by: NURSE PRACTITIONER

## 2024-07-01 PROCEDURE — 6370000000 HC RX 637 (ALT 250 FOR IP): Performed by: NURSE PRACTITIONER

## 2024-07-01 PROCEDURE — 80069 RENAL FUNCTION PANEL: CPT

## 2024-07-01 PROCEDURE — 2700000000 HC OXYGEN THERAPY PER DAY

## 2024-07-01 PROCEDURE — 85027 COMPLETE CBC AUTOMATED: CPT

## 2024-07-01 PROCEDURE — 2580000003 HC RX 258: Performed by: INTERNAL MEDICINE

## 2024-07-01 PROCEDURE — 99291 CRITICAL CARE FIRST HOUR: CPT | Performed by: STUDENT IN AN ORGANIZED HEALTH CARE EDUCATION/TRAINING PROGRAM

## 2024-07-01 PROCEDURE — 94640 AIRWAY INHALATION TREATMENT: CPT

## 2024-07-01 RX ORDER — SPIRONOLACTONE 25 MG/1
12.5 TABLET ORAL DAILY
Qty: 15 TABLET | Refills: 0 | Status: SHIPPED | OUTPATIENT
Start: 2024-07-02 | End: 2024-08-01

## 2024-07-01 RX ORDER — METOPROLOL SUCCINATE 25 MG/1
25 TABLET, EXTENDED RELEASE ORAL DAILY
Qty: 30 TABLET | Refills: 0 | Status: SHIPPED | OUTPATIENT
Start: 2024-07-02 | End: 2024-08-01

## 2024-07-01 RX ORDER — PREDNISONE 20 MG/1
40 TABLET ORAL DAILY
Status: DISCONTINUED | OUTPATIENT
Start: 2024-07-02 | End: 2024-07-01 | Stop reason: HOSPADM

## 2024-07-01 RX ORDER — HYDROXYZINE HYDROCHLORIDE 10 MG/1
10 TABLET, FILM COATED ORAL 3 TIMES DAILY PRN
Qty: 90 TABLET | Refills: 0 | Status: SHIPPED | OUTPATIENT
Start: 2024-07-01 | End: 2024-07-31

## 2024-07-01 RX ORDER — PANTOPRAZOLE SODIUM 40 MG/1
40 TABLET, DELAYED RELEASE ORAL
Status: DISCONTINUED | OUTPATIENT
Start: 2024-07-02 | End: 2024-07-01 | Stop reason: HOSPADM

## 2024-07-01 RX ORDER — OXYCODONE HYDROCHLORIDE 5 MG/1
5 TABLET ORAL EVERY 6 HOURS PRN
Status: DISCONTINUED | OUTPATIENT
Start: 2024-07-01 | End: 2024-07-01 | Stop reason: HOSPADM

## 2024-07-01 RX ORDER — ATORVASTATIN CALCIUM 40 MG/1
40 TABLET, FILM COATED ORAL NIGHTLY
Qty: 30 TABLET | Refills: 0 | Status: SHIPPED | OUTPATIENT
Start: 2024-07-01 | End: 2024-07-31

## 2024-07-01 RX ADMIN — LEVOFLOXACIN 500 MG: 500 TABLET, FILM COATED ORAL at 10:26

## 2024-07-01 RX ADMIN — BUSPIRONE HYDROCHLORIDE 10 MG: 5 TABLET ORAL at 10:26

## 2024-07-01 RX ADMIN — ENOXAPARIN SODIUM 40 MG: 100 INJECTION SUBCUTANEOUS at 10:26

## 2024-07-01 RX ADMIN — SODIUM CHLORIDE, PRESERVATIVE FREE 10 ML: 5 INJECTION INTRAVENOUS at 11:26

## 2024-07-01 RX ADMIN — ALBUTEROL SULFATE 2.5 MG: 2.5 SOLUTION RESPIRATORY (INHALATION) at 07:50

## 2024-07-01 RX ADMIN — METOPROLOL SUCCINATE 25 MG: 25 TABLET, EXTENDED RELEASE ORAL at 10:26

## 2024-07-01 RX ADMIN — OXYCODONE 5 MG: 5 TABLET ORAL at 12:47

## 2024-07-01 RX ADMIN — SPIRONOLACTONE 12.5 MG: 25 TABLET ORAL at 10:26

## 2024-07-01 RX ADMIN — ASPIRIN 81 MG 81 MG: 81 TABLET ORAL at 10:26

## 2024-07-01 RX ADMIN — ACETYLCYSTEINE 600 MG: 200 INHALANT RESPIRATORY (INHALATION) at 07:50

## 2024-07-01 RX ADMIN — ARFORMOTEROL TARTRATE 15 MCG: 15 SOLUTION RESPIRATORY (INHALATION) at 07:50

## 2024-07-01 RX ADMIN — ROFLUMILAST 500 MCG: 500 TABLET ORAL at 10:27

## 2024-07-01 RX ADMIN — OXYCODONE 5 MG: 5 TABLET ORAL at 05:27

## 2024-07-01 RX ADMIN — POTASSIUM CHLORIDE 40 MEQ: 1500 TABLET, EXTENDED RELEASE ORAL at 06:46

## 2024-07-01 RX ADMIN — PREDNISONE 40 MG: 20 TABLET ORAL at 10:26

## 2024-07-01 RX ADMIN — BUDESONIDE 500 MCG: 0.5 SUSPENSION RESPIRATORY (INHALATION) at 07:50

## 2024-07-01 RX ADMIN — LISINOPRIL 5 MG: 5 TABLET ORAL at 10:26

## 2024-07-01 RX ADMIN — MUPIROCIN: 20 OINTMENT TOPICAL at 11:25

## 2024-07-01 RX ADMIN — BUSPIRONE HYDROCHLORIDE 10 MG: 5 TABLET ORAL at 16:22

## 2024-07-01 ASSESSMENT — PAIN DESCRIPTION - ORIENTATION
ORIENTATION: LEFT
ORIENTATION: LEFT

## 2024-07-01 ASSESSMENT — PAIN DESCRIPTION - LOCATION
LOCATION: SHOULDER
LOCATION: SHOULDER

## 2024-07-01 ASSESSMENT — PAIN DESCRIPTION - DESCRIPTORS
DESCRIPTORS: ACHING;DISCOMFORT
DESCRIPTORS: ACHING

## 2024-07-01 ASSESSMENT — PAIN SCALES - GENERAL
PAINLEVEL_OUTOF10: 9
PAINLEVEL_OUTOF10: 6

## 2024-07-01 NOTE — PROGRESS NOTES
Comprehensive Nutrition Assessment    Type and Reason for Visit:  Reassess    Nutrition Recommendations/Plan:   Continue regular diet and encourage PO intake   Continue ensure BID, add magic cups once daily   Provide diet education when appropriate   Monitor nutrition adequacy, pertinent labs, bowel habits, wt changes, and clinical progress     Malnutrition Assessment:  Malnutrition Status:  Moderate malnutrition (07/01/24 1201)    Context:  Chronic Illness     Findings of the 6 clinical characteristics of malnutrition:  Energy Intake:  Mild decrease in energy intake (Comment)  Weight Loss:  Greater than 10% over 6 months     Muscle Mass Loss:  Mild muscle mass loss Clavicles (pectoralis & deltoids), Temples (temporalis)  Fluid Accumulation:  Mild Extremities    Nutrition Assessment:    Follow up/ consult for diet education: On trach collar. No sedation. ECHO was repeated with improvement in EF. Pt continues on regular diet. PO intakes 1-76% of meals in EMR. Pt reports poor appetite, reports she just does not feeling like eating. Reports eating less than 50% of meals. Reports taking a few sips of ensure drinks, likes all flavors. RD to add magic cups once daily. Encouraged protein intake and always available options, pt compliant. Pt not appropriate for CHF diet education d/t poor intakes at this time. Will provide handouts in AVS. Pt meets criteria for malnutrition d/t poor intakes, wt loss and NFPE. Continue to encourage PO intake, will continue to monitor.    Nutrition Related Findings:    BLE trace edema. PEG. + BM 6/29. No nausea, per pt. Wound Type: None       Current Nutrition Intake & Therapies:    Average Meal Intake: 1-25%, 26-50%, 51-75%  Average Supplements Intake: 0%  ADULT DIET; Regular  ADULT ORAL NUTRITION SUPPLEMENT; Breakfast, Dinner; Standard High Calorie/High Protein Oral Supplement    Anthropometric Measures:  Height: 160 cm (5' 3\")  Ideal Body Weight (IBW): 115 lbs (52 kg)       Current Body

## 2024-07-01 NOTE — PROGRESS NOTES
[facility] arrived to transport pt back to [facility].     Left @1802; report called to Viky @8613

## 2024-07-01 NOTE — PLAN OF CARE
Problem: Safety - Adult  Goal: Free from fall injury  Outcome: Progressing     Problem: Discharge Planning  Goal: Discharge to home or other facility with appropriate resources  Outcome: Progressing  Flowsheets (Taken 6/30/2024 0800)  Discharge to home or other facility with appropriate resources: Identify barriers to discharge with patient and caregiver     Problem: Pain  Goal: Verbalizes/displays adequate comfort level or baseline comfort level  Outcome: Progressing     Problem: Chronic Conditions and Co-morbidities  Goal: Patient's chronic conditions and co-morbidity symptoms are monitored and maintained or improved  Outcome: Progressing  Flowsheets (Taken 6/30/2024 0800)  Care Plan - Patient's Chronic Conditions and Co-Morbidity Symptoms are Monitored and Maintained or Improved: Monitor and assess patient's chronic conditions and comorbid symptoms for stability, deterioration, or improvement     Problem: Nutrition Deficit:  Goal: Optimize nutritional status  Outcome: Progressing     Problem: Skin/Tissue Integrity  Goal: Absence of new skin breakdown  Description: 1.  Monitor for areas of redness and/or skin breakdown  2.  Assess vascular access sites hourly  3.  Every 4-6 hours minimum:  Change oxygen saturation probe site  4.  Every 4-6 hours:  If on nasal continuous positive airway pressure, respiratory therapy assess nares and determine need for appliance change or resting period.  Outcome: Progressing

## 2024-07-01 NOTE — DISCHARGE INSTRUCTIONS
Heart Failure Resources:  Heart Failure Interactive Workbook:  Go to [email or web address].psicofxp/publication/?v=866305 for a Free Heart Failure Interactive Workbook provided by The American [employer] Association. This interactive workbook will provide information on Healthier Living with Heart Failure. Please copy and paste link into search bar. Use your mouse to scroll through the pages.    HF Conner rhonda:   Heart Failure Free smart phone rhonda available for iPhone and Android download. Use your phone to track sodium intake, fluid intake, symptoms, and weight.     Low Sodium Diet / Recipes:  Go to www.[employer] website for “renal” diet which is Low Sodium! [employer] is a dialysis company, but this website offers free seasonal cookbooks. Each quarter, they will release 25-30 new recipes with a breakdown of calories, sodium, and glucose. You can also go to www.[employer]/recipes website for free recipes.   Home Exercise Program:   Identification of Green/Yellow/Red zones:  You should be able to identify when you feel good (green zone), if you have 1-2 symptoms of HF (yellow zone), or if you are in need of medical attention (red zone).  In your CHF education folder you were provided a “stop light tool” to outline this information.     We want to you to rate your exertion levels:    Our therapy team has discussed means of identification with you such as the \"Laisha scale.\"  The Laisha rating scale ranges from 6 to 20, where 6 means \"no exertion at all\" and 20 means \"maximal exertion.\" The goal is to use this to gauge how much effort it is taking for you to do your normal daily tasks.   You should be able to recognize when too much exertion is being expended.    Elements of Energy Conservation:   Prioritize/Plan: Decide what needs to be done today, and what can wait for a later date, write to do lists, plan ahead to avoid extra trips, and gather supplies and equipment needed before starting an activity.   Position: Avoid

## 2024-07-01 NOTE — PROGRESS NOTES
Pulmonary & Critical Care Medicine ICU Progress Note      Events of Last 24 hours:   Pt has no complaints. She denies any chest pain, SOB.       Invasive Lines: PICC D#None   CVC D#None  Art Line D#None            Vitals:  /72   Pulse 69   Temp 98.4 °F (36.9 °C) (Oral)   Resp 24   Ht 1.6 m (5' 3\")   Wt 66.4 kg (146 lb 6.2 oz)   SpO2 95%   BMI 25.93 kg/m²    Tmax:  CVP:        Intake/Output Summary (Last 24 hours) at 7/1/2024 0738  Last data filed at 7/1/2024 0530  Gross per 24 hour   Intake 250 ml   Output 450 ml   Net -200 ml       EXAM:  Physical Exam  Constitutional:       Appearance: She is ill-appearing.   HENT:      Head: Normocephalic and atraumatic.      Nose: Nose normal.      Mouth/Throat:      Pharynx: No oropharyngeal exudate.   Eyes:      General: No scleral icterus.        Right eye: No discharge.         Left eye: No discharge.   Cardiovascular:      Rate and Rhythm: Normal rate.      Heart sounds: No murmur heard.     No gallop.   Pulmonary:      Effort: Pulmonary effort is normal.      Breath sounds: Rales present. No wheezing.   Abdominal:      General: Abdomen is flat. Bowel sounds are normal. There is no distension.      Tenderness: There is no abdominal tenderness.   Musculoskeletal:         General: No swelling.      Cervical back: Normal range of motion.   Skin:     General: Skin is warm and dry.   Neurological:      Mental Status: She is alert.          Medications:  Scheduled Meds:   levoFLOXacin  500 mg Oral Daily    albuterol  2.5 mg Nebulization BID RT    spironolactone  12.5 mg Oral Daily    metoprolol succinate  25 mg Oral Daily    mupirocin   Each Nostril BID    enoxaparin  40 mg SubCUTAneous Daily    predniSONE  40 mg Oral Daily    budesonide  0.5 mg Nebulization BID RT    arformoterol tartrate  15 mcg Nebulization BID RT    acetylcysteine  600 mg Inhalation BID RT    sodium chloride flush  5-40 mL IntraVENous 2 times per day    lisinopril  5 mg Oral Daily     Roflumilast  500 mcg Oral Daily    atorvastatin  40 mg Oral Nightly    escitalopram  20 mg Oral Nightly    busPIRone  10 mg Oral TID    aspirin  81 mg Oral Daily    pantoprazole  40 mg IntraVENous Daily       PRN Meds:  oxyCODONE, ipratropium 0.5 mg-albuterol 2.5 mg, sodium chloride flush, sodium chloride, sodium chloride, potassium chloride **OR** potassium alternative oral replacement **OR** potassium chloride, magnesium sulfate, ondansetron **OR** ondansetron, acetaminophen **OR** acetaminophen, polyethylene glycol, nitroGLYCERIN, sodium chloride, hydrOXYzine HCl    Results:  CBC:   Recent Labs     06/29/24 0438 06/30/24 0442 07/01/24  0505   WBC 10.3 8.4 9.4   HGB 10.9* 10.8* 11.6*   HCT 33.5* 32.7* 35.6*   MCV 86.1 85.9 85.4    317 361     BMP:   Recent Labs     06/29/24 0438 06/30/24 0442 07/01/24  0505    136 137   K 3.6 3.2* 3.1*    99 98*   CO2 27 29 27   PHOS 1.9* 2.4* 3.0   BUN 25* 14 10   CREATININE <0.5* <0.5* <0.5*     LIVER PROFILE: No results for input(s): \"AST\", \"ALT\", \"LIPASE\", \"AMYLASE\", \"BILIDIR\", \"BILITOT\", \"ALKPHOS\" in the last 72 hours.    Invalid input(s): \"ALB\"  PT/INR: No results for input(s): \"PROTIME\", \"INR\" in the last 72 hours.  APTT: No results for input(s): \"APTT\" in the last 72 hours.  UA:No results for input(s): \"NITRITE\", \"COLORU\", \"PHUR\", \"LABCAST\", \"WBCUA\", \"RBCUA\", \"MUCUS\", \"TRICHOMONAS\", \"YEAST\", \"BACTERIA\", \"CLARITYU\", \"SPECGRAV\", \"LEUKOCYTESUR\", \"UROBILINOGEN\", \"BILIRUBINUR\", \"BLOODU\", \"GLUCOSEU\", \"AMORPHOUS\" in the last 72 hours.    Invalid input(s): \"KETONESU\"    Cultures:  Procedure Component Value Units Date/Time   Culture, Respiratory [[id number]] (Abnormal)  Collected: 06/28/24 1050   Order Status: Completed Specimen: Sput Induced from Bronchial Washing Updated: 06/30/24 1431    CULTURE, RESPIRATORY Moderate growth normal respiratory pj with Abnormal     Gram Stain Result 2+ WBC's (Polymorphonuclear)  2+ Gram positive cocci    Organism  Haemophilus influenzae Abnormal     CULTURE, RESPIRATORY --    Heavy growth  Beta Lactamase Negative  Sensitivities not routinely performed. Drugs of choice are:  Ampicillin, Ceftriaxone or Trimethoprim/Sulfamethoxazole.  Ampicillin, Ceftriaxone or Trimethoprim/Sulfamethoxazole.    Organism Stenotrophomonas maltophilia Abnormal     CULTURE, RESPIRATORY Rare growth    Organism Alcaligenes faecalis ssp faecalis Abnormal     CULTURE, RESPIRATORY --    Moderate growth  Sensitivity to follow  Isolation in progress   Narrative:     ORDER#: [id number]                          ORDERED BY: VIVIENNE PONCE  SOURCE: Bronchial Washing                  COLLECTED:  06/28/24 10:50  ANTIBIOTICS AT [facility].:                      RECEIVED :  06/28/24 15:17       Films:  CXR 6/26/24  Narrative & Impression  EXAMINATION:  ONE XRAY VIEW OF THE CHEST     6/26/2024 9:14 pm     COMPARISON:  None.     HISTORY:  ORDERING SYSTEM PROVIDED HISTORY: SOB  TECHNOLOGIST PROVIDED HISTORY:  Reason for exam:->SOB  Reason for Exam: sob     FINDINGS:  There is some a increased markings noted bilaterally, in keeping with the  known patient COPD.  There is some scarring noted bilaterally.  There is no  evidence of CHF.  The heart appears normal.  Tracheostomy tube is in good  position.  There is old healed fracture of the left humeral neck.  There is  no change from prior examination.     IMPRESSION:  1. No acute cardiopulmonary process.  2. COPD.  3. Tracheostomy tube in good position.      Assessment:  HFrEF  Chronic hypoxic respiratory failure status post trach dependent  Pneumonia  Right upper lobe pulmonary nodule  Former smoker  Hypokalemia  Normocytic anemia    Plan:  - Pt AOx3, she is answering questions appropriately  - No pressors, keep goal MAP > 65 or SBP > 90  - Cardiology - LHC performed with no obstruction. ECHO was repeated with improvement in EF. Cardiology signed off  - Cont Mucomyst for today, Brovana/Pulmicort nebs  - CXR ordered  -  Prednisone 40mg PO qd for 5 day course  - Pt following outpatient for lung nodule  - Cont trach collar during day, trach hygiene  - regular diet  - Protonix for GI ppx  - Replace electrolytes PRN   - External flood  - Levaquin for 5 day course for PNA, s/p bronch  - Lovenox for DVT ppx  - Keep goal glucose 140-180, No SSl      Ppx/Flood/Lines  - PIV  - No flood  - Protonix for GI ppx, Lovenox for DVT ppx    Critical care time spent reviewing labs/films, examining patient, collaborating with other physicians but excluding procedures for life threatening organ failure is 45 minutes.        Electronically signed by:  Cole Lepe MD    7/1/2024    7:38 AM.

## 2024-07-01 NOTE — PLAN OF CARE
Problem: Safety - Adult  Goal: Free from fall injury  Outcome: Progressing     Problem: Discharge Planning  Goal: Discharge to home or other facility with appropriate resources  Outcome: Progressing     Problem: Pain  Goal: Verbalizes/displays adequate comfort level or baseline comfort level  Outcome: Progressing     Problem: Chronic Conditions and Co-morbidities  Goal: Patient's chronic conditions and co-morbidity symptoms are monitored and maintained or improved  Outcome: Progressing     Problem: Nutrition Deficit:  Goal: Optimize nutritional status  Outcome: Progressing  Flowsheets (Taken 7/1/2024 1153 by Rosa Sutton, MS, RD, LD)  Nutrient intake appropriate for improving, restoring, or maintaining nutritional needs:   Assess nutritional status and recommend course of action   Monitor oral intake, labs, and treatment plans   Recommend appropriate diets, oral nutritional supplements, and vitamin/mineral supplements     Problem: Skin/Tissue Integrity  Goal: Absence of new skin breakdown  Description: 1.  Monitor for areas of redness and/or skin breakdown  2.  Assess vascular access sites hourly  3.  Every 4-6 hours minimum:  Change oxygen saturation probe site  4.  Every 4-6 hours:  If on nasal continuous positive airway pressure, respiratory therapy assess nares and determine need for appliance change or resting period.  Outcome: Progressing

## 2024-07-01 NOTE — CARE COORDINATION
CASE MANAGEMENT DISCHARGE SUMMARY      Discharge to: [place]    Precertification completed: n/a  Hospital Exemption Notification (HENS) completed: n/a    IMM given:  7/1/24    New Durable Medical Equipment ordered/agency: no    Transportation:    Family/car: no   Medical Transport explained to pt/family. Pt/family voice no agency preference.    Agency used: [facility]    time: 5:00   Ambulance form completed: Yes    Pt on vent  35% peep 5   To trach  Confirmed discharge plan with:     Patient: yes     Family:  yes   Name: Thuy and Carla  daughters Contact number:     Facility/Agency, name:  [facility] faxed   Phone number for report to facility: [phone]     RN, name: Cristina    Note: Discharging nurse to complete [facility], reconcile [facility], and place final copy with patient's discharge packet. RN to ensure that written prescriptions for  Level II medications are sent with patient to the facility as per protocol.    Mouna Nair RN

## 2024-07-01 NOTE — PROGRESS NOTES
Hospital Medicine Progress Note      Date of Admission: 6/27/2024  Hospital Day: 5    Chief Admission Complaint:  Patient was transferred over here from [facility] ICU for cardiac catheterization following non ST elevation MI     Subjective:  no new c/o    Presenting Admission History:         68 y.o. female who presented to [facility] with above complaint.  PMHx significant for chronic respiratory failure tracheostomy COPD pulmonary nodule cancer hypertension CHF and chronic pain  She was admitted to [facility] this morning with acute on chronic hypoxic respiratory failure possible COPD exacerbation or CHF.  Patient was admitted to ICU and was connected to the ventilator through tracheostomy  Echocardiogram showed reduced ejection fraction that was new and patient was transferred over here for cardiac catheterization  She lives in nursing home    Assessment/Plan:      Current Principal Problem:  Respiratory failure (HCC)      NSTEMI - started and Heparin gtt - now off - at OSH and transferred, s/p Cat 27 June w/out complications and no occlusive CAD.     CHF - acute systolic failure w/ reduced EF 35% by cath c/w Takotsubo's CM.  Continue diuresis as currently ordered w/ close monitoring of Renal function and electrolytes for ADR.  Continue current medical management and follow I/O as well as clinical response.  Continue medical mgt per Cardiology - consulted and appreciated     Acute on chronic respiratory failure - w/ chronic tracheostomy - Initially placed on vent but now on trach collar.      Possible COPD exacerbation currently getting nebulizers and steroid/Levaquin     Hx Right upper lobe nodule/cancer - Not a surgical candidate s/p SBRT. Enlarging right upper lobe nodule     HTN - w/out known CAD and no evidence of active signs/sxs of ischemia/failure. Currently controlled on home meds w/ vitals documented and reviewed.    Diarrhea - C diff negative.        Physical Exam Performed:   monitoring as noted above  [x] Data Review (any 3)  [] Collateral history obtained from:    [x] All available Consultant notes from yesterday/today were reviewed  [x] All current labs were reviewed and interpreted for clinical significance   [x] Appropriate follow-up labs were ordered    Medications:  Personally reviewed in detail in conjunction w/ labs as documented for evidence of drug toxicity.     Infusion Medications    sodium chloride       Scheduled Medications    levoFLOXacin  500 mg Oral Daily    albuterol  2.5 mg Nebulization BID RT    spironolactone  12.5 mg Oral Daily    metoprolol succinate  25 mg Oral Daily    mupirocin   Each Nostril BID    enoxaparin  40 mg SubCUTAneous Daily    predniSONE  40 mg Oral Daily    budesonide  0.5 mg Nebulization BID RT    arformoterol tartrate  15 mcg Nebulization BID RT    acetylcysteine  600 mg Inhalation BID RT    sodium chloride flush  5-40 mL IntraVENous 2 times per day    lisinopril  5 mg Oral Daily    Roflumilast  500 mcg Oral Daily    atorvastatin  40 mg Oral Nightly    escitalopram  20 mg Oral Nightly    busPIRone  10 mg Oral TID    aspirin  81 mg Oral Daily    pantoprazole  40 mg IntraVENous Daily     PRN Meds: oxyCODONE, ipratropium 0.5 mg-albuterol 2.5 mg, sodium chloride flush, potassium chloride **OR** potassium alternative oral replacement **OR** potassium chloride, magnesium sulfate, ondansetron **OR** ondansetron, acetaminophen **OR** acetaminophen, polyethylene glycol, nitroGLYCERIN, sodium chloride, hydrOXYzine HCl     Labs:  Personally reviewed and interpreted for clinical significance.     Recent Labs     06/29/24 0438 06/30/24 0442 07/01/24  0505   WBC 10.3 8.4 9.4   HGB 10.9* 10.8* 11.6*   HCT 33.5* 32.7* 35.6*    317 361       Recent Labs     06/29/24  0438 06/30/24  0442 07/01/24  0505    136 137   K 3.6 3.2* 3.1*    99 98*   CO2 27 29 27   BUN 25* 14 10   CREATININE <0.5* <0.5* <0.5*   CALCIUM 8.9 8.6 8.6   PHOS 1.9* 2.4*  3.0       No results for input(s): \"PROBNP\", \"TROPHS\" in the last 72 hours.    No results for input(s): \"LABA1C\" in the last 72 hours.  No results for input(s): \"AST\", \"ALT\", \"BILIDIR\", \"BILITOT\", \"ALKPHOS\" in the last 72 hours.    No results for input(s): \"INR\", \"LACTA\", \"TSH\" in the last 72 hours.      Urine Cultures:   Lab Results   Component Value Date/Time    LABURIN  05/02/2023 01:11 AM     <50,000 CFU/ml mixed skin/urogenital pj. No further workup     Blood Cultures:   Lab Results   Component Value Date/Time    BC No Growth after 4 days of incubation. 06/24/2024 01:31 PM     Lab Results   Component Value Date/Time    BLOODCULT2 No Growth after 4 days of incubation. 06/24/2024 01:31 PM     Organism:   Lab Results   Component Value Date/Time    ORG Haemophilus influenzae 06/28/2024 10:50 AM    ORG Stenotrophomonas maltophilia 06/28/2024 10:50 AM    ORG Alcaligenes faecalis ssp faecalis 06/28/2024 10:50 AM         Moe Ly MD

## 2024-07-01 NOTE — DISCHARGE INSTR - COC
Continuity of Care Form    Patient Name: Annie Stein   :  1955  MRN:  [id number]    Admit date:  2024  Discharge date:  2024    Code Status Order: Full Code   Advance Directives:     Admitting Physician:  Moe Ly MD  PCP: Keyla Mtz MD    Discharging Nurse: Cristina Lassiter RN  Discharging Hospital Unit/Room#: 0233/0233-01  Discharging Unit Phone Number: [phone]    Emergency Contact:   Extended Emergency Contact Information  Primary Emergency Contact: HUMERA STEIN  Home Phone: [phone]  Mobile Phone: [phone]  Relation: Child  Secondary Emergency Contact: Carla Stein   [facility]  Home Phone: [phone]  Mobile Phone: [phone]  Relation: Child    Past Surgical History:  Past Surgical History:   Procedure Laterality Date    CARDIAC PROCEDURE N/A 2024    Left and right heart cath / coronary angiography performed by Merary Davis MD at [facility] CARDIAC CATH LAB    CHOLECYSTECTOMY      COLONOSCOPY         Immunization History:   Immunization History   Administered Date(s) Administered    Influenza Vaccine, unspecified formulation 2013, 10/26/2016    Influenza Virus Vaccine 2013, 10/26/2016, 2017    Influenza, FLUARIX, FLULAVAL, FLUZONE (age 6 mo+) AND AFLURIA, (age 3 y+), PF, 0.5mL 10/26/2016, 2017, 2020, 2022    TDaP, ADACEL (age 10y-64y), BOOSTRIX (age 10y+), IM, 0.5mL 2017       Active Problems:  Patient Active Problem List   Diagnosis Code    Chest pain R07.9    Shortness of breath R06.02    Tobacco use Z72.0    Moderate COPD (chronic obstructive pulmonary disease) ([facility]) J44.9    COPD exacerbation ([facility]) J44.1    Acute respiratory failure with hypoxia ([facility]) J96.01    Hyponatremia E87.1    Pulmonary nodule R91.1    Pulmonary emphysema ([facility]) J43.9    Acute on chronic respiratory failure with hypoxia and hypercapnia ([facility]) J96.21, J96.22    Community acquired pneumonia of right lung J18.9    Acute  Liquids  Daily Fluid Restriction: no  Last Modified Barium Swallow with Video (Video Swallowing Test): not done    Treatments at the Time of Hospital Discharge:   Respiratory Treatments: yes  Oxygen Therapy:   Trach collar  Ventilator:    - No ventilator support    Rehab Therapies: Physical Therapy  Weight Bearing Status/Restrictions: No weight bearing restrictions  Other Medical Equipment (for information only, NOT a DME order):    Other Treatments:     Patient's personal belongings (please select all that are sent with patient):  Glasses, Dentures upper and lower purse, cell phone,     RN SIGNATURE:  Electronically signed by Cristina Lassiter RN on 7/1/24 at 3:48 PM EDT    CASE MANAGEMENT/SOCIAL WORK SECTION    Inpatient Status Date: 6/27/24    Readmission Risk Assessment Score:  Readmission Risk              Risk of Unplanned Readmission:  41           Discharging to Facility/ Agency   [facility]  [facility]illed [facility]  [address] [place] [id number]  [phone]   / signature: Electronically signed by Mouna Nair RN on 7/1/24 at 2:11 PM EDT    PHYSICIAN SECTION    Prognosis: Fair    Condition at Discharge: Stable    Rehab Potential (if transferring to Rehab): Fair    Recommended Labs or Other Treatments After Discharge: NA    Physician Certification: I certify the above information and transfer of Annie Tyler  is necessary for the continuing treatment of the diagnosis listed and that she requires Skilled Nursing Facility for greater 30 days.     Update Admission H&P: No change in H&P    PHYSICIAN SIGNATURE:  Electronically signed by Cristina Lassiter RN on 7/1/24 at 3:41 PM EDT

## 2024-07-02 LAB
BACTERIA SPEC RESP CULT: ABNORMAL
GRAM STN SPEC: ABNORMAL
ORGANISM: ABNORMAL

## 2024-07-02 NOTE — DISCHARGE SUMMARY
Hospital Medicine Discharge Summary    Patient: Annie Tyler   : 1955     Admit Date: 2024   Discharge Date: 2024    Disposition:  []Home   []HHC  [x]SNF - [place]  []Acute Rehab  []LTAC  []Hospice  Code status:  [x]Full  []DNR/CCA  []Limited (DNR/CCA with Do Not Intubate)  []DNRCC  Condition at Discharge: Stable  Primary Care Provider: Keyla Mtz MD    Admitting Provider: Moe Ly MD  Discharge Provider: Moe Ly MD     Discharge Diagnoses:      Active Hospital Problems    Diagnosis     NSTEMI (non-ST elevated myocardial infarction) (HCC) [I21.4]     Respiratory failure (HCC) [J96.90]     Elevated troponin [R79.89]     EDGAR (dyspnea on exertion) [R06.09]     Takotsubo cardiomyopathy [I51.81]     Moderate malnutrition (HCC) [E44.0]        Presenting Admission History:        68 y.o. female who presented to [facility] with above complaint.  PMHx significant for chronic respiratory failure tracheostomy COPD pulmonary nodule cancer hypertension CHF and chronic pain  She was admitted to [facility] this morning with acute on chronic hypoxic respiratory failure possible COPD exacerbation or CHF.  Patient was admitted to ICU and was connected to the ventilator through tracheostomy  Echocardiogram showed reduced ejection fraction that was new and patient was transferred over here for cardiac catheterization  She lives in nursing home     Assessment/Plan:        NSTEMI - started and Heparin gtt - now off - at OSH and transferred, s/p [facility]  w/out complications and no occlusive CAD.      CHF - acute systolic failure w/ reduced EF 35% by cath c/w Takotsubo's CM.  Continue diuresis as currently ordered w/ close monitoring of Renal function and electrolytes for ADR.  Continue current medical management and follow I/O as well as clinical response.  Continue medical mgt per Cardiology - consulted and appreciated     Acute on chronic respiratory failure -  apex abutting the pleural surface on image 46 measuring 1 cm.  These areas could relate to focal inflammatory infectious etiology given that they are new from recent prior.  Short oval follow-up would be recommended. Bones/soft tissue: No destructive lesion     1. No evidence for pulmonary embolism. 2. Spiculated nodule in the right upper lobe has increased in size from prior examination concerning for primary malignancy. 3. 2 additional nodular spiculated density seen in the posterior aspect of the right upper lobe abutting the pleural surface could relate to focal infectious inflammatory etiology although metastatic disease or malignancy is difficult to exclude.  Recommend short interval follow-up or further evaluation with PET.     XR CHEST (2 VW)    Result Date: 6/24/2024  EXAMINATION: TWO XRAY VIEWS OF THE CHEST 6/24/2024 2:19 pm COMPARISON: None. HISTORY: ORDERING SYSTEM PROVIDED HISTORY: sob TECHNOLOGIST PROVIDED HISTORY: Reason for exam:->sob Reason for Exam: SOB FINDINGS: Lines and tubes: Tracheostomy tube in place. Small left-sided pleural effusion with left lower lobe atelectasis.  The right lung is clear.  Heart size stable.     Small left pleural effusion with atelectasis.     XR Shoulder Left 2 VW    Result Date: 6/17/2024  True AP ([place]), Axillary, Outlet EXAM: XR SHOULDER LEFT MINIMUM 2-VIEWS  INDICATION:  Left shoulder pain, unspecified chronicity COMPARISON: May 3, 2024. TECHNIQUE: 3 views. FINDINGS/IMPRESSION: Healing displaced fracture of the proximal left humerus unchanged in position. Report Verified by: Ambrose Alonso MD at 6/17/2024 1:10 PM EDT      Consults:     IP CONSULT TO CRITICAL CARE  IP CONSULT TO CARDIOLOGY  IP CONSULT TO CRITICAL CARE  IP CONSULT TO HEART FAILURE NURSE/COORDINATOR  IP CONSULT TO DIETITIAN    Labs:     Recent Labs     06/30/24  0442 07/01/24  0505   WBC 8.4 9.4   HGB 10.8* 11.6*   HCT 32.7* 35.6*    361     Recent Labs     06/30/24  0442

## 2024-07-03 NOTE — PROGRESS NOTES
05/05/23 2000   RT Protocol   History Pulmonary Disease 2   Respiratory pattern 0   Breath sounds 2   Cough 0   Indications for Bronchodilator Therapy Decreased or absent breath sounds   Bronchodilator Assessment Score 4     RT Inhaler-Nebulizer Bronchodilator Protocol Note    There is a bronchodilator order in the chart from a provider indicating to follow the RT Bronchodilator Protocol and there is an Initiate RT Inhaler-Nebulizer Bronchodilator Protocol order as well (see protocol at bottom of note). CXR Findings:  No results found. The findings from the last RT Protocol Assessment were as follows:   History Pulmonary Disease: Chronic pulmonary disease  Respiratory Pattern: Regular pattern and RR 12-20 bpm  Breath Sounds: Slightly diminished and/or crackles  Cough: Strong, spontaneous, non-productive  Indication for Bronchodilator Therapy: Decreased or absent breath sounds  Bronchodilator Assessment Score: 4    Aerosolized bronchodilator medication orders have been revised according to the RT Inhaler-Nebulizer Bronchodilator Protocol below. Respiratory Therapist to perform RT Therapy Protocol Assessment initially then follow the protocol. Repeat RT Therapy Protocol Assessment PRN for score 0-3 or on second treatment, BID, and PRN for scores above 3. No Indications - adjust the frequency to every 6 hours PRN wheezing or bronchospasm, if no treatments needed after 48 hours then discontinue using Per Protocol order mode. If indication present, adjust the RT bronchodilator orders based on the Bronchodilator Assessment Score as indicated below. Use Inhaler orders unless patient has one or more of the following: on home nebulizer, not able to hold breath for 10 seconds, is not alert and oriented, cannot activate and use MDI correctly, or respiratory rate 25 breaths per minute or more, then use the equivalent nebulizer order(s) with same Frequency and PRN reasons based on the score.   If a patient
4 Eyes Skin Assessment     The patient is being assess for   Admission    I agree that 2 RN's have performed a thorough Head to Toe Skin Assessment on the patient. ALL assessment sites listed below have been assessed. Areas assessed for pressure by both nurses:   [x]   Head, Face, and Ears   [x]   Shoulders, Back, and Chest, Abdomen  [x]   Arms, Elbows, and Hands   [x]   Coccyx, Sacrum, and Ischium  [x]   Legs, Feet, and Heels        Skin Assessed Under all Medical Devices by both nurses:  O2 device tubing              All Mepilex Borders were peeled back and area peeked at by both nurses:  No:    Please list where Mepilex Borders are located:               **SHARE this note so that the co-signing nurse is able to place an eSignature**    Co-signer eSignature: Electronically signed by Vaibhav Saavedra RN on 5/2/23 at 6:12 AM EDT    Does the Patient have Skin Breakdown related to pressure?   No     (Insert Photo here  )         William Prevention initiated:  Yes   Wound Care Orders initiated:  No      [facility] nurse consulted for Pressure Injury (Stage 3,4, Unstageable, DTI, NWPT, Complex wounds)and New or Established Ostomies:  No      Primary Nurse eSignature: Electronically signed by Mile Gonzalez RN on 5/2/23 at 6:11 AM EDT
4 Eyes Skin Assessment     The patient is being assess for   Transfer to New Unit    I agree that 2 RN's have performed a thorough Head to Toe Skin Assessment on the patient. ALL assessment sites listed below have been assessed. Areas assessed for pressure by both nurses:   [x]   Head, Face, and Ears   [x]   Shoulders, Back, and Chest, Abdomen  [x]   Arms, Elbows, and Hands   [x]   Coccyx, Sacrum, and Ischium  [x]   Legs, Feet, and Heels        Skin Assessed Under all Medical Devices by both nurses:  O2 device tubing              All Mepilex Borders were peeled back and area peeked at by both nurses:  No: n/a  Please list where Mepilex Borders are located:  n/a             **SHARE this note so that the co-signing nurse is able to place an eSignature**    Co-signer eSignature: Electronically signed by Aamir Bonilla RN on 5/3/23 at 6:08 PM EDT    Does the Patient have Skin Breakdown related to pressure?   No     (Insert Photo here)         William Prevention initiated:  No   Wound Care Orders initiated:  No      [facility] nurse consulted for Pressure Injury (Stage 3,4, Unstageable, DTI, NWPT, Complex wounds)and New or Established Ostomies:  No      Primary Nurse eSignature: Electronically signed by Flex Asencio RN on 5/3/23 at 4:47 PM EDT
Admit: 2023    Name:  Susana Smith  Room:  [address] [place] [address]  MRN:    [id number]    Critical Care Daily Progress Note for 5/3/2023   Admitted with acute on chronic hyponatremia    Interval History:     Sodium levels coming up. Currently on home 3 L of oxygen    Scheduled Meds:   sodium chloride flush  5-40 mL IntraVENous 2 times per day    enoxaparin  40 mg SubCUTAneous Daily    predniSONE  40 mg Oral Daily    ipratropium-albuterol  1 ampule Inhalation Q4H WA    mupirocin   Each Nostril BID    tiotropium-olodaterol  2 puff Inhalation Daily    levoFLOXacin  500 mg Oral QHS       Continuous Infusions:   sodium chloride         PRN Meds:  ipratropium-albuterol, sodium chloride flush, sodium chloride, ondansetron **OR** ondansetron, polyethylene glycol, acetaminophen **OR** acetaminophen, albuterol sulfate HFA, hydrOXYzine HCl                  Objective:     Temp  Av.6 °F (36.4 °C)  Min: 97.2 °F (36.2 °C)  Max: 97.8 °F (36.6 °C)  Pulse  Av.7  Min: 69  Max: 93  BP  Min: 100/57  Max: 168/75  SpO2  Av.5 %  Min: 85 %  Max: 100 %  Patient Vitals for the past 4 hrs:   BP Pulse Resp SpO2   23 1202 -- -- -- 92 %   23 1200 (!) 168/75 93 23 (!) 87 %   23 1121 -- -- -- 92 %   23 1000 (!) 143/74 82 19 93 %   23 0900 (!) 100/57 79 15 100 %         Intake/Output Summary (Last 24 hours) at 5/3/2023 1237  Last data filed at 5/3/2023 0830  Gross per 24 hour   Intake 1791.53 ml   Output 1935 ml   Net -143.47 ml       Physical Exam:  Gen: No distress. Alert. Eyes: PERRL. No sclera icterus. No conjunctival injection. ENT: No discharge. Pharynx clear. Neck: Trachea midline. Normal thyroid. Resp: No accessory muscle use. No crackles. No wheezes. No rhonchi. No dullness on percussion. CV: Regular rate. Regular rhythm. No murmur or rub. 1 + pedal  edema. GI: Non-tender. Non-distended. No masses. No organomegaly. Normal bowel sounds. No hernia. Skin: Warm and dry.  No nodule on exposed
Bedside Mobility Assessment Tool (BMAT):     Assessment Level 1- Sit and Shake    1. From a semi-reclined position, ask patient to sit up and rotate to a seated position at the side of the bed. Can use the bedrail. 2. Ask patient to reach out and grab your hand and shake making sure patient reaches across his/her midline. Pass- Patient is able to come to a seated position, maintain core strength. Maintains seated balance while reaching across midline. Move on to Assessment Level 2. Assessment Level 2- Stretch and Point   1. With patient in seated position at the side of the bed, have patient place both feet on the floor (or stool) with knees no higher than hips. 2. Ask patient to stretch one leg and straighten the knee, then bend the ankle/flex and point the toes. If appropriate, repeat with the other leg. Pass- Patient is able to demonstrate appropriate quad strength on intended weight bearing limb(s). Move onto Assessment Level 3. Assessment Level 3- Stand   1. Ask patient to elevate off the bed or chair (seated to standing) using an assistive device (cane, bedrail). 2. Patient should be able to raise buttocks off be and hold for a count of five. May repeat once. Pass- Patient maintains standing stability for at least 5 seconds, proceed to assessment level 4. Assessment Level 4- Walk   1. Ask patient to march in place at bedside. 2. Then ask patient to advance step and return each foot. Some medical conditions may render a patient from stepping backwards, use your best clinical judgement. Pass- Patient demonstrates balance while shifting weight and ability to step, takes independent steps, does not use assistive device patient is MOBILITY LEVEL 4.       Mobility Level- 4
Bedside report received from day shift RN. Patient resting comfortably in bed. No signs of discomfort or distress. Bed is in lowest position, wheels locked, 2/2 side rails up. Bedside table and call light within reach. White board updated. Will continue to monitor patient. Yamini Kim RN    9:11 PM  PRN tylenol given for headache. Yamini Kim RN    10:24 PM  Shift assessment complete. (See findings in flowsheet). Med pass complete. (See MAR). VSS. Patient with no complaints at this time. Yamini Kim RN    4:51 AM  Patient resting quietly in bed watching television. PRN tylenol given for headache.  Yamini Kim RN
C/o lower back pain rate as 7/10. Requesting tylenol. Tylenol given. See mar.
Inpatient Occupational Therapy Evaluation and Treatment    Unit: [address] [place]  Date:  2023  Patient Name:    Steffanie Romo  Admitting diagnosis:  Hyponatremia [E87.1]  Acute respiratory failure with hypoxia ([facility] [address] [J96.01]  Pneumonia of both lower lobes due to infectious organism [J18.9]  Admit Date:  2023  Precautions/Restrictions/WB Status/ Lines/ Wounds/ Oxygen: Fall risk, Bed/chair alarm, and Lines (IV and Supplemental O2 (3L))    Treatment Time:  4385-1140  Treatment Number:  2  Timed Code Treatment Minutes: 42 minutes  Total Treatment Minutes:  42  minutes    Patient Goals for Therapy: \"I want to walk to the window \"          Discharge Recommendations: Home with 24/7 assist  and Home OT  DME needs for discharge: Needs Met         Therapy recommendations for staff:   Assist of 1 for ambulation with use of rolling walker (RW) and gait belt to/from chair  to/from bathroom  within room    History of Present Illness:  A 61-year-old female history hypertension, recent hyponatremia, COPD presented emergency with shortness of breath and hypoxia. Admits having some nausea. Denies any cough. No fever or chills. Has been having diarrhea for the past few weeks. No abdominal pain. Home Health S4 Level Recommendation:  Level 1 Standard    AM-PAC Score: AM-PAC Inpatient Daily Activity Raw Score: 20     Subjective:  Patient lying reclined bed with no family present. Pt agreeable to this OT session.      Cognition:    A&O x4   Able to follow 2 step commands    Pain:   Yes  Location: Back pain  Ratin /10  Pain Medicine Status: Received pain med prior to tx    Preadmission Environment:   Pt. Lives with family (daughter)  Home environment:    one story home  Steps to enter first floor:   bilateral hand rails and ramp          Steps to second floor: N/A  Bathroom:       Tub/Shower unit, shower chair and Standard height toilet  Equipment owned:      SPC, Rolling Walker, manual W/C, BSC, quad cane, home O2 (3 L),
Inpatient Occupational Therapy Evaluation and Treatment    Unit: ICU  Date:  5/3/2023  Patient Name:    Deborah Seay  Admitting diagnosis:  Hyponatremia [E87.1]  Acute respiratory failure with hypoxia ([address] [J96.01]  Pneumonia of both lower lobes due to infectious organism [J18.9]  Admit Date:  2023  Precautions/Restrictions/WB Status/ Lines/ Wounds/ Oxygen: Fall risk, Bed/chair alarm, Lines (IV and Supplemental O2 (3L)), Telemetry, and Continuous pulse oximetry    Treatment Time:  14:05-14:45  Treatment Number:  1  Timed Code Treatment Minutes: 30 minutes  Total Treatment Minutes:  40  minutes    Patient Goals for Therapy: \" to go home \"          Discharge Recommendations: Home with 7 assist  and Home OT  DME needs for discharge: Needs Met         Therapy recommendations for staff:   Assist of 1 for ambulation with use of rolling walker (RW) and gait belt within room    History of Present Illness: A 62-year-old female history hypertension, recent hyponatremia, COPD presented emergency with shortness of breath and hypoxia. Admits having some nausea. Denies any cough. No fever or chills. Has been having diarrhea for the past few weeks. No abdominal pain. Home Health S4 Level Recommendation:  Level 1 Standard    AM-PAC Score: AM-PAC Inpatient Daily Activity Raw Score: 20     Subjective:  Patient lying reclined bed with no family present. Pt agreeable to this OT session.      Cognition:    A&O x4   Able to follow 2 step commands    Pain:   Yes  Location: B LEs- cramping  Ratin /10  Pain Medicine Status: Denies need    Preadmission Environment:   Pt. Lives with family (daughter)  Home environment:    one story home  Steps to enter first floor:   bilateral hand rails and ramp          Steps to second floor: N/A  Bathroom:       Tub/Shower unit, shower chair and Standard height toilet  Equipment owned:      SPC, Rolling Walker, manual W/C, BSC, quad cane, home O2 (3 L), inhaler and nebulizer ,
Inpatient Physical Therapy Evaluation    Unit: ICU  Date:  5/3/2023  Patient Name:    Syl Chiang  Admitting diagnosis:  Hyponatremia [E87.1]  Acute respiratory failure with hypoxia ([address] [J96.01]  Pneumonia of both lower lobes due to infectious organism [J18.9]  Admit Date:  2023  Precautions/Restrictions/WB Status/ Lines/ Wounds/ Oxygen: Fall risk, Bed/chair alarm, Lines (IV and Supplemental O2 (3L)), Telemetry, and Continuous pulse oximetry    Treatment Time:  14;13-14;46  Treatment Number:  1  Timed Code Treatment Minutes: 23 minutes  Total Treatment Minutes:  33  minutes    Patient Stated Goals for Therapy: \" not stated \" . Agreeable to get up to chair. Plans to go home, declines home PT         Discharge Recommendations: Home with 24hr supervision and Home PT  DME needs for discharge: Needs Met       Therapy recommendation for EMS Transport: can transport by wheelchair    Therapy recommendations for staff:   Assist of 1 for ambulation with use of rolling walker (RW) and gait belt within room    History of Present Illness:   30-year-old female history hypertension, recent hyponatremia, COPD presented emergency with shortness of breath and hypoxia. Being treated for a on c hypoxic RF, COPD exacerbation, UTI, LE edema, and a on c hyponatremia. Home Health S4 Level Recommendation:  Level 1 Standard    AM-PAC Mobility Score       AM-PAC Inpatient Mobility without Stair Climbing Raw Score : 18    Subjective  Patient lying reclined bed with no family present. Pt agreeable to this PT session.      Cognition    A&O x4 (knows it's May 2023, doesn't know date)  Able to follow 2 step commands    Pain   Yes  Location: BLE (cramping)  Ratin /10  Pain Medicine Status: Denies need    Preadmission Environment:   Pt. Lives with family (daughter)  Home environment:    one story home  Steps to enter first floor:   bilateral hand rails and ramp          Steps to second floor: N/A  Bathroom:       Tub/Shower unit,
Lab Results   Component Value Date    CHOLESTEROL 185 07/10/2023    TRIGLYCERIDE 118 07/10/2023    HDL 81 07/10/2023    CALCLDL 80 07/10/2023      Calcium score is 43  On statin -atorvastatin 40 mg  Onaspirin 81 mg daily  All parameters at goal - aim for LDL <70   
Med list changes from \"completed\" to \"in progress\" as several drugs did not match up with Encounter notes or [employer] fills. Please review with pt, especially the yellow-highlighted notes under several meds on the med list, and let pharmacy know when med rec has been completed.  Thanks :)    Clement Murphy PharmD, [facility], 5/2/2023 12:45 AM
Na 123, up from 115- nephrology paged.   Magdiel Kay RN, BSN
Nephrology at bedside. Na ordered for 12- will page MD w/ results.
Nephrology consult called to answering service, 5/2/23 @ 0644Fernando Durán
Patient brought from [place]. [facility] via a stretcher,on oxygen via [place] at 3L/min. Patient settled in bed with the help of other Rns,placed on the bedside monitor,CHG bath given,skin assessment done. Patient is confused,she will answer a question but once you repeat the question,she will give a different answer. She has an external catheter for urine output. Patient has diminished breath sounds bilaterally. She was able to state her name,she could not remember her date of birth and she knew she was in a hospital.  Admission questions answered but some needed a clarification from the daughter because the patient did not give clear answers. Patient has marks on her lower extremities that look like 'bites from [address] notified. This RN asked patient how she got the marks,patient stated 'yes we did have ticks,but got treated for them'  Patient presented to [place]. [place] with diarrhoea,but has not had any bowel movement since she got here,will monitor for that,in the mean time,will place patient in c-diff rule out. Patient settled in bed,room orientation done,bed is in its lowest level,call light within reach,and she denies any further  assistance at the moment.
Physician Progress Note      PATIENTVishal Elkins  CSN #:                  [id number]  :                       1955  ADMIT DATE:       2023 6:42 PM  [address] [place] DATE:        2023 7:38 PM  RESPONDING  PROVIDER #:        Nasra Camilo DO          QUERY TEXT:    Pt admitted with hyponatremia. Noted documentation of SIADH per nephrology   consultant. If possible, please document in progress notes and discharge   summary:    The medical record reflects the following:  Risk Factors: age, copd  Clinical Indicators: nephrology PN on - Initially osmolarity was 47 urine   sodium less than 20, repeat was urine osmolality 89, urine sodium less than   20. .  Serum osmolality was 240. -  Patient started on saline and sodium jumped   up to 123 in 6 hours. - most likely due to prerenal component on top of baseline SIADH  Treatment: ICU, nephrology consult, fluid restrictions, increase protein   intake, Hold meloxicam, Lasix at this time. Also lisinopril-Hctz, trend labs    Thank you,  Bryson Gale RN,BSN,CCDS,CRCR  Options provided:  -- SIADH confirmed present on admission  -- SIADH ruled out  -- Other - I will add my own diagnosis  -- Disagree - Not applicable / Not valid  -- Disagree - Clinically unable to determine / Unknown  -- Refer to Clinical Documentation Reviewer    PROVIDER RESPONSE TEXT:    The diagnosis of SIADH was confirmed as present on admission.     Query created by: Catherine Mejia on 2023 12:06 PM      Electronically signed by:  Nasra Camilo DO 2023 8:13 AM
Prescriptions: levaquin, prednisone, mucinex-paper and discharge instructions given. Pt verbalized understanding denies any questions/ needs at this time. Transport called to transport pt to vehicle for discharge home. Daughter arrived for p/u.
Pt assisted OOB to bathroom. IV sites WNL. Assessment unchanged. Awaiting 1800 Na level. Spoke w/ pts daughter Demetrius Coley and updated on pt care.   Tom Conner RN, BSN
Pt on 3 LNC all shift sat 93-99%. No c/o per pt.
Pulmonary Progress Note  CC: COPD    Subjective:  no SOB at rest      EXAM: BP (!) 157/81   Pulse 87   Temp 98.5 °F (36.9 °C) (Oral)   Resp 18   Ht 5' 3\" (1.6 m)   Wt 158 lb 3.2 oz (71.8 kg)   SpO2 97%   BMI 28.02 kg/m²  on 3L  Constitutional:  No acute distress. Eyes: PERRL. Conjunctivae anicteric. ENT: Normal nose. Normal tongue. Neck:  Trachea is midline. Respiratory: No accessory muscle usage. + decreased breath sounds. + wheezes. No rales. No Rhonchi. Cardiovascular: Normal S1S2. No digit clubbing. No digit cyanosis. No LE edema. Psychiatric: No anxiety or Agitation. Alert and Oriented to person, place and time. Scheduled Meds:   sodium chloride  1 g Oral TID WC    ipratropium-albuterol  1 ampule Inhalation TID    urea  15 g Oral Daily    sodium chloride flush  5-40 mL IntraVENous 2 times per day    enoxaparin  40 mg SubCUTAneous Daily    predniSONE  40 mg Oral Daily    mupirocin   Each Nostril BID    tiotropium-olodaterol  2 puff Inhalation Daily    levoFLOXacin  500 mg Oral QHS     Continuous Infusions:   sodium chloride       PRN Meds:  ipratropium-albuterol, sodium chloride flush, sodium chloride, ondansetron **OR** ondansetron, polyethylene glycol, acetaminophen **OR** acetaminophen, albuterol sulfate HFA, hydrOXYzine HCl    Labs:  CBC:   Recent Labs     05/01/23  1835 05/02/23  0308 05/03/23  0725   WBC 11.8* 10.0 7.6   HGB 11.5* 11.2* 11.8*   HCT 33.8* 31.9* 35.0*   MCV 88.5 88.4 91.6    258 301       BMP:   Recent Labs     05/02/23  0308 05/02/23  0813 05/02/23  1147 05/03/23  0725 05/03/23  1717 05/04/23  0513   * 122*  123*   < > 126* 125* 125*   K 4.2 4.0  4.1   < > 3.9 4.1 3.9   CL 78* 84*  85*   < > 88* 88* 89*   CO2 28 28  28   < > 29 24 26   PHOS 2.1* 2.7  --   --   --   --    BUN 9 7  7   < > 9 12 13   CREATININE <0.5* <0.5*  <0.5*   < > <0.5* <0.5* <0.5*    < > = values in this interval not displayed.        Chest imaging was reviewed by me and showed
Pulmonary Progress Note  CC: COPD    Subjective:  no SOB at rest      EXAM: BP (!) 157/91   Pulse 84   Temp 98.7 °F (37.1 °C) (Oral)   Resp 14   Ht 5' 3\" (1.6 m)   Wt 166 lb (75.3 kg)   SpO2 95%   BMI 29.41 kg/m²  on 3L  Constitutional:  No acute distress. Eyes: PERRL. Conjunctivae anicteric. ENT: Normal nose. Normal tongue. Neck:  Trachea is midline. Respiratory: No accessory muscle usage. + decreased breath sounds. + wheezes. No rales. No Rhonchi. Cardiovascular: Normal S1S2. No digit clubbing. No digit cyanosis. No LE edema. Psychiatric: No anxiety or Agitation. Alert and Oriented to person, place and time. Scheduled Meds:   sodium chloride  1 g Oral TID WC    ipratropium-albuterol  1 ampule Inhalation TID    predniSONE  30 mg Oral Daily    urea  15 g Oral Daily    sodium chloride flush  5-40 mL IntraVENous 2 times per day    enoxaparin  40 mg SubCUTAneous Daily    mupirocin   Each Nostril BID    tiotropium-olodaterol  2 puff Inhalation Daily    levoFLOXacin  500 mg Oral QHS     Continuous Infusions:   sodium chloride       PRN Meds:  ipratropium-albuterol, sodium chloride flush, sodium chloride, ondansetron **OR** ondansetron, polyethylene glycol, acetaminophen **OR** acetaminophen, albuterol sulfate HFA, hydrOXYzine HCl    Labs:  CBC:   Recent Labs     05/03/23  0725   WBC 7.6   HGB 11.8*   HCT 35.0*   MCV 91.6          BMP:   Recent Labs     05/04/23  0513 05/04/23  1707 05/05/23  1340   * 127* 128*   K 3.9 4.9 4.8   CL 89* 90* 92*   CO2 26 29 26   BUN 13 21* 26*   CREATININE <0.5* 0.6 0.6       Chest imaging was reviewed by me and showed 5/1/23 CXR: Mild bibasilar airspace disease. This is favored to represent atelectasis.  However, pneumonia is not fully excluded in the appropriate clinical setting     ASSESSMENT:  Acute on chronic hypoxic respiratory failure  Acute on chronic hyponatremia   COPD exacerbation  UTI  LE edema     PLAN:  Supplemental oxygen to maintain SaO2
Pulmonary Progress Note  CC: COPD    Subjective:  no SOB at resty      EXAM: BP (!) 145/80   Pulse 73   Temp 97.5 °F (36.4 °C) (Oral)   Resp 17   Ht 5' 3\" (1.6 m)   Wt 158 lb 3.2 oz (71.8 kg)   SpO2 95%   BMI 28.02 kg/m²  on 3L  Constitutional:  No acute distress. Eyes: PERRL. Conjunctivae anicteric. ENT: Normal nose. Normal tongue. Neck:  Trachea is midline. Respiratory: No accessory muscle usage. + decreased breath sounds. + wheezes. No rales. No Rhonchi. Cardiovascular: Normal S1S2. No digit clubbing. No digit cyanosis. No LE edema. Psychiatric: No anxiety or Agitation. Alert and Oriented to person, place and time. Scheduled Meds:   sodium chloride flush  5-40 mL IntraVENous 2 times per day    enoxaparin  40 mg SubCUTAneous Daily    predniSONE  40 mg Oral Daily    ipratropium-albuterol  1 ampule Inhalation Q4H WA    mupirocin   Each Nostril BID    tiotropium-olodaterol  2 puff Inhalation Daily    levoFLOXacin  500 mg Oral QHS     Continuous Infusions:   sodium chloride       PRN Meds:  ipratropium-albuterol, sodium chloride flush, sodium chloride, ondansetron **OR** ondansetron, polyethylene glycol, acetaminophen **OR** acetaminophen, albuterol sulfate HFA, hydrOXYzine HCl    Labs:  CBC:   Recent Labs     05/01/23  1835 05/02/23  0308 05/03/23  0725   WBC 11.8* 10.0 7.6   HGB 11.5* 11.2* 11.8*   HCT 33.8* 31.9* 35.0*   MCV 88.5 88.4 91.6    258 301     BMP:   Recent Labs     05/02/23  0308 05/02/23  0813 05/02/23  1147 05/02/23  1753 05/03/23  0106 05/03/23  0725   * 122*  123*   < > 122* 125* 126*   K 4.2 4.0  4.1   < > 4.0 4.1 3.9   CL 78* 84*  85*   < > 84* 88* 88*   CO2 28 28  28   < > 27 30 29   PHOS 2.1* 2.7  --   --   --   --    BUN 9 7  7   < > 11 11 9   CREATININE <0.5* <0.5*  <0.5*   < > 0.7 <0.5* <0.5*    < > = values in this interval not displayed. Chest imaging was reviewed by me and showed 5/1/23 CXR: Mild bibasilar airspace disease.   This is favored to
RT Inhaler-Nebulizer Bronchodilator Protocol Note    There is a bronchodilator order in the chart from a provider indicating to follow the RT Bronchodilator Protocol and there is an Initiate RT Inhaler-Nebulizer Bronchodilator Protocol order as well (see protocol at bottom of note). CXR Findings:  No results found. The findings from the last RT Protocol Assessment were as follows:   History Pulmonary Disease: (P) Chronic pulmonary disease  Respiratory Pattern: (P) Regular pattern and RR 12-20 bpm  Breath Sounds: (P) Slightly diminished and/or crackles  Cough: (P) Strong, spontaneous, non-productive  Indication for Bronchodilator Therapy: (P) Decreased or absent breath sounds  Bronchodilator Assessment Score: (P) 4    Aerosolized bronchodilator medication orders have been revised according to the RT Inhaler-Nebulizer Bronchodilator Protocol below. Respiratory Therapist to perform RT Therapy Protocol Assessment initially then follow the protocol. Repeat RT Therapy Protocol Assessment PRN for score 0-3 or on second treatment, BID, and PRN for scores above 3. No Indications - adjust the frequency to every 6 hours PRN wheezing or bronchospasm, if no treatments needed after 48 hours then discontinue using Per Protocol order mode. If indication present, adjust the RT bronchodilator orders based on the Bronchodilator Assessment Score as indicated below. Use Inhaler orders unless patient has one or more of the following: on home nebulizer, not able to hold breath for 10 seconds, is not alert and oriented, cannot activate and use MDI correctly, or respiratory rate 25 breaths per minute or more, then use the equivalent nebulizer order(s) with same Frequency and PRN reasons based on the score. If a patient is on this medication at home then do not decrease Frequency below that used at home.     0-3 - enter or revise RT bronchodilator order(s) to equivalent RT Bronchodilator order with Frequency of every 4
RT Inhaler-Nebulizer Bronchodilator Protocol Note    There is a bronchodilator order in the chart from a provider indicating to follow the RT Bronchodilator Protocol and there is an Initiate RT Inhaler-Nebulizer Bronchodilator Protocol order as well (see protocol at bottom of note). CXR Findings:  No results found. The findings from the last RT Protocol Assessment were as follows:   History Pulmonary Disease: Chronic pulmonary disease  Respiratory Pattern: Dyspnea on exertion or RR 21-25 bpm  Breath Sounds: Slightly diminished and/or crackles  Cough: Strong, spontaneous, non-productive  Indication for Bronchodilator Therapy: Decreased or absent breath sounds  Bronchodilator Assessment Score: 6    Aerosolized bronchodilator medication orders have been revised according to the RT Inhaler-Nebulizer Bronchodilator Protocol below. Respiratory Therapist to perform RT Therapy Protocol Assessment initially then follow the protocol. Repeat RT Therapy Protocol Assessment PRN for score 0-3 or on second treatment, BID, and PRN for scores above 3. No Indications - adjust the frequency to every 6 hours PRN wheezing or bronchospasm, if no treatments needed after 48 hours then discontinue using Per Protocol order mode. If indication present, adjust the RT bronchodilator orders based on the Bronchodilator Assessment Score as indicated below. Use Inhaler orders unless patient has one or more of the following: on home nebulizer, not able to hold breath for 10 seconds, is not alert and oriented, cannot activate and use MDI correctly, or respiratory rate 25 breaths per minute or more, then use the equivalent nebulizer order(s) with same Frequency and PRN reasons based on the score. If a patient is on this medication at home then do not decrease Frequency below that used at home.     0-3 - enter or revise RT bronchodilator order(s) to equivalent RT Bronchodilator order with Frequency of every 4 hours PRN for wheezing
RT Inhaler-Nebulizer Bronchodilator Protocol Note    There is a bronchodilator order in the chart from a provider indicating to follow the RT Bronchodilator Protocol and there is an Initiate RT Inhaler-Nebulizer Bronchodilator Protocol order as well (see protocol at bottom of note). CXR Findings:  No results found. The findings from the last RT Protocol Assessment were as follows:   History Pulmonary Disease: Chronic pulmonary disease  Respiratory Pattern: Regular pattern and RR 12-20 bpm  Breath Sounds: Slightly diminished and/or crackles  Cough: Strong, spontaneous, non-productive  Indication for Bronchodilator Therapy: Decreased or absent breath sounds  Bronchodilator Assessment Score: 4    Aerosolized bronchodilator medication orders have been revised according to the RT Inhaler-Nebulizer Bronchodilator Protocol below. Respiratory Therapist to perform RT Therapy Protocol Assessment initially then follow the protocol. Repeat RT Therapy Protocol Assessment PRN for score 0-3 or on second treatment, BID, and PRN for scores above 3. No Indications - adjust the frequency to every 6 hours PRN wheezing or bronchospasm, if no treatments needed after 48 hours then discontinue using Per Protocol order mode. If indication present, adjust the RT bronchodilator orders based on the Bronchodilator Assessment Score as indicated below. Use Inhaler orders unless patient has one or more of the following: on home nebulizer, not able to hold breath for 10 seconds, is not alert and oriented, cannot activate and use MDI correctly, or respiratory rate 25 breaths per minute or more, then use the equivalent nebulizer order(s) with same Frequency and PRN reasons based on the score. If a patient is on this medication at home then do not decrease Frequency below that used at home.     0-3 - enter or revise RT bronchodilator order(s) to equivalent RT Bronchodilator order with Frequency of every 4 hours PRN for wheezing or
RT Inhaler-Nebulizer Bronchodilator Protocol Note    There is a bronchodilator order in the chart from a provider indicating to follow the RT Bronchodilator Protocol and there is an Initiate RT Inhaler-Nebulizer Bronchodilator Protocol order as well (see protocol at bottom of note). CXR Findings:  No results found. The findings from the last RT Protocol Assessment were as follows:   History Pulmonary Disease: Chronic pulmonary disease  Respiratory Pattern: Regular pattern and RR 12-20 bpm  Breath Sounds: Slightly diminished and/or crackles  Cough: Strong, spontaneous, non-productive  Indication for Bronchodilator Therapy: Decreased or absent breath sounds  Bronchodilator Assessment Score: 4    Aerosolized bronchodilator medication orders have been revised according to the RT Inhaler-Nebulizer Bronchodilator Protocol below. Respiratory Therapist to perform RT Therapy Protocol Assessment initially then follow the protocol. Repeat RT Therapy Protocol Assessment PRN for score 0-3 or on second treatment, BID, and PRN for scores above 3. No Indications - adjust the frequency to every 6 hours PRN wheezing or bronchospasm, if no treatments needed after 48 hours then discontinue using Per Protocol order mode. If indication present, adjust the RT bronchodilator orders based on the Bronchodilator Assessment Score as indicated below. Use Inhaler orders unless patient has one or more of the following: on home nebulizer, not able to hold breath for 10 seconds, is not alert and oriented, cannot activate and use MDI correctly, or respiratory rate 25 breaths per minute or more, then use the equivalent nebulizer order(s) with same Frequency and PRN reasons based on the score. If a patient is on this medication at home then do not decrease Frequency below that used at home.     0-3 - enter or revise RT bronchodilator order(s) to equivalent RT Bronchodilator order with Frequency of every 4 hours PRN for wheezing or
RT Inhaler-Nebulizer Bronchodilator Protocol Note    There is a bronchodilator order in the chart from a provider indicating to follow the RT Bronchodilator Protocol and there is an Initiate RT Inhaler-Nebulizer Bronchodilator Protocol order as well (see protocol at bottom of note). CXR Findings:  XR CHEST PORTABLE    Result Date: 5/1/2023  Mild bibasilar airspace disease. This is favored to represent atelectasis. However, pneumonia is not fully excluded in the appropriate clinical setting       The findings from the last RT Protocol Assessment were as follows:   History Pulmonary Disease: Chronic pulmonary disease  Respiratory Pattern: Dyspnea on exertion or RR 21-25 bpm  Breath Sounds: Intermittent or unilateral wheezes  Cough: Strong, spontaneous, non-productive  Indication for Bronchodilator Therapy: Wheezing associated with pulm disorder  Bronchodilator Assessment Score: 8    Aerosolized bronchodilator medication orders have been revised according to the RT Inhaler-Nebulizer Bronchodilator Protocol below. Respiratory Therapist to perform RT Therapy Protocol Assessment initially then follow the protocol. Repeat RT Therapy Protocol Assessment PRN for score 0-3 or on second treatment, BID, and PRN for scores above 3. No Indications - adjust the frequency to every 6 hours PRN wheezing or bronchospasm, if no treatments needed after 48 hours then discontinue using Per Protocol order mode. If indication present, adjust the RT bronchodilator orders based on the Bronchodilator Assessment Score as indicated below. Use Inhaler orders unless patient has one or more of the following: on home nebulizer, not able to hold breath for 10 seconds, is not alert and oriented, cannot activate and use MDI correctly, or respiratory rate 25 breaths per minute or more, then use the equivalent nebulizer order(s) with same Frequency and PRN reasons based on the score.   If a patient is on this medication at home then do
RT Inhaler-Nebulizer Bronchodilator Protocol Note    There is a bronchodilator order in the chart from a provider indicating to follow the RT Bronchodilator Protocol and there is an Initiate RT Inhaler-Nebulizer Bronchodilator Protocol order as well (see protocol at bottom of note). CXR Findings:  XR CHEST PORTABLE    Result Date: 5/1/2023  Mild bibasilar airspace disease. This is favored to represent atelectasis. However, pneumonia is not fully excluded in the appropriate clinical setting       The findings from the last RT Protocol Assessment were as follows:   History Pulmonary Disease: Chronic pulmonary disease  Respiratory Pattern: Regular pattern and RR 12-20 bpm  Breath Sounds: Intermittent or unilateral wheezes  Cough: Strong, spontaneous, non-productive  Indication for Bronchodilator Therapy: Wheezing associated with pulm disorder  Bronchodilator Assessment Score: 6    Aerosolized bronchodilator medication orders have been revised according to the RT Inhaler-Nebulizer Bronchodilator Protocol below. Respiratory Therapist to perform RT Therapy Protocol Assessment initially then follow the protocol. Repeat RT Therapy Protocol Assessment PRN for score 0-3 or on second treatment, BID, and PRN for scores above 3. No Indications - adjust the frequency to every 6 hours PRN wheezing or bronchospasm, if no treatments needed after 48 hours then discontinue using Per Protocol order mode. If indication present, adjust the RT bronchodilator orders based on the Bronchodilator Assessment Score as indicated below. Use Inhaler orders unless patient has one or more of the following: on home nebulizer, not able to hold breath for 10 seconds, is not alert and oriented, cannot activate and use MDI correctly, or respiratory rate 25 breaths per minute or more, then use the equivalent nebulizer order(s) with same Frequency and PRN reasons based on the score.   If a patient is on this medication at home then do not
RT Inhaler-Nebulizer Bronchodilator Protocol Note    There is a bronchodilator order in the chart from a provider indicating to follow the RT Bronchodilator Protocol and there is an Initiate RT Inhaler-Nebulizer Bronchodilator Protocol order as well (see protocol at bottom of note). CXR Findings:  XR CHEST PORTABLE    Result Date: 5/1/2023  Mild bibasilar airspace disease. This is favored to represent atelectasis. However, pneumonia is not fully excluded in the appropriate clinical setting       The findings from the last RT Protocol Assessment were as follows:   History Pulmonary Disease: Chronic pulmonary disease  Respiratory Pattern: Regular pattern and RR 12-20 bpm  Breath Sounds: Slightly diminished and/or crackles  Cough: Strong, spontaneous, non-productive  Indication for Bronchodilator Therapy: Decreased or absent breath sounds  Bronchodilator Assessment Score: 4    Aerosolized bronchodilator medication orders have been revised according to the RT Inhaler-Nebulizer Bronchodilator Protocol below. Respiratory Therapist to perform RT Therapy Protocol Assessment initially then follow the protocol. Repeat RT Therapy Protocol Assessment PRN for score 0-3 or on second treatment, BID, and PRN for scores above 3. No Indications - adjust the frequency to every 6 hours PRN wheezing or bronchospasm, if no treatments needed after 48 hours then discontinue using Per Protocol order mode. If indication present, adjust the RT bronchodilator orders based on the Bronchodilator Assessment Score as indicated below. Use Inhaler orders unless patient has one or more of the following: on home nebulizer, not able to hold breath for 10 seconds, is not alert and oriented, cannot activate and use MDI correctly, or respiratory rate 25 breaths per minute or more, then use the equivalent nebulizer order(s) with same Frequency and PRN reasons based on the score.   If a patient is on this medication at home then do not
RT Inhaler-Nebulizer Bronchodilator Protocol Note    There is a bronchodilator order in the chart from a provider indicating to follow the RT Bronchodilator Protocol and there is an Initiate RT Inhaler-Nebulizer Bronchodilator Protocol order as well (see protocol at bottom of note). CXR Findings:  XR CHEST PORTABLE    Result Date: 5/1/2023  Mild bibasilar airspace disease. This is favored to represent atelectasis. However, pneumonia is not fully excluded in the appropriate clinical setting       The findings from the last RT Protocol Assessment were as follows:   History Pulmonary Disease: Chronic pulmonary disease  Respiratory Pattern: Regular pattern and RR 12-20 bpm  Breath Sounds: Slightly diminished and/or crackles  Cough: Strong, spontaneous, non-productive  Indication for Bronchodilator Therapy: Decreased or absent breath sounds  Bronchodilator Assessment Score: 4    Aerosolized bronchodilator medication orders have been revised according to the RT Inhaler-Nebulizer Bronchodilator Protocol below. Respiratory Therapist to perform RT Therapy Protocol Assessment initially then follow the protocol. Repeat RT Therapy Protocol Assessment PRN for score 0-3 or on second treatment, BID, and PRN for scores above 3. No Indications - adjust the frequency to every 6 hours PRN wheezing or bronchospasm, if no treatments needed after 48 hours then discontinue using Per Protocol order mode. If indication present, adjust the RT bronchodilator orders based on the Bronchodilator Assessment Score as indicated below. Use Inhaler orders unless patient has one or more of the following: on home nebulizer, not able to hold breath for 10 seconds, is not alert and oriented, cannot activate and use MDI correctly, or respiratory rate 25 breaths per minute or more, then use the equivalent nebulizer order(s) with same Frequency and PRN reasons based on the score.   If a patient is on this medication at home then do not
Reassessment complete. Patient continues awake in bed. Physical assessment unchanged at this time. Patient assisted with repositioning at this time. Call light and personal belongings within reach.
Reassessment complete. Patient up in chair with unchanged physical assessment. Patient denies current needs. Call light and personal belongings within reach.
Repeat sodium levels-115. This RN called nephrology Dr. Megan Lawson and informed him on the patient's situation and he gave me a verbal order 'Start patient on  normal saline 75mls/hr and repeat sodium levels after 2hrs and call me if the sodium levels are less than 115 and more than 120.'
Report given to [facility] RN, for transfer to 
Resting in bed awake. Am assessment complete. Alert and oriented. Bed alarm in place and turned on. Call light in reach. Patient is able to demonstrate the ability to move from a reclining position to an upright position within the recliner. Bedside Mobility Assessment Tool (BMAT):     Assessment Level 1- Sit and Shake    1. From a semi-reclined position, ask patient to sit up and rotate to a seated position at the side of the bed. Can use the bedrail. 2. Ask patient to reach out and grab your hand and shake making sure patient reaches across his/her midline. Pass- Patient is able to come to a seated position, maintain core strength. Maintains seated balance while reaching across midline. Move on to Assessment Level 2. Assessment Level 2- Stretch and Point   1. With patient in seated position at the side of the bed, have patient place both feet on the floor (or stool) with knees no higher than hips. 2. Ask patient to stretch one leg and straighten the knee, then bend the ankle/flex and point the toes. If appropriate, repeat with the other leg. Pass- Patient is able to demonstrate appropriate quad strength on intended weight bearing limb(s). Move onto Assessment Level 3. Assessment Level 3- Stand   1. Ask patient to elevate off the bed or chair (seated to standing) using an assistive device (cane, bedrail). 2. Patient should be able to raise buttocks off be and hold for a count of five. May repeat once. Pass- Patient maintains standing stability for at least 5 seconds, proceed to assessment level 4. Assessment Level 4- Walk   1. Ask patient to march in place at bedside. 2. Then ask patient to advance step and return each foot. Some medical conditions may render a patient from stepping backwards, use your best clinical judgement.    Pass- Patient demonstrates balance while shifting weight and ability to step, takes independent steps, does not use assistive device patient is MOBILITY
Resting in bed awake. No complaints or needs at present time. Call light in reach. Bed alarm in place and turned on.
Shift assessment complete. Patient seen awake in bed. Patient is alert and oriented x 4. Patient seen on 3 lpm O2 per NC, patient wears 3 lpm O2 at home. Patient denies pain at this time. Patient with no s/s of distress noted. Physical assessment as charted in flow sheets. Scheduled medications given per orders. Peripheral IV C/D/I and functioning properly. External female catheter in place. Park-care provided. Patient assisted to [facility] for bowel movement. Patient educated on use of call light and to call out with needs, verbalized understanding. Call light and personal belongings within reach. Patient denies current needs.
Shift assessment complete. See flow sheet. Due medications administered per MD orders. See MAR. Vital signs stable. Patient is alert and oriented x 4. Pt denies any present needs/concerns. Call light explained and in reach.
[facility] Progress Note           Subjective/   59-year-old female past medical history of COPD on 3 L nasal cannula, hypertension, chronic pain, hyponatremia from [facility] who is coming in with complaints of worsening shortness of breath worsening for the last week associated with some diarrhea. Patient had to be increased on oxygen requirements and was admitted for pneumonia and hyponatremia management. Nephrology consulted for worsening acute on chronic hyponatremia     No acute events in the last 24 hrs  Feels at baseline/drinking 2-3 sodas daily    Objective/      /87   Pulse 71   Temp 97.9 °F (36.6 °C) (Oral)   Resp 15   Ht 5' 3\" (1.6 m)   Wt 166 lb (75.3 kg)   SpO2 99%   BMI 29.41 kg/m²   Gen:  awake, nad    Skin: no rash, turgor wnl  Heent:  eomi, mmm  Neck: supple, No jvd noted   Cardiovascular:  RRR, S1, S2  Respiratory:  , symmetric movement  Abdomen:  soft , non tender   Ext: nontraumatic , trace pedal edema  Psychiatric: at baseline  Musculoskeletal:  moving extremities    Data/    Labs:  CBC:   Recent Labs     05/03/23  0725   WBC 7.6   HGB 11.8*   HCT 35.0*            BMP:   Recent Labs     05/03/23  1717 05/04/23  0513 05/04/23  1707   * 125* 127*   K 4.1 3.9 4.9   CL 88* 89* 90*   CO2 24 26 29   BUN 12 13 21*   CREATININE <0.5* <0.5* 0.6   GLUCOSE 210* 105* 136*            U/A:  Recent Labs     05/02/23  1016   COLORU Yellow   PHUR 7.0   WBCUA 0-2   RBCUA 0-2   BACTERIA Rare*   CLARITYU Clear   SPECGRAV <=1.005   LEUKOCYTESUR Negative   UROBILINOGEN 0.2   BILIRUBINUR Negative   BLOODU TRACE-INTACT*   GLUCOSEU Negative           Assessment:  Hyponatremia:   - sodium on presentation 115   - urine Studies have been reviewed. Initially osmolarity was 47 urine sodium less than 20, repeat was urine osmolality 89, urine sodium less than 20. .  Serum osmolality was 240.  -  Patient started on saline and sodium jumped up to 123 in 6 hours.    - most likely
[facility] Progress Note           Subjective/   62-year-old female past medical history of COPD on 3 L nasal cannula, hypertension, chronic pain, hyponatremia from [facility] who is coming in with complaints of worsening shortness of breath worsening for the last week associated with some diarrhea. Patient had to be increased on oxygen requirements and was admitted for pneumonia and hyponatremia management. Nephrology consulted for worsening acute on chronic hyponatremia     No acute events in the last 24 hrs  Intake 1400cc. Uop 1200    Objective/      BP (!) 157/81   Pulse 87   Temp 98.5 °F (36.9 °C) (Oral)   Resp 18   Ht 5' 3\" (1.6 m)   Wt 158 lb 3.2 oz (71.8 kg)   SpO2 97%   BMI 28.02 kg/m²   Gen:  awake, nad    Skin: no rash, turgor wnl  Heent:  eomi, mmm  Neck: supple, No jvd noted   Cardiovascular:  RRR, S1, S2  Respiratory:  , symmetric movement  Abdomen:  soft , non tender   Ext: nontraumatic , trace pedal edema  Psychiatric: at baseline  Musculoskeletal:  moving extremities    Data/    Labs:  CBC:   Recent Labs     05/01/23  1835 05/02/23  0308 05/03/23  0725   WBC 11.8* 10.0 7.6   HGB 11.5* 11.2* 11.8*   HCT 33.8* 31.9* 35.0*    258 301         BMP:   Recent Labs     05/03/23  0725 05/03/23  1717 05/04/23  0513   * 125* 125*   K 3.9 4.1 3.9   CL 88* 88* 89*   CO2 29 24 26   BUN 9 12 13   CREATININE <0.5* <0.5* <0.5*   GLUCOSE 104* 210* 105*            U/A:  Recent Labs     05/02/23  0111 05/02/23  1016   COLORU Yellow Yellow   PHUR 6.0 7.0   WBCUA 10-20* 0-2   RBCUA 3-4 0-2   BACTERIA Rare* Rare*   CLARITYU Clear Clear   SPECGRAV 1.025 <=1.005   LEUKOCYTESUR MODERATE* Negative   UROBILINOGEN 0.2 0.2   BILIRUBINUR Negative Negative   BLOODU SMALL* TRACE-INTACT*   GLUCOSEU Negative Negative   AMORPHOUS Rare  --            Assessment:  Hyponatremia:   - sodium on presentation 115   - urine Studies have been reviewed.   Initially osmolarity was 47 urine sodium less
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 Patient had to be increased on oxygen requirements and was admitted for pneumonia and hyponatremia management. Nephrology consulted for worsening acute on chronic hyponatremia     No acute events in the last 24 hrs  Feels at baseline/drinking 2-3 sodas daily    Objective/      BP (!) 134/112   Pulse 79   Temp 97.5 °F (36.4 °C) (Oral)   Resp 23   Ht 5' 3\" (1.6 m)   Wt 158 lb 3.2 oz (71.8 kg)   SpO2 93%   BMI 28.02 kg/m²   Gen:  awake, nad    Skin: no rash, turgor wnl  Heent:  eomi, mmm  Neck: supple, No jvd noted   Cardiovascular:  RRR, S1, S2  Respiratory:  , symmetric movement  Abdomen:  soft , non tender   Ext: nontraumatic , trace pedal edema  Psychiatric: at baseline  Musculoskeletal:  moving extremities    Data/    Labs:  CBC:   Recent Labs     05/01/23  1835 05/02/23  0308 05/03/23  0725   WBC 11.8* 10.0 7.6   HGB 11.5* 11.2* 11.8*   HCT 33.8* 31.9* 35.0*    258 301       BMP:   Recent Labs     05/02/23  1753 05/03/23  0106 05/03/23  0725   * 125* 126*   K 4.0 4.1 3.9   CL 84* 88* 88*   CO2 27 30 29   BUN 11 11 9   CREATININE 0.7 <0.5* <0.5*   GLUCOSE 251* 100* 104*          U/A:  Recent Labs     05/02/23  0111 05/02/23  1016   COLORU Yellow Yellow   PHUR 6.0 7.0   WBCUA 10-20* 0-2   RBCUA 3-4 0-2   BACTERIA Rare* Rare*   CLARITYU Clear Clear   SPECGRAV 1.025 <=1.005   LEUKOCYTESUR MODERATE* Negative   UROBILINOGEN 0.2 0.2   BILIRUBINUR Negative Negative   BLOODU SMALL* TRACE-INTACT*   GLUCOSEU Negative Negative   AMORPHOUS Rare  --          Assessment:  Hyponatremia:   - sodium on presentation 115   - urine Studies have been reviewed.   Initially osmolarity was 47 urine
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 Patient had to be increased on oxygen requirements and was admitted for pneumonia and hyponatremia management. Nephrology consulted for worsening acute on chronic hyponatremia     The patient was seen and examined; she feels well today with no CP, SOB, nausea or vomiting. ROS: No fever or chills. Social: No family at bedside. Objective/      BP (!) 143/76 Comment: Recheck  Pulse 84   Temp 98.6 °F (37 °C) (Oral)   Resp 18   Ht 5' 3\" (1.6 m)   Wt 166 lb (75.3 kg)   SpO2 97%   BMI 29.41 kg/m²     Gen:  awake, nad    Skin: no rash, turgor wnl  Heent:  eomi, mmm  Neck: supple, No jvd noted   Cardiovascular:  RRR, S1, S2  Respiratory:  , symmetric movement  Abdomen:  soft , non tender   Ext: nontraumatic , trace pedal edema  Psychiatric: at baseline  Musculoskeletal:  moving extremities    Data/    Labs:  CBC:   Recent Labs     05/05/23  1643 05/06/23  0521   WBC 6.8 7.0   HGB 11.6* 11.4*   HCT 35.0* 34.0*    297         BMP:   Recent Labs     05/04/23  1707 05/05/23  1340 05/06/23  0521   * 128* 129*   K 4.9 4.8 4.2   CL 90* 92* 92*   CO2 29 26 25   BUN 21* 26* 14   CREATININE 0.6 0.6 <0.5*   GLUCOSE 136* 178* 93         Assessment:    Hyponatremia:   - sodium on presentation 115   - urine Studies have been reviewed. Initially osmolarity was 47 urine sodium less than 20, repeat was urine osmolality 89, urine sodium less than 20. .  Serum osmolality was 240.  -  Patient started on saline and sodium jumped up to 123 in 6 hours.    - most likely due to prerenal component on top of baseline SIADH   - would recommend restricting P.O Free water to less than 1 L / d .  -Increase
This RN called lab,to confirm if they took a sample for a repeat sodium level,that was to be done at 0630,it was not done and they said they will come and take the sample.
This RN called the patient's daughter 'Fabienne Blake' to get more answers,and she told me that she lives with the patient and that they have not had any fleas or ticks. This RN updated the daughter on patient's condition. This RN asked the daughter if the patient has any advanced directive on file and the daughter answered ' I do not know'. This RN tried to go over the patient's med list with the daughter on phone,but some of the drugs she was not sure if the patient was taking them and some drugs she was not sure when the patient took them last.This needs more follow up during the day,by the dayshift RN.
Transferred down from ICU in stable condition. Sitting up in chair awake. Oriented to room and call light. Oxygen on at 3 liters per nasal cannula. Call light in reach.
Lymph: No cervical LAD. No supraclavicular LAD. M/S: No cyanosis. No joint deformity. No clubbing. Neuro: Awake. Psych: Oriented x 3. No anxiety or agitation. Lab Data:  CBC:   Recent Labs     05/03/23  0725 05/05/23  1643   WBC 7.6 6.8   RBC 3.82* 3.79*   HGB 11.8* 11.6*   HCT 35.0* 35.0*   MCV 91.6 92.2   RDW 13.0 13.0    315       BMP:   Recent Labs     05/04/23  0513 05/04/23  1707 05/05/23  1340   * 127* 128*   K 3.9 4.9 4.8   CL 89* 90* 92*   CO2 26 29 26   BUN 13 21* 26*   CREATININE <0.5* 0.6 0.6       BNP: No results for input(s): BNP in the last 72 hours. PT/INR: No results for input(s): PROTIME, INR in the last 72 hours. APTT:No results for input(s): APTT in the last 72 hours. CARDIAC ENZYMES: No results for input(s): CKMB, CKMBINDEX, TROPONINI in the last 72 hours. Invalid input(s): CKTOTAL;3  FASTING LIPID PANEL:  Lab Results   Component Value Date/Time    CHOL 154 09/15/2017 08:18 AM    HDL 81 09/15/2017 08:18 AM    HDL 64 05/14/2012 06:55 AM    TRIG 44 09/15/2017 08:18 AM     LIVER PROFILE:   No results for input(s): AST, ALT, ALB, BILIDIR, BILITOT, ALKPHOS in the last 72 hours. XR CHEST PORTABLE   Final Result   Mild bibasilar airspace disease. This is favored to represent atelectasis.    However, pneumonia is not fully excluded in the appropriate clinical setting               Assessment & Plan:     Patient Active Problem List    Diagnosis Date Noted    Metabolic encephalopathy 90/65/3003    Acute encephalopathy 11/18/2022    Chronic hypoxemic respiratory failure ([address] 11/18/2022    Chronic respiratory failure with hypoxia ([address] 08/31/2022    Hypokalemia 08/29/2022    Low TSH level 08/29/2022    History of COVID-19 08/29/2022    Leukocytosis 08/29/2022    Hypertension 08/15/2022    COVID-19 08/15/2022    Pneumonia due to COVID-19 virus 08/14/2022    Community acquired pneumonia     Transaminitis 12/10/2021    Acute hypoxemic respiratory failure [address] [address]
exposed extremities. Lymph: No cervical LAD. No supraclavicular LAD. M/S: No cyanosis. No joint deformity. No clubbing. Neuro: Awake. Psych: Oriented x 3. No anxiety or agitation. Lab Data:  CBC:   Recent Labs     05/03/23  0725   WBC 7.6   RBC 3.82*   HGB 11.8*   HCT 35.0*   MCV 91.6   RDW 13.0        BMP:   Recent Labs     05/04/23  0513 05/04/23  1707 05/05/23  1340   * 127* 128*   K 3.9 4.9 4.8   CL 89* 90* 92*   CO2 26 29 26   BUN 13 21* 26*   CREATININE <0.5* 0.6 0.6     BNP: No results for input(s): BNP in the last 72 hours. PT/INR: No results for input(s): PROTIME, INR in the last 72 hours. APTT:No results for input(s): APTT in the last 72 hours. CARDIAC ENZYMES: No results for input(s): CKMB, CKMBINDEX, TROPONINI in the last 72 hours. Invalid input(s): CKTOTAL;3  FASTING LIPID PANEL:  Lab Results   Component Value Date/Time    CHOL 154 09/15/2017 08:18 AM    HDL 81 09/15/2017 08:18 AM    HDL 64 05/14/2012 06:55 AM    TRIG 44 09/15/2017 08:18 AM     LIVER PROFILE:   No results for input(s): AST, ALT, ALB, BILIDIR, BILITOT, ALKPHOS in the last 72 hours.      CULTURE:  Results       Procedure Component Value Units Date/Time    Clostridium Difficile Toxin/Antigen [[id number]] Collected: 05/03/23 0940    Order Status: Completed Specimen: Stool Updated: 05/03/23 1550     C.diff Toxin/Antigen --     Negative for Clostridium difficile antigen and toxin  Normal Range: Negative      Narrative:      ORDER#: [id number]                          ORDERED BY: Arcenio Lopes  SOURCE: Stool                              COLLECTED:  05/03/23 09:40  ANTIBIOTICS AT [facility].:                      RECEIVED :  05/03/23 09:46  Collect White vial (sterile container)    Culture, Respiratory [[id number]]     Order Status: No result Specimen: Sputum Expectorated     Culture, Urine [[id number]] Collected: 05/02/23 0111    Order Status: Completed Specimen: Urine, clean catch Updated: 05/03/23 1014     Urine
exposed extremities. No rash on exposed extremities. Lymph: No cervical LAD. No supraclavicular LAD. M/S: No cyanosis. No joint deformity. No clubbing. Neuro: Awake. Psych: Oriented x 3. No anxiety or agitation. Lab Data:  CBC:   Recent Labs     05/01/23  1835 05/02/23  0308 05/03/23  0725   WBC 11.8* 10.0 7.6   RBC 3.82* 3.61* 3.82*   HGB 11.5* 11.2* 11.8*   HCT 33.8* 31.9* 35.0*   MCV 88.5 88.4 91.6   RDW 12.5 12.9 13.0    258 301       BMP:   Recent Labs     05/02/23  0308 05/02/23  0813 05/02/23  1147 05/03/23  0725 05/03/23  1717 05/04/23  0513   * 122*  123*   < > 126* 125* 125*   K 4.2 4.0  4.1   < > 3.9 4.1 3.9   CL 78* 84*  85*   < > 88* 88* 89*   CO2 28 28  28   < > 29 24 26   PHOS 2.1* 2.7  --   --   --   --    BUN 9 7  7   < > 9 12 13   CREATININE <0.5* <0.5*  <0.5*   < > <0.5* <0.5* <0.5*    < > = values in this interval not displayed. BNP: No results for input(s): BNP in the last 72 hours. PT/INR: No results for input(s): PROTIME, INR in the last 72 hours. APTT:No results for input(s): APTT in the last 72 hours. CARDIAC ENZYMES: No results for input(s): CKMB, CKMBINDEX, TROPONINI in the last 72 hours. Invalid input(s): CKTOTAL;3  FASTING LIPID PANEL:  Lab Results   Component Value Date/Time    CHOL 154 09/15/2017 08:18 AM    HDL 81 09/15/2017 08:18 AM    HDL 64 05/14/2012 06:55 AM    TRIG 44 09/15/2017 08:18 AM     LIVER PROFILE:   Recent Labs     05/01/23 1835   AST 44*   ALT 25   BILITOT 0.8   ALKPHOS 112           XR CHEST PORTABLE   Final Result   Mild bibasilar airspace disease. This is favored to represent atelectasis.    However, pneumonia is not fully excluded in the appropriate clinical setting               Assessment & Plan:     Patient Active Problem List    Diagnosis Date Noted    Metabolic encephalopathy 64/85/3930    Acute encephalopathy 11/18/2022    Chronic hypoxemic respiratory failure ([address] 11/18/2022    Chronic respiratory failure with
failure  Acute on chronic hyponatremia  (baseline is 125-128)  COPD exacerbation  UTI  LE edema  KAMLA nodule s/p SBRT     PLAN:  Supplemental oxygen to maintain SaO2 >92%; wean as tolerated  Intensive inhaled bronchodilator therapy. Prednisone taper   Nephrology following  Chemo  Dc planning ok with me.  Pt asked to follow up in [address] [place] [address]
Pneumonia due to infectious organism     Pancreatic abnormality     Common bile duct dilatation     Acute on chronic respiratory failure ([facility] [address] 02/04/2021    Community acquired pneumonia of right lung     Acute exacerbation of chronic obstructive pulmonary disease (COPD) ([facility]     Acute on chronic respiratory failure with hypoxia and hypercapnia ([facility] [address] 02/04/2020    Lung nodule     Pulmonary emphysema (HCC)     Hyponatremia 01/30/2020    Acute respiratory failure with hypoxia ([facility] [address] 05/21/2018    COPD exacerbation ([facility] [address] 05/20/2018    Moderate COPD (chronic obstructive pulmonary disease) ([facility] [address] 04/21/2016    Tobacco use 02/12/2016    Chest pain 10/14/2011    Shortness of breath 10/14/2011     Acute on chronic hyponatremia  Admitted to ICU. Nephrology consult. Continue IV normal saline for now. 1200 ml fluid restriction  Serial BMPs. Hold Lexapro  Sodium levels trending up. Continue fluid restriction     Acute on chronic hypoxic respiratory failure - improving  Required up to 5 L of oxygen.   Currently on 3 L     Acute exacerbation of COPD  Continue prednisone, Levaquin  Inhaled bronchodilators  Sputum cultures     Hypomagnesemia and hypophosphatemia  Replaced     Lovenox for DVT prophylaxis  Full code  General diet    Amira Mis, DO
bilaterally  Assistive Device Used:    rolling walker (RW)  Level of Assist:    SBA   Comment: reported some R upper thigh numbness during this walk    Stair Training deferred, pt does not have stairs in home environment     Therapeutic Exercises Initiated  deferred secondary to treatment focus on functional mobility  Supine:  N/A    Seated:  N/A    Standing:  N/A    Activity Tolerance   During therapy session noted pt with SOB/EDGAR  fatigue     BP (mmHg) HR (bpm) SpO2 (%) on 3L Comments   Supine at rest 157/87 78 96%                 Post ambulation   78 95 %, reports some SOB,but recovered quickly      Positioning Needs   Pt up in chair, alarm set, positioned in proper neutral alignment and pressure relief provided. Call light provided and all needs within reach  RN aware of pt position/status    Other Activities  RN aware-patient requesting some allergy medication or muculytic    Patient/Family Education   Pt educated on role of inpatient PT, POC, importance of continued activity, DC recommendations, safety awareness, pacing activity, HEP, and calling for assist with mobility. Assessment  Pt seen today for physical therapy Evaluation. Patient demonstrated good progress and tolerance of activity/ambulation this date, with no LOB noted. Recommending Home 24 hr supervision and with home PT upon discharge as patient functioning close to baseline level and would benefit from continued therapy services to address multi-factorial fall risk with recent h/o frequent falls. Goals : To be met in 3 visits:  1). Independent with LE Ex x 10 reps  2). Sit to/from stand: Supervision,goal met  3). Bed to chair: Supervision,  4). CHF goal: N/A    To be met in 6 visits:  1). Supine to/from sit: Independent  2). Sit to/from stand: Modified Independent  3). Bed to chair: Modified Independent  4). Gait: Ambulate 150 ft.  with Modified Independent and use of LRAD (least restrictive assistive device)  5).   Tolerate B LE

## 2024-07-03 NOTE — PROGRESS NOTES
Physician Progress Note      PATIENT:               JAKI STEIN  [facility] #:                  [id number]  :                       1955  ADMIT DATE:       2024 12:43 PM  DISCH DATE:        2024 7:00 PM  RESPONDING  PROVIDER #:        Sebas West MD          QUERY TEXT:    Patient admitted with COPD AE.  Noted documentation of acute respiratory   failure. The patient presents with reported hypoxia after eating without   respiratory distress per ED and h/p progress note.  Patient was treated with   trach mask and intermittent HS ventilator, normally uses 8-10L at nursing home   and reported ventilator at night. VBG on 50% pH 7.354/pCO2 48.7, No ABG. In   order to support the diagnosis of acute respiratory failure, please include   additional clinical indicators in your documentation.  Or please document if   the diagnosis of acute respiratory failure has bee    The medical record reflects the following:  Risk Factors: age, copd ae, coughing spell after eating, hx of KAMLA cancer  Clinical Indicators: ED: No acute distress no tachypnea or increased   respiratory effort. RR-18, a/o x 3, VBG-P02-55.5  h/p: Resp: No accessory muscle use. No crackles. No wheezes. No rhonchi. No   dullness on percussion. Pulmonary consult: baseline oxygen 8-10 L,   +dyspnea/cough, +accessory muscle use  Treatment: VBG, pulmonary consult, trach mask-oxygen 10 L, vent at HS,   Intensive inhaled bronchodilator therapy, IV solumedrol, doxycycline, sppech    Acute Respiratory Failure Clinical Indicators per [employer] Training Guide and   Quick Reference Guide:  pO2 < 60 mmHg or SpO2 (pulse oximetry) < 91% breathing room air  pCO2 > 50 and pH < 7.35  P/F ratio (pO2 / FIO2) < 300  pO2 decrease or pCO2 increase by 10 mmHg from baseline (if known)  Supplemental oxygen of 40% or more  Presence of respiratory distress, tachypnea, dyspnea, shortness of breath,   wheezing  Unable to speak in complete

## 2024-07-05 NOTE — PROGRESS NOTES
Physician Progress Note      PATIENT:               JAKI STEIN  CSN #:                  [id number]  :                       1955  ADMIT DATE:       2024 3:29 PM  DISCH DATE:        2024 1:23 PM  RESPONDING  PROVIDER #:        Moe Ledesma MD          QUERY TEXT:    Pt admitted with Acute respiratory failure. Pt noted to have elevated   troponin. If possible, please document in the progress notes and discharge   summary if you are evaluating and/or treating any of the following:    The medical record reflects the following:  Risk Factors: Acute respiratory failure, acute sCHF    Clinical Indicators: Per  [facility]- \"NSTEMI - started and Heparin gtt - now off   - at OSH and transferred, s/p LHCath  w/out complications and no   occlusive CAD. CHF - acute systolic failure w/ reduced EF 35% by cath c/w   Takotsubo's CM. HTN - w/out known CAD and no evidence of active signs/sxs of   ischemia/failure. Currently controlled on home meds w/ vitals documented and   reviewed. Echocardiogram showed reduced ejection fraction that was new and   patient was transferred over here for cardiac catheterization.\"  Sinus tachycardia 119/min    Treatment: serial troponins, EKG, cardio consult, LHC, Heparin  Options provided:  -- Non-ischemic myocardial injury due to Acute on chronic respiratory failure,   NSTEMI ruled out  -- Elevated troponin without myocardial injury  -- NSTEMI as evidenced by, please specify  -- Other - I will add my own diagnosis  -- Disagree - Not applicable / Not valid  -- Disagree - Clinically unable to determine / Unknown  -- Refer to Clinical Documentation Reviewer    PROVIDER RESPONSE TEXT:    This patient has elevated troponin without myocardial injury.    Query created by: Stephanie Booker on 2024 8:20 AM      Electronically signed by:  Moe Ledesma MD 2024 1:38 PM

## 2024-07-27 PROBLEM — R79.89 ELEVATED TROPONIN: Status: RESOLVED | Noted: 2024-06-27 | Resolved: 2024-07-27

## 2024-07-31 ENCOUNTER — OFFICE VISIT (OUTPATIENT)
Dept: CARDIOLOGY CLINIC | Age: 69
End: 2024-07-31
Payer: MEDICARE

## 2024-07-31 VITALS
HEIGHT: 63 IN | OXYGEN SATURATION: 100 % | SYSTOLIC BLOOD PRESSURE: 138 MMHG | WEIGHT: 142 LBS | DIASTOLIC BLOOD PRESSURE: 82 MMHG | HEART RATE: 89 BPM | BODY MASS INDEX: 25.16 KG/M2

## 2024-07-31 DIAGNOSIS — I42.8 NONISCHEMIC CARDIOMYOPATHY (HCC): Primary | ICD-10-CM

## 2024-07-31 DIAGNOSIS — I50.22 HEART FAILURE WITH MILDLY REDUCED EJECTION FRACTION (HFMREF) (HCC): ICD-10-CM

## 2024-07-31 PROCEDURE — G8400 PT W/DXA NO RESULTS DOC: HCPCS | Performed by: NURSE PRACTITIONER

## 2024-07-31 PROCEDURE — 1111F DSCHRG MED/CURRENT MED MERGE: CPT | Performed by: NURSE PRACTITIONER

## 2024-07-31 PROCEDURE — 3017F COLORECTAL CA SCREEN DOC REV: CPT | Performed by: NURSE PRACTITIONER

## 2024-07-31 PROCEDURE — 3075F SYST BP GE 130 - 139MM HG: CPT | Performed by: NURSE PRACTITIONER

## 2024-07-31 PROCEDURE — 99214 OFFICE O/P EST MOD 30 MIN: CPT | Performed by: NURSE PRACTITIONER

## 2024-07-31 PROCEDURE — 3079F DIAST BP 80-89 MM HG: CPT | Performed by: NURSE PRACTITIONER

## 2024-07-31 PROCEDURE — 1090F PRES/ABSN URINE INCON ASSESS: CPT | Performed by: NURSE PRACTITIONER

## 2024-07-31 PROCEDURE — 1036F TOBACCO NON-USER: CPT | Performed by: NURSE PRACTITIONER

## 2024-07-31 PROCEDURE — 1123F ACP DISCUSS/DSCN MKR DOCD: CPT | Performed by: NURSE PRACTITIONER

## 2024-07-31 PROCEDURE — G8427 DOCREV CUR MEDS BY ELIG CLIN: HCPCS | Performed by: NURSE PRACTITIONER

## 2024-07-31 PROCEDURE — G8417 CALC BMI ABV UP PARAM F/U: HCPCS | Performed by: NURSE PRACTITIONER

## 2024-07-31 NOTE — PROGRESS NOTES
used smokeless tobacco. She reports that she does not currently use alcohol after a past usage of about 12.0 standard drinks of alcohol per week. She reports that she does not use drugs.     Family History: family history includes COPD in her mother; Hypertension in her mother.    Review of Systems   Review of Systems    OBJECTIVE:    Vital signs:    /82   Pulse 89   Ht 1.6 m (5' 3\")   Wt 64.4 kg (142 lb)   SpO2 100%   BMI 25.15 kg/m²      Physical Exam:  Constitutional:  Comfortable and alert, NAD, appears older than stated age  Eyes: PERRL, sclera nonicteric  Neck:  Supple, no masses, no thyroidmegaly, unable to assess JVD, + trach  Skin:  Warm and dry; no rash or lesions  Heart:  Regular, normal apex, S1 and S2 normal, no M/G/R  Lungs:  Normal respiratory effort; clear; no wheezing/rhonchi/rales  Abdomen: soft, non tender, + bowel sounds  Extremities:  No edema or cyanosis; no clubbing  Neuro: alert and oriented, moves legs and arms equally, normal mood and affect    Data Reviewed:      /[facility] 6/27/2024:  Takotsubo's cardiomyopathy.   Normal right heart hemodynamics.  Recommendations  Patient management should include: Aggressive risk factor modification.   Coronary Findings  Diagnostic  Dominance: Right  Left Main   The vessel was visualized by angiography. Size of vessel >=2.0 mm. The vessel is angiographically normal.      Left Anterior Descending   The vessel was visualized by angiography and is large. There is mild disease in the mid segment. The vessel is calcified.   Mid LAD lesion, 30% stenosed.      First Diagonal Branch   1st Diag lesion, 40% stenosed.      Left Circumflex   The vessel was visualized by angiography. Size of vessel >=2.0 mm. The vessel is angiographically normal.      Right Coronary Artery   The vessel was visualized by angiography. Size of vessel >=2.0 mm. The vessel is angiographically normal.      Intervention      No interventions have been documented.   Left Heart     Left

## 2024-07-31 NOTE — PATIENT INSTRUCTIONS
Check blood work for kidney function  Change metoprolol tartrate to metoprolol succinate 25 mg once daily  Continue other medications  Follow up in 3 months with Dr. Cohn

## 2024-08-04 ENCOUNTER — TELEPHONE (OUTPATIENT)
Dept: CASE MANAGEMENT | Age: 69
End: 2024-08-04

## 2024-08-04 NOTE — TELEPHONE ENCOUNTER
6/25/2024 [facility] Pulmonary: Spiculated nodule in the right upper lobe has increased in size from prior  examination concerning for primary malignancy.    Has patient followed with [facility]? I see no pulmonary follow up scheduled.    Thank you,  Jennifer Barnes RN  [facility] Lung Navigator  [phone]

## 2024-08-07 NOTE — TELEPHONE ENCOUNTER
Daughter called back. Pt is in a nursing home with a trach. Pt is not able to talk much. Daughter will call nursing home to see if someone can be in the room to help patient if we do a vv on Friday. Daughter will call back with an update.

## 2024-08-16 ENCOUNTER — APPOINTMENT (OUTPATIENT)
Dept: CT IMAGING | Age: 69
DRG: 871 | End: 2024-08-16
Payer: MEDICARE

## 2024-08-16 ENCOUNTER — APPOINTMENT (OUTPATIENT)
Dept: GENERAL RADIOLOGY | Age: 69
DRG: 871 | End: 2024-08-16
Payer: MEDICARE

## 2024-08-16 ENCOUNTER — HOSPITAL ENCOUNTER (EMERGENCY)
Age: 69
Discharge: OTHER INSTITUTION | DRG: 871 | End: 2024-08-16
Attending: STUDENT IN AN ORGANIZED HEALTH CARE EDUCATION/TRAINING PROGRAM
Payer: MEDICARE

## 2024-08-16 ENCOUNTER — HOSPITAL ENCOUNTER (INPATIENT)
Age: 69
LOS: 4 days | Discharge: OTHER INSTITUTION | DRG: 871 | End: 2024-08-20
Attending: EMERGENCY MEDICINE | Admitting: STUDENT IN AN ORGANIZED HEALTH CARE EDUCATION/TRAINING PROGRAM
Payer: MEDICARE

## 2024-08-16 VITALS
DIASTOLIC BLOOD PRESSURE: 74 MMHG | RESPIRATION RATE: 19 BRPM | HEART RATE: 102 BPM | BODY MASS INDEX: 25.16 KG/M2 | OXYGEN SATURATION: 98 % | SYSTOLIC BLOOD PRESSURE: 129 MMHG | WEIGHT: 142 LBS | HEIGHT: 63 IN | TEMPERATURE: 99.2 F

## 2024-08-16 DIAGNOSIS — R45.1 AGITATION: ICD-10-CM

## 2024-08-16 DIAGNOSIS — J44.1 COPD EXACERBATION (HCC): Primary | ICD-10-CM

## 2024-08-16 DIAGNOSIS — R06.02 SHORTNESS OF BREATH: ICD-10-CM

## 2024-08-16 DIAGNOSIS — R91.1 LUNG NODULE SEEN ON IMAGING STUDY: ICD-10-CM

## 2024-08-16 DIAGNOSIS — J96.02 ACUTE RESPIRATORY FAILURE WITH HYPERCAPNIA (HCC): ICD-10-CM

## 2024-08-16 LAB
ALBUMIN SERPL-MCNC: 3.8 G/DL (ref 3.4–5)
ALBUMIN SERPL-MCNC: 3.9 G/DL (ref 3.4–5)
ALBUMIN/GLOB SERPL: 1.1 {RATIO} (ref 1.1–2.2)
ALBUMIN/GLOB SERPL: 1.3 {RATIO} (ref 1.1–2.2)
ALP SERPL-CCNC: 124 U/L (ref 40–129)
ALP SERPL-CCNC: 146 U/L (ref 40–129)
ALT SERPL-CCNC: 17 U/L (ref 10–40)
ALT SERPL-CCNC: 21 U/L (ref 10–40)
ANION GAP SERPL CALCULATED.3IONS-SCNC: 12 MMOL/L (ref 3–16)
ANION GAP SERPL CALCULATED.3IONS-SCNC: 15 MMOL/L (ref 3–16)
AST SERPL-CCNC: 22 U/L (ref 15–37)
AST SERPL-CCNC: 26 U/L (ref 15–37)
BASE EXCESS BLDA CALC-SCNC: -0.2 MMOL/L (ref -3–3)
BASE EXCESS BLDV CALC-SCNC: 4 MMOL/L (ref -3–3)
BASOPHILS # BLD: 0 K/UL (ref 0–0.2)
BASOPHILS # BLD: 0 K/UL (ref 0–0.2)
BASOPHILS NFR BLD: 0 %
BASOPHILS NFR BLD: 0.3 %
BILIRUB SERPL-MCNC: 0.6 MG/DL (ref 0–1)
BILIRUB SERPL-MCNC: 0.7 MG/DL (ref 0–1)
BUN SERPL-MCNC: 7 MG/DL (ref 7–20)
BUN SERPL-MCNC: 8 MG/DL (ref 7–20)
CALCIUM SERPL-MCNC: 9.4 MG/DL (ref 8.3–10.6)
CALCIUM SERPL-MCNC: 9.7 MG/DL (ref 8.3–10.6)
CHLORIDE SERPL-SCNC: 91 MMOL/L (ref 99–110)
CHLORIDE SERPL-SCNC: 93 MMOL/L (ref 99–110)
CO2 BLDA-SCNC: 30.4 MMOL/L
CO2 BLDV-SCNC: 33 MMOL/L
CO2 SERPL-SCNC: 29 MMOL/L (ref 21–32)
CO2 SERPL-SCNC: 31 MMOL/L (ref 21–32)
COHGB MFR BLDA: 0.3 % (ref 0–1.5)
COHGB MFR BLDV: 0.8 % (ref 0–1.5)
CREAT SERPL-MCNC: 0.6 MG/DL (ref 0.6–1.2)
CREAT SERPL-MCNC: <0.5 MG/DL (ref 0.6–1.2)
D-DIMER QUANTITATIVE: 1.53 UG/ML FEU (ref 0–0.6)
DEPRECATED RDW RBC AUTO: 15.5 % (ref 12.4–15.4)
DEPRECATED RDW RBC AUTO: 15.6 % (ref 12.4–15.4)
EKG ATRIAL RATE: 114 BPM
EKG DIAGNOSIS: NORMAL
EKG P AXIS: 39 DEGREES
EKG P-R INTERVAL: 126 MS
EKG Q-T INTERVAL: 324 MS
EKG QRS DURATION: 66 MS
EKG QTC CALCULATION (BAZETT): 446 MS
EKG R AXIS: -52 DEGREES
EKG T AXIS: 39 DEGREES
EKG VENTRICULAR RATE: 114 BPM
EOSINOPHIL # BLD: 0 K/UL (ref 0–0.6)
EOSINOPHIL # BLD: 0.3 K/UL (ref 0–0.6)
EOSINOPHIL NFR BLD: 0.1 %
EOSINOPHIL NFR BLD: 2 %
FLUAV RNA RESP QL NAA+PROBE: NOT DETECTED
FLUBV RNA RESP QL NAA+PROBE: NOT DETECTED
GFR SERPLBLD CREATININE-BSD FMLA CKD-EPI: >90 ML/MIN/{1.73_M2}
GFR SERPLBLD CREATININE-BSD FMLA CKD-EPI: >90 ML/MIN/{1.73_M2}
GLUCOSE BLD-MCNC: 294 MG/DL (ref 70–99)
GLUCOSE BLD-MCNC: 305 MG/DL (ref 70–99)
GLUCOSE SERPL-MCNC: 137 MG/DL (ref 70–99)
GLUCOSE SERPL-MCNC: 311 MG/DL (ref 70–99)
HCO3 BLDA-SCNC: 28.4 MMOL/L (ref 21–29)
HCO3 BLDV-SCNC: 30.8 MMOL/L (ref 23–29)
HCT VFR BLD AUTO: 33.4 % (ref 36–48)
HCT VFR BLD AUTO: 33.9 % (ref 36–48)
HGB BLD-MCNC: 10.7 G/DL (ref 12–16)
HGB BLD-MCNC: 10.8 G/DL (ref 12–16)
HGB BLDA-MCNC: 12 G/DL (ref 12–16)
HYPOCHROMIA BLD QL SMEAR: ABNORMAL
LACTATE BLDV-SCNC: 1.1 MMOL/L (ref 0.4–1.9)
LACTATE BLDV-SCNC: 2.7 MMOL/L (ref 0.4–2)
LACTATE BLDV-SCNC: 3.6 MMOL/L (ref 0.4–1.9)
LYMPHOCYTES # BLD: 1.1 K/UL (ref 1–5.1)
LYMPHOCYTES # BLD: 1.6 K/UL (ref 1–5.1)
LYMPHOCYTES NFR BLD: 12 %
LYMPHOCYTES NFR BLD: 7.4 %
MCH RBC QN AUTO: 27.9 PG (ref 26–34)
MCH RBC QN AUTO: 28 PG (ref 26–34)
MCHC RBC AUTO-ENTMCNC: 31.9 G/DL (ref 31–36)
MCHC RBC AUTO-ENTMCNC: 32.1 G/DL (ref 31–36)
MCV RBC AUTO: 87 FL (ref 80–100)
MCV RBC AUTO: 87.6 FL (ref 80–100)
METHGB MFR BLDA: 0.3 %
METHGB MFR BLDV: 0.3 %
MONOCYTES # BLD: 0.2 K/UL (ref 0–1.3)
MONOCYTES # BLD: 0.3 K/UL (ref 0–1.3)
MONOCYTES NFR BLD: 1.4 %
MONOCYTES NFR BLD: 2 %
NEUTROPHILS # BLD: 11.2 K/UL (ref 1.7–7.7)
NEUTROPHILS # BLD: 12.9 K/UL (ref 1.7–7.7)
NEUTROPHILS NFR BLD: 83 %
NEUTROPHILS NFR BLD: 90.8 %
NEUTS BAND NFR BLD MANUAL: 1 % (ref 0–7)
NT-PROBNP SERPL-MCNC: 162 PG/ML (ref 0–124)
NT-PROBNP SERPL-MCNC: 296 PG/ML (ref 0–124)
O2 CT VFR BLDV CALC: 16 VOL %
O2 THERAPY: ABNORMAL
O2 THERAPY: ABNORMAL
PCO2 BLDA: 66.5 MMHG (ref 35–45)
PCO2 BLDV: 56.2 MMHG (ref 40–50)
PERFORMED ON: ABNORMAL
PERFORMED ON: ABNORMAL
PH BLDA: 7.25 [PH] (ref 7.35–7.45)
PH BLDV: 7.36 [PH] (ref 7.35–7.45)
PLATELET # BLD AUTO: 351 K/UL (ref 135–450)
PLATELET # BLD AUTO: 530 K/UL (ref 135–450)
PLATELET BLD QL SMEAR: ADEQUATE
PMV BLD AUTO: 7.2 FL (ref 5–10.5)
PMV BLD AUTO: 8.1 FL (ref 5–10.5)
PO2 BLDA: 74.9 MMHG (ref 75–108)
PO2 BLDV: 76 MMHG (ref 25–40)
POLYCHROMASIA BLD QL SMEAR: ABNORMAL
POTASSIUM SERPL-SCNC: 3.6 MMOL/L (ref 3.5–5.1)
POTASSIUM SERPL-SCNC: 4.2 MMOL/L (ref 3.5–5.1)
PROT SERPL-MCNC: 6.8 G/DL (ref 6.4–8.2)
PROT SERPL-MCNC: 7.3 G/DL (ref 6.4–8.2)
RBC # BLD AUTO: 3.84 M/UL (ref 4–5.2)
RBC # BLD AUTO: 3.87 M/UL (ref 4–5.2)
SAO2 % BLDA: 92.3 %
SAO2 % BLDV: 94 %
SARS-COV-2 RNA RESP QL NAA+PROBE: NOT DETECTED
SLIDE REVIEW: ABNORMAL
SODIUM SERPL-SCNC: 135 MMOL/L (ref 136–145)
SODIUM SERPL-SCNC: 136 MMOL/L (ref 136–145)
TROPONIN, HIGH SENSITIVITY: 36 NG/L (ref 0–14)
TROPONIN, HIGH SENSITIVITY: 56 NG/L (ref 0–14)
TROPONIN, HIGH SENSITIVITY: 58 NG/L (ref 0–14)
WBC # BLD AUTO: 13.3 K/UL (ref 4–11)
WBC # BLD AUTO: 14.2 K/UL (ref 4–11)

## 2024-08-16 PROCEDURE — 6360000002 HC RX W HCPCS

## 2024-08-16 PROCEDURE — 87040 BLOOD CULTURE FOR BACTERIA: CPT

## 2024-08-16 PROCEDURE — 2580000003 HC RX 258: Performed by: EMERGENCY MEDICINE

## 2024-08-16 PROCEDURE — 84484 ASSAY OF TROPONIN QUANT: CPT

## 2024-08-16 PROCEDURE — 6360000004 HC RX CONTRAST MEDICATION: Performed by: STUDENT IN AN ORGANIZED HEALTH CARE EDUCATION/TRAINING PROGRAM

## 2024-08-16 PROCEDURE — 85025 COMPLETE CBC W/AUTO DIFF WBC: CPT

## 2024-08-16 PROCEDURE — 2000000000 HC ICU R&B

## 2024-08-16 PROCEDURE — 71260 CT THORAX DX C+: CPT

## 2024-08-16 PROCEDURE — 96365 THER/PROPH/DIAG IV INF INIT: CPT

## 2024-08-16 PROCEDURE — 87636 SARSCOV2 & INF A&B AMP PRB: CPT

## 2024-08-16 PROCEDURE — 83880 ASSAY OF NATRIURETIC PEPTIDE: CPT

## 2024-08-16 PROCEDURE — 93010 ELECTROCARDIOGRAM REPORT: CPT | Performed by: INTERNAL MEDICINE

## 2024-08-16 PROCEDURE — 83605 ASSAY OF LACTIC ACID: CPT

## 2024-08-16 PROCEDURE — 6370000000 HC RX 637 (ALT 250 FOR IP): Performed by: STUDENT IN AN ORGANIZED HEALTH CARE EDUCATION/TRAINING PROGRAM

## 2024-08-16 PROCEDURE — 71045 X-RAY EXAM CHEST 1 VIEW: CPT

## 2024-08-16 PROCEDURE — 87077 CULTURE AEROBIC IDENTIFY: CPT

## 2024-08-16 PROCEDURE — 83036 HEMOGLOBIN GLYCOSYLATED A1C: CPT

## 2024-08-16 PROCEDURE — 2580000003 HC RX 258: Performed by: STUDENT IN AN ORGANIZED HEALTH CARE EDUCATION/TRAINING PROGRAM

## 2024-08-16 PROCEDURE — 87150 DNA/RNA AMPLIFIED PROBE: CPT

## 2024-08-16 PROCEDURE — 80053 COMPREHEN METABOLIC PANEL: CPT

## 2024-08-16 PROCEDURE — 6360000002 HC RX W HCPCS: Performed by: EMERGENCY MEDICINE

## 2024-08-16 PROCEDURE — 36415 COLL VENOUS BLD VENIPUNCTURE: CPT

## 2024-08-16 PROCEDURE — 82803 BLOOD GASES ANY COMBINATION: CPT

## 2024-08-16 PROCEDURE — 6360000002 HC RX W HCPCS: Performed by: STUDENT IN AN ORGANIZED HEALTH CARE EDUCATION/TRAINING PROGRAM

## 2024-08-16 PROCEDURE — 96375 TX/PRO/DX INJ NEW DRUG ADDON: CPT

## 2024-08-16 PROCEDURE — 84145 PROCALCITONIN (PCT): CPT

## 2024-08-16 PROCEDURE — 85379 FIBRIN DEGRADATION QUANT: CPT

## 2024-08-16 PROCEDURE — 93005 ELECTROCARDIOGRAM TRACING: CPT | Performed by: STUDENT IN AN ORGANIZED HEALTH CARE EDUCATION/TRAINING PROGRAM

## 2024-08-16 PROCEDURE — 99285 EMERGENCY DEPT VISIT HI MDM: CPT

## 2024-08-16 PROCEDURE — 94002 VENT MGMT INPAT INIT DAY: CPT

## 2024-08-16 RX ORDER — MAGNESIUM SULFATE IN WATER 40 MG/ML
2000 INJECTION, SOLUTION INTRAVENOUS ONCE
Status: COMPLETED | OUTPATIENT
Start: 2024-08-16 | End: 2024-08-16

## 2024-08-16 RX ORDER — LISINOPRIL 5 MG/1
5 TABLET ORAL DAILY
Status: DISCONTINUED | OUTPATIENT
Start: 2024-08-17 | End: 2024-08-20 | Stop reason: HOSPADM

## 2024-08-16 RX ORDER — 0.9 % SODIUM CHLORIDE 0.9 %
500 INTRAVENOUS SOLUTION INTRAVENOUS ONCE
Status: COMPLETED | OUTPATIENT
Start: 2024-08-16 | End: 2024-08-16

## 2024-08-16 RX ORDER — AZITHROMYCIN 250 MG/1
TABLET, FILM COATED ORAL
Qty: 6 TABLET | Refills: 0 | Status: ON HOLD | OUTPATIENT
Start: 2024-08-16 | End: 2024-08-16

## 2024-08-16 RX ORDER — PREDNISONE 20 MG/1
40 TABLET ORAL DAILY
Qty: 10 TABLET | Refills: 0 | Status: ON HOLD | OUTPATIENT
Start: 2024-08-16 | End: 2024-08-16

## 2024-08-16 RX ORDER — SPIRONOLACTONE 25 MG/1
12.5 TABLET ORAL DAILY
Status: DISCONTINUED | OUTPATIENT
Start: 2024-08-17 | End: 2024-08-20 | Stop reason: HOSPADM

## 2024-08-16 RX ORDER — MORPHINE SULFATE 4 MG/ML
4 INJECTION, SOLUTION INTRAMUSCULAR; INTRAVENOUS
Status: DISCONTINUED | OUTPATIENT
Start: 2024-08-16 | End: 2024-08-18

## 2024-08-16 RX ORDER — LEVALBUTEROL 1.25 MG/.5ML
SOLUTION, CONCENTRATE RESPIRATORY (INHALATION)
Status: COMPLETED
Start: 2024-08-16 | End: 2024-08-16

## 2024-08-16 RX ORDER — SODIUM CHLORIDE 9 MG/ML
INJECTION, SOLUTION INTRAVENOUS PRN
Status: DISCONTINUED | OUTPATIENT
Start: 2024-08-16 | End: 2024-08-20 | Stop reason: HOSPADM

## 2024-08-16 RX ORDER — MORPHINE SULFATE 2 MG/ML
2 INJECTION, SOLUTION INTRAMUSCULAR; INTRAVENOUS
Status: DISCONTINUED | OUTPATIENT
Start: 2024-08-16 | End: 2024-08-18

## 2024-08-16 RX ORDER — LEVOFLOXACIN 750 MG/1
750 TABLET, FILM COATED ORAL DAILY
Status: ON HOLD | COMMUNITY
Start: 2024-08-16 | End: 2024-08-20

## 2024-08-16 RX ORDER — FUROSEMIDE 20 MG
20 TABLET ORAL DAILY
Status: DISCONTINUED | OUTPATIENT
Start: 2024-08-17 | End: 2024-08-20 | Stop reason: HOSPADM

## 2024-08-16 RX ORDER — HYDROXYZINE HYDROCHLORIDE 10 MG/1
10 TABLET, FILM COATED ORAL EVERY 6 HOURS PRN
COMMUNITY

## 2024-08-16 RX ORDER — ACETAMINOPHEN 650 MG/1
650 SUPPOSITORY RECTAL EVERY 6 HOURS PRN
Status: DISCONTINUED | OUTPATIENT
Start: 2024-08-16 | End: 2024-08-20 | Stop reason: HOSPADM

## 2024-08-16 RX ORDER — MAGNESIUM SULFATE IN WATER 40 MG/ML
2000 INJECTION, SOLUTION INTRAVENOUS PRN
Status: DISCONTINUED | OUTPATIENT
Start: 2024-08-16 | End: 2024-08-20 | Stop reason: HOSPADM

## 2024-08-16 RX ORDER — BUDESONIDE 0.5 MG/2ML
1 INHALANT ORAL 2 TIMES DAILY
COMMUNITY

## 2024-08-16 RX ORDER — IPRATROPIUM BROMIDE AND ALBUTEROL SULFATE 2.5; .5 MG/3ML; MG/3ML
1 SOLUTION RESPIRATORY (INHALATION)
Status: DISCONTINUED | OUTPATIENT
Start: 2024-08-16 | End: 2024-08-16 | Stop reason: HOSPADM

## 2024-08-16 RX ORDER — ATORVASTATIN CALCIUM 40 MG/1
40 TABLET, FILM COATED ORAL NIGHTLY
COMMUNITY

## 2024-08-16 RX ORDER — POTASSIUM CHLORIDE 7.45 MG/ML
10 INJECTION INTRAVENOUS PRN
Status: DISCONTINUED | OUTPATIENT
Start: 2024-08-16 | End: 2024-08-20 | Stop reason: HOSPADM

## 2024-08-16 RX ORDER — LEVOFLOXACIN 5 MG/ML
750 INJECTION, SOLUTION INTRAVENOUS EVERY 24 HOURS
Status: DISCONTINUED | OUTPATIENT
Start: 2024-08-17 | End: 2024-08-17

## 2024-08-16 RX ORDER — ACETAMINOPHEN 325 MG/1
650 TABLET ORAL EVERY 6 HOURS PRN
Status: DISCONTINUED | OUTPATIENT
Start: 2024-08-16 | End: 2024-08-20 | Stop reason: HOSPADM

## 2024-08-16 RX ORDER — IOPAMIDOL 755 MG/ML
75 INJECTION, SOLUTION INTRAVASCULAR
Status: COMPLETED | OUTPATIENT
Start: 2024-08-16 | End: 2024-08-16

## 2024-08-16 RX ORDER — METOPROLOL TARTRATE 25 MG/1
25 TABLET, FILM COATED ORAL DAILY
Status: ON HOLD | COMMUNITY
End: 2024-08-20 | Stop reason: HOSPADM

## 2024-08-16 RX ORDER — SODIUM CHLORIDE 0.9 % (FLUSH) 0.9 %
5-40 SYRINGE (ML) INJECTION PRN
Status: DISCONTINUED | OUTPATIENT
Start: 2024-08-16 | End: 2024-08-20 | Stop reason: HOSPADM

## 2024-08-16 RX ORDER — POTASSIUM CHLORIDE 750 MG/1
40 TABLET, EXTENDED RELEASE ORAL PRN
Status: DISCONTINUED | OUTPATIENT
Start: 2024-08-16 | End: 2024-08-20 | Stop reason: HOSPADM

## 2024-08-16 RX ORDER — LORAZEPAM 2 MG/ML
1 INJECTION INTRAMUSCULAR ONCE
Status: DISCONTINUED | OUTPATIENT
Start: 2024-08-16 | End: 2024-08-17

## 2024-08-16 RX ORDER — ALBUTEROL SULFATE 0.83 MG/ML
SOLUTION RESPIRATORY (INHALATION)
Status: DISPENSED
Start: 2024-08-16 | End: 2024-08-17

## 2024-08-16 RX ORDER — IPRATROPIUM BROMIDE AND ALBUTEROL SULFATE 2.5; .5 MG/3ML; MG/3ML
1 SOLUTION RESPIRATORY (INHALATION) EVERY 4 HOURS PRN
Qty: 360 ML | Refills: 0 | Status: ON HOLD | OUTPATIENT
Start: 2024-08-16 | End: 2024-08-16

## 2024-08-16 RX ORDER — BUSPIRONE HYDROCHLORIDE 10 MG/1
10 TABLET ORAL 3 TIMES DAILY
Status: DISCONTINUED | OUTPATIENT
Start: 2024-08-16 | End: 2024-08-20 | Stop reason: HOSPADM

## 2024-08-16 RX ORDER — ONDANSETRON 4 MG/1
4 TABLET, ORALLY DISINTEGRATING ORAL EVERY 8 HOURS PRN
Status: DISCONTINUED | OUTPATIENT
Start: 2024-08-16 | End: 2024-08-20 | Stop reason: HOSPADM

## 2024-08-16 RX ORDER — TRAMADOL HYDROCHLORIDE 50 MG/1
50 TABLET ORAL EVERY 6 HOURS PRN
Status: ON HOLD | COMMUNITY
End: 2024-08-20 | Stop reason: HOSPADM

## 2024-08-16 RX ORDER — LORAZEPAM 2 MG/ML
2 INJECTION INTRAMUSCULAR ONCE
Status: COMPLETED | OUTPATIENT
Start: 2024-08-16 | End: 2024-08-16

## 2024-08-16 RX ORDER — ROFLUMILAST 500 UG/1
500 TABLET ORAL DAILY
Status: DISCONTINUED | OUTPATIENT
Start: 2024-08-17 | End: 2024-08-17

## 2024-08-16 RX ORDER — SPIRONOLACTONE 25 MG/1
12.5 TABLET ORAL DAILY
Status: ON HOLD | COMMUNITY
End: 2024-08-20 | Stop reason: HOSPADM

## 2024-08-16 RX ORDER — IPRATROPIUM BROMIDE AND ALBUTEROL SULFATE 2.5; .5 MG/3ML; MG/3ML
1 SOLUTION RESPIRATORY (INHALATION)
Status: DISCONTINUED | OUTPATIENT
Start: 2024-08-17 | End: 2024-08-17

## 2024-08-16 RX ORDER — POLYETHYLENE GLYCOL 3350 17 G/17G
17 POWDER, FOR SOLUTION ORAL DAILY PRN
Status: DISCONTINUED | OUTPATIENT
Start: 2024-08-16 | End: 2024-08-20 | Stop reason: HOSPADM

## 2024-08-16 RX ORDER — PREDNISONE 5 MG/1
5 TABLET ORAL DAILY
Status: ON HOLD | COMMUNITY
End: 2024-08-20 | Stop reason: HOSPADM

## 2024-08-16 RX ORDER — ATORVASTATIN CALCIUM 40 MG/1
40 TABLET, FILM COATED ORAL NIGHTLY
Status: DISCONTINUED | OUTPATIENT
Start: 2024-08-16 | End: 2024-08-20 | Stop reason: HOSPADM

## 2024-08-16 RX ORDER — ENOXAPARIN SODIUM 100 MG/ML
40 INJECTION SUBCUTANEOUS DAILY
Status: DISCONTINUED | OUTPATIENT
Start: 2024-08-17 | End: 2024-08-20 | Stop reason: HOSPADM

## 2024-08-16 RX ORDER — ONDANSETRON 2 MG/ML
4 INJECTION INTRAMUSCULAR; INTRAVENOUS EVERY 6 HOURS PRN
Status: DISCONTINUED | OUTPATIENT
Start: 2024-08-16 | End: 2024-08-20 | Stop reason: HOSPADM

## 2024-08-16 RX ORDER — ALBUTEROL SULFATE 90 UG/1
2 AEROSOL, METERED RESPIRATORY (INHALATION) EVERY 4 HOURS PRN
Qty: 1 EACH | Refills: 0 | Status: ON HOLD | OUTPATIENT
Start: 2024-08-16 | End: 2024-08-16

## 2024-08-16 RX ORDER — LEVALBUTEROL 1.25 MG/.5ML
1.25 SOLUTION, CONCENTRATE RESPIRATORY (INHALATION) EVERY 8 HOURS PRN
Status: DISCONTINUED | OUTPATIENT
Start: 2024-08-16 | End: 2024-08-16 | Stop reason: HOSPADM

## 2024-08-16 RX ORDER — METOPROLOL SUCCINATE 25 MG/1
25 TABLET, EXTENDED RELEASE ORAL DAILY
Status: DISCONTINUED | OUTPATIENT
Start: 2024-08-17 | End: 2024-08-20 | Stop reason: HOSPADM

## 2024-08-16 RX ORDER — IPRATROPIUM BROMIDE AND ALBUTEROL SULFATE 2.5; .5 MG/3ML; MG/3ML
SOLUTION RESPIRATORY (INHALATION)
Status: DISPENSED
Start: 2024-08-16 | End: 2024-08-17

## 2024-08-16 RX ORDER — MAGNESIUM SULFATE IN WATER 40 MG/ML
INJECTION, SOLUTION INTRAVENOUS
Status: COMPLETED
Start: 2024-08-16 | End: 2024-08-16

## 2024-08-16 RX ORDER — SODIUM CHLORIDE 0.9 % (FLUSH) 0.9 %
5-40 SYRINGE (ML) INJECTION EVERY 12 HOURS SCHEDULED
Status: DISCONTINUED | OUTPATIENT
Start: 2024-08-16 | End: 2024-08-20 | Stop reason: HOSPADM

## 2024-08-16 RX ADMIN — MAGNESIUM SULFATE IN WATER 2000 MG: 40 INJECTION, SOLUTION INTRAVENOUS at 18:55

## 2024-08-16 RX ADMIN — SODIUM CHLORIDE 500 ML: 9 INJECTION, SOLUTION INTRAVENOUS at 19:45

## 2024-08-16 RX ADMIN — LEVALBUTEROL 2.5 MG: 1.25 SOLUTION, CONCENTRATE RESPIRATORY (INHALATION) at 18:55

## 2024-08-16 RX ADMIN — MAGNESIUM SULFATE HEPTAHYDRATE 2000 MG: 40 INJECTION, SOLUTION INTRAVENOUS at 18:55

## 2024-08-16 RX ADMIN — ACETAMINOPHEN 650 MG: 325 TABLET ORAL at 23:56

## 2024-08-16 RX ADMIN — WATER 125 MG: 1 INJECTION INTRAMUSCULAR; INTRAVENOUS; SUBCUTANEOUS at 11:48

## 2024-08-16 RX ADMIN — MEROPENEM 1000 MG: 1 INJECTION, POWDER, FOR SOLUTION INTRAVENOUS at 19:42

## 2024-08-16 RX ADMIN — ATORVASTATIN CALCIUM 40 MG: 40 TABLET, FILM COATED ORAL at 23:49

## 2024-08-16 RX ADMIN — VANCOMYCIN HYDROCHLORIDE 1000 MG: 1 INJECTION, POWDER, LYOPHILIZED, FOR SOLUTION INTRAVENOUS at 20:20

## 2024-08-16 RX ADMIN — IPRATROPIUM BROMIDE AND ALBUTEROL SULFATE 1 DOSE: 2.5; .5 SOLUTION RESPIRATORY (INHALATION) at 11:49

## 2024-08-16 RX ADMIN — SODIUM CHLORIDE, PRESERVATIVE FREE 10 ML: 5 INJECTION INTRAVENOUS at 23:49

## 2024-08-16 RX ADMIN — AZITHROMYCIN DIHYDRATE 500 MG: 500 INJECTION, POWDER, LYOPHILIZED, FOR SOLUTION INTRAVENOUS at 12:24

## 2024-08-16 RX ADMIN — BUSPIRONE HYDROCHLORIDE 10 MG: 10 TABLET ORAL at 23:49

## 2024-08-16 RX ADMIN — LORAZEPAM 2 MG: 2 INJECTION INTRAMUSCULAR; INTRAVENOUS at 19:20

## 2024-08-16 RX ADMIN — IOPAMIDOL 75 ML: 755 INJECTION, SOLUTION INTRAVENOUS at 11:31

## 2024-08-16 ASSESSMENT — PULMONARY FUNCTION TESTS
PIF_VALUE: 28
PIF_VALUE: 24
PIF_VALUE: 24

## 2024-08-16 ASSESSMENT — PAIN - FUNCTIONAL ASSESSMENT
PAIN_FUNCTIONAL_ASSESSMENT: NONE - DENIES PAIN
PAIN_FUNCTIONAL_ASSESSMENT: NONE - DENIES PAIN

## 2024-08-16 NOTE — PROGRESS NOTES
08/16/24 1033   Patient Observation   Respirations 28   SpO2 98 %   Breath Sounds   Breath Sounds Bilateral Diminished   Vent Information   $Ventilation $Initial Day   Vent Patient Data (Readings)   Vt Mandatory Exp (mL) 400 mL   Vt (Measured) 436 mL   Peak Inspiratory Pressure (cmH2O) 28 cmH2O   Rate Measured 15 br/min   Minute Volume (L/min) 9 Liters   Peak Inspiratory Flow (lpm) 600 L/sec   Mean Airway Pressure (cmH2O) 12 cmH20   I:E Ratio 1:3   Flow Sensitivity 3 L/min   Backup Apnea On   Vent Alarm Settings   Low Minute Volume (lpm) 2.5 L/min   High Minute Volume (lpm) 22 L/min   Low Exhaled Vt (ml) 250 mL   High Exhaled Vt (ml) 900 mL   Apnea (secs) 20 secs   Additional Respiratoray Assessments   Humidification Source Heated wire   Humidification Temp 36   Ambu Bag With Mask At Bedside Yes   Airway Clearance   Suction Device Inline suction catheter   Sputum Amount Scant   Sputum Color/Odor Yellow   Sputum Consistency Thick

## 2024-08-16 NOTE — ED PROVIDER NOTES
[facility] ED  EMERGENCY DEPARTMENT ENCOUNTER        Patient Name: Annie Tyler  MRN: [id number]  Birthdate 1955  Date of evaluation: 8/16/2024  Provider: Sola Guardado MD  PCP: Keyla Mtz MD  Note Started: 12:14 PM EDT 8/16/24      CHIEF COMPLAINT  Shortness of Breath         HISTORY & PHYSICAL     HISTORY OF PRESENT ILLNESS  History from : Patient    Limitations to history : None    Annie Tyler is a 68 y.o. female  has a past medical history of Angina pectoris (HCC), Chronic pain, Congestive heart failure (HCC) (1/5/2024), COPD exacerbation ([facility]) (05/20/2018), Depression, Essential hypertension (08/15/2022), Panic disorder, Pneumonia, and Pulmonary emphysema (HCC)., who presents to the ED complaining of shortness of breath.  Patient reports shortness of breath since this morning.  She does report cough, no fever.  She denies current chest pain but does reports she intermittently has this.     Denies history of blood clots or active malignancy.  Patient denies unilateral leg swelling, hemoptysis, recent travel or surgery/immobilization, or OCP or other hormone use. Patient is not post partum.    Family history:   Family History   Problem Relation Age of Onset   • COPD Mother    • Hypertension Mother    • Asthma Neg Hx    • Cancer Neg Hx    • Diabetes Neg Hx    • Emphysema Neg Hx    • Heart Failure Neg Hx      Patient does not smoke. Patient denies cocaine or other drug use.      Old records reviewed: Echo 6/27/24  •  Left Ventricle: Moderately reduced left ventricular systolic function with a visually estimated EF of 40 ± 5%. Mild hypokinesis of the following segments: basal anterior. Moderate hypokinesis of the following segments: basal inferior. Severe hypokinesis of the following segments: mid anterior, mid anterolateral, mid anteroseptal, mid inferior, mid inferolateral, mid inferoseptal and apical anterior. Akinesis of the following segments: apical lateral, apical  99 %      Height Weight - Scale         08/16/24 1030 08/16/24 1030         1.6 m (5' 3\") 64.4 kg (142 lb)           GENERAL APPEARANCE: Awake and alert. Cooperative. no distress.  HENT: Normocephalic. Atraumatic. Mucous membranes are moist  NECK: Supple.  Full range of motion of the neck without stiffness or pain.  EYES: PERRL. EOM's grossly intact.  HEART/CHEST: RR, tachycardia. No murmurs.  Chest wall is not tender to palpation.  LUNGS: Respirations unlabored.  Tracheostomy dependent.  Wheezing bilaterally.  Speaking comfortably in full sentences.   ABDOMEN: No tenderness. Soft. Non-distended. No masses. No organomegaly. No guarding or rebound.   MUSCULOSKELETAL: No extremity edema, lower extremities are equal bilaterally and nontender to palpation. Compartments soft.  No deformity.  No tenderness in the extremities.  All extremities neurovascularly intact.  SKIN: Warm and dry. No acute rashes.   NEUROLOGICAL: Alert and oriented.  No gross facial drooping. Strength 5/5, sensation intact.   PSYCHIATRIC: Normal mood and affect.      WORKUP     LABS  I have reviewed all labs for this visit.   Results for orders placed or performed during the hospital encounter of 08/16/24   COVID-19 & Influenza Combo    Specimen: Nasopharyngeal Swab   Result Value Ref Range    SARS-CoV-2 RNA, RT PCR NOT DETECTED NOT DETECTED    Influenza A NOT DETECTED NOT DETECTED    Influenza B NOT DETECTED NOT DETECTED   CBC with Auto Differential   Result Value Ref Range    WBC 13.3 (H) 4.0 - 11.0 K/uL    RBC 3.84 (L) 4.00 - 5.20 M/uL    Hemoglobin 10.7 (L) 12.0 - 16.0 g/dL    Hematocrit 33.4 (L) 36.0 - 48.0 %    MCV 87.0 80.0 - 100.0 fL    MCH 28.0 26.0 - 34.0 pg    MCHC 32.1 31.0 - 36.0 g/dL    RDW 15.5 (H) 12.4 - 15.4 %    Platelets 351 135 - 450 K/uL    MPV 7.2 5.0 - 10.5 fL    PLATELET SLIDE REVIEW Adequate     SLIDE REVIEW see below     Neutrophils % 83.0 %    Lymphocytes % 12.0 %    Monocytes % 2.0 %    Eosinophils % 2.0 %    Basophils %  Pt. was seen and examined. Agree with above. Plan d/w the team.  Extensive L leg DVT in the setting of previous central line  no signs of phlegmasia  therapeutic A/C for 3m   agree with lysis by IR   IMPRESSION:  1. New asymmetric ground-glass opacification in the left lower lung zone.  Asymmetric edema or developing pneumonitis may be considered.  2. Chronic pattern of reticular airspace changes.   [ER]   1141 Patient is not thrombocytopenic [ER]   1207 COVID and flu swab negative [ER]   1207 CT PE: IMPRESSION:  1. Negative for pulmonary embolus.  2. Advanced emphysema with an indeterminate 12 mm spiculated right upper lobe pulmonary nodule. [ER]   1208 The Ekg independently interpreted by me shows  sinus tachycardia, vdkl=993  Axis is   Left axis deviation  QTc is  within an acceptable range  Intervals and Durations are unremarkable.      ST Segments: no acute change  No significant change from prior EKG dated 6/26/24 [ER]   1320 On reassessment, patient states that she feels that she is back at her baseline.  Her preference is to be transferred back to her nursing facility [ER]   1332 Repeat troponin stable at 58 [ER]      ED Course User Index  [ER] Sola Guardado MD        CONSULTS:   None    SCREENINGS:       [place] Coma Scale  Eye Opening: Spontaneous  Best Verbal Response: Oriented  Best Motor Response: Obeys commands  [place] Coma Scale Score: 15                CIWA Assessment  BP: 126/65  Pulse: 100      CURB-65 Score  Confusion: No  BUN > 19 mg/dL (> 7 mmol/L): No  Respiratory Rate > 29: No  Systolic BP < 90 mmHg or Diastolic BP < 61 mmHg: No  Age is 65+: Yes  CURB-65 Score: 1    Is this patient to be included in the SEP-1 Core Measure due to severe sepsis or septic shock?   No   Exclusion criteria - the patient is NOT to be included for SEP-1 Core Measure due to:  May have criteria for sepsis, but does not meet criteria for severe sepsis or septic shock      TREATMENT:  During the patient's ED course, the patient was given:  Medications   ipratropium 0.5 mg-albuterol 2.5 mg (DUONEB) nebulizer solution 1 Dose (1 Dose Inhalation Given 8/16/24 1149)   levalbuterol (XOPENEX) nebulizer solution 1.25 mg  prednisone.  Strict return precautions given.  Encouraged pulmonology, cardiology, and PCP follow-up.  Patient discharged in stable condition.    I estimate there is LOW risk for ACUTE CORONARY SYNDROME, PULMONARY EMBOLISM, PNEUMOTHORAX, RUPTURED ESOPHAGUS OR THORACIC AORTIC DISSECTION, thus I consider the discharge disposition reasonable. Annie Tyler and I have discussed the diagnosis and risks, and we agree with discharging home with close follow-up. We also discussed returning to the Emergency Department immediately if new or worsening symptoms occur. We have discussed the symptoms which are most concerning that necessitate immediate return.    Vitals:    Vitals:    08/16/24 1033 08/16/24 1034 08/16/24 1100 08/16/24 1319   BP:  (!) 162/83 126/65    Pulse:  (!) 121 (!) 109 100   Resp: 28 21 20    Temp:       TempSrc:       SpO2: 98% 97% 98%    Weight:       Height:           CRITICAL CARE TIME     I personally spent a total of 0 minutes of critical care time in obtaining history, performing a physical exam, bedside monitoring of interventions, collecting and interpreting tests and discussion with consultants but excluding time spent performing procedures, treating other patients and teaching time.                   FINAL IMPRESSION      1. COPD exacerbation (HCC)    2. Shortness of breath          DISPOSITION/PLAN   Disposition: DISPOSITION Decision To Discharge 08/16/2024 01:38:46 PM  Condition at Disposition: Stable    Condition: stable     DISCHARGE MEDICATIONS:  Patient was given scripts for the following medications. I counseled patient how to take these medications:  New Prescriptions    ALBUTEROL SULFATE HFA (PROVENTIL;VENTOLIN;PROAIR) 108 (90 BASE) MCG/ACT INHALER    Inhale 2 puffs into the lungs every 4 hours as needed for Wheezing or Shortness of Breath    AZITHROMYCIN (ZITHROMAX) 250 MG TABLET    Take 2 tablets by mouth daily for 1 day, THEN 1 tablet daily for 4 days.    IPRATROPIUM 0.5 MG-ALBUTEROL  18yo male diagnosed with diabetes after presenting with DKA found to have DVT in LLE where fem line was.  No significant family hx however will obtain hypercoagulopathy w/u.  Obtain imaging to assess for any possible anatomical defect in extent of thrombus  (abd/pelvis).  Will have IR review to see if he is a candidate for thrombectomy to decrease post thrombotic syndrome as his leg discrepancy is very evident already.  Continue anticoagulation goal antiXa 0.5-1.  Will need prolonged anticoagulation, family will need to be taught.  Will f/u as an outpatient to review results of hypercoag w/u.

## 2024-08-16 NOTE — ED NOTES
Pt alert and verbal at time of discharge. Discharge instructions reviewed and sent with transport. Report called to Viky at [facility].Charmaine Cantor RN

## 2024-08-16 NOTE — ED NOTES
Writer attempting to start iv and draw blood, patient restless and moving arms around. Previously placed IV removed by patient d/t movement and IV writer attempted to place was also removed during blood culture draw

## 2024-08-16 NOTE — DISCHARGE INSTRUCTIONS
At this time, we suspect her symptoms were related to her COPD.  We did discuss possible admission but your preference is for discharge home at this time.  Please follow-up closely with your primary care doctor, your cardiologist, and your pulmonologist.  Please return to the emergency department if you have any new or worsening symptoms.  You have been prescribed antibiotics, steroids, and breathing treatments for home.    Please follow-up with your primary care doctor and pulmonologist regarding the 12 mm right upper lobe pulmonary nodule seen on your imaging today.

## 2024-08-17 PROBLEM — J18.9 PNEUMONIA DUE TO INFECTIOUS ORGANISM: Status: ACTIVE | Noted: 2024-08-17

## 2024-08-17 LAB
ANION GAP SERPL CALCULATED.3IONS-SCNC: 13 MMOL/L (ref 3–16)
BASE EXCESS BLDA CALC-SCNC: 5.2 MMOL/L (ref -3–3)
BUN SERPL-MCNC: 12 MG/DL (ref 7–20)
CALCIUM SERPL-MCNC: 8.7 MG/DL (ref 8.3–10.6)
CHLORIDE SERPL-SCNC: 96 MMOL/L (ref 99–110)
CO2 BLDA-SCNC: 31 MMOL/L
CO2 SERPL-SCNC: 26 MMOL/L (ref 21–32)
COHGB MFR BLDA: 0.3 % (ref 0–1.5)
CREAT SERPL-MCNC: 0.6 MG/DL (ref 0.6–1.2)
DEPRECATED RDW RBC AUTO: 15.3 % (ref 12.4–15.4)
EST. AVERAGE GLUCOSE BLD GHB EST-MCNC: 119.8 MG/DL
GFR SERPLBLD CREATININE-BSD FMLA CKD-EPI: >90 ML/MIN/{1.73_M2}
GLUCOSE BLD-MCNC: 117 MG/DL (ref 70–99)
GLUCOSE BLD-MCNC: 134 MG/DL (ref 70–99)
GLUCOSE BLD-MCNC: 155 MG/DL (ref 70–99)
GLUCOSE BLD-MCNC: 171 MG/DL (ref 70–99)
GLUCOSE BLD-MCNC: 230 MG/DL (ref 70–99)
GLUCOSE SERPL-MCNC: 243 MG/DL (ref 70–99)
HBA1C MFR BLD: 5.8 %
HCO3 BLDA-SCNC: 29.6 MMOL/L (ref 21–29)
HCT VFR BLD AUTO: 27.4 % (ref 36–48)
HGB BLD-MCNC: 9.2 G/DL (ref 12–16)
HGB BLDA-MCNC: 9.7 G/DL (ref 12–16)
LACTATE BLDV-SCNC: 1 MMOL/L (ref 0.4–2)
LACTATE BLDV-SCNC: 2.1 MMOL/L (ref 0.4–2)
LACTATE BLDV-SCNC: 3.8 MMOL/L (ref 0.4–2)
LACTATE BLDV-SCNC: 4.9 MMOL/L (ref 0.4–1.9)
MAGNESIUM SERPL-MCNC: 2.4 MG/DL (ref 1.8–2.4)
MCH RBC QN AUTO: 28.6 PG (ref 26–34)
MCHC RBC AUTO-ENTMCNC: 33.6 G/DL (ref 31–36)
MCV RBC AUTO: 85.3 FL (ref 80–100)
METHGB MFR BLDA: 0.3 %
O2 THERAPY: ABNORMAL
PCO2 BLDA: 43.3 MMHG (ref 35–45)
PERFORMED ON: ABNORMAL
PH BLDA: 7.45 [PH] (ref 7.35–7.45)
PHOSPHATE SERPL-MCNC: 1.8 MG/DL (ref 2.5–4.9)
PLATELET # BLD AUTO: 315 K/UL (ref 135–450)
PMV BLD AUTO: 7.2 FL (ref 5–10.5)
PO2 BLDA: 67.6 MMHG (ref 75–108)
POTASSIUM SERPL-SCNC: 3 MMOL/L (ref 3.5–5.1)
PROCALCITONIN SERPL IA-MCNC: 0.77 NG/ML (ref 0–0.15)
RBC # BLD AUTO: 3.22 M/UL (ref 4–5.2)
SAO2 % BLDA: 94.2 %
SODIUM SERPL-SCNC: 135 MMOL/L (ref 136–145)
WBC # BLD AUTO: 7.8 K/UL (ref 4–11)

## 2024-08-17 PROCEDURE — 6370000000 HC RX 637 (ALT 250 FOR IP): Performed by: INTERNAL MEDICINE

## 2024-08-17 PROCEDURE — 94003 VENT MGMT INPAT SUBQ DAY: CPT

## 2024-08-17 PROCEDURE — 94761 N-INVAS EAR/PLS OXIMETRY MLT: CPT

## 2024-08-17 PROCEDURE — 5A1945Z RESPIRATORY VENTILATION, 24-96 CONSECUTIVE HOURS: ICD-10-PCS | Performed by: INTERNAL MEDICINE

## 2024-08-17 PROCEDURE — 6370000000 HC RX 637 (ALT 250 FOR IP): Performed by: STUDENT IN AN ORGANIZED HEALTH CARE EDUCATION/TRAINING PROGRAM

## 2024-08-17 PROCEDURE — 2000000000 HC ICU R&B

## 2024-08-17 PROCEDURE — 83735 ASSAY OF MAGNESIUM: CPT

## 2024-08-17 PROCEDURE — 84100 ASSAY OF PHOSPHORUS: CPT

## 2024-08-17 PROCEDURE — 2580000003 HC RX 258: Performed by: INTERNAL MEDICINE

## 2024-08-17 PROCEDURE — 2580000003 HC RX 258: Performed by: STUDENT IN AN ORGANIZED HEALTH CARE EDUCATION/TRAINING PROGRAM

## 2024-08-17 PROCEDURE — 6360000002 HC RX W HCPCS: Performed by: INTERNAL MEDICINE

## 2024-08-17 PROCEDURE — 2500000003 HC RX 250 WO HCPCS: Performed by: INTERNAL MEDICINE

## 2024-08-17 PROCEDURE — 85027 COMPLETE CBC AUTOMATED: CPT

## 2024-08-17 PROCEDURE — 6360000002 HC RX W HCPCS: Performed by: STUDENT IN AN ORGANIZED HEALTH CARE EDUCATION/TRAINING PROGRAM

## 2024-08-17 PROCEDURE — 2700000000 HC OXYGEN THERAPY PER DAY

## 2024-08-17 PROCEDURE — 80048 BASIC METABOLIC PNL TOTAL CA: CPT

## 2024-08-17 PROCEDURE — 83605 ASSAY OF LACTIC ACID: CPT

## 2024-08-17 PROCEDURE — 94640 AIRWAY INHALATION TREATMENT: CPT

## 2024-08-17 PROCEDURE — 82803 BLOOD GASES ANY COMBINATION: CPT

## 2024-08-17 PROCEDURE — 99291 CRITICAL CARE FIRST HOUR: CPT | Performed by: INTERNAL MEDICINE

## 2024-08-17 PROCEDURE — 36415 COLL VENOUS BLD VENIPUNCTURE: CPT

## 2024-08-17 PROCEDURE — 99233 SBSQ HOSP IP/OBS HIGH 50: CPT | Performed by: INTERNAL MEDICINE

## 2024-08-17 RX ORDER — INSULIN LISPRO 100 [IU]/ML
0-4 INJECTION, SOLUTION INTRAVENOUS; SUBCUTANEOUS NIGHTLY
Status: DISCONTINUED | OUTPATIENT
Start: 2024-08-17 | End: 2024-08-20 | Stop reason: HOSPADM

## 2024-08-17 RX ORDER — DEXTROSE MONOHYDRATE 100 MG/ML
INJECTION, SOLUTION INTRAVENOUS CONTINUOUS PRN
Status: DISCONTINUED | OUTPATIENT
Start: 2024-08-17 | End: 2024-08-17 | Stop reason: SDUPTHER

## 2024-08-17 RX ORDER — GLUCAGON 1 MG/ML
1 KIT INJECTION PRN
Status: DISCONTINUED | OUTPATIENT
Start: 2024-08-17 | End: 2024-08-20 | Stop reason: HOSPADM

## 2024-08-17 RX ORDER — LEVOFLOXACIN 5 MG/ML
500 INJECTION, SOLUTION INTRAVENOUS EVERY 24 HOURS
Status: DISCONTINUED | OUTPATIENT
Start: 2024-08-17 | End: 2024-08-18

## 2024-08-17 RX ORDER — ALBUTEROL SULFATE 0.83 MG/ML
2.5 SOLUTION RESPIRATORY (INHALATION)
Status: DISCONTINUED | OUTPATIENT
Start: 2024-08-17 | End: 2024-08-20 | Stop reason: HOSPADM

## 2024-08-17 RX ORDER — DEXTROSE MONOHYDRATE 100 MG/ML
INJECTION, SOLUTION INTRAVENOUS CONTINUOUS PRN
Status: DISCONTINUED | OUTPATIENT
Start: 2024-08-17 | End: 2024-08-20 | Stop reason: HOSPADM

## 2024-08-17 RX ORDER — SCOLOPAMINE TRANSDERMAL SYSTEM 1 MG/1
1 PATCH, EXTENDED RELEASE TRANSDERMAL
COMMUNITY

## 2024-08-17 RX ORDER — SODIUM CHLORIDE, SODIUM LACTATE, POTASSIUM CHLORIDE, CALCIUM CHLORIDE 600; 310; 30; 20 MG/100ML; MG/100ML; MG/100ML; MG/100ML
INJECTION, SOLUTION INTRAVENOUS CONTINUOUS
Status: ACTIVE | OUTPATIENT
Start: 2024-08-17 | End: 2024-08-17

## 2024-08-17 RX ORDER — INSULIN LISPRO 100 [IU]/ML
0-4 INJECTION, SOLUTION INTRAVENOUS; SUBCUTANEOUS
Status: DISCONTINUED | OUTPATIENT
Start: 2024-08-17 | End: 2024-08-20 | Stop reason: HOSPADM

## 2024-08-17 RX ORDER — MICONAZOLE NITRATE, ZINC OXIDE, WHITE PETROLATUM 2.5; 150; 813.5 MG/G; MG/G; MG/G
OINTMENT TOPICAL 2 TIMES DAILY
COMMUNITY

## 2024-08-17 RX ORDER — LORAZEPAM 2 MG/ML
1 INJECTION INTRAMUSCULAR EVERY 4 HOURS PRN
Status: DISCONTINUED | OUTPATIENT
Start: 2024-08-17 | End: 2024-08-18

## 2024-08-17 RX ORDER — GLUCAGON 1 MG/ML
1 KIT INJECTION PRN
Status: DISCONTINUED | OUTPATIENT
Start: 2024-08-17 | End: 2024-08-17 | Stop reason: SDUPTHER

## 2024-08-17 RX ORDER — IPRATROPIUM BROMIDE AND ALBUTEROL SULFATE 2.5; .5 MG/3ML; MG/3ML
1 SOLUTION RESPIRATORY (INHALATION)
Status: DISCONTINUED | OUTPATIENT
Start: 2024-08-17 | End: 2024-08-20 | Stop reason: HOSPADM

## 2024-08-17 RX ORDER — TRAMADOL HYDROCHLORIDE 50 MG/1
50 TABLET ORAL EVERY 6 HOURS PRN
Status: DISCONTINUED | OUTPATIENT
Start: 2024-08-17 | End: 2024-08-20 | Stop reason: HOSPADM

## 2024-08-17 RX ADMIN — SODIUM CHLORIDE, POTASSIUM CHLORIDE, SODIUM LACTATE AND CALCIUM CHLORIDE: 600; 310; 30; 20 INJECTION, SOLUTION INTRAVENOUS at 00:39

## 2024-08-17 RX ADMIN — TRAMADOL HYDROCHLORIDE 50 MG: 50 TABLET ORAL at 21:44

## 2024-08-17 RX ADMIN — IPRATROPIUM BROMIDE AND ALBUTEROL SULFATE 1 DOSE: 2.5; .5 SOLUTION RESPIRATORY (INHALATION) at 02:59

## 2024-08-17 RX ADMIN — IPRATROPIUM BROMIDE AND ALBUTEROL SULFATE 1 DOSE: 2.5; .5 SOLUTION RESPIRATORY (INHALATION) at 07:32

## 2024-08-17 RX ADMIN — LEVOFLOXACIN 500 MG: 5 INJECTION, SOLUTION INTRAVENOUS at 11:54

## 2024-08-17 RX ADMIN — ATORVASTATIN CALCIUM 40 MG: 40 TABLET, FILM COATED ORAL at 20:45

## 2024-08-17 RX ADMIN — ENOXAPARIN SODIUM 40 MG: 100 INJECTION SUBCUTANEOUS at 09:58

## 2024-08-17 RX ADMIN — METOPROLOL SUCCINATE 25 MG: 25 TABLET, EXTENDED RELEASE ORAL at 09:57

## 2024-08-17 RX ADMIN — POTASSIUM CHLORIDE 10 MEQ: 7.46 INJECTION, SOLUTION INTRAVENOUS at 16:52

## 2024-08-17 RX ADMIN — BUSPIRONE HYDROCHLORIDE 10 MG: 10 TABLET ORAL at 09:57

## 2024-08-17 RX ADMIN — MORPHINE SULFATE 4 MG: 4 INJECTION, SOLUTION INTRAMUSCULAR; INTRAVENOUS at 16:03

## 2024-08-17 RX ADMIN — POTASSIUM PHOSPHATE, MONOBASIC POTASSIUM PHOSPHATE, DIBASIC 30 MMOL: 224; 236 INJECTION, SOLUTION, CONCENTRATE INTRAVENOUS at 11:08

## 2024-08-17 RX ADMIN — SODIUM CHLORIDE, PRESERVATIVE FREE 10 ML: 5 INJECTION INTRAVENOUS at 20:44

## 2024-08-17 RX ADMIN — BUSPIRONE HYDROCHLORIDE 10 MG: 10 TABLET ORAL at 20:45

## 2024-08-17 RX ADMIN — MORPHINE SULFATE 4 MG: 4 INJECTION, SOLUTION INTRAMUSCULAR; INTRAVENOUS at 20:43

## 2024-08-17 RX ADMIN — LORAZEPAM 1 MG: 2 INJECTION INTRAMUSCULAR; INTRAVENOUS at 20:51

## 2024-08-17 RX ADMIN — POTASSIUM CHLORIDE 10 MEQ: 7.46 INJECTION, SOLUTION INTRAVENOUS at 14:00

## 2024-08-17 RX ADMIN — MORPHINE SULFATE 4 MG: 4 INJECTION, SOLUTION INTRAMUSCULAR; INTRAVENOUS at 23:11

## 2024-08-17 RX ADMIN — POTASSIUM CHLORIDE 10 MEQ: 7.46 INJECTION, SOLUTION INTRAVENOUS at 15:33

## 2024-08-17 RX ADMIN — IPRATROPIUM BROMIDE AND ALBUTEROL SULFATE 1 DOSE: 2.5; .5 SOLUTION RESPIRATORY (INHALATION) at 19:35

## 2024-08-17 RX ADMIN — LORAZEPAM 1 MG: 2 INJECTION INTRAMUSCULAR; INTRAVENOUS at 16:49

## 2024-08-17 RX ADMIN — METHYLPREDNISOLONE SODIUM SUCCINATE 500 MG: 500 INJECTION INTRAMUSCULAR; INTRAVENOUS at 00:07

## 2024-08-17 RX ADMIN — INSULIN LISPRO 4 UNITS: 100 INJECTION, SOLUTION INTRAVENOUS; SUBCUTANEOUS at 01:54

## 2024-08-17 RX ADMIN — BUSPIRONE HYDROCHLORIDE 10 MG: 10 TABLET ORAL at 14:00

## 2024-08-17 RX ADMIN — IPRATROPIUM BROMIDE AND ALBUTEROL SULFATE 1 DOSE: 2.5; .5 SOLUTION RESPIRATORY (INHALATION) at 14:58

## 2024-08-17 RX ADMIN — POTASSIUM CHLORIDE 10 MEQ: 7.46 INJECTION, SOLUTION INTRAVENOUS at 09:53

## 2024-08-17 RX ADMIN — FUROSEMIDE 20 MG: 20 TABLET ORAL at 09:56

## 2024-08-17 RX ADMIN — MORPHINE SULFATE 2 MG: 2 INJECTION, SOLUTION INTRAMUSCULAR; INTRAVENOUS at 14:00

## 2024-08-17 RX ADMIN — POTASSIUM CHLORIDE 10 MEQ: 7.46 INJECTION, SOLUTION INTRAVENOUS at 12:48

## 2024-08-17 RX ADMIN — SODIUM CHLORIDE, PRESERVATIVE FREE 10 ML: 5 INJECTION INTRAVENOUS at 09:58

## 2024-08-17 RX ADMIN — TRAMADOL HYDROCHLORIDE 50 MG: 50 TABLET ORAL at 11:08

## 2024-08-17 RX ADMIN — IPRATROPIUM BROMIDE AND ALBUTEROL SULFATE 1 DOSE: 2.5; .5 SOLUTION RESPIRATORY (INHALATION) at 23:17

## 2024-08-17 RX ADMIN — ACETAMINOPHEN 650 MG: 325 TABLET ORAL at 09:55

## 2024-08-17 RX ADMIN — POTASSIUM CHLORIDE 10 MEQ: 7.46 INJECTION, SOLUTION INTRAVENOUS at 11:05

## 2024-08-17 RX ADMIN — LISINOPRIL 5 MG: 5 TABLET ORAL at 09:57

## 2024-08-17 ASSESSMENT — PULMONARY FUNCTION TESTS
PIF_VALUE: 21
PIF_VALUE: 30
PIF_VALUE: 22
PIF_VALUE: 17
PIF_VALUE: 22

## 2024-08-17 ASSESSMENT — PAIN DESCRIPTION - LOCATION
LOCATION: RIB CAGE;SHOULDER
LOCATION: SHOULDER
LOCATION: HEAD
LOCATION: HEAD

## 2024-08-17 ASSESSMENT — PAIN DESCRIPTION - ORIENTATION
ORIENTATION: RIGHT
ORIENTATION: RIGHT
ORIENTATION: LEFT
ORIENTATION: MID
ORIENTATION: RIGHT
ORIENTATION: MID

## 2024-08-17 ASSESSMENT — PAIN SCALES - GENERAL
PAINLEVEL_OUTOF10: 5
PAINLEVEL_OUTOF10: 8
PAINLEVEL_OUTOF10: 7
PAINLEVEL_OUTOF10: 10
PAINLEVEL_OUTOF10: 7
PAINLEVEL_OUTOF10: 8

## 2024-08-17 ASSESSMENT — PAIN DESCRIPTION - DESCRIPTORS
DESCRIPTORS: ACHING

## 2024-08-17 NOTE — PROGRESS NOTES
4 Eyes Skin Assessment     NAME:  Annie Tyler  YOB: 1955  MEDICAL RECORD NUMBER:  [id number]    The patient is being assessed for  Admission    I agree that at least one RN has performed a thorough Head to Toe Skin Assessment on the patient. ALL assessment sites listed below have been assessed.      Areas assessed by both nurses:    Head, Face, Ears, Shoulders, Back, Chest, Arms, Elbows, Hands, Sacrum. Buttock, Coccyx, Ischium, Legs. Feet and Heels, and Under Medical Devices         Does the Patient have a Wound? No noted wound(s)       William Prevention initiated by RN: Yes  Wound Care Orders initiated by RN: No    Pressure Injury (Stage 3,4, Unstageable, DTI, NWPT, and Complex wounds) if present, place Wound referral order by RN under : No    New Ostomies, if present place, Ostomy referral order under : No     Nurse 1 eSignature: Electronically signed by Garima Redmond RN on 8/17/24 at 5:26 AM EDT    **SHARE this note so that the co-signing nurse can place an eSignature**    Nurse 2 eSignature: Electronically signed by Darcie Nevarez RN on 8/17/24 at 5:27 AM EDT   Patient is not able to demonstrate the ability to move from a reclining position to an upright position within the recliner due to  ., is not able to demonstrated the ability to move from a reclining position to an upright position within the recliner. however patient is alert, oriented and able to provide informed consent, and is not able to demonstrated the ability to move from a reclining position to an upright position within the recliner. Patient is confused, demented and /or unable to follow instruction.

## 2024-08-17 NOTE — FLOWSHEET NOTE
08/17/24 0800   Vitals   Temp 98.7 °F (37.1 °C)   Temp Source Temporal   Pulse 98   Heart Rate Source Monitor   Respirations 20   /68   MAP (Calculated) 88   MAP (mmHg) 86   BP Method Automatic   Oxygen Therapy   SpO2 95 %   O2 Device Ventilator   FiO2  40 %     Vital signs stable. Pt is alert and oriented X4 and denies any worsening SOB at the moment. Pt with complaints of headache pain. PRN tylenol provided per Pt request. Nothing new noted on head to toe assessment. External urinary catheter remains secure in place. PEG remains secure to abdomen and functioning properly. Pt is NSR/ST on the monitor. Morning medication administration completed via PEG tube. SLP eval pending. Pt and family updated on plan of care.  Pt denies any further assistance at the moment. Will continue to monitor.

## 2024-08-17 NOTE — H&P
V2.0  History and Physical      Name:  Annie Tyler /Age/Sex: 1955  (68 y.o. female)   MRN & CSN:  [id number] & [id number] Encounter Date/Time: 2024 8:46 PM EDT   Location:   PCP: Keyla Mtz MD       Hospital Day: 1    Assessment and Plan:   Annie Tyler is a 68 y.o. female with a pmh of chronic trach dependency COPD hypertension mood disorder chronic diastolic heart failure who presents with COPD exacerbation (HCC)    Hospital Problems             Last Modified POA    * (Principal) COPD exacerbation (HCC) 2024 Yes       Acute hypoxic respiratory failure with underlying chronic trach dependency and COPD likely COPD exacerbation.  My clinical suspicion is more towards suspected aspiration pneumonitis type of picture.  Underlying worsening of COPD exacerbation cannot be ruled out as the patient is wheezing as well.  Started on levofloxacin Solu-Medrol DuoNeb incentive spirometry chest physiotherapy.  To be admitted to the ICU telemetry in place.  PT OT for placement purposes again  Chronic hypoxic respiratory failure trach dependent continue with Solu-Medrol DuoNebs telemetry in place Home medication include. Roflumilast and chronic steroid use which will be continued as IV steroid.  I would recommend avoiding any benzodiazepines.  Use opioids if need be for air hunger and respiratory anxiety  Benign essential hypertension on lisinopril and spironolactone  Mood disorder on buspirone and Lexapro  Chronic diastolic heart failure with preserved ejection fraction on Lasix 20 mg daily  History of Takotsubo cardiomyopathy with mildly elevated troponins.  Last left heart cath was done recently in  without any complications and no occlusive CAD  History of right upper lobe pulm nodule/cancer.  Not a surgical candidate s/p SBRT.  Continue to monitor outpatient  Healing displaced fracture of the proximal left humerus found in 2024.  Patient was placed on a sling and had    Eyes:      Conjunctiva/sclera: Conjunctivae normal.      Pupils: Pupils are equal, round, and reactive to light.   Cardiovascular:      Rate and Rhythm: Normal rate and regular rhythm.      Pulses: Normal pulses.      Heart sounds: Normal heart sounds. No murmur heard.  Pulmonary:      Effort: Respiratory distress present.      Breath sounds: Wheezing present. No rhonchi or rales.      Comments: Chronic trach dependence  Abdominal:      General: Abdomen is flat. Bowel sounds are normal. There is distension.      Palpations: Abdomen is soft.      Tenderness: There is no abdominal tenderness.   Musculoskeletal:         General: No deformity. Normal range of motion.      Cervical back: Normal range of motion and neck supple.      Right lower leg: No edema.      Left lower leg: No edema.   Skin:     Coloration: Skin is not jaundiced or pale.   Neurological:      General: No focal deficit present.      Mental Status: She is alert and oriented to person, place, and time. Mental status is at baseline.      Motor: Weakness present.        Past History:   PMHx   Past Medical History:   Diagnosis Date    Angina pectoris (HCC)     Chronic pain     knees and lower back     Congestive heart failure (HCC) 1/5/2024    COPD exacerbation (HCC) 05/20/2018    Depression     Essential hypertension 08/15/2022    Panic disorder     Pneumonia     Pulmonary emphysema (HCC)         PSHX:  has a past surgical history that includes Cholecystectomy; Colonoscopy; and Cardiac procedure (N/A, 6/27/2024).    Fam HX: family history includes COPD in her mother; Hypertension in her mother.    Soc HX:   Social History     Socioeconomic History    Marital status:      Spouse name: None    Number of children: 6    Years of education: None    Highest education level: None   Tobacco Use    Smoking status: Former     Current packs/day: 0.00     Average packs/day: 2.0 packs/day for 52.0 years (104.0 ttl pk-yrs)     Types: Cigarettes     Start date:  0.6   ALKPHOS 124 146*     Lipids:   Lab Results   Component Value Date/Time    CHOL 180 06/28/2024 04:29 AM    HDL 68 06/28/2024 04:29 AM    HDL 64 05/14/2012 06:55 AM    TRIG 107 06/28/2024 04:29 AM     Hemoglobin A1C:   Lab Results   Component Value Date/Time    LABA1C 5.4 09/15/2017 08:18 AM     TSH:   Lab Results   Component Value Date/Time    TSH 0.11 06/24/2024 01:30 PM     Troponin: No results found for: \"TROPONINT\"  Lactic Acid:   Recent Labs     08/16/24  1838   LACTA 2.7*     BNP:   Recent Labs     08/16/24  1045 08/16/24  1838   PROBNP 162* 296*     UA:  Lab Results   Component Value Date/Time    NITRU Negative 10/05/2023 11:10 PM    COLORU Yellow 10/05/2023 11:10 PM    PHUR 6.5 10/05/2023 11:10 PM    PHUR 6.0 10/05/2023 11:10 PM    WBCUA 3-5 10/05/2023 11:10 PM    RBCUA 3-4 10/05/2023 11:10 PM    BACTERIA 1+ 10/05/2023 11:10 PM    CLARITYU Clear 10/05/2023 11:10 PM    LEUKOCYTESUR Negative 10/05/2023 11:10 PM    UROBILINOGEN 0.2 10/05/2023 11:10 PM    BILIRUBINUR Negative 10/05/2023 11:10 PM    BLOODU TRACE-INTACT 10/05/2023 11:10 PM    GLUCOSEU Negative 10/05/2023 11:10 PM    KETUA Negative 10/05/2023 11:10 PM    AMORPHOUS Rare 05/02/2023 01:11 AM     Urine Cultures:   Lab Results   Component Value Date/Time    LABURIN  05/02/2023 01:11 AM     <50,000 CFU/ml mixed skin/urogenital pj. No further workup     Blood Cultures:   Lab Results   Component Value Date/Time    BC No Growth after 4 days of incubation. 06/24/2024 01:31 PM     Lab Results   Component Value Date/Time    BLOODCULT2 No Growth after 4 days of incubation. 06/24/2024 01:31 PM     Organism:   Lab Results   Component Value Date/Time    ORG Haemophilus influenzae 06/28/2024 10:50 AM    ORG Stenotrophomonas maltophilia 06/28/2024 10:50 AM    ORG Alcaligenes faecalis ssp faecalis 06/28/2024 10:50 AM       Imaging/Diagnostics Last 24 Hours   XR CHEST PORTABLE    Result Date: 8/16/2024  EXAMINATION: ONE XRAY VIEW OF THE CHEST 8/16/2024 5:33 pm  is no pneumothorax, consolidation or effusion.  12 mm spiculated nodule within the right upper lobe is unchanged from prior. Upper Abdomen: Limited images of the upper abdomen are unremarkable. Soft Tissues/Bones: No acute bone or soft tissue abnormality.     1. Negative for pulmonary embolus. 2. Advanced emphysema with an indeterminate 12 mm spiculated right upper lobe pulmonary nodule. RECOMMENDATIONS: The Fleischner society pulmonary nodule recommendations are usually for follow-up and management of pulmonary nodules smaller than 8 mm detected incidentally on non-screening CT. Nodule size greater than 8 mm either low or high risk patients follow-up CTs at around 3, 9, and 24 months dynamic contrast enhanced CT, PET, and/or biopsy Note: newly detected indeterminate nodule in persons 35 years of age or older. low risk patients - minimal or absent history of smoking and or other known risk factors high risk patients - history of smoking or of other known risk factors Based on current guidelines*, a follow-up unenhanced low-dose CT can be obtained at clinical discretion. *Anatoly H, Gio JM, Lew G, Rodrigo CJ, Florencio JR, Johnny DP, et al. Guidelines for Management of Small Pulmonary Nodules Detected on CT Scans: A Statement from the Fleischner Society. Radiology 2005;237:395-400.     XR CHEST PORTABLE    Result Date: 8/16/2024  EXAMINATION: ONE XRAY VIEW OF THE CHEST 8/16/2024 10:43 am COMPARISON: 07/01/2024 radiograph HISTORY: ORDERING SYSTEM PROVIDED HISTORY: shortness of breath, cough TECHNOLOGIST PROVIDED HISTORY: Reason for exam:->shortness of breath, cough Reason for Exam: shortness of breath FINDINGS: Tracheostomy tube present.  Heart and mediastinum are normal.  Diffuse pattern of reticular airspace changes that appear chronic.  On the left, there is a new are pattern of asymmetric ground-glass opacification in the lower lung zone.  No acute skeletal finding.  Remote deformity of the visualized left

## 2024-08-17 NOTE — PROGRESS NOTES
08/17/24 1050   Encounter Summary   Encounter Overview/Reason Spiritual/Emotional Needs   Service Provided For Patient   Referral/Consult From Rounding   Last Encounter  08/17/24  ( visited and prayed with patient)   Assessment/Intervention/Outcome   Assessment Coping   Intervention Prayer (assurance of)/[place]

## 2024-08-17 NOTE — ED NOTES
Patient lying in bed with eyes closed, appears more comfortable. Respirations unlabored and easy but continues with tachypnea

## 2024-08-17 NOTE — PROGRESS NOTES
08/17/24 0300   Patient Observation   Pulse (!) 106   Respirations 27   SpO2 95 %   Breath Sounds   Right Upper Lobe Diminished;Expiratory wheezes   Right Middle Lobe Diminished   Right Lower Lobe Diminished   Left Upper Lobe Diminished;Expiratory wheezes   Left Lower Lobe Diminished   Vent Information   Vent Mode AC/VC   $Ventilation $Subsequent Day   Ventilator Settings   FiO2  40 %   Vt (Set, mL) 400 mL   Resp Rate (Set) 15 bpm   PEEP/CPAP (cmH2O) 5   Vent Patient Data (Readings)   Vt (Measured) 429 mL   Peak Inspiratory Pressure (cmH2O) 22 cmH2O   Rate Measured 27 br/min   Minute Volume (L/min) 11 Liters   Mean Airway Pressure (cmH2O) 10 cmH20   I:E Ratio 1:2.0   Flow Sensitivity 3 L/min   Vent Alarm Settings   High Pressure (cmH2O) 50 cmH2O   Low Minute Volume (lpm) 2.5 L/min   Low Exhaled Vt (ml) 250 mL   RR High (bpm) 50 br/min   Apnea (secs) 20 secs   Additional Respiratoray Assessments   Humidification Source Heated wire   Humidification Temp 37   Circuit Condensation Drained   Ambu Bag With Mask At Bedside Yes   Surgical Airway  Sascha   No placement date or time found.   Present on Admission/Arrival: Yes  Surgical Airway Type: Tracheostomy  Brand: Sascha  Size: 4   Status Secured   Site Assessment Clean;Dry   Ties Assessment Changed;Clean;Dry   Treatment   $Treatment Type $Inhaled Therapy/Meds

## 2024-08-17 NOTE — PROGRESS NOTES
Med rec completed off of \"Order Summary Report\" from [facility] sent to [facility] by ED.

## 2024-08-17 NOTE — PROGRESS NOTES
Page to Dr Lee  pt now in ICU blood sugar 305   pt is not diabetic  however getting steroids awaiting orders

## 2024-08-17 NOTE — PROGRESS NOTES
Pt noted to be intermittently anxious and double stacking breathes, subsequently desaturating to low 80's SPO2. PRN morphine provided for air hunger and page sent out to Dr. Saez for PRN sedation for better ventilator tolerance.

## 2024-08-17 NOTE — PROGRESS NOTES
RT Inhaler-Nebulizer Bronchodilator Protocol Note    There is a bronchodilator order in the chart from a provider indicating to follow the RT Bronchodilator Protocol and there is an “Initiate RT Inhaler-Nebulizer Bronchodilator Protocol” order as well (see protocol at bottom of note).    CXR Findings:  XR CHEST PORTABLE    Result Date: 8/16/2024  Decreased lung volumes compared to the prior study.  There is persistent ill-defined opacity at the left lung base favored to represent atelectasis given the CT with no confluent airspace consolidation in this area.     XR CHEST PORTABLE    Result Date: 8/16/2024  1. New asymmetric ground-glass opacification in the left lower lung zone. Asymmetric edema or developing pneumonitis may be considered. 2. Chronic pattern of reticular airspace changes.       The findings from the last RT Protocol Assessment were as follows:   History Pulmonary Disease: (P) Chronic pulmonary disease  Respiratory Pattern: (P) Mild dyspnea at rest, irregular pattern, or RR 21-25 bpm  Breath Sounds: (P) Slightly diminished and/or crackles  Cough: (P) Weak, productive  Indication for Bronchodilator Therapy: Wheezing associated with pulm disorder  Bronchodilator Assessment Score: (P) 10    Aerosolized bronchodilator medication orders have been revised according to the RT Inhaler-Nebulizer Bronchodilator Protocol below.    Respiratory Therapist to perform RT Therapy Protocol Assessment initially then follow the protocol.  Repeat RT Therapy Protocol Assessment PRN for score 0-3 or on second treatment, BID, and PRN for scores above 3.    No Indications - adjust the frequency to every 6 hours PRN wheezing or bronchospasm, if no treatments needed after 48 hours then discontinue using Per Protocol order mode.     If indication present, adjust the RT bronchodilator orders based on the Bronchodilator Assessment Score as indicated below.  Use Inhaler orders unless patient has one or more of the following: on

## 2024-08-17 NOTE — ED PROVIDER NOTES
[facility] Emergency Department      CHIEF COMPLAINT  Shortness of Breath (Pt was Dc'd back to nursing home around 430p; a/o, responsive; arrives back from nursing home after trach cannula changed; unresponsive; a HR 140s; pt is a trach vent pt at [facility] )      HISTORY OF PRESENT ILLNESS  Annie Tyler is a 68 y.o. female with a history of COPD who is chronically vent dependent with a tracheostomy in place presents in respiratory distress.  She was just seen here earlier today for a COPD exacerbation and was quite stable and discharged back to her nursing home.  She was drinking a diet Pepsi when she was leaving the emergency department via transport earlier.  Apparently as soon as she arrived back at the nursing home she developed respiratory distress.  They could not get her stabilized on the ventilator.  They had transport bring her back to the ER.  They were having significant difficulty bagging the patient and route to the hospital.  She arrives tachycardic, hypertensive and respiratory distress..   No other complaints, modifying factors or associated symptoms.     History obtained from EMS    I have reviewed the following from the nursing documentation.    Past Medical History:   Diagnosis Date    Angina pectoris (HCC)     Chronic pain     knees and lower back     Congestive heart failure (HCC) 1/5/2024    COPD exacerbation (HCC) 05/20/2018    Depression     Essential hypertension 08/15/2022    Panic disorder     Pneumonia     Pulmonary emphysema (HCC)      Past Surgical History:   Procedure Laterality Date    CARDIAC PROCEDURE N/A 6/27/2024    Left and right heart cath / coronary angiography performed by Merary Davis MD at [facility] CARDIAC CATH LAB    CHOLECYSTECTOMY      COLONOSCOPY       Family History   Problem Relation Age of Onset    COPD Mother     Hypertension Mother     Asthma Neg Hx     Cancer Neg Hx     Diabetes Neg Hx     Emphysema Neg Hx     Heart Failure Neg Hx   home.  She was noted to be drinking a diet Pepsi when she left here but she does not recall a choking episode.  I am concerned for potential aspiration.  I did cover with antibiotics but there is no clear sign of infection on her x-ray.  She was given additional albuterol, Xopenex and IV magnesium.  She was already given IV steroids by EMS.  Lactic acid is elevated to 2.7.  She was quite anxious so she was given Ativan which did help her work of breathing significantly.  She has significantly improved.  She will be admitted to the ICU.  Consultation summary: Discussed with Dr. Lee, hospitalist for admission who accepts.    Social determinants of health: None      Labs and imaging reviewed and results discussed with patient. Patient was reassessed as noted above . Plan of care discussed with patient. Patient in agreement with plan.       CLINICAL IMPRESSION  1. COPD exacerbation (HCC)    2. Acute respiratory failure with hypercapnia (HCC)        Blood pressure 117/60, pulse (!) 125, temperature 99.2 °F (37.3 °C), temperature source Rectal, resp. rate (!) 32, SpO2 100%, not currently breastfeeding.    DISPOSITION  Annie Tyler was admitted in stable condition.    I, Carlene Dos Santos MD am the primary clinician of record.    (Please note this note was completed with a voice recognition program.  Efforts were made to edit the dictations but occasionally words are mis-transcribed.)        Carlene Dos Santos MD  08/18/24 0014

## 2024-08-17 NOTE — PROGRESS NOTES
Pt to ICU form Er via stretcher on monitor and vent with this RN and RT. Pt to ICU 5  daughter at bedside

## 2024-08-17 NOTE — PROGRESS NOTES
RT Inhaler-Nebulizer Bronchodilator Protocol Note    There is a bronchodilator order in the chart from a provider indicating to follow the RT Bronchodilator Protocol and there is an “Initiate RT Inhaler-Nebulizer Bronchodilator Protocol” order as well (see protocol at bottom of note).    CXR Findings:  XR CHEST PORTABLE    Result Date: 8/16/2024  Decreased lung volumes compared to the prior study.  There is persistent ill-defined opacity at the left lung base favored to represent atelectasis given the CT with no confluent airspace consolidation in this area.     XR CHEST PORTABLE    Result Date: 8/16/2024  1. New asymmetric ground-glass opacification in the left lower lung zone. Asymmetric edema or developing pneumonitis may be considered. 2. Chronic pattern of reticular airspace changes.       The findings from the last RT Protocol Assessment were as follows:   History Pulmonary Disease: Chronic pulmonary disease  Respiratory Pattern: Mild dyspnea at rest, irregular pattern, or RR 21-25 bpm  Breath Sounds: Slightly diminished and/or crackles  Cough: Strong, productive  Indication for Bronchodilator Therapy: Decreased or absent breath sounds  Bronchodilator Assessment Score: 9    Aerosolized bronchodilator medication orders have been revised according to the RT Inhaler-Nebulizer Bronchodilator Protocol below.    Respiratory Therapist to perform RT Therapy Protocol Assessment initially then follow the protocol.  Repeat RT Therapy Protocol Assessment PRN for score 0-3 or on second treatment, BID, and PRN for scores above 3.    No Indications - adjust the frequency to every 6 hours PRN wheezing or bronchospasm, if no treatments needed after 48 hours then discontinue using Per Protocol order mode.     If indication present, adjust the RT bronchodilator orders based on the Bronchodilator Assessment Score as indicated below.  Use Inhaler orders unless patient has one or more of the following: on home nebulizer, not able

## 2024-08-17 NOTE — PROGRESS NOTES
08/17/24 1935   Patient Observation   Pulse 92   Respirations 25   SpO2 97 %   Breath Sounds   Right Upper Lobe Diminished   Right Middle Lobe Diminished   Right Lower Lobe Diminished   Left Upper Lobe Diminished   Left Lower Lobe Diminished   Vent Information   Vent Mode AC/VC   Ventilator Settings   FiO2  50 %   Vt (Set, mL) 350 mL   Resp Rate (Set) 15 bpm   PEEP/CPAP (cmH2O) 8   Vent Patient Data (Readings)   Vt (Measured) 426 mL   Peak Inspiratory Pressure (cmH2O) 21 cmH2O   Rate Measured 30 br/min   Minute Volume (L/min) 10 Liters   Mean Airway Pressure (cmH2O) 8 cmH20   I:E Ratio 1:2.6   Flow Sensitivity 3 L/min   Vent Alarm Settings   High Pressure (cmH2O) 50 cmH2O   Low Minute Volume (lpm) 2.5 L/min   Low Exhaled Vt (ml) 250 mL   RR High (bpm) 50 br/min   Apnea (secs) 20 secs   Additional Respiratoray Assessments   Humidification Source Heated wire   Humidification Temp 37   Circuit Condensation Drained   Ambu Bag With Mask At Bedside Yes   Airway Clearance   Suction Trach   Suction Device Inline suction catheter   Sputum Amount Moderate   Sputum Color/Odor White   Sputum Consistency Thick   Surgical Airway  Shiley   No placement date or time found.   Present on Admission/Arrival: Yes  Surgical Airway Type: Tracheostomy  Brand: Sascha  Size: 4   Status Secured   Site Assessment Clean;Dry   Ties Assessment Clean;Dry;Intact   Treatment   $Treatment Type $Inhaled Therapy/Meds

## 2024-08-17 NOTE — PROGRESS NOTES
ICU Progress Note    Admit Date:  8/16/2024    Nursing home patient who was just released from a [facility] hospital back to the nursing home-. Sent  back to the hospital with increased shortness of breath   -admitted for acute on chronic respiratory failure; she is vent dependent      Subjective:  Ms. Tyler  seen in the ICU.   she is awake,  she is on vent.    Looks ill. No distress       Objective:   /67   Pulse 89   Temp 99.1 °F (37.3 °C) (Oral)   Resp 19   Wt 58.6 kg (129 lb 3 oz)   SpO2 100%   BMI 22.88 kg/m²     Intake/Output Summary (Last 24 hours) at 8/17/2024 1542  Last data filed at 8/17/2024 0614  Gross per 24 hour   Intake 1647.14 ml   Output 400 ml   Net 1247.14 ml         Physical Exam:  General:  Awake, alert, NAD  Tracheostomy in place on chronic vent  Skin:  Warm and dry  Neck:  JVD absent. Neck supple  Chest: javier  rhonchi.   Cardiovascular:  RRR ,S1S2 normal  Abdomen:  Soft, non tender, non distended, BS +  Extremities:  No edema.  Intact peripheral pulses. Brisk cap refill, < 2 secs  Neuro: non focal      Medications:   Scheduled Meds:   insulin lispro  0-4 Units SubCUTAneous TID WC    insulin lispro  0-4 Units SubCUTAneous Nightly    ipratropium 0.5 mg-albuterol 2.5 mg  1 Dose Inhalation Q4H RT    [START ON 8/18/2024] methylPREDNISolone  40 mg IntraVENous Daily    levofloxacin  500 mg IntraVENous Q24H    potassium phosphate IVPB (PERIPHERAL LINE)  30 mmol IntraVENous Once    atorvastatin  40 mg Oral Nightly    busPIRone  10 mg Oral TID    furosemide  20 mg Oral Daily    lisinopril  5 mg Oral Daily    metoprolol succinate  25 mg Oral Daily    [Held by provider] spironolactone  12.5 mg Oral Daily    sodium chloride flush  5-40 mL IntraVENous 2 times per day    enoxaparin  40 mg SubCUTAneous Daily       Continuous Infusions:   dextrose      sodium chloride         Data:  CBC:   Recent Labs     08/16/24  1045 08/16/24  1838 08/17/24  0318   WBC 13.3* 14.2* 7.8   RBC 3.84* 3.87* 3.22*   HGB  observations,  patient history, and epidemiological information.  Testing was performed using DONYA OSBALDO SARS-CoV-2 and Influenza A/B  nucleic acid assay. This test is a multiplex Real-Time Reverse  Transcriptase Polymerase Chain Reaction (RT-PCR)-based in vitro  diagnostic test intended for the qualitative detection of nucleic  acids from SARS-CoV-2, influenza A, and influenza B in nasopharyngeal  and nasal swab specimens for use under the FDA’s Emergency Use  Authorization (EUA) only.    Patient Fact Sheet:  https://www.fda.gov/media/935570/download  Provider Fact Sheet: https://www.fda.gov/media/020357/download  EUA: https://www.fda.gov/media/966677/download  IFU: https://www.[employer].gov/media/175641/download    Methodology:  RT-PCR          Influenza A NOT DETECTED     Influenza B NOT DETECTED              Radiology  XR CHEST PORTABLE   Final Result   Decreased lung volumes compared to the prior study.  There is persistent   ill-defined opacity at the left lung base favored to represent atelectasis   given the CT with no confluent airspace consolidation in this area.               Assessment:  Principal Problem:    COPD exacerbation (HCC)  Resolved Problems:    * No resolved hospital problems. *      Plan     # Acute on chronic hypoxic respiratory failure with underlying chronic trach dependency and COPD  # COPD exacerbation.    # PNA - suspected aspiration pneumonitis, possible gram posotive infection    - admitted to ICU, pulm consult  - cont abx- levaquin- cont HHN, solumedrol    # Benign essential hypertension   -on lisinopril and spironolactone    # Mood disorder  - on buspirone and Lexapro    # Chronic diastolic heart failure with preserved ejection fraction   -on Lasix 20 mg daily    # History of Takotsubo cardiomyopathy with mildly elevated troponins.    -Last left heart cath was done recently in June without any complications and no occlusive CAD    # History of right upper lobe pulm nodule/cancer.  Not a  surgical candidate s/p SBRT.  Continue to monitor outpatient    # Healing displaced fracture of the proximal left humerus   -found in June 2024.  Patient was placed on a sling and had nonweightbearing restrictions by orthopedic in June.    #  Hypokalemia - replete  # Hypophosphatemia - replete         - Lovenox for DVT prophylaxis       Diet NPO Exceptions are: Ice Chips, Sips of Water with Meds     Full Code         Cara Hutton MD   8/17/2024 3:42 PM

## 2024-08-17 NOTE — PROGRESS NOTES
Dr Jesus salguero informed pt has critical lactis of 4.9 awaiting orders   Bed/Stretcher in lowest position, wheels locked, appropriate side rails in place/Call bell, personal items and telephone in reach/Instruct patient to call for assistance before getting out of bed/chair/stretcher/Non-slip footwear applied when patient is off stretcher/[place] to call system/Physically safe environment - no spills, clutter or unnecessary equipment/Purposeful proactive rounding/Room/bathroom lighting operational, light cord in reach

## 2024-08-17 NOTE — PLAN OF CARE
HEART FAILURE CARE PLAN:    Comorbidities Reviewed: Yes   Patient has a past medical history of Angina pectoris (HCC), Chronic pain, Congestive heart failure (HCC), COPD exacerbation (HCC), Depression, Essential hypertension, Panic disorder, Pneumonia, and Pulmonary emphysema (HCC).     Weights Reviewed: Yes   Admission weight: 58.6 kg (129 lb 3 oz)   Wt Readings from Last 3 Encounters:   08/17/24 58.6 kg (129 lb 3 oz)   08/16/24 64.4 kg (142 lb)   07/31/24 64.4 kg (142 lb)     Intake & Output Reviewed: Yes     Intake/Output Summary (Last 24 hours) at 8/17/2024 0581  Last data filed at 8/16/2024 2149  Gross per 24 hour   Intake 871.18 ml   Output --   Net 871.18 ml       ECHOCARDIOGRAM Reviewed: Yes   Patient's Ejection Fraction (EF) is less than or equal to 40%. Discuss HFrEF Guideline Directed Medical Therapy (GDMT) with Cardiologist or Hospitalist:          Medications Reviewed: Yes   SCHEDULED HOSPITAL MEDICATIONS:   insulin lispro  0-4 Units SubCUTAneous TID WC    insulin lispro  0-4 Units SubCUTAneous Nightly    ipratropium 0.5 mg-albuterol 2.5 mg  1 Dose Inhalation Q4H RT    ipratropium 0.5 mg-albuterol 2.5 mg        albuterol        LORazepam  1 mg IntraVENous Once    atorvastatin  40 mg Oral Nightly    busPIRone  10 mg Oral TID    levofloxacin  750 mg IntraVENous Q24H    furosemide  20 mg Oral Daily    lisinopril  5 mg Oral Daily    metoprolol succinate  25 mg Oral Daily    Roflumilast  500 mcg Oral Daily    spironolactone  12.5 mg Oral Daily    sodium chloride flush  5-40 mL IntraVENous 2 times per day    enoxaparin  40 mg SubCUTAneous Daily     HOME MEDICATIONS:  Prior to Admission medications    Medication Sig Start Date End Date Taking? Authorizing Provider   Miconazole-Zinc Oxide-Petrolat 0.25-15-81.35 % OINT Apply topically 2 times daily Indications: excoration To lionel area   Yes Provider, MD Joey   budesonide (PULMICORT) 0.5 MG/2ML nebulizer suspension Take 2 mLs by nebulization 2 times daily  Indications: Chronic Obstructive Lung Disease   Yes Joey Haney MD   levoFLOXacin (LEVAQUIN) 750 MG tablet Take 1 tablet by mouth daily 8/16/24 8/26/24 Yes Joey Haney MD   atorvastatin (LIPITOR) 40 MG tablet Take 1 tablet by mouth nightly   Yes Joey Haney MD   metoprolol tartrate (LOPRESSOR) 25 MG tablet Take 1 tablet by mouth daily Indications: High Blood Pressure Disorder   Yes Joey Haney MD   predniSONE (DELTASONE) 5 MG tablet Take 1 tablet by mouth daily   Yes Joey Haney MD   spironolactone (ALDACTONE) 25 MG tablet Take 0.5 tablets by mouth daily   Yes Joey Haney MD   traMADol (ULTRAM) 50 MG tablet Take 1 tablet by mouth every 6 hours as needed for Pain (shoulder pain 6-10). Max Daily Amount: 200 mg   Yes Joey Haney MD   Roflumilast (DALIRESP) 500 MCG tablet Take 1 tablet by mouth daily 2/27/24  Yes Chiqui Wolfe MD   busPIRone (BUSPAR) 10 MG tablet Take 1 tablet by mouth 3 times daily 1/26/24  Yes Sebas Rodriguez MD   furosemide (LASIX) 20 MG tablet Take 1 tablet by mouth daily   Yes Joey Haney MD   lisinopril (PRINIVIL;ZESTRIL) 10 MG tablet Take 0.5 tablets by mouth daily   Yes Joey Haney MD   escitalopram (LEXAPRO) 20 MG tablet Take 1 tablet by mouth at bedtime   Yes Joey Haney MD   lidocaine 4 % external patch Place 1 patch onto the skin daily 9/2/22  Yes Cara Hutton MD   scopolamine (TRANSDERM-SCOP) transdermal patch Place 1 patch onto the skin every 72 hours Indications: increased secretions    Joey Haney MD   hydrOXYzine HCl (ATARAX) 10 MG tablet Take 1 tablet by mouth every 6 hours as needed for Anxiety    Joey Haney MD   ipratropium 0.5 mg-albuterol 2.5 mg (DUONEB) 0.5-2.5 (3) MG/3ML SOLN nebulizer solution Inhale 3 mLs into the lungs every 4 hours as needed for Shortness of Breath 12/4/23   Jean Manuel MD   acetaminophen (TYLENOL) 500 MG

## 2024-08-17 NOTE — PROGRESS NOTES
08/16/24 2341   Patient Observation   Pulse (!) 125   Respirations (!) 32   SpO2 100 %   Patient Observations shiley 4   Vent Information   Vent Mode AC/VC   Ventilator Settings   FiO2  (S)  40 %   Vt (Set, mL) 400 mL   Resp Rate (Set) 15 bpm   PEEP/CPAP (cmH2O) 5   Vent Patient Data (Readings)   Vt (Measured) 408 mL   Peak Inspiratory Pressure (cmH2O) 24 cmH2O   Rate Measured 32 br/min   Minute Volume (L/min) 12.7 Liters   Peak Inspiratory Flow (lpm) 65 L/sec   Mean Airway Pressure (cmH2O) 10 cmH20   I:E Ratio 1:2   Flow Sensitivity 3 L/min   Vent Alarm Settings   High Pressure (cmH2O) 50 cmH2O   Low Minute Volume (lpm) 2.5 L/min   Low Exhaled Vt (ml) 250 mL   RR High (bpm) 50 br/min   Apnea (secs) 20 secs   Additional Respiratoray Assessments   Humidification Source Heated wire   Humidification Temp 36.9   Circuit Condensation Drained   Ambu Bag With Mask At Bedside Yes   Airway Clearance   Suction Trach   Suction Device Inline suction catheter   Sputum Amount Scant   Surgical Airway  Sascha   No placement date or time found.   Present on Admission/Arrival: Yes  Surgical Airway Type: Tracheostomy  Brand: Sascha  Size: 4   Status Secured   Site Assessment Clean;Dry   Site Care Dressing applied;Cleansed   Inner Cannula Care Changed/new   Ties Assessment Clean;Dry;Intact;Secure   Spare Trach at Bedside Yes   Spare Trach Tube One Size Smaller at Bedside Yes   Ambu Bag With Mask at Bedside Yes

## 2024-08-17 NOTE — CONSULTS
Reason for referral and CC: SOB, COPD, chronic vent dependent    HISTORY OF PRESENT ILLNESS: 68-year-old female with history of severe end-stage COPD with ventilator dependence and a lung nodule and CHF presented for shortness of breath.  She was initially treated with steroids and DuoNebs in the emergency room and felt better and sent back to nursing home but then she returned with similar symptoms.  SOB improved this morning.    Past Medical History:   Diagnosis Date    Angina pectoris (HCC)     Chronic pain     knees and lower back     Congestive heart failure (HCC) 1/5/2024    COPD exacerbation (HCC) 05/20/2018    Depression     Essential hypertension 08/15/2022    Panic disorder     Pneumonia     Pulmonary emphysema (HCC)      Past Surgical History:   Procedure Laterality Date    CARDIAC PROCEDURE N/A 6/27/2024    Left and right heart cath / coronary angiography performed by Merary Davis MD at [facility] CARDIAC CATH LAB    CHOLECYSTECTOMY      COLONOSCOPY       Family History  family history includes COPD in her mother; Hypertension in her mother.    Social History:  reports that she quit smoking about 3 years ago. Her smoking use included cigarettes. She started smoking about 55 years ago. She has a 104 pack-year smoking history. She has been exposed to tobacco smoke. She has never used smokeless tobacco.   reports that she does not currently use alcohol after a past usage of about 12.0 standard drinks of alcohol per week.    ALLERGIES:  Patient is allergic to cephalosporins, penicillins, and hctz [hydrochlorothiazide].  Continuous Infusions:   dextrose      sodium chloride       Scheduled Meds:   insulin lispro  0-4 Units SubCUTAneous TID WC    insulin lispro  0-4 Units SubCUTAneous Nightly    ipratropium 0.5 mg-albuterol 2.5 mg  1 Dose Inhalation Q4H RT    LORazepam  1 mg IntraVENous Once    atorvastatin  40 mg Oral Nightly    busPIRone  10 mg Oral TID    levofloxacin  750 mg IntraVENous Q24H    furosemide

## 2024-08-17 NOTE — PROGRESS NOTES
RT Inhaler-Nebulizer Bronchodilator Protocol Note    There is a bronchodilator order in the chart from a provider indicating to follow the RT Bronchodilator Protocol and there is an “Initiate RT Inhaler-Nebulizer Bronchodilator Protocol” order as well (see protocol at bottom of note).    CXR Findings:  XR CHEST PORTABLE    Result Date: 8/16/2024  Decreased lung volumes compared to the prior study.  There is persistent ill-defined opacity at the left lung base favored to represent atelectasis given the CT with no confluent airspace consolidation in this area.     XR CHEST PORTABLE    Result Date: 8/16/2024  1. New asymmetric ground-glass opacification in the left lower lung zone. Asymmetric edema or developing pneumonitis may be considered. 2. Chronic pattern of reticular airspace changes.       The findings from the last RT Protocol Assessment were as follows:   History Pulmonary Disease: Chronic pulmonary disease  Respiratory Pattern: Dyspnea on exertion or RR 21-25 bpm  Breath Sounds: Inspiratory and expiratory or bilateral wheezing and/or rhonchi  Cough: Strong, productive  Indication for Bronchodilator Therapy: Wheezing associated with pulm disorder  Bronchodilator Assessment Score: 11    Aerosolized bronchodilator medication orders have been revised according to the RT Inhaler-Nebulizer Bronchodilator Protocol below.    Respiratory Therapist to perform RT Therapy Protocol Assessment initially then follow the protocol.  Repeat RT Therapy Protocol Assessment PRN for score 0-3 or on second treatment, BID, and PRN for scores above 3.    No Indications - adjust the frequency to every 6 hours PRN wheezing or bronchospasm, if no treatments needed after 48 hours then discontinue using Per Protocol order mode.     If indication present, adjust the RT bronchodilator orders based on the Bronchodilator Assessment Score as indicated below.  Use Inhaler orders unless patient has one or more of the following: on home  nebulizer, not able to hold breath for 10 seconds, is not alert and oriented, cannot activate and use MDI correctly, or respiratory rate 25 breaths per minute or more, then use the equivalent nebulizer order(s) with same Frequency and PRN reasons based on the score.  If a patient is on this medication at home then do not decrease Frequency below that used at home.    0-3 - enter or revise RT bronchodilator order(s) to equivalent RT Bronchodilator order with Frequency of every 4 hours PRN for wheezing or increased work of breathing using Per Protocol order mode.        4-6 - enter or revise RT Bronchodilator order(s) to two equivalent RT bronchodilator orders with one order with BID Frequency and one order with Frequency of every 4 hours PRN wheezing or increased work of breathing using Per Protocol order mode.        7-10 - enter or revise RT Bronchodilator order(s) to two equivalent RT bronchodilator orders with one order with TID Frequency and one order with Frequency of every 4 hours PRN wheezing or increased work of breathing using Per Protocol order mode.       11-13 - enter or revise RT Bronchodilator order(s) to one equivalent RT bronchodilator order with QID Frequency and an Albuterol order with Frequency of every 4 hours PRN wheezing or increased work of breathing using Per Protocol order mode.      Greater than 13 - enter or revise RT Bronchodilator order(s) to one equivalent RT bronchodilator order with every 4 hours Frequency and an Albuterol order with Frequency of every 2 hours PRN wheezing or increased work of breathing using Per Protocol order mode.       Electronically signed by Shakeel Kenyon RCP on 8/17/2024 at 2:56 AM

## 2024-08-18 PROBLEM — R78.81 GRAM-NEGATIVE BACTEREMIA: Status: ACTIVE | Noted: 2024-08-18

## 2024-08-18 PROBLEM — Z99.11 DEPENDENT ON VENTILATOR (HCC): Status: ACTIVE | Noted: 2024-08-18

## 2024-08-18 PROBLEM — I50.32 CHRONIC DIASTOLIC CHF (CONGESTIVE HEART FAILURE) (HCC): Status: ACTIVE | Noted: 2024-08-18

## 2024-08-18 LAB
ANION GAP SERPL CALCULATED.3IONS-SCNC: 10 MMOL/L (ref 3–16)
APPEARANCE BRONCH: ABNORMAL
BILIRUB UR QL STRIP.AUTO: NEGATIVE
BUN SERPL-MCNC: 14 MG/DL (ref 7–20)
CALCIUM SERPL-MCNC: 8.8 MG/DL (ref 8.3–10.6)
CHLORIDE SERPL-SCNC: 102 MMOL/L (ref 99–110)
CLARITY UR: CLEAR
CLOT SPEC QL: ABNORMAL
CO2 SERPL-SCNC: 27 MMOL/L (ref 21–32)
COLOR BRONCH: ABNORMAL
COLOR UR: YELLOW
CREAT SERPL-MCNC: <0.5 MG/DL (ref 0.6–1.2)
DEPRECATED RDW RBC AUTO: 16.2 % (ref 12.4–15.4)
GFR SERPLBLD CREATININE-BSD FMLA CKD-EPI: >90 ML/MIN/{1.73_M2}
GLUCOSE BLD-MCNC: 115 MG/DL (ref 70–99)
GLUCOSE BLD-MCNC: 116 MG/DL (ref 70–99)
GLUCOSE BLD-MCNC: 125 MG/DL (ref 70–99)
GLUCOSE BLD-MCNC: 151 MG/DL (ref 70–99)
GLUCOSE SERPL-MCNC: 127 MG/DL (ref 70–99)
GLUCOSE UR STRIP.AUTO-MCNC: NEGATIVE MG/DL
HCT VFR BLD AUTO: 28.6 % (ref 36–48)
HGB BLD-MCNC: 9.3 G/DL (ref 12–16)
HGB UR QL STRIP.AUTO: NEGATIVE
KETONES UR STRIP.AUTO-MCNC: ABNORMAL MG/DL
LEUKOCYTE ESTERASE UR QL STRIP.AUTO: NEGATIVE
MACROPHAGES NFR BRONCH MANUAL: 1 % (ref 90–95)
MAGNESIUM SERPL-MCNC: 2.4 MG/DL (ref 1.8–2.4)
MCH RBC QN AUTO: 28.2 PG (ref 26–34)
MCHC RBC AUTO-ENTMCNC: 32.6 G/DL (ref 31–36)
MCV RBC AUTO: 86.7 FL (ref 80–100)
NEUTROPHILS NFR BRONCH MANUAL: 99 % (ref 5–10)
NITRITE UR QL STRIP.AUTO: NEGATIVE
NUC CELL # BRONCH MANUAL: 5085 /CUMM
PERFORMED ON: ABNORMAL
PH UR STRIP.AUTO: 6 [PH] (ref 5–8)
PHOSPHATE SERPL-MCNC: 3.2 MG/DL (ref 2.5–4.9)
PLATELET # BLD AUTO: 366 K/UL (ref 135–450)
PMV BLD AUTO: 7.2 FL (ref 5–10.5)
POTASSIUM SERPL-SCNC: 5.4 MMOL/L (ref 3.5–5.1)
PROT UR STRIP.AUTO-MCNC: NEGATIVE MG/DL
RBC # BLD AUTO: 3.29 M/UL (ref 4–5.2)
RBC # FLD MANUAL: 5300 /CUMM
REPORT: NORMAL
SODIUM SERPL-SCNC: 139 MMOL/L (ref 136–145)
SP GR UR STRIP.AUTO: >=1.03 (ref 1–1.03)
TOTAL CELLS COUNTED BRONCH: 100
UA DIPSTICK W REFLEX MICRO PNL UR: ABNORMAL
URN SPEC COLLECT METH UR: ABNORMAL
UROBILINOGEN UR STRIP-ACNC: 0.2 E.U./DL
WBC # BLD AUTO: 26.8 K/UL (ref 4–11)

## 2024-08-18 PROCEDURE — 84100 ASSAY OF PHOSPHORUS: CPT

## 2024-08-18 PROCEDURE — 99233 SBSQ HOSP IP/OBS HIGH 50: CPT | Performed by: INTERNAL MEDICINE

## 2024-08-18 PROCEDURE — 2580000003 HC RX 258: Performed by: INTERNAL MEDICINE

## 2024-08-18 PROCEDURE — 94003 VENT MGMT INPAT SUBQ DAY: CPT

## 2024-08-18 PROCEDURE — 99291 CRITICAL CARE FIRST HOUR: CPT | Performed by: INTERNAL MEDICINE

## 2024-08-18 PROCEDURE — 81003 URINALYSIS AUTO W/O SCOPE: CPT

## 2024-08-18 PROCEDURE — 6360000002 HC RX W HCPCS: Performed by: STUDENT IN AN ORGANIZED HEALTH CARE EDUCATION/TRAINING PROGRAM

## 2024-08-18 PROCEDURE — 87205 SMEAR GRAM STAIN: CPT

## 2024-08-18 PROCEDURE — 80048 BASIC METABOLIC PNL TOTAL CA: CPT

## 2024-08-18 PROCEDURE — 94640 AIRWAY INHALATION TREATMENT: CPT

## 2024-08-18 PROCEDURE — 6370000000 HC RX 637 (ALT 250 FOR IP): Performed by: INTERNAL MEDICINE

## 2024-08-18 PROCEDURE — 87077 CULTURE AEROBIC IDENTIFY: CPT

## 2024-08-18 PROCEDURE — 2000000000 HC ICU R&B

## 2024-08-18 PROCEDURE — 2700000000 HC OXYGEN THERAPY PER DAY

## 2024-08-18 PROCEDURE — 6360000002 HC RX W HCPCS: Performed by: INTERNAL MEDICINE

## 2024-08-18 PROCEDURE — 31645 BRNCHSC W/THER ASPIR 1ST: CPT | Performed by: INTERNAL MEDICINE

## 2024-08-18 PROCEDURE — 36415 COLL VENOUS BLD VENIPUNCTURE: CPT

## 2024-08-18 PROCEDURE — 87186 SC STD MICRODIL/AGAR DIL: CPT

## 2024-08-18 PROCEDURE — 85027 COMPLETE CBC AUTOMATED: CPT

## 2024-08-18 PROCEDURE — 6370000000 HC RX 637 (ALT 250 FOR IP): Performed by: STUDENT IN AN ORGANIZED HEALTH CARE EDUCATION/TRAINING PROGRAM

## 2024-08-18 PROCEDURE — 31622 DX BRONCHOSCOPE/WASH: CPT

## 2024-08-18 PROCEDURE — 2580000003 HC RX 258: Performed by: STUDENT IN AN ORGANIZED HEALTH CARE EDUCATION/TRAINING PROGRAM

## 2024-08-18 PROCEDURE — 31624 DX BRONCHOSCOPE/LAVAGE: CPT | Performed by: INTERNAL MEDICINE

## 2024-08-18 PROCEDURE — 89051 BODY FLUID CELL COUNT: CPT

## 2024-08-18 PROCEDURE — 0B9J8ZX DRAINAGE OF LEFT LOWER LUNG LOBE, VIA NATURAL OR ARTIFICIAL OPENING ENDOSCOPIC, DIAGNOSTIC: ICD-10-PCS | Performed by: INTERNAL MEDICINE

## 2024-08-18 PROCEDURE — 87086 URINE CULTURE/COLONY COUNT: CPT

## 2024-08-18 PROCEDURE — 83735 ASSAY OF MAGNESIUM: CPT

## 2024-08-18 PROCEDURE — 87070 CULTURE OTHR SPECIMN AEROBIC: CPT

## 2024-08-18 PROCEDURE — 94761 N-INVAS EAR/PLS OXIMETRY MLT: CPT

## 2024-08-18 RX ORDER — MORPHINE SULFATE 2 MG/ML
2 INJECTION, SOLUTION INTRAMUSCULAR; INTRAVENOUS EVERY 4 HOURS PRN
Status: DISCONTINUED | OUTPATIENT
Start: 2024-08-18 | End: 2024-08-20 | Stop reason: HOSPADM

## 2024-08-18 RX ORDER — LIDOCAINE HYDROCHLORIDE 40 MG/ML
SOLUTION TOPICAL
Status: DISPENSED
Start: 2024-08-18 | End: 2024-08-18

## 2024-08-18 RX ORDER — MORPHINE SULFATE 4 MG/ML
4 INJECTION, SOLUTION INTRAMUSCULAR; INTRAVENOUS
Status: DISCONTINUED | OUTPATIENT
Start: 2024-08-18 | End: 2024-08-18

## 2024-08-18 RX ORDER — CLONAZEPAM 1 MG/1
1 TABLET ORAL EVERY 12 HOURS
Status: DISCONTINUED | OUTPATIENT
Start: 2024-08-18 | End: 2024-08-20 | Stop reason: HOSPADM

## 2024-08-18 RX ORDER — LORAZEPAM 2 MG/ML
0.5 INJECTION INTRAMUSCULAR EVERY 6 HOURS PRN
Status: DISCONTINUED | OUTPATIENT
Start: 2024-08-18 | End: 2024-08-20 | Stop reason: HOSPADM

## 2024-08-18 RX ADMIN — ATORVASTATIN CALCIUM 40 MG: 40 TABLET, FILM COATED ORAL at 20:43

## 2024-08-18 RX ADMIN — LORAZEPAM 1 MG: 2 INJECTION INTRAMUSCULAR; INTRAVENOUS at 01:32

## 2024-08-18 RX ADMIN — LORAZEPAM 1 MG: 2 INJECTION INTRAMUSCULAR; INTRAVENOUS at 06:48

## 2024-08-18 RX ADMIN — BUSPIRONE HYDROCHLORIDE 10 MG: 10 TABLET ORAL at 09:24

## 2024-08-18 RX ADMIN — FUROSEMIDE 20 MG: 20 TABLET ORAL at 09:24

## 2024-08-18 RX ADMIN — WATER 40 MG: 1 INJECTION INTRAMUSCULAR; INTRAVENOUS; SUBCUTANEOUS at 09:24

## 2024-08-18 RX ADMIN — LORAZEPAM 0.5 MG: 2 INJECTION INTRAMUSCULAR; INTRAVENOUS at 19:28

## 2024-08-18 RX ADMIN — IPRATROPIUM BROMIDE AND ALBUTEROL SULFATE 1 DOSE: 2.5; .5 SOLUTION RESPIRATORY (INHALATION) at 03:18

## 2024-08-18 RX ADMIN — MORPHINE SULFATE 4 MG: 4 INJECTION, SOLUTION INTRAMUSCULAR; INTRAVENOUS at 01:37

## 2024-08-18 RX ADMIN — IPRATROPIUM BROMIDE AND ALBUTEROL SULFATE 1 DOSE: 2.5; .5 SOLUTION RESPIRATORY (INHALATION) at 23:18

## 2024-08-18 RX ADMIN — BUSPIRONE HYDROCHLORIDE 10 MG: 10 TABLET ORAL at 15:12

## 2024-08-18 RX ADMIN — IPRATROPIUM BROMIDE AND ALBUTEROL SULFATE 1 DOSE: 2.5; .5 SOLUTION RESPIRATORY (INHALATION) at 16:30

## 2024-08-18 RX ADMIN — MEROPENEM 1000 MG: 1 INJECTION INTRAVENOUS at 10:14

## 2024-08-18 RX ADMIN — TRAMADOL HYDROCHLORIDE 50 MG: 50 TABLET ORAL at 19:28

## 2024-08-18 RX ADMIN — IPRATROPIUM BROMIDE AND ALBUTEROL SULFATE 1 DOSE: 2.5; .5 SOLUTION RESPIRATORY (INHALATION) at 07:58

## 2024-08-18 RX ADMIN — MORPHINE SULFATE 4 MG: 4 INJECTION, SOLUTION INTRAMUSCULAR; INTRAVENOUS at 06:13

## 2024-08-18 RX ADMIN — BUSPIRONE HYDROCHLORIDE 10 MG: 10 TABLET ORAL at 20:43

## 2024-08-18 RX ADMIN — CLONAZEPAM 1 MG: 1 TABLET ORAL at 09:24

## 2024-08-18 RX ADMIN — CLONAZEPAM 1 MG: 1 TABLET ORAL at 20:43

## 2024-08-18 RX ADMIN — MEROPENEM 1000 MG: 1 INJECTION INTRAVENOUS at 17:41

## 2024-08-18 RX ADMIN — IPRATROPIUM BROMIDE AND ALBUTEROL SULFATE 1 DOSE: 2.5; .5 SOLUTION RESPIRATORY (INHALATION) at 11:54

## 2024-08-18 RX ADMIN — IPRATROPIUM BROMIDE AND ALBUTEROL SULFATE 1 DOSE: 2.5; .5 SOLUTION RESPIRATORY (INHALATION) at 19:42

## 2024-08-18 RX ADMIN — ENOXAPARIN SODIUM 40 MG: 100 INJECTION SUBCUTANEOUS at 09:24

## 2024-08-18 RX ADMIN — SODIUM CHLORIDE, PRESERVATIVE FREE 10 ML: 5 INJECTION INTRAVENOUS at 09:25

## 2024-08-18 RX ADMIN — SODIUM CHLORIDE, PRESERVATIVE FREE 10 ML: 5 INJECTION INTRAVENOUS at 20:42

## 2024-08-18 ASSESSMENT — PULMONARY FUNCTION TESTS
PIF_VALUE: 19
PIF_VALUE: 14
PIF_VALUE: 17
PIF_VALUE: 19
PIF_VALUE: 19
PIF_VALUE: 18

## 2024-08-18 ASSESSMENT — PAIN DESCRIPTION - ORIENTATION
ORIENTATION: RIGHT
ORIENTATION: RIGHT

## 2024-08-18 ASSESSMENT — PAIN DESCRIPTION - LOCATION
LOCATION: RIB CAGE;SHOULDER
LOCATION: BACK
LOCATION: RIB CAGE;SHOULDER

## 2024-08-18 ASSESSMENT — PAIN DESCRIPTION - DESCRIPTORS
DESCRIPTORS: ACHING

## 2024-08-18 ASSESSMENT — PAIN SCALES - GENERAL
PAINLEVEL_OUTOF10: 0
PAINLEVEL_OUTOF10: 7
PAINLEVEL_OUTOF10: 7
PAINLEVEL_OUTOF10: 0
PAINLEVEL_OUTOF10: 10

## 2024-08-18 NOTE — PROGRESS NOTES
RT Inhaler-Nebulizer Bronchodilator Protocol Note    There is a bronchodilator order in the chart from a provider indicating to follow the RT Bronchodilator Protocol and there is an “Initiate RT Inhaler-Nebulizer Bronchodilator Protocol” order as well (see protocol at bottom of note).    CXR Findings:  No results found.    The findings from the last RT Protocol Assessment were as follows:   History Pulmonary Disease: Chronic pulmonary disease  Respiratory Pattern: Dyspnea on exertion or RR 21-25 bpm  Breath Sounds: Slightly diminished and/or crackles  Cough: Strong, productive  Indication for Bronchodilator Therapy: Decreased or absent breath sounds  Bronchodilator Assessment Score: 7    Aerosolized bronchodilator medication orders have been revised according to the RT Inhaler-Nebulizer Bronchodilator Protocol below.    Respiratory Therapist to perform RT Therapy Protocol Assessment initially then follow the protocol.  Repeat RT Therapy Protocol Assessment PRN for score 0-3 or on second treatment, BID, and PRN for scores above 3.    No Indications - adjust the frequency to every 6 hours PRN wheezing or bronchospasm, if no treatments needed after 48 hours then discontinue using Per Protocol order mode.     If indication present, adjust the RT bronchodilator orders based on the Bronchodilator Assessment Score as indicated below.  Use Inhaler orders unless patient has one or more of the following: on home nebulizer, not able to hold breath for 10 seconds, is not alert and oriented, cannot activate and use MDI correctly, or respiratory rate 25 breaths per minute or more, then use the equivalent nebulizer order(s) with same Frequency and PRN reasons based on the score.  If a patient is on this medication at home then do not decrease Frequency below that used at home.    0-3 - enter or revise RT bronchodilator order(s) to equivalent RT Bronchodilator order with Frequency of every 4 hours PRN for wheezing or increased work  of breathing using Per Protocol order mode.        4-6 - enter or revise RT Bronchodilator order(s) to two equivalent RT bronchodilator orders with one order with BID Frequency and one order with Frequency of every 4 hours PRN wheezing or increased work of breathing using Per Protocol order mode.        7-10 - enter or revise RT Bronchodilator order(s) to two equivalent RT bronchodilator orders with one order with TID Frequency and one order with Frequency of every 4 hours PRN wheezing or increased work of breathing using Per Protocol order mode.       11-13 - enter or revise RT Bronchodilator order(s) to one equivalent RT bronchodilator order with QID Frequency and an Albuterol order with Frequency of every 4 hours PRN wheezing or increased work of breathing using Per Protocol order mode.      Greater than 13 - enter or revise RT Bronchodilator order(s) to one equivalent RT bronchodilator order with every 4 hours Frequency and an Albuterol order with Frequency of every 2 hours PRN wheezing or increased work of breathing using Per Protocol order mode.     Electronically signed by Shakeel Kenyon RCP on 8/18/2024 at 7:44 PM

## 2024-08-18 NOTE — PROGRESS NOTES
Pt has been anxious through the night. Administered several doses of ativan and morphine.     0640 Pt was bathed and got anxious. Meds were given. Trach was a little positional while turning.      0715 Shift change, bedside report given to Beau GALVEZ. Pt exhibits no s/s of distress. Call light in reach. Care has been transferred at this time.

## 2024-08-18 NOTE — PROGRESS NOTES
Patient visibly perspiration around her face and a little distressful on breathes complaining of back pain. Giving ordered PRN's.

## 2024-08-18 NOTE — PROGRESS NOTES
08/18/24 0319   Patient Observation   Pulse 88   Respirations 22   SpO2 94 %   Breath Sounds   Right Upper Lobe Diminished   Right Middle Lobe Diminished   Right Lower Lobe Diminished   Left Upper Lobe Diminished   Left Lower Lobe Diminished   Vent Information   Vent Mode AC/VC   $Ventilation $Subsequent Day   Ventilator Settings   FiO2  50 %   Vt (Set, mL) 350 mL   Resp Rate (Set) 15 bpm   PEEP/CPAP (cmH2O) 8   Vent Patient Data (Readings)   Vt (Measured) 413 mL   Peak Inspiratory Pressure (cmH2O) 17 cmH2O   Rate Measured 24 br/min   Minute Volume (L/min) 9 Liters   Mean Airway Pressure (cmH2O) 9.2 cmH20   I:E Ratio 1:2.3   Flow Sensitivity 3 L/min   Vent Alarm Settings   High Pressure (cmH2O) 50 cmH2O   Low Minute Volume (lpm) 2.5 L/min   Low Exhaled Vt (ml) 250 mL   RR High (bpm) 50 br/min   Apnea (secs) 20 secs   Additional Respiratoray Assessments   Humidification Source Heated wire   Humidification Temp 36.7   Circuit Condensation Drained   Ambu Bag With Mask At Bedside Yes   Surgical Airway  Sascha   No placement date or time found.   Present on Admission/Arrival: Yes  Surgical Airway Type: Tracheostomy  Brand: Sascha  Size: 4   Status Secured   Site Assessment Dry;Clean   Ties Assessment Clean;Dry;Intact   Treatment   $Treatment Type $Inhaled Therapy/Meds

## 2024-08-18 NOTE — PROGRESS NOTES
Pulmonary & Critical Care Medicine ICU Progress Note    CC: COPD, ventilator dependent    Events of Last 24 hours: a lot of anxiety yesterday and trouble with vent synchrony  GNR in blood. leukocytosis  Invasive Lines: PIV    MV:  chronic  Vent Mode: AC/VC Resp Rate (Set): 15 bpm/Vt (Set, mL): 350 mL/ /FiO2 : 50 %  Recent Labs     08/16/24  1838 08/17/24  0939   PHART 7.248* 7.453*   CPP4FMW 66.5* 43.3   PO2ART 74.9* 67.6*       IV:   dextrose      sodium chloride         Vitals:  BP 96/64   Pulse 93   Temp 98.6 °F (37 °C)   Resp 24   Wt 58.6 kg (129 lb 3 oz)   SpO2 98%   BMI 22.88 kg/m²  on vent  Constitutional:  No acute distress.   Eyes: PERRL. Conjunctivae anicteric.   ENT: Normal nose. Trach in place   Neck:  Trachea is midline.   Respiratory: No accessory muscle usage.  Decreased breath sounds. No wheezes. No rales. No Rhonchi.  Cardiovascular: Normal S1S2. No digit clubbing. No digit cyanosis. No LE edema.   Psychiatric: + anxiety or Agitation. Alert and Oriented to person, place and time.     Scheduled Meds:   clonazePAM  1 mg Oral Q12H    insulin lispro  0-4 Units SubCUTAneous TID WC    insulin lispro  0-4 Units SubCUTAneous Nightly    ipratropium 0.5 mg-albuterol 2.5 mg  1 Dose Inhalation Q4H RT    methylPREDNISolone  40 mg IntraVENous Daily    levofloxacin  500 mg IntraVENous Q24H    atorvastatin  40 mg Oral Nightly    busPIRone  10 mg Oral TID    furosemide  20 mg Oral Daily    lisinopril  5 mg Oral Daily    metoprolol succinate  25 mg Oral Daily    [Held by provider] spironolactone  12.5 mg Oral Daily    sodium chloride flush  5-40 mL IntraVENous 2 times per day    enoxaparin  40 mg SubCUTAneous Daily     PRN Meds:  glucose, dextrose bolus **OR** dextrose bolus, glucagon (rDNA), dextrose, albuterol, traMADol, LORazepam, morphine **OR** morphine, sodium chloride flush, sodium chloride, potassium chloride **OR** potassium alternative oral replacement **OR** potassium chloride, magnesium sulfate,

## 2024-08-18 NOTE — PROGRESS NOTES
ICU Progress Note    Admit Date:  8/16/2024    Recent admission noted and sent to ECF  Sent  back to the hospital with increased shortness of breath   -admitted for acute on chronic respiratory failure; she is vent dependent         issues with anxiety and drop in spo2 overnight noted    Subjective:  Ms. Tyler  seen in the ICU on vent support, drowsy but arousable on vent      Objective:   BP 94/62   Pulse (!) 103   Temp 98.6 °F (37 °C)   Resp 27   Wt 58.6 kg (129 lb 3 oz)   SpO2 95%   BMI 22.88 kg/m²     Intake/Output Summary (Last 24 hours) at 8/18/2024 0735  Last data filed at 8/17/2024 2330  Gross per 24 hour   Intake --   Output 200 ml   Net -200 ml         Physical Exam:        General: elderly female arousable on vent drowsy  Chronic tracheostomy noted  No distress    Mucous Membranes:  Pink , anicteric  Neck: No JVD, no carotid bruit, no thyromegaly  Chest: bilateral air entry with scattered basilar wheeze  Cardiovascular:  RRR S1S2 heard, no murmurs or gallops  Abdomen:  Soft, undistended, PEG +  non tender, no organomegaly, BS present  Extremities: No edema or cyanosis. Distal pulses well felt  Neurological : grossly normal on vent when aroused        Medications:   Scheduled Meds:   insulin lispro  0-4 Units SubCUTAneous TID WC    insulin lispro  0-4 Units SubCUTAneous Nightly    ipratropium 0.5 mg-albuterol 2.5 mg  1 Dose Inhalation Q4H RT    methylPREDNISolone  40 mg IntraVENous Daily    levofloxacin  500 mg IntraVENous Q24H    atorvastatin  40 mg Oral Nightly    busPIRone  10 mg Oral TID    furosemide  20 mg Oral Daily    lisinopril  5 mg Oral Daily    metoprolol succinate  25 mg Oral Daily    [Held by provider] spironolactone  12.5 mg Oral Daily    sodium chloride flush  5-40 mL IntraVENous 2 times per day    enoxaparin  40 mg SubCUTAneous Daily       Continuous Infusions:   dextrose      sodium chloride         Data:  CBC:   Recent Labs     08/16/24  1838 08/17/24  0318 08/18/24  0433   WBC  old please draw pediatric bottle.~Blood Culture 1    Culture, Blood, PCR ID Panel [[id number]] Collected: 08/16/24 1910    Order Status: Completed Updated: 08/18/24 0838     Report SEE IMAGE    Narrative:      CALL  Anna  [facility] tel. [phone],  Microbiology results called to and read back by SAMAN PAINTER RX, 08/18/2024  08:36, by ORIN  Microbiology results called to and read back by SHELIA CHRISTENSEN RN, 08/18/2024  08:34, by ORIN    COVID-19 & Influenza Combo [[id number]] Collected: 08/16/24 1127    Order Status: Completed Specimen: Nasopharyngeal Swab Updated: 08/16/24 1153     SARS-CoV-2 RNA, RT PCR NOT DETECTED     Comment: Not Detected results do not preclude SARS-CoV-2 infection and  should not be used as the sole basis for patient management  decisions.  Results must be combined with clinical observations,  patient history, and epidemiological information.  Testing was performed using DONYA OSBALDO SARS-CoV-2 and Influenza A/B  nucleic acid assay. This test is a multiplex Real-Time Reverse  Transcriptase Polymerase Chain Reaction (RT-PCR)-based in vitro  diagnostic test intended for the qualitative detection of nucleic  acids from SARS-CoV-2, influenza A, and influenza B in nasopharyngeal  and nasal swab specimens for use under the FDA’s Emergency Use  Authorization (EUA) only.    Patient Fact Sheet:  https://www.fda.gov/media/054696/download  Provider Fact Sheet: https://www.fda.gov/media/624004/download  EUA: https://www.fda.gov/media/146479/download  IFU: https://www.fda.gov/media/165484/download    Methodology:  RT-PCR          Influenza A NOT DETECTED     Influenza B NOT DETECTED              Radiology  XR CHEST PORTABLE   Final Result   Decreased lung volumes compared to the prior study.  There is persistent   ill-defined opacity at the left lung base favored to represent atelectasis   given the CT with no confluent airspace consolidation in this area.               Assessment:  Principal Problem:    COPD  exacerbation (HCC)  Active Problems:    Pneumonia due to infectious organism  Resolved Problems:    * No resolved hospital problems. *      Plan     # Acute on chronic hypoxic respiratory failure with underlying chronic trach dependency   # COPD exacerbation.    # PNA - suspected aspiration pneumonitis, possible gram neg  infection    - admitted to ICU, pulm consulted- continued vent support and adjusted as needed to maintain spo2 >92  - imaging with persistent ill-defined opacity at the left lung base favored to represent atelectasis   - sputum cx pending , blood with gram neg rods and high wbc today   - change levaquin to cefepime   -   cont HHN, consider to wean solumedrol      Gram neg bacteremia - source unclear- check UA and culture  Cefepime as above      # History of right upper lobe pulm nodule/cancer.  Not a surgical candidate s/p SBRT.  Continue to monitor outpatient      # History of Takotsubo cardiomyopathy with mildly elevated troponins.    -Last left heart cath was done recently in June without any complications and no occlusive CAD      # Healing displaced fracture of the proximal left humerus   -found in June 2024.  Patient was placed on a sling and had nonweightbearing restrictions       # Mood disorder  - on buspirone and Lexapro    # Chronic diastolic heart failure with preserved ejection fraction   -on Lasix 20 mg daily as needed  - hold aldactone /ACEi for high K    #  Hypokalemia - repleted  # Hypophosphatemia - repleted         - Lovenox for DVT prophylaxis       Diet NPO Exceptions are: Ice Chips, Sips of Water with Meds  Resume TF   Full Code         Byron Faulkner MD   8/18/2024 7:35 AM

## 2024-08-18 NOTE — PROGRESS NOTES
Speech Language Pathology  Attempt Note     Name: Annie Tyler  : 1955  Medical Diagnosis: COPD exacerbation (HCC) [J44.1]      SLP attempted to see pt for bedside swallow evaluation (BSE) x2 this date. Order acknowledged and chart reviewed for completion of eval. Evaluation unable to be completed due to RN reporting persistent somnolence this shift. SLP to re-attempt as pt's condition and schedule allows. RN aware. No charges.    Thank you,    Debra Ivory M.S. [facility]-SLP #[id number]  Speech Language Pathologist

## 2024-08-18 NOTE — PROGRESS NOTES
08/17/24 2317   Patient Observation   Pulse 90   Respirations 28   SpO2 95 %   Breath Sounds   Right Upper Lobe Diminished   Right Middle Lobe Diminished   Right Lower Lobe Diminished   Left Upper Lobe Diminished   Left Lower Lobe Diminished   Vent Information   Vent Mode AC/VC   Ventilator Settings   FiO2  50 %   Vt (Set, mL) 350 mL   Resp Rate (Set) 15 bpm   PEEP/CPAP (cmH2O) 8   Vent Patient Data (Readings)   Vt (Measured) 455 mL   Peak Inspiratory Pressure (cmH2O) 17 cmH2O   Rate Measured 30 br/min   Minute Volume (L/min) 11.6 Liters   Mean Airway Pressure (cmH2O) 9 cmH20   I:E Ratio 1:2.1   Flow Sensitivity 3 L/min   Vent Alarm Settings   High Pressure (cmH2O) 50 cmH2O   Low Minute Volume (lpm) 2.5 L/min   Low Exhaled Vt (ml) 250 mL   RR High (bpm) 50 br/min   Apnea (secs) 20 secs   Additional Respiratoray Assessments   Humidification Source Heated wire   Humidification Temp 36.9   Circuit Condensation Drained   Ambu Bag With Mask At Bedside Yes   Airway Clearance   Suction Trach   Suction Device Inline suction catheter   Sputum Amount Small   Sputum Color/Odor White   Sputum Consistency Thick   Surgical Airway  Shiley   No placement date or time found.   Present on Admission/Arrival: Yes  Surgical Airway Type: Tracheostomy  Brand: Shialex  Size: 4   Status Secured   Site Assessment Clean;Dry   Site Care Cleansed;Dried;Dressing applied   Inner Cannula Care Changed/new   Ties Assessment Clean;Dry;Intact   Treatment   $Treatment Type $Inhaled Therapy/Meds

## 2024-08-18 NOTE — PROGRESS NOTES
08/18/24 1942   Patient Observation   Pulse 100   Respirations 28   SpO2 93 %   Breath Sounds   Right Upper Lobe Diminished   Right Middle Lobe Diminished   Right Lower Lobe Diminished   Left Upper Lobe Diminished   Left Lower Lobe Diminished   Vent Information   Vent Mode AC/VC   Ventilator Settings   FiO2  50 %   Vt (Set, mL) 400 mL   Resp Rate (Set) 15 bpm   PEEP/CPAP (cmH2O) 5   Vent Patient Data (Readings)   Vt (Measured) 410 mL   Peak Inspiratory Pressure (cmH2O) 18 cmH2O   Rate Measured 25 br/min   Minute Volume (L/min) 12 Liters   Peak Inspiratory Flow (lpm) 60 L/sec   Mean Airway Pressure (cmH2O) 8.2 cmH20   I:E Ratio 1:3.9   Flow Sensitivity 3 L/min   Vent Alarm Settings   High Pressure (cmH2O) 50 cmH2O   Low Minute Volume (lpm) 2.5 L/min   Low Exhaled Vt (ml) 250 mL   RR High (bpm) 50 br/min   Apnea (secs) 20 secs   Airway Clearance   Suction Trach   Suction Device Inline suction catheter   Sputum Method Obtained Tracheal   Sputum Amount Moderate   Sputum Color/Odor White   Sputum Consistency Thick   Surgical Airway  Sascha   No placement date or time found.   Present on Admission/Arrival: Yes  Surgical Airway Type: Tracheostomy  Brand: Sascha  Size: 4   Status Secured   Site Assessment Clean;Dry   Ties Assessment Clean;Dry;Intact   Treatment   $Treatment Type $Inhaled Therapy/Meds

## 2024-08-18 NOTE — FLOWSHEET NOTE
08/18/24 0800   Vitals   Temp 98.3 °F (36.8 °C)   Temp Source Axillary   Pulse 93   Respirations 24   BP 96/64   MAP (Calculated) 75   MAP (mmHg) 76   Pain Assessment   Pain Level 0   Oxygen Therapy   SpO2 98 %   O2 Device Ventilator   FiO2  50 %   Vent Settings   PEEP/CPAP (cmH2O) 8       Vital signs stable. BP remains soft. Lisinopril held. Pt remains drowsy but arousable and oriented X4 and denies any worsening SOB at the moment. Nothing new noted on head to toe assessment. Endotracheal suction performed. Copious thick secretions noted. External urinary catheter remains secure in place. PEG remains secure to abdomen and functioning properly. Pt is NSR/ST on the monitor. Morning medication administration completed via PEG tube. SLP eval pending. Pt updated on plan of care.  Pt denies any further assistance at the moment. Will continue to monitor.

## 2024-08-18 NOTE — PROCEDURES
PROCEDURE NOTE  Date: 8/18/2024   Name: Annie Tyler  YOB: 1955    Procedures      See History and Physical or progress note for additional findings. Pertinent changes recorded below if present.  Pre/post procedure diagnosis: mucus in airways    Allergies and medications have been reviewed    HENT: Airway patent and reviewed. Trach in place.  Cardiovascular: Normal rate, regular rhythm, normal heart sounds.   Pulmonary/Chest: No wheezes. No rhonchi. No rales.   Abdominal: Soft. Bowel sounds are normal. No distension.    ASA: Class 4 - A patient with an incapacitating systemic disease that is a constant threat to life  Level of Sedation Plan: Continue ICU sedation    Post Procedure Plan   Continue ICU care.   ______________________     The risks and benefits as well as alternatives to the procedure have been discussed with the POA.  The  POA understands and agrees to proceed. Signed Consent in chart.    PROCEDURE:  BRONCHOSCOPY      The risks and benefits as well as alternatives to the procedure have been discussed with the patient or POA.  The patient or POA understands and agrees to proceed. Consent signed.    DESCRIPTION OF PROCEDURE: A time out was taken. ICU sedation continued.  4ml 1% lidocaine through bronchoscope.    The scope was passed with ease via the ETT.  A complete airway inspection was performed. Normal anatomy. No endobronchial lesion was identified. Mucosa appeared normal. There were thick tan secretions.  Therapeutic aspiration completed. Washings were obtained throughout the airways.  A Bronchoalveolar lavage was obtained from the LLL with good return.        The patient tolerated the procedure well. EBL none. Recovery will be per endoscopy protocol. Will discharge home or return to same level of care per recovery protocol.    FOLLOW UP:  Cultures and cytology in three to five days.

## 2024-08-19 ENCOUNTER — APPOINTMENT (OUTPATIENT)
Dept: GENERAL RADIOLOGY | Age: 69
DRG: 871 | End: 2024-08-19
Payer: MEDICARE

## 2024-08-19 LAB
ANION GAP SERPL CALCULATED.3IONS-SCNC: 4 MMOL/L (ref 3–16)
BACTERIA UR CULT: NORMAL
BASE EXCESS BLDV CALC-SCNC: 7.7 MMOL/L (ref -3–3)
BUN SERPL-MCNC: 15 MG/DL (ref 7–20)
CALCIUM SERPL-MCNC: 9.7 MG/DL (ref 8.3–10.6)
CHLORIDE SERPL-SCNC: 97 MMOL/L (ref 99–110)
CO2 BLDV-SCNC: 34 MMOL/L
CO2 SERPL-SCNC: 34 MMOL/L (ref 21–32)
COHGB MFR BLDV: 1.9 % (ref 0–1.5)
CREAT SERPL-MCNC: <0.5 MG/DL (ref 0.6–1.2)
DEPRECATED RDW RBC AUTO: 16.4 % (ref 12.4–15.4)
GFR SERPLBLD CREATININE-BSD FMLA CKD-EPI: >90 ML/MIN/{1.73_M2}
GLUCOSE BLD-MCNC: 138 MG/DL (ref 70–99)
GLUCOSE BLD-MCNC: 141 MG/DL (ref 70–99)
GLUCOSE BLD-MCNC: 142 MG/DL (ref 70–99)
GLUCOSE BLD-MCNC: 144 MG/DL (ref 70–99)
GLUCOSE BLD-MCNC: 153 MG/DL (ref 70–99)
GLUCOSE BLD-MCNC: 196 MG/DL (ref 70–99)
GLUCOSE SERPL-MCNC: 134 MG/DL (ref 70–99)
HCO3 BLDV-SCNC: 32.1 MMOL/L (ref 23–29)
HCT VFR BLD AUTO: 29.5 % (ref 36–48)
HGB BLD-MCNC: 9.6 G/DL (ref 12–16)
MAGNESIUM SERPL-MCNC: 2.4 MG/DL (ref 1.8–2.4)
MCH RBC QN AUTO: 28.1 PG (ref 26–34)
MCHC RBC AUTO-ENTMCNC: 32.5 G/DL (ref 31–36)
MCV RBC AUTO: 86.4 FL (ref 80–100)
METHGB MFR BLDV: 0.3 %
O2 CT VFR BLDV CALC: 15 VOL %
O2 THERAPY: ABNORMAL
PCO2 BLDV: 44.6 MMHG (ref 40–50)
PERFORMED ON: ABNORMAL
PH BLDV: 7.47 [PH] (ref 7.35–7.45)
PLATELET # BLD AUTO: 334 K/UL (ref 135–450)
PMV BLD AUTO: 7.4 FL (ref 5–10.5)
PO2 BLDV: 145.1 MMHG (ref 25–40)
POTASSIUM SERPL-SCNC: 4.2 MMOL/L (ref 3.5–5.1)
RBC # BLD AUTO: 3.41 M/UL (ref 4–5.2)
SAO2 % BLDV: 99 %
SODIUM SERPL-SCNC: 135 MMOL/L (ref 136–145)
WBC # BLD AUTO: 18.4 K/UL (ref 4–11)

## 2024-08-19 PROCEDURE — 6370000000 HC RX 637 (ALT 250 FOR IP): Performed by: INTERNAL MEDICINE

## 2024-08-19 PROCEDURE — 36415 COLL VENOUS BLD VENIPUNCTURE: CPT

## 2024-08-19 PROCEDURE — 83735 ASSAY OF MAGNESIUM: CPT

## 2024-08-19 PROCEDURE — 2580000003 HC RX 258: Performed by: STUDENT IN AN ORGANIZED HEALTH CARE EDUCATION/TRAINING PROGRAM

## 2024-08-19 PROCEDURE — 82803 BLOOD GASES ANY COMBINATION: CPT

## 2024-08-19 PROCEDURE — 6360000002 HC RX W HCPCS: Performed by: INTERNAL MEDICINE

## 2024-08-19 PROCEDURE — 94640 AIRWAY INHALATION TREATMENT: CPT

## 2024-08-19 PROCEDURE — 99233 SBSQ HOSP IP/OBS HIGH 50: CPT | Performed by: INTERNAL MEDICINE

## 2024-08-19 PROCEDURE — 2580000003 HC RX 258: Performed by: INTERNAL MEDICINE

## 2024-08-19 PROCEDURE — 2000000000 HC ICU R&B

## 2024-08-19 PROCEDURE — 6370000000 HC RX 637 (ALT 250 FOR IP): Performed by: STUDENT IN AN ORGANIZED HEALTH CARE EDUCATION/TRAINING PROGRAM

## 2024-08-19 PROCEDURE — 2700000000 HC OXYGEN THERAPY PER DAY

## 2024-08-19 PROCEDURE — 80048 BASIC METABOLIC PNL TOTAL CA: CPT

## 2024-08-19 PROCEDURE — 99291 CRITICAL CARE FIRST HOUR: CPT | Performed by: INTERNAL MEDICINE

## 2024-08-19 PROCEDURE — 85027 COMPLETE CBC AUTOMATED: CPT

## 2024-08-19 PROCEDURE — 92526 ORAL FUNCTION THERAPY: CPT

## 2024-08-19 PROCEDURE — 6360000002 HC RX W HCPCS: Performed by: STUDENT IN AN ORGANIZED HEALTH CARE EDUCATION/TRAINING PROGRAM

## 2024-08-19 PROCEDURE — L8501 TRACHEOSTOMY SPEAKING VALVE: HCPCS

## 2024-08-19 PROCEDURE — 71045 X-RAY EXAM CHEST 1 VIEW: CPT

## 2024-08-19 PROCEDURE — 92610 EVALUATE SWALLOWING FUNCTION: CPT

## 2024-08-19 PROCEDURE — 94003 VENT MGMT INPAT SUBQ DAY: CPT

## 2024-08-19 PROCEDURE — 94761 N-INVAS EAR/PLS OXIMETRY MLT: CPT

## 2024-08-19 RX ORDER — MUPIROCIN 20 MG/G
OINTMENT TOPICAL 2 TIMES DAILY
Status: DISCONTINUED | OUTPATIENT
Start: 2024-08-19 | End: 2024-08-20 | Stop reason: HOSPADM

## 2024-08-19 RX ADMIN — IPRATROPIUM BROMIDE AND ALBUTEROL SULFATE 1 DOSE: 2.5; .5 SOLUTION RESPIRATORY (INHALATION) at 19:52

## 2024-08-19 RX ADMIN — IPRATROPIUM BROMIDE AND ALBUTEROL SULFATE 1 DOSE: 2.5; .5 SOLUTION RESPIRATORY (INHALATION) at 15:50

## 2024-08-19 RX ADMIN — MEROPENEM 1000 MG: 1 INJECTION INTRAVENOUS at 02:29

## 2024-08-19 RX ADMIN — MUPIROCIN: 20 OINTMENT TOPICAL at 20:16

## 2024-08-19 RX ADMIN — SODIUM CHLORIDE, PRESERVATIVE FREE 10 ML: 5 INJECTION INTRAVENOUS at 09:23

## 2024-08-19 RX ADMIN — IPRATROPIUM BROMIDE AND ALBUTEROL SULFATE 1 DOSE: 2.5; .5 SOLUTION RESPIRATORY (INHALATION) at 03:31

## 2024-08-19 RX ADMIN — BUSPIRONE HYDROCHLORIDE 10 MG: 10 TABLET ORAL at 09:20

## 2024-08-19 RX ADMIN — CLONAZEPAM 1 MG: 1 TABLET ORAL at 20:17

## 2024-08-19 RX ADMIN — IPRATROPIUM BROMIDE AND ALBUTEROL SULFATE 1 DOSE: 2.5; .5 SOLUTION RESPIRATORY (INHALATION) at 23:37

## 2024-08-19 RX ADMIN — MEROPENEM 1000 MG: 1 INJECTION INTRAVENOUS at 17:02

## 2024-08-19 RX ADMIN — IPRATROPIUM BROMIDE AND ALBUTEROL SULFATE 1 DOSE: 2.5; .5 SOLUTION RESPIRATORY (INHALATION) at 11:22

## 2024-08-19 RX ADMIN — CLONAZEPAM 1 MG: 1 TABLET ORAL at 09:20

## 2024-08-19 RX ADMIN — MUPIROCIN: 20 OINTMENT TOPICAL at 09:20

## 2024-08-19 RX ADMIN — BUSPIRONE HYDROCHLORIDE 10 MG: 10 TABLET ORAL at 20:15

## 2024-08-19 RX ADMIN — LISINOPRIL 5 MG: 5 TABLET ORAL at 09:20

## 2024-08-19 RX ADMIN — BUSPIRONE HYDROCHLORIDE 10 MG: 10 TABLET ORAL at 14:23

## 2024-08-19 RX ADMIN — ENOXAPARIN SODIUM 40 MG: 100 INJECTION SUBCUTANEOUS at 09:20

## 2024-08-19 RX ADMIN — SODIUM CHLORIDE, PRESERVATIVE FREE 10 ML: 5 INJECTION INTRAVENOUS at 20:28

## 2024-08-19 RX ADMIN — ATORVASTATIN CALCIUM 40 MG: 40 TABLET, FILM COATED ORAL at 20:15

## 2024-08-19 RX ADMIN — FUROSEMIDE 20 MG: 20 TABLET ORAL at 09:20

## 2024-08-19 RX ADMIN — WATER 40 MG: 1 INJECTION INTRAMUSCULAR; INTRAVENOUS; SUBCUTANEOUS at 09:20

## 2024-08-19 RX ADMIN — MEROPENEM 1000 MG: 1 INJECTION INTRAVENOUS at 09:31

## 2024-08-19 RX ADMIN — IPRATROPIUM BROMIDE AND ALBUTEROL SULFATE 1 DOSE: 2.5; .5 SOLUTION RESPIRATORY (INHALATION) at 07:38

## 2024-08-19 RX ADMIN — METOPROLOL SUCCINATE 25 MG: 25 TABLET, EXTENDED RELEASE ORAL at 09:20

## 2024-08-19 ASSESSMENT — PAIN SCALES - GENERAL: PAINLEVEL_OUTOF10: 0

## 2024-08-19 ASSESSMENT — PULMONARY FUNCTION TESTS
PIF_VALUE: 20
PIF_VALUE: 19
PIF_VALUE: 21
PIF_VALUE: 19

## 2024-08-19 NOTE — PROGRESS NOTES
Speech Language Pathology  Speaking Valve Trial  Facility/Department: [facility] [place]    PMV Risk Management  - PMV to be placed by SLP, RN, or RT only  - Ensure cuff is FULLY DEFLATED prior to donning PMV  - Recommend continuous O2 monitoring as able while PMV is in use (until pt consistently tolerates valve without adverse affects)  - Remove PMV for sleeping/napping  - Don PMV for PO intake as able for improved pharyngeal pressure and swallow safety  - Should pt experience respiratory issues, inability to voice, or PMV will not remain on trach, please remove PMV and call SLP  - Clean valve with gentle soap and water  - Store in allocated container of matching color       NAME:Annie Tyler  : 1955 (68 y.o.)   MRN: [id number]  ROOM: [address] [place] [address]  ADMISSION DATE: 2024  PATIENT DIAGNOSIS(ES): COPD exacerbation (HCC) [J44.1]  Allergies   Allergen Reactions    Cephalosporins Shortness Of Breath     OK to take aztreonam and meropenem     Penicillins Anaphylaxis, Hives and Other (See Comments)     OK to take aztreonam and meropenem    Hctz [Hydrochlorothiazide] Other (See Comments)     Recurrent low sodium         Evaluating Therapist: HOOD Sanabria    DATE ONSET: 2024    Chart Reviewed: : [x] Yes [] No     Pain: The patient denies pain     Referring Physician: Dr. Saez        Date of PMV Trail: 2024     Case Hx:   History of Present Illness:      Chief Complaint: COPD exacerbation (HCC)  Annie Tyler is a 68-year-old female with past medical history of hypertension chronic hypoxic respiratory failure with tracheostomy COPD hypertension CHF chronic pain presented to the hospital from the nursing home with a recent discharge as the patient was found to be short of breath.  Trach cannula was changed today.  Patient was unresponsive with a heart rate of 140s in the ED patient received levalbuterol magnesium meropenem and vancomycin for antibiotic reports with a chest x-ray showing signs  PMV tolerance with goals for tolerance independently with stable respiratory sats  Frequency/Duration: Patient will be followed by speech-language pathology 1-2 times per day/4-7 days per week to address goals.  Discharge Recommendations: ECF/LTACH with Speech Therapy for Speech/Dysphagia treatment at discharge.         After evaluation:   [x]              Patient left in no apparent distress sitting up in chair  []              Patient left in no apparent distress in bed  [x]              Call bell left within reach  [x]              Nursing notified  []              Caregiver present  []              Bed alarm activated        COMMUNICATION/EDUCATION:   [x]              Aspiration precautions; compensatory swallow techniques  [x]              PMV use and care              Goals  Short Term Goals   Timeframe for STGs: 5 days (08/24/2024)  Goal 1: Patient will initiate PMV trials when appropriate     Goal 2: Patient will tolerate PMV trials with SLP for 30 minutes.  Goal 3: Patient will improve voicing while wearing PMV to increase speech intelligibility to 100%.  Goal 4: Pt/Caregiver will demonstrate ability to place and remove PMV with cues, for increased independence with verbal speech production.  Goal 5: Patient will utilize strategies to facilitate increased intelligibility with mouthing words with min cues.     Long Term Goals  Timeframe for LTG's: 7 days (08/26/2024)  Goal 1: Pt will tolerate Passy mara valve (PMV) placement for increasing intervals throughout the day, with consistent phonation and good voice quality, with cues for breath support, phrasing and volume.         Total Treatment Time: 0920-0955, 35 mins, 1 unit     Yohana Varner M.A., CCC-SLP   Speech-Language Pathologist #[id number]  Speech Pathology and Swallowing Disorders  P: (inpatient): [phone] (speech desk): [phone]  E: [email or web address]  Certified to provide:  FEES, MBS, Vital Stim, and Jimenez Dysphagia Therapy Program (MDTP)     Yohana HINES

## 2024-08-19 NOTE — PLAN OF CARE
Problem: SLP Adult - Impaired Swallowing  Goal: By Discharge: Advance to least restrictive diet without signs or symptoms of aspiration for planned discharge setting.  See evaluation for individualized goals.  Note: SLP completed evaluation. Please refer to notes in EMR.    Yohana Varner M.A., CCC-SLP   Speech-Language Pathologist #[id number]  Speech Pathology and Swallowing Disorders  P: (inpatient): [phone] (speech desk): [phone]  E: [email or web address]  Certified to provide:  FEES, MBS, Vital Stim, and Tony Dysphagia Therapy Program (MDTP)

## 2024-08-19 NOTE — PROGRESS NOTES
Speech Language Pathology  Swallowing Disorders and Dysphagia  Clinical Bedside Swallow Assessment  Facility/Department: [facility] [place] ICU    Instrumentation: Not clinically indicated at this time. Will continue to monitor  Diet recommendation: IDDSI 6 Soft and Bite Sized Solids; IDDSI 0 Thin Liquids; Meds whole with thin liquids  Risk management: upright for all intake, stay upright for at least 30 mins after intake, small bites/sips, close supervision, oral care 2-3x/day to reduce adverse affects in the event of aspiration, increase physical mobility as able, slow rate of intake, STRICT aspiration precautions, and hold PO and contact SLP if s/s of aspiration or worsening respiratory status develop. DON PMV FOR ORAL INTAKE IF SUPERVISION IS AVAILABLE    NAME:Annie Tyler  : 1955 (68 y.o.)   MRN: [id number]  ROOM: 3005/3005-01  ADMISSION DATE: 2024  PATIENT DIAGNOSIS(ES): COPD exacerbation ([facility]) [J44.1]  Chief Complaint   Patient presents with    Shortness of Breath     Pt was Dc'd back to nursing home around 430p; a/o, responsive; arrives back from nursing home after trach cannula changed; unresponsive; a HR 140s; pt is a trach vent pt at [facility]      Patient Active Problem List    Diagnosis Date Noted    Metabolic encephalopathy 2022    Acute encephalopathy 2022    Chronic hypoxemic respiratory failure (HCC) 2022    Chronic respiratory failure with hypoxia ([facility]) 2022    Hypokalemia 2022    History of COVID-19 2022    Leukocytosis 2022    Primary hypertension 08/15/2022    COVID-19 08/15/2022    Pneumonia due to COVID-19 virus 2022    Gram-negative bacteremia 2024    Chronic diastolic CHF (congestive heart failure) ([facility]) 2024    Dependent on ventilator ([facility]) 2024    Pneumonia due to infectious organism 2024    NSTEMI (non-ST elevated myocardial infarction) ([facility]) 2024    Lung nodule 2024    Acute HFrEF (heart failure  11/17/2022       Lab Results   Component Value Date/Time    INR 1.03 06/27/2024 09:57 AM    INR 0.88 01/19/2024 12:56 PM    INR 0.95 10/07/2023 08:07 AM       Magnesium:    Lab Results   Component Value Date/Time    MG 2.40 08/19/2024 04:45 AM       U/A:    Lab Results   Component Value Date/Time    COLORU Yellow 08/18/2024 12:22 PM    WBCUA 3-5 10/05/2023 11:10 PM    RBCUA 3-4 10/05/2023 11:10 PM    BACTERIA 1+ 10/05/2023 11:10 PM    CLARITYU Clear 08/18/2024 12:22 PM    LEUKOCYTESUR Negative 08/18/2024 12:22 PM    BLOODU Negative 08/18/2024 12:22 PM    GLUCOSEU Negative 08/18/2024 12:22 PM    AMORPHOUS Rare 05/02/2023 01:11 AM     ABG:    Lab Results   Component Value Date/Time    ABG6HJQ 29.6 08/17/2024 09:39 AM    BEART 5.2 08/17/2024 09:39 AM    H3HULCKY 94.2 08/17/2024 09:39 AM    PHART 7.453 08/17/2024 09:39 AM    ROE0WFP 43.3 08/17/2024 09:39 AM    PO2ART 67.6 08/17/2024 09:39 AM    MCY5JSO 31.0 08/17/2024 09:39 AM         Results       Procedure Component Value Units Date/Time    Culture, Urine [[id number]] Collected: 08/18/24 1222    Order Status: Sent Specimen: Urine, clean catch Updated: 08/19/24 0059    Culture, Respiratory [[id number]] Collected: 08/18/24 1134    Order Status: No result Specimen: BAL Quantitative Count Updated: 08/18/24 1402     Gram Stain Result 4+ WBC's (Polymorphonuclear)  2+ WBC's (Mononuclear)  No Epithelial Cells seen  1+ Gram positive cocci  in pairs      Narrative:      ORDER#: [id number]                          ORDERED BY: ADA CHINO  SOURCE: BAL Quantitative Count Right MiddleCOLLECTED:  08/18/24 11:34  ANTIBIOTICS AT YAMILETH.:                      RECEIVED :  08/18/24 13:58    Culture, Blood 2 [[id number]] Collected: 08/16/24 1935    Order Status: Completed Specimen: Blood Updated: 08/18/24 0115     Culture, Blood 2 No Growth to date.  Any change in status will be called.    Narrative:      ORDER#: [id number]                          ORDERED BY: ADA CHINO  SOURCE:  The patient/caregiver will demonstrate understanding of compensatory swallow strategies, for improved swallow safety      Long Term Goals:   Timeframe for Long Term Goals: (7 days 08/26/2024)  Goal 1: The patient will tolerate least restrictive diet with no clinical s/s of aspiration or worsening respiratory/pulmonary status    Treatment:  Skilled instruction completed with patient re: evidenced based practice regarding recommendations and POC, importance of oral care to reduce adverse affects in the event of aspiration, and instruction of recommended compensatory strategies developed based upon clinical exam. Pt able to recall/demonstrate compensatory strategies with min cues.      Pt Education: SLP educated the patient re: Role of SLP, rationale for completion of assessment, anatomical components of swallow structures as they pertain to airway protection, results of assessment, recommendations, POC, rationale for FEES, and rationale for MBS  Pt Education Response: verbalized understanding, demonstrated understanding, and RN aware    Duration/Frequency of Tx: 1-2x/day 4-7/wk for dysphagia and PMV monitoring (see PMV trial assessment)    Individuals Consulted:   Patient   RN  MD  Other: RT      Safety Devices / Report:   All fall risk precautions in place  Bed alarm in place  call light in reach  Safety handoff completed with RN  Left in bed                        Total Treatment Time / Charges       Time in Time out Total Time / units   Swallow Eval/Tx Time  0955 1020 25 mins / 2 units     Signature:  Yohana Varner M.A., CCC-SLP   Speech-Language Pathologist #[id number]  Speech Pathology and Swallowing Disorders  P: (inpatient): [phone] (speech desk): [phone]  E: [email or web address]  Certified to provide:  FEES, MBS, Vital Stim, and Jimenez Dysphagia Therapy Program (MDTP)

## 2024-08-19 NOTE — PROGRESS NOTES
Completed Specimen: Blood Updated: 08/18/24 0837     Blood Culture, Routine --     Gram stain Aerobic bottle:  Gram negative rods  Information to follow  A Resistance Gene (Gene Name) was detected on the Blood Culture PCR  panel and cannot be associated with a specific organism target.  Please  refer to sensitivity results.      Narrative:      ORDER#: [id number]                          ORDERED BY: ADA CHINO  SOURCE: Blood Hand, Left                   COLLECTED:  08/16/24 19:10  ANTIBIOTICS AT YAMILETH.:                      RECEIVED :  08/16/24 19:55  CALL  Anna  [facility] tel. [phone],  Microbiology results called to and read back by SAMAN PAINTER RX, 08/18/2024  08:36, by ORIN  Microbiology results called to and read back by SHELIA CHRISTENSEN RN, 08/18/2024  08:34, by ORIN  If child <=2 yrs old please draw pediatric bottle.~Blood Culture 1    Culture, Blood, PCR ID Panel [[id number]] Collected: 08/16/24 1910    Order Status: Completed Updated: 08/18/24 0838     Report SEE IMAGE    Narrative:      CALL  Anna  [facility] tel. [phone],  Microbiology results called to and read back by SAMAN PAINTER RX, 08/18/2024  08:36, by ORIN  Microbiology results called to and read back by SHELIA CHRISTENSEN RN, 08/18/2024  08:34, by ORIN    COVID-19 & Influenza Combo [[id number]] Collected: 08/16/24 1127    Order Status: Completed Specimen: Nasopharyngeal Swab Updated: 08/16/24 1153     SARS-CoV-2 RNA, RT PCR NOT DETECTED     Comment: Not Detected results do not preclude SARS-CoV-2 infection and  should not be used as the sole basis for patient management  decisions.  Results must be combined with clinical observations,  patient history, and epidemiological information.  Testing was performed using DONYA OSBALDO SARS-CoV-2 and Influenza A/B  nucleic acid assay. This test is a multiplex Real-Time Reverse  Transcriptase Polymerase Chain Reaction (RT-PCR)-based in vitro  diagnostic test intended for the qualitative detection of nucleic  acids from  nodule/cancer.  Not a surgical candidate s/p SBRT.  Continue to monitor outpatient      # History of Takotsubo cardiomyopathy with mildly elevated troponins.    -Last left heart cath was done recently in June without any complications and no occlusive CAD      # Healing displaced fracture of the proximal left humerus   -found in June 2024.  Patient was placed on a sling and had nonweightbearing restrictions       # Mood disorder  - on buspirone and Lexapro    # Chronic diastolic heart failure with preserved ejection fraction   -on Lasix 20 mg daily as needed  - hold aldactone /ACEi for high K    #  Hypokalemia - repleted  # Hypophosphatemia - repleted         - Lovenox for DVT prophylaxis       Diet NPO Exceptions are: Ice Chips, Sips of Water with Meds  ADULT TUBE FEEDING; Nasogastric; 2.0 Calorie; Continuous; 20; Yes; 20; Q 6 hours; 60; 60; Q 6 hours  Resume TF   Full Code         Byron Faulkner MD   8/19/2024 7:21 AM

## 2024-08-19 NOTE — PROGRESS NOTES
08/18/24 2318   Patient Observation   Pulse 92   Respirations 24   SpO2 94 %   Breath Sounds   Right Upper Lobe Diminished   Right Middle Lobe Diminished   Right Lower Lobe Diminished   Left Upper Lobe Diminished   Left Lower Lobe Diminished   Vent Information   Vent Mode AC/VC   Ventilator Settings   FiO2  50 %   Vt (Set, mL) 400 mL   Resp Rate (Set) 15 bpm   PEEP/CPAP (cmH2O) 5   Vent Patient Data (Readings)   Vt (Measured) 502 mL   Peak Inspiratory Pressure (cmH2O) 19 cmH2O   Rate Measured 26 br/min   Minute Volume (L/min) 10 Liters   Mean Airway Pressure (cmH2O) 8 cmH20   I:E Ratio 1:2.8   Flow Sensitivity 3 L/min   Vent Alarm Settings   High Pressure (cmH2O) 50 cmH2O   Low Minute Volume (lpm) 2.5 L/min   Low Exhaled Vt (ml) 250 mL   RR High (bpm) 50 br/min   Apnea (secs) 20 secs   Airway Clearance   Suction Trach   Suction Device Inline suction catheter   Sputum Method Obtained Tracheal   Sputum Amount Large   Sputum Color/Odor White;Yellow   Sputum Consistency Thick   Surgical Airway  Sascha   No placement date or time found.   Present on Admission/Arrival: Yes  Surgical Airway Type: Tracheostomy  Brand: Sascha  Size: 4   Status Secured   Site Assessment Clean;Dry   Site Care Cleansed;Dried;Dressing applied   Inner Cannula Care Changed/new   Ties Assessment Clean;Dry;Intact   Treatment   $Treatment Type $Inhaled Therapy/Meds

## 2024-08-19 NOTE — PROGRESS NOTES
Pulmonary & Critical Care Medicine ICU Progress Note    CC: COPD, ventilator dependent    Events of Last 24 hours: some anxiety  Copious secretions    Invasive Lines: PIV    MV:  chronic  Vent Mode: AC/VC Resp Rate (Set): 15 bpm/Vt (Set, mL): 400 mL/ /FiO2 : 50 %  Recent Labs     08/16/24  1838 08/17/24  0939   PHART 7.248* 7.453*   MAJ8JPN 66.5* 43.3   PO2ART 74.9* 67.6*       IV:   dextrose      sodium chloride         Vitals:  /86   Pulse 100   Temp 96.9 °F (36.1 °C) (Axillary)   Resp 21   Wt 58.6 kg (129 lb 3 oz)   SpO2 100%   BMI 22.88 kg/m²  on vent  Constitutional:  No acute distress.   Eyes: PERRL. Conjunctivae anicteric.   ENT: Normal nose. Trach in place   Neck:  Trachea is midline.   Respiratory: No accessory muscle usage.  Decreased breath sounds. No wheezes. No rales. No Rhonchi.  Cardiovascular: Normal S1S2. No digit clubbing. No digit cyanosis. No LE edema.   Psychiatric: + anxiety or Agitation. Alert and Oriented to person, place and time.     Scheduled Meds:   mupirocin   Each Nostril BID    clonazePAM  1 mg Oral Q12H    meropenem  1,000 mg IntraVENous Q8H    insulin lispro  0-4 Units SubCUTAneous TID WC    insulin lispro  0-4 Units SubCUTAneous Nightly    ipratropium 0.5 mg-albuterol 2.5 mg  1 Dose Inhalation Q4H RT    methylPREDNISolone  40 mg IntraVENous Daily    atorvastatin  40 mg Oral Nightly    busPIRone  10 mg Oral TID    furosemide  20 mg Oral Daily    lisinopril  5 mg Oral Daily    metoprolol succinate  25 mg Oral Daily    [Held by provider] spironolactone  12.5 mg Oral Daily    sodium chloride flush  5-40 mL IntraVENous 2 times per day    enoxaparin  40 mg SubCUTAneous Daily     PRN Meds:  LORazepam, morphine **OR** [DISCONTINUED] morphine, glucose, dextrose bolus **OR** dextrose bolus, glucagon (rDNA), dextrose, albuterol, traMADol, sodium chloride flush, sodium chloride, potassium chloride **OR** potassium alternative oral replacement **OR** potassium chloride, magnesium  sulfate, ondansetron **OR** ondansetron, polyethylene glycol, acetaminophen **OR** acetaminophen    Results:  CBC:   Recent Labs     08/17/24  0318 08/18/24  0433 08/19/24  0445   WBC 7.8 26.8* 18.4*   HGB 9.2* 9.3* 9.6*   HCT 27.4* 28.6* 29.5*   MCV 85.3 86.7 86.4    366 334     BMP:   Recent Labs     08/17/24 0318 08/18/24  0433 08/19/24  0445   * 139 135*   K 3.0* 5.4* 4.2   CL 96* 102 97*   CO2 26 27 34*   PHOS 1.8* 3.2  --    BUN 12 14 15   CREATININE 0.6 <0.5* <0.5*     LIVER PROFILE:   Recent Labs     08/16/24  1045 08/16/24  1838   AST 22 26   ALT 17 21   BILITOT 0.7 0.6   ALKPHOS 124 146*       Cultures:  Blood cx 1/2: GNR    Films:  Chest imaging was reviewed by me and showed 8/16/24 CTPA: IMPRESSION:  1. Negative for pulmonary embolus.  2. Advanced emphysema with an indeterminate 12 mm spiculated right upper lobe pulmonary nodule.     ASSESSMENT:  COPD exacerbation  Acute on chronic hypercarbic respiratory failure  GNR bacteremia  Chronic hypoxia  End stage COPD  RUL lung nodule     PLAN:  MV per my orders  switch to oral prednisone taper  merrem D#2  F/u BAL, blood and Urine cx  Klonopin  SLP and restart diet if able  Lovenox DVT prophylaxis   Due to at least single organ failure and risk of rapid deterioration, I spent 31 minutes of Critical care time reviewing labs/films, examining patient, collaborating with other physicians. This does not include time performing critical procedures.

## 2024-08-19 NOTE — DISCHARGE INSTRUCTIONS
Heart Failure Resources:  Heart Failure Interactive Workbook:  Go to [email or web address]/publication/?j=967215 for a Free Heart Failure Interactive Workbook provided by The American Heart Association. This interactive workbook will provide information on Healthier Living with Heart Failure. Please copy and paste link into search bar. Use your mouse to scroll through the pages.    HF [place] rhonda:   Heart Failure Free smart phone rhonda available for iPhone and Android download. Use your phone to track sodium intake, fluid intake, symptoms, and weight.     Low Sodium Diet / Recipes:  Go to www.[employer] website for “renal” diet which is Low Sodium! [employer] is a dialysis company, but this website offers free seasonal cookbooks. Each quarter, they will release 25-30 new recipes with a breakdown of calories, sodium, and glucose. You can also go to www[employer]pert[employer]/recipes website for free recipes.     Discharge Instruction Video:  Scan the QR code below with your camera and click the canva.com link to open the video and watch educational information on Heart Failure and Medications from one of our nurses.   [email or web address]/[email or web address]_[email or web address]/edit    Home Exercise Program:   Identification of Green/Yellow/Red zones:  You should be able to identify when you feel good (green zone), if you have 1-2 symptoms of HF (yellow zone), or if you are in need of medical attention (red zone).  In your CHF education folder you were provided a “stop light tool” to outline this information.     We want to you to rate your exertion levels:    Our therapy team has discussed means of identification with you such as the \"Laisha scale.\"  The Laisha rating scale ranges from 6 to 20, where 6 means \"no exertion at all\" and 20 means \"maximal exertion.\" The goal is to use this to gauge how much effort it is taking for you to do your normal daily tasks.   You should be able to recognize when too much

## 2024-08-19 NOTE — PROGRESS NOTES
08/19/24 0331   Patient Observation   Pulse (!) 101   Respirations 30   SpO2 95 %   Breath Sounds   Right Upper Lobe Diminished   Right Middle Lobe Diminished   Right Lower Lobe Diminished   Left Upper Lobe Diminished   Left Lower Lobe Diminished   Vent Information   Vent Mode AC/VC   $Ventilation $Subsequent Day   Ventilator Settings   FiO2  50 %   Vt (Set, mL) 400 mL   Resp Rate (Set) 15 bpm   PEEP/CPAP (cmH2O) 5   Vent Patient Data (Readings)   Vt (Measured) 445 mL   Peak Inspiratory Pressure (cmH2O) 19 cmH2O   Rate Measured 29 br/min   Minute Volume (L/min) 11.8 Liters   Mean Airway Pressure (cmH2O) 10 cmH20   I:E Ratio 1:2.0   Flow Sensitivity 3 L/min   Vent Alarm Settings   High Pressure (cmH2O) 50 cmH2O   Low Minute Volume (lpm) 2.5 L/min   Low Exhaled Vt (ml) 250 mL   RR High (bpm) 50 br/min   Apnea (secs) 20 secs   Surgical Airway  Sascha Hickey placement date or time found.   Present on Admission/Arrival: Yes  Surgical Airway Type: Tracheostomy  Brand: Sascha  Size: 4   Status Secured   Site Assessment Dry;Clean   Ties Assessment Clean;Dry;Intact   Treatment   $Treatment Type $Inhaled Therapy/Meds

## 2024-08-19 NOTE — CARE COORDINATION
Case Management Assessment  Initial Evaluation    Date/Time of Evaluation: 8/19/2024 5:39 PM  Assessment Completed by: Teresa Boeck, RN    If patient is discharged prior to next notation, then this note serves as note for discharge by case management.    Patient Name: Annie Stein                   YOB: 1955  Diagnosis: COPD exacerbation (HCC) [J44.1]                   Date / Time: 8/16/2024  6:24 PM    Patient Admission Status: Inpatient   Readmission Risk (Low < 19, Mod (19-27), High > 27): Readmission Risk Score: 32.9    Current PCP: Bruce Nichols MD  PCP verified by CM? Yes    Chart Reviewed: Yes      History Provided by: Child/Family (Annie)  Patient Orientation:      Patient Cognition:      Hospitalization in the last 30 days (Readmission):  No    If yes, Readmission Assessment in CM Navigator will be completed.    Advance Directives:      Code Status: Full Code   Patient's Primary Decision Maker is: Named in Scanned ACP Document    Primary Decision Maker: HUMERA STEIN - Child - [phone]    Secondary Decision Maker: shaheen lim - Other - [phone]    Discharge Planning:    Patient lives with: (P) Other (Comment) (LTC) Type of Home: (P) Long-Term Care  Primary Care Giver:    Patient Support Systems include:     Current Financial resources: (P) Medicare, Medicaid, Hospital Care Assurance Program  Current community resources: (P) None  Current services prior to admission: (P) Oxygen Therapy, None (trach/vent)            Current DME:              Type of Home Care services:  (P) None    ADLS  Prior functional level: Assistance with the following:, Bathing, Dressing, Toileting, Cooking, Housework, Shopping, Mobility  Current functional level: Mobility, Shopping, Housework, Cooking, Toileting, Dressing, Bathing, Assistance with the following:    PT AM-PAC:   /24  OT AM-PAC:   /24    Family can provide assistance at DC: Other (comment) (Has caregivers at LTC faciltiy)  Would you  was provided to:     Patient Representative Name:       The Patient and/or Patient Representative Agree with the Discharge Plan?      Teresa Boeck, RN  Case Management Department  Ph: [phone]

## 2024-08-19 NOTE — CARE COORDINATION
Advance Care Planning     General Advance Care Planning (ACP) Conversation    Date of Conversation: 8/19/2024  Conducted with: Patient with Decision Making Capacity  Other persons present: None    Healthcare Decision Maker:   Primary Decision Maker: THUY STEIN - Ever - [phone]    Secondary Decision Maker: shaheen lim - Hussein - [phone]       Content/Action Overview:  Has ACP document(s) on file - reflects the patient's care preferences  Reviewed DNR/DNI and patient elects Full Code (Attempt Resuscitation)      Patient has Thuy Stein listed as POA.  Nildamaxim Stein is first alterate.      Length of Voluntary ACP Conversation in minutes:  <16 minutes (Non-Billable)    Teresa Boeck, RN

## 2024-08-20 VITALS
SYSTOLIC BLOOD PRESSURE: 104 MMHG | TEMPERATURE: 98.1 F | HEART RATE: 86 BPM | WEIGHT: 143.74 LBS | DIASTOLIC BLOOD PRESSURE: 69 MMHG | BODY MASS INDEX: 25.46 KG/M2 | RESPIRATION RATE: 13 BRPM | OXYGEN SATURATION: 98 %

## 2024-08-20 LAB
ANION GAP SERPL CALCULATED.3IONS-SCNC: 8 MMOL/L (ref 3–16)
BASOPHILS # BLD: 0 K/UL (ref 0–0.2)
BASOPHILS NFR BLD: 0.1 %
BUN SERPL-MCNC: 13 MG/DL (ref 7–20)
CALCIUM SERPL-MCNC: 9 MG/DL (ref 8.3–10.6)
CHLORIDE SERPL-SCNC: 95 MMOL/L (ref 99–110)
CO2 SERPL-SCNC: 33 MMOL/L (ref 21–32)
CREAT SERPL-MCNC: <0.5 MG/DL (ref 0.6–1.2)
DEPRECATED RDW RBC AUTO: 16 % (ref 12.4–15.4)
EOSINOPHIL # BLD: 0 K/UL (ref 0–0.6)
EOSINOPHIL NFR BLD: 0.1 %
GFR SERPLBLD CREATININE-BSD FMLA CKD-EPI: >90 ML/MIN/{1.73_M2}
GLUCOSE BLD-MCNC: 105 MG/DL (ref 70–99)
GLUCOSE BLD-MCNC: 122 MG/DL (ref 70–99)
GLUCOSE BLD-MCNC: 133 MG/DL (ref 70–99)
GLUCOSE BLD-MCNC: 99 MG/DL (ref 70–99)
GLUCOSE SERPL-MCNC: 105 MG/DL (ref 70–99)
HCT VFR BLD AUTO: 29.8 % (ref 36–48)
HGB BLD-MCNC: 9.9 G/DL (ref 12–16)
LYMPHOCYTES # BLD: 0.8 K/UL (ref 1–5.1)
LYMPHOCYTES NFR BLD: 6.5 %
MCH RBC QN AUTO: 28.5 PG (ref 26–34)
MCHC RBC AUTO-ENTMCNC: 33.3 G/DL (ref 31–36)
MCV RBC AUTO: 85.5 FL (ref 80–100)
MONOCYTES # BLD: 1.3 K/UL (ref 0–1.3)
MONOCYTES NFR BLD: 10.3 %
NEUTROPHILS # BLD: 10.1 K/UL (ref 1.7–7.7)
NEUTROPHILS NFR BLD: 83 %
PERFORMED ON: ABNORMAL
PERFORMED ON: NORMAL
PLATELET # BLD AUTO: 291 K/UL (ref 135–450)
PMV BLD AUTO: 7.6 FL (ref 5–10.5)
POTASSIUM SERPL-SCNC: 4.1 MMOL/L (ref 3.5–5.1)
RBC # BLD AUTO: 3.48 M/UL (ref 4–5.2)
SODIUM SERPL-SCNC: 136 MMOL/L (ref 136–145)
WBC # BLD AUTO: 12.2 K/UL (ref 4–11)

## 2024-08-20 PROCEDURE — 36415 COLL VENOUS BLD VENIPUNCTURE: CPT

## 2024-08-20 PROCEDURE — 99233 SBSQ HOSP IP/OBS HIGH 50: CPT | Performed by: INTERNAL MEDICINE

## 2024-08-20 PROCEDURE — 6370000000 HC RX 637 (ALT 250 FOR IP): Performed by: INTERNAL MEDICINE

## 2024-08-20 PROCEDURE — 99239 HOSP IP/OBS DSCHRG MGMT >30: CPT | Performed by: INTERNAL MEDICINE

## 2024-08-20 PROCEDURE — 80048 BASIC METABOLIC PNL TOTAL CA: CPT

## 2024-08-20 PROCEDURE — 6360000002 HC RX W HCPCS: Performed by: STUDENT IN AN ORGANIZED HEALTH CARE EDUCATION/TRAINING PROGRAM

## 2024-08-20 PROCEDURE — 6370000000 HC RX 637 (ALT 250 FOR IP): Performed by: STUDENT IN AN ORGANIZED HEALTH CARE EDUCATION/TRAINING PROGRAM

## 2024-08-20 PROCEDURE — 6360000002 HC RX W HCPCS: Performed by: INTERNAL MEDICINE

## 2024-08-20 PROCEDURE — 2580000003 HC RX 258: Performed by: INTERNAL MEDICINE

## 2024-08-20 PROCEDURE — 94003 VENT MGMT INPAT SUBQ DAY: CPT

## 2024-08-20 PROCEDURE — 85025 COMPLETE CBC W/AUTO DIFF WBC: CPT

## 2024-08-20 PROCEDURE — 2580000003 HC RX 258: Performed by: STUDENT IN AN ORGANIZED HEALTH CARE EDUCATION/TRAINING PROGRAM

## 2024-08-20 PROCEDURE — 94761 N-INVAS EAR/PLS OXIMETRY MLT: CPT

## 2024-08-20 PROCEDURE — 94640 AIRWAY INHALATION TREATMENT: CPT

## 2024-08-20 RX ORDER — PREDNISONE 10 MG/1
TABLET ORAL
Qty: 20 TABLET | Refills: 0
Start: 2024-08-20

## 2024-08-20 RX ORDER — SULFAMETHOXAZOLE/TRIMETHOPRIM 800-160 MG
1 TABLET ORAL EVERY 12 HOURS SCHEDULED
Qty: 13 TABLET | Refills: 0
Start: 2024-08-20 | End: 2024-08-27

## 2024-08-20 RX ORDER — CLONAZEPAM 1 MG/1
0.5 TABLET ORAL EVERY 12 HOURS
Qty: 4 TABLET | Refills: 0 | Status: SHIPPED | OUTPATIENT
Start: 2024-08-20 | End: 2024-08-24

## 2024-08-20 RX ORDER — SULFAMETHOXAZOLE/TRIMETHOPRIM 800-160 MG
1 TABLET ORAL EVERY 12 HOURS SCHEDULED
Status: DISCONTINUED | OUTPATIENT
Start: 2024-08-20 | End: 2024-08-20 | Stop reason: HOSPADM

## 2024-08-20 RX ORDER — LEVOFLOXACIN 750 MG/1
750 TABLET, FILM COATED ORAL DAILY
Status: DISCONTINUED | OUTPATIENT
Start: 2024-08-20 | End: 2024-08-20 | Stop reason: HOSPADM

## 2024-08-20 RX ORDER — LEVOFLOXACIN 5 MG/ML
750 INJECTION, SOLUTION INTRAVENOUS EVERY 24 HOURS
Status: DISCONTINUED | OUTPATIENT
Start: 2024-08-20 | End: 2024-08-20

## 2024-08-20 RX ORDER — METOPROLOL SUCCINATE 25 MG/1
25 TABLET, EXTENDED RELEASE ORAL DAILY
Qty: 30 TABLET | Refills: 0
Start: 2024-08-20 | End: 2024-09-19

## 2024-08-20 RX ORDER — LEVOFLOXACIN 750 MG/1
750 TABLET, FILM COATED ORAL DAILY
Qty: 13 TABLET | Refills: 0
Start: 2024-08-20 | End: 2024-09-02

## 2024-08-20 RX ADMIN — MEROPENEM 1000 MG: 1 INJECTION INTRAVENOUS at 09:05

## 2024-08-20 RX ADMIN — CLONAZEPAM 1 MG: 1 TABLET ORAL at 08:53

## 2024-08-20 RX ADMIN — TRAMADOL HYDROCHLORIDE 50 MG: 50 TABLET ORAL at 10:09

## 2024-08-20 RX ADMIN — METOPROLOL SUCCINATE 25 MG: 25 TABLET, EXTENDED RELEASE ORAL at 08:53

## 2024-08-20 RX ADMIN — MUPIROCIN: 20 OINTMENT TOPICAL at 08:52

## 2024-08-20 RX ADMIN — IPRATROPIUM BROMIDE AND ALBUTEROL SULFATE 1 DOSE: 2.5; .5 SOLUTION RESPIRATORY (INHALATION) at 15:18

## 2024-08-20 RX ADMIN — LORAZEPAM 0.5 MG: 2 INJECTION INTRAMUSCULAR; INTRAVENOUS at 16:16

## 2024-08-20 RX ADMIN — IPRATROPIUM BROMIDE AND ALBUTEROL SULFATE 1 DOSE: 2.5; .5 SOLUTION RESPIRATORY (INHALATION) at 07:55

## 2024-08-20 RX ADMIN — BUSPIRONE HYDROCHLORIDE 10 MG: 10 TABLET ORAL at 12:21

## 2024-08-20 RX ADMIN — SODIUM CHLORIDE, PRESERVATIVE FREE 10 ML: 5 INJECTION INTRAVENOUS at 08:54

## 2024-08-20 RX ADMIN — IPRATROPIUM BROMIDE AND ALBUTEROL SULFATE 1 DOSE: 2.5; .5 SOLUTION RESPIRATORY (INHALATION) at 03:10

## 2024-08-20 RX ADMIN — MEROPENEM 1000 MG: 1 INJECTION INTRAVENOUS at 04:36

## 2024-08-20 RX ADMIN — ENOXAPARIN SODIUM 40 MG: 100 INJECTION SUBCUTANEOUS at 08:52

## 2024-08-20 RX ADMIN — LISINOPRIL 5 MG: 5 TABLET ORAL at 08:53

## 2024-08-20 RX ADMIN — BUSPIRONE HYDROCHLORIDE 10 MG: 10 TABLET ORAL at 08:53

## 2024-08-20 RX ADMIN — LEVOFLOXACIN 750 MG: 750 TABLET, FILM COATED ORAL at 12:21

## 2024-08-20 RX ADMIN — SULFAMETHOXAZOLE AND TRIMETHOPRIM 1 TABLET: 800; 160 TABLET ORAL at 12:21

## 2024-08-20 RX ADMIN — FUROSEMIDE 20 MG: 20 TABLET ORAL at 08:53

## 2024-08-20 RX ADMIN — WATER 40 MG: 1 INJECTION INTRAMUSCULAR; INTRAVENOUS; SUBCUTANEOUS at 08:54

## 2024-08-20 RX ADMIN — IPRATROPIUM BROMIDE AND ALBUTEROL SULFATE 1 DOSE: 2.5; .5 SOLUTION RESPIRATORY (INHALATION) at 11:23

## 2024-08-20 ASSESSMENT — PAIN DESCRIPTION - LOCATION: LOCATION: BACK

## 2024-08-20 ASSESSMENT — PULMONARY FUNCTION TESTS
PIF_VALUE: 23
PIF_VALUE: 24

## 2024-08-20 ASSESSMENT — PAIN DESCRIPTION - DESCRIPTORS: DESCRIPTORS: ACHING

## 2024-08-20 ASSESSMENT — PAIN SCALES - GENERAL
PAINLEVEL_OUTOF10: 0
PAINLEVEL_OUTOF10: 0
PAINLEVEL_OUTOF10: 5
PAINLEVEL_OUTOF10: 4

## 2024-08-20 ASSESSMENT — PAIN DESCRIPTION - ORIENTATION: ORIENTATION: MID

## 2024-08-20 ASSESSMENT — PAIN - FUNCTIONAL ASSESSMENT: PAIN_FUNCTIONAL_ASSESSMENT: ACTIVITIES ARE NOT PREVENTED

## 2024-08-20 NOTE — PLAN OF CARE
Problem: Discharge Planning  Goal: Discharge to home or other facility with appropriate resources  Outcome: Adequate for Discharge     Problem: Safety - Adult  Goal: Free from fall injury  Outcome: Adequate for Discharge     Problem: Chronic Conditions and Co-morbidities  Goal: Patient's chronic conditions and co-morbidity symptoms are monitored and maintained or improved  Outcome: Adequate for Discharge     Problem: Skin/Tissue Integrity  Goal: Absence of new skin breakdown  Description: 1.  Monitor for areas of redness and/or skin breakdown  2.  Assess vascular access sites hourly  3.  Every 4-6 hours minimum:  Change oxygen saturation probe site  4.  Every 4-6 hours:  If on nasal continuous positive airway pressure, respiratory therapy assess nares and determine need for appliance change or resting period.  Outcome: Adequate for Discharge     Problem: Respiratory - Adult  Goal: Achieves optimal ventilation and oxygenation  Outcome: Adequate for Discharge     Problem: Pain  Goal: Verbalizes/displays adequate comfort level or baseline comfort level  Outcome: Adequate for Discharge

## 2024-08-20 NOTE — PROGRESS NOTES
Patient moved to the stretcher for the ambulance. The Squad team connected their ventilator and monitoring equipment. Patient was pushed off the unit with no s/s of distress noted. She was answering yes/no questions appropriately on the stretcher. Electronically signed by Kamran Hall RN on 8/20/2024 at 4:30 PM     30 26

## 2024-08-20 NOTE — PROGRESS NOTES
4 Eyes Skin Assessment     NAME:  Annie Tyler  YOB: 1955  MEDICAL RECORD NUMBER:  [id number]    The patient is being assessed for  Other shift    I agree that at least one RN has performed a thorough Head to Toe Skin Assessment on the patient. ALL assessment sites listed below have been assessed.      Areas assessed by both nurses:    Head, Face, Ears, Shoulders, Back, Chest, Arms, Elbows, Hands, Sacrum. Buttock, Coccyx, Ischium, Legs. Feet and Heels, and Under Medical Devices         Does the Patient have a Wound? No noted wound(s)       William Prevention initiated by RN: Yes  Wound Care Orders initiated by RN: No    Pressure Injury (Stage 3,4, Unstageable, DTI, NWPT, and Complex wounds) if present, place Wound referral order by RN under : No    New Ostomies, if present place, Ostomy referral order under : No     Nurse 1 eSignature: Electronically signed by Kiersten Romo RN on 8/19/24 at 9:31 PM EDT    **SHARE this note so that the co-signing nurse can place an eSignature**                                Nurse 2 eSignature: Electronically signed by Mariya Duque RN on 8/20/24 at 6:17 AM EDT

## 2024-08-20 NOTE — PROGRESS NOTES
Patient sat up in bed. Respiratory switched the patient to her trach mask. Patient breakfast provided to the patient. She was able to eat with setup and tolerated well. Electronically signed by Kamran Hall RN on 8/20/2024 at 9:44 AM

## 2024-08-20 NOTE — PROGRESS NOTES
Shift assessment completed (see flowsheet).  Patient is alert and oriented x4.  On Trache. Collar  ( trache mask + Mech . Vent at night).  Able to swallow pills whole.  PEG tube intact.    Infusing:  KVO      IV access:  PIVleft dorsal wrist  PIV right forearm        External catheter.

## 2024-08-20 NOTE — PROGRESS NOTES
Pulmonary & Critical Care Medicine ICU Progress Note    CC: COPD, ventilator dependent    Events of Last 24 hours:  Tolerated CATC x 12 hours  Copious secretions    Invasive Lines: PIV    MV:  chronic  Vent Mode: AC/VC Resp Rate (Set): 15 bpm/Vt (Set, mL): 400 mL/ /FiO2 : 45 %  Recent Labs     08/17/24  0939   PHART 7.453*   JJV7FIN 43.3   PO2ART 67.6*       IV:   dextrose      sodium chloride         Vitals:  /79   Pulse 83   Temp (!) 96.7 °F (35.9 °C) (Temporal)   Resp 22   Wt 65.2 kg (143 lb 11.8 oz)   SpO2 98%   BMI 25.46 kg/m²  on vent  Constitutional:  No acute distress.   Eyes: PERRL. Conjunctivae anicteric.   ENT: Normal nose. Trach in place   Neck:  Trachea is midline.   Respiratory: No accessory muscle usage.  Decreased breath sounds. No wheezes. No rales. No Rhonchi.  Cardiovascular: Normal S1S2. No digit clubbing. No digit cyanosis. No LE edema.   Psychiatric: + anxiety or Agitation. Alert and Oriented to person, place and time.     Scheduled Meds:   mupirocin   Each Nostril BID    clonazePAM  1 mg Oral Q12H    meropenem  1,000 mg IntraVENous Q8H    insulin lispro  0-4 Units SubCUTAneous TID WC    insulin lispro  0-4 Units SubCUTAneous Nightly    ipratropium 0.5 mg-albuterol 2.5 mg  1 Dose Inhalation Q4H RT    methylPREDNISolone  40 mg IntraVENous Daily    atorvastatin  40 mg Oral Nightly    busPIRone  10 mg Oral TID    furosemide  20 mg Oral Daily    lisinopril  5 mg Oral Daily    metoprolol succinate  25 mg Oral Daily    [Held by provider] spironolactone  12.5 mg Oral Daily    sodium chloride flush  5-40 mL IntraVENous 2 times per day    enoxaparin  40 mg SubCUTAneous Daily     PRN Meds:  LORazepam, morphine **OR** [DISCONTINUED] morphine, glucose, dextrose bolus **OR** dextrose bolus, glucagon (rDNA), dextrose, albuterol, traMADol, sodium chloride flush, sodium chloride, potassium chloride **OR** potassium alternative oral replacement **OR** potassium chloride, magnesium sulfate,

## 2024-08-20 NOTE — PROGRESS NOTES
Called and updated the patients Daughter about the patients discharge order and likely discharge back to [facility]. Thuy (daughter) was concerned because when the patient came into the hospital her mental status was altered and she was not able to talk or recognize her relatives. The patient is currently alert and oriented to person, place and time. She is able to speak when she has her PSV in. Updated Dr. Faulkner. Patient is appropriate for discharge at this time. Updated  Electronically signed by Kamran Hall RN on 8/20/2024 at 3:12 PM

## 2024-08-20 NOTE — PROGRESS NOTES
Called Thuy Tyler (daughter) back to update her on the discharge after talking with Dr. Faulkner. Multiple questions answered about the patients discharge antibiotics and current mental status. Family is agreeable with discharge back to [facility] at this time. Spoke with DCP  is at 1630. Electronically signed by Kamran Hall RN on 8/20/2024 at 3:36 PM

## 2024-08-20 NOTE — ACP (ADVANCE CARE PLANNING)
Advance Care Planning     Advance Care Planning Inpatient Note  [facility] Department    Today's Date: 8/20/2024  [facility]    Received request from IDT Member.  Upon review of chart and communication with care team, patient's decision making abilities are not in question.. Patient was/were present in the room during visit.    Goals of ACP Conversation:  Discuss advance care planning documents    Health Care Decision Makers:       Primary Decision Maker: HUMERA STEIN - Child - [phone]    Secondary Decision Maker: Nilda Stein - Child - [phone]  Summary:  Verified Documents  Verified Healthcare Decision Maker  Updated Healthcare Decision Maker    Advance Care Planning Documents (Patient Wishes):  Healthcare Power of /Advance Directive Appointment of Health Care Agent  Living Will/Advance Directive     Assessment:  Patient not clear if she has communicated her care preference to her family.  She confirmed her desire for resuscitation attempts and mechanical ventilation if needed.  Patient's stated wishes are consistent with her present code status.    Interventions:  Provided education on documents for clarity and greater understanding  Encouraged ongoing ACP conversation with future decision makers and loved ones    Care Preferences Communicated:   Ventilation:   If the patient, in their present state of health, suddenly became very ill and unable to breathe on their own,     the patient would desire the use of a ventilator (breathing machine).    If their health worsens and it becomes clear that the change of recovery is unlikely,     the patient would desire the use of a ventilator (breathing machine).    Resuscitation:  In the event the patient's heart stopped as a result of an underlying serious health condition, the patient communicates a preference for      resuscitative attempts (CPR).    Outcomes/Plan:  ACP Discussion: Completed  Existing advance directive reviewed with  patient; no changes to patient's previously recorded wishes.    Electronically signed by LEATHA Martinez on 8/20/2024 at 1:34 PM

## 2024-08-20 NOTE — PLAN OF CARE
Problem: Safety - Adult  Goal: Free from fall injury  Outcome: Progressing     Problem: Chronic Conditions and Co-morbidities  Goal: Patient's chronic conditions and co-morbidity symptoms are monitored and maintained or improved  Outcome: Progressing     Problem: Skin/Tissue Integrity  Goal: Absence of new skin breakdown  Description: 1.  Monitor for areas of redness and/or skin breakdown  2.  Assess vascular access sites hourly  3.  Every 4-6 hours minimum:  Change oxygen saturation probe site  4.  Every 4-6 hours:  If on nasal continuous positive airway pressure, respiratory therapy assess nares and determine need for appliance change or resting period.  Outcome: Progressing     Problem: Respiratory - Adult  Goal: Achieves optimal ventilation and oxygenation  Outcome: Progressing     Problem: Pain  Goal: Verbalizes/displays adequate comfort level or baseline comfort level  Outcome: Progressing

## 2024-08-20 NOTE — DISCHARGE INSTR - COC
Continuity of Care Form    Patient Name: Annie Stein   :  1955  MRN:  [id number]    Admit date:  2024  Discharge date:  2024    Code Status Order: Full Code   Advance Directives:   Advance Care Flowsheet Documentation             Admitting Physician:  Sangita Lee MD  PCP: Bruce Nichols MD    Discharging Nurse: Delfino  Discharging Hospital Unit/Room#: 3005/3005-01  Discharging Unit Phone Number: [phone]    Emergency Contact:   Extended Emergency Contact Information  Primary Emergency Contact: HUMERA STEIN  Home Phone: [phone]  Mobile Phone: [phone]  Relation: Child  Secondary Emergency Contact: shaheen lim  Home Phone: [phone]  Relation: Other    Past Surgical History:  Past Surgical History:   Procedure Laterality Date    CARDIAC PROCEDURE N/A 2024    Left and right heart cath / coronary angiography performed by Merary Davis MD at [facility] CARDIAC CATH LAB    CHOLECYSTECTOMY      COLONOSCOPY         Immunization History:   Immunization History   Administered Date(s) Administered    Influenza Vaccine, unspecified formulation 2013, 10/26/2016    Influenza Virus Vaccine 2013, 10/26/2016, 2017    Influenza, FLUARIX, FLULAVAL, FLUZONE (age 6 mo+) and AFLURIA, (age 3 y+), Quadv PF, 0.5mL 10/26/2016, 2017, 2020, 2022    TDaP, ADACEL (age 10y-64y), BOOSTRIX (age 10y+), IM, 0.5mL 2017       Active Problems:  Patient Active Problem List   Diagnosis Code    Chest pain R07.9    Shortness of breath R06.02    Tobacco use Z72.0    Moderate COPD (chronic obstructive pulmonary disease) ([facility]) J44.9    COPD exacerbation ([facility]) J44.1    Acute respiratory failure with hypoxia ([facility]) J96.01    Hyponatremia E87.1    Pulmonary nodule R91.1    Pulmonary emphysema ([facility]) J43.9    Acute on chronic respiratory failure with hypoxia and hypercapnia ([facility]) J96.21, J96.22    Community acquired pneumonia of right lung J18.9    Acute exacerbation of  [Urine:2200]    Safety Concerns:     Aspiration Risk    Impairments/Disabilities:      Speech    Nutrition Therapy:  Current Nutrition Therapy:   - Oral Diet:  Dysphagia - Soft and Bite Sized    Routes of Feeding: Oral and Gastrostomy Tube  Liquids: Thin Liquids  Daily Fluid Restriction: no  Last Modified Barium Swallow with Video (Video Swallowing Test): not done    Treatments at the Time of Hospital Discharge:   Respiratory Treatments: Albuterol and Douneb HHN  Oxygen Therapy:   Is in trach Mask with 40% FiO2, Ventilator at night and PRN.   Ventilator:    - Ventilator Settings:    Vt (Set, mL): 400 mL  Resp Rate (Set): 15 bpm  FiO2 : 40 %    PEEP/CPAP (cmH2O): 5       Rehab Therapies: Physical Therapy, Occupational Therapy, and Speech/Language Therapy  Weight Bearing Status/Restrictions: No weight bearing restrictions  Other Medical Equipment (for information only, NOT a DME order):  wheelchair, bedside commode, and hospital bed  Other Treatments:     Patient's personal belongings (please select all that are sent with patient):  {RIZWAN DME Belongings:[id number]}    RN SIGNATURE:  {Esignature:[id number]}    CASE MANAGEMENT/SOCIAL WORK SECTION    Inpatient Status Date: 8/16/24    Readmission Risk Assessment Score:  Readmission Risk              Risk of Unplanned Readmission:  44           Discharging to Facility/ Agency   Name:  [facility]  Address: [address] [place] [address]  Phone: [phone]  Fax:    Dialysis Facility (if applicable)   Name:   Address:  Dialysis Schedule:  Phone:  Fax:    / signature: Electronically signed by Teresa Boeck, RN on 8/20/24 at 4:02 PM EDT    PHYSICIAN SECTION    Prognosis: Fair    Condition at Discharge: Stable    Rehab Potential (if transferring to Rehab): Good    Recommended Labs or Other Treatments After Discharge:  BMP in 1 week   Wean o2 as able  PT OT       Physician Certification: I certify the above information and transfer of Annie Tyler  is

## 2024-08-20 NOTE — CARE COORDINATION
DISCHARGE ORDER  Date/Time 2024 4:07 PM  Completed by: Teresa Boeck, RN, Case Management    Patient Name: Annie Tyler    : 1955      Admit order Date and Status:24  Noted discharge order. (verify MD's last order for status of admission/Traditional Medicare 3 [place] Inpatient qualifying stay required for SNF)    Confirmed discharge plan with:              Patient:  No              When pt confirms DC plan does any support person need to be contacted by CM Yes if yes who - Delfino RN will contact the family                      Discharge to Facility: [facility]   Facility phone number for staff giving report: [phone]   Pre-certification completed: NA   Hospital Exemption Notification (HENS) completed: NA   Discharge orders and Continuity of Care faxed to facility:  [facility]      Transportation:               Medical Transport explained with choice list offered to pt/family.                Choice:No(no preference)  Agency used: [facility]   time:   16:30      Pt/family/Nursing/Facility aware of  time:  Yes Names: Delfino RN, family, patient, HUC paperwork, Enrique/Mya Resendiz.  Ambulance form completed:  yes     Date Last IMM Given: NA    Comments: Pt is being d/c'd to [facility] today. Pt's O2 sats are 98% on 8L.    Discharge timeout done with Delfino RN. All discharge needs and concerns addressed.    Discharging nurse to complete CHIKI, reconcile AVS, and place final copy with patient's discharge packet. Discharging RN to ensure that written prescriptions for  Level II medications are sent with patient to the facility as per protocol.

## 2024-08-20 NOTE — DISCHARGE SUMMARY
Name:  Annie Tyler  Room:  3005/3005-01  MRN:    [id number]    IM Discharge Summary    Discharging Physician:  Byron river MD    Admit: 8/16/2024    Discharge:      PCP      Bruce Nichols MD    Diagnoses and hospital course  this Admission           # Acute on chronic hypoxic respiratory failure with underlying chronic trach dependency   # COPD exacerbation.    # PNA - suspected aspiration pneumonitis, possible gram neg  infection    - admitted to ICU, pulm consulted- continued vent support and adjusted as needed to maintain spo2 >92  - imaging with persistent ill-defined opacity at the left lung base favored to represent atelectasis   -  blood with gram neg rods and high wbc noted but appears to be contamination   - had bronch with BAL , cx with stenotrophomonas,, acinetobacter and staph aureus   - changed merrem to levaquin and bactrim now based on BAL   -   contd HHN, consider to wean solumedrol  - improved symptoms and hypoxia   - adjusted anxiety meds and pt liberated from vent and now on trach collar   - dc back to SNF for continued o2 weaning        Gram neg bacteremia - source unclear- could be contamination  Given abx as above  Resolved leucocytosis and no fevers noted        # History of right upper lobe pulm nodule/cancer.  Not a surgical candidate s/p SBRT.  Continue to monitor outpatient        # History of Takotsubo cardiomyopathy with mildly elevated troponins.    -Last left heart cath was done recently in June without any complications and no occlusive CAD        # Healing displaced fracture of the proximal left humerus   -found in June 2024.  Patient was placed on a sling and had nonweightbearing restrictions         # Mood disorder  - on buspirone and Lexapro     # Chronic diastolic heart failure with preserved ejection fraction   -on Lasix 20 mg daily as needed  - hold aldactone /ACEi for high K              HPI  68-year-old female with past medical history of hypertension chronic

## 2024-08-20 NOTE — PROGRESS NOTES
Patient is being discharged from ICU. They are alert and oriented times 4. Speech is understandable with her PSV. She is being transported on a ventilaor via her trach. Discharge education provided reviewed with the nursing staff from [facility] (Viky). Ativan administered for agitation prior to the transport at the patients request. Patient verbalized understanding. Family stated that they will make their own follow up appointments. They verbalized when they should follow up. Patient verbalized that they have all of their belongings. IV removed per policy and procedure. Catheter intact. Bandage applied. Ambulance at bedside to take the patient home. Report provided to Viky GALVEZ at [facility]. Verbalized understanding. Electronically signed by Kamran Hall RN on 8/20/2024 at 4:19 PM

## 2024-08-20 NOTE — PLAN OF CARE
HEART FAILURE CARE PLAN:    Comorbidities Reviewed: Yes   Patient has a past medical history of Angina pectoris (HCC), Chronic pain, Congestive heart failure (HCC), COPD exacerbation (HCC), Depression, Essential hypertension, Panic disorder, Pneumonia, and Pulmonary emphysema (HCC).     Weights Reviewed: Yes   Admission weight: 58.6 kg (129 lb 3 oz)   Wt Readings from Last 3 Encounters:   08/17/24 58.6 kg (129 lb 3 oz)   08/16/24 64.4 kg (142 lb)   07/31/24 64.4 kg (142 lb)     Intake & Output Reviewed: Yes     Intake/Output Summary (Last 24 hours) at 8/19/2024 2133  Last data filed at 8/19/2024 2000  Gross per 24 hour   Intake 1377.2 ml   Output 1700 ml   Net -322.8 ml       ECHOCARDIOGRAM Reviewed: Yes   Patient's Ejection Fraction (EF) is greater than 40%     Medications Reviewed: Yes   SCHEDULED HOSPITAL MEDICATIONS:   mupirocin   Each Nostril BID    clonazePAM  1 mg Oral Q12H    meropenem  1,000 mg IntraVENous Q8H    insulin lispro  0-4 Units SubCUTAneous TID WC    insulin lispro  0-4 Units SubCUTAneous Nightly    ipratropium 0.5 mg-albuterol 2.5 mg  1 Dose Inhalation Q4H RT    methylPREDNISolone  40 mg IntraVENous Daily    atorvastatin  40 mg Oral Nightly    busPIRone  10 mg Oral TID    furosemide  20 mg Oral Daily    lisinopril  5 mg Oral Daily    metoprolol succinate  25 mg Oral Daily    [Held by provider] spironolactone  12.5 mg Oral Daily    sodium chloride flush  5-40 mL IntraVENous 2 times per day    enoxaparin  40 mg SubCUTAneous Daily     HOME MEDICATIONS:  Prior to Admission medications    Medication Sig Start Date End Date Taking? Authorizing Provider   Miconazole-Zinc Oxide-Petrolat 0.25-15-81.35 % OINT Apply topically 2 times daily Indications: excoration To lionel area   Yes ProviderJoey MD   budesonide (PULMICORT) 0.5 MG/2ML nebulizer suspension Take 2 mLs by nebulization 2 times daily Indications: Chronic Obstructive Lung Disease   Yes Provider, MD Joey   levoFLOXacin (LEVAQUIN)  TABS Take 1 tablet by mouth daily  Patient not taking: Reported on 8/28/2022 4/12/21 9/1/22  Pat Liu MD      Diet Reviewed: Yes   ADULT DIET; Dysphagia - Soft and Bite Sized    Goal of Care Reviewed: Yes   Patient and/or Family's stated Goal of Care this Admission: Reduce shortness of breath, increase activity tolerance, better understand heart failure and disease management, be more comfortable, and reduce lower extremity edema prior to discharge.     Electronically signed by Kiersten Romo RN on 8/19/2024 at 9:33 PM

## 2024-08-20 NOTE — PROGRESS NOTES
Patient remains alert and oriented x4, trache collar connected to mech. Vent, without signs of respiratory distress. Patient denies any discomfort or pain at this time. Afebrile. Bed in lowest position, call light within reach.

## 2024-08-21 LAB
BACTERIA BLD CULT ORG #2: NORMAL
BACTERIA BLD CULT: ABNORMAL
BACTERIA BLD CULT: ABNORMAL
ORGANISM: ABNORMAL

## 2024-08-22 LAB
BACTERIA SPEC RESP CULT: ABNORMAL
GRAM STN SPEC: ABNORMAL
ORGANISM: ABNORMAL

## 2024-08-25 NOTE — RESULT ENCOUNTER NOTE
Culture result reviewed, patient inpatient being managed by inpatient team who is aware of culture results.

## 2024-08-26 LAB
BACTERIA SPEC RESP CULT: ABNORMAL
GRAM STN SPEC: ABNORMAL
ORGANISM: ABNORMAL
REPORT: NORMAL

## 2024-08-26 NOTE — PROGRESS NOTES
Physician Progress Note      PATIENT:               JAKI STEIN  [facility] #:                  [id number]  :                       1955  ADMIT DATE:       2024 6:24 PM  DISCH DATE:        2024 4:33 PM  RESPONDING  PROVIDER #:        Byron Faulkner MD          QUERY TEXT:    Pt admitted with respiratory distress. Pt noted to have altered mental status   per ED provider. If possible, please document in the progress notes and   discharge summary if you are evaluating and / or treating any of the   following:    The medical record reflects the following:  Risk Factors: AE COPD, acute on chronic respiratory failure vent dependent,   CHF, suspected aspiration pneumonitis    Clinical Indicators: Per ED provider -\"Mental status was quite somnolent   on arrival but she was in respiratory distress.  Once we were able to   stabilize her respiratory status her mental status improved.\"  Per Pulm consult -\"Acute on chronic hypercarbic respiratory failure\"  VBG pCO2 56.2->44.6  BC = + GNR    Treatment: Pulmonary consult, mechanical ventilation, VBG, O2, Levaquin   changed to Cefepime, labs, imaging    Thank you,  Vee Alan RN BSN CRCR CCDS  [email or web address]  Options provided:  -- Metabolic encephalopathy due to acute hypercarbic respiratory   failure/infections, treated and resolved  -- Other - I will add my own diagnosis  -- Disagree - Not applicable / Not valid  -- Disagree - Clinically unable to determine / Unknown  -- Refer to Clinical Documentation Reviewer    PROVIDER RESPONSE TEXT:    Metabolic encephalopathy due to acute hypercarbic respiratory   failure/infections, treated and resolved    Query created by: Carmel Alan on 2024 1:52 PM      QUERY TEXT:    Pt admitted with respiratory distress/suspected aspiration pneumonitis. Pt   noted to have WBC 14.2, lactic acid 2.7-4.9, , RR 29-49 and was   somnolent on arrival to ED. If possible, please document in the progress

## 2024-09-05 LAB
BACTERIA SPEC RESP CULT: ABNORMAL
GRAM STN SPEC: ABNORMAL
Lab: NORMAL
ORGANISM: ABNORMAL
REPORT: NORMAL
THIS TEST SENT TO: NORMAL

## 2024-09-08 ENCOUNTER — TELEPHONE (OUTPATIENT)
Dept: CASE MANAGEMENT | Age: 69
End: 2024-09-08

## 2024-10-14 ENCOUNTER — TELEPHONE (OUTPATIENT)
Dept: CASE MANAGEMENT | Age: 69
End: 2024-10-14

## 2024-10-14 NOTE — TELEPHONE ENCOUNTER
Per CT Lung Screening 2/28/2024, LRAD4A. 8mm X 5mm spiculated. Pt received CTA Chest Pulm 8/16/2024, nodule now 12 mm spiculated RUL.     Pt does not have an appt until 2/7/2025.    Thank you,  Jennifer Barnes RN  [facility] Lung Navigator  [phone]

## 2024-10-15 NOTE — TELEPHONE ENCOUNTER
Called pt mobile number it rang about 10 times then said patient is unavailable. Called patient daughter to get her mother scheduled, she informed that she is at [facility]. As I was going to call [facility], Dr Saez said that he rounds there and sees the patient at that nursing home. Se stated that she is on a vent and Jude Luis may not want her to travel over here. They spoke and Dr Saez said that he will see her when he rounds there, she is a high risk to bring her here with 10 L of O2 and being on a vent.     Called daughter to inform her of this, she verbalized understanding

## 2024-11-27 ENCOUNTER — APPOINTMENT (OUTPATIENT)
Dept: CT IMAGING | Age: 69
DRG: 208 | End: 2024-11-27
Payer: MEDICARE

## 2024-11-27 ENCOUNTER — APPOINTMENT (OUTPATIENT)
Dept: GENERAL RADIOLOGY | Age: 69
DRG: 208 | End: 2024-11-27
Payer: MEDICARE

## 2024-11-27 ENCOUNTER — HOSPITAL ENCOUNTER (INPATIENT)
Age: 69
LOS: 5 days | Discharge: NURSING, REHAB OR LONG-TERM CARE | DRG: 208 | End: 2024-12-02
Attending: STUDENT IN AN ORGANIZED HEALTH CARE EDUCATION/TRAINING PROGRAM | Admitting: INTERNAL MEDICINE
Payer: MEDICARE

## 2024-11-27 DIAGNOSIS — R06.02 SHORTNESS OF BREATH: ICD-10-CM

## 2024-11-27 DIAGNOSIS — R09.02 HYPOXIA: ICD-10-CM

## 2024-11-27 DIAGNOSIS — A41.9 SEPTICEMIA (HCC): ICD-10-CM

## 2024-11-27 DIAGNOSIS — R45.1 AGITATION: ICD-10-CM

## 2024-11-27 DIAGNOSIS — R91.1 PULMONARY NODULE: ICD-10-CM

## 2024-11-27 DIAGNOSIS — J44.1 COPD EXACERBATION (HCC): Primary | ICD-10-CM

## 2024-11-27 PROBLEM — R79.89 ELEVATED TROPONIN: Status: ACTIVE | Noted: 2024-11-27

## 2024-11-27 LAB
ACANTHOCYTES BLD QL SMEAR: ABNORMAL
ALBUMIN SERPL-MCNC: 3.5 G/DL (ref 3.4–5)
ALBUMIN/GLOB SERPL: 1.2 {RATIO} (ref 1.1–2.2)
ALP SERPL-CCNC: 156 U/L (ref 40–129)
ALT SERPL-CCNC: 17 U/L (ref 10–40)
ANION GAP SERPL CALCULATED.3IONS-SCNC: 8 MMOL/L (ref 3–16)
ANISOCYTOSIS BLD QL SMEAR: ABNORMAL
AST SERPL-CCNC: 24 U/L (ref 15–37)
BASE EXCESS BLDA CALC-SCNC: -0.4 MMOL/L (ref -3–3)
BASE EXCESS BLDA CALC-SCNC: -0.5 MMOL/L (ref -3–3)
BASE EXCESS BLDV CALC-SCNC: 2.2 MMOL/L (ref -3–3)
BASOPHILS # BLD: 0.2 K/UL (ref 0–0.2)
BASOPHILS NFR BLD: 1 %
BILIRUB SERPL-MCNC: 0.3 MG/DL (ref 0–1)
BUN SERPL-MCNC: 15 MG/DL (ref 7–20)
CALCIUM SERPL-MCNC: 8.5 MG/DL (ref 8.3–10.6)
CHLORIDE SERPL-SCNC: 95 MMOL/L (ref 99–110)
CO2 BLDA-SCNC: 27.8 MMOL/L
CO2 BLDA-SCNC: 28.2 MMOL/L
CO2 BLDV-SCNC: 33 MMOL/L
CO2 SERPL-SCNC: 31 MMOL/L (ref 21–32)
COHGB MFR BLDA: 0.3 % (ref 0–1.5)
COHGB MFR BLDA: 0.3 % (ref 0–1.5)
COHGB MFR BLDV: 0.5 % (ref 0–1.5)
CREAT SERPL-MCNC: 0.6 MG/DL (ref 0.6–1.2)
DEPRECATED RDW RBC AUTO: 14.8 % (ref 12.4–15.4)
EKG ATRIAL RATE: 100 BPM
EKG ATRIAL RATE: 100 BPM
EKG DIAGNOSIS: NORMAL
EKG DIAGNOSIS: NORMAL
EKG P AXIS: 69 DEGREES
EKG P AXIS: 78 DEGREES
EKG P-R INTERVAL: 134 MS
EKG P-R INTERVAL: 144 MS
EKG Q-T INTERVAL: 348 MS
EKG Q-T INTERVAL: 366 MS
EKG QRS DURATION: 70 MS
EKG QRS DURATION: 74 MS
EKG QTC CALCULATION (BAZETT): 448 MS
EKG QTC CALCULATION (BAZETT): 472 MS
EKG R AXIS: -57 DEGREES
EKG R AXIS: -64 DEGREES
EKG T AXIS: 44 DEGREES
EKG T AXIS: 72 DEGREES
EKG VENTRICULAR RATE: 100 BPM
EKG VENTRICULAR RATE: 100 BPM
EOSINOPHIL # BLD: 0 K/UL (ref 0–0.6)
EOSINOPHIL NFR BLD: 0 %
FLUAV RNA RESP QL NAA+PROBE: NOT DETECTED
FLUBV RNA RESP QL NAA+PROBE: NOT DETECTED
GFR SERPLBLD CREATININE-BSD FMLA CKD-EPI: >90 ML/MIN/{1.73_M2}
GLUCOSE BLD-MCNC: 145 MG/DL (ref 70–99)
GLUCOSE BLD-MCNC: 170 MG/DL (ref 70–99)
GLUCOSE BLD-MCNC: 183 MG/DL (ref 70–99)
GLUCOSE SERPL-MCNC: 149 MG/DL (ref 70–99)
HCO3 BLDA-SCNC: 26.2 MMOL/L (ref 21–29)
HCO3 BLDA-SCNC: 26.5 MMOL/L (ref 21–29)
HCO3 BLDV-SCNC: 30.5 MMOL/L (ref 23–29)
HCT VFR BLD AUTO: 31.6 % (ref 36–48)
HGB BLD-MCNC: 10.2 G/DL (ref 12–16)
HGB BLDA-MCNC: 10.5 G/DL (ref 12–16)
HGB BLDA-MCNC: 10.8 G/DL (ref 12–16)
HYPOCHROMIA BLD QL SMEAR: ABNORMAL
LACTATE BLDV-SCNC: 1.6 MMOL/L (ref 0.4–1.9)
LYMPHOCYTES # BLD: 1.1 K/UL (ref 1–5.1)
LYMPHOCYTES NFR BLD: 5 %
MCH RBC QN AUTO: 28.3 PG (ref 26–34)
MCHC RBC AUTO-ENTMCNC: 32.4 G/DL (ref 31–36)
MCV RBC AUTO: 87.3 FL (ref 80–100)
METHGB MFR BLDA: 0 %
METHGB MFR BLDA: 0.1 %
METHGB MFR BLDV: 0.3 %
MONOCYTES # BLD: 2.1 K/UL (ref 0–1.3)
MONOCYTES NFR BLD: 9 %
NEUTROPHILS # BLD: 19.5 K/UL (ref 1.7–7.7)
NEUTROPHILS NFR BLD: 85 %
NT-PROBNP SERPL-MCNC: 258 PG/ML (ref 0–124)
O2 CT VFR BLDV CALC: 15 VOL %
O2 THERAPY: ABNORMAL
OVALOCYTES BLD QL SMEAR: ABNORMAL
PCO2 BLDA: 52.3 MMHG (ref 35–45)
PCO2 BLDA: 54.8 MMHG (ref 35–45)
PCO2 BLDV: 67.3 MMHG (ref 40–50)
PERFORMED ON: ABNORMAL
PH BLDA: 7.3 [PH] (ref 7.35–7.45)
PH BLDA: 7.32 [PH] (ref 7.35–7.45)
PH BLDV: 7.27 [PH] (ref 7.35–7.45)
PLATELET # BLD AUTO: 368 K/UL (ref 135–450)
PLATELET BLD QL SMEAR: ADEQUATE
PMV BLD AUTO: 7.9 FL (ref 5–10.5)
PO2 BLDA: 102.3 MMHG (ref 75–108)
PO2 BLDA: 110.5 MMHG (ref 75–108)
PO2 BLDV: 71.3 MMHG (ref 25–40)
POIKILOCYTOSIS BLD QL SMEAR: ABNORMAL
POLYCHROMASIA BLD QL SMEAR: ABNORMAL
POTASSIUM SERPL-SCNC: 3.6 MMOL/L (ref 3.5–5.1)
PROCALCITONIN SERPL IA-MCNC: 0.06 NG/ML (ref 0–0.15)
PROT SERPL-MCNC: 6.5 G/DL (ref 6.4–8.2)
RBC # BLD AUTO: 3.62 M/UL (ref 4–5.2)
REASON FOR REJECTION: NORMAL
REASON FOR REJECTION: NORMAL
REJECTED TEST: NORMAL
REJECTED TEST: NORMAL
SAO2 % BLDA: 97 %
SAO2 % BLDA: 97.6 %
SAO2 % BLDV: 92 %
SARS-COV-2 RNA RESP QL NAA+PROBE: NOT DETECTED
SLIDE REVIEW: ABNORMAL
SODIUM SERPL-SCNC: 134 MMOL/L (ref 136–145)
TARGETS BLD QL SMEAR: ABNORMAL
TROPONIN, HIGH SENSITIVITY: 72 NG/L (ref 0–14)
TROPONIN, HIGH SENSITIVITY: 81 NG/L (ref 0–14)
WBC # BLD AUTO: 22.9 K/UL (ref 4–11)

## 2024-11-27 PROCEDURE — 84145 PROCALCITONIN (PCT): CPT

## 2024-11-27 PROCEDURE — 1200000000 HC SEMI PRIVATE

## 2024-11-27 PROCEDURE — 82803 BLOOD GASES ANY COMBINATION: CPT

## 2024-11-27 PROCEDURE — 87040 BLOOD CULTURE FOR BACTERIA: CPT

## 2024-11-27 PROCEDURE — 93005 ELECTROCARDIOGRAM TRACING: CPT | Performed by: STUDENT IN AN ORGANIZED HEALTH CARE EDUCATION/TRAINING PROGRAM

## 2024-11-27 PROCEDURE — 87636 SARSCOV2 & INF A&B AMP PRB: CPT

## 2024-11-27 PROCEDURE — 96374 THER/PROPH/DIAG INJ IV PUSH: CPT

## 2024-11-27 PROCEDURE — 6360000002 HC RX W HCPCS: Performed by: STUDENT IN AN ORGANIZED HEALTH CARE EDUCATION/TRAINING PROGRAM

## 2024-11-27 PROCEDURE — 2580000003 HC RX 258: Performed by: NURSE PRACTITIONER

## 2024-11-27 PROCEDURE — 94640 AIRWAY INHALATION TREATMENT: CPT

## 2024-11-27 PROCEDURE — 99223 1ST HOSP IP/OBS HIGH 75: CPT | Performed by: STUDENT IN AN ORGANIZED HEALTH CARE EDUCATION/TRAINING PROGRAM

## 2024-11-27 PROCEDURE — 6370000000 HC RX 637 (ALT 250 FOR IP): Performed by: INTERNAL MEDICINE

## 2024-11-27 PROCEDURE — 83880 ASSAY OF NATRIURETIC PEPTIDE: CPT

## 2024-11-27 PROCEDURE — 6360000002 HC RX W HCPCS: Performed by: NURSE PRACTITIONER

## 2024-11-27 PROCEDURE — 80053 COMPREHEN METABOLIC PANEL: CPT

## 2024-11-27 PROCEDURE — 71045 X-RAY EXAM CHEST 1 VIEW: CPT

## 2024-11-27 PROCEDURE — 99223 1ST HOSP IP/OBS HIGH 75: CPT | Performed by: INTERNAL MEDICINE

## 2024-11-27 PROCEDURE — 36415 COLL VENOUS BLD VENIPUNCTURE: CPT

## 2024-11-27 PROCEDURE — 2700000000 HC OXYGEN THERAPY PER DAY

## 2024-11-27 PROCEDURE — 94002 VENT MGMT INPAT INIT DAY: CPT

## 2024-11-27 PROCEDURE — 5A1945Z RESPIRATORY VENTILATION, 24-96 CONSECUTIVE HOURS: ICD-10-PCS | Performed by: INTERNAL MEDICINE

## 2024-11-27 PROCEDURE — 2580000003 HC RX 258: Performed by: STUDENT IN AN ORGANIZED HEALTH CARE EDUCATION/TRAINING PROGRAM

## 2024-11-27 PROCEDURE — 96361 HYDRATE IV INFUSION ADD-ON: CPT

## 2024-11-27 PROCEDURE — 99285 EMERGENCY DEPT VISIT HI MDM: CPT

## 2024-11-27 PROCEDURE — 83605 ASSAY OF LACTIC ACID: CPT

## 2024-11-27 PROCEDURE — 6360000004 HC RX CONTRAST MEDICATION: Performed by: STUDENT IN AN ORGANIZED HEALTH CARE EDUCATION/TRAINING PROGRAM

## 2024-11-27 PROCEDURE — 6370000000 HC RX 637 (ALT 250 FOR IP): Performed by: NURSE PRACTITIONER

## 2024-11-27 PROCEDURE — 93010 ELECTROCARDIOGRAM REPORT: CPT | Performed by: STUDENT IN AN ORGANIZED HEALTH CARE EDUCATION/TRAINING PROGRAM

## 2024-11-27 PROCEDURE — 85025 COMPLETE CBC W/AUTO DIFF WBC: CPT

## 2024-11-27 PROCEDURE — 71260 CT THORAX DX C+: CPT

## 2024-11-27 PROCEDURE — 84484 ASSAY OF TROPONIN QUANT: CPT

## 2024-11-27 RX ORDER — SCOLOPAMINE TRANSDERMAL SYSTEM 1 MG/1
1 PATCH, EXTENDED RELEASE TRANSDERMAL
Status: DISCONTINUED | OUTPATIENT
Start: 2024-11-27 | End: 2024-12-02 | Stop reason: HOSPADM

## 2024-11-27 RX ORDER — DOXYCYCLINE HYCLATE 100 MG
100 TABLET ORAL EVERY 12 HOURS SCHEDULED
Status: COMPLETED | OUTPATIENT
Start: 2024-11-27 | End: 2024-12-02

## 2024-11-27 RX ORDER — ATORVASTATIN CALCIUM 40 MG/1
40 TABLET, FILM COATED ORAL NIGHTLY
Status: DISCONTINUED | OUTPATIENT
Start: 2024-11-27 | End: 2024-12-02 | Stop reason: HOSPADM

## 2024-11-27 RX ORDER — ONDANSETRON 2 MG/ML
4 INJECTION INTRAMUSCULAR; INTRAVENOUS EVERY 6 HOURS PRN
Status: DISCONTINUED | OUTPATIENT
Start: 2024-11-27 | End: 2024-12-02 | Stop reason: HOSPADM

## 2024-11-27 RX ORDER — TRAMADOL HYDROCHLORIDE 50 MG/1
50 TABLET ORAL EVERY 8 HOURS PRN
Status: ON HOLD | COMMUNITY
End: 2024-12-02 | Stop reason: HOSPADM

## 2024-11-27 RX ORDER — IOPAMIDOL 755 MG/ML
75 INJECTION, SOLUTION INTRAVASCULAR
Status: COMPLETED | OUTPATIENT
Start: 2024-11-27 | End: 2024-11-27

## 2024-11-27 RX ORDER — IPRATROPIUM BROMIDE AND ALBUTEROL SULFATE 2.5; .5 MG/3ML; MG/3ML
1 SOLUTION RESPIRATORY (INHALATION) EVERY 4 HOURS PRN
Status: DISCONTINUED | OUTPATIENT
Start: 2024-11-27 | End: 2024-12-02 | Stop reason: HOSPADM

## 2024-11-27 RX ORDER — 0.9 % SODIUM CHLORIDE 0.9 %
1000 INTRAVENOUS SOLUTION INTRAVENOUS ONCE
Status: COMPLETED | OUTPATIENT
Start: 2024-11-27 | End: 2024-11-27

## 2024-11-27 RX ORDER — SODIUM CHLORIDE 0.9 % (FLUSH) 0.9 %
5-40 SYRINGE (ML) INJECTION EVERY 12 HOURS SCHEDULED
Status: DISCONTINUED | OUTPATIENT
Start: 2024-11-27 | End: 2024-12-02 | Stop reason: HOSPADM

## 2024-11-27 RX ORDER — LORAZEPAM 0.5 MG/1
0.5 TABLET ORAL EVERY 12 HOURS
Status: DISCONTINUED | OUTPATIENT
Start: 2024-11-27 | End: 2024-12-02 | Stop reason: HOSPADM

## 2024-11-27 RX ORDER — ASPIRIN 81 MG/1
81 TABLET, CHEWABLE ORAL DAILY
Status: DISCONTINUED | OUTPATIENT
Start: 2024-11-27 | End: 2024-12-02 | Stop reason: HOSPADM

## 2024-11-27 RX ORDER — PREDNISONE 20 MG/1
40 TABLET ORAL DAILY
Status: DISCONTINUED | OUTPATIENT
Start: 2024-11-30 | End: 2024-12-02 | Stop reason: HOSPADM

## 2024-11-27 RX ORDER — ROFLUMILAST 500 UG/1
500 TABLET ORAL DAILY
Status: DISCONTINUED | OUTPATIENT
Start: 2024-11-27 | End: 2024-12-02 | Stop reason: HOSPADM

## 2024-11-27 RX ORDER — ACETAMINOPHEN 650 MG/1
650 SUPPOSITORY RECTAL EVERY 6 HOURS PRN
Status: DISCONTINUED | OUTPATIENT
Start: 2024-11-27 | End: 2024-12-02 | Stop reason: HOSPADM

## 2024-11-27 RX ORDER — LORAZEPAM 0.5 MG/1
0.5 TABLET ORAL 2 TIMES DAILY
Status: ON HOLD | COMMUNITY
End: 2024-12-02

## 2024-11-27 RX ORDER — SODIUM CHLORIDE 0.9 % (FLUSH) 0.9 %
5-40 SYRINGE (ML) INJECTION PRN
Status: DISCONTINUED | OUTPATIENT
Start: 2024-11-27 | End: 2024-12-02 | Stop reason: HOSPADM

## 2024-11-27 RX ORDER — LEVOFLOXACIN 5 MG/ML
750 INJECTION, SOLUTION INTRAVENOUS ONCE
Status: COMPLETED | OUTPATIENT
Start: 2024-11-27 | End: 2024-11-27

## 2024-11-27 RX ORDER — IPRATROPIUM BROMIDE AND ALBUTEROL SULFATE 2.5; .5 MG/3ML; MG/3ML
1 SOLUTION RESPIRATORY (INHALATION)
Status: DISCONTINUED | OUTPATIENT
Start: 2024-11-27 | End: 2024-12-02 | Stop reason: HOSPADM

## 2024-11-27 RX ORDER — ACETAMINOPHEN 325 MG/1
650 TABLET ORAL EVERY 6 HOURS PRN
Status: DISCONTINUED | OUTPATIENT
Start: 2024-11-27 | End: 2024-12-02 | Stop reason: HOSPADM

## 2024-11-27 RX ORDER — POLYETHYLENE GLYCOL 3350 17 G/17G
17 POWDER, FOR SOLUTION ORAL DAILY PRN
Status: DISCONTINUED | OUTPATIENT
Start: 2024-11-27 | End: 2024-12-02 | Stop reason: HOSPADM

## 2024-11-27 RX ORDER — ESCITALOPRAM OXALATE 10 MG/1
20 TABLET ORAL NIGHTLY
Status: DISCONTINUED | OUTPATIENT
Start: 2024-11-27 | End: 2024-12-02 | Stop reason: HOSPADM

## 2024-11-27 RX ORDER — ONDANSETRON 2 MG/ML
4 INJECTION INTRAMUSCULAR; INTRAVENOUS ONCE
Status: COMPLETED | OUTPATIENT
Start: 2024-11-27 | End: 2024-11-27

## 2024-11-27 RX ORDER — SODIUM CHLORIDE 9 MG/ML
INJECTION, SOLUTION INTRAVENOUS PRN
Status: DISCONTINUED | OUTPATIENT
Start: 2024-11-27 | End: 2024-12-02 | Stop reason: HOSPADM

## 2024-11-27 RX ORDER — METOPROLOL TARTRATE 25 MG/1
25 TABLET, FILM COATED ORAL 2 TIMES DAILY
Status: DISCONTINUED | OUTPATIENT
Start: 2024-11-27 | End: 2024-11-28

## 2024-11-27 RX ORDER — ONDANSETRON 4 MG/1
4 TABLET, ORALLY DISINTEGRATING ORAL EVERY 8 HOURS PRN
Status: DISCONTINUED | OUTPATIENT
Start: 2024-11-27 | End: 2024-12-02 | Stop reason: HOSPADM

## 2024-11-27 RX ORDER — BUSPIRONE HYDROCHLORIDE 10 MG/1
10 TABLET ORAL 3 TIMES DAILY
Status: DISCONTINUED | OUTPATIENT
Start: 2024-11-27 | End: 2024-12-02 | Stop reason: HOSPADM

## 2024-11-27 RX ORDER — METOPROLOL TARTRATE 25 MG/1
25 TABLET, FILM COATED ORAL 2 TIMES DAILY
COMMUNITY

## 2024-11-27 RX ORDER — BUDESONIDE 0.5 MG/2ML
500 INHALANT ORAL 2 TIMES DAILY
Status: DISCONTINUED | OUTPATIENT
Start: 2024-11-27 | End: 2024-12-02 | Stop reason: HOSPADM

## 2024-11-27 RX ORDER — ENOXAPARIN SODIUM 100 MG/ML
40 INJECTION SUBCUTANEOUS DAILY
Status: DISCONTINUED | OUTPATIENT
Start: 2024-11-27 | End: 2024-12-02 | Stop reason: HOSPADM

## 2024-11-27 RX ADMIN — IOPAMIDOL 75 ML: 755 INJECTION, SOLUTION INTRAVENOUS at 10:20

## 2024-11-27 RX ADMIN — SODIUM CHLORIDE, PRESERVATIVE FREE 10 ML: 5 INJECTION INTRAVENOUS at 20:36

## 2024-11-27 RX ADMIN — WATER 125 MG: 1 INJECTION INTRAMUSCULAR; INTRAVENOUS; SUBCUTANEOUS at 09:39

## 2024-11-27 RX ADMIN — SODIUM CHLORIDE 1000 ML: 9 INJECTION, SOLUTION INTRAVENOUS at 09:38

## 2024-11-27 RX ADMIN — BUSPIRONE HYDROCHLORIDE 10 MG: 10 TABLET ORAL at 20:35

## 2024-11-27 RX ADMIN — ENOXAPARIN SODIUM 40 MG: 100 INJECTION SUBCUTANEOUS at 14:33

## 2024-11-27 RX ADMIN — ROFLUMILAST 500 MCG: 500 TABLET ORAL at 20:38

## 2024-11-27 RX ADMIN — ONDANSETRON 4 MG: 2 INJECTION INTRAMUSCULAR; INTRAVENOUS at 12:02

## 2024-11-27 RX ADMIN — LORAZEPAM 0.5 MG: 0.5 TABLET ORAL at 16:03

## 2024-11-27 RX ADMIN — ATORVASTATIN CALCIUM 40 MG: 40 TABLET, FILM COATED ORAL at 20:35

## 2024-11-27 RX ADMIN — WATER 40 MG: 1 INJECTION INTRAMUSCULAR; INTRAVENOUS; SUBCUTANEOUS at 20:38

## 2024-11-27 RX ADMIN — DOXYCYCLINE HYCLATE 100 MG: 100 TABLET, COATED ORAL at 20:35

## 2024-11-27 RX ADMIN — LEVOFLOXACIN 750 MG: 5 INJECTION, SOLUTION INTRAVENOUS at 09:38

## 2024-11-27 RX ADMIN — ESCITALOPRAM OXALATE 20 MG: 10 TABLET ORAL at 20:35

## 2024-11-27 RX ADMIN — ASPIRIN 81 MG: 81 TABLET, CHEWABLE ORAL at 20:38

## 2024-11-27 RX ADMIN — BUDESONIDE INHALATION 500 MCG: 0.5 SUSPENSION RESPIRATORY (INHALATION) at 19:02

## 2024-11-27 RX ADMIN — IPRATROPIUM BROMIDE AND ALBUTEROL SULFATE 1 DOSE: 2.5; .5 SOLUTION RESPIRATORY (INHALATION) at 19:02

## 2024-11-27 RX ADMIN — METOPROLOL TARTRATE 25 MG: 25 TABLET, FILM COATED ORAL at 20:35

## 2024-11-27 ASSESSMENT — PULMONARY FUNCTION TESTS
PIF_VALUE: 17
PIF_VALUE: 30
PIF_VALUE: 15
PIF_VALUE: 32
PIF_VALUE: 38

## 2024-11-27 ASSESSMENT — PAIN DESCRIPTION - DESCRIPTORS: DESCRIPTORS: ACHING

## 2024-11-27 ASSESSMENT — PAIN SCALES - GENERAL
PAINLEVEL_OUTOF10: 0
PAINLEVEL_OUTOF10: 6
PAINLEVEL_OUTOF10: 0

## 2024-11-27 ASSESSMENT — PAIN DESCRIPTION - LOCATION: LOCATION: BACK

## 2024-11-27 ASSESSMENT — PAIN DESCRIPTION - ORIENTATION: ORIENTATION: LOWER

## 2024-11-27 ASSESSMENT — PAIN DESCRIPTION - PAIN TYPE: TYPE: ACUTE PAIN

## 2024-11-27 NOTE — ED NOTES
Patient taken to and from CT with RN and RT without issue. Patient remains awake, alert and oriented. Patient's daughter, Thuy, updated over the phone. Patient in-line suctioned as needed with positive response.

## 2024-11-27 NOTE — ED NOTES
Patient arrives from nursing home with increased work of breathing and reported dark secretions in vent tubing. Pt is awake, alert and oriented upon arrival. Increased RR, cool, clammy skin, diaphoretic and shaky. Pt states she feels hot, denies other symptoms of illness as of recent. Vitals stable at this time.

## 2024-11-27 NOTE — ED PROVIDER NOTES
Criteria   Septic Shock Criteria       Must meet 2:    []Temp >100.9 F (38.3 C) or < 96.8 F (36 C)  [x]HR > 90  []RR > 20  [x]WBC > 12 or < 4 or 10% bands    AND:    [x] Infection Confirmed or Suspected.     Must meet 1:    []Lactate > 2       or   []Signs of Organ Dysfunction:    - SBP < 90 or MAP < 65  -Creatinine > 2 or increased from baseline  -Urine Output < 0.5 ml/kg/hr  -Bilirubin > 2  -INR > 1.5 (not anticoagulated)  -Platelets < 100,000  -Acute Respiratory Failure as evidenced by new need for NIPPV or mechanical ventilation   Must meet 1:    []Lactate > 4        or   []SBP < 90 or MAP < 65 for at least two readings in the first hour after fluid bolus administration    []Vasopressors initiated (if hypotension persists after fluid resuscitation)   Patient Vitals for the past 6 hrs:   BP Temp Pulse Resp SpO2 Weight Weight Method Percent Weight Change   11/27/24 0926 -- -- (!) 103 (!) 34 -- -- -- --   11/27/24 0927 -- -- (!) 101 23 -- -- -- --   11/27/24 0931 -- -- (!) 102 19 -- -- -- --   11/27/24 0932 -- -- (!) 103 28 -- -- -- --   11/27/24 0933 -- -- (!) 102 23 -- -- -- --   11/27/24 0934 -- -- (!) 107 28 -- -- -- --   11/27/24 0935 -- -- (!) 101 22 -- -- -- --   11/27/24 0936 -- -- (!) 103 27 100 % -- -- --   11/27/24 0937 -- -- (!) 101 18 100 % -- -- --   11/27/24 0938 -- -- (!) 103 28 99 % -- -- --   11/27/24 0939 -- -- (!) 110 22 100 % -- -- --   11/27/24 0940 -- -- 99 23 100 % -- -- --   11/27/24 0941 -- -- 97 27 100 % -- -- --   11/27/24 0942 -- -- 99 30 100 % -- -- --   11/27/24 0943 -- -- 99 23 100 % -- -- --   11/27/24 0945 -- -- (!) 101 (!) 32 96 % -- -- --   11/27/24 1026 -- -- 98 25 100 % -- -- --   11/27/24 1027 105/65 -- (!) 106 22 97 % -- -- --   11/27/24 1105 -- -- (!) 104 21 99 % -- -- --   11/27/24 1108 -- -- (!) 104 14 100 % -- -- --   11/27/24 1203 -- 98.6 °F (37 °C) -- -- -- -- -- --   11/27/24 1229 136/81 98.8 °F (37.1 °C) (!) 118 19 93 % 62.6 kg (138 lb) Bed scale 0   11/27/24 1300

## 2024-11-27 NOTE — H&P
Hospital Medicine History & Physical      PCP: Bruce Nichols MD    Date of Admission: 11/27/2024    Date of Service: Pt seen/examined on 11/27/24      Chief Complaint:    Chief Complaint   Patient presents with    Shortness of Breath     Sent from [facility] after staff observed \"dark secretions\" in her vent tubing this morning. Pt is trached and vent dependent.         History Of Present Illness:      The patient is a 69 y.o. female with PMH of HTN, chronic hypoxic respiratory failure with tracheostomy, COPD, CHF, chronic pain, anxiety, CAD who presents to [facility] with shortness of breath from [facility] . Pt is currently placed on vent via her chronic trach and reports couple days of sob , has been placed on steroids with no benefit. No cough or sputum or fevers  No change in bowel or bladder habits. Workup showed hypercarbia and startd vent support. Pt does not use vent regularly   CTA chest was neg for PE or infections but wbc high attributed to her recent steroid use      Past Medical History:        Diagnosis Date    Angina pectoris (HCC)     Chronic pain     knees and lower back     Congestive heart failure (HCC) 1/5/2024    COPD exacerbation (HCC) 05/20/2018    Depression     Essential hypertension 08/15/2022    Panic disorder     Pneumonia     Pulmonary emphysema (HCC)        Past Surgical History:        Procedure Laterality Date    CARDIAC PROCEDURE N/A 6/27/2024    Left and right heart cath / coronary angiography performed by Merary Davis MD at [facility] CARDIAC CATH LAB    CHOLECYSTECTOMY      COLONOSCOPY         Medications Prior to Admission:    Prior to Admission medications    Medication Sig Start Date End Date Taking? Authorizing Provider   metoprolol succinate (TOPROL XL) 25 MG extended release tablet Take 1 tablet by mouth daily 8/20/24 9/19/24  Byron Faulkner MD   clonazePAM (KLONOPIN) 1 MG tablet Take 0.5 tablets by mouth in the morning and 0.5 tablets in the evening. Do all

## 2024-11-27 NOTE — FLOWSHEET NOTE
11/27/24 1229   Vitals   Temp 98.8 °F (37.1 °C)   Pulse (!) 118   Respirations 19   /81   MAP (Calculated) 99   MAP (mmHg) 95   Oxygen Therapy   SpO2 93 %   O2 Device Ventilator   FiO2  50 %   Vent Settings   Resp Rate (Set) 16 bpm   Rate Measured 24 br/min   Vt (Set, mL) 400 mL   PEEP/CPAP (cmH2O) 5   Height and Weight   Weight - Scale 62.6 kg (138 lb)   Weight Method Bed scale   BMI (Calculated) 0     Pt admitted to ICU room 2. Vital signs stable. Admission and assessment completed, see flow sheet. Pt is alert and oriented X4 and able to follow simple commands. Chronic trache vent with # 6 Shiley trache, 16 / 400 / 50 % / 5. NSR, Respirations remains mildly labored but Pt feels as though her breathing has subjectively improved.  Bilateral lung sounds diminished. Pt continues to saturate well on baseline ventilator settings. PEG remains secure in place. Bowel sounds active. PIV's X2 WNL. External urinary catheter placed. Pt appears comfortable and denies any needs at this time. Call light within reach. Bed in lowest position. Will continue to monitor

## 2024-11-27 NOTE — CONSULTS
Reason for referral and CC: SOB, hypoxia, COPD    HISTORY OF PRESENT ILLNESS: 68 yo female with a h/o end-stage COPD and chronic dependence on mechanical ventilation via tracheostomy and CHF presented with shortness of breath and hypoxia.  Her baseline is 10 L/min of oxygen bleeding to her ventilator at the nursing home.  On good days she can ambulate with a walker while on the ventilator.  She can sometimes tolerate trach collar for several hours or or a shift.  Recently she has been on the vent 24/7.  She endorses increased shortness of breath for several days.  No significant change in her sputum per patient although nursing home thought looked darker.  She states she is anxious but breathing better since admission.      Past Medical History:   Diagnosis Date    Angina pectoris ([facility])     Chronic pain     knees and lower back     Congestive heart failure (HCC) 1/5/2024    COPD exacerbation ([facility]) 05/20/2018    Depression     Essential hypertension 08/15/2022    Panic disorder     Pneumonia     Pulmonary emphysema ([facility])      Past Surgical History:   Procedure Laterality Date    CARDIAC PROCEDURE N/A 6/27/2024    Left and right heart cath / coronary angiography performed by Merary Davis MD at [facility] CARDIAC CATH LAB    CHOLECYSTECTOMY      COLONOSCOPY       Family History  family history includes COPD in her mother; Hypertension in her mother.    Social History:  reports that she quit smoking about 3 years ago. Her smoking use included cigarettes. She started smoking about 55 years ago. She has a 104 pack-year smoking history. She has been exposed to tobacco smoke. She has never used smokeless tobacco.   reports that she does not currently use alcohol after a past usage of about 12.0 standard drinks of alcohol per week.    ALLERGIES:  Patient is allergic to cephalosporins, penicillins, and hctz [hydrochlorothiazide].  Continuous Infusions:   sodium chloride       Scheduled Meds:   budesonide  500 mcg Nebulization 
  Component Value Date/Time     11/27/2024 08:20 AM    K 3.6 11/27/2024 08:20 AM    CL 95 11/27/2024 08:20 AM    CO2 31 11/27/2024 08:20 AM    BUN 15 11/27/2024 08:20 AM    CREATININE 0.6 11/27/2024 08:20 AM    GFRAA >60 09/01/2022 05:24 AM    GFRAA >60 02/07/2013 10:24 AM    AGRATIO 1.2 11/27/2024 08:20 AM    LABGLOM >90 11/27/2024 08:20 AM    LABGLOM >60 01/25/2024 05:24 AM    GLUCOSE 149 11/27/2024 08:20 AM    CALCIUM 8.5 11/27/2024 08:20 AM    BILITOT 0.3 11/27/2024 08:20 AM    ALKPHOS 156 11/27/2024 08:20 AM    AST 24 11/27/2024 08:20 AM    ALT 17 11/27/2024 08:20 AM     PT/INR:  No results found for: \"PTINR\"  HgBA1c:  Lab Results   Component Value Date    LABA1C 5.8 08/16/2024     Lab Results   Component Value Date    TROPONINI <0.01 11/17/2022       Lab Results   Component Value Date    CHOL 180 06/28/2024    CHOL 154 09/15/2017    CHOL 186 11/12/2013     Lab Results   Component Value Date    TRIG 107 06/28/2024    TRIG 44 09/15/2017    TRIG 99 11/12/2013     Lab Results   Component Value Date    HDL 68 (H) 06/28/2024    HDL 81 (H) 09/15/2017    HDL 49 11/12/2013     No components found for: \"LDLCHOLESTEROL\", \"LDLCALC\"  Lab Results   Component Value Date    VLDL 21 06/28/2024    VLDL 9 09/15/2017    VLDL 20 11/12/2013     No results found for: \"CHOLHDLRATIO\"     Cardiac Data:     EKG: Sinus rhythm with Premature atrial complexesBaseline artifactLeft axis deviationInferior infarctAbnormal ECGWhen compared with ECG of 27-NOV-2024 07:53, (unconfirmed)Premature atrial complexes     Telemetry personally reviewed: Sinus tachycardia 100s    R/LHC 6/27/2024:  Takotsubo's cardiomyopathy.   Normal right heart hemodynamics.  Recommendations  Patient management should include: Aggressive risk factor modification.   Coronary Findings  Diagnostic  Dominance: Right  Left Main   The vessel was visualized by angiography. Size of vessel >=2.0 mm. The vessel is angiographically normal.      Left Anterior Descending   The

## 2024-11-28 ENCOUNTER — APPOINTMENT (OUTPATIENT)
Dept: GENERAL RADIOLOGY | Age: 69
DRG: 208 | End: 2024-11-28
Payer: MEDICARE

## 2024-11-28 LAB
ANION GAP SERPL CALCULATED.3IONS-SCNC: 10 MMOL/L (ref 3–16)
BASE EXCESS BLDV CALC-SCNC: 2.4 MMOL/L (ref -3–3)
BASOPHILS # BLD: 0 K/UL (ref 0–0.2)
BASOPHILS NFR BLD: 0 %
BUN SERPL-MCNC: 15 MG/DL (ref 7–20)
CALCIUM SERPL-MCNC: 8.9 MG/DL (ref 8.3–10.6)
CHLORIDE SERPL-SCNC: 99 MMOL/L (ref 99–110)
CO2 BLDV-SCNC: 28 MMOL/L
CO2 SERPL-SCNC: 28 MMOL/L (ref 21–32)
COHGB MFR BLDV: 1 % (ref 0–1.5)
CREAT SERPL-MCNC: 0.5 MG/DL (ref 0.6–1.2)
DEPRECATED RDW RBC AUTO: 14.8 % (ref 12.4–15.4)
EOSINOPHIL # BLD: 0 K/UL (ref 0–0.6)
EOSINOPHIL NFR BLD: 0 %
GFR SERPLBLD CREATININE-BSD FMLA CKD-EPI: >90 ML/MIN/{1.73_M2}
GLUCOSE BLD-MCNC: 156 MG/DL (ref 70–99)
GLUCOSE BLD-MCNC: 158 MG/DL (ref 70–99)
GLUCOSE SERPL-MCNC: 132 MG/DL (ref 70–99)
HCO3 BLDV-SCNC: 27 MMOL/L (ref 23–29)
HCT VFR BLD AUTO: 26.9 % (ref 36–48)
HGB BLD-MCNC: 9 G/DL (ref 12–16)
LYMPHOCYTES # BLD: 0.6 K/UL (ref 1–5.1)
LYMPHOCYTES NFR BLD: 5.8 %
MCH RBC QN AUTO: 28.5 PG (ref 26–34)
MCHC RBC AUTO-ENTMCNC: 33.4 G/DL (ref 31–36)
MCV RBC AUTO: 85.4 FL (ref 80–100)
METHGB MFR BLDV: 0.3 %
MONOCYTES # BLD: 0.6 K/UL (ref 0–1.3)
MONOCYTES NFR BLD: 5.2 %
NEUTROPHILS # BLD: 9.8 K/UL (ref 1.7–7.7)
NEUTROPHILS NFR BLD: 89 %
O2 THERAPY: ABNORMAL
PCO2 BLDV: 41.7 MMHG (ref 40–50)
PERFORMED ON: ABNORMAL
PERFORMED ON: ABNORMAL
PH BLDV: 7.43 [PH] (ref 7.35–7.45)
PLATELET # BLD AUTO: 333 K/UL (ref 135–450)
PMV BLD AUTO: 8.1 FL (ref 5–10.5)
PO2 BLDV: 174 MMHG (ref 25–40)
POTASSIUM SERPL-SCNC: 4.4 MMOL/L (ref 3.5–5.1)
RBC # BLD AUTO: 3.15 M/UL (ref 4–5.2)
SAO2 % BLDV: 99 %
SODIUM SERPL-SCNC: 137 MMOL/L (ref 136–145)
WBC # BLD AUTO: 11 K/UL (ref 4–11)

## 2024-11-28 PROCEDURE — 71045 X-RAY EXAM CHEST 1 VIEW: CPT

## 2024-11-28 PROCEDURE — 82803 BLOOD GASES ANY COMBINATION: CPT

## 2024-11-28 PROCEDURE — 99233 SBSQ HOSP IP/OBS HIGH 50: CPT | Performed by: INTERNAL MEDICINE

## 2024-11-28 PROCEDURE — 6360000002 HC RX W HCPCS: Performed by: NURSE PRACTITIONER

## 2024-11-28 PROCEDURE — 94003 VENT MGMT INPAT SUBQ DAY: CPT

## 2024-11-28 PROCEDURE — 1200000000 HC SEMI PRIVATE

## 2024-11-28 PROCEDURE — 94640 AIRWAY INHALATION TREATMENT: CPT

## 2024-11-28 PROCEDURE — 2580000003 HC RX 258: Performed by: NURSE PRACTITIONER

## 2024-11-28 PROCEDURE — 6370000000 HC RX 637 (ALT 250 FOR IP): Performed by: INTERNAL MEDICINE

## 2024-11-28 PROCEDURE — 36415 COLL VENOUS BLD VENIPUNCTURE: CPT

## 2024-11-28 PROCEDURE — 99232 SBSQ HOSP IP/OBS MODERATE 35: CPT | Performed by: INTERNAL MEDICINE

## 2024-11-28 PROCEDURE — 2700000000 HC OXYGEN THERAPY PER DAY

## 2024-11-28 PROCEDURE — 80048 BASIC METABOLIC PNL TOTAL CA: CPT

## 2024-11-28 PROCEDURE — 6370000000 HC RX 637 (ALT 250 FOR IP): Performed by: NURSE PRACTITIONER

## 2024-11-28 PROCEDURE — 85025 COMPLETE CBC W/AUTO DIFF WBC: CPT

## 2024-11-28 PROCEDURE — 94761 N-INVAS EAR/PLS OXIMETRY MLT: CPT

## 2024-11-28 RX ORDER — METOPROLOL TARTRATE 25 MG/1
12.5 TABLET, FILM COATED ORAL 2 TIMES DAILY
Status: DISCONTINUED | OUTPATIENT
Start: 2024-11-28 | End: 2024-11-28

## 2024-11-28 RX ORDER — METOPROLOL TARTRATE 25 MG/1
12.5 TABLET, FILM COATED ORAL 2 TIMES DAILY
Status: DISCONTINUED | OUTPATIENT
Start: 2024-11-28 | End: 2024-12-02 | Stop reason: HOSPADM

## 2024-11-28 RX ADMIN — ASPIRIN 81 MG: 81 TABLET, CHEWABLE ORAL at 10:24

## 2024-11-28 RX ADMIN — DOXYCYCLINE HYCLATE 100 MG: 100 TABLET, COATED ORAL at 10:23

## 2024-11-28 RX ADMIN — IPRATROPIUM BROMIDE AND ALBUTEROL SULFATE 1 DOSE: 2.5; .5 SOLUTION RESPIRATORY (INHALATION) at 14:25

## 2024-11-28 RX ADMIN — ROFLUMILAST 500 MCG: 500 TABLET ORAL at 10:24

## 2024-11-28 RX ADMIN — METOPROLOL TARTRATE 12.5 MG: 25 TABLET, FILM COATED ORAL at 10:24

## 2024-11-28 RX ADMIN — DOXYCYCLINE HYCLATE 100 MG: 100 TABLET, COATED ORAL at 19:56

## 2024-11-28 RX ADMIN — ATORVASTATIN CALCIUM 40 MG: 40 TABLET, FILM COATED ORAL at 19:57

## 2024-11-28 RX ADMIN — ACETAMINOPHEN 650 MG: 325 TABLET ORAL at 19:57

## 2024-11-28 RX ADMIN — BUSPIRONE HYDROCHLORIDE 10 MG: 10 TABLET ORAL at 14:09

## 2024-11-28 RX ADMIN — LORAZEPAM 0.5 MG: 0.5 TABLET ORAL at 19:57

## 2024-11-28 RX ADMIN — BUDESONIDE INHALATION 500 MCG: 0.5 SUSPENSION RESPIRATORY (INHALATION) at 07:21

## 2024-11-28 RX ADMIN — BUSPIRONE HYDROCHLORIDE 10 MG: 10 TABLET ORAL at 19:57

## 2024-11-28 RX ADMIN — WATER 40 MG: 1 INJECTION INTRAMUSCULAR; INTRAVENOUS; SUBCUTANEOUS at 10:23

## 2024-11-28 RX ADMIN — ESCITALOPRAM OXALATE 20 MG: 10 TABLET ORAL at 19:57

## 2024-11-28 RX ADMIN — METOPROLOL TARTRATE 12.5 MG: 25 TABLET, FILM COATED ORAL at 19:57

## 2024-11-28 RX ADMIN — SODIUM CHLORIDE, PRESERVATIVE FREE 10 ML: 5 INJECTION INTRAVENOUS at 10:38

## 2024-11-28 RX ADMIN — ENOXAPARIN SODIUM 40 MG: 100 INJECTION SUBCUTANEOUS at 10:23

## 2024-11-28 RX ADMIN — BUDESONIDE INHALATION 500 MCG: 0.5 SUSPENSION RESPIRATORY (INHALATION) at 19:13

## 2024-11-28 RX ADMIN — IPRATROPIUM BROMIDE AND ALBUTEROL SULFATE 1 DOSE: 2.5; .5 SOLUTION RESPIRATORY (INHALATION) at 11:33

## 2024-11-28 RX ADMIN — IPRATROPIUM BROMIDE AND ALBUTEROL SULFATE 1 DOSE: 2.5; .5 SOLUTION RESPIRATORY (INHALATION) at 07:21

## 2024-11-28 RX ADMIN — SODIUM CHLORIDE, PRESERVATIVE FREE 10 ML: 5 INJECTION INTRAVENOUS at 19:56

## 2024-11-28 RX ADMIN — BUSPIRONE HYDROCHLORIDE 10 MG: 10 TABLET ORAL at 10:24

## 2024-11-28 RX ADMIN — ACETAMINOPHEN 650 MG: 325 TABLET ORAL at 10:27

## 2024-11-28 RX ADMIN — WATER 40 MG: 1 INJECTION INTRAMUSCULAR; INTRAVENOUS; SUBCUTANEOUS at 19:56

## 2024-11-28 RX ADMIN — LORAZEPAM 0.5 MG: 0.5 TABLET ORAL at 10:24

## 2024-11-28 ASSESSMENT — PAIN SCALES - GENERAL
PAINLEVEL_OUTOF10: 3
PAINLEVEL_OUTOF10: 5
PAINLEVEL_OUTOF10: 3
PAINLEVEL_OUTOF10: 3
PAINLEVEL_OUTOF10: 8

## 2024-11-28 ASSESSMENT — PAIN DESCRIPTION - ONSET: ONSET: SUDDEN

## 2024-11-28 ASSESSMENT — PULMONARY FUNCTION TESTS
PIF_VALUE: 21
PIF_VALUE: 29
PIF_VALUE: 31
PIF_VALUE: 25

## 2024-11-28 ASSESSMENT — PAIN DESCRIPTION - ORIENTATION: ORIENTATION: INNER;MID

## 2024-11-28 ASSESSMENT — PAIN DESCRIPTION - LOCATION
LOCATION: HEAD
LOCATION: NECK

## 2024-11-28 ASSESSMENT — PAIN DESCRIPTION - DESCRIPTORS: DESCRIPTORS: DISCOMFORT

## 2024-11-28 ASSESSMENT — PAIN DESCRIPTION - FREQUENCY: FREQUENCY: INTERMITTENT

## 2024-11-28 ASSESSMENT — PAIN DESCRIPTION - PAIN TYPE: TYPE: ACUTE PAIN

## 2024-11-29 LAB
BASOPHILS # BLD: 0 K/UL (ref 0–0.2)
BASOPHILS NFR BLD: 0.1 %
DEPRECATED RDW RBC AUTO: 15 % (ref 12.4–15.4)
EOSINOPHIL # BLD: 0 K/UL (ref 0–0.6)
EOSINOPHIL NFR BLD: 0 %
HCT VFR BLD AUTO: 26.1 % (ref 36–48)
HGB BLD-MCNC: 8.8 G/DL (ref 12–16)
LYMPHOCYTES # BLD: 0.5 K/UL (ref 1–5.1)
LYMPHOCYTES NFR BLD: 5.2 %
MCH RBC QN AUTO: 29.2 PG (ref 26–34)
MCHC RBC AUTO-ENTMCNC: 33.8 G/DL (ref 31–36)
MCV RBC AUTO: 86.4 FL (ref 80–100)
MONOCYTES # BLD: 0.5 K/UL (ref 0–1.3)
MONOCYTES NFR BLD: 4.9 %
NEUTROPHILS # BLD: 9.3 K/UL (ref 1.7–7.7)
NEUTROPHILS NFR BLD: 89.8 %
PLATELET # BLD AUTO: 327 K/UL (ref 135–450)
PMV BLD AUTO: 8.2 FL (ref 5–10.5)
RBC # BLD AUTO: 3.02 M/UL (ref 4–5.2)
WBC # BLD AUTO: 10.3 K/UL (ref 4–11)

## 2024-11-29 PROCEDURE — 6370000000 HC RX 637 (ALT 250 FOR IP): Performed by: INTERNAL MEDICINE

## 2024-11-29 PROCEDURE — 36415 COLL VENOUS BLD VENIPUNCTURE: CPT

## 2024-11-29 PROCEDURE — 6360000002 HC RX W HCPCS: Performed by: NURSE PRACTITIONER

## 2024-11-29 PROCEDURE — 85025 COMPLETE CBC W/AUTO DIFF WBC: CPT

## 2024-11-29 PROCEDURE — 99232 SBSQ HOSP IP/OBS MODERATE 35: CPT | Performed by: INTERNAL MEDICINE

## 2024-11-29 PROCEDURE — 2580000003 HC RX 258: Performed by: NURSE PRACTITIONER

## 2024-11-29 PROCEDURE — 94761 N-INVAS EAR/PLS OXIMETRY MLT: CPT

## 2024-11-29 PROCEDURE — 2060000000 HC ICU INTERMEDIATE R&B

## 2024-11-29 PROCEDURE — 2700000000 HC OXYGEN THERAPY PER DAY

## 2024-11-29 PROCEDURE — 6370000000 HC RX 637 (ALT 250 FOR IP): Performed by: NURSE PRACTITIONER

## 2024-11-29 PROCEDURE — 94003 VENT MGMT INPAT SUBQ DAY: CPT

## 2024-11-29 PROCEDURE — 94640 AIRWAY INHALATION TREATMENT: CPT

## 2024-11-29 PROCEDURE — 99233 SBSQ HOSP IP/OBS HIGH 50: CPT | Performed by: INTERNAL MEDICINE

## 2024-11-29 RX ADMIN — BUSPIRONE HYDROCHLORIDE 10 MG: 10 TABLET ORAL at 19:55

## 2024-11-29 RX ADMIN — ROFLUMILAST 500 MCG: 500 TABLET ORAL at 07:52

## 2024-11-29 RX ADMIN — SODIUM CHLORIDE, PRESERVATIVE FREE 10 ML: 5 INJECTION INTRAVENOUS at 19:57

## 2024-11-29 RX ADMIN — METOPROLOL TARTRATE 12.5 MG: 25 TABLET, FILM COATED ORAL at 19:54

## 2024-11-29 RX ADMIN — ENOXAPARIN SODIUM 40 MG: 100 INJECTION SUBCUTANEOUS at 07:52

## 2024-11-29 RX ADMIN — LORAZEPAM 0.5 MG: 0.5 TABLET ORAL at 19:54

## 2024-11-29 RX ADMIN — WATER 40 MG: 1 INJECTION INTRAMUSCULAR; INTRAVENOUS; SUBCUTANEOUS at 07:55

## 2024-11-29 RX ADMIN — ATORVASTATIN CALCIUM 40 MG: 40 TABLET, FILM COATED ORAL at 19:55

## 2024-11-29 RX ADMIN — LORAZEPAM 0.5 MG: 0.5 TABLET ORAL at 07:52

## 2024-11-29 RX ADMIN — IPRATROPIUM BROMIDE AND ALBUTEROL SULFATE 1 DOSE: 2.5; .5 SOLUTION RESPIRATORY (INHALATION) at 11:01

## 2024-11-29 RX ADMIN — IPRATROPIUM BROMIDE AND ALBUTEROL SULFATE 1 DOSE: 2.5; .5 SOLUTION RESPIRATORY (INHALATION) at 06:58

## 2024-11-29 RX ADMIN — DOXYCYCLINE HYCLATE 100 MG: 100 TABLET, COATED ORAL at 07:52

## 2024-11-29 RX ADMIN — DOXYCYCLINE HYCLATE 100 MG: 100 TABLET, COATED ORAL at 19:54

## 2024-11-29 RX ADMIN — ESCITALOPRAM OXALATE 20 MG: 10 TABLET ORAL at 19:55

## 2024-11-29 RX ADMIN — METOPROLOL TARTRATE 12.5 MG: 25 TABLET, FILM COATED ORAL at 07:53

## 2024-11-29 RX ADMIN — BUSPIRONE HYDROCHLORIDE 10 MG: 10 TABLET ORAL at 14:06

## 2024-11-29 RX ADMIN — IPRATROPIUM BROMIDE AND ALBUTEROL SULFATE 1 DOSE: 2.5; .5 SOLUTION RESPIRATORY (INHALATION) at 19:33

## 2024-11-29 RX ADMIN — SODIUM CHLORIDE, PRESERVATIVE FREE 10 ML: 5 INJECTION INTRAVENOUS at 07:56

## 2024-11-29 RX ADMIN — ASPIRIN 81 MG: 81 TABLET, CHEWABLE ORAL at 07:52

## 2024-11-29 RX ADMIN — BUDESONIDE INHALATION 500 MCG: 0.5 SUSPENSION RESPIRATORY (INHALATION) at 19:33

## 2024-11-29 RX ADMIN — IPRATROPIUM BROMIDE AND ALBUTEROL SULFATE 1 DOSE: 2.5; .5 SOLUTION RESPIRATORY (INHALATION) at 14:48

## 2024-11-29 RX ADMIN — ACETAMINOPHEN 650 MG: 325 TABLET ORAL at 14:10

## 2024-11-29 RX ADMIN — BUDESONIDE INHALATION 500 MCG: 0.5 SUSPENSION RESPIRATORY (INHALATION) at 07:09

## 2024-11-29 RX ADMIN — BUSPIRONE HYDROCHLORIDE 10 MG: 10 TABLET ORAL at 07:53

## 2024-11-29 ASSESSMENT — PAIN SCALES - GENERAL
PAINLEVEL_OUTOF10: 0
PAINLEVEL_OUTOF10: 3

## 2024-11-29 ASSESSMENT — PAIN SCALES - WONG BAKER: WONGBAKER_NUMERICALRESPONSE: NO HURT

## 2024-11-29 ASSESSMENT — PULMONARY FUNCTION TESTS
PIF_VALUE: 23
PIF_VALUE: 25

## 2024-11-29 ASSESSMENT — PAIN DESCRIPTION - DESCRIPTORS: DESCRIPTORS: ACHING

## 2024-11-29 ASSESSMENT — PAIN - FUNCTIONAL ASSESSMENT: PAIN_FUNCTIONAL_ASSESSMENT: ACTIVITIES ARE NOT PREVENTED

## 2024-11-29 ASSESSMENT — PAIN DESCRIPTION - ORIENTATION: ORIENTATION: MID

## 2024-11-29 ASSESSMENT — PAIN DESCRIPTION - LOCATION: LOCATION: HEAD

## 2024-11-29 NOTE — DISCHARGE SUMMARY
Name:  Annie Tyler  Room:  3002/3002-01  MRN:    [id number]    Discharge Summary      This discharge summary is in conjunction with a complete physical exam done on the day of discharge.    Discharging Physician: Dr. Faulkner      Admit: 11/27/2024  Discharge:  12/2/2024     HPI taken from admission H&P:    The patient is a 69 y.o. female with PMH of HTN, chronic hypoxic respiratory failure with tracheostomy, COPD, CHF, chronic pain, anxiety, CAD who presents to [facility] with shortness of breath from ECF . Pt is currently placed on vent via her chronic trach and reports couple days of sob , has been placed on steroids with no benefit. No cough or sputum or fevers  No change in bowel or bladder habits. Workup showed hypercarbia and startd vent support. Pt does not use vent regularly   CTA chest was neg for PE or infections but wbc high attributed to her recent steroid use     Diagnoses this Admission and Hospital Course   Acute on Chronic Hypoxic Respiratory Failure with underlying chronic trach dependency   Copd exacerbation   - Oxygen baseline 5 L via trach  - , placed on vent in the ED for increased work of breathing and hypercarbia , pt reports she has been on steroids recently   - high wbc likely sec to that and now improved and back on trach collar and use vent at night.  - CTA chest neg for PE or infection   - sputum cx ordered procal 0.06  - supp O2, wean as tolerated  - started steroids, HHn and doxy- improved and,  wbc improved as well   - recurrent admissions for the same noted  -  pulmonary consulted         # History of right upper lobe pulm nodule/cancer.  Not a surgical candidate s/p SBRT.  Continue to monitor outpatient        # History of Takotsubo cardiomyopathy with mildly elevated troponins.    No chest pain per pt  -Last left heart cath was done recently in June without any complications and no occlusive CAD  - cardiology consulted and no plans for workup   - continue home ASA, statins

## 2024-11-29 NOTE — CARE COORDINATION
Advance Care Planning     General Advance Care Planning (ACP) Conversation    Date of Conversation: 11/29/2024  Conducted with: Patient with Decision Making Capacity  Other persons present: None    Patient has ACP documents on file.    Daughter Humera Stein is the POA.     Healthcare Decision Maker:   Primary Decision Maker: HUMERA STEIN POA - Child - [phone]    Secondary Decision Maker: Nilda Stein 1st Alt - Child - [phone]     Today we documented Decision Maker(s) consistent with ACP documents on file.  Content/Action Overview:  Has ACP document(s) on file - reflects the patient's care preferences  Reviewed DNR/DNI and patient elects Full Code (Attempt Resuscitation)        Length of Voluntary ACP Conversation in minutes:  <16 minutes (Non-Billable)    Teresa Boeck, RN

## 2024-11-30 ENCOUNTER — APPOINTMENT (OUTPATIENT)
Dept: GENERAL RADIOLOGY | Age: 69
DRG: 208 | End: 2024-11-30
Payer: MEDICARE

## 2024-11-30 LAB
ANISOCYTOSIS BLD QL SMEAR: ABNORMAL
BASOPHILS # BLD: 0 K/UL (ref 0–0.2)
BASOPHILS NFR BLD: 0 %
DEPRECATED RDW RBC AUTO: 15.3 % (ref 12.4–15.4)
EOSINOPHIL # BLD: 0 K/UL (ref 0–0.6)
EOSINOPHIL NFR BLD: 0 %
HCT VFR BLD AUTO: 34 % (ref 36–48)
HGB BLD-MCNC: 10.8 G/DL (ref 12–16)
HYPOCHROMIA BLD QL SMEAR: ABNORMAL
LYMPHOCYTES # BLD: 3.2 K/UL (ref 1–5.1)
LYMPHOCYTES NFR BLD: 9 %
MCH RBC QN AUTO: 28.2 PG (ref 26–34)
MCHC RBC AUTO-ENTMCNC: 31.8 G/DL (ref 31–36)
MCV RBC AUTO: 88.7 FL (ref 80–100)
MONOCYTES # BLD: 5 K/UL (ref 0–1.3)
MONOCYTES NFR BLD: 20 %
NEUTROPHILS # BLD: 16.6 K/UL (ref 1.7–7.7)
NEUTROPHILS NFR BLD: 63 %
NEUTS BAND NFR BLD MANUAL: 4 % (ref 0–7)
PATH INTERP BLD-IMP: YES
PLATELET # BLD AUTO: 494 K/UL (ref 135–450)
PLATELET BLD QL SMEAR: ABNORMAL
PMV BLD AUTO: 8.5 FL (ref 5–10.5)
POIKILOCYTOSIS BLD QL SMEAR: ABNORMAL
POLYCHROMASIA BLD QL SMEAR: ABNORMAL
PROCALCITONIN SERPL IA-MCNC: 0.17 NG/ML (ref 0–0.15)
RBC # BLD AUTO: 3.83 M/UL (ref 4–5.2)
SLIDE REVIEW: ABNORMAL
VARIANT LYMPHS NFR BLD MANUAL: 4 % (ref 0–6)
WBC # BLD AUTO: 24.8 K/UL (ref 4–11)

## 2024-11-30 PROCEDURE — 2580000003 HC RX 258: Performed by: NURSE PRACTITIONER

## 2024-11-30 PROCEDURE — 6370000000 HC RX 637 (ALT 250 FOR IP): Performed by: STUDENT IN AN ORGANIZED HEALTH CARE EDUCATION/TRAINING PROGRAM

## 2024-11-30 PROCEDURE — 6370000000 HC RX 637 (ALT 250 FOR IP): Performed by: NURSE PRACTITIONER

## 2024-11-30 PROCEDURE — 2060000000 HC ICU INTERMEDIATE R&B

## 2024-11-30 PROCEDURE — 36415 COLL VENOUS BLD VENIPUNCTURE: CPT

## 2024-11-30 PROCEDURE — 94640 AIRWAY INHALATION TREATMENT: CPT

## 2024-11-30 PROCEDURE — 6370000000 HC RX 637 (ALT 250 FOR IP): Performed by: INTERNAL MEDICINE

## 2024-11-30 PROCEDURE — 6360000002 HC RX W HCPCS: Performed by: NURSE PRACTITIONER

## 2024-11-30 PROCEDURE — 71045 X-RAY EXAM CHEST 1 VIEW: CPT

## 2024-11-30 PROCEDURE — 94003 VENT MGMT INPAT SUBQ DAY: CPT

## 2024-11-30 PROCEDURE — 94761 N-INVAS EAR/PLS OXIMETRY MLT: CPT

## 2024-11-30 PROCEDURE — 99233 SBSQ HOSP IP/OBS HIGH 50: CPT | Performed by: INTERNAL MEDICINE

## 2024-11-30 PROCEDURE — 2700000000 HC OXYGEN THERAPY PER DAY

## 2024-11-30 PROCEDURE — 6360000002 HC RX W HCPCS: Performed by: STUDENT IN AN ORGANIZED HEALTH CARE EDUCATION/TRAINING PROGRAM

## 2024-11-30 PROCEDURE — 85025 COMPLETE CBC W/AUTO DIFF WBC: CPT

## 2024-11-30 PROCEDURE — 84145 PROCALCITONIN (PCT): CPT

## 2024-11-30 RX ORDER — HYDROXYZINE HYDROCHLORIDE 25 MG/1
25 TABLET, FILM COATED ORAL EVERY 4 HOURS PRN
Status: DISCONTINUED | OUTPATIENT
Start: 2024-11-30 | End: 2024-12-02 | Stop reason: HOSPADM

## 2024-11-30 RX ORDER — HYDROXYZINE HYDROCHLORIDE 50 MG/ML
25 INJECTION, SOLUTION INTRAMUSCULAR EVERY 6 HOURS PRN
Status: DISCONTINUED | OUTPATIENT
Start: 2024-11-30 | End: 2024-11-30

## 2024-11-30 RX ORDER — LORAZEPAM 2 MG/ML
1 INJECTION INTRAMUSCULAR ONCE
Status: COMPLETED | OUTPATIENT
Start: 2024-11-30 | End: 2024-11-30

## 2024-11-30 RX ADMIN — ASPIRIN 81 MG: 81 TABLET, CHEWABLE ORAL at 08:33

## 2024-11-30 RX ADMIN — DOXYCYCLINE HYCLATE 100 MG: 100 TABLET, COATED ORAL at 21:32

## 2024-11-30 RX ADMIN — ROFLUMILAST 500 MCG: 500 TABLET ORAL at 08:33

## 2024-11-30 RX ADMIN — BUSPIRONE HYDROCHLORIDE 10 MG: 10 TABLET ORAL at 08:33

## 2024-11-30 RX ADMIN — SODIUM CHLORIDE, PRESERVATIVE FREE 10 ML: 5 INJECTION INTRAVENOUS at 08:33

## 2024-11-30 RX ADMIN — ESCITALOPRAM OXALATE 20 MG: 10 TABLET ORAL at 21:32

## 2024-11-30 RX ADMIN — LORAZEPAM 0.5 MG: 0.5 TABLET ORAL at 21:32

## 2024-11-30 RX ADMIN — BUSPIRONE HYDROCHLORIDE 10 MG: 10 TABLET ORAL at 14:00

## 2024-11-30 RX ADMIN — DOXYCYCLINE HYCLATE 100 MG: 100 TABLET, COATED ORAL at 08:33

## 2024-11-30 RX ADMIN — HYDROXYZINE HYDROCHLORIDE 25 MG: 25 TABLET ORAL at 18:21

## 2024-11-30 RX ADMIN — IPRATROPIUM BROMIDE AND ALBUTEROL SULFATE 1 DOSE: 2.5; .5 SOLUTION RESPIRATORY (INHALATION) at 11:19

## 2024-11-30 RX ADMIN — ATORVASTATIN CALCIUM 40 MG: 40 TABLET, FILM COATED ORAL at 21:32

## 2024-11-30 RX ADMIN — BUDESONIDE INHALATION 500 MCG: 0.5 SUSPENSION RESPIRATORY (INHALATION) at 19:26

## 2024-11-30 RX ADMIN — METOPROLOL TARTRATE 12.5 MG: 25 TABLET, FILM COATED ORAL at 08:32

## 2024-11-30 RX ADMIN — LORAZEPAM 1 MG: 2 INJECTION INTRAMUSCULAR; INTRAVENOUS at 05:11

## 2024-11-30 RX ADMIN — LORAZEPAM 0.5 MG: 0.5 TABLET ORAL at 08:33

## 2024-11-30 RX ADMIN — IPRATROPIUM BROMIDE AND ALBUTEROL SULFATE 1 DOSE: 2.5; .5 SOLUTION RESPIRATORY (INHALATION) at 15:28

## 2024-11-30 RX ADMIN — BUDESONIDE INHALATION 500 MCG: 0.5 SUSPENSION RESPIRATORY (INHALATION) at 07:21

## 2024-11-30 RX ADMIN — PREDNISONE 40 MG: 20 TABLET ORAL at 08:32

## 2024-11-30 RX ADMIN — IPRATROPIUM BROMIDE AND ALBUTEROL SULFATE 1 DOSE: 2.5; .5 SOLUTION RESPIRATORY (INHALATION) at 07:21

## 2024-11-30 RX ADMIN — SODIUM CHLORIDE, PRESERVATIVE FREE 10 ML: 5 INJECTION INTRAVENOUS at 22:02

## 2024-11-30 RX ADMIN — IPRATROPIUM BROMIDE AND ALBUTEROL SULFATE 1 DOSE: 2.5; .5 SOLUTION RESPIRATORY (INHALATION) at 19:26

## 2024-11-30 RX ADMIN — BUSPIRONE HYDROCHLORIDE 10 MG: 10 TABLET ORAL at 21:32

## 2024-11-30 RX ADMIN — METOPROLOL TARTRATE 12.5 MG: 25 TABLET, FILM COATED ORAL at 21:32

## 2024-11-30 RX ADMIN — ENOXAPARIN SODIUM 40 MG: 100 INJECTION SUBCUTANEOUS at 08:33

## 2024-11-30 ASSESSMENT — PULMONARY FUNCTION TESTS
PIF_VALUE: 25
PIF_VALUE: 32

## 2024-12-01 LAB
ANION GAP SERPL CALCULATED.3IONS-SCNC: 12 MMOL/L (ref 3–16)
BACTERIA BLD CULT ORG #2: NORMAL
BACTERIA BLD CULT: NORMAL
BUN SERPL-MCNC: 18 MG/DL (ref 7–20)
CALCIUM SERPL-MCNC: 8.3 MG/DL (ref 8.3–10.6)
CHLORIDE SERPL-SCNC: 99 MMOL/L (ref 99–110)
CO2 SERPL-SCNC: 19 MMOL/L (ref 21–32)
CREAT SERPL-MCNC: 0.7 MG/DL (ref 0.6–1.2)
DEPRECATED RDW RBC AUTO: 15.4 % (ref 12.4–15.4)
GFR SERPLBLD CREATININE-BSD FMLA CKD-EPI: >90 ML/MIN/{1.73_M2}
GLUCOSE BLD-MCNC: 134 MG/DL (ref 70–99)
GLUCOSE SERPL-MCNC: 121 MG/DL (ref 70–99)
HCT VFR BLD AUTO: 31.8 % (ref 36–48)
HGB BLD-MCNC: 10.2 G/DL (ref 12–16)
MCH RBC QN AUTO: 28.5 PG (ref 26–34)
MCHC RBC AUTO-ENTMCNC: 32.2 G/DL (ref 31–36)
MCV RBC AUTO: 88.6 FL (ref 80–100)
PERFORMED ON: ABNORMAL
PLATELET # BLD AUTO: 318 K/UL (ref 135–450)
PMV BLD AUTO: 7.9 FL (ref 5–10.5)
POTASSIUM SERPL-SCNC: 3.8 MMOL/L (ref 3.5–5.1)
RBC # BLD AUTO: 3.59 M/UL (ref 4–5.2)
SODIUM SERPL-SCNC: 130 MMOL/L (ref 136–145)
WBC # BLD AUTO: 13.3 K/UL (ref 4–11)

## 2024-12-01 PROCEDURE — 2060000000 HC ICU INTERMEDIATE R&B

## 2024-12-01 PROCEDURE — 6370000000 HC RX 637 (ALT 250 FOR IP): Performed by: INTERNAL MEDICINE

## 2024-12-01 PROCEDURE — 6370000000 HC RX 637 (ALT 250 FOR IP): Performed by: NURSE PRACTITIONER

## 2024-12-01 PROCEDURE — 80048 BASIC METABOLIC PNL TOTAL CA: CPT

## 2024-12-01 PROCEDURE — 2580000003 HC RX 258: Performed by: NURSE PRACTITIONER

## 2024-12-01 PROCEDURE — 94761 N-INVAS EAR/PLS OXIMETRY MLT: CPT

## 2024-12-01 PROCEDURE — 6360000002 HC RX W HCPCS: Performed by: NURSE PRACTITIONER

## 2024-12-01 PROCEDURE — 94640 AIRWAY INHALATION TREATMENT: CPT

## 2024-12-01 PROCEDURE — 99232 SBSQ HOSP IP/OBS MODERATE 35: CPT | Performed by: INTERNAL MEDICINE

## 2024-12-01 PROCEDURE — 85027 COMPLETE CBC AUTOMATED: CPT

## 2024-12-01 PROCEDURE — 36415 COLL VENOUS BLD VENIPUNCTURE: CPT

## 2024-12-01 PROCEDURE — 99233 SBSQ HOSP IP/OBS HIGH 50: CPT | Performed by: INTERNAL MEDICINE

## 2024-12-01 PROCEDURE — 6370000000 HC RX 637 (ALT 250 FOR IP): Performed by: STUDENT IN AN ORGANIZED HEALTH CARE EDUCATION/TRAINING PROGRAM

## 2024-12-01 PROCEDURE — 2700000000 HC OXYGEN THERAPY PER DAY

## 2024-12-01 PROCEDURE — 2580000003 HC RX 258: Performed by: INTERNAL MEDICINE

## 2024-12-01 PROCEDURE — 94003 VENT MGMT INPAT SUBQ DAY: CPT

## 2024-12-01 RX ORDER — SODIUM CHLORIDE FOR INHALATION 3 %
4 VIAL, NEBULIZER (ML) INHALATION 2 TIMES DAILY
Status: DISCONTINUED | OUTPATIENT
Start: 2024-12-01 | End: 2024-12-02 | Stop reason: HOSPADM

## 2024-12-01 RX ADMIN — DOXYCYCLINE HYCLATE 100 MG: 100 TABLET, COATED ORAL at 08:29

## 2024-12-01 RX ADMIN — BUDESONIDE INHALATION 500 MCG: 0.5 SUSPENSION RESPIRATORY (INHALATION) at 07:14

## 2024-12-01 RX ADMIN — IPRATROPIUM BROMIDE AND ALBUTEROL SULFATE 1 DOSE: 2.5; .5 SOLUTION RESPIRATORY (INHALATION) at 15:06

## 2024-12-01 RX ADMIN — ESCITALOPRAM OXALATE 20 MG: 10 TABLET ORAL at 20:33

## 2024-12-01 RX ADMIN — IPRATROPIUM BROMIDE AND ALBUTEROL SULFATE 1 DOSE: 2.5; .5 SOLUTION RESPIRATORY (INHALATION) at 19:19

## 2024-12-01 RX ADMIN — BUDESONIDE INHALATION 500 MCG: 0.5 SUSPENSION RESPIRATORY (INHALATION) at 19:19

## 2024-12-01 RX ADMIN — ASPIRIN 81 MG: 81 TABLET, CHEWABLE ORAL at 08:30

## 2024-12-01 RX ADMIN — SODIUM CHLORIDE, PRESERVATIVE FREE 10 ML: 5 INJECTION INTRAVENOUS at 20:33

## 2024-12-01 RX ADMIN — ROFLUMILAST 500 MCG: 500 TABLET ORAL at 08:29

## 2024-12-01 RX ADMIN — HYDROXYZINE HYDROCHLORIDE 25 MG: 25 TABLET ORAL at 17:27

## 2024-12-01 RX ADMIN — DOXYCYCLINE HYCLATE 100 MG: 100 TABLET, COATED ORAL at 20:33

## 2024-12-01 RX ADMIN — METOPROLOL TARTRATE 12.5 MG: 25 TABLET, FILM COATED ORAL at 20:33

## 2024-12-01 RX ADMIN — ATORVASTATIN CALCIUM 40 MG: 40 TABLET, FILM COATED ORAL at 20:33

## 2024-12-01 RX ADMIN — PREDNISONE 40 MG: 20 TABLET ORAL at 08:29

## 2024-12-01 RX ADMIN — METOPROLOL TARTRATE 12.5 MG: 25 TABLET, FILM COATED ORAL at 08:29

## 2024-12-01 RX ADMIN — SODIUM CHLORIDE, PRESERVATIVE FREE 10 ML: 5 INJECTION INTRAVENOUS at 08:30

## 2024-12-01 RX ADMIN — SODIUM CHLORIDE 30 MG/ML INHALATION SOLUTION 4 ML: 30 SOLUTION INHALANT at 19:20

## 2024-12-01 RX ADMIN — LORAZEPAM 0.5 MG: 0.5 TABLET ORAL at 08:30

## 2024-12-01 RX ADMIN — ENOXAPARIN SODIUM 40 MG: 100 INJECTION SUBCUTANEOUS at 08:29

## 2024-12-01 RX ADMIN — IPRATROPIUM BROMIDE AND ALBUTEROL SULFATE 1 DOSE: 2.5; .5 SOLUTION RESPIRATORY (INHALATION) at 10:45

## 2024-12-01 RX ADMIN — BUSPIRONE HYDROCHLORIDE 10 MG: 10 TABLET ORAL at 20:33

## 2024-12-01 RX ADMIN — BUSPIRONE HYDROCHLORIDE 10 MG: 10 TABLET ORAL at 13:29

## 2024-12-01 RX ADMIN — ACETAMINOPHEN 650 MG: 325 TABLET ORAL at 17:27

## 2024-12-01 RX ADMIN — LORAZEPAM 0.5 MG: 0.5 TABLET ORAL at 20:33

## 2024-12-01 RX ADMIN — BUSPIRONE HYDROCHLORIDE 10 MG: 10 TABLET ORAL at 08:30

## 2024-12-01 RX ADMIN — IPRATROPIUM BROMIDE AND ALBUTEROL SULFATE 1 DOSE: 2.5; .5 SOLUTION RESPIRATORY (INHALATION) at 07:14

## 2024-12-01 ASSESSMENT — PULMONARY FUNCTION TESTS
PIF_VALUE: 25
PIF_VALUE: 23

## 2024-12-01 ASSESSMENT — PAIN DESCRIPTION - LOCATION: LOCATION: BACK

## 2024-12-01 ASSESSMENT — PAIN SCALES - GENERAL
PAINLEVEL_OUTOF10: 2
PAINLEVEL_OUTOF10: 5

## 2024-12-01 ASSESSMENT — PAIN DESCRIPTION - DESCRIPTORS: DESCRIPTORS: DISCOMFORT

## 2024-12-02 ENCOUNTER — APPOINTMENT (OUTPATIENT)
Dept: GENERAL RADIOLOGY | Age: 69
DRG: 208 | End: 2024-12-02
Payer: MEDICARE

## 2024-12-02 VITALS
OXYGEN SATURATION: 90 % | RESPIRATION RATE: 15 BRPM | SYSTOLIC BLOOD PRESSURE: 124 MMHG | TEMPERATURE: 97.7 F | DIASTOLIC BLOOD PRESSURE: 61 MMHG | WEIGHT: 134.7 LBS | HEART RATE: 75 BPM | BODY MASS INDEX: 23.86 KG/M2

## 2024-12-02 LAB
ANION GAP SERPL CALCULATED.3IONS-SCNC: 11 MMOL/L (ref 3–16)
BASOPHILS # BLD: 0 K/UL (ref 0–0.2)
BASOPHILS NFR BLD: 0.1 %
BUN SERPL-MCNC: 16 MG/DL (ref 7–20)
CALCIUM SERPL-MCNC: 9.1 MG/DL (ref 8.3–10.6)
CHLORIDE SERPL-SCNC: 98 MMOL/L (ref 99–110)
CO2 SERPL-SCNC: 26 MMOL/L (ref 21–32)
CREAT SERPL-MCNC: 0.5 MG/DL (ref 0.6–1.2)
DEPRECATED RDW RBC AUTO: 15 % (ref 12.4–15.4)
EOSINOPHIL # BLD: 0.1 K/UL (ref 0–0.6)
EOSINOPHIL NFR BLD: 0.7 %
GFR SERPLBLD CREATININE-BSD FMLA CKD-EPI: >90 ML/MIN/{1.73_M2}
GLUCOSE SERPL-MCNC: 130 MG/DL (ref 70–99)
HCT VFR BLD AUTO: 31.8 % (ref 36–48)
HGB BLD-MCNC: 10.4 G/DL (ref 12–16)
LYMPHOCYTES # BLD: 1 K/UL (ref 1–5.1)
LYMPHOCYTES NFR BLD: 7.6 %
MCH RBC QN AUTO: 28.2 PG (ref 26–34)
MCHC RBC AUTO-ENTMCNC: 32.6 G/DL (ref 31–36)
MCV RBC AUTO: 86.8 FL (ref 80–100)
MONOCYTES # BLD: 1 K/UL (ref 0–1.3)
MONOCYTES NFR BLD: 7.9 %
NEUTROPHILS # BLD: 10.9 K/UL (ref 1.7–7.7)
NEUTROPHILS NFR BLD: 83.7 %
PATH INTERP BLD-IMP: NORMAL
PLATELET # BLD AUTO: 348 K/UL (ref 135–450)
PMV BLD AUTO: 7.7 FL (ref 5–10.5)
POTASSIUM SERPL-SCNC: 3.4 MMOL/L (ref 3.5–5.1)
RBC # BLD AUTO: 3.67 M/UL (ref 4–5.2)
SODIUM SERPL-SCNC: 135 MMOL/L (ref 136–145)
WBC # BLD AUTO: 13 K/UL (ref 4–11)

## 2024-12-02 PROCEDURE — 85025 COMPLETE CBC W/AUTO DIFF WBC: CPT

## 2024-12-02 PROCEDURE — 6370000000 HC RX 637 (ALT 250 FOR IP): Performed by: INTERNAL MEDICINE

## 2024-12-02 PROCEDURE — 80048 BASIC METABOLIC PNL TOTAL CA: CPT

## 2024-12-02 PROCEDURE — 94640 AIRWAY INHALATION TREATMENT: CPT

## 2024-12-02 PROCEDURE — 36415 COLL VENOUS BLD VENIPUNCTURE: CPT

## 2024-12-02 PROCEDURE — 2580000003 HC RX 258: Performed by: NURSE PRACTITIONER

## 2024-12-02 PROCEDURE — 94761 N-INVAS EAR/PLS OXIMETRY MLT: CPT

## 2024-12-02 PROCEDURE — 71045 X-RAY EXAM CHEST 1 VIEW: CPT

## 2024-12-02 PROCEDURE — 94003 VENT MGMT INPAT SUBQ DAY: CPT

## 2024-12-02 PROCEDURE — 2700000000 HC OXYGEN THERAPY PER DAY

## 2024-12-02 PROCEDURE — 6370000000 HC RX 637 (ALT 250 FOR IP): Performed by: NURSE PRACTITIONER

## 2024-12-02 PROCEDURE — 99239 HOSP IP/OBS DSCHRG MGMT >30: CPT | Performed by: INTERNAL MEDICINE

## 2024-12-02 PROCEDURE — 94010 BREATHING CAPACITY TEST: CPT

## 2024-12-02 PROCEDURE — 6360000002 HC RX W HCPCS: Performed by: NURSE PRACTITIONER

## 2024-12-02 PROCEDURE — 99233 SBSQ HOSP IP/OBS HIGH 50: CPT | Performed by: INTERNAL MEDICINE

## 2024-12-02 RX ORDER — LORAZEPAM 0.5 MG/1
0.5 TABLET ORAL 2 TIMES DAILY PRN
Qty: 6 TABLET | Refills: 0 | Status: SHIPPED | OUTPATIENT
Start: 2024-12-02 | End: 2024-12-05

## 2024-12-02 RX ORDER — MUPIROCIN 20 MG/G
OINTMENT TOPICAL 2 TIMES DAILY
Status: DISCONTINUED | OUTPATIENT
Start: 2024-12-02 | End: 2024-12-02 | Stop reason: HOSPADM

## 2024-12-02 RX ORDER — PREDNISONE 10 MG/1
TABLET ORAL
DISCHARGE
Start: 2024-12-02

## 2024-12-02 RX ADMIN — ASPIRIN 81 MG: 81 TABLET, CHEWABLE ORAL at 08:05

## 2024-12-02 RX ADMIN — BUSPIRONE HYDROCHLORIDE 10 MG: 10 TABLET ORAL at 14:18

## 2024-12-02 RX ADMIN — BUSPIRONE HYDROCHLORIDE 10 MG: 10 TABLET ORAL at 08:05

## 2024-12-02 RX ADMIN — SODIUM CHLORIDE, PRESERVATIVE FREE 10 ML: 5 INJECTION INTRAVENOUS at 08:05

## 2024-12-02 RX ADMIN — SODIUM CHLORIDE 30 MG/ML INHALATION SOLUTION 4 ML: 30 SOLUTION INHALANT at 08:16

## 2024-12-02 RX ADMIN — DOXYCYCLINE HYCLATE 100 MG: 100 TABLET, COATED ORAL at 08:04

## 2024-12-02 RX ADMIN — LORAZEPAM 0.5 MG: 0.5 TABLET ORAL at 08:05

## 2024-12-02 RX ADMIN — MUPIROCIN: 20 OINTMENT TOPICAL at 08:04

## 2024-12-02 RX ADMIN — IPRATROPIUM BROMIDE AND ALBUTEROL SULFATE 1 DOSE: 2.5; .5 SOLUTION RESPIRATORY (INHALATION) at 11:54

## 2024-12-02 RX ADMIN — IPRATROPIUM BROMIDE AND ALBUTEROL SULFATE 1 DOSE: 2.5; .5 SOLUTION RESPIRATORY (INHALATION) at 15:28

## 2024-12-02 RX ADMIN — PREDNISONE 40 MG: 20 TABLET ORAL at 08:04

## 2024-12-02 RX ADMIN — IPRATROPIUM BROMIDE AND ALBUTEROL SULFATE 1 DOSE: 2.5; .5 SOLUTION RESPIRATORY (INHALATION) at 08:16

## 2024-12-02 RX ADMIN — ACETAMINOPHEN 650 MG: 325 TABLET ORAL at 10:24

## 2024-12-02 RX ADMIN — ROFLUMILAST 500 MCG: 500 TABLET ORAL at 08:05

## 2024-12-02 RX ADMIN — ENOXAPARIN SODIUM 40 MG: 100 INJECTION SUBCUTANEOUS at 08:04

## 2024-12-02 RX ADMIN — METOPROLOL TARTRATE 12.5 MG: 25 TABLET, FILM COATED ORAL at 08:04

## 2024-12-02 ASSESSMENT — PULMONARY FUNCTION TESTS
PIF_VALUE: 29
PIF_VALUE: 30

## 2024-12-02 ASSESSMENT — COPD QUESTIONNAIRES
QUESTION4_WALKINCLINE: 5
QUESTION1_COUGHFREQUENCY: 3
QUESTION8_ENERGYLEVEL: 3
QUESTION6_LEAVINGHOUSE: 5
QUESTION7_SLEEPQUALITY: 3
QUESTION3_CHESTTIGHTNESS: 3
CAT_TOTALSCORE: 32
QUESTION5_HOMEACTIVITIES: 5
QUESTION2_CHESTPHLEGM: 5

## 2024-12-02 ASSESSMENT — PAIN DESCRIPTION - ORIENTATION: ORIENTATION: MID

## 2024-12-02 ASSESSMENT — PAIN DESCRIPTION - LOCATION: LOCATION: HEAD

## 2024-12-02 ASSESSMENT — PAIN SCALES - GENERAL
PAINLEVEL_OUTOF10: 6
PAINLEVEL_OUTOF10: 2

## 2024-12-02 ASSESSMENT — PAIN DESCRIPTION - DESCRIPTORS: DESCRIPTORS: ACHING

## 2024-12-02 ASSESSMENT — PAIN - FUNCTIONAL ASSESSMENT: PAIN_FUNCTIONAL_ASSESSMENT: PREVENTS OR INTERFERES SOME ACTIVE ACTIVITIES AND ADLS

## 2024-12-02 NOTE — CARE COORDINATION
CM did not deliver 2nd IMM within 4 hours for DC, verbal explanation of patient rights at bedside. Pt voiced understanding of discharge MCR rights and is agreeable to discharge.

## 2024-12-02 NOTE — DISCHARGE INSTR - COC
Continuity of Care Form    Patient Name: Annie Stein   :  1955  MRN:  [id number]    Admit date:  2024  Discharge date:  24    Code Status Order: Full Code   Advance Directives:   Advance Care Flowsheet Documentation             Admitting Physician:  Fiorella Grayson DO  PCP: Bruce Nichols MD    Discharging Nurse: Delfino  Discharging Hospital Unit/Room#: 3002/3002-01  Discharging Unit Phone Number: [phone]    Emergency Contact:   Extended Emergency Contact Information  Primary Emergency Contact: HUMERA STEIN  Mobile Phone: [phone]  Relation: Child  Secondary Emergency Contact: shaheen lim  Mobile Phone: [phone]  Relation: Other    Past Surgical History:  Past Surgical History:   Procedure Laterality Date    CARDIAC PROCEDURE N/A 2024    Left and right heart cath / coronary angiography performed by Merary Davis MD at [facility] CARDIAC CATH LAB    CHOLECYSTECTOMY      COLONOSCOPY         Immunization History:   Immunization History   Administered Date(s) Administered    Influenza Vaccine, unspecified formulation 2013, 10/26/2016    Influenza Virus Vaccine 2013, 10/26/2016, 2017    Influenza, FLUARIX, FLULAVAL, FLUZONE (age 6 mo+) and AFLURIA, (age 3 y+), Quadv PF, 0.5mL 10/26/2016, 2017, 2020, 2022    TDaP, ADACEL (age 10y-64y), BOOSTRIX (age 10y+), IM, 0.5mL 2017       Active Problems:  Patient Active Problem List   Diagnosis Code    Chest pain R07.9    Shortness of breath R06.02    Tobacco use Z72.0    Moderate COPD (chronic obstructive pulmonary disease) ([facility]) J44.9    COPD exacerbation ([facility]) J44.1    Acute respiratory failure with hypoxia J96.01    Hyponatremia E87.1    Pulmonary nodule R91.1    Pulmonary emphysema ([facility]) J43.9    Acute on chronic respiratory failure with hypoxia and hypercapnia J96.21, J96.22    Community acquired pneumonia of right lung J18.9    Acute exacerbation of chronic obstructive pulmonary disease

## 2024-12-02 NOTE — CARE COORDINATION
DISCHARGE ORDER  Date/Time 2024 2:54 PM  Completed by: Teresa Boeck, RN, Case Management    Patient Name: Annie Tyler    : 1955      Admit order Date and Status:24  Noted discharge order. (verify MD's last order for status of admission/Traditional Medicare 3 [place] Inpatient qualifying stay required for SNF)    Confirmed discharge plan with:              Patient:  Yes              When pt confirms DC plan does any support person need to be contacted by CM Yes if yes who -              Discharge to Facility: [facility]  Facility phone number for staff giving report: [phone]   Pre-certification completed: NA   Hospital Exemption Notification (HENS) completed: NA   Discharge orders and Continuity of Care faxed to facility:  [facility] [place]      Transportation:               Medical Transport explained with choice list offered to pt/family.                Choice:No(no preference)  Agency used: [place]    time:   17:00      Pt/family/Nursing/Facility aware of  time: yes  Names: patient, Thuy/child, Delfino RN, Roni/[facility]           Ambulance form completed:  yes:      Date Last IMM Given: 24    Comments: Pt is being d/c'd to [facility] today. Pt's O2 sats are 96% on T-piece.    Discharge timeout done with Delfino RN. All discharge needs and concerns addressed.    Discharging nurse to complete CHIKI, reconcile AVS, and place final copy with patient's discharge packet. Discharging RN to ensure that written prescriptions for  Level II medications are sent with patient to the facility as per protocol.

## 2024-12-02 NOTE — PLAN OF CARE
Problem: Chronic Conditions and Co-morbidities  Goal: Patient's chronic conditions and co-morbidity symptoms are monitored and maintained or improved  11/27/2024 2335 by Franko Urbina RN  Outcome: Progressing  11/27/2024 2334 by Franko Urbina RN  Outcome: Not Progressing     Problem: Discharge Planning  Goal: Discharge to home or other facility with appropriate resources  11/27/2024 2335 by Franko Urbina RN  Outcome: Progressing  11/27/2024 2334 by Franko Urbina RN  Outcome: Not Progressing     Problem: Pain  Goal: Verbalizes/displays adequate comfort level or baseline comfort level  11/27/2024 2335 by Franko Urbina RN  Outcome: Progressing  11/27/2024 2334 by Franko Urbina RN  Outcome: Not Progressing     Problem: Skin/Tissue Integrity  Goal: Absence of new skin breakdown  Description: 1.  Monitor for areas of redness and/or skin breakdown  2.  Assess vascular access sites hourly  3.  Every 4-6 hours minimum:  Change oxygen saturation probe site  4.  Every 4-6 hours:  If on nasal continuous positive airway pressure, respiratory therapy assess nares and determine need for appliance change or resting period.  11/27/2024 2335 by Franko Urbina RN  Outcome: Progressing  11/27/2024 2334 by Franko Urbina RN  Outcome: Not Progressing     Problem: ABCDS Injury Assessment  Goal: Absence of physical injury  11/27/2024 2335 by Franko Urbina, RN  Outcome: Progressing  11/27/2024 2334 by Franko Urbina RN  Outcome: Not Progressing     Problem: Safety - Adult  Goal: Free from fall injury  11/27/2024 2335 by Franko Urbina, RN  Outcome: Progressing  11/27/2024 2334 by Franko Urbina, RN  Outcome: Not Progressing     
  Problem: Chronic Conditions and Co-morbidities  Goal: Patient's chronic conditions and co-morbidity symptoms are monitored and maintained or improved  11/30/2024 0153 by Eyad Long RN  Outcome: Progressing  Flowsheets (Taken 11/29/2024 2000)  Care Plan - Patient's Chronic Conditions and Co-Morbidity Symptoms are Monitored and Maintained or Improved: Monitor and assess patient's chronic conditions and comorbid symptoms for stability, deterioration, or improvement  11/29/2024 1212 by Kamran Hall, RN  Outcome: Progressing  Flowsheets (Taken 11/29/2024 1212)  Care Plan - Patient's Chronic Conditions and Co-Morbidity Symptoms are Monitored and Maintained or Improved: Monitor and assess patient's chronic conditions and comorbid symptoms for stability, deterioration, or improvement     Problem: Discharge Planning  Goal: Discharge to home or other facility with appropriate resources  Outcome: Progressing  Flowsheets (Taken 11/29/2024 2000)  Discharge to home or other facility with appropriate resources: Identify barriers to discharge with patient and caregiver     Problem: Pain  Goal: Verbalizes/displays adequate comfort level or baseline comfort level  11/30/2024 0153 by Eyad Long RN  Outcome: Progressing  Flowsheets (Taken 11/29/2024 2000)  Verbalizes/displays adequate comfort level or baseline comfort level:   Encourage patient to monitor pain and request assistance   Assess pain using appropriate pain scale   Implement non-pharmacological measures as appropriate and evaluate response  11/29/2024 1212 by Kamran Hall, RN  Outcome: Progressing  Flowsheets (Taken 11/29/2024 1212)  Verbalizes/displays adequate comfort level or baseline comfort level:   Encourage patient to monitor pain and request assistance   Assess pain using appropriate pain scale  Note: Pain rated 0/10.      
  Problem: Chronic Conditions and Co-morbidities  Goal: Patient's chronic conditions and co-morbidity symptoms are monitored and maintained or improved  11/30/2024 0847 by Tia Ojeda RN  Outcome: Progressing  Flowsheets (Taken 11/30/2024 0840)  Care Plan - Patient's Chronic Conditions and Co-Morbidity Symptoms are Monitored and Maintained or Improved: Monitor and assess patient's chronic conditions and comorbid symptoms for stability, deterioration, or improvement  11/30/2024 0153 by Eyad Long RN  Outcome: Progressing  Flowsheets (Taken 11/29/2024 2000)  Care Plan - Patient's Chronic Conditions and Co-Morbidity Symptoms are Monitored and Maintained or Improved: Monitor and assess patient's chronic conditions and comorbid symptoms for stability, deterioration, or improvement     Problem: Discharge Planning  Goal: Discharge to home or other facility with appropriate resources  11/30/2024 0847 by Tia Ojeda RN  Outcome: Progressing  Flowsheets (Taken 11/30/2024 0840)  Discharge to home or other facility with appropriate resources: Identify barriers to discharge with patient and caregiver  11/30/2024 0153 by Eyad Long RN  Outcome: Progressing  Flowsheets (Taken 11/29/2024 2000)  Discharge to home or other facility with appropriate resources: Identify barriers to discharge with patient and caregiver     Problem: Pain  Goal: Verbalizes/displays adequate comfort level or baseline comfort level  11/30/2024 0847 by Tia Ojeda RN  Outcome: Progressing  11/30/2024 0153 by Eyad Long RN  Outcome: Progressing  Flowsheets (Taken 11/29/2024 2000)  Verbalizes/displays adequate comfort level or baseline comfort level:   Encourage patient to monitor pain and request assistance   Assess pain using appropriate pain scale   Implement non-pharmacological measures as appropriate and evaluate response     Problem: Skin/Tissue Integrity  Goal: Absence of new skin breakdown  Description: 1.  Monitor for 
  Problem: Chronic Conditions and Co-morbidities  Goal: Patient's chronic conditions and co-morbidity symptoms are monitored and maintained or improved  11/30/2024 2210 by Eyad Long RN  Outcome: Progressing  Flowsheets (Taken 11/30/2024 2000)  Care Plan - Patient's Chronic Conditions and Co-Morbidity Symptoms are Monitored and Maintained or Improved: Monitor and assess patient's chronic conditions and comorbid symptoms for stability, deterioration, or improvement  11/30/2024 0847 by Tia Ojeda RN  Outcome: Progressing  Flowsheets (Taken 11/30/2024 0840)  Care Plan - Patient's Chronic Conditions and Co-Morbidity Symptoms are Monitored and Maintained or Improved: Monitor and assess patient's chronic conditions and comorbid symptoms for stability, deterioration, or improvement     Problem: Pain  Goal: Verbalizes/displays adequate comfort level or baseline comfort level  11/30/2024 2210 by Eyad Long RN  Outcome: Progressing  Flowsheets (Taken 11/30/2024 2000)  Verbalizes/displays adequate comfort level or baseline comfort level:   Encourage patient to monitor pain and request assistance   Assess pain using appropriate pain scale   Implement non-pharmacological measures as appropriate and evaluate response  11/30/2024 0847 by Tia Ojeda RN  Outcome: Progressing     Problem: Respiratory - Adult  Goal: Achieves optimal ventilation and oxygenation  11/30/2024 2210 by Eyad Long RN  Outcome: Progressing  Flowsheets (Taken 11/30/2024 2000)  Achieves optimal ventilation and oxygenation:   Assess for changes in respiratory status   Assess for changes in mentation and behavior   Position to facilitate oxygenation and minimize respiratory effort   Oxygen supplementation based on oxygen saturation or arterial blood gases  11/30/2024 0847 by Tia Ojeda RN  Outcome: Progressing     
  Problem: Chronic Conditions and Co-morbidities  Goal: Patient's chronic conditions and co-morbidity symptoms are monitored and maintained or improved  12/1/2024 0841 by Tia Ojeda RN  Outcome: Progressing  Flowsheets (Taken 12/1/2024 0800)  Care Plan - Patient's Chronic Conditions and Co-Morbidity Symptoms are Monitored and Maintained or Improved: Monitor and assess patient's chronic conditions and comorbid symptoms for stability, deterioration, or improvement  11/30/2024 2210 by Eyad Long RN  Outcome: Progressing  Flowsheets (Taken 11/30/2024 2000)  Care Plan - Patient's Chronic Conditions and Co-Morbidity Symptoms are Monitored and Maintained or Improved: Monitor and assess patient's chronic conditions and comorbid symptoms for stability, deterioration, or improvement     Problem: Discharge Planning  Goal: Discharge to home or other facility with appropriate resources  12/1/2024 0841 by Tia Ojeda RN  Outcome: Progressing  Flowsheets (Taken 12/1/2024 0800)  Discharge to home or other facility with appropriate resources: Identify barriers to discharge with patient and caregiver  11/30/2024 2210 by Eyad Long RN  Outcome: Progressing  Flowsheets (Taken 11/30/2024 2000)  Discharge to home or other facility with appropriate resources: Identify barriers to discharge with patient and caregiver     Problem: Pain  Goal: Verbalizes/displays adequate comfort level or baseline comfort level  12/1/2024 0841 by Tia Ojeda RN  Outcome: Progressing  Flowsheets (Taken 12/1/2024 0829)  Verbalizes/displays adequate comfort level or baseline comfort level: Encourage patient to monitor pain and request assistance  11/30/2024 2210 by Eyad Long RN  Outcome: Progressing  Flowsheets (Taken 11/30/2024 2000)  Verbalizes/displays adequate comfort level or baseline comfort level:   Encourage patient to monitor pain and request assistance   Assess pain using appropriate pain scale   Implement 
  Problem: Chronic Conditions and Co-morbidities  Goal: Patient's chronic conditions and co-morbidity symptoms are monitored and maintained or improved  12/2/2024 1615 by Kamran Hall RN  Outcome: Adequate for Discharge  12/2/2024 0414 by Eyad Long RN  Outcome: Progressing     Problem: Discharge Planning  Goal: Discharge to home or other facility with appropriate resources  12/2/2024 1615 by Kamran Hall RN  Outcome: Adequate for Discharge  12/2/2024 0414 by Eyad Long RN  Outcome: Progressing     Problem: Pain  Goal: Verbalizes/displays adequate comfort level or baseline comfort level  12/2/2024 1615 by Kamran Hall RN  Outcome: Adequate for Discharge  Flowsheets (Taken 12/2/2024 1615)  Verbalizes/displays adequate comfort level or baseline comfort level: Assess pain using appropriate pain scale  Note: Pain is 0/10.   12/2/2024 0414 by Eyad Long RN  Outcome: Progressing  Flowsheets (Taken 12/1/2024 2000)  Verbalizes/displays adequate comfort level or baseline comfort level:   Encourage patient to monitor pain and request assistance   Assess pain using appropriate pain scale   Implement non-pharmacological measures as appropriate and evaluate response     Problem: Skin/Tissue Integrity  Goal: Absence of new skin breakdown  Description: 1.  Monitor for areas of redness and/or skin breakdown  2.  Assess vascular access sites hourly  3.  Every 4-6 hours minimum:  Change oxygen saturation probe site  4.  Every 4-6 hours:  If on nasal continuous positive airway pressure, respiratory therapy assess nares and determine need for appliance change or resting period.  12/2/2024 1615 by Kamran Hall RN  Outcome: Adequate for Discharge  12/2/2024 0414 by Eyad Long RN  Outcome: Progressing     Problem: ABCDS Injury Assessment  Goal: Absence of physical injury  12/2/2024 1615 by Kamran Hall RN  Outcome: Adequate for Discharge  12/2/2024 0414 by Ethan 
  Problem: Chronic Conditions and Co-morbidities  Goal: Patient's chronic conditions and co-morbidity symptoms are monitored and maintained or improved  Outcome: Progressing     Problem: Discharge Planning  Goal: Discharge to home or other facility with appropriate resources  Outcome: Progressing     Problem: Pain  Goal: Verbalizes/displays adequate comfort level or baseline comfort level  Outcome: Progressing     Problem: Skin/Tissue Integrity  Goal: Absence of new skin breakdown  Description: 1.  Monitor for areas of redness and/or skin breakdown  2.  Assess vascular access sites hourly  3.  Every 4-6 hours minimum:  Change oxygen saturation probe site  4.  Every 4-6 hours:  If on nasal continuous positive airway pressure, respiratory therapy assess nares and determine need for appliance change or resting period.  Outcome: Progressing     Problem: ABCDS Injury Assessment  Goal: Absence of physical injury  Outcome: Progressing     Problem: Safety - Adult  Goal: Free from fall injury  Outcome: Progressing     
  Problem: Chronic Conditions and Co-morbidities  Goal: Patient's chronic conditions and co-morbidity symptoms are monitored and maintained or improved  Outcome: Progressing     Problem: Discharge Planning  Goal: Discharge to home or other facility with appropriate resources  Outcome: Progressing     Problem: Pain  Goal: Verbalizes/displays adequate comfort level or baseline comfort level  Outcome: Progressing  Flowsheets (Taken 12/1/2024 2000)  Verbalizes/displays adequate comfort level or baseline comfort level:   Encourage patient to monitor pain and request assistance   Assess pain using appropriate pain scale   Implement non-pharmacological measures as appropriate and evaluate response     Problem: Skin/Tissue Integrity  Goal: Absence of new skin breakdown  Description: 1.  Monitor for areas of redness and/or skin breakdown  2.  Assess vascular access sites hourly  3.  Every 4-6 hours minimum:  Change oxygen saturation probe site  4.  Every 4-6 hours:  If on nasal continuous positive airway pressure, respiratory therapy assess nares and determine need for appliance change or resting period.  Outcome: Progressing     Problem: ABCDS Injury Assessment  Goal: Absence of physical injury  Outcome: Progressing     Problem: Safety - Adult  Goal: Free from fall injury  Outcome: Progressing     Problem: Respiratory - Adult  Goal: Achieves optimal ventilation and oxygenation  Outcome: Progressing     Problem: Cardiovascular - Adult  Goal: Maintains optimal cardiac output and hemodynamic stability  Outcome: Progressing  Goal: Absence of cardiac dysrhythmias or at baseline  Outcome: Progressing     Problem: Infection - Adult  Goal: Absence of infection at discharge  Outcome: Progressing     
Discharge  12/2/2024 1615 by Kamran Hall RN  Outcome: Adequate for Discharge  12/2/2024 0414 by Eyad Long RN  Outcome: Progressing     Problem: ABCDS Injury Assessment  Goal: Absence of physical injury  12/2/2024 1616 by Kamran Hall RN  Outcome: Adequate for Discharge  12/2/2024 1615 by Kamran Hall RN  Outcome: Adequate for Discharge  12/2/2024 0414 by Eyad Long RN  Outcome: Progressing     Problem: Safety - Adult  Goal: Free from fall injury  12/2/2024 1616 by Kamran Hall RN  Outcome: Adequate for Discharge  12/2/2024 1615 by Kamran Hall RN  Outcome: Adequate for Discharge  12/2/2024 0414 by Eyad Long RN  Outcome: Progressing     Problem: Respiratory - Adult  Goal: Achieves optimal ventilation and oxygenation  12/2/2024 1616 by Kamran Hall RN  Outcome: Adequate for Discharge  12/2/2024 1615 by Kamran Hall RN  Outcome: Adequate for Discharge  12/2/2024 0414 by Eyad Long RN  Outcome: Progressing     Problem: Cardiovascular - Adult  Goal: Maintains optimal cardiac output and hemodynamic stability  12/2/2024 1616 by Kamran Hall RN  Outcome: Adequate for Discharge  12/2/2024 1615 by Kamran Hall RN  Outcome: Adequate for Discharge  12/2/2024 0414 by Eyad Long RN  Outcome: Progressing  Goal: Absence of cardiac dysrhythmias or at baseline  12/2/2024 1616 by Kamran Hall RN  Outcome: Adequate for Discharge  12/2/2024 1615 by Kamran Hall RN  Outcome: Adequate for Discharge  12/2/2024 0414 by Eyad Long RN  Outcome: Progressing     Problem: Infection - Adult  Goal: Absence of infection at discharge  12/2/2024 1616 by Kamran Hall RN  Outcome: Adequate for Discharge  12/2/2024 1615 by Kamran Hall RN  Outcome: Adequate for Discharge  12/2/2024 0414 by Eyad Long RN  Outcome: Progressing     
and hemodynamic stability  Flowsheets (Taken 11/29/2024 1212)  Maintains optimal cardiac output and hemodynamic stability:   Monitor blood pressure and heart rate   Monitor urine output and notify Licensed Independent Practitioner for values outside of normal range   Assess for signs of decreased cardiac output  Note: The patient is in sinus rhythm while at rest. Sinus tachycardia is present when the patient moves around the bed. Electronically signed by Kamran Hall RN on 11/29/2024 at 12:15 PM

## 2024-12-02 NOTE — PROGRESS NOTES
[facility]                                 Pulmonary Consult /Progress Note :                                                                Reason for referral and CC: SOB, hypoxia, COPD    Subjective   Vent dependent recently  Doing great  Ate well   States SOB has resolved and she is to base line   Had low BP,watch as she is metoprolol 12.5 bid and seems working well       PHYSICAL EXAM: BP (!) 136/119   Pulse 96   Temp 98.4 °F (36.9 °C) (Temporal)   Resp 16   Wt 57.9 kg (127 lb 9.6 oz)   SpO2 93%   BMI 22.60 kg/m²  on 50% FiO2  Constitutional:  No acute distress.   Eyes: PERRL. Conjunctivae anicteric.   ENT: Normal nose. Normal tongue.    Neck:  Trach is in place and c/d/i  Respiratory: No accessory muscle usage.  Decreased breath sounds. No wheezes. No rales. No Rhonchi.  Cardiovascular: Normal S1S2. No digit clubbing. No digit cyanosis. No LE edema.    Gastrointestinal: No mass palpated. No tenderness palpated.   Skin: No rash on the exposed extremities.   Psychiatric: No anxiety or Agitation. Alert and Oriented to person, place and time.    CBC:   Recent Labs     11/27/24  0753 11/28/24  0416 11/29/24  0423   WBC 22.9* 11.0 10.3   HGB 10.2* 9.0* 8.8*   HCT 31.6* 26.9* 26.1*   MCV 87.3 85.4 86.4    333 327     BMP:   Recent Labs     11/27/24  0820 11/28/24  0416   * 137   K 3.6 4.4   CL 95* 99   CO2 31 28   BUN 15 15   CREATININE 0.6 0.5*        Recent Labs     11/27/24  0820   AST 24   ALT 17   BILITOT 0.3   ALKPHOS 156*     No results for input(s): \"PROTIME\", \"INR\" in the last 72 hours.  No results for input(s): \"NITRITE\", \"COLORU\", \"PHUR\", \"LABCAST\", \"WBCUA\", \"RBCUA\", \"MUCUS\", \"TRICHOMONAS\", \"YEAST\", \"BACTERIA\", \"CLARITYU\", \"SPECGRAV\", \"LEUKOCYTESUR\", \"UROBILINOGEN\", \"BILIRUBINUR\", \"BLOODU\", \"GLUCOSEU\", \"AMORPHOUS\" in the last 72 hours.    Invalid input(s): \"KETONESU\"  Recent Labs     11/27/24  7908 
                                                  [facility], [facility] Sleep [facility]                                 Pulmonary Consult /Progress Note :                                                                Reason for referral and CC: SOB, hypoxia, COPD    Subjective   Vent dependent recently  On Trach collar daytime  Wbc better    Doing great  States SOB has resolved and she is to base line   Awake and had breakfast     PHYSICAL EXAM: BP (!) 117/55   Pulse 96   Temp 97.7 °F (36.5 °C) (Temporal)   Resp 22   Wt 61.1 kg (134 lb 11.2 oz)   SpO2 95%   BMI 23.86 kg/m²  on 50% FiO2  Constitutional:  No acute distress.   Eyes: PERRL. Conjunctivae anicteric.   ENT: Normal nose. Normal tongue.    Neck:  Trach is in place and c/d/i  Respiratory: No accessory muscle usage.  Decreased breath sounds. No wheezes. No rales. No Rhonchi.  Cardiovascular: Normal S1S2. No digit clubbing. No digit cyanosis. No LE edema.    Gastrointestinal: No mass palpated. No tenderness palpated.   Skin: No rash on the exposed extremities.   Psychiatric: No anxiety or Agitation. Alert and Oriented to person, place and time.    CBC:   Recent Labs     11/29/24  0423 11/30/24  0425 12/01/24  0904   WBC 10.3 24.8* 13.3*   HGB 8.8* 10.8* 10.2*   HCT 26.1* 34.0* 31.8*   MCV 86.4 88.7 88.6    494* 318     BMP:   No results for input(s): \"NA\", \"K\", \"CL\", \"CO2\", \"PHOS\", \"BUN\", \"CREATININE\" in the last 72 hours.    Invalid input(s): \"CA\"       No results for input(s): \"AST\", \"ALT\", \"LIPASE\", \"AMYLASE\", \"BILIDIR\", \"BILITOT\", \"ALKPHOS\" in the last 72 hours.    Invalid input(s): \"ALB\"    No results for input(s): \"PROTIME\", \"INR\" in the last 72 hours.  No results for input(s): \"NITRITE\", \"COLORU\", \"PHUR\", \"LABCAST\", \"WBCUA\", \"RBCUA\", \"MUCUS\", \"TRICHOMONAS\", \"YEAST\", \"BACTERIA\", \"CLARITYU\", \"SPECGRAV\", \"LEUKOCYTESUR\", \"UROBILINOGEN\", \"BILIRUBINUR\", \"BLOODU\", \"GLUCOSEU\", \"AMORPHOUS\" in the last 72 hours.    Invalid input(s): 
                                                  [facility]                                 Pulmonary Consult /Progress Note :                                                                Reason for referral and CC: SOB, hypoxia, COPD    Subjective   Vent dependent recently  Trach collar  Elevated wbc but in steroids   Doing great  States SOB has resolved and she is to base line   Awake and had breakfast     PHYSICAL EXAM: BP (!) 92/49   Pulse (!) 108   Temp 98.2 °F (36.8 °C) (Oral)   Resp (!) 34   Wt 61.1 kg (134 lb 11.2 oz)   SpO2 97%   BMI 23.86 kg/m²  on 50% FiO2  Constitutional:  No acute distress.   Eyes: PERRL. Conjunctivae anicteric.   ENT: Normal nose. Normal tongue.    Neck:  Trach is in place and c/d/i  Respiratory: No accessory muscle usage.  Decreased breath sounds. No wheezes. No rales. No Rhonchi.  Cardiovascular: Normal S1S2. No digit clubbing. No digit cyanosis. No LE edema.    Gastrointestinal: No mass palpated. No tenderness palpated.   Skin: No rash on the exposed extremities.   Psychiatric: No anxiety or Agitation. Alert and Oriented to person, place and time.    CBC:   Recent Labs     11/28/24  0416 11/29/24  0423 11/30/24  0425   WBC 11.0 10.3 24.8*   HGB 9.0* 8.8* 10.8*   HCT 26.9* 26.1* 34.0*   MCV 85.4 86.4 88.7    327 494*     BMP:   Recent Labs     11/28/24 0416      K 4.4   CL 99   CO2 28   BUN 15   CREATININE 0.5*        No results for input(s): \"AST\", \"ALT\", \"LIPASE\", \"AMYLASE\", \"BILIDIR\", \"BILITOT\", \"ALKPHOS\" in the last 72 hours.    Invalid input(s): \"ALB\"    No results for input(s): \"PROTIME\", \"INR\" in the last 72 hours.  No results for input(s): \"NITRITE\", \"COLORU\", \"PHUR\", \"LABCAST\", \"WBCUA\", \"RBCUA\", \"MUCUS\", \"TRICHOMONAS\", \"YEAST\", \"BACTERIA\", \"CLARITYU\", \"SPECGRAV\", \"LEUKOCYTESUR\", \"UROBILINOGEN\", \"BILIRUBINUR\", \"BLOODU\", \"GLUCOSEU\", \"AMORPHOUS\" in the last 72 hours.    Invalid input(s): \"KETONESU\"  Recent Labs     
                                                  [facility], Critical Care [facility] Sleep Special[facility]                                 Pulmonary Consult /Progress Note :                                                                Reason for referral and CC: SOB, hypoxia, COPD    Subjective   Vent dependent recently  She has been doing great  Had low BP,watch as she is metoprolol ,       PHYSICAL EXAM: BP 98/62   Pulse 74   Temp 98.1 °F (36.7 °C) (Temporal)   Resp 25   Wt 58.4 kg (128 lb 12.8 oz)   SpO2 100%   BMI 22.82 kg/m²  on 50% FiO2  Constitutional:  No acute distress.   Eyes: PERRL. Conjunctivae anicteric.   ENT: Normal nose. Normal tongue.    Neck:  Trach is in place and c/d/i  Respiratory: No accessory muscle usage.  Decreased breath sounds. No wheezes. No rales. No Rhonchi.  Cardiovascular: Normal S1S2. No digit clubbing. No digit cyanosis. No LE edema.    Gastrointestinal: No mass palpated. No tenderness palpated.   Skin: No rash on the exposed extremities.   Psychiatric: No anxiety or Agitation. Alert and Oriented to person, place and time.    CBC:   Recent Labs     11/27/24  0753 11/28/24  0416   WBC 22.9* 11.0   HGB 10.2* 9.0*   HCT 31.6* 26.9*   MCV 87.3 85.4    333     BMP:   Recent Labs     11/27/24  0820 11/28/24  0416   * 137   K 3.6 4.4   CL 95* 99   CO2 31 28   BUN 15 15   CREATININE 0.6 0.5*        Recent Labs     11/27/24  0820   AST 24   ALT 17   BILITOT 0.3   ALKPHOS 156*     No results for input(s): \"PROTIME\", \"INR\" in the last 72 hours.  No results for input(s): \"NITRITE\", \"COLORU\", \"PHUR\", \"LABCAST\", \"WBCUA\", \"RBCUA\", \"MUCUS\", \"TRICHOMONAS\", \"YEAST\", \"BACTERIA\", \"CLARITYU\", \"SPECGRAV\", \"LEUKOCYTESUR\", \"UROBILINOGEN\", \"BILIRUBINUR\", \"BLOODU\", \"GLUCOSEU\", \"AMORPHOUS\" in the last 72 hours.    Invalid input(s): \"KETONESU\"  Recent Labs     11/27/24  1358 11/27/24  1628   PHART 7.302* 7.318*   [id number] 54.8* 52.3*   PO2ART 102.3 110.5*       Chest imaging was reviewed by me 
   11/27/24 1437   Encounter Summary   Encounter Overview/Reason Spiritual/Emotional Needs   Service Provided For Patient   Last Encounter  11/27/24  (Visit and prayer.  Non verbal on trach. Awake.  Smiling.)   Complexity of Encounter Low   Begin Time 1425   End Time  1437   Total Time Calculated 12 min   Spiritual/Emotional needs   Type Spiritual Support   Assessment/Intervention/Outcome   Assessment Calm   Intervention Prayer (assurance of)/Eckert;Nurtured Hope   Outcome Receptive   Plan and Referrals   Plan/Referrals Other (Comment)  (Continue prn support.)       
   11/29/24 1900   RT Protocol   History Pulmonary Disease 2   Respiratory pattern 0   Breath sounds 6   Cough 1   Indications for Bronchodilator Therapy Decreased or absent breath sounds   Bronchodilator Assessment Score 9     RT Inhaler-Nebulizer Bronchodilator Protocol Note    There is a bronchodilator order in the chart from a provider indicating to follow the RT Bronchodilator Protocol and there is an “Initiate RT Inhaler-Nebulizer Bronchodilator Protocol” order as well (see protocol at bottom of note).    CXR Findings:  XR CHEST PORTABLE    Result Date: 11/28/2024  Stable chest when compared to the prior exam       The findings from the last RT Protocol Assessment were as follows:   History Pulmonary Disease: Chronic pulmonary disease  Respiratory Pattern: Regular pattern and RR 12-20 bpm  Breath Sounds: Inspiratory and expiratory or bilateral wheezing and/or rhonchi  Cough: Strong, productive  Indication for Bronchodilator Therapy: Decreased or absent breath sounds  Bronchodilator Assessment Score: 9    Aerosolized bronchodilator medication orders have been revised according to the RT Inhaler-Nebulizer Bronchodilator Protocol below.    Respiratory Therapist to perform RT Therapy Protocol Assessment initially then follow the protocol.  Repeat RT Therapy Protocol Assessment PRN for score 0-3 or on second treatment, BID, and PRN for scores above 3.    No Indications - adjust the frequency to every 6 hours PRN wheezing or bronchospasm, if no treatments needed after 48 hours then discontinue using Per Protocol order mode.     If indication present, adjust the RT bronchodilator orders based on the Bronchodilator Assessment Score as indicated below.  Use Inhaler orders unless patient has one or more of the following: on home nebulizer, not able to hold breath for 10 seconds, is not alert and oriented, cannot activate and use MDI correctly, or respiratory rate 25 breaths per minute or more, then use the equivalent 
   11/29/24 2309   Patient Observation   Pulse 70   Respirations 21   SpO2 96 %   Patient Observations shiley 4   Breath Sounds   Right Upper Lobe Diminished   Right Middle Lobe Diminished   Right Lower Lobe Diminished   Left Upper Lobe Diminished   Left Lower Lobe Diminished   Vent Information   Vent Mode AC/VC   Ventilator Settings   FiO2  40 %   Vt (Set, mL) 400 mL   Resp Rate (Set) 18 bpm   PEEP/CPAP (cmH2O) 5   Vent Patient Data (Readings)   Vt (Measured) 471 mL   Peak Inspiratory Pressure (cmH2O) 25 cmH2O   Rate Measured 27 br/min   Minute Volume (L/min) 10.4 Liters   Peak Inspiratory Flow (lpm) 65 L/sec   Mean Airway Pressure (cmH2O) 6.1 cmH20   I:E Ratio 1:3.9   Flow Sensitivity 3 L/min   Vent Alarm Settings   High Pressure (cmH2O) 45 cmH2O   Low Minute Volume (lpm) 4 L/min   Low Exhaled Vt (ml) 250 mL   RR High (bpm) 45 br/min   Apnea (secs) 20 secs   Additional Respiratoray Assessments   Humidification Source HME   Ambu Bag With Mask At Bedside Yes   Backup Trachs Available (Size) 4.0;6.0   Airway Clearance   Suction Trach   Suction Device Inline suction catheter   Sputum Method Obtained Tracheal   Sputum Amount Small   Sputum Color/Odor White   Sputum Consistency Thick   Surgical Airway    No placement date or time found.   Surgical Airway Type: Tracheostomy  Size: 4   Status Secured   Site Assessment Clean;Dry   Site Care Dressing applied   Inner Cannula Care Changed/new   Ties Assessment Clean;Dry;Secure   Spare Trach at Bedside Yes   Spare Trach Tube One Size Smaller at Bedside Yes   Ambu Bag With Mask at Bedside Yes       
   11/30/24 0238   Patient Observation   Pulse 79   Respirations 15   SpO2 100 %   Patient Observations shiley 4   Breath Sounds   Right Upper Lobe Diminished   Right Middle Lobe Diminished   Right Lower Lobe Diminished   Left Upper Lobe Diminished   Left Lower Lobe Diminished   Vent Information   Vent Mode AC/VC   Ventilator Settings   FiO2  40 %   Vt (Set, mL) 400 mL   Resp Rate (Set) 18 bpm   PEEP/CPAP (cmH2O) 5   Vent Patient Data (Readings)   Vt (Measured) 437 mL   Peak Inspiratory Pressure (cmH2O) 25 cmH2O   Rate Measured 25 br/min   Minute Volume (L/min) 9.1 Liters   Peak Inspiratory Flow (lpm) 65 L/sec   Mean Airway Pressure (cmH2O) 6 cmH20   I:E Ratio 1:3.5   Flow Sensitivity 3 L/min   Vent Alarm Settings   High Pressure (cmH2O) 45 cmH2O   Low Minute Volume (lpm) 4 L/min   Low Exhaled Vt (ml) 250 mL   RR High (bpm) 45 br/min   Apnea (secs) 20 secs   Additional Respiratoray Assessments   Humidification Source HME   Ambu Bag With Mask At Bedside Yes   Backup Trachs Available (Size) 4.0;6.0   Airway Clearance   Suction Trach   Suction Device Inline suction catheter   Sputum Method Obtained Tracheal   Sputum Amount Moderate   Sputum Color/Odor White   Sputum Consistency Thick       
   11/30/24 1900   RT Protocol   History Pulmonary Disease 2   Respiratory pattern 0   Breath sounds 2   Cough 1   Indications for Bronchodilator Therapy Decreased or absent breath sounds   Bronchodilator Assessment Score 5     RT Inhaler-Nebulizer Bronchodilator Protocol Note    There is a bronchodilator order in the chart from a provider indicating to follow the RT Bronchodilator Protocol and there is an “Initiate RT Inhaler-Nebulizer Bronchodilator Protocol” order as well (see protocol at bottom of note).    CXR Findings:  XR CHEST PORTABLE    Result Date: 11/30/2024  Bibasilar segmental atelectasis versus pneumonia.       The findings from the last RT Protocol Assessment were as follows:   History Pulmonary Disease: Chronic pulmonary disease  Respiratory Pattern: Regular pattern and RR 12-20 bpm  Breath Sounds: Slightly diminished and/or crackles  Cough: Strong, productive  Indication for Bronchodilator Therapy: Decreased or absent breath sounds  Bronchodilator Assessment Score: 5    Aerosolized bronchodilator medication orders have been revised according to the RT Inhaler-Nebulizer Bronchodilator Protocol below.    Respiratory Therapist to perform RT Therapy Protocol Assessment initially then follow the protocol.  Repeat RT Therapy Protocol Assessment PRN for score 0-3 or on second treatment, BID, and PRN for scores above 3.    No Indications - adjust the frequency to every 6 hours PRN wheezing or bronchospasm, if no treatments needed after 48 hours then discontinue using Per Protocol order mode.     If indication present, adjust the RT bronchodilator orders based on the Bronchodilator Assessment Score as indicated below.  Use Inhaler orders unless patient has one or more of the following: on home nebulizer, not able to hold breath for 10 seconds, is not alert and oriented, cannot activate and use MDI correctly, or respiratory rate 25 breaths per minute or more, then use the equivalent nebulizer order(s) with 
   11/30/24 2311   Patient Observation   Pulse 79   Respirations 24   Patient Observations shiley 4   Breath Sounds   Right Upper Lobe Diminished   Right Middle Lobe Diminished   Right Lower Lobe Diminished   Left Upper Lobe Diminished   Left Lower Lobe Diminished   Vent Information   Vent Mode AC/VC   Ventilator Settings   FiO2  40 %   Vt (Set, mL) 400 mL   Resp Rate (Set) 18 bpm   PEEP/CPAP (cmH2O) 5   Vent Patient Data (Readings)   Vt (Measured) 510 mL   Peak Inspiratory Pressure (cmH2O) 32 cmH2O   Rate Measured 30 br/min   Minute Volume (L/min) 9.7 Liters   Peak Inspiratory Flow (lpm) 65 L/sec   Mean Airway Pressure (cmH2O) 12 cmH20   I:E Ratio 1:3.2   Flow Sensitivity 3 L/min   Vent Alarm Settings   High Pressure (cmH2O) 45 cmH2O   Low Minute Volume (lpm) 4 L/min   Low Exhaled Vt (ml) 250 mL   RR High (bpm) 45 br/min   Apnea (secs) 20 secs   Additional Respiratoray Assessments   Humidification Source HME   Ambu Bag With Mask At Bedside Yes   Backup Trachs Available (Size) 4.0;6.0   Airway Clearance   Suction Trach   Suction Device Inline suction catheter   Sputum Method Obtained Tracheal   Sputum Amount Moderate   Sputum Color/Odor White   Sputum Consistency Thick   Surgical Airway    No placement date or time found.   Surgical Airway Type: Tracheostomy  Size: 4   Status Secured   Site Assessment Clean;Dry   Site Care Dressing applied   Inner Cannula Care Changed/new   Ties Assessment Clean;Dry;Intact   Spare Trach at Bedside Yes   Spare Trach Tube One Size Smaller at Bedside Yes   Ambu Bag With Mask at Bedside Yes       
   12/01/24 0307   Patient Observation   Pulse 73   Respirations 29   SpO2 99 %   Patient Observations shiley 4   Breath Sounds   Right Upper Lobe Diminished   Right Middle Lobe Diminished   Right Lower Lobe Diminished   Left Upper Lobe Diminished   Left Lower Lobe Diminished   Vent Information   Vent Mode AC/VC   Ventilator Settings   FiO2  40 %   Vt (Set, mL) 400 mL   Resp Rate (Set) 18 bpm   PEEP/CPAP (cmH2O) 5   Vent Patient Data (Readings)   Vt (Measured) 420 mL   Peak Inspiratory Pressure (cmH2O) 23 cmH2O   Rate Measured 27 br/min   Minute Volume (L/min) 9.9 Liters   Peak Inspiratory Flow (lpm) 65 L/sec   Mean Airway Pressure (cmH2O) 9.6 cmH20   I:E Ratio 1:2.2   Flow Sensitivity 3 L/min   Vent Alarm Settings   High Pressure (cmH2O) 45 cmH2O   Low Minute Volume (lpm) 4 L/min   Low Exhaled Vt (ml) 250 mL   RR High (bpm) 45 br/min   Apnea (secs) 20 secs   Additional Respiratoray Assessments   Humidification Source HME   Ambu Bag With Mask At Bedside Yes   Backup Trachs Available (Size) 4.0;6.0   Airway Clearance   Suction Trach   Suction Device Inline suction catheter   Sputum Method Obtained Tracheal   Sputum Amount Moderate   Sputum Color/Odor White   Sputum Consistency Thick       
   12/01/24 1900   RT Protocol   History Pulmonary Disease 2   Respiratory pattern 2   Breath sounds 2   Cough 1   Indications for Bronchodilator Therapy Decreased or absent breath sounds   Bronchodilator Assessment Score 7     RT Inhaler-Nebulizer Bronchodilator Protocol Note    There is a bronchodilator order in the chart from a provider indicating to follow the RT Bronchodilator Protocol and there is an “Initiate RT Inhaler-Nebulizer Bronchodilator Protocol” order as well (see protocol at bottom of note).    CXR Findings:  XR CHEST PORTABLE    Result Date: 11/30/2024  Bibasilar segmental atelectasis versus pneumonia.       The findings from the last RT Protocol Assessment were as follows:   History Pulmonary Disease: Chronic pulmonary disease  Respiratory Pattern: Dyspnea on exertion or RR 21-25 bpm  Breath Sounds: Slightly diminished and/or crackles  Cough: Strong, productive  Indication for Bronchodilator Therapy: Decreased or absent breath sounds  Bronchodilator Assessment Score: 7    Aerosolized bronchodilator medication orders have been revised according to the RT Inhaler-Nebulizer Bronchodilator Protocol below.    Respiratory Therapist to perform RT Therapy Protocol Assessment initially then follow the protocol.  Repeat RT Therapy Protocol Assessment PRN for score 0-3 or on second treatment, BID, and PRN for scores above 3.    No Indications - adjust the frequency to every 6 hours PRN wheezing or bronchospasm, if no treatments needed after 48 hours then discontinue using Per Protocol order mode.     If indication present, adjust the RT bronchodilator orders based on the Bronchodilator Assessment Score as indicated below.  Use Inhaler orders unless patient has one or more of the following: on home nebulizer, not able to hold breath for 10 seconds, is not alert and oriented, cannot activate and use MDI correctly, or respiratory rate 25 breaths per minute or more, then use the equivalent nebulizer order(s) 
   12/01/24 2309   Patient Observation   Pulse 63   Respirations 20   Patient Observations shiley 4   Breath Sounds   Right Upper Lobe Diminished   Right Middle Lobe Diminished   Right Lower Lobe Diminished   Left Upper Lobe Diminished   Left Lower Lobe Diminished   Vent Information   Vent Mode AC/VC   Ventilator Settings   FiO2  40 %   Vt (Set, mL) 400 mL   Resp Rate (Set) 18 bpm   PEEP/CPAP (cmH2O) 5   Vent Patient Data (Readings)   Vt (Measured) 403 mL   Peak Inspiratory Pressure (cmH2O) 25 cmH2O   Rate Measured 24 br/min   Minute Volume (L/min) 9.9 Liters   Peak Inspiratory Flow (lpm) 65 L/sec   Mean Airway Pressure (cmH2O) 8.4 cmH20   I:E Ratio 1:2.5   Flow Sensitivity 3 L/min   Vent Alarm Settings   High Pressure (cmH2O) 45 cmH2O   Low Minute Volume (lpm) 4 L/min   Low Exhaled Vt (ml) 250 mL   RR High (bpm) 45 br/min   Apnea (secs) 20 secs   Additional Respiratoray Assessments   Humidification Source HME   Ambu Bag With Mask At Bedside Yes   Backup Trachs Available (Size) 4.0;6.0   Airway Clearance   Suction Trach   Suction Device Inline suction catheter   Sputum Method Obtained Tracheal   Sputum Amount Large   Sputum Color/Odor White   Sputum Consistency Thick   Surgical Airway    No placement date or time found.   Surgical Airway Type: Tracheostomy  Size: 4   Status Secured   Site Assessment Clean;Dry   Site Care Dressing applied   Inner Cannula Care Changed/new   Ties Assessment Clean;Dry;Intact;Secure   Spare Trach at Bedside Yes   Spare Trach Tube One Size Smaller at Bedside Yes   Ambu Bag With Mask at Bedside Yes       
   12/02/24 0305   Patient Observation   Pulse 57   Respirations 24   Patient Observations shiley 4   Breath Sounds   Right Upper Lobe Diminished;Rhonchi   Right Middle Lobe Diminished   Right Lower Lobe Diminished   Left Upper Lobe Diminished;Rhonchi   Left Lower Lobe Diminished   Vent Information   Vent Mode AC/VC   Ventilator Settings   FiO2  40 %   Vt (Set, mL) 400 mL   Resp Rate (Set) 18 bpm   PEEP/CPAP (cmH2O) 5   Vent Patient Data (Readings)   Vt (Measured) 378 mL   Peak Inspiratory Pressure (cmH2O) 29 cmH2O   Rate Measured 23 br/min   Minute Volume (L/min) 8.3 Liters   Peak Inspiratory Flow (lpm) 65 L/sec   Mean Airway Pressure (cmH2O) 10 cmH20   I:E Ratio 1:2.7   Flow Sensitivity 3 L/min   Vent Alarm Settings   High Pressure (cmH2O) 45 cmH2O   Low Minute Volume (lpm) 4 L/min   Low Exhaled Vt (ml) 250 mL   RR High (bpm) 45 br/min   Apnea (secs) 20 secs   Additional Respiratoray Assessments   Humidification Source HME   Ambu Bag With Mask At Bedside Yes   Backup Trachs Available (Size) 4.0;6.0   Airway Clearance   Suction Trach   Suction Device Inline suction catheter   Sputum Method Obtained Tracheal   Sputum Amount Moderate   Sputum Color/Odor White   Sputum Consistency Thick       
   12/02/24 0817   Patient Observation   Pulse 84   Respirations 20   SpO2 100 %   Patient Observations shiley 4   Breath Sounds   Breath Sounds Bilateral Diminished   Vent Information   Vent Mode AC/VC   Ventilator Settings   FiO2  40 %   Vt (Set, mL) 400 mL   Resp Rate (Set) 18 bpm   PEEP/CPAP (cmH2O) 5   Vent Patient Data (Readings)   Vt (Measured) 379 mL   Peak Inspiratory Pressure (cmH2O) 30 cmH2O   Rate Measured 20 br/min   Minute Volume (L/min) 8.3 Liters   Peak Inspiratory Flow (lpm) 65 L/sec   Mean Airway Pressure (cmH2O) 10 cmH20   I:E Ratio 1:3   Flow Sensitivity 3 L/min   Backup Apnea On   Backup Rate 18 Breaths Per Minute   Backup Vt 400   Vent Alarm Settings   High Pressure (cmH2O) 45 cmH2O   Low Minute Volume (lpm) 4 L/min   High Minute Volume (lpm) 22 L/min   Low Exhaled Vt (ml) 250 mL   High Exhaled Vt (ml) 900 mL   RR High (bpm) 45 br/min   Apnea (secs) 20 secs   Additional Respiratoray Assessments   Humidification Source HME   Circuit Condensation Drained   Ambu Bag With Mask At Bedside Yes   Backup Trachs Available (Size) 4.0;6.0   Surgical Airway    No placement date or time found.   Surgical Airway Type: Tracheostomy  Size: 4   Status Secured   Site Assessment Clean;Dry   Site Care Dressing applied;Cleansed;Dried   Inner Cannula Care Changed/new   Ties Assessment Clean;Dry;Intact;Secure   Spare Trach at Bedside Yes   Spare Trach Tube One Size Smaller at Bedside Yes   Ambu Bag With Mask at Bedside Yes   Respiratory   Respiratory Interventions H.O.B. elevated       
4 Eyes Skin Assessment     NAME:  Annie Tyler  YOB: 1955  MEDICAL RECORD NUMBER:  [id number]    The patient is being assessed for  Admission    I agree that at least one RN has performed a thorough Head to Toe Skin Assessment on the patient. ALL assessment sites listed below have been assessed.      Areas assessed by both nurses:    Head, Face, Ears, Shoulders, Back, Chest, Arms, Elbows, Hands, Sacrum. Buttock, Coccyx, Ischium, Legs. Feet and Heels, and Under Medical Devices         Does the Patient have a Wound? No noted wound(s)       Scattered ecchymosis, excoriation to buttocks    William Prevention initiated by RN: Yes  Wound Care Orders initiated by RN: No    Pressure Injury (Stage 3,4, Unstageable, DTI, NWPT, and Complex wounds) if present, place Wound referral order by RN under : No    New Ostomies, if present place, Ostomy referral order under : No     Nurse 1 eSignature: Electronically signed by Beau Quinteros RN on 11/27/24 at 2:42 PM EST    **SHARE this note so that the co-signing nurse can place an eSignature**    Nurse 2 eSignature: Electronically signed by Carolina Chicas RN on 11/27/24 at 5:47 PM EST   
40% CATC at this time.   11/28/24 1000   Oxygen Therapy/Pulse Ox   O2 Therapy Oxygen humidified   O2 Device Trach collar   O2 Flow Rate (L/min) 8 L/min   FiO2  40 %   Pulse 97   Respirations 22   SpO2 90 %       
Bedside report and Pt care transferred to Francisco GALVEZ. Pt denies any assistance at this time.   
Called Th[facility] ([facility]) to update her about with no answer. The mailbox was full and no voice mail notification was able to be left. Called and left a message for Nilda Tyler to return the call. Message left asking for a return. Spoke with Case Management. They verbalized that they had spoken with the family and informed them of discharge. Electronically signed by Kamran Hall RN on 12/2/2024 at 3:05 PM    
Dr Saez made aware of ABG results. Orders received to increase RR to 18 and repeat ABG in 1 hour.  
HEART FAILURE CARE PLAN:    Comorbidities Reviewed: Yes   Patient has a past medical history of Angina pectoris (HCC), Chronic pain, Congestive heart failure (HCC), COPD exacerbation (HCC), Depression, Essential hypertension, Panic disorder, Pneumonia, and Pulmonary emphysema (HCC).     Weights Reviewed: Yes   Admission weight: 62.6 kg (138 lb)   Wt Readings from Last 3 Encounters:   11/29/24 57.9 kg (127 lb 9.6 oz)   08/20/24 65.2 kg (143 lb 11.8 oz)   08/16/24 64.4 kg (142 lb)     Intake & Output Reviewed: Yes     Intake/Output Summary (Last 24 hours) at 11/30/2024 0152  Last data filed at 11/29/2024 1600  Gross per 24 hour   Intake 1290 ml   Output 400 ml   Net 890 ml       ECHOCARDIOGRAM Reviewed: Yes   Patient's Ejection Fraction (EF) is greater than 40%     Medications Reviewed: Yes   SCHEDULED HOSPITAL MEDICATIONS:   metoprolol tartrate  12.5 mg Oral BID    budesonide  500 mcg Nebulization BID    scopolamine  1 patch TransDERmal Q72H    sodium chloride flush  5-40 mL IntraVENous 2 times per day    enoxaparin  40 mg SubCUTAneous Daily    predniSONE  40 mg Oral Daily    doxycycline hyclate  100 mg Oral 2 times per day    LORazepam  0.5 mg Oral Q12H    ipratropium 0.5 mg-albuterol 2.5 mg  1 Dose Inhalation 4x Daily RT    atorvastatin  40 mg Oral Nightly    busPIRone  10 mg Oral TID    Roflumilast  500 mcg Oral Daily    escitalopram  20 mg Oral Nightly    aspirin  81 mg Oral Daily     HOME MEDICATIONS:  Prior to Admission medications    Medication Sig Start Date End Date Taking? Authorizing Provider   LORazepam (ATIVAN) 0.5 MG tablet Take 1 tablet by mouth in the morning and at bedtime. Max Daily Amount: 1 mg   Yes Joey Haney MD   melatonin 3 MG TABS tablet Take 1 tablet by mouth nightly as needed (insomnia)   Yes Joey Haney MD   traMADol (ULTRAM) 50 MG tablet Take 1 tablet by mouth every 8 hours as needed for Pain. Max Daily Amount: 150 mg   Yes Joey Haney MD   metoprolol 
HEART FAILURE CARE PLAN:    Comorbidities Reviewed: Yes   Patient has a past medical history of Angina pectoris (HCC), Chronic pain, Congestive heart failure (HCC), COPD exacerbation (HCC), Depression, Essential hypertension, Panic disorder, Pneumonia, and Pulmonary emphysema (HCC).     Weights Reviewed: Yes   Admission weight: 62.6 kg (138 lb)   Wt Readings from Last 3 Encounters:   11/30/24 61.1 kg (134 lb 11.2 oz)   08/20/24 65.2 kg (143 lb 11.8 oz)   08/16/24 64.4 kg (142 lb)     Intake & Output Reviewed: Yes     Intake/Output Summary (Last 24 hours) at 11/30/2024 2214  Last data filed at 11/30/2024 1811  Gross per 24 hour   Intake 420 ml   Output 1100 ml   Net -680 ml       ECHOCARDIOGRAM Reviewed: Yes   Patient's Ejection Fraction (EF) is greater than 40%     Medications Reviewed: Yes   SCHEDULED HOSPITAL MEDICATIONS:   metoprolol tartrate  12.5 mg Oral BID    budesonide  500 mcg Nebulization BID    scopolamine  1 patch TransDERmal Q72H    sodium chloride flush  5-40 mL IntraVENous 2 times per day    enoxaparin  40 mg SubCUTAneous Daily    predniSONE  40 mg Oral Daily    doxycycline hyclate  100 mg Oral 2 times per day    LORazepam  0.5 mg Oral Q12H    ipratropium 0.5 mg-albuterol 2.5 mg  1 Dose Inhalation 4x Daily RT    atorvastatin  40 mg Oral Nightly    busPIRone  10 mg Oral TID    Roflumilast  500 mcg Oral Daily    escitalopram  20 mg Oral Nightly    aspirin  81 mg Oral Daily     HOME MEDICATIONS:  Prior to Admission medications    Medication Sig Start Date End Date Taking? Authorizing Provider   LORazepam (ATIVAN) 0.5 MG tablet Take 1 tablet by mouth in the morning and at bedtime. Max Daily Amount: 1 mg   Yes Joey Haney MD   melatonin 3 MG TABS tablet Take 1 tablet by mouth nightly as needed (insomnia)   Yes Joey Haney MD   traMADol (ULTRAM) 50 MG tablet Take 1 tablet by mouth every 8 hours as needed for Pain. Max Daily Amount: 150 mg   Yes Joey Haney MD   metoprolol 
IM Progress Note    Admit Date:  11/27/2024  1    Interval history:  copd exacerbation , chronic trach  Used vent support     Subjective:  Ms. Tyler was on vent support via trach overnight with no issues  Feels good and ready to come off    No fevers, no cough    Objective:   /67   Pulse 74   Temp 98.7 °F (37.1 °C) (Oral)   Resp 25   Wt 58.4 kg (128 lb 12.8 oz)   SpO2 100%   BMI 22.82 kg/m²     Intake/Output Summary (Last 24 hours) at 11/28/2024 0727  Last data filed at 11/28/2024 0200  Gross per 24 hour   Intake 85 ml   Output 250 ml   Net -165 ml       Physical Exam:    General elderly female on vent support via trach  Awake, alert and oriented. Appears to be not in any distress  Mucous Membranes:  Pink , anicteric  Neck: No JVD, no carotid bruit, no thyromegaly  Chest:  improved javier air entry with resolved wheeze  Cardiovascular:  RRR S1S2 heard, no murmurs or gallops  Abdomen:  Soft, undistended, non tender, no organomegaly, BS present, PEG noted  Extremities: No edema or cyanosis. Distal pulses well felt  Neurological : grossly normal  Non focal on vent       Medications:   Scheduled Medications:    budesonide  500 mcg Nebulization BID    scopolamine  1 patch TransDERmal Q72H    sodium chloride flush  5-40 mL IntraVENous 2 times per day    enoxaparin  40 mg SubCUTAneous Daily    methylPREDNISolone  40 mg IntraVENous Q12H    Followed by    [START ON 11/30/2024] predniSONE  40 mg Oral Daily    doxycycline hyclate  100 mg Oral 2 times per day    LORazepam  0.5 mg Oral Q12H    ipratropium 0.5 mg-albuterol 2.5 mg  1 Dose Inhalation 4x Daily RT    metoprolol tartrate  25 mg Oral BID    atorvastatin  40 mg Oral Nightly    busPIRone  10 mg Oral TID    Roflumilast  500 mcg Oral Daily    escitalopram  20 mg Oral Nightly    aspirin  81 mg Oral Daily     I   sodium chloride       ipratropium 0.5 mg-albuterol 2.5 mg, sodium chloride flush, sodium chloride, ondansetron **OR** ondansetron, polyethylene glycol, 
IM Progress Note    Admit Date:  11/27/2024  2    Interval history:  copd exacerbation , chronic trach  Used vent support     Subjective:  Ms. Tyler was able to come off vent yesterday am and back on trach collar with 8 l and had intermittent cough needing suction   Anxiety issues noted    No fevers   Pt denies any issues, tolerating diet well     Objective:   /60   Pulse 82   Temp 98.9 °F (37.2 °C) (Oral)   Resp 26   Wt 57.9 kg (127 lb 9.6 oz)   SpO2 98%   BMI 22.60 kg/m²     Intake/Output Summary (Last 24 hours) at 11/29/2024 0723  Last data filed at 11/29/2024 0600  Gross per 24 hour   Intake 100 ml   Output 500 ml   Net -400 ml       Physical Exam:    General elderly female on trach collar  Awake, alert and oriented. Appears to be not in any distress  Mucous Membranes:  Pink , anicteric  Neck: No JVD, no carotid bruit, no thyromegaly  Chest:  improved javier air entry with resolved wheeze  Cardiovascular:  RRR S1S2 heard, no murmurs or gallops  Abdomen:  Soft, undistended, non tender, no organomegaly, BS present, PEG noted  Extremities: No edema or cyanosis. Distal pulses well felt  Neurological : grossly normal  Non focal       Medications:   Scheduled Medications:    metoprolol tartrate  12.5 mg Oral BID    budesonide  500 mcg Nebulization BID    scopolamine  1 patch TransDERmal Q72H    sodium chloride flush  5-40 mL IntraVENous 2 times per day    enoxaparin  40 mg SubCUTAneous Daily    methylPREDNISolone  40 mg IntraVENous Q12H    Followed by    [START ON 11/30/2024] predniSONE  40 mg Oral Daily    doxycycline hyclate  100 mg Oral 2 times per day    LORazepam  0.5 mg Oral Q12H    ipratropium 0.5 mg-albuterol 2.5 mg  1 Dose Inhalation 4x Daily RT    atorvastatin  40 mg Oral Nightly    busPIRone  10 mg Oral TID    Roflumilast  500 mcg Oral Daily    escitalopram  20 mg Oral Nightly    aspirin  81 mg Oral Daily     I   sodium chloride       ipratropium 0.5 mg-albuterol 2.5 mg, sodium chloride flush, 
Johanna placed on ventilator for the hs on settings of ac 18/400/40%/peep of 5    
Patient is being discharged from ICU. They are alert and oriented times 4. Speech is slurred due to her tracheostomy. Denies pain. Discharge education provided. Patient verbalized understanding. Patient verbalized that they have all of their belongings. IV removed per policy and procedure. Catheter intact. Bandage applied. Ambulance at bedside to take the patient home. Electronically signed by Kamran Hall RN on 12/2/2024 at 4:57 PM      
Patient placed on vent for the hs on settings of ac 18/400/40%/peep of 5   
Patient placed on ventilator for the hs on settings of ac 18/400/40%/peep of 5  
Patient taken off of vent and placed on 40% t piece/Cool aerosol. Tolerating well. Cuff deflated.  
Pt needed suctioned. Pt was suctioned and pt grew anxious which made her more SOB. Pt given albuterol. Pt grew more anxious and SOB. HR ST up to 145 and jittery. RT and RN at bedside. 1820.    1850- Pt moved to look for her glasses and her trach came out longterm. RT called to bedside. Trach put back in place.     Pt is now better. SpO2 99%. Vent settings 18/400/40% +5    Report given to LENNY Singh. No needs expressed. POC transferred at this time.  
RT Inhaler-Nebulizer Bronchodilator Protocol Note    There is a bronchodilator order in the chart from a provider indicating to follow the RT Bronchodilator Protocol and there is an “Initiate RT Inhaler-Nebulizer Bronchodilator Protocol” order as well (see protocol at bottom of note).    CXR Findings:  XR CHEST PORTABLE    Result Date: 11/27/2024  No acute process.       The findings from the last RT Protocol Assessment were as follows:   History Pulmonary Disease: (P) Chronic pulmonary disease  Respiratory Pattern: (P) Regular pattern and RR 12-20 bpm  Breath Sounds: (P) Slightly diminished and/or crackles  Cough: (P) Strong, productive  Indication for Bronchodilator Therapy: (P) Decreased or absent breath sounds  Bronchodilator Assessment Score: (P) 5    Aerosolized bronchodilator medication orders have been revised according to the RT Inhaler-Nebulizer Bronchodilator Protocol below.    Respiratory Therapist to perform RT Therapy Protocol Assessment initially then follow the protocol.  Repeat RT Therapy Protocol Assessment PRN for score 0-3 or on second treatment, BID, and PRN for scores above 3.    No Indications - adjust the frequency to every 6 hours PRN wheezing or bronchospasm, if no treatments needed after 48 hours then discontinue using Per Protocol order mode.     If indication present, adjust the RT bronchodilator orders based on the Bronchodilator Assessment Score as indicated below.  Use Inhaler orders unless patient has one or more of the following: on home nebulizer, not able to hold breath for 10 seconds, is not alert and oriented, cannot activate and use MDI correctly, or respiratory rate 25 breaths per minute or more, then use the equivalent nebulizer order(s) with same Frequency and PRN reasons based on the score.  If a patient is on this medication at home then do not decrease Frequency below that used at home.    0-3 - enter or revise RT bronchodilator order(s) to equivalent RT Bronchodilator 
RT Inhaler-Nebulizer Bronchodilator Protocol Note    There is a bronchodilator order in the chart from a provider indicating to follow the RT Bronchodilator Protocol and there is an “Initiate RT Inhaler-Nebulizer Bronchodilator Protocol” order as well (see protocol at bottom of note).    CXR Findings:  XR CHEST PORTABLE    Result Date: 11/27/2024  No acute process.       The findings from the last RT Protocol Assessment were as follows:   History Pulmonary Disease: Chronic pulmonary disease  Respiratory Pattern: Dyspnea on exertion or RR 21-25 bpm  Breath Sounds: Inspiratory and expiratory or bilateral wheezing and/or rhonchi  Cough: Strong, productive  Indication for Bronchodilator Therapy: Wheezing associated with pulm disorder  Bronchodilator Assessment Score: 11    Aerosolized bronchodilator medication orders have been revised according to the RT Inhaler-Nebulizer Bronchodilator Protocol below.    Respiratory Therapist to perform RT Therapy Protocol Assessment initially then follow the protocol.  Repeat RT Therapy Protocol Assessment PRN for score 0-3 or on second treatment, BID, and PRN for scores above 3.    No Indications - adjust the frequency to every 6 hours PRN wheezing or bronchospasm, if no treatments needed after 48 hours then discontinue using Per Protocol order mode.     If indication present, adjust the RT bronchodilator orders based on the Bronchodilator Assessment Score as indicated below.  Use Inhaler orders unless patient has one or more of the following: on home nebulizer, not able to hold breath for 10 seconds, is not alert and oriented, cannot activate and use MDI correctly, or respiratory rate 25 breaths per minute or more, then use the equivalent nebulizer order(s) with same Frequency and PRN reasons based on the score.  If a patient is on this medication at home then do not decrease Frequency below that used at home.    0-3 - enter or revise RT bronchodilator order(s) to equivalent RT 
RT Inhaler-Nebulizer Bronchodilator Protocol Note    There is a bronchodilator order in the chart from a provider indicating to follow the RT Bronchodilator Protocol and there is an “Initiate RT Inhaler-Nebulizer Bronchodilator Protocol” order as well (see protocol at bottom of note).    CXR Findings:  XR CHEST PORTABLE    Result Date: 11/28/2024  Stable chest when compared to the prior exam       The findings from the last RT Protocol Assessment were as follows:   History Pulmonary Disease: (P) Chronic pulmonary disease  Respiratory Pattern: (P) Regular pattern and RR 12-20 bpm  Breath Sounds: (P) Inspiratory and expiratory or bilateral wheezing and/or rhonchi  Cough: (P) Strong, productive  Indication for Bronchodilator Therapy: (P) Wheezing associated with pulm disorder  Bronchodilator Assessment Score: (P) 9    Aerosolized bronchodilator medication orders have been revised according to the RT Inhaler-Nebulizer Bronchodilator Protocol below.    Respiratory Therapist to perform RT Therapy Protocol Assessment initially then follow the protocol.  Repeat RT Therapy Protocol Assessment PRN for score 0-3 or on second treatment, BID, and PRN for scores above 3.    No Indications - adjust the frequency to every 6 hours PRN wheezing or bronchospasm, if no treatments needed after 48 hours then discontinue using Per Protocol order mode.     If indication present, adjust the RT bronchodilator orders based on the Bronchodilator Assessment Score as indicated below.  Use Inhaler orders unless patient has one or more of the following: on home nebulizer, not able to hold breath for 10 seconds, is not alert and oriented, cannot activate and use MDI correctly, or respiratory rate 25 breaths per minute or more, then use the equivalent nebulizer order(s) with same Frequency and PRN reasons based on the score.  If a patient is on this medication at home then do not decrease Frequency below that used at home.    0-3 - enter or revise 
RT Inhaler-Nebulizer Bronchodilator Protocol Note    There is a bronchodilator order in the chart from a provider indicating to follow the RT Bronchodilator Protocol and there is an “Initiate RT Inhaler-Nebulizer Bronchodilator Protocol” order as well (see protocol at bottom of note).    CXR Findings:  XR CHEST PORTABLE    Result Date: 11/28/2024  Stable chest when compared to the prior exam     XR CHEST PORTABLE    Result Date: 11/27/2024  No acute process.       The findings from the last RT Protocol Assessment were as follows:   History Pulmonary Disease: (P) Chronic pulmonary disease  Respiratory Pattern: (P) Regular pattern and RR 12-20 bpm  Breath Sounds: (P) Inspiratory and expiratory or bilateral wheezing and/or rhonchi  Cough: (P) Strong, productive  Indication for Bronchodilator Therapy: (P) Decreased or absent breath sounds  Bronchodilator Assessment Score: (P) 9    Aerosolized bronchodilator medication orders have been revised according to the RT Inhaler-Nebulizer Bronchodilator Protocol below.    Respiratory Therapist to perform RT Therapy Protocol Assessment initially then follow the protocol.  Repeat RT Therapy Protocol Assessment PRN for score 0-3 or on second treatment, BID, and PRN for scores above 3.    No Indications - adjust the frequency to every 6 hours PRN wheezing or bronchospasm, if no treatments needed after 48 hours then discontinue using Per Protocol order mode.     If indication present, adjust the RT bronchodilator orders based on the Bronchodilator Assessment Score as indicated below.  Use Inhaler orders unless patient has one or more of the following: on home nebulizer, not able to hold breath for 10 seconds, is not alert and oriented, cannot activate and use MDI correctly, or respiratory rate 25 breaths per minute or more, then use the equivalent nebulizer order(s) with same Frequency and PRN reasons based on the score.  If a patient is on this medication at home then do not 
RT Inhaler-Nebulizer Bronchodilator Protocol Note    There is a bronchodilator order in the chart from a provider indicating to follow the RT Bronchodilator Protocol and there is an “Initiate RT Inhaler-Nebulizer Bronchodilator Protocol” order as well (see protocol at bottom of note).    CXR Findings:  XR CHEST PORTABLE    Result Date: 11/28/2024  Stable chest when compared to the prior exam     XR CHEST PORTABLE    Result Date: 11/27/2024  No acute process.       The findings from the last RT Protocol Assessment were as follows:   History Pulmonary Disease: (P) Chronic pulmonary disease  Respiratory Pattern: (P) Regular pattern and RR 12-20 bpm  Breath Sounds: (P) Slightly diminished and/or crackles  Cough: (P) Strong, productive  Indication for Bronchodilator Therapy: (P) Decreased or absent breath sounds  Bronchodilator Assessment Score: (P) 5    Aerosolized bronchodilator medication orders have been revised according to the RT Inhaler-Nebulizer Bronchodilator Protocol below.    Respiratory Therapist to perform RT Therapy Protocol Assessment initially then follow the protocol.  Repeat RT Therapy Protocol Assessment PRN for score 0-3 or on second treatment, BID, and PRN for scores above 3.    No Indications - adjust the frequency to every 6 hours PRN wheezing or bronchospasm, if no treatments needed after 48 hours then discontinue using Per Protocol order mode.     If indication present, adjust the RT bronchodilator orders based on the Bronchodilator Assessment Score as indicated below.  Use Inhaler orders unless patient has one or more of the following: on home nebulizer, not able to hold breath for 10 seconds, is not alert and oriented, cannot activate and use MDI correctly, or respiratory rate 25 breaths per minute or more, then use the equivalent nebulizer order(s) with same Frequency and PRN reasons based on the score.  If a patient is on this medication at home then do not decrease Frequency below that used 
RT Inhaler-Nebulizer Bronchodilator Protocol Note    There is a bronchodilator order in the chart from a provider indicating to follow the RT Bronchodilator Protocol and there is an “Initiate RT Inhaler-Nebulizer Bronchodilator Protocol” order as well (see protocol at bottom of note).    CXR Findings:  XR CHEST PORTABLE    Result Date: 11/30/2024  Bibasilar segmental atelectasis versus pneumonia.       The findings from the last RT Protocol Assessment were as follows:   History Pulmonary Disease: Chronic pulmonary disease  Respiratory Pattern: Dyspnea on exertion or RR 21-25 bpm  Breath Sounds: Slightly diminished and/or crackles  Cough: Strong, productive  Indication for Bronchodilator Therapy: Decreased or absent breath sounds  Bronchodilator Assessment Score: 7    Aerosolized bronchodilator medication orders have been revised according to the RT Inhaler-Nebulizer Bronchodilator Protocol below.    Respiratory Therapist to perform RT Therapy Protocol Assessment initially then follow the protocol.  Repeat RT Therapy Protocol Assessment PRN for score 0-3 or on second treatment, BID, and PRN for scores above 3.    No Indications - adjust the frequency to every 6 hours PRN wheezing or bronchospasm, if no treatments needed after 48 hours then discontinue using Per Protocol order mode.     If indication present, adjust the RT bronchodilator orders based on the Bronchodilator Assessment Score as indicated below.  Use Inhaler orders unless patient has one or more of the following: on home nebulizer, not able to hold breath for 10 seconds, is not alert and oriented, cannot activate and use MDI correctly, or respiratory rate 25 breaths per minute or more, then use the equivalent nebulizer order(s) with same Frequency and PRN reasons based on the score.  If a patient is on this medication at home then do not decrease Frequency below that used at home.    0-3 - enter or revise RT bronchodilator order(s) to equivalent RT 
Report given to Nito GALVEZ at [facility]. Multiple questions answered. Updated her on the patients expected discharge time of 1700. Electronically signed by Kamran Hall RN on 12/2/2024 at 4:11 PM    
Rounding completed with Dr. Wolfe and multidisciplinary team. POC, labs, lines and assessment reviewed.   - Hypotensive - decrease metoprolol  - Trach collar - pt able to eat and swallow pills    
Shift assessment complete- see flowsheets.  Pt is A&O to person and time. Disoriented to place and situation.    VSS  Respirations are easy, even and unlabored. Pt is on trach collar FiO2 40% 8LPM. Sascha 4.    PIVs WDL. Flushed at this time.  Plan of care and goals reviewed. Call light within reach.  Bed in lowest position with wheels locked. No needs expressed at this time.   
last 72 hours.      Chest imaging was reviewed by me and showed 11/27/24 CTPA: Advanced emphysema with some chronic scarring.  Right upper lobe 12 mm spiculated pulmonary nodules unchanged since prior scan.    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  Diet   Hypertonic saline enbs   Lovenox DVT prophylaxis   
order(s) to equivalent RT Bronchodilator order with Frequency of every 4 hours PRN for wheezing or increased work of breathing using Per Protocol order mode.        4-6 - enter or revise RT Bronchodilator order(s) to two equivalent RT bronchodilator orders with one order with BID Frequency and one order with Frequency of every 4 hours PRN wheezing or increased work of breathing using Per Protocol order mode.        7-10 - enter or revise RT Bronchodilator order(s) to two equivalent RT bronchodilator orders with one order with TID Frequency and one order with Frequency of every 4 hours PRN wheezing or increased work of breathing using Per Protocol order mode.       11-13 - enter or revise RT Bronchodilator order(s) to one equivalent RT bronchodilator order with QID Frequency and an Albuterol order with Frequency of every 4 hours PRN wheezing or increased work of breathing using Per Protocol order mode.      Greater than 13 - enter or revise RT Bronchodilator order(s) to one equivalent RT bronchodilator order with every 4 hours Frequency and an Albuterol order with Frequency of every 2 hours PRN wheezing or increased work of breathing using Per Protocol order mode.     Electronically signed by Dallas Delacruz RCP on 12/1/2024 at 7:27 AM  
steroids recently   - high wbc likely sec to that and now improved and back on trach collar   - CTA chest neg for PE or infection   - sputum cx ordered procal 0.06  - supp O2, wean as tolerated  - started steroids, HHn and doxy- improved and now  weaned off vent , wbc high again with steroids.    - recurrent admissions for the same noted  -  pulmonary consulted   - can dc back to ECF       # History of right upper lobe pulm nodule/cancer.  Not a surgical candidate s/p SBRT.  Continue to monitor outpatient        # History of Takotsubo cardiomyopathy with mildly elevated troponins.    No chest pain per pt  -Last left heart cath was done recently in June without any complications and no occlusive CAD  - cardiology consulted and no plans for workup   - continue home ASA, statins        # Mood disorder/Anxiety  - on buspirone and Lexapro, xanax     # Chronic diastolic heart failure with preserved ejection fraction   -on Lasix 20 mg daily as needed       Note above makes patient higher risk for morbidity and mortality requiring testing and treatment.       DVT Prophylaxis: lovenox  Diet: regular diet  Code Status: full code     Dc planning ok today       Byron Faulkner MD, 12/1/2024 7:17 AM            
segments: mid anterior, mid anterolateral, mid anteroseptal, mid inferior, mid inferolateral, mid inferoseptal and apical anterior. Akinesis of the following segments: apical lateral, apical septal and apical inferior. Hyperkinesis of the following segments: basal anterolateral, basal anteroseptal, basal inferolateral and basal inferoseptal.  Differential most certainly includes ischemic cardiomyopathy and Takotsubo cardiomyopathy.    Image quality is adequate. Technically difficult study due to low parasternal window.           ASSESSMENT/PLAN:     Acute on Chronic Hypoxic Respiratory Failure with underlying chronic trach dependency   Copd exacerbation   - Oxygen baseline 5 L via trach  - placed on vent in the ED for increased work of breathing and hypercarbia , pt reports she has been on steroids recently   - high wbc likely sec to that and now improved and back on trach collar --worsening leukocytosis ? 2/2 IV Solu-medrol  - tapered steroid   - Solu-medrol started on admission, transition to prednisone today   - CTA chest neg for PE or infection on admission  - sputum cx pending  - procal 0.06 on admission   - supp O2, wean as tolerated  - improved and now  weaned off vent  - recurrent admissions for the same noted  -  pulmonary consulted   - on doxycycline D#3  - on Daliresp  - on scopolamine patch for secretions   - HHN     History of right upper lobe pulm nodule/cancer.    -Not a surgical candidate s/p SBRT.   - Continue to monitor outpatient      History of Takotsubo cardiomyopathy with mildly elevated troponins.    No chest pain per pt  -Last left heart cath was done recently in June without any complications and no occlusive CAD  - cardiology consulted and no plans for workup   - continue home ASA, statin and BB    HTN  - on lopressor  - lowered due to blood pressure on the  low side  -monitor      Mood disorder/Anxiety  - on buspirone and Lexapro, xanax     Chronic diastolic heart failure with preserved

## 2024-12-03 ENCOUNTER — TELEPHONE (OUTPATIENT)
Dept: CARDIOLOGY CLINIC | Age: 69
End: 2024-12-03

## 2024-12-03 NOTE — TELEPHONE ENCOUNTER
----- Message from Dr. Bruce Cohn DO sent at 11/27/2024  6:20 PM EST -----  Patient needs scheduled follow up. thanks

## 2024-12-04 NOTE — TELEPHONE ENCOUNTER
Called and spoke to Thuy the pt's POA. Per POA the pt resides at [facility]. I called and spoke to  at the facility, she stated the nurse is at lunch and will leave a note for the nurse to call us back.

## 2024-12-27 PROBLEM — R79.89 ELEVATED TROPONIN: Status: RESOLVED | Noted: 2024-11-27 | Resolved: 2024-12-27

## 2024-12-29 ENCOUNTER — APPOINTMENT (OUTPATIENT)
Dept: GENERAL RADIOLOGY | Age: 69
End: 2024-12-29
Payer: MEDICARE

## 2024-12-29 ENCOUNTER — HOSPITAL ENCOUNTER (EMERGENCY)
Age: 69
Discharge: HOME | End: 2024-12-29
Attending: STUDENT IN AN ORGANIZED HEALTH CARE EDUCATION/TRAINING PROGRAM
Payer: MEDICARE

## 2024-12-29 VITALS
HEART RATE: 96 BPM | DIASTOLIC BLOOD PRESSURE: 67 MMHG | RESPIRATION RATE: 16 BRPM | OXYGEN SATURATION: 97 % | TEMPERATURE: 99.8 F | SYSTOLIC BLOOD PRESSURE: 131 MMHG

## 2024-12-29 DIAGNOSIS — R06.02 SHORTNESS OF BREATH: ICD-10-CM

## 2024-12-29 DIAGNOSIS — Z43.0 ENCOUNTER FOR TRACHEOSTOMY TUBE CHANGE (HCC): Primary | ICD-10-CM

## 2024-12-29 LAB
GLUCOSE BLD-MCNC: 107 MG/DL (ref 70–99)
PERFORMED ON: ABNORMAL

## 2024-12-29 PROCEDURE — 94761 N-INVAS EAR/PLS OXIMETRY MLT: CPT

## 2024-12-29 PROCEDURE — 2700000000 HC OXYGEN THERAPY PER DAY

## 2024-12-29 PROCEDURE — 71045 X-RAY EXAM CHEST 1 VIEW: CPT

## 2024-12-29 PROCEDURE — 6370000000 HC RX 637 (ALT 250 FOR IP): Performed by: EMERGENCY MEDICINE

## 2024-12-29 PROCEDURE — 99285 EMERGENCY DEPT VISIT HI MDM: CPT

## 2024-12-29 RX ORDER — IPRATROPIUM BROMIDE AND ALBUTEROL SULFATE 2.5; .5 MG/3ML; MG/3ML
1 SOLUTION RESPIRATORY (INHALATION) ONCE
Status: COMPLETED | OUTPATIENT
Start: 2024-12-29 | End: 2024-12-29

## 2024-12-29 RX ADMIN — IPRATROPIUM BROMIDE AND ALBUTEROL SULFATE 1 DOSE: 2.5; .5 SOLUTION RESPIRATORY (INHALATION) at 12:07

## 2024-12-29 ASSESSMENT — PULMONARY FUNCTION TESTS
PIF_VALUE: 24
PIF_VALUE: 24

## 2024-12-29 NOTE — ED TRIAGE NOTES
[facility] from [facility].  She is a trach dependent patient.  At some point tonight she pulled out her entire trach.  EMS arrived and her initial saturatoin was 65%.  They inserted a 6.5 ET tube.  She arrived being bagged at 100%.

## 2024-12-29 NOTE — PROGRESS NOTES
12/29/24 0530   Patient Observation   SpO2 96 %   Patient Observations #[id number].  AMBU BAG AT HEAD OF BED.  HME.   Breath Sounds   Right Upper Lobe Clear   Right Middle Lobe Diminished   Right Lower Lobe Diminished   Left Upper Lobe Clear   Left Lower Lobe Diminished   Vent Information   Vent Mode AC/VC   $Ventilation $Initial Day   Ventilator Settings   FiO2  45 %   Vt (Set, mL) 400 mL   Resp Rate (Set) 16 bpm   PEEP/CPAP (cmH2O) 5   Vent Patient Data (Readings)   Vt (Measured) 496 mL   Peak Inspiratory Pressure (cmH2O) 24 cmH2O   Rate Measured 20 br/min   Minute Volume (L/min) 8.13 Liters   Peak Inspiratory Flow (lpm) 50 L/sec   Mean Airway Pressure (cmH2O) 7.4 cmH20   Plateau Pressure (cm H2O) 19 cm H2O   Driving Pressure 14   I:E Ratio 1:2.8   Flow Sensitivity 3 L/min   Static Compliance (L/cm H2O) 35   Dynamic Compliance (L/cm H2O) 26 L/cm H2O   Vent Alarm Settings   High Pressure (cmH2O) 40 cmH2O   Low Minute Volume (lpm) 2.5 L/min   High Minute Volume (lpm) 20 L/min   Low Exhaled Vt (ml) 250 mL   High Exhaled Vt (ml) 880 mL   RR High (bpm) 40 br/min   Apnea (secs) 20 secs   Additional Respiratoray Assessments   Humidification Source HME   Ambu Bag With Mask At Bedside Yes   Airway Clearance   Suction Trach   Suction Device Inline suction catheter   Sputum Method Obtained Tracheal   Sputum Amount Scant   Sputum Color/Odor Clear   Sputum Consistency Thick

## 2024-12-29 NOTE — ED PROVIDER NOTES
9/1/22  Pat Liu MD       Social history:  reports that she quit smoking about 3 years ago. Her smoking use included cigarettes. She started smoking about 56 years ago. She has a 104 pack-year smoking history. She has been exposed to tobacco smoke. She has never used smokeless tobacco. She reports that she does not currently use alcohol after a past usage of about 12.0 standard drinks of alcohol per week. She reports that she does not use drugs.    Family history:    Family History   Problem Relation Age of Onset    COPD Mother     Hypertension Mother     Asthma Neg Hx     Cancer Neg Hx     Diabetes Neg Hx     Emphysema Neg Hx     Heart Failure Neg Hx        Exam  ED Triage Vitals   BP Systolic BP Percentile Diastolic BP Percentile Temp Temp src Pulse Respirations SpO2   12/29/24 0508 -- -- 12/29/24 0514 -- 12/29/24 0508 12/29/24 0514 12/29/24 0508   (!) 143/86   99.8 °F (37.7 °C)  (!) 103 18 100 %      Height Weight         -- --                     Physical Exam  Vitals reviewed.   HENT:      Head: Normocephalic and atraumatic.      Right Ear: External ear normal.      Left Ear: External ear normal.      Nose: Nose normal.      Mouth/Throat:      Pharynx: Oropharynx is clear.   Eyes:      Conjunctiva/sclera: Conjunctivae normal.   Neck:      Comments: Tracheostomy site clean dry and intact.  Cardiovascular:      Rate and Rhythm: Regular rhythm. Tachycardia present.   Pulmonary:      Effort: No respiratory distress.   Abdominal:      General: There is no distension.      Palpations: Abdomen is soft.   Musculoskeletal:      Right lower leg: No edema.      Left lower leg: No edema.   Skin:     General: Skin is warm.      Capillary Refill: Capillary refill takes less than 2 seconds.   Neurological:      General: No focal deficit present.      Mental Status: She is alert.             MDM/ED Course  ED Medication Orders (From admission, onward)      None                Radiology  XR CHEST PORTABLE    Result

## 2024-12-31 NOTE — PROGRESS NOTES
daily, lisinopril 5mg daily, lasix 20mg daily  Cardiac event monitor - 4 week to assess for arrhythmias  Schedule limited echo to re-assess heart structure and function    Follow up 3 months in person.      Scribe's attestation:  This note was scribed in the presence of Dr. Bruce Cohn DO. By Elena Lopez RN      I, Dr. Bruce Cohn, personally performed the services described in this documentation, as scribed by the above signed scribe in my presence. It is both accurate and complete to my knowledge. I agree with the details independently gathered by the clinical support staff, while the remaining scribed note accurately describes my personal service to the patient.      I will address the patient's cardiac risk factors and adjusted pharmacologic treatment as needed. In addition, I have reinforced the need for patient directed risk factor modification.  All questions and concerns were addressed to the patient/family. Alternatives to my treatment were discussed.     Thank you for allowing us to participate in the care of Annie Tyler. Please call me with any questions [phone].    Bruce Cohn DO  Cardiovascular Disease  [facility]  [phone] [place] Office  [phone] [place] Office

## 2025-01-02 ENCOUNTER — TRANSCRIBE ORDERS (OUTPATIENT)
Dept: ADMINISTRATIVE | Age: 70
End: 2025-01-02

## 2025-01-02 DIAGNOSIS — Z85.118 PERSONAL HISTORY OF MALIGNANT NEOPLASM OF BRONCHUS AND LUNG: Primary | ICD-10-CM

## 2025-01-02 DIAGNOSIS — R41.82 ALTERED MENTAL STATUS, UNSPECIFIED ALTERED MENTAL STATUS TYPE: ICD-10-CM

## 2025-01-03 ENCOUNTER — SCHEDULED TELEPHONE ENCOUNTER (OUTPATIENT)
Dept: CARDIOLOGY CLINIC | Age: 70
End: 2025-01-03

## 2025-01-03 ENCOUNTER — ANCILLARY PROCEDURE (OUTPATIENT)
Dept: CARDIOLOGY CLINIC | Age: 70
End: 2025-01-03

## 2025-01-03 ENCOUNTER — TELEPHONE (OUTPATIENT)
Dept: CARDIOLOGY CLINIC | Age: 70
End: 2025-01-03

## 2025-01-03 DIAGNOSIS — I50.21 ACUTE HFREF (HEART FAILURE WITH REDUCED EJECTION FRACTION) (HCC): ICD-10-CM

## 2025-01-03 DIAGNOSIS — R00.0 TACHYCARDIA, UNSPECIFIED: ICD-10-CM

## 2025-01-03 DIAGNOSIS — I51.81 TAKOTSUBO CARDIOMYOPATHY: ICD-10-CM

## 2025-01-03 DIAGNOSIS — Z93.0 TRACHEOSTOMY DEPENDENCE (HCC): ICD-10-CM

## 2025-01-03 DIAGNOSIS — I50.32 CHRONIC DIASTOLIC CHF (CONGESTIVE HEART FAILURE) (HCC): ICD-10-CM

## 2025-01-03 DIAGNOSIS — I10 PRIMARY HYPERTENSION: Primary | ICD-10-CM

## 2025-01-03 DIAGNOSIS — Z99.11 DEPENDENT ON VENTILATOR (HCC): ICD-10-CM

## 2025-01-03 DIAGNOSIS — R00.0 TACHYCARDIA: ICD-10-CM

## 2025-01-03 RX ORDER — ASPIRIN 81 MG/1
81 TABLET, CHEWABLE ORAL DAILY
COMMUNITY

## 2025-01-03 RX ORDER — METOPROLOL SUCCINATE 25 MG/1
25 TABLET, EXTENDED RELEASE ORAL DAILY
Qty: 90 TABLET | Refills: 1
Start: 2025-01-03

## 2025-01-03 RX ORDER — TRAMADOL HYDROCHLORIDE 50 MG/1
50 TABLET ORAL EVERY 8 HOURS PRN
COMMUNITY

## 2025-01-03 NOTE — TELEPHONE ENCOUNTER
Monitor placed by S[employer] [facility] HOME  Monitor company [employer]  Length of monitor 30 DAYS  Monitor ordered by [employer]  Serial number SENT TO HOME  Kit ID [id number]  Activation successful prior to pt leaving office? Yes

## 2025-01-10 ENCOUNTER — HOSPITAL ENCOUNTER (INPATIENT)
Age: 70
LOS: 7 days | Discharge: NURSING, REHAB OR LONG-TERM CARE | End: 2025-01-17
Attending: STUDENT IN AN ORGANIZED HEALTH CARE EDUCATION/TRAINING PROGRAM | Admitting: INTERNAL MEDICINE
Payer: MEDICARE

## 2025-01-10 ENCOUNTER — APPOINTMENT (OUTPATIENT)
Dept: GENERAL RADIOLOGY | Age: 70
End: 2025-01-10
Payer: MEDICARE

## 2025-01-10 DIAGNOSIS — Z93.0 TRACHEOSTOMY DEPENDENCE (HCC): ICD-10-CM

## 2025-01-10 DIAGNOSIS — J96.21 ACUTE ON CHRONIC RESPIRATORY FAILURE WITH HYPOXIA: ICD-10-CM

## 2025-01-10 DIAGNOSIS — Z99.11 DEPENDENT ON VENTILATOR (HCC): ICD-10-CM

## 2025-01-10 DIAGNOSIS — A41.9 SEPTICEMIA (HCC): ICD-10-CM

## 2025-01-10 DIAGNOSIS — J18.9 PNEUMONIA DUE TO INFECTIOUS ORGANISM, UNSPECIFIED LATERALITY, UNSPECIFIED PART OF LUNG: Primary | ICD-10-CM

## 2025-01-10 PROBLEM — J95.851 VAP (VENTILATOR-ASSOCIATED PNEUMONIA) (HCC): Status: ACTIVE | Noted: 2024-08-17

## 2025-01-10 LAB
ACANTHOCYTES BLD QL SMEAR: ABNORMAL
ALBUMIN SERPL-MCNC: 3.3 G/DL (ref 3.4–5)
ALBUMIN/GLOB SERPL: 1 {RATIO} (ref 1.1–2.2)
ALP SERPL-CCNC: 246 U/L (ref 40–129)
ALT SERPL-CCNC: 16 U/L (ref 10–40)
ANION GAP SERPL CALCULATED.3IONS-SCNC: 12 MMOL/L (ref 3–16)
ANISOCYTOSIS BLD QL SMEAR: ABNORMAL
APPEARANCE BRONCH: ABNORMAL
AST SERPL-CCNC: 31 U/L (ref 15–37)
BASE EXCESS BLDV CALC-SCNC: 0.8 MMOL/L (ref -3–3)
BASOPHILS # BLD: 0 K/UL (ref 0–0.2)
BASOPHILS NFR BLD: 0 %
BILIRUB SERPL-MCNC: 0.6 MG/DL (ref 0–1)
BUN SERPL-MCNC: 9 MG/DL (ref 7–20)
BURR CELLS BLD QL SMEAR: ABNORMAL
CALCIUM SERPL-MCNC: 9.1 MG/DL (ref 8.3–10.6)
CHLORIDE SERPL-SCNC: 86 MMOL/L (ref 99–110)
CLOT SPEC QL: ABNORMAL
CO2 BLDV-SCNC: 29 MMOL/L
CO2 SERPL-SCNC: 26 MMOL/L (ref 21–32)
COHGB MFR BLDV: 0.8 % (ref 0–1.5)
COLOR BRONCH: COLORLESS
CREAT SERPL-MCNC: 0.6 MG/DL (ref 0.6–1.2)
DACRYOCYTES BLD QL SMEAR: ABNORMAL
DEPRECATED RDW RBC AUTO: 14.7 % (ref 12.4–15.4)
EKG ATRIAL RATE: 98 BPM
EKG DIAGNOSIS: NORMAL
EKG P AXIS: 54 DEGREES
EKG P-R INTERVAL: 130 MS
EKG Q-T INTERVAL: 356 MS
EKG QRS DURATION: 72 MS
EKG QTC CALCULATION (BAZETT): 454 MS
EKG R AXIS: -52 DEGREES
EKG T AXIS: 41 DEGREES
EKG VENTRICULAR RATE: 98 BPM
EOSINOPHIL # BLD: 0 K/UL (ref 0–0.6)
EOSINOPHIL NFR BLD: 0 %
FLUAV RNA RESP QL NAA+PROBE: NOT DETECTED
FLUBV RNA RESP QL NAA+PROBE: NOT DETECTED
GFR SERPLBLD CREATININE-BSD FMLA CKD-EPI: >90 ML/MIN/{1.73_M2}
GLUCOSE BLD-MCNC: 154 MG/DL (ref 70–99)
GLUCOSE SERPL-MCNC: 128 MG/DL (ref 70–99)
HCO3 BLDV-SCNC: 27 MMOL/L (ref 23–29)
HCT VFR BLD AUTO: 30.6 % (ref 36–48)
HGB BLD-MCNC: 9.9 G/DL (ref 12–16)
INR PPP: 1.12 (ref 0.85–1.15)
LACTATE BLDV-SCNC: 2.3 MMOL/L (ref 0.4–2)
LACTATE BLDV-SCNC: 3.1 MMOL/L (ref 0.4–1.9)
LACTATE BLDV-SCNC: 3.4 MMOL/L (ref 0.4–1.9)
LYMPHOCYTES # BLD: 1.2 K/UL (ref 1–5.1)
LYMPHOCYTES NFR BLD: 4 %
LYMPHOCYTES NFR BRONCH MANUAL: 2 % (ref 5–10)
MACROPHAGES NFR BRONCH MANUAL: 13 % (ref 90–95)
MCH RBC QN AUTO: 26.2 PG (ref 26–34)
MCHC RBC AUTO-ENTMCNC: 32.2 G/DL (ref 31–36)
MCV RBC AUTO: 81.3 FL (ref 80–100)
METHGB MFR BLDV: 0 %
MONOCYTES # BLD: 3.5 K/UL (ref 0–1.3)
MONOCYTES NFR BLD: 12 %
MONOCYTES NFR BRONCH MANUAL: 6 %
NEUTROPHILS # BLD: 24.5 K/UL (ref 1.7–7.7)
NEUTROPHILS NFR BLD: 82 %
NEUTROPHILS NFR BRONCH MANUAL: 79 % (ref 5–10)
NEUTS BAND NFR BLD MANUAL: 2 % (ref 0–7)
NT-PROBNP SERPL-MCNC: 491 PG/ML (ref 0–124)
NUC CELL # BRONCH MANUAL: 582 /CUMM
O2 CT VFR BLDV CALC: 16 VOL %
O2 THERAPY: ABNORMAL
OVALOCYTES BLD QL SMEAR: ABNORMAL
PATH INTERP BLD-IMP: NO
PCO2 BLDV: 50.2 MMHG (ref 40–50)
PERFORMED ON: ABNORMAL
PH BLDV: 7.35 [PH] (ref 7.35–7.45)
PLATELET # BLD AUTO: 508 K/UL (ref 135–450)
PLATELET BLD QL SMEAR: ABNORMAL
PMV BLD AUTO: 8.1 FL (ref 5–10.5)
PO2 BLDV: 126.8 MMHG (ref 25–40)
POIKILOCYTOSIS BLD QL SMEAR: ABNORMAL
POTASSIUM SERPL-SCNC: 3.9 MMOL/L (ref 3.5–5.1)
PROCALCITONIN SERPL IA-MCNC: 0.91 NG/ML (ref 0–0.15)
PROT SERPL-MCNC: 6.7 G/DL (ref 6.4–8.2)
PROTHROMBIN TIME: 14.6 SEC (ref 11.9–14.9)
RBC # BLD AUTO: 3.76 M/UL (ref 4–5.2)
RBC # FLD MANUAL: 1000 /CUMM
RSV AG NOSE QL: NEGATIVE
SAO2 % BLDV: 98 %
SARS-COV-2 RNA RESP QL NAA+PROBE: NOT DETECTED
SLIDE REVIEW: ABNORMAL
SODIUM SERPL-SCNC: 124 MMOL/L (ref 136–145)
TOTAL CELLS COUNTED BRONCH: 100
TROPONIN, HIGH SENSITIVITY: 16 NG/L (ref 0–14)
TROPONIN, HIGH SENSITIVITY: 39 NG/L (ref 0–14)
TROPONIN, HIGH SENSITIVITY: 42 NG/L (ref 0–14)
WBC # BLD AUTO: 29.2 K/UL (ref 4–11)

## 2025-01-10 PROCEDURE — 31624 DX BRONCHOSCOPE/LAVAGE: CPT | Performed by: INTERNAL MEDICINE

## 2025-01-10 PROCEDURE — 94761 N-INVAS EAR/PLS OXIMETRY MLT: CPT

## 2025-01-10 PROCEDURE — 99291 CRITICAL CARE FIRST HOUR: CPT | Performed by: INTERNAL MEDICINE

## 2025-01-10 PROCEDURE — 2000000000 HC ICU R&B

## 2025-01-10 PROCEDURE — 87807 RSV ASSAY W/OPTIC: CPT

## 2025-01-10 PROCEDURE — 99285 EMERGENCY DEPT VISIT HI MDM: CPT

## 2025-01-10 PROCEDURE — 71045 X-RAY EXAM CHEST 1 VIEW: CPT

## 2025-01-10 PROCEDURE — 6370000000 HC RX 637 (ALT 250 FOR IP): Performed by: INTERNAL MEDICINE

## 2025-01-10 PROCEDURE — 0B9H8ZX DRAINAGE OF LUNG LINGULA, VIA NATURAL OR ARTIFICIAL OPENING ENDOSCOPIC, DIAGNOSTIC: ICD-10-PCS | Performed by: INTERNAL MEDICINE

## 2025-01-10 PROCEDURE — 87070 CULTURE OTHR SPECIMN AEROBIC: CPT

## 2025-01-10 PROCEDURE — 96365 THER/PROPH/DIAG IV INF INIT: CPT

## 2025-01-10 PROCEDURE — 93010 ELECTROCARDIOGRAM REPORT: CPT | Performed by: INTERNAL MEDICINE

## 2025-01-10 PROCEDURE — 83880 ASSAY OF NATRIURETIC PEPTIDE: CPT

## 2025-01-10 PROCEDURE — 31622 DX BRONCHOSCOPE/WASH: CPT

## 2025-01-10 PROCEDURE — 87186 SC STD MICRODIL/AGAR DIL: CPT

## 2025-01-10 PROCEDURE — 84145 PROCALCITONIN (PCT): CPT

## 2025-01-10 PROCEDURE — 2580000003 HC RX 258

## 2025-01-10 PROCEDURE — 85610 PROTHROMBIN TIME: CPT

## 2025-01-10 PROCEDURE — 85025 COMPLETE CBC W/AUTO DIFF WBC: CPT

## 2025-01-10 PROCEDURE — 84484 ASSAY OF TROPONIN QUANT: CPT

## 2025-01-10 PROCEDURE — 99223 1ST HOSP IP/OBS HIGH 75: CPT | Performed by: INTERNAL MEDICINE

## 2025-01-10 PROCEDURE — 94640 AIRWAY INHALATION TREATMENT: CPT

## 2025-01-10 PROCEDURE — 87077 CULTURE AEROBIC IDENTIFY: CPT

## 2025-01-10 PROCEDURE — 31645 BRNCHSC W/THER ASPIR 1ST: CPT | Performed by: INTERNAL MEDICINE

## 2025-01-10 PROCEDURE — 5A1945Z RESPIRATORY VENTILATION, 24-96 CONSECUTIVE HOURS: ICD-10-PCS | Performed by: INTERNAL MEDICINE

## 2025-01-10 PROCEDURE — 87636 SARSCOV2 & INF A&B AMP PRB: CPT

## 2025-01-10 PROCEDURE — 87040 BLOOD CULTURE FOR BACTERIA: CPT

## 2025-01-10 PROCEDURE — 6370000000 HC RX 637 (ALT 250 FOR IP)

## 2025-01-10 PROCEDURE — 89051 BODY FLUID CELL COUNT: CPT

## 2025-01-10 PROCEDURE — 93005 ELECTROCARDIOGRAM TRACING: CPT | Performed by: STUDENT IN AN ORGANIZED HEALTH CARE EDUCATION/TRAINING PROGRAM

## 2025-01-10 PROCEDURE — 6360000002 HC RX W HCPCS

## 2025-01-10 PROCEDURE — 0B9M8ZZ DRAINAGE OF BILATERAL LUNGS, VIA NATURAL OR ARTIFICIAL OPENING ENDOSCOPIC: ICD-10-PCS | Performed by: INTERNAL MEDICINE

## 2025-01-10 PROCEDURE — 94002 VENT MGMT INPAT INIT DAY: CPT

## 2025-01-10 PROCEDURE — 2500000003 HC RX 250 WO HCPCS

## 2025-01-10 PROCEDURE — 6360000002 HC RX W HCPCS: Performed by: INTERNAL MEDICINE

## 2025-01-10 PROCEDURE — 89220 SPUTUM SPECIMEN COLLECTION: CPT

## 2025-01-10 PROCEDURE — 87205 SMEAR GRAM STAIN: CPT

## 2025-01-10 PROCEDURE — 2580000003 HC RX 258: Performed by: STUDENT IN AN ORGANIZED HEALTH CARE EDUCATION/TRAINING PROGRAM

## 2025-01-10 PROCEDURE — 87641 MR-STAPH DNA AMP PROBE: CPT

## 2025-01-10 PROCEDURE — 6370000000 HC RX 637 (ALT 250 FOR IP): Performed by: STUDENT IN AN ORGANIZED HEALTH CARE EDUCATION/TRAINING PROGRAM

## 2025-01-10 PROCEDURE — 83605 ASSAY OF LACTIC ACID: CPT

## 2025-01-10 PROCEDURE — 80053 COMPREHEN METABOLIC PANEL: CPT

## 2025-01-10 PROCEDURE — 82803 BLOOD GASES ANY COMBINATION: CPT

## 2025-01-10 PROCEDURE — 6360000002 HC RX W HCPCS: Performed by: STUDENT IN AN ORGANIZED HEALTH CARE EDUCATION/TRAINING PROGRAM

## 2025-01-10 PROCEDURE — 2700000000 HC OXYGEN THERAPY PER DAY

## 2025-01-10 PROCEDURE — 36415 COLL VENOUS BLD VENIPUNCTURE: CPT

## 2025-01-10 RX ORDER — POTASSIUM CHLORIDE 1500 MG/1
40 TABLET, EXTENDED RELEASE ORAL PRN
Status: DISCONTINUED | OUTPATIENT
Start: 2025-01-10 | End: 2025-01-10

## 2025-01-10 RX ORDER — MEROPENEM AND SODIUM CHLORIDE 1 G/50ML
1000 INJECTION, SOLUTION INTRAVENOUS ONCE
Status: COMPLETED | OUTPATIENT
Start: 2025-01-10 | End: 2025-01-10

## 2025-01-10 RX ORDER — ENOXAPARIN SODIUM 100 MG/ML
40 INJECTION SUBCUTANEOUS DAILY
Status: DISCONTINUED | OUTPATIENT
Start: 2025-01-10 | End: 2025-01-10

## 2025-01-10 RX ORDER — LEVOFLOXACIN 5 MG/ML
750 INJECTION, SOLUTION INTRAVENOUS ONCE
Status: DISCONTINUED | OUTPATIENT
Start: 2025-01-10 | End: 2025-01-10

## 2025-01-10 RX ORDER — ONDANSETRON 4 MG/1
4 TABLET, ORALLY DISINTEGRATING ORAL EVERY 8 HOURS PRN
Status: DISCONTINUED | OUTPATIENT
Start: 2025-01-10 | End: 2025-01-10

## 2025-01-10 RX ORDER — MEROPENEM AND SODIUM CHLORIDE 1 G/50ML
1000 INJECTION, SOLUTION INTRAVENOUS EVERY 8 HOURS
Status: DISCONTINUED | OUTPATIENT
Start: 2025-01-10 | End: 2025-01-14

## 2025-01-10 RX ORDER — SODIUM CHLORIDE 9 MG/ML
INJECTION, SOLUTION INTRAVENOUS CONTINUOUS
Status: ACTIVE | OUTPATIENT
Start: 2025-01-10 | End: 2025-01-11

## 2025-01-10 RX ORDER — SODIUM CHLORIDE 0.9 % (FLUSH) 0.9 %
5-40 SYRINGE (ML) INJECTION PRN
Status: DISCONTINUED | OUTPATIENT
Start: 2025-01-10 | End: 2025-01-17 | Stop reason: HOSPADM

## 2025-01-10 RX ORDER — ACETAMINOPHEN 650 MG/1
650 SUPPOSITORY RECTAL EVERY 6 HOURS PRN
Status: DISCONTINUED | OUTPATIENT
Start: 2025-01-10 | End: 2025-01-17 | Stop reason: HOSPADM

## 2025-01-10 RX ORDER — LORAZEPAM 2 MG/ML
2 INJECTION INTRAMUSCULAR EVERY 6 HOURS PRN
Status: DISCONTINUED | OUTPATIENT
Start: 2025-01-10 | End: 2025-01-11

## 2025-01-10 RX ORDER — VANCOMYCIN 1 G/200ML
1000 INJECTION, SOLUTION INTRAVENOUS EVERY 12 HOURS
Status: DISCONTINUED | OUTPATIENT
Start: 2025-01-10 | End: 2025-01-10

## 2025-01-10 RX ORDER — SODIUM CHLORIDE 0.9 % (FLUSH) 0.9 %
5-40 SYRINGE (ML) INJECTION EVERY 12 HOURS SCHEDULED
Status: DISCONTINUED | OUTPATIENT
Start: 2025-01-10 | End: 2025-01-17 | Stop reason: HOSPADM

## 2025-01-10 RX ORDER — IPRATROPIUM BROMIDE AND ALBUTEROL SULFATE 2.5; .5 MG/3ML; MG/3ML
1 SOLUTION RESPIRATORY (INHALATION) EVERY 4 HOURS PRN
Status: DISCONTINUED | OUTPATIENT
Start: 2025-01-10 | End: 2025-01-17 | Stop reason: HOSPADM

## 2025-01-10 RX ORDER — POTASSIUM CHLORIDE 7.45 MG/ML
10 INJECTION INTRAVENOUS PRN
Status: DISCONTINUED | OUTPATIENT
Start: 2025-01-10 | End: 2025-01-17 | Stop reason: HOSPADM

## 2025-01-10 RX ORDER — ENOXAPARIN SODIUM 100 MG/ML
40 INJECTION SUBCUTANEOUS DAILY
Status: DISCONTINUED | OUTPATIENT
Start: 2025-01-10 | End: 2025-01-13

## 2025-01-10 RX ORDER — ACETAMINOPHEN 650 MG/1
650 SUPPOSITORY RECTAL EVERY 6 HOURS PRN
Status: DISCONTINUED | OUTPATIENT
Start: 2025-01-10 | End: 2025-01-10

## 2025-01-10 RX ORDER — ONDANSETRON 2 MG/ML
4 INJECTION INTRAMUSCULAR; INTRAVENOUS EVERY 6 HOURS PRN
Status: DISCONTINUED | OUTPATIENT
Start: 2025-01-10 | End: 2025-01-17 | Stop reason: HOSPADM

## 2025-01-10 RX ORDER — SODIUM CHLORIDE, SODIUM LACTATE, POTASSIUM CHLORIDE, AND CALCIUM CHLORIDE .6; .31; .03; .02 G/100ML; G/100ML; G/100ML; G/100ML
1000 INJECTION, SOLUTION INTRAVENOUS ONCE
Status: COMPLETED | OUTPATIENT
Start: 2025-01-10 | End: 2025-01-10

## 2025-01-10 RX ORDER — POTASSIUM CHLORIDE 7.45 MG/ML
10 INJECTION INTRAVENOUS PRN
Status: DISCONTINUED | OUTPATIENT
Start: 2025-01-10 | End: 2025-01-10

## 2025-01-10 RX ORDER — MAGNESIUM SULFATE 1 G/100ML
1000 INJECTION INTRAVENOUS PRN
Status: DISCONTINUED | OUTPATIENT
Start: 2025-01-10 | End: 2025-01-17 | Stop reason: HOSPADM

## 2025-01-10 RX ORDER — MAGNESIUM SULFATE IN WATER 40 MG/ML
2000 INJECTION, SOLUTION INTRAVENOUS PRN
Status: DISCONTINUED | OUTPATIENT
Start: 2025-01-10 | End: 2025-01-10

## 2025-01-10 RX ORDER — ONDANSETRON 2 MG/ML
4 INJECTION INTRAMUSCULAR; INTRAVENOUS EVERY 6 HOURS PRN
Status: DISCONTINUED | OUTPATIENT
Start: 2025-01-10 | End: 2025-01-10

## 2025-01-10 RX ORDER — IPRATROPIUM BROMIDE AND ALBUTEROL SULFATE 2.5; .5 MG/3ML; MG/3ML
1 SOLUTION RESPIRATORY (INHALATION)
Status: DISCONTINUED | OUTPATIENT
Start: 2025-01-10 | End: 2025-01-10

## 2025-01-10 RX ORDER — IPRATROPIUM BROMIDE AND ALBUTEROL SULFATE 2.5; .5 MG/3ML; MG/3ML
1 SOLUTION RESPIRATORY (INHALATION)
Status: DISCONTINUED | OUTPATIENT
Start: 2025-01-10 | End: 2025-01-17 | Stop reason: HOSPADM

## 2025-01-10 RX ORDER — POLYETHYLENE GLYCOL 3350 17 G/17G
17 POWDER, FOR SOLUTION ORAL DAILY PRN
Status: DISCONTINUED | OUTPATIENT
Start: 2025-01-10 | End: 2025-01-10

## 2025-01-10 RX ORDER — MIDAZOLAM HYDROCHLORIDE 1 MG/ML
INJECTION, SOLUTION INTRAMUSCULAR; INTRAVENOUS
Status: COMPLETED
Start: 2025-01-10 | End: 2025-01-10

## 2025-01-10 RX ORDER — LIDOCAINE HYDROCHLORIDE 40 MG/ML
SOLUTION TOPICAL
Status: DISPENSED
Start: 2025-01-10 | End: 2025-01-11

## 2025-01-10 RX ORDER — ACETAMINOPHEN 325 MG/1
650 TABLET ORAL EVERY 6 HOURS PRN
Status: DISCONTINUED | OUTPATIENT
Start: 2025-01-10 | End: 2025-01-10

## 2025-01-10 RX ORDER — SODIUM CHLORIDE 9 MG/ML
INJECTION, SOLUTION INTRAVENOUS PRN
Status: DISCONTINUED | OUTPATIENT
Start: 2025-01-10 | End: 2025-01-17 | Stop reason: HOSPADM

## 2025-01-10 RX ORDER — POTASSIUM CHLORIDE 1500 MG/1
40 TABLET, EXTENDED RELEASE ORAL PRN
Status: DISCONTINUED | OUTPATIENT
Start: 2025-01-10 | End: 2025-01-17 | Stop reason: HOSPADM

## 2025-01-10 RX ORDER — ONDANSETRON 4 MG/1
4 TABLET, ORALLY DISINTEGRATING ORAL EVERY 8 HOURS PRN
Status: DISCONTINUED | OUTPATIENT
Start: 2025-01-10 | End: 2025-01-17 | Stop reason: HOSPADM

## 2025-01-10 RX ORDER — MIDAZOLAM HYDROCHLORIDE 1 MG/ML
2 INJECTION, SOLUTION INTRAMUSCULAR; INTRAVENOUS ONCE
Status: DISCONTINUED | OUTPATIENT
Start: 2025-01-10 | End: 2025-01-17 | Stop reason: HOSPADM

## 2025-01-10 RX ORDER — SODIUM CHLORIDE 0.9 % (FLUSH) 0.9 %
10 SYRINGE (ML) INJECTION PRN
Status: DISCONTINUED | OUTPATIENT
Start: 2025-01-10 | End: 2025-01-17 | Stop reason: HOSPADM

## 2025-01-10 RX ORDER — POLYETHYLENE GLYCOL 3350 17 G/17G
17 POWDER, FOR SOLUTION ORAL DAILY PRN
Status: DISCONTINUED | OUTPATIENT
Start: 2025-01-10 | End: 2025-01-17 | Stop reason: HOSPADM

## 2025-01-10 RX ORDER — LEVOFLOXACIN 750 MG/1
750 TABLET, FILM COATED ORAL ONCE
Status: COMPLETED | OUTPATIENT
Start: 2025-01-10 | End: 2025-01-10

## 2025-01-10 RX ORDER — ACETAMINOPHEN 325 MG/1
650 TABLET ORAL EVERY 6 HOURS PRN
Status: DISCONTINUED | OUTPATIENT
Start: 2025-01-10 | End: 2025-01-17 | Stop reason: HOSPADM

## 2025-01-10 RX ADMIN — VANCOMYCIN HYDROCHLORIDE 1000 MG: 1 INJECTION, POWDER, LYOPHILIZED, FOR SOLUTION INTRAVENOUS at 12:33

## 2025-01-10 RX ADMIN — IPRATROPIUM BROMIDE AND ALBUTEROL SULFATE 1 DOSE: 2.5; .5 SOLUTION RESPIRATORY (INHALATION) at 18:58

## 2025-01-10 RX ADMIN — IPRATROPIUM BROMIDE AND ALBUTEROL SULFATE 1 DOSE: 2.5; .5 SOLUTION RESPIRATORY (INHALATION) at 15:47

## 2025-01-10 RX ADMIN — LORAZEPAM 2 MG: 2 INJECTION INTRAMUSCULAR; INTRAVENOUS at 22:46

## 2025-01-10 RX ADMIN — MIDAZOLAM HYDROCHLORIDE 2 MG: 1 INJECTION, SOLUTION INTRAMUSCULAR; INTRAVENOUS at 17:10

## 2025-01-10 RX ADMIN — MEROPENEM AND SODIUM CHLORIDE 1000 MG: 1 INJECTION, SOLUTION INTRAVENOUS at 12:30

## 2025-01-10 RX ADMIN — SODIUM CHLORIDE, POTASSIUM CHLORIDE, SODIUM LACTATE AND CALCIUM CHLORIDE 1000 ML: 600; 310; 30; 20 INJECTION, SOLUTION INTRAVENOUS at 12:48

## 2025-01-10 RX ADMIN — VANCOMYCIN HYDROCHLORIDE 1000 MG: 1 INJECTION, POWDER, LYOPHILIZED, FOR SOLUTION INTRAVENOUS at 23:50

## 2025-01-10 RX ADMIN — LEVOFLOXACIN 750 MG: 750 TABLET, FILM COATED ORAL at 12:49

## 2025-01-10 RX ADMIN — ENOXAPARIN SODIUM 40 MG: 100 INJECTION SUBCUTANEOUS at 16:16

## 2025-01-10 RX ADMIN — MEROPENEM AND SODIUM CHLORIDE 1000 MG: 1 INJECTION, SOLUTION INTRAVENOUS at 20:36

## 2025-01-10 RX ADMIN — Medication 10 ML: at 20:37

## 2025-01-10 RX ADMIN — SODIUM CHLORIDE: 9 INJECTION, SOLUTION INTRAVENOUS at 15:27

## 2025-01-10 RX ADMIN — WATER 40 MG: 1 INJECTION INTRAMUSCULAR; INTRAVENOUS; SUBCUTANEOUS at 16:16

## 2025-01-10 ASSESSMENT — PULMONARY FUNCTION TESTS
PIF_VALUE: 24
PIF_VALUE: 19
PIF_VALUE: 26
PIF_VALUE: 15

## 2025-01-10 ASSESSMENT — PAIN - FUNCTIONAL ASSESSMENT: PAIN_FUNCTIONAL_ASSESSMENT: NONE - DENIES PAIN

## 2025-01-10 NOTE — PROGRESS NOTES
Consent verified for bronchoscopy. Timeout per policy. Procedure performed by Dr. Saez. Bronchoscopy assist performed on 100% FiO2.    0ml of 1% lidocaine instilled and 100ml of 0.9% normal saline instilled. Pt tolerated bronchoscopy without difficulty, airway support per RTD. Patient SpO2 within acceptable range throughout bronchoscopy procedure.  Sedation provided/monitored per nurse. No s/s distress, no complications noted.  See physician notes.  Continue plan of care

## 2025-01-10 NOTE — H&P
Admission H & P    Annie Tyler;  MRN: [id number] ;   Admit Date: 1/10/2025 11:16 AM   Current Date and Time: 1/10/2025 2:18 PM    PCP  --  Bruce Nichols MD         Chief Complaint   Patient presents with    Shortness of Breath     The patient presents to the ER from [place] with reports of difficulty breathing. The patient is on a vent at night  but has required it all day today.          HPI:   69 y.o. female with pmhx of COPD, chronic hypoxic respiratory failure with tracheostomy, anxiety, takotsubo cardiomyopathy who presented to [facility] ED with complaint of shortness of breath   Pt does use vent at night and 5 L trach collar during daytime, reports 3-4 days of progressive dyspnea with mild cough but no fevers or chills  No increased sputum. No change in bladder or bowel habits  Apparently unable to come off vent during daytime and needing vent all day and sent her for eval. Reports not been on any meds for copd flare up this time    Pt seen mildly anxious on vent , awake, alert and oriented    Allergies   Allergen Reactions    Cephalosporins Shortness Of Breath     OK to take aztreonam and meropenem     Penicillins Anaphylaxis, Hives and Other (See Comments)     OK to take aztreonam and meropenem    Hctz [Hydrochlorothiazide] Other (See Comments)     Recurrent low sodium       Past Medical History:   Diagnosis Date    Angina pectoris (HCC)     Chronic pain     knees and lower back     Congestive heart failure (HCC) 1/5/2024    COPD exacerbation (HCC) 05/20/2018    Depression     Essential hypertension 08/15/2022    Panic disorder     Pneumonia     Pulmonary emphysema (HCC)       Past Surgical History:   Procedure Laterality Date    CARDIAC PROCEDURE N/A 6/27/2024    Left and right heart cath / coronary angiography performed by Merary Davis MD at [facility] CARDIAC CATH LAB    CHOLECYSTECTOMY      COLONOSCOPY        Not in a hospital admission.  Allergies   Allergen Reactions    Cephalosporins  gallops  Abdomen:  Soft, undistended, non tender, no organomegaly, BS present, PEG noted  Extremities: No edema or cyanosis. Distal pulses well felt  Neurological : grossly normal  Non focal      LABS:  CBC:  Lab Results   Component Value Date/Time    WBC 29.2 01/10/2025 11:31 AM    HGB 9.9 01/10/2025 11:31 AM    HCT 30.6 01/10/2025 11:31 AM    MCV 81.3 01/10/2025 11:31 AM     01/10/2025 11:31 AM    NEUTOPHILPCT 82.0 01/10/2025 11:31 AM    LYMPHOPCT 4.0 01/10/2025 11:31 AM    MONOPCT 12.0 01/10/2025 11:31 AM    EOSPCT 0.0 01/10/2025 11:31 AM    BASOPCT 0.0 01/10/2025 11:31 AM    NEUTROABS 24.5 01/10/2025 11:31 AM    LYMPHSABS 1.2 01/10/2025 11:31 AM    MONOSABS 3.5 01/10/2025 11:31 AM    EOSABS 0.0 01/10/2025 11:31 AM    BASOSABS 0.0 01/10/2025 11:31 AM     BMP:   Lab Results   Component Value Date/Time     01/10/2025 11:31 AM    K 3.9 01/10/2025 11:31 AM    CO2 26 01/10/2025 11:31 AM    BUN 9 01/10/2025 11:31 AM    CREATININE 0.6 01/10/2025 11:31 AM    CALCIUM 9.1 01/10/2025 11:31 AM     Coagulation:   Lab Results   Component Value Date/Time    INR 1.12 01/10/2025 11:31 AM    APTT 24.0 06/27/2024 09:57 AM     Cardiac markers:   Lab Results   Component Value Date/Time    TROPONINI <0.01 11/17/2022 03:11 PM     ABGs: No results found for: \"POCPH\", \"POCPCO2\", \"POCPO2\", \"POCHCO3\", \"POCTCO2\", \"POCFIO2\"    Lab Results   Component Value Date    ALT 16 01/10/2025    AST 31 01/10/2025    ALKPHOS 246 (H) 01/10/2025    BILITOT 0.6 01/10/2025       Lab Results   Component Value Date    INR 1.12 01/10/2025    INR 1.03 06/27/2024    INR 0.88 01/19/2024    PROTIME 14.6 01/10/2025    PROTIME 13.7 06/27/2024    PROTIME 12.0 01/19/2024         EKG: NSR , left axis deviation       Radiology:    XR CHEST PORTABLE   Final Result   1. Worsening left airspace disease concerning for pneumonia; recommend chest   radiograph in 8 weeks to confirm resolution.           06/26/24     ECHO (TTE) LIMITED        Left Ventricle:

## 2025-01-10 NOTE — PROGRESS NOTES
4 Eyes Skin Assessment     NAME:  Annie Tyler  YOB: 1955  MEDICAL RECORD NUMBER:  [id number]    The patient is being assessed for  Admission    I agree that at least one RN has performed a thorough Head to Toe Skin Assessment on the patient. ALL assessment sites listed below have been assessed.      Areas assessed by both nurses:    Head, Face, Ears, Shoulders, Back, Chest, Arms, Elbows, Hands, Sacrum. Buttock, Coccyx, Ischium, Legs. Feet and Heels, and Under Medical Devices         Does the Patient have a Wound? Yes wound(s) were present on assessment. LDA wound assessment was Initiated and completed by RN       William Prevention initiated by RN: Yes  Wound Care Orders initiated by RN: No    Pressure Injury (Stage 3,4, Unstageable, DTI, NWPT, and Complex wounds) if present, place Wound referral order by RN under : No    New Ostomies, if present place, Ostomy referral order under : No     Nurse 1 eSignature: Electronically signed by Alyson Hall RN on 1/10/25 at 3:52 PM EST    **SHARE this note so that the co-signing nurse can place an eSignature**    Nurse 2 eSignature: Electronically signed by Gerri Ontiveros RN on 1/10/25 at 4:35 PM EST

## 2025-01-10 NOTE — ED NOTES
Pharmacy called to confirm that I can run Vancomycin with LR. Pharmacist stated that I can run it together and that they are compatible.

## 2025-01-10 NOTE — ED NOTES
Patient stated that she can take pills orally. I did a swallow screen on the patient after receiving an order to do so from Dr. Chiang. The patient passed the swallow screen and I then medicated the patient with her oral medication.

## 2025-01-10 NOTE — PROGRESS NOTES
RT Inhaler-Nebulizer Bronchodilator Protocol Note    There is a bronchodilator order in the chart from a provider indicating to follow the RT Bronchodilator Protocol and there is an “Initiate RT Inhaler-Nebulizer Bronchodilator Protocol” order as well (see protocol at bottom of note).    CXR Findings:  XR CHEST PORTABLE    Result Date: 1/10/2025  1. Worsening left airspace disease concerning for pneumonia; recommend chest radiograph in 8 weeks to confirm resolution.       The findings from the last RT Protocol Assessment were as follows:   History Pulmonary Disease: (P) Chronic pulmonary disease  Respiratory Pattern: (P) Dyspnea on exertion or RR 21-25 bpm  Breath Sounds: (P) Slightly diminished and/or crackles  Cough: (P) Strong, productive  Indication for Bronchodilator Therapy: (P) Decreased or absent breath sounds, On home bronchodilators  Bronchodilator Assessment Score: (P) 7    Aerosolized bronchodilator medication orders have been revised according to the RT Inhaler-Nebulizer Bronchodilator Protocol below.    Respiratory Therapist to perform RT Therapy Protocol Assessment initially then follow the protocol.  Repeat RT Therapy Protocol Assessment PRN for score 0-3 or on second treatment, BID, and PRN for scores above 3.    No Indications - adjust the frequency to every 6 hours PRN wheezing or bronchospasm, if no treatments needed after 48 hours then discontinue using Per Protocol order mode.     If indication present, adjust the RT bronchodilator orders based on the Bronchodilator Assessment Score as indicated below.  Use Inhaler orders unless patient has one or more of the following: on home nebulizer, not able to hold breath for 10 seconds, is not alert and oriented, cannot activate and use MDI correctly, or respiratory rate 25 breaths per minute or more, then use the equivalent nebulizer order(s) with same Frequency and PRN reasons based on the score.  If a patient is on this medication at home then do not

## 2025-01-10 NOTE — ED PROVIDER NOTES
-Continue home medications   [facility] EMERGENCY DEPARTMENT      CHIEF COMPLAINT  Shortness of Breath (The patient presents to the ER from [place] with reports of difficulty breathing. The patient is on a vent at night  but has required it all day today. )       HISTORY OF PRESENT ILLNESS  Annie Tyler is a 69 y.o. female  who presents to the ED complaining of shortness of breath.  Patient has tracheostomy and usually only uses a ventilator at night, however this morning has required her ventilator at a facility all morning.  On arrival patient does appear to be in respiratory distress with tachypnea and diaphoresis.  Denies pain anywhere, does endorse feeling very short of breath.    No other complaints, modifying factors or associated symptoms.     I have reviewed the following from the nursing documentation.    Past Medical History:   Diagnosis Date    Angina pectoris (HCC)     Chronic pain     knees and lower back     Congestive heart failure (HCC) 1/5/2024    COPD exacerbation ([facility]) 05/20/2018    Depression     Essential hypertension 08/15/2022    Panic disorder     Pneumonia     Pulmonary emphysema (HCC)      Past Surgical History:   Procedure Laterality Date    CARDIAC PROCEDURE N/A 6/27/2024    Left and right heart cath / coronary angiography performed by Merary Davis MD at [facility] CARDIAC CATH LAB    CHOLECYSTECTOMY      COLONOSCOPY       Family History   Problem Relation Age of Onset    COPD Mother     Hypertension Mother     Asthma Neg Hx     Cancer Neg Hx     Diabetes Neg Hx     Emphysema Neg Hx     Heart Failure Neg Hx      Social History     Socioeconomic History    Marital status:      Spouse name: Not on file    Number of children: 6    Years of education: Not on file    Highest education level: Not on file   Occupational History    Not on file   Tobacco Use    Smoking status: Former     Current packs/day: 0.00     Average packs/day: 2.0 packs/day for 52.0 years (104.0 ttl pk-yrs)     Types: Cigarettes

## 2025-01-10 NOTE — PROGRESS NOTES
Critical care consult called to answering service, Electronically signed by Roni Louise on 1/10/2025 at 3:13 PM

## 2025-01-10 NOTE — ED NOTES
Patients vent alarms were going off. RN went to the bedside to see the patient and could visibly see the patients work of breathing was harder and she stated that she couldn't get her breath. Respiratory therapy came down to the bedside and was able to suction the patient and get the patient feeling better.

## 2025-01-10 NOTE — ED NOTES
The patient reported to me that she needed changed. The patient has been cleaned up and changed at this time.

## 2025-01-10 NOTE — PROGRESS NOTES
[facility]   Pharmacy Pharmacokinetic Monitoring Service - Vancomycin     Annie Tyler is a 69 y.o. female starting on vancomycin therapy for PNA. Pharmacy consulted by JOYCELYN Molina for monitoring and adjustment.    Target Concentration: Goal AUC/RADHA 400-600 mg*hr/L    Additional Antimicrobials: merrem    Pertinent Laboratory Values:   Wt Readings from Last 1 Encounters:   11/30/24 61.1 kg (134 lb 11.2 oz)     Temp Readings from Last 1 Encounters:   01/10/25 97.6 °F (36.4 °C) (Temporal)     CrCl cannot be calculated (Unknown ideal weight.).  Recent Labs     01/10/25  1131   CREATININE 0.6   BUN 9   WBC 29.2*     Procalcitonin: 0.91    Pertinent Cultures:  Culture Date Source Results   1/10 Blood NGTD   MRSA Nasal Swab: not ordered. Order placed by pharmacy.    Plan:  Dosing recommendations based on Bayesian software  Start vancomycin 1000mg q12h, 1gm given in ED  Anticipated AUC of 560 and trough concentration of 17.3 at steady state  Renal labs as indicated   Vancomycin concentration ordered for 1/11 @ 0600   Pharmacy will continue to monitor patient and adjust therapy as indicated    Thank you for the consult,  Madison Melendez RPH  1/10/2025 3:04 PM

## 2025-01-10 NOTE — PROCEDURES
PROCEDURE NOTE  Date: 1/10/2025   Name: Annie Tyler  YOB: 1955    Procedures  See History and Physical or progress note for additional findings. Pertinent changes recorded below if present.  Pre/post procedure diagnosis: pneumonia    Allergies and medications have been reviewed    HENT: Airway patent and reviewed. ETT in place.  Cardiovascular: Normal rate, regular rhythm, normal heart sounds.   Pulmonary/Chest: No wheezes. No rhonchi. No rales.   Abdominal: Soft. Bowel sounds are normal. No distension.    ASA: Class 4 - A patient with an incapacitating systemic disease that is a constant threat to life  Level of Sedation Plan: Continue ICU sedation    Post Procedure Plan   Continue ICU care.   ______________________     The risks and benefits as well as alternatives to the procedure have been discussed with the POA.  The  POA understands and agrees to proceed. Signed Consent in chart.    PROCEDURE:  BRONCHOSCOPY      The risks and benefits as well as alternatives to the procedure have been discussed with the patient or POA.  The patient or POA understands and agrees to proceed. Consent signed.    DESCRIPTION OF PROCEDURE: A time out was taken. ICU sedation continued.     The scope was passed with ease via the ETT.  A complete airway inspection was performed. Normal anatomy. No endobronchial lesion was identified. Mucosa appeared normal. There were tan secretions scattered throughout the airways and therapeutic aspiration was completed..  Washings were obtained throughout the airways.  A Bronchoalveolar lavage was obtained from the Lingula with good return.        The patient tolerated the procedure well. EBL none. Recovery will be per endoscopy protocol. Will discharge home or return to same level of care per recovery protocol.    FOLLOW UP:  Cultures and

## 2025-01-10 NOTE — ED NOTES
1321 Placed call to [facility] to initiate Pt transfer for ICU d/t [facility] being at capacity.     1400 Placed call to Pulmonology for stat consult. Expecting a call from Dr. Saez

## 2025-01-10 NOTE — CONSULTS
Pulmonary & Critical Care Consultation Note    CC: SOB    HISTORY OF PRESENT ILLNESS: 69-year-old female with a history of chronic ventilator dependence due to end-stage COPD presented from her nursing home with increased shortness of breath.  She typically is able to come off the ventilator during the day and just use at night.  However today she remained on the ventilator and still has shortness of breath at the nursing home.  In the emergency room she was found to have a lots of tracheal secretions and mucous plugging removed by suctioning.  She currently looks calm and in no distress on the ventilator in the ICU.    PAST MEDICAL HISTORY:  Past Medical History:   Diagnosis Date    Angina pectoris ([facility])     Chronic pain     knees and lower back     Congestive heart failure (HCC) 1/5/2024    COPD exacerbation ([facility]) 05/20/2018    Depression     Essential hypertension 08/15/2022    Panic disorder     Pneumonia     Pulmonary emphysema ([facility])      PAST SURGICAL HISTORY:  Past Surgical History:   Procedure Laterality Date    CARDIAC PROCEDURE N/A 6/27/2024    Left and right heart cath / coronary angiography performed by Merary Davis MD at [facility] CARDIAC CATH LAB    CHOLECYSTECTOMY      COLONOSCOPY         FAMILY HISTORY:  family history includes COPD in her mother; Hypertension in her mother.    SOCIAL HISTORY:   reports that she quit smoking about 4 years ago. Her smoking use included cigarettes. She started smoking about 56 years ago. She has a 104 pack-year smoking history. She has been exposed to tobacco smoke. She has never used smokeless tobacco.    Scheduled Meds:   sodium chloride flush  5-40 mL IntraVENous 2 times per day    sodium chloride flush  5-40 mL IntraVENous 2 times per day    enoxaparin  40 mg SubCUTAneous Daily    methylPREDNISolone  40 mg IntraVENous Q12H    meropenem  1,000 mg IntraVENous Q8H    vancomycin  1,000 mg IntraVENous Q12H    ipratropium 0.5 mg-albuterol 2.5 mg  1 Dose Inhalation 4x  Labs     01/10/25  1131   *   K 3.9   CL 86*   CO2 26   BUN 9   CREATININE 0.6     LIVER PROFILE:   Recent Labs     01/10/25  1131   AST 31   ALT 16   BILITOT 0.6   ALKPHOS 246*     PT/INR:   Recent Labs     01/10/25  1131   PROTIME 14.6   INR 1.12     APTT: No results for input(s): \"APTT\" in the last 72 hours.  UA:No results for input(s): \"NITRITE\", \"COLORU\", \"PHUR\", \"LABCAST\", \"WBCUA\", \"RBCUA\", \"MUCUS\", \"TRICHOMONAS\", \"YEAST\", \"BACTERIA\", \"CLARITYU\", \"SPECGRAV\", \"LEUKOCYTESUR\", \"UROBILINOGEN\", \"BILIRUBINUR\", \"BLOODU\", \"GLUCOSEU\", \"AMORPHOUS\" in the last 72 hours.    Invalid input(s): \"KETONESU\"  No results for input(s): \"PHART\", \"QAH4GAW\", \"PO2ART\" in the last 72 hours.    Microbiology:  RSv neg  Flu/covid neg    Imaging:  Chest images independently reviewed by me and showed:IMPRESSION:  1. Worsening left airspace disease concerning for pneumonia; recommend chest  radiograph in 8 weeks to confirm resolution.      11/27/24 CTPA: Advanced emphysema with some chronic scarring.  Right upper lobe 12 mm spiculated pulmonary nodules unchanged since prior scan.     ASSESSMENT:  Healthcare acquired pneumonia  Chronic dependence on mechanical ventilation via tracheostomy  Acute on chronic hypercarbic respiratory failure  End stage COPD  RUL lung nodule, 12 mm and stable at least since June 2024     PLAN:  MV per my orders  Routine tracheostomy care  IV solumedrol 40 mg IV Q12 hrs. Plan to switch to oral prednisone taper when improved.    Continue vancomycin and Merrem  Prednisone 30mg x 5 days  IV fluid  Bronchoscopy for pulmonary toilet and BAL  Sputum GS&C.  SLP and restart diet if able tomorrow  Lovenox DVT prophylaxis   CCT 31 min

## 2025-01-11 PROBLEM — J18.9 PNEUMONIA DUE TO INFECTIOUS ORGANISM: Status: ACTIVE | Noted: 2025-01-11

## 2025-01-11 LAB
ALBUMIN SERPL-MCNC: 2.7 G/DL (ref 3.4–5)
ALP SERPL-CCNC: 189 U/L (ref 40–129)
ALT SERPL-CCNC: 12 U/L (ref 10–40)
ANION GAP SERPL CALCULATED.3IONS-SCNC: 11 MMOL/L (ref 3–16)
AST SERPL-CCNC: 24 U/L (ref 15–37)
BASE EXCESS BLDA CALC-SCNC: -2.8 MMOL/L (ref -3–3)
BASE EXCESS BLDV CALC-SCNC: 1 MMOL/L (ref -3–3)
BASOPHILS # BLD: 0 K/UL (ref 0–0.2)
BASOPHILS NFR BLD: 0.1 %
BILIRUB DIRECT SERPL-MCNC: 0.3 MG/DL (ref 0–0.3)
BILIRUB INDIRECT SERPL-MCNC: 0.1 MG/DL (ref 0–1)
BILIRUB SERPL-MCNC: 0.4 MG/DL (ref 0–1)
BUN SERPL-MCNC: 10 MG/DL (ref 7–20)
CALCIUM SERPL-MCNC: 8.3 MG/DL (ref 8.3–10.6)
CHLORIDE SERPL-SCNC: 94 MMOL/L (ref 99–110)
CO2 BLDA-SCNC: 22.5 MMOL/L
CO2 BLDV-SCNC: 27 MMOL/L
CO2 SERPL-SCNC: 24 MMOL/L (ref 21–32)
COHGB MFR BLDA: 0.3 % (ref 0–1.5)
COHGB MFR BLDV: 1 % (ref 0–1.5)
CREAT SERPL-MCNC: 0.4 MG/DL (ref 0.6–1.2)
DEPRECATED RDW RBC AUTO: 14.9 % (ref 12.4–15.4)
EOSINOPHIL # BLD: 0 K/UL (ref 0–0.6)
EOSINOPHIL NFR BLD: 0 %
GFR SERPLBLD CREATININE-BSD FMLA CKD-EPI: >90 ML/MIN/{1.73_M2}
GLUCOSE BLD-MCNC: 122 MG/DL (ref 70–99)
GLUCOSE BLD-MCNC: 128 MG/DL (ref 70–99)
GLUCOSE BLD-MCNC: 144 MG/DL (ref 70–99)
GLUCOSE SERPL-MCNC: 130 MG/DL (ref 70–99)
HCO3 BLDA-SCNC: 21.4 MMOL/L (ref 21–29)
HCO3 BLDV-SCNC: 25.5 MMOL/L (ref 23–29)
HCT VFR BLD AUTO: 26.1 % (ref 36–48)
HGB BLD-MCNC: 8.4 G/DL (ref 12–16)
HGB BLDA-MCNC: 10.2 G/DL (ref 12–16)
LACTATE BLDV-SCNC: 1.1 MMOL/L (ref 0.4–2)
LACTATE BLDV-SCNC: 1.2 MMOL/L (ref 0.4–2)
LACTATE BLDV-SCNC: 1.4 MMOL/L (ref 0.4–2)
LACTATE BLDV-SCNC: 1.7 MMOL/L (ref 0.4–2)
LYMPHOCYTES # BLD: 0.3 K/UL (ref 1–5.1)
LYMPHOCYTES NFR BLD: 2.2 %
MAGNESIUM SERPL-MCNC: 1.65 MG/DL (ref 1.8–2.4)
MCH RBC QN AUTO: 26.5 PG (ref 26–34)
MCHC RBC AUTO-ENTMCNC: 32.3 G/DL (ref 31–36)
MCV RBC AUTO: 81.9 FL (ref 80–100)
METHGB MFR BLDA: 0.3 %
METHGB MFR BLDV: 0.3 %
MONOCYTES # BLD: 0.3 K/UL (ref 0–1.3)
MONOCYTES NFR BLD: 2.1 %
NEUTROPHILS # BLD: 13.2 K/UL (ref 1.7–7.7)
NEUTROPHILS NFR BLD: 95.6 %
O2 CT VFR BLDA CALC: 13 ML/DL
O2 CT VFR BLDV CALC: 10 VOL %
O2 THERAPY: ABNORMAL
O2 THERAPY: NORMAL
PCO2 BLDA: 34.6 MMHG (ref 35–45)
PCO2 BLDV: 40.2 MMHG (ref 40–50)
PERFORMED ON: ABNORMAL
PH BLDA: 7.41 [PH] (ref 7.35–7.45)
PH BLDV: 7.42 [PH] (ref 7.35–7.45)
PLATELET # BLD AUTO: 372 K/UL (ref 135–450)
PMV BLD AUTO: 7.6 FL (ref 5–10.5)
PO2 BLDA: 80.9 MMHG (ref 75–108)
PO2 BLDV: 38.8 MMHG (ref 25–40)
POTASSIUM SERPL-SCNC: 3 MMOL/L (ref 3.5–5.1)
PROT SERPL-MCNC: 5.6 G/DL (ref 6.4–8.2)
RBC # BLD AUTO: 3.19 M/UL (ref 4–5.2)
SAO2 % BLDA: 96.1 %
SAO2 % BLDV: 75 %
SODIUM SERPL-SCNC: 129 MMOL/L (ref 136–145)
VANCOMYCIN SERPL-MCNC: 18.2 UG/ML
WBC # BLD AUTO: 13.8 K/UL (ref 4–11)

## 2025-01-11 PROCEDURE — 83735 ASSAY OF MAGNESIUM: CPT

## 2025-01-11 PROCEDURE — 85025 COMPLETE CBC W/AUTO DIFF WBC: CPT

## 2025-01-11 PROCEDURE — 94640 AIRWAY INHALATION TREATMENT: CPT

## 2025-01-11 PROCEDURE — 51798 US URINE CAPACITY MEASURE: CPT

## 2025-01-11 PROCEDURE — 36415 COLL VENOUS BLD VENIPUNCTURE: CPT

## 2025-01-11 PROCEDURE — 6370000000 HC RX 637 (ALT 250 FOR IP): Performed by: INTERNAL MEDICINE

## 2025-01-11 PROCEDURE — 2580000003 HC RX 258

## 2025-01-11 PROCEDURE — 02HV33Z INSERTION OF INFUSION DEVICE INTO SUPERIOR VENA CAVA, PERCUTANEOUS APPROACH: ICD-10-PCS | Performed by: INTERNAL MEDICINE

## 2025-01-11 PROCEDURE — 2000000000 HC ICU R&B

## 2025-01-11 PROCEDURE — 83605 ASSAY OF LACTIC ACID: CPT

## 2025-01-11 PROCEDURE — 51701 INSERT BLADDER CATHETER: CPT

## 2025-01-11 PROCEDURE — 36569 INSJ PICC 5 YR+ W/O IMAGING: CPT

## 2025-01-11 PROCEDURE — 6360000002 HC RX W HCPCS

## 2025-01-11 PROCEDURE — 2580000003 HC RX 258: Performed by: INTERNAL MEDICINE

## 2025-01-11 PROCEDURE — 80076 HEPATIC FUNCTION PANEL: CPT

## 2025-01-11 PROCEDURE — 2500000003 HC RX 250 WO HCPCS

## 2025-01-11 PROCEDURE — 82803 BLOOD GASES ANY COMBINATION: CPT

## 2025-01-11 PROCEDURE — 2700000000 HC OXYGEN THERAPY PER DAY

## 2025-01-11 PROCEDURE — 99291 CRITICAL CARE FIRST HOUR: CPT | Performed by: INTERNAL MEDICINE

## 2025-01-11 PROCEDURE — 6360000002 HC RX W HCPCS: Performed by: INTERNAL MEDICINE

## 2025-01-11 PROCEDURE — 80048 BASIC METABOLIC PNL TOTAL CA: CPT

## 2025-01-11 PROCEDURE — 94761 N-INVAS EAR/PLS OXIMETRY MLT: CPT

## 2025-01-11 PROCEDURE — 76942 ECHO GUIDE FOR BIOPSY: CPT

## 2025-01-11 PROCEDURE — 2500000003 HC RX 250 WO HCPCS: Performed by: INTERNAL MEDICINE

## 2025-01-11 PROCEDURE — 94003 VENT MGMT INPAT SUBQ DAY: CPT

## 2025-01-11 PROCEDURE — 80202 ASSAY OF VANCOMYCIN: CPT

## 2025-01-11 PROCEDURE — 99233 SBSQ HOSP IP/OBS HIGH 50: CPT | Performed by: INTERNAL MEDICINE

## 2025-01-11 RX ORDER — HYDROXYZINE HYDROCHLORIDE 10 MG/1
10 TABLET, FILM COATED ORAL 3 TIMES DAILY PRN
Status: DISCONTINUED | OUTPATIENT
Start: 2025-01-11 | End: 2025-01-14

## 2025-01-11 RX ORDER — ATORVASTATIN CALCIUM 40 MG/1
40 TABLET, FILM COATED ORAL NIGHTLY
Status: DISCONTINUED | OUTPATIENT
Start: 2025-01-11 | End: 2025-01-17 | Stop reason: HOSPADM

## 2025-01-11 RX ORDER — BUSPIRONE HYDROCHLORIDE 10 MG/1
10 TABLET ORAL 3 TIMES DAILY
Status: DISCONTINUED | OUTPATIENT
Start: 2025-01-11 | End: 2025-01-17 | Stop reason: HOSPADM

## 2025-01-11 RX ORDER — ASPIRIN 81 MG/1
81 TABLET, CHEWABLE ORAL DAILY
Status: DISCONTINUED | OUTPATIENT
Start: 2025-01-11 | End: 2025-01-17 | Stop reason: HOSPADM

## 2025-01-11 RX ORDER — SODIUM CHLORIDE 9 MG/ML
INJECTION, SOLUTION INTRAVENOUS CONTINUOUS
Status: DISCONTINUED | OUTPATIENT
Start: 2025-01-11 | End: 2025-01-16

## 2025-01-11 RX ORDER — ESCITALOPRAM OXALATE 10 MG/1
20 TABLET ORAL NIGHTLY
Status: DISCONTINUED | OUTPATIENT
Start: 2025-01-11 | End: 2025-01-17 | Stop reason: HOSPADM

## 2025-01-11 RX ORDER — METOPROLOL TARTRATE 25 MG/1
12.5 TABLET, FILM COATED ORAL 2 TIMES DAILY
Status: DISCONTINUED | OUTPATIENT
Start: 2025-01-11 | End: 2025-01-15

## 2025-01-11 RX ADMIN — WATER 40 MG: 1 INJECTION INTRAMUSCULAR; INTRAVENOUS; SUBCUTANEOUS at 02:46

## 2025-01-11 RX ADMIN — MEROPENEM AND SODIUM CHLORIDE 1000 MG: 1 INJECTION, SOLUTION INTRAVENOUS at 12:03

## 2025-01-11 RX ADMIN — HYDROXYZINE HYDROCHLORIDE 10 MG: 10 TABLET ORAL at 21:04

## 2025-01-11 RX ADMIN — METOPROLOL TARTRATE 12.5 MG: 25 TABLET, FILM COATED ORAL at 21:08

## 2025-01-11 RX ADMIN — ESCITALOPRAM OXALATE 20 MG: 10 TABLET ORAL at 21:04

## 2025-01-11 RX ADMIN — BUSPIRONE HYDROCHLORIDE 10 MG: 10 TABLET ORAL at 16:42

## 2025-01-11 RX ADMIN — POTASSIUM BICARBONATE 40 MEQ: 782 TABLET, EFFERVESCENT ORAL at 11:57

## 2025-01-11 RX ADMIN — Medication 10 ML: at 21:24

## 2025-01-11 RX ADMIN — IPRATROPIUM BROMIDE AND ALBUTEROL SULFATE 1 DOSE: 2.5; .5 SOLUTION RESPIRATORY (INHALATION) at 19:22

## 2025-01-11 RX ADMIN — MAGNESIUM SULFATE IN DEXTROSE 1000 MG: 10 INJECTION, SOLUTION INTRAVENOUS at 07:18

## 2025-01-11 RX ADMIN — PREDNISONE 30 MG: 20 TABLET ORAL at 10:07

## 2025-01-11 RX ADMIN — METOPROLOL TARTRATE 12.5 MG: 25 TABLET, FILM COATED ORAL at 10:31

## 2025-01-11 RX ADMIN — BUSPIRONE HYDROCHLORIDE 10 MG: 10 TABLET ORAL at 10:31

## 2025-01-11 RX ADMIN — MEROPENEM AND SODIUM CHLORIDE 1000 MG: 1 INJECTION, SOLUTION INTRAVENOUS at 21:23

## 2025-01-11 RX ADMIN — MEROPENEM AND SODIUM CHLORIDE 1000 MG: 1 INJECTION, SOLUTION INTRAVENOUS at 04:53

## 2025-01-11 RX ADMIN — SODIUM CHLORIDE: 9 INJECTION, SOLUTION INTRAVENOUS at 21:02

## 2025-01-11 RX ADMIN — VANCOMYCIN HYDROCHLORIDE 1000 MG: 1 INJECTION, POWDER, LYOPHILIZED, FOR SOLUTION INTRAVENOUS at 12:06

## 2025-01-11 RX ADMIN — VANCOMYCIN HYDROCHLORIDE 1000 MG: 1 INJECTION, POWDER, LYOPHILIZED, FOR SOLUTION INTRAVENOUS at 23:29

## 2025-01-11 RX ADMIN — POTASSIUM BICARBONATE 20 MEQ: 782 TABLET, EFFERVESCENT ORAL at 16:42

## 2025-01-11 RX ADMIN — IPRATROPIUM BROMIDE AND ALBUTEROL SULFATE 1 DOSE: 2.5; .5 SOLUTION RESPIRATORY (INHALATION) at 07:57

## 2025-01-11 RX ADMIN — ENOXAPARIN SODIUM 40 MG: 100 INJECTION SUBCUTANEOUS at 10:07

## 2025-01-11 RX ADMIN — HYDROXYZINE HYDROCHLORIDE 10 MG: 10 TABLET ORAL at 10:31

## 2025-01-11 RX ADMIN — BUSPIRONE HYDROCHLORIDE 10 MG: 10 TABLET ORAL at 21:04

## 2025-01-11 RX ADMIN — ASPIRIN 81 MG: 81 TABLET, CHEWABLE ORAL at 10:32

## 2025-01-11 RX ADMIN — IPRATROPIUM BROMIDE AND ALBUTEROL SULFATE 1 DOSE: 2.5; .5 SOLUTION RESPIRATORY (INHALATION) at 15:33

## 2025-01-11 RX ADMIN — POTASSIUM CHLORIDE 10 MEQ: 7.46 INJECTION, SOLUTION INTRAVENOUS at 10:13

## 2025-01-11 RX ADMIN — IPRATROPIUM BROMIDE AND ALBUTEROL SULFATE 1 DOSE: 2.5; .5 SOLUTION RESPIRATORY (INHALATION) at 11:37

## 2025-01-11 RX ADMIN — ATORVASTATIN CALCIUM 40 MG: 40 TABLET, FILM COATED ORAL at 21:05

## 2025-01-11 ASSESSMENT — PULMONARY FUNCTION TESTS
PIF_VALUE: 11
PIF_VALUE: 26
PIF_VALUE: 28
PIF_VALUE: 34
PIF_VALUE: 11

## 2025-01-11 NOTE — PROGRESS NOTES
AM care rounds completed. Discussed POC.  Verbal order to replace potassium with po via Gtube due to poor IV access. May need PICC line.

## 2025-01-11 NOTE — PROGRESS NOTES
Shift assessment completed, see flow sheet. Pt is alert and oriented  with confusion.    ICU lines in place.

## 2025-01-11 NOTE — PROGRESS NOTES
Pt sob, large amount of secretions from trach.Pt  stated she is having trouble breathing. RT at bedside, placed pt back on mechanical ventilation

## 2025-01-11 NOTE — PROGRESS NOTES
HEART FAILURE CARE PLAN:    Comorbidities Reviewed: Yes   Patient has a past medical history of Angina pectoris (HCC), Chronic pain, Congestive heart failure (HCC), COPD exacerbation (HCC), Depression, Essential hypertension, Panic disorder, Pneumonia, and Pulmonary emphysema (HCC).     Weights Reviewed: Yes   Admission weight: 47 kg (103 lb 9.9 oz)   Wt Readings from Last 3 Encounters:   01/11/25 45.5 kg (100 lb 5 oz)   11/30/24 61.1 kg (134 lb 11.2 oz)   08/20/24 65.2 kg (143 lb 11.8 oz)     Intake & Output Reviewed: Yes     Intake/Output Summary (Last 24 hours) at 1/11/2025 1509  Last data filed at 1/11/2025 0503  Gross per 24 hour   Intake 1004.84 ml   Output 400 ml   Net 604.84 ml       ECHOCARDIOGRAM Reviewed: Yes   Patient's Ejection Fraction (EF) is greater than 40%     Medications Reviewed: Yes   SCHEDULED HOSPITAL MEDICATIONS:   predniSONE  30 mg Oral Daily    aspirin  81 mg Oral Daily    escitalopram  20 mg Oral Nightly    busPIRone  10 mg Oral TID    metoprolol tartrate  12.5 mg Oral BID    atorvastatin  40 mg Oral Nightly    sodium chloride flush  5-40 mL IntraVENous 2 times per day    sodium chloride flush  5-40 mL IntraVENous 2 times per day    enoxaparin  40 mg SubCUTAneous Daily    meropenem  1,000 mg IntraVENous Q8H    vancomycin  1,000 mg IntraVENous Q12H    ipratropium 0.5 mg-albuterol 2.5 mg  1 Dose Inhalation 4x Daily RT    midazolam  2 mg IntraVENous Once     HOME MEDICATIONS:  Prior to Admission medications    Medication Sig Start Date End Date Taking? Authorizing Provider   traMADol (ULTRAM) 50 MG tablet Take 1 tablet by mouth every 8 hours as needed for Pain.   Yes ProviderJoey MD   aspirin 81 MG chewable tablet Take 1 tablet by mouth daily   Yes Joey Haney MD   metoprolol succinate (TOPROL XL) 25 MG extended release tablet Take 1 tablet by mouth daily 1/3/25  Yes Bruce Cohn DO   melatonin 3 MG TABS tablet Take 1 tablet by mouth nightly as needed (insomnia)    [facility]  Nephrology  Progress Note    Patient Name: Jaquan Farmer  MRN: [id number]  Admission Date: 8/12/2020  Hospital Length of Stay: 18 days  Attending Provider: Donis Roa MD   Primary Care Physician: Primary Doctor No  Principal Problem:Duodenum disorder    Subjective:     HPI: Mrs. Farmer is a 41 year old female with antrectomy at [facility] complicated duodenal necrosis post op. She aspirated, was intubated, and became septic. She developed renal failure and required max vent settings and was transferred here for higher level of care. On arrival she was hypotensive on pressers, max vent setting, and had minimal urine output.  Overnight she was given 6L of fluid, 6g calcium, placed on vac, pip-tazo, and fluconazole. Nephro consulted for sid.    Interval History: SLED x 12 hrs finished earlier this AM. Adequate clearance achieved, I/O with negative fluid balance ~369 ml. FiO2 30 %. Anuric.     Review of patient's allergies indicates:   Allergen Reactions    Latex      Current Facility-Administered Medications   Medication Frequency    0.9%  NaCl infusion (CRRT USE ONLY) Continuous    0.9%  NaCl infusion (for blood administration) Q24H PRN    albuterol-ipratropium 2.5 mg-0.5 mg/3 mL nebulizer solution 3 mL Q6H PRN    calcium gluconate 1g in dextrose 5% 100mL (ready to mix system) PRN    calcium gluconate 2 g in dextrose 5 % 100 mL IVPB PRN    calcium gluconate 3 g in dextrose 5 % 100 mL IVPB PRN    cloNIDine 0.2 mg/24 hr td ptwk 1 patch Q7 Days    dexmedetomidine (PRECEDEX) 400mcg/100mL 0.9% NaCL infusion Continuous    famotidine (PF) injection 20 mg Daily    fluconazole (DIFLUCAN) IVPB 200 mg 100 mL Q24H    heparin (porcine) injection 5,000 Units Q8H    hydrALAZINE injection 10 mg Q6H PRN    HYDROmorphone injection 0.5 mg Q2H PRN    labetalol 20 mg/4 mL (5 mg/mL) IV syring Q4H PRN    lactated ringers infusion Continuous    lidocaine 5 % patch 1 patch Q24H     magnesium sulfate 2g in water 50mL IVPB (premix) PRN    magnesium sulfate 2g in water 50mL IVPB (premix) PRN    magnesium sulfate 2g in water 50mL IVPB (premix) PRN    melatonin tablet 3 mg Nightly PRN    metoprolol injection 10 mg Q6H    metoprolol injection 5 mg Q4H PRN    miconazole NITRATE 2 % top powder BID    naloxone 0.4 mg/mL injection 0.02 mg PRN    naloxone 0.4 mg/mL injection 0.02 mg PRN    piperacillin-tazobactam 4.5 g in sodium chloride 0.9% 100 mL IVPB (ready to mix system) Q12H    propofol (DIPRIVAN) 10 mg/mL infusion Continuous    sodium phosphate 20.01 mmol in dextrose 5 % 250 mL IVPB PRN    sodium phosphate 30 mmol in dextrose 5 % 250 mL IVPB PRN    sodium phosphate 39.99 mmol in dextrose 5 % 250 mL IVPB PRN    TPN ADULT CENTRAL LINE CUSTOM Continuous       Objective:     Vital Signs (Most Recent):  Temp: 98.6 °F (37 °C) (08/30/20 0700)  Pulse: 99 (08/30/20 0915)  Resp: (!) 21 (08/30/20 0915)  BP: (!) 155/94 (08/30/20 0900)  SpO2: 100 % (08/30/20 0915)  O2 Device (Oxygen Therapy): ventilator (08/30/20 0900) Vital Signs (24h Range):  Temp:  [98.6 °F (37 °C)-99.7 °F (37.6 °C)] 98.6 °F (37 °C)  Pulse:  [] 99  Resp:  [16-30] 21  SpO2:  [100 %] 100 %  BP: (119-191)/() 155/94     Weight: 69.5 kg (153 lb 3.5 oz) (08/30/20 0500)  Body mass index is 28.02 kg/m².  Body surface area is 1.74 meters squared.    I/O last 3 completed shifts:  In: 91761.9 [I.V.:7528.2; Blood:431.7; IV Piggyback:650]  Out: 9039 [Urine:300; Drains:2660; Other:6079]    Physical Exam  Vitals signs and nursing note reviewed.   Constitutional:       Appearance: She is not ill-appearing.      Interventions: She is sedated and intubated.   HENT:      Head: Normocephalic and atraumatic.      Mouth/Throat:      Mouth: Mucous membranes are moist.      Pharynx: Oropharynx is clear.   Eyes:      General: No scleral icterus.        Right eye: No discharge.         Left eye: No discharge.      Extraocular Movements:  Extraocular movements intact.      Conjunctiva/sclera: Conjunctivae normal.   Neck:      Musculoskeletal: Normal range of motion and neck supple.   Cardiovascular:      Rate and Rhythm: Regular rhythm. Tachycardia present.      Pulses: Normal pulses.      Heart sounds: Normal heart sounds.   Pulmonary:      Effort: Pulmonary effort is normal. No respiratory distress. She is intubated.      Breath sounds: No rales.   Abdominal:      General: Bowel sounds are normal.      Palpations: Abdomen is soft.      Comments: woundvac   Musculoskeletal:      Right lower leg: Edema present.      Left lower leg: Edema present.   Skin:     General: Skin is warm and dry.      Findings: No bruising or rash.   Neurological:      Mental Status: She is alert.         Significant Labs:  CBC:   Recent Labs   Lab 08/30/20 0314   WBC 26.25*   RBC 2.89*   HGB 8.7*   HCT 28.5*      MCV 99*   MCH 30.1   MCHC 30.5*     CMP:   Recent Labs   Lab 08/30/20 0314   *  120*   CALCIUM 8.2*  8.2*   ALBUMIN 1.4*  1.4*   PROT 6.8     140   K 4.4  4.4   CO2 24  24     109   BUN 8  8   CREATININE 0.8  0.8   ALKPHOS 122   ALT 10   AST 31   BILITOT 0.6     All labs within the past 24 hours have been reviewed.         Assessment/Plan:     * Duodenum disorder  - Management per primary team   - Duodenal necrosis s/p explolatory lap.     Acute renal failure with tubular necrosis  - oliguric stage 3 sid with ischemic atn likely secondary to septic shock  - unknown bl cr  - muddy brown casts on microscopy    Plan/Recommendations:  - No need for RRT today. Reevaluate tomorrow for CRRT vs HD depending hemodynamics.   -Strict I/O's  -Renally dose meds/avoid nephrotoxic meds  -Keep MAP >65  -Will continue to follow closely           Severe sepsis  - Management per primary team         Thank you for your consult. I will follow-up with patient. Please contact us if you have any additional questions.    Efren Pacheco,  MD  Nephrology  [facility]

## 2025-01-11 NOTE — PLAN OF CARE
Problem: Chronic Conditions and Co-morbidities  Goal: Patient's chronic conditions and co-morbidity symptoms are monitored and maintained or improved  Outcome: Progressing  Flowsheets (Taken 1/10/2025 6723 by Alyson Hall RN)  Care Plan - Patient's Chronic Conditions and Co-Morbidity Symptoms are Monitored and Maintained or Improved: Monitor and assess patient's chronic conditions and comorbid symptoms for stability, deterioration, or improvement     Problem: Discharge Planning  Goal: Discharge to home or other facility with appropriate resources  Outcome: Progressing     Problem: Skin/Tissue Integrity  Goal: Absence of new skin breakdown  Description: 1.  Monitor for areas of redness and/or skin breakdown  2.  Assess vascular access sites hourly  3.  Every 4-6 hours minimum:  Change oxygen saturation probe site  4.  Every 4-6 hours:  If on nasal continuous positive airway pressure, respiratory therapy assess nares and determine need for appliance change or resting period.  Outcome: Progressing     Problem: Safety - Adult  Goal: Free from fall injury  Outcome: Progressing

## 2025-01-11 NOTE — PLAN OF CARE
Problem: Chronic Conditions and Co-morbidities  Goal: Patient's chronic conditions and co-morbidity symptoms are monitored and maintained or improved  1/11/2025 1504 by Flavio Garcia RN  Outcome: Progressing  1/11/2025 0325 by Mariya Duque RN  Outcome: Progressing  Flowsheets (Taken 1/10/2025 1543 by Alyson Hall, RN)  Care Plan - Patient's Chronic Conditions and Co-Morbidity Symptoms are Monitored and Maintained or Improved: Monitor and assess patient's chronic conditions and comorbid symptoms for stability, deterioration, or improvement     Problem: Discharge Planning  Goal: Discharge to home or other facility with appropriate resources  1/11/2025 1504 by Flavio Garcia RN  Outcome: Progressing  1/11/2025 0325 by Mariya Duque RN  Outcome: Progressing

## 2025-01-11 NOTE — PROGRESS NOTES
Right AC PIV infiltrated, discussed with pulmonology and IM regarding need for PICC line. Orders placed

## 2025-01-11 NOTE — PROGRESS NOTES
Pulmonary Progress Note  CC: SOB and pneumonia    Subjective:  anxious this morning      EXAM: BP (!) 103/57   Pulse (!) 104   Temp 97.7 °F (36.5 °C) (Temporal)   Resp 24   Ht 1.6 m (5' 3\")   Wt 45.5 kg (100 lb 5 oz)   SpO2 97%   BMI 17.77 kg/m²  on vent  Constitutional:  no acute distress.   Eyes: PERRL. Conjunctivae anicteric.   ENT: Normal nose. Normal tongue.    Neck:  Trach in place and is c/d/i  Respiratory: No accessory muscle usage.  decreased breath sounds. No wheezes. No rales. No Rhonchi.  Cardiovascular: Normal S1S2. No digit clubbing. No digit cyanosis. No LE edema.   Psychiatric: No anxiety or Agitation. Alert and Oriented to person, place and time.    Scheduled Meds:   sodium chloride flush  5-40 mL IntraVENous 2 times per day    sodium chloride flush  5-40 mL IntraVENous 2 times per day    enoxaparin  40 mg SubCUTAneous Daily    methylPREDNISolone  40 mg IntraVENous Q12H    meropenem  1,000 mg IntraVENous Q8H    vancomycin  1,000 mg IntraVENous Q12H    ipratropium 0.5 mg-albuterol 2.5 mg  1 Dose Inhalation 4x Daily RT    midazolam  2 mg IntraVENous Once     Continuous Infusions:   sodium chloride      sodium chloride      sodium chloride 75 mL/hr at 01/11/25 0503     PRN Meds:  sodium chloride flush, sodium chloride, potassium chloride **OR** potassium alternative oral replacement **OR** potassium chloride, sodium chloride flush, sodium chloride, magnesium sulfate, ondansetron **OR** ondansetron, polyethylene glycol, acetaminophen **OR** acetaminophen, ipratropium 0.5 mg-albuterol 2.5 mg    Labs:  CBC:   Recent Labs     01/10/25  1131 01/11/25  0224   WBC 29.2* 13.8*   HGB 9.9* 8.4*   HCT 30.6* 26.1*   MCV 81.3 81.9   * 372     BMP:   Recent Labs     01/10/25  1131 01/11/25  0224   * 129*   K 3.9 3.0*   CL 86* 94*   CO2 26 24   BUN 9 10   CREATININE 0.6 0.4*     Microbiology:  RSv neg  Flu/covid neg     Imaging:  Chest images independently reviewed by me and showed:IMPRESSION:  1.

## 2025-01-11 NOTE — PROGRESS NOTES
RT Inhaler-Nebulizer Bronchodilator Protocol Note    There is a bronchodilator order in the chart from a provider indicating to follow the RT Bronchodilator Protocol and there is an “Initiate RT Inhaler-Nebulizer Bronchodilator Protocol” order as well (see protocol at bottom of note).    CXR Findings:  XR CHEST PORTABLE    Result Date: 1/10/2025  1. Worsening left airspace disease concerning for pneumonia; recommend chest radiograph in 8 weeks to confirm resolution.       The findings from the last RT Protocol Assessment were as follows:   History Pulmonary Disease: (P) Chronic pulmonary disease  Respiratory Pattern: (P) Mild dyspnea at rest, irregular pattern, or RR 21-25 bpm  Breath Sounds: (P) Slightly diminished and/or crackles  Cough: (P) Strong, productive  Indication for Bronchodilator Therapy: (P) Wheezing associated with pulm disorder  Bronchodilator Assessment Score: (P) 9    Aerosolized bronchodilator medication orders have been revised according to the RT Inhaler-Nebulizer Bronchodilator Protocol below.    Respiratory Therapist to perform RT Therapy Protocol Assessment initially then follow the protocol.  Repeat RT Therapy Protocol Assessment PRN for score 0-3 or on second treatment, BID, and PRN for scores above 3.    No Indications - adjust the frequency to every 6 hours PRN wheezing or bronchospasm, if no treatments needed after 48 hours then discontinue using Per Protocol order mode.     If indication present, adjust the RT bronchodilator orders based on the Bronchodilator Assessment Score as indicated below.  Use Inhaler orders unless patient has one or more of the following: on home nebulizer, not able to hold breath for 10 seconds, is not alert and oriented, cannot activate and use MDI correctly, or respiratory rate 25 breaths per minute or more, then use the equivalent nebulizer order(s) with same Frequency and PRN reasons based on the score.  If a patient is on this medication at home then do

## 2025-01-11 NOTE — PROGRESS NOTES
1/11  Vanc random = 18.2 mcg/mL at 0224.  Calculated AUC of 408.  Continue current dose and frequency of vancomycin.  Will monitor and adjust dose accordingly.  Nikky GarciaD  1/11/2025 6:04 AM

## 2025-01-11 NOTE — PROGRESS NOTES
Per MD order, writer called [facility] for placement of a PICC; writer spoke with [facility] representative Cherrie. Update shared with shift LENNY Garcia. Janlel RN Clinical

## 2025-01-11 NOTE — PROGRESS NOTES
01/10/25 2318   Patient Observation   Pulse (!) 121   Respirations (!) 31   SpO2 98 %   Breath Sounds   Right Upper Lobe Diminished   Right Middle Lobe Diminished   Right Lower Lobe Diminished   Left Upper Lobe Diminished   Left Lower Lobe Diminished   Vent Information   Vent Mode AC/VC   Ventilator Settings   Vt (Set, mL) 400 mL   Resp Rate (Set) 16 bpm   PEEP/CPAP (cmH2O) 5   FiO2  50 %   Vent Patient Data (Readings)   Vt (Measured) 436 mL   Peak Inspiratory Pressure (cmH2O) 26 cmH2O   Rate Measured 31 br/min   Minute Volume (L/min) 12.6 Liters   Peak Inspiratory Flow (lpm) 70 L/sec   Mean Airway Pressure (cmH2O) 10 cmH20   I:E Ratio 1:2.4   Flow Sensitivity 3 L/min   Vent Alarm Settings   High Pressure (cmH2O) 50 cmH2O   Low Minute Volume (lpm) 3 L/min   Low Exhaled Vt (ml) 200 mL   RR High (bpm) 45 br/min   Apnea (secs) 20 secs   Additional Respiratoray Assessments   Humidification Source HME   Airway Clearance   Suction Trach   Suction Device Inline suction catheter   Sputum Method Obtained Tracheal   Sputum Amount Small   Sputum Color/Odor Yellow   Sputum Consistency Thick   Surgical Airway  Shiley Cuffed   No placement date or time found.   Present on Admission/Arrival: Yes  Surgical Airway Type: Tracheostomy  Brand: Shiley  Style: Cuffed  Size: 4   Status Secured   Site Assessment Drainage   Site Care Cleansed;Dried;Dressing applied   Inner Cannula Care Changed/new   Ties Assessment Clean;Dry;Intact

## 2025-01-11 NOTE — PROGRESS NOTES
4 Eyes Skin Assessment     NAME:  Annie Tyler  YOB: 1955  MEDICAL RECORD NUMBER:  [id number]    The patient is being assessed for  Other Shift Assessment    I agree that at least one RN has performed a thorough Head to Toe Skin Assessment on the patient. ALL assessment sites listed below have been assessed.      Areas assessed by both nurses:    Head, Face, Ears, Shoulders, Back, Chest, Arms, Elbows, Hands, Sacrum. Buttock, Coccyx, Ischium, Legs. Feet and Heels, and Under Medical Devices      *redness to perianal area    Does the Patient have a Wound? No       William Prevention initiated by RN: Yes  Wound Care Orders initiated by RN: Yes    Pressure Injury (Stage 3,4, Unstageable, DTI, NWPT, and Complex wounds) if present, place Wound referral order by RN under : No    New Ostomies, if present place, Ostomy referral order under : No     Nurse 1 eSignature: Electronically signed by Mariya Duque RN on 1/11/25 at 5:19 AM EST    **SHARE this note so that the co-signing nurse can place an eSignature**    Nurse 2 eSignature: Electronically signed by Brigitte Woo RN on 1/11/25 at 6:55 AM EST

## 2025-01-11 NOTE — PROGRESS NOTES
IM Progress Note    Admit Date:  1/10/2025  1    Interval history:  chronic resp failure on vent at night and 5 L in daytime      Subjective:  Ms. Tyler seen up in bed, did well on vent overnight , planning to go to [facility] this am  No new fevers  Na improving   Mentation stable  Anxiety remains      Objective:   BP (!) 103/57   Pulse (!) 104   Temp 97.7 °F (36.5 °C) (Temporal)   Resp 24   Ht 1.6 m (5' 3\")   Wt 45.5 kg (100 lb 5 oz)   SpO2 97%   BMI 17.77 kg/m²     Intake/Output Summary (Last 24 hours) at 1/11/2025 0918  Last data filed at 1/11/2025 0503  Gross per 24 hour   Intake 2304.84 ml   Output 400 ml   Net 1904.84 ml       Physical Exam:    General elderly female on vent   Anxious,  no  resp distress  Tracheostomy Appears to be not in any distress  Mucous Membranes:  Pink , anicteric  Neck: No JVD, no carotid bruit, no thyromegaly  Chest: diminished  javier air entry with no wheeze   Cardiovascular:  RRR S1S2 heard, no murmurs or gallops  Abdomen:  Soft, undistended, non tender, no organomegaly, BS present, PEG noted  Extremities: No edema or cyanosis. Distal pulses well felt  Neurological : grossly normal  Non focal    Medications:   Scheduled Medications:    predniSONE  30 mg Oral Daily    sodium chloride flush  5-40 mL IntraVENous 2 times per day    sodium chloride flush  5-40 mL IntraVENous 2 times per day    enoxaparin  40 mg SubCUTAneous Daily    meropenem  1,000 mg IntraVENous Q8H    vancomycin  1,000 mg IntraVENous Q12H    ipratropium 0.5 mg-albuterol 2.5 mg  1 Dose Inhalation 4x Daily RT    midazolam  2 mg IntraVENous Once     I   sodium chloride      sodium chloride      sodium chloride 75 mL/hr at 01/11/25 0503     sodium chloride flush, sodium chloride, potassium chloride **OR** potassium alternative oral replacement **OR** potassium chloride, sodium chloride flush, sodium chloride, magnesium sulfate, ondansetron **OR** ondansetron, polyethylene glycol, acetaminophen **OR**

## 2025-01-11 NOTE — PROGRESS NOTES
01/11/25 0249   Patient Observation   Pulse (!) 112   Respirations 27   SpO2 99 %   Breath Sounds   Right Upper Lobe Diminished   Right Middle Lobe Diminished   Right Lower Lobe Diminished   Left Upper Lobe Diminished   Left Lower Lobe Diminished   Vent Information   Vent Mode AC/VC   $Ventilation $Subsequent Day   Ventilator Settings   Vt (Set, mL) 400 mL   Resp Rate (Set) 16 bpm   PEEP/CPAP (cmH2O) 5   FiO2  (S)  40 %  (decreased to 40%)   Vent Patient Data (Readings)   Vt (Measured) 420 mL   Peak Inspiratory Pressure (cmH2O) 28 cmH2O   Rate Measured 31 br/min   Minute Volume (L/min) 13 Liters   Peak Inspiratory Flow (lpm) 70 L/sec   Mean Airway Pressure (cmH2O) 10 cmH20   I:E Ratio 1:2.0   Flow Sensitivity 3 L/min   Vent Alarm Settings   High Pressure (cmH2O) 50 cmH2O   Low Minute Volume (lpm) 3 L/min   Low Exhaled Vt (ml) 200 mL   RR High (bpm) 45 br/min   Apnea (secs) 20 secs   Additional Respiratoray Assessments   Humidification Source HME   Airway Clearance   Suction Trach   Suction Device Inline suction catheter   Sputum Method Obtained Tracheal   Sputum Amount Moderate   Sputum Color/Odor Yellow   Sputum Consistency Thick   Surgical Airway  Shiley Cuffed   No placement date or time found.   Present on Admission/Arrival: Yes  Surgical Airway Type: Tracheostomy  Brand: Shiley  Style: Cuffed  Size: 4   Status Secured   Site Assessment Clean;Dry   Ties Assessment Clean;Dry;Intact

## 2025-01-12 LAB
ANION GAP SERPL CALCULATED.3IONS-SCNC: 8 MMOL/L (ref 3–16)
BASOPHILS # BLD: 0 K/UL (ref 0–0.2)
BASOPHILS NFR BLD: 0.1 %
BUN SERPL-MCNC: 18 MG/DL (ref 7–20)
CALCIUM SERPL-MCNC: 8.6 MG/DL (ref 8.3–10.6)
CHLORIDE SERPL-SCNC: 102 MMOL/L (ref 99–110)
CO2 SERPL-SCNC: 28 MMOL/L (ref 21–32)
CREAT SERPL-MCNC: 0.5 MG/DL (ref 0.6–1.2)
DEPRECATED RDW RBC AUTO: 15.2 % (ref 12.4–15.4)
EOSINOPHIL # BLD: 0 K/UL (ref 0–0.6)
EOSINOPHIL NFR BLD: 0 %
GFR SERPLBLD CREATININE-BSD FMLA CKD-EPI: >90 ML/MIN/{1.73_M2}
GLUCOSE BLD-MCNC: 106 MG/DL (ref 70–99)
GLUCOSE BLD-MCNC: 108 MG/DL (ref 70–99)
GLUCOSE BLD-MCNC: 114 MG/DL (ref 70–99)
GLUCOSE BLD-MCNC: 94 MG/DL (ref 70–99)
GLUCOSE SERPL-MCNC: 99 MG/DL (ref 70–99)
HCT VFR BLD AUTO: 26 % (ref 36–48)
HGB BLD-MCNC: 8.6 G/DL (ref 12–16)
LYMPHOCYTES # BLD: 0.7 K/UL (ref 1–5.1)
LYMPHOCYTES NFR BLD: 2.9 %
MCH RBC QN AUTO: 26.7 PG (ref 26–34)
MCHC RBC AUTO-ENTMCNC: 32.9 G/DL (ref 31–36)
MCV RBC AUTO: 81.2 FL (ref 80–100)
MONOCYTES # BLD: 1.4 K/UL (ref 0–1.3)
MONOCYTES NFR BLD: 5.4 %
MRSA DNA SPEC QL NAA+PROBE: NORMAL
NEUTROPHILS # BLD: 23.5 K/UL (ref 1.7–7.7)
NEUTROPHILS NFR BLD: 91.6 %
PERFORMED ON: ABNORMAL
PERFORMED ON: NORMAL
PLATELET # BLD AUTO: 479 K/UL (ref 135–450)
PMV BLD AUTO: 7.5 FL (ref 5–10.5)
POTASSIUM SERPL-SCNC: 4.6 MMOL/L (ref 3.5–5.1)
RBC # BLD AUTO: 3.21 M/UL (ref 4–5.2)
SODIUM SERPL-SCNC: 138 MMOL/L (ref 136–145)
WBC # BLD AUTO: 25.6 K/UL (ref 4–11)

## 2025-01-12 PROCEDURE — 6370000000 HC RX 637 (ALT 250 FOR IP): Performed by: INTERNAL MEDICINE

## 2025-01-12 PROCEDURE — 94640 AIRWAY INHALATION TREATMENT: CPT

## 2025-01-12 PROCEDURE — 94761 N-INVAS EAR/PLS OXIMETRY MLT: CPT

## 2025-01-12 PROCEDURE — 2580000003 HC RX 258

## 2025-01-12 PROCEDURE — 94003 VENT MGMT INPAT SUBQ DAY: CPT

## 2025-01-12 PROCEDURE — 2700000000 HC OXYGEN THERAPY PER DAY

## 2025-01-12 PROCEDURE — 85025 COMPLETE CBC W/AUTO DIFF WBC: CPT

## 2025-01-12 PROCEDURE — 97162 PT EVAL MOD COMPLEX 30 MIN: CPT

## 2025-01-12 PROCEDURE — 6360000002 HC RX W HCPCS

## 2025-01-12 PROCEDURE — 97166 OT EVAL MOD COMPLEX 45 MIN: CPT

## 2025-01-12 PROCEDURE — 97535 SELF CARE MNGMENT TRAINING: CPT

## 2025-01-12 PROCEDURE — 99233 SBSQ HOSP IP/OBS HIGH 50: CPT | Performed by: INTERNAL MEDICINE

## 2025-01-12 PROCEDURE — 97530 THERAPEUTIC ACTIVITIES: CPT

## 2025-01-12 PROCEDURE — 2000000000 HC ICU R&B

## 2025-01-12 PROCEDURE — 2580000003 HC RX 258: Performed by: INTERNAL MEDICINE

## 2025-01-12 PROCEDURE — 80048 BASIC METABOLIC PNL TOTAL CA: CPT

## 2025-01-12 RX ORDER — TRAMADOL HYDROCHLORIDE 50 MG/1
50 TABLET ORAL EVERY 6 HOURS PRN
Status: DISCONTINUED | OUTPATIENT
Start: 2025-01-12 | End: 2025-01-17 | Stop reason: HOSPADM

## 2025-01-12 RX ADMIN — ESCITALOPRAM OXALATE 20 MG: 10 TABLET ORAL at 20:28

## 2025-01-12 RX ADMIN — IPRATROPIUM BROMIDE AND ALBUTEROL SULFATE 1 DOSE: 2.5; .5 SOLUTION RESPIRATORY (INHALATION) at 11:29

## 2025-01-12 RX ADMIN — BUSPIRONE HYDROCHLORIDE 10 MG: 10 TABLET ORAL at 09:09

## 2025-01-12 RX ADMIN — IPRATROPIUM BROMIDE AND ALBUTEROL SULFATE 1 DOSE: 2.5; .5 SOLUTION RESPIRATORY (INHALATION) at 15:27

## 2025-01-12 RX ADMIN — BUSPIRONE HYDROCHLORIDE 10 MG: 10 TABLET ORAL at 20:29

## 2025-01-12 RX ADMIN — IPRATROPIUM BROMIDE AND ALBUTEROL SULFATE 1 DOSE: 2.5; .5 SOLUTION RESPIRATORY (INHALATION) at 19:25

## 2025-01-12 RX ADMIN — SODIUM CHLORIDE: 9 INJECTION, SOLUTION INTRAVENOUS at 12:07

## 2025-01-12 RX ADMIN — METOPROLOL TARTRATE 12.5 MG: 25 TABLET, FILM COATED ORAL at 20:28

## 2025-01-12 RX ADMIN — PREDNISONE 30 MG: 20 TABLET ORAL at 09:08

## 2025-01-12 RX ADMIN — HYDROXYZINE HYDROCHLORIDE 10 MG: 10 TABLET ORAL at 09:09

## 2025-01-12 RX ADMIN — BUSPIRONE HYDROCHLORIDE 10 MG: 10 TABLET ORAL at 14:20

## 2025-01-12 RX ADMIN — IPRATROPIUM BROMIDE AND ALBUTEROL SULFATE 1 DOSE: 2.5; .5 SOLUTION RESPIRATORY (INHALATION) at 07:44

## 2025-01-12 RX ADMIN — ATORVASTATIN CALCIUM 40 MG: 40 TABLET, FILM COATED ORAL at 20:28

## 2025-01-12 RX ADMIN — TRAMADOL HYDROCHLORIDE 50 MG: 50 TABLET ORAL at 11:55

## 2025-01-12 RX ADMIN — METOPROLOL TARTRATE 12.5 MG: 25 TABLET, FILM COATED ORAL at 09:08

## 2025-01-12 RX ADMIN — VANCOMYCIN HYDROCHLORIDE 1000 MG: 1 INJECTION, POWDER, LYOPHILIZED, FOR SOLUTION INTRAVENOUS at 12:14

## 2025-01-12 RX ADMIN — VANCOMYCIN HYDROCHLORIDE 1000 MG: 1 INJECTION, POWDER, LYOPHILIZED, FOR SOLUTION INTRAVENOUS at 22:42

## 2025-01-12 RX ADMIN — ASPIRIN 81 MG: 81 TABLET, CHEWABLE ORAL at 09:08

## 2025-01-12 RX ADMIN — MEROPENEM AND SODIUM CHLORIDE 1000 MG: 1 INJECTION, SOLUTION INTRAVENOUS at 12:17

## 2025-01-12 RX ADMIN — ENOXAPARIN SODIUM 40 MG: 100 INJECTION SUBCUTANEOUS at 09:08

## 2025-01-12 RX ADMIN — MEROPENEM AND SODIUM CHLORIDE 1000 MG: 1 INJECTION, SOLUTION INTRAVENOUS at 20:31

## 2025-01-12 RX ADMIN — TRAMADOL HYDROCHLORIDE 50 MG: 50 TABLET ORAL at 23:07

## 2025-01-12 RX ADMIN — MEROPENEM AND SODIUM CHLORIDE 1000 MG: 1 INJECTION, SOLUTION INTRAVENOUS at 04:59

## 2025-01-12 ASSESSMENT — PAIN SCALES - GENERAL
PAINLEVEL_OUTOF10: 10
PAINLEVEL_OUTOF10: 10
PAINLEVEL_OUTOF10: 9
PAINLEVEL_OUTOF10: 0
PAINLEVEL_OUTOF10: 0

## 2025-01-12 ASSESSMENT — PAIN DESCRIPTION - PAIN TYPE: TYPE: ACUTE PAIN

## 2025-01-12 ASSESSMENT — PULMONARY FUNCTION TESTS
PIF_VALUE: 35
PIF_VALUE: 24
PIF_VALUE: 32
PIF_VALUE: 31
PIF_VALUE: 24

## 2025-01-12 ASSESSMENT — PAIN DESCRIPTION - ORIENTATION
ORIENTATION: LEFT;RIGHT
ORIENTATION: RIGHT;LEFT
ORIENTATION: RIGHT;LEFT

## 2025-01-12 ASSESSMENT — PAIN DESCRIPTION - DESCRIPTORS
DESCRIPTORS: ACHING

## 2025-01-12 ASSESSMENT — PAIN DESCRIPTION - LOCATION
LOCATION: FLANK
LOCATION: BACK
LOCATION: BACK

## 2025-01-12 ASSESSMENT — PAIN - FUNCTIONAL ASSESSMENT: PAIN_FUNCTIONAL_ASSESSMENT: PREVENTS OR INTERFERES SOME ACTIVE ACTIVITIES AND ADLS

## 2025-01-12 ASSESSMENT — PAIN SCALES - WONG BAKER: WONGBAKER_NUMERICALRESPONSE: NO HURT

## 2025-01-12 NOTE — PROGRESS NOTES
01/12/25 0313   Patient Observation   Pulse 74   Respirations 25   SpO2 100 %   Breath Sounds   Right Upper Lobe Diminished   Right Middle Lobe Diminished   Right Lower Lobe Diminished   Left Upper Lobe Diminished   Left Lower Lobe Diminished   Vent Information   Vent Mode AC/VC   Ventilator Settings   Vt (Set, mL) 400 mL   Resp Rate (Set) 16 bpm   PEEP/CPAP (cmH2O) 5   FiO2  40 %   Vent Patient Data (Readings)   Vt (Measured) 475 mL   Peak Inspiratory Pressure (cmH2O) 32 cmH2O   Rate Measured 25 br/min   Minute Volume (L/min) 10 Liters   Peak Inspiratory Flow (lpm) 70 L/sec   Mean Airway Pressure (cmH2O) 10 cmH20   I:E Ratio 1:2.6   Flow Sensitivity 3 L/min   Vent Alarm Settings   High Pressure (cmH2O) 50 cmH2O   Low Minute Volume (lpm) 3 L/min   Low Exhaled Vt (ml) 200 mL   RR High (bpm) 45 br/min   Apnea (secs) 20 secs   Additional Respiratoray Assessments   Humidification Source HME   Airway Clearance   Suction Trach   Suction Device Inline suction catheter   Sputum Method Obtained Tracheal   Sputum Amount Small   Sputum Color/Odor Yellow;White   Sputum Consistency Thick   Surgical Airway  Shiley Cuffed   No placement date or time found.   Present on Admission/Arrival: Yes  Surgical Airway Type: Tracheostomy  Brand: Shiley  Style: Cuffed  Size: 4   Status Secured   Site Assessment Clean;Dry   Ties Assessment Clean;Dry;Intact

## 2025-01-12 NOTE — PROGRESS NOTES
Inpatient Occupational Therapy Evaluation & Treatment    Unit: ICU  Date:  1/12/2025  Patient Name:    Annie Tyler  Admitting diagnosis:  Respiratory failure [J96.90]  Acute on chronic respiratory failure [J96.20]  Admit Date:  1/10/2025  Precautions/Restrictions/WB Status/ Lines/ Wounds/ Oxygen: Fall risk, Bed/chair alarm, Lines (IV, PEG tube, PICC right, and Trach/Ventilator f40% for O2 demands), Telemetry, Continuous pulse oximetry, and Isolation Precautions: Contact    Pt seen for cotreatment this date due to patient safety, patient endurance, complexity of condition, and acute illness/injury    Treatment Time:  09:36-10:54  Treatment Number:  1  Timed Code Treatment Minutes: 68 minutes  Total Treatment Minutes: 78 minutes    Patient Goals for Therapy: none stated      Discharge Recommendations: LTC with skilled therapy  DME needs for discharge: Defer to facility       Therapy recommendations for staff:   Assist of 1 for sitting EOB    History of Present Illness: Per admission H&P from Dr. Kaushik MD on 1/10  \"69 y.o. female with pmhx of COPD, chronic hypoxic respiratory failure with tracheostomy, anxiety, takotsubo cardiomyopathy who presented to [facility] ED with complaint of shortness of breath   Pt does use vent at night and 5 L trach collar during daytime, reports 3-4 days of progressive dyspnea with mild cough but no fevers or chills  No increased sputum. No change in bladder or bowel habits  Apparently unable to come off vent during daytime and needing vent all day and sent her for eval. Reports not been on any meds for copd flare up this time.\"    Preadmission Environment:   Patient a LTC resident at [place] where she has 24/7 assistance/supervision available. Patient reports that staff assists with all components of ADL/IADL performance, bed mobility, and functional transfer performance. Patient reports that she receives assistance with functional transfer performance and performs

## 2025-01-12 NOTE — PROGRESS NOTES
RT Inhaler-Nebulizer Bronchodilator Protocol Note    There is a bronchodilator order in the chart from a provider indicating to follow the RT Bronchodilator Protocol and there is an “Initiate RT Inhaler-Nebulizer Bronchodilator Protocol” order as well (see protocol at bottom of note).    CXR Findings:  XR CHEST PORTABLE    Result Date: 1/10/2025  1. Worsening left airspace disease concerning for pneumonia; recommend chest radiograph in 8 weeks to confirm resolution.       The findings from the last RT Protocol Assessment were as follows:   History Pulmonary Disease: (P) Chronic pulmonary disease  Respiratory Pattern: (P) Dyspnea on exertion or RR 21-25 bpm  Breath Sounds: (P) Slightly diminished and/or crackles  Cough: (P) Weak, productive  Indication for Bronchodilator Therapy: (P) Decreased or absent breath sounds  Bronchodilator Assessment Score: (P) 8    Aerosolized bronchodilator medication orders have been revised according to the RT Inhaler-Nebulizer Bronchodilator Protocol below.    Respiratory Therapist to perform RT Therapy Protocol Assessment initially then follow the protocol.  Repeat RT Therapy Protocol Assessment PRN for score 0-3 or on second treatment, BID, and PRN for scores above 3.    No Indications - adjust the frequency to every 6 hours PRN wheezing or bronchospasm, if no treatments needed after 48 hours then discontinue using Per Protocol order mode.     If indication present, adjust the RT bronchodilator orders based on the Bronchodilator Assessment Score as indicated below.  Use Inhaler orders unless patient has one or more of the following: on home nebulizer, not able to hold breath for 10 seconds, is not alert and oriented, cannot activate and use MDI correctly, or respiratory rate 25 breaths per minute or more, then use the equivalent nebulizer order(s) with same Frequency and PRN reasons based on the score.  If a patient is on this medication at home then do not decrease Frequency below  that used at home.    0-3 - enter or revise RT bronchodilator order(s) to equivalent RT Bronchodilator order with Frequency of every 4 hours PRN for wheezing or increased work of breathing using Per Protocol order mode.        4-6 - enter or revise RT Bronchodilator order(s) to two equivalent RT bronchodilator orders with one order with BID Frequency and one order with Frequency of every 4 hours PRN wheezing or increased work of breathing using Per Protocol order mode.        7-10 - enter or revise RT Bronchodilator order(s) to two equivalent RT bronchodilator orders with one order with TID Frequency and one order with Frequency of every 4 hours PRN wheezing or increased work of breathing using Per Protocol order mode.       11-13 - enter or revise RT Bronchodilator order(s) to one equivalent RT bronchodilator order with QID Frequency and an Albuterol order with Frequency of every 4 hours PRN wheezing or increased work of breathing using Per Protocol order mode.      Greater than 13 - enter or revise RT Bronchodilator order(s) to one equivalent RT bronchodilator order with every 4 hours Frequency and an Albuterol order with Frequency of every 2 hours PRN wheezing or increased work of breathing using Per Protocol order mode.         Electronically signed by Dallas Delacruz RCP on 1/12/2025 at 7:55 AM

## 2025-01-12 NOTE — PROGRESS NOTES
Shift assessment completed as documented. Pt awake and alert.  Follows commands. Purewick in place. NSR without ectopy.Abd soft and nontender. Call light within reach. Monitoring closely

## 2025-01-12 NOTE — PROGRESS NOTES
4 Eyes Skin Assessment     NAME:  Annie Tyler  YOB: 1955  MEDICAL RECORD NUMBER:  [id number]    The patient is being assessed for  Shift Handoff    I agree that at least one RN has performed a thorough Head to Toe Skin Assessment on the patient. ALL assessment sites listed below have been assessed.      Areas assessed by both nurses:    Head, Face, Ears, Shoulders, Back, Chest, Arms, Elbows, Hands, Sacrum. Buttock, Coccyx, Ischium, Legs. Feet and Heels, and Under Medical Devices      *redness to buttocks       Does the Patient have a Wound? Yes wound(s) were present on assessment. LDA wound assessment was Initiated and completed by RN       William Prevention initiated by RN: Yes  Wound Care Orders initiated by RN: Yes    Pressure Injury (Stage 3,4, Unstageable, DTI, NWPT, and Complex wounds) if present, place Wound referral order by RN under : No    New Ostomies, if present place, Ostomy referral order under : No     Nurse 1 eSignature: Electronically signed by Mariya Duque RN on 1/12/25 at 6:31 AM EST    **SHARE this note so that the co-signing nurse can place an eSignature**    Nurse 2 eSignature: Electronically signed by Eyad Long RN on 1/12/25 at 6:33 AM EST

## 2025-01-12 NOTE — PROGRESS NOTES
Pulmonary Progress Note  CC: SOB and pneumonia    Subjective: Patient tolerated T-piece yesterday during the day and was back on the ventilator at night.      EXAM: /70   Pulse 95   Temp 97.1 °F (36.2 °C) (Temporal)   Resp 22   Ht 1.6 m (5' 3\")   Wt 48.2 kg (106 lb 4.2 oz)   SpO2 90%   BMI 18.82 kg/m²  on vent  Constitutional:  no acute distress.   Eyes: PERRL. Conjunctivae anicteric.   ENT: Normal nose. Normal tongue.    Neck:  Trach in place and is c/d/i  Respiratory: No accessory muscle usage.  decreased breath sounds. No wheezes. No rales. No Rhonchi.  Cardiovascular: Normal S1S2. No digit clubbing. No digit cyanosis. No LE edema.   Psychiatric: No anxiety or Agitation. Alert and Oriented to person, place and time.    Scheduled Meds:   predniSONE  30 mg Oral Daily    aspirin  81 mg Oral Daily    escitalopram  20 mg Oral Nightly    busPIRone  10 mg Oral TID    metoprolol tartrate  12.5 mg Oral BID    atorvastatin  40 mg Oral Nightly    sodium chloride flush  5-40 mL IntraVENous 2 times per day    sodium chloride flush  5-40 mL IntraVENous 2 times per day    enoxaparin  40 mg SubCUTAneous Daily    meropenem  1,000 mg IntraVENous Q8H    vancomycin  1,000 mg IntraVENous Q12H    ipratropium 0.5 mg-albuterol 2.5 mg  1 Dose Inhalation 4x Daily RT    midazolam  2 mg IntraVENous Once     Continuous Infusions:   sodium chloride 50 mL/hr at 01/12/25 0506    sodium chloride      sodium chloride       PRN Meds:  hydrOXYzine HCl, sodium chloride flush, sodium chloride, potassium chloride **OR** potassium alternative oral replacement **OR** potassium chloride, sodium chloride flush, sodium chloride, magnesium sulfate, ondansetron **OR** ondansetron, polyethylene glycol, acetaminophen **OR** acetaminophen, ipratropium 0.5 mg-albuterol 2.5 mg    Labs:  CBC:   Recent Labs     01/10/25  1131 01/11/25  0224 01/12/25  0502   WBC 29.2* 13.8* 25.6*   HGB 9.9* 8.4* 8.6*   HCT 30.6* 26.1* 26.0*   MCV 81.3 81.9 81.2   PLT  508* 372 479*     BMP:   Recent Labs     01/10/25  1131 01/11/25  0224 01/12/25  0502   * 129* 138   K 3.9 3.0* 4.6   CL 86* 94* 102   CO2 26 24 28   BUN 9 10 18   CREATININE 0.6 0.4* 0.5*     Microbiology:  RSv neg  Flu/covid neg     Imaging:  Chest images independently reviewed by me and showed:IMPRESSION:  1. Worsening left airspace disease concerning for pneumonia; recommend chest  radiograph in 8 weeks to confirm resolution.        11/27/24 CTPA: Advanced emphysema with some chronic scarring.  Right upper lobe 12 mm spiculated pulmonary nodules unchanged since prior scan.     ASSESSMENT:  Healthcare acquired pneumonia  Chronic dependence on mechanical ventilation via tracheostomy  Acute on chronic hypercarbic respiratory failure  End stage COPD  RUL lung nodule, 12 mm and stable at least since June 2024     PLAN:  CATC seen during the daytime. vent support at night  MV per my orders  Routine tracheostomy care  Continue vancomycin and Merrem  Prednisone 30mg x 5 days  stop IV fluid patient when taking p.o.  F/u Bronchoscopy results  Sputum GS&C.  SLP and restart diet if able   Lovenox DVT prophylaxis   Close to baseline respiratory status

## 2025-01-12 NOTE — PROGRESS NOTES
Pt moving in bed and grabbed side rail to sit up, and trach popped out, RT at bedside. Trach put back in, with no complications.

## 2025-01-12 NOTE — PROGRESS NOTES
HEART FAILURE CARE PLAN:    Comorbidities Reviewed: Yes   Patient has a past medical history of Angina pectoris (HCC), Chronic pain, Congestive heart failure (HCC), COPD exacerbation (HCC), Depression, Essential hypertension, Panic disorder, Pneumonia, and Pulmonary emphysema (HCC).     Weights Reviewed: Yes   Admission weight: 47 kg (103 lb 9.9 oz)   Wt Readings from Last 3 Encounters:   01/12/25 48.2 kg (106 lb 4.2 oz)   11/30/24 61.1 kg (134 lb 11.2 oz)   08/20/24 65.2 kg (143 lb 11.8 oz)     Intake & Output Reviewed: Yes     Intake/Output Summary (Last 24 hours) at 1/12/2025 1411  Last data filed at 1/12/2025 0506  Gross per 24 hour   Intake 1453.9 ml   Output 475 ml   Net 978.9 ml       ECHOCARDIOGRAM Reviewed: Yes   Patient's Ejection Fraction (EF) is greater than 40%     Medications Reviewed: Yes   SCHEDULED HOSPITAL MEDICATIONS:   predniSONE  30 mg Oral Daily    aspirin  81 mg Oral Daily    escitalopram  20 mg Oral Nightly    busPIRone  10 mg Oral TID    metoprolol tartrate  12.5 mg Oral BID    atorvastatin  40 mg Oral Nightly    sodium chloride flush  5-40 mL IntraVENous 2 times per day    sodium chloride flush  5-40 mL IntraVENous 2 times per day    enoxaparin  40 mg SubCUTAneous Daily    meropenem  1,000 mg IntraVENous Q8H    vancomycin  1,000 mg IntraVENous Q12H    ipratropium 0.5 mg-albuterol 2.5 mg  1 Dose Inhalation 4x Daily RT    midazolam  2 mg IntraVENous Once     HOME MEDICATIONS:  Prior to Admission medications    Medication Sig Start Date End Date Taking? Authorizing Provider   traMADol (ULTRAM) 50 MG tablet Take 1 tablet by mouth every 8 hours as needed for Pain.   Yes ProviderJoey MD   aspirin 81 MG chewable tablet Take 1 tablet by mouth daily   Yes ProviderJoey MD   metoprolol succinate (TOPROL XL) 25 MG extended release tablet Take 1 tablet by mouth daily 1/3/25  Yes Bruce Cohn DO   melatonin 3 MG TABS tablet Take 1 tablet by mouth nightly as needed (insomnia)   Yes

## 2025-01-12 NOTE — PROGRESS NOTES
Shift assessment completed, see flow sheet. Pt is alert and oriented. Denies pain at this time.     Temp-97.8  HR-128  RR-25  BP-141/95  SpO2-93%    Pt vented with trach. Vent settings are 24/511/+5/40%.    Purewick in place and working properly.     Call light in reach.

## 2025-01-12 NOTE — PROGRESS NOTES
Vancomycin Day: 3  Current Regimen: 1000 mg IV every 12 hours    Patient's labs, vitals, and vancomycin regimen reviewed.   [facility]-Rx updated    Plan:   No change today. Predicted .  Pharmacy will continue to monitor and adjust as necessary.

## 2025-01-12 NOTE — PROGRESS NOTES
Inpatient Physical Therapy Evaluation & Treatment    Unit: ICU  Date:  1/12/2025  Patient Name:    Annie Tyler  Admitting diagnosis:  Respiratory failure [J96.90]  Acute on chronic respiratory failure [J96.20]  Admit Date:  1/10/2025  Precautions/Restrictions/WB Status/ Lines/ Wounds/ Oxygen: Fall risk, Bed/chair alarm, and Lines (IV, external catheter, PEG tube, PICC right, and trache)      Pt seen for cotreatment this date due to patient safety and patient endurance    Treatment Time:  0930-1054  Treatment Number:  1   Timed Code Treatment Minutes: 74 minutes  Total Treatment Minutes:  84  minutes    Patient Stated Goals for Therapy: None stated      Discharge Recommendations: Return to LTC with skilled therapy  DME needs for discharge: Defer to facility       Therapy recommendation for EMS Transport: can transport by wheelchair    Therapy recommendations for staff:   Assist of 1 for sitting EOB    History of Present Illness:   As per Byron Faulkner MD on 1/10/2025 \"69 y.o. female with pmhx of COPD, chronic hypoxic respiratory failure with tracheostomy, anxiety, takotsubo cardiomyopathy who presented to [facility] ED with complaint of shortness of breath   Pt does use vent at night and 5 L trach collar during daytime, reports 3-4 days of progressive dyspnea with mild cough but no fevers or chills  No increased sputum. No change in bladder or bowel habits  Apparently unable to come off vent during daytime and needing vent all day and sent her for eval. Reports not been on any meds for copd flare up this time\"    Preadmission Environment:   Patient a LTC resident at [facility] where she has 24/7 assistance/supervision available. Patient reports that staff assists with all components of ADL/IADL performance, bed mobility, and functional transfer performance. Patient reports that she receives assistance with functional transfer performance and performs either a stand-pivot transfer or a ghislaine lift  reps      Rehabilitation Potential: Fair  Strengths for achieving goals include:   Pt motivated and Pt cooperative   Barriers to achieving goals include:    Weakness    Plan    To be seen 3-5 x/ week while in acute care setting for therapeutic exercises/activities, bed mobility training, functional transfer training, family/patient education, ADL/IADL retraining, and gait training.    Signature: Julianne Leija, SPT     PT treatment was performed under supervision of this therapist:  Dayron Peoples, PT, DPT       If patient discharges from this facility prior to next visit, this note will serve as the Discharge Summary.    CHF Education  N/A

## 2025-01-12 NOTE — PROGRESS NOTES
01/11/25 2301   Patient Observation   Pulse 89   Respirations 19   SpO2 (!) 89 %   Breath Sounds   Right Upper Lobe Diminished   Right Middle Lobe Diminished   Right Lower Lobe Diminished   Left Upper Lobe Diminished   Left Lower Lobe Diminished   Vent Information   Vent Mode (S)  AC/VC   Ventilator Settings   Vt (Set, mL) 400 mL   Resp Rate (Set) 16 bpm   PEEP/CPAP (cmH2O) 5   FiO2  40 %   Vent Patient Data (Readings)   Vt (Measured) 446 mL   Peak Inspiratory Pressure (cmH2O) 34 cmH2O   Rate Measured 27 br/min   Minute Volume (L/min) 11 Liters   Peak Inspiratory Flow (lpm) 70 L/sec   Mean Airway Pressure (cmH2O) 13 cmH20   I:E Ratio 1:2.1   Flow Sensitivity 3 L/min   Vent Alarm Settings   High Pressure (cmH2O) 50 cmH2O   Low Minute Volume (lpm) 3 L/min   Low Exhaled Vt (ml) 200 mL   RR High (bpm) 45 br/min   Apnea (secs) 20 secs   Additional Respiratoray Assessments   Humidification Source HME   Airway Clearance   Suction Trach   Suction Device Inline suction catheter   Sputum Method Obtained Tracheal   Sputum Amount Moderate   Sputum Color/Odor Yellow   Sputum Consistency Thick   Surgical Airway  Shiley Cuffed   No placement date or time found.   Present on Admission/Arrival: Yes  Surgical Airway Type: Tracheostomy  Brand: Shiley  Style: Cuffed  Size: 4   Status Secured   Site Assessment Clean;Dry   Site Care Cleansed;Dried;Dressing applied   Inner Cannula Care Changed/new   Ties Assessment Clean;Dry;Intact

## 2025-01-12 NOTE — PROGRESS NOTES
IM Progress Note    Admit Date:  1/10/2025  2    Interval history:  chronic resp failure on vent at night and 5 L in daytime      Subjective:  Ms. Tyler seen up in bed, did well on vent overnight ,   Currently on trach collar this am with some dyspnea  Anxiety issues overnight noted  Kept NPO   No new fevers  Na improved, wbc remains high   Mentation stable      Objective:   /70   Pulse 95   Temp 97.1 °F (36.2 °C) (Temporal)   Resp 22   Ht 1.6 m (5' 3\")   Wt 48.2 kg (106 lb 4.2 oz)   SpO2 90%   BMI 18.82 kg/m²     Intake/Output Summary (Last 24 hours) at 1/12/2025 0852  Last data filed at 1/12/2025 0506  Gross per 24 hour   Intake 1453.9 ml   Output 475 ml   Net 978.9 ml       Physical Exam:    General elderly female on trach collar this am   Anxious,  no  resp distress  Tracheostomy Appears to be not in any distress  Mucous Membranes:  Pink , anicteric  Neck: No JVD, no carotid bruit, no thyromegaly  Chest: chronic accessory muscle use +  diminished  javier air entry with no wheeze   Cardiovascular:  RRR S1S2 heard, no murmurs or gallops  Abdomen:  Soft, undistended, non tender, no organomegaly, BS present, PEG noted  Extremities: No edema or cyanosis. Distal pulses well felt  Neurological : grossly normal  Non focal with some anxiety     Medications:   Scheduled Medications:    predniSONE  30 mg Oral Daily    aspirin  81 mg Oral Daily    escitalopram  20 mg Oral Nightly    busPIRone  10 mg Oral TID    metoprolol tartrate  12.5 mg Oral BID    atorvastatin  40 mg Oral Nightly    sodium chloride flush  5-40 mL IntraVENous 2 times per day    sodium chloride flush  5-40 mL IntraVENous 2 times per day    enoxaparin  40 mg SubCUTAneous Daily    meropenem  1,000 mg IntraVENous Q8H    vancomycin  1,000 mg IntraVENous Q12H    ipratropium 0.5 mg-albuterol 2.5 mg  1 Dose Inhalation 4x Daily RT    midazolam  2 mg IntraVENous Once     I   sodium chloride 50 mL/hr at 01/12/25 0506    sodium chloride      sodium  chloride       hydrOXYzine HCl, sodium chloride flush, sodium chloride, potassium chloride **OR** potassium alternative oral replacement **OR** potassium chloride, sodium chloride flush, sodium chloride, magnesium sulfate, ondansetron **OR** ondansetron, polyethylene glycol, acetaminophen **OR** acetaminophen, ipratropium 0.5 mg-albuterol 2.5 mg    Lab Data:  Recent Labs     01/10/25  1131 01/11/25  0224 01/12/25  0502   WBC 29.2* 13.8* 25.6*   HGB 9.9* 8.4* 8.6*   HCT 30.6* 26.1* 26.0*   MCV 81.3 81.9 81.2   * 372 479*     Recent Labs     01/10/25  1131 01/11/25  0224 01/12/25  0502   * 129* 138   K 3.9 3.0* 4.6   CL 86* 94* 102   CO2 26 24 28   BUN 9 10 18   CREATININE 0.6 0.4* 0.5*     No results for input(s): \"CKTOTAL\", \"CKMB\", \"CKMBINDEX\", \"TROPONINI\" in the last 72 hours.    Coagulation:   Lab Results   Component Value Date/Time    INR 1.12 01/10/2025 11:31 AM    APTT 24.0 06/27/2024 09:57 AM     Cardiac markers:   Lab Results   Component Value Date/Time    TROPONINI <0.01 11/17/2022 03:11 PM         Lab Results   Component Value Date    ALT 12 01/11/2025    AST 24 01/11/2025    ALKPHOS 189 (H) 01/11/2025    BILITOT 0.4 01/11/2025       Lab Results   Component Value Date    INR 1.12 01/10/2025    INR 1.03 06/27/2024    INR 0.88 01/19/2024    PROTIME 14.6 01/10/2025    PROTIME 13.7 06/27/2024    PROTIME 12.0 01/19/2024       Radiology      XR CHEST PORTABLE   Final Result   1. Worsening left airspace disease concerning for pneumonia; recommend chest   radiograph in 8 weeks to confirm resolution.         IR PICC WO SQ PORT/PUMP > 5 YEARS    (Results Pending)     Cultures  Blood- NGTD  Sputum - pending    Bronch    There were tan secretions scattered throughout the airways and therapeutic aspiration was completed..  Washings were obtained throughout the airways.  A Bronchoalveolar lavage was obtained from the [facility] with good return.        Assessment & Plan:      06/26/24     ECHO (TTE) LIMITED

## 2025-01-12 NOTE — PROGRESS NOTES
Arrived to place PICC line with bedside RN Mariya. Pre-procedure and timeout done with RN, discussed limitations of placement and allergies. Consent confirmed. Vital signs stable. Labs, allergies, medications, and code status reviewed. No contraindications noted.     Procedure explained to pt, including the risk and benefits of the procedure. All questions answered. Pt verbalizes understanding of the procedure and states no more questions.     Pt's basilic, brachial, cephalic are all easily collapsible with no indication for a clot. Vein to catheter ration greater than or equal to 45%. Image of vessel shown below. Pt tolerated sterile procedure well, with no difficulty accessing brachial vein, when accessed - blood was free flowing and non-pulsatile. Guidewire, introducer, and catheter went in smoothly.      PICC line verified with ECG. PICC okay to use.    Please replace all existing IV tubing with new IV tubing prior to using the PICC for current IV infusions.  Please remove any PIVs from PICC arm.  All of the above may be sources of infection or an increase chance of a clot.      Post procedure - reorganized pt table, placed pt in lowest position, with call light and educated on line care. Reported off to bedside RN.      If you have any questions please call/message the IV therapy department.    [phone]  Deflection in first picture and at 0 emily for second capture

## 2025-01-12 NOTE — PLAN OF CARE
Problem: Chronic Conditions and Co-morbidities  Goal: Patient's chronic conditions and co-morbidity symptoms are monitored and maintained or improved  1/12/2025 0014 by Mariya Duque RN  Outcome: Progressing     Problem: Discharge Planning  Goal: Discharge to home or other facility with appropriate resources  1/12/2025 0014 by Mariya Duque RN  Outcome: Progressing     Problem: Skin/Tissue Integrity  Goal: Absence of new skin breakdown  Description: 1.  Monitor for areas of redness and/or skin breakdown  2.  Assess vascular access sites hourly  3.  Every 4-6 hours minimum:  Change oxygen saturation probe site  4.  Every 4-6 hours:  If on nasal continuous positive airway pressure, respiratory therapy assess nares and determine need for appliance change or resting period.  1/12/2025 0014 by Mariya Duque RN  Outcome: Progressing     Problem: ABCDS Injury Assessment  Goal: Absence of physical injury  1/12/2025 0014 by Mariya Duque RN  Outcome: Progressing     Problem: Safety - Adult  Goal: Free from fall injury  1/12/2025 0014 by Mariya Duque RN  Outcome: Progressing

## 2025-01-12 NOTE — PROGRESS NOTES
01/11/25 1922   Patient Observation   Pulse 97   Respirations 25   SpO2 95 %   Breath Sounds   Right Upper Lobe Diminished   Right Middle Lobe Diminished   Right Lower Lobe Diminished   Left Upper Lobe Diminished   Left Lower Lobe Diminished   Vent Information   Vent Mode CPAP/PS   Ventilator Settings   PEEP/CPAP (cmH2O) 5   FiO2  40 %   Pressure Support (cm H2O) 5 cm H2O   Vent Patient Data (Readings)   Vt (Measured) 511 mL   Peak Inspiratory Pressure (cmH2O) 11 cmH2O   Rate Measured 24 br/min   Minute Volume (L/min) 8.8 Liters   Mean Airway Pressure (cmH2O) 7 cmH20   I:E Ratio 1:2.4   Flow Sensitivity 3 L/min   Vent Alarm Settings   High Pressure (cmH2O) 50 cmH2O   Low Minute Volume (lpm) 3 L/min   Low Exhaled Vt (ml) 200 mL   RR High (bpm) 45 br/min   Apnea (secs) 20 secs   Additional Respiratoray Assessments   Humidification Source HME   Airway Clearance   Suction Trach   Suction Device Inline suction catheter   Sputum Method Obtained Tracheal   Sputum Amount Moderate   Sputum Color/Odor Yellow   Sputum Consistency Thick   Surgical Airway  Shiley Cuffed   No placement date or time found.   Present on Admission/Arrival: Yes  Surgical Airway Type: Tracheostomy  Brand: Shiley  Style: Cuffed  Size: 4   Status Secured   Site Assessment Clean;Dry   Ties Assessment Clean;Dry;Intact   Treatment   $Treatment Type $Inhaled Therapy/Meds

## 2025-01-12 NOTE — PLAN OF CARE
Problem: Chronic Conditions and Co-morbidities  Goal: Patient's chronic conditions and co-morbidity symptoms are monitored and maintained or improved  1/12/2025 1413 by Flavio Garcia RN  Outcome: Progressing  1/12/2025 0014 by Mariya Duque RN  Outcome: Progressing     Problem: Safety - Adult  Goal: Free from fall injury  1/12/2025 1413 by Flavio Garcia RN  Outcome: Progressing  1/12/2025 0014 by Mariya Duque RN  Outcome: Progressing

## 2025-01-13 PROBLEM — A41.9 SEPTICEMIA (HCC): Status: ACTIVE | Noted: 2025-01-13

## 2025-01-13 PROBLEM — E44.0 MODERATE PROTEIN-CALORIE MALNUTRITION (HCC): Chronic | Status: ACTIVE | Noted: 2024-06-25

## 2025-01-13 PROBLEM — J44.9 END STAGE COPD (HCC): Status: ACTIVE | Noted: 2025-01-13

## 2025-01-13 LAB
ACANTHOCYTES BLD QL SMEAR: ABNORMAL
ANION GAP SERPL CALCULATED.3IONS-SCNC: 10 MMOL/L (ref 3–16)
ANISOCYTOSIS BLD QL SMEAR: ABNORMAL
BASOPHILS # BLD: 0 K/UL (ref 0–0.2)
BASOPHILS NFR BLD: 0 %
BUN SERPL-MCNC: 17 MG/DL (ref 7–20)
BURR CELLS BLD QL SMEAR: ABNORMAL
CALCIUM SERPL-MCNC: 8.9 MG/DL (ref 8.3–10.6)
CHLORIDE SERPL-SCNC: 102 MMOL/L (ref 99–110)
CO2 SERPL-SCNC: 25 MMOL/L (ref 21–32)
CREAT SERPL-MCNC: 0.5 MG/DL (ref 0.6–1.2)
DACRYOCYTES BLD QL SMEAR: ABNORMAL
DEPRECATED RDW RBC AUTO: 15.5 % (ref 12.4–15.4)
EOSINOPHIL # BLD: 0 K/UL (ref 0–0.6)
EOSINOPHIL NFR BLD: 0 %
GFR SERPLBLD CREATININE-BSD FMLA CKD-EPI: >90 ML/MIN/{1.73_M2}
GLUCOSE BLD-MCNC: 109 MG/DL (ref 70–99)
GLUCOSE BLD-MCNC: 112 MG/DL (ref 70–99)
GLUCOSE BLD-MCNC: 117 MG/DL (ref 70–99)
GLUCOSE BLD-MCNC: 123 MG/DL (ref 70–99)
GLUCOSE SERPL-MCNC: 95 MG/DL (ref 70–99)
HCT VFR BLD AUTO: 27 % (ref 36–48)
HGB BLD-MCNC: 8.6 G/DL (ref 12–16)
LYMPHOCYTES # BLD: 0.7 K/UL (ref 1–5.1)
LYMPHOCYTES NFR BLD: 3 %
MCH RBC QN AUTO: 26.5 PG (ref 26–34)
MCHC RBC AUTO-ENTMCNC: 31.8 G/DL (ref 31–36)
MCV RBC AUTO: 83.4 FL (ref 80–100)
MONOCYTES # BLD: 1 K/UL (ref 0–1.3)
MONOCYTES NFR BLD: 4 %
NEUTROPHILS # BLD: 22.2 K/UL (ref 1.7–7.7)
NEUTROPHILS NFR BLD: 93 %
NRBC BLD-RTO: 2 /100 WBC
OVALOCYTES BLD QL SMEAR: ABNORMAL
PATH INTERP BLD-IMP: NO
PERFORMED ON: ABNORMAL
PLATELET # BLD AUTO: 460 K/UL (ref 135–450)
PLATELET BLD QL SMEAR: ABNORMAL
PMV BLD AUTO: 7.5 FL (ref 5–10.5)
POIKILOCYTOSIS BLD QL SMEAR: ABNORMAL
POLYCHROMASIA BLD QL SMEAR: ABNORMAL
POTASSIUM SERPL-SCNC: 4.4 MMOL/L (ref 3.5–5.1)
PROCALCITONIN SERPL IA-MCNC: 0.52 NG/ML (ref 0–0.15)
RBC # BLD AUTO: 3.23 M/UL (ref 4–5.2)
SCHISTOCYTES BLD QL SMEAR: ABNORMAL
SLIDE REVIEW: ABNORMAL
SODIUM SERPL-SCNC: 137 MMOL/L (ref 136–145)
WBC # BLD AUTO: 23.9 K/UL (ref 4–11)

## 2025-01-13 PROCEDURE — 94003 VENT MGMT INPAT SUBQ DAY: CPT

## 2025-01-13 PROCEDURE — 84145 PROCALCITONIN (PCT): CPT

## 2025-01-13 PROCEDURE — 94761 N-INVAS EAR/PLS OXIMETRY MLT: CPT

## 2025-01-13 PROCEDURE — 99233 SBSQ HOSP IP/OBS HIGH 50: CPT | Performed by: INTERNAL MEDICINE

## 2025-01-13 PROCEDURE — 94640 AIRWAY INHALATION TREATMENT: CPT

## 2025-01-13 PROCEDURE — 6370000000 HC RX 637 (ALT 250 FOR IP): Performed by: INTERNAL MEDICINE

## 2025-01-13 PROCEDURE — 6360000002 HC RX W HCPCS: Performed by: INTERNAL MEDICINE

## 2025-01-13 PROCEDURE — 36415 COLL VENOUS BLD VENIPUNCTURE: CPT

## 2025-01-13 PROCEDURE — 2000000000 HC ICU R&B

## 2025-01-13 PROCEDURE — 6360000002 HC RX W HCPCS

## 2025-01-13 PROCEDURE — 2700000000 HC OXYGEN THERAPY PER DAY

## 2025-01-13 PROCEDURE — 85025 COMPLETE CBC W/AUTO DIFF WBC: CPT

## 2025-01-13 PROCEDURE — 80048 BASIC METABOLIC PNL TOTAL CA: CPT

## 2025-01-13 PROCEDURE — 2580000003 HC RX 258: Performed by: INTERNAL MEDICINE

## 2025-01-13 RX ORDER — MUPIROCIN 20 MG/G
OINTMENT TOPICAL 2 TIMES DAILY
Status: DISCONTINUED | OUTPATIENT
Start: 2025-01-13 | End: 2025-01-17 | Stop reason: HOSPADM

## 2025-01-13 RX ORDER — ENOXAPARIN SODIUM 100 MG/ML
30 INJECTION SUBCUTANEOUS DAILY
Status: DISCONTINUED | OUTPATIENT
Start: 2025-01-13 | End: 2025-01-16

## 2025-01-13 RX ADMIN — PREDNISONE 30 MG: 20 TABLET ORAL at 08:47

## 2025-01-13 RX ADMIN — MUPIROCIN: 20 OINTMENT TOPICAL at 20:09

## 2025-01-13 RX ADMIN — MEROPENEM AND SODIUM CHLORIDE 1000 MG: 1 INJECTION, SOLUTION INTRAVENOUS at 04:06

## 2025-01-13 RX ADMIN — BUSPIRONE HYDROCHLORIDE 10 MG: 10 TABLET ORAL at 08:47

## 2025-01-13 RX ADMIN — IPRATROPIUM BROMIDE AND ALBUTEROL SULFATE 1 DOSE: 2.5; .5 SOLUTION RESPIRATORY (INHALATION) at 07:29

## 2025-01-13 RX ADMIN — MUPIROCIN: 20 OINTMENT TOPICAL at 10:50

## 2025-01-13 RX ADMIN — IPRATROPIUM BROMIDE AND ALBUTEROL SULFATE 1 DOSE: 2.5; .5 SOLUTION RESPIRATORY (INHALATION) at 11:57

## 2025-01-13 RX ADMIN — ENOXAPARIN SODIUM 30 MG: 100 INJECTION SUBCUTANEOUS at 10:50

## 2025-01-13 RX ADMIN — TRAMADOL HYDROCHLORIDE 50 MG: 50 TABLET ORAL at 14:33

## 2025-01-13 RX ADMIN — MEROPENEM AND SODIUM CHLORIDE 1000 MG: 1 INJECTION, SOLUTION INTRAVENOUS at 14:32

## 2025-01-13 RX ADMIN — ATORVASTATIN CALCIUM 40 MG: 40 TABLET, FILM COATED ORAL at 20:07

## 2025-01-13 RX ADMIN — BUSPIRONE HYDROCHLORIDE 10 MG: 10 TABLET ORAL at 20:06

## 2025-01-13 RX ADMIN — ESCITALOPRAM OXALATE 20 MG: 10 TABLET ORAL at 20:06

## 2025-01-13 RX ADMIN — TRAMADOL HYDROCHLORIDE 50 MG: 50 TABLET ORAL at 20:06

## 2025-01-13 RX ADMIN — METOPROLOL TARTRATE 12.5 MG: 25 TABLET, FILM COATED ORAL at 20:07

## 2025-01-13 RX ADMIN — MEROPENEM AND SODIUM CHLORIDE 1000 MG: 1 INJECTION, SOLUTION INTRAVENOUS at 20:06

## 2025-01-13 RX ADMIN — SODIUM CHLORIDE: 9 INJECTION, SOLUTION INTRAVENOUS at 08:47

## 2025-01-13 RX ADMIN — IPRATROPIUM BROMIDE AND ALBUTEROL SULFATE 1 DOSE: 2.5; .5 SOLUTION RESPIRATORY (INHALATION) at 19:56

## 2025-01-13 RX ADMIN — ASPIRIN 81 MG: 81 TABLET, CHEWABLE ORAL at 08:47

## 2025-01-13 RX ADMIN — HYDROXYZINE HYDROCHLORIDE 10 MG: 10 TABLET ORAL at 20:07

## 2025-01-13 RX ADMIN — METOPROLOL TARTRATE 12.5 MG: 25 TABLET, FILM COATED ORAL at 08:47

## 2025-01-13 RX ADMIN — BUSPIRONE HYDROCHLORIDE 10 MG: 10 TABLET ORAL at 14:33

## 2025-01-13 RX ADMIN — HYDROXYZINE HYDROCHLORIDE 10 MG: 10 TABLET ORAL at 14:33

## 2025-01-13 RX ADMIN — IPRATROPIUM BROMIDE AND ALBUTEROL SULFATE 1 DOSE: 2.5; .5 SOLUTION RESPIRATORY (INHALATION) at 15:29

## 2025-01-13 ASSESSMENT — PAIN DESCRIPTION - ORIENTATION
ORIENTATION: RIGHT;LEFT
ORIENTATION: RIGHT;LEFT;LOWER;MID
ORIENTATION: RIGHT;LEFT;MID;LOWER

## 2025-01-13 ASSESSMENT — PAIN SCALES - GENERAL
PAINLEVEL_OUTOF10: 0
PAINLEVEL_OUTOF10: 0
PAINLEVEL_OUTOF10: 8
PAINLEVEL_OUTOF10: 10
PAINLEVEL_OUTOF10: 8
PAINLEVEL_OUTOF10: 0

## 2025-01-13 ASSESSMENT — PAIN DESCRIPTION - DESCRIPTORS
DESCRIPTORS: ACHING

## 2025-01-13 ASSESSMENT — PULMONARY FUNCTION TESTS
PIF_VALUE: 24
PIF_VALUE: 23
PIF_VALUE: 22
PIF_VALUE: 27
PIF_VALUE: 25

## 2025-01-13 ASSESSMENT — PAIN DESCRIPTION - FREQUENCY: FREQUENCY: CONTINUOUS

## 2025-01-13 ASSESSMENT — PAIN DESCRIPTION - ONSET: ONSET: ON-GOING

## 2025-01-13 ASSESSMENT — PAIN SCALES - WONG BAKER
WONGBAKER_NUMERICALRESPONSE: NO HURT

## 2025-01-13 ASSESSMENT — PAIN - FUNCTIONAL ASSESSMENT: PAIN_FUNCTIONAL_ASSESSMENT: PREVENTS OR INTERFERES SOME ACTIVE ACTIVITIES AND ADLS

## 2025-01-13 ASSESSMENT — PAIN DESCRIPTION - LOCATION
LOCATION: ABDOMEN
LOCATION: BACK
LOCATION: BACK

## 2025-01-13 ASSESSMENT — PAIN DESCRIPTION - PAIN TYPE: TYPE: ACUTE PAIN;CHRONIC PAIN

## 2025-01-13 NOTE — PLAN OF CARE
Problem: Chronic Conditions and Co-morbidities  Goal: Patient's chronic conditions and co-morbidity symptoms are monitored and maintained or improved  Outcome: Progressing  Flowsheets (Taken 1/12/2025 2025)  Care Plan - Patient's Chronic Conditions and Co-Morbidity Symptoms are Monitored and Maintained or Improved: Monitor and assess patient's chronic conditions and comorbid symptoms for stability, deterioration, or improvement     Problem: Skin/Tissue Integrity  Goal: Absence of new skin breakdown  Description: 1.  Monitor for areas of redness and/or skin breakdown  2.  Assess vascular access sites hourly  3.  Every 4-6 hours minimum:  Change oxygen saturation probe site  4.  Every 4-6 hours:  If on nasal continuous positive airway pressure, respiratory therapy assess nares and determine need for appliance change or resting period.  Outcome: Progressing     Problem: ABCDS Injury Assessment  Goal: Absence of physical injury  Outcome: Progressing     Problem: Safety - Adult  Goal: Free from fall injury  Outcome: Progressing

## 2025-01-13 NOTE — PROGRESS NOTES
01/12/25 2319   Patient Observation   Pulse (!) 108   Respirations 21   SpO2 93 %   Breath Sounds   Right Upper Lobe Diminished   Right Middle Lobe Diminished   Right Lower Lobe Diminished   Left Upper Lobe Diminished   Left Lower Lobe Diminished   Vent Information   Vent Mode AC/VC   Ventilator Settings   Vt (Set, mL) 400 mL   Resp Rate (Set) 16 bpm   PEEP/CPAP (cmH2O) 5   FiO2  40 %   Vent Patient Data (Readings)   Vt (Measured) 446 mL   Peak Inspiratory Pressure (cmH2O) 24 cmH2O   Rate Measured 28 br/min   Minute Volume (L/min) 12 Liters   Peak Inspiratory Flow (lpm) 70 L/sec   Mean Airway Pressure (cmH2O) 11 cmH20   I:E Ratio 1;2.5   Flow Sensitivity 3 L/min   Vent Alarm Settings   High Pressure (cmH2O) 50 cmH2O   Low Minute Volume (lpm) 3 L/min   Low Exhaled Vt (ml) 200 mL   RR High (bpm) 45 br/min   Apnea (secs) 20 secs   Additional Respiratoray Assessments   Humidification Source HME   Airway Clearance   Suction Trach   Suction Device Inline suction catheter   Sputum Method Obtained Tracheal   Sputum Amount Moderate   Sputum Color/Odor Yellow;White   Sputum Consistency Thick   Surgical Airway  Shiley Cuffed   No placement date or time found.   Present on Admission/Arrival: Yes  Surgical Airway Type: Tracheostomy  Brand: Shiley  Style: Cuffed  Size: 4   Status Secured   Site Assessment Clean;Dry   Site Care Cleansed;Dried;Dressing applied   Inner Cannula Care Changed/new   Ties Assessment Clean;Dry;Intact

## 2025-01-13 NOTE — PROGRESS NOTES
Pulmonary Progress Note  CC: SOB and pneumonia    Subjective:   TC during day   AC at night       Intake/Output Summary (Last 24 hours) at 1/13/2025 0952  Last data filed at 1/13/2025 0609  Gross per 24 hour   Intake 1818.63 ml   Output 100 ml   Net 1718.63 ml           EXAM: BP (!) 136/98   Pulse 86   Temp 98.6 °F (37 °C) (Oral)   Resp 17   Ht 1.6 m (5' 3\")   Wt 49.1 kg (108 lb 3.9 oz)   SpO2 95%   BMI 19.17 kg/m²  on vent  Constitutional:  no acute distress.   Eyes: PERRL. Conjunctivae anicteric.   ENT: Normal nose. Normal tongue.    Neck:  Trach in place and is c/d/i  Respiratory: No accessory muscle usage.  decreased breath sounds. No wheezes. No rales. Few Rhonchi.  Cardiovascular: Normal S1S2. No digit clubbing. No digit cyanosis. No LE edema.   Psychiatric: No anxiety or Agitation. Alert and Oriented to person, place and time.    Scheduled Meds:   predniSONE  30 mg Oral Daily    aspirin  81 mg Oral Daily    escitalopram  20 mg Oral Nightly    busPIRone  10 mg Oral TID    metoprolol tartrate  12.5 mg Oral BID    atorvastatin  40 mg Oral Nightly    sodium chloride flush  5-40 mL IntraVENous 2 times per day    sodium chloride flush  5-40 mL IntraVENous 2 times per day    enoxaparin  40 mg SubCUTAneous Daily    meropenem  1,000 mg IntraVENous Q8H    vancomycin  1,000 mg IntraVENous Q12H    ipratropium 0.5 mg-albuterol 2.5 mg  1 Dose Inhalation 4x Daily RT    midazolam  2 mg IntraVENous Once     Continuous Infusions:   sodium chloride 50 mL/hr at 01/13/25 0609    sodium chloride      sodium chloride       PRN Meds:  traMADol, hydrOXYzine HCl, sodium chloride flush, sodium chloride, potassium chloride **OR** potassium alternative oral replacement **OR** potassium chloride, sodium chloride flush, sodium chloride, magnesium sulfate, ondansetron **OR** ondansetron, polyethylene glycol, acetaminophen **OR** acetaminophen, ipratropium 0.5 mg-albuterol 2.5 mg    Labs:  CBC:   Recent Labs     01/11/25  1513

## 2025-01-13 NOTE — CARE COORDINATION
Case Management Assessment  Initial Evaluation    Date/Time of Evaluation: 1/13/2025 8:59 AM  Assessment Completed by: Teresa Boeck, RN    If patient is discharged prior to next notation, then this note serves as note for discharge by case management.    Patient Name: Annie Tyler                   YOB: 1955  Diagnosis: Respiratory failure [J96.90]  Acute on chronic respiratory failure [J96.20]                   Date / Time: 1/10/2025 11:16 AM    Patient Admission Status: Inpatient   Readmission Risk (Low < 19, Mod (19-27), High > 27): Readmission Risk Score: 30.7    Current PCP: Bruce Nichols MD  PCP verified by CM? Yes    Chart Reviewed: Yes      History Provided by: Other (see comment) (spoke with daughter for assessment questions)  Patient Orientation: Other (see comment) (spoke with daughter for assessment questions)    Patient Cognition: Other (see comment) (spoke with daughter for assessment questions)    Hospitalization in the last 30 days (Readmission):  No    If yes, Readmission Assessment in  Navigator will be completed.    Advance Directives:      Code Status: Full Code   Patient's Primary Decision Maker is:      Primary Decision Maker: [facility] Child - [phone]    Secondary Decision Maker: Nilda Tyler [address] [place] [address] Child - [phone]    Discharge Planning:    Patient lives with: Other (Comment) (long term care) Type of Home: Long-Term Care  Primary Care Giver: Self  Patient Support Systems include: Children   Current Financial resources: Medicaid, Medicare  Current community resources: None  Current services prior to admission: Durable Medical Equipment, Long Term Acute Care            Current DME: Oxygen Therapy (Comment)            Type of Home Care services:  None    ADLS  Prior functional level: Assistance with the following:, Bathing, Dressing, Toileting, Feeding, Cooking, Housework, Shopping, Mobility  Current functional level: Assistance with the

## 2025-01-13 NOTE — PROGRESS NOTES
01/13/25 0304   Patient Observation   Pulse 93   Respirations 24   SpO2 95 %   Breath Sounds   Right Upper Lobe Diminished   Right Middle Lobe Diminished   Right Lower Lobe Diminished   Left Upper Lobe Diminished   Left Lower Lobe Diminished   Vent Information   Vent Mode AC/VC   $Ventilation $Subsequent Day   Ventilator Settings   Vt (Set, mL) 400 mL   Resp Rate (Set) 16 bpm   PEEP/CPAP (cmH2O) 5   FiO2  40 %   Vent Patient Data (Readings)   Vt (Measured) 465 mL   Peak Inspiratory Pressure (cmH2O) 22 cmH2O   Rate Measured 31 br/min   Minute Volume (L/min) 12 Liters   Peak Inspiratory Flow (lpm) 70 L/sec   Mean Airway Pressure (cmH2O) 11 cmH20   I:E Ratio 1:2.7   Flow Sensitivity 3 L/min   Vent Alarm Settings   High Pressure (cmH2O) 50 cmH2O   Low Minute Volume (lpm) 3 L/min   Low Exhaled Vt (ml) 200 mL   RR High (bpm) 45 br/min   Apnea (secs) 20 secs   Additional Respiratoray Assessments   Humidification Source HME   Airway Clearance   Suction Trach   Suction Device Inline suction catheter   Sputum Method Obtained Tracheal   Sputum Amount Moderate   Sputum Color/Odor Yellow;White   Sputum Consistency Thick   Surgical Airway  Shiley Cuffed   No placement date or time found.   Present on Admission/Arrival: Yes  Surgical Airway Type: Tracheostomy  Brand: Shiley  Style: Cuffed  Size: 4   Status Secured   Site Assessment Clean;Dry   Ties Assessment Clean;Dry;Intact

## 2025-01-13 NOTE — PROGRESS NOTES
01/13/25 0730   Patient Observation   Pulse 98   Respirations 24   SpO2 93 %   Patient Observations Shiley 4   Breath Sounds   Breath Sounds Bilateral Diminished   Vent Information   Equipment Changed HME   Vent Mode AC/VC   Ventilator Settings   Vt (Set, mL) 400 mL   Resp Rate (Set) 16 bpm   PEEP/CPAP (cmH2O) 5   FiO2  40 %   Vent Patient Data (Readings)   Vt Mandatory Exp (mL) 400 mL   Vt (Measured) 452 mL   Peak Inspiratory Pressure (cmH2O) 24 cmH2O   Rate Measured 24 br/min   Minute Volume (L/min) 11.5 Liters   Peak Inspiratory Flow (lpm) 70 L/sec   Mean Airway Pressure (cmH2O) 8.4 cmH20   I:E Ratio 1:3.8   Flow Sensitivity 3 L/min   Backup Apnea On   Backup Rate 16 Breaths Per Minute   Backup Vt 400   Vent Alarm Settings   High Pressure (cmH2O) 50 cmH2O   Low Minute Volume (lpm) 3 L/min   High Minute Volume (lpm) 28 L/min   Low Exhaled Vt (ml) 200 mL   High Exhaled Vt (ml) 1000 mL   RR High (bpm) 45 br/min   Apnea (secs) 20 secs   Additional Respiratoray Assessments   Humidification Source HME   Ambu Bag With Mask At Bedside Yes   Backup Trachs Available (Size) 4.0   Surgical Airway  Shiley Cuffed   No placement date or time found.   Present on Admission/Arrival: Yes  Surgical Airway Type: Tracheostomy  Brand: Sascha  Style: Cuffed  Size: 4   Status Secured   Site Assessment Clean;Dry   Site Care Cleansed;Dried;Dressing applied   Inner Cannula Care Changed/new   Ties Assessment Clean;Dry;Intact   Spare Trach at Bedside Yes   Spare Trach Tube One Size Smaller at Bedside Yes   Ambu Bag With Mask at Bedside Yes   Ventilator Associated Pneumonia Bundle   Oral Care Performed Mouth swabbed   Treatment   $Treatment Type $Inhaled Therapy/Meds   Respiratory   Respiratory Interventions H.O.B. elevated

## 2025-01-13 NOTE — PROGRESS NOTES
HEART FAILURE CARE PLAN:    Comorbidities Reviewed: Yes   Patient has a past medical history of Angina pectoris (HCC), Chronic pain, Congestive heart failure (HCC), COPD exacerbation (HCC), Depression, Essential hypertension, Panic disorder, Pneumonia, and Pulmonary emphysema (HCC).     Weights Reviewed: Yes   Admission weight: 47 kg (103 lb 9.9 oz)   Wt Readings from Last 3 Encounters:   01/13/25 49.1 kg (108 lb 3.9 oz)   11/30/24 61.1 kg (134 lb 11.2 oz)   08/20/24 65.2 kg (143 lb 11.8 oz)     Intake & Output Reviewed: Yes     Intake/Output Summary (Last 24 hours) at 1/13/2025 0727  Last data filed at 1/13/2025 0609  Gross per 24 hour   Intake 1818.63 ml   Output 100 ml   Net 1718.63 ml       ECHOCARDIOGRAM Reviewed: Yes   Patient's Ejection Fraction (EF) is greater than 40%     Medications Reviewed: Yes   SCHEDULED HOSPITAL MEDICATIONS:   predniSONE  30 mg Oral Daily    aspirin  81 mg Oral Daily    escitalopram  20 mg Oral Nightly    busPIRone  10 mg Oral TID    metoprolol tartrate  12.5 mg Oral BID    atorvastatin  40 mg Oral Nightly    sodium chloride flush  5-40 mL IntraVENous 2 times per day    sodium chloride flush  5-40 mL IntraVENous 2 times per day    enoxaparin  40 mg SubCUTAneous Daily    meropenem  1,000 mg IntraVENous Q8H    vancomycin  1,000 mg IntraVENous Q12H    ipratropium 0.5 mg-albuterol 2.5 mg  1 Dose Inhalation 4x Daily RT    midazolam  2 mg IntraVENous Once     HOME MEDICATIONS:  Prior to Admission medications    Medication Sig Start Date End Date Taking? Authorizing Provider   traMADol (ULTRAM) 50 MG tablet Take 1 tablet by mouth every 8 hours as needed for Pain.   Yes ProviderJoey MD   aspirin 81 MG chewable tablet Take 1 tablet by mouth daily   Yes Joey Haney MD   metoprolol succinate (TOPROL XL) 25 MG extended release tablet Take 1 tablet by mouth daily 1/3/25  Yes Bruce Cohn DO   melatonin 3 MG TABS tablet Take 1 tablet by mouth nightly as needed (insomnia)

## 2025-01-13 NOTE — PLAN OF CARE
Problem: Chronic Conditions and Co-morbidities  Goal: Patient's chronic conditions and co-morbidity symptoms are monitored and maintained or improved  1/13/2025 1650 by Flavio Garcia RN  Outcome: Progressing  1/13/2025 0726 by Nirmala Mesa RN  Outcome: Progressing  Flowsheets (Taken 1/12/2025 2025)  Care Plan - Patient's Chronic Conditions and Co-Morbidity Symptoms are Monitored and Maintained or Improved: Monitor and assess patient's chronic conditions and comorbid symptoms for stability, deterioration, or improvement     Problem: Discharge Planning  Goal: Discharge to home or other facility with appropriate resources  1/13/2025 1650 by Flaivo Garcia RN  Outcome: Progressing  1/13/2025 0726 by Nirmala Mesa RN  Outcome: Progressing

## 2025-01-13 NOTE — PROGRESS NOTES
Shift assessment completed as documented on flowsheet. Pt awake and alert. Denies pain or discomfort at this time. NSR without ectopy. Pt currently on mechanical ventilation. Call light within reach

## 2025-01-13 NOTE — PROGRESS NOTES
Comprehensive Nutrition Assessment    Type and Reason for Visit:  Initial, Positive nutrition screen (+ screen for MST = 2 and home EN)    Nutrition Recommendations/Plan:   Continue NPO status until patient is medically cleared + cleared by SLP for diet advancement with appropriate diet consistencies.   Monitor SLP evaluation and notes.   Monitor nutrition-related labs, bowel function, and weight trends.      Malnutrition Assessment:  Malnutrition Status:  Moderate malnutrition (01/13/25 1227)    Context:  Chronic Illness     Findings of the 6 clinical characteristics of malnutrition:  Energy Intake:  Mild decrease in energy intake (per patient)  Weight Loss:  Greater than 20% over 1 year (-59# or 35.5% weight loss since 1/19/24)     Body Fat Loss:  Mild body fat loss Orbital   Muscle Mass Loss:  Mild muscle mass loss Temples (temporalis), Clavicles (pectoralis & deltoids), Calf (gastrocnemius), Hand (interosseous)  Fluid Accumulation:  No fluid accumulation     Strength:  Not Performed    Nutrition Assessment:    patient is malnourished r/t inadequate protein-energy intake, unintended weight loss, and impaired respiratory function AEB NPO status, weight loss PTA, and chronic trach + worsening SOB PTA; patient is at risk for further compromise d/t altered nutrition-related labs, increased nutrient/energy demands + catabolic illness, and need for SLP evaluation prior to diet advancement; will continue NPO status and monitor nutrition status    Nutrition Related Findings:    patient is A & O x 4; patient presented with worsening SOB; she is from [place]; patient is a chronic trach patient; patient reported that she had a decreased appetite x a few days PTA r/t not feeling well; patient reported that she consumes regular solid po consistency and thin liquids at [facility]; patient is receiving NS at 50 ml/hr; + BM on 1/13/25 Wound Type: None       Current Nutrition Intake & Therapies:    Average Meal  Intake  Physical Signs/Symptoms Outcomes: Biochemical Data, Chewing or Swallowing, Hemodynamic Status, Nutrition Focused Physical Findings, Skin, Weight    Discharge Planning:    Too soon to determine     Yuni Marcus RD, LD  Contact: [phone]

## 2025-01-13 NOTE — PROGRESS NOTES
IM Progress Note    Admit Date:  1/10/2025  3    Interval history:  chronic resp failure on vent at night and 5 L in daytime      Subjective:  Ms. Tyler seen up in bed, did well on vent overnight ,   Currently on trach collar this am with some dyspnea  Anxiety issues overnight noted  Kept NPO   No new fevers  Na improved, Wbc remains coming down.  Mentation stable      Objective:   BP (!) 147/77   Pulse 92   Temp 97.1 °F (36.2 °C) (Temporal)   Resp 24   Ht 1.6 m (5' 2.99\")   Wt 49.1 kg (108 lb 3.9 oz)   SpO2 90%   BMI 19.18 kg/m²     Intake/Output Summary (Last 24 hours) at 1/13/2025 1303  Last data filed at 1/13/2025 0609  Gross per 24 hour   Intake 1818.63 ml   Output 100 ml   Net 1718.63 ml       Physical Exam:    General elderly female on trach collar this am   Anxious,  no  resp distress  Tracheostomy Appears to be not in any distress  Mucous Membranes:  Pink , anicteric  Neck: No JVD, no carotid bruit, no thyromegaly  Chest: chronic accessory muscle use +  diminished  javire air entry with no wheeze   Cardiovascular:  RRR S1S2 heard, no murmurs or gallops  Abdomen:  Soft, undistended, non tender, no organomegaly, BS present, PEG noted  Extremities: No edema or cyanosis. Distal pulses well felt  Neurological : grossly normal  Non focal with some anxiety     Medications:   Scheduled Medications:    enoxaparin  30 mg SubCUTAneous Daily    mupirocin   Each Nostril BID    predniSONE  30 mg Oral Daily    aspirin  81 mg Oral Daily    escitalopram  20 mg Oral Nightly    busPIRone  10 mg Oral TID    metoprolol tartrate  12.5 mg Oral BID    atorvastatin  40 mg Oral Nightly    sodium chloride flush  5-40 mL IntraVENous 2 times per day    sodium chloride flush  5-40 mL IntraVENous 2 times per day    meropenem  1,000 mg IntraVENous Q8H    ipratropium 0.5 mg-albuterol 2.5 mg  1 Dose Inhalation 4x Daily RT    midazolam  2 mg IntraVENous Once     I   sodium chloride 50 mL/hr at 01/13/25 0847    sodium chloride

## 2025-01-13 NOTE — FLOWSHEET NOTE
01/12/25 2000   Vital Signs   Temp 98.7 °F (37.1 °C)   Temp Source Oral   Pulse (!) 102   Heart Rate Source Monitor   Respirations 17   BP (!) 188/162   MAP (Calculated) 171   MAP (mmHg) 170   BP Location Left upper arm   BP Method Automatic   Patient Position Semi fowlers   Pain Assessment   Pain Assessment 0-10   Pain Level 10   Patient's Stated Pain Goal 2   Pain Location Back   Pain Orientation Right;Left   Pain Descriptors Aching   Functional Pain Assessment Prevents or interferes some active activities and ADLs   Pain Type Acute pain   Opioid-Induced Sedation   POSS Score 1   Oxygen Therapy   SpO2 97 %   Pulse Oximetry Type Continuous   Pulse Oximeter Device Mode Continuous   Pulse Oximeter Device Location Finger   O2 Device Ventilator   FiO2  40 %   PEEP/CPAP (cm H2O) 5 cm H20     Shift assessment complete. See doc flow. Nightly medications given via G-Tube, see MAR. Patient with no complaints at this time. Call light and bedside table within easy reach.

## 2025-01-13 NOTE — PROGRESS NOTES
HEART FAILURE CARE PLAN:    Comorbidities Reviewed: Yes   Patient has a past medical history of Angina pectoris (HCC), Chronic pain, Congestive heart failure (HCC), COPD exacerbation (HCC), Depression, Essential hypertension, Panic disorder, Pneumonia, and Pulmonary emphysema (HCC).     Weights Reviewed: Yes   Admission weight: 47 kg (103 lb 9.9 oz)   Wt Readings from Last 3 Encounters:   01/13/25 49.1 kg (108 lb 3.9 oz)   11/30/24 61.1 kg (134 lb 11.2 oz)   08/20/24 65.2 kg (143 lb 11.8 oz)     Intake & Output Reviewed: Yes     Intake/Output Summary (Last 24 hours) at 1/13/2025 1651  Last data filed at 1/13/2025 0609  Gross per 24 hour   Intake 1818.63 ml   Output 100 ml   Net 1718.63 ml       ECHOCARDIOGRAM Reviewed: Yes   Patient's Ejection Fraction (EF) is greater than 40%     Medications Reviewed: Yes   SCHEDULED HOSPITAL MEDICATIONS:   enoxaparin  30 mg SubCUTAneous Daily    mupirocin   Each Nostril BID    predniSONE  30 mg Oral Daily    aspirin  81 mg Oral Daily    escitalopram  20 mg Oral Nightly    busPIRone  10 mg Oral TID    metoprolol tartrate  12.5 mg Oral BID    atorvastatin  40 mg Oral Nightly    sodium chloride flush  5-40 mL IntraVENous 2 times per day    sodium chloride flush  5-40 mL IntraVENous 2 times per day    meropenem  1,000 mg IntraVENous Q8H    ipratropium 0.5 mg-albuterol 2.5 mg  1 Dose Inhalation 4x Daily RT    midazolam  2 mg IntraVENous Once     HOME MEDICATIONS:  Prior to Admission medications    Medication Sig Start Date End Date Taking? Authorizing Provider   traMADol (ULTRAM) 50 MG tablet Take 1 tablet by mouth every 8 hours as needed for Pain.   Yes ProviderJoey MD   aspirin 81 MG chewable tablet Take 1 tablet by mouth daily   Yes ProviderJoey MD   metoprolol succinate (TOPROL XL) 25 MG extended release tablet Take 1 tablet by mouth daily 1/3/25  Yes Bruce Cohn DO   melatonin 3 MG TABS tablet Take 1 tablet by mouth nightly as needed (insomnia)   Yes

## 2025-01-13 NOTE — PROGRESS NOTES
Pt request PRN pain medication for back pain. States her pain is a 10 out of 10.  PRN meds given see MAR.

## 2025-01-13 NOTE — ACP (ADVANCE CARE PLANNING)
Advance Care Planning     General Advance Care Planning (ACP) Conversation    Date of Conversation: 1/13/2025  Conducted with: Patient unable to have POA conversation.   Other persons present: None    Healthcare Decision Maker:   Primary Decision Maker: HUMERA STEIN POA - Child - [phone]    Secondary Decision Maker: Nilda Stein 1[facility] Alt - Child - [phone]     Today we documented Decision Maker(s) consistent with ACP documents on file.  Content/Action Overview:  Has ACP document(s) on file - reflects the patient's care preferences  Reviewed DNR/DNI and patient elects Full Code (Attempt Resuscitation)      Length of Voluntary ACP Conversation in minutes:  <16 minutes (Non-Billable)    Teresa Boeck, RN

## 2025-01-14 LAB
ANION GAP SERPL CALCULATED.3IONS-SCNC: 12 MMOL/L (ref 3–16)
BACTERIA BLD CULT ORG #2: NORMAL
BACTERIA BLD CULT: NORMAL
BUN SERPL-MCNC: 13 MG/DL (ref 7–20)
CALCIUM SERPL-MCNC: 8.8 MG/DL (ref 8.3–10.6)
CHLORIDE SERPL-SCNC: 104 MMOL/L (ref 99–110)
CO2 SERPL-SCNC: 26 MMOL/L (ref 21–32)
CREAT SERPL-MCNC: 0.5 MG/DL (ref 0.6–1.2)
DEPRECATED RDW RBC AUTO: 15.6 % (ref 12.4–15.4)
GFR SERPLBLD CREATININE-BSD FMLA CKD-EPI: >90 ML/MIN/{1.73_M2}
GLUCOSE BLD-MCNC: 115 MG/DL (ref 70–99)
GLUCOSE BLD-MCNC: 117 MG/DL (ref 70–99)
GLUCOSE BLD-MCNC: 132 MG/DL (ref 70–99)
GLUCOSE BLD-MCNC: 136 MG/DL (ref 70–99)
GLUCOSE SERPL-MCNC: 99 MG/DL (ref 70–99)
HCT VFR BLD AUTO: 27.6 % (ref 36–48)
HGB BLD-MCNC: 8.7 G/DL (ref 12–16)
MCH RBC QN AUTO: 26.1 PG (ref 26–34)
MCHC RBC AUTO-ENTMCNC: 31.6 G/DL (ref 31–36)
MCV RBC AUTO: 82.7 FL (ref 80–100)
PERFORMED ON: ABNORMAL
PLATELET # BLD AUTO: 456 K/UL (ref 135–450)
PMV BLD AUTO: 8 FL (ref 5–10.5)
POTASSIUM SERPL-SCNC: 4.1 MMOL/L (ref 3.5–5.1)
RBC # BLD AUTO: 3.34 M/UL (ref 4–5.2)
SODIUM SERPL-SCNC: 142 MMOL/L (ref 136–145)
WBC # BLD AUTO: 23 K/UL (ref 4–11)

## 2025-01-14 PROCEDURE — 2700000000 HC OXYGEN THERAPY PER DAY

## 2025-01-14 PROCEDURE — 94640 AIRWAY INHALATION TREATMENT: CPT

## 2025-01-14 PROCEDURE — 80048 BASIC METABOLIC PNL TOTAL CA: CPT

## 2025-01-14 PROCEDURE — 94003 VENT MGMT INPAT SUBQ DAY: CPT

## 2025-01-14 PROCEDURE — 99233 SBSQ HOSP IP/OBS HIGH 50: CPT | Performed by: INTERNAL MEDICINE

## 2025-01-14 PROCEDURE — 36415 COLL VENOUS BLD VENIPUNCTURE: CPT

## 2025-01-14 PROCEDURE — 2500000003 HC RX 250 WO HCPCS: Performed by: INTERNAL MEDICINE

## 2025-01-14 PROCEDURE — 6360000002 HC RX W HCPCS: Performed by: INTERNAL MEDICINE

## 2025-01-14 PROCEDURE — 94761 N-INVAS EAR/PLS OXIMETRY MLT: CPT

## 2025-01-14 PROCEDURE — 6370000000 HC RX 637 (ALT 250 FOR IP): Performed by: INTERNAL MEDICINE

## 2025-01-14 PROCEDURE — 2000000000 HC ICU R&B

## 2025-01-14 PROCEDURE — 2500000003 HC RX 250 WO HCPCS

## 2025-01-14 PROCEDURE — 6360000002 HC RX W HCPCS

## 2025-01-14 PROCEDURE — 85027 COMPLETE CBC AUTOMATED: CPT

## 2025-01-14 RX ORDER — SULFAMETHOXAZOLE AND TRIMETHOPRIM 200; 40 MG/5ML; MG/5ML
200 SUSPENSION ORAL EVERY 8 HOURS
Status: DISCONTINUED | OUTPATIENT
Start: 2025-01-14 | End: 2025-01-16

## 2025-01-14 RX ORDER — LEVOFLOXACIN 500 MG/1
500 TABLET, FILM COATED ORAL DAILY
Status: DISCONTINUED | OUTPATIENT
Start: 2025-01-14 | End: 2025-01-15

## 2025-01-14 RX ORDER — HYDROXYZINE HYDROCHLORIDE 10 MG/1
10 TABLET, FILM COATED ORAL 3 TIMES DAILY PRN
Status: DISCONTINUED | OUTPATIENT
Start: 2025-01-14 | End: 2025-01-17 | Stop reason: HOSPADM

## 2025-01-14 RX ORDER — LISINOPRIL 10 MG/1
10 TABLET ORAL DAILY
Status: DISCONTINUED | OUTPATIENT
Start: 2025-01-14 | End: 2025-01-17 | Stop reason: HOSPADM

## 2025-01-14 RX ORDER — LORAZEPAM 2 MG/ML
0.5 INJECTION INTRAMUSCULAR ONCE
Status: COMPLETED | OUTPATIENT
Start: 2025-01-14 | End: 2025-01-14

## 2025-01-14 RX ADMIN — MUPIROCIN: 20 OINTMENT TOPICAL at 19:55

## 2025-01-14 RX ADMIN — IPRATROPIUM BROMIDE AND ALBUTEROL SULFATE 1 DOSE: 2.5; .5 SOLUTION RESPIRATORY (INHALATION) at 07:31

## 2025-01-14 RX ADMIN — METOPROLOL TARTRATE 12.5 MG: 25 TABLET, FILM COATED ORAL at 19:53

## 2025-01-14 RX ADMIN — Medication 10 ML: at 08:30

## 2025-01-14 RX ADMIN — BUSPIRONE HYDROCHLORIDE 10 MG: 10 TABLET ORAL at 14:36

## 2025-01-14 RX ADMIN — Medication 10 ML: at 08:23

## 2025-01-14 RX ADMIN — Medication 10 ML: at 19:54

## 2025-01-14 RX ADMIN — IPRATROPIUM BROMIDE AND ALBUTEROL SULFATE 1 DOSE: 2.5; .5 SOLUTION RESPIRATORY (INHALATION) at 11:43

## 2025-01-14 RX ADMIN — MUPIROCIN: 20 OINTMENT TOPICAL at 08:23

## 2025-01-14 RX ADMIN — TRAMADOL HYDROCHLORIDE 50 MG: 50 TABLET ORAL at 19:59

## 2025-01-14 RX ADMIN — LEVOFLOXACIN 500 MG: 500 TABLET, FILM COATED ORAL at 14:36

## 2025-01-14 RX ADMIN — HYDROXYZINE HYDROCHLORIDE 10 MG: 10 TABLET ORAL at 19:58

## 2025-01-14 RX ADMIN — TRAMADOL HYDROCHLORIDE 50 MG: 50 TABLET ORAL at 13:45

## 2025-01-14 RX ADMIN — IPRATROPIUM BROMIDE AND ALBUTEROL SULFATE 1 DOSE: 2.5; .5 SOLUTION RESPIRATORY (INHALATION) at 19:42

## 2025-01-14 RX ADMIN — METOPROLOL TARTRATE 12.5 MG: 25 TABLET, FILM COATED ORAL at 08:22

## 2025-01-14 RX ADMIN — BUSPIRONE HYDROCHLORIDE 10 MG: 10 TABLET ORAL at 19:53

## 2025-01-14 RX ADMIN — LORAZEPAM 0.5 MG: 2 INJECTION INTRAMUSCULAR; INTRAVENOUS at 17:46

## 2025-01-14 RX ADMIN — LISINOPRIL 10 MG: 10 TABLET ORAL at 10:55

## 2025-01-14 RX ADMIN — ESCITALOPRAM OXALATE 20 MG: 10 TABLET ORAL at 19:54

## 2025-01-14 RX ADMIN — IPRATROPIUM BROMIDE AND ALBUTEROL SULFATE 1 DOSE: 2.5; .5 SOLUTION RESPIRATORY (INHALATION) at 15:49

## 2025-01-14 RX ADMIN — MEROPENEM AND SODIUM CHLORIDE 1000 MG: 1 INJECTION, SOLUTION INTRAVENOUS at 03:56

## 2025-01-14 RX ADMIN — HYDROXYZINE HYDROCHLORIDE 10 MG: 10 TABLET ORAL at 10:55

## 2025-01-14 RX ADMIN — Medication 25 ML: at 12:07

## 2025-01-14 RX ADMIN — ENOXAPARIN SODIUM 30 MG: 100 INJECTION SUBCUTANEOUS at 08:22

## 2025-01-14 RX ADMIN — PREDNISONE 30 MG: 20 TABLET ORAL at 08:22

## 2025-01-14 RX ADMIN — BUSPIRONE HYDROCHLORIDE 10 MG: 10 TABLET ORAL at 08:22

## 2025-01-14 RX ADMIN — ASPIRIN 81 MG: 81 TABLET, CHEWABLE ORAL at 08:22

## 2025-01-14 RX ADMIN — ATORVASTATIN CALCIUM 40 MG: 40 TABLET, FILM COATED ORAL at 19:53

## 2025-01-14 RX ADMIN — Medication 25 ML: at 19:58

## 2025-01-14 ASSESSMENT — PAIN DESCRIPTION - LOCATION
LOCATION: BACK
LOCATION: GENERALIZED
LOCATION: BACK

## 2025-01-14 ASSESSMENT — PAIN DESCRIPTION - ONSET: ONSET: ON-GOING

## 2025-01-14 ASSESSMENT — PAIN SCALES - WONG BAKER
WONGBAKER_NUMERICALRESPONSE: NO HURT
WONGBAKER_NUMERICALRESPONSE: NO HURT

## 2025-01-14 ASSESSMENT — PAIN DESCRIPTION - FREQUENCY: FREQUENCY: CONTINUOUS

## 2025-01-14 ASSESSMENT — PAIN DESCRIPTION - DESCRIPTORS
DESCRIPTORS: ACHING

## 2025-01-14 ASSESSMENT — PAIN SCALES - GENERAL
PAINLEVEL_OUTOF10: 0
PAINLEVEL_OUTOF10: 8
PAINLEVEL_OUTOF10: 0

## 2025-01-14 ASSESSMENT — PULMONARY FUNCTION TESTS
PIF_VALUE: 33
PIF_VALUE: 19
PIF_VALUE: 21
PIF_VALUE: 26

## 2025-01-14 ASSESSMENT — PAIN DESCRIPTION - PAIN TYPE: TYPE: ACUTE PAIN;CHRONIC PAIN

## 2025-01-14 ASSESSMENT — PAIN DESCRIPTION - ORIENTATION
ORIENTATION: RIGHT;LEFT;MID;LOWER
ORIENTATION: RIGHT;LEFT

## 2025-01-14 ASSESSMENT — PAIN - FUNCTIONAL ASSESSMENT: PAIN_FUNCTIONAL_ASSESSMENT: ACTIVITIES ARE NOT PREVENTED

## 2025-01-14 NOTE — PLAN OF CARE
Problem: Chronic Conditions and Co-morbidities  Goal: Patient's chronic conditions and co-morbidity symptoms are monitored and maintained or improved  1/14/2025 0945 by Gerri Ontiveros RN  Outcome: Progressing  Flowsheets  Taken 1/14/2025 0358 by Nirmala Mesa RN  Care Plan - Patient's Chronic Conditions and Co-Morbidity Symptoms are Monitored and Maintained or Improved: Monitor and assess patient's chronic conditions and comorbid symptoms for stability, deterioration, or improvement  Taken 1/13/2025 2010 by Nirmala Mesa RN  Care Plan - Patient's Chronic Conditions and Co-Morbidity Symptoms are Monitored and Maintained or Improved: Monitor and assess patient's chronic conditions and comorbid symptoms for stability, deterioration, or improvement  1/13/2025 1955 by Nirmala Mesa RN  Outcome: Progressing     Problem: Discharge Planning  Goal: Discharge to home or other facility with appropriate resources  1/14/2025 0945 by Gerri Ontiveros RN  Outcome: Progressing  1/13/2025 1955 by Nirmala Mesa RN  Outcome: Progressing    HEART FAILURE CARE PLAN:    Comorbidities Reviewed: Yes   Patient has a past medical history of Angina pectoris (HCC), Chronic pain, Congestive heart failure (HCC), COPD exacerbation (HCC), Depression, Essential hypertension, Panic disorder, Pneumonia, and Pulmonary emphysema (HCC).     Weights Reviewed: Yes   Admission weight: 47 kg (103 lb 9.9 oz)   Wt Readings from Last 3 Encounters:   01/14/25 51 kg (112 lb 7 oz)   11/30/24 61.1 kg (134 lb 11.2 oz)   08/20/24 65.2 kg (143 lb 11.8 oz)     Intake & Output Reviewed: Yes     Intake/Output Summary (Last 24 hours) at 1/14/2025 0946  Last data filed at 1/14/2025 0628  Gross per 24 hour   Intake 1373.47 ml   Output 700 ml   Net 673.47 ml       ECHOCARDIOGRAM Reviewed: Yes   Patient's Ejection Fraction (EF) is greater than 40%     Medications Reviewed: Yes   SCHEDULED HOSPITAL MEDICATIONS:   enoxaparin  30 mg SubCUTAneous Daily     mupirocin   Each Nostril BID    predniSONE  30 mg Oral Daily    aspirin  81 mg Oral Daily    escitalopram  20 mg Oral Nightly    busPIRone  10 mg Oral TID    metoprolol tartrate  12.5 mg Oral BID    atorvastatin  40 mg Oral Nightly    sodium chloride flush  5-40 mL IntraVENous 2 times per day    sodium chloride flush  5-40 mL IntraVENous 2 times per day    meropenem  1,000 mg IntraVENous Q8H    ipratropium 0.5 mg-albuterol 2.5 mg  1 Dose Inhalation 4x Daily RT    midazolam  2 mg IntraVENous Once     HOME MEDICATIONS:  Prior to Admission medications    Medication Sig Start Date End Date Taking? Authorizing Provider   traMADol (ULTRAM) 50 MG tablet Take 1 tablet by mouth every 8 hours as needed for Pain.   Yes ProviderJoey MD   aspirin 81 MG chewable tablet Take 1 tablet by mouth daily   Yes ProviderJoey MD   metoprolol succinate (TOPROL XL) 25 MG extended release tablet Take 1 tablet by mouth daily 1/3/25  Yes Bruce Cohn DO   melatonin 3 MG TABS tablet Take 1 tablet by mouth nightly as needed (insomnia)   Yes ProviderJoey MD   hydrOXYzine HCl (ATARAX) 10 MG tablet Take 1 tablet by mouth every 6 hours as needed for Anxiety   Yes ProviderJoey MD   atorvastatin (LIPITOR) 40 MG tablet Take 1 tablet by mouth nightly   Yes ProviderJoey MD   busPIRone (BUSPAR) 10 MG tablet Take 1 tablet by mouth 3 times daily 1/26/24  Yes Sebas Rodriguez MD   furosemide (LASIX) 20 MG tablet Take 1 tablet by mouth daily   Yes ProviderJoey MD   lisinopril (PRINIVIL;ZESTRIL) 10 MG tablet Take 0.5 tablets by mouth daily   Yes ProviderJoey MD   scopolamine (TRANSDERM-SCOP) transdermal patch Place 1 patch onto the skin every 72 hours Indications: increased secretions    ProviderJoey MD   Miconazole-Zinc Oxide-Petrolat 0.25-15-81.35 % OINT Apply topically 2 times daily Indications: excoration To lionel area    ProviderJoey MD   budesonide

## 2025-01-14 NOTE — PROGRESS NOTES
01/13/25 2300   RT Protocol   History Pulmonary Disease 2   Respiratory pattern 4   Breath sounds 2   Cough 1   Indications for Bronchodilator Therapy Decreased or absent breath sounds   Bronchodilator Assessment Score 9     RT Inhaler-Nebulizer Bronchodilator Protocol Note    There is a bronchodilator order in the chart from a provider indicating to follow the RT Bronchodilator Protocol and there is an “Initiate RT Inhaler-Nebulizer Bronchodilator Protocol” order as well (see protocol at bottom of note).    CXR Findings:  No results found.    The findings from the last RT Protocol Assessment were as follows:   History Pulmonary Disease: Chronic pulmonary disease  Respiratory Pattern: Mild dyspnea at rest, irregular pattern, or RR 21-25 bpm  Breath Sounds: Slightly diminished and/or crackles  Cough: Strong, productive  Indication for Bronchodilator Therapy: Decreased or absent breath sounds  Bronchodilator Assessment Score: 9    Aerosolized bronchodilator medication orders have been revised according to the RT Inhaler-Nebulizer Bronchodilator Protocol below.    Respiratory Therapist to perform RT Therapy Protocol Assessment initially then follow the protocol.  Repeat RT Therapy Protocol Assessment PRN for score 0-3 or on second treatment, BID, and PRN for scores above 3.    No Indications - adjust the frequency to every 6 hours PRN wheezing or bronchospasm, if no treatments needed after 48 hours then discontinue using Per Protocol order mode.     If indication present, adjust the RT bronchodilator orders based on the Bronchodilator Assessment Score as indicated below.  Use Inhaler orders unless patient has one or more of the following: on home nebulizer, not able to hold breath for 10 seconds, is not alert and oriented, cannot activate and use MDI correctly, or respiratory rate 25 breaths per minute or more, then use the equivalent nebulizer order(s) with same Frequency and PRN reasons based on the score.  If a

## 2025-01-14 NOTE — PROGRESS NOTES
Occupational Therapy/Physical Therapy  Pt was attempted to be seen for OT/PT treatment. Upon entry pt was refusing OT/PT treatment due to increased pain at this time. RN notified of pt's pain and medication request. Will re-attempt and follow up with treatment and care plan as schedule allows.    Thank you,  Gretel Torres OTR/L [id number]  Gold Sykes, PT, DPT

## 2025-01-14 NOTE — PROGRESS NOTES
01/13/25 2325   Patient Observation   Pulse 89   Respirations 15   SpO2 (!) 89 %   Breath Sounds   Breath Sounds Bilateral Diminished   Right Upper Lobe Diminished   Right Middle Lobe Diminished   Right Lower Lobe Diminished   Left Upper Lobe Diminished   Left Lower Lobe Diminished   Vent Information   Vent Mode AC/VC   Ventilator Settings   Vt (Set, mL) 400 mL   Resp Rate (Set) 16 bpm   PEEP/CPAP (cmH2O) 5   FiO2  40 %   Vent Patient Data (Readings)   Vt (Measured) 539 mL   Peak Inspiratory Pressure (cmH2O) 25 cmH2O   Rate Measured 30 br/min   Minute Volume (L/min) 11.1 Liters   Peak Inspiratory Flow (lpm) 60 L/sec   Mean Airway Pressure (cmH2O) 9 cmH20   I:E Ratio 1:2.5   Backup Apnea On   Backup Rate 16 Breaths Per Minute   Backup Vt 400   Vent Alarm Settings   High Pressure (cmH2O) 55 cmH2O   Low Minute Volume (lpm) 3 L/min   High Minute Volume (lpm) 28 L/min   Low Exhaled Vt (ml) 200 mL   High Exhaled Vt (ml) 1000 mL   RR High (bpm) 45 br/min   Apnea (secs) 20 secs   Additional Respiratoray Assessments   Humidification Source HME   Circuit Condensation Drained   Ambu Bag With Mask At Bedside Yes   Backup Trachs Available (Size) 4.0   Airway Clearance   Suction Trach   Subglottic Suction Done No   Suction Device Inline suction catheter   Sputum Method Obtained Tracheal   Sputum Amount Small   Sputum Color/Odor Yellow   Sputum Consistency Thick;Thin   Surgical Airway  Shiley Cuffed   No placement date or time found.   Present on Admission/Arrival: Yes  Surgical Airway Type: Tracheostomy  Brand: Shiley  Style: Cuffed  Size: 4   Status Secured   Site Assessment Clean;Dry   Site Care Cleansed;Dried;Dressing applied   Inner Cannula Care Changed/new   Ties Assessment Clean;Dry;Intact   Spare Trach at Bedside Yes   Spare Trach Tube One Size Smaller at Bedside Yes   Ambu Bag With Mask at Bedside Yes   Ventilator Associated Pneumonia Bundle   Elevation of Head of Bed to 30-45 Degrees  Yes   Oral Care Completed No    Oral Suctioning No   ETT/Trach Suctioning Yes

## 2025-01-14 NOTE — PROGRESS NOTES
Pulmonary Progress Note  CC: SOB and pneumonia    Subjective:   Failing TC during day - tolerated 1 hr only yesterday   AC at night       Intake/Output Summary (Last 24 hours) at 1/14/2025 0728  Last data filed at 1/14/2025 0628  Gross per 24 hour   Intake 1373.47 ml   Output 700 ml   Net 673.47 ml           EXAM: BP (!) 160/86   Pulse 96   Temp 98.8 °F (37.1 °C) (Oral)   Resp 22   Ht 1.6 m (5' 2.99\")   Wt 51 kg (112 lb 7 oz)   SpO2 93%   BMI 19.92 kg/m²  on vent  Constitutional:  no acute distress.   Eyes: PERRL. Conjunctivae anicteric.   ENT: Normal nose. Normal tongue.    Neck:  Trach in place and is c/d/i  Respiratory: No accessory muscle usage.  decreased breath sounds. Few wheezes. No rales. Few Rhonchi.  Cardiovascular: Normal S1S2. No digit clubbing. No digit cyanosis. No LE edema.   Psychiatric: No anxiety or Agitation. Alert and Oriented to person, place and time.    Scheduled Meds:   enoxaparin  30 mg SubCUTAneous Daily    mupirocin   Each Nostril BID    predniSONE  30 mg Oral Daily    aspirin  81 mg Oral Daily    escitalopram  20 mg Oral Nightly    busPIRone  10 mg Oral TID    metoprolol tartrate  12.5 mg Oral BID    atorvastatin  40 mg Oral Nightly    sodium chloride flush  5-40 mL IntraVENous 2 times per day    sodium chloride flush  5-40 mL IntraVENous 2 times per day    meropenem  1,000 mg IntraVENous Q8H    ipratropium 0.5 mg-albuterol 2.5 mg  1 Dose Inhalation 4x Daily RT    midazolam  2 mg IntraVENous Once     Continuous Infusions:   sodium chloride 50 mL/hr at 01/14/25 0628    sodium chloride      sodium chloride       PRN Meds:  traMADol, hydrOXYzine HCl, sodium chloride flush, sodium chloride, potassium chloride **OR** potassium alternative oral replacement **OR** potassium chloride, sodium chloride flush, sodium chloride, magnesium sulfate, ondansetron **OR** ondansetron, polyethylene glycol, acetaminophen **OR** acetaminophen, ipratropium 0.5 mg-albuterol 2.5 mg    Labs:  CBC:   Recent

## 2025-01-14 NOTE — PROGRESS NOTES
IM Progress Note    Admit Date:  1/10/2025  4    Interval history:  chronic resp failure on vent at night and 5 L in daytime      Subjective:  Ms. Tyler seen up in bed, did well on vent overnight ,   Currently on trach collar this am with some dyspnea  Anxiety issues overnight noted  Kept NPO   No new fevers  Na improved, Wbc remains coming down.  Mentation stable    1/14-subjectively better.  Culture growing Pseudomonas and stenotrophomonas maltophilia.  Rare growth.  Bicycle improving but still elevated.  No fever.  Mildly tachycardic.      Objective:   BP (!) 166/89   Pulse (!) 104   Temp 97.5 °F (36.4 °C) (Temporal)   Resp 18   Ht 1.6 m (5' 2.99\")   Wt 51 kg (112 lb 7 oz)   SpO2 92%   BMI 19.92 kg/m²     Intake/Output Summary (Last 24 hours) at 1/14/2025 1326  Last data filed at 1/14/2025 0628  Gross per 24 hour   Intake 1373.47 ml   Output 700 ml   Net 673.47 ml       Physical Exam:    General elderly female on trach collar this am   Anxious,  no  resp distress  Tracheostomy Appears to be not in any distress  Mucous Membranes:  Pink , anicteric  Neck: No JVD, no carotid bruit, no thyromegaly  Chest: chronic accessory muscle use +  diminished  javier air entry with no wheeze   Cardiovascular: Tachycardia.  Regular rhythm.  S1S2 heard, no murmurs or gallops  Abdomen:  Soft, undistended, non tender, no organomegaly, BS present, PEG noted  Extremities: No edema or cyanosis. Distal pulses well felt  Neurological : grossly normal  Non focal with some anxiety     Medications:   Scheduled Medications:    sulfamethoxazole-trimethoprim  200 mg Oral Q8H    lisinopril  10 mg Oral Daily    enoxaparin  30 mg SubCUTAneous Daily    mupirocin   Each Nostril BID    predniSONE  30 mg Oral Daily    aspirin  81 mg Oral Daily    escitalopram  20 mg Oral Nightly    busPIRone  10 mg Oral TID    metoprolol tartrate  12.5 mg Oral BID    atorvastatin  40 mg Oral Nightly    sodium chloride flush  5-40 mL IntraVENous 2 times per day  resolved.  -124 on presentation   -gentle IVF  with NS given and improved      #Elevated troponin   -42-->39   -could be 2/2 to CP   -no acute ischemic EKG changes        #Thrombocytosis   -likely 2/2 to infection    improving     #Takotsubo cardiomyopathy                                                                                             #Non-ischemic cardiomyopathy   -EF 49-45% per last echo as above   -on ASA, statin, lisinopril, lasix, toprol   -holding lasix and lisinopril with low sodium - can resume  -monitor daily weights, intake and output      #Mood disorder   #Anxiety   -on atarax, buspar, lexapro   Resume meds via PEG        Note above makes patient higher risk for morbidity and mortality requiring testing and treatment.        Possible discharge planning in the a.m.  Await sensitivities.     DVT Prophylaxis: Lovenox   Diet: TF  Code Status: Full Code     FRANCISCO ASENCIO MD, 1/14/2025 1:26 PM

## 2025-01-14 NOTE — PROGRESS NOTES
Blood pressure (!) 170/94, pulse (!) 103, temperature 98.8 °F (37.1 °C), temperature source Oral, resp. rate 17, height 1.6 m (5' 2.99\"), weight 49.1 kg (108 lb 3.9 oz), SpO2 99%, not currently breastfeeding.    Patient continues on Vent to Trach with O2 per order. Vent settings are 16/400/+40%/5.  Patient is currently breathing over vent, prn medication given, see MAR.    Shift assessment complete. See doc flow. Nightly medications given see MAR. Patient with no complaints at this time. Call light and bedside table within easy reach.     Electronically signed by Nirmala Mesa RN on 1/13/2025 at 8:21 PM

## 2025-01-14 NOTE — PROGRESS NOTES
01/13/25 1956   Patient Observation   Pulse (!) 103   Respirations 20   SpO2 99 %   Breath Sounds   Breath Sounds Bilateral Diminished   Right Upper Lobe Diminished   Right Middle Lobe Diminished   Right Lower Lobe Diminished   Left Upper Lobe Diminished   Left Lower Lobe Diminished   Vent Information   Equipment Changed HME   Vent Mode AC/VC   Ventilator Settings   Vt (Set, mL) 400 mL   Resp Rate (Set) 16 bpm   PEEP/CPAP (cmH2O) 5   FiO2  40 %   Vent Patient Data (Readings)   Vt (Measured) 800 mL   Peak Inspiratory Pressure (cmH2O) 27 cmH2O   Rate Measured 29 br/min   Minute Volume (L/min) 10.8 Liters   Peak Inspiratory Flow (lpm) 60 L/sec   Mean Airway Pressure (cmH2O) 7.8 cmH20   I:E Ratio 1:2.7   Backup Apnea On   Backup Rate 16 Breaths Per Minute   Backup Vt 400   Vent Alarm Settings   High Pressure (cmH2O) 55 cmH2O   Low Minute Volume (lpm) 3 L/min   High Minute Volume (lpm) 28 L/min   Low Exhaled Vt (ml) 160 mL   High Exhaled Vt (ml) 1000 mL   RR High (bpm) 45 br/min   Apnea (secs) 20 secs   Additional Respiratoray Assessments   Humidification Source HME   Circuit Condensation Drained   Ambu Bag With Mask At Bedside Yes   Backup Trachs Available (Size) 4.0   Airway Clearance   Suction Trach   Subglottic Suction Done No   Suction Device Inline suction catheter   Sputum Method Obtained Tracheal   Sputum Amount Moderate   Sputum Color/Odor White   Sputum Consistency Thick   Surgical Airway  Shiley Cuffed   No placement date or time found.   Present on Admission/Arrival: Yes  Surgical Airway Type: Tracheostomy  Brand: Shiley  Style: Cuffed  Size: 4   Status Secured   Ties Assessment Intact;Secure   Cuff Pressure   (MOV)   Spare Trach at Bedside Yes   Spare Trach Tube One Size Smaller at Bedside Yes   Ambu Bag With Mask at Bedside Yes   Ventilator Associated Pneumonia Bundle   Elevation of Head of Bed to 30-45 Degrees  Yes   Oral Care Completed No   Oral Suctioning No   ETT/Trach Suctioning Yes   Treatment

## 2025-01-14 NOTE — PROGRESS NOTES
Pt very anxious c/o SOB remains on trach/vent.  RT at bedside.  Pt unable to have meds til 7 pm as been anxious all day, but now -130's BP elevated.  Okay per Dr. Faulkner to give Ativan 0.5 mg x1.

## 2025-01-14 NOTE — PROGRESS NOTES
4 Eyes Skin Assessment     NAME:  Annie Tyler  YOB: 1955  MEDICAL RECORD NUMBER:  [id number]    The patient is being assessed for  Other Low William Scale     I agree that at least one RN has performed a thorough Head to Toe Skin Assessment on the patient. ALL assessment sites listed below have been assessed.      Areas assessed by both nurses:    Head, Face, Ears, Shoulders, Back, Chest, Arms, Elbows, Hands, Sacrum. Buttock, Coccyx, Ischium, Legs. Feet and Heels, and Under Medical Devices   Redness to buttocks.       Does the Patient have a Wound? No noted wound(s)       William Prevention initiated by RN: Yes  Wound Care Orders initiated by RN: No    Pressure Injury (Stage 3,4, Unstageable, DTI, NWPT, and Complex wounds) if present, place Wound referral order by RN under : No    New Ostomies, if present place, Ostomy referral order under : No   Patient is not able to demonstrate the ability to move from a reclining position to an upright position within the recliner due to anxiety/weakness.    Nurse 1 eSignature: Electronically signed by Nirmala Mesa RN on 1/14/25 at 3:28 AM EST    **SHARE this note so that the co-signing nurse can place an eSignature**    Nurse 2 eSignature: Electronically signed by Zandra Womack RN on 1/15/25 at 6:29 AM EST

## 2025-01-14 NOTE — PROGRESS NOTES
01/14/25 0311   Patient Observation   Pulse 93   Respirations 23   SpO2 94 %   Breath Sounds   Breath Sounds Bilateral Diminished   Right Upper Lobe Diminished   Right Middle Lobe Diminished   Right Lower Lobe Diminished   Left Upper Lobe Diminished   Left Lower Lobe Diminished   Vent Information   Vent Mode AC/VC   Ventilator Settings   Vt (Set, mL) 400 mL   Resp Rate (Set) 16 bpm   PEEP/CPAP (cmH2O) 5   FiO2  40 %   Vent Patient Data (Readings)   Vt (Measured) 512 mL   Peak Inspiratory Pressure (cmH2O) 26 cmH2O   Rate Measured 28 br/min   Minute Volume (L/min) 10.6 Liters   Peak Inspiratory Flow (lpm) 60 L/sec   Mean Airway Pressure (cmH2O) 9.1 cmH20   I:E Ratio 1:2.7   Backup Apnea On   Backup Rate 16 Breaths Per Minute   Backup Vt 400   Vent Alarm Settings   High Pressure (cmH2O) 55 cmH2O   Low Minute Volume (lpm) 3 L/min   High Minute Volume (lpm) 28 L/min   Low Exhaled Vt (ml) 200 mL   High Exhaled Vt (ml) 1000 mL   RR High (bpm) 45 br/min   Apnea (secs) 20 secs   Additional Respiratoray Assessments   Humidification Source HME   Circuit Condensation Drained   Ambu Bag With Mask At Bedside Yes   Backup Trachs Available (Size) 4.0   Surgical Airway  Shiley Cuffed   No placement date or time found.   Present on Admission/Arrival: Yes  Surgical Airway Type: Tracheostomy  Brand: Sascha  Style: Cuffed  Size: 4   Status Secured   Ties Assessment Intact;Secure   Spare Trach at Bedside Yes   Spare Trach Tube One Size Smaller at Bedside Yes   Ambu Bag With Mask at Bedside Yes   Ventilator Associated Pneumonia Bundle   Elevation of Head of Bed to 30-45 Degrees  Yes   Oral Care Completed No   Oral Suctioning No   ETT/Trach Suctioning Yes

## 2025-01-14 NOTE — PLAN OF CARE
Problem: Chronic Conditions and Co-morbidities  Goal: Patient's chronic conditions and co-morbidity symptoms are monitored and maintained or improved  1/13/2025 1955 by Nirmala Mesa RN  Outcome: Progressing     Problem: Skin/Tissue Integrity  Goal: Absence of new skin breakdown  Description: 1.  Monitor for areas of redness and/or skin breakdown  2.  Assess vascular access sites hourly  3.  Every 4-6 hours minimum:  Change oxygen saturation probe site  4.  Every 4-6 hours:  If on nasal continuous positive airway pressure, respiratory therapy assess nares and determine need for appliance change or resting period.  1/13/2025 1955 by Nirmala Mesa, RN  Outcome: Progressing     Problem: ABCDS Injury Assessment  Goal: Absence of physical injury  1/13/2025 1955 by Nirmala Mesa RN  Outcome: Progressing     Problem: Safety - Adult  Goal: Free from fall injury  1/13/2025 0726 by Nirmala Mesa, RN  Outcome: Progressing     Problem: Pain  Goal: Verbalizes/displays adequate comfort level or baseline comfort level  1/13/2025 1955 by Nirmala Mesa, RN  Outcome: Progressing

## 2025-01-14 NOTE — PROGRESS NOTES
HEART FAILURE CARE PLAN:    Comorbidities Reviewed: Yes   Patient has a past medical history of Angina pectoris (HCC), Chronic pain, Congestive heart failure (HCC), COPD exacerbation (HCC), Depression, Essential hypertension, Panic disorder, Pneumonia, and Pulmonary emphysema (HCC).     Weights Reviewed: Yes   Admission weight: 47 kg (103 lb 9.9 oz)   Wt Readings from Last 3 Encounters:   01/13/25 49.1 kg (108 lb 3.9 oz)   11/30/24 61.1 kg (134 lb 11.2 oz)   08/20/24 65.2 kg (143 lb 11.8 oz)     Intake & Output Reviewed: Yes     Intake/Output Summary (Last 24 hours) at 1/13/2025 1956  Last data filed at 1/13/2025 1854  Gross per 24 hour   Intake 1818.63 ml   Output 350 ml   Net 1468.63 ml       ECHOCARDIOGRAM Reviewed: Yes   Patient's Ejection Fraction (EF) is greater than 40%     Medications Reviewed: Yes   SCHEDULED HOSPITAL MEDICATIONS:   enoxaparin  30 mg SubCUTAneous Daily    mupirocin   Each Nostril BID    predniSONE  30 mg Oral Daily    aspirin  81 mg Oral Daily    escitalopram  20 mg Oral Nightly    busPIRone  10 mg Oral TID    metoprolol tartrate  12.5 mg Oral BID    atorvastatin  40 mg Oral Nightly    sodium chloride flush  5-40 mL IntraVENous 2 times per day    sodium chloride flush  5-40 mL IntraVENous 2 times per day    meropenem  1,000 mg IntraVENous Q8H    ipratropium 0.5 mg-albuterol 2.5 mg  1 Dose Inhalation 4x Daily RT    midazolam  2 mg IntraVENous Once     HOME MEDICATIONS:  Prior to Admission medications    Medication Sig Start Date End Date Taking? Authorizing Provider   traMADol (ULTRAM) 50 MG tablet Take 1 tablet by mouth every 8 hours as needed for Pain.   Yes ProviderJoey MD   aspirin 81 MG chewable tablet Take 1 tablet by mouth daily   Yes ProviderJoey MD   metoprolol succinate (TOPROL XL) 25 MG extended release tablet Take 1 tablet by mouth daily 1/3/25  Yes Bruce Cohn DO   melatonin 3 MG TABS tablet Take 1 tablet by mouth nightly as needed (insomnia)   Yes

## 2025-01-14 NOTE — PROGRESS NOTES
Shift assessment completed, see flow sheet.   Pt is awake A/O and appears anxious.  Pt with c/o SOB suctioned by RT & does feel better.       Trach/vent AC # 4 . 16 / 400 / 40 %/ +5.   SpO2 96%. Respirations are easy, even, and unlabored.   Bilateral lung sounds diminished.     VSS  ST on the monitor       PIV, WNL with NS 50 ml/hr    All lines and monitoring devices in place.external  Allred is patent and secured.  Bilateral soft wrist restraints in place for patient safety.  Bed in lowest position with wheels locked. No needs expressed at this time. Will continue to monitor.

## 2025-01-15 LAB
ANION GAP SERPL CALCULATED.3IONS-SCNC: 9 MMOL/L (ref 3–16)
BACTERIA SPEC RESP CULT: ABNORMAL
BUN SERPL-MCNC: 12 MG/DL (ref 7–20)
CALCIUM SERPL-MCNC: 8.8 MG/DL (ref 8.3–10.6)
CHLORIDE SERPL-SCNC: 103 MMOL/L (ref 99–110)
CO2 SERPL-SCNC: 28 MMOL/L (ref 21–32)
CREAT SERPL-MCNC: 0.5 MG/DL (ref 0.6–1.2)
DEPRECATED RDW RBC AUTO: 15.7 % (ref 12.4–15.4)
GFR SERPLBLD CREATININE-BSD FMLA CKD-EPI: >90 ML/MIN/{1.73_M2}
GLUCOSE BLD-MCNC: 151 MG/DL (ref 70–99)
GLUCOSE BLD-MCNC: 165 MG/DL (ref 70–99)
GLUCOSE BLD-MCNC: 167 MG/DL (ref 70–99)
GLUCOSE BLD-MCNC: 180 MG/DL (ref 70–99)
GLUCOSE BLD-MCNC: 183 MG/DL (ref 70–99)
GLUCOSE SERPL-MCNC: 157 MG/DL (ref 70–99)
GRAM STN SPEC: ABNORMAL
HCT VFR BLD AUTO: 24.5 % (ref 36–48)
HGB BLD-MCNC: 8 G/DL (ref 12–16)
MAGNESIUM SERPL-MCNC: 1.92 MG/DL (ref 1.8–2.4)
MCH RBC QN AUTO: 26.7 PG (ref 26–34)
MCHC RBC AUTO-ENTMCNC: 32.7 G/DL (ref 31–36)
MCV RBC AUTO: 81.6 FL (ref 80–100)
ORGANISM: ABNORMAL
PERFORMED ON: ABNORMAL
PLATELET # BLD AUTO: 380 K/UL (ref 135–450)
PMV BLD AUTO: 7.9 FL (ref 5–10.5)
POTASSIUM SERPL-SCNC: 3.4 MMOL/L (ref 3.5–5.1)
RBC # BLD AUTO: 3 M/UL (ref 4–5.2)
SODIUM SERPL-SCNC: 140 MMOL/L (ref 136–145)
WBC # BLD AUTO: 19.5 K/UL (ref 4–11)

## 2025-01-15 PROCEDURE — 2500000003 HC RX 250 WO HCPCS: Performed by: INTERNAL MEDICINE

## 2025-01-15 PROCEDURE — 97530 THERAPEUTIC ACTIVITIES: CPT

## 2025-01-15 PROCEDURE — 94761 N-INVAS EAR/PLS OXIMETRY MLT: CPT

## 2025-01-15 PROCEDURE — 99233 SBSQ HOSP IP/OBS HIGH 50: CPT | Performed by: INTERNAL MEDICINE

## 2025-01-15 PROCEDURE — 6370000000 HC RX 637 (ALT 250 FOR IP): Performed by: INTERNAL MEDICINE

## 2025-01-15 PROCEDURE — 85027 COMPLETE CBC AUTOMATED: CPT

## 2025-01-15 PROCEDURE — 92523 SPEECH SOUND LANG COMPREHEN: CPT

## 2025-01-15 PROCEDURE — 94640 AIRWAY INHALATION TREATMENT: CPT

## 2025-01-15 PROCEDURE — 36415 COLL VENOUS BLD VENIPUNCTURE: CPT

## 2025-01-15 PROCEDURE — 92610 EVALUATE SWALLOWING FUNCTION: CPT

## 2025-01-15 PROCEDURE — 80048 BASIC METABOLIC PNL TOTAL CA: CPT

## 2025-01-15 PROCEDURE — 2700000000 HC OXYGEN THERAPY PER DAY

## 2025-01-15 PROCEDURE — 83735 ASSAY OF MAGNESIUM: CPT

## 2025-01-15 PROCEDURE — 6360000002 HC RX W HCPCS: Performed by: INTERNAL MEDICINE

## 2025-01-15 PROCEDURE — 94003 VENT MGMT INPAT SUBQ DAY: CPT

## 2025-01-15 PROCEDURE — 2500000003 HC RX 250 WO HCPCS

## 2025-01-15 PROCEDURE — 2000000000 HC ICU R&B

## 2025-01-15 PROCEDURE — 92526 ORAL FUNCTION THERAPY: CPT

## 2025-01-15 RX ORDER — LORAZEPAM 2 MG/ML
0.5 INJECTION INTRAMUSCULAR ONCE
Status: COMPLETED | OUTPATIENT
Start: 2025-01-15 | End: 2025-01-15

## 2025-01-15 RX ORDER — METOPROLOL SUCCINATE 50 MG/1
50 TABLET, EXTENDED RELEASE ORAL DAILY
Status: DISCONTINUED | OUTPATIENT
Start: 2025-01-15 | End: 2025-01-16

## 2025-01-15 RX ORDER — LORAZEPAM 2 MG/ML
0.5 INJECTION INTRAMUSCULAR EVERY 6 HOURS PRN
Status: DISCONTINUED | OUTPATIENT
Start: 2025-01-15 | End: 2025-01-16

## 2025-01-15 RX ORDER — LEVOFLOXACIN 750 MG/1
750 TABLET, FILM COATED ORAL DAILY
Status: DISCONTINUED | OUTPATIENT
Start: 2025-01-16 | End: 2025-01-17 | Stop reason: HOSPADM

## 2025-01-15 RX ADMIN — BUSPIRONE HYDROCHLORIDE 10 MG: 10 TABLET ORAL at 08:30

## 2025-01-15 RX ADMIN — Medication 10 ML: at 08:30

## 2025-01-15 RX ADMIN — LORAZEPAM 0.5 MG: 2 INJECTION INTRAMUSCULAR; INTRAVENOUS at 19:06

## 2025-01-15 RX ADMIN — ASPIRIN 81 MG: 81 TABLET, CHEWABLE ORAL at 08:30

## 2025-01-15 RX ADMIN — POTASSIUM BICARBONATE 40 MEQ: 782 TABLET, EFFERVESCENT ORAL at 06:40

## 2025-01-15 RX ADMIN — Medication 25 ML: at 03:45

## 2025-01-15 RX ADMIN — MUPIROCIN: 20 OINTMENT TOPICAL at 08:30

## 2025-01-15 RX ADMIN — BUSPIRONE HYDROCHLORIDE 10 MG: 10 TABLET ORAL at 20:50

## 2025-01-15 RX ADMIN — LEVOFLOXACIN 500 MG: 500 TABLET, FILM COATED ORAL at 08:29

## 2025-01-15 RX ADMIN — LISINOPRIL 10 MG: 10 TABLET ORAL at 08:30

## 2025-01-15 RX ADMIN — ESCITALOPRAM OXALATE 20 MG: 10 TABLET ORAL at 20:50

## 2025-01-15 RX ADMIN — IPRATROPIUM BROMIDE AND ALBUTEROL SULFATE 1 DOSE: 2.5; .5 SOLUTION RESPIRATORY (INHALATION) at 08:03

## 2025-01-15 RX ADMIN — Medication 25 ML: at 11:50

## 2025-01-15 RX ADMIN — Medication 25 ML: at 18:35

## 2025-01-15 RX ADMIN — IPRATROPIUM BROMIDE AND ALBUTEROL SULFATE 1 DOSE: 2.5; .5 SOLUTION RESPIRATORY (INHALATION) at 18:57

## 2025-01-15 RX ADMIN — HYDROXYZINE HYDROCHLORIDE 10 MG: 10 TABLET ORAL at 18:41

## 2025-01-15 RX ADMIN — IPRATROPIUM BROMIDE AND ALBUTEROL SULFATE 1 DOSE: 2.5; .5 SOLUTION RESPIRATORY (INHALATION) at 15:03

## 2025-01-15 RX ADMIN — ENOXAPARIN SODIUM 30 MG: 100 INJECTION SUBCUTANEOUS at 08:30

## 2025-01-15 RX ADMIN — ATORVASTATIN CALCIUM 40 MG: 40 TABLET, FILM COATED ORAL at 20:50

## 2025-01-15 RX ADMIN — MUPIROCIN: 20 OINTMENT TOPICAL at 20:51

## 2025-01-15 RX ADMIN — HYDROXYZINE HYDROCHLORIDE 10 MG: 10 TABLET ORAL at 10:38

## 2025-01-15 RX ADMIN — Medication 10 ML: at 20:51

## 2025-01-15 RX ADMIN — BUSPIRONE HYDROCHLORIDE 10 MG: 10 TABLET ORAL at 14:44

## 2025-01-15 RX ADMIN — PREDNISONE 30 MG: 20 TABLET ORAL at 08:30

## 2025-01-15 RX ADMIN — METOPROLOL SUCCINATE 50 MG: 50 TABLET, EXTENDED RELEASE ORAL at 08:30

## 2025-01-15 RX ADMIN — IPRATROPIUM BROMIDE AND ALBUTEROL SULFATE 1 DOSE: 2.5; .5 SOLUTION RESPIRATORY (INHALATION) at 12:02

## 2025-01-15 ASSESSMENT — PAIN SCALES - GENERAL
PAINLEVEL_OUTOF10: 5
PAINLEVEL_OUTOF10: 0

## 2025-01-15 ASSESSMENT — PULMONARY FUNCTION TESTS
PIF_VALUE: 22
PIF_VALUE: 21
PIF_VALUE: 23

## 2025-01-15 ASSESSMENT — PAIN DESCRIPTION - DESCRIPTORS: DESCRIPTORS: ACHING

## 2025-01-15 ASSESSMENT — PAIN - FUNCTIONAL ASSESSMENT: PAIN_FUNCTIONAL_ASSESSMENT: ACTIVITIES ARE NOT PREVENTED

## 2025-01-15 ASSESSMENT — PAIN DESCRIPTION - FREQUENCY: FREQUENCY: CONTINUOUS

## 2025-01-15 ASSESSMENT — PAIN SCALES - WONG BAKER
WONGBAKER_NUMERICALRESPONSE: NO HURT
WONGBAKER_NUMERICALRESPONSE: NO HURT

## 2025-01-15 ASSESSMENT — PAIN DESCRIPTION - LOCATION: LOCATION: BACK

## 2025-01-15 ASSESSMENT — PAIN DESCRIPTION - ONSET: ONSET: ON-GOING

## 2025-01-15 ASSESSMENT — PAIN DESCRIPTION - PAIN TYPE: TYPE: CHRONIC PAIN

## 2025-01-15 ASSESSMENT — PAIN DESCRIPTION - ORIENTATION: ORIENTATION: LOWER;MID

## 2025-01-15 NOTE — PROGRESS NOTES
Blood pressure 116/68, pulse (!) 126, temperature 98.8 °F (37.1 °C), temperature source Oral, resp. rate (!) 31, height 1.6 m (5' 2.99\"), weight 51 kg (112 lb 7 oz), SpO2 100%, not currently breastfeeding.    Patient continues on Vent to Trach with O2 per order. Vent settings are 16/400/+50%/5.  Patient is tolerating vent well at this time.     Patient noted with anxiety. PRN medication given See MAR.    Tube feed started via G-Tube at this time at a rate of 25 ml/H. Will increase every 4 hours till goal rate and as patient tolerates.     Shift assessment complete. See doc flow. Nightly medications given see MAR. Patient complains of back pain, PRN pain medication given per order. See MAR.Call light and bedside table within easy reach.     Central line dressing is clean, dry and intact with no signs of drainage. All tubing is current and dated. IPA caps applied to all access hubs.

## 2025-01-15 NOTE — PROGRESS NOTES
Reassessment completed, see flowsheets, unchanged from prior. VSS.     Pt now on trach collar 10L FiO2 40%.   NS 50 ml/hr.     All ICU Lines and monitoring remain in place. Bed locked in lowest position. Call light within reach.

## 2025-01-15 NOTE — PROGRESS NOTES
IM Progress Note    Admit Date:  1/10/2025  5    Interval history:  chronic resp failure on vent at night and 5 L in daytime      Subjective:  Ms. Tyler seen up in bed, did well on vent overnight ,   Currently on trach collar this am with some dyspnea  Anxiety issues overnight noted  Kept NPO   No new fevers  Na improved, Wbc remains coming down.  Mentation stable    1/14-subjectively better.  Culture growing Pseudomonas and stenotrophomonas maltophilia.  Rare growth.  Bicycle improving but still elevated.  No fever.  Mildly tachycardic.    1/15-she is mildly tachycardic.  On trach collar this a.m.  Ventilator at night.  Awake and alert.      Objective:   /72   Pulse 95   Temp 98.7 °F (37.1 °C) (Oral)   Resp 22   Ht 1.6 m (5' 2.99\")   Wt 53.8 kg (118 lb 9.7 oz)   SpO2 98%   BMI 21.02 kg/m²     Intake/Output Summary (Last 24 hours) at 1/15/2025 1332  Last data filed at 1/15/2025 1212  Gross per 24 hour   Intake 2376.32 ml   Output 500 ml   Net 1876.32 ml       Physical Exam:    General elderly female on trach collar this am   Anxious,  no  resp distress  Tracheostomy Appears to be not in any distress  Mucous Membranes:  Pink , anicteric  Neck: No JVD, no carotid bruit, no thyromegaly  Chest: chronic accessory muscle use +  diminished  javier air entry with no wheeze   Cardiovascular: Tachycardia.  Regular rhythm.  S1S2 heard, no murmurs or gallops  Abdomen:  Soft, undistended, non tender, no organomegaly, BS present, PEG noted  Extremities: No edema or cyanosis. Distal pulses well felt  Neurological : grossly normal  Non focal with some anxiety     Medications:   Scheduled Medications:    metoprolol succinate  50 mg Oral Daily    [START ON 1/16/2025] levoFLOXacin  750 mg Per G Tube Daily    sulfamethoxazole-trimethoprim  200 mg Oral Q8H    lisinopril  10 mg Oral Daily    enoxaparin  30 mg SubCUTAneous Daily    mupirocin   Each Nostril BID    predniSONE  30 mg Oral Daily    aspirin  81 mg Oral Daily     initially on IV steroids and breathing treatments.  Changed to p.o. prednisone.  - blood and sputum remains neg, culture noted.  On Merrem.. stop Merrem and start Levaquin.  Bactrim added.  Monitor for progress for another 24 hours.  -White cell count is improving.  -On tube feeds.  -critical care consulted  -Speech has seen patient.  Recommend FEES.  Will be done tomorrow.     #Hyponatremia -acute on  chronic. It has resolved.  -124 on presentation   -gentle IVF  with NS given and improved      #Elevated troponin   -42-->39   -could be 2/2 to CP   -no acute ischemic EKG changes        #Thrombocytosis   -likely 2/2 to infection    improving     #Takotsubo cardiomyopathy                                                                                             #Non-ischemic cardiomyopathy   -EF 49-45% per last echo as above   -on ASA, statin, lisinopril, lasix, toprol   -holding lasix and lisinopril with low sodium - can resume  -monitor daily weights, intake and output      #Mood disorder   #Anxiety   -on atarax, buspar, lexapro   Resume meds via PEG        Note above makes patient higher risk for morbidity and mortality requiring testing and treatment.      Discharge planning     DVT Prophylaxis: Lovenox   Diet: TF  Code Status: Full Code     FRANCISCO ASENCIO MD, 1/15/2025 1:32 PM

## 2025-01-15 NOTE — PROGRESS NOTES
RT weaned FiO2 to 40%      Care rounds completed with Dr. Wolf and multidisciplinary team. Reviewed labs, meds, VS, assessment, & plan of care for today. See dictated note and new orders for details.     Have SLP eval pt.   Tentative plan for dc today

## 2025-01-15 NOTE — PROGRESS NOTES
Inpatient Physical Therapy Treatment    Unit: ICU  Date:  1/15/2025  Patient Name:    Annie Tyler  Admitting diagnosis:  Respiratory failure [J96.90]  Acute on chronic respiratory failure [J96.20]  Admit Date:  1/10/2025  Precautions/Restrictions/WB Status/ Lines/ Wounds/ Oxygen: Fall risk, Bed/chair alarm, and Lines (IV, external catheter, PEG tube, PICC right, and trache)      Pt seen for cotreatment this date due to patient safety, patient endurance, and need for the assistance of 2 skilled therapists    Treatment Time:  1117-1140  Treatment Number:  2   Timed Code Treatment Minutes: 23 minutes  Total Treatment Minutes:  23 minutes    Patient Stated Goals for Therapy: None stated      Discharge Recommendations: Return to LTC with skilled therapy  DME needs for discharge: Defer to facility       Therapy recommendation for EMS Transport: can transport by wheelchair    Therapy recommendations for staff:   Assist of 1 for sitting EOB    History of Present Illness:   As per Byron Daquan Faulkner MD on 1/10/2025 \"69 y.o. female with pmhx of COPD, chronic hypoxic respiratory failure with tracheostomy, anxiety, takotsubo cardiomyopathy who presented to [facility] ED with complaint of shortness of breath   Pt does use vent at night and 5 L trach collar during daytime, reports 3-4 days of progressive dyspnea with mild cough but no fevers or chills  No increased sputum. No change in bladder or bowel habits  Apparently unable to come off vent during daytime and needing vent all day and sent her for eval. Reports not been on any meds for copd flare up this time\"    Preadmission Environment:   Patient a LTC resident at [place] where she has 24/7 assistance/supervision available. Patient reports that staff assists with all components of ADL/IADL performance, bed mobility, and functional transfer performance. Patient reports that she receives assistance with functional transfer performance and performs either a  position.    Transfer Training     Sit to stand:   Not Tested  Stand to sit:   Not Tested  Bed to/from Chair:  Not Tested   Comments: Not tested due to increased fatigue seen with sitting upright.    Gait gait deferred due to fatigue; pt ambulated 0 ft.       Stair Training deferred, pt unsafe/ not appropriate to complete stairs at this time      Therapeutic Exercises Initiated  deferred secondary to treatment focus on functional mobility  Supine:  N/A      Positioning Needs   Pt in bed and ICU monitoring  Call light provided and all needs within reach  RN aware of pt position/status    Other Activities  N/A    Patient/Family Education   Pt educated on role of inpatient PT, POC, importance of continued activity, DC recommendations, functional transfer/mobility safety, transfer techniques, and HEP.    Assessment  Pt seen today for physical therapy Treatment. Pt demonstrated decreased Activity tolerance, Safety, and Strength as well as decreased independence with Ambulation, Bed Mobility , and Transfers.     Pt required mod encouragement to participate in therapy due to SOB/anxiety surrounding movement, along with pain. Pt able to perform bed mobility with min Ax2 this date, but only remained sitting ~10 seconds before laying self back down and becoming SOB. RN present to suction pt's trach due to pt reporting feeling like she is choking. SpO2 dropped to low 80s when sitting, returned to low 90s upon exit on 10L. Pt would continue to benefit from skilled therapy during LOS to progress mobility as tolerated.       Recommending return to LTC with skilled therapy.    Goals :   To be met in 3 visits:  1). Independent with LE Ex x 10 reps  2). Sit to/from stand: Max A   3). Bed to chair: Max A   4). CHF goal: N/A    To be met in 6 visits:  1).  Supine to/from sit: Mod A   2).  Sit to/from stand: Mod A   3).  Bed to chair: Mod A   4).  Gait: Ambulate  5 ft.   with Max A  and use of rolling walker (RW)  5).  Tolerate B LE

## 2025-01-15 NOTE — PROGRESS NOTES
Reassessment completed, see flowsheets, unchanged from prior. VSS. Pt on and off anxious.     Pt now on trach collar 10L FiO2 60%.   NS 50 ml/hr.      All ICU Lines and monitoring remain in place. Bed locked in lowest position. Call light within reach.

## 2025-01-15 NOTE — PROGRESS NOTES
01/15/25 0305   Patient Observation   Pulse (!) 112   Respirations 23   SpO2 93 %   Breath Sounds   Breath Sounds Bilateral Diminished   Right Upper Lobe Diminished   Right Middle Lobe Diminished   Right Lower Lobe Diminished   Left Upper Lobe Diminished   Left Lower Lobe Diminished   Vent Information   Vent Mode AC/VC   Ventilator Settings   Vt (Set, mL) 400 mL   Resp Rate (Set) 16 bpm   PEEP/CPAP (cmH2O) 5   FiO2  50 %   Vent Patient Data (Readings)   Vt (Measured) 444 mL   Peak Inspiratory Pressure (cmH2O) 23 cmH2O   Rate Measured 33 br/min   Minute Volume (L/min) 12.7 Liters   Peak Inspiratory Flow (lpm) 60 L/sec   Mean Airway Pressure (cmH2O) 12 cmH20   I:E Ratio 1:2.1   Backup Apnea On   Backup Rate 16 Breaths Per Minute   Backup Vt 400   Vent Alarm Settings   High Pressure (cmH2O) 5 cmH2O   Low Minute Volume (lpm) 3 L/min   High Minute Volume (lpm) 28 L/min   Low Exhaled Vt (ml) 200 mL   High Exhaled Vt (ml) 1000 mL   RR High (bpm) 45 br/min   Apnea (secs) 20 secs   Additional Respiratoray Assessments   Humidification Source HME   Circuit Condensation Drained   Ambu Bag With Mask At Bedside Yes   Backup Trachs Available (Size) 4.0   Airway Clearance   Suction Trach   Subglottic Suction Done No   Suction Device Inline suction catheter   Sputum Method Obtained Tracheal   Sputum Amount Scant   Sputum Color/Odor White   Surgical Airway  Shiley Cuffed   No placement date or time found.   Present on Admission/Arrival: Yes  Surgical Airway Type: Tracheostomy  Brand: Shiley  Style: Cuffed  Size: 4   Status Secured   Ties Assessment Clean;Dry;Intact;Secure   Ventilator Associated Pneumonia Bundle   Elevation of Head of Bed to 30-45 Degrees  Yes   Oral Care Completed No   Oral Suctioning No   ETT/Trach Suctioning Yes

## 2025-01-15 NOTE — PROGRESS NOTES
01/14/25 1944   Patient Observation   Pulse (!) 118   Respirations 21   SpO2 97 %   Breath Sounds   Breath Sounds Bilateral Diminished   Right Upper Lobe Diminished   Right Middle Lobe Diminished   Right Lower Lobe Diminished   Left Upper Lobe Diminished   Left Lower Lobe Diminished   Vent Information   Equipment Changed HME   Vent Mode AC/VC   Ventilator Settings   Vt (Set, mL) 400 mL   Resp Rate (Set) 16 bpm   PEEP/CPAP (cmH2O) 5   FiO2  50 %   Vent Patient Data (Readings)   Vt (Measured) 446 mL   Peak Inspiratory Pressure (cmH2O) 21 cmH2O   Rate Measured 31 br/min   Minute Volume (L/min) 12.5 Liters   Peak Inspiratory Flow (lpm) 60 L/sec   Mean Airway Pressure (cmH2O) 9 cmH20   I:E Ratio 1:2.9   Backup Apnea On   Backup Rate 16 Breaths Per Minute   Backup Vt 400   Vent Alarm Settings   High Pressure (cmH2O) 5 cmH2O   Low Minute Volume (lpm) 3 L/min   High Minute Volume (lpm) 28 L/min   Low Exhaled Vt (ml) 200 mL   High Exhaled Vt (ml) 1000 mL   RR High (bpm) 45 br/min   Apnea (secs) 20 secs   Additional Respiratoray Assessments   Humidification Source HME   Circuit Condensation Drained   Ambu Bag With Mask At Bedside Yes   Backup Trachs Available (Size) 4.0   Airway Clearance   Suction Trach   Subglottic Suction Done No   Suction Device Inline suction catheter   Sputum Method Obtained Tracheal   Sputum Amount Small   Sputum Color/Odor White   Sputum Consistency Thin   Surgical Airway  Shiley Cuffed   No placement date or time found.   Present on Admission/Arrival: Yes  Surgical Airway Type: Tracheostomy  Brand: Shiley  Style: Cuffed  Size: 4   Status Secured   Ties Assessment Clean;Dry;Intact;Secure   Cuff Pressure   (MOV)   Spare Trach at Bedside Yes   Spare Trach Tube One Size Smaller at Bedside Yes   Ambu Bag With Mask at Bedside Yes   Ventilator Associated Pneumonia Bundle   Elevation of Head of Bed to 30-45 Degrees  Yes   Oral Care Completed No   Oral Suctioning No   ETT/Trach Suctioning Yes

## 2025-01-15 NOTE — PROGRESS NOTES
SLP completed and stated a FEES is recommended as pt feels water is going the wrong way.     During eval SpO2 dropping to 85% on trach collar 40%, 10L.RT at bedside and increased FiO2 80%.

## 2025-01-15 NOTE — PROGRESS NOTES
Speech Language Pathology  Swallowing Disorders and Dysphagia  Clinical Bedside Swallow Assessment  Facility/Department: [facility] ICU    Instrumentation: Yes. FEES recommended to further evaluate pharyngeal phase and mechanisms (I.e. vocal folds, UES, reflux, LPR)  Diet recommendation: NPO; Ice chips with SLP/RN only (oral care must be completed prior); Meds via alt means of nutrition  Risk management: upright for all intake, stay upright for at least 30 mins after intake, small bites/sips, assist feed, 1:1 supervision with intake, oral care q4 hrs to reduce adverse affects in the event of aspiration, increase physical mobility as able, STRICT aspiration precautions, and hold PO and contact SLP if s/s of aspiration or worsening respiratory status develop.      NAME:Annie Tyler  : 1955 (69 y.o.)   MRN: [id number]  ROOM: [facility]-  ADMISSION DATE: 1/10/2025  Chief Complaint   Patient presents with    Shortness of Breath     The patient presents to the ER from [employer] with reports of difficulty breathing. The patient is on a vent at night  but has required it all day today.      Past Medical History:   Diagnosis Date    Angina pectoris (HCC)     Chronic pain     knees and lower back     Congestive heart failure (HCC) 2024    COPD exacerbation (HCC) 2018    Depression     Essential hypertension 08/15/2022    Panic disorder     Pneumonia     Pulmonary emphysema (HCC)      Past Surgical History:   Procedure Laterality Date    CARDIAC PROCEDURE N/A 2024    Left and right heart cath / coronary angiography performed by Merary Davis MD at [facility] CARDIAC CATH LAB    CHOLECYSTECTOMY      COLONOSCOPY           DATE ONSET: 1/10/2025    Date of Evaluation: 1/15/2025   Evaluating Therapist: HOOD Call    Chart Reviewed: : [x] Yes [] No     Pain: The patient complains of throat pain 7/10 Rn notified    RN Ok'd SLP Entry: Yes [x]  Requested Hold []  RN Name: Charlotte      Data Review:       RECEIVED :  01/10/25 12:37  If child <=2 yrs old please draw pediatric bottle.~Blood Culture 1    Blood Culture 2 [[id number]] Collected: 01/10/25 1220    Order Status: Completed Specimen: Blood Updated: 01/14/25 1315     Culture, Blood 2 No Growth after 4 days of incubation.    Narrative:      ORDER#: [id number]                          ORDERED BY: PATIENCE MARSH  SOURCE: Blood                              COLLECTED:  01/10/25 12:20  ANTIBIOTICS AT [facility].:                      RECEIVED :  01/10/25 12:38  If child <=2 yrs old please draw pediatric bottle.~Blood Culture #2    COVID-19 & Influenza Combo [[id number]] Collected: 01/10/25 1131    Order Status: Completed Specimen: Nasopharyngeal Swab Updated: 01/10/25 1211     SARS-CoV-2 RNA, RT PCR NOT DETECTED     Comment: Not Detected results do not preclude SARS-CoV-2 infection and  should not be used as the sole basis for patient management  decisions.  Results must be combined with clinical observations,  patient history, and epidemiological information.  Testing was performed using DONYA OSBALDO SARS-CoV-2 and Influenza A/B  nucleic acid assay. This test is a multiplex Real-Time Reverse  Transcriptase Polymerase Chain Reaction (RT-PCR)-based in vitro  diagnostic test intended for the qualitative detection of nucleic  acids from SARS-CoV-2, influenza A, and influenza B in nasopharyngeal  and nasal swab specimens for use under the FDA’s Emergency Use  Authorization (EUA) only.    Patient Fact Sheet:  https://www.fda.gov/media/513329/download  Provider Fact Sheet: https://www.fda.gov/media/483491/download  EUA: https://www.fda.gov/media/859216/download  IFU: https://www.fda.gov/media/230145/download    Methodology:  RT-PCR          Influenza A NOT DETECTED     Influenza B NOT DETECTED    RSV Detection [[id number]] Collected: 01/10/25 1131    Order Status: Completed Specimen: Nasopharyngeal Swab Updated: 01/10/25 1504     RSV Rapid Ag Negative    Pneumonia Panel,

## 2025-01-15 NOTE — PROGRESS NOTES
continued activity, DC recommendations, functional transfer/mobility safety, transfer techniques, pursed lip breathing, energy conservation, pacing activity, calling for assist with mobility, and oxygen tubing line management    CHF Education  N/A    Assessment:  Pt seen for occupational therapy followup session in the acute care setting.  Pt limited by decreased activity tolerance, ADLs, cognition, and mobility this date. Making gradual progress toward goals. Pt functioning below baseline and will likely benefit from continued skilled occupational therapy services to maximize safety and independence.     Recommending return to LTC with skilled therapy.    Goal(s) : updated and addressed on 1/15/25  To be met in 3 Visits:  Bed to toilet/BSC:                                                                   Max A   Pt will complete 3/3 CHF goals                                              N/A     To be met in 5 Visits:  Supine to/from Sit in preparation for ADL task:                      SBA  Toileting                                                                                  Min A   Grooming                                                                                SBA  Upper Body Dressing:                                                            SBA  Lower Body Dressing:                                                            Min A   Pt to demonstrate UE therapeutic exs x 15 reps with minimal cues     Rehabilitation Potential: Good  Strengths for achieving goals include: Pt motivated and Pt cooperative   Barriers to achieving goals include:  Complexity of condition and Chronicity of condition     Plan:  To be seen 3-5 x/ week while in acute care setting for therapeutic exercises/activities, functional transfer training, family/patient education, ADL/IADL retraining, energy conservation training, and neuromuscular reeducation ..    Electronically signed by Jennifer Sheehan OT on 1/15/2025 at 3:09  PM      If patient discharges from this facility prior to next visit, this note will serve as the Discharge Summary

## 2025-01-15 NOTE — PROGRESS NOTES
Speech Language Pathology  Speaking Valve Trial  Facility/Department: [facility] ICU      NAME:Annie Tyler  : 1955 (69 y.o.)   MRN: [id number]  ROOM: [address] [place] [address]  ADMISSION DATE: 1/10/2025  PATIENT DIAGNOSIS(ES): Respiratory failure [J96.90]  Acute on chronic respiratory failure [J96.20]  Allergies   Allergen Reactions    Cephalosporins Shortness Of Breath     OK to take aztreonam and meropenem     Penicillins Anaphylaxis, Hives and Other (See Comments)     OK to take aztreonam and meropenem    Hctz [Hydrochlorothiazide] Other (See Comments)     Recurrent low sodium         Evaluating Therapist: HOOD Call    DATE ONSET: 1/10/2025    Chart Reviewed: : [x] Yes [] No     Pain: 7 throat     Referring Physician: Dr Wolf        Date of PMV Trail:TD@     Case Hx:  \"69 y.o. female with pmhx of COPD, chronic hypoxic respiratory failure with tracheostomy, anxiety, takotsubo cardiomyopathy who presented to [facility] ED with complaint of shortness of breath   Pt does use vent at night and 5 L trach collar during daytime, reports 3-4 days of progressive dyspnea with mild cough but no fevers or chills  No increased sputum. No change in bladder or bowel habits  Apparently unable to come off vent during daytime and needing vent all day and sent her for eval. Reports not been on any meds for copd flare up this time     Pt seen mildly anxious on vent , awake, alert and oriented\"     SUBJECTIVE:   Patient seen in room at bedside with RN permission. Pleasant and cooperative.        Valve Used:  (WHITE)  Oxygen:      Trach:   Size: 4  Type: Shiley cuffed  Status: deflated  Pt has tolerated cuff deflation with adequate respiratory support and with stable O2 sats throughout  Additional Comments: Pt state she usually has purple PMV at [facility] but PMV does not appear to be with pt at this time.  Pt states she uses PMV to eat \"sometimes.\" White PMV provided and utilized as it is speaking valvle available at [facility]

## 2025-01-15 NOTE — PROGRESS NOTES
HEART FAILURE CARE PLAN:    Comorbidities Reviewed: Yes   Patient has a past medical history of Angina pectoris (HCC), Chronic pain, Congestive heart failure (HCC), COPD exacerbation (HCC), Depression, Essential hypertension, Panic disorder, Pneumonia, and Pulmonary emphysema (HCC).     Weights Reviewed: Yes   Admission weight: 47 kg (103 lb 9.9 oz)   Wt Readings from Last 3 Encounters:   01/14/25 51 kg (112 lb 7 oz)   11/30/24 61.1 kg (134 lb 11.2 oz)   08/20/24 65.2 kg (143 lb 11.8 oz)     Intake & Output Reviewed: Yes     Intake/Output Summary (Last 24 hours) at 1/14/2025 1934  Last data filed at 1/14/2025 0628  Gross per 24 hour   Intake 1373.47 ml   Output 450 ml   Net 923.47 ml       ECHOCARDIOGRAM Reviewed: Yes   Patient's Ejection Fraction (EF) is greater than 40%     Medications Reviewed: Yes   SCHEDULED HOSPITAL MEDICATIONS:   sulfamethoxazole-trimethoprim  200 mg Oral Q8H    lisinopril  10 mg Oral Daily    levoFLOXacin  500 mg Per G Tube Daily    enoxaparin  30 mg SubCUTAneous Daily    mupirocin   Each Nostril BID    predniSONE  30 mg Oral Daily    aspirin  81 mg Oral Daily    escitalopram  20 mg Oral Nightly    busPIRone  10 mg Oral TID    metoprolol tartrate  12.5 mg Oral BID    atorvastatin  40 mg Oral Nightly    sodium chloride flush  5-40 mL IntraVENous 2 times per day    sodium chloride flush  5-40 mL IntraVENous 2 times per day    ipratropium 0.5 mg-albuterol 2.5 mg  1 Dose Inhalation 4x Daily RT    midazolam  2 mg IntraVENous Once     HOME MEDICATIONS:  Prior to Admission medications    Medication Sig Start Date End Date Taking? Authorizing Provider   traMADol (ULTRAM) 50 MG tablet Take 1 tablet by mouth every 8 hours as needed for Pain.   Yes Provider, MD Joey   aspirin 81 MG chewable tablet Take 1 tablet by mouth daily   Yes ProviderJoey MD   metoprolol succinate (TOPROL XL) 25 MG extended release tablet Take 1 tablet by mouth daily 1/3/25  Yes Bruce Cohn, DO   melatonin 3  Goal of Care this Admission: Reduce shortness of breath, increase activity tolerance, better understand heart failure and disease management, be more comfortable, and reduce lower extremity edema prior to discharge.     Electronically signed by Nirmala Mesa RN on 1/14/2025 at 7:34 PM

## 2025-01-15 NOTE — PROGRESS NOTES
RT Inhaler-Nebulizer Bronchodilator Protocol Note    There is a bronchodilator order in the chart from a provider indicating to follow the RT Bronchodilator Protocol and there is an “Initiate RT Inhaler-Nebulizer Bronchodilator Protocol” order as well (see protocol at bottom of note).    CXR Findings:  No results found.    The findings from the last RT Protocol Assessment were as follows:   History Pulmonary Disease: (P) Chronic pulmonary disease  Respiratory Pattern: (P) Use of accessory muscles, prolonged exhalation, or RR 26-30 bpm  Breath Sounds: (P) Intermittent or unilateral wheezes  Cough: (P) Strong, productive  Indication for Bronchodilator Therapy: (P) Wheezing associated with pulm disorder  Bronchodilator Assessment Score: (P) 13    Aerosolized bronchodilator medication orders have been revised according to the RT Inhaler-Nebulizer Bronchodilator Protocol below.    Respiratory Therapist to perform RT Therapy Protocol Assessment initially then follow the protocol.  Repeat RT Therapy Protocol Assessment PRN for score 0-3 or on second treatment, BID, and PRN for scores above 3.    No Indications - adjust the frequency to every 6 hours PRN wheezing or bronchospasm, if no treatments needed after 48 hours then discontinue using Per Protocol order mode.     If indication present, adjust the RT bronchodilator orders based on the Bronchodilator Assessment Score as indicated below.  Use Inhaler orders unless patient has one or more of the following: on home nebulizer, not able to hold breath for 10 seconds, is not alert and oriented, cannot activate and use MDI correctly, or respiratory rate 25 breaths per minute or more, then use the equivalent nebulizer order(s) with same Frequency and PRN reasons based on the score.  If a patient is on this medication at home then do not decrease Frequency below that used at home.    0-3 - enter or revise RT bronchodilator order(s) to equivalent RT Bronchodilator order with  Frequency of every 4 hours PRN for wheezing or increased work of breathing using Per Protocol order mode.        4-6 - enter or revise RT Bronchodilator order(s) to two equivalent RT bronchodilator orders with one order with BID Frequency and one order with Frequency of every 4 hours PRN wheezing or increased work of breathing using Per Protocol order mode.        7-10 - enter or revise RT Bronchodilator order(s) to two equivalent RT bronchodilator orders with one order with TID Frequency and one order with Frequency of every 4 hours PRN wheezing or increased work of breathing using Per Protocol order mode.       11-13 - enter or revise RT Bronchodilator order(s) to one equivalent RT bronchodilator order with QID Frequency and an Albuterol order with Frequency of every 4 hours PRN wheezing or increased work of breathing using Per Protocol order mode.      Greater than 13 - enter or revise RT Bronchodilator order(s) to one equivalent RT bronchodilator order with every 4 hours Frequency and an Albuterol order with Frequency of every 2 hours PRN wheezing or increased work of breathing using Per Protocol order mode.       Electronically signed by Dallas Delacruz RCP on 1/15/2025 at 8:27 AM

## 2025-01-15 NOTE — PROGRESS NOTES
Pulmonary Progress Note  CC: SOB and pneumonia    Subjective:   TC 40% this am  AC at night   QTc 440       Intake/Output Summary (Last 24 hours) at 1/15/2025 0713  Last data filed at 1/15/2025 0600  Gross per 24 hour   Intake 1831.92 ml   Output 300 ml   Net 1531.92 ml           EXAM: BP (!) 146/87   Pulse (!) 104   Temp 98.2 °F (36.8 °C) (Temporal)   Resp 22   Ht 1.6 m (5' 2.99\")   Wt 53.8 kg (118 lb 9.7 oz)   SpO2 94%   BMI 21.02 kg/m²  on TC   Constitutional:  no acute distress.   Eyes: PERRL. Conjunctivae anicteric.   ENT: Normal nose. Normal tongue.    Neck:  Trach in place and is c/d/i  Respiratory: No accessory muscle usage.  decreased breath sounds. Few wheezes. No rales. Few Rhonchi.  Cardiovascular: Normal S1S2. No digit clubbing. No digit cyanosis. No LE edema.   Psychiatric: No anxiety or Agitation. Alert and Oriented to person, place and time.    Scheduled Meds:   sulfamethoxazole-trimethoprim  200 mg Oral Q8H    lisinopril  10 mg Oral Daily    levoFLOXacin  500 mg Per G Tube Daily    enoxaparin  30 mg SubCUTAneous Daily    mupirocin   Each Nostril BID    predniSONE  30 mg Oral Daily    aspirin  81 mg Oral Daily    escitalopram  20 mg Oral Nightly    busPIRone  10 mg Oral TID    metoprolol tartrate  12.5 mg Oral BID    atorvastatin  40 mg Oral Nightly    sodium chloride flush  5-40 mL IntraVENous 2 times per day    sodium chloride flush  5-40 mL IntraVENous 2 times per day    ipratropium 0.5 mg-albuterol 2.5 mg  1 Dose Inhalation 4x Daily RT    midazolam  2 mg IntraVENous Once     Continuous Infusions:   sodium chloride 50 mL/hr at 01/15/25 0533    sodium chloride      sodium chloride       PRN Meds:  hydrOXYzine HCl, traMADol, sodium chloride flush, sodium chloride, potassium chloride **OR** potassium alternative oral replacement **OR** potassium chloride, sodium chloride flush, sodium chloride, magnesium sulfate, ondansetron **OR** ondansetron, polyethylene glycol, acetaminophen **OR**

## 2025-01-15 NOTE — CARE COORDINATION
Patient is bedhold at [place].   Therapy recs are for skilled at LTC.  No precert needed as patient is straight Medicare. Three night stay completed.

## 2025-01-15 NOTE — PROGRESS NOTES
01/14/25 2303   Patient Observation   Pulse (!) 108   Respirations 24   SpO2 91 %   Breath Sounds   Breath Sounds Bilateral Diminished   Right Upper Lobe Diminished   Right Middle Lobe Diminished   Right Lower Lobe Diminished   Left Upper Lobe Diminished   Left Lower Lobe Diminished   Vent Information   Vent Mode AC/VC   Ventilator Settings   Vt (Set, mL) 400 mL   Resp Rate (Set) 16 bpm   PEEP/CPAP (cmH2O) 5   FiO2  50 %   Vent Patient Data (Readings)   Vt (Measured) 501 mL   Peak Inspiratory Pressure (cmH2O) 21 cmH2O   Rate Measured 30 br/min   Minute Volume (L/min) 11.4 Liters   Peak Inspiratory Flow (lpm) 60 L/sec   Mean Airway Pressure (cmH2O) 11 cmH20   I:E Ratio 1:3   Backup Apnea On   Backup Rate 16 Breaths Per Minute   Backup Vt 400   Vent Alarm Settings   High Pressure (cmH2O) 5 cmH2O   Low Minute Volume (lpm) 3 L/min   High Minute Volume (lpm) 28 L/min   Low Exhaled Vt (ml) 200 mL   High Exhaled Vt (ml) 1000 mL   RR High (bpm) 45 br/min   Apnea (secs) 20 secs   Additional Respiratoray Assessments   Humidification Source HME   Circuit Condensation Drained   Ambu Bag With Mask At Bedside Yes   Backup Trachs Available (Size) 4.0   Surgical Airway  Shiley Cuffed   No placement date or time found.   Present on Admission/Arrival: Yes  Surgical Airway Type: Tracheostomy  Brand: Sascha  Style: Cuffed  Size: 4   Status Secured   Site Assessment Clean;Dry   Site Care Cleansed;Dried;Dressing applied   Inner Cannula Care Changed/new   Ties Assessment Clean;Dry;Intact   Spare Trach at Bedside Yes   Spare Trach Tube One Size Smaller at Bedside Yes   Ambu Bag With Mask at Bedside Yes   Ventilator Associated Pneumonia Bundle   Elevation of Head of Bed to 30-45 Degrees  Yes   Oral Care Completed No   Oral Suctioning No   ETT/Trach Suctioning No

## 2025-01-15 NOTE — FLOWSHEET NOTE
01/15/25 1843 01/15/25 1845 01/15/25 1847   Oxygen Therapy   SpO2 (!) 68 % 98 % 99 %   FiO2  60 % 98 % 80 %   O2 Flow Rate (L/min) 10 L/min 10 L/min 10 L/min       Pt became anxious after a couple ice chips, SOB. Oxygen increased, suctioned with no output. Gave PRN   hydroxyzine per order, see MAR.    Weaning FiO2 down as pt tolerates.

## 2025-01-15 NOTE — PLAN OF CARE
Problem: Chronic Conditions and Co-morbidities  Goal: Patient's chronic conditions and co-morbidity symptoms are monitored and maintained or improved  1/14/2025 1932 by Nirmala Mesa, RN  Outcome: Progressing  1/14/2025 0945 by Gerri Ontiveros, RN  Outcome: Progressing  Flowsheets  Taken 1/14/2025 0358 by Nirmala Mesa, RN  Care Plan - Patient's Chronic Conditions and Co-Morbidity Symptoms are Monitored and Maintained or Improved: Monitor and assess patient's chronic conditions and comorbid symptoms for stability, deterioration, or improvement  Taken 1/13/2025 2010 by Nirmala Mesa, RN  Care Plan - Patient's Chronic Conditions and Co-Morbidity Symptoms are Monitored and Maintained or Improved: Monitor and assess patient's chronic conditions and comorbid symptoms for stability, deterioration, or improvement     Problem: Skin/Tissue Integrity  Goal: Absence of new skin breakdown  Description: 1.  Monitor for areas of redness and/or skin breakdown  2.  Assess vascular access sites hourly  3.  Every 4-6 hours minimum:  Change oxygen saturation probe site  4.  Every 4-6 hours:  If on nasal continuous positive airway pressure, respiratory therapy assess nares and determine need for appliance change or resting period.  Outcome: Progressing     Problem: Safety - Adult  Goal: Free from fall injury  Outcome: Progressing     Problem: Pain  Goal: Verbalizes/displays adequate comfort level or baseline comfort level  Outcome: Progressing

## 2025-01-15 NOTE — PROGRESS NOTES
Shift assessment, completed, see flow sheet. Pt is alert and oriented x 3. Following commands.     Chronic trach #4 Shiley cuffed. St 107, /90 (104), SpO2 93% on Tpiece 10L FiO2 98%. Respirations areeasy currently, but EDGAR and at times at rest. Bilateral lung sounds rhonchi RUL, diminished bilaterally. PEG in place, with TF at 45 mL/hr.     RUE PICC, WNL with NS 50 ml/hr infusing.     Purewick in place and patent, WNL, CLWS.         DISCONTINUED MEDICATIONS:  Current Discharge Medication List          I am the Primary Clinician of Record. Alexis Gordon MD (electronically signed)    (Please note that parts of this dictation were completed with voice recognition software. Quite often unanticipated grammatical, syntax, homophones, and other interpretive errors are inadvertently transcribed by the computer software. Please disregards these errors. Please excuse any errors that have escaped final proofreading.)     Alexis Gordon MD  06/10/24 2448

## 2025-01-16 ENCOUNTER — APPOINTMENT (OUTPATIENT)
Dept: GENERAL RADIOLOGY | Age: 70
End: 2025-01-16
Payer: MEDICARE

## 2025-01-16 PROBLEM — I10 UNCONTROLLED HYPERTENSION: Status: ACTIVE | Noted: 2025-01-16

## 2025-01-16 LAB
ANION GAP SERPL CALCULATED.3IONS-SCNC: 7 MMOL/L (ref 3–16)
BUN SERPL-MCNC: 11 MG/DL (ref 7–20)
CALCIUM SERPL-MCNC: 9 MG/DL (ref 8.3–10.6)
CHLORIDE SERPL-SCNC: 103 MMOL/L (ref 99–110)
CO2 SERPL-SCNC: 34 MMOL/L (ref 21–32)
CREAT SERPL-MCNC: 0.5 MG/DL (ref 0.6–1.2)
DEPRECATED RDW RBC AUTO: 15.4 % (ref 12.4–15.4)
GFR SERPLBLD CREATININE-BSD FMLA CKD-EPI: >90 ML/MIN/{1.73_M2}
GLUCOSE BLD-MCNC: 112 MG/DL (ref 70–99)
GLUCOSE BLD-MCNC: 141 MG/DL (ref 70–99)
GLUCOSE BLD-MCNC: 146 MG/DL (ref 70–99)
GLUCOSE BLD-MCNC: 151 MG/DL (ref 70–99)
GLUCOSE BLD-MCNC: 159 MG/DL (ref 70–99)
GLUCOSE BLD-MCNC: 161 MG/DL (ref 70–99)
GLUCOSE SERPL-MCNC: 124 MG/DL (ref 70–99)
HCT VFR BLD AUTO: 25.5 % (ref 36–48)
HGB BLD-MCNC: 8.1 G/DL (ref 12–16)
MCH RBC QN AUTO: 26.3 PG (ref 26–34)
MCHC RBC AUTO-ENTMCNC: 31.9 G/DL (ref 31–36)
MCV RBC AUTO: 82.5 FL (ref 80–100)
PERFORMED ON: ABNORMAL
PLATELET # BLD AUTO: 416 K/UL (ref 135–450)
PMV BLD AUTO: 8 FL (ref 5–10.5)
POTASSIUM SERPL-SCNC: 4.3 MMOL/L (ref 3.5–5.1)
RBC # BLD AUTO: 3.09 M/UL (ref 4–5.2)
SODIUM SERPL-SCNC: 144 MMOL/L (ref 136–145)
WBC # BLD AUTO: 25 K/UL (ref 4–11)

## 2025-01-16 PROCEDURE — 6370000000 HC RX 637 (ALT 250 FOR IP): Performed by: INTERNAL MEDICINE

## 2025-01-16 PROCEDURE — 2000000000 HC ICU R&B

## 2025-01-16 PROCEDURE — 2700000000 HC OXYGEN THERAPY PER DAY

## 2025-01-16 PROCEDURE — 71045 X-RAY EXAM CHEST 1 VIEW: CPT

## 2025-01-16 PROCEDURE — 94003 VENT MGMT INPAT SUBQ DAY: CPT

## 2025-01-16 PROCEDURE — 76937 US GUIDE VASCULAR ACCESS: CPT

## 2025-01-16 PROCEDURE — 6360000002 HC RX W HCPCS: Performed by: INTERNAL MEDICINE

## 2025-01-16 PROCEDURE — 99233 SBSQ HOSP IP/OBS HIGH 50: CPT | Performed by: INTERNAL MEDICINE

## 2025-01-16 PROCEDURE — 2500000003 HC RX 250 WO HCPCS

## 2025-01-16 PROCEDURE — 80048 BASIC METABOLIC PNL TOTAL CA: CPT

## 2025-01-16 PROCEDURE — 2500000003 HC RX 250 WO HCPCS: Performed by: INTERNAL MEDICINE

## 2025-01-16 PROCEDURE — 92507 TX SP LANG VOICE COMM INDIV: CPT

## 2025-01-16 PROCEDURE — 2580000003 HC RX 258: Performed by: INTERNAL MEDICINE

## 2025-01-16 PROCEDURE — 85027 COMPLETE CBC AUTOMATED: CPT

## 2025-01-16 PROCEDURE — 94761 N-INVAS EAR/PLS OXIMETRY MLT: CPT

## 2025-01-16 PROCEDURE — 94640 AIRWAY INHALATION TREATMENT: CPT

## 2025-01-16 RX ORDER — CARVEDILOL 6.25 MG/1
6.25 TABLET ORAL 2 TIMES DAILY WITH MEALS
Status: DISCONTINUED | OUTPATIENT
Start: 2025-01-16 | End: 2025-01-17 | Stop reason: HOSPADM

## 2025-01-16 RX ORDER — PREDNISONE 20 MG/1
20 TABLET ORAL DAILY
Status: DISCONTINUED | OUTPATIENT
Start: 2025-01-17 | End: 2025-01-17 | Stop reason: HOSPADM

## 2025-01-16 RX ORDER — LORAZEPAM 0.5 MG/1
0.5 TABLET ORAL EVERY 6 HOURS PRN
Status: DISCONTINUED | OUTPATIENT
Start: 2025-01-16 | End: 2025-01-17 | Stop reason: HOSPADM

## 2025-01-16 RX ORDER — ENOXAPARIN SODIUM 100 MG/ML
40 INJECTION SUBCUTANEOUS DAILY
Status: DISCONTINUED | OUTPATIENT
Start: 2025-01-16 | End: 2025-01-17 | Stop reason: HOSPADM

## 2025-01-16 RX ADMIN — Medication 10 ML: at 09:05

## 2025-01-16 RX ADMIN — PREDNISONE 30 MG: 20 TABLET ORAL at 09:04

## 2025-01-16 RX ADMIN — ESCITALOPRAM OXALATE 20 MG: 10 TABLET ORAL at 20:57

## 2025-01-16 RX ADMIN — LEVOFLOXACIN 750 MG: 750 TABLET, FILM COATED ORAL at 09:04

## 2025-01-16 RX ADMIN — ASPIRIN 81 MG: 81 TABLET, CHEWABLE ORAL at 09:04

## 2025-01-16 RX ADMIN — IPRATROPIUM BROMIDE AND ALBUTEROL SULFATE 1 DOSE: 2.5; .5 SOLUTION RESPIRATORY (INHALATION) at 03:01

## 2025-01-16 RX ADMIN — LISINOPRIL 10 MG: 10 TABLET ORAL at 09:04

## 2025-01-16 RX ADMIN — MUPIROCIN: 20 OINTMENT TOPICAL at 09:04

## 2025-01-16 RX ADMIN — LORAZEPAM 0.5 MG: 0.5 TABLET ORAL at 17:20

## 2025-01-16 RX ADMIN — IPRATROPIUM BROMIDE AND ALBUTEROL SULFATE 1 DOSE: 2.5; .5 SOLUTION RESPIRATORY (INHALATION) at 07:24

## 2025-01-16 RX ADMIN — BUSPIRONE HYDROCHLORIDE 10 MG: 10 TABLET ORAL at 20:57

## 2025-01-16 RX ADMIN — BUSPIRONE HYDROCHLORIDE 10 MG: 10 TABLET ORAL at 09:04

## 2025-01-16 RX ADMIN — BUSPIRONE HYDROCHLORIDE 10 MG: 10 TABLET ORAL at 14:27

## 2025-01-16 RX ADMIN — IPRATROPIUM BROMIDE AND ALBUTEROL SULFATE 1 DOSE: 2.5; .5 SOLUTION RESPIRATORY (INHALATION) at 14:54

## 2025-01-16 RX ADMIN — Medication 10 ML: at 21:13

## 2025-01-16 RX ADMIN — Medication 25 ML: at 04:30

## 2025-01-16 RX ADMIN — CARVEDILOL 6.25 MG: 6.25 TABLET, FILM COATED ORAL at 09:04

## 2025-01-16 RX ADMIN — SODIUM CHLORIDE: 9 INJECTION, SOLUTION INTRAVENOUS at 03:59

## 2025-01-16 RX ADMIN — Medication 10 ML: at 21:14

## 2025-01-16 RX ADMIN — ATORVASTATIN CALCIUM 40 MG: 40 TABLET, FILM COATED ORAL at 20:57

## 2025-01-16 RX ADMIN — MUPIROCIN: 20 OINTMENT TOPICAL at 21:13

## 2025-01-16 RX ADMIN — ENOXAPARIN SODIUM 40 MG: 100 INJECTION SUBCUTANEOUS at 09:04

## 2025-01-16 RX ADMIN — CARVEDILOL 6.25 MG: 6.25 TABLET, FILM COATED ORAL at 17:20

## 2025-01-16 RX ADMIN — IPRATROPIUM BROMIDE AND ALBUTEROL SULFATE 1 DOSE: 2.5; .5 SOLUTION RESPIRATORY (INHALATION) at 19:23

## 2025-01-16 RX ADMIN — IPRATROPIUM BROMIDE AND ALBUTEROL SULFATE 1 DOSE: 2.5; .5 SOLUTION RESPIRATORY (INHALATION) at 11:57

## 2025-01-16 RX ADMIN — LORAZEPAM 0.5 MG: 0.5 TABLET ORAL at 11:52

## 2025-01-16 RX ADMIN — LORAZEPAM 0.5 MG: 2 INJECTION INTRAMUSCULAR; INTRAVENOUS at 00:06

## 2025-01-16 ASSESSMENT — PULMONARY FUNCTION TESTS
PIF_VALUE: 17
PIF_VALUE: 22
PIF_VALUE: 26
PIF_VALUE: 25
PIF_VALUE: 19

## 2025-01-16 ASSESSMENT — PAIN SCALES - GENERAL
PAINLEVEL_OUTOF10: 0

## 2025-01-16 NOTE — PROGRESS NOTES
Reassessment completed, see flowsheets, unchanged from prior. VSS.     Pt anxious, gave PRN ativan, see MAR.    All ICU Lines and monitoring remain in place. Bed locked in lowest position. Call light within reach.

## 2025-01-16 NOTE — PROGRESS NOTES
Reassessment completed, see flowsheets, unchanged from prior. VSS.      Remains on trach collar FiO2 60%, 10L     All ICU Lines and monitoring remain in place. Bed locked in lowest position. Call light within reach.

## 2025-01-16 NOTE — PROGRESS NOTES
Shift assessment, completed, see flow sheet. Pt is alert and oriented x 3. Following commands. Restless.     Chronic trach #4 Shiley cuffed. St 103, /79 (101), SpO2 91% on ACVC 16/ 400/ 50%/ 5. Respirations are easy currently, but pt easy SOB, with increased WOB even at rest. Bilateral lung sounds diminished bilaterally.     PEG in place, with TF at 45 mL/hr.      RUE PICC, WNL with NS 50 ml/hr infusing.      Purewick in place and patent, WNL, CLWS.

## 2025-01-16 NOTE — PROGRESS NOTES
Messaged hospitalist about pt's continued anxiety.  's.  RR 30's, Spo2 87-89%.  Pt denies pain, nods head yes to having anxiety.

## 2025-01-16 NOTE — PROGRESS NOTES
01/15/25 1904   Patient Observation   Pulse (!) 127   Respirations 22   SpO2 98 %   Breath Sounds   Breath Sounds Bilateral Diminished   Right Upper Lobe Diminished   Right Middle Lobe Diminished   Right Lower Lobe Diminished   Left Upper Lobe Diminished   Left Lower Lobe Diminished   Vent Information   Vent Mode AC/VC   Ventilator Settings   Vt (Set, mL) 400 mL   Resp Rate (Set) 16 bpm   PEEP/CPAP (cmH2O) 5   FiO2  60 %   Vent Patient Data (Readings)   Vt (Measured) 426 mL   Peak Inspiratory Pressure (cmH2O) 22 cmH2O   Rate Measured 34 br/min   Minute Volume (L/min) 13 Liters   Peak Inspiratory Flow (lpm) 60 L/sec   Mean Airway Pressure (cmH2O) 12 cmH20   I:E Ratio 1:2.7   Backup Apnea On   Backup Rate 16 Breaths Per Minute   Backup Vt 400   Vent Alarm Settings   High Pressure (cmH2O) 5 cmH2O   Low Minute Volume (lpm) 3 L/min   High Minute Volume (lpm) 28 L/min   Low Exhaled Vt (ml) 200 mL   High Exhaled Vt (ml) 1000 mL   RR High (bpm) 45 br/min   Apnea (secs) 20 secs   Additional Respiratoray Assessments   Humidification Source HME   Circuit Condensation Drained   Ambu Bag With Mask At Bedside Yes   Backup Trachs Available (Size) 4.0   Airway Clearance   Suction Trach   Subglottic Suction Done No   Suction Device Inline suction catheter   Sputum Method Obtained Tracheal   Sputum Amount Small   Sputum Color/Odor Clear;Yellow   Sputum Consistency Thick;Thin   Surgical Airway  Shiley Cuffed   No placement date or time found.   Present on Admission/Arrival: Yes  Surgical Airway Type: Tracheostomy  Brand: Shiley  Style: Cuffed  Size: 4   Status Secured   Ties Assessment Clean;Dry;Intact   Cuff Pressure   (MOV)   Spare Trach at Bedside Yes   Spare Trach Tube One Size Smaller at Bedside Yes   Ambu Bag With Mask at Bedside Yes   Ventilator Associated Pneumonia Bundle   Elevation of Head of Bed to 30-45 Degrees  Yes   Oral Care Completed No   Oral Suctioning No   ETT/Trach Suctioning Yes

## 2025-01-16 NOTE — PROGRESS NOTES
[facility] Vascular Access  Ultrasound Guided Peripheral Insertion Procedure Note.     Annie Tyler   Admitted- 1/10/2025 11:16 AM  Admission diagnosis- Respiratory failure [J96.90]  Acute on chronic respiratory failure [J96.20]      Attending Physician- Byron Faulkner MD  Ordering Physician- Dr. Byron Faulkner  Indication for Insertion: Limited Access          USG PIV placed to right basilic vessel using sterile technique. Brisk blood return noted, line flushes with ease. Patient tolerated well. Bed returned to lowest position, call light within reach. See US images below.

## 2025-01-16 NOTE — PROGRESS NOTES
01/16/25 0258   Patient Observation   Pulse 92   Respirations 25   SpO2 94 %   Breath Sounds   Breath Sounds Bilateral Diminished;Expiratory wheezing   Right Upper Lobe Diminished;Expiratory wheezes   Right Middle Lobe Diminished;Expiratory wheezes   Right Lower Lobe Diminished   Left Upper Lobe Diminished;Expiratory wheezes   Left Lower Lobe Diminished   Vent Information   Equipment Changed HME   Vent Mode AC/VC   Ventilator Settings   Vt (Set, mL) 400 mL   Resp Rate (Set) 16 bpm   PEEP/CPAP (cmH2O) 5   FiO2  50 %   Vent Patient Data (Readings)   Vt (Measured) 417 mL   Peak Inspiratory Pressure (cmH2O) 25 cmH2O   Rate Measured 25 br/min   Minute Volume (L/min) 10.4 Liters   Peak Inspiratory Flow (lpm) 55 L/sec   Mean Airway Pressure (cmH2O) 10 cmH20   I:E Ratio 1:1.8   Backup Apnea On   Backup Rate 16 Breaths Per Minute   Backup Vt 400   Vent Alarm Settings   High Pressure (cmH2O) 5 cmH2O   Low Minute Volume (lpm) 3 L/min   High Minute Volume (lpm) 28 L/min   Low Exhaled Vt (ml) 200 mL   High Exhaled Vt (ml) 1000 mL   RR High (bpm) 45 br/min   Apnea (secs) 20 secs   Additional Respiratoray Assessments   Humidification Source HME   Ambu Bag With Mask At Bedside Yes   Backup Trachs Available (Size) 4.0   Surgical Airway  Shiley Cuffed   No placement date or time found.   Present on Admission/Arrival: Yes  Surgical Airway Type: Tracheostomy  Brand: Sascha  Style: Cuffed  Size: 4   Status Secured   Ties Assessment Clean;Dry;Intact   Spare Trach at Bedside Yes   Spare Trach Tube One Size Smaller at Bedside Yes   Ambu Bag With Mask at Bedside Yes   Ventilator Associated Pneumonia Bundle   Elevation of Head of Bed to 30-45 Degrees  Yes   Oral Care Completed No   Oral Suctioning No   ETT/Trach Suctioning No

## 2025-01-16 NOTE — PROGRESS NOTES
RT Inhaler-Nebulizer Bronchodilator Protocol Note    There is a bronchodilator order in the chart from a provider indicating to follow the RT Bronchodilator Protocol and there is an “Initiate RT Inhaler-Nebulizer Bronchodilator Protocol” order as well (see protocol at bottom of note).    CXR Findings:  No results found.    The findings from the last RT Protocol Assessment were as follows:   History Pulmonary Disease: (P) Chronic pulmonary disease  Respiratory Pattern: (P) Use of accessory muscles, prolonged exhalation, or RR 26-30 bpm  Breath Sounds: (P) Intermittent or unilateral wheezes  Cough: (P) Strong, productive  Indication for Bronchodilator Therapy: (P) Decreased or absent breath sounds, Wheezing associated with pulm disorder  Bronchodilator Assessment Score: (P) 13    Aerosolized bronchodilator medication orders have been revised according to the RT Inhaler-Nebulizer Bronchodilator Protocol below.    Respiratory Therapist to perform RT Therapy Protocol Assessment initially then follow the protocol.  Repeat RT Therapy Protocol Assessment PRN for score 0-3 or on second treatment, BID, and PRN for scores above 3.    No Indications - adjust the frequency to every 6 hours PRN wheezing or bronchospasm, if no treatments needed after 48 hours then discontinue using Per Protocol order mode.     If indication present, adjust the RT bronchodilator orders based on the Bronchodilator Assessment Score as indicated below.  Use Inhaler orders unless patient has one or more of the following: on home nebulizer, not able to hold breath for 10 seconds, is not alert and oriented, cannot activate and use MDI correctly, or respiratory rate 25 breaths per minute or more, then use the equivalent nebulizer order(s) with same Frequency and PRN reasons based on the score.  If a patient is on this medication at home then do not decrease Frequency below that used at home.    0-3 - enter or revise RT bronchodilator order(s) to

## 2025-01-16 NOTE — PROGRESS NOTES
Pure wick leaked, brief changed, lionel care given.  Pt tolerated well. Denies pain. Oral care given. TV turned on per pt request.  Call light in reach.   Pt remain tachypneic.  Spo2 92%

## 2025-01-16 NOTE — PROGRESS NOTES
RT Inhaler-Nebulizer Bronchodilator Protocol Note    There is a bronchodilator order in the chart from a provider indicating to follow the RT Bronchodilator Protocol and there is an “Initiate RT Inhaler-Nebulizer Bronchodilator Protocol” order as well (see protocol at bottom of note).    CXR Findings:  No results found.    The findings from the last RT Protocol Assessment were as follows:   History Pulmonary Disease: Chronic pulmonary disease  Respiratory Pattern: Severe use of accessory muscle or RR>30 bpm  Breath Sounds: Intermittent or unilateral wheezes  Cough: Strong, productive  Indication for Bronchodilator Therapy: Wheezing associated with pulm disorder  Bronchodilator Assessment Score: 15    Aerosolized bronchodilator medication orders have been revised according to the RT Inhaler-Nebulizer Bronchodilator Protocol below.    Respiratory Therapist to perform RT Therapy Protocol Assessment initially then follow the protocol.  Repeat RT Therapy Protocol Assessment PRN for score 0-3 or on second treatment, BID, and PRN for scores above 3.    No Indications - adjust the frequency to every 6 hours PRN wheezing or bronchospasm, if no treatments needed after 48 hours then discontinue using Per Protocol order mode.     If indication present, adjust the RT bronchodilator orders based on the Bronchodilator Assessment Score as indicated below.  Use Inhaler orders unless patient has one or more of the following: on home nebulizer, not able to hold breath for 10 seconds, is not alert and oriented, cannot activate and use MDI correctly, or respiratory rate 25 breaths per minute or more, then use the equivalent nebulizer order(s) with same Frequency and PRN reasons based on the score.  If a patient is on this medication at home then do not decrease Frequency below that used at home.    0-3 - enter or revise RT bronchodilator order(s) to equivalent RT Bronchodilator order with Frequency of every 4 hours PRN for wheezing or  increased work of breathing using Per Protocol order mode.        4-6 - enter or revise RT Bronchodilator order(s) to two equivalent RT bronchodilator orders with one order with BID Frequency and one order with Frequency of every 4 hours PRN wheezing or increased work of breathing using Per Protocol order mode.        7-10 - enter or revise RT Bronchodilator order(s) to two equivalent RT bronchodilator orders with one order with TID Frequency and one order with Frequency of every 4 hours PRN wheezing or increased work of breathing using Per Protocol order mode.       11-13 - enter or revise RT Bronchodilator order(s) to one equivalent RT bronchodilator order with QID Frequency and an Albuterol order with Frequency of every 4 hours PRN wheezing or increased work of breathing using Per Protocol order mode.      Greater than 13 - enter or revise RT Bronchodilator order(s) to one equivalent RT bronchodilator order with every 4 hours Frequency and an Albuterol order with Frequency of every 2 hours PRN wheezing or increased work of breathing using Per Protocol order mode.       Electronically signed by Wendi Stover RCP on 1/15/2025 at 7:07 PM

## 2025-01-16 NOTE — PROGRESS NOTES
Pt more restful.  , Resp rate remains elevated at times.  Pt denies pain.  Assisted to reposition.  Pt turns self.

## 2025-01-16 NOTE — PROGRESS NOTES
0.5 mg Ativan given for anxiety.  Pt awake, resp rate 32, , Spo2 92%.  /103.  Talked with pt. Tried to get her to slow her breathing down.  Pt nods no.

## 2025-01-16 NOTE — PROGRESS NOTES
Pt restless, anxious, SpO2 84%. Asking to be placed back on ventilator.   Gave PRn ativan per order, see MAR. RT called and placed pt back on ACVC

## 2025-01-16 NOTE — PROGRESS NOTES
Care rounds completed with Dr. Wolf and multidisciplinary team. Reviewed labs, meds, VS, assessment, & plan of care for today. See dictated note and new orders for details.     Decrease prednisone 20 mg  Increase water flushes 100 ml q3h

## 2025-01-16 NOTE — PROGRESS NOTES
IM Progress Note    Admit Date:  1/10/2025  6    Interval history:  chronic resp failure on vent at night and 5 L in daytime      Subjective:  Ms. Tyler seen up in bed, did well on vent overnight ,   Currently on trach collar this am with some dyspnea  Anxiety issues overnight noted  Kept NPO   No new fevers  Na improved, Wbc remains coming down.  Mentation stable    1/14-subjectively better.  Culture growing Pseudomonas and stenotrophomonas maltophilia.  Rare growth.  Bicycle improving but still elevated.  No fever.  Mildly tachycardic.    1/15-she is mildly tachycardic.  On trach collar this a.m.  Ventilator at night.  Awake and alert.      1/16- extremely anxious. WBC is up. Still tachycardia. Having trouble with swallowing.       Objective:   BP (!) 160/79   Pulse (!) 103   Temp 97.7 °F (36.5 °C) (Temporal)   Resp 16   Ht 1.6 m (5' 2.99\")   Wt 57.8 kg (127 lb 6.8 oz)   SpO2 91%   BMI 22.58 kg/m²     Intake/Output Summary (Last 24 hours) at 1/16/2025 0856  Last data filed at 1/16/2025 0522  Gross per 24 hour   Intake 1864.2 ml   Output 300 ml   Net 1564.2 ml       Physical Exam:    General elderly female on trach collar this am /anxious  Anxious,  mild  resp distress  Tracheostomy Appears to be not in any distress  Mucous Membranes:  Pink , anicteric  Neck: No JVD, no carotid bruit, no thyromegaly  Chest: chronic accessory muscle use +  diminished  javier air entry with no wheeze   Cardiovascular: Tachycardia.  Regular rhythm.  S1S2 heard, no murmurs or gallops  Abdomen:  Soft, undistended, non tender, no organomegaly, BS present, PEG noted  Extremities: No edema or cyanosis. Distal pulses well felt  Neurological : grossly normal  Non focal with some anxiety     Medications:   Scheduled Medications:    carvedilol  6.25 mg Per G Tube BID     enoxaparin  40 mg SubCUTAneous Daily    levoFLOXacin  750 mg Per G Tube Daily    sulfamethoxazole-trimethoprim  200 mg Oral Q8H    lisinopril  10 mg Oral Daily     A, and influenza B in nasopharyngeal  and nasal swab specimens for use under the FDA’s Emergency Use  Authorization (EUA) only.    Patient Fact Sheet:  https://www.fda.gov/media/294343/download  Provider Fact Sheet: https://www.fda.gov/media/102207/download  EUA: https://www.fda.gov/media/696030/download  IFU: https://www.fda.gov/media/287341/download    Methodology:  RT-PCR          Influenza A NOT DETECTED     Influenza B NOT DETECTED    RSV Detection [[id number]] Collected: 01/10/25 1131    Order Status: Completed Specimen: Nasopharyngeal Swab Updated: 01/10/25 1504     RSV Rapid Ag Negative    Pneumonia Panel, Molecular [[id number]] Collected: 01/10/25 0000    Order Status: Canceled Specimen: Sputum, Suctioned              Bronch    There were tan secretions scattered throughout the airways and therapeutic aspiration was completed..  Washings were obtained throughout the airways.  A Bronchoalveolar lavage was obtained from the Lingula with good return.        Assessment & Plan:      06/26/24     ECHO (TTE) LIMITED        Left Ventricle: Moderately reduced left ventricular systolic function with a visually estimated EF of 40 ± 5%. Mild hypokinesis of the following segments: basal anterior. Moderate hypokinesis of the following segments: basal inferior. Severe hypokinesis of the following segments: mid anterior, mid anterolateral, mid anteroseptal, mid inferior, mid inferolateral, mid inferoseptal and apical anterior. Akinesis of the following segments: apical lateral, apical septal and apical inferior. Hyperkinesis of the following segments: basal anterolateral, basal anteroseptal, basal inferolateral and basal inferoseptal.  Differential most certainly includes ischemic cardiomyopathy and Takotsubo cardiomyopathy.    Image quality is adequate. Technically difficult study due to low parasternal window.        ASSESMENT & PLAN:         #Acute on Chronic Hypoxic Respiratory Failure with underlying chronic trach

## 2025-01-16 NOTE — PROGRESS NOTES
Shift assessment completed.  Pt awake, anxious at times.  Resp rate 28.  Pt trach/vent.  AC/VC 16, 400, 60%, peep 5. Spo2 98%.  Pt denies pain.  Call light in reach.

## 2025-01-16 NOTE — PROGRESS NOTES
JUAN CARLOSE PICC removed s/p having US PIV placed.   PICC length, 40. Pressure held. Pt tolerated well.

## 2025-01-16 NOTE — PLAN OF CARE
Problem: Chronic Conditions and Co-morbidities  Goal: Patient's chronic conditions and co-morbidity symptoms are monitored and maintained or improved  Outcome: Progressing  Flowsheets (Taken 1/14/2025 1950 by Nirmala Mesa RN)  Care Plan - Patient's Chronic Conditions and Co-Morbidity Symptoms are Monitored and Maintained or Improved: Monitor and assess patient's chronic conditions and comorbid symptoms for stability, deterioration, or improvement     Problem: Discharge Planning  Goal: Discharge to home or other facility with appropriate resources  Outcome: Progressing     Problem: Skin/Tissue Integrity  Goal: Absence of new skin breakdown  Description: 1.  Monitor for areas of redness and/or skin breakdown  2.  Assess vascular access sites hourly  3.  Every 4-6 hours minimum:  Change oxygen saturation probe site  4.  Every 4-6 hours:  If on nasal continuous positive airway pressure, respiratory therapy assess nares and determine need for appliance change or resting period.  Outcome: Progressing  Note: No breakdown noted.      Problem: Safety - Adult  Goal: Free from fall injury  Outcome: Progressing

## 2025-01-16 NOTE — PROGRESS NOTES
Pt remains incredibly anxious/restless s/p being placed on ventilator by RT and PRN atarax. HR 130s, RR 30s. Perfect serve to Dr. Faulkner and new order for x1 0.5 mg ativan IV, see  MAR.

## 2025-01-16 NOTE — PROGRESS NOTES
4 Eyes Skin Assessment     NAME:  Annie Tyler  YOB: 1955  MEDICAL RECORD NUMBER:  [id number]    The patient is being assessed for  Shift Handoff    I agree that at least one RN has performed a thorough Head to Toe Skin Assessment on the patient. ALL assessment sites listed below have been assessed.      Areas assessed by both nurses:    Head, Face, Ears, Shoulders, Back, Chest, Arms, Elbows, Hands, Sacrum. Buttock, Coccyx, Ischium, Legs. Feet and Heels, and Under Medical Devices         Does the Patient have a Wound? No noted wound(s)       William Prevention initiated by RN: Yes  Wound Care Orders initiated by RN: No    Pressure Injury (Stage 3,4, Unstageable, DTI, NWPT, and Complex wounds) if present, place Wound referral order by RN under : No    New Ostomies, if present place, Ostomy referral order under : No     Nurse 1 eSignature: Electronically signed by Edna Starks RN on 1/16/25 at 1:44 AM EST    **SHARE this note so that the co-signing nurse can place an eSignature**    Nurse 2 eSignature: Electronically signed by Zandra Womack RN on 1/16/25 at 1:46 AM EST

## 2025-01-16 NOTE — PROGRESS NOTES
Speech Language Pathology  Speaking Valve Follow-Up  Facility/Department: [facility] [place] [facility]      NAME:Annie Tyler  : 1955 (69 y.o.)   MRN: [id number]  ROOM: [facility]3006-01  ADMISSION DATE: 1/10/2025  PATIENT DIAGNOSIS(ES): Respiratory failure [J96.90]  Acute on chronic respiratory failure [J96.20]  Allergies   Allergen Reactions    Cephalosporins Shortness Of Breath     OK to take aztreonam and meropenem     Penicillins Anaphylaxis, Hives and Other (See Comments)     OK to take aztreonam and meropenem    Hctz [Hydrochlorothiazide] Other (See Comments)     Recurrent low sodium       DATE ONSET: admit 01/10/2025    Pain: No complaints    Date of PMV Trail: 1/15; Today is first f/u    Valve Used:  White  Trach type/size:Shiley #4  Oxygen: FiO2: 60%    Start Time:0835  End Time: 0850    RT present and suctions and changes inner cannula      1. Baseline Measures O2:91  HR:113  RR:19-23  Color:  pink  Effort: no perceived increase in WOB after removal from vent       2. Deflate Cuff  Amount of air withdrawn: completed by RT  when changed over to trach collar     3. Suction trachea and oral cavity Amount/color of secretions: no secretions expectorated    Strength of cough: weak, uncoordinated inhalation for build up of explosive explosion of air/inability to build subglottic pressure in setting of open system or with finger occlusion       4. Place Speaking Valve Time Placed: attempted 0837 but pt unable to voice with finger occulusion, therefore PMV not placed       5. Suction Orally  Secretions:N/A       6.  Repeat Measures O2: 83  HR:110  RR:21  Color:pink  Effort:increased work of breathing with finger occlusion therefore PMV not placed       7.  Cue Voicing  Vocal Quality: unable to achieve voice with finger occlusion; PMV again not placed         8. Repeat Measures:    O2: 91  HR:111  RR: 23  Color: pink  Effort: increased WOB         9.  Remove Valve  N/A Time Removed:       10. Inflate Cuff Amount of air    Continued Dysphagia treatment with goals per plan of care.    Discharge Recommendations: SLP at discharge is pending clinical progression    If pt discharges from hospital prior to Speech/Swallowing discharge, this note serves as tx and discharge summary.     Total Treatment Time / Charges     Time in Time out Total Time / units   Cognitive Tx         Speech Tx 0890 8856 19 min/1 unit   Dysphagia Tx        Signature:  Emily Herrera M.A. SLP  Speech-Language Pathologist  [facility] #[id number]  [phone]

## 2025-01-16 NOTE — PROGRESS NOTES
Pulmonary Progress Note  CC: SOB and pneumonia    Subjective:   TC 40% this am  AC at night   QTc 438 this am   High WBC   No fever   Small creamy secretions       Intake/Output Summary (Last 24 hours) at 1/16/2025 0733  Last data filed at 1/16/2025 0522  Gross per 24 hour   Intake 2233.63 ml   Output 500 ml   Net 1733.63 ml           EXAM: /64   Pulse 99   Temp 98.7 °F (37.1 °C) (Axillary)   Resp 25   Ht 1.6 m (5' 2.99\")   Wt 57.8 kg (127 lb 6.8 oz)   SpO2 95%   BMI 22.58 kg/m²  on TC   Constitutional:  no acute distress.   Eyes: PERRL. Conjunctivae anicteric.   ENT: Normal nose. Normal tongue.    Neck:  Trach in place and is c/d/i  Respiratory: No accessory muscle usage.  decreased breath sounds. Few wheezes. No rales. Few Rhonchi.  Cardiovascular: Normal S1S2. No digit clubbing. No digit cyanosis. No LE edema.   Psychiatric: No anxiety or Agitation. Alert and Oriented to person, place and time.    Scheduled Meds:   metoprolol succinate  50 mg Oral Daily    levoFLOXacin  750 mg Per G Tube Daily    sulfamethoxazole-trimethoprim  200 mg Oral Q8H    lisinopril  10 mg Oral Daily    enoxaparin  30 mg SubCUTAneous Daily    mupirocin   Each Nostril BID    predniSONE  30 mg Oral Daily    aspirin  81 mg Oral Daily    escitalopram  20 mg Oral Nightly    busPIRone  10 mg Oral TID    atorvastatin  40 mg Oral Nightly    sodium chloride flush  5-40 mL IntraVENous 2 times per day    sodium chloride flush  5-40 mL IntraVENous 2 times per day    ipratropium 0.5 mg-albuterol 2.5 mg  1 Dose Inhalation 4x Daily RT    midazolam  2 mg IntraVENous Once     Continuous Infusions:   sodium chloride 50 mL/hr at 01/16/25 0522    sodium chloride      sodium chloride       PRN Meds:  LORazepam, hydrOXYzine HCl, traMADol, sodium chloride flush, sodium chloride, potassium chloride **OR** potassium alternative oral replacement **OR** potassium chloride, sodium chloride flush, sodium chloride, magnesium sulfate, ondansetron **OR**

## 2025-01-17 ENCOUNTER — TELEPHONE (OUTPATIENT)
Dept: PULMONOLOGY | Age: 70
End: 2025-01-17

## 2025-01-17 VITALS
TEMPERATURE: 97.5 F | DIASTOLIC BLOOD PRESSURE: 60 MMHG | RESPIRATION RATE: 25 BRPM | HEART RATE: 93 BPM | SYSTOLIC BLOOD PRESSURE: 104 MMHG | HEIGHT: 63 IN | OXYGEN SATURATION: 94 % | BODY MASS INDEX: 22.58 KG/M2 | WEIGHT: 127.43 LBS

## 2025-01-17 LAB
ACANTHOCYTES BLD QL SMEAR: ABNORMAL
ANION GAP SERPL CALCULATED.3IONS-SCNC: 9 MMOL/L (ref 3–16)
ANISOCYTOSIS BLD QL SMEAR: ABNORMAL
BASOPHILS # BLD: 0 K/UL (ref 0–0.2)
BASOPHILS NFR BLD: 0 %
BUN SERPL-MCNC: 10 MG/DL (ref 7–20)
BURR CELLS BLD QL SMEAR: ABNORMAL
CALCIUM SERPL-MCNC: 8.9 MG/DL (ref 8.3–10.6)
CHLORIDE SERPL-SCNC: 95 MMOL/L (ref 99–110)
CO2 SERPL-SCNC: 30 MMOL/L (ref 21–32)
CREAT SERPL-MCNC: 0.4 MG/DL (ref 0.6–1.2)
DEPRECATED RDW RBC AUTO: 16.1 % (ref 12.4–15.4)
EOSINOPHIL # BLD: 0.3 K/UL (ref 0–0.6)
EOSINOPHIL NFR BLD: 1 %
GFR SERPLBLD CREATININE-BSD FMLA CKD-EPI: >90 ML/MIN/{1.73_M2}
GLUCOSE BLD-MCNC: 100 MG/DL (ref 70–99)
GLUCOSE BLD-MCNC: 136 MG/DL (ref 70–99)
GLUCOSE BLD-MCNC: 144 MG/DL (ref 70–99)
GLUCOSE SERPL-MCNC: 125 MG/DL (ref 70–99)
HCT VFR BLD AUTO: 28.1 % (ref 36–48)
HGB BLD-MCNC: 9 G/DL (ref 12–16)
HYPOCHROMIA BLD QL SMEAR: ABNORMAL
LYMPHOCYTES # BLD: 1.3 K/UL (ref 1–5.1)
LYMPHOCYTES NFR BLD: 5 %
MCH RBC QN AUTO: 27.5 PG (ref 26–34)
MCHC RBC AUTO-ENTMCNC: 32.1 G/DL (ref 31–36)
MCV RBC AUTO: 85.5 FL (ref 80–100)
MONOCYTES # BLD: 1.8 K/UL (ref 0–1.3)
MONOCYTES NFR BLD: 7 %
NEUTROPHILS # BLD: 22 K/UL (ref 1.7–7.7)
NEUTROPHILS NFR BLD: 86 %
NEUTS BAND NFR BLD MANUAL: 1 % (ref 0–7)
OVALOCYTES BLD QL SMEAR: ABNORMAL
PERFORMED ON: ABNORMAL
PLATELET # BLD AUTO: 352 K/UL (ref 135–450)
PLATELET BLD QL SMEAR: ADEQUATE
PMV BLD AUTO: 7.8 FL (ref 5–10.5)
POIKILOCYTOSIS BLD QL SMEAR: ABNORMAL
POLYCHROMASIA BLD QL SMEAR: ABNORMAL
POTASSIUM SERPL-SCNC: 4.5 MMOL/L (ref 3.5–5.1)
RBC # BLD AUTO: 3.28 M/UL (ref 4–5.2)
SLIDE REVIEW: ABNORMAL
SODIUM SERPL-SCNC: 134 MMOL/L (ref 136–145)
WBC # BLD AUTO: 25.3 K/UL (ref 4–11)

## 2025-01-17 PROCEDURE — 80048 BASIC METABOLIC PNL TOTAL CA: CPT

## 2025-01-17 PROCEDURE — 6360000002 HC RX W HCPCS: Performed by: INTERNAL MEDICINE

## 2025-01-17 PROCEDURE — 2500000003 HC RX 250 WO HCPCS

## 2025-01-17 PROCEDURE — 94761 N-INVAS EAR/PLS OXIMETRY MLT: CPT

## 2025-01-17 PROCEDURE — 2500000003 HC RX 250 WO HCPCS: Performed by: INTERNAL MEDICINE

## 2025-01-17 PROCEDURE — 2700000000 HC OXYGEN THERAPY PER DAY

## 2025-01-17 PROCEDURE — 6370000000 HC RX 637 (ALT 250 FOR IP): Performed by: INTERNAL MEDICINE

## 2025-01-17 PROCEDURE — 85025 COMPLETE CBC W/AUTO DIFF WBC: CPT

## 2025-01-17 PROCEDURE — 99233 SBSQ HOSP IP/OBS HIGH 50: CPT | Performed by: INTERNAL MEDICINE

## 2025-01-17 PROCEDURE — 94003 VENT MGMT INPAT SUBQ DAY: CPT

## 2025-01-17 PROCEDURE — 94640 AIRWAY INHALATION TREATMENT: CPT

## 2025-01-17 PROCEDURE — 36415 COLL VENOUS BLD VENIPUNCTURE: CPT

## 2025-01-17 PROCEDURE — 99239 HOSP IP/OBS DSCHRG MGMT >30: CPT | Performed by: INTERNAL MEDICINE

## 2025-01-17 RX ORDER — LEVOFLOXACIN 750 MG/1
750 TABLET, FILM COATED ORAL DAILY
Qty: 11 TABLET | Refills: 0
Start: 2025-01-18 | End: 2025-01-29

## 2025-01-17 RX ORDER — LISINOPRIL 10 MG/1
10 TABLET ORAL DAILY
Qty: 30 TABLET | Refills: 3
Start: 2025-01-18

## 2025-01-17 RX ORDER — PREDNISONE 10 MG/1
10 TABLET ORAL DAILY
Qty: 5 TABLET | Refills: 0
Start: 2025-01-17 | End: 2025-01-22

## 2025-01-17 RX ORDER — TRAMADOL HYDROCHLORIDE 50 MG/1
50 TABLET ORAL EVERY 8 HOURS PRN
Qty: 9 TABLET | Refills: 0 | Status: SHIPPED | OUTPATIENT
Start: 2025-01-17 | End: 2025-01-20

## 2025-01-17 RX ORDER — POLYETHYLENE GLYCOL 3350 17 G/17G
17 POWDER, FOR SOLUTION ORAL DAILY PRN
Qty: 527 G | Refills: 1
Start: 2025-01-17

## 2025-01-17 RX ORDER — CARVEDILOL 6.25 MG/1
6.25 TABLET ORAL 2 TIMES DAILY WITH MEALS
Qty: 60 TABLET | Refills: 3
Start: 2025-01-17

## 2025-01-17 RX ORDER — LORAZEPAM 0.5 MG/1
0.5 TABLET ORAL EVERY 6 HOURS PRN
Qty: 12 TABLET | Refills: 0 | Status: SHIPPED | OUTPATIENT
Start: 2025-01-17 | End: 2025-01-20

## 2025-01-17 RX ADMIN — LORAZEPAM 0.5 MG: 0.5 TABLET ORAL at 00:21

## 2025-01-17 RX ADMIN — IPRATROPIUM BROMIDE AND ALBUTEROL SULFATE 1 DOSE: 2.5; .5 SOLUTION RESPIRATORY (INHALATION) at 11:48

## 2025-01-17 RX ADMIN — IPRATROPIUM BROMIDE AND ALBUTEROL SULFATE 1 DOSE: 2.5; .5 SOLUTION RESPIRATORY (INHALATION) at 08:00

## 2025-01-17 RX ADMIN — CARVEDILOL 6.25 MG: 6.25 TABLET, FILM COATED ORAL at 08:48

## 2025-01-17 RX ADMIN — LISINOPRIL 10 MG: 10 TABLET ORAL at 08:48

## 2025-01-17 RX ADMIN — BUSPIRONE HYDROCHLORIDE 10 MG: 10 TABLET ORAL at 08:48

## 2025-01-17 RX ADMIN — LORAZEPAM 0.5 MG: 0.5 TABLET ORAL at 08:48

## 2025-01-17 RX ADMIN — LEVOFLOXACIN 750 MG: 750 TABLET, FILM COATED ORAL at 08:48

## 2025-01-17 RX ADMIN — PREDNISONE 20 MG: 20 TABLET ORAL at 08:48

## 2025-01-17 RX ADMIN — ENOXAPARIN SODIUM 40 MG: 100 INJECTION SUBCUTANEOUS at 08:47

## 2025-01-17 RX ADMIN — Medication 10 ML: at 08:49

## 2025-01-17 RX ADMIN — MUPIROCIN: 20 OINTMENT TOPICAL at 08:48

## 2025-01-17 RX ADMIN — ASPIRIN 81 MG: 81 TABLET, CHEWABLE ORAL at 08:48

## 2025-01-17 RX ADMIN — Medication 10 ML: at 08:48

## 2025-01-17 ASSESSMENT — PAIN SCALES - GENERAL: PAINLEVEL_OUTOF10: 0

## 2025-01-17 ASSESSMENT — PULMONARY FUNCTION TESTS
PIF_VALUE: 28
PIF_VALUE: 21
PIF_VALUE: 23

## 2025-01-17 NOTE — PROGRESS NOTES
Shift assessment, completed, see flow sheet. Pt is alert and oriented x 3. Following commands. Restless.     Chronic trach #4 Shiley cuffed. SR 98, /75 (91), SpO2 93% on ACVC 16/ 400/ 50%/ 5. Respirations are easy currently, but pt easy SOB, with increased WOB even at rest. Bilateral lung sounds diminished bilaterally.      PEG in place, with TF at 45 mL/hr.      2 PIVs, WNL, saline locked.      Purewick in place and patent, WNL, CLWS.

## 2025-01-17 NOTE — PROGRESS NOTES
01/16/25 1925   Patient Observation   Pulse 85   Respirations 19   SpO2 93 %   Patient Observations Shiley 4   Breath Sounds   Respiratory Pattern Tachypneic   Breath Sounds Bilateral Diminished   Vent Information   Vent Mode AC/VC   Ventilator Settings   Vt (Set, mL) 400 mL   Resp Rate (Set) 16 bpm   PEEP/CPAP (cmH2O) 5   FiO2  50 %   Vent Patient Data (Readings)   Vt (Measured) 412 mL   Peak Inspiratory Pressure (cmH2O) 26 cmH2O   Rate Measured 20 br/min   Minute Volume (L/min) 8.54 Liters   Peak Inspiratory Flow (lpm) 55 L/sec   Mean Airway Pressure (cmH2O) 11 cmH20   I:E Ratio 1:2.3   Backup Apnea On   Vent Alarm Settings   High Pressure (cmH2O) 60 cmH2O   Low Minute Volume (lpm) 3 L/min   High Minute Volume (lpm) 28 L/min   Low Exhaled Vt (ml) 200 mL   High Exhaled Vt (ml) 1000 mL   RR High (bpm) 45 br/min   Apnea (secs) 20 secs   Additional Respiratoray Assessments   Humidification Source HME   Circuit Condensation Drained   Ambu Bag With Mask At Bedside Yes   Backup Trachs Available (Size) 4.0   Airway Clearance   Suction Device Inline suction catheter   Sputum Method Obtained Tracheal   Sputum Amount Small   Sputum Color/Odor Yellow   Sputum Consistency Thick   Surgical Airway  Shiley Cuffed   No placement date or time found.   Present on Admission/Arrival: Yes  Surgical Airway Type: Tracheostomy  Brand: Sascha  Style: Cuffed  Size: 4   Status Secured   Treatment   $Treatment Type $Inhaled Therapy/Meds

## 2025-01-17 NOTE — DISCHARGE SUMMARY
Name:  Annie Tyler  Room:  3006/3006-01  MRN:    [id number]    Discharge Summary      This discharge summary is in conjunction with a complete physical exam done on the day of discharge.      Discharging Physician: FRANCISCO ASENCIO MD      Admit: 1/10/2025  Discharge:  1/17/2025     Diagnoses this Admission    Principal Problem:    Respiratory failure  Active Problems:    Acute on chronic respiratory failure    Moderate protein-calorie malnutrition (HCC)    VAP (ventilator-associated pneumonia) (HCC)    Pneumonia due to infectious organism    Septicemia (HCC)    End stage COPD (HCC)    Uncontrolled hypertension  Resolved Problems:    * No resolved hospital problems. *      Procedures (Please Review Full Report for Details)  Mechanical ventilation  PICC line placed and removed    Consults    IP CONSULT TO PULMONOLOGY  IP CONSULT TO CRITICAL CARE  IP CONSULT TO PHARMACY  IP CONSULT TO SOCIAL WORK  IP CONSULT TO VASCULAR ACCESS TEAM  IP CONSULT TO VASCULAR ACCESS TEAM      HPI:        Physical Exam at Discharge:  BP (!) 92/52   Pulse 83   Temp 97.5 °F (36.4 °C) (Temporal)   Resp 23   Ht 1.6 m (5' 2.99\")   Wt 57.8 kg (127 lb 6.8 oz)   SpO2 91%   BMI 22.58 kg/m²     69 y.o. female with pmhx of COPD, chronic hypoxic respiratory failure with tracheostomy, anxiety, takotsubo cardiomyopathy who presented to [facility] ED with complaint of shortness of breath   Pt does use vent at night and 5 L trach collar during daytime, reports 3-4 days of progressive dyspnea with mild cough but no fevers or chills  No increased sputum. No change in bladder or bowel habits  Apparently unable to come off vent during daytime and needing vent all day and sent her for eval. Reports not been on any meds for copd flare up this time     Pt seen mildly anxious on vent , awake, alert and oriented    Hospital Course    #Acute on Chronic Hypoxic Respiratory Failure with underlying chronic trach dependency   #COPD with  Get Your Medications        You can get these medications from any pharmacy    Bring a paper prescription for each of these medications  LORazepam 0.5 MG tablet  traMADol 50 MG tablet       Information about where to get these medications is not yet available    Ask your nurse or doctor about these medications  carvedilol 6.25 MG tablet  levoFLOXacin 750 MG tablet  lisinopril 10 MG tablet  polyethylene glycol 17 g packet  predniSONE 10 MG tablet           Discharge Condition/Location: Stable    Follow Up:  Follow up with PCP.    More than 30 mts spent. Limited long term prognosis.    FRANCISCO ASENCIO MD 1/17/2025 12:42 PM

## 2025-01-17 NOTE — PROGRESS NOTES
Care rounds completed with Dr. Wolf and multidisciplinary team. Reviewed labs, meds, VS, assessment, & plan of care for today. See dictated note and new orders for details.       Okay for discharge

## 2025-01-17 NOTE — PROGRESS NOTES
01/17/25 0311   Patient Observation   Pulse 90   Respirations 16   SpO2 92 %   Vent Patient Data (Readings)   Backup Apnea On   Backup Vt 400   Vent Alarm Settings   High Pressure (cmH2O) 60 cmH2O   Low Minute Volume (lpm) 3 L/min   High Minute Volume (lpm) 28 L/min   Low Exhaled Vt (ml) 200 mL   High Exhaled Vt (ml) 1000 mL   RR High (bpm) 45 br/min   Apnea (secs) 20 secs   Additional Respiratoray Assessments   Humidification Source HME   Circuit Condensation Not drained   Ambu Bag With Mask At Bedside Yes   Backup Trachs Available (Size) 4.0   Airway Clearance   Suction Device Inline suction catheter   Sputum Method Obtained Tracheal   Sputum Amount Small   Sputum Color/Odor Yellow   Sputum Consistency Thick   Surgical Airway  Shiley Cuffed   No placement date or time found.   Present on Admission/Arrival: Yes  Surgical Airway Type: Tracheostomy  Brand: Shiley  Style: Cuffed  Size: 4   Status Secured

## 2025-01-17 NOTE — CARE COORDINATION
DISCHARGE ORDER  Date/Time 2025 10:29 AM  Completed by: Teresa Boeck, RN, Case Management    Patient Name: Annie Tyler    : 1955      Admit order Date and Status: 01/10/25  Noted discharge order. (verify MD's last order for status of admission/Traditional Medicare 3 [place] Inpatient qualifying stay required for SNF)    Confirmed discharge plan with:              Patient:  Yes              When pt confirms DC plan does any support person need to be contacted by CM Yes if yes who daughter Thuy                      Discharge to Facility: [facility]   Facility phone number for staff giving report: [phone]   Pre-certification completed: NA   Hospital Exemption Notification (HENS) completed: NA   Discharge orders and Continuity of Care faxed to facility:  [facility]      Transportation:               Medical Transport explained with choice list offered to pt/family.                Choice:No(no preference)  Agency used: [facility]   time:   1:00      Pt/family/Nursing/Facility aware of  time:  Yes Names: Patient, Thuy/daughter, Enrique/Mya Resendiz,   Ambulance form completed:  yes:      Date Last IMM Given: 25    Comments:  Pt is being d/c'd to [facility] today. Pt's O2 sats are 93% on Trach collar 4L of oxygen.    Discharge timeout done with Charlotte GALVEZ. All discharge needs and concerns addressed.    Discharging nurse to complete CHIKI, reconcile AVS, and place final copy with patient's discharge packet. Discharging RN to ensure that written prescriptions for  Level II medications are sent with patient to the facility as per protocol.

## 2025-01-17 NOTE — PROGRESS NOTES
Pulmonary Progress Note  CC: SOB and pneumonia    Subjective:   TC 40% this am  AC at night   QTc 454 this am   No fever   Small creamy secretions       Intake/Output Summary (Last 24 hours) at 1/17/2025 0719  Last data filed at 1/17/2025 0500  Gross per 24 hour   Intake 1829.24 ml   Output 850 ml   Net 979.24 ml           EXAM: /68   Pulse 95   Temp 98.1 °F (36.7 °C) (Axillary)   Resp (!) 32   Ht 1.6 m (5' 2.99\")   Wt 57.8 kg (127 lb 6.8 oz)   SpO2 92%   BMI 22.58 kg/m²  on TC   Constitutional:  no acute distress.   Eyes: PERRL. Conjunctivae anicteric.   ENT: Normal nose. Normal tongue.    Neck:  Trach in place and is c/d/i  Respiratory: No accessory muscle usage.  decreased breath sounds. Few wheezes. No rales. Few Rhonchi.  Cardiovascular: Normal S1S2. No digit clubbing. No digit cyanosis. No LE edema.   Psychiatric: No anxiety or Agitation. Alert and Oriented to person, place and time.    Scheduled Meds:   carvedilol  6.25 mg Per G Tube BID WC    enoxaparin  40 mg SubCUTAneous Daily    predniSONE  20 mg Oral Daily    levoFLOXacin  750 mg Per G Tube Daily    lisinopril  10 mg Oral Daily    mupirocin   Each Nostril BID    aspirin  81 mg Oral Daily    escitalopram  20 mg Oral Nightly    busPIRone  10 mg Oral TID    atorvastatin  40 mg Oral Nightly    sodium chloride flush  5-40 mL IntraVENous 2 times per day    sodium chloride flush  5-40 mL IntraVENous 2 times per day    ipratropium 0.5 mg-albuterol 2.5 mg  1 Dose Inhalation 4x Daily RT    midazolam  2 mg IntraVENous Once     Continuous Infusions:   sodium chloride      sodium chloride       PRN Meds:  LORazepam, hydrOXYzine HCl, traMADol, sodium chloride flush, sodium chloride, potassium chloride **OR** potassium alternative oral replacement **OR** potassium chloride, sodium chloride flush, sodium chloride, magnesium sulfate, ondansetron **OR** ondansetron, polyethylene glycol, acetaminophen **OR** acetaminophen, ipratropium 0.5 mg-albuterol 2.5  mg    Labs:  CBC:   Recent Labs     01/15/25  0515 01/16/25  0505 01/17/25  0646   WBC 19.5* 25.0* 25.3*   HGB 8.0* 8.1* 9.0*   HCT 24.5* 25.5* 28.1*   MCV 81.6 82.5 85.5    416 352     BMP:   Recent Labs     01/15/25  0503 01/16/25  0505    144   K 3.4* 4.3    103   CO2 28 34*   BUN 12 11   CREATININE 0.5* 0.5*     Microbiology:  BAL 1/10 Pseudomonas Stenotrophomonas    Wash 1/10 Pseudomonas Stenotrophomonas Corynebacterium striatum        Imaging:  CXR 1/10 images independently reviewed by me and   Worsening left airspace disease concerning for pneumonia       CTPA 11/27   Advanced emphysema with some chronic scarring.  Right upper lobe 12 mm spiculated pulmonary nodules unchanged since prior scan.     ASSESSMENT:  [facility] acquired pneumonia- Pseudomonas Stenotrophomonas    Chronic dependence on mechanical ventilation via tracheostomy  Acute on chronic hypercarbic respiratory failure  End stage COPD with AE  Uncontrolled HTN   Leukocytosis-worsening.  Steroid is probably contributing  Uncontrolled Anxiety   RUL lung nodule, 12 mm and stable at least since June 2024     PLAN:  CATC seen during the daytime. vent support at night  MV per my orders  Routine tracheostomy care  Chemo D#4/14. Completed 5 days of Meropenem.   Prednisone 20--> 10 mg  Follow WBC - check in 1-2 days   Ativan PRN for anxiety   TFs through PEG with water flushes 100 Q3 hrs   Electrolytes replacement   Home medications were addressed - resume Lisinopril   Hydralazine 10 mg IV Q4 hrs PRN for SBP>160   CT chest 3 months follow-up nodule (Office is  notified)   Lovenox DVT prophylaxis   D/C planning is okay with pulmonary.   Discussed history, updates, management, and testing with the IM

## 2025-01-17 NOTE — PROGRESS NOTES
01/17/25 0859   Oxygen Therapy/Pulse Ox   SpO2 (!) 81 %     SpO2 81% on 60% CATC. I placed patient back on the ventilator. Cuff is inflated

## 2025-01-17 NOTE — PLAN OF CARE
Problem: Chronic Conditions and Co-morbidities  Goal: Patient's chronic conditions and co-morbidity symptoms are monitored and maintained or improved  Outcome: Progressing  Flowsheets (Taken 1/14/2025 1950 by Nirmala Mesa RN)  Care Plan - Patient's Chronic Conditions and Co-Morbidity Symptoms are Monitored and Maintained or Improved: Monitor and assess patient's chronic conditions and comorbid symptoms for stability, deterioration, or improvement     Problem: Discharge Planning  Goal: Discharge to home or other facility with appropriate resources  Outcome: Progressing  Flowsheets (Taken 1/16/2025 2307)  Discharge to home or other facility with appropriate resources:   Arrange for needed discharge resources and transportation as appropriate   Refer to discharge planning if patient needs post-hospital services based on physician order or complex needs related to functional status, cognitive ability or social support system     Problem: Skin/Tissue Integrity  Goal: Absence of new skin breakdown  Description: 1.  Monitor for areas of redness and/or skin breakdown  2.  Assess vascular access sites hourly  3.  Every 4-6 hours minimum:  Change oxygen saturation probe site  4.  Every 4-6 hours:  If on nasal continuous positive airway pressure, respiratory therapy assess nares and determine need for appliance change or resting period.  Outcome: Progressing

## 2025-01-17 NOTE — PROGRESS NOTES
Physician Progress Note      PATIENT:               JAKI STEIN  CSN #:                  [id number]  :                       1955  ADMIT DATE:       1/10/2025 11:16 AM  DISCH DATE:  RESPONDING  PROVIDER #:        Jean Manuel MD          QUERY TEXT:    Patient admitted with sepsis. Noted to have moderate malnutrition per dietary.   If possible, please document in progress notes and discharge summary if you   are evaluating and /or treating any of the following:    The medical record reflects the following:  Risk Factors: trach/peg status, acute illness,  Clinical Indicators: \"patient is malnourished r/t inadequate protein-energy   intake, unintended weight loss, and impaired respiratory function AEB NPO   status, weight loss PTA, and chronic trach + worsening SOB PTA\" noted per   dietary, mild fat and muscle loss, BMI 19.2  Treatment: dietary consult, tube feeds, nutritional supplements, I&O's,   supportive care    ASPEN Criteria:    https://aspenjournals.onlinelibrary.bunch.com/doi/full/10.1177/493827920445985  5  Options provided:  -- Protein calorie malnutrition moderate  -- Other - I will add my own diagnosis  -- Disagree - Not applicable / Not valid  -- Disagree - Clinically unable to determine / Unknown  -- Refer to Clinical Documentation Reviewer    PROVIDER RESPONSE TEXT:    This patient has moderate protein calorie malnutrition.    Query created by: Cherrie Nair on 1/15/2025 12:47 PM      Electronically signed by:  Jean Manuel MD 2025 8:29 AM

## 2025-01-17 NOTE — PROGRESS NOTES
RT Inhaler-Nebulizer Bronchodilator Protocol Note    There is a bronchodilator order in the chart from a provider indicating to follow the RT Bronchodilator Protocol and there is an “Initiate RT Inhaler-Nebulizer Bronchodilator Protocol” order as well (see protocol at bottom of note).    CXR Findings:  XR CHEST PORTABLE    Result Date: 1/16/2025  Persistent left basilar airspace disease with developing right basilar airspace disease, atelectasis versus pneumonia. Small bilateral pleural effusions, left greater than right.       The findings from the last RT Protocol Assessment were as follows:   History Pulmonary Disease: (P) Chronic pulmonary disease  Respiratory Pattern: (P) Use of accessory muscles, prolonged exhalation, or RR 26-30 bpm  Breath Sounds: (P) Intermittent or unilateral wheezes  Cough: (P) Strong, productive  Indication for Bronchodilator Therapy: (P) Decreased or absent breath sounds  Bronchodilator Assessment Score: (P) 13    Aerosolized bronchodilator medication orders have been revised according to the RT Inhaler-Nebulizer Bronchodilator Protocol below.    Respiratory Therapist to perform RT Therapy Protocol Assessment initially then follow the protocol.  Repeat RT Therapy Protocol Assessment PRN for score 0-3 or on second treatment, BID, and PRN for scores above 3.    No Indications - adjust the frequency to every 6 hours PRN wheezing or bronchospasm, if no treatments needed after 48 hours then discontinue using Per Protocol order mode.     If indication present, adjust the RT bronchodilator orders based on the Bronchodilator Assessment Score as indicated below.  Use Inhaler orders unless patient has one or more of the following: on home nebulizer, not able to hold breath for 10 seconds, is not alert and oriented, cannot activate and use MDI correctly, or respiratory rate 25 breaths per minute or more, then use the equivalent nebulizer order(s) with same Frequency and PRN reasons based on the

## 2025-01-17 NOTE — PROGRESS NOTES
Shift assessment completed.  Pt calm, restful.  Follows commands.  HR 76 Sinus rhythm.  Denies pain.  Trach/vent.  AC/VC 16, 400, 50%, +5.  Spo2 94%.  RR 24.   Call light in reach.

## 2025-01-17 NOTE — PROGRESS NOTES
Pt discharged.  Removed PIVs, WNL, tolerated well.   Report given to yovany, all questions answered. RT at bedside and pt placed on portable ventilator.     Report called and given to Mya Resendiz. Pt sent with all belongings (pajama pants). Yovany given all paperwork including ativan and tramadol prescriptions.

## 2025-01-17 NOTE — DISCHARGE INSTR - COC
Continuity of Care Form    Patient Name: Annie Stein   :  1955  MRN:  [id number]    Admit date:  1/10/2025  Discharge date:  1/10/25    Code Status Order: Full Code   Advance Directives:   Advance Care Flowsheet Documentation             Admitting Physician:  Fiorella Grayson DO  PCP: Bruce Nichols MD    Discharging Nurse: [facility] Unit/Room[facility] 3006/3006-01  Discharging Unit Phone Number: [phone]    Emergency Contact:   Extended Emergency Contact Information  Primary Emergency Contact: HUMERA STEIN  Mobile Phone: [phone]  Relation: Child  Secondary Emergency Contact: Nilda Stein [address]  Mobile Phone: [phone]  Relation: Child    Past Surgical History:  Past Surgical History:   Procedure Laterality Date    CARDIAC PROCEDURE N/A 2024    Left and right heart cath / coronary angiography performed by Merary Davis MD at [facility] CARDIAC CATH LAB    CHOLECYSTECTOMY      COLONOSCOPY         Immunization History:   Immunization History   Administered Date(s) Administered    Influenza Vaccine, unspecified formulation 2013, 10/26/2016    Influenza Virus Vaccine 2013, 10/26/2016, 2017    Influenza, FLUARIX, FLULAVAL, FLUZONE (age 6 mo+) and AFLURIA, (age 3 y+), Quadv PF, 0.5mL 10/26/2016, 2017, 2020, 2022    TDaP, ADACEL (age 10y-64y), BOOSTRIX (age 10y+), IM, 0.5mL 2017       Active Problems:  Patient Active Problem List   Diagnosis Code    Chest pain R07.9    Shortness of breath R06.02    Tobacco use Z72.0    Moderate COPD (chronic obstructive pulmonary disease) ([facility]) J44.9    COPD exacerbation ([facility]) J44.1    Acute respiratory failure with hypoxia J96.01    Hyponatremia E87.1    Pulmonary nodule R91.1    Pulmonary emphysema ([facility]) J43.9    Acute on chronic respiratory failure with hypoxia and hypercapnia J96.21, J96.22    Community acquired pneumonia of right lung J18.9    Acute exacerbation of chronic obstructive pulmonary  Collected: 08/28/22 0727  Result status: Final  Resulting lab: [facility]  Reference range: Not Detected                           Nurse Assessment:  Last Vital Signs: /65   Pulse 97   Temp 98.9 °F (37.2 °C) (Temporal)   Resp 14   Ht 1.6 m (5' 2.99\")   Wt 57.8 kg (127 lb 6.8 oz)   SpO2 93%   BMI 22.58 kg/m²     Last documented pain score (0-10 scale): Pain Level: 0  Last Weight:   Wt Readings from Last 1 Encounters:   01/16/25 57.8 kg (127 lb 6.8 oz)     Mental Status:  oriented, alert, coherent, and able to concentrate and follow conversation    IV Access:  - None    Nursing Mobility/ADLs:  Walking   Dependent  Transfer  Dependent  Bathing  Dependent  Dressing  Dependent  Toileting  Dependent  Feeding  Dependent  Med Admin  Dependent  Med Delivery   crushed via PEG tube    Wound Care Documentation and Therapy:        Elimination:  Continence:   Bowel: No  Bladder: No  Urinary Catheter: None   Colostomy/Ileostomy/Ileal Conduit: No       Date of Last BM: 01/17/25    Intake/Output Summary (Last 24 hours) at 1/17/2025 0931  Last data filed at 1/17/2025 0859  Gross per 24 hour   Intake 1671 ml   Output 1000 ml   Net 671 ml     I/O last 3 completed shifts:  In: 2582.5 [I.V.:319.5; NG/GT:2263]  Out: 1000 [Urine:1000]    Safety Concerns:     At Risk for Falls and Aspiration Risk    Impairments/Disabilities:      Speech and Vision    Nutrition Therapy:  Current Nutrition Therapy:   - Tube Feedings:  Standard with fiber    Routes of Feeding: Gastrostomy Tube  Liquids: No Liquids- NPO  Daily Fluid Restriction: no  Last Modified Barium Swallow with Video (Video Swallowing Test): not done, too anxious at the time    Treatments at the Time of Hospital Discharge:   Respiratory Treatments: ipratropium 0.5 mg-albuterol 2.5 mg (DUONEB) nebulizer solution 1 Dose 4 TIMES DAILY and ipratropium 0.5 mg-albuterol 2.5 mg (DUONEB) nebulizer solution 1 Dose  EVERY 4 HOURS PRN : Shortness of Breath

## 2025-01-17 NOTE — PROGRESS NOTES
Pt awake, restless, anxious.  Oral care given, repositioned, Pt nods head yes she needs something to help with her anxiety.  Ativan given as ordered.

## 2025-01-17 NOTE — PROGRESS NOTES
RT Inhaler-Nebulizer Bronchodilator Protocol Note    There is a bronchodilator order in the chart from a provider indicating to follow the RT Bronchodilator Protocol and there is an “Initiate RT Inhaler-Nebulizer Bronchodilator Protocol” order as well (see protocol at bottom of note).    CXR Findings:  XR CHEST PORTABLE    Result Date: 1/16/2025  Persistent left basilar airspace disease with developing right basilar airspace disease, atelectasis versus pneumonia. Small bilateral pleural effusions, left greater than right.       The findings from the last RT Protocol Assessment were as follows:   History Pulmonary Disease: Chronic pulmonary disease  Respiratory Pattern: Use of accessory muscles, prolonged exhalation, or RR 26-30 bpm  Breath Sounds: Intermittent or unilateral wheezes  Cough: Strong, productive  Indication for Bronchodilator Therapy: Decreased or absent breath sounds  Bronchodilator Assessment Score: 13    Aerosolized bronchodilator medication orders have been revised according to the RT Inhaler-Nebulizer Bronchodilator Protocol below.    Respiratory Therapist to perform RT Therapy Protocol Assessment initially then follow the protocol.  Repeat RT Therapy Protocol Assessment PRN for score 0-3 or on second treatment, BID, and PRN for scores above 3.    No Indications - adjust the frequency to every 6 hours PRN wheezing or bronchospasm, if no treatments needed after 48 hours then discontinue using Per Protocol order mode.     If indication present, adjust the RT bronchodilator orders based on the Bronchodilator Assessment Score as indicated below.  Use Inhaler orders unless patient has one or more of the following: on home nebulizer, not able to hold breath for 10 seconds, is not alert and oriented, cannot activate and use MDI correctly, or respiratory rate 25 breaths per minute or more, then use the equivalent nebulizer order(s) with same Frequency and PRN reasons based on the score.  If a patient is on

## 2025-01-17 NOTE — PROGRESS NOTES
01/16/25 2239   Patient Observation   Pulse 85   Respirations 15   SpO2 98 %   Patient Observations Shiley 4   Breath Sounds   Breath Sounds Bilateral Diminished   Vent Information   Vent Mode AC/VC   Ventilator Settings   Vt (Set, mL) 400 mL   Resp Rate (Set) 16 bpm   PEEP/CPAP (cmH2O) 5   FiO2  50 %   Pressure Support (cm H2O) 5 cm H2O   Vent Patient Data (Readings)   Vt (Measured) 502 mL   Peak Inspiratory Pressure (cmH2O) 17 cmH2O   Rate Measured 18 br/min   Minute Volume (L/min) 9.5 Liters   Peak Inspiratory Flow (lpm) 55 L/sec   Mean Airway Pressure (cmH2O) 7.1 cmH20   I:E Ratio 1:3.1   Flow Sensitivity 3 L/min   Backup Apnea On   Backup Rate 16 Breaths Per Minute   Vent Alarm Settings   High Pressure (cmH2O) 60 cmH2O   Low Minute Volume (lpm) 3 L/min   High Minute Volume (lpm) 28 L/min   Low Exhaled Vt (ml) 200 mL   High Exhaled Vt (ml) 1000 mL   RR High (bpm) 45 br/min   Apnea (secs) 20 secs   Additional Respiratoray Assessments   Humidification Source HME  (changed)   Circuit Condensation Drained   Ambu Bag With Mask At Bedside Yes   Backup Trachs Available (Size) 4.0   Airway Clearance   Suction Device Inline suction catheter   Sputum Method Obtained Tracheal   Sputum Amount Small   Sputum Color/Odor Yellow   Sputum Consistency Thick   Surgical Airway  Shiley Cuffed   No placement date or time found.   Present on Admission/Arrival: Yes  Surgical Airway Type: Tracheostomy  Brand: Sascha  Style: Cuffed  Size: 4   Status Secured   Site Assessment Clean;Dry;No drainage   Site Care Cleansed;Dried;Dressing applied   Inner Cannula Care Changed/new   Ties Assessment Clean;Dry   Cuff Pressure   (mov)   Spare Trach at Bedside Yes   Spare Trach Tube One Size Smaller at Bedside Yes   Ambu Bag With Mask at Bedside Yes

## 2025-01-30 NOTE — TELEPHONE ENCOUNTER
Tried to call patient, message states that the call cannot be completed at this time to try again later. Daughter phone said ekaterina full

## 2025-02-27 ENCOUNTER — HOSPITAL ENCOUNTER (EMERGENCY)
Age: 70
Discharge: NURSING, REHAB OR LONG-TERM CARE | End: 2025-02-28
Attending: STUDENT IN AN ORGANIZED HEALTH CARE EDUCATION/TRAINING PROGRAM
Payer: MEDICARE

## 2025-02-27 ENCOUNTER — APPOINTMENT (OUTPATIENT)
Dept: GENERAL RADIOLOGY | Age: 70
End: 2025-02-27
Payer: MEDICARE

## 2025-02-27 DIAGNOSIS — E86.0 DEHYDRATION: ICD-10-CM

## 2025-02-27 DIAGNOSIS — R06.03 RESPIRATORY DISTRESS: Primary | ICD-10-CM

## 2025-02-27 DIAGNOSIS — R91.1 LUNG NODULE: ICD-10-CM

## 2025-02-27 LAB
BASE EXCESS BLDV CALC-SCNC: 6.8 MMOL/L (ref -3–3)
CO2 BLDV-SCNC: 34 MMOL/L
COHGB MFR BLDV: 1.2 % (ref 0–1.5)
ECHO BSA: 1.45 M2
HCO3 BLDV-SCNC: 32.7 MMOL/L (ref 23–29)
METHGB MFR BLDV: 0.3 %
O2 CT VFR BLDV CALC: 9 VOL %
O2 THERAPY: ABNORMAL
PCO2 BLDV: 55 MMHG (ref 40–50)
PH BLDV: 7.39 [PH] (ref 7.35–7.45)
PO2 BLDV: 43.1 MMHG (ref 25–40)
SAO2 % BLDV: 78 %

## 2025-02-27 PROCEDURE — 71045 X-RAY EXAM CHEST 1 VIEW: CPT

## 2025-02-27 PROCEDURE — 80053 COMPREHEN METABOLIC PANEL: CPT

## 2025-02-27 PROCEDURE — 82803 BLOOD GASES ANY COMBINATION: CPT

## 2025-02-27 PROCEDURE — 85025 COMPLETE CBC W/AUTO DIFF WBC: CPT

## 2025-02-27 PROCEDURE — 99285 EMERGENCY DEPT VISIT HI MDM: CPT

## 2025-02-27 PROCEDURE — 87636 SARSCOV2 & INF A&B AMP PRB: CPT

## 2025-02-27 PROCEDURE — 83605 ASSAY OF LACTIC ACID: CPT

## 2025-02-27 PROCEDURE — 84484 ASSAY OF TROPONIN QUANT: CPT

## 2025-02-27 PROCEDURE — 36415 COLL VENOUS BLD VENIPUNCTURE: CPT

## 2025-02-27 PROCEDURE — 85379 FIBRIN DEGRADATION QUANT: CPT

## 2025-02-27 PROCEDURE — 93005 ELECTROCARDIOGRAM TRACING: CPT | Performed by: STUDENT IN AN ORGANIZED HEALTH CARE EDUCATION/TRAINING PROGRAM

## 2025-02-27 PROCEDURE — 83880 ASSAY OF NATRIURETIC PEPTIDE: CPT

## 2025-02-27 ASSESSMENT — PULMONARY FUNCTION TESTS: PIF_VALUE: 28

## 2025-02-28 ENCOUNTER — APPOINTMENT (OUTPATIENT)
Dept: CT IMAGING | Age: 70
End: 2025-02-28
Payer: MEDICARE

## 2025-02-28 VITALS
HEART RATE: 88 BPM | OXYGEN SATURATION: 97 % | DIASTOLIC BLOOD PRESSURE: 68 MMHG | RESPIRATION RATE: 20 BRPM | TEMPERATURE: 98 F | SYSTOLIC BLOOD PRESSURE: 105 MMHG

## 2025-02-28 LAB
ACANTHOCYTES BLD QL SMEAR: ABNORMAL
ALBUMIN SERPL-MCNC: 3 G/DL (ref 3.4–5)
ALBUMIN/GLOB SERPL: 0.9 {RATIO} (ref 1.1–2.2)
ALP SERPL-CCNC: 600 U/L (ref 40–129)
ALT SERPL-CCNC: 50 U/L (ref 10–40)
ANION GAP SERPL CALCULATED.3IONS-SCNC: 6 MMOL/L (ref 3–16)
AST SERPL-CCNC: 46 U/L (ref 15–37)
BASOPHILS # BLD: 0 K/UL (ref 0–0.2)
BASOPHILS NFR BLD: 0 %
BILIRUB SERPL-MCNC: 0.3 MG/DL (ref 0–1)
BUN SERPL-MCNC: 21 MG/DL (ref 7–20)
CALCIUM SERPL-MCNC: 9.8 MG/DL (ref 8.3–10.6)
CHLORIDE SERPL-SCNC: 91 MMOL/L (ref 99–110)
CO2 SERPL-SCNC: 36 MMOL/L (ref 21–32)
CREAT SERPL-MCNC: 0.5 MG/DL (ref 0.6–1.2)
D-DIMER QUANTITATIVE: >20 UG/ML FEU (ref 0–0.6)
DACRYOCYTES BLD QL SMEAR: ABNORMAL
DEPRECATED RDW RBC AUTO: 16.5 % (ref 12.4–15.4)
EKG ATRIAL RATE: 86 BPM
EKG DIAGNOSIS: NORMAL
EKG P AXIS: 54 DEGREES
EKG P-R INTERVAL: 126 MS
EKG Q-T INTERVAL: 358 MS
EKG QRS DURATION: 66 MS
EKG QTC CALCULATION (BAZETT): 428 MS
EKG R AXIS: -57 DEGREES
EKG T AXIS: 51 DEGREES
EKG VENTRICULAR RATE: 86 BPM
EOSINOPHIL # BLD: 0 K/UL (ref 0–0.6)
EOSINOPHIL NFR BLD: 0 %
FLUAV RNA RESP QL NAA+PROBE: NOT DETECTED
FLUBV RNA RESP QL NAA+PROBE: NOT DETECTED
GFR SERPLBLD CREATININE-BSD FMLA CKD-EPI: >90 ML/MIN/{1.73_M2}
GLUCOSE SERPL-MCNC: 119 MG/DL (ref 70–99)
HCT VFR BLD AUTO: 24.7 % (ref 36–48)
HGB BLD-MCNC: 7.8 G/DL (ref 12–16)
LACTATE BLDV-SCNC: 1.8 MMOL/L (ref 0.4–2)
LYMPHOCYTES # BLD: 0.9 K/UL (ref 1–5.1)
LYMPHOCYTES NFR BLD: 5 %
MCH RBC QN AUTO: 24.2 PG (ref 26–34)
MCHC RBC AUTO-ENTMCNC: 31.6 G/DL (ref 31–36)
MCV RBC AUTO: 76.6 FL (ref 80–100)
MONOCYTES # BLD: 1.5 K/UL (ref 0–1.3)
MONOCYTES NFR BLD: 8 %
NEUTROPHILS # BLD: 16.4 K/UL (ref 1.7–7.7)
NEUTROPHILS NFR BLD: 87 %
NT-PROBNP SERPL-MCNC: 368 PG/ML (ref 0–124)
OVALOCYTES BLD QL SMEAR: ABNORMAL
PATH INTERP BLD-IMP: YES
PLATELET # BLD AUTO: 479 K/UL (ref 135–450)
PLATELET BLD QL SMEAR: ADEQUATE
PMV BLD AUTO: 8 FL (ref 5–10.5)
POIKILOCYTOSIS BLD QL SMEAR: ABNORMAL
POLYCHROMASIA BLD QL SMEAR: ABNORMAL
POTASSIUM SERPL-SCNC: 4.5 MMOL/L (ref 3.5–5.1)
PROT SERPL-MCNC: 6.4 G/DL (ref 6.4–8.2)
RBC # BLD AUTO: 3.22 M/UL (ref 4–5.2)
REASON FOR REJECTION: NORMAL
REJECTED TEST: NORMAL
SARS-COV-2 RNA RESP QL NAA+PROBE: NOT DETECTED
SCHISTOCYTES BLD QL SMEAR: ABNORMAL
SLIDE REVIEW: ABNORMAL
SODIUM SERPL-SCNC: 133 MMOL/L (ref 136–145)
TARGETS BLD QL SMEAR: ABNORMAL
TROPONIN, HIGH SENSITIVITY: 41 NG/L (ref 0–14)
TROPONIN, HIGH SENSITIVITY: 42 NG/L (ref 0–14)
WBC # BLD AUTO: 18.8 K/UL (ref 4–11)

## 2025-02-28 PROCEDURE — 71260 CT THORAX DX C+: CPT

## 2025-02-28 PROCEDURE — 84484 ASSAY OF TROPONIN QUANT: CPT

## 2025-02-28 PROCEDURE — 36415 COLL VENOUS BLD VENIPUNCTURE: CPT

## 2025-02-28 PROCEDURE — 2580000003 HC RX 258: Performed by: STUDENT IN AN ORGANIZED HEALTH CARE EDUCATION/TRAINING PROGRAM

## 2025-02-28 PROCEDURE — 85379 FIBRIN DEGRADATION QUANT: CPT

## 2025-02-28 PROCEDURE — 93010 ELECTROCARDIOGRAM REPORT: CPT | Performed by: INTERNAL MEDICINE

## 2025-02-28 PROCEDURE — 96361 HYDRATE IV INFUSION ADD-ON: CPT

## 2025-02-28 PROCEDURE — 6360000004 HC RX CONTRAST MEDICATION: Performed by: STUDENT IN AN ORGANIZED HEALTH CARE EDUCATION/TRAINING PROGRAM

## 2025-02-28 PROCEDURE — 96360 HYDRATION IV INFUSION INIT: CPT

## 2025-02-28 RX ORDER — SODIUM CHLORIDE, SODIUM LACTATE, POTASSIUM CHLORIDE, AND CALCIUM CHLORIDE .6; .31; .03; .02 G/100ML; G/100ML; G/100ML; G/100ML
500 INJECTION, SOLUTION INTRAVENOUS ONCE
Status: COMPLETED | OUTPATIENT
Start: 2025-02-28 | End: 2025-02-28

## 2025-02-28 RX ORDER — IOPAMIDOL 755 MG/ML
85 INJECTION, SOLUTION INTRAVASCULAR
Status: COMPLETED | OUTPATIENT
Start: 2025-02-28 | End: 2025-02-28

## 2025-02-28 RX ADMIN — SODIUM CHLORIDE, POTASSIUM CHLORIDE, SODIUM LACTATE AND CALCIUM CHLORIDE 500 ML: 600; 310; 30; 20 INJECTION, SOLUTION INTRAVENOUS at 03:15

## 2025-02-28 RX ADMIN — IOPAMIDOL 85 ML: 755 INJECTION, SOLUTION INTRAVENOUS at 04:17

## 2025-02-28 ASSESSMENT — PULMONARY FUNCTION TESTS: PIF_VALUE: 32

## 2025-02-28 NOTE — ED NOTES
RT called to assist transporting patient to CT for ordered imaging. ED RN waiting for RT to arrive at bedside, will call CT when ready to bring patient for scans.

## 2025-02-28 NOTE — ED PROVIDER NOTES
[facility] EMERGENCY DEPARTMENT     EMERGENCY DEPARTMENT ENCOUNTER         Pt Name: Annie Tyler   MRN: [id number]   Birthdate 1955   Date of evaluation: 2/27/2025   Provider: Kedar East MD   PCP: Bruce Nichols MD   Note Started: 11:24 PM EST 2/27/25       Chief Complaint     Shortness of Breath (Pt arrives via EMS from facility for C/O shortness of breath with shallow respirations/wheezing per facility. EMS states, \"We suctioned her on our arrival and she improved after.\" Pt on trach, RT at bedside on patient arrival. Pt can nod/gesture appropriately, Cruzito BURKETT at bedside on arrival to ED. )      History of Present Illness     Annie Tyler is a 69 y.o. female who presents from nursing home with shortness of breath.  The patient is chronically ill she is trach to vent at all times also with PEG feeding tube.  Per EMS report and nursing home she developed some respiratory distress and EMS was called.  They did suction her and her breathing improved and she was transported with bag ventilations to tracheostomy.  She required no further interventions.  There is some indication she may also have some abdominal pain.  On my assessment she is alert she can nod to questions cannot provide further history.      Review of Systems     Positives and pertinent negatives as per HPI.    Past Medical, Surgical, Family, and Social History     She has a past medical history of Angina pectoris, Chronic pain, Congestive heart failure (HCC), COPD exacerbation (HCC), Depression, Essential hypertension, Panic disorder, Pneumonia, and Pulmonary emphysema (HCC).  She has a past surgical history that includes Cholecystectomy; Colonoscopy; and Cardiac procedure (N/A, 6/27/2024).  Her family history includes COPD in her mother; Hypertension in her mother.  She reports that she quit smoking about 4 years ago. Her smoking use included cigarettes. She started smoking about 56 years ago. She has a 104 pack-year

## 2025-02-28 NOTE — PROGRESS NOTES
02/27/25 2341   Patient Observation   Pulse 81   Respirations 21   SpO2 100 %   Patient Observations shiley 4 cuffed   Breath Sounds   Right Upper Lobe Diminished   Right Middle Lobe Diminished   Right Lower Lobe Diminished   Left Upper Lobe Diminished   Left Lower Lobe Diminished   Vent Information   Vent Mode AC/VC   $Ventilation $Initial Day   Ventilator Settings   FiO2  40 %   Vt (Set, mL) 400 mL   Resp Rate (Set) 16 bpm   PEEP/CPAP (cmH2O) 5   Vent Patient Data (Readings)   Vt (Measured) 412 mL   Peak Inspiratory Pressure (cmH2O) 28 cmH2O   Rate Measured 21 br/min   Minute Volume (L/min) 9.2 Liters   Peak Inspiratory Flow (lpm) 60 L/sec   Mean Airway Pressure (cmH2O) 9.9 cmH20   I:E Ratio 1:2.3   Flow Sensitivity 3 L/min   Vent Alarm Settings   High Pressure (cmH2O) 40 cmH2O   Low Minute Volume (lpm) 2.5 L/min   Low Exhaled Vt (ml) 250 mL   RR High (bpm) 40 br/min   Apnea (secs) 20 secs   Additional Respiratoray Assessments   Humidification Source HME   Ambu Bag With Mask At Bedside Yes   Backup Trachs Available (Size) 4.0   Airway Clearance   Suction Trach   Suction Device Inline suction catheter   Sputum Method Obtained Tracheal   Sputum Amount Small   Sputum Color/Odor White   Sputum Consistency Thick   Surgical Airway  Shiley Cuffed   No placement date or time found.   Surgical Airway Type: Tracheostomy  Brand: Sascha  Style: Cuffed  Size: 4   Status Secured   Site Assessment Clean;Dry   Site Care Cleansed;Dried;Dressing applied   Inner Cannula Care Changed/new   Ties Assessment Clean;Dry;Secure   Spare Trach at Bedside Yes   Spare Trach Tube One Size Smaller at Bedside No   Ambu Bag With Mask at Bedside Yes

## 2025-02-28 NOTE — ED NOTES
Report called to [facility], spoke with Cal- RN. All questions answered and informed that patient is on her way back at this time/tremaine webb RN

## 2025-02-28 NOTE — DISCHARGE INSTRUCTIONS
You were evaluated in the emergency department for respiratory distress. Assessments and testing completed during your visit were reassuring and at this time there is no indication for further testing, treatment or admission to the hospital. Given this it is appropriate to discharge you from the emergency department. At the time of discharge we discussed the following:    Please review this visit with primary doctor and also note that there were some markers of dehydration tonight which responded well to IV fluids.    Please note that sometimes it is difficult to diagnose a medical condition early in the disease process before the disease is fully manifest. Because of this, should you develop any new or worsening symptoms, you may return at any time to the emergency department for another evaluation. If available you are also recommended to review this visit with your primary care physician or other medical provider in the next 7 days. Thank you for allowing us to care for you today.

## 2025-02-28 NOTE — ED NOTES
Christina GALVEZ at [facility], patient's ECF updated on patient's status and plan of care at this time. No further questions at this time per ECF staff.

## 2025-02-28 NOTE — PROGRESS NOTES
Patient placed on ventilator on settings of 16/400/40%/peep of 5 shiley 4    decreased vent oxygen from 40% to 30%

## 2025-03-03 LAB — PATH INTERP BLD-IMP: NORMAL

## 2025-07-31 NOTE — PROGRESS NOTES
Marlon Pichardo MD to Me (Selected Message)        7/31/25  2:30 PM  It is reasonable for him to hold Eliquis for 2 days and then resume taking it. I'd also suggest having him follow-up closely with his PCP for additional evaluation of the bleeding and to make sure hemoglobin is stable. If he is have persistent/recurrent bleeding or other new symptoms such as fatigue, lightheadedness, shortness of breath, or low BP he should go to the ED for evaluation. Thank you!    7/31/25 2:50 pm  I called pt and he stated understanding.  He has appt with the gastro coming up that his pcp referred him.  He will hold the Eliquis for 2 days.  He said he is on a pulmonary medication that can cause intestinal bleeding.  His pulmonologist told him to hold that medication for a few days also.  He stated understanding about going to the ED if his symptoms got worse.  I also explained to him that Eliquis does not cause the bleeding but if he is bleeding from somewhere it will make it look worse.  He stated understanding.    He also said he has not had a CBC since 7/20 and it was 14.0 on the hemoglobin.  Do you still want him to have one done?  Please advise.  Andrade Gallegos, CMA    RT Inhaler-Nebulizer Bronchodilator Protocol Note    There is a bronchodilator order in the chart from a provider indicating to follow the RT Bronchodilator Protocol and there is an “Initiate RT Inhaler-Nebulizer Bronchodilator Protocol” order as well (see protocol at bottom of note).    CXR Findings:  No results found.    The findings from the last RT Protocol Assessment were as follows:   History Pulmonary Disease: (P) Chronic pulmonary disease  Respiratory Pattern: (P) Dyspnea on exertion or RR 21-25 bpm  Breath Sounds: (P) Intermittent or unilateral wheezes  Cough: (P) Strong, spontaneous, non-productive  Indication for Bronchodilator Therapy: Wheezing associated with pulm disorder  Bronchodilator Assessment Score: (P) 8    Aerosolized bronchodilator medication orders have been revised according to the RT Inhaler-Nebulizer Bronchodilator Protocol below.    Respiratory Therapist to perform RT Therapy Protocol Assessment initially then follow the protocol.  Repeat RT Therapy Protocol Assessment PRN for score 0-3 or on second treatment, BID, and PRN for scores above 3.    No Indications - adjust the frequency to every 6 hours PRN wheezing or bronchospasm, if no treatments needed after 48 hours then discontinue using Per Protocol order mode.     If indication present, adjust the RT bronchodilator orders based on the Bronchodilator Assessment Score as indicated below.  Use Inhaler orders unless patient has one or more of the following: on home nebulizer, not able to hold breath for 10 seconds, is not alert and oriented, cannot activate and use MDI correctly, or respiratory rate 25 breaths per minute or more, then use the equivalent nebulizer order(s) with same Frequency and PRN reasons based on the score.  If a patient is on this medication at home then do not decrease Frequency below that used at home.    0-3 - enter or revise RT bronchodilator order(s) to equivalent RT Bronchodilator order with Frequency of every 4  hours PRN for wheezing or increased work of breathing using Per Protocol order mode.        4-6 - enter or revise RT Bronchodilator order(s) to two equivalent RT bronchodilator orders with one order with BID Frequency and one order with Frequency of every 4 hours PRN wheezing or increased work of breathing using Per Protocol order mode.        7-10 - enter or revise RT Bronchodilator order(s) to two equivalent RT bronchodilator orders with one order with TID Frequency and one order with Frequency of every 4 hours PRN wheezing or increased work of breathing using Per Protocol order mode.       11-13 - enter or revise RT Bronchodilator order(s) to one equivalent RT bronchodilator order with QID Frequency and an Albuterol order with Frequency of every 4 hours PRN wheezing or increased work of breathing using Per Protocol order mode.      Greater than 13 - enter or revise RT Bronchodilator order(s) to one equivalent RT bronchodilator order with every 4 hours Frequency and an Albuterol order with Frequency of every 2 hours PRN wheezing or increased work of breathing using Per Protocol order mode.     RT to enter RT Home Evaluation for COPD & MDI Assessment order using Per Protocol order mode.    Electronically signed by Meghana Ware RCP on 1/25/2024 at 7:42 AM

## (undated) PROCEDURE — 5A1945Z RESPIRATORY VENTILATION, 24-96 CONSECUTIVE HOURS: ICD-10-PCS

## (undated) PROCEDURE — B2111ZZ FLUOROSCOPY OF MULTIPLE CORONARY ARTERIES USING LOW OSMOLAR CONTRAST: ICD-10-PCS

## (undated) PROCEDURE — 4A023N8 MEASUREMENT OF CARDIAC SAMPLING AND PRESSURE, BILATERAL, PERCUTANEOUS APPROACH: ICD-10-PCS

## (undated) DEVICE — 5FR CORDIS CATHETER MULTIPACK

## (undated) DEVICE — CATHETER PULM ART 5FR INFL 0.75CC L110CM BAL DIA8MM SGL WDG

## (undated) DEVICE — PINNACLE INTRODUCER SHEATH: Brand: PINNACLE

## (undated) DEVICE — DEVICE VASC CLSR 5FR GRY W/ INTEGR SEAL 10ML LOK SYR

## (undated) DEVICE — WIRE .025 SWAN J 3MM 150 CM

## (undated) DEVICE — 150CM STANDARD JWIRE

## (undated) DEVICE — CATH LAB PACK: Brand: MEDLINE INDUSTRIES, INC.

## (undated) DEVICE — Device